# Patient Record
Sex: MALE | Race: WHITE | NOT HISPANIC OR LATINO | Employment: UNEMPLOYED | URBAN - METROPOLITAN AREA
[De-identification: names, ages, dates, MRNs, and addresses within clinical notes are randomized per-mention and may not be internally consistent; named-entity substitution may affect disease eponyms.]

---

## 2017-04-20 DIAGNOSIS — J44.9 CHRONIC OBSTRUCTIVE PULMONARY DISEASE, UNSPECIFIED COPD TYPE (H): ICD-10-CM

## 2017-04-20 NOTE — TELEPHONE ENCOUNTER
albuterol (PROAIR HFA, PROVENTIL HFA, VENTOLIN HFA) 108 (90 BASE) MCG/ACT inhaler       Last Written Prescription Date: 3/25/16  Last Fill Quantity: 1, # refills: 6    Last Office Visit with G, P or [facility] prescribing provider:  3/25/16   Future Office Visit:       Date of Last Asthma Action Plan Letter:   There are no preventive care reminders to display for this patient.   Asthma Control Test: No flowsheet data found.    Date of Last Spirometry Test:   No results found for this or any previous visit.        \

## 2017-04-24 NOTE — TELEPHONE ENCOUNTER
Routing refill request to provider for review/approval because:  Patient needs to be seen because it has been more than 1 year since last office visit.    Thank you  Shalini THEODORE RN

## 2017-04-25 RX ORDER — ALBUTEROL SULFATE 90 UG/1
AEROSOL, METERED RESPIRATORY (INHALATION)
Qty: 8.5 G | Refills: 0 | Status: SHIPPED | OUTPATIENT
Start: 2017-04-25 | End: 2017-11-13

## 2017-04-25 NOTE — TELEPHONE ENCOUNTER
Pt notified and is waiting for physical to be seen   But knows he needs to be seen for refills     Rebekah Galvez  [facility] [place]

## 2017-05-12 ENCOUNTER — OFFICE VISIT (OUTPATIENT)
Dept: FAMILY MEDICINE | Facility: CLINIC | Age: 52
End: 2017-05-12
Payer: COMMERCIAL

## 2017-05-12 VITALS
BODY MASS INDEX: 19.97 KG/M2 | HEART RATE: 76 BPM | SYSTOLIC BLOOD PRESSURE: 124 MMHG | DIASTOLIC BLOOD PRESSURE: 70 MMHG | OXYGEN SATURATION: 94 % | TEMPERATURE: 98.4 F | WEIGHT: 117 LBS | HEIGHT: 64 IN

## 2017-05-12 DIAGNOSIS — I10 ESSENTIAL HYPERTENSION, BENIGN: ICD-10-CM

## 2017-05-12 DIAGNOSIS — J44.9 CHRONIC OBSTRUCTIVE PULMONARY DISEASE, UNSPECIFIED COPD TYPE (H): Primary | ICD-10-CM

## 2017-05-12 DIAGNOSIS — Z12.11 SPECIAL SCREENING FOR MALIGNANT NEOPLASMS, COLON: ICD-10-CM

## 2017-05-12 DIAGNOSIS — J43.2 CENTRILOBULAR EMPHYSEMA (H): ICD-10-CM

## 2017-05-12 LAB
ANION GAP SERPL CALCULATED.3IONS-SCNC: 9 MMOL/L (ref 3–14)
BUN SERPL-MCNC: 17 MG/DL (ref 7–30)
CALCIUM SERPL-MCNC: 9.5 MG/DL (ref 8.5–10.1)
CHLORIDE SERPL-SCNC: 100 MMOL/L (ref 94–109)
CO2 SERPL-SCNC: 28 MMOL/L (ref 20–32)
CREAT SERPL-MCNC: 0.76 MG/DL (ref 0.66–1.25)
GFR SERPL CREATININE-BSD FRML MDRD: NORMAL ML/MIN/1.7M2
GLUCOSE SERPL-MCNC: 92 MG/DL (ref 70–99)
POTASSIUM SERPL-SCNC: 4.3 MMOL/L (ref 3.4–5.3)
SODIUM SERPL-SCNC: 137 MMOL/L (ref 133–144)

## 2017-05-12 PROCEDURE — 36415 COLL VENOUS BLD VENIPUNCTURE: CPT | Performed by: FAMILY MEDICINE

## 2017-05-12 PROCEDURE — 99214 OFFICE O/P EST MOD 30 MIN: CPT | Performed by: FAMILY MEDICINE

## 2017-05-12 PROCEDURE — 80048 BASIC METABOLIC PNL TOTAL CA: CPT | Performed by: FAMILY MEDICINE

## 2017-05-12 RX ORDER — ATENOLOL 25 MG/1
25 TABLET ORAL DAILY
Qty: 90 TABLET | Refills: 3 | Status: SHIPPED | OUTPATIENT
Start: 2017-05-12 | End: 2018-09-04

## 2017-05-12 RX ORDER — AMLODIPINE BESYLATE 10 MG/1
10 TABLET ORAL DAILY
Qty: 90 TABLET | Refills: 3 | Status: SHIPPED | OUTPATIENT
Start: 2017-05-12 | End: 2018-09-04

## 2017-05-12 RX ORDER — LISINOPRIL 40 MG/1
TABLET ORAL
Qty: 135 TABLET | Refills: 3 | Status: SHIPPED | OUTPATIENT
Start: 2017-05-12 | End: 2018-09-04

## 2017-05-12 RX ORDER — ALBUTEROL SULFATE 90 UG/1
2 AEROSOL, METERED RESPIRATORY (INHALATION) EVERY 6 HOURS PRN
Qty: 1 INHALER | Refills: 10 | Status: SHIPPED | OUTPATIENT
Start: 2017-05-12 | End: 2017-06-27

## 2017-05-12 NOTE — MR AVS SNAPSHOT
After Visit Summary   5/12/2017    Luis Hernandez    MRN: [id number]           Patient Information     Date Of Birth          1965        Visit Information        Provider Department      5/12/2017 10:20 AM Dean Mariee MD [facility]        Today's Diagnoses     Chronic obstructive pulmonary disease, unspecified COPD type (H)    -  1    Centrilobular emphysema (H)        BENIGN HYPERTENSION        Special screening for malignant neoplasms, colon          Care Instructions    Please go to lab.    If you are finding out that you are having more shortness of breath, try using the albuterol prior to activity or let me know and I can increase the dose of the advair to 500/50.    Please get your colonoscopy done this summer.          Thank you for choosing [facility].  You may be receiving a survey in the mail from [employer] regarding your visit today.  Please take a few minutes to complete and return the survey to let us know how we are doing.      If you have questions or concerns, please contact us via [employer] or you can contact your care team at [phone].    Our Clinic hours are:  Monday 6:40 am  to 7:00 pm  Tuesday -Friday 6:40 am to 5:00 pm    The [place] outpatient lab hours are:  Monday - Friday 6:10 am to 4:45 pm  Saturdays 7:00 am to 11:00 am  Appointments are required, call [phone]    If you have clinical questions after hours or would like to schedule an appointment,  call the clinic at [phone].          Follow-ups after your visit        Additional Services     GASTROENTEROLOGY ADULT REF PROCEDURE ONLY       Last Lab Result: Creatinine (mg/dL)       Date                     Value                 03/25/2016               0.72             ----------  Body mass index is 20.24 kg/(m^2).     Needed:  No  Language:  English    Patient will be contacted to schedule procedure.     Please be aware that coverage of these services is subject to the  "terms and limitations of your health insurance plan.  Call member services at your health plan with any benefit or coverage questions.  Any procedures must be performed at a [facility] facility OR coordinated by your clinic's referral office.    Please bring the following with you to your appointment:    (1) Any X-Rays, CTs or MRIs which have been performed.  Contact the facility where they were done to arrange for  prior to your scheduled appointment.    (2) List of current medications   (3) This referral request   (4) Any documents/labs given to you for this referral                  Who to contact     If you have questions or need follow up information about today's clinic visit or your schedule please contact [facility] directly at [phone].  Normal or non-critical lab and imaging results will be communicated to you by MyChart, letter or phone within 4 business days after the clinic has received the results. If you do not hear from us within 7 days, please contact the clinic through "[employer]hart or phone. If you have a critical or abnormal lab result, we will notify you by phone as soon as possible.  Submit refill requests through [employer] or call your pharmacy and they will forward the refill request to us. Please allow 3 business days for your refill to be completed.          Additional Information About Your Visit        "[email or web address] Information     [employer] lets you send messages to your doctor, view your test results, renew your prescriptions, schedule appointments and more. To sign up, go to [email or web address] . Click on \"Log in\" on the left side of the screen, which will take you to the Welcome page. Then click on \"Sign up Now\" on the right side of the page.     You will be asked to enter the access code listed below, as well as some personal information. Please follow the directions to create your username and password.     Your access code is: [email or web address]  Expires: 8/10/2017 10:52 AM   " "  Your access code will  in 90 days. If you need help or a new code, please call your [facility] clinic or [phone].        Care EveryWhere ID     This is your Care EveryWhere ID. This could be used by other organizations to access your [facility] medical records  [id number]        Your Vitals Were     Pulse Temperature Height Pulse Oximetry BMI (Body Mass Index)       76 98.4  F (36.9  C) (Tympanic) 5' 3.75\" (1.619 m) 94% 20.24 kg/m2        Blood Pressure from Last 3 Encounters:   17 124/70   16 132/84   16 139/85    Weight from Last 3 Encounters:   17 117 lb (53.1 kg)   16 126 lb 12.8 oz (57.5 kg)   16 126 lb 8 oz (57.4 kg)              We Performed the Following     Basic metabolic panel     GASTROENTEROLOGY ADULT REF PROCEDURE ONLY          Today's Medication Changes          These changes are accurate as of: 17 10:52 AM.  If you have any questions, ask your nurse or doctor.               These medicines have changed or have updated prescriptions.        Dose/Directions    * albuterol 108 (90 BASE) MCG/ACT Inhaler   This may have changed:  Another medication with the same name was added. Make sure you understand how and when to take each.   Used for:  Chronic obstructive pulmonary disease, unspecified COPD type (H)   Generic drug:  albuterol   Changed by:  Dean Mariee MD        INHALE 2 PUFFS INTO THE LUNGS EVERY 4 HOURS AS NNEDED FOR SHORTNESS OF BREATH / DYSPNEA   Quantity:  8.5 g   Refills:  0       * albuterol 108 (90 BASE) MCG/ACT Inhaler   Commonly known as:  PROAIR HFA/PROVENTIL HFA/VENTOLIN HFA   This may have changed:  You were already taking a medication with the same name, and this prescription was added. Make sure you understand how and when to take each.   Used for:  Chronic obstructive pulmonary disease, unspecified COPD type (H), Centrilobular emphysema (H)   Changed by:  Dean Mariee MD        Dose:  2 puff   Inhale 2 puffs into the " lungs every 6 hours as needed for shortness of breath / dyspnea or wheezing   Quantity:  1 Inhaler   Refills:  10       * Notice:  This list has 2 medication(s) that are the same as other medications prescribed for you. Read the directions carefully, and ask your doctor or other care provider to review them with you.         Where to get your medicines      These medications were sent to [id number] - [place], [place] - [address]  [address] [address]     Phone:  [phone]     albuterol 108 (90 BASE) MCG/ACT Inhaler    amLODIPine 10 MG tablet    atenolol 25 MG tablet    fluticasone-salmeterol 250-50 MCG/DOSE diskus inhaler    lisinopril 40 MG tablet                Primary Care Provider Office Phone # Fax #    Dean Mariee [phone]       [facility] [address] [address]        Thank you!     Thank you for choosing [facility]  for your care. Our goal is always to provide you with excellent care. Hearing back from our patients is one way we can continue to improve our services. Please take a few minutes to complete the written survey that you may receive in the mail after your visit with us. Thank you!             Your Updated Medication List - Protect others around you: Learn how to safely use, store and throw away your medicines at www.disposemymeds.org.          This list is accurate as of: 5/12/17 10:52 AM.  Always use your most recent med list.                   Brand Name Dispense Instructions for use    * albuterol 108 (90 BASE) MCG/ACT Inhaler   Generic drug:  albuterol     8.5 g    INHALE 2 PUFFS INTO THE LUNGS EVERY 4 HOURS AS NNEDED FOR SHORTNESS OF BREATH / DYSPNEA       * albuterol 108 (90 BASE) MCG/ACT Inhaler    PROAIR HFA/PROVENTIL HFA/VENTOLIN HFA    1 Inhaler    Inhale 2 puffs into the lungs every 6 hours as needed for shortness of breath / dyspnea or wheezing       amLODIPine 10 MG  tablet    NORVASC    90 tablet    Take 1 tablet (10 mg) by mouth daily       atenolol 25 MG tablet    TENORMIN    90 tablet    Take 1 tablet (25 mg) by mouth daily       fluticasone-salmeterol 250-50 MCG/DOSE diskus inhaler    ADVAIR    3 Inhaler    Inhale 1 puff into the lungs every 12 hours       lisinopril 40 MG tablet    PRINIVIL/ZESTRIL    135 tablet    Take 1.5 pills, or 60 mg daily       * Notice:  This list has 2 medication(s) that are the same as other medications prescribed for you. Read the directions carefully, and ask your doctor or other care provider to review them with you.

## 2017-05-12 NOTE — PROGRESS NOTES
SUBJECTIVE:                                                    Luis Hernandez is a 51 year old male who presents to clinic today for the following health issues:      Hypertension Follow-up      Outpatient blood pressures are not being checked.    Low Salt Diet: no added salt     COPD Follow-Up    Symptoms are currently: stable    Current fatigue or dyspnea with ambulation: stable     Shortness of breath: stable    Increased or change in Cough/Sputum: Yes-  Some phlegm lately    Fever(s): No    Baseline ambulation without stopping to rest 1-2 feet, blocks. Able to walk up 2-3 flights of stairs without stopping to rest.    Any ER/UC or hospital admissions since your last visit? No     History   Smoking Status     Former Smoker     Packs/day: 1.00     Years: 20.00     Types: Cigarettes   Smokeless Tobacco     Former User     Comment: quitting using the patch     No results found for: FEV1, QLY6QAZ       Amount of exercise or physical activity: walks daily with job    Problems taking medications regularly: No    Medication side effects: none    Diet: low salt          Problem list and histories reviewed & adjusted, as indicated.  Additional history: as documented        Reviewed and updated as needed this visit by clinical staff  Tobacco  Allergies  Med Hx  Surg Hx  Fam Hx  Soc Hx      Reviewed and updated as needed this visit by Provider         ROS:  CONSTITUTIONAL:NEGATIVE for fever, chills, change in weight  INTEGUMENTARY/SKIN: NEGATIVE for worrisome rashes, moles or lesions  RESP:sometimes has some increase work usually during the winter months.  CV: NEGATIVE for chest pain, palpitations or peripheral edema  MUSCULOSKELETAL: NEGATIVE for significant arthralgias or myalgia  NEURO: NEGATIVE for weakness, dizziness or paresthesias  PSYCHIATRIC: NEGATIVE for changes in mood or affect    OBJECTIVE:                                                    /70 (Cuff Size: Adult Regular)  Pulse 76  Temp 98.4  F  "(36.9  C) (Tympanic)  Ht 5' 3.75\" (1.619 m)  Wt 117 lb (53.1 kg)  SpO2 94%  BMI 20.24 kg/m2  Body mass index is 20.24 kg/(m^2).  GENERAL APPEARANCE: alert, no distress and cooperative  RESP: clear but some decrease in breath sounds  CV: regular rates and rhythm, normal S1 S2, no S3 or S4 and no murmur, click or rub  MS: extremities normal- no gross deformities noted  SKIN: no suspicious lesions or rashes  NEURO: Normal strength and tone, mentation intact and speech normal  PSYCH: mentation appears normal and affect normal/bright         ASSESSMENT/PLAN:                                                    1. Chronic obstructive pulmonary disease, unspecified COPD type (H)  Stable however discussed increasing strength of advair if worse  - albuterol (PROAIR HFA/PROVENTIL HFA/VENTOLIN HFA) 108 (90 BASE) MCG/ACT Inhaler; Inhale 2 puffs into the lungs every 6 hours as needed for shortness of breath / dyspnea or wheezing  Dispense: 1 Inhaler; Refill: 10    2. Centrilobular emphysema (H)  noted  - fluticasone-salmeterol (ADVAIR) 250-50 MCG/DOSE diskus inhaler; Inhale 1 puff into the lungs every 12 hours  Dispense: 3 Inhaler; Refill: 3  - albuterol (PROAIR HFA/PROVENTIL HFA/VENTOLIN HFA) 108 (90 BASE) MCG/ACT Inhaler; Inhale 2 puffs into the lungs every 6 hours as needed for shortness of breath / dyspnea or wheezing  Dispense: 1 Inhaler; Refill: 10    3. BENIGN HYPERTENSION  controlled  - lisinopril (PRINIVIL/ZESTRIL) 40 MG tablet; Take 1.5 pills, or 60 mg daily  Dispense: 135 tablet; Refill: 3  - atenolol (TENORMIN) 25 MG tablet; Take 1 tablet (25 mg) by mouth daily  Dispense: 90 tablet; Refill: 3  - amLODIPine (NORVASC) 10 MG tablet; Take 1 tablet (10 mg) by mouth daily  Dispense: 90 tablet; Refill: 3  - Basic metabolic panel    4. Special screening for malignant neoplasms, colon  Needs to have screening done  - GASTROENTEROLOGY ADULT REF PROCEDURE ONLY      See Patient Instructions    Dean Mariee MD  [place] [facility]

## 2017-05-12 NOTE — PATIENT INSTRUCTIONS
Please go to lab.    If you are finding out that you are having more shortness of breath, try using the albuterol prior to activity or let me know and I can increase the dose of the advair to 500/50.    Please get your colonoscopy done this summer.          Thank you for choosing [facility].  You may be receiving a survey in the mail from [employer] regarding your visit today.  Please take a few minutes to complete and return the survey to let us know how we are doing.      If you have questions or concerns, please contact us via [employer] or you can contact your care team at [phone].    Our Clinic hours are:  Monday 6:40 am  to 7:00 pm  Tuesday -Friday 6:40 am to 5:00 pm    The [place] outpatient lab hours are:  Monday - Friday 6:10 am to 4:45 pm  Saturdays 7:00 am to 11:00 am  Appointments are required, call [phone]    If you have clinical questions after hours or would like to schedule an appointment,  call the clinic at [phone].

## 2017-05-12 NOTE — LETTER
[facility]  [address] [address]  Phone: [phone]    May 15, 2017    Luis KATY Hernandez  [address] [address]          Dear Mr. Hernandez,    The results of your recent lab tests were:    He has normal kidney function.        Enclosed is a copy of these results.  If you have any further questions or problems, please contact our office.        Sincerely,      Dean Mariee MD / DARRYL

## 2017-06-27 ENCOUNTER — TELEPHONE (OUTPATIENT)
Dept: FAMILY MEDICINE | Facility: CLINIC | Age: 52
End: 2017-06-27

## 2017-06-27 DIAGNOSIS — J44.9 CHRONIC OBSTRUCTIVE PULMONARY DISEASE, UNSPECIFIED COPD TYPE (H): ICD-10-CM

## 2017-06-27 DIAGNOSIS — J43.2 CENTRILOBULAR EMPHYSEMA (H): ICD-10-CM

## 2017-06-27 RX ORDER — ALBUTEROL SULFATE 90 UG/1
2 AEROSOL, METERED RESPIRATORY (INHALATION) EVERY 6 HOURS PRN
Qty: 1 INHALER | Refills: 10 | Status: SHIPPED | OUTPATIENT
Start: 2017-06-27 | End: 2018-07-27

## 2017-06-27 NOTE — TELEPHONE ENCOUNTER
Patient returned our call, and I gave him the message.  [facility]  Clinic [facility]  [place]

## 2017-06-27 NOTE — TELEPHONE ENCOUNTER
S-(situation): Patient reports that he has used his ventolin inhaler x2 since it was prescribed and it has made his tongue swell up both times he has used it.      B-(background): Patient states that he picked up a ventolin inhaler from the pharmacy 2 weeks ago.  Both times he has used it his tongue has swelled up.  This happened most recently last night.  No other recent changes to medications or no new foods.  Patient did not seek emergency care either time or take benadryl.  Symptoms resolved on their own.  Patient would like to switch back to the albuterol inhaler he used previously.    A-(assessment): Patient having tongue swelling from ventolin inhaler.  Called pharmacy and patient was previously dispensed proair, but insurance no longer covers this.    R-(recommendations): Order pended for albuterol specifically asking for proair to be dispensed.    Judie Yoo RN

## 2017-06-27 NOTE — TELEPHONE ENCOUNTER
Reason for Call:  Other med request    Detailed comments: Patient states he is reacting to Ventolin and wants to go back to Albuterol please.  He states the Ventolin makes his tongue swell.    Phone Number Patient can be reached at: Home number on file [phone] (home)    Best Time: any    Can we leave a detailed message on this number? YES    Call taken on 6/27/2017 at 7:59 AM by Jocelyn Adams

## 2017-06-27 NOTE — TELEPHONE ENCOUNTER
Left message for patient to return call to clinic.  Prescription for proair was sent to the pharmacy this is the form of albuterol that he was on previously.    Judie Yoo RN

## 2017-11-08 ENCOUNTER — TELEPHONE (OUTPATIENT)
Dept: FAMILY MEDICINE | Facility: CLINIC | Age: 52
End: 2017-11-08

## 2017-11-08 DIAGNOSIS — J43.2 CENTRILOBULAR EMPHYSEMA (H): ICD-10-CM

## 2017-11-08 NOTE — TELEPHONE ENCOUNTER
Last OV 5/12/17 with Dr. Mariee.  Patient instructions from that OV are copied below    Instructions   Patient Instructions    Please go to lab.     If you are finding out that you are having more shortness of breath, try using the albuterol prior to activity or let me know and I can increase the dose of the advair to 500/50.     Please get your colonoscopy done this summer.        Order pended.    Judie Yoo RN

## 2017-11-08 NOTE — TELEPHONE ENCOUNTER
Patient reports Dr. Mariee told patient at last appointment that if patient would like a stronger dose to let us know. Patient is requesting a stronger dose with this refill.    fluticasone-salmeterol (ADVAIR) 250-50 MCG/DOSE diskus inhaler       Last Written Prescription Date: 5/12/17  Last Fill Quantity: 3, # refills: 3    Last Office Visit with G, P or [facility] prescribing provider:  5/12/17   Future Office Visit:

## 2017-11-09 NOTE — TELEPHONE ENCOUNTER
Left him a message to check with the pharmacy for something that he requested and plan to see primary before a refill will be needed.     Left our number to call if he has questions.     Nisha Miller RNC

## 2017-11-13 ENCOUNTER — OFFICE VISIT (OUTPATIENT)
Dept: FAMILY MEDICINE | Facility: CLINIC | Age: 52
End: 2017-11-13
Payer: COMMERCIAL

## 2017-11-13 ENCOUNTER — RADIANT APPOINTMENT (OUTPATIENT)
Dept: GENERAL RADIOLOGY | Facility: CLINIC | Age: 52
End: 2017-11-13
Attending: FAMILY MEDICINE
Payer: COMMERCIAL

## 2017-11-13 VITALS
OXYGEN SATURATION: 98 % | BODY MASS INDEX: 19.5 KG/M2 | TEMPERATURE: 98.1 F | HEART RATE: 76 BPM | WEIGHT: 114.2 LBS | HEIGHT: 64 IN | DIASTOLIC BLOOD PRESSURE: 76 MMHG | SYSTOLIC BLOOD PRESSURE: 120 MMHG

## 2017-11-13 DIAGNOSIS — J44.1 COPD EXACERBATION (H): ICD-10-CM

## 2017-11-13 DIAGNOSIS — Z23 NEED FOR PROPHYLACTIC VACCINATION AND INOCULATION AGAINST INFLUENZA: ICD-10-CM

## 2017-11-13 DIAGNOSIS — J43.2 CENTRILOBULAR EMPHYSEMA (H): ICD-10-CM

## 2017-11-13 DIAGNOSIS — R06.2 WHEEZING: ICD-10-CM

## 2017-11-13 DIAGNOSIS — J43.2 CENTRILOBULAR EMPHYSEMA (H): Primary | ICD-10-CM

## 2017-11-13 PROCEDURE — 71020 XR CHEST 2 VW: CPT

## 2017-11-13 PROCEDURE — 90471 IMMUNIZATION ADMIN: CPT | Performed by: FAMILY MEDICINE

## 2017-11-13 PROCEDURE — 90686 IIV4 VACC NO PRSV 0.5 ML IM: CPT | Performed by: FAMILY MEDICINE

## 2017-11-13 PROCEDURE — 99214 OFFICE O/P EST MOD 30 MIN: CPT | Mod: 25 | Performed by: FAMILY MEDICINE

## 2017-11-13 RX ORDER — DOXYCYCLINE 100 MG/1
100 CAPSULE ORAL 2 TIMES DAILY
Qty: 20 CAPSULE | Refills: 0 | Status: SHIPPED | OUTPATIENT
Start: 2017-11-13 | End: 2017-12-05

## 2017-11-13 RX ORDER — PREDNISONE 10 MG/1
40 TABLET ORAL DAILY
Qty: 20 TABLET | Refills: 0 | Status: SHIPPED | OUTPATIENT
Start: 2017-11-13 | End: 2017-12-05

## 2017-11-13 NOTE — LETTER
[facility]  [address] [address]  Phone: [phone]      November 13, 2017      RE: Luis Hernandez  [address] [address]        To whom it may concern:    Luis Hernandez is under my professional care he was seen in clinic today 11/13/2017. The employee is UNABLE to return to work until 11/14/2017, may return to his regular shift.      Sincerely,    Dean Mariee MD

## 2017-11-13 NOTE — NURSING NOTE
"Chief Complaint   Patient presents with     Chest Pain     COPD     follow up      Flu Shot     Health Maintenance     Patient reminded due for Colonoscopy, he has info at home.        Initial BP (!) 122/92 (BP Location: Right arm, Patient Position: Chair, Cuff Size: Adult Regular)  Pulse 76  Temp 98.1  F (36.7  C) (Tympanic)  Ht 5' 3.75\" (1.619 m)  Wt 114 lb 3.2 oz (51.8 kg)  SpO2 98%  BMI 19.76 kg/m2 Estimated body mass index is 19.76 kg/(m^2) as calculated from the following:    Height as of this encounter: 5' 3.75\" (1.619 m).    Weight as of this encounter: 114 lb 3.2 oz (51.8 kg).  Medication Reconciliation: complete    "

## 2017-11-13 NOTE — PROGRESS NOTES
"  SUBJECTIVE:   Luis Hernandez is a 52 year old male who presents to clinic today for the following health issues:    Chief Complaint   Patient presents with     Chest Pain     COPD     follow up      Flu Shot     Health Maintenance     Patient reminded due for Colonoscopy, he has info at home.        COPD Follow-Up    Symptoms are currently: slightly worsened    Current fatigue or dyspnea with ambulation: worsened from baseline    Shortness of breath: slightly worsened    Increased or change in Cough/Sputum: No    Fever(s): No    Baseline ambulation without stopping to rest:  2 blocks. Able to walk up 2 flights of stairs without stopping to rest.    Any ER/UC or hospital admissions since your last visit? No     History   Smoking Status     Former Smoker     Packs/day: 1.00     Years: 20.00     Types: Cigarettes   Smokeless Tobacco     Former User     Comment: quitting using the patch     No results found for: FEV1, [id number]      Amount of exercise or physical activity: \" just his job\"    Problems taking medications regularly: No    Medication side effects: none    Diet: regular (no restrictions)    CHEST PAIN     Onset: 1 week ago     Description:   Location:  right side- Upper chest   Character: sharp  Radiation: none   Duration:Constant at work- worse when at work up moving around . Fine if he is at home not up and moving .      Intensity: moderate    Progression of Symptoms:  same    Accompanying Signs & Symptoms:  Shortness of breath: no  Sweating: no  Nausea/vomiting: no  Lightheadedness: no  Palpitations: not applicable  Fever/Chills: no  Cough: no  Heartburn: no    History:   Family history of heart disease YES- Paternal Grandmother     Tobacco use: no    Precipitating factors:   Worse with exertion: YES  Worse with deep breaths :  YES, and worse with heavy breathing   Also worse with coughing   Related to food: no    Alleviating factors:  Rest       Therapies Tried and outcome: Rest- helps     patient has " "right sided chest pain with activity for the past week.  He also has copd, just had advair increased started the higher dose last night.  He is active at work.          Problem list and histories reviewed & adjusted, as indicated.  Additional history: as documented        Reviewed and updated as needed this visit by clinical staff  Tobacco  Allergies  Meds  Med Hx  Surg Hx  Fam Hx  Soc Hx      Reviewed and updated as needed this visit by Provider         ROS:  CONSTITUTIONAL:NEGATIVE for fever, chills, change in weight  INTEGUMENTARY/SKIN: NEGATIVE for worrisome rashes, moles or lesions  ENT/MOUTH: NEGATIVE for ear, mouth and throat problems  RESP:has wheeze, worse, known copd  CV:right sided pain  MUSCULOSKELETAL: NEGATIVE for significant arthralgias or myalgia  NEURO: NEGATIVE for weakness, dizziness or paresthesias  PSYCHIATRIC: NEGATIVE for changes in mood or affect    OBJECTIVE:                                                    /76 (BP Location: Right arm, Patient Position: Chair, Cuff Size: Adult Regular)  Pulse 76  Temp 98.1  F (36.7  C) (Tympanic)  Ht 5' 3.75\" (1.619 m)  Wt 114 lb 3.2 oz (51.8 kg)  SpO2 98%  BMI 19.76 kg/m2  Body mass index is 19.76 kg/(m^2).  GENERAL APPEARANCE: alert, no distress, cooperative and Nourishment slim   HENT: ear canals and TM's normal and nose and mouth without ulcers or lesions  NECK: no adenopathy, no asymmetry, masses, or scars and thyroid normal to palpation  RESP: diffuse wheeze, decrease breath sounds  CV: regular rates and rhythm, normal S1 S2, no S3 or S4 and no murmur, click or rub  MS: extremities normal- no gross deformities noted  SKIN: no suspicious lesions or rashes  NEURO: Normal strength and tone, mentation intact and speech normal  PSYCH: mentation appears normal and affect normal/bright    I have personally reviewed the xray and my interpretation is the following:  CXR is clear       ASSESSMENT/PLAN:                                          "           1. Centrilobular emphysema (H)  Likely having a copd exacerbation, will treat with steroid, cover with abx, also follow up in 2 weeks.  Concerned to make sure nothing else is going on. Just increased advair to a higher dose too. If not better may need to have chest ct with contrast.  - XR Chest 2 Views; Future    2. Need for prophylactic vaccination and inoculation against influenza    - Vaccine Administration, Initial [23970]  - FLU VAC, SPLIT VIRUS IM > 3 YO (QUADRIVALENT) [05982]    3. Wheezing    - XR Chest 2 Views; Future  COPD exacerbation plan as above.  He is at risk for decline due to underlying disease.    See Patient Instructions    Dean Mariee MD  [facility]  Injectable Influenza Immunization Documentation    1.  Is the person to be vaccinated sick today?   No    2. Does the person to be vaccinated have an allergy to a component   of the vaccine?   No  Egg Allergy Algorithm Link    3. Has the person to be vaccinated ever had a serious reaction   to influenza vaccine in the past?   No    4. Has the person to be vaccinated ever had Guillain-Barré syndrome?   No    Form completed by Ghanshyam CARR CMA

## 2017-11-13 NOTE — MR AVS SNAPSHOT
After Visit Summary   11/13/2017    Luis Hernandez    MRN: [id number]           Patient Information     Date Of Birth          1965        Visit Information        Provider Department      11/13/2017 2:40 PM Dean Mariee MD [facility]        Today's Diagnoses     Centrilobular emphysema (H)    -  1    Need for prophylactic vaccination and inoculation against influenza        Wheezing          Care Instructions    Follow up in 2 weeks.    I am putting you on an antibiotic and also oral prednisone.    I am expecting you should be feeling better over the next week.      Thank you for choosing [facility].  You may be receiving a survey in the mail from [employer] regarding your visit today.  Please take a few minutes to complete and return the survey to let us know how we are doing.      If you have questions or concerns, please contact us via Park[employer] Enterprise or you can contact your care team at [phone].    Our Clinic hours are:  Monday 6:40 am  to 7:00 pm  Tuesday -Friday 6:40 am to 5:00 pm    The [place] outpatient lab hours are:  Monday - Friday 6:10 am to 4:45 pm  Saturdays 7:00 am to 11:00 am  Appointments are required, call [phone]    If you have clinical questions after hours or would like to schedule an appointment,  call the clinic at [phone].            Follow-ups after your visit        Who to contact     If you have questions or need follow up information about today's clinic visit or your schedule please contact [facility] directly at [phone].  Normal or non-critical lab and imaging results will be communicated to you by Novasentishart, letter or phone within 4 business days after the clinic has received the results. If you do not hear from us within 7 days, please contact the clinic through [employer] or phone. If you have a critical or abnormal lab result, we will notify you by phone as soon as possible.  Submit refill requests through [employer]  "or call your pharmacy and they will forward the refill request to us. Please allow 3 business days for your refill to be completed.          Additional Information About Your Visit        MyChart Information     TelePharmhart lets you send messages to your doctor, view your test results, renew your prescriptions, schedule appointments and more. To sign up, go to [email or web address] . Click on \"Log in\" on the left side of the screen, which will take you to the Welcome page. Then click on \"Sign up Now\" on the right side of the page.     You will be asked to enter the access code listed below, as well as some personal information. Please follow the directions to create your username and password.     Your access code is: [email or web address]  Expires: 2018  3:34 PM     Your access code will  in 90 days. If you need help or a new code, please call your [place] clinic or [phone].        Care EveryWhere ID     This is your Care EveryWhere ID. This could be used by other organizations to access your [place] medical records  [id number]        Your Vitals Were     Pulse Temperature Height Pulse Oximetry BMI (Body Mass Index)       76 98.1  F (36.7  C) (Tympanic) 5' 3.75\" (1.619 m) 98% 19.76 kg/m2        Blood Pressure from Last 3 Encounters:   17 120/76   17 124/70   16 132/84    Weight from Last 3 Encounters:   17 114 lb 3.2 oz (51.8 kg)   17 117 lb (53.1 kg)   16 126 lb 12.8 oz (57.5 kg)              We Performed the Following     FLU VAC, SPLIT VIRUS IM > 3 YO (QUADRIVALENT) [95917]     Vaccine Administration, Initial [62960]          Today's Medication Changes          These changes are accurate as of: 17  3:34 PM.  If you have any questions, ask your nurse or doctor.               Start taking these medicines.        Dose/Directions    doxycycline 100 MG capsule   Commonly known as:  VIBRAMYCIN   Used for:  Centrilobular emphysema (H), Wheezing   Started by:  Kodi " Dean DOTSON MD        Dose:  100 mg   Take 1 capsule (100 mg) by mouth 2 times daily   Quantity:  20 capsule   Refills:  0       predniSONE 10 MG tablet   Commonly known as:  DELTASONE   Used for:  Centrilobular emphysema (H), Wheezing   Started by:  Dean Mariee MD        Dose:  40 mg   Take 4 tablets (40 mg) by mouth daily for 5 days   Quantity:  20 tablet   Refills:  0            Where to get your medicines      These medications were sent to [id number] - [place], [place] - [address]  [address] [address]     Phone:  [phone]     doxycycline 100 MG capsule    predniSONE 10 MG tablet                Primary Care Provider Office Phone # Fax #    Dean Mariee [phone] [address] [address]        Equal Access to Services     [facility]: Hadii anthony lara hadasho Soomaali, waaxda luqadaha, qaybta kaalmada adeegyada, waxjamarcus zarate . So [facility] [phone].    ATENCIÓN: Si habla español, tiene a zuleta disposición servicios gratuitos de asistencia lingüística. Llame al [phone].    We comply with applicable federal civil rights laws and [place] laws. We do not discriminate on the basis of race, color, national origin, age, disability, sex, sexual orientation, or gender identity.            Thank you!     Thank you for choosing [facility]  for your care. Our goal is always to provide you with excellent care. Hearing back from our patients is one way we can continue to improve our services. Please take a few minutes to complete the written survey that you may receive in the mail after your visit with us. Thank you!             Your Updated Medication List - Protect others around you: Learn how to safely use, store and throw away your medicines at www.disposemymeds.org.          This list is accurate as of: 11/13/17  3:34 PM.  Always use your most recent med list.                    Brand Name Dispense Instructions for use Diagnosis    albuterol 108 (90 BASE) MCG/ACT Inhaler    PROAIR HFA/PROVENTIL HFA/VENTOLIN HFA    1 Inhaler    Inhale 2 puffs into the lungs every 6 hours as needed for shortness of breath / dyspnea PROAIR only, had tongue swelling with Ventolin.    Chronic obstructive pulmonary disease, unspecified COPD type (H), Centrilobular emphysema (H)       amLODIPine 10 MG tablet    NORVASC    90 tablet    Take 1 tablet (10 mg) by mouth daily    Essential hypertension, benign       atenolol 25 MG tablet    TENORMIN    90 tablet    Take 1 tablet (25 mg) by mouth daily    Essential hypertension, benign       doxycycline 100 MG capsule    VIBRAMYCIN    20 capsule    Take 1 capsule (100 mg) by mouth 2 times daily    Centrilobular emphysema (H), Wheezing       fluticasone-salmeterol 500-50 MCG/DOSE diskus inhaler    ADVAIR    1 Inhaler    Inhale 1 puff into the lungs 2 times daily    Centrilobular emphysema (H)       lisinopril 40 MG tablet    PRINIVIL/ZESTRIL    135 tablet    Take 1.5 pills, or 60 mg daily    Essential hypertension, benign       predniSONE 10 MG tablet    DELTASONE    20 tablet    Take 4 tablets (40 mg) by mouth daily for 5 days    Centrilobular emphysema (H), Wheezing

## 2017-11-13 NOTE — PATIENT INSTRUCTIONS
Follow up in 2 weeks.    I am putting you on an antibiotic and also oral prednisone.    I am expecting you should be feeling better over the next week.      Thank you for choosing [facility].  You may be receiving a survey in the mail from Jose [employer]ast[employer] regarding your visit today.  Please take a few minutes to complete and return the survey to let us know how we are doing.      If you have questions or concerns, please contact us via Online [employer] Platform or you can contact your care team at [phone].    Our Clinic hours are:  Monday 6:40 am  to 7:00 pm  Tuesday -Friday 6:40 am to 5:00 pm    The [place] outpatient lab hours are:  Monday - Friday 6:10 am to 4:45 pm  Saturdays 7:00 am to 11:00 am  Appointments are required, call [phone]    If you have clinical questions after hours or would like to schedule an appointment,  call the clinic at [phone].

## 2017-12-05 ENCOUNTER — OFFICE VISIT (OUTPATIENT)
Dept: FAMILY MEDICINE | Facility: CLINIC | Age: 52
End: 2017-12-05
Payer: COMMERCIAL

## 2017-12-05 VITALS
HEART RATE: 83 BPM | OXYGEN SATURATION: 97 % | HEIGHT: 64 IN | RESPIRATION RATE: 16 BRPM | DIASTOLIC BLOOD PRESSURE: 84 MMHG | TEMPERATURE: 97.2 F | WEIGHT: 116.2 LBS | SYSTOLIC BLOOD PRESSURE: 138 MMHG | BODY MASS INDEX: 19.84 KG/M2

## 2017-12-05 DIAGNOSIS — R06.2 WHEEZING: ICD-10-CM

## 2017-12-05 DIAGNOSIS — J43.2 CENTRILOBULAR EMPHYSEMA (H): Primary | ICD-10-CM

## 2017-12-05 DIAGNOSIS — F17.201 TOBACCO ABUSE, IN REMISSION: ICD-10-CM

## 2017-12-05 DIAGNOSIS — I10 ESSENTIAL HYPERTENSION, BENIGN: ICD-10-CM

## 2017-12-05 PROCEDURE — 99214 OFFICE O/P EST MOD 30 MIN: CPT | Performed by: FAMILY MEDICINE

## 2017-12-05 RX ORDER — PREDNISONE 10 MG/1
40 TABLET ORAL DAILY
Qty: 20 TABLET | Refills: 0 | Status: SHIPPED | OUTPATIENT
Start: 2017-12-05 | End: 2018-09-04

## 2017-12-05 RX ORDER — DOXYCYCLINE 100 MG/1
100 CAPSULE ORAL 2 TIMES DAILY
Qty: 20 CAPSULE | Refills: 0 | Status: SHIPPED | OUTPATIENT
Start: 2017-12-05 | End: 2018-09-04

## 2017-12-05 NOTE — PATIENT INSTRUCTIONS
For your COPD,  You are doing better.    Due to the severity of your COPD, I do want you to have on hand a course of antibiotic called doxycycline and oral prednisone.    If your breathing gets worse, increase cough, shortness of breath which is going on for days, please start the antibiotic and the oral steroid medication.  You will then need to let us know how the treatment works.    I refilled your Advair medication at the higher dose for the next year.      Thank you for choosing [facility].  You may be receiving a survey in the mail from Jose Gastelum regarding your visit today.  Please take a few minutes to complete and return the survey to let us know how we are doing.      If you have questions or concerns, please contact us via anywayanyday or you can contact your care team at [phone].    Our Clinic hours are:  Monday 6:40 am  to 7:00 pm  Tuesday -Friday 6:40 am to 5:00 pm    The [place] outpatient lab hours are:  Monday - Friday 6:10 am to 4:45 pm  Saturdays 7:00 am to 11:00 am  Appointments are required, call [phone]    If you have clinical questions after hours or would like to schedule an appointment,  call the clinic at [phone].

## 2017-12-05 NOTE — MR AVS SNAPSHOT
After Visit Summary   12/5/2017    Luis Hernandez    MRN: [id number]           Patient Information     Date Of Birth          1965        Visit Information        Provider Department      12/5/2017 8:20 AM Dean Mariee MD [facility]        Today's Diagnoses     Centrilobular emphysema (H)    -  1    Essential hypertension, benign        Tobacco abuse, in remission        Wheezing          Care Instructions    For your COPD,  You are doing better.    Due to the severity of your COPD, I do want you to have on hand a course of antibiotic called doxycycline and oral prednisone.    If your breathing gets worse, increase cough, shortness of breath which is going on for days, please start the antibiotic and the oral steroid medication.  You will then need to let us know how the treatment works.    I refilled your Advair medication at the higher dose for the next year.      Thank you for choosing [facility].  You may be receiving a survey in the mail from [facility] regarding your visit today.  Please take a few minutes to complete and return the survey to let us know how we are doing.      If you have questions or concerns, please contact us via [email or web address] or you can contact your care team at [phone].    Our Clinic hours are:  Monday 6:40 am  to 7:00 pm  Tuesday -Friday 6:40 am to 5:00 pm    The [place] outpatient lab hours are:  Monday - Friday 6:10 am to 4:45 pm  Saturdays 7:00 am to 11:00 am  Appointments are required, call [phone]    If you have clinical questions after hours or would like to schedule an appointment,  call the clinic at [phone].            Follow-ups after your visit        Who to contact     If you have questions or need follow up information about today's clinic visit or your schedule please contact [facility] directly at [phone].  Normal or non-critical lab and imaging results will be communicated to you by Sandra, letter  "or phone within 4 business days after the clinic has received the results. If you do not hear from us within 7 days, please contact the clinic through Slide or phone. If you have a critical or abnormal lab result, we will notify you by phone as soon as possible.  Submit refill requests through Slide or call your pharmacy and they will forward the refill request to us. Please allow 3 business days for your refill to be completed.          Additional Information About Your Visit        Slide Information     Slide lets you send messages to your doctor, view your test results, renew your prescriptions, schedule appointments and more. To sign up, go to [email or web address] . Click on \"Log in\" on the left side of the screen, which will take you to the Welcome page. Then click on \"Sign up Now\" on the right side of the page.     You will be asked to enter the access code listed below, as well as some personal information. Please follow the directions to create your username and password.     Your access code is: [email or web address]  Expires: 2018  3:34 PM     Your access code will  in 90 days. If you need help or a new code, please call your [place] clinic or [phone].        Care EveryWhere ID     This is your Care EveryWhere ID. This could be used by other organizations to access your [place] medical records  [id number]        Your Vitals Were     Pulse Temperature Respirations Height Pulse Oximetry BMI (Body Mass Index)    83 97.2  F (36.2  C) (Tympanic) 16 5' 3.75\" (1.619 m) 97% 20.1 kg/m2       Blood Pressure from Last 3 Encounters:   17 138/84   17 120/76   17 124/70    Weight from Last 3 Encounters:   17 116 lb 3.2 oz (52.7 kg)   17 114 lb 3.2 oz (51.8 kg)   17 117 lb (53.1 kg)              Today, you had the following     No orders found for display         Today's Medication Changes          These changes are accurate as of: 17  8:55 AM.  If you have " any questions, ask your nurse or doctor.               Start taking these medicines.        Dose/Directions    predniSONE 10 MG tablet   Commonly known as:  DELTASONE   Used for:  Centrilobular emphysema (H), Wheezing   Started by:  Dean Mariee MD        Dose:  40 mg   Take 4 tablets (40 mg) by mouth daily Keep on hand for COPD exacerbation.   Quantity:  20 tablet   Refills:  0         These medicines have changed or have updated prescriptions.        Dose/Directions    doxycycline 100 MG capsule   Commonly known as:  VIBRAMYCIN   This may have changed:  additional instructions   Used for:  Centrilobular emphysema (H), Wheezing   Changed by:  Dean Mariee MD        Dose:  100 mg   Take 1 capsule (100 mg) by mouth 2 times daily Keep on hand for COPD exacerbation.   Quantity:  20 capsule   Refills:  0       fluticasone-salmeterol 500-50 MCG/DOSE diskus inhaler   Commonly known as:  ADVAIR   This may have changed:  additional instructions   Used for:  Centrilobular emphysema (H)   Changed by:  Dean Mariee MD        Dose:  1 puff   Inhale 1 puff into the lungs 2 times daily Hold on file until needed   Quantity:  1 Inhaler   Refills:  11            Where to get your medicines      These medications were sent to [id number] - [place], [place] - [address]  [address] [address]     Phone:  [phone]     doxycycline 100 MG capsule    fluticasone-salmeterol 500-50 MCG/DOSE diskus inhaler    predniSONE 10 MG tablet                Primary Care Provider Office Phone # Fax #    Dean Mariee [phone] [address] [address]        Equal Access to Services     [facility]: Hadii anthony dee Soange, waaxda luqadaha, qaybta kaalmada adealfredo, miguel angel castorena. [facility] [phone].    ATENCIÓN: Si habla español, tiene a zuleta disposición servicios gratuitos de asistencia lingüística.  Andrew burk [phone].    We comply with applicable federal civil rights laws and [place] laws. We do not discriminate on the basis of race, color, national origin, age, disability, sex, sexual orientation, or gender identity.            Thank you!     Thank you for choosing [facility]  for your care. Our goal is always to provide you with excellent care. Hearing back from our patients is one way we can continue to improve our services. Please take a few minutes to complete the written survey that you may receive in the mail after your visit with us. Thank you!             Your Updated Medication List - Protect others around you: Learn how to safely use, store and throw away your medicines at www.disposemymeds.org.          This list is accurate as of: 12/5/17  8:55 AM.  Always use your most recent med list.                   Brand Name Dispense Instructions for use Diagnosis    albuterol 108 (90 BASE) MCG/ACT Inhaler    PROAIR HFA/PROVENTIL HFA/VENTOLIN HFA    1 Inhaler    Inhale 2 puffs into the lungs every 6 hours as needed for shortness of breath / dyspnea PROAIR only, had tongue swelling with Ventolin.    Chronic obstructive pulmonary disease, unspecified COPD type (H), Centrilobular emphysema (H)       amLODIPine 10 MG tablet    NORVASC    90 tablet    Take 1 tablet (10 mg) by mouth daily    Essential hypertension, benign       atenolol 25 MG tablet    TENORMIN    90 tablet    Take 1 tablet (25 mg) by mouth daily    Essential hypertension, benign       doxycycline 100 MG capsule    VIBRAMYCIN    20 capsule    Take 1 capsule (100 mg) by mouth 2 times daily Keep on hand for COPD exacerbation.    Centrilobular emphysema (H), Wheezing       fluticasone-salmeterol 500-50 MCG/DOSE diskus inhaler    ADVAIR    1 Inhaler    Inhale 1 puff into the lungs 2 times daily Hold on file until needed    Centrilobular emphysema (H)       lisinopril 40 MG tablet    PRINIVIL/ZESTRIL    135 tablet    Take 1.5 pills,  or 60 mg daily    Essential hypertension, benign       predniSONE 10 MG tablet    DELTASONE    20 tablet    Take 4 tablets (40 mg) by mouth daily Keep on hand for COPD exacerbation.    Centrilobular emphysema (H), Wheezing

## 2017-12-05 NOTE — PROGRESS NOTES
SUBJECTIVE:   Luis Hernandez is a 52 year old male who presents to clinic today for the following health issues:    Chief Complaint   Patient presents with     COPD     Recheck, exacerbation 11/13, given antibiotics and prednisone.           COPD Follow-Up    Symptoms are currently: improved    Current fatigue or dyspnea with ambulation: stable     Shortness of breath: improved with new inhaler    Increased or change in Cough/Sputum: No    Fever(s): No    Baseline ambulation without stopping to rest:  2 blocks. Able to walk up 2 flights of stairs without stopping to rest.    Any ER/UC or hospital admissions since your last visit? No     History   Smoking Status     Former Smoker     Packs/day: 1.00     Years: 20.00     Types: Cigarettes   Smokeless Tobacco     Former User     Comment: quitting using the patch     No results found for: FEV1, KPP9TVR      Amount of exercise or physical activity: active at work    Problems taking medications regularly: No    Medication side effects: none    Diet: regular (no restrictions)            Problem list and histories reviewed & adjusted, as indicated.  Additional history: as documented        Reviewed and updated as needed this visit by clinical staffTobacco  Allergies  Meds  Med Hx  Surg Hx  Fam Hx  Soc Hx      Reviewed and updated as needed this visit by Provider         ROS:  CONSTITUTIONAL:NEGATIVE for fever, chills, change in weight  INTEGUMENTARY/SKIN: NEGATIVE for worrisome rashes, moles or lesions  ENT/MOUTH: NEGATIVE for ear, mouth and throat problems  RESP:improved breathing  CV: NEGATIVE for chest pain, palpitations or peripheral edema  MUSCULOSKELETAL: NEGATIVE for significant arthralgias or myalgia  NEURO: NEGATIVE for weakness, dizziness or paresthesias  PSYCHIATRIC: NEGATIVE for changes in mood or affect    OBJECTIVE:                                                    /84 (BP Location: Right arm, Patient Position: Chair, Cuff Size: Adult Regular)   "Pulse 83  Temp 97.2  F (36.2  C) (Tympanic)  Resp 16  Ht 5' 3.75\" (1.619 m)  Wt 116 lb 3.2 oz (52.7 kg)  SpO2 97%  BMI 20.1 kg/m2  Body mass index is 20.1 kg/(m^2).  GENERAL APPEARANCE: alert, no distress and cooperative  RESP: clear, however noted decrease in breath sounds.  CV: regular rates and rhythm, normal S1 S2, no S3 or S4 and no murmur, click or rub  ABDOMEN: soft, nontender, without hepatosplenomegaly or masses and bowel sounds normal  MS: extremities normal- no gross deformities noted  SKIN: no suspicious lesions or rashes  NEURO: Normal strength and tone, mentation intact and speech normal  PSYCH: mentation appears normal and affect normal/bright         ASSESSMENT/PLAN:                                                    1. Centrilobular emphysema (H)  Improved, discussed prevention of exacerbation with a course of abx and steroids to have on hand, instructed when to start, may help him so that he does not have to come into clinic, if he uses it he is to let us know and about his status.  - fluticasone-salmeterol (ADVAIR) 500-50 MCG/DOSE diskus inhaler; Inhale 1 puff into the lungs 2 times daily Hold on file until needed  Dispense: 1 Inhaler; Refill: 11  - doxycycline (VIBRAMYCIN) 100 MG capsule; Take 1 capsule (100 mg) by mouth 2 times daily Keep on hand for COPD exacerbation.  Dispense: 20 capsule; Refill: 0  - predniSONE (DELTASONE) 10 MG tablet; Take 4 tablets (40 mg) by mouth daily Keep on hand for COPD exacerbation.  Dispense: 20 tablet; Refill: 0    2. Essential hypertension, benign  controlled    3. Tobacco abuse, in remission  noted    4. Wheezing  stable  - doxycycline (VIBRAMYCIN) 100 MG capsule; Take 1 capsule (100 mg) by mouth 2 times daily Keep on hand for COPD exacerbation.  Dispense: 20 capsule; Refill: 0  - predniSONE (DELTASONE) 10 MG tablet; Take 4 tablets (40 mg) by mouth daily Keep on hand for COPD exacerbation.  Dispense: 20 tablet; Refill: 0      See Patient " Instructions    Dean Mariee MD  [facility]

## 2017-12-05 NOTE — NURSING NOTE
"Chief Complaint   Patient presents with     COPD     Recheck, exacerbation 11/13, given antibiotics and prednisone.       Initial BP (!) 154/104 (BP Location: Right arm, Patient Position: Chair, Cuff Size: Adult Regular)  Pulse 83  Temp 97.2  F (36.2  C) (Tympanic)  Resp 16  Ht 5' 3.75\" (1.619 m)  Wt 116 lb 3.2 oz (52.7 kg)  SpO2 97%  BMI 20.1 kg/m2 Estimated body mass index is 20.1 kg/(m^2) as calculated from the following:    Height as of this encounter: 5' 3.75\" (1.619 m).    Weight as of this encounter: 116 lb 3.2 oz (52.7 kg).  Medication Reconciliation: complete  "

## 2018-07-27 DIAGNOSIS — J44.9 CHRONIC OBSTRUCTIVE PULMONARY DISEASE, UNSPECIFIED COPD TYPE (H): ICD-10-CM

## 2018-07-27 DIAGNOSIS — J43.2 CENTRILOBULAR EMPHYSEMA (H): ICD-10-CM

## 2018-07-27 RX ORDER — ALBUTEROL SULFATE 90 UG/1
AEROSOL, METERED RESPIRATORY (INHALATION)
Qty: 8.5 G | Refills: 4 | Status: SHIPPED | OUTPATIENT
Start: 2018-07-27 | End: 2018-10-23

## 2018-07-27 NOTE — TELEPHONE ENCOUNTER
"Requested Prescriptions   Pending Prescriptions Disp Refills     PROAIR  (90 Base) MCG/ACT inhaler [Pharmacy Med Name: ProAir HFA Inhalation Aerosol Solution 108 (90 Base) MCG/ACT]  Last Written Prescription Date:  6/27/2017  Last Fill Quantity: 1 inhaler,  # refills: 10   Last office visit: 12/5/2017 with prescribing provider:  Margot   Future Office Visit:     8.5  9     Sig: inhale 2 puffs into the lungs every 6 hours as needed for shortness of breath / dyspnea.    Asthma Maintenance Inhalers - Anticholinergics Passed    7/27/2018  6:57 AM       Passed - Patient is age 12 years or older       Passed - Recent (12 mo) or future (30 days) visit within the authorizing provider's specialty    Patient had office visit in the last 12 months or has a visit in the next 30 days with authorizing provider or within the authorizing provider's specialty.  See \"Patient Info\" tab in inbasket, or \"Choose Columns\" in Meds & Orders section of the refill encounter.              "

## 2018-07-27 NOTE — TELEPHONE ENCOUNTER
Prescription approved per [facility] Refill Protocol.  Sondra CARR RN

## 2018-09-04 ENCOUNTER — OFFICE VISIT (OUTPATIENT)
Dept: FAMILY MEDICINE | Facility: CLINIC | Age: 53
End: 2018-09-04
Payer: COMMERCIAL

## 2018-09-04 VITALS
SYSTOLIC BLOOD PRESSURE: 136 MMHG | OXYGEN SATURATION: 94 % | WEIGHT: 112.2 LBS | DIASTOLIC BLOOD PRESSURE: 86 MMHG | HEIGHT: 64 IN | BODY MASS INDEX: 19.15 KG/M2 | HEART RATE: 92 BPM | TEMPERATURE: 98.4 F

## 2018-09-04 DIAGNOSIS — J43.2 CENTRILOBULAR EMPHYSEMA (H): ICD-10-CM

## 2018-09-04 DIAGNOSIS — Z23 NEED FOR PROPHYLACTIC VACCINATION AND INOCULATION AGAINST INFLUENZA: ICD-10-CM

## 2018-09-04 DIAGNOSIS — I10 ESSENTIAL HYPERTENSION, BENIGN: ICD-10-CM

## 2018-09-04 DIAGNOSIS — Z12.5 SCREENING FOR PROSTATE CANCER: ICD-10-CM

## 2018-09-04 DIAGNOSIS — J44.1 COPD EXACERBATION (H): ICD-10-CM

## 2018-09-04 DIAGNOSIS — R06.2 WHEEZING: ICD-10-CM

## 2018-09-04 DIAGNOSIS — Z13.6 CARDIOVASCULAR SCREENING; LDL GOAL LESS THAN 130: ICD-10-CM

## 2018-09-04 DIAGNOSIS — I10 ESSENTIAL HYPERTENSION, BENIGN: Primary | ICD-10-CM

## 2018-09-04 LAB
ANION GAP SERPL CALCULATED.3IONS-SCNC: 3 MMOL/L (ref 3–14)
BUN SERPL-MCNC: 16 MG/DL (ref 7–30)
CALCIUM SERPL-MCNC: 9.5 MG/DL (ref 8.5–10.1)
CHLORIDE SERPL-SCNC: 101 MMOL/L (ref 94–109)
CO2 SERPL-SCNC: 34 MMOL/L (ref 20–32)
CREAT SERPL-MCNC: 0.67 MG/DL (ref 0.66–1.25)
GFR SERPL CREATININE-BSD FRML MDRD: >90 ML/MIN/1.7M2
GLUCOSE SERPL-MCNC: 86 MG/DL (ref 70–99)
POTASSIUM SERPL-SCNC: 4.8 MMOL/L (ref 3.4–5.3)
PSA SERPL-ACNC: 1 UG/L (ref 0–4)
SODIUM SERPL-SCNC: 138 MMOL/L (ref 133–144)

## 2018-09-04 PROCEDURE — 99214 OFFICE O/P EST MOD 30 MIN: CPT | Mod: 25 | Performed by: FAMILY MEDICINE

## 2018-09-04 PROCEDURE — 90471 IMMUNIZATION ADMIN: CPT | Performed by: FAMILY MEDICINE

## 2018-09-04 PROCEDURE — 80048 BASIC METABOLIC PNL TOTAL CA: CPT | Performed by: FAMILY MEDICINE

## 2018-09-04 PROCEDURE — 90682 RIV4 VACC RECOMBINANT DNA IM: CPT | Performed by: FAMILY MEDICINE

## 2018-09-04 PROCEDURE — G0103 PSA SCREENING: HCPCS | Performed by: FAMILY MEDICINE

## 2018-09-04 PROCEDURE — 36415 COLL VENOUS BLD VENIPUNCTURE: CPT | Performed by: FAMILY MEDICINE

## 2018-09-04 RX ORDER — ATENOLOL 25 MG/1
25 TABLET ORAL DAILY
Qty: 90 TABLET | Refills: 3 | Status: SHIPPED | OUTPATIENT
Start: 2018-09-04 | End: 2019-06-12

## 2018-09-04 RX ORDER — ALBUTEROL SULFATE 90 UG/1
2 AEROSOL, METERED RESPIRATORY (INHALATION) EVERY 4 HOURS PRN
Qty: 1 INHALER | Refills: 3 | Status: SHIPPED | OUTPATIENT
Start: 2018-09-04 | End: 2019-06-12

## 2018-09-04 RX ORDER — AMLODIPINE BESYLATE 10 MG/1
10 TABLET ORAL DAILY
Qty: 90 TABLET | Refills: 3 | Status: ON HOLD | OUTPATIENT
Start: 2018-09-04 | End: 2019-04-05

## 2018-09-04 RX ORDER — DOXYCYCLINE 100 MG/1
100 CAPSULE ORAL 2 TIMES DAILY
Qty: 20 CAPSULE | Refills: 1 | Status: SHIPPED | OUTPATIENT
Start: 2018-09-04 | End: 2018-10-23

## 2018-09-04 RX ORDER — PREDNISONE 10 MG/1
40 TABLET ORAL DAILY
Qty: 20 TABLET | Refills: 1 | Status: SHIPPED | OUTPATIENT
Start: 2018-09-04 | End: 2018-10-23 | Stop reason: DRUGHIGH

## 2018-09-04 RX ORDER — TIOTROPIUM BROMIDE 18 UG/1
CAPSULE ORAL; RESPIRATORY (INHALATION)
Qty: 30 CAPSULE | Refills: 11 | Status: SHIPPED | OUTPATIENT
Start: 2018-09-04 | End: 2019-09-18

## 2018-09-04 RX ORDER — LISINOPRIL 40 MG/1
TABLET ORAL
Qty: 135 TABLET | Refills: 3 | Status: SHIPPED | OUTPATIENT
Start: 2018-09-04 | End: 2019-06-12

## 2018-09-04 NOTE — PROGRESS NOTES

## 2018-09-04 NOTE — PROGRESS NOTES
"  SUBJECTIVE:   Luis Hernandez is a 53 year old male who presents to clinic today for the following health issues:      Hypertension Follow-up      Outpatient blood pressures are not being checked.    Low Salt Diet: no added salt    COPD Follow-Up    Symptoms are currently: slightly worsened    Current fatigue or dyspnea with ambulation: worsened from baseline    Shortness of breath: much worse    Increased or change in Cough/Sputum: Yes-  More productive phlegm    Fever(s): No    Baseline ambulation without stopping to rest:  50  yards. Able to walk up 1 flight of stairs without stopping to rest.    Any ER/UC or hospital admissions since your last visit? No     History   Smoking Status     Former Smoker     Packs/day: 1.00     Years: 20.00     Types: Cigarettes   Smokeless Tobacco     Former User     Comment: quitting using the patch     No results found for: FEV1, [id number]    Amount of exercise or physical activity: None    Problems taking medications regularly: No    Medication side effects: none    Diet: low salt                  Problem list and histories reviewed & adjusted, as indicated.  Additional history: as documented        Reviewed and updated as needed this visit by clinical staff  Allergies  Meds       Reviewed and updated as needed this visit by Provider         ROS:  CONSTITUTIONAL:NEGATIVE for fever, chills, change in weight  INTEGUMENTARY/SKIN: NEGATIVE for worrisome rashes, moles or lesions  RESP:as above  CV: NEGATIVE for chest pain, palpitations or peripheral edema  MUSCULOSKELETAL: NEGATIVE for significant arthralgias or myalgia  NEURO: NEGATIVE for weakness, dizziness or paresthesias  PSYCHIATRIC: NEGATIVE for changes in mood or affect    OBJECTIVE:                                                    /86  Pulse 92  Temp 98.4  F (36.9  C)  Ht 5' 3.5\" (1.613 m)  Wt 112 lb 3.2 oz (50.9 kg)  SpO2 94%  BMI 19.56 kg/m2  Body mass index is 19.56 kg/(m^2).  GENERAL APPEARANCE: alert, no " distress, cooperative and Nourishment slim   RESP: decrease breath sounds and end expiratory wheezes  CV: regular rates and rhythm, normal S1 S2, no S3 or S4 and no murmur, click or rub  ABDOMEN: soft, nontender, without hepatosplenomegaly or masses and bowel sounds normal  MS: extremities normal- no gross deformities noted  SKIN: no suspicious lesions or rashes  NEURO: Normal strength and tone, mentation intact and speech normal  PSYCH: mentation appears normal and affect normal/bright         ASSESSMENT/PLAN:                                                    1. Essential hypertension, benign  Borderline control, recheck one month, refilled med, check labs  - Basic metabolic panel  - amLODIPine (NORVASC) 10 MG tablet; Take 1 tablet (10 mg) by mouth daily  Dispense: 90 tablet; Refill: 3  - atenolol (TENORMIN) 25 MG tablet; Take 1 tablet (25 mg) by mouth daily  Dispense: 90 tablet; Refill: 3  - lisinopril (PRINIVIL/ZESTRIL) 40 MG tablet; Take 1.5 pills, or 60 mg daily  Dispense: 135 tablet; Refill: 3    2. COPD exacerbation (H)  Use abx, steroid and also start spiriva  - doxycycline (VIBRAMYCIN) 100 MG capsule; Take 1 capsule (100 mg) by mouth 2 times daily Keep on hand for COPD exacerbation.  Dispense: 20 capsule; Refill: 1  - predniSONE (DELTASONE) 10 MG tablet; Take 4 tablets (40 mg) by mouth daily Keep on hand for COPD exacerbation.  Dispense: 20 tablet; Refill: 1    3. Centrilobular emphysema (H)    - fluticasone-salmeterol (ADVAIR) 500-50 MCG/DOSE diskus inhaler; Inhale 1 puff into the lungs 2 times daily Hold on file until needed  Dispense: 1 Inhaler; Refill: 11  - albuterol (PROAIR HFA/PROVENTIL HFA/VENTOLIN HFA) 108 (90 Base) MCG/ACT inhaler; Inhale 2 puffs into the lungs every 4 hours as needed for shortness of breath / dyspnea Hold on file until needed  Dispense: 1 Inhaler; Refill: 3  - tiotropium (SPIRIVA HANDIHALER) 18 MCG capsule; Inhale contents of one capsule daily.  Dispense: 30 capsule; Refill:  11  - doxycycline (VIBRAMYCIN) 100 MG capsule; Take 1 capsule (100 mg) by mouth 2 times daily Keep on hand for COPD exacerbation.  Dispense: 20 capsule; Refill: 1  - predniSONE (DELTASONE) 10 MG tablet; Take 4 tablets (40 mg) by mouth daily Keep on hand for COPD exacerbation.  Dispense: 20 tablet; Refill: 1    4. CARDIOVASCULAR SCREENING; LDL GOAL LESS THAN 130    - Lipid panel reflex to direct LDL Fasting; Future    5. Need for prophylactic vaccination and inoculation against influenza    - FLU VACCINE, (RIV4) RECOMBINANT HA  , IM (FluBlok, egg free) [59293]- >18 YRS (FMG recommended  50-64 YRS)  - Vaccine Administration, Initial [70159]    6. Screening for prostate cancer    - Prostate spec antigen screen    7. BENIGN HYPERTENSION    - Basic metabolic panel  - amLODIPine (NORVASC) 10 MG tablet; Take 1 tablet (10 mg) by mouth daily  Dispense: 90 tablet; Refill: 3  - atenolol (TENORMIN) 25 MG tablet; Take 1 tablet (25 mg) by mouth daily  Dispense: 90 tablet; Refill: 3  - lisinopril (PRINIVIL/ZESTRIL) 40 MG tablet; Take 1.5 pills, or 60 mg daily  Dispense: 135 tablet; Refill: 3    8. Wheezing    - doxycycline (VIBRAMYCIN) 100 MG capsule; Take 1 capsule (100 mg) by mouth 2 times daily Keep on hand for COPD exacerbation.  Dispense: 20 capsule; Refill: 1  - predniSONE (DELTASONE) 10 MG tablet; Take 4 tablets (40 mg) by mouth daily Keep on hand for COPD exacerbation.  Dispense: 20 tablet; Refill: 1      See Patient Instructions    Dean Mariee MD  [facility]

## 2018-09-04 NOTE — MR AVS SNAPSHOT
After Visit Summary   9/4/2018    Luis Hernandez    MRN: [id number]           Patient Information     Date Of Birth          1965        Visit Information        Provider Department      9/4/2018 8:20 AM Dean Mariee MD [facility]        Today's Diagnoses     Essential hypertension, benign    -  1    COPD exacerbation (H)        Centrilobular emphysema (H)        CARDIOVASCULAR SCREENING; LDL GOAL LESS THAN 130        Need for prophylactic vaccination and inoculation against influenza        Screening for prostate cancer        BENIGN HYPERTENSION        Wheezing          Care Instructions     Refilled your medications for the year.    You do have some wheezing going on which is consistent with a COPD exacerbation.  Please take the doxycycline and the course of prednisone you have at home.    I am also adding spiriva which is one capsule a day that you inhale.  If this is not covered by your formulary please let us know.    Follow up in 1 month or so to see how you are doing      Thank you for choosing [facility].  You may be receiving a survey in the mail from [facility] regarding your visit today.  Please take a few minutes to complete and return the survey to let us know how we are doing.      If you have questions or concerns, please contact us via [email or web address] or you can contact your care team at [phone].    Our Clinic hours are:  Monday 6:40 am  to 7:00 pm  Tuesday -Friday 6:40 am to 5:00 pm    The [place] outpatient lab hours are:  Monday - Friday 6:10 am to 4:45 pm  Saturdays 7:00 am to 11:00 am  Appointments are required, call [phone]    If you have clinical questions after hours or would like to schedule an appointment,  call the clinic at [phone].            Follow-ups after your visit        Follow-up notes from your care team     Return in about 4 weeks (around 10/2/2018) for follow up in the office.      Future tests that were ordered for you  "today     Open Future Orders        Priority Expected Expires Ordered    Lipid panel reflex to direct LDL Fasting Routine  9/4/2019 9/4/2018            Who to contact     If you have questions or need follow up information about today's clinic visit or your schedule please contact [facility] directly at [phone].  Normal or non-critical lab and imaging results will be communicated to you by MyChart, letter or phone within 4 business days after the clinic has received the results. If you do not hear from us within 7 days, please contact the clinic through MyChart or phone. If you have a critical or abnormal lab result, we will notify you by phone as soon as possible.  Submit refill requests through [employer] or call your pharmacy and they will forward the refill request to us. Please allow 3 business days for your refill to be completed.          Additional Information About Your Visit        Care EveryWhere ID     This is your Care EveryWhere ID. This could be used by other organizations to access your [facility] medical records  [id number]        Your Vitals Were     Pulse Temperature Height Pulse Oximetry BMI (Body Mass Index)       92 98.4  F (36.9  C) 5' 3.5\" (1.613 m) 94% 19.56 kg/m2        Blood Pressure from Last 3 Encounters:   09/04/18 136/86   12/05/17 138/84   11/13/17 120/76    Weight from Last 3 Encounters:   09/04/18 112 lb 3.2 oz (50.9 kg)   12/05/17 116 lb 3.2 oz (52.7 kg)   11/13/17 114 lb 3.2 oz (51.8 kg)              We Performed the Following     Basic metabolic panel     FLU VACCINE, (RIV4) RECOMBINANT HA  , IM (FluBlok, egg free) [89754]- >18 YRS (FMG recommended  50-64 YRS)     Prostate spec antigen screen     Vaccine Administration, Initial [34602]          Today's Medication Changes          These changes are accurate as of 9/4/18  9:00 AM.  If you have any questions, ask your nurse or doctor.               Start taking these medicines.        Dose/Directions    tiotropium 18 " MCG capsule   Commonly known as:  SPIRIVA HANDIHALER   Used for:  Centrilobular emphysema (H)        Inhale contents of one capsule daily.   Quantity:  30 capsule   Refills:  11         These medicines have changed or have updated prescriptions.        Dose/Directions    * PROAIR  (90 Base) MCG/ACT inhaler   This may have changed:  Another medication with the same name was added. Make sure you understand how and when to take each.   Used for:  Chronic obstructive pulmonary disease, unspecified COPD type (H), Centrilobular emphysema (H)   Generic drug:  albuterol        inhale 2 puffs into the lungs every 6 hours as needed for shortness of breath / dyspnea.   Quantity:  8.5 g   Refills:  4       * albuterol 108 (90 Base) MCG/ACT inhaler   Commonly known as:  PROAIR HFA/PROVENTIL HFA/VENTOLIN HFA   This may have changed:  You were already taking a medication with the same name, and this prescription was added. Make sure you understand how and when to take each.   Used for:  Centrilobular emphysema (H)        Dose:  2 puff   Inhale 2 puffs into the lungs every 4 hours as needed for shortness of breath / dyspnea Hold on file until needed   Quantity:  1 Inhaler   Refills:  3       * Notice:  This list has 2 medication(s) that are the same as other medications prescribed for you. Read the directions carefully, and ask your doctor or other care provider to review them with you.         Where to get your medicines      These medications were sent to [id number] - [place], [place] - [address]  [address] [address]     Phone:  [phone]     albuterol 108 (90 Base) MCG/ACT inhaler    amLODIPine 10 MG tablet    atenolol 25 MG tablet    doxycycline 100 MG capsule    fluticasone-salmeterol 500-50 MCG/DOSE diskus inhaler    lisinopril 40 MG tablet    predniSONE 10 MG tablet    tiotropium 18 MCG capsule                Primary Care Provider Office Phone # Fax #    Dean DOTSON  MD [place] [phone]       [address] [address]        Equal Access to Services     GRISELDA HOLLAND : Hadii aad ku hadanarenee Roche, waboda luqadaha, qaaminata kaalmada jeaninealfredo, miguel angel aurorain hayaan jeanineeulogio mcgrath laMelissakhalif castorena. So [facility] [phone].    ATENCIÓN: Si habla español, tiene a zuleta disposición servicios gratuitos de asistencia lingüística. Llame al [phone].    We comply with applicable federal civil rights laws and [place] laws. We do not discriminate on the basis of race, color, national origin, age, disability, sex, sexual orientation, or gender identity.            Thank you!     Thank you for choosing [facility]  for your care. Our goal is always to provide you with excellent care. Hearing back from our patients is one way we can continue to improve our services. Please take a few minutes to complete the written survey that you may receive in the mail after your visit with us. Thank you!             Your Updated Medication List - Protect others around you: Learn how to safely use, store and throw away your medicines at www.disposemymeds.org.          This list is accurate as of 9/4/18  9:00 AM.  Always use your most recent med list.                   Brand Name Dispense Instructions for use Diagnosis    amLODIPine 10 MG tablet    NORVASC    90 tablet    Take 1 tablet (10 mg) by mouth daily    Essential hypertension, benign       atenolol 25 MG tablet    TENORMIN    90 tablet    Take 1 tablet (25 mg) by mouth daily    Essential hypertension, benign       doxycycline 100 MG capsule    VIBRAMYCIN    20 capsule    Take 1 capsule (100 mg) by mouth 2 times daily Keep on hand for COPD exacerbation.    Centrilobular emphysema (H), Wheezing, COPD exacerbation (H)       fluticasone-salmeterol 500-50 MCG/DOSE diskus inhaler    ADVAIR    1 Inhaler    Inhale 1 puff into the lungs 2 times daily Hold on file until needed    Centrilobular emphysema (H)       lisinopril 40 MG  tablet    PRINIVIL/ZESTRIL    135 tablet    Take 1.5 pills, or 60 mg daily    Essential hypertension, benign       predniSONE 10 MG tablet    DELTASONE    20 tablet    Take 4 tablets (40 mg) by mouth daily Keep on hand for COPD exacerbation.    Centrilobular emphysema (H), Wheezing, COPD exacerbation (H)       * PROAIR  (90 Base) MCG/ACT inhaler   Generic drug:  albuterol     8.5 g    inhale 2 puffs into the lungs every 6 hours as needed for shortness of breath / dyspnea.    Chronic obstructive pulmonary disease, unspecified COPD type (H), Centrilobular emphysema (H)       * albuterol 108 (90 Base) MCG/ACT inhaler    PROAIR HFA/PROVENTIL HFA/VENTOLIN HFA    1 Inhaler    Inhale 2 puffs into the lungs every 4 hours as needed for shortness of breath / dyspnea Hold on file until needed    Centrilobular emphysema (H)       tiotropium 18 MCG capsule    SPIRIVA HANDIHALER    30 capsule    Inhale contents of one capsule daily.    Centrilobular emphysema (H)       * Notice:  This list has 2 medication(s) that are the same as other medications prescribed for you. Read the directions carefully, and ask your doctor or other care provider to review them with you.

## 2018-09-04 NOTE — LETTER
September 4, 2018      Luis BURNS [place]  [address] [address]        Dear ,    We are writing to inform you of your test results.    Normal kidney function.     Resulted Orders   Basic metabolic panel   Result Value Ref Range    Sodium 138 133 - 144 mmol/L    Potassium 4.8 3.4 - 5.3 mmol/L    Chloride 101 94 - 109 mmol/L    Carbon Dioxide 34 (H) 20 - 32 mmol/L    Anion Gap 3 3 - 14 mmol/L    Glucose 86 70 - 99 mg/dL    Urea Nitrogen 16 7 - 30 mg/dL    Creatinine 0.67 0.66 - 1.25 mg/dL    GFR Estimate >90 >60 mL/min/1.7m2      Comment:      Non  GFR Calc    GFR Estimate If Black >90 >60 mL/min/1.7m2      Comment:       GFR Calc    Calcium 9.5 8.5 - 10.1 mg/dL       If you have any questions or concerns, please call the clinic at the number listed above.       Sincerely,        Dean Mariee MD

## 2018-09-04 NOTE — PATIENT INSTRUCTIONS
Refilled your medications for the year.    You do have some wheezing going on which is consistent with a COPD exacerbation.  Please take the doxycycline and the course of prednisone you have at home.    I am also adding spiriva which is one capsule a day that you inhale.  If this is not covered by your formulary please let us know.    Follow up in 1 month or so to see how you are doing      Thank you for choosing [facility].  You may be receiving a survey in the mail [facility] your [facility] today.  Please take a few minutes to complete and return the survey to let us know how we are doing.      If you have questions or concerns, please contact us via [employer] or you can contact your care team at [phone].    Our Clinic hours are:  Monday 6:40 am  to 7:00 pm  Tuesday -Friday 6:40 am to 5:00 pm    The [place] outpatient lab hours are:  Monday - Friday 6:10 am to 4:45 pm  Saturdays 7:00 am to 11:00 am  Appointments are required, call [phone]    If you have clinical questions after hours or would like to schedule an appointment,  call the clinic at [phone].

## 2018-10-23 ENCOUNTER — OFFICE VISIT (OUTPATIENT)
Dept: FAMILY MEDICINE | Facility: CLINIC | Age: 53
End: 2018-10-23
Payer: COMMERCIAL

## 2018-10-23 VITALS
DIASTOLIC BLOOD PRESSURE: 70 MMHG | RESPIRATION RATE: 16 BRPM | TEMPERATURE: 98.1 F | SYSTOLIC BLOOD PRESSURE: 144 MMHG | HEART RATE: 98 BPM | OXYGEN SATURATION: 90 % | BODY MASS INDEX: 19.81 KG/M2 | WEIGHT: 116 LBS | HEIGHT: 64 IN

## 2018-10-23 DIAGNOSIS — J43.2 CENTRILOBULAR EMPHYSEMA (H): ICD-10-CM

## 2018-10-23 DIAGNOSIS — J44.1 COPD EXACERBATION (H): ICD-10-CM

## 2018-10-23 DIAGNOSIS — R06.2 WHEEZING: ICD-10-CM

## 2018-10-23 PROCEDURE — 99213 OFFICE O/P EST LOW 20 MIN: CPT | Performed by: FAMILY MEDICINE

## 2018-10-23 RX ORDER — PREDNISONE 10 MG/1
TABLET ORAL
Qty: 31 TABLET | Refills: 3 | Status: ON HOLD | OUTPATIENT
Start: 2018-10-23 | End: 2019-04-05

## 2018-10-23 RX ORDER — DOXYCYCLINE 100 MG/1
100 CAPSULE ORAL 2 TIMES DAILY
Qty: 20 CAPSULE | Refills: 3 | Status: ON HOLD | OUTPATIENT
Start: 2018-10-23 | End: 2019-04-05

## 2018-10-23 NOTE — PATIENT INSTRUCTIONS
You are doing better.    I did refill your antibiotic of doxycycline and prednisone. I did change your prednisone to a burst and taper over 2 weeks due to your copd that you have. We will see if this works better.    Please get the doxycycline and the prednisone filled and keep on hand when you do have a COPD exacerbation meaning you have difficulty breathing, cough with sputum etc or using your albuterol inhaler frequently.          Thank you for choosing [facility].  You may be receiving a survey in the mail from [facility] regarding your visit today.  Please take a few minutes to complete and return the survey to let us know how we are doing.      If you have questions or concerns, please contact us via [employer] or you can contact your care team at [phone].    Our Clinic hours are:  Monday 6:40 am  to 7:00 pm  Tuesday -Friday 6:40 am to 5:00 pm    The [place] outpatient lab hours are:  Monday - Friday 6:10 am to 4:45 pm  Saturdays 7:00 am to 11:00 am  Appointments are required, call [phone]    If you have clinical questions after hours or would like to schedule an appointment,  call the clinic at [phone].

## 2018-10-23 NOTE — PROGRESS NOTES
SUBJECTIVE:   Luis Hernandez is a 53 year old male who presents to clinic today for the following health issues:  Chief Complaint   Patient presents with     Follow Up For     COPD. Was seen 9/4/2018 and given Abx, prednisone and Spiriva. He felt great until he caught a cold. Then he breathing got worse. Started prednisone again on 10/21/18.         COPD Follow-Up    Symptoms are currently: slightly improved    Current fatigue or dyspnea with ambulation: better than baseline    Shortness of breath: improved    Increased or change in Cough/Sputum: Yes-  Since the cold symptoms    Fever(s): No    Baseline ambulation without stopping to rest:  50 yards . Able to walk up 1 flights of stairs without stopping to rest.    Any ER/UC or hospital admissions since your last visit? No     History   Smoking Status     Former Smoker     Packs/day: 1.00     Years: 20.00     Types: Cigarettes   Smokeless Tobacco     Former User     Comment: quitting using the patch     No results found for: FEV1, [id number]      Amount of exercise or physical activity: None    Problems taking medications regularly: No    Medication side effects: dry mouth    Diet: regular (no restrictions)        Problem list and histories reviewed & adjusted, as indicated.  Additional history: as documented        Reviewed and updated as needed this visit by clinical staff  Allergies  Meds       Reviewed and updated as needed this visit by Provider         ROS:  CONSTITUTIONAL:NEGATIVE for fever, chills, change in weight  INTEGUMENTARY/SKIN: NEGATIVE for worrisome rashes, moles or lesions  RESP:improved breathing with spiriva  CV: NEGATIVE for chest pain, palpitations or peripheral edema  GI: NEGATIVE for nausea, abdominal pain, heartburn, or change in bowel habits  MUSCULOSKELETAL: NEGATIVE for significant arthralgias or myalgia  NEURO: NEGATIVE for weakness, dizziness or paresthesias  PSYCHIATRIC: NEGATIVE for changes in mood or affect    OBJECTIVE:              "                                       /70  Pulse 98  Temp 98.1  F (36.7  C) (Tympanic)  Resp 16  Ht 5' 3.5\" (1.613 m)  Wt 116 lb (52.6 kg)  SpO2 90%  BMI 20.23 kg/m2  Body mass index is 20.23 kg/(m^2).  GENERAL APPEARANCE: alert, no distress, cooperative and Nourishment slim   RESP: lungs clear to auscultation - no rales, rhonchi or wheezes but does have a prolonged expiratory phase  CV: regular rates and rhythm, normal S1 S2, no S3 or S4 and no murmur, click or rub  MS: extremities normal- no gross deformities noted  SKIN: no suspicious lesions or rashes  NEURO: Normal strength and tone, mentation intact and speech normal  PSYCH: mentation appears normal and affect normal/bright         ASSESSMENT/PLAN:                                                    1. Centrilobular emphysema (H)  Will change the steroid course to a longer 2 weeks burst and taper, reports the steroid helped but a longer course may be needed.  Taking his two inhalers  - predniSONE (DELTASONE) 10 MG tablet; Take 4 tabs PO Q daily X 3 days, 3 tabs X 3 days, 2 tabs X 3 days, 1 tab X 3 days, 1/2 tab X 2 days. Use for COPD exacerbations  Dispense: 31 tablet; Refill: 3  - doxycycline (VIBRAMYCIN) 100 MG capsule; Take 1 capsule (100 mg) by mouth 2 times daily Keep on hand for COPD exacerbation.  Dispense: 20 capsule; Refill: 3    2. Wheezing  Use for copd exacerbation and explained.  - doxycycline (VIBRAMYCIN) 100 MG capsule; Take 1 capsule (100 mg) by mouth 2 times daily Keep on hand for COPD exacerbation.  Dispense: 20 capsule; Refill: 3    3. COPD exacerbation (H)    - predniSONE (DELTASONE) 10 MG tablet; Take 4 tabs PO Q daily X 3 days, 3 tabs X 3 days, 2 tabs X 3 days, 1 tab X 3 days, 1/2 tab X 2 days. Use for COPD exacerbations  Dispense: 31 tablet; Refill: 3  - doxycycline (VIBRAMYCIN) 100 MG capsule; Take 1 capsule (100 mg) by mouth 2 times daily Keep on hand for COPD exacerbation.  Dispense: 20 capsule; Refill: 3      See Patient " Instructions    Dean Mariee MD  [facility]

## 2018-10-23 NOTE — MR AVS SNAPSHOT
After Visit Summary   10/23/2018    Luis Hernandez    MRN: [id number]           Patient Information     Date Of Birth          1965        Visit Information        Provider Department      10/23/2018 9:20 AM Dean Mariee MD [facility]        Today's Diagnoses     Centrilobular emphysema (H)        Wheezing        COPD exacerbation (H)          Care Instructions    You are doing better.    I did refill your antibiotic of doxycycline and prednisone. I did change your prednisone to a burst and taper over 2 weeks due to your copd that you have. We will see if this works better.    Please get the doxycycline and the prednisone filled and keep on hand when you do have a COPD exacerbation meaning you have difficulty breathing, cough with sputum etc or using your albuterol inhaler frequently.          Thank you for choosing [facility].  You may be receiving a survey in the mail from [facility] regarding your visit today.  Please take a few minutes to complete and return the survey to let us know how we are doing.      If you have questions or concerns, please contact us via [employer] or you can contact your care team at [phone].    Our Clinic hours are:  Monday 6:40 am  to 7:00 pm  Tuesday -Friday 6:40 am to 5:00 pm    The [place] outpatient lab hours are:  Monday - Friday 6:10 am to 4:45 pm  Saturdays 7:00 am to 11:00 am  Appointments are required, call [phone]    If you have clinical questions after hours or would like to schedule an appointment,  call the clinic at [phone].            Follow-ups after your visit        Follow-up notes from your care team     Return in about 6 months (around 4/23/2019).      Who to contact     If you have questions or need follow up information about today's clinic visit or your schedule please contact [facility] directly at [phone].  Normal or non-critical lab and imaging results will be communicated to you by  "MyChart, letter or phone within 4 business days after the clinic has received the results. If you do not hear from us within 7 days, please contact the clinic through MyChart or phone. If you have a critical or abnormal lab result, we will notify you by phone as soon as possible.  Submit refill requests through [employer] or call your pharmacy and they will forward the refill request to us. Please allow 3 business days for your refill to be completed.          Additional Information About Your Visit        Care EveryWhere ID     This is your Care EveryWhere ID. This could be used by other organizations to access your [facility] medical records  [id number]        Your Vitals Were     Pulse Temperature Respirations Height Pulse Oximetry BMI (Body Mass Index)    98 98.1  F (36.7  C) (Tympanic) 16 5' 3.5\" (1.613 m) 90% 20.23 kg/m2       Blood Pressure from Last 3 Encounters:   10/23/18 144/70   09/04/18 136/86   12/05/17 138/84    Weight from Last 3 Encounters:   10/23/18 116 lb (52.6 kg)   09/04/18 112 lb 3.2 oz (50.9 kg)   12/05/17 116 lb 3.2 oz (52.7 kg)              Today, you had the following     No orders found for display         Today's Medication Changes          These changes are accurate as of 10/23/18  9:59 AM.  If you have any questions, ask your nurse or doctor.               These medicines have changed or have updated prescriptions.        Dose/Directions    predniSONE 10 MG tablet   Commonly known as:  DELTASONE   This may have changed:    - how much to take  - how to take this  - when to take this  - additional instructions   Used for:  Centrilobular emphysema (H), COPD exacerbation (H)        Take 4 tabs PO Q daily X 3 days, 3 tabs X 3 days, 2 tabs X 3 days, 1 tab X 3 days, 1/2 tab X 2 days. Use for COPD exacerbations   Quantity:  31 tablet   Refills:  3            Where to get your medicines      These medications were sent to [id number] - [place], [place] - [address]  [address] " [facility] [address]     Phone:  [phone]     doxycycline 100 MG capsule    predniSONE 10 MG tablet                Primary Care Provider Office Phone # Fax #    Dean Mariee [phone] [address] [address]        Equal Access to Services     GRISELDA HOLLAND : Hadii aad ku hadasho Soomaali, waaxda luqadaha, qaybta kaalmada adeegyada, waxay aurorain hayaan ena adairgideonstacy castorena. So [facility] [phone].    ATENCIÓN: Si habla español, tiene a zuleta disposición servicios gratuitos de asistencia lingüística. Llame al [phone].    We comply with applicable federal civil rights laws and [place] laws. We do not discriminate on the basis of race, color, national origin, age, disability, sex, sexual orientation, or gender identity.            Thank you!     Thank you for choosing [facility]  for your care. Our goal is always to provide you with excellent care. Hearing back from our patients is one way we can continue to improve our services. Please take a few minutes to complete the written survey that you may receive in the mail after your visit with us. Thank you!             Your Updated Medication List - Protect others around you: Learn how to safely use, store and throw away your medicines at www.disposemymeds.org.          This list is accurate as of 10/23/18  9:59 AM.  Always use your most recent med list.                   Brand Name Dispense Instructions for use Diagnosis    albuterol 108 (90 Base) MCG/ACT inhaler    PROAIR HFA/PROVENTIL HFA/VENTOLIN HFA    1 Inhaler    Inhale 2 puffs into the lungs every 4 hours as needed for shortness of breath / dyspnea Hold on file until needed    Centrilobular emphysema (H)       amLODIPine 10 MG tablet    NORVASC    90 tablet    Take 1 tablet (10 mg) by mouth daily    Essential hypertension, benign       atenolol 25 MG tablet    TENORMIN    90 tablet    Take 1 tablet (25 mg) by mouth daily     Essential hypertension, benign       doxycycline 100 MG capsule    VIBRAMYCIN    20 capsule    Take 1 capsule (100 mg) by mouth 2 times daily Keep on hand for COPD exacerbation.    Centrilobular emphysema (H), Wheezing, COPD exacerbation (H)       fluticasone-salmeterol 500-50 MCG/DOSE diskus inhaler    ADVAIR    1 Inhaler    Inhale 1 puff into the lungs 2 times daily Hold on file until needed    Centrilobular emphysema (H)       lisinopril 40 MG tablet    PRINIVIL/ZESTRIL    135 tablet    Take 1.5 pills, or 60 mg daily    Essential hypertension, benign       predniSONE 10 MG tablet    DELTASONE    31 tablet    Take 4 tabs PO Q daily X 3 days, 3 tabs X 3 days, 2 tabs X 3 days, 1 tab X 3 days, 1/2 tab X 2 days. Use for COPD exacerbations    Centrilobular emphysema (H), COPD exacerbation (H)       tiotropium 18 MCG capsule    SPIRIVA HANDIHALER    30 capsule    Inhale contents of one capsule daily.    Centrilobular emphysema (H)

## 2019-02-19 ENCOUNTER — OFFICE VISIT (OUTPATIENT)
Dept: FAMILY MEDICINE | Facility: CLINIC | Age: 54
End: 2019-02-19
Payer: COMMERCIAL

## 2019-02-19 VITALS
HEIGHT: 64 IN | BODY MASS INDEX: 19.63 KG/M2 | WEIGHT: 115 LBS | HEART RATE: 84 BPM | SYSTOLIC BLOOD PRESSURE: 136 MMHG | TEMPERATURE: 97.6 F | RESPIRATION RATE: 20 BRPM | DIASTOLIC BLOOD PRESSURE: 80 MMHG | OXYGEN SATURATION: 91 %

## 2019-02-19 DIAGNOSIS — J44.1 COPD EXACERBATION (H): ICD-10-CM

## 2019-02-19 DIAGNOSIS — J44.9 COPD, VERY SEVERE (H): Primary | ICD-10-CM

## 2019-02-19 PROCEDURE — 99214 OFFICE O/P EST MOD 30 MIN: CPT | Performed by: FAMILY MEDICINE

## 2019-02-19 ASSESSMENT — MIFFLIN-ST. JEOR: SCORE: 1283.67

## 2019-02-19 NOTE — PROGRESS NOTES
SUBJECTIVE:   Luis Hernandez is a 53 year old male who presents to clinic today for the following health issues:    COPD Follow-Up    Symptoms are currently: stable, had some difficulty with extreme cold weather a couple weeks ago    Current fatigue or dyspnea with ambulation: stable     Shortness of breath: stable    Increased or change in Cough/Sputum: No    Fever(s): No    Baseline ambulation without stopping to rest: many variables- weather, prior activity, etc. Able to walk up 1 flights of stairs without stopping to rest.    Any ER/UC or hospital admissions since your last visit? No     History   Smoking Status     Former Smoker     Packs/day: 1.00     Years: 20.00     Types: Cigarettes   Smokeless Tobacco     Former User     Comment: quitting using the patch     No results found for: FEV1, [id number]    Amount of exercise or physical activity: active with work    Problems taking medications regularly: No    Medication side effects: none    Diet: regular (no restrictions)    *Patient is requesting a handicap sticker application due to extreme fatigue and shortness of breath when walking.    Patient has known severe copd.  He is feeling he is getting worse.  He has not been to pulmonary rehab.  Has abx and oral steroids at home.  No fever or chills.  On max advair and spiriva.  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  He takes his time when moving around.  He had a recent exacerbation and self treated.  He is feeling at baseline right now.        Problem list and histories reviewed & adjusted, as indicated.  Additional history: as documented        Reviewed and updated as needed this visit by clinical staff       Reviewed and updated as needed this visit by Provider         ROS:  CONSTITUTIONAL:NEGATIVE for fever, chills, change in weight  INTEGUMENTARY/SKIN: NEGATIVE for worrisome rashes, moles or lesions  ENT/MOUTH: NEGATIVE for ear, mouth and throat problems  RESP:recent exacerbation, resolved  CV: NEGATIVE  "for chest pain, palpitations or peripheral edema  MUSCULOSKELETAL: NEGATIVE for significant arthralgias or myalgia  HEME/ALLERGY/IMMUNE: NEGATIVE for bleeding problems    OBJECTIVE:                                                    /80   Pulse 84   Temp 97.6  F (36.4  C) (Tympanic)   Resp 20   Ht 1.619 m (5' 3.75\")   Wt 52.2 kg (115 lb)   SpO2 91%   BMI 19.89 kg/m     Body mass index is 19.89 kg/m .  GENERAL APPEARANCE: alert, no distress, cooperative and Nourishment slim   HENT: ear canals and TM's normal and nose and mouth without ulcers or lesions  NECK: no adenopathy, no asymmetry, masses, or scars and thyroid normal to palpation  RESP: good air movement for Luis, very slight end wheeze  CV: regular rates and rhythm, normal S1 S2, no S3 or S4 and no murmur, click or rub  MS: negative  SKIN: no suspicious lesions or rashes  NEURO: Normal strength and tone, mentation intact and speech normal  PSYCH: mentation appears normal and affect normal/bright         ASSESSMENT/PLAN:                                                    1. COPD exacerbation (H)  Recent exacerbation self treated, made sure he has refills of his meds at home    2. COPD, very severe (H)  Recommend pulmonary rehab, completed parking sticker. At sometime he may need supplement O2.  We will see how he does in rehab.  - PULMONARY REHAB REFERRAL; Future    Counseling/Coordination of care is over 15 min in 25 min appt.  Discussed copd, progression, use of O2, need to check about O2 sats with activity, etc.  See Patient Instructions    Dean Mariee MD  [facility]  "

## 2019-02-19 NOTE — PATIENT INSTRUCTIONS
I have complete the form for your parking.    For your breathing I would recommend pulmonary rehab to see if they can help with your breathing since you are working and have ESPARZA.    Keep a steroid and antibiotic prescription on hand at home.          Thank you for choosing [facility].  You may be receiving a survey in the mail from [employer] regarding your visit today.  Please take a few minutes to complete and return the survey to let us know how we are doing.      If you have questions or concerns, please contact us via [employer] or you can contact your care team at [phone].    Our Clinic hours are:  Monday 6:40 am  to 7:00 pm  Tuesday -Friday 6:40 am to 5:00 pm    The [place] outpatient lab hours are:  Monday - Friday 6:10 am to 4:45 pm  Saturdays 7:00 am to 11:00 am  Appointments are required, call [phone]    If you have clinical questions after hours or would like to schedule an appointment,  call the clinic at [phone].

## 2019-02-26 ENCOUNTER — HOSPITAL ENCOUNTER (OUTPATIENT)
Dept: CARDIAC REHAB | Facility: CLINIC | Age: 54
End: 2019-02-26
Attending: FAMILY MEDICINE
Payer: COMMERCIAL

## 2019-02-26 DIAGNOSIS — J44.9 COPD, VERY SEVERE (H): ICD-10-CM

## 2019-02-26 NOTE — PROGRESS NOTES
Patient arrived for his Pulmonary Rehab initial evaluation. 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 He rated his fatigue at a 7/10. As we only had a shortened time, he was fatigued, and was already visibly shoulder shrugging RT decided to postpone the 6MWT. Vitals were obtained, patient health history was discussed, and details specific to the program were introduced. Patient will return on Thursday 2/28 at 8am to finish his intake. He will also do orientation to the equipment and discuss goals. Plan is to join group after this session and continue until completion of the program.     No billing charge.

## 2019-02-28 ENCOUNTER — HOSPITAL ENCOUNTER (OUTPATIENT)
Dept: CARDIAC REHAB | Facility: CLINIC | Age: 54
End: 2019-02-28
Attending: FAMILY MEDICINE
Payer: COMMERCIAL

## 2019-02-28 PROCEDURE — 40000244 ZZH STATISTIC VISIT PULM REHAB

## 2019-02-28 PROCEDURE — G0424 PULMONARY REHAB W EXER: HCPCS

## 2019-03-05 ENCOUNTER — HOSPITAL ENCOUNTER (OUTPATIENT)
Dept: CARDIAC REHAB | Facility: CLINIC | Age: 54
End: 2019-03-05
Attending: FAMILY MEDICINE
Payer: COMMERCIAL

## 2019-03-05 PROCEDURE — 40000244 ZZH STATISTIC VISIT PULM REHAB

## 2019-03-05 PROCEDURE — G0424 PULMONARY REHAB W EXER: HCPCS

## 2019-03-12 ENCOUNTER — HOSPITAL ENCOUNTER (OUTPATIENT)
Dept: CARDIAC REHAB | Facility: CLINIC | Age: 54
End: 2019-03-12
Attending: FAMILY MEDICINE
Payer: COMMERCIAL

## 2019-03-12 PROCEDURE — 40000244 ZZH STATISTIC VISIT PULM REHAB

## 2019-03-12 PROCEDURE — G0424 PULMONARY REHAB W EXER: HCPCS

## 2019-03-14 ENCOUNTER — HOSPITAL ENCOUNTER (OUTPATIENT)
Dept: CARDIAC REHAB | Facility: CLINIC | Age: 54
End: 2019-03-14
Attending: FAMILY MEDICINE
Payer: COMMERCIAL

## 2019-03-14 PROCEDURE — 40000244 ZZH STATISTIC VISIT PULM REHAB

## 2019-03-14 PROCEDURE — G0424 PULMONARY REHAB W EXER: HCPCS

## 2019-03-19 ENCOUNTER — HOSPITAL ENCOUNTER (OUTPATIENT)
Dept: CARDIAC REHAB | Facility: CLINIC | Age: 54
End: 2019-03-19
Attending: FAMILY MEDICINE
Payer: COMMERCIAL

## 2019-03-19 PROCEDURE — 40000244 ZZH STATISTIC VISIT PULM REHAB

## 2019-03-19 PROCEDURE — G0424 PULMONARY REHAB W EXER: HCPCS

## 2019-03-21 ENCOUNTER — HOSPITAL ENCOUNTER (OUTPATIENT)
Dept: CARDIAC REHAB | Facility: CLINIC | Age: 54
End: 2019-03-21
Attending: FAMILY MEDICINE
Payer: COMMERCIAL

## 2019-03-21 PROCEDURE — 40000244 ZZH STATISTIC VISIT PULM REHAB

## 2019-03-21 PROCEDURE — G0424 PULMONARY REHAB W EXER: HCPCS

## 2019-03-26 ENCOUNTER — HOSPITAL ENCOUNTER (OUTPATIENT)
Dept: CARDIAC REHAB | Facility: CLINIC | Age: 54
End: 2019-03-26
Attending: FAMILY MEDICINE
Payer: COMMERCIAL

## 2019-04-01 ENCOUNTER — APPOINTMENT (OUTPATIENT)
Dept: GENERAL RADIOLOGY | Facility: CLINIC | Age: 54
End: 2019-04-01
Attending: FAMILY MEDICINE
Payer: COMMERCIAL

## 2019-04-01 ENCOUNTER — APPOINTMENT (OUTPATIENT)
Dept: CARDIOLOGY | Facility: CLINIC | Age: 54
End: 2019-04-01
Attending: PHYSICIAN ASSISTANT
Payer: COMMERCIAL

## 2019-04-01 ENCOUNTER — HOSPITAL ENCOUNTER (INPATIENT)
Facility: CLINIC | Age: 54
LOS: 4 days | Discharge: HOME | End: 2019-04-05
Attending: FAMILY MEDICINE | Admitting: FAMILY MEDICINE
Payer: COMMERCIAL

## 2019-04-01 DIAGNOSIS — I27.20 PULMONARY HYPERTENSION (H): Primary | ICD-10-CM

## 2019-04-01 DIAGNOSIS — J41.0 SIMPLE CHRONIC BRONCHITIS (H): ICD-10-CM

## 2019-04-01 DIAGNOSIS — J44.1 COPD EXACERBATION (H): ICD-10-CM

## 2019-04-01 DIAGNOSIS — J96.01 ACUTE RESPIRATORY FAILURE WITH HYPOXIA (H): ICD-10-CM

## 2019-04-01 LAB
ALBUMIN UR-MCNC: NEGATIVE MG/DL
ANION GAP SERPL CALCULATED.3IONS-SCNC: 5 MMOL/L (ref 3–14)
APPEARANCE UR: CLEAR
BASE EXCESS BLDV CALC-SCNC: 8 MMOL/L
BASOPHILS # BLD AUTO: 0 10E9/L (ref 0–0.2)
BASOPHILS NFR BLD AUTO: 0.2 %
BILIRUB UR QL STRIP: NEGATIVE
BUN SERPL-MCNC: 11 MG/DL (ref 7–30)
CALCIUM SERPL-MCNC: 8.7 MG/DL (ref 8.5–10.1)
CHLORIDE SERPL-SCNC: 87 MMOL/L (ref 94–109)
CO2 SERPL-SCNC: 33 MMOL/L (ref 20–32)
COLOR UR AUTO: ABNORMAL
CREAT SERPL-MCNC: 0.46 MG/DL (ref 0.66–1.25)
DIFFERENTIAL METHOD BLD: ABNORMAL
EOSINOPHIL # BLD AUTO: 0.1 10E9/L (ref 0–0.7)
EOSINOPHIL NFR BLD AUTO: 0.4 %
ERYTHROCYTE [DISTWIDTH] IN BLOOD BY AUTOMATED COUNT: 13.2 % (ref 10–15)
GFR SERPL CREATININE-BSD FRML MDRD: >90 ML/MIN/{1.73_M2}
GLUCOSE SERPL-MCNC: 103 MG/DL (ref 70–99)
GLUCOSE UR STRIP-MCNC: NEGATIVE MG/DL
HCO3 BLDV-SCNC: 37 MMOL/L (ref 21–28)
HCT VFR BLD AUTO: 47.9 % (ref 40–53)
HGB BLD-MCNC: 15.6 G/DL (ref 13.3–17.7)
HGB UR QL STRIP: NEGATIVE
IMM GRANULOCYTES # BLD: 0.1 10E9/L (ref 0–0.4)
IMM GRANULOCYTES NFR BLD: 0.5 %
KETONES UR STRIP-MCNC: NEGATIVE MG/DL
LEUKOCYTE ESTERASE UR QL STRIP: NEGATIVE
LYMPHOCYTES # BLD AUTO: 1.1 10E9/L (ref 0.8–5.3)
LYMPHOCYTES NFR BLD AUTO: 8.9 %
MCH RBC QN AUTO: 31.4 PG (ref 26.5–33)
MCHC RBC AUTO-ENTMCNC: 32.6 G/DL (ref 31.5–36.5)
MCV RBC AUTO: 96 FL (ref 78–100)
MONOCYTES # BLD AUTO: 1.2 10E9/L (ref 0–1.3)
MONOCYTES NFR BLD AUTO: 10 %
NEUTROPHILS # BLD AUTO: 9.7 10E9/L (ref 1.6–8.3)
NEUTROPHILS NFR BLD AUTO: 80 %
NITRATE UR QL: NEGATIVE
NRBC # BLD AUTO: 0 10*3/UL
NRBC BLD AUTO-RTO: 0 /100
NT-PROBNP SERPL-MCNC: 331 PG/ML (ref 0–900)
O2/TOTAL GAS SETTING VFR VENT: ABNORMAL %
OSMOLALITY UR: 210 MMOL/KG (ref 100–1200)
PCO2 BLDV: 68 MM HG (ref 40–50)
PH BLDV: 7.34 PH (ref 7.32–7.43)
PH UR STRIP: 8 PH (ref 5–7)
PLATELET # BLD AUTO: 339 10E9/L (ref 150–450)
PO2 BLDV: 45 MM HG (ref 25–47)
POTASSIUM SERPL-SCNC: 4.2 MMOL/L (ref 3.4–5.3)
RBC # BLD AUTO: 4.97 10E12/L (ref 4.4–5.9)
RBC #/AREA URNS AUTO: <1 /HPF (ref 0–2)
SODIUM SERPL-SCNC: 125 MMOL/L (ref 133–144)
SODIUM UR-SCNC: 13 MMOL/L
SOURCE: ABNORMAL
SP GR UR STRIP: 1.01 (ref 1–1.03)
SQUAMOUS #/AREA URNS AUTO: <1 /HPF (ref 0–1)
TROPONIN I SERPL-MCNC: <0.015 UG/L (ref 0–0.04)
UROBILINOGEN UR STRIP-MCNC: 0 MG/DL (ref 0–2)
WBC # BLD AUTO: 12.1 10E9/L (ref 4–11)
WBC #/AREA URNS AUTO: <1 /HPF (ref 0–5)

## 2019-04-01 PROCEDURE — 85025 COMPLETE CBC W/AUTO DIFF WBC: CPT | Performed by: FAMILY MEDICINE

## 2019-04-01 PROCEDURE — 84300 ASSAY OF URINE SODIUM: CPT | Performed by: PHYSICIAN ASSISTANT

## 2019-04-01 PROCEDURE — 99285 EMERGENCY DEPT VISIT HI MDM: CPT | Mod: 25

## 2019-04-01 PROCEDURE — 25000125 ZZHC RX 250: Performed by: PHYSICIAN ASSISTANT

## 2019-04-01 PROCEDURE — 12000000 ZZH R&B MED SURG/OB

## 2019-04-01 PROCEDURE — 94640 AIRWAY INHALATION TREATMENT: CPT

## 2019-04-01 PROCEDURE — 40000275 ZZH STATISTIC RCP TIME EA 10 MIN

## 2019-04-01 PROCEDURE — 25000128 H RX IP 250 OP 636: Performed by: FAMILY MEDICINE

## 2019-04-01 PROCEDURE — 25000125 ZZHC RX 250: Performed by: FAMILY MEDICINE

## 2019-04-01 PROCEDURE — 94640 AIRWAY INHALATION TREATMENT: CPT | Mod: 76

## 2019-04-01 PROCEDURE — 25000132 ZZH RX MED GY IP 250 OP 250 PS 637: Performed by: PHYSICIAN ASSISTANT

## 2019-04-01 PROCEDURE — 99223 1ST HOSP IP/OBS HIGH 75: CPT | Mod: AI | Performed by: FAMILY MEDICINE

## 2019-04-01 PROCEDURE — 93306 TTE W/DOPPLER COMPLETE: CPT | Mod: 26 | Performed by: INTERNAL MEDICINE

## 2019-04-01 PROCEDURE — 93010 ELECTROCARDIOGRAM REPORT: CPT | Mod: Z6 | Performed by: FAMILY MEDICINE

## 2019-04-01 PROCEDURE — 84484 ASSAY OF TROPONIN QUANT: CPT | Performed by: FAMILY MEDICINE

## 2019-04-01 PROCEDURE — 93005 ELECTROCARDIOGRAM TRACING: CPT

## 2019-04-01 PROCEDURE — 96374 THER/PROPH/DIAG INJ IV PUSH: CPT

## 2019-04-01 PROCEDURE — 82803 BLOOD GASES ANY COMBINATION: CPT | Performed by: FAMILY MEDICINE

## 2019-04-01 PROCEDURE — 99285 EMERGENCY DEPT VISIT HI MDM: CPT | Mod: 25 | Performed by: FAMILY MEDICINE

## 2019-04-01 PROCEDURE — 81001 URINALYSIS AUTO W/SCOPE: CPT | Performed by: FAMILY MEDICINE

## 2019-04-01 PROCEDURE — 83935 ASSAY OF URINE OSMOLALITY: CPT | Performed by: PHYSICIAN ASSISTANT

## 2019-04-01 PROCEDURE — 76604 US EXAM CHEST: CPT

## 2019-04-01 PROCEDURE — 71046 X-RAY EXAM CHEST 2 VIEWS: CPT

## 2019-04-01 PROCEDURE — 93306 TTE W/DOPPLER COMPLETE: CPT

## 2019-04-01 PROCEDURE — 80048 BASIC METABOLIC PNL TOTAL CA: CPT | Performed by: FAMILY MEDICINE

## 2019-04-01 PROCEDURE — 25800030 ZZH RX IP 258 OP 636: Performed by: PHYSICIAN ASSISTANT

## 2019-04-01 PROCEDURE — 40000809 ZZH STATISTIC NO DOCUMENTATION TO SUPPORT CHARGE

## 2019-04-01 PROCEDURE — 76604 US EXAM CHEST: CPT | Mod: 26 | Performed by: FAMILY MEDICINE

## 2019-04-01 PROCEDURE — 83880 ASSAY OF NATRIURETIC PEPTIDE: CPT | Performed by: FAMILY MEDICINE

## 2019-04-01 RX ORDER — AMOXICILLIN 250 MG
2 CAPSULE ORAL 2 TIMES DAILY PRN
Status: DISCONTINUED | OUTPATIENT
Start: 2019-04-01 | End: 2019-04-05 | Stop reason: HOSPADM

## 2019-04-01 RX ORDER — AMLODIPINE BESYLATE 10 MG/1
10 TABLET ORAL EVERY EVENING
Status: DISCONTINUED | OUTPATIENT
Start: 2019-04-01 | End: 2019-04-01

## 2019-04-01 RX ORDER — AMOXICILLIN 250 MG
1 CAPSULE ORAL 2 TIMES DAILY PRN
Status: DISCONTINUED | OUTPATIENT
Start: 2019-04-01 | End: 2019-04-05 | Stop reason: HOSPADM

## 2019-04-01 RX ORDER — ATENOLOL 25 MG/1
25 TABLET ORAL EVERY EVENING
Status: DISCONTINUED | OUTPATIENT
Start: 2019-04-01 | End: 2019-04-01

## 2019-04-01 RX ORDER — AZITHROMYCIN 250 MG/1
250 TABLET, FILM COATED ORAL DAILY
Status: COMPLETED | OUTPATIENT
Start: 2019-04-02 | End: 2019-04-05

## 2019-04-01 RX ORDER — ACETAMINOPHEN 650 MG/1
650 SUPPOSITORY RECTAL EVERY 4 HOURS PRN
Status: DISCONTINUED | OUTPATIENT
Start: 2019-04-01 | End: 2019-04-05 | Stop reason: HOSPADM

## 2019-04-01 RX ORDER — AMLODIPINE BESYLATE 10 MG/1
10 TABLET ORAL DAILY
Status: DISCONTINUED | OUTPATIENT
Start: 2019-04-02 | End: 2019-04-04

## 2019-04-01 RX ORDER — IPRATROPIUM BROMIDE AND ALBUTEROL SULFATE 2.5; .5 MG/3ML; MG/3ML
3 SOLUTION RESPIRATORY (INHALATION) ONCE
Status: COMPLETED | OUTPATIENT
Start: 2019-04-01 | End: 2019-04-01

## 2019-04-01 RX ORDER — POLYETHYLENE GLYCOL 3350 17 G/17G
17 POWDER, FOR SOLUTION ORAL DAILY PRN
Status: DISCONTINUED | OUTPATIENT
Start: 2019-04-01 | End: 2019-04-05 | Stop reason: HOSPADM

## 2019-04-01 RX ORDER — IBUPROFEN 600 MG/1
600 TABLET, FILM COATED ORAL EVERY 6 HOURS PRN
Status: DISCONTINUED | OUTPATIENT
Start: 2019-04-01 | End: 2019-04-05 | Stop reason: HOSPADM

## 2019-04-01 RX ORDER — ONDANSETRON 4 MG/1
4 TABLET, ORALLY DISINTEGRATING ORAL EVERY 6 HOURS PRN
Status: DISCONTINUED | OUTPATIENT
Start: 2019-04-01 | End: 2019-04-05 | Stop reason: HOSPADM

## 2019-04-01 RX ORDER — PROCHLORPERAZINE MALEATE 10 MG
10 TABLET ORAL EVERY 6 HOURS PRN
Status: DISCONTINUED | OUTPATIENT
Start: 2019-04-01 | End: 2019-04-05 | Stop reason: HOSPADM

## 2019-04-01 RX ORDER — BISACODYL 10 MG
10 SUPPOSITORY, RECTAL RECTAL DAILY PRN
Status: DISCONTINUED | OUTPATIENT
Start: 2019-04-01 | End: 2019-04-05 | Stop reason: HOSPADM

## 2019-04-01 RX ORDER — METHYLPREDNISOLONE SODIUM SUCCINATE 125 MG/2ML
125 INJECTION, POWDER, LYOPHILIZED, FOR SOLUTION INTRAMUSCULAR; INTRAVENOUS ONCE
Status: COMPLETED | OUTPATIENT
Start: 2019-04-01 | End: 2019-04-01

## 2019-04-01 RX ORDER — ONDANSETRON 2 MG/ML
4 INJECTION INTRAMUSCULAR; INTRAVENOUS EVERY 6 HOURS PRN
Status: DISCONTINUED | OUTPATIENT
Start: 2019-04-01 | End: 2019-04-05 | Stop reason: HOSPADM

## 2019-04-01 RX ORDER — IPRATROPIUM BROMIDE AND ALBUTEROL SULFATE 2.5; .5 MG/3ML; MG/3ML
3 SOLUTION RESPIRATORY (INHALATION)
Status: DISCONTINUED | OUTPATIENT
Start: 2019-04-01 | End: 2019-04-05 | Stop reason: HOSPADM

## 2019-04-01 RX ORDER — METOCLOPRAMIDE HYDROCHLORIDE 5 MG/ML
10 INJECTION INTRAMUSCULAR; INTRAVENOUS EVERY 6 HOURS PRN
Status: DISCONTINUED | OUTPATIENT
Start: 2019-04-01 | End: 2019-04-05 | Stop reason: HOSPADM

## 2019-04-01 RX ORDER — PROCHLORPERAZINE 25 MG
25 SUPPOSITORY, RECTAL RECTAL EVERY 12 HOURS PRN
Status: DISCONTINUED | OUTPATIENT
Start: 2019-04-01 | End: 2019-04-05 | Stop reason: HOSPADM

## 2019-04-01 RX ORDER — PREDNISONE 20 MG/1
40 TABLET ORAL DAILY
Status: DISCONTINUED | OUTPATIENT
Start: 2019-04-01 | End: 2019-04-02

## 2019-04-01 RX ORDER — NALOXONE HYDROCHLORIDE 0.4 MG/ML
.1-.4 INJECTION, SOLUTION INTRAMUSCULAR; INTRAVENOUS; SUBCUTANEOUS
Status: DISCONTINUED | OUTPATIENT
Start: 2019-04-01 | End: 2019-04-05 | Stop reason: HOSPADM

## 2019-04-01 RX ORDER — AZITHROMYCIN 250 MG/1
500 TABLET, FILM COATED ORAL ONCE
Status: COMPLETED | OUTPATIENT
Start: 2019-04-01 | End: 2019-04-01

## 2019-04-01 RX ORDER — ALBUTEROL SULFATE 0.83 MG/ML
2.5 SOLUTION RESPIRATORY (INHALATION)
Status: DISCONTINUED | OUTPATIENT
Start: 2019-04-01 | End: 2019-04-05 | Stop reason: HOSPADM

## 2019-04-01 RX ORDER — SODIUM CHLORIDE 9 MG/ML
INJECTION, SOLUTION INTRAVENOUS CONTINUOUS
Status: DISCONTINUED | OUTPATIENT
Start: 2019-04-01 | End: 2019-04-02

## 2019-04-01 RX ORDER — ATENOLOL 25 MG/1
25 TABLET ORAL DAILY
Status: DISCONTINUED | OUTPATIENT
Start: 2019-04-02 | End: 2019-04-05 | Stop reason: HOSPADM

## 2019-04-01 RX ORDER — ACETAMINOPHEN 325 MG/1
650 TABLET ORAL EVERY 4 HOURS PRN
Status: DISCONTINUED | OUTPATIENT
Start: 2019-04-01 | End: 2019-04-05 | Stop reason: HOSPADM

## 2019-04-01 RX ORDER — METOCLOPRAMIDE 10 MG/1
10 TABLET ORAL EVERY 6 HOURS PRN
Status: DISCONTINUED | OUTPATIENT
Start: 2019-04-01 | End: 2019-04-05 | Stop reason: HOSPADM

## 2019-04-01 RX ADMIN — IBUPROFEN 600 MG: 600 TABLET ORAL at 14:57

## 2019-04-01 RX ADMIN — IPRATROPIUM BROMIDE AND ALBUTEROL SULFATE 3 ML: .5; 3 SOLUTION RESPIRATORY (INHALATION) at 15:35

## 2019-04-01 RX ADMIN — AZITHROMYCIN 500 MG: 250 TABLET, FILM COATED ORAL at 14:27

## 2019-04-01 RX ADMIN — IPRATROPIUM BROMIDE AND ALBUTEROL SULFATE 3 ML: .5; 3 SOLUTION RESPIRATORY (INHALATION) at 09:31

## 2019-04-01 RX ADMIN — METHYLPREDNISOLONE SODIUM SUCCINATE 125 MG: 125 INJECTION, POWDER, FOR SOLUTION INTRAMUSCULAR; INTRAVENOUS at 11:08

## 2019-04-01 RX ADMIN — LISINOPRIL 60 MG: 40 TABLET ORAL at 21:51

## 2019-04-01 RX ADMIN — SODIUM CHLORIDE: 9 INJECTION, SOLUTION INTRAVENOUS at 14:11

## 2019-04-01 RX ADMIN — PREDNISONE 40 MG: 20 TABLET ORAL at 14:27

## 2019-04-01 RX ADMIN — IPRATROPIUM BROMIDE AND ALBUTEROL SULFATE 3 ML: .5; 3 SOLUTION RESPIRATORY (INHALATION) at 20:14

## 2019-04-01 ASSESSMENT — ACTIVITIES OF DAILY LIVING (ADL)
ADLS_ACUITY_SCORE: 11
ADLS_ACUITY_SCORE: 11

## 2019-04-01 ASSESSMENT — MIFFLIN-ST. JEOR
SCORE: 1289
SCORE: 1287.64

## 2019-04-01 NOTE — PROGRESS NOTES
1:32 PM   Pt seen and examined.  history of copd with one previous hospitalization for this  Had recent out pt prednisone and doxy rx for same  Now with increasing difficulty breathing for two days.  No fever or prodromal uri.  Just mild cough.  Has had leg swelling for two weeks.  No chf history.  Eating well last two days.  Drinks 3-4 per day weekends-not so much during week    Has prolonged exp phase and wheeze on exam.  Sat was 89 % -now in 90's on oxygen    bnp is normal  Na was 125    Will hydrate at 50 ml per hour of saline.  Nebs, steroids, zithromax.  Check echo--suspect that he might have some right heart issues from copd or pul htn.  No diuretics now.  Checking urine sodium and urine and serum osmo's

## 2019-04-01 NOTE — ED AVS SNAPSHOT
[facility]  [address] [address]  Phone:  [phone]  Fax:  [phone]                                    Luis Hernandez   MRN: [id number]    Department:  [facility]   Date of Visit:  4/1/2019           After Visit Summary Signature Page    I have received my discharge instructions, and my questions have been answered. I have discussed any challenges I see with this plan with the nurse or doctor.    ..........................................................................................................................................  Patient/Patient Representative Signature      ..........................................................................................................................................  Patient Representative Print Name and Relationship to Patient    ..................................................               ................................................  Date                                   Time    ..........................................................................................................................................  Reviewed by Signature/Title    ...................................................              ..............................................  Date                                               Time          22EPIC Rev 08/18

## 2019-04-01 NOTE — ED PROVIDER NOTES
"HPI  Current medications, past medical history, and social history are reviewed.    The patient is a 53-year-old male presenting with shortness of breath.  He has a known history of COPD.  His last exacerbation appears to be on 2/19/19.  He reports worsened breathing starting 2 days ago.  At the same time, he recognized new bilateral edema in his lower extremities.  This does not extend above the calves.  He denies chest pain.  He does report a severe cough with congestion.  There has been minimal to no production.  He denies having a fever.  No myalgia.  No headache or sore throat.    ROS: All other review of systems are negative other than that noted above.     No past medical history on file.  Past Surgical History:   Procedure Laterality Date     APPENDECTOMY      childhood     Medicines    albuterol (PROAIR HFA/PROVENTIL HFA/VENTOLIN HFA) 108 (90 Base) MCG/ACT inhaler   amLODIPine (NORVASC) 10 MG tablet   atenolol (TENORMIN) 25 MG tablet   fluticasone-salmeterol (ADVAIR) 500-50 MCG/DOSE diskus inhaler   guaiFENesin (MUCINEX PO)   lisinopril (PRINIVIL/ZESTRIL) 40 MG tablet   predniSONE (DELTASONE) 10 MG tablet   tiotropium (SPIRIVA HANDIHALER) 18 MCG capsule   doxycycline (VIBRAMYCIN) 100 MG capsule     Family History   Problem Relation Age of Onset     Hypertension Father      Lipids Father      C.A.D. Father      Heart Disease Father      Diabetes Paternal Grandmother      Hypertension Mother      Ovarian Cancer Mother      Social History     Tobacco Use     Smoking status: Former Smoker     Packs/day: 1.00     Years: 20.00     Pack years: 20.00     Types: Cigarettes     Smokeless tobacco: Former User     Tobacco comment: quitting using the patch   Substance Use Topics     Alcohol use: Yes     Comment: rare     Drug use: No         PHYSICAL  BP (!) 159/105   Pulse 97   Temp 98  F (36.7  C) (Oral)   Resp 22   Ht 1.626 m (5' 4\")   Wt 52.2 kg (115 lb)   SpO2 95%   BMI 19.74 kg/m    General: Patient is " alert and in severe distress.  Taking deep breaths with accessory muscle use.  Talking in broken sentences.  Neurological: Alert.  Moving upper and lower extremities equally, bilaterally.  Head / Neck: Atraumatic.  Ears: Not done.  Eyes: Pupils are equal, round, and reactive.  Normal conjunctiva.  Nose: Midline.  No epistaxis.  Mouth / Throat: No ulcerations or lesions.  Upper pharynx is not erythematous.  Moist.  Respiratory: As above.  Decreased breath sounds bilaterally.  End expiratory wheezing.  Rhonchi.  Coughing occasionally.  Cardiovascular: Regular rhythm.  Peripheral extremities are warm.  Both feet and ankles are swollen.  No calf tenderness.  Abdomen / Pelvis: Not tender.  No distention.  Soft throughout.  Genitalia: Not done.  Musculoskeletal: No tenderness over major muscles and joints.  Skin: No evidence of rash or trauma.        ED COURSE  0927.  The patient has no shortness of breath and bilateral lower extremity edema.  Lab values pending.  Chest x-ray.  EKG.  The patient is receiving an albuterol/Atrovent neb on arrival.  O2 saturations are found to be 86% on room air.  He does not use oxygen at home.    Labs Ordered and Resulted from Time of ED Arrival Up to the Time of Departure from the ED   CBC WITH PLATELETS DIFFERENTIAL - Abnormal; Notable for the following components:       Result Value    WBC 12.1 (*)     Absolute Neutrophil 9.7 (*)     All other components within normal limits   BASIC METABOLIC PANEL - Abnormal; Notable for the following components:    Sodium 125 (*)     Chloride 87 (*)     Carbon Dioxide 33 (*)     Glucose 103 (*)     Creatinine 0.46 (*)     All other components within normal limits   BLOOD GAS VENOUS - Abnormal; Notable for the following components:    PCO2 Venous 68 (*)     Bicarbonate Venous 37 (*)     All other components within normal limits   TROPONIN I   NT PROBNP INPATIENT   VITAL SIGNS   PULSE OXIMETRY NURSING   CARDIAC CONTINUOUS MONITORING     IMAGING  Images  reviewed by me.  Radiology report also reviewed.  POC US ECHO LIMITED   Final Result   [facility] Procedure Note        Limited Bedside ED Cardiac Ultrasound:      PROCEDURE: PERFORMED BY: Dr. Seth Barahona   INDICATIONS/SYMPTOM:  Shortness of Breath   PROBE: Cardiac phased array probe   BODY LOCATION: Chest   FINDINGS:    The ultrasound was performed utilizing the subcostal, parasternal long axis and parasternal short axis views.   Cardiac contractility:  Present   Gross estimation of cardiac kinesis: normal   Pericardial Effusion:  None   RV:LV ratio: Equal   INTERPRETATION:    Chamber size and motion were grossly normal with LV > RV, normal cardiac kinesis.  No pericardial effusion was found.    IMAGE DOCUMENTATION: Images were archived to hard drive.              XR Chest 2 Views   Preliminary Result   IMPRESSION: Very prominent emphysematous changes most prominent in the   upper lobes. Hyperinflation. Cardiomediastinal silhouette is normal.   No focal infiltrates or evidence of pleural fluid. No significant   change.        EKG  (1025)   Interpretation performed by me.  Rate: 95     Rhythm: sinus     Axis: nl  Intervals: ME (12-2) 122, QRS (<12) 104, QTc (>5) 397  P wave: down in V1 and V2     QRS complex: nl  ST segment / T-wave: ST/J-point elevation in V2-V6, similar to previous  Conclusion: The patient has a sinus rhythm with P wave inversion, suspicious for left atrial enlargement.  ST/J-point elevation diffusely in V2-V6, similar to previous.    1044.  The patient has a negative chest x-ray.  A bedside ultrasound is obtained but not documented.  I see good heart function without pericardial effusion.  I see no obvious valve problems.  I see symmetric squeeze and chamber size.  I am assuming his fluid retention is progressively worsened and subacute and not specifically related to his shortness of breath.  He denies orthopnea that is new or different.  No chest pain.  His troponin is negative  though his EKG is abnormal (but similar to previous).  COPD exacerbation will be diagnosed.  Solu-Medrol will be started.  He does not need another neb currently.    Medications   methylPREDNISolone sodium succinate (solu-MEDROL) injection 125 mg (not administered)   ipratropium - albuterol 0.5 mg/2.5 mg/3 mL (DUONEB) neb solution 3 mL (3 mLs Nebulization Given 4/1/19 0998)       IMPRESSION    ICD-10-CM    1. COPD exacerbation (H) J44.1    2. Acute respiratory failure with hypoxia (H) J96.01          Critical Care time:  none                    Seth Barahona MD  04/01/19 2591

## 2019-04-01 NOTE — H&P
[facility]    History and Physical - Hospitalist Service       Date of Admission:  4/1/2019    Assessment & Plan   Luis Hernandez is a 53 year old male admitted on 4/1/2019. He presents with increasing dyspnea.    Acute on chronic respiratory failure with hypoxia and hypercapnia   Suspect COPD exacerbation, possible right HF  Increasing dyspnea over the last 48 hours, following a couple weeks of URI symptoms. Working very hard on arrival at the ED, 86% SpO2, 2-3 words/breath. Markedly improved with Duoneb x 1 and solu-medrol 125 mg. With BNP of 331 and new lower extremity edema, concerned for possible right heart failure due to acute COPD exacerbation.    At his baseline becomes dyspneic after about 50 feet, today only able to walk 5-10 feet before becoming dyspneic.  Acknowledges some orthopnea but this is unchanged from his baseline.  New lower extremity edema present for about 2 weeks, markedly worse over the last 48 hours. No PND. No recent travel, prolonged immobility, calf pain/swelling. Normal echo 8/26/15 with LVEF 60-65%, no mention of pulmonary HTN. SpO2 now 95% with O2. WBC 12.1. NT proBNP 331. CXR hyperinflated without infitrate. VBG 7.34/68/45.  - Duoneb q4h while awake  - Albuterol q2h prn  - Prednisone 40 mg x 5 days  - Azithromycin oral 500 mg, then 250 mg x 4 days  - Echo  - O2 as needed to maintain > 90%    COPD (centrilobular emphysema)  PFTs 7/15/2013 showed severe obstructive lung disease with reversibility and moderate reduction in diffusion capacity. Managed with Albuterol MDI, Advair, and Spiriva.  - Albuterol neb prn  - Hold Advair and Spiriva while on duonebs and prednisone    Hyponatremia  Na 125 on admission. Denies decreased PO intake of food or drink. Denies frequent alcohol use, ~ 3-6 beers / weekend.  - Urine sodium pending  - Urine & serum osmolality pending  - NS @ 50 mL/hr    Essential hypertension  Reviewed outpatient BPs, appears well controlled on  amlodipine 10 mg and lisinopril 60 mg.  - Continue amlodipine and lisinopril.    Tobacco abuse, in remission  60 pack/year smoking history. Currently down to 1-2 cigarettes per week. Declined nicotine patch.    Lung nodule  Known calcified granuloma RUL, stable on repeat CTs, pulmonology states no further follow-up needed.       Diet: 2 Gram Sodium Diet    DVT Prophylaxis: Pneumatic Compression Devices   Miller Catheter: not present  Code Status: Full    Disposition Plan   Expected discharge: 2 - 3 days, recommended to prior living arrangement once antibiotic plan established and dyspnea resolved/improving.  Entered: Tim Eubanks PA-C 04/01/2019, 1:03 PM     The patient's care was discussed with the Attending Physician, Dr. Holt and Patient.    Tim Eubanks PA-C  [facility]    ______________________________________________________________________    Chief Complaint   Increasing dyspnea    History is obtained from the patient    History of Present Illness   Luis Hernandez is a 53 year old male with a history of severe emphysema, hypertension, and 60 pack/year smoking history who presents with increasing dyspnea.  He describes approximately 2 weeks of URI symptoms including rhinorrhea nasal congestion but no cough.  Dramatic increase in his work of breathing and shortness of breath over the last 24-48 hours.  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  As of today he felt he could only walk 5-10 feet.  Additionally notes some mild swelling in his feet over the last 2 weeks that became much more prominent and visible over the last 48 hours.  Mild orthopnea at his baseline, unchanged.  No PND.  No cardiac history.  Denies fevers, chills, cough, mucus production, recent travel, or prolonged immobility.    60-pack-year smoking history, down to 1-2 cigarettes/week.  Trying to quit completely.    Denies  history of DVT, PE, stroke, MI.    Review of Systems    Denies headaches, dizziness, chest pain, palpitations, nausea, vomiting, diarrhea, constipation, weight loss.  The 10 point Review of Systems is negative other than noted in the HPI or here.     Past Medical History    I have reviewed this patient's medical history and updated it with pertinent information if needed.   Past Medical History:   Diagnosis Date     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)      Erectile dysfunction      Hypertension         Tobacco abuse, in remission 12/05/2017     Priority: Medium     Incidental pulmonary nodule, > 3mm and < 8mm, new from 2010 01/07/2014     Priority: Medium     By chest x-ray and chest CT new from CT chest from Oct 2010.   Stable 01/2014 to 07/2014, 6 month follow scan recommended.   Chest CT of 1/15/2015= stable nodule, f/u one year.       Eczema 10/04/2010     Priority: Medium        Past Surgical History   I have reviewed this patient's surgical history and updated it with pertinent information if needed.  Past Surgical History:   Procedure Laterality Date     APPENDECTOMY      childhood       Social History   I have reviewed this patient's social history and updated it with pertinent information if needed.  Social History     Tobacco Use     Smoking status: Former Smoker     Packs/day: 1.00     Years: 20.00     Pack years: 20.00     Types: Cigarettes     Smokeless tobacco: Former User     Tobacco comment: quitting using the patch   Substance Use Topics     Alcohol use: Yes     Comment: rare     Drug use: No       Family History   I have reviewed this patient's family history and updated it with pertinent information if needed.   Family History   Problem Relation Age of Onset     Hypertension Father      Lipids Father      C.A.D. Father      Heart Disease Father      Diabetes Paternal Grandmother      Hypertension Mother      Ovarian Cancer Mother        Prior to Admission Medications    Prior to Admission Medications   Prescriptions Last Dose Informant Patient Reported? Taking?   albuterol (PROAIR HFA/PROVENTIL HFA/VENTOLIN HFA) 108 (90 Base) MCG/ACT inhaler 4/1/2019 at 0200 Self No Yes   Sig: Inhale 2 puffs into the lungs every 4 hours as needed for shortness of breath / dyspnea Hold on file until needed   amLODIPine (NORVASC) 10 MG tablet 3/31/2019 at 1700 Self No Yes   Sig: Take 1 tablet (10 mg) by mouth daily   atenolol (TENORMIN) 25 MG tablet 3/31/2019 at 1700 Self No Yes   Sig: Take 1 tablet (25 mg) by mouth daily   doxycycline (VIBRAMYCIN) 100 MG capsule More than a month at Unknown time Self No No   Sig: Take 1 capsule (100 mg) by mouth 2 times daily Keep on hand for COPD exacerbation.   fluticasone-salmeterol (ADVAIR) 500-50 MCG/DOSE diskus inhaler 3/31/2019 at 2200 Self No Yes   Sig: Inhale 1 puff into the lungs 2 times daily Hold on file until needed   guaiFENesin (MUCINEX PO) 3/31/2019 at pm Self Yes Yes   Sig: Take 2 tablets by mouth once as needed   lisinopril (PRINIVIL/ZESTRIL) 40 MG tablet 3/31/2019 at 1700 Self No Yes   Sig: Take 1.5 pills, or 60 mg daily   Patient taking differently: Take 60 mg by mouth every evening Take 1.5 pills, or 60 mg daily   predniSONE (DELTASONE) 10 MG tablet 3/31/2019 at 1300 20 mg Self No Yes   Sig: Take 4 tabs PO Q daily X 3 days, 3 tabs X 3 days, 2 tabs X 3 days, 1 tab X 3 days, 1/2 tab X 2 days. Use for COPD exacerbations   tiotropium (SPIRIVA HANDIHALER) 18 MCG capsule 3/31/2019 at 2200 Self No Yes   Sig: Inhale contents of one capsule daily.   Patient taking differently: Inhale 18 mcg into the lungs every evening Inhale contents of one capsule daily.      Facility-Administered Medications: None     Allergies   Allergies   Allergen Reactions     Hctz Other (See Comments)     He has hyponatremia     Penicillins Other (See Comments)     Reaction unknown       Physical Exam   Vital Signs: Temp: 98  F (36.7  C) Temp src: Oral BP: (!) 148/109 Pulse: 96  Heart Rate: 98 Resp: 22 SpO2: 95 % O2 Device: None (Room air)    Weight: 115 lbs 0 oz    General: Appears calm, comfortable, nontoxic. Answers questions quickly and appropriately with clear speech.   Skin: Pink, warm, dry.  Eyes: PERRL. Sclera are white.  HENT:  Normocephalic, atraumatic. Oropharynx is moist, without lesions or exudate.  Lymph/Hematologic: No occipital, anterior/posterior cervical, supraclavicular, or axillary lymphadenopathy.  CV: RRR, clear S1/S2 without murmur, rub, or gallop. Radial pulses equal bilaterally.  Lower extremity edema present to mid calf.    Respiratory: Lung sounds are distant throughout with prolonged expiratory phase and scattered mild wheezes. Equal bilaterally.  Mild conversational dyspnea.  GI: Soft, nontender. Normal bowel sounds.  Musculoskeletal: Moving all extremities well. Normal muscle bulk and tone.  Nail clubbing present.  Neuro: Memory and cognition appear normal. CN II - XII grossly intact. Symmetrical extremity strength.  Psych: Normal mood and affect.    Data   Data reviewed today: I reviewed all medications, new labs and imaging results over the last 24 hours. I personally reviewed the EKG tracing showing SR without ischemic changes and the chest x-ray image(s) showing hyperinflation without effusions or infiltrates.    Recent Labs   Lab 04/01/19  0932   WBC 12.1*   HGB 15.6   MCV 96      *   POTASSIUM 4.2   CHLORIDE 87*   CO2 33*   BUN 11   CR 0.46*   ANIONGAP 5   MIKE 8.7   *   TROPI <0.015     Recent Results (from the past 24 hour(s))   XR Chest 2 Views    Narrative    CHEST TWO VIEWS April 1, 2019 10:04 AM     HISTORY: Shortness of breath.    COMPARISON: 11/13/2017.      Impression    IMPRESSION: Very prominent emphysematous changes most prominent in the  upper lobes. Hyperinflation. Cardiomediastinal silhouette is normal.  No focal infiltrates or evidence of pleural fluid. No significant  change.   POC US ECHO LIMITED    Impression     [facility] [place] Procedure Note      Limited Bedside ED Cardiac Ultrasound:    PROCEDURE: PERFORMED BY: Dr. Seth Barahona  INDICATIONS/SYMPTOM:  Shortness of Breath  PROBE: Cardiac phased array probe  BODY LOCATION: Chest  FINDINGS:   The ultrasound was performed utilizing the subcostal, parasternal long axis and parasternal short axis views.  Cardiac contractility:  Present  Gross estimation of cardiac kinesis: normal  Pericardial Effusion:  None  RV:LV ratio: Equal  INTERPRETATION:    Chamber size and motion were grossly normal with LV > RV, normal cardiac kinesis.  No pericardial effusion was found.   IMAGE DOCUMENTATION: Images were archived to hard drive.

## 2019-04-01 NOTE — LETTER
"Transition Communication Hand-off for Care Transitions to Next Level of Care Provider    Name: Luis BURNS Vanedarien  : 1965  MRN #: [id number]  Primary Care Provider: Dean Mariee  Primary Care MD Name: Dr. Mariee  Primary Clinic: [address] [facility] [address]  Primary Care Clinic Name: [facility]  Reason for Hospitalization:  COPD exacerbation (H) [J44.1]  Acute respiratory failure with hypoxia (H) [J96.01]  Admit Date/Time: 2019  9:15 AM  Discharge Date: 2019  Payor Source: Payor: [employer] / Plan: [employer] COMPREHENSIVE CARE SERV/BLUE LINK / Product Type: PPO /     Readmission Assessment Measure (OLLIE) Risk Score/category: Low  Reason for Communication Hand-off Referral: Admission diagnoses: COPD    Discharge Plan: Home       Concern for non-adherence with plan of care:   Y/N no  Follow-up plan:    Future Appointments   Date Time Provider Department Center   2019  9:00 AM 1, [facility]   2019  9:00 AM 1, [facility]   2019  9:00 AM 1, [facility]   2019  9:00 AM 1, [facility]   2019  9:00 AM 1, [facility]   2019  9:00 AM 1, [facility]   2019  9:00 AM 1, [facility]   2019  9:00 AM 1[facility]       Johnson Recommendations:  Per MD note 4/3/2019 \"Acute on chronic respiratory failure with hypoxia and hypercapnia   Suspect COPD exacerbation  Severe pulmonary hypertension  Increasing dyspnea over t 48 hours, following a couple weeks of URI symptoms. Working very hard on arrival at the ED, 86% SpO2, 2-3 words/breath. Markedly improved with Duoneb x 1 and solu-medrol 125 mg. With BNP of 331 and new lower extremity edema, concerned for possible right heart failure due to acute COPD exacerbation. Acknowledges some orthopnea but this is " "unchanged from his baseline.  New lower extremity edema present for about 2 weeks, markedly worse over the last 48 hours. No PND. No recent travel, prolonged immobility, calf pain/swelling. Normal echo 8/26/15 with LVEF 60-65%, no mention of pulmonary HTN. On admit-- WBC 12.1. NT proBNP 331. CXR hyperinflated without infitrate. VBG 7.34/68/45.     On nebs, pred, oxygen.Feel a lot better-but still on oxygen  Echo shows severe pul htn-received one dose lasix 4/2/19 .  Holding Advair and Spiriva while on duonebs and prednisone  On four liters nc.\"      Karen Barajas    AVS/Discharge Summary is the source of truth; this is a helpful guide for improved communication of patient story          "

## 2019-04-01 NOTE — PROGRESS NOTES
Pt is familiar with RT as he attend Pulmonary Rehab. Pt was instructed that he would need a doctor's note upon/after discharge stating that he is healthy/safe to continue rehab once he gets out of the hospital. Pt does not have home oxygen, but will probably need to be set up with it (based on being 86% RA at last clinic sesion). RT to continue to follow.         04/01/19 1535   Nebulizer Pre Assessment   %RT Use ONLY Delivery Method Nebulizer - Initial   Nebulizer Device Mouthpiece   Pretreatment Heart Rate (beats/min) 92   Pretreatment Resp Rate (breaths/min) 18   Pretreatment O2 sats - (TCU only) 91  (2.5L)   Pretreat Breath Sounds - Bilat - All Lobes diminished   Breath Sounds Post-Respiratory Treatment   Posttreatment Heart Rate (beats/min) 94   Posttreatment Resp Rate (breaths/min) 20   Throughout All Fields Post-Treatment aeration increased;wheezes, expiratory          04/01/19 1504   RCAT Assessment   Reason for Assessment COPD   Pulmonary Status 4   Surgical Status 0   Chest X-ray 1   Respiratory Pattern 0   Mental Status 0   Breath Sounds 4   Cough Effectiveness 0   Level of Activity 1   O2 Required for SpO2>=92% 1   Acuity Level (points) 11   Acuity Level  3   Re-eval Interval Guideline Every 3 days   Re-evaluation Date 04/04/19   Breath Sounds   Breath Sounds All Fields   All Lung Fields Breath Sounds crackles, coarse

## 2019-04-01 NOTE — PLAN OF CARE
"[place] [facility]    Patient admitted to room 2312 at approximately 1330 via cart from emergency room. Patient was accompanied by transport tech.     Verbal SBAR report received from ed prior to patient arrival.     Patient ambulated to bed independently. Patient alert and oriented X 3. The patient is not having any pain. 0-10 Pain Scale: 0. Admission vital signs: Blood pressure (!) 147/97, pulse 98, temperature 98.4  F (36.9  C), temperature source Oral, resp. rate 20, height 1.626 m (5' 4\"), weight 52.3 kg (115 lb 4.8 oz), SpO2 93 %. Patient was oriented to plan of care, call light, bed controls, tv, telephone, bathroom and visiting hours.     Risk Assessment    The following safety risks were identified during admission: none. Yellow risk band applied: NO.     Skin Initial Assessment    This writer admitted this patient and completed a full skin assessment and Troy score in the Adult PCS flowsheet. Appropriate interventions initiated as needed.     Secondary skin check completed by nan.    Troy Risk Assessment  Sensory Perception: 4-->no impairment  Moisture: 4-->rarely moist  Activity: 4-->walks frequently  Mobility: 4-->no limitation  Nutrition: 4-->excellent  Friction and Shear: 3-->no apparent problem  Troy Score: 23  Bed Support Surface: Atmos Air mattress  Reassessed using Bed Algorithm: Trudi Cisneros    "

## 2019-04-02 LAB
ANION GAP SERPL CALCULATED.3IONS-SCNC: 4 MMOL/L (ref 3–14)
BUN SERPL-MCNC: 16 MG/DL (ref 7–30)
CALCIUM SERPL-MCNC: 8.5 MG/DL (ref 8.5–10.1)
CHLORIDE SERPL-SCNC: 96 MMOL/L (ref 94–109)
CO2 SERPL-SCNC: 35 MMOL/L (ref 20–32)
CREAT SERPL-MCNC: 0.62 MG/DL (ref 0.66–1.25)
D DIMER PPP FEU-MCNC: 0.4 UG/ML FEU (ref 0–0.5)
ERYTHROCYTE [DISTWIDTH] IN BLOOD BY AUTOMATED COUNT: 13.3 % (ref 10–15)
GFR SERPL CREATININE-BSD FRML MDRD: >90 ML/MIN/{1.73_M2}
GLUCOSE SERPL-MCNC: 166 MG/DL (ref 70–99)
HCT VFR BLD AUTO: 42.9 % (ref 40–53)
HGB BLD-MCNC: 14.1 G/DL (ref 13.3–17.7)
LACTATE BLD-SCNC: 1.4 MMOL/L (ref 0.7–2)
MCH RBC QN AUTO: 32.3 PG (ref 26.5–33)
MCHC RBC AUTO-ENTMCNC: 32.9 G/DL (ref 31.5–36.5)
MCV RBC AUTO: 98 FL (ref 78–100)
OSMOLALITY SERPL: 275 MMOL/KG (ref 275–295)
PLATELET # BLD AUTO: 285 10E9/L (ref 150–450)
POTASSIUM SERPL-SCNC: 4.5 MMOL/L (ref 3.4–5.3)
RBC # BLD AUTO: 4.37 10E12/L (ref 4.4–5.9)
SODIUM SERPL-SCNC: 135 MMOL/L (ref 133–144)
WBC # BLD AUTO: 9.7 10E9/L (ref 4–11)

## 2019-04-02 PROCEDURE — 36415 COLL VENOUS BLD VENIPUNCTURE: CPT | Performed by: FAMILY MEDICINE

## 2019-04-02 PROCEDURE — 94640 AIRWAY INHALATION TREATMENT: CPT | Mod: 76

## 2019-04-02 PROCEDURE — 85027 COMPLETE CBC AUTOMATED: CPT | Performed by: PHYSICIAN ASSISTANT

## 2019-04-02 PROCEDURE — 25000132 ZZH RX MED GY IP 250 OP 250 PS 637: Performed by: PHYSICIAN ASSISTANT

## 2019-04-02 PROCEDURE — 25000132 ZZH RX MED GY IP 250 OP 250 PS 637: Performed by: FAMILY MEDICINE

## 2019-04-02 PROCEDURE — 12000000 ZZH R&B MED SURG/OB

## 2019-04-02 PROCEDURE — 85379 FIBRIN DEGRADATION QUANT: CPT | Performed by: FAMILY MEDICINE

## 2019-04-02 PROCEDURE — 80048 BASIC METABOLIC PNL TOTAL CA: CPT | Performed by: PHYSICIAN ASSISTANT

## 2019-04-02 PROCEDURE — 40000275 ZZH STATISTIC RCP TIME EA 10 MIN

## 2019-04-02 PROCEDURE — 99233 SBSQ HOSP IP/OBS HIGH 50: CPT | Performed by: FAMILY MEDICINE

## 2019-04-02 PROCEDURE — 83605 ASSAY OF LACTIC ACID: CPT | Performed by: FAMILY MEDICINE

## 2019-04-02 PROCEDURE — 25000128 H RX IP 250 OP 636: Performed by: FAMILY MEDICINE

## 2019-04-02 PROCEDURE — 25800030 ZZH RX IP 258 OP 636: Performed by: PHYSICIAN ASSISTANT

## 2019-04-02 PROCEDURE — 36415 COLL VENOUS BLD VENIPUNCTURE: CPT | Performed by: PHYSICIAN ASSISTANT

## 2019-04-02 PROCEDURE — 40000270 ZZH STATISTIC OXYGEN  O2DAILY TECH TIME

## 2019-04-02 PROCEDURE — 83930 ASSAY OF BLOOD OSMOLALITY: CPT | Performed by: PHYSICIAN ASSISTANT

## 2019-04-02 PROCEDURE — 25000125 ZZHC RX 250: Performed by: PHYSICIAN ASSISTANT

## 2019-04-02 PROCEDURE — 94640 AIRWAY INHALATION TREATMENT: CPT

## 2019-04-02 RX ORDER — DIPHENHYDRAMINE HCL 25 MG
25 CAPSULE ORAL
Status: DISCONTINUED | OUTPATIENT
Start: 2019-04-02 | End: 2019-04-05 | Stop reason: HOSPADM

## 2019-04-02 RX ORDER — FUROSEMIDE 10 MG/ML
20 INJECTION INTRAMUSCULAR; INTRAVENOUS ONCE
Status: COMPLETED | OUTPATIENT
Start: 2019-04-02 | End: 2019-04-02

## 2019-04-02 RX ADMIN — AZITHROMYCIN 250 MG: 250 TABLET, FILM COATED ORAL at 09:11

## 2019-04-02 RX ADMIN — PREDNISONE 40 MG: 20 TABLET ORAL at 09:11

## 2019-04-02 RX ADMIN — AMLODIPINE BESYLATE 10 MG: 10 TABLET ORAL at 09:11

## 2019-04-02 RX ADMIN — IPRATROPIUM BROMIDE AND ALBUTEROL SULFATE 3 ML: .5; 3 SOLUTION RESPIRATORY (INHALATION) at 07:49

## 2019-04-02 RX ADMIN — LISINOPRIL 60 MG: 40 TABLET ORAL at 21:05

## 2019-04-02 RX ADMIN — FUROSEMIDE 20 MG: 10 INJECTION, SOLUTION INTRAVENOUS at 10:56

## 2019-04-02 RX ADMIN — DIPHENHYDRAMINE HYDROCHLORIDE 25 MG: 25 CAPSULE ORAL at 23:37

## 2019-04-02 RX ADMIN — IPRATROPIUM BROMIDE AND ALBUTEROL SULFATE 3 ML: .5; 3 SOLUTION RESPIRATORY (INHALATION) at 20:22

## 2019-04-02 RX ADMIN — SODIUM CHLORIDE: 9 INJECTION, SOLUTION INTRAVENOUS at 09:15

## 2019-04-02 RX ADMIN — ALBUTEROL SULFATE 2.5 MG: 2.5 SOLUTION RESPIRATORY (INHALATION) at 01:51

## 2019-04-02 RX ADMIN — ATENOLOL 25 MG: 25 TABLET ORAL at 09:11

## 2019-04-02 RX ADMIN — IPRATROPIUM BROMIDE AND ALBUTEROL SULFATE 3 ML: .5; 3 SOLUTION RESPIRATORY (INHALATION) at 15:56

## 2019-04-02 RX ADMIN — IPRATROPIUM BROMIDE AND ALBUTEROL SULFATE 3 ML: .5; 3 SOLUTION RESPIRATORY (INHALATION) at 11:46

## 2019-04-02 ASSESSMENT — ACTIVITIES OF DAILY LIVING (ADL)
ADLS_ACUITY_SCORE: 11

## 2019-04-02 NOTE — PLAN OF CARE
AOx4, up w/SBA to BRV. O2 increased from 2.5LPM to 4LPM to keep sats >92%. Harsh, nonproductive cough. Lactic BPA fired, resulted 1.4. NS @ 50ml/hr, denies pain. Rested comfortably throughout shift.

## 2019-04-02 NOTE — PLAN OF CARE
Patient continues to require 3-4 liters of oxygen to keep saturation above 92%.  Dyspneic with any activity, received Lasix IV x 1 dose, Prednisone as ordered.

## 2019-04-02 NOTE — PLAN OF CARE
2.5L O2 via NC, sats >90%. SOB at rest and with exertion. Denies pain, UA sent for sodium osmolality. Currently comfortable.

## 2019-04-02 NOTE — PROGRESS NOTES
Discharge Planner   Discharge Plans in progress: Home  Barriers to discharge plan: Clinical improvement  Follow up plan: Referral for Clinic Care Coordination. Follow up with PCP.       Entered by: Tati Aleman 04/02/2019 1:16 PM

## 2019-04-02 NOTE — PROGRESS NOTES
[facility]      Subjective:  Thinks his breathing is better.  But not active and on oxygen.  No fever  Reports chronic hand tremor  Leg edema is better  Chronic tremor- no history of etoh withdrawal or problems    Review of Systems:  CONSTITUTIONAL: NEGATIVE for fever, chills, change in weight  INTEGUMENTARY/SKIN: NEGATIVE for worrisome rashes, moles or lesions  EYES: NEGATIVE for vision changes or irritation  ENT/MOUTH: NEGATIVE for ear, mouth and throat problems  RESP:cough, sob  BREAST: NEGATIVE for masses, tenderness or discharge  CV: leg edema is better  GI: NEGATIVE for nausea, abdominal pain, heartburn, or change in bowel habits  : NEGATIVE for frequency, dysuria, or hematuria  MUSCULOSKELETAL: NEGATIVE for significant arthralgias or myalgia  NEURO: chronic tremor  ENDOCRINE: NEGATIVE for temperature intolerance, skin/hair changes  HEME: NEGATIVE for bleeding problems  PSYCHIATRIC: NEGATIVE for changes in mood or affect    Physical Exam:  Vitals Were Reviewed    Patient Vitals for the past 16 hrs:   BP Temp Temp src Pulse Resp SpO2   04/02/19 0759 125/86 98.5  F (36.9  C) Oral 105 18 91 %   04/02/19 0425 -- -- -- -- -- 93 %   04/02/19 0413 118/83 98.6  F (37  C) Oral 104 16 (!) 88 %   04/01/19 2336 124/84 98.2  F (36.8  C) Oral 93 18 92 %   04/01/19 2024 137/82 98.1  F (36.7  C) Oral 95 20 91 %   04/01/19 2014 -- -- -- -- -- 94 %         Intake/Output Summary (Last 24 hours) at 4/2/2019 1003  Last data filed at 4/1/2019 1700  Gross per 24 hour   Intake 360 ml   Output --   Net 360 ml       GENERAL APPEARANCE: mildy tachypneic  EYES: conjunctiva clear, eyes grossly normal  RESP: exp wheeze, prolonged exp phase, not distressed  CV: regular rate and rhythm, normal S1 S2, no S3 or S4 and no murmur, click or rub   ABDOMEN: soft, nontender, no HSM or masses and bowel sounds normal  MS: less edema  SKIN: clear without significant rashes or lesions    Lab:  Recent Labs   Lab Test  04/02/19  0446 04/01/19  0932    125*   POTASSIUM 4.5 4.2   CHLORIDE 96 87*   CO2 35* 33*   ANIONGAP 4 5   * 103*   BUN 16 11   CR 0.62* 0.46*   MIKE 8.5 8.7     CBC RESULTS:   Recent Labs   Lab Test 04/02/19  0446 04/01/19  0932   WBC 9.7 12.1*   RBC 4.37* 4.97   HGB 14.1 15.6   HCT 42.9 47.9    339       Results for orders placed or performed during the hospital encounter of 04/01/19 (from the past 24 hour(s))   XR Chest 2 Views    Narrative    CHEST TWO VIEWS April 1, 2019 10:04 AM     HISTORY: Shortness of breath.    COMPARISON: 11/13/2017.      Impression    IMPRESSION: Very prominent emphysematous changes most prominent in the  upper lobes. Hyperinflation. Cardiomediastinal silhouette is normal.  No focal infiltrates or evidence of pleural fluid. No significant  change.    ALFREDO HINOJOSA MD   POC US ECHO LIMITED    Impression    [facility] Procedure Note      Limited Bedside ED Cardiac Ultrasound:    PROCEDURE: PERFORMED BY: Dr. Seth Barahona  INDICATIONS/SYMPTOM:  Shortness of Breath  PROBE: Cardiac phased array probe  BODY LOCATION: Chest  FINDINGS:   The ultrasound was performed utilizing the subcostal, parasternal long axis and parasternal short axis views.  Cardiac contractility:  Present  Gross estimation of cardiac kinesis: normal  Pericardial Effusion:  None  RV:LV ratio: Equal  INTERPRETATION:    Chamber size and motion were grossly normal with LV > RV, normal cardiac kinesis.  No pericardial effusion was found.   IMAGE DOCUMENTATION: Images were archived to hard drive.         UA with Microscopic   Result Value Ref Range    Color Urine Straw     Appearance Urine Clear     Glucose Urine Negative NEG^Negative mg/dL    Bilirubin Urine Negative NEG^Negative    Ketones Urine Negative NEG^Negative mg/dL    Specific Gravity Urine 1.008 1.003 - 1.035    Blood Urine Negative NEG^Negative    pH Urine 8.0 (H) 5.0 - 7.0 pH    Protein Albumin Urine Negative NEG^Negative mg/dL     Urobilinogen mg/dL 0.0 0.0 - 2.0 mg/dL    Nitrite Urine Negative NEG^Negative    Leukocyte Esterase Urine Negative NEG^Negative    Source Midstream Urine     WBC Urine <1 0 - 5 /HPF    RBC Urine <1 0 - 2 /HPF    Squamous Epithelial /HPF Urine <1 0 - 1 /HPF   Echocardiogram Complete    Narrative    [id number]  [id number]  [id number]  [id number]^KAYLA^DAVID           [facility]  Echocardiography Laboratory  [address] [place] [address]        Name: MARVA CASTELLANOS  MRN: [id number]  : 1965  Study Date: 2019 02:43 PM  Age: 53 yrs  Gender: Male  Patient Location: [facility]  Reason For Study: Dyspnea, Edema  Ordering Physician: DAVID GARZA  Referring Physician: Dean Mariee  Performed By: Tiara Sweeney RDCS     BSA: 1.5 m2  Height: 64 in  Weight: 115 lb  HR: 58  BP: 147/97 mmHg  _____________________________________________________________________________  __        Procedure  Complete Portable Echo Adult.  _____________________________________________________________________________  __        Interpretation Summary     1. The left ventricle is normal in structure, function and size. The visual  ejection fraction is estimated at 60%.  2. The right ventricle is mildly dilated. The right ventricular systolic  function is normal.  3. There is mild (1+) tricuspid regurgitation.  4. Severe (>55mmHg) pulmonary hypertension is present. The right ventricular  systolic pressure is approximated at 55mmHg plus the right atrial pressure.     Echo from 2015 showed normal RV, trace TR, could not estimated RVSP.  _____________________________________________________________________________  __        Left Ventricle  The left ventricle is normal in structure, function and size. There is normal  left ventricular wall thickness. The visual ejection fraction is estimated at  60%. Diastolic function not assessed due to tachycardia. Normal left  ventricular wall motion.     Right Ventricle  The  right ventricle is mildly dilated. The right ventricular systolic function  is normal.     Atria  Normal left atrial size. Right atrial size is normal. There is no atrial shunt  seen.     Mitral Valve  There is no mitral regurgitation noted.        Tricuspid Valve  There is mild (1+) tricuspid regurgitation. Severe (>55mmHg) pulmonary  hypertension is present. The right ventricular systolic pressure is  approximated at 55mmHg plus the right atrial pressure.     Aortic Valve  The aortic valve is normal in structure and function.     Pulmonic Valve  The pulmonic valve is normal in structure and function.     Vessels  Normal ascending, transverse (arch), and descending aorta.     Pericardium  There is no pericardial effusion.        Rhythm  Sinus rhythm was noted.  _____________________________________________________________________________  __  MMode/2D Measurements & Calculations  IVSd: 0.95 cm     LVIDd: 4.1 cm  LVIDs: 2.8 cm  LVPWd: 1.1 cm  FS: 30.9 %  LV mass(C)d: 133.8 grams  LV mass(C)dI: 86.5 grams/m2  Ao root diam: 3.4 cm  LA dimension: 3.0 cm  LA/Ao: 0.87  LA Volume (BP): 23.0 ml  LA Volume Index (BP): 14.8 ml/m2  RWT: 0.53  TAPSE: 2.0 cm           Doppler Measurements & Calculations  MV E max danielle: 62.9 cm/sec  MV A max danielle: 62.5 cm/sec  MV E/A: 1.0  MV dec time: 0.29 sec  TR max danielle: 372.6 cm/sec  TR max P.5 mmHg  E/E' av.0  Lateral E/e': 5.1  Medial E/e': 8.8           _____________________________________________________________________________  __           Report approved by: Inocencio Robins 2019 03:57 PM      Sodium random urine   Result Value Ref Range    Sodium Urine mmol/L 13 mmol/L   Osmolality urine   Result Value Ref Range    Urine Osmolality 210 100 - 1,200 mmol/kg   CBC with platelets   Result Value Ref Range    WBC 9.7 4.0 - 11.0 10e9/L    RBC Count 4.37 (L) 4.4 - 5.9 10e12/L    Hemoglobin 14.1 13.3 - 17.7 g/dL    Hematocrit 42.9 40.0 - 53.0 %    MCV 98 78 - 100 fl     MCH 32.3 26.5 - 33.0 pg    MCHC 32.9 31.5 - 36.5 g/dL    RDW 13.3 10.0 - 15.0 %    Platelet Count 285 150 - 450 10e9/L   Basic metabolic panel   Result Value Ref Range    Sodium 135 133 - 144 mmol/L    Potassium 4.5 3.4 - 5.3 mmol/L    Chloride 96 94 - 109 mmol/L    Carbon Dioxide 35 (H) 20 - 32 mmol/L    Anion Gap 4 3 - 14 mmol/L    Glucose 166 (H) 70 - 99 mg/dL    Urea Nitrogen 16 7 - 30 mg/dL    Creatinine 0.62 (L) 0.66 - 1.25 mg/dL    GFR Estimate >90 >60 mL/min/[1.73_m2]    GFR Estimate If Black >90 >60 mL/min/[1.73_m2]    Calcium 8.5 8.5 - 10.1 mg/dL   Lactic acid level STAT for sepsis protocol   Result Value Ref Range    Lactate for Sepsis Protocol 1.4 0.7 - 2.0 mmol/L       Assessment and Plan:    Luis Hernandez is a 53 year old male admitted on 4/1/2019. He presents with increasing dyspnea.     Acute on chronic respiratory failure with hypoxia and hypercapnia   Suspect COPD exacerbation  Severe pulmonary hypertension  Increasing dyspnea over t 48 hours, following a couple weeks of URI symptoms. Working very hard on arrival at the ED, 86% SpO2, 2-3 words/breath. Markedly improved with Duoneb x 1 and solu-medrol 125 mg. With BNP of 331 and new lower extremity edema, concerned for possible right heart failure due to acute COPD exacerbation. Acknowledges some orthopnea but this is unchanged from his baseline.  New lower extremity edema present for about 2 weeks, markedly worse over the last 48 hours. No PND. No recent travel, prolonged immobility, calf pain/swelling. Normal echo 8/26/15 with LVEF 60-65%, no mention of pulmonary HTN. On admit-- WBC 12.1. NT proBNP 331. CXR hyperinflated without infitrate. VBG 7.34/68/45.  On nebs, pred, oxygen.  Echo shows severe pul htn.  Holding Advair and Spiriva while on duonebs and prednisone  On four liters nc.     Hyponatremia  Na 125 on admission. Denies decreased PO intake of food or drink. Denies frequent alcohol use, ~ 3-6 beers / weekend.    Sodium now normalized.  Urine sodium is 13. Urine osmolality 210. Etiology is still unclear. Suggest monitoring in future. Could be from pul htn and some fluid overload.    Essential hypertension  Reviewed outpatient BPs, appears well controlled on amlodipine 10 mg and lisinopril 60 mg.  - Continue amlodipine and lisinopril.     Tobacco abuse, in remission  60 pack/year smoking history. Currently down to 1-2 cigarettes per week. Declined nicotine patch.    Diet: 2 Gram Sodium Diet    DVT Prophylaxis: Pneumatic Compression Devices   Miller Catheter: not present  Code Status: Full     Plan - pt with known copd and recent exacerbation with pred and doxy use presents with worse difficulty breathing and edema. Found to have pul htn.   Given saline initially due to low na (125). Sodium has normalized. Will give one dose lasix. D dimer ordered--but wheezing and prolonged exp phase suggest copd not PE.  Currently needing oxygen. Continue therapies. May need home oxygen.

## 2019-04-02 NOTE — CONSULTS
Care Transition Initial Assessment - RN      Met with: Patient.    DATA   Principal Problem:    Acute on chronic respiratory failure with hypoxia and hypercapnia (H)  Active Problems:    Essential hypertension, benign    COPD (chronic obstructive pulmonary disease) (H)    Tobacco abuse, in remission    Hyponatremia    COPD exacerbation (H)       Primary Care Clinic [facility]: [facility]  Primary Care MD Name: Dr. Mariee  Contact information and PCP information verified: Yes    ASSESSMENT  Cognitive Status: awake, alert and oriented.        Description of Support System: Supportive, Involved   Who is your support system?: Parent(s)   Support Assessment: Adequate family and caregiver support   Insurance Concerns: No Insurance issues identified      This writer met with pt, introduced self and role.  Care Transitions is consulted for a diagnosis of COPD. The patient lives independently in the community with his father. He does not use home oxygen. Provided COPD education resources. Patient agreed to Clinic Care Coordination. There are no discharge needs identified.    PLAN    Home      Tati Aleman RN, Care Coordinator [phone]

## 2019-04-03 LAB
ANION GAP SERPL CALCULATED.3IONS-SCNC: 3 MMOL/L (ref 3–14)
BUN SERPL-MCNC: 17 MG/DL (ref 7–30)
CALCIUM SERPL-MCNC: 8.2 MG/DL (ref 8.5–10.1)
CHLORIDE SERPL-SCNC: 98 MMOL/L (ref 94–109)
CO2 SERPL-SCNC: 36 MMOL/L (ref 20–32)
CREAT SERPL-MCNC: 0.56 MG/DL (ref 0.66–1.25)
ERYTHROCYTE [DISTWIDTH] IN BLOOD BY AUTOMATED COUNT: 14 % (ref 10–15)
GFR SERPL CREATININE-BSD FRML MDRD: >90 ML/MIN/{1.73_M2}
GLUCOSE SERPL-MCNC: 88 MG/DL (ref 70–99)
HCT VFR BLD AUTO: 42.2 % (ref 40–53)
HGB BLD-MCNC: 13.4 G/DL (ref 13.3–17.7)
MCH RBC QN AUTO: 31.6 PG (ref 26.5–33)
MCHC RBC AUTO-ENTMCNC: 31.8 G/DL (ref 31.5–36.5)
MCV RBC AUTO: 100 FL (ref 78–100)
PLATELET # BLD AUTO: 270 10E9/L (ref 150–450)
POTASSIUM SERPL-SCNC: 4.2 MMOL/L (ref 3.4–5.3)
RBC # BLD AUTO: 4.24 10E12/L (ref 4.4–5.9)
SODIUM SERPL-SCNC: 137 MMOL/L (ref 133–144)
WBC # BLD AUTO: 10.7 10E9/L (ref 4–11)

## 2019-04-03 PROCEDURE — 80048 BASIC METABOLIC PNL TOTAL CA: CPT | Performed by: PHYSICIAN ASSISTANT

## 2019-04-03 PROCEDURE — 25000125 ZZHC RX 250: Performed by: PHYSICIAN ASSISTANT

## 2019-04-03 PROCEDURE — 25000125 ZZHC RX 250: Performed by: FAMILY MEDICINE

## 2019-04-03 PROCEDURE — 25000131 ZZH RX MED GY IP 250 OP 636 PS 637: Performed by: FAMILY MEDICINE

## 2019-04-03 PROCEDURE — 25000132 ZZH RX MED GY IP 250 OP 250 PS 637: Performed by: FAMILY MEDICINE

## 2019-04-03 PROCEDURE — 94640 AIRWAY INHALATION TREATMENT: CPT | Mod: 76

## 2019-04-03 PROCEDURE — 85027 COMPLETE CBC AUTOMATED: CPT | Performed by: PHYSICIAN ASSISTANT

## 2019-04-03 PROCEDURE — 40000270 ZZH STATISTIC OXYGEN  O2DAILY TECH TIME

## 2019-04-03 PROCEDURE — 99233 SBSQ HOSP IP/OBS HIGH 50: CPT | Performed by: FAMILY MEDICINE

## 2019-04-03 PROCEDURE — 94640 AIRWAY INHALATION TREATMENT: CPT

## 2019-04-03 PROCEDURE — 25000132 ZZH RX MED GY IP 250 OP 250 PS 637: Performed by: PHYSICIAN ASSISTANT

## 2019-04-03 PROCEDURE — 36415 COLL VENOUS BLD VENIPUNCTURE: CPT | Performed by: PHYSICIAN ASSISTANT

## 2019-04-03 PROCEDURE — 40000275 ZZH STATISTIC RCP TIME EA 10 MIN

## 2019-04-03 PROCEDURE — 12000000 ZZH R&B MED SURG/OB

## 2019-04-03 RX ORDER — NICOTINE 21 MG/24HR
1 PATCH, TRANSDERMAL 24 HOURS TRANSDERMAL DAILY
Status: DISCONTINUED | OUTPATIENT
Start: 2019-04-03 | End: 2019-04-05 | Stop reason: HOSPADM

## 2019-04-03 RX ADMIN — IPRATROPIUM BROMIDE AND ALBUTEROL SULFATE 3 ML: .5; 3 SOLUTION RESPIRATORY (INHALATION) at 07:50

## 2019-04-03 RX ADMIN — IPRATROPIUM BROMIDE AND ALBUTEROL SULFATE 3 ML: .5; 3 SOLUTION RESPIRATORY (INHALATION) at 20:19

## 2019-04-03 RX ADMIN — ATENOLOL 25 MG: 25 TABLET ORAL at 07:36

## 2019-04-03 RX ADMIN — AZITHROMYCIN 250 MG: 250 TABLET, FILM COATED ORAL at 07:36

## 2019-04-03 RX ADMIN — IPRATROPIUM BROMIDE AND ALBUTEROL SULFATE 3 ML: .5; 3 SOLUTION RESPIRATORY (INHALATION) at 15:24

## 2019-04-03 RX ADMIN — AMLODIPINE BESYLATE 10 MG: 10 TABLET ORAL at 07:36

## 2019-04-03 RX ADMIN — IPRATROPIUM BROMIDE AND ALBUTEROL SULFATE 3 ML: .5; 3 SOLUTION RESPIRATORY (INHALATION) at 00:08

## 2019-04-03 RX ADMIN — DIPHENHYDRAMINE HYDROCHLORIDE 25 MG: 25 CAPSULE ORAL at 21:33

## 2019-04-03 RX ADMIN — IPRATROPIUM BROMIDE AND ALBUTEROL SULFATE 3 ML: .5; 3 SOLUTION RESPIRATORY (INHALATION) at 11:21

## 2019-04-03 RX ADMIN — NICOTINE 1 PATCH: 14 PATCH, EXTENDED RELEASE TRANSDERMAL at 11:55

## 2019-04-03 RX ADMIN — LISINOPRIL 60 MG: 40 TABLET ORAL at 17:02

## 2019-04-03 RX ADMIN — PREDNISONE 60 MG: 10 TABLET ORAL at 07:36

## 2019-04-03 ASSESSMENT — ACTIVITIES OF DAILY LIVING (ADL)
ADLS_ACUITY_SCORE: 11

## 2019-04-03 NOTE — PLAN OF CARE
Remains on 3-4L via NC to keep sats >92%. SOB w/activity, patient states breathing feels significantly improved from previous. Denies pain.

## 2019-04-03 NOTE — PLAN OF CARE
Patient states that breathing is better today, saturation on 2 liters 95%, will continue to try and wean off oxygen.  Requesting Nicotine patch, started 14 mg this afternoon.  Up ambulating with oxygen at this time.

## 2019-04-03 NOTE — PROGRESS NOTES
[facility]      Subjective:  He feels much better  Legs less swollen  Had a lot of urine out yesterday   Wants smoking patch    Review of Systems:  CONSTITUTIONAL: NEGATIVE for fever, chills, change in weight  INTEGUMENTARY/SKIN: NEGATIVE for worrisome rashes, moles or lesions  EYES: NEGATIVE for vision changes or irritation  ENT/MOUTH: NEGATIVE for ear, mouth and throat problems  RESP:better   BREAST: NEGATIVE for masses, tenderness or discharge  CV: less edema, no cp  GI: NEGATIVE for nausea, abdominal pain, heartburn, or change in bowel habits  : NEGATIVE for frequency, dysuria, or hematuria  MUSCULOSKELETAL: NEGATIVE for significant arthralgias or myalgia  NEURO: NEGATIVE for weakness, dizziness or paresthesias  ENDOCRINE: NEGATIVE for temperature intolerance, skin/hair changes  HEME: NEGATIVE for bleeding problems  PSYCHIATRIC: NEGATIVE for changes in mood or affect    Physical Exam:  Vitals Were Reviewed    Patient Vitals for the past 16 hrs:   BP Temp Temp src Pulse Heart Rate Resp SpO2   04/03/19 1125 118/71 98.3  F (36.8  C) Oral 89 -- 20 97 %   04/03/19 0734 139/89 98.3  F (36.8  C) Oral -- 93 18 92 %   04/03/19 0420 125/84 98.4  F (36.9  C) Oral 90 -- 18 98 %   04/02/19 2250 136/90 98.2  F (36.8  C) Oral 98 -- 18 94 %   04/02/19 2022 -- -- -- -- -- -- 96 %   04/02/19 1956 116/81 98.5  F (36.9  C) Oral 92 -- 18 92 %   04/02/19 1950 116/81 -- -- -- -- -- --       No intake or output data in the 24 hours ending 04/03/19 1145    GENERAL APPEARANCE: healthy, alert and no distress  RESP: better airmovement, less wheeze  CV: regular rate and rhythm, normal S1 S2, no S3 or S4 and no murmur, click or rub   ABDOMEN: soft, nontender, no HSM or masses and bowel sounds normal  MS: much less pitting  SKIN: clear without significant rashes or lesions    Lab:  Recent Labs   Lab Test 04/03/19  0632 04/02/19  0446    135   POTASSIUM 4.2 4.5   CHLORIDE 98 96   CO2 36* 35*   ANIONGAP 3 4   GLC  88 166*   BUN 17 16   CR 0.56* 0.62*   MIKE 8.2* 8.5     CBC RESULTS:   Recent Labs   Lab Test 04/03/19  0632 04/02/19  0446   WBC 10.7 9.7   RBC 4.24* 4.37*   HGB 13.4 14.1   HCT 42.2 42.9    285       Results for orders placed or performed during the hospital encounter of 04/01/19 (from the past 24 hour(s))   Care Transition RN/SW IP Consult    Narrative    Tati Aleman RN     4/2/2019  1:12 PM  Care Transition Initial Assessment - RN      Met with: Patient.    DATA   Principal Problem:    Acute on chronic respiratory failure with hypoxia and   hypercapnia (H)  Active Problems:    Essential hypertension, benign    COPD (chronic obstructive pulmonary disease) (H)    Tobacco abuse, in remission    Hyponatremia    COPD exacerbation (H)       Primary Care Clinic Name: [facility]  Primary Care MD Name: Dr. Mariee  Contact information and PCP information verified: Yes    ASSESSMENTCognitive Status: awake, alert and oriented.        Description of Support System: Supportive, Involved   Who is your support system?: Parent(s)   Support Assessment: Adequate family and caregiver support   Insurance Concerns: No Insurance issues identified      This writer met with pt, introduced self and role.  Care   Transitions is consulted for a diagnosis of COPD. The patient   lives independently in the community with his father. He does not   use home oxygen. Provided COPD education resources. Patient   agreed to Clinic Care Coordination. There are no discharge needs   identified.    PLAN    Home      Tati Aleman RN, Care Coordinator [phone]       CBC with platelets   Result Value Ref Range    WBC 10.7 4.0 - 11.0 10e9/L    RBC Count 4.24 (L) 4.4 - 5.9 10e12/L    Hemoglobin 13.4 13.3 - 17.7 g/dL    Hematocrit 42.2 40.0 - 53.0 %     78 - 100 fl    MCH 31.6 26.5 - 33.0 pg    MCHC 31.8 31.5 - 36.5 g/dL    RDW 14.0 10.0 - 15.0 %    Platelet Count 270 150 - 450 10e9/L   Basic metabolic panel   Result Value  Ref Range    Sodium 137 133 - 144 mmol/L    Potassium 4.2 3.4 - 5.3 mmol/L    Chloride 98 94 - 109 mmol/L    Carbon Dioxide 36 (H) 20 - 32 mmol/L    Anion Gap 3 3 - 14 mmol/L    Glucose 88 70 - 99 mg/dL    Urea Nitrogen 17 7 - 30 mg/dL    Creatinine 0.56 (L) 0.66 - 1.25 mg/dL    GFR Estimate >90 >60 mL/min/[1.73_m2]    GFR Estimate If Black >90 >60 mL/min/[1.73_m2]    Calcium 8.2 (L) 8.5 - 10.1 mg/dL       Assessment and Plan:    Luis Hernandez is a 53 year old male admitted on 4/1/2019. He presents with increasing dyspnea.     Acute on chronic respiratory failure with hypoxia and hypercapnia   Suspect COPD exacerbation  Severe pulmonary hypertension  Increasing dyspnea over t 48 hours, following a couple weeks of URI symptoms. Working very hard on arrival at the ED, 86% SpO2, 2-3 words/breath. Markedly improved with Duoneb x 1 and solu-medrol 125 mg. With BNP of 331 and new lower extremity edema, concerned for possible right heart failure due to acute COPD exacerbation. Acknowledges some orthopnea but this is unchanged from his baseline.  New lower extremity edema present for about 2 weeks, markedly worse over the last 48 hours. No PND. No recent travel, prolonged immobility, calf pain/swelling. Normal echo 8/26/15 with LVEF 60-65%, no mention of pulmonary HTN. On admit-- WBC 12.1. NT proBNP 331. CXR hyperinflated without infitrate. VBG 7.34/68/45.    On nebs, pred, oxygen.Feel a lot better-but still on oxygen  Echo shows severe pul htn-received one dose lasix 4/2/19 .  Holding Advair and Spiriva while on duonebs and prednisone  On four liters nc.     Hyponatremia  Na 125 on admission. Denies decreased PO intake of food or drink. Denies frequent alcohol use, ~ 3-6 beers / weekend.     Sodium now normalized. Urine sodium is 13. Urine osmolality 210. Etiology is still unclear. Suggest monitoring in future. Could be from pul htn and some fluid overload.     Essential hypertension  Reviewed outpatient BPs, appears well  controlled on amlodipine 10 mg and lisinopril 60 mg.  - Continue amlodipine and lisinopril.     Tobacco abuse, in remission  60 pack/year smoking history. Currently down to 1-2 cigarettes per week. Declined nicotine patch.     Diet: 2 Gram Sodium Diet    DVT Prophylaxis: Pneumatic Compression Devices   Miller Catheter: not present  Code Status: Full     Plan - pt with known copd and recent exacerbation with pred and doxy use presents with worse difficulty breathing and edema. Found to have pul htn.   Given saline initially due to low na (125). Sodium has normalized. . D dimer was normal. Given one dose of lasix yesterday due to edema-edema better. Feels beter. Trying weaning oxygen and possibly home tomorrow. May need a prn lasix pill for home.

## 2019-04-04 LAB
ANION GAP SERPL CALCULATED.3IONS-SCNC: 1 MMOL/L (ref 3–14)
BASE EXCESS BLDV CALC-SCNC: 8.1 MMOL/L
BUN SERPL-MCNC: 16 MG/DL (ref 7–30)
CALCIUM SERPL-MCNC: 8.6 MG/DL (ref 8.5–10.1)
CHLORIDE SERPL-SCNC: 95 MMOL/L (ref 94–109)
CO2 SERPL-SCNC: 37 MMOL/L (ref 20–32)
CREAT SERPL-MCNC: 0.57 MG/DL (ref 0.66–1.25)
ERYTHROCYTE [DISTWIDTH] IN BLOOD BY AUTOMATED COUNT: 14 % (ref 10–15)
GFR SERPL CREATININE-BSD FRML MDRD: >90 ML/MIN/{1.73_M2}
GLUCOSE SERPL-MCNC: 86 MG/DL (ref 70–99)
HCO3 BLDV-SCNC: 37 MMOL/L (ref 21–28)
HCT VFR BLD AUTO: 45.7 % (ref 40–53)
HGB BLD-MCNC: 14.5 G/DL (ref 13.3–17.7)
MCH RBC QN AUTO: 31.9 PG (ref 26.5–33)
MCHC RBC AUTO-ENTMCNC: 31.7 G/DL (ref 31.5–36.5)
MCV RBC AUTO: 101 FL (ref 78–100)
O2/TOTAL GAS SETTING VFR VENT: 26 %
PCO2 BLDV: 68 MM HG (ref 40–50)
PH BLDV: 7.34 PH (ref 7.32–7.43)
PLATELET # BLD AUTO: 301 10E9/L (ref 150–450)
PO2 BLDV: 39 MM HG (ref 25–47)
POTASSIUM SERPL-SCNC: 4.2 MMOL/L (ref 3.4–5.3)
RBC # BLD AUTO: 4.54 10E12/L (ref 4.4–5.9)
SODIUM SERPL-SCNC: 133 MMOL/L (ref 133–144)
WBC # BLD AUTO: 11.2 10E9/L (ref 4–11)

## 2019-04-04 PROCEDURE — 25000132 ZZH RX MED GY IP 250 OP 250 PS 637: Performed by: PHYSICIAN ASSISTANT

## 2019-04-04 PROCEDURE — 85027 COMPLETE CBC AUTOMATED: CPT | Performed by: PHYSICIAN ASSISTANT

## 2019-04-04 PROCEDURE — 12000000 ZZH R&B MED SURG/OB

## 2019-04-04 PROCEDURE — 82803 BLOOD GASES ANY COMBINATION: CPT | Performed by: FAMILY MEDICINE

## 2019-04-04 PROCEDURE — 94640 AIRWAY INHALATION TREATMENT: CPT

## 2019-04-04 PROCEDURE — 25000131 ZZH RX MED GY IP 250 OP 636 PS 637: Performed by: FAMILY MEDICINE

## 2019-04-04 PROCEDURE — 25000128 H RX IP 250 OP 636: Performed by: FAMILY MEDICINE

## 2019-04-04 PROCEDURE — 94640 AIRWAY INHALATION TREATMENT: CPT | Mod: 76

## 2019-04-04 PROCEDURE — 25000132 ZZH RX MED GY IP 250 OP 250 PS 637: Performed by: FAMILY MEDICINE

## 2019-04-04 PROCEDURE — 25000125 ZZHC RX 250: Performed by: PHYSICIAN ASSISTANT

## 2019-04-04 PROCEDURE — 36415 COLL VENOUS BLD VENIPUNCTURE: CPT | Performed by: PHYSICIAN ASSISTANT

## 2019-04-04 PROCEDURE — 80048 BASIC METABOLIC PNL TOTAL CA: CPT | Performed by: PHYSICIAN ASSISTANT

## 2019-04-04 PROCEDURE — 99233 SBSQ HOSP IP/OBS HIGH 50: CPT | Performed by: FAMILY MEDICINE

## 2019-04-04 RX ORDER — FUROSEMIDE 10 MG/ML
20 INJECTION INTRAMUSCULAR; INTRAVENOUS ONCE
Status: COMPLETED | OUTPATIENT
Start: 2019-04-04 | End: 2019-04-04

## 2019-04-04 RX ORDER — FUROSEMIDE 20 MG
20 TABLET ORAL DAILY
Status: DISCONTINUED | OUTPATIENT
Start: 2019-04-05 | End: 2019-04-05 | Stop reason: HOSPADM

## 2019-04-04 RX ADMIN — IPRATROPIUM BROMIDE AND ALBUTEROL SULFATE 3 ML: .5; 3 SOLUTION RESPIRATORY (INHALATION) at 07:43

## 2019-04-04 RX ADMIN — NICOTINE 1 PATCH: 14 PATCH, EXTENDED RELEASE TRANSDERMAL at 08:03

## 2019-04-04 RX ADMIN — ALBUTEROL SULFATE 2.5 MG: 2.5 SOLUTION RESPIRATORY (INHALATION) at 04:16

## 2019-04-04 RX ADMIN — ATENOLOL 25 MG: 25 TABLET ORAL at 08:03

## 2019-04-04 RX ADMIN — IPRATROPIUM BROMIDE AND ALBUTEROL SULFATE 3 ML: .5; 3 SOLUTION RESPIRATORY (INHALATION) at 16:13

## 2019-04-04 RX ADMIN — IPRATROPIUM BROMIDE AND ALBUTEROL SULFATE 3 ML: .5; 3 SOLUTION RESPIRATORY (INHALATION) at 11:46

## 2019-04-04 RX ADMIN — LISINOPRIL 60 MG: 40 TABLET ORAL at 17:02

## 2019-04-04 RX ADMIN — FUROSEMIDE 20 MG: 10 INJECTION, SOLUTION INTRAVENOUS at 13:02

## 2019-04-04 RX ADMIN — PREDNISONE 60 MG: 10 TABLET ORAL at 08:03

## 2019-04-04 RX ADMIN — AMLODIPINE BESYLATE 10 MG: 10 TABLET ORAL at 08:03

## 2019-04-04 RX ADMIN — AZITHROMYCIN 250 MG: 250 TABLET, FILM COATED ORAL at 08:03

## 2019-04-04 RX ADMIN — IPRATROPIUM BROMIDE AND ALBUTEROL SULFATE 3 ML: .5; 3 SOLUTION RESPIRATORY (INHALATION) at 19:24

## 2019-04-04 RX ADMIN — IPRATROPIUM BROMIDE AND ALBUTEROL SULFATE 3 ML: .5; 3 SOLUTION RESPIRATORY (INHALATION) at 23:28

## 2019-04-04 ASSESSMENT — ACTIVITIES OF DAILY LIVING (ADL)
ADLS_ACUITY_SCORE: 11

## 2019-04-04 ASSESSMENT — MIFFLIN-ST. JEOR: SCORE: 1310

## 2019-04-04 NOTE — PLAN OF CARE
"A&O. Up independently. Pt states overall feeling a lot better and that his breathing is getting much better too and he is happy with how far he can walk without being so SOB. Pt showered this evening independently. IV saline locked. Nicotine patch in place on right shoulder. Pt receiving nebs. Pt requests Benadryl at bedtime for sleep. Pt currently on 2L O2 via NC. /79 (BP Location: Left arm)   Pulse 97   Temp 98  F (36.7  C) (Oral)   Resp 20   Ht 1.626 m (5' 4\")   Wt 52.3 kg (115 lb 4.8 oz)   SpO2 95%   BMI 19.79 kg/m  . Will continue to monitor.   "

## 2019-04-04 NOTE — PLAN OF CARE
Pt up independently. Denies any SOB or ESPARZA. Sats 92% on RA. SL posterior fine crackles. Denies pain, A & O.  Kimberly Omer RN

## 2019-04-04 NOTE — PROGRESS NOTES
[facility] - [facility] Service      Subjective:  Much better  Some leg edema  Needed oxygen overnight-desatted to 87 off      Review of Systems:  CONSTITUTIONAL: NEGATIVE for fever, chills, change in weight  INTEGUMENTARY/SKIN: NEGATIVE for worrisome rashes, moles or lesions  EYES: NEGATIVE for vision changes or irritation  ENT/MOUTH: NEGATIVE for ear, mouth and throat problems  RESP:breathing is better  BREAST: NEGATIVE for masses, tenderness or discharge  CV: some edema  GI: NEGATIVE for nausea, abdominal pain, heartburn, or change in bowel habits  : NEGATIVE for frequency, dysuria, or hematuria  MUSCULOSKELETAL: NEGATIVE for significant arthralgias or myalgia  NEURO: NEGATIVE for weakness, dizziness or paresthesias  ENDOCRINE: NEGATIVE for temperature intolerance, skin/hair changes  HEME: NEGATIVE for bleeding problems  PSYCHIATRIC: NEGATIVE for changes in mood or affect    Physical Exam:  Vitals Were Reviewed    Patient Vitals for the past 16 hrs:   BP Temp Temp src Pulse Resp SpO2 Weight   04/04/19 0805 -- -- -- -- -- 94 % --   04/04/19 0750 135/89 97.7  F (36.5  C) Oral 92 20 97 % --   04/04/19 0743 -- -- -- -- -- 95 % --   04/04/19 0500 -- -- -- -- -- -- 54.4 kg (119 lb 14.9 oz)   04/04/19 0431 125/84 98.4  F (36.9  C) Oral 105 20 92 % --   04/04/19 0001 132/88 97.8  F (36.6  C) Oral 87 18 95 % --   04/03/19 2040 127/85 98  F (36.7  C) Oral 94 20 93 % --       No intake or output data in the 24 hours ending 04/04/19 1238    GENERAL APPEARANCE: healthy, alert and no distress  EYES: conjunctiva clear, eyes grossly normal  RESP: some wheeze, decreased, better air movement, not distressed.  CV: regular rate and rhythm, normal S1 S2, no S3 or S4 and no murmur, click or rub   ABDOMEN: soft, nontender, no HSM or masses and bowel sounds normal  MS: no clubbing, cyanosis; some  edema  SKIN: clear without significant rashes or lesions    Lab:  Recent Labs   Lab Test 04/04/19  0602 04/03/19  0632    133 680    POTASSIUM 4.2 4.2   CHLORIDE 95 98   CO2 37* 36*   ANIONGAP 1* 3   GLC 86 88   BUN 16 17   CR 0.57* 0.56*   MIKE 8.6 8.2*     CBC RESULTS:   Recent Labs   Lab Test 04/04/19  0602 04/03/19  0632   WBC 11.2* 10.7   RBC 4.54 4.24*   HGB 14.5 13.4   HCT 45.7 42.2    270       Results for orders placed or performed during the hospital encounter of 04/01/19 (from the past 24 hour(s))   CBC with platelets   Result Value Ref Range    WBC 11.2 (H) 4.0 - 11.0 10e9/L    RBC Count 4.54 4.4 - 5.9 10e12/L    Hemoglobin 14.5 13.3 - 17.7 g/dL    Hematocrit 45.7 40.0 - 53.0 %     (H) 78 - 100 fl    MCH 31.9 26.5 - 33.0 pg    MCHC 31.7 31.5 - 36.5 g/dL    RDW 14.0 10.0 - 15.0 %    Platelet Count 301 150 - 450 10e9/L   Basic metabolic panel   Result Value Ref Range    Sodium 133 133 - 144 mmol/L    Potassium 4.2 3.4 - 5.3 mmol/L    Chloride 95 94 - 109 mmol/L    Carbon Dioxide 37 (H) 20 - 32 mmol/L    Anion Gap 1 (L) 3 - 14 mmol/L    Glucose 86 70 - 99 mg/dL    Urea Nitrogen 16 7 - 30 mg/dL    Creatinine 0.57 (L) 0.66 - 1.25 mg/dL    GFR Estimate >90 >60 mL/min/[1.73_m2]    GFR Estimate If Black >90 >60 mL/min/[1.73_m2]    Calcium 8.6 8.5 - 10.1 mg/dL   Blood gas venous   Result Value Ref Range    Ph Venous 7.34 7.32 - 7.43 pH    PCO2 Venous 68 (H) 40 - 50 mm Hg    PO2 Venous 39 25 - 47 mm Hg    Bicarbonate Venous 37 (H) 21 - 28 mmol/L    Base Excess Venous 8.1 mmol/L    FIO2 26        Assessment and Plan:      Luis Hernandez is a 53 year old male admitted on 4/1/2019. He presents with increasing dyspnea.     Acute on chronic respiratory failure with hypoxia and hypercapnia   Suspect COPD exacerbation  Severe pulmonary hypertension  Increasing dyspnea over t 48 hours, following a couple weeks of URI symptoms. Working very hard on arrival at the ED, 86% SpO2, 2-3 words/breath. Markedly improved with Duoneb x 1 and solu-medrol 125 mg. With BNP of 331 and new lower extremity edema, concerned for possible right heart  failure due to acute COPD exacerbation. Acknowledges some orthopnea but this is unchanged from his baseline.  New lower extremity edema present for about 2 weeks, markedly worse over the last 48 hours. No PND. No recent travel, prolonged immobility, calf pain/swelling. Normal echo 8/26/15 with LVEF 60-65%, no mention of pulmonary HTN. On admit-- WBC 12.1. NT proBNP 331. CXR hyperinflated without infitrate. VBG 7.34/68/45.     On nebs, pred, just off oxygen--but needed it overnight.Feel a lot better-but still on oxygen  Echo shows severe pul htn-received one dose lasix 4/2/19 .  Holding Advair and Spiriva while on duonebs and prednisone     Hyponatremia  Na 125 on admission. Denies decreased PO intake of food or drink. Denies frequent alcohol use, ~ 3-6 beers / weekend.     Sodium now normalized. Urine sodium is 13. Urine osmolality 210. Etiology is still unclear. Suggest monitoring in future. Could be from pul htn and some fluid overload. Doubt from etoh.     Essential hypertension  Reviewed outpatient BPs, appears well controlled on amlodipine 10 mg and lisinopril 60 mg.    Discharge amlodopine due to swelling , probably discharge on lasix.     Tobacco abuse, in remission  60 pack/year smoking history. Currently down to 1-2 cigarettes per week. Declined nicotine patch.     Diet: 2 Gram Sodium Diet    DVT Prophylaxis: Pneumatic Compression Devices   Miller Catheter: not present  Code Status: Full     Plan - pt with known copd and recent exacerbation with pred and doxy use presents with worse difficulty breathing and edema. Found to have pul htn.   Given saline initially due to low na (125). Sodium has normalized. . D dimer was normal. Given one dose of lasix two days ago due to edema-edema better. Feels better. Trying weaning oxygen. Hopefully off oxygen tomorrow. One additional lasix dose today. Stop amlodopine due to swelling and discharge on oral lasix.

## 2019-04-04 NOTE — PLAN OF CARE
Pt states he slept well during the night. Woke this am @ 0400, requesting to see what his sat is on RA. Oxygen removed & sats dropped from 95% to 87%. Oxygen reapplied @ 1.5L, sat 92%. Pt reports he's feeling better today, ambulating independently in halls w/ portable O2 tank.

## 2019-04-05 VITALS
OXYGEN SATURATION: 94 % | WEIGHT: 119.27 LBS | SYSTOLIC BLOOD PRESSURE: 123 MMHG | RESPIRATION RATE: 18 BRPM | HEIGHT: 64 IN | TEMPERATURE: 98.1 F | BODY MASS INDEX: 20.36 KG/M2 | DIASTOLIC BLOOD PRESSURE: 78 MMHG | HEART RATE: 74 BPM

## 2019-04-05 LAB
ANION GAP SERPL CALCULATED.3IONS-SCNC: 2 MMOL/L (ref 3–14)
BUN SERPL-MCNC: 20 MG/DL (ref 7–30)
CALCIUM SERPL-MCNC: 8.6 MG/DL (ref 8.5–10.1)
CHLORIDE SERPL-SCNC: 98 MMOL/L (ref 94–109)
CO2 SERPL-SCNC: 37 MMOL/L (ref 20–32)
CREAT SERPL-MCNC: 0.69 MG/DL (ref 0.66–1.25)
ERYTHROCYTE [DISTWIDTH] IN BLOOD BY AUTOMATED COUNT: 14.1 % (ref 10–15)
GFR SERPL CREATININE-BSD FRML MDRD: >90 ML/MIN/{1.73_M2}
GLUCOSE SERPL-MCNC: 83 MG/DL (ref 70–99)
HCT VFR BLD AUTO: 42.3 % (ref 40–53)
HGB BLD-MCNC: 13.4 G/DL (ref 13.3–17.7)
MCH RBC QN AUTO: 31.6 PG (ref 26.5–33)
MCHC RBC AUTO-ENTMCNC: 31.7 G/DL (ref 31.5–36.5)
MCV RBC AUTO: 100 FL (ref 78–100)
PLATELET # BLD AUTO: 282 10E9/L (ref 150–450)
POTASSIUM SERPL-SCNC: 4.6 MMOL/L (ref 3.4–5.3)
RBC # BLD AUTO: 4.24 10E12/L (ref 4.4–5.9)
SODIUM SERPL-SCNC: 137 MMOL/L (ref 133–144)
WBC # BLD AUTO: 10.5 10E9/L (ref 4–11)

## 2019-04-05 PROCEDURE — 94640 AIRWAY INHALATION TREATMENT: CPT

## 2019-04-05 PROCEDURE — 99239 HOSP IP/OBS DSCHRG MGMT >30: CPT | Performed by: FAMILY MEDICINE

## 2019-04-05 PROCEDURE — 80048 BASIC METABOLIC PNL TOTAL CA: CPT | Performed by: PHYSICIAN ASSISTANT

## 2019-04-05 PROCEDURE — 25000132 ZZH RX MED GY IP 250 OP 250 PS 637: Performed by: FAMILY MEDICINE

## 2019-04-05 PROCEDURE — 36415 COLL VENOUS BLD VENIPUNCTURE: CPT | Performed by: PHYSICIAN ASSISTANT

## 2019-04-05 PROCEDURE — 94640 AIRWAY INHALATION TREATMENT: CPT | Mod: 76

## 2019-04-05 PROCEDURE — 25000132 ZZH RX MED GY IP 250 OP 250 PS 637: Performed by: PHYSICIAN ASSISTANT

## 2019-04-05 PROCEDURE — 25000131 ZZH RX MED GY IP 250 OP 636 PS 637: Performed by: FAMILY MEDICINE

## 2019-04-05 PROCEDURE — 25000125 ZZHC RX 250: Performed by: PHYSICIAN ASSISTANT

## 2019-04-05 PROCEDURE — 85027 COMPLETE CBC AUTOMATED: CPT | Performed by: PHYSICIAN ASSISTANT

## 2019-04-05 RX ORDER — NICOTINE 21 MG/24HR
1 PATCH, TRANSDERMAL 24 HOURS TRANSDERMAL EVERY 24 HOURS
Qty: 28 PATCH | Refills: 0 | Status: SHIPPED | OUTPATIENT
Start: 2019-04-05 | End: 2019-06-12

## 2019-04-05 RX ORDER — FUROSEMIDE 20 MG
20 TABLET ORAL DAILY
Qty: 30 TABLET | Refills: 0 | Status: SHIPPED | OUTPATIENT
Start: 2019-04-06 | End: 2019-04-26

## 2019-04-05 RX ORDER — PREDNISONE 10 MG/1
TABLET ORAL
Qty: 60 TABLET | Refills: 0 | Status: SHIPPED | OUTPATIENT
Start: 2019-04-05 | End: 2019-04-12

## 2019-04-05 RX ORDER — IPRATROPIUM BROMIDE AND ALBUTEROL SULFATE 2.5; .5 MG/3ML; MG/3ML
1 SOLUTION RESPIRATORY (INHALATION) 4 TIMES DAILY
Qty: 1 BOX | Refills: 1 | Status: SHIPPED | OUTPATIENT
Start: 2019-04-05 | End: 2019-06-12

## 2019-04-05 RX ADMIN — PREDNISONE 60 MG: 10 TABLET ORAL at 08:56

## 2019-04-05 RX ADMIN — FUROSEMIDE 20 MG: 20 TABLET ORAL at 06:27

## 2019-04-05 RX ADMIN — ATENOLOL 25 MG: 25 TABLET ORAL at 08:57

## 2019-04-05 RX ADMIN — NICOTINE 1 PATCH: 14 PATCH, EXTENDED RELEASE TRANSDERMAL at 08:59

## 2019-04-05 RX ADMIN — IPRATROPIUM BROMIDE AND ALBUTEROL SULFATE 3 ML: .5; 3 SOLUTION RESPIRATORY (INHALATION) at 12:06

## 2019-04-05 RX ADMIN — AZITHROMYCIN 250 MG: 250 TABLET, FILM COATED ORAL at 08:56

## 2019-04-05 RX ADMIN — IPRATROPIUM BROMIDE AND ALBUTEROL SULFATE 3 ML: .5; 3 SOLUTION RESPIRATORY (INHALATION) at 09:00

## 2019-04-05 ASSESSMENT — ACTIVITIES OF DAILY LIVING (ADL)
ADLS_ACUITY_SCORE: 11

## 2019-04-05 ASSESSMENT — MIFFLIN-ST. JEOR: SCORE: 1307

## 2019-04-05 NOTE — DISCHARGE INSTRUCTIONS
Stop amlodopine--this makes your legs swell.  Start lasix 20 mg daily.  Lasix can deplete potassium--you will need to have your potassium level checked next week.  Use duo neb four times a day.  While on duoneb --you can hold spiriva--It is not necessary.  Take 40 mg of prednisone a day until rechecked, then your primary doctor can advise you on how to taper.  No work through April 12, 2019.  I have prescribed a nebulizer.  I have ordered pulmonary rehab.              As a system [facility] wants to ensure that across the care continuum that you have support through care coordination services that include nurses and social workers in the outpatient setting.  Due to your COPD exacerbation I feel it is important you have this support when you discharge.  They will be calling you within 24-48 hours of your discharge.   A brochure describing the services was provided.  If you have questions you can reach out to your clinic directly and ask for the Care Coordinator assigned to your care.  Tati Aleman RN, Care Coordinator [phone]

## 2019-04-05 NOTE — PLAN OF CARE
Patient continues to be up independently.  Saturation > 92% ROOM AIR  Denies pain.  Alert & oriented X4

## 2019-04-05 NOTE — PROGRESS NOTES
[facility]      Subjective:  Much better    Review of Systems:  CONSTITUTIONAL: NEGATIVE for fever, chills, change in weight  ENT/MOUTH: NEGATIVE for ear, mouth and throat problems  RESP:much less sob  CV: NEGATIVE for chest pain, palpitations or peripheral edema    Physical Exam:  Vitals Were Reviewed    Patient Vitals for the past 16 hrs:   BP Temp Temp src Pulse Resp SpO2 Weight   04/05/19 1119 123/78 98.1  F (36.7  C) Oral 74 18 94 % --   04/05/19 0650 (!) 137/93 98.3  F (36.8  C) Oral 103 20 92 % --   04/05/19 0627 -- -- -- -- -- -- 54.1 kg (119 lb 4.3 oz)   04/05/19 0526 -- -- -- -- -- 94 % --   04/05/19 0500 (!) 142/99 98.1  F (36.7  C) Oral 104 20 93 % --   04/05/19 0100 122/82 99  F (37.2  C) Oral 109 18 95 % --   04/05/19 0059 -- -- -- -- -- 95 % --   04/05/19 0054 -- -- -- 105 18 90 % --   04/04/19 1944 121/79 98.4  F (36.9  C) Oral 107 20 93 % --         Intake/Output Summary (Last 24 hours) at 4/5/2019 1138  Last data filed at 4/5/2019 0959  Gross per 24 hour   Intake 1080 ml   Output 2125 ml   Net -1045 ml       GENERAL APPEARANCE: walking about floor  EYES: conjunctiva clear, eyes grossly normal  RESP: decreased, sl wheeze  CV: regular rate and rhythm, normal S1 S2, no S3 or S4 and no murmur, click or rub   ABDOMEN: soft, nontender, no HSM or masses and bowel sounds normal  MS: no clubbing, cyanosis; one plus but improved edema  SKIN: clear without significant rashes or lesions    Lab:  Recent Labs   Lab Test 04/05/19  0457 04/04/19  0602    133   POTASSIUM 4.6 4.2   CHLORIDE 98 95   CO2 37* 37*   ANIONGAP 2* 1*   GLC 83 86   BUN 20 16   CR 0.69 0.57*   MIKE 8.6 8.6     CBC RESULTS:   Recent Labs   Lab Test 04/05/19  0457 04/04/19  0602   WBC 10.5 11.2*   RBC 4.24* 4.54   HGB 13.4 14.5   HCT 42.3 45.7    301       Results for orders placed or performed during the hospital encounter of 04/01/19 (from the past 24 hour(s))   CBC with platelets   Result Value Ref Range     WBC 10.5 4.0 - 11.0 10e9/L    RBC Count 4.24 (L) 4.4 - 5.9 10e12/L    Hemoglobin 13.4 13.3 - 17.7 g/dL    Hematocrit 42.3 40.0 - 53.0 %     78 - 100 fl    MCH 31.6 26.5 - 33.0 pg    MCHC 31.7 31.5 - 36.5 g/dL    RDW 14.1 10.0 - 15.0 %    Platelet Count 282 150 - 450 10e9/L   Basic metabolic panel   Result Value Ref Range    Sodium 137 133 - 144 mmol/L    Potassium 4.6 3.4 - 5.3 mmol/L    Chloride 98 94 - 109 mmol/L    Carbon Dioxide 37 (H) 20 - 32 mmol/L    Anion Gap 2 (L) 3 - 14 mmol/L    Glucose 83 70 - 99 mg/dL    Urea Nitrogen 20 7 - 30 mg/dL    Creatinine 0.69 0.66 - 1.25 mg/dL    GFR Estimate >90 >60 mL/min/[1.73_m2]    GFR Estimate If Black >90 >60 mL/min/[1.73_m2]    Calcium 8.6 8.5 - 10.1 mg/dL       Assessment and Plan:    Luis Hernandez is a 53 year old male admitted on 4/1/2019. He presents with increasing dyspnea.     Acute on chronic respiratory failure with hypoxia and hypercapnia   Suspect COPD exacerbation  Severe pulmonary hypertension  Increasing dyspnea over t 48 hours, following a couple weeks of URI symptoms. Working very hard on arrival at the ED, 86% SpO2, 2-3 words/breath. Markedly improved with Duoneb x 1 and solu-medrol 125 mg. With BNP of 331 and new lower extremity edema, concerned for possible right heart failure due to acute COPD exacerbation. Acknowledges some orthopnea but this is unchanged from his baseline.  New lower extremity edema present for about 2 weeks, markedly worse over the last 48 hours. No PND. No recent travel, prolonged immobility, calf pain/swelling. Normal echo 8/26/15 with LVEF 60-65%, no mention of pulmonary HTN. On admit-- WBC 12.1. NT proBNP 331. CXR hyperinflated without infitrate. VBG 7.34/68/45.     On nebs, pred,off oxygen over night  Echo shows severe pul htn-received two doses of lasix, will discharge on lasix 20 mg  Holding Advair and Spiriva while on duonebs and prednisone     Hyponatremia  Na 125 on admission. Denies decreased PO intake of  food or drink. Denies frequent alcohol use, ~ 3-6 beers / weekend.     Sodium now normalized. Urine sodium is 13. Urine osmolality 210. Etiology is still unclear. Suggest monitoring in future. Could be from pul htn and some fluid overload. Doubt from etoh.     Essential hypertension  Reviewed outpatient BPs, appears well controlled on amlodipine 10 mg and lisinopril 60 mg.     Discharge amlodopine due to swelling , probably discharge on lasix.     Tobacco abuse, in remission  60 pack/year smoking history. Currently down to 1-2 cigarettes per week. Declined nicotine patch.     Diet: 2 Gram Sodium Diet    DVT Prophylaxis: Pneumatic Compression Devices   Miller Catheter: not present  Code Status: Full     Plan - pt with known copd and recent exacerbation with pred and doxy use presents with worse difficulty breathing and edema. Found to have pul htn.   Given saline initially due to low na (125). Sodium  normalized. . D dimer was normal. Given 2 doses of lasix  due to edema-edema better.          dc

## 2019-04-05 NOTE — DISCHARGE SUMMARY
Admit Date:     04/01/2019   Discharge Date:           HISTORY OF PRESENT ILLNESS:  Luis Hernandez is a 53-year-old male with a history of severe emphysema, hypertension and continued smoking.  He had had 2 weeks of URI symptoms including rhinorrhea and congestion.  He had dramatic increase in work of breathing over the 24-48 hours prior to admission.  He noted that he could only walk 5-10 feet.  He also developed leg swelling in the 2 weeks prior to admission.  He has a 60-pack-year history of smoking.  He has been trying to quit smoking.      He had recently been on prednisone and doxycycline for a COPD exacerbation.      Upon admission, he was diagnosed with a COPD exacerbation and acute on chronic hypoxic respiratory failure and hypercapnia.  He required oxygen.  He was given IV steroids and ultimately oral prednisone.  He was treated with nebulizations and oral Zithromax.  His chest x-ray was clear.      He went on to have an echocardiogram which showed severe pulmonary hypertension.  He had normal LV function.  He was diuresed on 2 occasions with IV Lasix and his edema reduced.      His breathing improved and ultimately he transitioned off oxygen.  He felt dramatically better.  He was up and ambulatory, off oxygen at the time of discharge, his swelling was better.      He did have a sodium of 125 upon admission.  It was unclear what caused this.  It may have been due to hypervolemic state related to his pulmonary hypertension.  He does have some history of alcohol use, so I cannot totally rule out alcohol is a factor in this, but it resolved and his sodium stayed normal in the hospital.      He has been on amlodipine and I think this is making leg swelling worse so I am going to stop that and send him out on some oral Lasix.  He is going to need to have his kidney function and potassium monitored.      ASSESSMENT:   1.  Acute hypoxic and hypercapnic respiratory failure related to chronic obstructive pulmonary  disease exacerbation.   2.  Severe pulmonary hypertension related to COPD.   3.  Hyponatremia -- unclear etiology, possibly due to hypervolemic state from pulmonary hypertension.   4.  Essential hypertension.   5.  Tobacco use.      PLAN:  He is going to discharge on Lasix 20 mg a day.  I did not put him on potassium.  He needs to have his potassium checked next week with his primary physician, who put him on 40 mg of prednisone a day until he sees primary care.  I think he is going to need a slow taper due to the severity of his illness.  I did not discharge him on oxygen.  I did give him a prescription for nebulizer and we are going to use DuoNeb 4 times a day.  While he is on the DuoNeb he needs to hold the Spiriva.  He can restart Spiriva when he is not taking DuoNeb 4 times a day.  He is referred to pulmonary rehabilitation.  I have taken him off work through 04/12/2019.  He needs to see primary care next week.  He is to return to the emergency room in the interim, if he has problems.     Current Discharge Medication List      START taking these medications    Details   furosemide (LASIX) 20 MG tablet Take 1 tablet (20 mg) by mouth daily  Qty: 30 tablet, Refills: 0    Associated Diagnoses: Pulmonary hypertension (H)      ipratropium - albuterol 0.5 mg/2.5 mg/3 mL (DUONEB) 0.5-2.5 (3) MG/3ML neb solution Take 1 vial (3 mLs) by nebulization 4 times daily  Qty: 1 Box, Refills: 1    Associated Diagnoses: Simple chronic bronchitis (H)      nicotine (NICODERM CQ) 14 MG/24HR 24 hr patch Place 1 patch onto the skin every 24 hours  Qty: 28 patch, Refills: 0    Associated Diagnoses: Simple chronic bronchitis (H)      order for DME Equipment being ordered: Nebulizer  Qty: 1 Device, Refills: 0    Associated Diagnoses: Simple chronic bronchitis (H)         CONTINUE these medications which have CHANGED    Details   predniSONE (DELTASONE) 10 MG tablet Four daily until you see your primary MD.  Qty: 60 tablet, Refills: 0     Comments: Please include the quantity of tablets and (strength) per dose in sig.  Associated Diagnoses: Simple chronic bronchitis (H)         CONTINUE these medications which have NOT CHANGED    Details   albuterol (PROAIR HFA/PROVENTIL HFA/VENTOLIN HFA) 108 (90 Base) MCG/ACT inhaler Inhale 2 puffs into the lungs every 4 hours as needed for shortness of breath / dyspnea Hold on file until needed  Qty: 1 Inhaler, Refills: 3    Associated Diagnoses: Centrilobular emphysema (H)      atenolol (TENORMIN) 25 MG tablet Take 1 tablet (25 mg) by mouth daily  Qty: 90 tablet, Refills: 3    Associated Diagnoses: Essential hypertension, benign      fluticasone-salmeterol (ADVAIR) 500-50 MCG/DOSE diskus inhaler Inhale 1 puff into the lungs 2 times daily Hold on file until needed  Qty: 1 Inhaler, Refills: 11    Associated Diagnoses: Centrilobular emphysema (H)      lisinopril (PRINIVIL/ZESTRIL) 40 MG tablet Take 1.5 pills, or 60 mg daily  Qty: 135 tablet, Refills: 3    Associated Diagnoses: Essential hypertension, benign      tiotropium (SPIRIVA HANDIHALER) 18 MCG capsule Inhale contents of one capsule daily.  Qty: 30 capsule, Refills: 11    Associated Diagnoses: Centrilobular emphysema (H)         STOP taking these medications       amLODIPine (NORVASC) 10 MG tablet Comments:   Reason for Stopping:         doxycycline (VIBRAMYCIN) 100 MG capsule Comments:   Reason for Stopping:         guaiFENesin (MUCINEX PO) Comments:   Reason for Stopping:             Unresulted Labs Ordered in the Past 30 Days of this Admission     No orders found from 2019 to 2019.              Greater than 30 minutes spent on this.         ECTOR ROSALES MD             D: 2019   T: 2019   MT: [place]      Name:     MARVA CASTELLANOS   MRN:      [id number]        Account:        [id number]   :      1965           Admit Date:     2019                                  Discharge Date:       Document: [id number]

## 2019-04-05 NOTE — PLAN OF CARE
[facility] DISCHARGE NOTE    Patient discharged to home at 1:50 PM via ambulation. Accompanied by staff. Discharge instructions reviewed with patient, opportunity offered to ask questions. Prescriptions sent to patients preferred pharmacy. All belongings sent with patient.    Nai Flores

## 2019-04-05 NOTE — PLAN OF CARE
Pt's sats on RA 90-91% at midnight. Placed on 1 liter oxygen & sats 95%. Pt does get SOA with activity. Lungs diminished with exp wheezes. States has occasional productive cough with small amt clear/yellow sputum. Has 1+ BLE edema. Up indep in room.

## 2019-04-08 ENCOUNTER — PATIENT OUTREACH (OUTPATIENT)
Dept: CARE COORDINATION | Facility: CLINIC | Age: 54
End: 2019-04-08

## 2019-04-08 ASSESSMENT — ACTIVITIES OF DAILY LIVING (ADL): DEPENDENT_IADLS:: INDEPENDENT

## 2019-04-08 NOTE — LETTER
[facility]  [address]  [place] [address]      April 8, 2019      Luis BURNS [facility]  [address] [address]      Dear Luis,    I am a clinic care coordinator who works with Dean Mariee MD at [facility]. I wanted to introduce myself and provide you with my contact information so that you can call me with questions or concerns about your health care. Below is a description of clinic care coordination and how I can further assist you.     The clinic care coordinator is a registered nurse and/or  who understand the health care system. The goal of clinic care coordination is to help you manage your health and improve access to the [facility] in the most efficient manner. The registered nurse can assist you in meeting your health care goals by providing education, coordinating services, and strengthening the communication among your providers. The  can assist you with financial, behavioral, psychosocial, chemical dependency, counseling, and/or psychiatric resources.    Please feel free to contact me at [phone], with any questions or concerns. We at [facility] are focused on providing you with the highest-quality healthcare experience possible and that all starts with you.     Sincerely,     ANASTASIA Linda, RN   [facility] - RN Care Coordinator  Phone: [phone]   Enclosed: I have enclosed a copy of a 24 Hour Access Plan. This has helpful phone numbers for you to call when needed. Please keep this in an easy to access place to use as needed.

## 2019-04-08 NOTE — PROGRESS NOTES
Clinic Care Coordination Contact  [facility]/Voicemail    Referral Source: IP Handoff    [place] [facility] [place] 4[place] with diagnosis of COPD exacerbation.     Clinical Data: Care Coordinator Outreach    Outreach attempted x 1.  Left message on voicemail with call back information and requested return call.    Plan: Care Coordinator will mail out care coordination introduction letter with care coordinator contact information and explanation of care coordination services. Care Coordinator will try to reach patient again in 1-2 business days.    ANASTASIA Linda, RN   [facility] - RN Care Coordinator  Phone: [phone]

## 2019-04-08 NOTE — LETTER
Health Care Home - Access Care Plan    About Me  Patient Name:  Luis Castellanos    YOB: 1965  Age:                             53 year old   Dalia MRN:            [id number] Telephone Information:   Home Phone [phone]   Mobile [phone]       Address:    [address] [place] [address] Email address:  No e-mail address on record      Emergency Contact(s)  Name Relationship Lgl Grd Work Phone Home Phone Mobile Phone   1. ELICEO CASTELLANOS Mother  [phone]   2. LUIS CASTELLANOS Father   [phone]             Health Maintenance: Routine Health maintenance Reviewed: Due/Overdue  Health Maintenance Due   Topic Date Due     HIV SCREEN (SYSTEM ASSIGNED)  06/02/1985     PREVENTIVE CARE VISIT  10/04/2011     COPD ACTION PLAN Q1 YR  11/25/2014     COLON CANCER SCREEN (SYSTEM ASSIGNED)  06/02/2017     ZOSTER IMMUNIZATION (1 of 2) 06/02/2017     ADVANCE DIRECTIVE PLANNING Q5 YRS  11/25/2018       My Access Plan  Medical Emergency 911   Questions or concerns during clinic hours Primary Clinic Line, I will call the clinic directly: [facility] - [phone]   24 Hour Appointment Line [phone] or  [phone]) (toll free)   24 Hour Nurse Line [phone] (toll free)   Questions or concerns outside clinic hours 24 Hour Appointment Line, I will call the after-hours on-call line:   [facility] [phone] or [phone]) (toll-free)   Preferred Urgent Care [facility], [phone]   Preferred [facility] [place], [place]  [phone]   Preferred Pharmacy [id number] - [place], [place] - [address] [facility]     Behavioral Health Crisis Line The National Suicide Prevention Lifeline at 1-958.848.8824 or 911     My Care Team Members  Patient Care Team       Relationship Specialty Notifications Start End    Dean Mariee MD PCP - General    11/21/08     Phone: [phone] Fax: [phone] [address] [facility] [address]    Frank Wagner MD MD Internal Medicine All results, Admissions 2/17/14     Phone: [phone] Fax: [phone]         [address] [facility] [address] [facility] [address]    Dean Mariee MD Assigned PCP   12/13/15     Phone: [phone] Fax: [phone] [address] [facility] [address]    Mindy Diane, RN Clinic Care Coordinator Primary Care - CC Admissions 4/5/19     Phone: [phone] Fax: [phone]               My Medical and Care Information  Problem List   Patient Active Problem List   Diagnosis     Essential hypertension, benign     ED (erectile dysfunction)     Eczema     COPD (chronic obstructive pulmonary disease) (H)     CARDIOVASCULAR SCREENING; LDL GOAL LESS THAN 130     Advanced directives, counseling/discussion     Incidental pulmonary nodule, > 3mm and < 8mm, new from 2010     Tobacco abuse, in remission     Acute on chronic respiratory failure with hypoxia and hypercapnia (H)     Hyponatremia     COPD exacerbation (H)      Current Medications and Allergies:  See printed Medication Report

## 2019-04-09 ENCOUNTER — HOSPITAL ENCOUNTER (OUTPATIENT)
Dept: CARDIAC REHAB | Facility: CLINIC | Age: 54
End: 2019-04-09
Attending: FAMILY MEDICINE
Payer: COMMERCIAL

## 2019-04-09 PROCEDURE — G0424 PULMONARY REHAB W EXER: HCPCS

## 2019-04-09 PROCEDURE — 40000244 ZZH STATISTIC VISIT PULM REHAB

## 2019-04-09 NOTE — PROGRESS NOTES
Clinic Care Coordination Contact  [facility]/Voicemail    Referral Source: IP Handoff  See initial note below    Clinical Data: Care Coordinator Outreach    Outreach attempted x 2.  Left message on voicemail with call back information and requested return call.    Plan: Care Coordinator mailed out care coordination introduction letter on 4/8/19. Care Coordinator will perform chart review after PCP appt scheduled on 4/12, and attempt patient outreach if appropriate.    ANASTASIA Linda, RN   [facility] - RN Care Coordinator  Phone: [phone]

## 2019-04-11 ENCOUNTER — HOSPITAL ENCOUNTER (OUTPATIENT)
Dept: CARDIAC REHAB | Facility: CLINIC | Age: 54
End: 2019-04-11
Attending: FAMILY MEDICINE
Payer: COMMERCIAL

## 2019-04-11 PROCEDURE — G0424 PULMONARY REHAB W EXER: HCPCS

## 2019-04-11 PROCEDURE — 40000244 ZZH STATISTIC VISIT PULM REHAB

## 2019-04-12 ENCOUNTER — TELEPHONE (OUTPATIENT)
Dept: FAMILY MEDICINE | Facility: CLINIC | Age: 54
End: 2019-04-12

## 2019-04-12 ENCOUNTER — OFFICE VISIT (OUTPATIENT)
Dept: FAMILY MEDICINE | Facility: CLINIC | Age: 54
End: 2019-04-12
Payer: COMMERCIAL

## 2019-04-12 VITALS
BODY MASS INDEX: 20.12 KG/M2 | RESPIRATION RATE: 24 BRPM | TEMPERATURE: 98.9 F | SYSTOLIC BLOOD PRESSURE: 152 MMHG | HEART RATE: 115 BPM | DIASTOLIC BLOOD PRESSURE: 86 MMHG | WEIGHT: 117.2 LBS | OXYGEN SATURATION: 93 %

## 2019-04-12 DIAGNOSIS — J41.8 MIXED SIMPLE AND MUCOPURULENT CHRONIC BRONCHITIS (H): Primary | ICD-10-CM

## 2019-04-12 DIAGNOSIS — J41.0 SIMPLE CHRONIC BRONCHITIS (H): ICD-10-CM

## 2019-04-12 DIAGNOSIS — I10 ESSENTIAL HYPERTENSION, BENIGN: ICD-10-CM

## 2019-04-12 DIAGNOSIS — R60.0 PERIPHERAL EDEMA: ICD-10-CM

## 2019-04-12 LAB
ANION GAP SERPL CALCULATED.3IONS-SCNC: 5 MMOL/L (ref 3–14)
BUN SERPL-MCNC: 21 MG/DL (ref 7–30)
CALCIUM SERPL-MCNC: 8.8 MG/DL (ref 8.5–10.1)
CHLORIDE SERPL-SCNC: 98 MMOL/L (ref 94–109)
CO2 SERPL-SCNC: 30 MMOL/L (ref 20–32)
CREAT SERPL-MCNC: 0.66 MG/DL (ref 0.66–1.25)
GFR SERPL CREATININE-BSD FRML MDRD: >90 ML/MIN/{1.73_M2}
GLUCOSE SERPL-MCNC: 145 MG/DL (ref 70–99)
POTASSIUM SERPL-SCNC: 4.3 MMOL/L (ref 3.4–5.3)
SODIUM SERPL-SCNC: 133 MMOL/L (ref 133–144)

## 2019-04-12 PROCEDURE — 99495 TRANSJ CARE MGMT MOD F2F 14D: CPT | Performed by: INTERNAL MEDICINE

## 2019-04-12 PROCEDURE — 36415 COLL VENOUS BLD VENIPUNCTURE: CPT | Performed by: INTERNAL MEDICINE

## 2019-04-12 PROCEDURE — 80048 BASIC METABOLIC PNL TOTAL CA: CPT | Performed by: INTERNAL MEDICINE

## 2019-04-12 RX ORDER — PREDNISONE 10 MG/1
10 TABLET ORAL DAILY
Status: CANCELLED | OUTPATIENT
Start: 2019-04-12

## 2019-04-12 RX ORDER — PREDNISONE 10 MG/1
TABLET ORAL
Qty: 30 TABLET | Refills: 0
Start: 2019-04-12 | End: 2019-04-26

## 2019-04-12 RX ORDER — AMLODIPINE BESYLATE 10 MG/1
10 TABLET ORAL DAILY
Qty: 90 TABLET | Refills: 3 | Status: SHIPPED | OUTPATIENT
Start: 2019-04-12 | End: 2019-04-26

## 2019-04-12 NOTE — PATIENT INSTRUCTIONS
Try weaning the Duonebs- decrease to three per day for a couple days and if that goes well, try two per day then 1 per day.  Resuming using the Spiriva when down to once per day.  You can use the Duonebs as needed if symptoms flare back up.      Continue the Lasix for about a week longer and then try stopping.  If edema returns, you can resume.      We'll start tapering the prednisone- go down to 30mg for 5 days, then 20mg for 5 days, then 10mg for 5 days, then stop.

## 2019-04-12 NOTE — TELEPHONE ENCOUNTER
Called and spoke to patient, and informed him he needs a current walk test to qualify for O2.  Patient does not want to come in on Monday and he agreed to have that completed in Cardiac and Pulmonary Rehab on Tues.  He already has appointment w/ Pulmonary Rehab on Tuesday 4/16/19. Yamilet BIGGS CMA ([facility])

## 2019-04-12 NOTE — PROGRESS NOTES
SUBJECTIVE:   Luis Hernandez is a 53 year old male who presents to clinic today for the following   health issues:      Chief Complaint   Patient presents with     Hospital F/U     [facility] 4/1/19 - 4/5/19, [facility] Follow-up Visit:    Hospital/Nursing Home/IP Rehab Facility: [facility]  Date of Admission: 4/1/19  Date of Discharge: 4/5/19  Reason(s) for Admission: respiratory failure             Problems taking medications regularly:  None       Medication changes since discharge: None       Problems adhering to non-medication therapy:  Respiratory therapy       Summary of hospitalization:  [facility] discharge summary reviewed    Prasad was admitted with SOB attributed to COPD exacerbation and was treated first with IV steroids then transitioned to PO.  He received nebs and azithromycin. TTE showed pulmonary HTN.  He was diuresed with IV Lasix.  Amlodipine was stopped in case this was contributing to swelling and he was started on PO Lasix.  Prednisone was continued at 40mg daily until follow-up so a taper could be prescribed.  Resume Spiriva when no longer using Duonebs as much.     Diagnostic Tests/Treatments reviewed.  Follow up needed: BMP  Other Healthcare Providers Involved in Patient s Care:         pulm rehab  Update since discharge: improved.     He reports edema has mostly resolved.  Feeling a bit more SOB than yesterday, but it tends to fluctuate.  No much cough or wheezing.  No fevers or chills.      Post Discharge Medication Reconciliation: discharge medications reconciled and changed, per note/orders (see AVS).  Plan of care communicated with patient     Coding guidelines for this visit:  Type of Medical   Decision Making Face-to-Face Visit       within 7 Days of discharge Face-to-Face Visit        within 14 days of discharge   Moderate Complexity 50628 [id number]   High Complexity 24246 90551              Additional history: as documented    Reviewed  and  updated as needed this visit by clinical staff  Tobacco  Allergies  Meds  Med Hx  Surg Hx  Fam Hx  Soc Hx        Reviewed and updated as needed this visit by Provider         Patient Active Problem List   Diagnosis     Essential hypertension, benign     ED (erectile dysfunction)     Eczema     COPD (chronic obstructive pulmonary disease) (H)     CARDIOVASCULAR SCREENING; LDL GOAL LESS THAN 130     Advanced directives, counseling/discussion     Incidental pulmonary nodule, > 3mm and < 8mm, new from 2010     Tobacco abuse, in remission     Acute on chronic respiratory failure with hypoxia and hypercapnia (H)     Hyponatremia     COPD exacerbation (H)     Past Surgical History:   Procedure Laterality Date     APPENDECTOMY      childhood       Social History     Tobacco Use     Smoking status: Former Smoker     Packs/day: 2.00     Years: 30.00     Pack years: 60.00     Types: Cigarettes     Smokeless tobacco: Former User     Tobacco comment: quitting using the patch   Substance Use Topics     Alcohol use: Yes     Comment: rare     Family History   Problem Relation Age of Onset     Hypertension Father      Lipids Father      C.A.D. Father      Heart Disease Father      Diabetes Paternal Grandmother      Hypertension Mother      Ovarian Cancer Mother          Current Outpatient Medications   Medication Sig Dispense Refill     amLODIPine (NORVASC) 10 MG tablet Take 1 tablet (10 mg) by mouth daily 90 tablet 3     atenolol (TENORMIN) 25 MG tablet Take 1 tablet (25 mg) by mouth daily 90 tablet 3     fluticasone-salmeterol (ADVAIR) 500-50 MCG/DOSE diskus inhaler Inhale 1 puff into the lungs 2 times daily Hold on file until needed 1 Inhaler 11     furosemide (LASIX) 20 MG tablet Take 1 tablet (20 mg) by mouth daily 30 tablet 0     ipratropium - albuterol 0.5 mg/2.5 mg/3 mL (DUONEB) 0.5-2.5 (3) MG/3ML neb solution Take 1 vial (3 mLs) by nebulization 4 times daily 1 Box 1     lisinopril (PRINIVIL/ZESTRIL) 40 MG tablet Take  1.5 pills, or 60 mg daily (Patient taking differently: Take 60 mg by mouth every evening Take 1.5 pills, or 60 mg daily) 135 tablet 3     nicotine (NICODERM CQ) 14 MG/24HR 24 hr patch Place 1 patch onto the skin every 24 hours 28 patch 0     order for DME Equipment being ordered: Oxygen 3L via NC during exercise as needed to maintain saturation at or above 90%.  Sats dropped to 78% during 6 minute walk test 1 Units 0     predniSONE (DELTASONE) 10 MG tablet Take 30 mg by mouth daily for 5 days, THEN 20 mg daily for 5 days, THEN 10 mg daily for 5 days. 30 tablet 0     albuterol (PROAIR HFA/PROVENTIL HFA/VENTOLIN HFA) 108 (90 Base) MCG/ACT inhaler Inhale 2 puffs into the lungs every 4 hours as needed for shortness of breath / dyspnea Hold on file until needed 1 Inhaler 3     order for DME Equipment being ordered: Nebulizer 1 Device 0     tiotropium (SPIRIVA HANDIHALER) 18 MCG capsule Inhale contents of one capsule daily. (Patient taking differently: Inhale 18 mcg into the lungs every evening Inhale contents of one capsule daily.) 30 capsule 11     Allergies   Allergen Reactions     Hctz Other (See Comments)     He has hyponatremia     Penicillins Other (See Comments)     Reaction unknown       ROS:  Constitutional, HEENT, pulmonary systems are negative, except as otherwise noted.    OBJECTIVE:     /86 (BP Location: Right arm, Patient Position: Sitting, Cuff Size: Adult Regular)   Pulse 115   Temp 98.9  F (37.2  C) (Tympanic)   Resp 24   Wt 53.2 kg (117 lb 3.2 oz)   SpO2 93%   BMI 20.12 kg/m    Body mass index is 20.12 kg/m .  GENERAL: healthy, alert and no distress  RESP: Very slight expiratory wheeze in the right lower lobe  CV: regular rate and rhythm, normal S1 S2, no S3 or S4, no murmur, click or rub  MS: Moderate pedal edema noted and trace ankle edema bilaterally    Diagnostic Test Results:  Results for orders placed or performed in visit on 04/12/19 (from the past 24 hour(s))   Basic metabolic panel    Result Value Ref Range    Sodium 133 133 - 144 mmol/L    Potassium 4.3 3.4 - 5.3 mmol/L    Chloride 98 94 - 109 mmol/L    Carbon Dioxide 30 20 - 32 mmol/L    Anion Gap 5 3 - 14 mmol/L    Glucose 145 (H) 70 - 99 mg/dL    Urea Nitrogen 21 7 - 30 mg/dL    Creatinine 0.66 0.66 - 1.25 mg/dL    GFR Estimate >90 >60 mL/min/[1.73_m2]    GFR Estimate If Black >90 >60 mL/min/[1.73_m2]    Calcium 8.8 8.5 - 10.1 mg/dL       ASSESSMENT/PLAN:         1. Mixed simple and mucopurulent chronic bronchitis (H)    Prasad was noted to desaturate to 78% on a 6-minute walk test upon admission to pulmonary rehab program a couple months ago.  They have been using 3 L of oxygen via nasal cannula with him during rehab, which is helping to maintain his saturations with exercise.  He would like to pursue getting prescription for oxygen for home use.  We contacted the oxygen company and unfortunately since his 6-minute walk test was greater than 3 days ago he will need a new one.  He is going to see if they would be able to do this at his pulmonary rehab session next week.  I also sent pulmonary rehab an email to inquire about this.  He has tried other treatments for his COPD including Advair, Spiriva, nebulizers, and oxygen saturation remains low, so oxygen therapy is indicated.  He is mobile in the home and able to use oxygen at home.  His pulmonary status is currently fairly stable, so we will try tapering him off of the prednisone as noted below.  He would also like to try tapering down on his DuoNeb use, so we will gradually decrease this as tolerated and he will resume his Spiriva.  Follow-up in 2 weeks for reassessment.      ADDENDUM 4/16:  6MWT needed to be repeated per O2 company since it was more than 30 days since the last one.  This was done in pulmonary rehab today.  They noted sats of 86% on RA at rest, so he qualifies for O2 at rest as well.  Sats recovered to 93% on 3L.  New prescription signed.      - order for DME; Equipment  being ordered: Oxygen 3L via NC continuously.  Dispense: 1 Units; Refill: 0  - predniSONE (DELTASONE) 10 MG tablet; Take 30 mg by mouth daily for 5 days, THEN 20 mg daily for 5 days, THEN 10 mg daily for 5 days.  Dispense: 30 tablet; Refill: 0    2. Essential hypertension, benign    Amlodipine was stopped during hospitalization with concerned that this was contributing to his edema, however he states that he has been on this for many years without any issues.  His blood pressure is not controlled off of this medication today, he would like to resume it.  Recheck blood pressure at follow-up in 2 weeks.    - amLODIPine (NORVASC) 10 MG tablet; Take 1 tablet (10 mg) by mouth daily  Dispense: 90 tablet; Refill: 3    3. Peripheral edema    Much improved since hospitalization.  He would like to continue the Lasix for a little while longer and then try to get off of it.  We will have him do this for another week to see if this can reduce his pedal edema further, and then try stopping.  BMP is stable today.    - Basic metabolic panel    Chart documentation was done using Dragon dictation software. Although reviewed after completion, some errors may remain.     Eric Patel MD  [facility] -

## 2019-04-15 NOTE — PROGRESS NOTES
Clinic Care Coordination Contact  [facility]/Voicemail    Referral Source: IP Handoff    Clinical Data: Care Coordinator Outreach    Outreach attempted x 3.  Left message on voicemail with call back information and requested return call.    Plan: Care Coordinator mailed out care coordination introduction letter on 4/8/19. Care Coordinator will do no further outreaches at this time.    ANASTASIA Linda, RN   [facility] - RN Care Coordinator  Phone: [phone]

## 2019-04-16 ENCOUNTER — HOSPITAL ENCOUNTER (OUTPATIENT)
Dept: CARDIAC REHAB | Facility: CLINIC | Age: 54
End: 2019-04-16
Attending: FAMILY MEDICINE
Payer: COMMERCIAL

## 2019-04-16 ENCOUNTER — DOCUMENTATION ONLY (OUTPATIENT)
Dept: FAMILY MEDICINE | Facility: CLINIC | Age: 54
End: 2019-04-16

## 2019-04-16 ENCOUNTER — TELEPHONE (OUTPATIENT)
Dept: CARDIAC REHAB | Facility: CLINIC | Age: 54
End: 2019-04-16

## 2019-04-16 PROCEDURE — 40000244 ZZH STATISTIC VISIT PULM REHAB

## 2019-04-16 PROCEDURE — G0424 PULMONARY REHAB W EXER: HCPCS

## 2019-04-16 NOTE — PROGRESS NOTES
Home Oxygen Assessment Tools  Patient's 02 sat on RA at rest 86% for 10 minutes.     Patient recovered to 93% at rest with 3L of oxygen applied.    As the patient was less than 88% on room air at rest, it was inappropriate/deemed unsafe to test him on room air with activity    Patient is on 3 LPM  (02 device: continuous nasal cannula) Sp02 91 %, after 2.5 minutes of walking in the hallway (approximately 180 feet).       Patient's Sp02 88% on 3 LPM/02 after 3.5 minutes of (hallway walking). Pt able to recover within 2 minutes with pursed lip breathing, stopping, and supplemental oxygen.

## 2019-04-16 NOTE — PROGRESS NOTES
[facility] received oxygen referral for patient 4/16/19. We have all documentation needed to bill patients insurance, called patients insurance verified no Prior Auth needed. Called patient and offered choice, patient is ok with using [facility] for oxygen setup. Scheduled to go to patients 4/17/19 to deliver oxygen.

## 2019-04-17 ENCOUNTER — TELEPHONE (OUTPATIENT)
Dept: FAMILY MEDICINE | Facility: CLINIC | Age: 54
End: 2019-04-17

## 2019-04-17 NOTE — TELEPHONE ENCOUNTER
Forms received from Dr. Mariee and filled out and faxed to [phone]    Britt Campbell on 4/17/2019 at 7:56 AM

## 2019-04-23 ENCOUNTER — HOSPITAL ENCOUNTER (OUTPATIENT)
Dept: CARDIAC REHAB | Facility: CLINIC | Age: 54
End: 2019-04-23
Attending: FAMILY MEDICINE
Payer: COMMERCIAL

## 2019-04-23 PROCEDURE — 40000244 ZZH STATISTIC VISIT PULM REHAB

## 2019-04-23 PROCEDURE — G0424 PULMONARY REHAB W EXER: HCPCS

## 2019-04-25 ENCOUNTER — HOSPITAL ENCOUNTER (OUTPATIENT)
Dept: CARDIAC REHAB | Facility: CLINIC | Age: 54
End: 2019-04-25
Attending: FAMILY MEDICINE
Payer: COMMERCIAL

## 2019-04-25 PROCEDURE — 40000244 ZZH STATISTIC VISIT PULM REHAB

## 2019-04-25 PROCEDURE — G0424 PULMONARY REHAB W EXER: HCPCS

## 2019-04-25 NOTE — PROGRESS NOTES
SUBJECTIVE:   Luis Hernandez is a 53 year old male who presents to clinic today for the following health issues:  Chief Complaint   Patient presents with     Hypertension     COPD         HPI     I saw Prasad on 4/12 for hospital follow-up.  We ordered home O2 for chronic bronchitis and were going to work on tapering Duonebs and prednisone and resuming Spiriva.  BP was high so we resumed amlodipine (previously held due to edema).  Edema was improved so we planned to continue Lasix for one more week and then try to stop.      Today, he reports he was not able to resume the amlodipine because he was not able to fill it at the pharmacy since insurance said it was too soon and he had thrown out his previous prescription when he was hospitalized per their instruction.      Hypertension Follow-up      Outpatient blood pressures are being checked at pulmonary rehab.  Results are 130s/80s.    Low Salt Diet: does not salt foods, but does pay attention to labels     Edema is resolved    COPD Follow-Up    Symptoms are currently: slightly worsened today, doing okay yesterday.  Tolerated coming off prednisone okay.  He is using Duonebs less as well, only on occasion now, and he started the Spiriva back up    No coughing or wheezing    He just got the oxygen for home and that is working well so far, didn't bring it here today and sat is 90.  He has a pulse ox and home and with the oxygen on, his sats stay in the 90s with activity (80s without the oxygen)    Current fatigue or dyspnea with ambulation: worsened from baseline    Shortness of breath: stable    Increased or change in Cough/Sputum: No    Fever(s): No    Baseline ambulation without stopping to rest:  100 feet. Able to walk up 1 flights of stairs without stopping to rest.    Any ER/UC or hospital admissions since your last visit? No     History   Smoking Status     Former Smoker     Packs/day: 2.00     Years: 30.00     Types: Cigarettes   Smokeless Tobacco     Former User      Comment: quitting using the patch     No results found for: FEV1, ROF6LXD  Additional history: as documented    Reviewed and updated as needed this visit by clinical staff  Tobacco  Allergies  Meds  Med Hx  Surg Hx  Fam Hx  Soc Hx        Reviewed and updated as needed this visit by Provider           Patient Active Problem List   Diagnosis     Essential hypertension, benign     ED (erectile dysfunction)     Eczema     COPD (chronic obstructive pulmonary disease) (H)     CARDIOVASCULAR SCREENING; LDL GOAL LESS THAN 130     Advanced directives, counseling/discussion     Incidental pulmonary nodule, > 3mm and < 8mm, new from 2010     Tobacco abuse, in remission     Acute on chronic respiratory failure with hypoxia and hypercapnia (H)     Hyponatremia     COPD exacerbation (H)     Past Surgical History:   Procedure Laterality Date     APPENDECTOMY      childhood       Social History     Tobacco Use     Smoking status: Former Smoker     Packs/day: 2.00     Years: 30.00     Pack years: 60.00     Types: Cigarettes     Smokeless tobacco: Former User     Tobacco comment: quitting using the patch   Substance Use Topics     Alcohol use: Yes     Comment: rare     Family History   Problem Relation Age of Onset     Hypertension Father      Lipids Father      C.A.D. Father      Heart Disease Father      Diabetes Paternal Grandmother      Hypertension Mother      Ovarian Cancer Mother          Current Outpatient Medications   Medication Sig Dispense Refill     amLODIPine (NORVASC) 10 MG tablet Take 1 tablet (10 mg) by mouth daily 90 tablet 3     atenolol (TENORMIN) 25 MG tablet Take 1 tablet (25 mg) by mouth daily 90 tablet 3     fluticasone-salmeterol (ADVAIR) 500-50 MCG/DOSE diskus inhaler Inhale 1 puff into the lungs 2 times daily Hold on file until needed 1 Inhaler 11     furosemide (LASIX) 20 MG tablet Take 1 tablet (20 mg) by mouth daily 90 tablet 3     lisinopril (PRINIVIL/ZESTRIL) 40 MG tablet Take 1.5 pills, or  60 mg daily (Patient taking differently: Take 60 mg by mouth every evening Take 1.5 pills, or 60 mg daily) 135 tablet 3     nicotine (NICODERM CQ) 14 MG/24HR 24 hr patch Place 1 patch onto the skin every 24 hours 28 patch 0     tiotropium (SPIRIVA HANDIHALER) 18 MCG capsule Inhale contents of one capsule daily. (Patient taking differently: Inhale 18 mcg into the lungs every evening Inhale contents of one capsule daily.) 30 capsule 11     albuterol (PROAIR HFA/PROVENTIL HFA/VENTOLIN HFA) 108 (90 Base) MCG/ACT inhaler Inhale 2 puffs into the lungs every 4 hours as needed for shortness of breath / dyspnea Hold on file until needed 1 Inhaler 3     ipratropium - albuterol 0.5 mg/2.5 mg/3 mL (DUONEB) 0.5-2.5 (3) MG/3ML neb solution Take 1 vial (3 mLs) by nebulization 4 times daily 1 Box 1     order for DME Equipment being ordered: Oxygen 3L via NC continuous.  O2 saturated noted to be 86% on room air at rest. 1 Units 0     order for DME Equipment being ordered: Nebulizer 1 Device 0     Allergies   Allergen Reactions     Hctz Other (See Comments)     He has hyponatremia     Penicillins Other (See Comments)     Reaction unknown       ROS:  Constitutional, cardiovascular, pulmonary systems are negative, except as otherwise noted.    OBJECTIVE:     BP (!) 176/104   Pulse 85   Temp 96.9  F (36.1  C) (Tympanic)   Resp 18   Wt 54.4 kg (120 lb)   SpO2 90%   BMI 20.60 kg/m    Body mass index is 20.6 kg/m .  GENERAL: healthy, alert and no distress  RESP: lungs clear to auscultation - no rales, rhonchi or wheezes  CV: regular rate and rhythm, normal S1 S2, no S3 or S4, no murmur, click or rub, mild bilateral ankle edema    Diagnostic Test Results:  none     ASSESSMENT/PLAN:         1. Simple chronic bronchitis (H)    Bit worse today compared to yesterday, which he attributes to the weather, but he states he is overall improved and doing well after tapering off the prednisone.  Was able to obtain home oxygen.  Continues with  pulmonary rehab.       2. Peripheral edema    Much improved- discussed we could discontinue Lasix, but risk would be edema returning, and if he developed pulmonary edema, this could affect his breathing.  He prefers to stay on the Lasix for now.  If things continue to remain stable, we may consider stopping it in the future.    - furosemide (LASIX) 20 MG tablet; Take 1 tablet (20 mg) by mouth daily  Dispense: 90 tablet; Refill: 3    3. Essential hypertension, benign    Unfortunately he was not able to  the amlodipine for insurance reasons and BP remains quite high today.  He is going to try to obtain it again, he says he may just end up paying cash.  RN BP check 2 weeks after restarting.     - amLODIPine (NORVASC) 10 MG tablet; Take 1 tablet (10 mg) by mouth daily  Dispense: 90 tablet; Refill: 3        Eric Patel MD  [facility] - IM

## 2019-04-26 ENCOUNTER — OFFICE VISIT (OUTPATIENT)
Dept: FAMILY MEDICINE | Facility: CLINIC | Age: 54
End: 2019-04-26
Payer: COMMERCIAL

## 2019-04-26 VITALS
BODY MASS INDEX: 20.6 KG/M2 | WEIGHT: 120 LBS | RESPIRATION RATE: 18 BRPM | HEART RATE: 85 BPM | OXYGEN SATURATION: 90 % | TEMPERATURE: 96.9 F | DIASTOLIC BLOOD PRESSURE: 104 MMHG | SYSTOLIC BLOOD PRESSURE: 176 MMHG

## 2019-04-26 DIAGNOSIS — J41.0 SIMPLE CHRONIC BRONCHITIS (H): Primary | ICD-10-CM

## 2019-04-26 DIAGNOSIS — I10 ESSENTIAL HYPERTENSION, BENIGN: ICD-10-CM

## 2019-04-26 DIAGNOSIS — R60.0 PERIPHERAL EDEMA: ICD-10-CM

## 2019-04-26 PROCEDURE — 99214 OFFICE O/P EST MOD 30 MIN: CPT | Performed by: INTERNAL MEDICINE

## 2019-04-26 RX ORDER — AMLODIPINE BESYLATE 10 MG/1
10 TABLET ORAL DAILY
Qty: 90 TABLET | Refills: 3 | Status: SHIPPED | OUTPATIENT
Start: 2019-04-26 | End: 2020-04-21

## 2019-04-26 RX ORDER — FUROSEMIDE 20 MG
20 TABLET ORAL DAILY
Qty: 90 TABLET | Refills: 3 | Status: SHIPPED | OUTPATIENT
Start: 2019-04-26 | End: 2020-04-21

## 2019-04-30 ENCOUNTER — HOSPITAL ENCOUNTER (OUTPATIENT)
Dept: CARDIAC REHAB | Facility: CLINIC | Age: 54
End: 2019-04-30
Attending: FAMILY MEDICINE
Payer: COMMERCIAL

## 2019-04-30 PROCEDURE — 40000244 ZZH STATISTIC VISIT PULM REHAB

## 2019-04-30 PROCEDURE — G0424 PULMONARY REHAB W EXER: HCPCS

## 2019-05-02 ENCOUNTER — HOSPITAL ENCOUNTER (OUTPATIENT)
Dept: CARDIAC REHAB | Facility: CLINIC | Age: 54
End: 2019-05-02
Attending: FAMILY MEDICINE
Payer: COMMERCIAL

## 2019-05-02 PROCEDURE — G0424 PULMONARY REHAB W EXER: HCPCS

## 2019-05-02 PROCEDURE — 40000244 ZZH STATISTIC VISIT PULM REHAB

## 2019-05-07 ENCOUNTER — HOSPITAL ENCOUNTER (OUTPATIENT)
Dept: CARDIAC REHAB | Facility: CLINIC | Age: 54
End: 2019-05-07
Attending: FAMILY MEDICINE
Payer: COMMERCIAL

## 2019-05-07 PROCEDURE — 40000244 ZZH STATISTIC VISIT PULM REHAB

## 2019-05-07 PROCEDURE — G0424 PULMONARY REHAB W EXER: HCPCS

## 2019-05-09 ENCOUNTER — HOSPITAL ENCOUNTER (OUTPATIENT)
Dept: CARDIAC REHAB | Facility: CLINIC | Age: 54
End: 2019-05-09
Attending: FAMILY MEDICINE
Payer: COMMERCIAL

## 2019-05-09 PROCEDURE — G0424 PULMONARY REHAB W EXER: HCPCS

## 2019-05-09 PROCEDURE — 40000244 ZZH STATISTIC VISIT PULM REHAB

## 2019-05-14 ENCOUNTER — HOSPITAL ENCOUNTER (OUTPATIENT)
Dept: CARDIAC REHAB | Facility: CLINIC | Age: 54
End: 2019-05-14
Attending: FAMILY MEDICINE
Payer: COMMERCIAL

## 2019-05-14 PROCEDURE — 40000244 ZZH STATISTIC VISIT PULM REHAB

## 2019-05-14 PROCEDURE — G0424 PULMONARY REHAB W EXER: HCPCS

## 2019-05-16 ENCOUNTER — HOSPITAL ENCOUNTER (OUTPATIENT)
Dept: CARDIAC REHAB | Facility: CLINIC | Age: 54
End: 2019-05-16
Attending: FAMILY MEDICINE
Payer: COMMERCIAL

## 2019-05-16 PROCEDURE — G0424 PULMONARY REHAB W EXER: HCPCS

## 2019-05-16 PROCEDURE — 40000244 ZZH STATISTIC VISIT PULM REHAB

## 2019-05-23 ENCOUNTER — TELEPHONE (OUTPATIENT)
Dept: FAMILY MEDICINE | Facility: CLINIC | Age: 54
End: 2019-05-23

## 2019-05-23 DIAGNOSIS — J43.2 CENTRILOBULAR EMPHYSEMA (H): ICD-10-CM

## 2019-05-23 DIAGNOSIS — R06.2 WHEEZING: ICD-10-CM

## 2019-05-23 DIAGNOSIS — J44.1 COPD EXACERBATION (H): ICD-10-CM

## 2019-05-23 NOTE — TELEPHONE ENCOUNTER
Requested Prescriptions   Pending Prescriptions Disp Refills     doxycycline hyclate (VIBRAMYCIN) 100 MG capsule [Pharmacy Med Name: Doxycycline Hyclate Oral Capsule 100 MG] 20 capsule 2     Sig: Take 1 capsule (100 mg) by mouth 2 times daily.( Keep on hand for COPD exacerbation.)       There is no refill protocol information for this order   Last Written Prescription Date:  Unknown  Last Fill Quantity: 20,  # refills: 2   Last office visit: 4/26/2019 with prescribing provider:  Mariee   Future Office Visit:         predniSONE (DELTASONE) 10 MG tablet [Pharmacy Med Name: predniSONE Oral Tablet 10 MG] 31 tablet 2     Sig: Take 4 tablets by mouth daily for 3 days, 3 tablets daily for 3 days, 2 tablets daily for 3 days, 1 tablet daily for 3 days, 1/2 tablet arvind       There is no refill protocol information for this order        Last Written Prescription Date:  Unknown  Last Fill Quantity: 31,  # refills: 2   Last office visit: 4/26/2019 with prescribing provider:  Mariee   Future Office Visit:

## 2019-05-23 NOTE — TELEPHONE ENCOUNTER
Left message for patient to return call to clinic.  Prescribed for bronchitis in April.  If he is still having symptoms, needs appt.    Alethea CARR RN

## 2019-05-24 RX ORDER — PREDNISONE 10 MG/1
TABLET ORAL
Qty: 31 TABLET | Refills: 2 | OUTPATIENT
Start: 2019-05-24

## 2019-05-24 RX ORDER — DOXYCYCLINE 100 MG/1
100 CAPSULE ORAL 2 TIMES DAILY
Qty: 20 CAPSULE | Refills: 2 | OUTPATIENT
Start: 2019-05-24

## 2019-06-07 ENCOUNTER — HOSPITAL ENCOUNTER (EMERGENCY)
Facility: CLINIC | Age: 54
Discharge: HOME | End: 2019-06-07
Attending: EMERGENCY MEDICINE | Admitting: EMERGENCY MEDICINE
Payer: COMMERCIAL

## 2019-06-07 VITALS
RESPIRATION RATE: 20 BRPM | OXYGEN SATURATION: 92 % | WEIGHT: 119 LBS | BODY MASS INDEX: 20.32 KG/M2 | TEMPERATURE: 98.2 F | SYSTOLIC BLOOD PRESSURE: 129 MMHG | HEIGHT: 64 IN | DIASTOLIC BLOOD PRESSURE: 92 MMHG

## 2019-06-07 DIAGNOSIS — K42.9 UMBILICAL HERNIA WITHOUT OBSTRUCTION AND WITHOUT GANGRENE: ICD-10-CM

## 2019-06-07 PROCEDURE — 99283 EMERGENCY DEPT VISIT LOW MDM: CPT

## 2019-06-07 PROCEDURE — 99283 EMERGENCY DEPT VISIT LOW MDM: CPT | Mod: Z6 | Performed by: EMERGENCY MEDICINE

## 2019-06-07 ASSESSMENT — ENCOUNTER SYMPTOMS
BACK PAIN: 0
SHORTNESS OF BREATH: 0
DIARRHEA: 0
CONSTIPATION: 0
VOMITING: 0
APPETITE CHANGE: 0
CHEST TIGHTNESS: 0
LIGHT-HEADEDNESS: 0
ABDOMINAL DISTENTION: 0
COUGH: 0
ABDOMINAL PAIN: 1
NAUSEA: 0
FATIGUE: 0
HEADACHES: 0
FEVER: 0

## 2019-06-07 ASSESSMENT — MIFFLIN-ST. JEOR: SCORE: 1300.78

## 2019-06-07 NOTE — ED PROVIDER NOTES
History     Chief Complaint   Patient presents with     Hernia     increased pain around umbilcal hernia since this evening.      HPI  Luis Hernandez is a 54 year old male with a history of an umbilical hernia for many years of severe umbilical pain after eating [employer] tonight.  Patient reports sharp pain in the periumbilical area which is been persistent causing him to come in for evaluation.  No associated nausea or diarrhea.  Symptoms have been present only for a few hours.  No previous similar symptoms in the past.    Allergies:  Allergies   Allergen Reactions     Hctz Other (See Comments)     He has hyponatremia     Penicillins Other (See Comments)     Reaction unknown       Problem List:    Patient Active Problem List    Diagnosis Date Noted     Acute on chronic respiratory failure with hypoxia and hypercapnia (H) 04/01/2019     Priority: Medium     Hyponatremia 04/01/2019     Priority: Medium     COPD exacerbation (H) 04/01/2019     Priority: Medium     Tobacco abuse, in remission 12/05/2017     Priority: Medium     Incidental pulmonary nodule, > 3mm and < 8mm, new from 2010 01/07/2014     Priority: Medium     By chest x-ray and chest CT new from CT chest from Oct 2010.   Stable 01/2014 to 07/2014, 6 month follow scan recommended.   Chest CT of 1/15/2015= stable nodule, f/u one year.       Advanced directives, counseling/discussion 11/25/2013     Priority: Medium     11/25/2013 Gave patient honoring choices forms to take home and review.  DAVEY Dial ([facility])        CARDIOVASCULAR SCREENING; LDL GOAL LESS THAN 130 10/31/2010     Priority: Medium     COPD (chronic obstructive pulmonary disease) (H) 10/25/2010     Priority: Medium     Severe to very severe       Eczema 10/04/2010     Priority: Medium     ED (erectile dysfunction) 04/20/2009     Priority: Medium     Essential hypertension, benign 10/15/2007     Priority: Medium        Past Medical History:    Past Medical History:   Diagnosis Date      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)      Erectile dysfunction      Hypertension        Past Surgical History:    Past Surgical History:   Procedure Laterality Date     APPENDECTOMY      childhood       Family History:    Family History   Problem Relation Age of Onset     Hypertension Father      Lipids Father      C.A.D. Father      Heart Disease Father      Diabetes Paternal Grandmother      Hypertension Mother      Ovarian Cancer Mother        Social History:  Marital Status:  Single [1]  Social History     Tobacco Use     Smoking status: Former Smoker     Packs/day: 2.00     Years: 30.00     Pack years: 60.00     Types: Cigarettes     Smokeless tobacco: Former User     Tobacco comment: quitting using the patch   Substance Use Topics     Alcohol use: Yes     Comment: rare     Drug use: No        Medications:      albuterol (PROAIR HFA/PROVENTIL HFA/VENTOLIN HFA) 108 (90 Base) MCG/ACT inhaler   amLODIPine (NORVASC) 10 MG tablet   atenolol (TENORMIN) 25 MG tablet   fluticasone-salmeterol (ADVAIR) 500-50 MCG/DOSE diskus inhaler   furosemide (LASIX) 20 MG tablet   ipratropium - albuterol 0.5 mg/2.5 mg/3 mL (DUONEB) 0.5-2.5 (3) MG/3ML neb solution   lisinopril (PRINIVIL/ZESTRIL) 40 MG tablet   nicotine (NICODERM CQ) 14 MG/24HR 24 hr patch   order for DME   order for DME   tiotropium (SPIRIVA HANDIHALER) 18 MCG capsule         Review of Systems   Constitutional: Negative for appetite change, fatigue and fever.   HENT: Negative for congestion.    Respiratory: Negative for cough, chest tightness and shortness of breath.    Cardiovascular: Negative for chest pain.   Gastrointestinal: Positive for abdominal pain. Negative for abdominal distention, constipation, diarrhea, nausea and vomiting.   Musculoskeletal: Negative for back pain.   Skin: Negative for rash.   Neurological: Negative for light-headedness and headaches.   All other systems reviewed and are negative.      Physical Exam   BP: (!)  "129/92  Heart Rate: 81  Temp: 98.2  F (36.8  C)  Resp: 20  Height: 162.6 cm (5' 4\")  Weight: 54 kg (119 lb)  SpO2: 92 %      Physical Exam   Constitutional: He is oriented to person, place, and time. He appears well-developed and well-nourished. No distress.   Appears mildly uncomfortable   HENT:   Head: Normocephalic and atraumatic.   Cardiovascular: Normal rate.   Pulmonary/Chest: Effort normal.   Abdominal: There is tenderness.       Neurological: He is alert and oriented to person, place, and time.   Skin: Skin is warm and dry. Capillary refill takes less than 2 seconds.   Psychiatric: He has a normal mood and affect.   Nursing note and vitals reviewed.      ED Course        Procedures    2:03 AM; patient placed supine with his knees bent to 90 degrees.  I manually reduced the umbilical hernia with slow, circumferential pressure.  After about 2 minutes, the incarcerated hernia loop reduced with instant resolution of patient's pain.                 No results found for this or any previous visit (from the past 24 hour(s)).    Medications - No data to display     2:37 AM Patient re-assessed: No return of pain.  Her abdomen remains soft and nontender.        Assessments & Plan (with Medical Decision Making)  54-year-old male presented for evaluation of periumbilical abdominal pain and tenderness around his umbilical hernia.  On exam he, firm, tender umbilical hernia.  Steady direct circumferential pressure applied over the course of 1 to 2 minutes with subsequent reduction of the hernia and resolution of his pain.  Patient left in the supine position and monitored in the ED with no recurrence.     I have reviewed the nursing notes.    I have reviewed the findings, diagnosis, plan and need for follow up with the patient.          Medication List      There are no discharge medications for this visit.         Final diagnoses:   Umbilical hernia without obstruction and without gangrene - incarcerated, reduced in the " ED       6/7/2019   [facility] EMERGENCY DEPARTMENT     Patel, Saúl Donald MD  06/07/19 0354

## 2019-06-07 NOTE — ED NOTES
Pt presents to ED for complaint of increased pain around his umbilical hernia starting tonight. Pain got worse this evening. Reports the hernia does not appear larger. Hernia has been present for years and he has not had any problems with it in the past.

## 2019-06-07 NOTE — ED AVS SNAPSHOT
[facility] Emergency Department  [address] [address]  Phone:  [phone]  Fax:  [phone]                                    Luis Hernandez   MRN: [id number]    Department:  [facility] Emergency Department   Date of Visit:  6/7/2019           After Visit Summary Signature Page    I have received my discharge instructions, and my questions have been answered. I have discussed any challenges I see with this plan with the nurse or doctor.    ..........................................................................................................................................  Patient/Patient Representative Signature      ..........................................................................................................................................  Patient Representative Print Name and Relationship to Patient    ..................................................               ................................................  Date                                   Time    ..........................................................................................................................................  Reviewed by Signature/Title    ...................................................              ..............................................  Date                                               Time          22EPIC Rev 08/18

## 2019-06-10 ENCOUNTER — PATIENT OUTREACH (OUTPATIENT)
Dept: FAMILY MEDICINE | Facility: CLINIC | Age: 54
End: 2019-06-10

## 2019-06-10 DIAGNOSIS — J44.1 COPD EXACERBATION (H): Primary | ICD-10-CM

## 2019-06-10 NOTE — PROGRESS NOTES
Clinic Care Coordination Contact  [facility]/Voicemail    Referral Source: ED Follow-Up  Clinical Data: Care Coordinator Outreach  Outreach attempted x 1.  Left message on voicemail with call back information and requested return call.  Plan: Care Coordinator will mail out care coordination introduction letter with care coordinator contact information and explanation of care coordination services. Care Coordinator will try to reach patient again in 1-2 business days.    Elizabeth Awan RN  [facility]-Care Coordination  [facility]  [phone]

## 2019-06-10 NOTE — LETTER
[facility] CARE COORDINATION  [address] [address].    Yanci 10, 2019    Luis BURNS [facility]  [address] [address]      Dear Luis,    I am a clinic care coordinator who works with Dean Mariee MD at [facility]. I wanted to introduce myself and provide you with my contact information so that you can call me with questions or concerns about your health care. Below is a description of clinic care coordination and how I can further assist you.     The clinic care coordinator is a registered nurse and/or  who understand the health care system. The goal of clinic care coordination is to help you manage your health and improve access to the [facility] in the most efficient manner. The registered nurse can assist you in meeting your health care goals by providing education, coordinating services, and strengthening the communication among your providers. The  can assist you with financial, behavioral, psychosocial, chemical dependency, counseling, and/or psychiatric resources.    Please feel free to contact me at [phone], with any questions or concerns. We at [facility] are focused on providing you with the highest-quality healthcare experience possible and that all starts with you.     Sincerely,     Elizabeth Awan RN  [facility]-Care Coordination  [facility]  [phone]      Enclosed: I have enclosed a copy of a 24 Hour Access Plan. This has helpful phone numbers for you to call when needed. Please keep this in an easy to access place to use as needed.

## 2019-06-10 NOTE — LETTER
Health Care Home - Access Care Plan    About Me:    Patient Name:  Luis Castellanos    YOB: 1965  Age:                      54 year old   [facility] MRN:     [id number] Telephone Information:   Home Phone [phone]   Mobile [phone]       Address:  [address] [address] Email address:  No e-mail address on record      Emergency Contact(s)   Name Relationship Lgl Grd Work Phone Home Phone Mobile Phone   1. ELICEO CASTELLANOS Mother  [phone]   2. LUIS CASTELLANOS Father   [phone]             Health Maintenance: Routine Health maintenance Reviewed: Due/Overdue   Health Maintenance Due   Topic Date Due     COLONOSCOPY  06/02/1977     HIV SCREENING  06/02/1982     PREVENTIVE CARE VISIT  10/04/2011     ZOSTER IMMUNIZATION (1 of 2) 06/02/2017     PHQ-2  01/01/2019         My Access Plan  Medical Emergency 911   Questions or concerns during clinic hours Primary Clinic Line, I will call the clinic directly: [facility] - [phone]   24 Hour Appointment Line [phone] or  [phone]) (toll free)   24 Hour Nurse Line [phone] (toll free)   Questions or concerns outside clinic hours 24 Hour Appointment Line, I will call the after-hours on-call line:   [facility] [phone] or [phone]) (toll-free)   Preferred Urgent Care     Preferred Hospital     Preferred Pharmacy [id number] - [place], [place] - [address] [place] [address]     Behavioral Health Crisis Line The National Suicide Prevention Lifeline at 1-429.218.1181 or 911                     My Care Team Members  Patient Care Team       Relationship Specialty Notifications Start End    Dean Mariee MD PCP - General   11/21/08     Phone: [phone] Fax: [phone] [address] [address]    Frank Wagner MD MD Internal Medicine All results, Admissions 2/17/14     Dean Mariee MD  Assigned PCP   4/26/19     Phone: [phone] Fax: [phone] [address] [address]    Rosa Maria Awan, RN Clinic Care Coordinator Primary Care - [facility] Admissions 6/10/19     Phone: [phone] Fax: [phone]               My Medical and Care Information  Problem List   Patient Active Problem List   Diagnosis     Essential hypertension, benign     ED (erectile dysfunction)     Eczema     COPD (chronic obstructive pulmonary disease) (H)     CARDIOVASCULAR SCREENING; LDL GOAL LESS THAN 130     Advanced directives, counseling/discussion     Incidental pulmonary nodule, > 3mm and < 8mm, new from 2010     Tobacco abuse, in remission     Acute on chronic respiratory failure with hypoxia and hypercapnia (H)     Hyponatremia     COPD exacerbation (H)      Current Medications and Allergies:  See printed Medication Report

## 2019-06-12 ENCOUNTER — OFFICE VISIT (OUTPATIENT)
Dept: FAMILY MEDICINE | Facility: CLINIC | Age: 54
End: 2019-06-12
Payer: COMMERCIAL

## 2019-06-12 VITALS
TEMPERATURE: 97.8 F | BODY MASS INDEX: 20.49 KG/M2 | WEIGHT: 120 LBS | DIASTOLIC BLOOD PRESSURE: 80 MMHG | OXYGEN SATURATION: 94 % | HEART RATE: 89 BPM | SYSTOLIC BLOOD PRESSURE: 138 MMHG | HEIGHT: 64 IN

## 2019-06-12 DIAGNOSIS — J44.1 COPD EXACERBATION (H): ICD-10-CM

## 2019-06-12 DIAGNOSIS — R06.2 WHEEZING: ICD-10-CM

## 2019-06-12 DIAGNOSIS — J41.0 SIMPLE CHRONIC BRONCHITIS (H): ICD-10-CM

## 2019-06-12 DIAGNOSIS — J43.2 CENTRILOBULAR EMPHYSEMA (H): ICD-10-CM

## 2019-06-12 DIAGNOSIS — I10 ESSENTIAL HYPERTENSION, BENIGN: ICD-10-CM

## 2019-06-12 PROCEDURE — 99214 OFFICE O/P EST MOD 30 MIN: CPT | Performed by: FAMILY MEDICINE

## 2019-06-12 RX ORDER — LISINOPRIL 40 MG/1
TABLET ORAL
Qty: 135 TABLET | Refills: 3 | Status: SHIPPED | OUTPATIENT
Start: 2019-06-12 | End: 2020-06-08

## 2019-06-12 RX ORDER — ALBUTEROL SULFATE 90 UG/1
2 AEROSOL, METERED RESPIRATORY (INHALATION) EVERY 4 HOURS PRN
Qty: 1 INHALER | Refills: 4 | Status: SHIPPED | OUTPATIENT
Start: 2019-06-12 | End: 2019-12-23

## 2019-06-12 RX ORDER — PREDNISONE 10 MG/1
40 TABLET ORAL DAILY
Qty: 20 TABLET | Refills: 3 | Status: SHIPPED | OUTPATIENT
Start: 2019-06-12 | End: 2019-12-23

## 2019-06-12 RX ORDER — ATENOLOL 25 MG/1
25 TABLET ORAL DAILY
Qty: 90 TABLET | Refills: 3 | Status: SHIPPED | OUTPATIENT
Start: 2019-06-12 | End: 2020-06-08

## 2019-06-12 RX ORDER — DOXYCYCLINE 100 MG/1
100 CAPSULE ORAL 2 TIMES DAILY
Qty: 20 CAPSULE | Refills: 3 | Status: SHIPPED | OUTPATIENT
Start: 2019-06-12 | End: 2019-12-23

## 2019-06-12 RX ORDER — IPRATROPIUM BROMIDE AND ALBUTEROL SULFATE 2.5; .5 MG/3ML; MG/3ML
1 SOLUTION RESPIRATORY (INHALATION) 4 TIMES DAILY
Qty: 1 BOX | Refills: 11 | Status: ON HOLD | OUTPATIENT
Start: 2019-06-12 | End: 2020-04-14

## 2019-06-12 ASSESSMENT — MIFFLIN-ST. JEOR: SCORE: 1305.32

## 2019-06-12 NOTE — PATIENT INSTRUCTIONS
I refilled your medications.    Continue with 3L per minute if your are using the oxygen.    Follow up in the fall in 2019 about 5 months.    You are doing great!  Keep up the great work!      Thank you for choosing [facility].  You may be receiving an email and/or telephone survey request from [facility] Customer Experience regarding your visit today.  Please take a few minutes to respond to the survey to let us know how we are doing.      If you have questions or concerns, please contact us via [employer] or you can contact your care team at [phone].    Our Clinic hours are:  Monday 6:40 am  to 7:00 pm  Tuesday -Friday 6:40 am to 5:00 pm    The [place] outpatient lab hours are:  Monday - Friday 6:10 am to 4:45 pm  Saturdays 7:00 am to 11:00 am  Appointments are required, call [phone]    If you have clinical questions after hours or would like to schedule an appointment,  call the clinic at [phone].

## 2019-06-12 NOTE — PROGRESS NOTES
"Subjective     Luis Hernandez is a 54 year old male who presents to clinic today for the following health issues:    HPI   Refill on medications: Prednisone and Doxycycline that he keeps on had for COPD Exacerbation  COPD Follow-Up    Overall, how are your COPD symptoms since your last clinic visit?  Better    How much fatigue or shortness of breath do you have when you are walking?  Same as usual    How much shortness of breath do you have when you are resting?  None    How often do you cough? Sometimes    Have you noticed any change in your sputum/phlegm?  No    Have you experienced a recent fever? No    Please describe how far you can walk without stopping to rest:  2-5 blocks    How many flights of stairs are you able to walk up without stopping?  1    Have you had any Emergency Room Visits, Urgent Care Visits, or Hospital Admissions because of your COPD since your last office visit?  No    History   Smoking Status     Former Smoker     Packs/day: 2.00     Years: 30.00     Types: Cigarettes   Smokeless Tobacco     Former User     Comment: quitting using the patch     No results found for: FEV1, [id number]    Pulmonary rehab went well, using O2 if he is really active.  Not needing abx or steroids at this time, just needs refill.    No side effect of his medication.                  Reviewed and updated as needed this visit by Provider         Review of Systems   Review Of Systems  Skin: negative  Eyes:   Ears/Nose/Throat: negative  Respiratory: stable  Cardiovascular: negative  Gastrointestinal:   Genitourinary: negative  Musculoskeletal: negative  Neurologic:   Psychiatric:   Hematologic/Lymphatic/Immunologic:   Endocrine:         Objective    /80   Pulse 89   Temp 97.8  F (36.6  C) (Tympanic)   Ht 1.626 m (5' 4\")   Wt 54.4 kg (120 lb)   SpO2 94%   BMI 20.60 kg/m    Body mass index is 20.6 kg/m .  Physical Exam   GENERAL APPEARANCE: alert, no distress, cooperative and Nourishment slim   RESP: lungs " clear to auscultation - no rales, rhonchi or wheezes, slight decrease in breath sound but no wheeze or rhonchi  CV: regular rates and rhythm, normal S1 S2, no S3 or S4 and no murmur, click or rub  ABDOMEN: soft, nontender, without hepatosplenomegaly or masses and bowel sounds normal  MS: extremities normal- no gross deformities noted  SKIN: no suspicious lesions or rashes  NEURO: Normal strength and tone, mentation intact and speech normal  PSYCH: mentation appears normal and affect normal/bright            Assessment & Plan     (J43.2) Centrilobular emphysema (H)  Comment: stable and refilled medications  Plan: doxycycline hyclate (VIBRAMYCIN) 100 MG         capsule, predniSONE (DELTASONE) 10 MG tablet,         albuterol (PROAIR HFA/PROVENTIL HFA/VENTOLIN         HFA) 108 (90 Base) MCG/ACT inhaler,         fluticasone-salmeterol (ADVAIR) 500-50 MCG/DOSE        inhaler            (R06.2) Wheezing  Comment: refilled med incase he needs it   Plan: doxycycline hyclate (VIBRAMYCIN) 100 MG         capsule, predniSONE (DELTASONE) 10 MG tablet            (J44.1) COPD exacerbation (H)  Comment:   Plan: doxycycline hyclate (VIBRAMYCIN) 100 MG         capsule, predniSONE (DELTASONE) 10 MG tablet            (J41.0) Simple chronic bronchitis (H)  Comment:   Plan: ipratropium - albuterol 0.5 mg/2.5 mg/3 mL         (DUONEB) 0.5-2.5 (3) MG/3ML neb solution            (I10) BENIGN HYPERTENSION  Comment: controlled and doing well  Plan: atenolol (TENORMIN) 25 MG tablet, lisinopril         (PRINIVIL/ZESTRIL) 40 MG tablet                 See Patient Instructions    Return in about 5 months (around 11/12/2019).    Dean Mariee MD  [facility]

## 2019-06-12 NOTE — PROGRESS NOTES
Clinic Care Coordination Contact  [facility]/Voicemail    Referral Source: ED Follow-Up  Clinical Data: Care Coordinator Outreach  Outreach attempted x 2.  Left message on voicemail with call back information and requested return call.  Plan: Care Coordinator mailed out care coordination introduction letter on 6-10-19. Care Coordinator will do no further outreaches at this time.    Elizabeth Awan RN  [facility]-Care Coordination  [facility]  [phone]

## 2019-07-03 ENCOUNTER — TELEPHONE (OUTPATIENT)
Dept: FAMILY MEDICINE | Facility: CLINIC | Age: 54
End: 2019-07-03

## 2019-07-03 NOTE — TELEPHONE ENCOUNTER
[facility] intermittent COPD form completed and routed to DR. Mariee for review and signature.

## 2019-07-08 NOTE — TELEPHONE ENCOUNTER
"Form completed and signed.  Will notify patient on Monday that form is ready for .     Form is in \"Pending Phone Calls\" tray.  "

## 2019-08-14 ENCOUNTER — TELEPHONE (OUTPATIENT)
Dept: FAMILY MEDICINE | Facility: CLINIC | Age: 54
End: 2019-08-14

## 2019-09-18 DIAGNOSIS — J43.2 CENTRILOBULAR EMPHYSEMA (H): ICD-10-CM

## 2019-09-18 NOTE — TELEPHONE ENCOUNTER
"Requested Prescriptions   Pending Prescriptions Disp Refills     tiotropium (SPIRIVA HANDIHALER) 18 MCG inhaled capsule [Pharmacy Med Name: Spiriva HandiHaler Inhalation Capsule 18 MCG] 30 capsule 10     Sig: Inhale contents of one capsule daily.       Asthma Maintenance Inhalers - Anticholinergics Passed - 9/18/2019  3:11 PM        Passed - Patient is age 12 years or older        Passed - Recent (12 mo) or future (30 days) visit within the authorizing provider's specialty     Patient had office visit in the last 12 months or has a visit in the next 30 days with authorizing provider or within the authorizing provider's specialty.  See \"Patient Info\" tab in inbasket, or \"Choose Columns\" in Meds & Orders section of the refill encounter.              Passed - Medication is active on med list        Last Written Prescription Date:  9/4/18  Last Fill Quantity: 30,  # refills: 11   Last office visit: 6/12/2019 with prescribing provider:  Kodi   Future Office Visit:      "

## 2019-09-19 RX ORDER — TIOTROPIUM BROMIDE 18 UG/1
CAPSULE ORAL; RESPIRATORY (INHALATION)
Qty: 30 CAPSULE | Refills: 3 | Status: SHIPPED | OUTPATIENT
Start: 2019-09-19 | End: 2019-12-23

## 2019-09-19 NOTE — TELEPHONE ENCOUNTER
Prescription approved per [facility] Refill Protocol.    Shalini THEODORE RN

## 2019-11-02 ENCOUNTER — HEALTH MAINTENANCE LETTER (OUTPATIENT)
Age: 54
End: 2019-11-02

## 2019-12-23 ENCOUNTER — OFFICE VISIT (OUTPATIENT)
Dept: FAMILY MEDICINE | Facility: CLINIC | Age: 54
End: 2019-12-23
Payer: COMMERCIAL

## 2019-12-23 VITALS
BODY MASS INDEX: 20.43 KG/M2 | SYSTOLIC BLOOD PRESSURE: 128 MMHG | DIASTOLIC BLOOD PRESSURE: 84 MMHG | TEMPERATURE: 97.3 F | OXYGEN SATURATION: 95 % | RESPIRATION RATE: 14 BRPM | HEART RATE: 92 BPM | WEIGHT: 119 LBS

## 2019-12-23 DIAGNOSIS — J44.1 COPD EXACERBATION (H): Primary | ICD-10-CM

## 2019-12-23 DIAGNOSIS — J43.2 CENTRILOBULAR EMPHYSEMA (H): ICD-10-CM

## 2019-12-23 DIAGNOSIS — Z12.11 SPECIAL SCREENING FOR MALIGNANT NEOPLASMS, COLON: ICD-10-CM

## 2019-12-23 DIAGNOSIS — R06.2 WHEEZING: ICD-10-CM

## 2019-12-23 PROCEDURE — 99214 OFFICE O/P EST MOD 30 MIN: CPT | Performed by: FAMILY MEDICINE

## 2019-12-23 RX ORDER — ALBUTEROL SULFATE 90 UG/1
2 AEROSOL, METERED RESPIRATORY (INHALATION) EVERY 4 HOURS PRN
Qty: 1 INHALER | Refills: 11 | Status: SHIPPED | OUTPATIENT
Start: 2019-12-23 | End: 2020-06-08

## 2019-12-23 RX ORDER — DOXYCYCLINE 100 MG/1
100 CAPSULE ORAL 2 TIMES DAILY
Qty: 20 CAPSULE | Refills: 4 | Status: ON HOLD | OUTPATIENT
Start: 2019-12-23 | End: 2020-02-19

## 2019-12-23 RX ORDER — TIOTROPIUM BROMIDE 18 UG/1
CAPSULE ORAL; RESPIRATORY (INHALATION)
Qty: 30 CAPSULE | Refills: 11 | Status: SHIPPED | OUTPATIENT
Start: 2019-12-23 | End: 2020-06-08

## 2019-12-23 RX ORDER — PREDNISONE 10 MG/1
40 TABLET ORAL DAILY
Qty: 20 TABLET | Refills: 4 | Status: ON HOLD | OUTPATIENT
Start: 2019-12-23 | End: 2020-04-14

## 2019-12-23 NOTE — PATIENT INSTRUCTIONS
You are having a COPD exacerbation.     Please start you doxycycline and your prednisone.    I sent refills of these medications to your pharmacy.    I refilled your albuterol and also your Spiriva to the pharmacy too.    Please get your colonoscopy done in the near future        Thank you for choosing [facility].  You may be receiving an email and/or telephone survey request from [facility] Customer Experience regarding your visit today.  Please take a few minutes to respond to the survey to let us know how we are doing.      If you have questions or concerns, please contact us via [employer] or you can contact your care team at [phone].    Our Clinic hours are:  Monday 6:40 am  to 7:00 pm  Tuesday -Friday 6:40 am to 5:00 pm    The [place] outpatient lab hours are:  Monday - Friday 6:10 am to 4:45 pm  Saturdays 7:00 am to 11:00 am  Appointments are required, call [phone]    If you have clinical questions after hours or would like to schedule an appointment,  call the clinic at [phone].

## 2019-12-23 NOTE — PROGRESS NOTES
"Subjective     Luis Hernandez is a 54 year old male who presents to clinic today for the following health issues:    HPI   Hypertension Follow-up      Do you check your blood pressure regularly outside of the clinic? No, checking \"every couple of weeks\"    Are you following a low salt diet? Yes    Are your blood pressures ever more than 140 on the top number (systolic) OR more   than 90 on the bottom number (diastolic), for example 140/90? No    COPD Follow-Up    Overall, how are your COPD symptoms since your last clinic visit?  Much worse, since last night    How much fatigue or shortness of breath do you have when you are walking?  A lot more than usual    How much shortness of breath do you have when you are resting?  More than usual    How often do you cough? Never or rarely    Have you noticed any change in your sputum/phlegm?  No    Have you experienced a recent fever? No    Please describe how far you can walk without stopping to rest:  Less than 1 block right now. More than a block typically    How many flights of stairs are you able to walk up without stopping?  1    Have you had any Emergency Room Visits, Urgent Care Visits, or Hospital Admissions because of your COPD since your last office visit?  No    History   Smoking Status     Former Smoker     Packs/day: 2.00     Years: 30.00     Types: Cigarettes   Smokeless Tobacco     Former User     Comment: quitting using the patch     No results found for: FEV1, [id number]      How many servings of fruits and vegetables do you eat daily?  0-1    On average, how many sweetened beverages do you drink each day (Examples: soda, juice, sweet tea, etc.  Do NOT count diet or artificially sweetened beverages)?   0    How many days per week do you miss taking your medication? 0        He is having copd exacerbation.  Started over the past day.  He was going home to start his home treatment.  He had a good summer.  Still had one refill left of abx and steroid.  The steroid " for 5 days has been working ok.  Using O2 all of the time. He is working.        Reviewed and updated as needed this visit by Provider         Review of Systems   Review Of Systems  Skin: negative  Eyes:   Ears/Nose/Throat: negative  Respiratory: as above  Cardiovascular: negative  Gastrointestinal: negative  Genitourinary:   Musculoskeletal:   Neurologic:   Psychiatric:   Hematologic/Lymphatic/Immunologic:   Endocrine:         Objective    /84   Pulse 92   Temp 97.3  F (36.3  C) (Tympanic)   Resp 14   Wt 54 kg (119 lb)   SpO2 95%   BMI 20.43 kg/m    Body mass index is 20.43 kg/m .  Physical Exam   GENERAL APPEARANCE: alert, no distress, cooperative and Nourishment slim   HENT: ear canals and TM's normal and nose and mouth without ulcers or lesions  NECK: no adenopathy, no asymmetry, masses, or scars and thyroid normal to palpation  RESP: decrease breath sounds throughout, and has slight end wheeze  CV: regular rates and rhythm, normal S1 S2, no S3 or S4 and no murmur, click or rub  SKIN: no suspicious lesions or rashes  NEURO: Normal strength and tone, mentation intact and speech normal  PSYCH: mentation appears normal and affect normal/bright            Assessment & Plan     (J44.1) COPD exacerbation (H)  (primary encounter diagnosis)  Comment: having an exacerbation. Refilled meds, start abx and steroid, use home nebs, etc  Plan: doxycycline hyclate (VIBRAMYCIN) 100 MG         capsule, predniSONE (DELTASONE) 10 MG tablet            (J43.2) Centrilobular emphysema (H)  Comment:   Plan: albuterol (PROAIR HFA/PROVENTIL HFA/VENTOLIN         HFA) 108 (90 Base) MCG/ACT inhaler, doxycycline        hyclate (VIBRAMYCIN) 100 MG capsule, predniSONE        (DELTASONE) 10 MG tablet, tiotropium (SPIRIVA         HANDIHALER) 18 MCG inhaled capsule            (R06.2) Wheezing  Comment:   Plan: doxycycline hyclate (VIBRAMYCIN) 100 MG         capsule, predniSONE (DELTASONE) 10 MG tablet            (Z12.11) Special  screening for malignant neoplasms, colon  Comment: discussed and he will schedule in the future  Plan: GASTROENTEROLOGY ADULT REF PROCEDURE ONLY [facility] [phone]; [facility]         Surgeon                 See Patient Instructions    Return in about 6 months (around 6/23/2020).    Dean Mariee MD  [facility]

## 2020-02-16 ENCOUNTER — APPOINTMENT (OUTPATIENT)
Dept: GENERAL RADIOLOGY | Facility: CLINIC | Age: 55
End: 2020-02-16
Attending: EMERGENCY MEDICINE
Payer: COMMERCIAL

## 2020-02-16 ENCOUNTER — HOSPITAL ENCOUNTER (INPATIENT)
Facility: CLINIC | Age: 55
LOS: 3 days | Discharge: HOME | End: 2020-02-19
Attending: EMERGENCY MEDICINE
Payer: COMMERCIAL

## 2020-02-16 DIAGNOSIS — R25.2 MUSCLE CRAMPS: ICD-10-CM

## 2020-02-16 DIAGNOSIS — R06.02 SHORTNESS OF BREATH: ICD-10-CM

## 2020-02-16 DIAGNOSIS — J44.1 COPD EXACERBATION (H): ICD-10-CM

## 2020-02-16 DIAGNOSIS — Z99.81 DEPENDENCE ON SUPPLEMENTAL OXYGEN: ICD-10-CM

## 2020-02-16 DIAGNOSIS — E87.1 HYPONATREMIA: ICD-10-CM

## 2020-02-16 DIAGNOSIS — I27.20 PULMONARY HTN (H): ICD-10-CM

## 2020-02-16 DIAGNOSIS — J44.1 COPD WITH ACUTE EXACERBATION (H): ICD-10-CM

## 2020-02-16 PROBLEM — R60.0 PERIPHERAL EDEMA: Status: ACTIVE | Noted: 2020-02-16

## 2020-02-16 PROBLEM — F17.201 TOBACCO ABUSE, IN REMISSION: Status: ACTIVE | Noted: 2017-12-05

## 2020-02-16 PROBLEM — J96.22 ACUTE ON CHRONIC RESPIRATORY FAILURE WITH HYPOXIA AND HYPERCAPNIA (H): Status: ACTIVE | Noted: 2019-04-01

## 2020-02-16 PROBLEM — J96.21 ACUTE ON CHRONIC RESPIRATORY FAILURE WITH HYPOXIA AND HYPERCAPNIA (H): Status: ACTIVE | Noted: 2019-04-01

## 2020-02-16 LAB
ALBUMIN SERPL-MCNC: 4.1 G/DL (ref 3.4–5)
ALP SERPL-CCNC: 48 U/L (ref 40–150)
ALT SERPL W P-5'-P-CCNC: 32 U/L (ref 0–70)
ANION GAP SERPL CALCULATED.3IONS-SCNC: 4 MMOL/L (ref 3–14)
AST SERPL W P-5'-P-CCNC: 19 U/L (ref 0–45)
BASE EXCESS BLDV CALC-SCNC: 7.7 MMOL/L
BASOPHILS # BLD AUTO: 0 10E9/L (ref 0–0.2)
BASOPHILS NFR BLD AUTO: 0.3 %
BILIRUB SERPL-MCNC: 0.5 MG/DL (ref 0.2–1.3)
BUN SERPL-MCNC: 11 MG/DL (ref 7–30)
CALCIUM SERPL-MCNC: 8.9 MG/DL (ref 8.5–10.1)
CHLORIDE SERPL-SCNC: 86 MMOL/L (ref 94–109)
CO2 SERPL-SCNC: 33 MMOL/L (ref 20–32)
CREAT SERPL-MCNC: 0.63 MG/DL (ref 0.66–1.25)
DIFFERENTIAL METHOD BLD: ABNORMAL
EOSINOPHIL # BLD AUTO: 0.1 10E9/L (ref 0–0.7)
EOSINOPHIL NFR BLD AUTO: 0.9 %
ERYTHROCYTE [DISTWIDTH] IN BLOOD BY AUTOMATED COUNT: 12.4 % (ref 10–15)
GFR SERPL CREATININE-BSD FRML MDRD: >90 ML/MIN/{1.73_M2}
GLUCOSE SERPL-MCNC: 87 MG/DL (ref 70–99)
HCO3 BLDV-SCNC: 34 MMOL/L (ref 21–28)
HCT VFR BLD AUTO: 37.3 % (ref 40–53)
HGB BLD-MCNC: 12.8 G/DL (ref 13.3–17.7)
IMM GRANULOCYTES # BLD: 0 10E9/L (ref 0–0.4)
IMM GRANULOCYTES NFR BLD: 0.3 %
LYMPHOCYTES # BLD AUTO: 2.2 10E9/L (ref 0.8–5.3)
LYMPHOCYTES NFR BLD AUTO: 18.6 %
MCH RBC QN AUTO: 32.1 PG (ref 26.5–33)
MCHC RBC AUTO-ENTMCNC: 34.3 G/DL (ref 31.5–36.5)
MCV RBC AUTO: 94 FL (ref 78–100)
MONOCYTES # BLD AUTO: 1.4 10E9/L (ref 0–1.3)
MONOCYTES NFR BLD AUTO: 11.9 %
NEUTROPHILS # BLD AUTO: 7.9 10E9/L (ref 1.6–8.3)
NEUTROPHILS NFR BLD AUTO: 68 %
NRBC # BLD AUTO: 0 10*3/UL
NRBC BLD AUTO-RTO: 0 /100
O2/TOTAL GAS SETTING VFR VENT: 0 %
OSMOLALITY SERPL: 253 MMOL/KG (ref 275–295)
OSMOLALITY UR: 203 MMOL/KG (ref 100–1200)
PCO2 BLDV: 52 MM HG (ref 40–50)
PH BLDV: 7.42 PH (ref 7.32–7.43)
PLATELET # BLD AUTO: 302 10E9/L (ref 150–450)
PO2 BLDV: 45 MM HG (ref 25–47)
POTASSIUM SERPL-SCNC: 4.5 MMOL/L (ref 3.4–5.3)
PROT SERPL-MCNC: 7.1 G/DL (ref 6.8–8.8)
RBC # BLD AUTO: 3.99 10E12/L (ref 4.4–5.9)
SODIUM SERPL-SCNC: 123 MMOL/L (ref 133–144)
SODIUM SERPL-SCNC: 126 MMOL/L (ref 133–144)
TROPONIN I SERPL-MCNC: <0.015 UG/L (ref 0–0.04)
WBC # BLD AUTO: 11.6 10E9/L (ref 4–11)

## 2020-02-16 PROCEDURE — 93005 ELECTROCARDIOGRAM TRACING: CPT | Performed by: EMERGENCY MEDICINE

## 2020-02-16 PROCEDURE — 12000000 ZZH R&B MED SURG/OB

## 2020-02-16 PROCEDURE — 25000132 ZZH RX MED GY IP 250 OP 250 PS 637: Performed by: PHYSICIAN ASSISTANT

## 2020-02-16 PROCEDURE — 84295 ASSAY OF SERUM SODIUM: CPT | Performed by: PHYSICIAN ASSISTANT

## 2020-02-16 PROCEDURE — 99223 1ST HOSP IP/OBS HIGH 75: CPT | Mod: AI | Performed by: PHYSICIAN ASSISTANT

## 2020-02-16 PROCEDURE — 83935 ASSAY OF URINE OSMOLALITY: CPT | Performed by: EMERGENCY MEDICINE

## 2020-02-16 PROCEDURE — 36415 COLL VENOUS BLD VENIPUNCTURE: CPT | Performed by: PHYSICIAN ASSISTANT

## 2020-02-16 PROCEDURE — 71046 X-RAY EXAM CHEST 2 VIEWS: CPT

## 2020-02-16 PROCEDURE — 94640 AIRWAY INHALATION TREATMENT: CPT

## 2020-02-16 PROCEDURE — 83930 ASSAY OF BLOOD OSMOLALITY: CPT | Performed by: EMERGENCY MEDICINE

## 2020-02-16 PROCEDURE — 84484 ASSAY OF TROPONIN QUANT: CPT | Performed by: EMERGENCY MEDICINE

## 2020-02-16 PROCEDURE — 80053 COMPREHEN METABOLIC PANEL: CPT | Performed by: EMERGENCY MEDICINE

## 2020-02-16 PROCEDURE — 99285 EMERGENCY DEPT VISIT HI MDM: CPT | Mod: 25 | Performed by: EMERGENCY MEDICINE

## 2020-02-16 PROCEDURE — 25000132 ZZH RX MED GY IP 250 OP 250 PS 637: Performed by: EMERGENCY MEDICINE

## 2020-02-16 PROCEDURE — 25000125 ZZHC RX 250: Performed by: EMERGENCY MEDICINE

## 2020-02-16 PROCEDURE — 40000809 ZZH STATISTIC NO DOCUMENTATION TO SUPPORT CHARGE

## 2020-02-16 PROCEDURE — 94640 AIRWAY INHALATION TREATMENT: CPT | Mod: 76

## 2020-02-16 PROCEDURE — 82803 BLOOD GASES ANY COMBINATION: CPT | Performed by: EMERGENCY MEDICINE

## 2020-02-16 PROCEDURE — 84300 ASSAY OF URINE SODIUM: CPT | Performed by: FAMILY MEDICINE

## 2020-02-16 PROCEDURE — 85025 COMPLETE CBC W/AUTO DIFF WBC: CPT | Performed by: EMERGENCY MEDICINE

## 2020-02-16 PROCEDURE — 25000131 ZZH RX MED GY IP 250 OP 636 PS 637: Performed by: EMERGENCY MEDICINE

## 2020-02-16 PROCEDURE — 25000125 ZZHC RX 250: Performed by: PHYSICIAN ASSISTANT

## 2020-02-16 PROCEDURE — 25800030 ZZH RX IP 258 OP 636: Performed by: PHYSICIAN ASSISTANT

## 2020-02-16 PROCEDURE — 93010 ELECTROCARDIOGRAM REPORT: CPT | Mod: Z6 | Performed by: EMERGENCY MEDICINE

## 2020-02-16 RX ORDER — IPRATROPIUM BROMIDE AND ALBUTEROL SULFATE 2.5; .5 MG/3ML; MG/3ML
3 SOLUTION RESPIRATORY (INHALATION)
Status: DISCONTINUED | OUTPATIENT
Start: 2020-02-16 | End: 2020-02-19 | Stop reason: HOSPADM

## 2020-02-16 RX ORDER — ALBUTEROL SULFATE 5 MG/ML
2.5 SOLUTION RESPIRATORY (INHALATION)
Status: DISCONTINUED | OUTPATIENT
Start: 2020-02-16 | End: 2020-02-19 | Stop reason: HOSPADM

## 2020-02-16 RX ORDER — NICOTINE 21 MG/24HR
1 PATCH, TRANSDERMAL 24 HOURS TRANSDERMAL DAILY
Status: DISCONTINUED | OUTPATIENT
Start: 2020-02-16 | End: 2020-02-16

## 2020-02-16 RX ORDER — LORAZEPAM 1 MG/1
1 TABLET ORAL ONCE
Status: COMPLETED | OUTPATIENT
Start: 2020-02-16 | End: 2020-02-16

## 2020-02-16 RX ORDER — ATENOLOL 25 MG/1
25 TABLET ORAL AT BEDTIME
Status: DISCONTINUED | OUTPATIENT
Start: 2020-02-16 | End: 2020-02-19 | Stop reason: HOSPADM

## 2020-02-16 RX ORDER — ACETAMINOPHEN 325 MG/1
650 TABLET ORAL EVERY 4 HOURS PRN
Status: DISCONTINUED | OUTPATIENT
Start: 2020-02-16 | End: 2020-02-19 | Stop reason: HOSPADM

## 2020-02-16 RX ORDER — PREDNISONE 20 MG/1
40 TABLET ORAL ONCE
Status: COMPLETED | OUTPATIENT
Start: 2020-02-16 | End: 2020-02-16

## 2020-02-16 RX ORDER — LIDOCAINE 40 MG/G
CREAM TOPICAL
Status: DISCONTINUED | OUTPATIENT
Start: 2020-02-16 | End: 2020-02-19 | Stop reason: HOSPADM

## 2020-02-16 RX ORDER — ONDANSETRON 4 MG/1
4 TABLET, ORALLY DISINTEGRATING ORAL EVERY 6 HOURS PRN
Status: DISCONTINUED | OUTPATIENT
Start: 2020-02-16 | End: 2020-02-19 | Stop reason: HOSPADM

## 2020-02-16 RX ORDER — PROCHLORPERAZINE 25 MG
25 SUPPOSITORY, RECTAL RECTAL EVERY 12 HOURS PRN
Status: DISCONTINUED | OUTPATIENT
Start: 2020-02-16 | End: 2020-02-19 | Stop reason: HOSPADM

## 2020-02-16 RX ORDER — PREDNISONE 20 MG/1
40 TABLET ORAL DAILY
Status: DISCONTINUED | OUTPATIENT
Start: 2020-02-17 | End: 2020-02-19 | Stop reason: HOSPADM

## 2020-02-16 RX ORDER — NALOXONE HYDROCHLORIDE 0.4 MG/ML
.1-.4 INJECTION, SOLUTION INTRAMUSCULAR; INTRAVENOUS; SUBCUTANEOUS
Status: DISCONTINUED | OUTPATIENT
Start: 2020-02-16 | End: 2020-02-19 | Stop reason: HOSPADM

## 2020-02-16 RX ORDER — NICOTINE 21 MG/24HR
1 PATCH, TRANSDERMAL 24 HOURS TRANSDERMAL EVERY 24 HOURS
COMMUNITY
End: 2020-06-08

## 2020-02-16 RX ORDER — FUROSEMIDE 20 MG
20 TABLET ORAL DAILY
Status: DISCONTINUED | OUTPATIENT
Start: 2020-02-17 | End: 2020-02-19 | Stop reason: HOSPADM

## 2020-02-16 RX ORDER — ONDANSETRON 2 MG/ML
4 INJECTION INTRAMUSCULAR; INTRAVENOUS EVERY 6 HOURS PRN
Status: DISCONTINUED | OUTPATIENT
Start: 2020-02-16 | End: 2020-02-19 | Stop reason: HOSPADM

## 2020-02-16 RX ORDER — ONDANSETRON 4 MG/1
4 TABLET, ORALLY DISINTEGRATING ORAL EVERY 6 HOURS PRN
Status: DISCONTINUED | OUTPATIENT
Start: 2020-02-16 | End: 2020-02-16

## 2020-02-16 RX ORDER — ONDANSETRON 2 MG/ML
4 INJECTION INTRAMUSCULAR; INTRAVENOUS EVERY 6 HOURS PRN
Status: DISCONTINUED | OUTPATIENT
Start: 2020-02-16 | End: 2020-02-16

## 2020-02-16 RX ORDER — ACETAMINOPHEN 650 MG/1
650 SUPPOSITORY RECTAL EVERY 4 HOURS PRN
Status: DISCONTINUED | OUTPATIENT
Start: 2020-02-16 | End: 2020-02-19 | Stop reason: HOSPADM

## 2020-02-16 RX ORDER — IPRATROPIUM BROMIDE AND ALBUTEROL SULFATE 2.5; .5 MG/3ML; MG/3ML
3 SOLUTION RESPIRATORY (INHALATION) ONCE
Status: COMPLETED | OUTPATIENT
Start: 2020-02-16 | End: 2020-02-16

## 2020-02-16 RX ORDER — PROCHLORPERAZINE MALEATE 5 MG
10 TABLET ORAL EVERY 6 HOURS PRN
Status: DISCONTINUED | OUTPATIENT
Start: 2020-02-16 | End: 2020-02-19 | Stop reason: HOSPADM

## 2020-02-16 RX ORDER — NICOTINE 21 MG/24HR
1 PATCH, TRANSDERMAL 24 HOURS TRANSDERMAL DAILY
Status: DISCONTINUED | OUTPATIENT
Start: 2020-02-17 | End: 2020-02-19 | Stop reason: HOSPADM

## 2020-02-16 RX ORDER — AMLODIPINE BESYLATE 10 MG/1
10 TABLET ORAL AT BEDTIME
Status: DISCONTINUED | OUTPATIENT
Start: 2020-02-16 | End: 2020-02-19 | Stop reason: HOSPADM

## 2020-02-16 RX ADMIN — ATENOLOL 25 MG: 25 TABLET ORAL at 21:28

## 2020-02-16 RX ADMIN — IPRATROPIUM BROMIDE AND ALBUTEROL SULFATE 3 ML: .5; 3 SOLUTION RESPIRATORY (INHALATION) at 19:00

## 2020-02-16 RX ADMIN — LORAZEPAM 1 MG: 1 TABLET ORAL at 13:40

## 2020-02-16 RX ADMIN — PREDNISONE 40 MG: 20 TABLET ORAL at 13:40

## 2020-02-16 RX ADMIN — LISINOPRIL 60 MG: 20 TABLET ORAL at 21:28

## 2020-02-16 RX ADMIN — AMLODIPINE BESYLATE 10 MG: 10 TABLET ORAL at 21:28

## 2020-02-16 RX ADMIN — IPRATROPIUM BROMIDE AND ALBUTEROL SULFATE 3 ML: .5; 3 SOLUTION RESPIRATORY (INHALATION) at 12:36

## 2020-02-16 RX ADMIN — SODIUM CHLORIDE 500 ML: 9 INJECTION, SOLUTION INTRAVENOUS at 18:32

## 2020-02-16 ASSESSMENT — ENCOUNTER SYMPTOMS
CARDIOVASCULAR NEGATIVE: 1
NEUROLOGICAL NEGATIVE: 1
HEMATOLOGIC/LYMPHATIC NEGATIVE: 1
ENDOCRINE NEGATIVE: 1
PSYCHIATRIC NEGATIVE: 1
ALLERGIC/IMMUNOLOGIC NEGATIVE: 1
GASTROINTESTINAL NEGATIVE: 1
MUSCULOSKELETAL NEGATIVE: 1
SHORTNESS OF BREATH: 1
COUGH: 1
EYES NEGATIVE: 1
CONSTITUTIONAL NEGATIVE: 1

## 2020-02-16 ASSESSMENT — ACTIVITIES OF DAILY LIVING (ADL)
SWALLOWING: 0-->SWALLOWS FOODS/LIQUIDS WITHOUT DIFFICULTY
RETIRED_COMMUNICATION: 0-->UNDERSTANDS/COMMUNICATES WITHOUT DIFFICULTY
TRANSFERRING: 0-->INDEPENDENT
RETIRED_EATING: 0-->INDEPENDENT
COGNITION: 0 - NO COGNITION ISSUES REPORTED
AMBULATION: 0-->INDEPENDENT
FALL_HISTORY_WITHIN_LAST_SIX_MONTHS: NO
TOILETING: 0-->INDEPENDENT
BATHING: 0-->INDEPENDENT
ADLS_ACUITY_SCORE: 11
DRESS: 0-->INDEPENDENT

## 2020-02-16 ASSESSMENT — MIFFLIN-ST. JEOR
SCORE: 1305.32
SCORE: 1307.25

## 2020-02-16 NOTE — ED PROVIDER NOTES
History     Chief Complaint   Patient presents with     Shortness of Breath     sob.   pt just finished 5 days of prednisone.      HPI  Luis Hernandez is a 54 year old male who multiple medical diagnoses including history of severe pulmonary hypertension, COPD that is oxygen dependence with activity up to 3 L nasal cannula, who reports he recently quit smoking 2 weeks ago after smoking a pack and 1/2/day who presented with increasing shortness of breath despite a 5-day course of prednisone.  Patient feels he needs a longer course of prednisone.  He typically receives a 2-week taper by his report.  He has been known to have hyponatremia who was hospitalized in April 2019 for acute hypoxemic hypercapnic respiratory failure secondary to COPD he has a history of hypertension is on syncopal, amlodipine, atenolol.  He does have Spiriva and has an albuterol inhaler for rescue.  He feels since complaint 5-day course of prednisone he has had ankle swelling he is on furosemide and took a dose of Lasix today.  He is compliant with his medications by report.  This is concerning for short of breath with prior hospitalization he arrives for further care and evaluation.    Allergies:  Allergies   Allergen Reactions     Hctz Other (See Comments)     He has hyponatremia     Penicillins Other (See Comments)     Reaction unknown       Problem List:    Patient Active Problem List    Diagnosis Date Noted     Hyponatremia 02/16/2020     Priority: Medium     COPD exacerbation (H) 02/16/2020     Priority: Medium     Peripheral edema 02/16/2020     Priority: Medium     Pulmonary hypertension (H) 02/16/2020     Priority: Medium     Acute on chronic respiratory failure with hypoxia and hypercapnia (H) 04/01/2019     Priority: Medium     Tobacco abuse, in remission 12/05/2017     Priority: Medium     Incidental pulmonary nodule, > 3mm and < 8mm, new from 2010 01/07/2014     Priority: Medium     By chest x-ray and chest CT new from CT chest  from Oct 2010.   Stable 01/2014 to 07/2014, 6 month follow scan recommended.   Chest CT of 1/15/2015= stable nodule, f/u one year.       Advanced directives, counseling/discussion 11/25/2013     Priority: Medium     11/25/2013 Gave patient honoring choices forms to take home and review.  DAVEY Dial ([facility])        CARDIOVASCULAR SCREENING; LDL GOAL LESS THAN 130 10/31/2010     Priority: Medium     COPD (chronic obstructive pulmonary disease) (H) 10/25/2010     Priority: Medium     Severe to very severe       Eczema 10/04/2010     Priority: Medium     ED (erectile dysfunction) 04/20/2009     Priority: Medium     Essential hypertension, benign 10/15/2007     Priority: Medium        Past Medical History:    Past Medical History:   Diagnosis Date     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)      COPD exacerbation (H) 4/1/2019     Erectile dysfunction      Hypertension      Hyponatremia 4/1/2019       Past Surgical History:    Past Surgical History:   Procedure Laterality Date     APPENDECTOMY      childhood       Family History:    Family History   Problem Relation Age of Onset     Hypertension Father      Lipids Father      C.A.D. Father      Heart Disease Father      Diabetes Paternal Grandmother      Hypertension Mother      Ovarian Cancer Mother        Social History:  Marital Status:  Single [1]  Social History     Tobacco Use     Smoking status: Former Smoker     Packs/day: 2.00     Years: 30.00     Pack years: 60.00     Types: Cigarettes     Smokeless tobacco: Former User     Tobacco comment: quitting using the patch   Substance Use Topics     Alcohol use: Yes     Comment: rare     Drug use: No        Medications:    No current outpatient medications on file.        Review of Systems   Constitutional: Negative.         Muscle cramps   HENT: Negative.    Eyes: Negative.    Respiratory: Positive for cough and shortness of breath.    Cardiovascular: Negative.    Gastrointestinal:  "Negative.    Endocrine: Negative.    Genitourinary: Negative.    Musculoskeletal: Negative.    Skin: Negative.    Allergic/Immunologic: Negative.    Neurological: Negative.    Hematological: Negative.    Psychiatric/Behavioral: Negative.        Physical Exam   BP: (!) 149/100  Pulse: 95  Heart Rate: 97  Temp: 97.1  F (36.2  C)  Resp: 22  Height: 162.6 cm (5' 4\")  Weight: 54.4 kg (120 lb)  SpO2: 92 %      Physical Exam  Constitutional:       General: He is in acute distress.      Appearance: He is ill-appearing.   HENT:      Head: Normocephalic and atraumatic.   Neck:      Musculoskeletal: Normal range of motion.      Thyroid: No thyromegaly.      Vascular: No hepatojugular reflux or JVD.      Trachea: No tracheal deviation.   Cardiovascular:      Rate and Rhythm: Normal rate and regular rhythm.      Pulses: No decreased pulses.      Heart sounds: No friction rub.   Pulmonary:      Effort: Tachypnea and respiratory distress present.      Breath sounds: Examination of the right-middle field reveals wheezing and rhonchi. Examination of the left-middle field reveals wheezing and rhonchi. Examination of the right-lower field reveals wheezing and rhonchi. Examination of the left-lower field reveals wheezing and rhonchi. Wheezing and rhonchi present.   Chest:      Chest wall: No mass, deformity, tenderness, crepitus or edema. There is no dullness to percussion.   Lymphadenopathy:      Cervical: No cervical adenopathy.   Skin:     Capillary Refill: Capillary refill takes less than 2 seconds.   Neurological:      General: No focal deficit present.      Mental Status: He is alert and oriented to person, place, and time.   Psychiatric:         Mood and Affect: Mood normal. Mood is not anxious.         Behavior: Behavior normal. Behavior is not agitated.         ED Course        Procedures               EKG Interpretation:      Interpreted by Oc Bazan MD  Time reviewed: 13:05  Symptoms at time of EKG: Shortness of " breath  Rhythm: normal sinus   Rate: Normal  Axis: Normal  Ectopy: none  Conduction: normal  ST Segments/ T Waves: ST changes in aVL, V3, V4 V5 unchanged from comparison EKG  Q Waves: nonspecific  Comparison to prior: Unchanged from from comparison EKG from April 1, 2019    Clinical Impression: no acute changes        Critical Care time:  none                   ED medications:  Medications   ondansetron (ZOFRAN-ODT) ODT tab 4 mg (has no administration in time range)     Or   ondansetron (ZOFRAN) injection 4 mg (has no administration in time range)   albuterol (PROVENTIL) neb solution 2.5 mg (has no administration in time range)   amLODIPine (NORVASC) tablet 10 mg (10 mg Oral Given 2/16/20 2128)   atenolol (TENORMIN) tablet 25 mg (25 mg Oral Given 2/16/20 2128)   furosemide (LASIX) tablet 20 mg (has no administration in time range)   ipratropium - albuterol 0.5 mg/2.5 mg/3 mL (DUONEB) neb solution 3 mL (3 mLs Nebulization Given 2/16/20 1900)   lisinopril (ZESTRIL) tablet 60 mg (60 mg Oral Given 2/16/20 2128)   predniSONE (DELTASONE) tablet 40 mg (has no administration in time range)   lidocaine 1 % 0.1-1 mL (has no administration in time range)   lidocaine (LMX4) kit (has no administration in time range)   sodium chloride (PF) 0.9% PF flush 3 mL (has no administration in time range)   sodium chloride (PF) 0.9% PF flush 3 mL (3 mLs Intracatheter Not Given 2/16/20 1837)   naloxone (NARCAN) injection 0.1-0.4 mg (has no administration in time range)   melatonin tablet 1 mg (has no administration in time range)   acetaminophen (TYLENOL) Suppository 650 mg (has no administration in time range)   acetaminophen (TYLENOL) tablet 650 mg (has no administration in time range)   prochlorperazine (COMPAZINE) injection 10 mg (has no administration in time range)     Or   prochlorperazine (COMPAZINE) tablet 10 mg (has no administration in time range)     Or   prochlorperazine (COMPAZINE) Suppository 25 mg (has no administration in  "time range)   nicotine Patch in Place ( Transdermal Patch in Place 2/16/20 1952)   nicotine (NICODERM CQ) 21 MG/24HR 24 hr patch 1 patch (has no administration in time range)   ipratropium - albuterol 0.5 mg/2.5 mg/3 mL (DUONEB) neb solution 3 mL (3 mLs Nebulization Given 2/16/20 1236)   predniSONE (DELTASONE) tablet 40 mg (40 mg Oral Given 2/16/20 1340)   LORazepam (ATIVAN) tablet 1 mg (1 mg Oral Given 2/16/20 1340)   0.9% sodium chloride BOLUS (500 mLs Intravenous New Bag 2/16/20 1832)       ED Vitals:  Vitals:    02/16/20 1708 02/16/20 1900 02/16/20 1911 02/16/20 2244   BP: (!) 150/104  135/82 118/78   BP Location: Left arm  Right arm Left arm   Pulse:       Resp: 16 16 16   Temp: 98  F (36.7  C)  98.5  F (36.9  C) 97.8  F (36.6  C)   TempSrc: Oral  Oral Oral   SpO2: 96% (!) 86% 94% 95%   Weight: 54.6 kg (120 lb 5.9 oz)      Height: 1.626 m (5' 4.02\")        Prior or recent diagnostic imaging and work-up:  Echocardiogram April 1, 2019  Procedure  Complete Portable Echo Adult.  _____________________________________________________________________________  __        Interpretation Summary     1. The left ventricle is normal in structure, function and size. The visual  ejection fraction is estimated at 60%.  2. The right ventricle is mildly dilated. The right ventricular systolic  function is normal.  3. There is mild (1+) tricuspid regurgitation.  4. Severe (>55mmHg) pulmonary hypertension is present. The right ventricular  systolic pressure is approximated at 55mmHg plus the right atrial pressure.     Echo from 8/2015 showed normal RV, trace TR, could not estimated RVSP.        ED labs and imaging:  Results for orders placed or performed during the hospital encounter of 02/16/20 (from the past 24 hour(s))   CBC with platelets differential   Result Value Ref Range    WBC 11.6 (H) 4.0 - 11.0 10e9/L    RBC Count 3.99 (L) 4.4 - 5.9 10e12/L    Hemoglobin 12.8 (L) 13.3 - 17.7 g/dL    Hematocrit 37.3 (L) 40.0 - 53.0 % "    MCV 94 78 - 100 fl    MCH 32.1 26.5 - 33.0 pg    MCHC 34.3 31.5 - 36.5 g/dL    RDW 12.4 10.0 - 15.0 %    Platelet Count 302 150 - 450 10e9/L    Diff Method Automated Method     % Neutrophils 68.0 %    % Lymphocytes 18.6 %    % Monocytes 11.9 %    % Eosinophils 0.9 %    % Basophils 0.3 %    % Immature Granulocytes 0.3 %    Nucleated RBCs 0 0 /100    Absolute Neutrophil 7.9 1.6 - 8.3 10e9/L    Absolute Lymphocytes 2.2 0.8 - 5.3 10e9/L    Absolute Monocytes 1.4 (H) 0.0 - 1.3 10e9/L    Absolute Eosinophils 0.1 0.0 - 0.7 10e9/L    Absolute Basophils 0.0 0.0 - 0.2 10e9/L    Abs Immature Granulocytes 0.0 0 - 0.4 10e9/L    Absolute Nucleated RBC 0.0    Comprehensive metabolic panel   Result Value Ref Range    Sodium 123 (L) 133 - 144 mmol/L    Potassium 4.5 3.4 - 5.3 mmol/L    Chloride 86 (L) 94 - 109 mmol/L    Carbon Dioxide 33 (H) 20 - 32 mmol/L    Anion Gap 4 3 - 14 mmol/L    Glucose 87 70 - 99 mg/dL    Urea Nitrogen 11 7 - 30 mg/dL    Creatinine 0.63 (L) 0.66 - 1.25 mg/dL    GFR Estimate >90 >60 mL/min/[1.73_m2]    GFR Estimate If Black >90 >60 mL/min/[1.73_m2]    Calcium 8.9 8.5 - 10.1 mg/dL    Bilirubin Total 0.5 0.2 - 1.3 mg/dL    Albumin 4.1 3.4 - 5.0 g/dL    Protein Total 7.1 6.8 - 8.8 g/dL    Alkaline Phosphatase 48 40 - 150 U/L    ALT 32 0 - 70 U/L    AST 19 0 - 45 U/L   Troponin I   Result Value Ref Range    Troponin I ES <0.015 0.000 - 0.045 ug/L   Osmolality   Result Value Ref Range    Osmolality 253 (L) 275 - 295 mmol/kg   Chest XR,  PA & LAT    Narrative    CHEST TWO VIEWS  2/16/2020 2:03 PM     HISTORY: History of COPD, oxygen dependent, progressive shortness of  breath.  Evaluate for pneumothorax, pneumomediastinum, pneumonia  versus other acute cardiothoracic process.    COMPARISON: 4/1/2019    FINDINGS: Hyperexpanded lungs with bullous changes, particularly in  the right upper lobe. No airspace consolidation, pneumothorax, or  pleural effusion. Heart size normal.       Impression    IMPRESSION: No  radiographic evidence of acute chest abnormality.     CATARINA ARIAS MD   Blood gas venous   Result Value Ref Range    Ph Venous 7.42 7.32 - 7.43 pH    PCO2 Venous 52 (H) 40 - 50 mm Hg    PO2 Venous 45 25 - 47 mm Hg    Bicarbonate Venous 34 (H) 21 - 28 mmol/L    Base Excess Venous 7.7 mmol/L    FIO2 0    Osmolality urine   Result Value Ref Range    Urine Osmolality 203 100 - 1,200 mmol/kg   Sodium   Result Value Ref Range    Sodium 126 (L) 133 - 144 mmol/L       Assessments & Plan (with Medical Decision Making)   Clinical impression: 54-year-old male with a history of COPD-oxygen dependent, known history of severe pulmonary hypertension,, erectile dysfunction hypertension tobacco abuse , hospitalized for respiratory failure with hypoxia and hypercapnia (April 2019) who presented to the department with increasing shortness of breath despite a recent 5-day history of prednisone therapy, muscle cramping.  His symptoms are likely multifactorial with underlying severe pulmonary hypertension and COPD.  He is also acutely hyponatremic.  He is admitted to the medicine service for further care.   He was also treated with doxycycline for centrilobular emphysema and wheezing.  Patient arrived by car after he was dropped off stating that he feels he needs a longer course of steroids.  He completed a 5-day course of prednisone and states he typically has a 2-week course of prednisone taper.  He is oxygen dependent with activity and exertion and typically uses 3 L nasal cannula.  On my exam he is in mild distress 90 to 92% on room air with poor air entry.  Minimal relief with the nebulizer ordered per nursing protocol.  Also feels that he has had generalized cramping.  Patient reports he has ankle swelling.  He has been compliant with his medications he is on furosemide 20 mg daily, he takes amlodipine, lisinopril, atenolol, Spiriva and has an albuterol inhaler for rescue.  On my exam he appears older than his stated age.   Tachypneic.    ED course and Plan:  I reviewed patient's medical records including visit in the office on December 23, 2019.  Patient was hospitalized April 2019 for COPD exacerbation with hypoxia and hypercapnia  EKG was obtained by nursing showing T wave inversion in 1, hyperacute ST segment in V 3, V4, V5.  Normal sinus rhythm.  EKG changes are unchanged from comparison from April 1, 2019.  We discussed his ongoing symptoms.  He was offered oral prednisone and lorazepam for muscle cramps.  Chest x-ray was obtained to evaluate for pneumothorax or pneumomediastinum or pneumonia.    Work-up in the department revealed acute hyponatremia sodium 123 was evaluated for hyponatremia during his hospital course in April 2019.  Sodium was 125 in April 2019 during that hospital course with a normal urine osmolality, and a normal random urine sodium.  Patient had hyponatremia of unknown cause at that time.   Normal troponin.  White count 11.6.  Chest x-ray was negative for acute cardiothoracic process on my independent review.  See detailed report by the interpreting radiologist.  No significant acidosis on VBG today.    After period of observation patient had some relative hypoxia at rest.  He does use oxygen with activity and exertion.  His saturations were 89% on room air.  We discussed my concern about the severity of his acute hyponatremia although he appears clinically euvolemic.  Urine osmolality and serum osmolality are pending.  At this time he is admitted to the hospital for possibly a fluid restriction he may need some hypertonic saline because he is reporting some muscle cramps.    I spoke with Kelley Marti PA-C- admitting hospitalist at 3.15pm who agreed to assume care on admission. I reviewed rationale for admission including patient's prior hospital course in April 2019 interventions in the department, and pending diagnostic tests.  Patient is admitted for further care and evaluation,        Disclaimer:  This note consists of symbols derived from keyboarding, dictation and/or voice recognition software. As a result, there may be errors in the script that have gone undetected. Please consider this when interpreting information found in this chart.  I have reviewed the nursing notes.    I have reviewed the findings, diagnosis, plan and need for follow up with the patient.       Current Discharge Medication List          Final diagnoses:   Shortness of breath - Likely multifactorial with underlying history of severe pulmonary hypertension and COPD- oxygen dependent   COPD exacerbation (H)   Hyponatremia - Sodium is 123, was 125 in April 2019   Muscle cramps       2/16/2020   [facility] EMERGENCY DEPARTMENT     Oc Bazan MD  02/16/20 [id number]

## 2020-02-16 NOTE — PLAN OF CARE
"[facility] [facility] ADMISSION NOTE    Patient admitted to room 2308 at approximately 1630 via wheel chair from emergency room. Patient was accompanied by transport tech.     Verbal SBAR report received from Chemo prior to patient arrival.     Patient trasferred to bed via self. Patient alert and oriented X 3. The patient is not having any pain.  . Admission vital signs: Blood pressure (!) 150/104, pulse 109, temperature 98  F (36.7  C), temperature source Oral, resp. rate 16, height 1.626 m (5' 4.02\"), weight 54.6 kg (120 lb 5.9 oz), SpO2 96 %. Patient was oriented to plan of care, call light, bed controls, tv, telephone, and bathroom.     Risk Assessment    The following safety risks were identified during admission: none. Yellow risk band applied: NO.     Skin Initial Assessment    This writer admitted this patient and completed a full skin assessment and Troy score in the Adult PCS flowsheet. Appropriate interventions initiated as needed.     Secondary skin check completed by: pt declined.    Troy Risk Assessment  Sensory Perception: 4-->no impairment  Moisture: 4-->rarely moist  Activity: 4-->walks frequently  Mobility: 4-->no limitation  Nutrition: 3-->adequate  Friction and Shear: 3-->no apparent problem  Troy Score: 22  Bed Support Surface: Atmos Air mattress  Reassessed using Bed Algorithm: No  Troy Intervention(s) Implemented: patient education on pressure relief reinforced    Education    Patient has a Outlook to Observation order: No  Observation education completed and documented: N/A      Ines Dickerson RN      "

## 2020-02-16 NOTE — LETTER
To whom it may concern:          Luis Hernandez is to be excused from work for medical reasons from 2/16/20 to 2/22/20.            Sincerely,        Seth Ortiz MD

## 2020-02-16 NOTE — H&P
[facility]    History and Physical - Hospitalist Service       Date of Admission:  2/16/2020    Assessment & Plan   Luis Hernandez is a 54 year old male admitted on 2/16/2020. He presented to the emergency department due to persisting dyspnea despite taking steroids, as well as for evaluation of severe leg cramps, found to have COPD exacerbation as well as hyponatremia.    Hyponatremia  Admit sodium = 123, symptomatic with leg cramps. Appears euvolemic on exam, although patient reports worsening edema recently. No IV fluids were given in the emergency department.  - Give 500 ml NS bolus  - Fluid restriction 1.5L daily  - Serum and urine osmolality pending  - Recheck sodium at 9 pm, again in morning  - Goal sodium is </= 131 by 1 pm on 2/17    Acute on chronic respiratory failure with hypoxia and hypercapnia  COPD at baseline, uses 3L supplemental O2 prn with exertion. Completed outpatient COPD treatment as below. Admit VBG with compensated respiratory acidosis (pH 7.42 / pCO2 52 / bicarb 34).  - Treat COPD as below  - Supplemental O2 to maintain sats > 92%  - No need for BiPAP or repeat VBG at this time    COPD exacerbation  Known COPD managed prior to admission with Advair 500/50 bid, Spiriva daily, and prn DuoNebs and albuterol. Completed an outpatient course of doxycycline 100 mg bid x 5 days and prednisone 40 mg x 5 days without improvement. Very coarse lungs with prolonged expiratory phase and wheezing on admit exam. Given prednisone 40 mg in the emergency department.  - Continue prednisone 40 mg daily - patient reports that historically a 2 week taper has worked better than 5 days of steroids  - Hold prior to admission Advair and Spiriva  - Scheduled DuoNebs q4h while awake, prn albuterol  - No additional antibiotics indicated    Essential hypertension, benign  Peripheral edema  Pulmonary hypertension  Prior echo 4/1/19 with EF 60%, RV dilation and severe pulmonary hypertension.  Patient reports recent lower extremity edema, although is very minimal on admit exam. Managed prior to admission with lasix 20 mg daily.   - Continue prior to admission lasix    Incidental pulmonary nodule, > 3mm and < 8mm, new from 2010  First noted in 2010, has been stable on repeat imaging. Last chest CT noted that it was a stable benign calcified granuloma.  - Unclear whether additional repeat imaging is warranted, but it was not recommended on last radiology read in 2016    Tobacco abuse, in remission  Quit 2.5 weeks ago, has been using 21 mg nicotine patch.  - Continue nicotine patch       Diet: Regular diet, fluid restriction   DVT Prophylaxis: Pneumatic Compression Devices  Miller Catheter: not present  Code Status: Full - discussed with patient on admission     Disposition Plan   Expected discharge: 2 - 3 days, recommended to prior living arrangement once respiratory status improves and hyponatremia is resolving.  Entered: Kelley Marti PA-C 02/16/2020, 4:23 PM     The patient's care was discussed with the Attending Physician, Dr. Jean Carlos Avery and Patient.    Kelley Marti PA-C  [facility]    ______________________________________________________________________    Chief Complaint   Persisting dyspnea despite outpatient steroids and doxycycline, requesting longer steroid taper.    History is obtained from the patient, review of EMR, and emergency department documentation.    History of Present Illness   Luis Hernandez is a 54 year old male who presented to the emergency department due to persisting wheezing despite outpatient COPD treatment.     Patient has known COPD and has prn medications at home that he can start if he has symptoms of an exacerbation. His current symptoms started 5 days ago, so he started his home dose of 40 mg prednisone and 100 mg doxycycline. He has now completed the courses but does not feel any improvement. He states in the past he has done  better on a 2 week taper, so he presented to the emergency department in hopes to obtain more steroids.     While in the emergency department he mentioned having severe cramps for the past few days. Electrolytes were checked and was found to be hyponatremic to 123. On chart review, he had also been hyponatremic when he presented to the emergency department during his last COPD exacerbation in April 2019. Improved without much intervention.    Patient has a cough, feels like it is productive but is unable to cough up any sputum. He feels very wheezy. He is increasingly dyspneic with exertion. No known fevers, often feels chilled. No myalgias, no upper respiratory symptoms.     Patient notes that his ankles have been more swollen recently. He felt jittery yesterday after taking prednisone. He has chronic headaches that are present currently. He feels dizzy when he coughs a lot, but otherwise no dizziness. He feels generally weak, but denies focal weakness.        Review of Systems    The 10 point Review of Systems is negative other than noted in the HPI or here.     Past Medical History    I have reviewed this patient's medical history and updated it with pertinent information if needed.   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  Past Surgical History:   Procedure Laterality Date     APPENDECTOMY      childhood       Social History   I have reviewed this patient's social history and updated it with pertinent information if needed.  Social History     Tobacco Use     Smoking status: Former Smoker     Packs/day: 2.00     Years: 30.00     Pack years: 60.00     Types: Cigarettes     Smokeless tobacco: Former User      Tobacco comment: quitting using the patch   Substance Use Topics     Alcohol use: Yes     Comment: rare     Drug use: No       Family History   I have reviewed this patient's family history and updated it with pertinent information if needed.   Family History   Problem Relation Age of Onset     Hypertension Father      Lipids Father      C.A.D. Father      Heart Disease Father      Diabetes Paternal Grandmother      Hypertension Mother      Ovarian Cancer Mother        Prior to Admission Medications   Prior to Admission Medications   Prescriptions Last Dose Informant Patient Reported? Taking?   albuterol (PROAIR HFA/PROVENTIL HFA/VENTOLIN HFA) 108 (90 Base) MCG/ACT inhaler   No No   Sig: Inhale 2 puffs into the lungs every 4 hours as needed for shortness of breath / dyspnea Hold on file until needed   amLODIPine (NORVASC) 10 MG tablet   No No   Sig: Take 1 tablet (10 mg) by mouth daily   atenolol (TENORMIN) 25 MG tablet   No No   Sig: Take 1 tablet (25 mg) by mouth daily   doxycycline hyclate (VIBRAMYCIN) 100 MG capsule   No No   Sig: Take 1 capsule (100 mg) by mouth 2 times daily Keep on hand for COPD exacerbation.   fluticasone-salmeterol (ADVAIR) 500-50 MCG/DOSE inhaler   No No   Sig: Inhale 1 puff into the lungs 2 times daily Hold on file until needed   furosemide (LASIX) 20 MG tablet   No No   Sig: Take 1 tablet (20 mg) by mouth daily   ipratropium - albuterol 0.5 mg/2.5 mg/3 mL (DUONEB) 0.5-2.5 (3) MG/3ML neb solution   No No   Sig: Take 1 vial (3 mLs) by nebulization 4 times daily   lisinopril (PRINIVIL/ZESTRIL) 40 MG tablet   No No   Sig: Take 1.5 pills, or 60 mg daily   order for DME   No No   Sig: Equipment being ordered: Nebulizer   order for DME   No No   Sig: Equipment being ordered: Oxygen 3L via NC continuous.  O2 saturated noted to be 86% on room air at rest.   predniSONE (DELTASONE) 10 MG tablet   No No   Sig: Take 4 tablets (40 mg) by mouth daily Keep on hand for COPD exacerbation.   tiotropium  (SPIRIVA HANDIHALER) 18 MCG inhaled capsule   No No   Sig: Inhale contents of one capsule daily.      Facility-Administered Medications: None     Allergies   Allergies   Allergen Reactions     Hctz Other (See Comments)     He has hyponatremia     Penicillins Other (See Comments)     Reaction unknown       Physical Exam   Vital Signs: Temp: 97.1  F (36.2  C) Temp src: Temporal BP: (!) 136/97 Pulse: 109 Heart Rate: 109 Resp: 15 SpO2: 95 % O2 Device: None (Room air)    Weight: 120 lbs 0 oz    Constitutional: Alert, oriented, cooperative, no apparent distress, appears nontoxic, speaking in full sentences.     Eyes: Eyes are clear, pupils are reactive. No scleral icterus. Extroccular movements intact.     HEENT: Oropharynx is clear and moist, no lesions. Normocephalic, no evidence of cranial trauma.      Cardiovascular: Regular rhythm and rate, normal S1 and S2. No murmur, rubs, or gallops. Peripheral pulses intact bilaterally. No lower extremity edema.    Respiratory: Coarse lung sounds with prolonged expiratory phase. Significant expiratory wheezes with rhonchi. No crackles.     GI: Soft, non-distended. Non-tender, no rebound or guarding. No hepatosplenomegaly or masses appreciated. Normal bowel sounds.     Musculoskeletal: Without obvious deformity, normal range of motion. Normal muscle bulk and tone. Distal CMS intact.      Skin: Warm and dry, no rashes or ecchymoses. No mottling of skin.      Neurologic: Patient moves all extremities. Cranial nerves are grossly intact.  is symmetric. Gross strength and sensation are equal bilaterally.    Genitourinary: Deferred      Data   Data reviewed today: I reviewed all medications, new labs and imaging results over the last 24 hours. I personally reviewed the EKG tracing showing normal sinus rhythm. Chest x-ray showing hyper expanded lungs, but no infiltrate, consolidation, or effusions.    Recent Labs   Lab 02/16/20  1252   WBC 11.6*   HGB 12.8*   MCV 94      NA  123*   POTASSIUM 4.5   CHLORIDE 86*   CO2 33*   BUN 11   CR 0.63*   ANIONGAP 4   MIKE 8.9   GLC 87   ALBUMIN 4.1   PROTTOTAL 7.1   BILITOTAL 0.5   ALKPHOS 48   ALT 32   AST 19   TROPI <0.015     Recent Results (from the past 24 hour(s))   Chest XR,  PA & LAT    Narrative    CHEST TWO VIEWS  2/16/2020 2:03 PM     HISTORY: History of COPD, oxygen dependent, progressive shortness of  breath.  Evaluate for pneumothorax, pneumomediastinum, pneumonia  versus other acute cardiothoracic process.    COMPARISON: 4/1/2019    FINDINGS: Hyperexpanded lungs with bullous changes, particularly in  the right upper lobe. No airspace consolidation, pneumothorax, or  pleural effusion. Heart size normal.       Impression    IMPRESSION: No radiographic evidence of acute chest abnormality.     CATARINA ARIAS MD

## 2020-02-17 LAB
ANION GAP SERPL CALCULATED.3IONS-SCNC: 3 MMOL/L (ref 3–14)
BUN SERPL-MCNC: 10 MG/DL (ref 7–30)
CALCIUM SERPL-MCNC: 9.2 MG/DL (ref 8.5–10.1)
CHLORIDE SERPL-SCNC: 93 MMOL/L (ref 94–109)
CO2 SERPL-SCNC: 34 MMOL/L (ref 20–32)
CREAT SERPL-MCNC: 0.63 MG/DL (ref 0.66–1.25)
ERYTHROCYTE [DISTWIDTH] IN BLOOD BY AUTOMATED COUNT: 12.8 % (ref 10–15)
GFR SERPL CREATININE-BSD FRML MDRD: >90 ML/MIN/{1.73_M2}
GLUCOSE SERPL-MCNC: 91 MG/DL (ref 70–99)
HCT VFR BLD AUTO: 40.1 % (ref 40–53)
HGB BLD-MCNC: 13.4 G/DL (ref 13.3–17.7)
MCH RBC QN AUTO: 32.2 PG (ref 26.5–33)
MCHC RBC AUTO-ENTMCNC: 33.4 G/DL (ref 31.5–36.5)
MCV RBC AUTO: 96 FL (ref 78–100)
PLATELET # BLD AUTO: 316 10E9/L (ref 150–450)
POTASSIUM SERPL-SCNC: 4.6 MMOL/L (ref 3.4–5.3)
RBC # BLD AUTO: 4.16 10E12/L (ref 4.4–5.9)
SODIUM SERPL-SCNC: 127 MMOL/L (ref 133–144)
SODIUM SERPL-SCNC: 130 MMOL/L (ref 133–144)
SODIUM SERPL-SCNC: 131 MMOL/L (ref 133–144)
SODIUM UR-SCNC: 72 MMOL/L
WBC # BLD AUTO: 9 10E9/L (ref 4–11)

## 2020-02-17 PROCEDURE — 25000131 ZZH RX MED GY IP 250 OP 636 PS 637: Performed by: PHYSICIAN ASSISTANT

## 2020-02-17 PROCEDURE — 36415 COLL VENOUS BLD VENIPUNCTURE: CPT | Performed by: FAMILY MEDICINE

## 2020-02-17 PROCEDURE — 94640 AIRWAY INHALATION TREATMENT: CPT | Mod: 76

## 2020-02-17 PROCEDURE — 36415 COLL VENOUS BLD VENIPUNCTURE: CPT | Performed by: PHYSICIAN ASSISTANT

## 2020-02-17 PROCEDURE — 80048 BASIC METABOLIC PNL TOTAL CA: CPT | Performed by: PHYSICIAN ASSISTANT

## 2020-02-17 PROCEDURE — 40000270 ZZH STATISTIC OXYGEN  O2DAILY TECH TIME

## 2020-02-17 PROCEDURE — 40000275 ZZH STATISTIC RCP TIME EA 10 MIN

## 2020-02-17 PROCEDURE — 99232 SBSQ HOSP IP/OBS MODERATE 35: CPT | Performed by: FAMILY MEDICINE

## 2020-02-17 PROCEDURE — 84295 ASSAY OF SERUM SODIUM: CPT | Performed by: INTERNAL MEDICINE

## 2020-02-17 PROCEDURE — 94640 AIRWAY INHALATION TREATMENT: CPT

## 2020-02-17 PROCEDURE — 84295 ASSAY OF SERUM SODIUM: CPT | Performed by: FAMILY MEDICINE

## 2020-02-17 PROCEDURE — 25000125 ZZHC RX 250: Performed by: PHYSICIAN ASSISTANT

## 2020-02-17 PROCEDURE — 36415 COLL VENOUS BLD VENIPUNCTURE: CPT | Performed by: INTERNAL MEDICINE

## 2020-02-17 PROCEDURE — 25000132 ZZH RX MED GY IP 250 OP 250 PS 637: Performed by: PHYSICIAN ASSISTANT

## 2020-02-17 PROCEDURE — 25000128 H RX IP 250 OP 636: Performed by: FAMILY MEDICINE

## 2020-02-17 PROCEDURE — 12000000 ZZH R&B MED SURG/OB

## 2020-02-17 PROCEDURE — 25000132 ZZH RX MED GY IP 250 OP 250 PS 637

## 2020-02-17 PROCEDURE — 85027 COMPLETE CBC AUTOMATED: CPT | Performed by: PHYSICIAN ASSISTANT

## 2020-02-17 RX ORDER — MAGNESIUM OXIDE 400 MG/1
400 TABLET ORAL EVERY EVENING
Status: ON HOLD | COMMUNITY
End: 2020-04-14

## 2020-02-17 RX ADMIN — PREDNISONE 40 MG: 20 TABLET ORAL at 07:46

## 2020-02-17 RX ADMIN — ATENOLOL 25 MG: 25 TABLET ORAL at 21:29

## 2020-02-17 RX ADMIN — LISINOPRIL 60 MG: 20 TABLET ORAL at 21:29

## 2020-02-17 RX ADMIN — IPRATROPIUM BROMIDE AND ALBUTEROL SULFATE 3 ML: .5; 3 SOLUTION RESPIRATORY (INHALATION) at 15:39

## 2020-02-17 RX ADMIN — DEXTROSE MONOHYDRATE 200 ML: 50 INJECTION, SOLUTION INTRAVENOUS at 10:30

## 2020-02-17 RX ADMIN — IPRATROPIUM BROMIDE AND ALBUTEROL SULFATE 3 ML: .5; 3 SOLUTION RESPIRATORY (INHALATION) at 03:48

## 2020-02-17 RX ADMIN — AMLODIPINE BESYLATE 10 MG: 10 TABLET ORAL at 21:29

## 2020-02-17 RX ADMIN — NICOTINE 1 PATCH: 21 PATCH, EXTENDED RELEASE TRANSDERMAL at 07:46

## 2020-02-17 RX ADMIN — FUROSEMIDE 20 MG: 20 TABLET ORAL at 07:46

## 2020-02-17 RX ADMIN — IPRATROPIUM BROMIDE AND ALBUTEROL SULFATE 3 ML: .5; 3 SOLUTION RESPIRATORY (INHALATION) at 07:37

## 2020-02-17 RX ADMIN — IPRATROPIUM BROMIDE AND ALBUTEROL SULFATE 3 ML: .5; 3 SOLUTION RESPIRATORY (INHALATION) at 11:33

## 2020-02-17 RX ADMIN — IPRATROPIUM BROMIDE AND ALBUTEROL SULFATE 3 ML: .5; 3 SOLUTION RESPIRATORY (INHALATION) at 19:26

## 2020-02-17 ASSESSMENT — ACTIVITIES OF DAILY LIVING (ADL)
ADLS_ACUITY_SCORE: 11

## 2020-02-17 ASSESSMENT — MIFFLIN-ST. JEOR: SCORE: 1301.25

## 2020-02-17 NOTE — PLAN OF CARE
Pt states no pain, gave IV fluids per MD order. 2000cc fluid restriction. Questions answered. Will continue to assess and monitor

## 2020-02-17 NOTE — PLAN OF CARE
"2 L O2 on for the night to keep sats in mid-90's. Dyspnea on exertion. Lungs are clear on inspiratory phase but ronchi on expiratory phase. Using urinal to void. Patient is aware of 1500 mL fluid restriction and currently has full one cup of coffee and one full cup of water at his bedside. IV saline locked. Nicotine patch on left shoulder. Neb given at 0345. Independent in room. Sodium was 126 at 2100 last night, up from 123. /78 (BP Location: Left arm)   Pulse 109   Temp 97.8  F (36.6  C) (Oral)   Resp 16   Ht 1.626 m (5' 4.02\")   Wt 54.6 kg (120 lb 5.9 oz)   SpO2 95%   BMI 20.65 kg/m      "

## 2020-02-17 NOTE — PROGRESS NOTES
[facility] Hospitalist Progress Note           Assessment & Plan        Luis Hernandez is a 54 year old male admitted on 2/16/2020. He presented to the emergency department due to persisting dyspnea despite taking steroids, as well as for evaluation of severe leg cramps, found to have COPD exacerbation as well as hyponatremia.       Acute on chronic respiratory failure with hypoxia and hypercapnia  2/16/20 -- COPD at baseline, uses 3L supplemental O2 prn with exertion. Completed outpatient COPD treatment as below. Admit VBG with compensated respiratory acidosis (pH 7.42 / pCO2 52 / bicarb 34).  - Treat COPD as below  - Supplemental O2 to maintain sats > 92%  - No need for BiPAP or repeat VBG at this time  2/17/2020 -- improving, but still on 2LNC at rest which is up from baseline - normally only uses oxygen with activity.       COPD exacerbation  2/16/20 -- Known COPD managed prior to admission with Advair 500/50 bid, Spiriva daily, and prn DuoNebs and albuterol. Completed an outpatient course of doxycycline 100 mg bid x 5 days and prednisone 40 mg x 5 days without improvement. Very coarse lungs with prolonged expiratory phase and wheezing on admit exam. Given prednisone 40 mg in the emergency department.  - Continue prednisone 40 mg daily - patient reports that historically a 2 week taper has worked better than 5 days of steroids  - Hold prior to admission Advair and Spiriva  - Scheduled DuoNebs q4h while awake, prn albuterol  - No additional antibiotics indicated  2/17/2020 -- improving . Continue prednisone at 40 along with nebs, plan for taper on discharge.      Hyponatremia  2/16/20 -- Admit sodium = 123, symptomatic with leg cramps. Appears euvolemic on exam, although patient reports worsening edema recently. No IV fluids were given in the emergency department.  - Give 500 ml NS bolus  - Fluid restriction 1.5L daily  - Serum and urine osmolality pending  - Recheck sodium at 9 pm, again in morning  - Goal  sodium is </= 131 by 1 pm on 2/17 2/17/2020 -- Na up to 130 this AM - slightly fast, but patient is pretty low risk overall - we'll give just 200 ml D5W x 1 now, continue every 6 hours checks for now, but goal is 131 or less by noon, then  135 or less by 6pm and 138 or less by tomorrow AM.  Urine sodium added and pending, but will relax to 2L restriction for now.       Essential hypertension, benign  Peripheral edema  Pulmonary hypertension  2/16/20 -- Prior echo 4/1/19 with EF 60%, RV dilation and severe pulmonary hypertension. Patient reports recent lower extremity edema, although is very minimal on admit exam. Managed prior to admission with lasix 20 mg daily.   - Continue prior to admission lasix  2/17/2020 -- stable.     Incidental pulmonary nodule  First noted in 2010, has been stable on repeat imaging. Last chest CT noted that it was a stable benign calcified granuloma. Nothing noted on chest x-ray on admission.  No further work-up needed.       Tobacco abuse, in remission  Quit 2.5 weeks ago, has been using 21 mg nicotine patch.  - Continue nicotine patch      Diet: Regular diet, fluid restriction as above      DVT Prophylaxis: Pneumatic Compression Devices    Miller Catheter: not present    Code Status: Full - discussed with patient on admission              Disposition  Improving, anticipate 1-2 more days inpatient pending continued improvement and weaning of oxygen            Interval History:   Improving.  Still wheezy but breathing feeling better at least.  No fever or chills. Cramps in legs also improving.  No new concerns.  No new symptoms. Confirmed that he uses oxygen with activity only at baseline.    No other pain             Review of Systems:    ROS: 10 point ROS neg other than the symptoms noted above in the HPI.           Medications:   Current active medications and PTA medications reviewed, see medication list for details.            Physical Exam:   Vitals were reviewed  Patient Vitals for  "the past 24 hrs:   BP Temp Temp src Pulse Heart Rate Resp SpO2 Height Weight   20 0745 (!) 128/92 97.9  F (36.6  C) Oral 83 -- 18 91 % -- --   20 0456 -- -- -- -- -- -- -- -- 54 kg (119 lb 0.8 oz)   20 2244 118/78 97.8  F (36.6  C) Oral -- 76 16 95 % -- --   20 1911 135/82 98.5  F (36.9  C) Oral -- 92 16 94 % -- --   20 1900 -- -- -- -- -- -- (!) 86 % -- --   20 1708 (!) 150/104 98  F (36.7  C) Oral -- 92 16 96 % 1.626 m (5' 4.02\") 54.6 kg (120 lb 5.9 oz)   20 1600 (!) 136/97 -- -- 109 109 15 95 % -- --   20 1545 (!) 158/104 -- -- 95 92 17 95 % -- --   20 1530 (!) 142/102 -- -- 96 92 18 94 % -- --   20 1515 (!) 130/95 -- -- 91 89 10 94 % -- --   20 1500 (!) 133/103 -- -- 98 96 11 (!) 89 % -- --   20 1445 (!) 141/102 -- -- 95 93 10 (!) 89 % -- --   20 1430 (!) 148/98 -- -- 91 92 8 90 % -- --   20 1415 (!) 150/101 -- -- 108 106 20 (!) 88 % -- --   20 1345 -- -- -- 94 107 (!) 34 -- -- --   20 1330 (!) 153/100 -- -- 97 95 17 92 % -- --   20 1315 (!) 149/99 -- -- 95 98 9 93 % -- --   20 1300 (!) 150/98 -- -- 101 100 8 94 % -- --   20 1245 (!) 147/105 -- -- 95 92 13 96 % -- --   20 1223 (!) 149/100 97.1  F (36.2  C) Temporal -- 97 22 92 % 1.626 m (5' 4\") 54.4 kg (120 lb)       Temperatures:  Current - Temp: 97.9  F (36.6  C); Max - Temp  Av.9  F (36.6  C)  Min: 97.1  F (36.2  C)  Max: 98.5  F (36.9  C)  Respiration range: Resp  Avg: 15.4  Min: 8  Max: 34  Pulse range: Pulse  Av.3  Min: 83  Max: 109  Blood pressure range: Systolic (24hrs), Av , Min:118 , Max:158   ; Diastolic (24hrs), Av, Min:78, Max:105    Pulse oximetry range: SpO2  Av.4 %  Min: 86 %  Max: 96 %  I/O last 3 completed shifts:  In: 515 [P.O.:515]  Out: 2900 [Urine:2900]    Intake/Output Summary (Last 24 hours) at 2020 0917  Last data filed at 2020 0641  Gross per 24 hour   Intake 515 ml   Output 2900 ml "   Net -2385 ml     EXAM:  General: awake and alert, NAD, oriented x 3  Head: normocephalic  Neck: unremarkable, no lymphadenopathy   HEENT: oropharynx pink and moist    Heart: Regular rate and rhythm, no murmurs, rubs, or gallops  Lungs: still pretty pronounced expiratory wheezing bilaterally but better air movement, no distress and no crackles.    Abdomen: soft, non-tender, no masses or organomegaly  Extremities: no edema in lower extremities   Skin unremarkable.               Data:     Results for orders placed or performed during the hospital encounter of 02/16/20 (from the past 24 hour(s))   CBC with platelets differential   Result Value Ref Range    WBC 11.6 (H) 4.0 - 11.0 10e9/L    RBC Count 3.99 (L) 4.4 - 5.9 10e12/L    Hemoglobin 12.8 (L) 13.3 - 17.7 g/dL    Hematocrit 37.3 (L) 40.0 - 53.0 %    MCV 94 78 - 100 fl    MCH 32.1 26.5 - 33.0 pg    MCHC 34.3 31.5 - 36.5 g/dL    RDW 12.4 10.0 - 15.0 %    Platelet Count 302 150 - 450 10e9/L    Diff Method Automated Method     % Neutrophils 68.0 %    % Lymphocytes 18.6 %    % Monocytes 11.9 %    % Eosinophils 0.9 %    % Basophils 0.3 %    % Immature Granulocytes 0.3 %    Nucleated RBCs 0 0 /100    Absolute Neutrophil 7.9 1.6 - 8.3 10e9/L    Absolute Lymphocytes 2.2 0.8 - 5.3 10e9/L    Absolute Monocytes 1.4 (H) 0.0 - 1.3 10e9/L    Absolute Eosinophils 0.1 0.0 - 0.7 10e9/L    Absolute Basophils 0.0 0.0 - 0.2 10e9/L    Abs Immature Granulocytes 0.0 0 - 0.4 10e9/L    Absolute Nucleated RBC 0.0    Comprehensive metabolic panel   Result Value Ref Range    Sodium 123 (L) 133 - 144 mmol/L    Potassium 4.5 3.4 - 5.3 mmol/L    Chloride 86 (L) 94 - 109 mmol/L    Carbon Dioxide 33 (H) 20 - 32 mmol/L    Anion Gap 4 3 - 14 mmol/L    Glucose 87 70 - 99 mg/dL    Urea Nitrogen 11 7 - 30 mg/dL    Creatinine 0.63 (L) 0.66 - 1.25 mg/dL    GFR Estimate >90 >60 mL/min/[1.73_m2]    GFR Estimate If Black >90 >60 mL/min/[1.73_m2]    Calcium 8.9 8.5 - 10.1 mg/dL    Bilirubin Total 0.5 0.2 -  1.3 mg/dL    Albumin 4.1 3.4 - 5.0 g/dL    Protein Total 7.1 6.8 - 8.8 g/dL    Alkaline Phosphatase 48 40 - 150 U/L    ALT 32 0 - 70 U/L    AST 19 0 - 45 U/L   Troponin I   Result Value Ref Range    Troponin I ES <0.015 0.000 - 0.045 ug/L   Osmolality   Result Value Ref Range    Osmolality 253 (L) 275 - 295 mmol/kg   Chest XR,  PA & LAT    Narrative    CHEST TWO VIEWS  2/16/2020 2:03 PM     HISTORY: History of COPD, oxygen dependent, progressive shortness of  breath.  Evaluate for pneumothorax, pneumomediastinum, pneumonia  versus other acute cardiothoracic process.    COMPARISON: 4/1/2019    FINDINGS: Hyperexpanded lungs with bullous changes, particularly in  the right upper lobe. No airspace consolidation, pneumothorax, or  pleural effusion. Heart size normal.       Impression    IMPRESSION: No radiographic evidence of acute chest abnormality.     CATARINA ARIAS MD   Blood gas venous   Result Value Ref Range    Ph Venous 7.42 7.32 - 7.43 pH    PCO2 Venous 52 (H) 40 - 50 mm Hg    PO2 Venous 45 25 - 47 mm Hg    Bicarbonate Venous 34 (H) 21 - 28 mmol/L    Base Excess Venous 7.7 mmol/L    FIO2 0    Osmolality urine   Result Value Ref Range    Urine Osmolality 203 100 - 1,200 mmol/kg   Sodium   Result Value Ref Range    Sodium 126 (L) 133 - 144 mmol/L   Basic metabolic panel   Result Value Ref Range    Sodium 130 (L) 133 - 144 mmol/L    Potassium 4.6 3.4 - 5.3 mmol/L    Chloride 93 (L) 94 - 109 mmol/L    Carbon Dioxide 34 (H) 20 - 32 mmol/L    Anion Gap 3 3 - 14 mmol/L    Glucose 91 70 - 99 mg/dL    Urea Nitrogen 10 7 - 30 mg/dL    Creatinine 0.63 (L) 0.66 - 1.25 mg/dL    GFR Estimate >90 >60 mL/min/[1.73_m2]    GFR Estimate If Black >90 >60 mL/min/[1.73_m2]    Calcium 9.2 8.5 - 10.1 mg/dL   CBC with platelets   Result Value Ref Range    WBC 9.0 4.0 - 11.0 10e9/L    RBC Count 4.16 (L) 4.4 - 5.9 10e12/L    Hemoglobin 13.4 13.3 - 17.7 g/dL    Hematocrit 40.1 40.0 - 53.0 %    MCV 96 78 - 100 fl    MCH 32.2 26.5 - 33.0  pg    MCHC 33.4 31.5 - 36.5 g/dL    RDW 12.8 10.0 - 15.0 %    Platelet Count 316 150 - 450 10e9/L           Attestation:  I have reviewed today's vital signs, notes, medications, labs and imaging.  Amount of time spent in direct patient care: 30 minutes.     Seth Ortiz MD, MD

## 2020-02-18 ENCOUNTER — APPOINTMENT (OUTPATIENT)
Dept: CT IMAGING | Facility: CLINIC | Age: 55
End: 2020-02-18
Attending: FAMILY MEDICINE
Payer: COMMERCIAL

## 2020-02-18 LAB
ANION GAP SERPL CALCULATED.3IONS-SCNC: 2 MMOL/L (ref 3–14)
BUN SERPL-MCNC: 18 MG/DL (ref 7–30)
CALCIUM SERPL-MCNC: 8.7 MG/DL (ref 8.5–10.1)
CHLORIDE SERPL-SCNC: 93 MMOL/L (ref 94–109)
CO2 SERPL-SCNC: 33 MMOL/L (ref 20–32)
CREAT SERPL-MCNC: 0.72 MG/DL (ref 0.66–1.25)
ERYTHROCYTE [DISTWIDTH] IN BLOOD BY AUTOMATED COUNT: 13.2 % (ref 10–15)
GFR SERPL CREATININE-BSD FRML MDRD: >90 ML/MIN/{1.73_M2}
GLUCOSE SERPL-MCNC: 89 MG/DL (ref 70–99)
HCT VFR BLD AUTO: 40.7 % (ref 40–53)
HGB BLD-MCNC: 13.4 G/DL (ref 13.3–17.7)
LACTATE BLD-SCNC: 1.4 MMOL/L (ref 0.7–2)
MCH RBC QN AUTO: 32 PG (ref 26.5–33)
MCHC RBC AUTO-ENTMCNC: 32.9 G/DL (ref 31.5–36.5)
MCV RBC AUTO: 97 FL (ref 78–100)
PLATELET # BLD AUTO: 324 10E9/L (ref 150–450)
POTASSIUM SERPL-SCNC: 4.4 MMOL/L (ref 3.4–5.3)
RBC # BLD AUTO: 4.19 10E12/L (ref 4.4–5.9)
SODIUM SERPL-SCNC: 128 MMOL/L (ref 133–144)
WBC # BLD AUTO: 12.2 10E9/L (ref 4–11)

## 2020-02-18 PROCEDURE — 83605 ASSAY OF LACTIC ACID: CPT | Performed by: INTERNAL MEDICINE

## 2020-02-18 PROCEDURE — 25000131 ZZH RX MED GY IP 250 OP 636 PS 637: Performed by: PHYSICIAN ASSISTANT

## 2020-02-18 PROCEDURE — 25000132 ZZH RX MED GY IP 250 OP 250 PS 637: Performed by: PHYSICIAN ASSISTANT

## 2020-02-18 PROCEDURE — 94640 AIRWAY INHALATION TREATMENT: CPT | Mod: 76

## 2020-02-18 PROCEDURE — 25000132 ZZH RX MED GY IP 250 OP 250 PS 637

## 2020-02-18 PROCEDURE — 85027 COMPLETE CBC AUTOMATED: CPT | Performed by: FAMILY MEDICINE

## 2020-02-18 PROCEDURE — 99232 SBSQ HOSP IP/OBS MODERATE 35: CPT | Performed by: FAMILY MEDICINE

## 2020-02-18 PROCEDURE — 25000125 ZZHC RX 250: Performed by: RADIOLOGY

## 2020-02-18 PROCEDURE — 25000128 H RX IP 250 OP 636: Performed by: RADIOLOGY

## 2020-02-18 PROCEDURE — 80048 BASIC METABOLIC PNL TOTAL CA: CPT | Performed by: FAMILY MEDICINE

## 2020-02-18 PROCEDURE — 74177 CT ABD & PELVIS W/CONTRAST: CPT

## 2020-02-18 PROCEDURE — 36415 COLL VENOUS BLD VENIPUNCTURE: CPT | Performed by: INTERNAL MEDICINE

## 2020-02-18 PROCEDURE — 36415 COLL VENOUS BLD VENIPUNCTURE: CPT | Performed by: FAMILY MEDICINE

## 2020-02-18 PROCEDURE — 25000132 ZZH RX MED GY IP 250 OP 250 PS 637: Performed by: FAMILY MEDICINE

## 2020-02-18 PROCEDURE — 12000000 ZZH R&B MED SURG/OB

## 2020-02-18 PROCEDURE — 25000125 ZZHC RX 250: Performed by: PHYSICIAN ASSISTANT

## 2020-02-18 PROCEDURE — 94640 AIRWAY INHALATION TREATMENT: CPT

## 2020-02-18 RX ORDER — IOPAMIDOL 755 MG/ML
58 INJECTION, SOLUTION INTRAVASCULAR ONCE
Status: COMPLETED | OUTPATIENT
Start: 2020-02-18 | End: 2020-02-18

## 2020-02-18 RX ORDER — MAGNESIUM OXIDE 400 MG/1
400 TABLET ORAL EVERY EVENING
Status: DISCONTINUED | OUTPATIENT
Start: 2020-02-18 | End: 2020-02-19 | Stop reason: HOSPADM

## 2020-02-18 RX ADMIN — ATENOLOL 25 MG: 25 TABLET ORAL at 21:38

## 2020-02-18 RX ADMIN — LISINOPRIL 60 MG: 20 TABLET ORAL at 21:37

## 2020-02-18 RX ADMIN — NICOTINE 1 PATCH: 21 PATCH, EXTENDED RELEASE TRANSDERMAL at 08:41

## 2020-02-18 RX ADMIN — AMLODIPINE BESYLATE 10 MG: 10 TABLET ORAL at 21:38

## 2020-02-18 RX ADMIN — Medication 400 MG: at 17:22

## 2020-02-18 RX ADMIN — SODIUM CHLORIDE 55 ML: 9 INJECTION, SOLUTION INTRAVENOUS at 11:46

## 2020-02-18 RX ADMIN — PREDNISONE 40 MG: 20 TABLET ORAL at 08:41

## 2020-02-18 RX ADMIN — IPRATROPIUM BROMIDE AND ALBUTEROL SULFATE 3 ML: .5; 3 SOLUTION RESPIRATORY (INHALATION) at 15:28

## 2020-02-18 RX ADMIN — IPRATROPIUM BROMIDE AND ALBUTEROL SULFATE 3 ML: .5; 3 SOLUTION RESPIRATORY (INHALATION) at 12:42

## 2020-02-18 RX ADMIN — IPRATROPIUM BROMIDE AND ALBUTEROL SULFATE 3 ML: .5; 3 SOLUTION RESPIRATORY (INHALATION) at 06:23

## 2020-02-18 RX ADMIN — IOPAMIDOL 58 ML: 755 INJECTION, SOLUTION INTRAVENOUS at 11:45

## 2020-02-18 RX ADMIN — FUROSEMIDE 20 MG: 20 TABLET ORAL at 08:41

## 2020-02-18 RX ADMIN — IPRATROPIUM BROMIDE AND ALBUTEROL SULFATE 3 ML: .5; 3 SOLUTION RESPIRATORY (INHALATION) at 19:24

## 2020-02-18 ASSESSMENT — ACTIVITIES OF DAILY LIVING (ADL)
ADLS_ACUITY_SCORE: 11

## 2020-02-18 NOTE — PLAN OF CARE
Patient alert and orientated. Vital signs stable. Patient on 2L of oxygen via nasal cannula, but patient does take his oxygen off here and there. Afebrile. Up independently. Denies any pain.       Patient has reached his fluid restriction of 1500 mL for the day. MD hunter with patient having ice chips occassionally to wet mouth.     CT scan completed.      Plan to discharge home tomorrow.

## 2020-02-18 NOTE — PROGRESS NOTES
[facility] Hospitalist Progress Note           Assessment & Plan      Luis Hernandez is a 54 year old male admitted on 2/16/2020. He presented to the emergency department due to persisting dyspnea despite taking steroids, as well as for evaluation of severe leg cramps, found to have COPD exacerbation as well as hyponatremia.        Acute on chronic respiratory failure with hypoxia and hypercapnia  2/16/20 -- COPD at baseline, uses 3L supplemental O2 prn with exertion. Completed outpatient COPD treatment as below. Admit VBG with compensated respiratory acidosis (pH 7.42 / pCO2 52 / bicarb 34).  - Treat COPD as below  - Supplemental O2 to maintain sats > 92%  - No need for BiPAP or repeat VBG at this time  2/17/2020 -- improving, but still on 2LNC at rest which is up from baseline - normally only uses oxygen with activity.    2/18/2020 -- improving, will try to wean oxygen at rest as able.         COPD exacerbation  2/16/20 -- Known COPD managed prior to admission with Advair 500/50 bid, Spiriva daily, and prn DuoNebs and albuterol. Completed an outpatient course of doxycycline 100 mg bid x 5 days and prednisone 40 mg x 5 days without improvement. Very coarse lungs with prolonged expiratory phase and wheezing on admit exam. Given prednisone 40 mg in the emergency department.  - Continue prednisone 40 mg daily - patient reports that historically a 2 week taper has worked better than 5 days of steroids  - Hold prior to admission Advair and Spiriva  - Scheduled DuoNebs q4h while awake, prn albuterol  - No additional antibiotics indicated  2/18/2020 -- slowly improving . Continue prednisone at 40 along with nebs, plan for taper on discharge.         Hyponatremia - suspect SIADH  2/16/20 -- Admit sodium = 123, symptomatic with leg cramps. Appears euvolemic on exam, although patient reports worsening edema recently. No IV fluids were given in the emergency department.  - Give 500 ml NS bolus  - Fluid restriction 1.5L  daily  - Serum and urine osmolality pending  - Recheck sodium at 9 pm, again in morning  - Goal sodium is </= 131 by 1 pm on 2/17 2/17/2020 -- Na up to 130 this AM - slightly fast, but patient is pretty low risk overall - we'll give just 200 ml D5W x 1 now, continue every 6 hours checks for now, but goal is 131 or less by noon, then  135 or less by 6pm and 138 or less by tomorrow AM.  Urine sodium added and pending, but will relax to 2L restriction for now.    2/18/2020 -- sodium up from admission but remains low- labs consistent with SIADH - resuming 1500 ml fluid restriction.   recheck in AM.  Out of the window of risk for overcorrection.  Requested that pharmacy review prior to admission medications, but no clear trigger for SIADH.  No overt signs of malignancy, but if no other cause found could consider CT chest/abd/pelvis to rule out malignancy.       Essential hypertension, benign  Peripheral edema  Pulmonary hypertension  2/16/20 -- Prior echo 4/1/19 with EF 60%, RV dilation and severe pulmonary hypertension. Patient reports recent lower extremity edema, although is very minimal on admit exam. Managed prior to admission with lasix 20 mg daily.   - Continue prior to admission lasix  2/18/2020 -- stable.      Incidental pulmonary nodule  First noted in 2010, has been stable on repeat imaging. Last chest CT noted that it was a stable benign calcified granuloma. Nothing noted on chest x-ray on admission.  No further work-up needed.       Tobacco abuse, in remission  Quit 2.5 weeks ago, has been using 21 mg nicotine patch.  - Continue nicotine patch      Diet: Regular diet, fluid restriction as above       DVT Prophylaxis: Pneumatic Compression Devices     Miller Catheter: not present     Code Status: Full - discussed with patient on admission               Disposition  Slowly improving, hope for discharge tomorrow if able to wean down oxygen at rest and sodium improving.              Interval History:   Feels  "like he's improving - hasn't been up and about much but breathing now feels \"good\" at rest on 2L - normally only uses 2L with activity however . No fever or chills.  No pain.  No new concerns.                Review of Systems:    ROS: 10 point ROS neg other than the symptoms noted above in the HPI.           Medications:   Current active medications and PTA medications reviewed, see medication list for details.            Physical Exam:   Vitals were reviewed  Patient Vitals for the past 24 hrs:   BP Temp Temp src Pulse Heart Rate Resp SpO2   20 0735 123/84 98.5  F (36.9  C) Oral 88 88 20 97 %   20 2300 103/75 98.7  F (37.1  C) Oral -- -- 18 94 %   20 1958 110/69 98.6  F (37  C) Oral -- 97 18 95 %   20 1647 102/69 97.4  F (36.3  C) Oral -- 95 18 98 %   20 1133 -- -- -- -- -- -- 96 %   20 1117 135/75 98.2  F (36.8  C) Oral 84 -- 16 93 %       Temperatures:  Current - Temp: 98.5  F (36.9  C); Max - Temp  Av.3  F (36.8  C)  Min: 97.4  F (36.3  C)  Max: 98.7  F (37.1  C)  Respiration range: Resp  Av  Min: 16  Max: 20  Pulse range: Pulse  Av  Min: 84  Max: 88  Blood pressure range: Systolic (24hrs), Av , Min:102 , Max:135   ; Diastolic (24hrs), Av, Min:69, Max:84    Pulse oximetry range: SpO2  Av.5 %  Min: 93 %  Max: 98 %  I/O last 3 completed shifts:  In: 1560 [P.O.:1560]  Out: 1200 [Urine:1200]    Intake/Output Summary (Last 24 hours) at 2020 1011  Last data filed at 2020 0846  Gross per 24 hour   Intake 1782 ml   Output 1200 ml   Net 582 ml     EXAM:  General: awake and alert, NAD, oriented x 3  Head: normocephalic  Neck: unremarkable, no lymphadenopathy   HEENT: oropharynx pink and moist    Heart: Regular rate and rhythm, no murmurs, rubs, or gallops  Lungs: improved air movement, still some wheezing with exhalation, no new concerns.    Abdomen: soft, non-tender, no masses or organomegaly  Extremities: no edema in lower extremities   Skin " unremarkable.               Data:     Results for orders placed or performed during the hospital encounter of 02/16/20 (from the past 24 hour(s))   Sodium   Result Value Ref Range    Sodium 127 (L) 133 - 144 mmol/L   Sodium   Result Value Ref Range    Sodium 131 (L) 133 - 144 mmol/L   CBC with platelets   Result Value Ref Range    WBC 12.2 (H) 4.0 - 11.0 10e9/L    RBC Count 4.19 (L) 4.4 - 5.9 10e12/L    Hemoglobin 13.4 13.3 - 17.7 g/dL    Hematocrit 40.7 40.0 - 53.0 %    MCV 97 78 - 100 fl    MCH 32.0 26.5 - 33.0 pg    MCHC 32.9 31.5 - 36.5 g/dL    RDW 13.2 10.0 - 15.0 %    Platelet Count 324 150 - 450 10e9/L   Basic metabolic panel   Result Value Ref Range    Sodium 128 (L) 133 - 144 mmol/L    Potassium 4.4 3.4 - 5.3 mmol/L    Chloride 93 (L) 94 - 109 mmol/L    Carbon Dioxide 33 (H) 20 - 32 mmol/L    Anion Gap 2 (L) 3 - 14 mmol/L    Glucose 89 70 - 99 mg/dL    Urea Nitrogen 18 7 - 30 mg/dL    Creatinine 0.72 0.66 - 1.25 mg/dL    GFR Estimate >90 >60 mL/min/[1.73_m2]    GFR Estimate If Black >90 >60 mL/min/[1.73_m2]    Calcium 8.7 8.5 - 10.1 mg/dL           Attestation:  I have reviewed today's vital signs, notes, medications, labs and imaging.  Amount of time spent in direct patient care: 25 minutes.     Seth Ortiz MD, MD

## 2020-02-18 NOTE — PROGRESS NOTES
Patient is alert and oriented. Up independent in room. Voiding good amounts. On strict I&0 2000 ml fluid daily, had 210 ml black coffee today 1790, S/L. On 2 LPM O2 NC 94%, uses O2 at home baseline. LS clear/diminished, infrequent good productive cough. Regular diet, had pudding, Rhea Doone cookies and coffee for a midnight snack. Denies any pain. Sodium 131 at 2319. Dr. Gregorio informed not drawn for 1800, was not ordered, off going RN noticed before end of shift. 0600 sodium 128. 420 ml out of 2000 ml this shift. Dr. Reyna notified of Na+.

## 2020-02-18 NOTE — PROGRESS NOTES
Patient is up indep in his room patient is doing well with his restrictions and has taken in up until now 1140. Day nurse did not write in patient's intake so this amount includes what day nurse reported was patient day intake. Patient is voiding good amounts and denies pain. Patient has call light and can let his needs be known.

## 2020-02-19 VITALS
HEART RATE: 95 BPM | BODY MASS INDEX: 20.74 KG/M2 | HEIGHT: 64 IN | OXYGEN SATURATION: 91 % | DIASTOLIC BLOOD PRESSURE: 78 MMHG | TEMPERATURE: 98.1 F | SYSTOLIC BLOOD PRESSURE: 129 MMHG | RESPIRATION RATE: 16 BRPM | WEIGHT: 121.47 LBS

## 2020-02-19 LAB
ANION GAP SERPL CALCULATED.3IONS-SCNC: 3 MMOL/L (ref 3–14)
BUN SERPL-MCNC: 31 MG/DL (ref 7–30)
CALCIUM SERPL-MCNC: 9 MG/DL (ref 8.5–10.1)
CHLORIDE SERPL-SCNC: 99 MMOL/L (ref 94–109)
CO2 SERPL-SCNC: 33 MMOL/L (ref 20–32)
CREAT SERPL-MCNC: 0.78 MG/DL (ref 0.66–1.25)
ERYTHROCYTE [DISTWIDTH] IN BLOOD BY AUTOMATED COUNT: 13.4 % (ref 10–15)
GFR SERPL CREATININE-BSD FRML MDRD: >90 ML/MIN/{1.73_M2}
GLUCOSE SERPL-MCNC: 98 MG/DL (ref 70–99)
HCT VFR BLD AUTO: 40 % (ref 40–53)
HGB BLD-MCNC: 13.2 G/DL (ref 13.3–17.7)
MCH RBC QN AUTO: 32.2 PG (ref 26.5–33)
MCHC RBC AUTO-ENTMCNC: 33 G/DL (ref 31.5–36.5)
MCV RBC AUTO: 98 FL (ref 78–100)
PLATELET # BLD AUTO: 348 10E9/L (ref 150–450)
POTASSIUM SERPL-SCNC: 4.3 MMOL/L (ref 3.4–5.3)
RBC # BLD AUTO: 4.1 10E12/L (ref 4.4–5.9)
SODIUM SERPL-SCNC: 135 MMOL/L (ref 133–144)
WBC # BLD AUTO: 13.1 10E9/L (ref 4–11)

## 2020-02-19 PROCEDURE — 25000132 ZZH RX MED GY IP 250 OP 250 PS 637

## 2020-02-19 PROCEDURE — 25000132 ZZH RX MED GY IP 250 OP 250 PS 637: Performed by: PHYSICIAN ASSISTANT

## 2020-02-19 PROCEDURE — 80048 BASIC METABOLIC PNL TOTAL CA: CPT | Performed by: FAMILY MEDICINE

## 2020-02-19 PROCEDURE — 36415 COLL VENOUS BLD VENIPUNCTURE: CPT | Performed by: FAMILY MEDICINE

## 2020-02-19 PROCEDURE — 94640 AIRWAY INHALATION TREATMENT: CPT

## 2020-02-19 PROCEDURE — 25000131 ZZH RX MED GY IP 250 OP 636 PS 637: Performed by: PHYSICIAN ASSISTANT

## 2020-02-19 PROCEDURE — 99239 HOSP IP/OBS DSCHRG MGMT >30: CPT | Performed by: FAMILY MEDICINE

## 2020-02-19 PROCEDURE — 25000125 ZZHC RX 250: Performed by: PHYSICIAN ASSISTANT

## 2020-02-19 PROCEDURE — 85027 COMPLETE CBC AUTOMATED: CPT | Performed by: FAMILY MEDICINE

## 2020-02-19 RX ORDER — PREDNISONE 10 MG/1
TABLET ORAL
Qty: 30 TABLET | Refills: 0 | Status: SHIPPED | OUTPATIENT
Start: 2020-02-19 | End: 2020-03-17

## 2020-02-19 RX ADMIN — FUROSEMIDE 20 MG: 20 TABLET ORAL at 08:02

## 2020-02-19 RX ADMIN — NICOTINE 1 PATCH: 21 PATCH, EXTENDED RELEASE TRANSDERMAL at 08:03

## 2020-02-19 RX ADMIN — IPRATROPIUM BROMIDE AND ALBUTEROL SULFATE 3 ML: .5; 3 SOLUTION RESPIRATORY (INHALATION) at 07:40

## 2020-02-19 RX ADMIN — PREDNISONE 40 MG: 20 TABLET ORAL at 08:02

## 2020-02-19 ASSESSMENT — ACTIVITIES OF DAILY LIVING (ADL)
ADLS_ACUITY_SCORE: 11

## 2020-02-19 ASSESSMENT — MIFFLIN-ST. JEOR: SCORE: 1312.25

## 2020-02-19 NOTE — DISCHARGE SUMMARY
[facility] Discharge Summary    Luis Hernandez MRN# [id number]   Age: 54 year old YOB: 1965     Date of Admission:  2/16/2020  Date of Discharge::  2/19/2020  Admitting Physician:  Jean Carlos Avery MD  Discharge Physician:  Seth Ortiz MD, MD             Admission Diagnoses:   Shortness of breath [R06.02]  Hyponatremia [E87.1]  Muscle cramps [R25.2]  COPD exacerbation (H) [J44.1]          Principle Discharge Diagnosis:       Acute on chronic respiratory failure with hypoxia and hypercapnia    See hospital course for further active diagnoses addressed during this admission.              Medications Prior to Admission:     Medications Prior to Admission   Medication Sig Dispense Refill Last Dose     albuterol (PROAIR HFA/PROVENTIL HFA/VENTOLIN HFA) 108 (90 Base) MCG/ACT inhaler Inhale 2 puffs into the lungs every 4 hours as needed for shortness of breath / dyspnea Hold on file until needed 1 Inhaler 11 2/16/2020 at Unknown time     amLODIPine (NORVASC) 10 MG tablet Take 1 tablet (10 mg) by mouth daily (Patient taking differently: Take 10 mg by mouth every evening ) 90 tablet 3 2/15/2020 at 1700     atenolol (TENORMIN) 25 MG tablet Take 1 tablet (25 mg) by mouth daily (Patient taking differently: Take 25 mg by mouth every evening ) 90 tablet 3 2/15/2020 at 1700     doxycycline hyclate (VIBRAMYCIN) 100 MG capsule Take 1 capsule (100 mg) by mouth 2 times daily Keep on hand for COPD exacerbation. 20 capsule 4 2/15/2020 at Unknown time     fluticasone-salmeterol (ADVAIR) 500-50 MCG/DOSE inhaler Inhale 1 puff into the lungs 2 times daily Hold on file until needed 1 Inhaler 11 2/16/2020 at am     furosemide (LASIX) 20 MG tablet Take 1 tablet (20 mg) by mouth daily 90 tablet 3 2/16/2020 at am     ipratropium - albuterol 0.5 mg/2.5 mg/3 mL (DUONEB) 0.5-2.5 (3) MG/3ML neb solution Take 1 vial (3 mLs) by nebulization 4 times daily 1 Box 11 2/16/2020 at Unknown time     lisinopril (PRINIVIL/ZESTRIL) 40  MG tablet Take 1.5 pills, or 60 mg daily (Patient taking differently: Take 60 mg by mouth every evening Take 1.5 pills, or 60 mg daily) 135 tablet 3 2/15/2020 at 1700     magnesium oxide (MAG-OX) 400 MG tablet Take 400 mg by mouth every evening   2/15/2020 at after supper     nicotine (NICODERM CQ) 21 MG/24HR 24 hr patch Place 1 patch onto the skin every 24 hours   2/16/2020 at 0800     order for DME Equipment being ordered: Oxygen 3L via NC continuous.  O2 saturated noted to be 86% on room air at rest. 1 Units 0 2/16/2020 at Unknown time     potassium 99 MG TABS Take 1 tablet by mouth every evening   2/15/2020 at pm     predniSONE (DELTASONE) 10 MG tablet Take 4 tablets (40 mg) by mouth daily Keep on hand for COPD exacerbation. 20 tablet 4 2/16/2020 at am     tiotropium (SPIRIVA HANDIHALER) 18 MCG inhaled capsule Inhale contents of one capsule daily. (Patient taking differently: Inhale 18 mcg into the lungs every evening Inhale contents of one capsule daily.) 30 capsule 11 Past Week at Unknown time     order for DME Equipment being ordered: Nebulizer 1 Device 0 Taking             Discharge Medications:     Current Discharge Medication List      START taking these medications    Details   !! predniSONE 10 MG PO tablet 4 tablets by mouth daily x 3 days, then 3 tablets daily x 3 days, then 2 tablets x 3 days then 1 tablet x 3 days then stop.  Qty: 30 tablet, Refills: 0    Associated Diagnoses: COPD with acute exacerbation (H)       !! - Potential duplicate medications found. Please discuss with provider.      CONTINUE these medications which have NOT CHANGED    Details   albuterol (PROAIR HFA/PROVENTIL HFA/VENTOLIN HFA) 108 (90 Base) MCG/ACT inhaler Inhale 2 puffs into the lungs every 4 hours as needed for shortness of breath / dyspnea Hold on file until needed  Qty: 1 Inhaler, Refills: 11    Comments: Pharmacy may dispense brand covered by insurance (Proair, or proventil or ventolin or generic albuterol  inhaler)  Associated Diagnoses: Centrilobular emphysema (H)      amLODIPine (NORVASC) 10 MG tablet Take 1 tablet (10 mg) by mouth daily  Qty: 90 tablet, Refills: 3    Comments: Patient disposed of last fill per instructions from hospital, but we are resuming so he needs this refilled early.  Associated Diagnoses: Essential hypertension, benign      atenolol (TENORMIN) 25 MG tablet Take 1 tablet (25 mg) by mouth daily  Qty: 90 tablet, Refills: 3    Associated Diagnoses: Essential hypertension, benign      fluticasone-salmeterol (ADVAIR) 500-50 MCG/DOSE inhaler Inhale 1 puff into the lungs 2 times daily Hold on file until needed  Qty: 1 Inhaler, Refills: 11    Associated Diagnoses: Centrilobular emphysema (H)      furosemide (LASIX) 20 MG tablet Take 1 tablet (20 mg) by mouth daily  Qty: 90 tablet, Refills: 3    Comments: The patient requests that this prescription be held on file for filling in the near future.  Associated Diagnoses: Peripheral edema      ipratropium - albuterol 0.5 mg/2.5 mg/3 mL (DUONEB) 0.5-2.5 (3) MG/3ML neb solution Take 1 vial (3 mLs) by nebulization 4 times daily  Qty: 1 Box, Refills: 11    Associated Diagnoses: Simple chronic bronchitis (H)      lisinopril (PRINIVIL/ZESTRIL) 40 MG tablet Take 1.5 pills, or 60 mg daily  Qty: 135 tablet, Refills: 3    Associated Diagnoses: Essential hypertension, benign      magnesium oxide (MAG-OX) 400 MG tablet Take 400 mg by mouth every evening      nicotine (NICODERM CQ) 21 MG/24HR 24 hr patch Place 1 patch onto the skin every 24 hours      !! order for DME Equipment being ordered: Oxygen 3L via NC continuous.  O2 saturated noted to be 86% on room air at rest.  Qty: 1 Units, Refills: 0    Associated Diagnoses: Mixed simple and mucopurulent chronic bronchitis (H)      potassium 99 MG TABS Take 1 tablet by mouth every evening      !! predniSONE (DELTASONE) 10 MG tablet Take 4 tablets (40 mg) by mouth daily Keep on hand for COPD exacerbation.  Qty: 20 tablet,  Refills: 4    Associated Diagnoses: Centrilobular emphysema (H); Wheezing; COPD exacerbation (H)      tiotropium (SPIRIVA HANDIHALER) 18 MCG inhaled capsule Inhale contents of one capsule daily.  Qty: 30 capsule, Refills: 11    Associated Diagnoses: Centrilobular emphysema (H)      !! order for DME Equipment being ordered: Nebulizer  Qty: 1 Device, Refills: 0    Associated Diagnoses: Simple chronic bronchitis (H)       !! - Potential duplicate medications found. Please discuss with provider.      STOP taking these medications       doxycycline hyclate (VIBRAMYCIN) 100 MG capsule Comments:   Reason for Stopping:                          Brief History of Illness:     From Admission H+P:   Luis Hernandez is a 54 year old male who presented to the emergency department due to persisting wheezing despite outpatient COPD treatment.      Patient has known COPD and has prn medications at home that he can start if he has symptoms of an exacerbation. His current symptoms started 5 days ago, so he started his home dose of 40 mg prednisone and 100 mg doxycycline. He has now completed the courses but does not feel any improvement. He states in the past he has done better on a 2 week taper, so he presented to the emergency department in hopes to obtain more steroids.      While in the emergency department he mentioned having severe cramps for the past few days. Electrolytes were checked and was found to be hyponatremic to 123. On chart review, he had also been hyponatremic when he presented to the emergency department during his last COPD exacerbation in April 2019. Improved without much intervention.     Patient has a cough, feels like it is productive but is unable to cough up any sputum. He feels very wheezy. He is increasingly dyspneic with exertion. No known fevers, often feels chilled. No myalgias, no upper respiratory symptoms.      Patient notes that his ankles have been more swollen recently. He felt jittery yesterday  "after taking prednisone. He has chronic headaches that are present currently. He feels dizzy when he coughs a lot, but otherwise no dizziness. He feels generally weak, but denies focal weakness.                  TODAY:     Subjective:  Doing well.  No dyspnea at rest, on room air at rest and using oxygen with being up and about in the room - this is baseline for him and he feels like his breathing overall is at or near baseline.  No fever or chills.  No new concerns.  Feels ready for discharge home today . Thinks he will do fine with transport without oxygen - will wheelchair down to his car (mom is picking him up) and then can have her grab his portable oxygen when he gets to his house.         ROS:   ROS: 10 point ROS neg other than the symptoms noted above in the HPI.   /78 (BP Location: Left arm)   Pulse 95   Temp 98.1  F (36.7  C) (Oral)   Resp 16   Ht 1.626 m (5' 4.02\")   Wt 55.1 kg (121 lb 7.6 oz)   SpO2 91%   BMI 20.84 kg/m     EXAM:  General: awake and alert, NAD, oriented x 3  Head: normocephalic  Neck: unremarkable, no lymphadenopathy   HEENT: oropharynx pink and moist    Heart: Regular rate and rhythm, no murmurs, rubs, or gallops  Lungs: still some expiratory wheezes but good air movement, no focal changes, no distress - overall clearly improved and suspect this is close to baseline.    Abdomen: soft, non-tender, no masses or organomegaly  Extremities: no edema in lower extremities   Skin unremarkable.            Hospital Course:     Luis Hernandez is a 54 year old male admitted on 2/16/2020. He presented to the emergency department due to persisting dyspnea despite taking steroids, as well as for evaluation of severe leg cramps, found to have COPD exacerbation as well as hyponatremia.        Acute on chronic respiratory failure with hypoxia and hypercapnia  2/16/20 -- COPD at baseline, uses 3L supplemental O2 prn with exertion. Completed outpatient COPD treatment as below. Admit VBG with " compensated respiratory acidosis (pH 7.42 / pCO2 52 / bicarb 34).  - Treat COPD as below  - Supplemental O2 to maintain sats > 92%  - No need for BiPAP or repeat VBG at this time  2/17/2020 -- improving, but still on 2LNC at rest which is up from baseline - normally only uses oxygen with activity.    2/19/2020 -- appears resolved and now at or near baseline.          COPD exacerbation  2/16/20 -- Known COPD managed prior to admission with Advair 500/50 bid, Spiriva daily, and prn DuoNebs and albuterol. Completed an outpatient course of doxycycline 100 mg bid x 5 days and prednisone 40 mg x 5 days without improvement. Very coarse lungs with prolonged expiratory phase and wheezing on admit exam. Given prednisone 40 mg in the emergency department.  - Continue prednisone 40 mg daily - patient reports that historically a 2 week taper has worked better than 5 days of steroids  - Hold prior to admission Advair and Spiriva  - Scheduled DuoNebs q4h while awake, prn albuterol  - No additional antibiotics indicated  2/18/2020 -- slowly improving . Continue prednisone at 40 along with nebs, plan for taper on discharge.    2/19/2020 --  Doing well as above, ok for discharge on prednisone taper along with home medications.           Hyponatremia - suspect SIADH  2/16/20 -- Admit sodium = 123, symptomatic with leg cramps. Appears euvolemic on exam, although patient reports worsening edema recently. No IV fluids were given in the emergency department.  - Give 500 ml NS bolus  - Fluid restriction 1.5L daily  - Serum and urine osmolality pending  - Recheck sodium at 9 pm, again in morning  - Goal sodium is </= 131 by 1 pm on 2/17 2/17/2020 -- Na up to 130 this AM - slightly fast, but patient is pretty low risk overall - we'll give just 200 ml D5W x 1 now, continue every 6 hours checks for now, but goal is 131 or less by noon, then  135 or less by 6pm and 138 or less by tomorrow AM.  Urine sodium added and pending, but will relax to  2L restriction for now.    2/18/2020 -- sodium up from admission but remains low- labs consistent with SIADH - resuming 1500 ml fluid restriction.   recheck in AM.  Out of the window of risk for overcorrection.  Requested that pharmacy review prior to admission medications, but no clear trigger for SIADH.    2/19/2020 -- per pharmacy, no apparent medication causes.  Discussed with patient yesterday and decided to proceed with CT chest/abd/pelvis - no clear malignancy found but did find a new pulmonary nodule as discussed below.  Sodium normalized with restriction - for now plan to try to continue a 2L fluid restriction, recheck sodium at follow-up with primary care provider and can discuss if he needs a tighter restriction, if 2L is good, or if he can try eliminating the restriction and following his sodium.       New pulmonary nodule  New pulmonary nodule measuring 8x5 mm seen on chest CT - per radiology recommend repeat CT chest for follow-up in 3-6 months - discussed with radiologist myself - nodule is too small to biopsy at present even if desired and characteristics are not immediately concerning, but did agree with recommendations for follow-up CT in 3-6 months.  Primary care provider to order this at follow-up appointment in 1 week, inbasket message sent.    Has an old known nodule as well - first noted in 2010, has been stable on repeat imaging. Last chest CT noted that it was a stable benign calcified granuloma. Nothing noted on chest x-ray on admission.      Essential hypertension, benign  Peripheral edema  Pulmonary hypertension  2/16/20 -- Prior echo 4/1/19 with EF 60%, RV dilation and severe pulmonary hypertension. Patient reports recent lower extremity edema, although is very minimal on admit exam. Managed prior to admission with lasix 20 mg daily.   - Continue prior to admission lasix  2/19/2020 -- these have remained stable, continue prior to admission lasix on discharge.      Tobacco abuse, in  remission  Quit 2.5 weeks ago, has been using 21 mg nicotine patch.  - Continue nicotine patch, encouraged to continue with cessation.       Diet: Regular diet, continue on fluid restriction as above      DVT Prophylaxis: Pneumatic Compression Devices     Code Status: Full - discussed with patient on admission                           Discharge Instructions and Follow-Up:     Discharge diet: Orders Placed This Encounter      Combination Diet Regular Diet Adult       Discharge activity: Activity as tolerated   Discharge follow-up: Follow up with primary care provider in 7 days, recheck BMP, readdress fluid restriction and order 3-6 month follow-up chest CT at that time as above.               Discharge Disposition:     Discharged to home      Attestation:  I have reviewed today's vital signs, notes, medications, labs and imaging.  Amount of time performed on this discharge summary: 65 minutes.    Seth Ortiz MD, MD

## 2020-02-19 NOTE — PLAN OF CARE
"Pt sleeping peacefully throughout the night. Up independent in room. Denies pain. /66 (BP Location: Left arm)   Pulse 89   Temp 98.1  F (36.7  C) (Oral)   Resp 16   Ht 1.626 m (5' 4.02\")   Wt 54 kg (119 lb 0.8 oz)   SpO2 95%   BMI 20.42 kg/m   LS bronchial and diminished in bases. Mild cough noted. Anticipate discharge today.  Sahil Prakash RN on 2/19/2020 at 5:33 AM    "

## 2020-02-19 NOTE — PLAN OF CARE
Patient alert and orientated. Vital signs stable. On room air, but uses oxygen as needed. Afebrile. Denied any pain. Up independently.      Fluid restriction of 1500 mL. Sodium level this morning was 135.     Patient denied any lightheadedness/dizziness, shortness of breath, difficulty breathing, nausea, or numbness/tingling.     Patient discharging home today.

## 2020-02-19 NOTE — PROGRESS NOTES
[facility] DISCHARGE NOTE    Patient discharged to home at 10:15 AM via wheel chair. Accompanied by other:self and staff. Discharge instructions reviewed with patient, opportunity offered to ask questions. Prescriptions sent to patients preferred pharmacy. All belongings sent with patient.    Viridiana Pacheco RN

## 2020-02-20 ENCOUNTER — TELEPHONE (OUTPATIENT)
Dept: FAMILY MEDICINE | Facility: CLINIC | Age: 55
End: 2020-02-20

## 2020-02-20 NOTE — TELEPHONE ENCOUNTER
ED/[facility]/IP follow up phone call:Shortness Of Breath    RN please call to follow up.  [facility] 2/19/2020    Number of ED visits in past 12 months = 0    Britt Campbell on 2/20/2020 at 9:51 AM

## 2020-02-26 ENCOUNTER — OFFICE VISIT (OUTPATIENT)
Dept: FAMILY MEDICINE | Facility: CLINIC | Age: 55
End: 2020-02-26
Payer: COMMERCIAL

## 2020-02-26 VITALS
BODY MASS INDEX: 21.24 KG/M2 | HEART RATE: 88 BPM | DIASTOLIC BLOOD PRESSURE: 82 MMHG | HEIGHT: 64 IN | OXYGEN SATURATION: 94 % | SYSTOLIC BLOOD PRESSURE: 138 MMHG | TEMPERATURE: 98.1 F | RESPIRATION RATE: 20 BRPM | WEIGHT: 124.4 LBS

## 2020-02-26 DIAGNOSIS — R91.1 INCIDENTAL PULMONARY NODULE, > 3MM AND < 8MM: ICD-10-CM

## 2020-02-26 DIAGNOSIS — I10 ESSENTIAL HYPERTENSION, BENIGN: Primary | ICD-10-CM

## 2020-02-26 DIAGNOSIS — E87.1 HYPONATREMIA: ICD-10-CM

## 2020-02-26 LAB
ANION GAP SERPL CALCULATED.3IONS-SCNC: 4 MMOL/L (ref 3–14)
BUN SERPL-MCNC: 20 MG/DL (ref 7–30)
CALCIUM SERPL-MCNC: 9 MG/DL (ref 8.5–10.1)
CHLORIDE SERPL-SCNC: 101 MMOL/L (ref 94–109)
CO2 SERPL-SCNC: 31 MMOL/L (ref 20–32)
CREAT SERPL-MCNC: 0.78 MG/DL (ref 0.66–1.25)
GFR SERPL CREATININE-BSD FRML MDRD: >90 ML/MIN/{1.73_M2}
GLUCOSE SERPL-MCNC: 62 MG/DL (ref 70–99)
POTASSIUM SERPL-SCNC: 4.1 MMOL/L (ref 3.4–5.3)
SODIUM SERPL-SCNC: 136 MMOL/L (ref 133–144)

## 2020-02-26 PROCEDURE — 99495 TRANSJ CARE MGMT MOD F2F 14D: CPT | Performed by: FAMILY MEDICINE

## 2020-02-26 PROCEDURE — 36415 COLL VENOUS BLD VENIPUNCTURE: CPT | Performed by: FAMILY MEDICINE

## 2020-02-26 PROCEDURE — 80048 BASIC METABOLIC PNL TOTAL CA: CPT | Performed by: FAMILY MEDICINE

## 2020-02-26 ASSESSMENT — MIFFLIN-ST. JEOR: SCORE: 1325.27

## 2020-02-26 ASSESSMENT — PAIN SCALES - GENERAL: PAINLEVEL: NO PAIN (0)

## 2020-02-26 NOTE — PATIENT INSTRUCTIONS
Please go to lab.    Please continue with the 2 liter fluid restriction.    Your lungs are clear.    Please recheck your lung nodule in 3-6 months, I ordered a future CT scan.          Thank you for choosing [facility].  You may be receiving an email and/or telephone survey request from [facility] [employer] regarding your visit today.  Please take a few minutes to respond to the survey to let us know how we are doing.      If you have questions or concerns, please contact us via [employer] or you can contact your care team at [phone].    Our Clinic hours are:  Monday 6:40 am  to 7:00 pm  Tuesday -Friday 6:40 am to 5:00 pm    The [place] outpatient lab hours are:  Monday - Friday 6:10 am to 4:45 pm  Saturdays 7:00 am to 11:00 am  Appointments are required, call [phone]    If you have clinical questions after hours or would like to schedule an appointment,  call the clinic at [phone].

## 2020-02-26 NOTE — NURSING NOTE
"Chief Complaint   Patient presents with     Hospital F/U     2/16-2/19 COPD exacerbation. Nonfasting        Initial /82   Pulse 88   Temp 98.1  F (36.7  C) (Tympanic)   Resp 20   Ht 1.626 m (5' 4\")   Wt 56.4 kg (124 lb 6.4 oz)   SpO2 94%   BMI 21.35 kg/m   Estimated body mass index is 21.35 kg/m  as calculated from the following:    Height as of this encounter: 1.626 m (5' 4\").    Weight as of this encounter: 56.4 kg (124 lb 6.4 oz).    Medication Reconciliation: complete    Mary Grace Lombardo CMA      "

## 2020-02-26 NOTE — PROGRESS NOTES
Subjective     Luis Hernandez is a 54 year old male who presents to clinic today for the following health issues:    HPI     Chief Complaint   Patient presents with     Hospital F/U     2/16-2/19 COPD exacerbation. Nonfasting      Hospital Follow-up Visit:  Hospital/Nursing Home/IP Rehab Facility: [facility]  Date of Admission: 2/16/2020  Date of Discharge: 2/19/2020  Reason(s) for Admission: Hyponatremia, shortness of breath, muscle cramps, COPD exacerbation.  Current status: Per patient, feeling 100% improved. Denies SOB or coughing.   Medication changes: Started on prednisone taper             Problems taking medications regularly:  None       Medication changes since discharge: Started on prednisone taper       Problems adhering to non-medication therapy:  None    Summary of hospitalization:  [facility] discharge summary reviewed  Diagnostic Tests/Treatments reviewed.  Follow up needed: need to have further imaging  Other Healthcare Providers Involved in Patient s Care:         None  Update since discharge: improved.     Post Discharge Medication Reconciliation: discharge medications reconciled and changed, per note/orders (see AVS).  Plan of care communicated with patient     Coding guidelines for this visit:  Type of Medical   Decision Making Face-to-Face Visit       within 7 Days of discharge Face-to-Face Visit        within 14 days of discharge   Moderate Complexity [id number]   High Complexity 12341 [id number]                      Reviewed and updated as needed this visit by Provider         Review of Systems   Review Of Systems  He has limited his water intake. He was drinking a lot of water prior to hosp admission  Skin: negative  Eyes: negative  Ears/Nose/Throat: negative  Respiratory: No shortness of breath, dyspnea on exertion, cough, or hemoptysis  Cardiovascular: negative  Gastrointestinal: negative  Genitourinary: negative  Musculoskeletal:   Neurologic:   Psychiatric:  "  Hematologic/Lymphatic/Immunologic:   Endocrine:         Objective    /82   Pulse 88   Temp 98.1  F (36.7  C) (Tympanic)   Resp 20   Ht 1.626 m (5' 4\")   Wt 56.4 kg (124 lb 6.4 oz)   SpO2 94%   BMI 21.35 kg/m    Body mass index is 21.35 kg/m .  Physical Exam   GENERAL APPEARANCE: alert, no distress and cooperative  RESP: lungs clear to auscultation - no rales, rhonchi or wheezes  CV: regular rates and rhythm, normal S1 S2, no S3 or S4 and no murmur, click or rub  ABDOMEN: soft, nontender, without hepatosplenomegaly or masses and bowel sounds normal  MS: extremities normal- no gross deformities noted  SKIN: no suspicious lesions or rashes  NEURO: Normal strength and tone, mentation intact and speech normal  PSYCH: mentation appears normal and affect normal/bright            Assessment & Plan     (I10) BENIGN HYPERTENSION  (primary encounter diagnosis)  Comment: controlled, need to recheck for hyponatremia  Plan: Basic metabolic panel            (E87.1) Hyponatremia  Comment: recheck  Plan: Basic metabolic panel            (R91.1) Incidental pulmonary nodule, > 3mm and < 8mm  Comment: explained finding and the plan with future CT scan  Plan: CT Chest w Contrast                 See Patient Instructions    Return in about 3 months (around 5/26/2020) for CT scan for lung nodule.    Dean Mariee MD  [facility]        "

## 2020-03-17 ENCOUNTER — MYC MEDICAL ADVICE (OUTPATIENT)
Dept: FAMILY MEDICINE | Facility: CLINIC | Age: 55
End: 2020-03-17

## 2020-03-17 DIAGNOSIS — J44.1 COPD WITH ACUTE EXACERBATION (H): ICD-10-CM

## 2020-03-17 DIAGNOSIS — J44.1 COPD EXACERBATION (H): ICD-10-CM

## 2020-03-17 DIAGNOSIS — R06.2 WHEEZING: ICD-10-CM

## 2020-03-17 DIAGNOSIS — J43.2 CENTRILOBULAR EMPHYSEMA (H): ICD-10-CM

## 2020-03-17 RX ORDER — PREDNISONE 10 MG/1
40 TABLET ORAL DAILY
Qty: 20 TABLET | Refills: 4 | Status: CANCELLED | OUTPATIENT
Start: 2020-03-17

## 2020-03-17 RX ORDER — PREDNISONE 10 MG/1
TABLET ORAL
Qty: 30 TABLET | Refills: 3 | Status: ON HOLD | OUTPATIENT
Start: 2020-03-17 | End: 2020-04-10

## 2020-03-17 NOTE — TELEPHONE ENCOUNTER
Dr Mariee, please see his mychart note, he is asking for prednisone to have on hand.   Thanks,   Nisha Miller RNC

## 2020-03-17 NOTE — TELEPHONE ENCOUNTER
I sent the medication with refills.  I also sent him a note on MyChart.  Dean Mariee MD  Family Medicine

## 2020-04-10 ENCOUNTER — APPOINTMENT (OUTPATIENT)
Dept: GENERAL RADIOLOGY | Facility: CLINIC | Age: 55
End: 2020-04-10
Attending: STUDENT IN AN ORGANIZED HEALTH CARE EDUCATION/TRAINING PROGRAM
Payer: COMMERCIAL

## 2020-04-10 ENCOUNTER — HOSPITAL ENCOUNTER (INPATIENT)
Facility: CLINIC | Age: 55
LOS: 4 days | Discharge: HOME | End: 2020-04-14
Attending: STUDENT IN AN ORGANIZED HEALTH CARE EDUCATION/TRAINING PROGRAM | Admitting: FAMILY MEDICINE
Payer: COMMERCIAL

## 2020-04-10 ENCOUNTER — COMMUNICATION - HEALTHEAST (OUTPATIENT)
Dept: SCHEDULING | Facility: CLINIC | Age: 55
End: 2020-04-10

## 2020-04-10 ENCOUNTER — APPOINTMENT (OUTPATIENT)
Dept: CT IMAGING | Facility: CLINIC | Age: 55
End: 2020-04-10
Attending: STUDENT IN AN ORGANIZED HEALTH CARE EDUCATION/TRAINING PROGRAM
Payer: COMMERCIAL

## 2020-04-10 DIAGNOSIS — R06.2 WHEEZING: ICD-10-CM

## 2020-04-10 DIAGNOSIS — J44.1 COPD EXACERBATION (H): ICD-10-CM

## 2020-04-10 DIAGNOSIS — R06.02 SHORTNESS OF BREATH: ICD-10-CM

## 2020-04-10 DIAGNOSIS — R60.0 PERIPHERAL EDEMA: ICD-10-CM

## 2020-04-10 DIAGNOSIS — J43.2 CENTRILOBULAR EMPHYSEMA (H): ICD-10-CM

## 2020-04-10 DIAGNOSIS — J18.9 PNEUMONIA OF LEFT LUNG DUE TO INFECTIOUS ORGANISM, UNSPECIFIED PART OF LUNG: Primary | ICD-10-CM

## 2020-04-10 DIAGNOSIS — J18.9 PNEUMONIA OF LEFT UPPER LOBE DUE TO INFECTIOUS ORGANISM: ICD-10-CM

## 2020-04-10 PROBLEM — J96.21 ACUTE ON CHRONIC RESPIRATORY FAILURE WITH HYPOXIA (H): Status: ACTIVE | Noted: 2020-04-10

## 2020-04-10 LAB
ALBUMIN SERPL-MCNC: 3.2 G/DL (ref 3.4–5)
ALP SERPL-CCNC: 77 U/L (ref 40–150)
ALT SERPL W P-5'-P-CCNC: 73 U/L (ref 0–70)
ANION GAP SERPL CALCULATED.3IONS-SCNC: 5 MMOL/L (ref 3–14)
AST SERPL W P-5'-P-CCNC: 64 U/L (ref 0–45)
BASE EXCESS BLDV CALC-SCNC: 1 MMOL/L
BASOPHILS # BLD AUTO: 0 10E9/L (ref 0–0.2)
BASOPHILS NFR BLD AUTO: 0.1 %
BILIRUB SERPL-MCNC: 0.2 MG/DL (ref 0.2–1.3)
BUN SERPL-MCNC: 14 MG/DL (ref 7–30)
CALCIUM SERPL-MCNC: 9.2 MG/DL (ref 8.5–10.1)
CHLORIDE SERPL-SCNC: 103 MMOL/L (ref 94–109)
CO2 SERPL-SCNC: 25 MMOL/L (ref 20–32)
CREAT SERPL-MCNC: 0.71 MG/DL (ref 0.66–1.25)
CRP SERPL-MCNC: 165 MG/L (ref 0–8)
DIFFERENTIAL METHOD BLD: ABNORMAL
EOSINOPHIL # BLD AUTO: 0.1 10E9/L (ref 0–0.7)
EOSINOPHIL NFR BLD AUTO: 0.4 %
ERYTHROCYTE [DISTWIDTH] IN BLOOD BY AUTOMATED COUNT: 13.8 % (ref 10–15)
FERRITIN SERPL-MCNC: 229 NG/ML (ref 26–388)
GFR SERPL CREATININE-BSD FRML MDRD: >90 ML/MIN/{1.73_M2}
GLUCOSE SERPL-MCNC: 120 MG/DL (ref 70–99)
GRAM STN SPEC: NORMAL
HCO3 BLDV-SCNC: 27 MMOL/L (ref 21–28)
HCT VFR BLD AUTO: 37.6 % (ref 40–53)
HGB BLD-MCNC: 12.2 G/DL (ref 13.3–17.7)
IMM GRANULOCYTES # BLD: 0.4 10E9/L (ref 0–0.4)
IMM GRANULOCYTES NFR BLD: 1.7 %
LACTATE BLD-SCNC: 0.8 MMOL/L (ref 0.7–2)
LDH SERPL L TO P-CCNC: 151 U/L (ref 85–227)
LYMPHOCYTES # BLD AUTO: 0.4 10E9/L (ref 0.8–5.3)
LYMPHOCYTES NFR BLD AUTO: 1.6 %
Lab: NORMAL
MCH RBC QN AUTO: 32.3 PG (ref 26.5–33)
MCHC RBC AUTO-ENTMCNC: 32.4 G/DL (ref 31.5–36.5)
MCV RBC AUTO: 100 FL (ref 78–100)
MONOCYTES # BLD AUTO: 1.5 10E9/L (ref 0–1.3)
MONOCYTES NFR BLD AUTO: 6.2 %
MRSA DNA SPEC QL NAA+PROBE: NEGATIVE
NEUTROPHILS # BLD AUTO: 21.4 10E9/L (ref 1.6–8.3)
NEUTROPHILS NFR BLD AUTO: 90 %
NRBC # BLD AUTO: 0 10*3/UL
NRBC BLD AUTO-RTO: 0 /100
NT-PROBNP SERPL-MCNC: 286 PG/ML (ref 0–900)
O2/TOTAL GAS SETTING VFR VENT: ABNORMAL %
PCO2 BLDV: 48 MM HG (ref 40–50)
PH BLDV: 7.36 PH (ref 7.32–7.43)
PLATELET # BLD AUTO: 302 10E9/L (ref 150–450)
PO2 BLDV: 63 MM HG (ref 25–47)
POTASSIUM SERPL-SCNC: 4.3 MMOL/L (ref 3.4–5.3)
PROCALCITONIN SERPL-MCNC: 0.07 NG/ML
PROT SERPL-MCNC: 7.1 G/DL (ref 6.8–8.8)
RBC # BLD AUTO: 3.78 10E12/L (ref 4.4–5.9)
SODIUM SERPL-SCNC: 133 MMOL/L (ref 133–144)
SPECIMEN SOURCE: NORMAL
SPECIMEN SOURCE: NORMAL
TROPONIN I SERPL-MCNC: <0.015 UG/L (ref 0–0.04)
WBC # BLD AUTO: 23.8 10E9/L (ref 4–11)

## 2020-04-10 PROCEDURE — 87205 SMEAR GRAM STAIN: CPT | Performed by: STUDENT IN AN ORGANIZED HEALTH CARE EDUCATION/TRAINING PROGRAM

## 2020-04-10 PROCEDURE — 85025 COMPLETE CBC W/AUTO DIFF WBC: CPT | Performed by: STUDENT IN AN ORGANIZED HEALTH CARE EDUCATION/TRAINING PROGRAM

## 2020-04-10 PROCEDURE — 83615 LACTATE (LD) (LDH) ENZYME: CPT | Performed by: STUDENT IN AN ORGANIZED HEALTH CARE EDUCATION/TRAINING PROGRAM

## 2020-04-10 PROCEDURE — 87040 BLOOD CULTURE FOR BACTERIA: CPT | Performed by: STUDENT IN AN ORGANIZED HEALTH CARE EDUCATION/TRAINING PROGRAM

## 2020-04-10 PROCEDURE — 87070 CULTURE OTHR SPECIMN AEROBIC: CPT | Performed by: STUDENT IN AN ORGANIZED HEALTH CARE EDUCATION/TRAINING PROGRAM

## 2020-04-10 PROCEDURE — 84484 ASSAY OF TROPONIN QUANT: CPT | Performed by: STUDENT IN AN ORGANIZED HEALTH CARE EDUCATION/TRAINING PROGRAM

## 2020-04-10 PROCEDURE — 96365 THER/PROPH/DIAG IV INF INIT: CPT | Performed by: STUDENT IN AN ORGANIZED HEALTH CARE EDUCATION/TRAINING PROGRAM

## 2020-04-10 PROCEDURE — 82803 BLOOD GASES ANY COMBINATION: CPT | Performed by: STUDENT IN AN ORGANIZED HEALTH CARE EDUCATION/TRAINING PROGRAM

## 2020-04-10 PROCEDURE — 87640 STAPH A DNA AMP PROBE: CPT | Performed by: PHYSICIAN ASSISTANT

## 2020-04-10 PROCEDURE — 99285 EMERGENCY DEPT VISIT HI MDM: CPT | Mod: 25 | Performed by: STUDENT IN AN ORGANIZED HEALTH CARE EDUCATION/TRAINING PROGRAM

## 2020-04-10 PROCEDURE — 87635 SARS-COV-2 COVID-19 AMP PRB: CPT | Performed by: STUDENT IN AN ORGANIZED HEALTH CARE EDUCATION/TRAINING PROGRAM

## 2020-04-10 PROCEDURE — 82728 ASSAY OF FERRITIN: CPT | Performed by: STUDENT IN AN ORGANIZED HEALTH CARE EDUCATION/TRAINING PROGRAM

## 2020-04-10 PROCEDURE — 12000000 ZZH R&B MED SURG/OB

## 2020-04-10 PROCEDURE — 96367 TX/PROPH/DG ADDL SEQ IV INF: CPT | Performed by: STUDENT IN AN ORGANIZED HEALTH CARE EDUCATION/TRAINING PROGRAM

## 2020-04-10 PROCEDURE — 87077 CULTURE AEROBIC IDENTIFY: CPT | Performed by: STUDENT IN AN ORGANIZED HEALTH CARE EDUCATION/TRAINING PROGRAM

## 2020-04-10 PROCEDURE — 86140 C-REACTIVE PROTEIN: CPT | Performed by: STUDENT IN AN ORGANIZED HEALTH CARE EDUCATION/TRAINING PROGRAM

## 2020-04-10 PROCEDURE — 99223 1ST HOSP IP/OBS HIGH 75: CPT | Mod: AI | Performed by: PHYSICIAN ASSISTANT

## 2020-04-10 PROCEDURE — 71045 X-RAY EXAM CHEST 1 VIEW: CPT

## 2020-04-10 PROCEDURE — 25800030 ZZH RX IP 258 OP 636: Performed by: STUDENT IN AN ORGANIZED HEALTH CARE EDUCATION/TRAINING PROGRAM

## 2020-04-10 PROCEDURE — 25000128 H RX IP 250 OP 636: Performed by: PHYSICIAN ASSISTANT

## 2020-04-10 PROCEDURE — 25000128 H RX IP 250 OP 636: Performed by: STUDENT IN AN ORGANIZED HEALTH CARE EDUCATION/TRAINING PROGRAM

## 2020-04-10 PROCEDURE — 87641 MR-STAPH DNA AMP PROBE: CPT | Performed by: PHYSICIAN ASSISTANT

## 2020-04-10 PROCEDURE — 25000125 ZZHC RX 250: Performed by: PHYSICIAN ASSISTANT

## 2020-04-10 PROCEDURE — 80053 COMPREHEN METABOLIC PANEL: CPT | Performed by: STUDENT IN AN ORGANIZED HEALTH CARE EDUCATION/TRAINING PROGRAM

## 2020-04-10 PROCEDURE — 83605 ASSAY OF LACTIC ACID: CPT | Performed by: PHYSICIAN ASSISTANT

## 2020-04-10 PROCEDURE — 87186 SC STD MICRODIL/AGAR DIL: CPT | Performed by: STUDENT IN AN ORGANIZED HEALTH CARE EDUCATION/TRAINING PROGRAM

## 2020-04-10 PROCEDURE — 83880 ASSAY OF NATRIURETIC PEPTIDE: CPT | Performed by: STUDENT IN AN ORGANIZED HEALTH CARE EDUCATION/TRAINING PROGRAM

## 2020-04-10 PROCEDURE — 25000132 ZZH RX MED GY IP 250 OP 250 PS 637: Performed by: PHYSICIAN ASSISTANT

## 2020-04-10 PROCEDURE — 36415 COLL VENOUS BLD VENIPUNCTURE: CPT | Performed by: PHYSICIAN ASSISTANT

## 2020-04-10 PROCEDURE — 71250 CT THORAX DX C-: CPT

## 2020-04-10 PROCEDURE — 84145 PROCALCITONIN (PCT): CPT | Performed by: STUDENT IN AN ORGANIZED HEALTH CARE EDUCATION/TRAINING PROGRAM

## 2020-04-10 RX ORDER — MAGNESIUM OXIDE 400 MG/1
400 TABLET ORAL EVERY EVENING
Status: DISCONTINUED | OUTPATIENT
Start: 2020-04-10 | End: 2020-04-14 | Stop reason: HOSPADM

## 2020-04-10 RX ORDER — GUAIFENESIN AND DEXTROMETHORPHAN HYDROBROMIDE 600; 30 MG/1; MG/1
1 TABLET, EXTENDED RELEASE ORAL 2 TIMES DAILY PRN
Status: DISCONTINUED | OUTPATIENT
Start: 2020-04-10 | End: 2020-04-14 | Stop reason: HOSPADM

## 2020-04-10 RX ORDER — ACETAMINOPHEN 325 MG/1
650 TABLET ORAL EVERY 4 HOURS PRN
Status: DISCONTINUED | OUTPATIENT
Start: 2020-04-10 | End: 2020-04-14 | Stop reason: HOSPADM

## 2020-04-10 RX ORDER — ALBUTEROL SULFATE 90 UG/1
2 AEROSOL, METERED RESPIRATORY (INHALATION)
Status: DISCONTINUED | OUTPATIENT
Start: 2020-04-10 | End: 2020-04-14 | Stop reason: HOSPADM

## 2020-04-10 RX ORDER — NALOXONE HYDROCHLORIDE 0.4 MG/ML
.1-.4 INJECTION, SOLUTION INTRAMUSCULAR; INTRAVENOUS; SUBCUTANEOUS
Status: DISCONTINUED | OUTPATIENT
Start: 2020-04-10 | End: 2020-04-14 | Stop reason: HOSPADM

## 2020-04-10 RX ORDER — CEFTRIAXONE SODIUM 2 G/50ML
2 INJECTION, SOLUTION INTRAVENOUS EVERY 24 HOURS
Status: DISCONTINUED | OUTPATIENT
Start: 2020-04-10 | End: 2020-04-14 | Stop reason: HOSPADM

## 2020-04-10 RX ORDER — AMLODIPINE BESYLATE 10 MG/1
10 TABLET ORAL DAILY
Status: DISCONTINUED | OUTPATIENT
Start: 2020-04-10 | End: 2020-04-14 | Stop reason: HOSPADM

## 2020-04-10 RX ORDER — FUROSEMIDE 20 MG
20 TABLET ORAL DAILY
Status: DISCONTINUED | OUTPATIENT
Start: 2020-04-10 | End: 2020-04-14 | Stop reason: HOSPADM

## 2020-04-10 RX ORDER — ONDANSETRON 4 MG/1
4 TABLET, ORALLY DISINTEGRATING ORAL EVERY 6 HOURS PRN
Status: DISCONTINUED | OUTPATIENT
Start: 2020-04-10 | End: 2020-04-14 | Stop reason: HOSPADM

## 2020-04-10 RX ORDER — ACETAMINOPHEN 650 MG/1
650 SUPPOSITORY RECTAL EVERY 4 HOURS PRN
Status: DISCONTINUED | OUTPATIENT
Start: 2020-04-10 | End: 2020-04-14 | Stop reason: HOSPADM

## 2020-04-10 RX ORDER — NICOTINE 21 MG/24HR
1 PATCH, TRANSDERMAL 24 HOURS TRANSDERMAL EVERY 24 HOURS
Status: DISCONTINUED | OUTPATIENT
Start: 2020-04-10 | End: 2020-04-14 | Stop reason: HOSPADM

## 2020-04-10 RX ORDER — PROCHLORPERAZINE 25 MG
25 SUPPOSITORY, RECTAL RECTAL EVERY 12 HOURS PRN
Status: DISCONTINUED | OUTPATIENT
Start: 2020-04-10 | End: 2020-04-14 | Stop reason: HOSPADM

## 2020-04-10 RX ORDER — PROCHLORPERAZINE MALEATE 5 MG
10 TABLET ORAL EVERY 6 HOURS PRN
Status: DISCONTINUED | OUTPATIENT
Start: 2020-04-10 | End: 2020-04-14 | Stop reason: HOSPADM

## 2020-04-10 RX ORDER — LIDOCAINE 40 MG/G
CREAM TOPICAL
Status: DISCONTINUED | OUTPATIENT
Start: 2020-04-10 | End: 2020-04-14 | Stop reason: HOSPADM

## 2020-04-10 RX ORDER — ONDANSETRON 2 MG/ML
4 INJECTION INTRAMUSCULAR; INTRAVENOUS EVERY 6 HOURS PRN
Status: DISCONTINUED | OUTPATIENT
Start: 2020-04-10 | End: 2020-04-14 | Stop reason: HOSPADM

## 2020-04-10 RX ORDER — ATENOLOL 25 MG/1
25 TABLET ORAL DAILY
Status: DISCONTINUED | OUTPATIENT
Start: 2020-04-10 | End: 2020-04-14 | Stop reason: HOSPADM

## 2020-04-10 RX ORDER — BENZONATATE 100 MG/1
100 CAPSULE ORAL 3 TIMES DAILY PRN
Status: DISCONTINUED | OUTPATIENT
Start: 2020-04-10 | End: 2020-04-14 | Stop reason: HOSPADM

## 2020-04-10 RX ADMIN — IPRATROPIUM BROMIDE AND ALBUTEROL 1 PUFF: 20; 100 SPRAY, METERED RESPIRATORY (INHALATION) at 21:47

## 2020-04-10 RX ADMIN — CEFTRIAXONE SODIUM 2 G: 2 INJECTION, SOLUTION INTRAVENOUS at 17:56

## 2020-04-10 RX ADMIN — ATENOLOL 25 MG: 25 TABLET ORAL at 21:45

## 2020-04-10 RX ADMIN — AZITHROMYCIN MONOHYDRATE 500 MG: 500 INJECTION, POWDER, LYOPHILIZED, FOR SOLUTION INTRAVENOUS at 19:05

## 2020-04-10 RX ADMIN — NICOTINE 1 PATCH: 21 PATCH, EXTENDED RELEASE TRANSDERMAL at 21:44

## 2020-04-10 RX ADMIN — LISINOPRIL 60 MG: 20 TABLET ORAL at 21:45

## 2020-04-10 RX ADMIN — FUROSEMIDE 20 MG: 20 TABLET ORAL at 21:45

## 2020-04-10 RX ADMIN — AMLODIPINE BESYLATE 10 MG: 10 TABLET ORAL at 21:45

## 2020-04-10 ASSESSMENT — MIFFLIN-ST. JEOR: SCORE: 1321.88

## 2020-04-10 NOTE — LETTER
Date: 4/14/2020      Name: Luis Hernandez                       YOB: 1965    Medical Record Number: [id number]    The patient was  Admitted to [facility] from 4/10-4/14/2020.  He should not return to work until he sees his primary care physician in follow up.        Lisseth Ortega MD  [facility]

## 2020-04-10 NOTE — ED PROVIDER NOTES
This exam was conducted using two-way, real-time interactive telecommunication technology between the patient and physician.  The patient was offered telemedicine as an option for care delivery and verbally consented to this option.  This evaluation was conducted while onsite in the emergency department during SARS-Cov-2 pandemic.        History     Chief Complaint   Patient presents with     Shortness of Breath     SOA with cough.  No pain.  Has history of COPD  States feels different     Cough     HPI  Luis Hernandez is a 54 year old male with past medical history which includes tobacco abuse, pulmonary hypertension, essential hypertension, and oxygen dependent COPD who presents for evaluation of increasing shortness of breath and cough.  Patient explains that yesterday he began suffering from increasing coughing and shortness of breath similar to history of COPD exacerbations.  However he has also felt chilled at times, home thermometer readings as high as 99  F.  Associated symptoms include mild headache.  He began taking his prescription prednisone at 40 mg yesterday in addition to doxycycline twice daily.  He denies sore throat, chest pain, or gastrointestinal symptoms.  No known sick/ill contacts.  Of note, patient only uses home oxygen with exertional activities but yesterday began using his oxygen at rest, 1 L via nasal cannula.    Allergies:  Allergies   Allergen Reactions     Hctz Other (See Comments)     He has hyponatremia     Penicillins Other (See Comments)     Reaction unknown       Problem List:    Patient Active Problem List    Diagnosis Date Noted     Hyponatremia 02/16/2020     Priority: Medium     COPD exacerbation (H) 02/16/2020     Priority: Medium     Peripheral edema 02/16/2020     Priority: Medium     Pulmonary hypertension (H) 02/16/2020     Priority: Medium     Acute on chronic respiratory failure with hypoxia and hypercapnia (H) 04/01/2019     Priority: Medium     Tobacco abuse, in  remission 12/05/2017     Priority: Medium     Incidental pulmonary nodule, > 3mm and < 8mm, new from 2010 01/07/2014     Priority: Medium     By chest x-ray and chest CT new from CT chest from Oct 2010.   Stable 01/2014 to 07/2014, 6 month follow scan recommended.   Chest CT of 1/15/2015= stable nodule, f/u one year.       Advanced directives, counseling/discussion 11/25/2013     Priority: Medium     11/25/2013 Gave patient honoring choices forms to take home and review.  DAVEY Dial ([facility])        CARDIOVASCULAR SCREENING; LDL GOAL LESS THAN 130 10/31/2010     Priority: Medium     COPD (chronic obstructive pulmonary disease) (H) 10/25/2010     Priority: Medium     Severe to very severe       Eczema 10/04/2010     Priority: Medium     ED (erectile dysfunction) 04/20/2009     Priority: Medium     Essential hypertension, benign 10/15/2007     Priority: Medium        Past Medical History:    Past Medical History:   Diagnosis Date     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)      COPD exacerbation (H) 4/1/2019     Erectile dysfunction      Hypertension      Hyponatremia 4/1/2019       Past Surgical History:    Past Surgical History:   Procedure Laterality Date     APPENDECTOMY      childhood       Family History:    Family History   Problem Relation Age of Onset     Hypertension Father      Lipids Father      C.A.D. Father      Heart Disease Father      Diabetes Paternal Grandmother      Hypertension Mother      Ovarian Cancer Mother        Social History:  Marital Status:  Single [1]  Social History     Tobacco Use     Smoking status: Former Smoker     Packs/day: 2.00     Years: 30.00     Pack years: 60.00     Types: Cigarettes     Smokeless tobacco: Former User     Tobacco comment: quitting using the patch   Substance Use Topics     Alcohol use: Yes     Comment: rare     Drug use: No        Medications:    albuterol (PROAIR HFA/PROVENTIL HFA/VENTOLIN HFA) 108 (90 Base) MCG/ACT  inhaler  amLODIPine (NORVASC) 10 MG tablet  atenolol (TENORMIN) 25 MG tablet  fluticasone-salmeterol (ADVAIR) 500-50 MCG/DOSE inhaler  furosemide (LASIX) 20 MG tablet  ipratropium - albuterol 0.5 mg/2.5 mg/3 mL (DUONEB) 0.5-2.5 (3) MG/3ML neb solution  lisinopril (PRINIVIL/ZESTRIL) 40 MG tablet  magnesium oxide (MAG-OX) 400 MG tablet  nicotine (NICODERM CQ) 21 MG/24HR 24 hr patch  order for DME  order for DME  potassium 99 MG TABS  predniSONE (DELTASONE) 10 MG tablet  predniSONE (DELTASONE) 10 MG tablet  tiotropium (SPIRIVA HANDIHALER) 18 MCG inhaled capsule          Review of Systems  Constitutional: Positive for chills.  Negative for fever.  Cardiovascular:  Negative for chest discomfort.  Respiratory: Positive for productive cough with shortness of breath.  Gastrointestinal:  Negative for abdominal pain, nausea or vomiting.   Neurological: Positive for generalized headache.  Negative for dizziness.    All others reviewed and are negative.      Physical Exam   BP: (!) 156/96  Pulse: 123  Heart Rate: 113  Temp: 98.6  F (37  C)  Resp: 22  Weight: 54.4 kg (120 lb)  SpO2: 94 %      Physical Exam  The physical exam was conducted using two-way, real-time interactive telecommunication technology with video between the patient and physician.    \  Constitutional:  Well developed, well nourished.  Appears nontoxic and in no acute distress.   HENT:  Normocephalic.  Eyes:  Conjunctivae are normal.   Cardiovascular:  No  cyanosis.   Respiratory:  Effort normal, no respiratory distress.  Talking in full sentences.  No audible wheezing or stridor.  Neurological:  Patient is alert and conversing appropriately.  Skin:  Does not appear erythematous or diaphoretic via video monitor.  Psychiatric:  Normal mood and affect.      ED Course        Procedures              Critical Care time:  none               Results for orders placed or performed during the hospital encounter of 04/10/20 (from the past 24 hour(s))   CBC with platelets  differential   Result Value Ref Range    WBC 23.8 (H) 4.0 - 11.0 10e9/L    RBC Count 3.78 (L) 4.4 - 5.9 10e12/L    Hemoglobin 12.2 (L) 13.3 - 17.7 g/dL    Hematocrit 37.6 (L) 40.0 - 53.0 %     78 - 100 fl    MCH 32.3 26.5 - 33.0 pg    MCHC 32.4 31.5 - 36.5 g/dL    RDW 13.8 10.0 - 15.0 %    Platelet Count 302 150 - 450 10e9/L    Diff Method Automated Method     % Neutrophils 90.0 %    % Lymphocytes 1.6 %    % Monocytes 6.2 %    % Eosinophils 0.4 %    % Basophils 0.1 %    % Immature Granulocytes 1.7 %    Nucleated RBCs 0 0 /100    Absolute Neutrophil 21.4 (H) 1.6 - 8.3 10e9/L    Absolute Lymphocytes 0.4 (L) 0.8 - 5.3 10e9/L    Absolute Monocytes 1.5 (H) 0.0 - 1.3 10e9/L    Absolute Eosinophils 0.1 0.0 - 0.7 10e9/L    Absolute Basophils 0.0 0.0 - 0.2 10e9/L    Abs Immature Granulocytes 0.4 0 - 0.4 10e9/L    Absolute Nucleated RBC 0.0    Comprehensive metabolic panel   Result Value Ref Range    Sodium 133 133 - 144 mmol/L    Potassium 4.3 3.4 - 5.3 mmol/L    Chloride 103 94 - 109 mmol/L    Carbon Dioxide 25 20 - 32 mmol/L    Anion Gap 5 3 - 14 mmol/L    Glucose 120 (H) 70 - 99 mg/dL    Urea Nitrogen 14 7 - 30 mg/dL    Creatinine 0.71 0.66 - 1.25 mg/dL    GFR Estimate >90 >60 mL/min/[1.73_m2]    GFR Estimate If Black >90 >60 mL/min/[1.73_m2]    Calcium 9.2 8.5 - 10.1 mg/dL    Bilirubin Total 0.2 0.2 - 1.3 mg/dL    Albumin 3.2 (L) 3.4 - 5.0 g/dL    Protein Total 7.1 6.8 - 8.8 g/dL    Alkaline Phosphatase 77 40 - 150 U/L    ALT 73 (H) 0 - 70 U/L    AST 64 (H) 0 - 45 U/L   Troponin I   Result Value Ref Range    Troponin I ES <0.015 0.000 - 0.045 ug/L   Nt probnp inpatient (BNP)   Result Value Ref Range    N-Terminal Pro BNP Inpatient 286 0 - 900 pg/mL   Blood gas venous   Result Value Ref Range    Ph Venous 7.36 7.32 - 7.43 pH    PCO2 Venous 48 40 - 50 mm Hg    PO2 Venous 63 (H) 25 - 47 mm Hg    Bicarbonate Venous 27 21 - 28 mmol/L    Base Excess Venous 1.0 mmol/L    FIO2 1 L    XR Chest Port 1 View    Narrative     CHEST ONE VIEW  4/10/2020 4:17 PM     HISTORY: Cough with shortness of breath.     COMPARISON: None.      Impression    IMPRESSION: Left lower lobe infiltrate. Right lung clear.    ECTOR POLLARD MD   Chest CT w/o contrast    Narrative    CT CHEST WITHOUT CONTRAST 4/10/2020 4:50 PM     HISTORY: Cough and shortness of breath, past medical history of oxygen  dependent COPD.    COMPARISON: None.    TECHNIQUE: Volumetric helical acquisition of CT images of the chest  from the clavicles to the kidneys were acquired without IV contrast.  Radiation dose for this scan was reduced using automated exposure  control, adjustment of the mA and/or kV according to patient size, or  iterative reconstruction technique.    FINDINGS: Extensive left upper lobe consolidation. Left lower lobe  clear. Moderate to severe emphysema. No pleural or pericardial  effusion. There are mild atherosclerotic changes of the visualized  aorta and its branches. There is no evidence of aortic aneurysm. There  are mild coronary vascular calcifications consistent with coronary  artery disease. Normal heart size. No acute findings in the visualized  upper abdomen.      Impression    IMPRESSION: Extensive left upper lobe infiltrate. Chest x-ray  follow-up in 6-8 weeks recommended to confirm resolution.    ECTOR POLLARD MD       Medications   cefTRIAXone IN D5W (ROCEPHIN) intermittent infusion 2 g (2 g Intravenous New Bag 4/10/20 1617)   azithromycin (ZITHROMAX) 500 mg in sodium chloride 0.9 % 250 mL intermittent infusion (has no administration in time range)   azithromycin (ZITHROMAX) 250 mg in sodium chloride 0.9 % 250 mL intermittent infusion (has no administration in time range)       Assessments & Plan (with Medical Decision Making)   Luis Hernandez is a 54 year old male who presented to the department complaining of 24 hours of symptoms including cough, shortness of breath, and tactile fevers at home.  He denies known sick or ill contacts, no known  novel coronavirus exposures.  Despite appearing fairly comfortable he describes subjective shortness of breath and need to increase home oxygen.  CBC significant for leukocytosis, personally reviewed the initial chest radiograph and there is concern for left-sided opacification.  Subsequent CT imaging read by radiologist as having an extensive left upper lobe infiltrate.  Given the patient's symptoms, objective findings, and extensive medical history, he will require hospital admission for monitoring and management.  Consulted hospitalist TANGELA Marti who has accepted ongoing care for the patient at this time.  She agrees with antibiotic therapy plan of ceftriaxone and azithromycin.  Temporary transition orders were placed per hospital protocol to prevent any potential delay in patient care.    The patient has been informed of results and the recommendation for admission.  They have verbalized an understanding, all questions answered, and in agreement with the plan.      Disclaimer:  This note consists of symbols derived from keyboarding, dictation, and/or voice recognition software.  As a result, there may be errors in the script that have gone undetected.  Please consider this when interpreting information found in the chart.        I have reviewed the nursing notes.    I have reviewed the findings, diagnosis, plan and need for follow up with the patient.       New Prescriptions    No medications on file       Final diagnoses:   Pneumonia of left upper lobe due to infectious organism (H)   Shortness of breath       4/10/2020   [facility] EMERGENCY DEPARTMENT     Luis Otero DO  04/10/20 1758

## 2020-04-10 NOTE — LETTER
Date: 4/14/2020      Name: Luis Hernandez                       YOB: 1965    Medical Record Number: [id number]    The patient was seen admitted to [facility] from 4/10/2020 to 4/14/2020.    He should not return to work until seen and released by his primary physician.            _________________________    Lisseth Ortega MD  [facility] Medicine [facility]

## 2020-04-11 PROBLEM — J44.1 COPD EXACERBATION (H): Status: RESOLVED | Noted: 2020-02-16 | Resolved: 2020-04-11

## 2020-04-11 PROBLEM — J96.21 ACUTE ON CHRONIC RESPIRATORY FAILURE WITH HYPOXIA AND HYPERCAPNIA (H): Status: RESOLVED | Noted: 2019-04-01 | Resolved: 2020-04-11

## 2020-04-11 PROBLEM — E87.1 HYPONATREMIA: Status: RESOLVED | Noted: 2020-02-16 | Resolved: 2020-04-11

## 2020-04-11 PROBLEM — J96.22 ACUTE ON CHRONIC RESPIRATORY FAILURE WITH HYPOXIA AND HYPERCAPNIA (H): Status: RESOLVED | Noted: 2019-04-01 | Resolved: 2020-04-11

## 2020-04-11 LAB
ALBUMIN SERPL-MCNC: 2.8 G/DL (ref 3.4–5)
ALP SERPL-CCNC: 75 U/L (ref 40–150)
ALT SERPL W P-5'-P-CCNC: 60 U/L (ref 0–70)
ANION GAP SERPL CALCULATED.3IONS-SCNC: 2 MMOL/L (ref 3–14)
AST SERPL W P-5'-P-CCNC: 32 U/L (ref 0–45)
BASOPHILS # BLD AUTO: 0 10E9/L (ref 0–0.2)
BASOPHILS NFR BLD AUTO: 0 %
BILIRUB SERPL-MCNC: 0.3 MG/DL (ref 0.2–1.3)
BUN SERPL-MCNC: 12 MG/DL (ref 7–30)
CALCIUM SERPL-MCNC: 9 MG/DL (ref 8.5–10.1)
CHLORIDE SERPL-SCNC: 98 MMOL/L (ref 94–109)
CO2 SERPL-SCNC: 32 MMOL/L (ref 20–32)
CREAT SERPL-MCNC: 0.59 MG/DL (ref 0.66–1.25)
DIFFERENTIAL METHOD BLD: ABNORMAL
EOSINOPHIL # BLD AUTO: 0 10E9/L (ref 0–0.7)
EOSINOPHIL NFR BLD AUTO: 0 %
ERYTHROCYTE [DISTWIDTH] IN BLOOD BY AUTOMATED COUNT: 14 % (ref 10–15)
GFR SERPL CREATININE-BSD FRML MDRD: >90 ML/MIN/{1.73_M2}
GLUCOSE SERPL-MCNC: 138 MG/DL (ref 70–99)
HCT VFR BLD AUTO: 35.5 % (ref 40–53)
HGB BLD-MCNC: 11.5 G/DL (ref 13.3–17.7)
LYMPHOCYTES # BLD AUTO: 0.7 10E9/L (ref 0.8–5.3)
LYMPHOCYTES NFR BLD AUTO: 3 %
MCH RBC QN AUTO: 31.9 PG (ref 26.5–33)
MCHC RBC AUTO-ENTMCNC: 32.4 G/DL (ref 31.5–36.5)
MCV RBC AUTO: 98 FL (ref 78–100)
MONOCYTES # BLD AUTO: 2.1 10E9/L (ref 0–1.3)
MONOCYTES NFR BLD AUTO: 9 %
MYELOCYTES # BLD: 0.2 10E9/L
MYELOCYTES NFR BLD MANUAL: 1 %
NEUTROPHILS # BLD AUTO: 19.8 10E9/L (ref 1.6–8.3)
NEUTROPHILS NFR BLD AUTO: 87 %
PLATELET # BLD AUTO: 296 10E9/L (ref 150–450)
PLATELET # BLD EST: ABNORMAL 10*3/UL
POTASSIUM SERPL-SCNC: 3.6 MMOL/L (ref 3.4–5.3)
PROT SERPL-MCNC: 6.7 G/DL (ref 6.8–8.8)
RBC # BLD AUTO: 3.61 10E12/L (ref 4.4–5.9)
RBC MORPH BLD: NORMAL
SARS-COV-2 PCR COMMENT: NORMAL
SARS-COV-2 RNA SPEC QL NAA+PROBE: NEGATIVE
SARS-COV-2 RNA SPEC QL NAA+PROBE: NORMAL
SODIUM SERPL-SCNC: 132 MMOL/L (ref 133–144)
SPECIMEN SOURCE: NORMAL
SPECIMEN SOURCE: NORMAL
WBC # BLD AUTO: 22.8 10E9/L (ref 4–11)

## 2020-04-11 PROCEDURE — 12000000 ZZH R&B MED SURG/OB

## 2020-04-11 PROCEDURE — 40000274 ZZH STATISTIC RCP CONSULT EA 30 MIN

## 2020-04-11 PROCEDURE — 25000128 H RX IP 250 OP 636: Performed by: PHYSICIAN ASSISTANT

## 2020-04-11 PROCEDURE — 25000128 H RX IP 250 OP 636: Performed by: FAMILY MEDICINE

## 2020-04-11 PROCEDURE — 99233 SBSQ HOSP IP/OBS HIGH 50: CPT | Performed by: FAMILY MEDICINE

## 2020-04-11 PROCEDURE — 94640 AIRWAY INHALATION TREATMENT: CPT

## 2020-04-11 PROCEDURE — 25000125 ZZHC RX 250: Performed by: FAMILY MEDICINE

## 2020-04-11 PROCEDURE — 85025 COMPLETE CBC W/AUTO DIFF WBC: CPT | Performed by: PHYSICIAN ASSISTANT

## 2020-04-11 PROCEDURE — 25000132 ZZH RX MED GY IP 250 OP 250 PS 637: Performed by: PHYSICIAN ASSISTANT

## 2020-04-11 PROCEDURE — 80053 COMPREHEN METABOLIC PANEL: CPT | Performed by: PHYSICIAN ASSISTANT

## 2020-04-11 PROCEDURE — 36415 COLL VENOUS BLD VENIPUNCTURE: CPT | Performed by: PHYSICIAN ASSISTANT

## 2020-04-11 RX ORDER — LEVOFLOXACIN 5 MG/ML
500 INJECTION, SOLUTION INTRAVENOUS EVERY 24 HOURS
Status: DISCONTINUED | OUTPATIENT
Start: 2020-04-11 | End: 2020-04-14 | Stop reason: HOSPADM

## 2020-04-11 RX ORDER — IPRATROPIUM BROMIDE AND ALBUTEROL SULFATE 2.5; .5 MG/3ML; MG/3ML
3 SOLUTION RESPIRATORY (INHALATION)
Status: DISCONTINUED | OUTPATIENT
Start: 2020-04-11 | End: 2020-04-13

## 2020-04-11 RX ADMIN — ACETAMINOPHEN 650 MG: 325 TABLET, FILM COATED ORAL at 13:44

## 2020-04-11 RX ADMIN — CEFTRIAXONE SODIUM 2 G: 2 INJECTION, SOLUTION INTRAVENOUS at 17:20

## 2020-04-11 RX ADMIN — ENOXAPARIN SODIUM 40 MG: 40 INJECTION SUBCUTANEOUS at 03:26

## 2020-04-11 RX ADMIN — BENZONATATE 100 MG: 100 CAPSULE ORAL at 03:26

## 2020-04-11 RX ADMIN — ALBUTEROL SULFATE 2 PUFF: 90 AEROSOL, METERED RESPIRATORY (INHALATION) at 20:19

## 2020-04-11 RX ADMIN — IPRATROPIUM BROMIDE AND ALBUTEROL 1 PUFF: 20; 100 SPRAY, METERED RESPIRATORY (INHALATION) at 11:48

## 2020-04-11 RX ADMIN — ACETAMINOPHEN 650 MG: 325 TABLET, FILM COATED ORAL at 03:26

## 2020-04-11 RX ADMIN — ALBUTEROL SULFATE 2 PUFF: 90 AEROSOL, METERED RESPIRATORY (INHALATION) at 06:49

## 2020-04-11 RX ADMIN — ALBUTEROL SULFATE 2 PUFF: 90 AEROSOL, METERED RESPIRATORY (INHALATION) at 03:20

## 2020-04-11 RX ADMIN — FUROSEMIDE 20 MG: 20 TABLET ORAL at 16:08

## 2020-04-11 RX ADMIN — FLUTICASONE FUROATE AND VILANTEROL TRIFENATATE 1 PUFF: 200; 25 POWDER RESPIRATORY (INHALATION) at 08:17

## 2020-04-11 RX ADMIN — NICOTINE 1 PATCH: 21 PATCH, EXTENDED RELEASE TRANSDERMAL at 20:34

## 2020-04-11 RX ADMIN — LEVOFLOXACIN 500 MG: 5 INJECTION, SOLUTION INTRAVENOUS at 13:41

## 2020-04-11 RX ADMIN — AMLODIPINE BESYLATE 10 MG: 10 TABLET ORAL at 16:08

## 2020-04-11 RX ADMIN — LISINOPRIL 60 MG: 20 TABLET ORAL at 16:15

## 2020-04-11 RX ADMIN — ATENOLOL 25 MG: 25 TABLET ORAL at 16:08

## 2020-04-11 RX ADMIN — IPRATROPIUM BROMIDE AND ALBUTEROL 1 PUFF: 20; 100 SPRAY, METERED RESPIRATORY (INHALATION) at 08:18

## 2020-04-11 RX ADMIN — IPRATROPIUM BROMIDE AND ALBUTEROL SULFATE 3 ML: .5; 3 SOLUTION RESPIRATORY (INHALATION) at 15:27

## 2020-04-11 RX ADMIN — Medication 400 MG: at 16:15

## 2020-04-11 ASSESSMENT — ACTIVITIES OF DAILY LIVING (ADL)
ADLS_ACUITY_SCORE: 13

## 2020-04-11 ASSESSMENT — MIFFLIN-ST. JEOR: SCORE: 1324.88

## 2020-04-11 NOTE — H&P
[facility]    History and Physical - Hospitalist Service       Date of Admission:  4/10/2020    Assessment & Plan   Luis Hernandez is a 54 year old male admitted on 4/10/2020. He presented to the emergency department for evaluation of cough and increasing O2 needs at home, was found to have pneumonia on imaging; he is being admitted for treatment of pneumonia, likely bacterial, but cannot rule out COVID.    Acute on chronic respiratory failure with hypoxia  Uses 1-2L supplemental O2 intermittently, but does not require it 24/7. In the past 24 hours prior to admission he was requiring 1-2L continuously to maintain sats. Chest x-ray with left infiltrate, chest CT with left upper lobe anterior infiltrate. VBG normal (pH 7.36 / pCO2 48 / bicarb 27). BNP normal. Presentation is most consistent with bacterial pneumonia, although cannot rule out COVID-19.  - Prn supplemental O2 to maintain sats > 92%  - Treat pneumonia and possible COVID below  - Radiology recommending follow-up chest x-ray in 6-8 weeks for resolution    Pneumonia - bacterial vs viral  Afebrile with leukocytosis (WBC 23.8) on admission. Chest x-ray and CT suggestive of pneumonia as above. Patient was given azithromycin and ceftriaxone in the emergency department. Was hospitalized within the past 2 months. Procalcitonin 0.07 - low risk.   - Ceftriaxone and azithromycin continued  - If COVID is negative, consider changing to Levaquin (to preserve azithromycin for COVID if needed)  - MRSA swab pending  - Procalcitonin pending  - Blood cultures pending x 2  - Will need follow-up chest x-ray as above    PUI COVID-19  Chest CT with left upper / anterior infiltrate as above, but not groundglass opacities. CRP is elevated (165.0), but normal ferritin (229) and LDH (151). LFTs also slightly elevated (ALT 73, AST 64). Patient works overnights at a grocery store, although no customers are present during his shifts and he stays away from  others.  - COVID pending  - Supplemental O2 as needed  - Discussed the benefit of prone position  - Continuous pulse ox  - No nebs or CPAP  - Contact and droplet precautions   - Prn tessalon and Mucinex D.M. available    Leukocytosis   Admit WBC 23.8. Afebrile on admission. Patient did take 40 mg prednisone x 2 days prior to admission, which may be contributing.   - CBC with diff in am     LFT elevation  Prior LFTs normal. Admit ALT 73, AST 64. May be a marker for COVID infection.  - CMP in am    COPD (chronic obstructive pulmonary disease)  Pulmonary hypertension  History of severe COPD, last hospitalized at [facility] Feb 16-19, 2020 for COPD exacerbation. Managed prior to admission with Advair 500/50 daily, DuoNebs qid, Spiriva 18 mcg daily, and prn albuterol. He has prn prednisone and doxycycline at home to use if he feels he is flaring - used prednisone 40 mg x 2 days prior to admission. Patient does not sound significantly wheezy on exam.  - No additional steroids for now, at least until COVID ruled out  - Continue prior to admission Advair and albuterol MDI  - Combivent MDI qid    Essential hypertension, benign  Peripheral edema  Managed prior to admission with amlodipine 10 mg daily, atenolol 25 mg daily, lisinopril 60 mg daily, and lasix 20 mg daily.   - Continue prior to admission meds     Incidental pulmonary nodule, > 3mm and < 8mm, new from 2010  First noted in 2010, has been stable on repeat imaging. Last chest CT noted that it was a stable benign calcified granuloma. New pulmonary nodule measuring 8x5 mm seen on chest CT 2/16/20.  Per notes from discharge summary 2/19/2020:  - Per radiology recommend repeat CT chest for follow-up in 3-6 months   - Primary care provider to order this at follow-up appointment in 1 week, inbasket message sent.      Tobacco abuse, in remission  Had quit a few months ago. Nicotine patch available if he would like one.       Diet: Regular Diet Adult    DVT Prophylaxis:  Enoxaparin (Lovenox) SQ  Miller Catheter: not present  Code Status: Full Code  - discussed with patient on admission    Disposition Plan   Expected discharge: 2+ days, recommended to prior living arrangement once work-up completed, respiratory status improves, antibiotic plan determined (if needed), stable vitals.  Entered: Kelley Marti PA-C 04/11/2020, 1:13 AM     The patient's care was discussed with the Attending Physician, Dr. Seth Ortiz, Bedside Nurse and Patient.    Kelley Marti PA-C  [facility]    ______________________________________________________________________    Chief Complaint   Cough, shortness of breath, increased O2 needs    History is obtained from the patient, review of EMR, and emergency department sign out from Dr. Luis Otero.    History of Present Illness   Luis Hernandez is a 54 year old male who presented to the emergency department for evaluation of worsening respiratory status.    Patient has COPD and has home O2. He does not need O2 continuously, but uses 1-2L if he is exerting himself.    Three days ago he developed some shortness of breath and a cough. He thought he might be having a COPD exacerbation. For the past 2 days he has taken his home supply of prednisone 40 mg. He has been using his rescue inhaler more frequently.    Over the past 24 hours he was needing to use 1-2L O2 around the clock to maintain his O2 sats at home. He has a productive cough with yellow sputum. His chest hurts from his coughing.     He denies fevers or chills, no myalgias or loss of taste/smell. 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 He works overnights only, and no customers are in the store during his shifts. He keeps his distance from others while at work.     On review of systems he maintains his appetite. Denies chest pain, palpitations, abdominal pain, nausea, vomiting, diarrhea, constipation. Urinating  normally. No pain. Has some headache. Feels fatigued and generally weak. The remainder review of systems is negative.    Review of Systems    The 10 point Review of Systems is negative other than noted in the HPI or here.     Past Medical History    I have reviewed this patient's medical history and updated it with pertinent information if needed.   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  Past Surgical History:   Procedure Laterality Date     APPENDECTOMY      childhood       Social History   I have reviewed this patient's social history and updated it with pertinent information if needed.  Social History     Tobacco Use     Smoking status: Former Smoker     Packs/day: 2.00     Years: 30.00     Pack years: 60.00     Types: Cigarettes     Smokeless tobacco: Former User     Tobacco comment: quitting using the patch   Substance Use Topics     Alcohol use: Yes     Comment: rare     Drug use: No       Family History   I have reviewed this patient's family history and updated it with pertinent information if needed.   Family History   Problem Relation Age of Onset     Hypertension Father      Lipids Father      C.A.D. Father      Heart Disease Father      Diabetes Paternal Grandmother      Hypertension Mother      Ovarian Cancer Mother        Prior to Admission Medications   Prior to Admission Medications   Prescriptions Last Dose Informant Patient Reported? Taking?   albuterol (PROAIR HFA/PROVENTIL HFA/VENTOLIN HFA) 108 (90 Base) MCG/ACT inhaler 4/10/2020 at 0800 Self No Yes   Sig: Inhale 2 puffs into the lungs every 4 hours as needed for shortness of breath / dyspnea Hold on file until needed   amLODIPine (NORVASC) 10 MG  tablet 4/9/2020 at 1600 Self No Yes   Sig: Take 1 tablet (10 mg) by mouth daily   atenolol (TENORMIN) 25 MG tablet 4/9/2020 at 1600 Self No Yes   Sig: Take 1 tablet (25 mg) by mouth daily   fluticasone-salmeterol (ADVAIR) 500-50 MCG/DOSE inhaler 4/10/2020 at 1000 Self No Yes   Sig: Inhale 1 puff into the lungs 2 times daily Hold on file until needed   furosemide (LASIX) 20 MG tablet 4/9/2020 at 1600 Self No Yes   Sig: Take 1 tablet (20 mg) by mouth daily   ipratropium - albuterol 0.5 mg/2.5 mg/3 mL (DUONEB) 0.5-2.5 (3) MG/3ML neb solution 4/10/2020 at 1130 Self No Yes   Sig: Take 1 vial (3 mLs) by nebulization 4 times daily   lisinopril (PRINIVIL/ZESTRIL) 40 MG tablet 4/9/2020 at 1600 Self No Yes   Sig: Take 1.5 pills, or 60 mg daily   Patient taking differently: Take 60 mg by mouth every evening Take 1.5 pills, or 60 mg daily   magnesium oxide (MAG-OX) 400 MG tablet 4/9/2020 at 1600 Self Yes Yes   Sig: Take 400 mg by mouth every evening   nicotine (NICODERM CQ) 21 MG/24HR 24 hr patch 4/9/2020 at Unknown time Self Yes Yes   Sig: Place 1 patch onto the skin every 24 hours   order for DME  Self No No   Sig: Equipment being ordered: Nebulizer   order for DME  Self No No   Sig: Equipment being ordered: Oxygen 3L via NC continuous.  O2 saturated noted to be 86% on room air at rest.   potassium 99 MG TABS 4/9/2020 at 1600 Self Yes Yes   Sig: Take 1 tablet by mouth every evening   predniSONE (DELTASONE) 10 MG tablet 4/10/2020 at 1200 Self No Yes   Sig: Take 4 tablets (40 mg) by mouth daily Keep on hand for COPD exacerbation.   tiotropium (SPIRIVA HANDIHALER) 18 MCG inhaled capsule 4/9/2020 at 0800 Self No Yes   Sig: Inhale contents of one capsule daily.   Patient taking differently: Inhale 18 mcg into the lungs every evening Inhale contents of one capsule daily.      Facility-Administered Medications: None     Allergies   Allergies   Allergen Reactions     Hctz Other (See Comments)     He has hyponatremia     Penicillins  Other (See Comments)     Reaction unknown       Physical Exam   Vital Signs: Temp: 100.9  F (38.3  C) Temp src: Oral BP: 135/86 Pulse: 101 Heart Rate: 102 Resp: 18 SpO2: 95 % O2 Device: Nasal cannula Oxygen Delivery: 3 LPM  Weight: 120 lbs 2.41 oz    Constitutional: Alert, oriented, cooperative, no apparent distress. Ill appearing but nontoxic, speaking in full sentences.     Eyes: Eyes are clear, pupils are reactive. No scleral icterus.    HEENT: Normocephalic, no evidence of cranial trauma.      Cardiovascular: Regular rhythm and rate, normal S1 and S2. No murmur, rubs, or gallops. Peripheral pulses intact bilaterally. No lower extremity edema.    Respiratory: Productive frequent cough. No wheezes, rhonchi, or crackles noted.     GI: Soft, non-distended. Non-tender, no rebound or guarding. No hepatosplenomegaly or masses appreciated. Normal bowel sounds.     Musculoskeletal: Without obvious deformity, normal range of motion. Normal muscle bulk and tone. Distal CMS intact.      Skin: Warm and dry, no rashes or ecchymoses. No mottling of skin.      Neurologic: Patient moves all extremities.  is symmetric. Gross strength and sensation are equal bilaterally.    Genitourinary: Deferred      Data   Data reviewed today: I reviewed all medications, new labs and imaging results over the last 24 hours. I personally reviewed the chest CT image(s) showing infiltrate in anterior upper lobe on the left. chest x-ray with left lower infiltrate.    Recent Labs   Lab 04/10/20  1437   WBC 23.8*   HGB 12.2*            POTASSIUM 4.3   CHLORIDE 103   CO2 25   BUN 14   CR 0.71   ANIONGAP 5   MIKE 9.2   *   ALBUMIN 3.2*   PROTTOTAL 7.1   BILITOTAL 0.2   ALKPHOS 77   ALT 73*   AST 64*   TROPI <0.015     Recent Results (from the past 24 hour(s))   XR Chest Port 1 View    Narrative    CHEST ONE VIEW  4/10/2020 4:17 PM     HISTORY: Cough with shortness of breath.     COMPARISON: None.      Impression     IMPRESSION: Left lower lobe infiltrate. Right lung clear.    ECTOR POLLARD MD   Chest CT w/o contrast    Narrative    CT CHEST WITHOUT CONTRAST 4/10/2020 4:50 PM     HISTORY: Cough and shortness of breath, past medical history of oxygen  dependent COPD.    COMPARISON: None.    TECHNIQUE: Volumetric helical acquisition of CT images of the chest  from the clavicles to the kidneys were acquired without IV contrast.  Radiation dose for this scan was reduced using automated exposure  control, adjustment of the mA and/or kV according to patient size, or  iterative reconstruction technique.    FINDINGS: Extensive left upper lobe consolidation. Left lower lobe  clear. Moderate to severe emphysema. No pleural or pericardial  effusion. There are mild atherosclerotic changes of the visualized  aorta and its branches. There is no evidence of aortic aneurysm. There  are mild coronary vascular calcifications consistent with coronary  artery disease. Normal heart size. No acute findings in the visualized  upper abdomen.      Impression    IMPRESSION: Extensive left upper lobe infiltrate. Chest x-ray  follow-up in 6-8 weeks recommended to confirm resolution.    ECTOR POLLARD MD

## 2020-04-11 NOTE — PROVIDER NOTIFICATION
DATE:  4/11/2020   TIME OF RECEIPT FROM LAB:  0219  LAB TEST:  COVID 19  LAB VALUE:  NEGATIVE  RESULTS GIVEN WITH READ-BACK TO (PROVIDER):  TANGELA REED  TIME LAB VALUE REPORTED TO PROVIDER:   0221    Per TANGELA Reed will remove contact isolation but leave droplet for now.

## 2020-04-11 NOTE — PLAN OF CARE
LS exp wheezes throughout. Oxygen sats >90% on 1.5L. He has frequent cough and is coughing up tan thick sputum. He denies need for cough med. Nicotine patch in place. Patient given tylenol for c/o headache this afternoon.

## 2020-04-11 NOTE — PROGRESS NOTES
"[facility]      Subjective:  He feels quite a bit better.  He is less short of breath.  He had low-grade fever overnight.  He is wheezing some but not too much    Review of Systems:  CONSTITUTIONAL: Stronger  INTEGUMENTARY/SKIN: NEGATIVE for worrisome rashes, moles or lesions  EYES: NEGATIVE for vision changes or irritation  ENT/MOUTH: NEGATIVE for ear, mouth and throat problems  RESP: Above  BREAST: NEGATIVE for masses, tenderness or discharge  CV: NEGATIVE for chest pain, palpitations or peripheral edema  GI: NEGATIVE for nausea, abdominal pain, heartburn, or change in bowel habits  : NEGATIVE for frequency, dysuria, or hematuria  MUSCULOSKELETAL: NEGATIVE for significant arthralgias or myalgia  NEURO: NEGATIVE for weakness, dizziness or paresthesias  ENDOCRINE: NEGATIVE for temperature intolerance, skin/hair changes  HEME: NEGATIVE for bleeding problems  PSYCHIATRIC: NEGATIVE for changes in mood or affect    Physical Exam:  Vitals Were Reviewed    Patient Vitals for the past 16 hrs:   BP Temp Temp src Pulse Resp SpO2 Height Weight   04/11/20 0832 118/68 98.1  F (36.7  C) Oral 88 18 92 % -- --   04/11/20 0326 -- -- -- -- -- -- -- 54.8 kg (120 lb 13 oz)   04/10/20 2340 135/86 100.9  F (38.3  C) Oral 101 18 95 % -- --   04/10/20 2132 -- -- -- -- 16 -- -- --   04/10/20 2131 -- 98.8  F (37.1  C) Oral -- -- -- -- --   04/10/20 2109 -- -- -- -- -- -- 1.651 m (5' 5\") 54.5 kg (120 lb 2.4 oz)   04/10/20 2100 -- 98.3  F (36.8  C) Oral -- 16 -- -- --   04/10/20 2056 -- 98.3  F (36.8  C) -- -- 14 -- -- --         Intake/Output Summary (Last 24 hours) at 4/11/2020 1213  Last data filed at 4/10/2020 2246  Gross per 24 hour   Intake --   Output 550 ml   Net -550 ml       GENERAL APPEARANCE: healthy, alert and no distress  RESP: Decreased  CV: regular rate and rhythm, normal S1 S2, no S3 or S4 and no murmur, click or rub   ABDOMEN: soft, nontender, no HSM or masses and bowel sounds normal  MS: no " clubbing, cyanosis; no edema  SKIN: clear without significant rashes or lesions    Lab:  Recent Labs   Lab Test 04/11/20  0616 04/10/20  1437   * 133   POTASSIUM 3.6 4.3   CHLORIDE 98 103   CO2 32 25   ANIONGAP 2* 5   * 120*   BUN 12 14   CR 0.59* 0.71   MIKE 9.0 9.2     CBC RESULTS:   Recent Labs   Lab Test 04/11/20  0616 04/10/20  1437   WBC 22.8* 23.8*   RBC 3.61* 3.78*   HGB 11.5* 12.2*   HCT 35.5* 37.6*    302       Results for orders placed or performed during the hospital encounter of 04/10/20 (from the past 24 hour(s))   CBC with platelets differential   Result Value Ref Range    WBC 23.8 (H) 4.0 - 11.0 10e9/L    RBC Count 3.78 (L) 4.4 - 5.9 10e12/L    Hemoglobin 12.2 (L) 13.3 - 17.7 g/dL    Hematocrit 37.6 (L) 40.0 - 53.0 %     78 - 100 fl    MCH 32.3 26.5 - 33.0 pg    MCHC 32.4 31.5 - 36.5 g/dL    RDW 13.8 10.0 - 15.0 %    Platelet Count 302 150 - 450 10e9/L    Diff Method Automated Method     % Neutrophils 90.0 %    % Lymphocytes 1.6 %    % Monocytes 6.2 %    % Eosinophils 0.4 %    % Basophils 0.1 %    % Immature Granulocytes 1.7 %    Nucleated RBCs 0 0 /100    Absolute Neutrophil 21.4 (H) 1.6 - 8.3 10e9/L    Absolute Lymphocytes 0.4 (L) 0.8 - 5.3 10e9/L    Absolute Monocytes 1.5 (H) 0.0 - 1.3 10e9/L    Absolute Eosinophils 0.1 0.0 - 0.7 10e9/L    Absolute Basophils 0.0 0.0 - 0.2 10e9/L    Abs Immature Granulocytes 0.4 0 - 0.4 10e9/L    Absolute Nucleated RBC 0.0    Comprehensive metabolic panel   Result Value Ref Range    Sodium 133 133 - 144 mmol/L    Potassium 4.3 3.4 - 5.3 mmol/L    Chloride 103 94 - 109 mmol/L    Carbon Dioxide 25 20 - 32 mmol/L    Anion Gap 5 3 - 14 mmol/L    Glucose 120 (H) 70 - 99 mg/dL    Urea Nitrogen 14 7 - 30 mg/dL    Creatinine 0.71 0.66 - 1.25 mg/dL    GFR Estimate >90 >60 mL/min/[1.73_m2]    GFR Estimate If Black >90 >60 mL/min/[1.73_m2]    Calcium 9.2 8.5 - 10.1 mg/dL    Bilirubin Total 0.2 0.2 - 1.3 mg/dL    Albumin 3.2 (L) 3.4 - 5.0 g/dL     Protein Total 7.1 6.8 - 8.8 g/dL    Alkaline Phosphatase 77 40 - 150 U/L    ALT 73 (H) 0 - 70 U/L    AST 64 (H) 0 - 45 U/L   Troponin I   Result Value Ref Range    Troponin I ES <0.015 0.000 - 0.045 ug/L   Nt probnp inpatient (BNP)   Result Value Ref Range    N-Terminal Pro BNP Inpatient 286 0 - 900 pg/mL   Blood gas venous   Result Value Ref Range    Ph Venous 7.36 7.32 - 7.43 pH    PCO2 Venous 48 40 - 50 mm Hg    PO2 Venous 63 (H) 25 - 47 mm Hg    Bicarbonate Venous 27 21 - 28 mmol/L    Base Excess Venous 1.0 mmol/L    FIO2 1 L    XR Chest Port 1 View    Narrative    CHEST ONE VIEW  4/10/2020 4:17 PM     HISTORY: Cough with shortness of breath.     COMPARISON: None.      Impression    IMPRESSION: Left lower lobe infiltrate. Right lung clear.    ECTOR POLLARD MD   Chest CT w/o contrast    Narrative    CT CHEST WITHOUT CONTRAST 4/10/2020 4:50 PM     HISTORY: Cough and shortness of breath, past medical history of oxygen  dependent COPD.    COMPARISON: None.    TECHNIQUE: Volumetric helical acquisition of CT images of the chest  from the clavicles to the kidneys were acquired without IV contrast.  Radiation dose for this scan was reduced using automated exposure  control, adjustment of the mA and/or kV according to patient size, or  iterative reconstruction technique.    FINDINGS: Extensive left upper lobe consolidation. Left lower lobe  clear. Moderate to severe emphysema. No pleural or pericardial  effusion. There are mild atherosclerotic changes of the visualized  aorta and its branches. There is no evidence of aortic aneurysm. There  are mild coronary vascular calcifications consistent with coronary  artery disease. Normal heart size. No acute findings in the visualized  upper abdomen.      Impression    IMPRESSION: Extensive left upper lobe infiltrate. Chest x-ray  follow-up in 6-8 weeks recommended to confirm resolution.    ECTOR POLLARD MD   Blood culture    Specimen: Blood   Result Value Ref Range    Specimen  Description Blood     Special Requests Right Arm     Culture Micro No growth after 9 hours    Blood culture    Specimen: Blood   Result Value Ref Range    Specimen Description Blood     Special Requests Left Arm     Culture Micro No growth after 9 hours    COVID-19 Virus (Coronavirus) by PCR Nasopharyngeal    Specimen: Nasopharyngeal   Result Value Ref Range    COVID-19 Virus PCR to [facility] - Source Nasopharyngeal     COVID-19 Virus PCR to [facility] - Result       Test received-See reflex to IDDL test SARS CoV2 (COVID-19) Virus RT-PCR   SARS-CoV-2 COVID-19 Virus (Coronavirus) RT-PCR Nasopharyngeal    Specimen: Nasopharyngeal   Result Value Ref Range    SARS-CoV-2 Virus Specimen Source Nasopharyngeal     SARS-CoV-2 PCR Result NEGATIVE     SARS-CoV-2 PCR Comment       This automated, real-time RT-PCR assay by [employer] on the [employer] has   been given Emergency Use Authorization (EUA) for the in vitro qualitative detection of RNA   from the SARS-CoV-2 virus in nasopharyngeal swabs in viral transport medium from patients   with signs and symptoms of infection who are suspected of COVID-19.     Methicillin Resistant Staph Aureus PCR    Specimen: Nares   Result Value Ref Range    Specimen Description Nares     Methicillin Resist/Sens S. aureus PCR Negative NEG^Negative   Procalcitonin   Result Value Ref Range    Procalcitonin 0.07 ng/ml   CRP inflammation   Result Value Ref Range    CRP Inflammation 165.0 (H) 0.0 - 8.0 mg/L   Ferritin   Result Value Ref Range    Ferritin 229 26 - 388 ng/mL   Lactate Dehydrogenase   Result Value Ref Range    Lactate Dehydrogenase 151 85 - 227 U/L   Sputum Culture Aerobic Bacterial    Specimen: Sputum   Result Value Ref Range    Specimen Description Sputum     Culture Micro Moderate growth  Normal latrice       Culture Micro (A)      Moderate growth  Lactose fermenting gram negative rods      Culture Micro (A)      Moderate growth  Strain 2  Lactose fermenting gram negative  rods      Culture Micro Culture in progress    Gram stain    Specimen: Sputum   Result Value Ref Range    Specimen Description Sputum     Special Requests Screen     Gram Stain <10 Squamous epithelial cells/low power field     Gram Stain >25 PMNs/low power field     Gram Stain Moderate  Mixed gram negative and positive latrice      Lactic acid level STAT   Result Value Ref Range    Lactate for Sepsis Protocol 0.8 0.7 - 2.0 mmol/L   CBC with platelets differential   Result Value Ref Range    WBC 22.8 (H) 4.0 - 11.0 10e9/L    RBC Count 3.61 (L) 4.4 - 5.9 10e12/L    Hemoglobin 11.5 (L) 13.3 - 17.7 g/dL    Hematocrit 35.5 (L) 40.0 - 53.0 %    MCV 98 78 - 100 fl    MCH 31.9 26.5 - 33.0 pg    MCHC 32.4 31.5 - 36.5 g/dL    RDW 14.0 10.0 - 15.0 %    Platelet Count 296 150 - 450 10e9/L    Diff Method Manual Differential     % Neutrophils 87.0 %    % Lymphocytes 3.0 %    % Monocytes 9.0 %    % Eosinophils 0.0 %    % Basophils 0.0 %    % Myelocytes 1.0 %    Absolute Neutrophil 19.8 (H) 1.6 - 8.3 10e9/L    Absolute Lymphocytes 0.7 (L) 0.8 - 5.3 10e9/L    Absolute Monocytes 2.1 (H) 0.0 - 1.3 10e9/L    Absolute Eosinophils 0.0 0.0 - 0.7 10e9/L    Absolute Basophils 0.0 0.0 - 0.2 10e9/L    Absolute Myelocytes 0.2 (H) 0 10e9/L    RBC Morphology Normal     Platelet Estimate       Automated count confirmed.  Platelet morphology is normal.   Comprehensive metabolic panel   Result Value Ref Range    Sodium 132 (L) 133 - 144 mmol/L    Potassium 3.6 3.4 - 5.3 mmol/L    Chloride 98 94 - 109 mmol/L    Carbon Dioxide 32 20 - 32 mmol/L    Anion Gap 2 (L) 3 - 14 mmol/L    Glucose 138 (H) 70 - 99 mg/dL    Urea Nitrogen 12 7 - 30 mg/dL    Creatinine 0.59 (L) 0.66 - 1.25 mg/dL    GFR Estimate >90 >60 mL/min/[1.73_m2]    GFR Estimate If Black >90 >60 mL/min/[1.73_m2]    Calcium 9.0 8.5 - 10.1 mg/dL    Bilirubin Total 0.3 0.2 - 1.3 mg/dL    Albumin 2.8 (L) 3.4 - 5.0 g/dL    Protein Total 6.7 (L) 6.8 - 8.8 g/dL    Alkaline Phosphatase 75 40 - 150 U/L     ALT 60 0 - 70 U/L    AST 32 0 - 45 U/L       Assessment and Plan:    Luis Hernandez is a 54 year old male admitted on 4/10/2020. He presented to the emergency department for evaluation of cough and increasing O2 needs at home, was found to have pneumonia on imaging; he is being admitted for treatment of pneumonia, likely bacterial, but cannot rule out COVID.    Acute on chronic respiratory failure with hypoxia   Pneumonia - bacterial vs viral  Uses 1-2L supplemental O2 intermittently, but does not require it 24/7. In the 24 hours prior to admission he was requiring 1-2L continuously to maintain sats. Chest x-ray with left infiltrate, chest CT with left upper lobe anterior infiltrate. VBG normal (pH 7.36 / pCO2 48 / bicarb 27). BNP normal. Presentation is most consistent with bacterial pneumonia.  Afebrile with leukocytosis  On admit(WBC 23.8) on admission. Chest x-ray and CT suggestive of pneumonia as above. Patient was given azithromycin and ceftriaxone in the emergency department. Was hospitalized within the past 2 months. Procalcitonin 0.07 - low risk.     Currently on two liters, wbc 22k, sputum with gram neg rods, changing atb to levaquin/ rocephin to get better gram neg coverage.  .     PUI COVID-19-ruled out    LFT elevation  Prior LFTs normal. Admit ALT 73, AST 64.   Resolved 04/11/20      COPD (chronic obstructive pulmonary disease)  Pulmonary hypertension  History of severe COPD, last hospitalized at [facility] Feb 16-19, 2020 for COPD exacerbation. Managed prior to admission with Advair 500/50 daily, DuoNebs qid, Spiriva 18 mcg daily, and prn albuterol. He has prn prednisone and doxycycline at home to use if he feels he is flaring - used prednisone 40 mg x 2 days prior to admission. Patient does not sound significantly wheezy on exam.  No additional steroids for now  Continue prior to admission Advair and albuterol MDI  Change Combivent MDI qid to duoneb q 4      Essential hypertension,  benign  Peripheral edema  Managed prior to admission with amlodipine 10 mg daily, atenolol 25 mg daily, lisinopril 60 mg daily, and lasix 20 mg daily.   - Continue prior to admission meds      Incidental pulmonary nodule, > 3mm and < 8mm, new from 2010  First noted in 2010, has been stable on repeat imaging. Last chest CT noted that it was a stable benign calcified granuloma. New pulmonary nodule measuring 8x5 mm seen on chest CT 2/16/20.  Per notes from discharge summary 2/19/2020:  - Per radiology recommend repeat CT chest for follow-up in 3-6 months   - Primary care provider to order this at follow-up appointment in 1 week, inbasket message sent.       Tobacco abuse, in remission  Had quit a few months ago. Nicotine patch available if he would like one.        Diet: Regular Diet Adult    DVT Prophylaxis: Enoxaparin (Lovenox) SQ  Miller Catheter: not present  Code Status: Full Code  - discussed with patient on admission     Plan -follow up urine culture, change antibiotics to Levaquin and Rocephin, start DuoNeb scheduled.

## 2020-04-11 NOTE — PLAN OF CARE
"Pt alert and oriented x 4. VSS on 3L O2; mildly tachy. Up with SBA. SOB on exertion, lung sounds diminished. Frequent productive cough noted with moderate amount of yellow-green phlegm; PRN tessalon given and pt stated that it helped. Pt stated that \"he feels a little bit better from yesterday\". COVID-19 result came back negative, MD updated. Pt has been resting in between cares. Discharge pending at this time. Will continue to monitor and reassess.   "

## 2020-04-11 NOTE — PROGRESS NOTES
Patient triggered Sepsis BPA. Results of lactic 0.8. VSS. Afebrile.  K Figueroa HONEYCUTT informed via web page.

## 2020-04-11 NOTE — PROGRESS NOTES
"[facility] [facility] [place]MISSION NOTE    Patient admitted to room 2405 at approximately 2045 via cart from emergency room. Patient was accompanied by transport tech.     Verbal SBAR report received from WILMAN RN prior to patient arrival.     Patient ambulated to bed with stand-by assist. Patient alert and oriented X 3. The patient is not having any pain. 0-10 Pain Scale: 0. Admission vital signs: Blood pressure 131/88, pulse 101, temperature 98.3  F (36.8  C), temperature source Oral, resp. rate 16, height 1.651 m (5' 5\"), weight 54.5 kg (120 lb 2.4 oz), SpO2 94 %. Patient was oriented to plan of care, call light, bed controls, tv, telephone, bathroom and visiting hours.     Risk Assessment    The following safety risks were identified during admission: fall. Yellow risk band applied: YES.     Skin Initial Assessment    This writer admitted this patient and completed a full skin assessment and Troy score in the Adult PCS flowsheet. Appropriate interventions initiated as needed.   Patient has bruise left hand from blood draw, bandaid over site. Discolored feet. Unable to assess groin or upper thigh, patient too short of breath.   Uses home 0xygen 1.5 - 2 lpm when he needs it.   Web page to ANNABELLE HONEYCUTT for MDI.       Troy Risk Assessment  Sensory Perception: 4-->no impairment  Moisture: 4-->rarely moist  Activity: 3-->walks occasionally  Mobility: 4-->no limitation  Nutrition: 2-->probably inadequate  Friction and Shear: 3-->no apparent problem  Troy Score: 20  Bed Support Surface: Mab[employer]teos Air mattress    Education      Roselyn Maier RN    "

## 2020-04-11 NOTE — PLAN OF CARE
"Writer assigned to patient from [id number]. A&O. Up independently. Regular diet tolerated well with no reports of N&V. IV saline locked. Up at edge of bed most of the evening because the Pt states, \"he feels like he can breathe better.\" Pt on scheduled Rocephin, Levaquin, Lisinopril, Mag-ox, Atenolol, Lasix, Norvasc, and duonebs. Pt remains on 1.5L O2 via NC. LS-diminished with expiratory wheezes. /65   Pulse 104   Temp 98.6  F (37  C) (Oral)   Resp 18   Ht 1.651 m (5' 5\")   Wt 54.8 kg (120 lb 13 oz)   SpO2 93%   BMI 20.10 kg/m  .       Charis Soriano RN on 4/11/2020 at 6:50 PM    "

## 2020-04-12 LAB
ANION GAP SERPL CALCULATED.3IONS-SCNC: 7 MMOL/L (ref 3–14)
BUN SERPL-MCNC: 13 MG/DL (ref 7–30)
CALCIUM SERPL-MCNC: 8.8 MG/DL (ref 8.5–10.1)
CHLORIDE SERPL-SCNC: 96 MMOL/L (ref 94–109)
CO2 SERPL-SCNC: 28 MMOL/L (ref 20–32)
CREAT SERPL-MCNC: 0.61 MG/DL (ref 0.66–1.25)
ERYTHROCYTE [DISTWIDTH] IN BLOOD BY AUTOMATED COUNT: 13.9 % (ref 10–15)
GFR SERPL CREATININE-BSD FRML MDRD: >90 ML/MIN/{1.73_M2}
GLUCOSE SERPL-MCNC: 94 MG/DL (ref 70–99)
HCT VFR BLD AUTO: 36.8 % (ref 40–53)
HGB BLD-MCNC: 11.9 G/DL (ref 13.3–17.7)
LACTATE BLD-SCNC: 0.7 MMOL/L (ref 0.7–2)
MCH RBC QN AUTO: 31.6 PG (ref 26.5–33)
MCHC RBC AUTO-ENTMCNC: 32.3 G/DL (ref 31.5–36.5)
MCV RBC AUTO: 98 FL (ref 78–100)
PLATELET # BLD AUTO: 311 10E9/L (ref 150–450)
POTASSIUM SERPL-SCNC: 3.9 MMOL/L (ref 3.4–5.3)
RBC # BLD AUTO: 3.76 10E12/L (ref 4.4–5.9)
SODIUM SERPL-SCNC: 131 MMOL/L (ref 133–144)
WBC # BLD AUTO: 20.7 10E9/L (ref 4–11)

## 2020-04-12 PROCEDURE — 36415 COLL VENOUS BLD VENIPUNCTURE: CPT | Performed by: FAMILY MEDICINE

## 2020-04-12 PROCEDURE — 25000128 H RX IP 250 OP 636: Performed by: PHYSICIAN ASSISTANT

## 2020-04-12 PROCEDURE — 83605 ASSAY OF LACTIC ACID: CPT | Performed by: FAMILY MEDICINE

## 2020-04-12 PROCEDURE — 94640 AIRWAY INHALATION TREATMENT: CPT

## 2020-04-12 PROCEDURE — 99233 SBSQ HOSP IP/OBS HIGH 50: CPT | Performed by: FAMILY MEDICINE

## 2020-04-12 PROCEDURE — 25000128 H RX IP 250 OP 636: Performed by: FAMILY MEDICINE

## 2020-04-12 PROCEDURE — 85027 COMPLETE CBC AUTOMATED: CPT | Performed by: FAMILY MEDICINE

## 2020-04-12 PROCEDURE — 12000000 ZZH R&B MED SURG/OB

## 2020-04-12 PROCEDURE — 80048 BASIC METABOLIC PNL TOTAL CA: CPT | Performed by: FAMILY MEDICINE

## 2020-04-12 PROCEDURE — 94640 AIRWAY INHALATION TREATMENT: CPT | Mod: 76

## 2020-04-12 PROCEDURE — 25000132 ZZH RX MED GY IP 250 OP 250 PS 637: Performed by: PHYSICIAN ASSISTANT

## 2020-04-12 PROCEDURE — 25000125 ZZHC RX 250: Performed by: FAMILY MEDICINE

## 2020-04-12 RX ADMIN — Medication 1 MG: at 01:35

## 2020-04-12 RX ADMIN — ACETAMINOPHEN 650 MG: 325 TABLET, FILM COATED ORAL at 20:02

## 2020-04-12 RX ADMIN — ENOXAPARIN SODIUM 40 MG: 40 INJECTION SUBCUTANEOUS at 23:32

## 2020-04-12 RX ADMIN — GUAIFENESIN AND DEXTROMETHORPHAN HYDROBROMIDE 1 TABLET: 600; 30 TABLET, EXTENDED RELEASE ORAL at 23:32

## 2020-04-12 RX ADMIN — IPRATROPIUM BROMIDE AND ALBUTEROL SULFATE 3 ML: .5; 3 SOLUTION RESPIRATORY (INHALATION) at 07:59

## 2020-04-12 RX ADMIN — IPRATROPIUM BROMIDE AND ALBUTEROL SULFATE 3 ML: .5; 3 SOLUTION RESPIRATORY (INHALATION) at 19:51

## 2020-04-12 RX ADMIN — GUAIFENESIN AND DEXTROMETHORPHAN HYDROBROMIDE 1 TABLET: 600; 30 TABLET, EXTENDED RELEASE ORAL at 06:10

## 2020-04-12 RX ADMIN — ACETAMINOPHEN 650 MG: 325 TABLET, FILM COATED ORAL at 06:10

## 2020-04-12 RX ADMIN — AMLODIPINE BESYLATE 10 MG: 10 TABLET ORAL at 16:27

## 2020-04-12 RX ADMIN — LISINOPRIL 60 MG: 20 TABLET ORAL at 16:27

## 2020-04-12 RX ADMIN — IPRATROPIUM BROMIDE AND ALBUTEROL SULFATE 3 ML: .5; 3 SOLUTION RESPIRATORY (INHALATION) at 16:42

## 2020-04-12 RX ADMIN — FUROSEMIDE 20 MG: 20 TABLET ORAL at 16:28

## 2020-04-12 RX ADMIN — BENZONATATE 100 MG: 100 CAPSULE ORAL at 20:02

## 2020-04-12 RX ADMIN — CEFTRIAXONE SODIUM 2 G: 2 INJECTION, SOLUTION INTRAVENOUS at 16:30

## 2020-04-12 RX ADMIN — Medication 400 MG: at 16:28

## 2020-04-12 RX ADMIN — ALBUTEROL SULFATE 2 PUFF: 90 AEROSOL, METERED RESPIRATORY (INHALATION) at 00:09

## 2020-04-12 RX ADMIN — LEVOFLOXACIN 500 MG: 5 INJECTION, SOLUTION INTRAVENOUS at 12:16

## 2020-04-12 RX ADMIN — ENOXAPARIN SODIUM 40 MG: 40 INJECTION SUBCUTANEOUS at 00:06

## 2020-04-12 RX ADMIN — IPRATROPIUM BROMIDE AND ALBUTEROL SULFATE 3 ML: .5; 3 SOLUTION RESPIRATORY (INHALATION) at 11:46

## 2020-04-12 RX ADMIN — FLUTICASONE FUROATE AND VILANTEROL TRIFENATATE 1 PUFF: 200; 25 POWDER RESPIRATORY (INHALATION) at 08:27

## 2020-04-12 RX ADMIN — ATENOLOL 25 MG: 25 TABLET ORAL at 16:28

## 2020-04-12 ASSESSMENT — ACTIVITIES OF DAILY LIVING (ADL)
ADLS_ACUITY_SCORE: 13

## 2020-04-12 ASSESSMENT — MIFFLIN-ST. JEOR: SCORE: 1347.88

## 2020-04-12 NOTE — PLAN OF CARE
Pt says he feels a little better today, less SOA.  He has occasional cough, mucinex was helpful this AM. Using O2 at 1-2 L prn per his home dose.  On RA at rest. Up independently in room, ambulating in halls.

## 2020-04-12 NOTE — PLAN OF CARE
Patient is independent in room. Some dyspnea on exertion. Remains on 1.5 L O2 to keep sats in mid 90's. Patient has used albuterol inhaler two times PRN instead of nebulizer. Droplet isolation maintained. Sepsis protocol triggered, lactic came back 0.7. Temp max 99.2 this shift.  Melatonin given to aid sleep.

## 2020-04-12 NOTE — PROGRESS NOTES
[facility]      Subjective:  He feels overall better.  He is less short of breath.  He has no fever.  He is eating relatively well.    Review of Systems:  CONSTITUTIONAL: Some malaise but less than previous  INTEGUMENTARY/SKIN: NEGATIVE for worrisome rashes, moles or lesions  EYES: NEGATIVE for vision changes or irritation  ENT/MOUTH: NEGATIVE for ear, mouth and throat problems  RESP: Less short of breath, cough  BREAST: NEGATIVE for masses, tenderness or discharge  CV: NEGATIVE for chest pain, palpitations or peripheral edema  GI: NEGATIVE for nausea, abdominal pain, heartburn, or change in bowel habits  : NEGATIVE for frequency, dysuria, or hematuria  MUSCULOSKELETAL: NEGATIVE for significant arthralgias or myalgia  NEURO: NEGATIVE for weakness, dizziness or paresthesias  ENDOCRINE: NEGATIVE for temperature intolerance, skin/hair changes  HEME: NEGATIVE for bleeding problems  PSYCHIATRIC: NEGATIVE for changes in mood or affect    Physical Exam:  Vitals Were Reviewed    Patient Vitals for the past 16 hrs:   BP Temp Temp src Pulse Resp SpO2 Weight   04/12/20 0756 120/76 99.1  F (37.3  C) Oral 101 18 93 % --   04/12/20 0604 -- -- -- -- -- -- 57.1 kg (125 lb 14.1 oz)   04/12/20 0100 115/70 99.1  F (37.3  C) Oral 106 18 95 % --   04/12/20 0040 103/71 99.2  F (37.3  C) Oral 106 18 95 % --   04/12/20 0010 111/71 98.5  F (36.9  C) Oral 107 20 93 % --       No intake or output data in the 24 hours ending 04/12/20 1256    GENERAL APPEARANCE: healthy, alert and no distress  EYES: conjunctiva clear, eyes grossly normal  RESP: Breath sounds are decreased, there was a very slight wheeze  CV: regular rate and rhythm, normal S1 S2, no S3 or S4 and no murmur, click or rub   ABDOMEN: soft, nontender, no HSM or masses and bowel sounds normal  MS: no clubbing, cyanosis; tr edema  SKIN: clear without significant rashes or lesions    Lab:  Recent Labs   Lab Test 04/12/20  0513 04/11/20  0616   * 132*    POTASSIUM 3.9 3.6   CHLORIDE 96 98   CO2 28 32   ANIONGAP 7 2*   GLC 94 138*   BUN 13 12   CR 0.61* 0.59*   MIKE 8.8 9.0     CBC RESULTS:   Recent Labs   Lab Test 04/12/20  0513 04/11/20  0616   WBC 20.7* 22.8*   RBC 3.76* 3.61*   HGB 11.9* 11.5*   HCT 36.8* 35.5*    296       Results for orders placed or performed during the hospital encounter of 04/10/20 (from the past 24 hour(s))   Lactic acid level STAT   Result Value Ref Range    Lactate for Sepsis Protocol 0.7 0.7 - 2.0 mmol/L   CBC with platelets   Result Value Ref Range    WBC 20.7 (H) 4.0 - 11.0 10e9/L    RBC Count 3.76 (L) 4.4 - 5.9 10e12/L    Hemoglobin 11.9 (L) 13.3 - 17.7 g/dL    Hematocrit 36.8 (L) 40.0 - 53.0 %    MCV 98 78 - 100 fl    MCH 31.6 26.5 - 33.0 pg    MCHC 32.3 31.5 - 36.5 g/dL    RDW 13.9 10.0 - 15.0 %    Platelet Count 311 150 - 450 10e9/L   Basic metabolic panel   Result Value Ref Range    Sodium 131 (L) 133 - 144 mmol/L    Potassium 3.9 3.4 - 5.3 mmol/L    Chloride 96 94 - 109 mmol/L    Carbon Dioxide 28 20 - 32 mmol/L    Anion Gap 7 3 - 14 mmol/L    Glucose 94 70 - 99 mg/dL    Urea Nitrogen 13 7 - 30 mg/dL    Creatinine 0.61 (L) 0.66 - 1.25 mg/dL    GFR Estimate >90 >60 mL/min/[1.73_m2]    GFR Estimate If Black >90 >60 mL/min/[1.73_m2]    Calcium 8.8 8.5 - 10.1 mg/dL       Assessment and Plan:    Luis Hernandez is a 54 year old male admitted on 4/10/2020. He presented to the emergency department for evaluation of cough and increasing O2 needs at home, was found to have pneumonia on imaging; he is being admitted for treatment of pneumonia, likely bacterial, but cannot rule out COVID.     Acute on chronic respiratory failure with hypoxia   Community acquired pneumonia-sputum with e coli and Klebsiella (sensitivities pending)  Uses 1-2L supplemental O2 intermittently at home, but does not require it 24/7. In the 24 hours prior to admission he was requiring 1-2L continuously to maintain sats. Chest x-ray with left infiltrate, chest  CT with left upper lobe anterior infiltrate. VBG normal (pH 7.36 / pCO2 48 / bicarb 27). BNP normal. Presentation is most consistent with bacterial pneumonia.  Afebrile with leukocytosis  On admit(WBC 23.8) on admission. Chest x-ray and CT suggestive of pneumonia as above. Patient was given azithromycin and ceftriaxone in the emergency department. Was hospitalized within the past 2 months. Procalcitonin 0.07 upon admit.    Currently on 1.5 liters nc, wbc 22k-20.7k, sputum with E. coli and Klebsiella (sensitivities pending), changed atb to levaquin/ rocephin 4/11/20  to get better gram neg coverage.   upon admit. Will repeat CRP 4/13/20 to ascertain improvement.  Note that in February he had a nodule seen on CT scan in the area of this pneumonia. No further fever.     PUI COVID-19-ruled out     LFT elevation  Prior LFTs normal. Admit ALT 73, AST 64.   Resolved 04/11/20      COPD (chronic obstructive pulmonary disease)  Pulmonary hypertension  History of severe COPD, last hospitalized at [facility] Feb 16-19, 2020 for COPD exacerbation. Managed prior to admission with Advair 500/50 daily, DuoNebs qid, Spiriva 18 mcg daily, and prn albuterol. He has prn prednisone and doxycycline at home to use if he feels he is flaring - used prednisone 40 mg x 2 days prior to admission.    The patient has had minimal to no wheezing so steroids have not been given systemically.  Continue prior to admission Advair and albuterol MDI  On duoneb q 4      Essential hypertension, benign  Peripheral edema  Managed prior to admission with amlodipine 10 mg daily, atenolol 25 mg daily, lisinopril 60 mg daily, and lasix 20 mg daily.   - Continue prior to admission meds      History of  pulmonary nodule-- new 8 by 5 mm posterior lateral nodule left upper lobe noted 2/18/20  New pulmonary nodule measuring 8x5 mm seen on chest CT 2/16/20.  Per notes from discharge summary 2/19/2020:  - Per radiology recommend repeat CT chest for  follow-up in 3-6 months   - Primary care provider to order this at follow-up appointment in 1 week, [employer] message sent.      This is in the area of current infiltrate--so CT follow up will be crucial.     Tobacco abuse, in remission  Had quit a few months ago. Nicotine patch available if he would like one.        Diet: Regular Diet Adult    DVT Prophylaxis: Enoxaparin (Lovenox) SQ  Miller Catheter: not present  Code Status: Full Code  - discussed with patient on admission     Plan -continue Levaquin and Rocephin, await sensitivities on sputum culture.  His white blood cell count is down a bit and he feels better-but I will order CRP tomorrow to see if this shows improvement from admission.     He will need CT follow-up of this pneumonia.  He had a new nodule noted in the area of his current infiltrate on a 2/18/2020 CT.

## 2020-04-13 ENCOUNTER — APPOINTMENT (OUTPATIENT)
Dept: GENERAL RADIOLOGY | Facility: CLINIC | Age: 55
End: 2020-04-13
Attending: INTERNAL MEDICINE
Payer: COMMERCIAL

## 2020-04-13 LAB
ANION GAP SERPL CALCULATED.3IONS-SCNC: 6 MMOL/L (ref 3–14)
BUN SERPL-MCNC: 15 MG/DL (ref 7–30)
CALCIUM SERPL-MCNC: 8.8 MG/DL (ref 8.5–10.1)
CHLORIDE SERPL-SCNC: 96 MMOL/L (ref 94–109)
CO2 SERPL-SCNC: 28 MMOL/L (ref 20–32)
CREAT SERPL-MCNC: 0.61 MG/DL (ref 0.66–1.25)
CRP SERPL-MCNC: 271 MG/L (ref 0–8)
ERYTHROCYTE [DISTWIDTH] IN BLOOD BY AUTOMATED COUNT: 13.7 % (ref 10–15)
GFR SERPL CREATININE-BSD FRML MDRD: >90 ML/MIN/{1.73_M2}
GLUCOSE SERPL-MCNC: 99 MG/DL (ref 70–99)
HCT VFR BLD AUTO: 35.6 % (ref 40–53)
HGB BLD-MCNC: 11.8 G/DL (ref 13.3–17.7)
L PNEUMO1 AG UR QL IA: NORMAL
LACTATE BLD-SCNC: 0.8 MMOL/L (ref 0.7–2)
MCH RBC QN AUTO: 31.7 PG (ref 26.5–33)
MCHC RBC AUTO-ENTMCNC: 33.1 G/DL (ref 31.5–36.5)
MCV RBC AUTO: 96 FL (ref 78–100)
PLATELET # BLD AUTO: 337 10E9/L (ref 150–450)
POTASSIUM SERPL-SCNC: 3.8 MMOL/L (ref 3.4–5.3)
RBC # BLD AUTO: 3.72 10E12/L (ref 4.4–5.9)
SARS-COV-2 PCR COMMENT: NORMAL
SARS-COV-2 RNA SPEC QL NAA+PROBE: NEGATIVE
SARS-COV-2 RNA SPEC QL NAA+PROBE: NORMAL
SODIUM SERPL-SCNC: 130 MMOL/L (ref 133–144)
SPECIMEN SOURCE: NORMAL
WBC # BLD AUTO: 22.5 10E9/L (ref 4–11)

## 2020-04-13 PROCEDURE — 87899 AGENT NOS ASSAY W/OPTIC: CPT | Performed by: INTERNAL MEDICINE

## 2020-04-13 PROCEDURE — 83605 ASSAY OF LACTIC ACID: CPT | Performed by: FAMILY MEDICINE

## 2020-04-13 PROCEDURE — 87581 M.PNEUMON DNA AMP PROBE: CPT | Performed by: INTERNAL MEDICINE

## 2020-04-13 PROCEDURE — 93005 ELECTROCARDIOGRAM TRACING: CPT

## 2020-04-13 PROCEDURE — 87633 RESP VIRUS 12-25 TARGETS: CPT | Performed by: INTERNAL MEDICINE

## 2020-04-13 PROCEDURE — 36415 COLL VENOUS BLD VENIPUNCTURE: CPT | Performed by: INTERNAL MEDICINE

## 2020-04-13 PROCEDURE — 40000275 ZZH STATISTIC RCP TIME EA 10 MIN

## 2020-04-13 PROCEDURE — 86140 C-REACTIVE PROTEIN: CPT | Performed by: FAMILY MEDICINE

## 2020-04-13 PROCEDURE — 25000128 H RX IP 250 OP 636: Performed by: PHYSICIAN ASSISTANT

## 2020-04-13 PROCEDURE — 25000125 ZZHC RX 250: Performed by: INTERNAL MEDICINE

## 2020-04-13 PROCEDURE — 84145 PROCALCITONIN (PCT): CPT | Performed by: INTERNAL MEDICINE

## 2020-04-13 PROCEDURE — 80048 BASIC METABOLIC PNL TOTAL CA: CPT | Performed by: FAMILY MEDICINE

## 2020-04-13 PROCEDURE — 99233 SBSQ HOSP IP/OBS HIGH 50: CPT | Performed by: INTERNAL MEDICINE

## 2020-04-13 PROCEDURE — 94640 AIRWAY INHALATION TREATMENT: CPT

## 2020-04-13 PROCEDURE — 25000128 H RX IP 250 OP 636: Performed by: FAMILY MEDICINE

## 2020-04-13 PROCEDURE — 25000125 ZZHC RX 250: Performed by: FAMILY MEDICINE

## 2020-04-13 PROCEDURE — 87635 SARS-COV-2 COVID-19 AMP PRB: CPT | Performed by: INTERNAL MEDICINE

## 2020-04-13 PROCEDURE — 12000000 ZZH R&B MED SURG/OB

## 2020-04-13 PROCEDURE — 25000132 ZZH RX MED GY IP 250 OP 250 PS 637: Performed by: PHYSICIAN ASSISTANT

## 2020-04-13 PROCEDURE — 36415 COLL VENOUS BLD VENIPUNCTURE: CPT | Performed by: FAMILY MEDICINE

## 2020-04-13 PROCEDURE — 71046 X-RAY EXAM CHEST 2 VIEWS: CPT

## 2020-04-13 PROCEDURE — 87486 CHLMYD PNEUM DNA AMP PROBE: CPT | Performed by: INTERNAL MEDICINE

## 2020-04-13 PROCEDURE — 25000131 ZZH RX MED GY IP 250 OP 636 PS 637: Performed by: INTERNAL MEDICINE

## 2020-04-13 PROCEDURE — 85027 COMPLETE CBC AUTOMATED: CPT | Performed by: FAMILY MEDICINE

## 2020-04-13 PROCEDURE — 94640 AIRWAY INHALATION TREATMENT: CPT | Mod: 76

## 2020-04-13 RX ORDER — PREDNISONE 20 MG/1
40 TABLET ORAL DAILY
Status: DISCONTINUED | OUTPATIENT
Start: 2020-04-13 | End: 2020-04-14 | Stop reason: HOSPADM

## 2020-04-13 RX ADMIN — GUAIFENESIN AND DEXTROMETHORPHAN HYDROBROMIDE 1 TABLET: 600; 30 TABLET, EXTENDED RELEASE ORAL at 12:06

## 2020-04-13 RX ADMIN — Medication 400 MG: at 17:27

## 2020-04-13 RX ADMIN — BENZONATATE 100 MG: 100 CAPSULE ORAL at 23:24

## 2020-04-13 RX ADMIN — IPRATROPIUM BROMIDE AND ALBUTEROL 1 PUFF: 20; 100 SPRAY, METERED RESPIRATORY (INHALATION) at 20:34

## 2020-04-13 RX ADMIN — IPRATROPIUM BROMIDE AND ALBUTEROL SULFATE 3 ML: .5; 3 SOLUTION RESPIRATORY (INHALATION) at 11:40

## 2020-04-13 RX ADMIN — BENZONATATE 100 MG: 100 CAPSULE ORAL at 04:45

## 2020-04-13 RX ADMIN — LEVOFLOXACIN 500 MG: 5 INJECTION, SOLUTION INTRAVENOUS at 12:23

## 2020-04-13 RX ADMIN — AMLODIPINE BESYLATE 10 MG: 10 TABLET ORAL at 15:34

## 2020-04-13 RX ADMIN — PREDNISONE 40 MG: 20 TABLET ORAL at 15:34

## 2020-04-13 RX ADMIN — BENZONATATE 100 MG: 100 CAPSULE ORAL at 13:56

## 2020-04-13 RX ADMIN — IPRATROPIUM BROMIDE AND ALBUTEROL 1 PUFF: 20; 100 SPRAY, METERED RESPIRATORY (INHALATION) at 17:25

## 2020-04-13 RX ADMIN — CEFTRIAXONE SODIUM 2 G: 2 INJECTION, SOLUTION INTRAVENOUS at 17:28

## 2020-04-13 RX ADMIN — FUROSEMIDE 20 MG: 20 TABLET ORAL at 15:34

## 2020-04-13 RX ADMIN — ACETAMINOPHEN 650 MG: 325 TABLET, FILM COATED ORAL at 04:45

## 2020-04-13 RX ADMIN — FLUTICASONE FUROATE AND VILANTEROL TRIFENATATE 1 PUFF: 200; 25 POWDER RESPIRATORY (INHALATION) at 09:25

## 2020-04-13 RX ADMIN — ATENOLOL 25 MG: 25 TABLET ORAL at 15:34

## 2020-04-13 RX ADMIN — LISINOPRIL 60 MG: 20 TABLET ORAL at 17:27

## 2020-04-13 RX ADMIN — ENOXAPARIN SODIUM 40 MG: 40 INJECTION SUBCUTANEOUS at 23:25

## 2020-04-13 RX ADMIN — IPRATROPIUM BROMIDE AND ALBUTEROL SULFATE 3 ML: .5; 3 SOLUTION RESPIRATORY (INHALATION) at 07:48

## 2020-04-13 ASSESSMENT — ACTIVITIES OF DAILY LIVING (ADL)
ADLS_ACUITY_SCORE: 13

## 2020-04-13 ASSESSMENT — MIFFLIN-ST. JEOR: SCORE: 1345.88

## 2020-04-13 NOTE — PLAN OF CARE
Patient HR was 120 to 130. Chest xray results back and appears worse per report. Dr. Ortega notified. Started on Tele. EKG done. NP swabs sent for Covid and respiratory panel. Patient placed in isolation, contact and droplet, r/o Covid. See note from Dr. Ortega.

## 2020-04-13 NOTE — PLAN OF CARE
Went to radiology for x ray by wheel chair.  On room air most of shift. Patient will put self on 1 L via n/c when ambulating in room. Lung sounds diminished with expiratory wheezing. Course lung sound hear  In left upper lobe. Coughing up less sputum. States sputum is now clear. Requested Tessalon and Mucinex. Received IV antibiotic.

## 2020-04-13 NOTE — PROGRESS NOTES
Addendum    cxr 2 views read as increased left side infiltrates. This is compared to a single view and not sure how reliable the comparison is. CT scan done on admit did comment on fairly extensive left side infiltrate. With previous procalcitonin low and crp high unclear if patient has an atypical pneumonia ( viral or other) along with bacterial ( grew out e coli and klebsiella). Will recheck covid 19, check viral resp panel and legionella urine.  ekg shows sinus tach that could just be a function of pulm infection. Was just given evening atenolol so will see if hr improves. Of note, no pe on previous chest ct.    Lisseth Ortega MD

## 2020-04-13 NOTE — PROGRESS NOTES
[facility]        Assessment and Plan:    Luis Hernandez is a 54 year old male admitted on 4/10/2020. He presented to the emergency department for evaluation of cough and increasing O2 needs at home, was found to have pneumonia on imaging; he is being admitted for treatment of pneumonia, likely bacterial, but cannot rule out COVID.     Acute on chronic respiratory failure with hypoxia   Community acquired pneumonia-sputum with e coli and Klebsiella (sensitivities pending)  Uses 1-2L supplemental O2 intermittently at home, but does not require it 24/7. In the 24 hours prior to admission he was requiring 1-2L continuously to maintain sats. Chest x-ray with left infiltrate, chest CT with left upper lobe anterior infiltrate. VBG normal (pH 7.36 / pCO2 48 / bicarb 27). BNP normal. Presentation is most consistent with bacterial pneumonia.  Afebrile with leukocytosis  On admit(WBC 23.8) on admission. Chest x-ray and CT suggestive of pneumonia as above. Patient was given azithromycin and ceftriaxone in the emergency department. Was hospitalized within the past 2 months. Procalcitonin 0.07 upon admit.    Currently on 1.5 liters nc, wbc 22k-20.7k, sputum with E. coli and Klebsiella (sensitivities pending), changed atb to levaquin/ rocephin 4/11/20  to get better gram neg coverage.   upon admit. Will repeat CRP 4/13/20 to ascertain improvement.  Note that in February he had a nodule seen on CT scan in the area of this pneumonia. No further fever.    4/13- as below, pt feels better except for cough secondary to nebs, wbc and crp are up however, . Current antibx should cover the klebsiella and e coli in sputum. I will not change antibx today given incongruence between labs and clinical improvement but may stop tomorrow.check procalcitonin. Recheck cxr     PUI COVID-19-ruled out     LFT elevation  Prior LFTs normal. Admit ALT 73, AST 64.   Resolved 04/11/20      COPD (chronic obstructive pulmonary  disease)  Pulmonary hypertension  History of severe COPD, last hospitalized at [facility] Feb 16-19, 2020 for COPD exacerbation. Managed prior to admission with Advair 500/50 daily, DuoNebs qid, Spiriva 18 mcg daily, and prn albuterol. He has prn prednisone and doxycycline at home to use if he feels he is flaring - used prednisone 40 mg x 2 days prior to admission.    The patient has had minimal to no wheezing so steroids have not been given systemically.  Continue prior to admission Advair and albuterol MDI  On duoneb q 4     4/13- per pt request will stop duonebs and restart combivent, cont breo ellipta. He is wheezing so will restart prednisone. He generally feels better but wbc up and crp up which does not correlate. Sputum cx pos for klebsiella and e coli both sens to the cipro and ceftriaxone he is on. I will cont these antibx for now and stop one tomorrow if improves, check procalcitonin today, cbc in am     Essential hypertension, benign  Peripheral edema  Managed prior to admission with amlodipine 10 mg daily, atenolol 25 mg daily, lisinopril 60 mg daily, and lasix 20 mg daily.   - Continue prior to admission meds      History of  pulmonary nodule-- new 8 by 5 mm posterior lateral nodule left upper lobe noted 2/18/20  New pulmonary nodule measuring 8x5 mm seen on chest CT 2/16/20.  Per notes from discharge summary 2/19/2020:  - Per radiology recommend repeat CT chest for follow-up in 3-6 months   - Primary care provider to order this at follow-up appointment in 1 week, inbasket message sent.      This is in the area of current infiltrate--so CT follow up will be crucial.     Tobacco abuse, in remission  Had quit a few months ago. Nicotine patch available if he would like one.        Diet: Regular Diet Adult    DVT Prophylaxis: Enoxaparin (Lovenox) SQ  Miller Catheter: not present  Code Status: Full Code  - discussed with patient on admission         Lisseth Ortega MD       Interval History    generally  continues to feel better than when first admitted. However, he says that when he uses the nebs, it makes him cough so much he gets more sob.  He would liket o stop duonebs and go back to combivent inhaler. iohterwise he feels good. Eating well. Has been walking in [place]    Physical Exam   Temp:  [98.2  F (36.8  C)-99.6  F (37.6  C)] 98.3  F (36.8  C)  Pulse:  [104-110] 104  Resp:  [18] 18  BP: (113-146)/(74-94) 113/74  SpO2:  [90 %-95 %] 90 %    Weights:   Vitals:    04/11/20 0326 04/12/20 0604 04/13/20 0450   Weight: 54.8 kg (120 lb 13 oz) 57.1 kg (125 lb 14.1 oz) 56.9 kg (125 lb 7.1 oz)    Body mass index is 20.87 kg/m .    Constitutional:  Looks like he is having difficulty breathing but he says that is normal for him  CV: Regular  Respiratory:  Wheeze left, decr bs right  GI: Soft, nontender  Skin: Warm and dry  Musculoskeletal:no edema    Data   Recent Labs   Lab 04/13/20  0435 04/12/20  0513 04/11/20  0616 04/10/20  1437   WBC 22.5* 20.7* 22.8* 23.8*   HGB 11.8* 11.9* 11.5* 12.2*   MCV 96 98 98 100    311 296 302   * 131* 132* 133   POTASSIUM 3.8 3.9 3.6 4.3   CHLORIDE 96 96 98 103   CO2 28 28 32 25   BUN 15 13 12 14   CR 0.61* 0.61* 0.59* 0.71   ANIONGAP 6 7 2* 5   MIKE 8.8 8.8 9.0 9.2   GLC 99 94 138* 120*   ALBUMIN  --   --  2.8* 3.2*   PROTTOTAL  --   --  6.7* 7.1   BILITOTAL  --   --  0.3 0.2   ALKPHOS  --   --  75 77   ALT  --   --  60 73*   AST  --   --  32 64*   TROPI  --   --   --  <0.015       Recent Labs   Lab 04/13/20  0435 04/12/20  0513 04/11/20  0616 04/10/20  1437   GLC 99 94 138* 120*        Unresulted Labs Ordered in the Past 30 Days of this Admission     Date and Time Order Name Status Description    4/10/2020 1735 Sputum Culture Aerobic Bacterial Preliminary     4/10/2020 1735 Blood culture Preliminary     4/10/2020 1735 Blood culture Preliminary            I    Medications     - MEDICATION INSTRUCTIONS -         amLODIPine  10 mg Oral Daily     atenolol  25 mg Oral Daily      cefTRIAXone  2 g Intravenous Q24H     enoxaparin ANTICOAGULANT  40 mg Subcutaneous Q24H     fluticasone-vilanterol  1 puff Inhalation Daily     furosemide  20 mg Oral Daily     Ipratropium-Albuterol  1 puff Inhalation 4x daily     levofloxacin  500 mg Intravenous Q24H     lisinopril  60 mg Oral QPM     magnesium oxide  400 mg Oral QPM     nicotine  1 patch Transdermal Q24H     nicotine   Transdermal Q8H     predniSONE  40 mg Oral Daily     sodium chloride (PF)  3 mL Intracatheter Q8H       Lisseth Ortega MD

## 2020-04-13 NOTE — PROGRESS NOTES
Patient is alert and oriented. C/o headache from coughing, given Tylenol, Mucinex and Tessalon. Sepsis protocol populated for tachycardia, pt states this is normal for them, lactic acid 0.7. Independent in room. Regular diet. Uses home O2 1-2 lpm. 1.5 lpm nc here, has not wanted to use this morning. Nicotine patch on right deltoid.

## 2020-04-14 VITALS
SYSTOLIC BLOOD PRESSURE: 120 MMHG | BODY MASS INDEX: 21.19 KG/M2 | DIASTOLIC BLOOD PRESSURE: 79 MMHG | HEIGHT: 65 IN | WEIGHT: 127.21 LBS | OXYGEN SATURATION: 90 % | HEART RATE: 102 BPM | TEMPERATURE: 98.9 F | RESPIRATION RATE: 18 BRPM

## 2020-04-14 PROBLEM — J18.9 CAP (COMMUNITY ACQUIRED PNEUMONIA): Status: ACTIVE | Noted: 2020-04-14

## 2020-04-14 LAB
ANION GAP SERPL CALCULATED.3IONS-SCNC: 5 MMOL/L (ref 3–14)
BACTERIA SPEC CULT: ABNORMAL
BUN SERPL-MCNC: 16 MG/DL (ref 7–30)
C PNEUM DNA SPEC QL NAA+PROBE: NOT DETECTED
CALCIUM SERPL-MCNC: 9 MG/DL (ref 8.5–10.1)
CHLORIDE SERPL-SCNC: 95 MMOL/L (ref 94–109)
CO2 SERPL-SCNC: 30 MMOL/L (ref 20–32)
CREAT SERPL-MCNC: 0.65 MG/DL (ref 0.66–1.25)
ERYTHROCYTE [DISTWIDTH] IN BLOOD BY AUTOMATED COUNT: 13.9 % (ref 10–15)
FLUAV H1 2009 PAND RNA SPEC QL NAA+PROBE: NOT DETECTED
FLUAV H1 RNA SPEC QL NAA+PROBE: NOT DETECTED
FLUAV H3 RNA SPEC QL NAA+PROBE: NOT DETECTED
FLUAV RNA SPEC QL NAA+PROBE: NOT DETECTED
FLUBV RNA SPEC QL NAA+PROBE: NOT DETECTED
GFR SERPL CREATININE-BSD FRML MDRD: >90 ML/MIN/{1.73_M2}
GLUCOSE SERPL-MCNC: 138 MG/DL (ref 70–99)
HADV DNA SPEC QL NAA+PROBE: NOT DETECTED
HCOV PNL SPEC NAA+PROBE: NOT DETECTED
HCT VFR BLD AUTO: 36.3 % (ref 40–53)
HGB BLD-MCNC: 12.1 G/DL (ref 13.3–17.7)
HMPV RNA SPEC QL NAA+PROBE: NOT DETECTED
HPIV1 RNA SPEC QL NAA+PROBE: NOT DETECTED
HPIV2 RNA SPEC QL NAA+PROBE: NOT DETECTED
HPIV3 RNA SPEC QL NAA+PROBE: NOT DETECTED
HPIV4 RNA SPEC QL NAA+PROBE: NOT DETECTED
M PNEUMO DNA SPEC QL NAA+PROBE: NOT DETECTED
MCH RBC QN AUTO: 32.1 PG (ref 26.5–33)
MCHC RBC AUTO-ENTMCNC: 33.3 G/DL (ref 31.5–36.5)
MCV RBC AUTO: 96 FL (ref 78–100)
MICROBIOLOGIST REVIEW: NORMAL
PLATELET # BLD AUTO: 384 10E9/L (ref 150–450)
POTASSIUM SERPL-SCNC: 4.1 MMOL/L (ref 3.4–5.3)
PROCALCITONIN SERPL-MCNC: 0.73 NG/ML
RBC # BLD AUTO: 3.77 10E12/L (ref 4.4–5.9)
RSV RNA SPEC QL NAA+PROBE: NOT DETECTED
RSV RNA SPEC QL NAA+PROBE: NOT DETECTED
RV+EV RNA SPEC QL NAA+PROBE: NOT DETECTED
SODIUM SERPL-SCNC: 130 MMOL/L (ref 133–144)
SPECIMEN SOURCE: ABNORMAL
WBC # BLD AUTO: 19 10E9/L (ref 4–11)

## 2020-04-14 PROCEDURE — 80048 BASIC METABOLIC PNL TOTAL CA: CPT | Performed by: FAMILY MEDICINE

## 2020-04-14 PROCEDURE — 99207 ZZC CDG-CODE INCORRECT PER BILLING BASED ON TIME: CPT | Performed by: INTERNAL MEDICINE

## 2020-04-14 PROCEDURE — 25000132 ZZH RX MED GY IP 250 OP 250 PS 637: Performed by: PHYSICIAN ASSISTANT

## 2020-04-14 PROCEDURE — 25000131 ZZH RX MED GY IP 250 OP 636 PS 637: Performed by: INTERNAL MEDICINE

## 2020-04-14 PROCEDURE — 36415 COLL VENOUS BLD VENIPUNCTURE: CPT | Performed by: FAMILY MEDICINE

## 2020-04-14 PROCEDURE — 85027 COMPLETE CBC AUTOMATED: CPT | Performed by: FAMILY MEDICINE

## 2020-04-14 PROCEDURE — 99238 HOSP IP/OBS DSCHRG MGMT 30/<: CPT | Performed by: INTERNAL MEDICINE

## 2020-04-14 RX ORDER — LEVOFLOXACIN 750 MG/1
750 TABLET, FILM COATED ORAL DAILY
Qty: 4 TABLET | Refills: 0 | Status: SHIPPED | OUTPATIENT
Start: 2020-04-14 | End: 2020-04-21

## 2020-04-14 RX ORDER — MAGNESIUM OXIDE 400 MG/1
400 TABLET ORAL EVERY EVENING
Start: 2020-04-14 | End: 2021-09-07

## 2020-04-14 RX ORDER — PREDNISONE 10 MG/1
40 TABLET ORAL DAILY
Qty: 20 TABLET | Refills: 4 | Status: SHIPPED | OUTPATIENT
Start: 2020-04-14 | End: 2020-04-21

## 2020-04-14 RX ADMIN — NICOTINE 1 PATCH: 21 PATCH, EXTENDED RELEASE TRANSDERMAL at 04:56

## 2020-04-14 RX ADMIN — IPRATROPIUM BROMIDE AND ALBUTEROL 1 PUFF: 20; 100 SPRAY, METERED RESPIRATORY (INHALATION) at 04:58

## 2020-04-14 RX ADMIN — FLUTICASONE FUROATE AND VILANTEROL TRIFENATATE 1 PUFF: 200; 25 POWDER RESPIRATORY (INHALATION) at 08:56

## 2020-04-14 RX ADMIN — PREDNISONE 40 MG: 20 TABLET ORAL at 08:54

## 2020-04-14 ASSESSMENT — ACTIVITIES OF DAILY LIVING (ADL)
ADLS_ACUITY_SCORE: 13

## 2020-04-14 ASSESSMENT — MIFFLIN-ST. JEOR: SCORE: 1353.88

## 2020-04-14 NOTE — PROGRESS NOTES
[facility]    Hospital Medicine   Cross Cover Note  Date of Service: 4/14/2020     I was called due to negative COVID test.    Patient has pneumonia which is a reasonable alternative etiology rather than COVID. May remove contact precaution, but maintain droplet precautions due to pneumonia.      Kelley Marti PA-C  [facility]  Pager: [phone]

## 2020-04-14 NOTE — PLAN OF CARE
[place] [facility] DISCHARGE NOTE    Patient discharged to home at 12:47 PM via wheel chair. Accompanied by father and staff. Discharge instructions reviewed with patient, opportunity offered to ask questions. Prescriptions filled and sent with patient upon discharge. All belongings sent with patient.    Lisseth Reddy RN

## 2020-04-14 NOTE — DISCHARGE SUMMARY
[facility] Discharge Summary        Assessment and Plan:    Luis Hernandez is a 54 year old male admitted on 4/10/2020. He presented to the emergency department for evaluation of cough and increasing O2 needs at home, was found to have pneumonia on imaging; he is being admitted for treatment of pneumonia, likely bacterial, but cannot rule out COVID.     Acute on chronic respiratory failure with hypoxia   Community acquired pneumonia-sputum  Secondary to e coli and ampicillin resistant Klebsiella . covid neg,resp viral panel neg, legionella neg  Uses 1-2L supplemental O2 intermittently at home, but does not require it 24/7. In the 24 hours prior to admission he was requiring 1-2L continuously to maintain sats. Chest x-ray with left infiltrate, chest CT with left upper lobe anterior infiltrate. VBG normal (pH 7.36 / pCO2 48 / bicarb 27). BNP normal. Presentation is most consistent with bacterial pneumonia.  Afebrile with leukocytosis  On admit(WBC 23.8) on admission. Chest x-ray and CT suggestive of pneumonia as above. Patient was given azithromycin and ceftriaxone in the emergency department. Was hospitalized within the past 2 months. Procalcitonin 0.07 upon admit.    Currently on 1.5 liters nc, wbc 22k-20.7k, sputum with E. coli and Klebsiella (sensitivities pending), changed atb to levaquin/ rocephin 4/11/20  to get better gram neg coverage.   upon admit. Will repeat CRP 4/13/20 to ascertain improvement.  Note that in February he had a nodule seen on CT scan in the area of this pneumonia. No further fever.    4/13- as below, pt feels better except for cough secondary to nebs, wbc and crp are up however, . Current antibx should cover the klebsiella and e coli in sputum. I will not change antibx today given incongruence between labs and clinical improvement but may stop tomorrow.check procalcitonin. Recheck cxr    4/14-Pt feels great. He is off oxygen, not wheezing and walking in halls.  He feels he go home. CXR suggest possible worsening infiltrate yesterday but hard to know if it was just technique. WBC down, procalcitonin .073 yesterday. I will discharge pt to complete 7 day course of levofloxacin which both e coli and klebsiella are sensitive to       PUI COVID-19-ruled out     LFT elevation  Prior LFTs normal. Admit ALT 73, AST 64.   Resolved 04/11/20      COPD (chronic obstructive pulmonary disease)  Pulmonary hypertension  History of severe COPD, last hospitalized at [facility] Feb 16-19, 2020 for COPD exacerbation. Managed prior to admission with Advair 500/50 daily, DuoNebs qid, Spiriva 18 mcg daily, and prn albuterol. He has prn prednisone and doxycycline at home to use if he feels he is flaring - used prednisone 40 mg x 2 days prior to admission.    The patient has had minimal to no wheezing so steroids have not been given systemically.  Continue prior to admission Advair and albuterol MDI  On duoneb q 4     4/13- per pt request will stop duonebs and restart combivent, cont breo ellipta. He is wheezing so will restart prednisone. He generally feels better but wbc up and crp up which does not correlate. Sputum cx pos for klebsiella and e coli both sens to the cipro and ceftriaxone he is on. I will cont these antibx for now and stop one tomorrow if improves, check procalcitonin today, cbc in am    4/14- markedly improved today. Wbc down. Will discharge home on antibx and 5 days prednisone     Essential hypertension, benign  Peripheral edema  Managed prior to admission with amlodipine 10 mg daily, atenolol 25 mg daily, lisinopril 60 mg daily, and lasix 20 mg daily.   - Continue prior to admission meds      History of  pulmonary nodule-- new 8 by 5 mm posterior lateral nodule left upper lobe noted 2/18/20. Needs follow up CT in 3-6 months. Will defer to primary to order  New pulmonary nodule measuring 8x5 mm seen on chest CT 2/16/20.  Per notes from discharge summary 2/19/2020:  - Per  radiology recommend repeat CT chest for follow-up in 3-6 months   - Primary care provider to order this at follow-up appointment in 1 week, [employer]et message sent.           Tobacco abuse, in remission  Had quit a few months ago.               Lisseth Ortega MD       Interval History     Feels great today. Off oxygen, walking in [place]    Physical Exam   Temp:  [98.1  F (36.7  C)-98.9  F (37.2  C)] 98.9  F (37.2  C)  Pulse:  [] 102  Heart Rate:  [] 89  Resp:  [18-20] 18  BP: (118-132)/(76-83) 120/79  SpO2:  [87 %-96 %] 90 %    Weights:   Vitals:    04/12/20 0604 04/13/20 0450 04/14/20 0529   Weight: 57.1 kg (125 lb 14.1 oz) 56.9 kg (125 lb 7.1 oz) 57.7 kg (127 lb 3.3 oz)    Body mass index is 21.17 kg/m .    Constitutional:   nad  CV: Regular  Respiratory:   clear  GI: Soft, nontender  Skin: Warm and dry  Musculoskeletal:no edema    Data   Recent Labs   Lab 04/14/20  0452 04/13/20  0435 04/12/20  0513 04/11/20  0616 04/10/20  1437   WBC 19.0* 22.5* 20.7* 22.8* 23.8*   HGB 12.1* 11.8* 11.9* 11.5* 12.2*   MCV 96 96 98 98 100    337 311 296 302   * 130* 131* 132* 133   POTASSIUM 4.1 3.8 3.9 3.6 4.3   CHLORIDE 95 96 96 98 103   CO2 30 28 28 32 25   BUN 16 15 13 12 14   CR 0.65* 0.61* 0.61* 0.59* 0.71   ANIONGAP 5 6 7 2* 5   MIKE 9.0 8.8 8.8 9.0 9.2   * 99 94 138* 120*   ALBUMIN  --   --   --  2.8* 3.2*   PROTTOTAL  --   --   --  6.7* 7.1   BILITOTAL  --   --   --  0.3 0.2   ALKPHOS  --   --   --  75 77   ALT  --   --   --  60 73*   AST  --   --   --  32 64*   TROPI  --   --   --   --  <0.015       Recent Labs   Lab 04/14/20  0452 04/13/20  0435 04/12/20  0513 04/11/20  0616 04/10/20  1437   * 99 94 138* 120*        Unresulted Labs Ordered in the Past 30 Days of this Admission     Date and Time Order Name Status Description    4/10/2020 1735 Blood culture Preliminary     4/10/2020 1735 Blood culture Preliminary            I    Medications     - MEDICATION INSTRUCTIONS -          amLODIPine  10 mg Oral Daily     atenolol  25 mg Oral Daily     cefTRIAXone  2 g Intravenous Q24H     enoxaparin ANTICOAGULANT  40 mg Subcutaneous Q24H     fluticasone-vilanterol  1 puff Inhalation Daily     furosemide  20 mg Oral Daily     Ipratropium-Albuterol  1 puff Inhalation 4x daily     levofloxacin  500 mg Intravenous Q24H     lisinopril  60 mg Oral QPM     magnesium oxide  400 mg Oral QPM     nicotine  1 patch Transdermal Q24H     nicotine   Transdermal Q8H     predniSONE  40 mg Oral Daily     sodium chloride (PF)  3 mL Intracatheter Q8H       Lisseth Ortega MD

## 2020-04-14 NOTE — PROGRESS NOTES
DATE:  4/13/2020   TIME OF RECEIPT FROM LAB:  2030  LAB TEST:  COVID 19  LAB VALUE:  Negative  RESULTS GIVEN WITH READ-BACK TO (PROVIDER): Kelley DÍAZ  TIME LAB VALUE REPORTED TO PROVIDER:   2035

## 2020-04-14 NOTE — PLAN OF CARE
Patient triggered sepsis alert.  Dr. Lisseth Ortega notified. Will not need to draw Lactic. WBC improved and patient discharging.

## 2020-04-15 ENCOUNTER — PATIENT OUTREACH (OUTPATIENT)
Dept: CARE COORDINATION | Facility: CLINIC | Age: 55
End: 2020-04-15

## 2020-04-15 NOTE — LETTER
[facility]  [address] [address]    April 16, 2020    Luis BURNS [facility]  [address] [address]      Dear Luis,    I am a clinic community health worker who works with Dean Mariee MD at [facility]. I wanted to introduce myself and provide you with my contact information for you to be able to call me with any questions or concerns. Below is a description of clinic care coordination and how we can further assist you.      The clinic care coordination team is made up of a registered nurse,  and community health worker who understand the health care system. The goal of clinic care coordination is to help you manage your health and improve access to the health care system in the most efficient manner. The team can assist you in meeting your health care goals by providing education, coordinating services, strengthening the communication among your providers and supporting you with any resource needs.    Please feel free to contact me with any questions or concerns. We are focused on providing you with the highest-quality healthcare experience possible and that all starts with you.     Sincerely,     Jo LONG Community Health Worker  Clinic Care Coordination  [facility] : [place]  Phone: [phone]

## 2020-04-15 NOTE — PROGRESS NOTES
Clinic Care Coordination Contact  [facility]/Voicemail    Clinical Data: Care Coordinator Outreach  Outreach attempted x 1.  Left message on patient's voicemail with call back information and requested return call.  Plan: CHW will try to reach patient again in 1-2 business days.    Referral reason: hosp d/c, hx of COPD. has not answered calls from CCC in past but warrants additional attempt due to elevated risk of readmission    Jo LONG Community Health Worker  Clinic Care Coordination  [facility]  Phone: [phone]

## 2020-04-16 LAB
BACTERIA SPEC CULT: NO GROWTH
BACTERIA SPEC CULT: NO GROWTH
Lab: NORMAL
Lab: NORMAL
SPECIMEN SOURCE: NORMAL
SPECIMEN SOURCE: NORMAL

## 2020-04-16 NOTE — PROGRESS NOTES
Clinic Care Coordination Contact  [facility]/Voicemail     CHW Outreach attempted x 2.  Left message on patient's voicemail with call back information and requested return call.  Plan: CHW will send care coordination introduction letter via [employer] CHW will do no further outreaches at this time.    Jo LONG Community Health Worker  Clinic Care Coordination  [facility]  Phone: [phone]

## 2020-04-21 ENCOUNTER — VIRTUAL VISIT (OUTPATIENT)
Dept: FAMILY MEDICINE | Facility: CLINIC | Age: 55
End: 2020-04-21
Payer: COMMERCIAL

## 2020-04-21 DIAGNOSIS — I10 ESSENTIAL HYPERTENSION, BENIGN: ICD-10-CM

## 2020-04-21 DIAGNOSIS — J41.0 SIMPLE CHRONIC BRONCHITIS (H): ICD-10-CM

## 2020-04-21 DIAGNOSIS — J18.9 COMMUNITY ACQUIRED PNEUMONIA, UNSPECIFIED LATERALITY: Primary | ICD-10-CM

## 2020-04-21 DIAGNOSIS — R60.0 PERIPHERAL EDEMA: ICD-10-CM

## 2020-04-21 PROCEDURE — 99214 OFFICE O/P EST MOD 30 MIN: CPT | Mod: 95 | Performed by: FAMILY MEDICINE

## 2020-04-21 RX ORDER — AMLODIPINE BESYLATE 10 MG/1
10 TABLET ORAL DAILY
Qty: 90 TABLET | Refills: 3 | Status: SHIPPED | OUTPATIENT
Start: 2020-04-21 | End: 2021-05-07

## 2020-04-21 RX ORDER — FUROSEMIDE 20 MG
20 TABLET ORAL DAILY
Qty: 90 TABLET | Refills: 3 | Status: SHIPPED | OUTPATIENT
Start: 2020-04-21 | End: 2021-05-07

## 2020-04-21 NOTE — PATIENT INSTRUCTIONS
Please call the clinic for an office visit for chest xray and some medication refills.    Phone number is below.          Thank you for choosing [facility] ([facility]  You may be receiving an email and/or telephone survey request from [facility] regarding your visit today.  Please take a few minutes to respond to the survey to let us know how we are doing.      If you have questions or concerns, please contact us via [employer] or you can contact your care team at [phone].    Our Clinic hours are:  Monday 6:40 am  to 7:00 pm  Tuesday -Friday 6:40 am to 5:00 pm    The [place] outpatient lab hours are:  Monday - Friday 6:10 am to 4:45 pm  Saturdays 7:00 am to 11:00 am  Appointments are required, call [phone]    If you have clinical questions after hours or would like to schedule an appointment,  call the clinic at [phone].

## 2020-04-21 NOTE — PROGRESS NOTES
"Luis Hernandez is a 54 year old male who is being evaluated via a billable telephone visit.      The patient has been notified of following:     \"This telephone visit will be conducted via a call between you and your physician/provider. We have found that certain health care needs can be provided without the need for a physical exam.  This service lets us provide the care you need with a short phone conversation.  If a prescription is necessary we can send it directly to your pharmacy.  If lab work is needed we can place an order for that and you can then stop by our lab to have the test done at a later time.    Telephone visits are billed at different rates depending on your insurance coverage. During this emergency period, for some insurers they may be billed the same as an in-person visit.  Please reach out to your insurance provider with any questions.    If during the course of the call the physician/provider feels a telephone visit is not appropriate, you will not be charged for this service.\"    Patient has given verbal consent for Telephone visit?  Yes    How would you like to obtain your AVS? Mail a copy    Subjective     Luis Hernandez is a 54 year old male who presents to clinic today for the following health issues:    [facility]    Hospital Follow-up Visit:    Hospital/Nursing Home/IP Rehab Facility: [facility]  Date of Admission: 4/10/20  Date of Discharge: 4/14/20  Reason(s) for Admission: Pneumonia of Left Lung, SOB, Emphysema, COPD Exacerbation      Was your hospitalization related to COVID-19? No   Problems taking medications regularly:  None  Medication changes since discharge: None  Problems adhering to non-medication therapy:  None    Summary of hospitalization:  [facility] discharge summary reviewed  Diagnostic Tests/Treatments reviewed.  Follow up needed: will need an office visit for a chest xray and medication refills.  Other Healthcare Providers Involved in Patient s Care:      "    None  Update since discharge: improved. Post Discharge Medication Reconciliation: discharge medications reconciled and changed, per note/orders (see AVS).  Plan of care communicated with patient                       Reviewed and updated as needed this visit by Provider  Tobacco  Allergies  Meds  Problems  Med Hx  Surg Hx  Fam Hx         Review of Systems   Review Of Systems  Skin: negative  Eyes: negative  Ears/Nose/Throat: negative  Respiratory: still using O2 but only at 2L per min  Cardiovascular: negative  Gastrointestinal: negative  Genitourinary:   Musculoskeletal: negative  Neurologic: negative  Psychiatric: negative  Hematologic/Lymphatic/Immunologic:   Endocrine:          Objective   Reported vitals:  There were no vitals taken for this visit.   healthy, alert and no distress  PSYCH: Alert and oriented times 3; coherent speech, normal   rate and volume, able to articulate logical thoughts, able   to abstract reason, no tangential thoughts, no hallucinations   or delusions  His affect is normal  RESP: No cough, no audible wheezing, able to talk in full sentences  Remainder of exam unable to be completed due to telephone visits            Assessment/Plan:  1. Community acquired pneumonia, unspecified laterality  He is doing better, completed abx, needs to have CXR in future and office visit    2. Simple chronic bronchitis (H)  Copd is stable and taking all of his medications    3. Essential hypertension, benign  He has had good blood pressure control  - amLODIPine (NORVASC) 10 MG tablet; Take 1 tablet (10 mg) by mouth daily  Dispense: 90 tablet; Refill: 3    4. Peripheral edema  Stable and on his medications.  - furosemide (LASIX) 20 MG tablet; Take 1 tablet (20 mg) by mouth daily  Dispense: 90 tablet; Refill: 3    Return in about 6 weeks (around 6/2/2020) for follow up for office visit..      Phone call duration:  15 minutes    Dean Mariee MD

## 2020-04-26 ENCOUNTER — TELEPHONE (OUTPATIENT)
Dept: FAMILY MEDICINE | Facility: CLINIC | Age: 55
End: 2020-04-26

## 2020-04-27 NOTE — TELEPHONE ENCOUNTER
[employer] - report of claim form completed and routed to DR. Mariee for review and signature.

## 2020-04-29 ENCOUNTER — TELEPHONE (OUTPATIENT)
Dept: FAMILY MEDICINE | Facility: CLINIC | Age: 55
End: 2020-04-29

## 2020-04-29 NOTE — TELEPHONE ENCOUNTER
Gill from [facility] CT scheduling dept is calling and states that Dr. Mariee got a message in his in box a while back to see if this patients CT is needing to be done now, or can wait, until all the Covid -19 scare is over. He can either answer his in box or call Gill at [phone].    [facility]

## 2020-04-29 NOTE — TELEPHONE ENCOUNTER
ARTHUR Beltran is asking if the patient's chest CT needs to be done now or later after COVID.  Recommended for pulmonary nodule. Please advise. Negra ZHU RN

## 2020-05-01 NOTE — TELEPHONE ENCOUNTER
"Per Dr Edwards, \"I cannot determine patient's fitness to return to work.  Per last virtual visit, he needs a follow up visit.\"    Can we try to schedule patient for a virtual visit for follow up and completion of his [employer] report of claim form?  Thank you.    Form is in \"Awaiting Response\" tray on forms desk.   "

## 2020-05-15 NOTE — TELEPHONE ENCOUNTER
Patient cancelled appointment.    [place] unemployment form also received.  Both forms routed to Dr. Mariee for review and signature.

## 2020-05-17 NOTE — TELEPHONE ENCOUNTER
Patient cancelled appointment for [employer] initial report of claim.    [place] unemployment form completed, signed, and faxed to [facility] at [phone]

## 2020-05-19 ENCOUNTER — TELEPHONE (OUTPATIENT)
Dept: FAMILY MEDICINE | Facility: CLINIC | Age: 55
End: 2020-05-19

## 2020-06-02 ENCOUNTER — HOSPITAL ENCOUNTER (OUTPATIENT)
Dept: CT IMAGING | Facility: CLINIC | Age: 55
Discharge: HOME | End: 2020-06-02
Attending: FAMILY MEDICINE | Admitting: FAMILY MEDICINE
Payer: COMMERCIAL

## 2020-06-02 DIAGNOSIS — R91.1 INCIDENTAL PULMONARY NODULE, > 3MM AND < 8MM: ICD-10-CM

## 2020-06-02 DIAGNOSIS — R91.1 INCIDENTAL LUNG NODULE, > 3MM AND < 8MM: Primary | ICD-10-CM

## 2020-06-02 PROCEDURE — 71250 CT THORAX DX C-: CPT

## 2020-06-08 ENCOUNTER — OFFICE VISIT (OUTPATIENT)
Dept: FAMILY MEDICINE | Facility: CLINIC | Age: 55
End: 2020-06-08
Payer: COMMERCIAL

## 2020-06-08 VITALS
BODY MASS INDEX: 20.83 KG/M2 | WEIGHT: 125 LBS | HEART RATE: 77 BPM | RESPIRATION RATE: 16 BRPM | OXYGEN SATURATION: 96 % | HEIGHT: 65 IN | DIASTOLIC BLOOD PRESSURE: 84 MMHG | TEMPERATURE: 98.5 F | SYSTOLIC BLOOD PRESSURE: 122 MMHG

## 2020-06-08 DIAGNOSIS — I10 ESSENTIAL HYPERTENSION, BENIGN: ICD-10-CM

## 2020-06-08 DIAGNOSIS — J43.2 CENTRILOBULAR EMPHYSEMA (H): Primary | ICD-10-CM

## 2020-06-08 DIAGNOSIS — Z12.5 SCREENING FOR PROSTATE CANCER: ICD-10-CM

## 2020-06-08 DIAGNOSIS — Z23 NEED FOR VACCINATION: ICD-10-CM

## 2020-06-08 DIAGNOSIS — D64.9 ANEMIA, UNSPECIFIED TYPE: ICD-10-CM

## 2020-06-08 DIAGNOSIS — E87.1 HYPONATREMIA: ICD-10-CM

## 2020-06-08 LAB
ANION GAP SERPL CALCULATED.3IONS-SCNC: 4 MMOL/L (ref 3–14)
BASOPHILS # BLD AUTO: 0.2 10E9/L (ref 0–0.2)
BASOPHILS NFR BLD AUTO: 1.9 %
BUN SERPL-MCNC: 9 MG/DL (ref 7–30)
CALCIUM SERPL-MCNC: 9.2 MG/DL (ref 8.5–10.1)
CHLORIDE SERPL-SCNC: 98 MMOL/L (ref 94–109)
CO2 SERPL-SCNC: 28 MMOL/L (ref 20–32)
CREAT SERPL-MCNC: 0.69 MG/DL (ref 0.66–1.25)
DIFFERENTIAL METHOD BLD: ABNORMAL
EOSINOPHIL # BLD AUTO: 0.8 10E9/L (ref 0–0.7)
EOSINOPHIL NFR BLD AUTO: 8.3 %
ERYTHROCYTE [DISTWIDTH] IN BLOOD BY AUTOMATED COUNT: 13.3 % (ref 10–15)
GFR SERPL CREATININE-BSD FRML MDRD: >90 ML/MIN/{1.73_M2}
GLUCOSE SERPL-MCNC: 94 MG/DL (ref 70–99)
HCT VFR BLD AUTO: 41.1 % (ref 40–53)
HGB BLD-MCNC: 13.9 G/DL (ref 13.3–17.7)
LYMPHOCYTES # BLD AUTO: 1.5 10E9/L (ref 0.8–5.3)
LYMPHOCYTES NFR BLD AUTO: 15.5 %
MCH RBC QN AUTO: 32.1 PG (ref 26.5–33)
MCHC RBC AUTO-ENTMCNC: 33.8 G/DL (ref 31.5–36.5)
MCV RBC AUTO: 95 FL (ref 78–100)
MONOCYTES # BLD AUTO: 1 10E9/L (ref 0–1.3)
MONOCYTES NFR BLD AUTO: 10.3 %
NEUTROPHILS # BLD AUTO: 6.3 10E9/L (ref 1.6–8.3)
NEUTROPHILS NFR BLD AUTO: 64 %
PLATELET # BLD AUTO: 381 10E9/L (ref 150–450)
POTASSIUM SERPL-SCNC: 4.1 MMOL/L (ref 3.4–5.3)
PSA SERPL-ACNC: 1.41 UG/L (ref 0–4)
RBC # BLD AUTO: 4.33 10E12/L (ref 4.4–5.9)
SODIUM SERPL-SCNC: 130 MMOL/L (ref 133–144)
WBC # BLD AUTO: 9.9 10E9/L (ref 4–11)

## 2020-06-08 PROCEDURE — 90715 TDAP VACCINE 7 YRS/> IM: CPT | Performed by: FAMILY MEDICINE

## 2020-06-08 PROCEDURE — 85025 COMPLETE CBC W/AUTO DIFF WBC: CPT | Performed by: FAMILY MEDICINE

## 2020-06-08 PROCEDURE — 80048 BASIC METABOLIC PNL TOTAL CA: CPT | Performed by: FAMILY MEDICINE

## 2020-06-08 PROCEDURE — 90471 IMMUNIZATION ADMIN: CPT | Performed by: FAMILY MEDICINE

## 2020-06-08 PROCEDURE — 99214 OFFICE O/P EST MOD 30 MIN: CPT | Performed by: FAMILY MEDICINE

## 2020-06-08 PROCEDURE — G0103 PSA SCREENING: HCPCS | Performed by: FAMILY MEDICINE

## 2020-06-08 PROCEDURE — 36415 COLL VENOUS BLD VENIPUNCTURE: CPT | Performed by: FAMILY MEDICINE

## 2020-06-08 RX ORDER — TIOTROPIUM BROMIDE 18 UG/1
18 CAPSULE ORAL; RESPIRATORY (INHALATION) EVERY EVENING
Qty: 30 CAPSULE | Refills: 11 | Status: SHIPPED | OUTPATIENT
Start: 2020-06-08 | End: 2021-05-07

## 2020-06-08 RX ORDER — ALBUTEROL SULFATE 90 UG/1
2 AEROSOL, METERED RESPIRATORY (INHALATION) EVERY 4 HOURS PRN
Qty: 1 INHALER | Refills: 11 | Status: SHIPPED | OUTPATIENT
Start: 2020-06-08 | End: 2021-05-07

## 2020-06-08 RX ORDER — LISINOPRIL 40 MG/1
TABLET ORAL
Qty: 135 TABLET | Refills: 3 | Status: SHIPPED | OUTPATIENT
Start: 2020-06-08 | End: 2021-05-07

## 2020-06-08 RX ORDER — ATENOLOL 25 MG/1
25 TABLET ORAL DAILY
Qty: 90 TABLET | Refills: 3 | Status: SHIPPED | OUTPATIENT
Start: 2020-06-08 | End: 2021-05-07

## 2020-06-08 ASSESSMENT — MIFFLIN-ST. JEOR: SCORE: 1338.88

## 2020-06-08 NOTE — PATIENT INSTRUCTIONS
I did get your medications refilled for the year.    I am checking your mild anemia that you had and your kidney function.          Thank you for choosing [facility].  You may be receiving an email and/or telephone survey request from [facility] Customer Experience regarding your visit today.  Please take a few minutes to respond to the survey to let us know how we are doing.      If you have questions or concerns, please contact us via [employer] or you can contact your care team at [phone].    Our Clinic hours are:  Monday 6:40 am  to 7:00 pm  Tuesday -Friday 6:40 am to 5:00 pm    The [place] outpatient lab hours are:  Monday - Friday 6:10 am to 4:45 pm  Saturdays 7:00 am to 11:00 am  Appointments are required, call [phone]    If you have clinical questions after hours or would like to schedule an appointment,  call the clinic at [phone].

## 2020-06-08 NOTE — PROGRESS NOTES
Subjective     Luis Hernandez is a 55 year old male who presents to clinic today for the following health issues:    HPI   Chief Complaint   Patient presents with     Hospital F/U     Hospital follow up     Health Maintenance     patient due for TD, Reminded due for colonoscopy, has info at home       Hospital Follow-up Visit:    Hospital/Nursing Home/IP Rehab Facility: [facility]  Date of Admission: 4/10/2020  Date of Discharge: 4/14/2020  Reason(s) for Admission: Acute on chronic respiratory failure with hypoxia   Community acquired pneumonia-sputum  Secondary to e coli and ampicillin resistant Klebsiella . covid neg,resp viral panel neg, legionella neg      Was your hospitalization related to COVID-19? No   Problems taking medications regularly:  None  Medication changes since discharge: None  Problems adhering to non-medication therapy:  None   - patient states he is feeling well.     Summary of hospitalization:  [facility] discharge summary reviewed  Diagnostic Tests/Treatments reviewed.  Follow up needed: none  Other Healthcare Providers Involved in Patient s Care:         None  Update since discharge: improved.   Post Discharge Medication Reconciliation: discharge medications reconciled and changed, per note/orders (see AVS).  Plan of care communicated with patient          Patient has been doing much better regarding his copd.  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  He seems to maintain his O2 in the 90s without O2, it does drop when he has activity.            Reviewed and updated as needed this visit by Provider  Tobacco  Allergies  Meds  Problems  Med Hx  Surg Hx  Fam Hx         Review of Systems   Review Of Systems  Skin: negative  Eyes:   Ears/Nose/Throat: negative  Respiratory: as above  Cardiovascular: negative  Gastrointestinal:   Genitourinary:   Musculoskeletal: negative  Neurologic: negative  Psychiatric: negative  Hematologic/Lymphatic/Immunologic: no  "bleeding   Endocrine:         Objective    /84   Pulse 77   Temp 98.5  F (36.9  C) (Tympanic)   Resp 16   Ht 1.651 m (5' 5\")   Wt 56.7 kg (125 lb)   SpO2 96%   BMI 20.80 kg/m    Body mass index is 20.8 kg/m .  Physical Exam   GENERAL APPEARANCE: alert, no distress and cooperative  RESP: decrease breath sounds throughout but clear  CV: regular rates and rhythm, normal S1 S2, no S3 or S4 and no murmur, click or rub  ABDOMEN: soft, nontender, without hepatosplenomegaly or masses and bowel sounds normal  MS: extremities normal- no gross deformities noted  SKIN: no suspicious lesions or rashes  NEURO: Normal strength and tone, mentation intact and speech normal  PSYCH: mentation appears normal and affect normal/bright            Assessment & Plan     (J43.2) Centrilobular emphysema (H)  (primary encounter diagnosis)  Comment: refilled all of his inhalers. Stable at this time  Plan: fluticasone-salmeterol (ADVAIR) 500-50 MCG/DOSE        inhaler, tiotropium (SPIRIVA HANDIHALER) 18 MCG        inhaled capsule, albuterol (PROAIR         HFA/PROVENTIL HFA/VENTOLIN HFA) 108 (90 Base)         MCG/ACT inhaler            (D64.9) Anemia, unspecified type  Comment: need to follow up with lab  Plan: CBC with platelets differential            (I10) Essential hypertension, benign  Comment: controlled  Plan: Basic metabolic panel        Refilled med    (I10) BENIGN HYPERTENSION  Comment:   Plan: atenolol (TENORMIN) 25 MG tablet, lisinopril         (ZESTRIL) 40 MG tablet            (Z12.5) Screening for prostate cancer  Comment:   Plan: PSA, screen                 See Patient Instructions    Return in about 1 year (around 6/8/2021).    Dean Mariee MD  [facility]      "

## 2020-06-08 NOTE — NURSING NOTE
Prior to immunization administration, verified patients identity using patient s name and date of birth. Please see Immunization Activity for additional information.     Screening Questionnaire for Adult Immunization    Are you sick today?   No   Do you have allergies to medications, food, a vaccine component or latex?   Yes   Have you ever had a serious reaction after receiving a vaccination?   No   Do you have a long-term health problem with heart, lung, kidney, or metabolic disease (e.g., diabetes), asthma, a blood disorder, no spleen, complement component deficiency, a cochlear implant, or a spinal fluid leak?  Are you on long-term aspirin therapy?   Yes   Do you have cancer, leukemia, HIV/AIDS, or any other immune system problem?   No   Do you have a parent, brother, or sister with an immune system problem?   No   In the past 3 months, have you taken medications that affect  your immune system, such as prednisone, other steroids, or anticancer drugs; drugs for the treatment of rheumatoid arthritis, Crohn s disease, or psoriasis; or have you had radiation treatments?   YES   Have you had a seizure, or a brain or other nervous system problem?   No   During the past year, have you received a transfusion of blood or blood    products, or been given immune (gamma) globulin or antiviral drug?   No   For women: Are you pregnant or is there a chance you could become       pregnant during the next month?   No   Have you received any vaccinations in the past 4 weeks?   No     Immunization questionnaire was positive for at least one answer.  Ok per guidelines for Tdap .        Per orders of Dr. Mariee , injection of Tdap given by Ghanshyam Modi CMA. Patient instructed to remain in clinic for 15 minutes afterwards, and to report any adverse reaction to me immediately.       Screening performed by Ghanshyam Modi CMA on 6/8/2020 at 11:17 AM.

## 2020-08-05 ENCOUNTER — TELEPHONE (OUTPATIENT)
Dept: FAMILY MEDICINE | Facility: CLINIC | Age: 55
End: 2020-08-05

## 2020-08-06 NOTE — TELEPHONE ENCOUNTER
Central Prior Authorization Team   Phone: [phone]    PA NOT NEEDED    Medication: fluticasone-salmeterol -PA NOT NEEDED  Insurance Company:    Pharmacy Filling the Rx:    Filling Pharmacy Phone:    Filling Pharmacy Fax:    Start Date: 8/6/2020    Insurance prefers [facility], called pharmacy and they state they already had changed the medication and the patient has picked up.

## 2020-08-06 NOTE — TELEPHONE ENCOUNTER
Prior Authorization Retail Medication Request    Medication/Dose: fluticasone-salmeterol  ICD code (if different than what is on RX):    Previously Tried and Failed:  Proair; proventil; ventolin; albuterol neb; advair; breo ellipta; ipratopium-albuterol; atrovent; combivent; spiriva   Rationale:  Patient has used since 2010    Insurance Name:  ERICK BENITO  Insurance ID:  Not provided  [facility] Key: [id number]      Pharmacy Information (if different than what is on RX)  Name:    Phone:      I wonder if this is an instance when [employer] would be covered? Lisseth LONG

## 2020-10-31 ENCOUNTER — VIRTUAL VISIT (OUTPATIENT)
Dept: FAMILY MEDICINE | Facility: OTHER | Age: 55
End: 2020-10-31

## 2020-10-31 NOTE — PROGRESS NOTES
"Date: 10/31/2020 12:27:55  Clinician: Flores Vizcaino  Clinician NPI: [id number]  Patient: Luis Hernandez  Patient : 1965  Patient Address[place] [address] [place], [place] [address] [place] [address]  Patient Phone: [phone]  Visit Protocol: URI  Patient Summary:  Luis is a 55 year old ( : 1965 ) male who initiated a [facility] Visit for COVID-19 (Coronavirus) evaluation and screening. When asked the question \"Please sign me up to receive news, health information and promotions. \", Luis responded \"No\".    When asked when his symptoms started, Luis reported that he does not have any symptoms.   He denies having recent facial or sinus surgery in the past 60 days and taking antibiotic medication in the past month.    Pertinent COVID-19 (Coronavirus) information  Luis does not work or volunteer as healthcare worker or a . In the past 14 days, Luis has not worked or volunteered at a healthcare facility or group living setting.   In the past 14 days, he also has not lived in a congregate living setting.   Luis has had a close contact with a laboratory-confirmed COVID-19 patient in the last 14 days. He was not exposed at his work. Date Luis was exposed to the laboratory-confirmed COVID-19 patient: 10/29/2020   Additional information about contact with COVID-19 (Coronavirus) patient as reported by the patient (free text): I currently have COPD and Emphzema. I took my mother to a doctor apppointment on thursday and she has tested positive for COVID today and has been put on a ventilator. Was also out to dinner with her and my brother and family on the  and my brother and his wife got a positive result test on the . Due to close contact exposure and my current health conditions I would like to be tested for COVID.   Luis is not living in the same household with the COVID-19 positive patient. He was in an enclosed space for greater than 15 minutes with the COVID-19 patient.   During the " encounter, both of them were wearing masks.   Since December 2019, Luis has not been diagnosed with lab-confirmed COVID-19 test and has had upper respiratory infection (URI) or influenza-like illness.      Date(s) of previous URI or influenza-like illness (free-text): april 2020     Symptoms Luis experienced during previous URI or influenza-like illness as reported by the patient (free-text): Pneumonia        Pertinent medical history  Luis does not need a return to work/school note.   Weight: 125 lbs   Luis smokes or uses smokeless tobacco.   Additional information as reported by the patient (free text): currently also on oxygen for COPD/Emphazema   Weight: 125 lbs    MEDICATIONS: atenolol oral, Lasix oral, lisinopril oral, Spiriva with HandiHaler inhalation, albuterol sulfate inhalation, Advair Diskus inhalation, ALLERGIES: Penicillins  Clinician Response:  Dear Luis,   Based on your exposure to COVID-19 (coronavirus), we would like to test you for this virus.  1. Please call [phone] to schedule your visit. Explain that you were referred by [facility] to have a COVID-19 test. Be ready to [facility].   The following will serve as your written order for this COVID Test, ordered by me, for the indication of suspected COVID [Z20.828]: The test will be ordered in [employer], our electronic health record, after you are scheduled. It will show as ordered and authorized by Jan Reyes MD.  Order: COVID-19 (coronavirus) PCR for ASYMPTOMATIC EXPOSURE testing from [facility].   If you know you have had close contact with someone who tested positive, you should be quarantined for 14 days after this exposure. You should stay in quarantine for the14 days even if the covid test is negative, the optimal time to test after exposure is 5-7 days from the exposure  Quarantine means   What should I do?  For safety, it's very important to follow these rules. Do this for 14 days after the date you were last  exposed to the virus..  Stay home and away from others. Don't go to school or anywhere else. Generally quarantine means staying home from work but there are some exceptions to this. Please contact your workplace.   No hugging, kissing or shaking hands.  Don't let anyone visit.  Cover your mouth and nose with a mask, tissue or washcloth to avoid spreading germs.  Wash your hands and face often. Use soap and water.  What are the symptoms of COVID-19?  The most common symptoms are cough, fever and trouble breathing. Less common symptoms include headache, body aches, fatigue (feeling very tired), chills, sore throat, stuffy or runny nose, diarrhea (loose poop), loss of taste or smell, belly pain, and nausea or vomiting (feeling sick to your stomach or throwing up).  After 14 days, if you have still don't have symptoms, you likely don't have this virus.  If you develop symptoms, follow these guidelines.  If you're normally healthy: Please start another [facility] visit to report your symptoms. Go to [email or web address].  If you have a serious health problem (like cancer, heart failure, an organ transplant or kidney disease): Call your specialty clinic. Let them know that you might have COVID-19.  2. When it's time for your COVID test:  Stay at least 6 feet away from others. (If someone will drive you to your test, stay in the backseat, as far away from the  as you can.)  Cover your mouth and nose with a mask, tissue or washcloth.  Go straight to the testing site. Don't make any stops on the way there or back.  Please note  Caregivers in these groups are at risk for severe illness due to COVID-19:  o People 65 years and older  o People who live in a nursing home or long-term care facility  o People with chronic disease (lung, heart, cancer, diabetes, kidney, liver, immunologic)  o People who have a weakened immune system, including those who:  Are in cancer treatment  Take medicine that weakens the immune system, such as  corticosteroids  Had a bone marrow or organ transplant  Have an immune deficiency  Have poorly controlled HIV or AIDS  Are obese (body mass index of 40 or higher)  Smoke regularly  Where can I get more information?  [facility] -- About COVID-19: [email or web address]  CDC -- What to Do If You're Sick: www.cdc.gov/coronavirus/2019-ncov/about/steps-when-sick.html  CDC -- Ending Home Isolation: www.cdc.gov/coronavirus/2019-ncov/hcp/disposition-in-home-patients.html  [facility] -- Caring for Someone: www.cdc.gov/coronavirus/2019-ncov/if-you-are-sick/care-for-someone.html  [facility] -- Interim Guidance for Hospital Discharge to Home: www.[facility].[place]./diseases/coronavirus/hcp/hospdischarge.pdf  [facility] clinical trials (COVID-19 research studies): clinicalaffairs.[facility]-clinical-trials  Below are the COVID-19 hotlines at the [facility] ([facility]). Interpreters are available.  For health questions: Call 700-305-3108 or 1-828.804.9239 (7 a.m. to 7 p.m.)  For questions about schools and childcare: Call 793-824-7235 or 1-312.167.1691 (7 a.m. to 7 p.m.)    Diagnosis: Contact with and (suspected) exposure to other viral communicable diseases  Diagnosis ICD: Z20.828

## 2020-11-03 ENCOUNTER — AMBULATORY - HEALTHEAST (OUTPATIENT)
Dept: FAMILY MEDICINE | Facility: CLINIC | Age: 55
End: 2020-11-03

## 2020-11-03 DIAGNOSIS — Z20.822 SUSPECTED 2019 NOVEL CORONAVIRUS INFECTION: ICD-10-CM

## 2020-11-05 ENCOUNTER — COMMUNICATION - HEALTHEAST (OUTPATIENT)
Dept: SCHEDULING | Facility: CLINIC | Age: 55
End: 2020-11-05

## 2020-11-16 ENCOUNTER — HEALTH MAINTENANCE LETTER (OUTPATIENT)
Age: 55
End: 2020-11-16

## 2021-03-12 ENCOUNTER — IMMUNIZATION (OUTPATIENT)
Dept: FAMILY MEDICINE | Facility: CLINIC | Age: 56
End: 2021-03-12
Payer: COMMERCIAL

## 2021-03-12 PROCEDURE — 0011A PR COVID VAC MODERNA 100 MCG/0.5 ML IM: CPT

## 2021-03-12 PROCEDURE — 91301 PR COVID VAC MODERNA 100 MCG/0.5 ML IM: CPT

## 2021-04-06 ENCOUNTER — NURSE TRIAGE (OUTPATIENT)
Dept: FAMILY MEDICINE | Facility: CLINIC | Age: 56
End: 2021-04-06

## 2021-04-06 ENCOUNTER — VIRTUAL VISIT (OUTPATIENT)
Dept: URGENT CARE | Facility: CLINIC | Age: 56
End: 2021-04-06
Payer: COMMERCIAL

## 2021-04-06 DIAGNOSIS — J44.1 COPD EXACERBATION (H): Primary | ICD-10-CM

## 2021-04-06 PROCEDURE — 99214 OFFICE O/P EST MOD 30 MIN: CPT | Performed by: EMERGENCY MEDICINE

## 2021-04-06 RX ORDER — PREDNISONE 10 MG/1
TABLET ORAL
Qty: 30 TABLET | Refills: 0 | Status: SHIPPED | OUTPATIENT
Start: 2021-04-06 | End: 2021-05-07

## 2021-04-06 RX ORDER — DOXYCYCLINE HYCLATE 100 MG
100 TABLET ORAL 2 TIMES DAILY
Qty: 20 TABLET | Refills: 0 | Status: SHIPPED | OUTPATIENT
Start: 2021-04-06 | End: 2021-04-16

## 2021-04-06 NOTE — TELEPHONE ENCOUNTER
"    Additional Information    Negative: Breathing stopped and hasn't returned    Negative: Choking on something    Negative: SEVERE difficulty breathing (e.g., struggling for each breath, speaks in single words, pulse > 120)    Negative: Bluish (or gray) lips or face    Negative: Difficult to awaken or acting confused (e.g., disoriented, slurred speech)    Negative: Passed out (i.e., fainted, collapsed and was not responding)    Negative: Wheezing started suddenly after medicine, an allergic food, or bee sting    Negative: Stridor    Negative: Slow, shallow and weak breathing    Negative: Sounds like a life-threatening emergency to the triager    Negative: Chest pain    Negative: Wheezing (high pitched whistling sound) and previous asthma attacks or use of asthma medicines    Negative: Difficulty breathing and only present when coughing    Negative: Difficulty breathing and only from stuffy or runny nose    Negative: MODERATE difficulty breathing (e.g., speaks in phrases, SOB even at rest, pulse 100-120) of new onset or worse than normal    Negative: Wheezing can be heard across the room    Negative: Drooling or spitting out saliva (because can't swallow)    Negative: Any history of prior \"blood clot\" in leg or lungs    Negative: Recent illness requiring prolonged bedrest (i.e., immobilization)    Negative: Hip or leg fracture in past 2 months (e.g., or had cast on leg or ankle)    Negative: Major surgery in the past month    Negative: Recent long-distance travel with prolonged time in car, bus, plane, or train (i.e., within past 2 weeks; 6 or  more hours duration)    Negative: Extra heart beats OR irregular heart beating   (i.e., \"palpitations\")    Negative: Fever > 103 F (39.4 C)    Negative: Fever > 101 F (38.3 C) and over 60 years of age    Negative: Fever > 100.0 F (37.8 C) and bedridden (e.g., nursing home patient, stroke, chronic illness, recovering from surgery)    Negative: Fever > 100.0 F (37.8 C) and " "diabetes mellitus or weak immune system (e.g., HIV positive, cancer chemo, splenectomy, organ transplant, chronic steroids)    Negative: Periods where breathing stops and then resumes normally and bedridden (e.g., nursing home patient, CVA)    Negative: Pregnant or postpartum (from 0 to 6 weeks after delivery)    Negative: Patient sounds very sick or weak to the triager    MILD difficulty breathing (e.g., minimal/no SOB at rest, SOB with walking, pulse < 100) of new onset or worse than normal    Answer Assessment - Initial Assessment Questions  1. RESPIRATORY STATUS: \"Describe your breathing?\" (e.g., wheezing, shortness of breath, unable to speak, severe coughing)       Wheezing, intermittent cough, with activity, better with rest  2. ONSET: \"When did this breathing problem begin?\"       Few days, has had COPD x 10 years  3. PATTERN \"Does the difficult breathing come and go, or has it been constant since it started?\"       Worse with activity  4. SEVERITY: \"How bad is your breathing?\" (e.g., mild, moderate, severe)     - MILD: No SOB at rest, mild SOB with walking, speaks normally in sentences, can lay down, no retractions, pulse < 100.     - MODERATE: SOB at rest, SOB with minimal exertion and prefers to sit, cannot lie down flat, speaks in phrases, mild retractions, audible wheezing, pulse 100-120.     - SEVERE: Very SOB at rest, speaks in single words, struggling to breathe, sitting hunched forward, retractions, pulse > 120       Mild  5. RECURRENT SYMPTOM: \"Have you had difficulty breathing before?\" If so, ask: \"When was the last time?\" and \"What happened that time?\"       Yes, as above  6. CARDIAC HISTORY: \"Do you have any history of heart disease?\" (e.g., heart attack, angina, bypass surgery, angioplasty)       Denies  7. LUNG HISTORY: \"Do you have any history of lung disease?\"  (e.g., pulmonary embolus, asthma, emphysema)      Denies  8. CAUSE: \"What do you think is causing the breathing problem?\"       " "COPD  9. OTHER SYMPTOMS: \"Do you have any other symptoms? (e.g., dizziness, runny nose, cough, chest pain, fever)      Intermittent cough.  Denies chest pain, fever.   10. PREGNANCY: \"Is there any chance you are pregnant?\" \"When was your last menstrual period?\"        N/A  11. TRAVEL: \"Have you traveled out of the country in the last month?\" (e.g., travel history, exposures)    Protocols used: BREATHING DIFFICULTY-A-[place]      "

## 2021-04-06 NOTE — PROGRESS NOTES
Phone appointment:    Assessment: Chronic history of COPD.  Patient is having exacerbation, recurrent in nature.  Typically knows the course of doxycycline and prednisone improves his symptoms.  He is in no acute distress at this time.    Plan: Tapering dose of prednisone ordered.  Doxycycline as instructed.  Recheck if worsening symptoms or no improvement in 5 to 7 days.    HPI: Patient is a 55-year-old male with a history of oxygen dependent COPD.  For about 2 days he has had worsening symptoms of dyspnea on exertion, increasing wheezing and coughing.  No fever.  Patient has recurrent exacerbations and notes that a protracted course of prednisone and doxycycline improves his symptoms.  He is tolerating his symptoms with quiet activity.      ROS: See HPI otherwise normal.    Allergies   Allergen Reactions     Hctz Other (See Comments)     He has hyponatremia     Penicillins Other (See Comments)     Reaction unknown      Current Outpatient Medications   Medication Sig Dispense Refill     doxycycline hyclate (VIBRA-TABS) 100 MG tablet Take 1 tablet (100 mg) by mouth 2 times daily for 10 days 20 tablet 0     predniSONE (DELTASONE) 10 MG tablet 4 tablets daily, then 3 tablets daily for 3 days, then 2 tablets daily for 3 days, then 1 tablet daily for 3 days. 30 tablet 0     albuterol (PROAIR HFA/PROVENTIL HFA/VENTOLIN HFA) 108 (90 Base) MCG/ACT inhaler Inhale 2 puffs into the lungs every 4 hours as needed for shortness of breath / dyspnea Hold on file until needed 1 Inhaler 11     amLODIPine (NORVASC) 10 MG tablet Take 1 tablet (10 mg) by mouth daily 90 tablet 3     atenolol (TENORMIN) 25 MG tablet Take 1 tablet (25 mg) by mouth daily 90 tablet 3     doxycycline hyclate (VIBRAMYCIN) 100 MG capsule Take 1 capsule (100 mg) by mouth 2 times daily Keep on hand for COPD exacerbation. 20 capsule 0     fluticasone-salmeterol (ADVAIR) 500-50 MCG/DOSE inhaler Inhale 1 puff into the lungs 2 times daily Hold on file until needed 1  Inhaler 11     furosemide (LASIX) 20 MG tablet Take 1 tablet (20 mg) by mouth daily 90 tablet 3     lisinopril (ZESTRIL) 40 MG tablet Take 1.5 pills, or 60 mg daily 135 tablet 3     magnesium oxide (MAG-OX) 400 MG tablet Take 1 tablet (400 mg) by mouth every evening       order for DME Equipment being ordered: Oxygen 3L via NC continuous.  O2 saturated noted to be 86% on room air at rest. 1 Units 0     order for DME Equipment being ordered: Nebulizer (Patient not taking: Reported on 6/8/2020) 1 Device 0     potassium 99 MG TABS Take 1 tablet by mouth every evening       tiotropium (SPIRIVA HANDIHALER) 18 MCG inhaled capsule Inhale 1 capsule (18 mcg) into the lungs every evening Inhale contents of one capsule daily. Hold on file until needed. 30 capsule 11         PE: No acute distress.  Patient is able to talk in full sentences.  He does have some notable wheezing.        TREATMENT: See HPI otherwise normal.      ASSESSMENT: Symptoms are typical of an exacerbation of COPD per patient's report.  Outpatient management seems to be reasonable at this time with careful follow-up instructions.      DIAGNOSIS: Exacerbation of COPD.      PLAN: Prednisone and doxycycline as instructed.  Continue daily inhalers.  Recheck of worsening symptoms.  Follow-up with Dr. Mariee in 4 to 5 days if no improvement.    Time: 10 minutes

## 2021-04-09 ENCOUNTER — IMMUNIZATION (OUTPATIENT)
Dept: FAMILY MEDICINE | Facility: CLINIC | Age: 56
End: 2021-04-09
Attending: FAMILY MEDICINE
Payer: COMMERCIAL

## 2021-04-09 PROCEDURE — 91301 PR COVID VAC MODERNA 100 MCG/0.5 ML IM: CPT

## 2021-04-09 PROCEDURE — 0012A PR COVID VAC MODERNA 100 MCG/0.5 ML IM: CPT

## 2021-05-07 ENCOUNTER — OFFICE VISIT (OUTPATIENT)
Dept: FAMILY MEDICINE | Facility: CLINIC | Age: 56
End: 2021-05-07
Payer: COMMERCIAL

## 2021-05-07 VITALS
WEIGHT: 132 LBS | SYSTOLIC BLOOD PRESSURE: 138 MMHG | TEMPERATURE: 97.2 F | HEIGHT: 64 IN | DIASTOLIC BLOOD PRESSURE: 84 MMHG | HEART RATE: 82 BPM | BODY MASS INDEX: 22.53 KG/M2 | OXYGEN SATURATION: 98 %

## 2021-05-07 DIAGNOSIS — D64.9 ANEMIA, UNSPECIFIED TYPE: ICD-10-CM

## 2021-05-07 DIAGNOSIS — R60.0 PERIPHERAL EDEMA: ICD-10-CM

## 2021-05-07 DIAGNOSIS — Z12.5 SCREENING FOR PROSTATE CANCER: ICD-10-CM

## 2021-05-07 DIAGNOSIS — J44.1 COPD EXACERBATION (H): ICD-10-CM

## 2021-05-07 DIAGNOSIS — I10 ESSENTIAL HYPERTENSION, BENIGN: ICD-10-CM

## 2021-05-07 DIAGNOSIS — R91.1 INCIDENTAL PULMONARY NODULE, > 3MM AND < 8MM: ICD-10-CM

## 2021-05-07 DIAGNOSIS — R06.2 WHEEZING: ICD-10-CM

## 2021-05-07 DIAGNOSIS — J43.2 CENTRILOBULAR EMPHYSEMA (H): Primary | ICD-10-CM

## 2021-05-07 DIAGNOSIS — Z13.6 CARDIOVASCULAR SCREENING; LDL GOAL LESS THAN 100: ICD-10-CM

## 2021-05-07 DIAGNOSIS — I27.20 PULMONARY HYPERTENSION (H): ICD-10-CM

## 2021-05-07 LAB
ANION GAP SERPL CALCULATED.3IONS-SCNC: 5 MMOL/L (ref 3–14)
BASOPHILS # BLD AUTO: 0.1 10E9/L (ref 0–0.2)
BASOPHILS NFR BLD AUTO: 0.9 %
BUN SERPL-MCNC: 13 MG/DL (ref 7–30)
CALCIUM SERPL-MCNC: 9.1 MG/DL (ref 8.5–10.1)
CHLORIDE SERPL-SCNC: 99 MMOL/L (ref 94–109)
CO2 SERPL-SCNC: 30 MMOL/L (ref 20–32)
CREAT SERPL-MCNC: 0.91 MG/DL (ref 0.66–1.25)
DIFFERENTIAL METHOD BLD: ABNORMAL
EOSINOPHIL # BLD AUTO: 0.5 10E9/L (ref 0–0.7)
EOSINOPHIL NFR BLD AUTO: 6 %
ERYTHROCYTE [DISTWIDTH] IN BLOOD BY AUTOMATED COUNT: 13.4 % (ref 10–15)
GFR SERPL CREATININE-BSD FRML MDRD: >90 ML/MIN/{1.73_M2}
GLUCOSE SERPL-MCNC: 69 MG/DL (ref 70–99)
HCT VFR BLD AUTO: 42.5 % (ref 40–53)
HGB BLD-MCNC: 13.5 G/DL (ref 13.3–17.7)
LYMPHOCYTES # BLD AUTO: 1.6 10E9/L (ref 0.8–5.3)
LYMPHOCYTES NFR BLD AUTO: 18.9 %
MCH RBC QN AUTO: 31.1 PG (ref 26.5–33)
MCHC RBC AUTO-ENTMCNC: 31.8 G/DL (ref 31.5–36.5)
MCV RBC AUTO: 98 FL (ref 78–100)
MONOCYTES # BLD AUTO: 0.8 10E9/L (ref 0–1.3)
MONOCYTES NFR BLD AUTO: 9.7 %
NEUTROPHILS # BLD AUTO: 5.6 10E9/L (ref 1.6–8.3)
NEUTROPHILS NFR BLD AUTO: 64.5 %
PLATELET # BLD AUTO: 385 10E9/L (ref 150–450)
POTASSIUM SERPL-SCNC: 4.2 MMOL/L (ref 3.4–5.3)
RBC # BLD AUTO: 4.34 10E12/L (ref 4.4–5.9)
SODIUM SERPL-SCNC: 134 MMOL/L (ref 133–144)
WBC # BLD AUTO: 8.7 10E9/L (ref 4–11)

## 2021-05-07 PROCEDURE — 80048 BASIC METABOLIC PNL TOTAL CA: CPT | Performed by: FAMILY MEDICINE

## 2021-05-07 PROCEDURE — 36415 COLL VENOUS BLD VENIPUNCTURE: CPT | Performed by: FAMILY MEDICINE

## 2021-05-07 PROCEDURE — 85025 COMPLETE CBC W/AUTO DIFF WBC: CPT | Performed by: FAMILY MEDICINE

## 2021-05-07 PROCEDURE — 99214 OFFICE O/P EST MOD 30 MIN: CPT | Performed by: FAMILY MEDICINE

## 2021-05-07 RX ORDER — AMLODIPINE BESYLATE 10 MG/1
10 TABLET ORAL DAILY
Qty: 90 TABLET | Refills: 3 | Status: SHIPPED | OUTPATIENT
Start: 2021-05-07 | End: 2022-05-24

## 2021-05-07 RX ORDER — LISINOPRIL 40 MG/1
TABLET ORAL
Qty: 1 TABLET | Refills: 0 | Status: SHIPPED | OUTPATIENT
Start: 2021-05-07 | End: 2021-05-07

## 2021-05-07 RX ORDER — IPRATROPIUM BROMIDE AND ALBUTEROL SULFATE 2.5; .5 MG/3ML; MG/3ML
1 SOLUTION RESPIRATORY (INHALATION) EVERY 6 HOURS PRN
Qty: 90 ML | Refills: 5 | Status: ON HOLD | OUTPATIENT
Start: 2021-05-07 | End: 2021-07-03

## 2021-05-07 RX ORDER — TIOTROPIUM BROMIDE 18 UG/1
18 CAPSULE ORAL; RESPIRATORY (INHALATION) EVERY EVENING
Qty: 30 CAPSULE | Refills: 11 | Status: SHIPPED | OUTPATIENT
Start: 2021-05-07 | End: 2021-09-16

## 2021-05-07 RX ORDER — LISINOPRIL 40 MG/1
TABLET ORAL
Qty: 135 TABLET | Refills: 3 | Status: SHIPPED | OUTPATIENT
Start: 2021-05-07 | End: 2021-05-07

## 2021-05-07 RX ORDER — ALBUTEROL SULFATE 90 UG/1
2 AEROSOL, METERED RESPIRATORY (INHALATION) EVERY 4 HOURS PRN
Qty: 18 G | Refills: 11 | Status: SHIPPED | OUTPATIENT
Start: 2021-05-07 | End: 2022-05-19

## 2021-05-07 RX ORDER — ATENOLOL 25 MG/1
25 TABLET ORAL DAILY
Qty: 90 TABLET | Refills: 3 | Status: SHIPPED | OUTPATIENT
Start: 2021-05-07 | End: 2022-05-24

## 2021-05-07 RX ORDER — DOXYCYCLINE 100 MG/1
100 CAPSULE ORAL 2 TIMES DAILY
Qty: 20 CAPSULE | Refills: 4 | Status: ON HOLD | OUTPATIENT
Start: 2021-05-07 | End: 2021-08-26

## 2021-05-07 RX ORDER — FUROSEMIDE 20 MG
20 TABLET ORAL DAILY
Qty: 90 TABLET | Refills: 3 | Status: ON HOLD | OUTPATIENT
Start: 2021-05-07 | End: 2021-08-26

## 2021-05-07 RX ORDER — PREDNISONE 10 MG/1
TABLET ORAL
Qty: 30 TABLET | Refills: 4 | Status: ON HOLD | OUTPATIENT
Start: 2021-05-07 | End: 2021-08-26

## 2021-05-07 RX ORDER — LOSARTAN POTASSIUM 50 MG/1
75 TABLET ORAL DAILY
Qty: 135 TABLET | Refills: 3 | Status: SHIPPED | OUTPATIENT
Start: 2021-05-07 | End: 2021-09-17

## 2021-05-07 ASSESSMENT — MIFFLIN-ST. JEOR: SCORE: 1354.75

## 2021-05-07 NOTE — PROGRESS NOTES
Assessment & Plan     Centrilobular emphysema (H)  Is on O2 with activity at 2 LPM, on inhalers, using them, has rescue prednisone and doxycycline for exacerbations, used them only 3 times last year, needs refill of meds, no side effects  stable  - doxycycline hyclate (VIBRAMYCIN) 100 MG capsule; Take 1 capsule (100 mg) by mouth 2 times daily Keep on hand for COPD exacerbation.  - fluticasone-salmeterol (ADVAIR) 500-50 MCG/DOSE inhaler; Inhale 1 puff into the lungs 2 times daily Hold on file until needed  - albuterol (PROAIR HFA/PROVENTIL HFA/VENTOLIN HFA) 108 (90 Base) MCG/ACT inhaler; Inhale 2 puffs into the lungs every 4 hours as needed for shortness of breath / dyspnea Hold on file until needed  - tiotropium (SPIRIVA HANDIHALER) 18 MCG inhaled capsule; Inhale 1 capsule (18 mcg) into the lungs every evening Inhale contents of one capsule daily. Hold on file until needed.  - ipratropium - albuterol 0.5 mg/2.5 mg/3 mL (DUONEB) 0.5-2.5 (3) MG/3ML neb solution; Take 1 vial (3 mLs) by nebulization every 6 hours as needed for shortness of breath / dyspnea or wheezing    COPD exacerbation (H)  Has meds for flare up and not abusing.  - doxycycline hyclate (VIBRAMYCIN) 100 MG capsule; Take 1 capsule (100 mg) by mouth 2 times daily Keep on hand for COPD exacerbation.  - predniSONE (DELTASONE) 10 MG tablet; 4 tablets daily, then 3 tablets daily for 3 days, then 2 tablets daily for 3 days, then 1 tablet daily for 3 days. Use for COPD flares  - ipratropium - albuterol 0.5 mg/2.5 mg/3 mL (DUONEB) 0.5-2.5 (3) MG/3ML neb solution; Take 1 vial (3 mLs) by nebulization every 6 hours as needed for shortness of breath / dyspnea or wheezing    Pulmonary hypertension (H)  On meds to help, stable    Anemia, unspecified type  Need to check to assure stability  - CBC with platelets differential    Essential hypertension, benign  Controlled, however having side effect of lisinopril likely due to chronic dry cough, will change to ARB  -  amLODIPine (NORVASC) 10 MG tablet; Take 1 tablet (10 mg) by mouth daily  - atenolol (TENORMIN) 25 MG tablet; Take 1 tablet (25 mg) by mouth daily  - losartan (COZAAR) 50 MG tablet; Take 1.5 tablets (75 mg) by mouth daily  - Basic metabolic panel    Wheezing  Has meds for flare ups  - doxycycline hyclate (VIBRAMYCIN) 100 MG capsule; Take 1 capsule (100 mg) by mouth 2 times daily Keep on hand for COPD exacerbation.    Incidental pulmonary nodule, > 3mm and < 8mm, new from 2010  Needs recheck of lung nodules  - CT Chest w Contrast; Future    Peripheral edema  Stable on med  - furosemide (LASIX) 20 MG tablet; Take 1 tablet (20 mg) by mouth daily    Screening for prostate cancer    - Prostate spec antigen screen; Future    CARDIOVASCULAR SCREENING; LDL GOAL LESS THAN 100    - Lipid panel reflex to direct LDL Fasting; Future                 No follow-ups on file.    Dean Mariee MD  [facility] is a 55 year old who presents for the following health issues     HPI     Hypertension Follow-up      Do you check your blood pressure regularly outside of the clinic? No     Are you following a low salt diet? Yes    Are your blood pressures ever more than 140 on the top number (systolic) OR more   than 90 on the bottom number (diastolic), for example 140/90? No    COPD Follow-Up    Overall, how are your COPD symptoms since your last clinic visit?  Slightly worse    How much fatigue or shortness of breath do you have when you are walking?  More than usual    How much shortness of breath do you have when you are resting?  None    How often do you cough? All the time    Have you noticed any change in your sputum/phlegm?  Yes- Thicker and more yellow    Have you experienced a recent fever? No    Please describe how far you can walk without stopping to rest:  The length of 3-5 rooms    How many flights of stairs are you able to walk up without stopping?  1    Have you had any Emergency  "Room Visits, Urgent Care Visits, or Hospital Admissions because of your COPD since your last office visit?  No    History   Smoking Status     Former Smoker     Packs/day: 2.00     Years: 30.00     Types: Cigarettes   Smokeless Tobacco     Former User     No results found for: FEV1, JTO2GSZ      How many servings of fruits and vegetables do you eat daily?  0-1    On average, how many sweetened beverages do you drink each day (Examples: soda, juice, sweet tea, etc.  Do NOT count diet or artificially sweetened beverages)?   1    How many days per week do you exercise enough to make your heart beat faster? 7    How many minutes a day do you exercise enough to make your heart beat faster? 20 - 29    How many days per week do you miss taking your medication? 0        Review of Systems   Review Of Systems  Skin: negative  Eyes:   Ears/Nose/Throat: negative  Respiratory: ESPARZA, stable otherwise  Cardiovascular: negative  Gastrointestinal: negative  Genitourinary:   Musculoskeletal:   Neurologic: negative  Psychiatric: negative  Hematologic/Lymphatic/Immunologic:   Endocrine:         Objective    /84   Pulse 82   Temp 97.2  F (36.2  C) (Tympanic)   Ht 1.626 m (5' 4\")   Wt 59.9 kg (132 lb)   SpO2 98%   BMI 22.66 kg/m    Body mass index is 22.66 kg/m .  Physical Exam   GENERAL APPEARANCE: alert, no distress and cooperative  RESP: clear but decrease breath sounds throughout  CV: regular rates and rhythm, normal S1 S2, no S3 or S4 and no murmur, click or rub  ABDOMEN: soft, nontender, without hepatosplenomegaly or masses and bowel sounds normal  MS: extremities normal- no gross deformities noted  SKIN: no suspicious lesions or rashes  NEURO: Normal strength and tone, mentation intact and speech normal                "

## 2021-05-07 NOTE — PATIENT INSTRUCTIONS
Please go to lab.    Please stop the lisinopril medication due to your cough.    Please start the losartan medication 75 mg a day.    I refilled your medication.    Please get a fasting lab done in about 1-2 months.          Thank you for choosing [facility].  You may be receiving an email and/or telephone survey request from [facility] Customer Experience regarding your visit today.  Please take a few minutes to respond to the survey to let us know how we are doing.      If you have questions or concerns, please contact us via [employer] or you can contact your care team at [phone].    Our Clinic hours are:  Monday 6:40 am  to 7:00 pm  Tuesday -Friday 6:40 am to 5:00 pm    The [place] outpatient lab hours are:  Monday - Friday 6:10 am to 4:45 pm  Saturdays 7:00 am to 11:00 am  Appointments are required, call [phone]    If you have clinical questions after hours or would like to schedule an appointment,  call the clinic at [phone].     Pain Refusal Text: I offered to prescribe pain medication but the patient refused to take this medication.

## 2021-05-10 ENCOUNTER — TELEPHONE (OUTPATIENT)
Dept: FAMILY MEDICINE | Facility: CLINIC | Age: 56
End: 2021-05-10

## 2021-05-10 NOTE — TELEPHONE ENCOUNTER
Forwarding to PCP for review please.     [facility] sends fax requesting clarification on recent prednisone Rx.  Would you like 4 tablets daily for 3 days?  We can either resend a new order or call them with a verbal.     Thanks,  Alecia Briscoe RN  [facility]

## 2021-05-10 NOTE — TELEPHONE ENCOUNTER
Please see the prescription, there is a burst and taper.  I do not understand why they are not seeing the complete instructions.  Dean Mariee MD  Family Medicine

## 2021-05-14 ENCOUNTER — HOSPITAL ENCOUNTER (OUTPATIENT)
Dept: CT IMAGING | Facility: CLINIC | Age: 56
Discharge: HOME | End: 2021-05-14
Attending: FAMILY MEDICINE | Admitting: FAMILY MEDICINE
Payer: COMMERCIAL

## 2021-05-14 ENCOUNTER — TELEPHONE (OUTPATIENT)
Dept: FAMILY MEDICINE | Facility: CLINIC | Age: 56
End: 2021-05-14

## 2021-05-14 DIAGNOSIS — R91.1 INCIDENTAL PULMONARY NODULE, > 3MM AND < 8MM: ICD-10-CM

## 2021-05-14 DIAGNOSIS — R91.1 INCIDENTAL PULMONARY NODULE, > 3MM AND < 8MM: Primary | ICD-10-CM

## 2021-05-14 PROCEDURE — 71260 CT THORAX DX C+: CPT

## 2021-05-14 PROCEDURE — 250N000009 HC RX 250: Performed by: FAMILY MEDICINE

## 2021-05-14 PROCEDURE — 250N000011 HC RX IP 250 OP 636: Performed by: FAMILY MEDICINE

## 2021-05-14 RX ORDER — IOPAMIDOL 755 MG/ML
80 INJECTION, SOLUTION INTRAVASCULAR ONCE
Status: DISCONTINUED | OUTPATIENT
Start: 2021-05-14 | End: 2021-05-14

## 2021-05-14 RX ORDER — IOPAMIDOL 755 MG/ML
80 INJECTION, SOLUTION INTRAVASCULAR ONCE
Status: COMPLETED | OUTPATIENT
Start: 2021-05-14 | End: 2021-05-14

## 2021-05-14 RX ADMIN — IOPAMIDOL 80 ML: 755 INJECTION, SOLUTION INTRAVENOUS at 09:37

## 2021-05-14 RX ADMIN — SODIUM CHLORIDE 80 ML: 9 INJECTION, SOLUTION INTRAVENOUS at 09:37

## 2021-05-14 NOTE — TELEPHONE ENCOUNTER
[facility]Home Oxygen Prescription    Signed, faxed to [phone] placed in basket. Elizabeth Hernandez on 5/14/2021 at 1:21 PM

## 2021-06-07 NOTE — TELEPHONE ENCOUNTER
Sats 92 on 1 liter O2  States it is diffcult to breath like he can't get enough air.  Has had chills since Monday...  COVID 19 Nurse Triage Plan/Patient Instructions    Please be aware that novel coronavirus (COVID-19) may be circulating in the community. If you develop symptoms such as fever, cough, or SOB or if you have concerns about the presence of another infection including coronavirus (COVID-19), please contact your health care provider or visit [email or web address].     Disposition/Instructions    Patient to go to ED and follow protocol based instructions. Follow System Ambulatory Workflow for COVID 19.     Bring Your Own Device:  Please also bring your smart device(s) (smart phones, tablets, laptops) and their charging cables for your personal use and to communicate with your care team during your visit.      Thank you for limiting contact with others, wearing a simple mask to cover your cough, practice good hand hygiene habits and accessing our virtual services where possible to limit the spread of this virus.    For more information about COVID19 and options for caring for yourself at home, please visit the CDC website at https://www.cdc.gov/coronavirus/2019-ncov/about/steps-when-sick.html  For more options for care at [facility], please visit our website at [email or web address]    For more information, please use the [facility] ([facility]) COVID-19 Hotlines (Interpreters available):     Health questions: Phone Number: [phone] or [phone] and Hours: 7 a.m. to 7 p.m.    Schools and  questions: Phone Number: [phone] or [phone] and Hours 7 a.m. to 7 p.m.      Aliza Kaur RN

## 2021-06-30 ENCOUNTER — APPOINTMENT (OUTPATIENT)
Dept: GENERAL RADIOLOGY | Facility: CLINIC | Age: 56
End: 2021-06-30
Attending: EMERGENCY MEDICINE
Payer: COMMERCIAL

## 2021-06-30 ENCOUNTER — HOSPITAL ENCOUNTER (INPATIENT)
Facility: CLINIC | Age: 56
LOS: 3 days | Discharge: HOME | End: 2021-07-03
Attending: EMERGENCY MEDICINE | Admitting: FAMILY MEDICINE
Payer: COMMERCIAL

## 2021-06-30 DIAGNOSIS — J96.21 ACUTE ON CHRONIC RESPIRATORY FAILURE WITH HYPOXIA AND HYPERCAPNIA (H): Primary | ICD-10-CM

## 2021-06-30 DIAGNOSIS — R06.03 ACUTE RESPIRATORY DISTRESS: ICD-10-CM

## 2021-06-30 DIAGNOSIS — Z11.52 ENCOUNTER FOR SCREENING LABORATORY TESTING FOR SEVERE ACUTE RESPIRATORY SYNDROME CORONAVIRUS 2 (SARS-COV-2): ICD-10-CM

## 2021-06-30 DIAGNOSIS — J96.22 ACUTE ON CHRONIC RESPIRATORY FAILURE WITH HYPOXIA AND HYPERCAPNIA (H): Primary | ICD-10-CM

## 2021-06-30 DIAGNOSIS — J41.0 SIMPLE CHRONIC BRONCHITIS (H): ICD-10-CM

## 2021-06-30 PROBLEM — J96.02 ACUTE RESPIRATORY ACIDOSIS (H): Status: ACTIVE | Noted: 2021-06-30

## 2021-06-30 PROBLEM — D64.9 ANEMIA, UNSPECIFIED TYPE: Status: RESOLVED | Noted: 2021-05-07 | Resolved: 2021-06-30

## 2021-06-30 PROBLEM — J18.9 PNEUMONIA: Status: RESOLVED | Noted: 2020-04-10 | Resolved: 2021-06-30

## 2021-06-30 PROBLEM — J18.9 CAP (COMMUNITY ACQUIRED PNEUMONIA): Status: RESOLVED | Noted: 2020-04-14 | Resolved: 2021-06-30

## 2021-06-30 LAB
ANION GAP SERPL CALCULATED.3IONS-SCNC: 4 MMOL/L (ref 3–14)
BASE EXCESS BLDV CALC-SCNC: 4.9 MMOL/L
BASOPHILS # BLD AUTO: 0 10E9/L (ref 0–0.2)
BASOPHILS NFR BLD AUTO: 0.2 %
BUN SERPL-MCNC: 13 MG/DL (ref 7–30)
CALCIUM SERPL-MCNC: 9.6 MG/DL (ref 8.5–10.1)
CHLORIDE SERPL-SCNC: 94 MMOL/L (ref 94–109)
CO2 SERPL-SCNC: 34 MMOL/L (ref 20–32)
CREAT SERPL-MCNC: 0.68 MG/DL (ref 0.66–1.25)
D DIMER PPP FEU-MCNC: 0.3 UG/ML FEU (ref 0–0.5)
DIFFERENTIAL METHOD BLD: ABNORMAL
EOSINOPHIL # BLD AUTO: 0 10E9/L (ref 0–0.7)
EOSINOPHIL NFR BLD AUTO: 0.3 %
ERYTHROCYTE [DISTWIDTH] IN BLOOD BY AUTOMATED COUNT: 12.7 % (ref 10–15)
GFR SERPL CREATININE-BSD FRML MDRD: >90 ML/MIN/{1.73_M2}
GLUCOSE BLDC GLUCOMTR-MCNC: 136 MG/DL (ref 70–99)
GLUCOSE SERPL-MCNC: 124 MG/DL (ref 70–99)
HCO3 BLDV-SCNC: 33 MMOL/L (ref 21–28)
HCT VFR BLD AUTO: 43.9 % (ref 40–53)
HGB BLD-MCNC: 14.1 G/DL (ref 13.3–17.7)
IMM GRANULOCYTES # BLD: 0 10E9/L (ref 0–0.4)
IMM GRANULOCYTES NFR BLD: 0.2 %
LABORATORY COMMENT REPORT: NORMAL
LACTATE BLD-SCNC: 0.7 MMOL/L (ref 0.7–2)
LYMPHOCYTES # BLD AUTO: 0.8 10E9/L (ref 0.8–5.3)
LYMPHOCYTES NFR BLD AUTO: 7.6 %
MCH RBC QN AUTO: 31.1 PG (ref 26.5–33)
MCHC RBC AUTO-ENTMCNC: 32.1 G/DL (ref 31.5–36.5)
MCV RBC AUTO: 97 FL (ref 78–100)
MONOCYTES # BLD AUTO: 0.3 10E9/L (ref 0–1.3)
MONOCYTES NFR BLD AUTO: 2.8 %
NEUTROPHILS # BLD AUTO: 9.3 10E9/L (ref 1.6–8.3)
NEUTROPHILS NFR BLD AUTO: 88.9 %
NRBC # BLD AUTO: 0 10*3/UL
NRBC BLD AUTO-RTO: 0 /100
PCO2 BLDV: 67 MM HG (ref 40–50)
PH BLDV: 7.31 PH (ref 7.32–7.43)
PLATELET # BLD AUTO: 418 10E9/L (ref 150–450)
PO2 BLDV: 49 MM HG (ref 25–47)
POTASSIUM SERPL-SCNC: 4.1 MMOL/L (ref 3.4–5.3)
RBC # BLD AUTO: 4.53 10E12/L (ref 4.4–5.9)
SARS-COV-2 RNA RESP QL NAA+PROBE: NEGATIVE
SODIUM SERPL-SCNC: 132 MMOL/L (ref 133–144)
SPECIMEN SOURCE: NORMAL
WBC # BLD AUTO: 10.5 10E9/L (ref 4–11)

## 2021-06-30 PROCEDURE — 96365 THER/PROPH/DIAG IV INF INIT: CPT | Performed by: EMERGENCY MEDICINE

## 2021-06-30 PROCEDURE — 82803 BLOOD GASES ANY COMBINATION: CPT | Performed by: EMERGENCY MEDICINE

## 2021-06-30 PROCEDURE — 93010 ELECTROCARDIOGRAM REPORT: CPT | Performed by: EMERGENCY MEDICINE

## 2021-06-30 PROCEDURE — 93005 ELECTROCARDIOGRAM TRACING: CPT | Performed by: EMERGENCY MEDICINE

## 2021-06-30 PROCEDURE — 83605 ASSAY OF LACTIC ACID: CPT | Performed by: FAMILY MEDICINE

## 2021-06-30 PROCEDURE — 200N000001 HC R&B ICU

## 2021-06-30 PROCEDURE — 85025 COMPLETE CBC W/AUTO DIFF WBC: CPT | Performed by: EMERGENCY MEDICINE

## 2021-06-30 PROCEDURE — 71045 X-RAY EXAM CHEST 1 VIEW: CPT

## 2021-06-30 PROCEDURE — C9803 HOPD COVID-19 SPEC COLLECT: HCPCS | Performed by: EMERGENCY MEDICINE

## 2021-06-30 PROCEDURE — 36415 COLL VENOUS BLD VENIPUNCTURE: CPT | Performed by: FAMILY MEDICINE

## 2021-06-30 PROCEDURE — 96375 TX/PRO/DX INJ NEW DRUG ADDON: CPT | Performed by: EMERGENCY MEDICINE

## 2021-06-30 PROCEDURE — 99223 1ST HOSP IP/OBS HIGH 75: CPT | Mod: AI | Performed by: FAMILY MEDICINE

## 2021-06-30 PROCEDURE — 87635 SARS-COV-2 COVID-19 AMP PRB: CPT | Performed by: EMERGENCY MEDICINE

## 2021-06-30 PROCEDURE — 5A09457 ASSISTANCE WITH RESPIRATORY VENTILATION, 24-96 CONSECUTIVE HOURS, CONTINUOUS POSITIVE AIRWAY PRESSURE: ICD-10-PCS | Performed by: INTERNAL MEDICINE

## 2021-06-30 PROCEDURE — 99285 EMERGENCY DEPT VISIT HI MDM: CPT | Mod: 25 | Performed by: EMERGENCY MEDICINE

## 2021-06-30 PROCEDURE — 250N000011 HC RX IP 250 OP 636: Performed by: EMERGENCY MEDICINE

## 2021-06-30 PROCEDURE — 999N000105 HC STATISTIC NO DOCUMENTATION TO SUPPORT CHARGE

## 2021-06-30 PROCEDURE — 94640 AIRWAY INHALATION TREATMENT: CPT | Performed by: EMERGENCY MEDICINE

## 2021-06-30 PROCEDURE — 250N000009 HC RX 250: Performed by: EMERGENCY MEDICINE

## 2021-06-30 PROCEDURE — 96361 HYDRATE IV INFUSION ADD-ON: CPT | Performed by: EMERGENCY MEDICINE

## 2021-06-30 PROCEDURE — 999N001017 HC STATISTIC GLUCOSE BY METER IP

## 2021-06-30 PROCEDURE — 94660 CPAP INITIATION&MGMT: CPT

## 2021-06-30 PROCEDURE — 999N000157 HC STATISTIC RCP TIME EA 10 MIN

## 2021-06-30 PROCEDURE — 250N000009 HC RX 250: Performed by: PHYSICIAN ASSISTANT

## 2021-06-30 PROCEDURE — 258N000003 HC RX IP 258 OP 636: Performed by: EMERGENCY MEDICINE

## 2021-06-30 PROCEDURE — 85379 FIBRIN DEGRADATION QUANT: CPT | Performed by: EMERGENCY MEDICINE

## 2021-06-30 PROCEDURE — 99291 CRITICAL CARE FIRST HOUR: CPT | Mod: 25 | Performed by: EMERGENCY MEDICINE

## 2021-06-30 PROCEDURE — 80048 BASIC METABOLIC PNL TOTAL CA: CPT | Performed by: EMERGENCY MEDICINE

## 2021-06-30 RX ORDER — AMLODIPINE BESYLATE 10 MG/1
10 TABLET ORAL DAILY
Status: DISCONTINUED | OUTPATIENT
Start: 2021-07-01 | End: 2021-07-03 | Stop reason: HOSPADM

## 2021-06-30 RX ORDER — AZITHROMYCIN 250 MG/1
250 TABLET, FILM COATED ORAL DAILY
Status: DISCONTINUED | OUTPATIENT
Start: 2021-07-01 | End: 2021-07-03 | Stop reason: HOSPADM

## 2021-06-30 RX ORDER — NICOTINE POLACRILEX 4 MG
15-30 LOZENGE BUCCAL
Status: DISCONTINUED | OUTPATIENT
Start: 2021-06-30 | End: 2021-07-01

## 2021-06-30 RX ORDER — DEXTROSE MONOHYDRATE 25 G/50ML
25-50 INJECTION, SOLUTION INTRAVENOUS
Status: DISCONTINUED | OUTPATIENT
Start: 2021-06-30 | End: 2021-07-01

## 2021-06-30 RX ORDER — PREDNISONE 20 MG/1
40 TABLET ORAL DAILY
Status: DISCONTINUED | OUTPATIENT
Start: 2021-07-01 | End: 2021-07-03 | Stop reason: HOSPADM

## 2021-06-30 RX ORDER — METHYLPREDNISOLONE SODIUM SUCCINATE 125 MG/2ML
125 INJECTION, POWDER, LYOPHILIZED, FOR SOLUTION INTRAMUSCULAR; INTRAVENOUS ONCE
Status: COMPLETED | OUTPATIENT
Start: 2021-06-30 | End: 2021-06-30

## 2021-06-30 RX ORDER — AMOXICILLIN 250 MG
2 CAPSULE ORAL 2 TIMES DAILY PRN
Status: DISCONTINUED | OUTPATIENT
Start: 2021-06-30 | End: 2021-07-03 | Stop reason: HOSPADM

## 2021-06-30 RX ORDER — AMOXICILLIN 250 MG
1 CAPSULE ORAL 2 TIMES DAILY PRN
Status: DISCONTINUED | OUTPATIENT
Start: 2021-06-30 | End: 2021-07-03 | Stop reason: HOSPADM

## 2021-06-30 RX ORDER — PROCHLORPERAZINE MALEATE 5 MG
10 TABLET ORAL EVERY 6 HOURS PRN
Status: DISCONTINUED | OUTPATIENT
Start: 2021-06-30 | End: 2021-07-03 | Stop reason: HOSPADM

## 2021-06-30 RX ORDER — ONDANSETRON 2 MG/ML
4 INJECTION INTRAMUSCULAR; INTRAVENOUS EVERY 6 HOURS PRN
Status: DISCONTINUED | OUTPATIENT
Start: 2021-06-30 | End: 2021-07-03 | Stop reason: HOSPADM

## 2021-06-30 RX ORDER — ATENOLOL 25 MG/1
25 TABLET ORAL DAILY
Status: DISCONTINUED | OUTPATIENT
Start: 2021-07-01 | End: 2021-07-03 | Stop reason: HOSPADM

## 2021-06-30 RX ORDER — FUROSEMIDE 20 MG
20 TABLET ORAL DAILY
Status: DISCONTINUED | OUTPATIENT
Start: 2021-07-01 | End: 2021-07-03 | Stop reason: HOSPADM

## 2021-06-30 RX ORDER — ALBUTEROL SULFATE 90 UG/1
2 AEROSOL, METERED RESPIRATORY (INHALATION) EVERY 4 HOURS PRN
Status: DISCONTINUED | OUTPATIENT
Start: 2021-06-30 | End: 2021-07-03 | Stop reason: HOSPADM

## 2021-06-30 RX ORDER — POLYETHYLENE GLYCOL 3350 17 G/17G
17 POWDER, FOR SOLUTION ORAL DAILY PRN
Status: DISCONTINUED | OUTPATIENT
Start: 2021-06-30 | End: 2021-07-03 | Stop reason: HOSPADM

## 2021-06-30 RX ORDER — PROCHLORPERAZINE 25 MG
25 SUPPOSITORY, RECTAL RECTAL EVERY 12 HOURS PRN
Status: DISCONTINUED | OUTPATIENT
Start: 2021-06-30 | End: 2021-07-03 | Stop reason: HOSPADM

## 2021-06-30 RX ORDER — ONDANSETRON 4 MG/1
4 TABLET, ORALLY DISINTEGRATING ORAL EVERY 6 HOURS PRN
Status: DISCONTINUED | OUTPATIENT
Start: 2021-06-30 | End: 2021-07-03 | Stop reason: HOSPADM

## 2021-06-30 RX ORDER — SODIUM CHLORIDE 9 MG/ML
INJECTION, SOLUTION INTRAVENOUS CONTINUOUS
Status: DISCONTINUED | OUTPATIENT
Start: 2021-06-30 | End: 2021-06-30

## 2021-06-30 RX ADMIN — SODIUM CHLORIDE 1000 ML: 9 INJECTION, SOLUTION INTRAVENOUS at 11:36

## 2021-06-30 RX ADMIN — METHYLPREDNISOLONE SODIUM SUCCINATE 125 MG: 125 INJECTION, POWDER, FOR SOLUTION INTRAMUSCULAR; INTRAVENOUS at 09:27

## 2021-06-30 RX ADMIN — IPRATROPIUM BROMIDE AND ALBUTEROL 1 PUFF: 20; 100 SPRAY, METERED RESPIRATORY (INHALATION) at 17:59

## 2021-06-30 RX ADMIN — SODIUM CHLORIDE 1000 ML: 9 INJECTION, SOLUTION INTRAVENOUS at 09:28

## 2021-06-30 RX ADMIN — AZITHROMYCIN 500 MG: 500 INJECTION, POWDER, LYOPHILIZED, FOR SOLUTION INTRAVENOUS at 09:44

## 2021-06-30 RX ADMIN — IPRATROPIUM BROMIDE AND ALBUTEROL 2 PUFF: 20; 100 SPRAY, METERED RESPIRATORY (INHALATION) at 09:29

## 2021-06-30 RX ADMIN — IPRATROPIUM BROMIDE AND ALBUTEROL 1 PUFF: 20; 100 SPRAY, METERED RESPIRATORY (INHALATION) at 22:10

## 2021-06-30 ASSESSMENT — ACTIVITIES OF DAILY LIVING (ADL)
ADLS_ACUITY_SCORE: 15
FALL_HISTORY_WITHIN_LAST_SIX_MONTHS: NO
WALKING_OR_CLIMBING_STAIRS_DIFFICULTY: NO
DOING_ERRANDS_INDEPENDENTLY_DIFFICULTY: NO
WHICH_OF_THE_ABOVE_FUNCTIONAL_RISKS_HAD_A_RECENT_ONSET_OR_CHANGE?: AMBULATION
ADLS_ACUITY_SCORE: 15
CONCENTRATING,_REMEMBERING_OR_MAKING_DECISIONS_DIFFICULTY: NO
DIFFICULTY_EATING/SWALLOWING: NO
TOILETING_ISSUES: NO
DIFFICULTY_COMMUNICATING: NO
DRESSING/BATHING_DIFFICULTY: NO

## 2021-06-30 ASSESSMENT — ENCOUNTER SYMPTOMS
CHEST TIGHTNESS: 1
SHORTNESS OF BREATH: 1
FATIGUE: 1
FEVER: 0

## 2021-06-30 ASSESSMENT — MIFFLIN-ST. JEOR
SCORE: 1321
SCORE: 1340.68

## 2021-06-30 NOTE — PROGRESS NOTES
Pt able to eat clear liquid tray slowly (requested regular for breakfast) on 3 l NC, up at bedside and used urinal, became very dyspneic, bipap needed to be applied, Recovered within minutes, comfortable with bipap on at rest.

## 2021-06-30 NOTE — ED PROVIDER NOTES
History     Chief Complaint   Patient presents with     Shortness of Breath     HPI  Luis Hernandez is a 56 year old male with history of COPD who presents with severe shortness of breath. Hypoxic with SaO2 in the 70s on arrival. Unable to speak in complete sentences, splinting, and accessory muscle use.    Patient states he was started on prednisone this past Monday and has been taking this as prescribed. He denies smoking.     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   Stable 01/2014 to 07/2014, 6 month follow scan recommended.   Chest CT of 1/15/2015= stable nodule, f/u one year.       Advanced directives, counseling/discussion 11/25/2013     Priority: Medium     11/25/2013 Gave patient honoring choices forms to take home and review.  DAVEY Dial ([facility])        CARDIOVASCULAR SCREENING; LDL GOAL LESS THAN 130 10/31/2010     Priority: Medium     COPD (chronic obstructive pulmonary disease) (H) 10/25/2010     Priority: Medium     Severe to very severe       Eczema 10/04/2010     Priority: Medium     ED (erectile dysfunction) 04/20/2009     Priority: Medium     Essential hypertension, benign 10/15/2007     Priority: Medium        Past Medical  "History:    Past Medical History:   Diagnosis Date     Acute on chronic respiratory failure with hypoxia (H) 4/10/2020     Acute on chronic respiratory failure with hypoxia and hypercapnia (H) 4/1/2019     CAP (community acquired pneumonia) 4/14/2020     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)      COPD exacerbation (H) 4/1/2019     COPD exacerbation (H) 2/16/2020     Erectile dysfunction      Hypertension      Hyponatremia 4/1/2019     Pneumonia 4/10/2020       Past Surgical History:    Past Surgical History:   Procedure Laterality Date     APPENDECTOMY      childhood       Family History:    Family History   Problem Relation Age of Onset     Hypertension Father      Lipids Father      C.A.D. Father      Heart Disease Father      Diabetes Paternal Grandmother      Hypertension Mother      Ovarian Cancer Mother      Breast Cancer Mother        Social History:  Marital Status:  Single [1]  Social History     Tobacco Use     Smoking status: Former Smoker     Packs/day: 2.00     Years: 30.00     Pack years: 60.00     Types: Cigarettes     Smokeless tobacco: Former User   Substance Use Topics     Alcohol use: Yes     Comment: rare     Drug use: No        Medications:    No current outpatient medications on file.        Review of Systems   Constitutional: Positive for fatigue. Negative for fever.   Respiratory: Positive for chest tightness and shortness of breath.    All other systems reviewed and are negative.      Physical Exam   BP: (!) 134/112  Pulse: 165  Temp: 97.8  F (36.6  C)  Resp: 30  Height: 162.6 cm (5' 4\")  Weight: 59 kg (130 lb)  SpO2: (!) 75 %      Physical Exam  Constitutional:       General: He is in acute distress.      Comments: splinting   HENT:      Head: Normocephalic and atraumatic.      Right Ear: External ear normal.      Left Ear: External ear normal.      Nose: Nose normal.      Mouth/Throat:      Mouth: Mucous membranes are dry.      Comments: Dry mucus membranes, lips " cracked  Eyes:      General:         Right eye: No discharge.         Left eye: No discharge.   Neck:      Musculoskeletal: No neck rigidity.   Cardiovascular:      Rate and Rhythm: Tachycardia present.      Pulses: Normal pulses.      Heart sounds: Normal heart sounds.   Pulmonary:      Effort: Respiratory distress present.      Comments: Bilateral exp wheezes, diminished LS, no rales  Accessory muscle use  Abdominal:      General: Abdomen is flat. Bowel sounds are normal.   Musculoskeletal: Normal range of motion.   Skin:     General: Skin is dry.      Capillary Refill: Capillary refill takes less than 2 seconds.   Neurological:      General: No focal deficit present.      Mental Status: He is alert.   Psychiatric:         Mood and Affect: Mood normal.         ED Course     0908- pt arrived in acute respiratory distress with SaO2 in the 70s on RA. Placed on oximask, ordered solumedrol, and inhaler.    0920- pt appears more comfortable, no longer splinting, but still struggling to get a deep breath with accessory muscle use.   Inhaler started.    0930- Solumedrol given. Pt able to speak in complete sentences.    0940- Azithromycin ordered once. ECG with sinus tachycardia, no CANDELARIA/STD.    1105- pt more SOB, VBG ordered, will speak to Hospitalist re: admission    1128- Spoke with Hospitalist- will come assess patient. Awaiting VBG results, will order D-dimer.     Procedures               EKG Interpretation:      Interpreted by Thiago Christensen MD  Time reviewed:   Symptoms at time of EKG: sob   Rhythm: sinus tachycardia  Rate: Tachycardia  Axis: Right Axis Deviation  Ectopy: none  Conduction: normal  ST Segments/ T Waves: No acute ischemic changes  Q Waves: nonspecific  Comparison to prior: grossly unchaged from 4/2020    Clinical Impression: sinus tachycardia                Critical Care time:  was 30 minutes for this patient excluding procedures.           Results for orders placed or performed during the hospital  encounter of 06/30/21 (from the past 24 hour(s))   CBC with platelets, differential   Result Value Ref Range    WBC 10.5 4.0 - 11.0 10e9/L    RBC Count 4.53 4.4 - 5.9 10e12/L    Hemoglobin 14.1 13.3 - 17.7 g/dL    Hematocrit 43.9 40.0 - 53.0 %    MCV 97 78 - 100 fl    MCH 31.1 26.5 - 33.0 pg    MCHC 32.1 31.5 - 36.5 g/dL    RDW 12.7 10.0 - 15.0 %    Platelet Count 418 150 - 450 10e9/L    Diff Method Automated Method     % Neutrophils 88.9 %    % Lymphocytes 7.6 %    % Monocytes 2.8 %    % Eosinophils 0.3 %    % Basophils 0.2 %    % Immature Granulocytes 0.2 %    Nucleated RBCs 0 0 /100    Absolute Neutrophil 9.3 (H) 1.6 - 8.3 10e9/L    Absolute Lymphocytes 0.8 0.8 - 5.3 10e9/L    Absolute Monocytes 0.3 0.0 - 1.3 10e9/L    Absolute Eosinophils 0.0 0.0 - 0.7 10e9/L    Absolute Basophils 0.0 0.0 - 0.2 10e9/L    Abs Immature Granulocytes 0.0 0 - 0.4 10e9/L    Absolute Nucleated RBC 0.0    Basic metabolic panel   Result Value Ref Range    Sodium 132 (L) 133 - 144 mmol/L    Potassium 4.1 3.4 - 5.3 mmol/L    Chloride 94 94 - 109 mmol/L    Carbon Dioxide 34 (H) 20 - 32 mmol/L    Anion Gap 4 3 - 14 mmol/L    Glucose 124 (H) 70 - 99 mg/dL    Urea Nitrogen 13 7 - 30 mg/dL    Creatinine 0.68 0.66 - 1.25 mg/dL    GFR Estimate >90 >60 mL/min/[1.73_m2]    GFR Estimate If Black >90 >60 mL/min/[1.73_m2]    Calcium 9.6 8.5 - 10.1 mg/dL   D dimer quantitative   Result Value Ref Range    D Dimer 0.3 0.0 - 0.50 ug/ml FEU   XR Chest Port 1 View    Narrative    CHEST PORTABLE ONE VIEW  6/30/2021 10:01 AM     HISTORY: COPD, desatting.    COMPARISON: None      Impression    IMPRESSION: Cardiac silhouette is within normal limits. No evidence  for infiltrate or pneumothorax. Blunting of the left costophrenic  angle may be due to tiny effusion or atelectasis. Degenerative changes  are noted through the spine.    ULISES CORONA MD          SYSTEM ID:  [id number]   Asymptomatic SARS-CoV-2 COVID-19 Virus (Coronavirus) by PCR    Specimen:  Nasopharyngeal   Result Value Ref Range    SARS-CoV-2 Virus Specimen Source Nasopharyngeal     SARS-CoV-2 PCR Result NEGATIVE     SARS-CoV-2 PCR Comment (Note)    Blood gas venous   Result Value Ref Range    Ph Venous 7.31 (L) 7.32 - 7.43 pH    PCO2 Venous 67 (H) 40 - 50 mm Hg    PO2 Venous 49 (H) 25 - 47 mm Hg    Bicarbonate Venous 33 (H) 21 - 28 mmol/L    Base Excess Venous 4.9 mmol/L   Lactic acid level STAT   Result Value Ref Range    Lactate for Sepsis Protocol 0.7 0.7 - 2.0 mmol/L   Glucose by meter   Result Value Ref Range    Glucose 136 (H) 70 - 99 mg/dL       Medications   glucose gel 15-30 g (has no administration in time range)     Or   dextrose 50 % injection 25-50 mL (has no administration in time range)     Or   glucagon injection 1 mg (has no administration in time range)   prochlorperazine (COMPAZINE) injection 10 mg (has no administration in time range)     Or   prochlorperazine (COMPAZINE) tablet 10 mg (has no administration in time range)     Or   prochlorperazine (COMPAZINE) suppository 25 mg (has no administration in time range)   ondansetron (ZOFRAN-ODT) ODT tab 4 mg (has no administration in time range)     Or   ondansetron (ZOFRAN) injection 4 mg (has no administration in time range)   polyethylene glycol (MIRALAX) Packet 17 g (has no administration in time range)   senna-docusate (SENOKOT-S/PERICOLACE) 8.6-50 MG per tablet 1 tablet (has no administration in time range)     Or   senna-docusate (SENOKOT-S/PERICOLACE) 8.6-50 MG per tablet 2 tablet (has no administration in time range)   predniSONE (DELTASONE) tablet 40 mg (has no administration in time range)   ipratropium-albuterol (COMBIVENT RESPIMAT) inhaler 1 puff (1 puff Inhalation Given 6/30/21 1759)   azithromycin (ZITHROMAX) tablet 250 mg (has no administration in time range)   albuterol (PROAIR HFA/PROVENTIL HFA/VENTOLIN HFA) 108 (90 Base) MCG/ACT inhaler 2 puff (has no administration in time range)   amLODIPine (NORVASC) tablet 10 mg  (has no administration in time range)   atenolol (TENORMIN) tablet 25 mg (has no administration in time range)   furosemide (LASIX) tablet 20 mg (has no administration in time range)   losartan (COZAAR) tablet 75 mg (has no administration in time range)   0.9% sodium chloride BOLUS (0 mLs Intravenous Stopped 6/30/21 1100)   methylPREDNISolone sodium succinate (solu-MEDROL) injection 125 mg (125 mg Intravenous Given 6/30/21 0927)   ipratropium-albuterol (COMBIVENT RESPIMAT) inhaler 2 puff (2 puffs Inhalation Given 6/30/21 0929)   azithromycin (ZITHROMAX) 500 mg in sodium chloride 0.9 % 250 mL intermittent infusion (0 mg Intravenous Stopped 6/30/21 1034)   0.9% sodium chloride BOLUS (0 mLs Intravenous Stopped 6/30/21 1400)       Assessments & Plan (with Medical Decision Making)     Likely COPD, patient desatting on arrival, in distress, will empirically treat with solumedrol, inhalers (convinced patient to try). Will get cxr, labs. Will monitor closely, titrate O2. Patient is at high risk for needing intubation.     I have reviewed the nursing notes.    I have reviewed the findings, diagnosis, plan and need for follow up with the patient.     Critical Care Addendum    My initial assessment, based on my review of nursing observations, review of vital signs, focused history and physical exam, established that Luis Hernandez has respiratory insufficiency, which requires immediate intervention, and therefore he is critically ill.     After the initial assessment, the care team initiated multiple lab tests, initiated IV fluid administration, initiated medication therapy with steroids and bronchodilators and initiated intensive non-invasive respiratory support to provide stabilization care. Due to the critical nature of this patient, I reassessed nursing observations, vital signs, physical exam and interpretation of imaging and labs multiple times prior to his disposition.     Time also spent performing documentation,  discussion with consultants and coordination of care.     Critical care time (excluding teaching time and procedures): 30 minutes.     Current Discharge Medication List          Final diagnoses:   None       6/30/2021   [facility] EMERGENCY DEPT     Thiago Christensen MD  06/30/21 0912

## 2021-06-30 NOTE — PROGRESS NOTES
Skin affirmation note    Admitting nurse completed full skin assessment, Troy score and Troy interventions. This writer agrees with the initial skin assessment findings.  Kimberly Omer RN BSN

## 2021-06-30 NOTE — PHARMACY
Hold potassium 99mg (2.5mEq) tablets while in hospital per non-essential medication holiday policy

## 2021-06-30 NOTE — PROGRESS NOTES
"[facility] [facility] [place]    Patient admitted to room 1004 at approximately 1410 via cart from emergency room. Patient was accompanied by nurse.     Verbal SBAR report received from Krupa FOSTER prior to patient arrival.     Patient ambulated to bed with stand-by assist. Patient alert and oriented X 3. The patient is not having any pain.  . Admission vital signs: Blood pressure (!) 144/56, pulse 114, temperature 97.7  F (36.5  C), temperature source Axillary, resp. rate (!) 39, height 1.626 m (5' 4\"), weight 57 kg (125 lb 10.6 oz), SpO2 91 %. Patient was oriented to plan of care, call light, bed controls, tv, telephone, bathroom and visiting hours.     Risk Assessment    The following safety risks were identified during admission: fall. Yellow risk band applied: YES.     Skin Initial Assessment    This writer admitted this patient and completed a full skin assessment and Troy score in the Adult PCS flowsheet. Appropriate interventions initiated as needed.     Secondary skin check completed by Maddie FOSTER.    Troy Risk Assessment  Sensory Perception: 4-->no impairment  Moisture: 4-->rarely moist  Activity: 3-->walks occasionally  Mobility: 3-->slightly limited  Nutrition: 3-->adequate  Friction and Shear: 3-->no apparent problem  Troy Score: 20  Mattress: Standard Hospital Mattress (Foam)  Bed Frame: Standard width and length    Education    Patient has a [place] to Observation order: No  Observation education completed and documented: N/A      Frannie Davidson RN    "

## 2021-06-30 NOTE — PROGRESS NOTES
11:38 AM  Patient presents with dyspnea 3 to 4 days.  He started prednisone 40 mg a day on Monday.  He continues to be dyspneic.  He has nonproductive cough.  No fever or chills.  He uses 2 L of oxygen at home with activity but does not use it with rest.    Currently the patient is sitting upright.  He has increased work of breathing heart rate is 120.  He is on 3 L of oxygen respiratory rate is around 20.  He has some wheeze and has prolonged expiratory phase.    I have asked for a D-dimer and a VBG.  If he is not acutely retaining I think he can likely go to the floor.    H&P will be done by Jessica HONEYCUTT.    11:52 AM    Ph 7.31/67  Admit to icu-trial bipap.

## 2021-06-30 NOTE — H&P
[facility]    History and Physical - Hospitalist Service       Date of Admission:  6/30/2021    Assessment & Plan      Luis Hernandez is a 56 year old male admitted on 6/30/2021. He presented to the emergency department for evaluation of cough, wheeze, shortness of breath and was found to have a COPD exacerbation with respiratory acidosis for which he is being admitted for further evaluation and treatment.    COPD exacerbation  Acute respiratory acidosis  Acute on chronic respiratory failure with hypoxia and hypercapnia  Known COPD, at baseline uses prn 3L supplemental O2 when exerting himself. Managed prior to admission with Advair 500/50 bid, Spiriva 18 mcg daily, prn albuterol, and prn DuoNebs (which he does not use).   Presented with O2 sats in the 70's, had been needing 24/7 supplemental O2 at home. Started a home supply of prednisone and doxycycline 2 days prior to admission without improvement.   Admit VBG with acute respiratory acidosis (pH 7.31 / pCO2 67 / bicarb 33). Chest x-ray unremarkable other than hyper expanded lung volumes. Is having some respiratory distress, was placed on BiPAP in the emergency department. Given 125 mg SoluMedrol, azithromycin, and Combivent in the emergency department.  - Admit to ICU on BiPAP  - Prednisone 40 mg daily   - Continue azithromycin 250 mg daily   - Scheduled Combivent q 4 hours while awake (does not tolerate nebs well)  - Prn albuterol  - Repeat VBG in am     Sinus tachycardia  Initial pulse in the 120's. EKG shows sinus tachycardia. Likely due to impaired respiratory status, expect pulse to normalize with improvement in respiratory status.   - Monitor on telemetry     Hyponatremia   Admit Na = 132, mild. Possibly nutritional due to poor recent appetite.  - BMP in am    Essential hypertension, benign  Peripheral edema  Blood pressure stable on admission. Managed prior to admission with amlodipine 10 mg daily, atenolol 25 mg daily, losartan  "75 mg daily, and lasix 20 mg daily.   - Continue home meds with holding parameters    Pulmonary hypertension  Noted on echo in 2019, severe >55 mmHg pulmonary hypertension present. Presumably due to pulmonary disease.     COVID status  COVID PCR negative 6/30/2021   Completed Moderna vaccine series on 4/9/21       Diet:   Advance diet as tolerated  DVT Prophylaxis: Pneumatic Compression Devices  Miller Catheter: Not present  Central Lines: None  Code Status:   Full - discussed with patient on admission      Disposition Plan   Expected discharge: 2+ days, recommended to prior living arrangement once respiratory status improves, respiratory acidosis resolves, clinical improvement.     The patient's care was discussed with the Attending Physician, Dr. Jean Carlos Holt and Patient.    Kelley Marti PA-C  [facility]  Securely message with the Vocera Web Console (learn more here)  Text page via [employer] Paging/Directory      ______________________________________________________________________    Chief Complaint   \"My COPD is flaring, and not getting better with my prednisone and inhalers at home.\"    History is obtained from the patient, review of EMR, and emergency department sign out from Dr. Thiago Christensen.     History of Present Illness   Luis Hernandez is a 56 year old male who presented to the emergency department for evaluation of COPD flare.    Patient has known COPD, uses 3L supplemental O2 when exerting himself, but otherwise usually on room air.   About three days ago he started having increased wheezing, shortness of breath, and a dry cough.   He has a home supply of prednisone and doxycycline to use if he has a flare; he started these 2 days ago (6/28/21) without improvement.   Recently he has been needing his supplemental O2 continuously.     He has been using his albuterol but says \"I can't take a deep breath in for it to work.\" He does not think his nebs are effective, so he has " not been using DuoNebs.     He denies any associated fever or chills, no recent known illness.  He has a poor appetite and has not been eating recently.  He is not sleeping well due to inability to breathe.    Denies any pain.  No nausea, vomiting, diarrhea, constipation.  Urinating well.   The remainder review of systems is negative.     Review of Systems    The 10 point Review of Systems is negative other than noted in the HPI or here.     Past Medical History    I have reviewed this patient's medical history and updated it with pertinent information if needed.   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  Past Surgical History:   Procedure Laterality Date     APPENDECTOMY      childhood       Social History   I have reviewed this patient's social history and updated it with pertinent information if needed.  Social History     Tobacco Use     Smoking status: Former Smoker     Packs/day: 2.00     Years: 30.00     Pack years: 60.00     Types: Cigarettes     Smokeless tobacco: Former User   Substance Use Topics     Alcohol use: Yes     Comment: rare     Drug use: No       Family History   I have reviewed this patient's family history and updated it with pertinent information if needed.  Family History   Problem Relation Age of Onset     Hypertension Father      Lipids Father      C.A.D. Father      Heart Disease Father      Diabetes Paternal Grandmother       Hypertension Mother      Ovarian Cancer Mother      Breast Cancer Mother        Prior to Admission Medications   Prior to Admission Medications   Prescriptions Last Dose Informant Patient Reported? Taking?   albuterol (PROAIR HFA/PROVENTIL HFA/VENTOLIN HFA) 108 (90 Base) MCG/ACT inhaler 6/30/2021 at am Self No Yes   Sig: Inhale 2 puffs into the lungs every 4 hours as needed for shortness of breath / dyspnea Hold on file until needed   amLODIPine (NORVASC) 10 MG tablet 6/30/2021 at 0700 Self No Yes   Sig: Take 1 tablet (10 mg) by mouth daily   atenolol (TENORMIN) 25 MG tablet 6/30/2021 at 0700 Self No Yes   Sig: Take 1 tablet (25 mg) by mouth daily   doxycycline hyclate (VIBRAMYCIN) 100 MG capsule 6/30/2021 at 0500 Self No Yes   Sig: Take 1 capsule (100 mg) by mouth 2 times daily Keep on hand for COPD exacerbation.   fluticasone-salmeterol (ADVAIR) 500-50 MCG/DOSE inhaler 6/29/2021 at Unknown time Self No Yes   Sig: Inhale 1 puff into the lungs 2 times daily Hold on file until needed   furosemide (LASIX) 20 MG tablet 6/30/2021 at 0700 Self No Yes   Sig: Take 1 tablet (20 mg) by mouth daily   ipratropium - albuterol 0.5 mg/2.5 mg/3 mL (DUONEB) 0.5-2.5 (3) MG/3ML neb solution Past Month at Unknown time Self No Yes   Sig: Take 1 vial (3 mLs) by nebulization every 6 hours as needed for shortness of breath / dyspnea or wheezing   losartan (COZAAR) 50 MG tablet 6/30/2021 at 0700 Self No Yes   Sig: Take 1.5 tablets (75 mg) by mouth daily   magnesium oxide (MAG-OX) 400 MG tablet Past Week at Unknown time Self No Yes   Sig: Take 1 tablet (400 mg) by mouth every evening   order for DME  Self No No   Sig: Equipment being ordered: Nebulizer   Patient not taking: Reported on 5/7/2021   order for DME  Self No No   Sig: Equipment being ordered: Oxygen 3L via NC continuous.  O2 saturated noted to be 86% on room air at rest.   potassium 99 MG TABS Past Week at Unknown time Self Yes Yes   Sig: Take 1 tablet by mouth every evening    predniSONE (DELTASONE) 10 MG tablet 2021 at 0500 40mg Self No Yes   Si tablets daily, then 3 tablets daily for 3 days, then 2 tablets daily for 3 days, then 1 tablet daily for 3 days. Use for COPD flares   tiotropium (SPIRIVA HANDIHALER) 18 MCG inhaled capsule 2021 at Unknown time Self No Yes   Sig: Inhale 1 capsule (18 mcg) into the lungs every evening Inhale contents of one capsule daily. Hold on file until needed.      Facility-Administered Medications: None     Allergies   Allergies   Allergen Reactions     Hctz Other (See Comments)     He has hyponatremia     Lisinopril Cough     Penicillins Other (See Comments)     Reaction unknown       Physical Exam   Vital Signs: Temp: 97.8  F (36.6  C) Temp src: Temporal BP: 123/89 Pulse: 112   Resp: 20 SpO2: 97 % O2 Device: BiPAP/CPAP Oxygen Delivery: 3 LPM  Weight: 130 lbs 0 oz    Constitutional: Alert, oriented, cooperative. Has some respiratory distress, initially tripoding but improved after BiPAP initiated. Appears nontoxic. Speaking in full coherent sentences.     Eyes: Eyes are clear, pupils are reactive. No scleral icterus.    HEENT: Oropharynx is clear and moist, no lesions. Normocephalic, no evidence of cranial trauma.      Cardiovascular: Regular rhythm rapid rate, normal S1 and S2. No murmur, rubs, or gallops. Peripheral pulses intact bilaterally. Trace non-pitting lower extremity edema.    Respiratory: Coarse lung sounds with prolonged expiratory phase. Coarse expiratory wheezes and rhonchi. No crackles.     GI: Soft, non-distended. Non-tender, no rebound or guarding. No hepatosplenomegaly or masses appreciated. Hypoactive bowel sounds.     Musculoskeletal: Without obvious deformity, normal range of motion. Diminished muscle bulk and tone. Distal CMS intact.      Skin: Warm and dry, no rashes or ecchymoses. No mottling of skin. Peeling skin on bilateral palms.     Neurologic: Patient moves all extremities. Gross strength and sensation are  equal bilaterally.    Genitourinary: Deferred      Data   Data reviewed today: I reviewed all medications, new labs and imaging results over the last 24 hours. I personally reviewed the EKG tracing showing sinus tachycardia and the chest x-ray image(s) showing hyperexpanded lung volumes, no infiltrate.    Recent Labs   Lab 06/30/21  0919   WBC 10.5   HGB 14.1   MCV 97      *   POTASSIUM 4.1   CHLORIDE 94   CO2 34*   BUN 13   CR 0.68   ANIONGAP 4   MIKE 9.6   *     Most Recent 3 CBC's:  Recent Labs   Lab Test 06/30/21  0919 05/07/21  0939 06/08/20  1025   WBC 10.5 8.7 9.9   HGB 14.1 13.5 13.9   MCV 97 98 95    385 381     Most Recent 3 BMP's:  Recent Labs   Lab Test 06/30/21  0919 05/07/21  0939 06/08/20  1025   * 134 130*   POTASSIUM 4.1 4.2 4.1   CHLORIDE 94 99 98   CO2 34* 30 28   BUN 13 13 9   CR 0.68 0.91 0.69   ANIONGAP 4 5 4   MIKE 9.6 9.1 9.2   * 69* 94     Most Recent 2 LFT's:  Recent Labs   Lab Test 04/11/20  0616 04/10/20  1437   AST 32 64*   ALT 60 73*   ALKPHOS 75 77   BILITOTAL 0.3 0.2     Recent Results (from the past 24 hour(s))   XR Chest Port 1 View    Narrative    CHEST PORTABLE ONE VIEW  6/30/2021 10:01 AM     HISTORY: COPD, desatting.    COMPARISON: None      Impression    IMPRESSION: Cardiac silhouette is within normal limits. No evidence  for infiltrate or pneumothorax. Blunting of the left costophrenic  angle may be due to tiny effusion or atelectasis. Degenerative changes  are noted through the spine.    ULISES CORONA MD          SYSTEM ID:  [id number]

## 2021-06-30 NOTE — ED NOTES
Pt states breathing has been getting worse since Sunday. Has prednisone at home which he started. No improvement with the prednisone just been getting worse. Recovered with 4L O2 via NC to 96%. Currently 93% on 3L RR still in the 30s.

## 2021-07-01 LAB
ALBUMIN SERPL-MCNC: 3.7 G/DL (ref 3.4–5)
ALP SERPL-CCNC: 37 U/L (ref 40–150)
ALT SERPL W P-5'-P-CCNC: 25 U/L (ref 0–70)
ANION GAP SERPL CALCULATED.3IONS-SCNC: 1 MMOL/L (ref 3–14)
AST SERPL W P-5'-P-CCNC: 14 U/L (ref 0–45)
BASE EXCESS BLDV CALC-SCNC: 8.3 MMOL/L
BASOPHILS # BLD AUTO: 0 10E9/L (ref 0–0.2)
BASOPHILS NFR BLD AUTO: 0.1 %
BILIRUB SERPL-MCNC: 0.3 MG/DL (ref 0.2–1.3)
BUN SERPL-MCNC: 14 MG/DL (ref 7–30)
CALCIUM SERPL-MCNC: 9.2 MG/DL (ref 8.5–10.1)
CHLORIDE SERPL-SCNC: 100 MMOL/L (ref 94–109)
CO2 SERPL-SCNC: 36 MMOL/L (ref 20–32)
CREAT SERPL-MCNC: 0.49 MG/DL (ref 0.66–1.25)
DIFFERENTIAL METHOD BLD: ABNORMAL
EOSINOPHIL # BLD AUTO: 0 10E9/L (ref 0–0.7)
EOSINOPHIL NFR BLD AUTO: 0.1 %
ERYTHROCYTE [DISTWIDTH] IN BLOOD BY AUTOMATED COUNT: 12.8 % (ref 10–15)
GFR SERPL CREATININE-BSD FRML MDRD: >90 ML/MIN/{1.73_M2}
GLUCOSE SERPL-MCNC: 94 MG/DL (ref 70–99)
HCO3 BLDV-SCNC: 37 MMOL/L (ref 21–28)
HCT VFR BLD AUTO: 39.1 % (ref 40–53)
HGB BLD-MCNC: 12.4 G/DL (ref 13.3–17.7)
IMM GRANULOCYTES # BLD: 0 10E9/L (ref 0–0.4)
IMM GRANULOCYTES NFR BLD: 0.2 %
LYMPHOCYTES # BLD AUTO: 0.9 10E9/L (ref 0.8–5.3)
LYMPHOCYTES NFR BLD AUTO: 10 %
MCH RBC QN AUTO: 31.2 PG (ref 26.5–33)
MCHC RBC AUTO-ENTMCNC: 31.7 G/DL (ref 31.5–36.5)
MCV RBC AUTO: 99 FL (ref 78–100)
MONOCYTES # BLD AUTO: 1.2 10E9/L (ref 0–1.3)
MONOCYTES NFR BLD AUTO: 12.7 %
NEUTROPHILS # BLD AUTO: 7 10E9/L (ref 1.6–8.3)
NEUTROPHILS NFR BLD AUTO: 76.9 %
NRBC # BLD AUTO: 0 10*3/UL
NRBC BLD AUTO-RTO: 0 /100
O2/TOTAL GAS SETTING VFR VENT: 30 %
PCO2 BLDV: 71 MM HG (ref 40–50)
PH BLDV: 7.33 PH (ref 7.32–7.43)
PLATELET # BLD AUTO: 344 10E9/L (ref 150–450)
PO2 BLDV: 64 MM HG (ref 25–47)
POTASSIUM SERPL-SCNC: 4 MMOL/L (ref 3.4–5.3)
PROT SERPL-MCNC: 6.8 G/DL (ref 6.8–8.8)
RBC # BLD AUTO: 3.97 10E12/L (ref 4.4–5.9)
SODIUM SERPL-SCNC: 137 MMOL/L (ref 133–144)
WBC # BLD AUTO: 9.1 10E9/L (ref 4–11)

## 2021-07-01 PROCEDURE — 94660 CPAP INITIATION&MGMT: CPT

## 2021-07-01 PROCEDURE — 99232 SBSQ HOSP IP/OBS MODERATE 35: CPT | Performed by: INTERNAL MEDICINE

## 2021-07-01 PROCEDURE — 85025 COMPLETE CBC W/AUTO DIFF WBC: CPT | Performed by: PHYSICIAN ASSISTANT

## 2021-07-01 PROCEDURE — 999N000123 HC STATISTIC OXYGEN O2DAILY TECH TIME

## 2021-07-01 PROCEDURE — 250N000013 HC RX MED GY IP 250 OP 250 PS 637: Performed by: INTERNAL MEDICINE

## 2021-07-01 PROCEDURE — 36415 COLL VENOUS BLD VENIPUNCTURE: CPT | Performed by: PHYSICIAN ASSISTANT

## 2021-07-01 PROCEDURE — 80053 COMPREHEN METABOLIC PANEL: CPT | Performed by: PHYSICIAN ASSISTANT

## 2021-07-01 PROCEDURE — 120N000001 HC R&B MED SURG/OB

## 2021-07-01 PROCEDURE — 82803 BLOOD GASES ANY COMBINATION: CPT | Performed by: PHYSICIAN ASSISTANT

## 2021-07-01 PROCEDURE — 999N000157 HC STATISTIC RCP TIME EA 10 MIN

## 2021-07-01 PROCEDURE — 250N000013 HC RX MED GY IP 250 OP 250 PS 637: Performed by: PHYSICIAN ASSISTANT

## 2021-07-01 PROCEDURE — 999N000215 HC STATISTIC HFNC ADULT NON-CPAP

## 2021-07-01 PROCEDURE — 250N000012 HC RX MED GY IP 250 OP 636 PS 637: Performed by: PHYSICIAN ASSISTANT

## 2021-07-01 RX ORDER — ACETAMINOPHEN 500 MG
500-1000 TABLET ORAL EVERY 6 HOURS PRN
Status: DISCONTINUED | OUTPATIENT
Start: 2021-07-01 | End: 2021-07-03 | Stop reason: HOSPADM

## 2021-07-01 RX ADMIN — IPRATROPIUM BROMIDE AND ALBUTEROL 1 PUFF: 20; 100 SPRAY, METERED RESPIRATORY (INHALATION) at 10:30

## 2021-07-01 RX ADMIN — IPRATROPIUM BROMIDE AND ALBUTEROL 1 PUFF: 20; 100 SPRAY, METERED RESPIRATORY (INHALATION) at 15:41

## 2021-07-01 RX ADMIN — LOSARTAN POTASSIUM 75 MG: 50 TABLET, FILM COATED ORAL at 07:53

## 2021-07-01 RX ADMIN — AMLODIPINE BESYLATE 10 MG: 10 TABLET ORAL at 07:53

## 2021-07-01 RX ADMIN — PREDNISONE 40 MG: 20 TABLET ORAL at 07:53

## 2021-07-01 RX ADMIN — IPRATROPIUM BROMIDE AND ALBUTEROL 1 PUFF: 20; 100 SPRAY, METERED RESPIRATORY (INHALATION) at 06:28

## 2021-07-01 RX ADMIN — AZITHROMYCIN MONOHYDRATE 250 MG: 250 TABLET ORAL at 07:54

## 2021-07-01 RX ADMIN — ACETAMINOPHEN 1000 MG: 500 TABLET, FILM COATED ORAL at 08:22

## 2021-07-01 RX ADMIN — FUROSEMIDE 20 MG: 20 TABLET ORAL at 07:53

## 2021-07-01 RX ADMIN — IPRATROPIUM BROMIDE AND ALBUTEROL 1 PUFF: 20; 100 SPRAY, METERED RESPIRATORY (INHALATION) at 19:48

## 2021-07-01 RX ADMIN — ATENOLOL 25 MG: 25 TABLET ORAL at 07:54

## 2021-07-01 ASSESSMENT — ACTIVITIES OF DAILY LIVING (ADL)
ADLS_ACUITY_SCORE: 13
ADLS_ACUITY_SCORE: 13
ADLS_ACUITY_SCORE: 15
ADLS_ACUITY_SCORE: 13

## 2021-07-01 ASSESSMENT — MIFFLIN-ST. JEOR: SCORE: 1317

## 2021-07-01 NOTE — PROGRESS NOTES
"Pt sitting up at edge of bed. Ate 100% breakfast, drinking fluids.  Took off Bipap prior to breakfast, applied O2 via NC at 4 lpm, sats 92-94% RR 20-24. Pt able to speak in full sentences without difficulty, no increased work of breathing today. Pt states \"I feel much better today\". Pt reports to this RN normally on 2lpm O2 with sats around 94% at home. Pt's daughter & father visited today. Pt has a large O2 tank at home that family will bring in tomorrow in anticipation of discharging. Kimberly Omer, RN BSN    "

## 2021-07-01 NOTE — PROGRESS NOTES
[facility] Medicine Progress Note  Date of Service: 07/01/2021     Assessment & Plan      Luis Hernandez is a 56 year old male admitted on 6/30/2021. He presented to the emergency department for evaluation of cough, wheeze, shortness of breath and was found to have a COPD exacerbation with respiratory acidosis for which he is being admitted for further evaluation and treatment.    COPD exacerbation  Acute respiratory acidosis  Acute on chronic respiratory failure with hypoxia and hypercapnia    Known COPD, at baseline uses prn 2-3L supplemental O2 with activity but not at rest. Managed prior to admission with Advair 500/50 bid, Spiriva 18 mcg daily, prn albuterol, and prn DuoNebs (which he does not use).     Presented with O2 sats in the 70's, had been needing 24/7 supplemental O2 at home. Started a home supply of prednisone and doxycycline 2 days prior to admission without improvement. In respiratory distress requiring BiPAP. VBG with acute respiratory acidosis (pH 7.31 / pCO2 67 / bicarb 33). Chest x-ray unremarkable.     Improving after one night and now off BiPAP.   - continue prednisone 40 mg daily   - Continue azithromycin 250 mg daily   - Scheduled Combivent q 4 hours while awake (does not tolerate nebs well)  - Prn albuterol    Sinus tachycardia    Initial pulse in the 120's. EKG shows sinus tachycardia. Likely due to impaired respiratory status, expect pulse to normalize with improvement in respiratory status. Resolved.     Hyponatremia     Admit Na = 132, mild. Possibly nutritional due to poor recent appetite.    Resolved 137.     Essential hypertension, benign  Peripheral edema  Blood pressure stable on admission. Managed prior to admission with amlodipine 10 mg daily, atenolol 25 mg daily, losartan 75 mg daily, and lasix 20 mg daily.   - Continue home meds with holding parameters    Pulmonary hypertension    Noted on echo in 2019, severe >55 mmHg pulmonary  hypertension present. Presumably due to pulmonary disease.     COVID status  COVID PCR negative 6/30/2021   Completed Moderna vaccine series on 4/9/21       Diet: Regular Diet Adult Advance diet as tolerated  DVT Prophylaxis: Pneumatic Compression Devices  Miller Catheter: Not present  Central Lines: None  Code Status: Full Code     Discussion: Improving. O2 needs above baseline and needs improved exercise tolerance before discharge.     Disposition: Anticipate discharge 1-3 more days. Stable for med/surg status.      Attestation:  Total time: 30 minutes    Aubrey Gomes MD  Hospital Medicine        Interval History   Feels much better than admission.  Has not been able to be very active yet.  Still very wheezy.  No chest pain or abdominal pain.    Physical Exam   Temp:  [97.7  F (36.5  C)-98.1  F (36.7  C)] 98.1  F (36.7  C)  Pulse:  [] 83  Resp:  [8-49] 17  BP: (106-151)/() 120/84  FiO2 (%):  [30 %-40 %] 30 %  SpO2:  [91 %-98 %] 92 % 4 lpm NC      Intake/Output Summary (Last 24 hours) at 7/1/2021 0932  Last data filed at 7/1/2021 0900  Gross per 24 hour   Intake 2960 ml   Output 700 ml   Net 2260 ml        Weights:   Vitals:    06/30/21 0910 06/30/21 1415 07/01/21 0600   Weight: 59 kg (130 lb) 57 kg (125 lb 10.6 oz) 56.6 kg (124 lb 12.5 oz)    Body mass index is 21.42 kg/m .    Constitutional: alert and oriented, NAD at rest, nontoxic  CV: Regular, no edema  Respiratory: Very tight exp wheezes with markedly prolonged exp phase, mild-moderate scattered rhonchi, rare crackles  GI: Soft, non-tender   Skin: Warm and dry  Data   Recent Labs   Lab 07/01/21  0440 06/30/21  0919   WBC 9.1 10.5   HGB 12.4* 14.1   MCV 99 97    418    132*   POTASSIUM 4.0 4.1   CHLORIDE 100 94   CO2 36* 34*   BUN 14 13   CR 0.49* 0.68   ANIONGAP 1* 4   MIKE 9.2 9.6   GLC 94 124*   ALBUMIN 3.7  --    PROTTOTAL 6.8  --    BILITOTAL 0.3  --    ALKPHOS 37*  --    ALT 25  --    AST 14  --        ABG   Recent Labs   Lab  07/01/21  0440   O2PER 30       VBG   Recent Labs   Lab 07/01/21  0440 06/30/21  1135   PHV 7.33 7.31*   PCO2V 71* 67*   PO2V 64* 49*   HCO3V 37* 33*   SHRUTHI 8.3 4.9   O2PER 30  --        Recent Labs   Lab 07/01/21  0440 06/30/21  1611 06/30/21  0919   GLC 94  --  124*   BGM  --  136*  --         Unresulted Labs Ordered in the Past 30 Days of this Admission     No orders found for last 31 day(s).           Imaging:   Recent Results (from the past 24 hour(s))   XR Chest Port 1 View    Narrative    CHEST PORTABLE ONE VIEW  6/30/2021 10:01 AM     HISTORY: COPD, desatting.    COMPARISON: None      Impression    IMPRESSION: Cardiac silhouette is within normal limits. No evidence  for infiltrate or pneumothorax. Blunting of the left costophrenic  angle may be due to tiny effusion or atelectasis. Degenerative changes  are noted through the spine.    ULISES CORONA MD          SYSTEM ID:  [id number]        I reviewed all new labs and imaging results over the last 24 hours. I personally reviewed no images or EKG's today.    Medications       amLODIPine  10 mg Oral Daily     atenolol  25 mg Oral Daily     azithromycin  250 mg Oral Daily     furosemide  20 mg Oral Daily     ipratropium-albuterol  1 puff Inhalation Q4H While awake     losartan  75 mg Oral Daily     predniSONE  40 mg Oral Daily   Reviewed meds    Aubrey Gomes MD  [facility]

## 2021-07-01 NOTE — UTILIZATION REVIEW
Admission Status; Secondary Review Determination     Under the authority of the Utilization Management Commitee, the utilization review process indicated a secondary review on the above patient. The review outcome is based on review of the medical records, discussions with staff, and applying clinical experience noted on the date of the review.     (x) Inpatient Status Appropriate - This patient's medical care is consistent with medical management for inpatient care and reasonable inpatient medical practice.     RATIONALE FOR DETERMINATION:  56 year old male h/o COPD on home oxygen with activity only who was admitted on 6/30/2021. He presented to the emergency department for evaluation of cough, wheeze, shortness of breath and was found to have a COPD exacerbation with respiratory acidosis for which he was admitted for further evaluation and treatment.    On presentation oxygen saturations were in the 70's and VBG consistent with acute respiratory acidosis (pH 7.31 / pCO2 67 / bicarb 33).  The patient required BIPAP on admission to the ICU.  Overnight his symptoms have improved.  He was titrated off BIPAP this morning and is currently on 4 liters via NC.  Plan is for possible discharge tomorrow.    Given the severity of his respiratory failure on presentation and the need for ICU care with BIPAP inpatient status appears appropriate.     At the time of admission with the information available to the attending physician more than 2 nights Hospital complex care was anticipated, based on patient risk of adverse outcome if treated as outpatient and complex care required. Inpatient admission is appropriate based on the Medicare guidelines.    The information on this document is developed by the utilization review team in order for the business office to ensure compliance. This only denotes the appropriateness of proper admission status and does not reflect the quality of care rendered.   The definitions of Inpatient  Status and Observation Status used in making the determination above are those provided in the CMS Coverage Manual, Chapter 1 and Chapter 6, section 70.4.     Sincerely,     Sylvester Grullon MD  Utilization Review   Physician Advisor   [facility]

## 2021-07-01 NOTE — PLAN OF CARE
Has been on BIPAP most of the night just coming off to take a few drinks overnight.  Voiding in a urinal. He gets quite dyspneic with any exertion.  Reports sleeping overnight and expresses no additional needs from staff at this time.

## 2021-07-01 NOTE — PROGRESS NOTES
Pt transferred to MS status. Remains stable on 4 LPM via nasal cannula. Pt encouraged to ambulate and sit up in the chair. Given a recliner in his room and pt sat up this afternoon. Tolerates activity well. Declines any questions or concerns at this time. Call light within reach.

## 2021-07-02 PROCEDURE — 250N000013 HC RX MED GY IP 250 OP 250 PS 637: Performed by: INTERNAL MEDICINE

## 2021-07-02 PROCEDURE — 250N000011 HC RX IP 250 OP 636: Performed by: HOSPITALIST

## 2021-07-02 PROCEDURE — 250N000012 HC RX MED GY IP 250 OP 636 PS 637: Performed by: INTERNAL MEDICINE

## 2021-07-02 PROCEDURE — 250N000011 HC RX IP 250 OP 636: Performed by: INTERNAL MEDICINE

## 2021-07-02 PROCEDURE — 120N000001 HC R&B MED SURG/OB

## 2021-07-02 PROCEDURE — 99232 SBSQ HOSP IP/OBS MODERATE 35: CPT | Performed by: INTERNAL MEDICINE

## 2021-07-02 RX ORDER — NALOXONE HYDROCHLORIDE 0.4 MG/ML
0.2 INJECTION, SOLUTION INTRAMUSCULAR; INTRAVENOUS; SUBCUTANEOUS
Status: DISCONTINUED | OUTPATIENT
Start: 2021-07-02 | End: 2021-07-03 | Stop reason: HOSPADM

## 2021-07-02 RX ORDER — NALOXONE HYDROCHLORIDE 0.4 MG/ML
0.4 INJECTION, SOLUTION INTRAMUSCULAR; INTRAVENOUS; SUBCUTANEOUS
Status: DISCONTINUED | OUTPATIENT
Start: 2021-07-02 | End: 2021-07-03 | Stop reason: HOSPADM

## 2021-07-02 RX ORDER — LORAZEPAM 2 MG/ML
0.5 INJECTION INTRAMUSCULAR ONCE
Status: COMPLETED | OUTPATIENT
Start: 2021-07-02 | End: 2021-07-02

## 2021-07-02 RX ADMIN — IPRATROPIUM BROMIDE AND ALBUTEROL 1 PUFF: 20; 100 SPRAY, METERED RESPIRATORY (INHALATION) at 21:09

## 2021-07-02 RX ADMIN — AZITHROMYCIN MONOHYDRATE 250 MG: 250 TABLET ORAL at 09:29

## 2021-07-02 RX ADMIN — IPRATROPIUM BROMIDE AND ALBUTEROL 1 PUFF: 20; 100 SPRAY, METERED RESPIRATORY (INHALATION) at 05:11

## 2021-07-02 RX ADMIN — FUROSEMIDE 20 MG: 20 TABLET ORAL at 09:29

## 2021-07-02 RX ADMIN — LORAZEPAM 0.5 MG: 2 INJECTION INTRAMUSCULAR; INTRAVENOUS at 05:10

## 2021-07-02 RX ADMIN — PREDNISONE 40 MG: 20 TABLET ORAL at 09:28

## 2021-07-02 RX ADMIN — ONDANSETRON 4 MG: 4 TABLET, ORALLY DISINTEGRATING ORAL at 13:29

## 2021-07-02 RX ADMIN — LOSARTAN POTASSIUM 75 MG: 50 TABLET, FILM COATED ORAL at 09:29

## 2021-07-02 RX ADMIN — IPRATROPIUM BROMIDE AND ALBUTEROL 1 PUFF: 20; 100 SPRAY, METERED RESPIRATORY (INHALATION) at 09:30

## 2021-07-02 RX ADMIN — IPRATROPIUM BROMIDE AND ALBUTEROL 1 PUFF: 20; 100 SPRAY, METERED RESPIRATORY (INHALATION) at 03:29

## 2021-07-02 RX ADMIN — ALBUTEROL SULFATE 2 PUFF: 90 AEROSOL, METERED RESPIRATORY (INHALATION) at 03:59

## 2021-07-02 RX ADMIN — ACETAMINOPHEN 1000 MG: 500 TABLET, FILM COATED ORAL at 16:09

## 2021-07-02 RX ADMIN — ALBUTEROL SULFATE 2 PUFF: 90 AEROSOL, METERED RESPIRATORY (INHALATION) at 21:08

## 2021-07-02 RX ADMIN — IPRATROPIUM BROMIDE AND ALBUTEROL 1 PUFF: 20; 100 SPRAY, METERED RESPIRATORY (INHALATION) at 16:01

## 2021-07-02 RX ADMIN — AMLODIPINE BESYLATE 10 MG: 10 TABLET ORAL at 09:29

## 2021-07-02 RX ADMIN — ACETAMINOPHEN 1000 MG: 500 TABLET, FILM COATED ORAL at 06:48

## 2021-07-02 RX ADMIN — ATENOLOL 25 MG: 25 TABLET ORAL at 09:29

## 2021-07-02 ASSESSMENT — ACTIVITIES OF DAILY LIVING (ADL)
ADLS_ACUITY_SCORE: 14
ADLS_ACUITY_SCORE: 14
ADLS_ACUITY_SCORE: 13

## 2021-07-02 NOTE — PROGRESS NOTES
[facility] Medicine Progress Note  Date of Service: 07/02/2021     Assessment & Plan      Luis Hernandez is a 56 year old male admitted on 6/30/2021. He presented to the emergency department for evaluation of cough, wheeze, shortness of breath and was found to have a COPD exacerbation with respiratory acidosis for which he is being admitted for further evaluation and treatment.    COPD exacerbation  Acute respiratory acidosis  Acute on chronic respiratory failure with hypoxia and hypercapnia    Known COPD, at baseline uses prn 2-3L supplemental O2 with activity but not at rest. Managed prior to admission with Advair 500/50 bid, Spiriva 18 mcg daily, prn albuterol, and prn DuoNebs (which he does not use).     Presented with O2 sats in the 70's, had been needing 24/7 supplemental O2 at home. Started a home supply of prednisone and doxycycline 2 days prior to admission without improvement. In respiratory distress requiring BiPAP. VBG with acute respiratory acidosis (pH 7.31 / pCO2 67 / bicarb 33). Chest x-ray unremarkable.     Improving after one night and off BiPAP.     Poor exercise tolerance still. Requiring O2 4 lpm (above baseline)  - continue prednisone 40 mg daily   - Continue azithromycin 250 mg daily   - Scheduled Combivent q 4 hours while awake (does not tolerate nebs well)  - Prn albuterol    Evening dyspnea    Episodes of dyspnea and anxiety most evenings. Suspect anxiety due to air hunger.    - low dose oxycodone prn dyspnea, discussed with patient    - would avoid lorazepam for these episodes    Sinus tachycardia    Initial pulse in the 120's. EKG shows sinus tachycardia. Likely due to impaired respiratory status, expect pulse to normalize with improvement in respiratory status. Resolved.     Hyponatremia     Admit Na = 132, mild. Possibly nutritional due to poor recent appetite.    Resolved 137.     Essential hypertension, benign  Peripheral edema    Blood pressure  stable on admission. Managed prior to admission with amlodipine 10 mg daily, atenolol 25 mg daily, losartan 75 mg daily, and lasix 20 mg daily.    - Continue home meds with holding parameters    Pulmonary hypertension    Noted on echo in 2019, severe >55 mmHg pulmonary hypertension present. Presumably due to pulmonary disease.     COVID status  COVID PCR negative 6/30/2021   Completed Moderna vaccine series on 4/9/21       Diet: Regular Diet Adult Advance diet as tolerated  DVT Prophylaxis: Pneumatic Compression Devices  Miller Catheter: Not present  Central Lines: None  Code Status: Full Code         Discussion: not much improvement over the last day.     Disposition Plan   Expected discharge: 1-3 days, recommended to prior living arrangement once O2 use less than 3 liters/minute.    Attestation:  Total time: 30 minutes    Aubrey Gomes MD  Hospital Medicine        Interval History   Had a difficult night with respiratory distress.  Patient notes that this often happens at night in the evening prior to going to bed or laying down.  Usually able to calm himself down after a couple hours.  Did get a lorazepam last night.  Has some chest tightness when this happens but no significant pain.  Gets very anxious and panicked at times.  Poor exercise tolerance today.  Does not feel he is improved much from yesterday.    Physical Exam   Temp:  [96.9  F (36.1  C)-98.3  F (36.8  C)] 98  F (36.7  C)  Pulse:  [] 78  Resp:  [18-22] 18  BP: (104-155)/(60-95) 104/60  SpO2:  [92 %-99 %] 99 %    Weights:   Vitals:    06/30/21 0910 06/30/21 1415 07/01/21 0600   Weight: 59 kg (130 lb) 57 kg (125 lb 10.6 oz) 56.6 kg (124 lb 12.5 oz)    Body mass index is 21.42 kg/m .    Constitutional: alert and oriented, tired appearing and mild creased work of breathing at rest and with talking but otherwise nontoxic  CV: Regular, no JVD or edema  Respiratory: Markedly prolonged expiratory phase with faint expiratory wheeze bilaterally,  diminished breath sounds throughout, poor air exchange  GI: Soft, non-tender, bowel sounds present  Skin: Warm and dry    Data   Recent Labs   Lab 07/01/21  0440 06/30/21  0919   WBC 9.1 10.5   HGB 12.4* 14.1   MCV 99 97    418    132*   POTASSIUM 4.0 4.1   CHLORIDE 100 94   CO2 36* 34*   BUN 14 13   CR 0.49* 0.68   ANIONGAP 1* 4   MIKE 9.2 9.6   GLC 94 124*   ALBUMIN 3.7  --    PROTTOTAL 6.8  --    BILITOTAL 0.3  --    ALKPHOS 37*  --    ALT 25  --    AST 14  --        Recent Labs   Lab 07/01/21  0440 06/30/21  1611 06/30/21  0919   GLC 94  --  124*   BGM  --  136*  --         Unresulted Labs Ordered in the Past 30 Days of this Admission     No orders found from 5/31/2021 to 7/1/2021.           Imaging: No results found for this or any previous visit (from the past 24 hour(s)).     I reviewed all new labs and imaging results over the last 24 hours. I personally reviewed no images or EKG's today.    Medications       amLODIPine  10 mg Oral Daily     atenolol  25 mg Oral Daily     azithromycin  250 mg Oral Daily     furosemide  20 mg Oral Daily     ipratropium-albuterol  1 puff Inhalation Q4H While awake     losartan  75 mg Oral Daily     predniSONE  40 mg Oral Daily   Reviewed meds    Aubrey Gomes MD  [facility] Medicine

## 2021-07-02 NOTE — PROGRESS NOTES
Patient is A&Ox4, is independent in his room and has a steady gait. The patient reported that he feels sob, his O2 sats have remained >91% on 4 Lpm NC, writer gave the patient his combivent inhaler and his albuterol inhaler. Writer paged telemed Dr. Ngo because the inhalers did not seem to decrease the pt's sob and the pt stated that nebs make it worse, writer suggest ativan to ease his breathing. Dr. Ngo ordered a one time dose of ativan, pt reported that it just made him feel weird. Pt's sats are 94% on 4 Lpm NC, now reporting a headache, prn tylenol given and writer encouraged pt to rest upright in bed instead of standing up so frequently and walking around his room, he stated that he would try this. Approx 5 mins after pt went to lay down in his bed, he was sleeping well. The patient has not slept hardly this whole shift. Pt drank at least 2 large cups of water this shift at approx 450 mls each.

## 2021-07-02 NOTE — PROGRESS NOTES
Patient in good spirits this morning. Did not eat lunch. Complains of headache and nausea. Oral Zofran given per patient request. Checked on after to see if nausea was better and wanted the Tylenol we discussed, but patient sleeping. Did not wake appeared comfortable. Oxygen decreased to 3 L via nasal cannula. When on 4 L oxygen levels 94%.

## 2021-07-03 VITALS
OXYGEN SATURATION: 93 % | DIASTOLIC BLOOD PRESSURE: 77 MMHG | WEIGHT: 124.56 LBS | SYSTOLIC BLOOD PRESSURE: 121 MMHG | BODY MASS INDEX: 21.27 KG/M2 | HEART RATE: 81 BPM | RESPIRATION RATE: 18 BRPM | TEMPERATURE: 98.2 F | HEIGHT: 64 IN

## 2021-07-03 PROCEDURE — 250N000013 HC RX MED GY IP 250 OP 250 PS 637: Performed by: INTERNAL MEDICINE

## 2021-07-03 PROCEDURE — 99238 HOSP IP/OBS DSCHRG MGMT 30/<: CPT | Performed by: INTERNAL MEDICINE

## 2021-07-03 PROCEDURE — 250N000012 HC RX MED GY IP 250 OP 636 PS 637: Performed by: INTERNAL MEDICINE

## 2021-07-03 RX ADMIN — LOSARTAN POTASSIUM 75 MG: 50 TABLET, FILM COATED ORAL at 08:57

## 2021-07-03 RX ADMIN — ACETAMINOPHEN 1000 MG: 500 TABLET, FILM COATED ORAL at 06:42

## 2021-07-03 RX ADMIN — IPRATROPIUM BROMIDE AND ALBUTEROL 1 PUFF: 20; 100 SPRAY, METERED RESPIRATORY (INHALATION) at 06:42

## 2021-07-03 RX ADMIN — AZITHROMYCIN MONOHYDRATE 250 MG: 250 TABLET ORAL at 08:57

## 2021-07-03 RX ADMIN — FUROSEMIDE 20 MG: 20 TABLET ORAL at 08:57

## 2021-07-03 RX ADMIN — PREDNISONE 40 MG: 20 TABLET ORAL at 08:56

## 2021-07-03 RX ADMIN — AMLODIPINE BESYLATE 10 MG: 10 TABLET ORAL at 08:57

## 2021-07-03 RX ADMIN — ATENOLOL 25 MG: 25 TABLET ORAL at 08:57

## 2021-07-03 RX ADMIN — ALBUTEROL SULFATE 2 PUFF: 90 AEROSOL, METERED RESPIRATORY (INHALATION) at 09:40

## 2021-07-03 RX ADMIN — IPRATROPIUM BROMIDE AND ALBUTEROL 1 PUFF: 20; 100 SPRAY, METERED RESPIRATORY (INHALATION) at 09:40

## 2021-07-03 ASSESSMENT — ACTIVITIES OF DAILY LIVING (ADL)
ADLS_ACUITY_SCORE: 13

## 2021-07-03 ASSESSMENT — MIFFLIN-ST. JEOR
SCORE: 1316
SCORE: 1321

## 2021-07-03 NOTE — PROGRESS NOTES
"Writer spoke with patient regarding his oxygen use at home as he reportedly had \"run out\" of oxygen at home. Patient stated that he ran out of oxygen in his portable oxygen tank and was unable to fill it off his concentrator soon enough. He will make sure his portable oxygen tank does not run out of oxygen.   "

## 2021-07-03 NOTE — DISCHARGE SUMMARY
[facility]  Discharge Summary  Hospital Medicine       Date of Admission:  6/30/2021  Date of Discharge:  7/3/2021  2:48 PM  Discharging Provider: Aubrey Gomes MD      Identification and Chief Compaint: Luis Hernandez is a 56 year old male who presented on 6/30/2021 with complaint of cough, wheezes and shortness of breath.    Discharge Diagnoses   COPD exacerbation  Acute respiratory acidosis  Acute on chronic respiratory failure with hypoxia and hypercapnia  Dyspnea, episodic  Essential hypertension, benign  Peripheral edema  Pulmonary hypertension      Follow-ups Needed After Discharge   Follow-up Appointments     Follow-up and recommended labs and tests       Follow up with primary care provider, Dean Mariee, within 7 days   for hospital follow- up.  No follow up labs or test are needed.           Hospital Course      COPD exacerbation  Acute respiratory acidosis  Acute on chronic respiratory failure with hypoxia and hypercapnia    Known COPD, at baseline uses prn 2-3L supplemental O2 with activity but not at rest. Managed prior to admission with Advair 500/50 bid, Spiriva 18 mcg daily, prn albuterol, and prn DuoNebs (which he does not use).     Presented with O2 sats in the 70's, had been needing 24/7 supplemental O2 at home. Started a home supply of prednisone and doxycycline 2 days prior to admission without improvement. In respiratory distress requiring BiPAP. VBG with acute respiratory acidosis (pH 7.31 / pCO2 67 / bicarb 33). Chest x-ray unremarkable.     Improving after one night and off BiPAP.     Poor exercise tolerance still. Requiring O2 4 lpm (above baseline)    Patient to resume his home prednisone taper at 30 mg daily. He will also resume his home doxycycline course. He will resume home bronchodilator therapy.     Dyspnea, episodic    Episodes of dyspnea and anxiety most evenings. Suspect anxiety due to air hunger.     Low dose oxycodone was discussed with  patient and offered for dyspnea, but he had no dyspnea last night and did not start the oxycodone.      Sinus tachycardia    Initial pulse in the 120's. EKG shows sinus tachycardia. Likely due to impaired respiratory status, expect pulse to normalize with improvement in respiratory status. Resolved.      Hyponatremia     Admit Na = 132, mild. Possibly nutritional due to poor recent appetite.    Resolved 137.      Essential hypertension, benign  Peripheral edema    Blood pressure stable on admission. Managed prior to admission with amlodipine 10 mg daily, atenolol 25 mg daily, losartan 75 mg daily, and lasix 20 mg daily.    - Continue home meds with holding parameters     Pulmonary hypertension    Noted on echo in 2019, severe >55 mmHg pulmonary hypertension present. Presumably due to pulmonary disease.      COVID status  COVID PCR negative 6/30/2021   Completed Moderna vaccine series on 4/9/21    Consultations This Hospital Stay   None    Code Status   Full Code    The discharge plan was discussed with the patient, and he expressed understanding.     Time Spent on this Encounter   Total time on this discharge was 25 minutes.       Aubrey Gomes MD  [facility]  ______________________________________________________________________    Physical Exam   Vital Signs: Temp: 98.2  F (36.8  C) Temp src: Oral BP: 121/77 Pulse: 81   Resp: 18 SpO2: 93 % O2 Device: Nasal cannula Oxygen Delivery: 2.5 LPM  Weight: 124 lbs 8.96 oz  Constitutional: alert and oriented, NAD, nontoxic  CV: Regular  Respiratory: diminished breath sounds throughout, no wheezes, prolonged exp phase but improved from yesterday, no rhonchi  GI: Soft, nontender  Skin: Warm and dry       Primary Care Physician   Dean Mariee  [id number] [facility] [id number]     Discharge Disposition   Discharged to home  Condition at discharge: Stable    Significant Results and Procedures   Results for orders placed or performed  during the hospital encounter of 06/30/21   XR Chest Port 1 View    Narrative    CHEST PORTABLE ONE VIEW  6/30/2021 10:01 AM     HISTORY: COPD, desatting.    COMPARISON: None      Impression    IMPRESSION: Cardiac silhouette is within normal limits. No evidence  for infiltrate or pneumothorax. Blunting of the left costophrenic  angle may be due to tiny effusion or atelectasis. Degenerative changes  are noted through the spine.    ULISES CORONA MD          SYSTEM ID:  [id number]     Procedures    None    Discharge Orders      Reason for your hospital stay    COPD exacerbation     Follow-up and recommended labs and tests     Follow up with primary care provider, Dean Mariee, within 7 days for hospital follow- up.  No follow up labs or test are needed.     Activity    Your activity upon discharge: activity as tolerated     Oxygen Adult/Peds    Oxygen Documentation:   I certify that this patient, Luis Hernandez has been under my care (or a nurse practitioner or physican's assistant working with me). This is the face-to-face encounter for oxygen medical necessity.      Luis Hernandez is now in a chronic stable state and continues to require supplemental oxygen. Patient has continued oxygen desaturation due to Chronic Respiratory Failure with Hypoxia J93.11  COPD J44.9.    Alternative treatment(s) tried or considered and deemed clinically infective for treatment of Chronic Respiratory Failure with Hypoxia J93.11  COPD J44.9 include inhalers and steroids.  If portability is ordered, is the patient mobile within the home? yes    **Patients who qualify for home O2 coverage under the CMS guidelines require ABG tests or O2 sat readings obtained closest to, but no earlier than 2 days prior to the discharge, as evidence of the need for home oxygen therapy. Testing must be performed while patient is in the chronic stable state. See notes for O2 sats.**     Diet    Follow this diet upon discharge:       Regular Diet  Adult     Discharge Medications   Current Discharge Medication List      CONTINUE these medications which have NOT CHANGED    Details   albuterol (PROAIR HFA/PROVENTIL HFA/VENTOLIN HFA) 108 (90 Base) MCG/ACT inhaler Inhale 2 puffs into the lungs every 4 hours as needed for shortness of breath / dyspnea Hold on file until needed  Qty: 18 g, Refills: 11    Comments: Pharmacy may dispense brand covered by insurance (Proair, or proventil or ventolin or generic albuterol inhaler)  Associated Diagnoses: Centrilobular emphysema (H)      amLODIPine (NORVASC) 10 MG tablet Take 1 tablet (10 mg) by mouth daily  Qty: 90 tablet, Refills: 3    Associated Diagnoses: Essential hypertension, benign      atenolol (TENORMIN) 25 MG tablet Take 1 tablet (25 mg) by mouth daily  Qty: 90 tablet, Refills: 3    Associated Diagnoses: Essential hypertension, benign      doxycycline hyclate (VIBRAMYCIN) 100 MG capsule Take 1 capsule (100 mg) by mouth 2 times daily Keep on hand for COPD exacerbation.  Qty: 20 capsule, Refills: 4    Associated Diagnoses: Centrilobular emphysema (H); Wheezing; COPD exacerbation (H)      fluticasone-salmeterol (ADVAIR) 500-50 MCG/DOSE inhaler Inhale 1 puff into the lungs 2 times daily Hold on file until needed  Qty: 60 each, Refills: 11    Associated Diagnoses: Centrilobular emphysema (H)      furosemide (LASIX) 20 MG tablet Take 1 tablet (20 mg) by mouth daily  Qty: 90 tablet, Refills: 3    Associated Diagnoses: Peripheral edema      losartan (COZAAR) 50 MG tablet Take 1.5 tablets (75 mg) by mouth daily  Qty: 135 tablet, Refills: 3    Associated Diagnoses: Essential hypertension, benign      magnesium oxide (MAG-OX) 400 MG tablet Take 1 tablet (400 mg) by mouth every evening  Qty:      Comments: Do not take magnesium until finish course of antibiotic  Associated Diagnoses: Peripheral edema      potassium 99 MG TABS Take 1 tablet by mouth every evening      predniSONE (DELTASONE) 10 MG tablet 4 tablets daily, then  3 tablets daily for 3 days, then 2 tablets daily for 3 days, then 1 tablet daily for 3 days. Use for COPD flares  Qty: 30 tablet, Refills: 4    Associated Diagnoses: COPD exacerbation (H)      tiotropium (SPIRIVA HANDIHALER) 18 MCG inhaled capsule Inhale 1 capsule (18 mcg) into the lungs every evening Inhale contents of one capsule daily. Hold on file until needed.  Qty: 30 capsule, Refills: 11    Associated Diagnoses: Centrilobular emphysema (H)      !! order for DME Equipment being ordered: Oxygen 3L via NC continuous.  O2 saturated noted to be 86% on room air at rest.  Qty: 1 Units, Refills: 0    Associated Diagnoses: Mixed simple and mucopurulent chronic bronchitis (H)      !! order for DME Equipment being ordered: Nebulizer  Qty: 1 Device, Refills: 0    Associated Diagnoses: Simple chronic bronchitis (H)       !! - Potential duplicate medications found. Please discuss with provider.        Allergies   Allergies   Allergen Reactions     Hctz Other (See Comments)     He has hyponatremia     Lisinopril Cough     Penicillins Other (See Comments)     Reaction unknown

## 2021-07-03 NOTE — PROGRESS NOTES
[facility] DISCHARGE NOTE    Patient discharged to home at 2:58 PM via wheel chair. Accompanied by daughter and staff. Discharge instructions reviewed with patient and daughter, opportunity offered to ask questions. Prescriptions - None ordered for discharge. All belongings sent with patient.    Roselyn Maier RN

## 2021-07-03 NOTE — PLAN OF CARE
"Patient alert and oriented, pleasant and cooperative with staff. Denies pain during NOC. Able to sleep majority of NOC waking at 0400 stating he, \"slept great,\" and feels much better. IV removed by patient while sleeping. Up ind in room.   Temp: 96.1  F (35.6  C) Temp src: Oral BP: 110/68 Pulse: 83   Resp: 18 SpO2: 96 % O2 Device: Nasal cannula Oxygen Delivery: 4 LPM   "

## 2021-07-05 ENCOUNTER — TELEPHONE (OUTPATIENT)
Dept: FAMILY MEDICINE | Facility: CLINIC | Age: 56
End: 2021-07-05

## 2021-07-05 ENCOUNTER — OFFICE VISIT (OUTPATIENT)
Dept: FAMILY MEDICINE | Facility: CLINIC | Age: 56
End: 2021-07-05
Payer: COMMERCIAL

## 2021-07-05 VITALS
WEIGHT: 122.8 LBS | SYSTOLIC BLOOD PRESSURE: 122 MMHG | RESPIRATION RATE: 20 BRPM | DIASTOLIC BLOOD PRESSURE: 82 MMHG | HEART RATE: 85 BPM | HEIGHT: 64 IN | OXYGEN SATURATION: 95 % | BODY MASS INDEX: 20.96 KG/M2 | TEMPERATURE: 98.2 F

## 2021-07-05 DIAGNOSIS — J44.1 COPD EXACERBATION (H): Primary | ICD-10-CM

## 2021-07-05 PROCEDURE — 99214 OFFICE O/P EST MOD 30 MIN: CPT | Performed by: NURSE PRACTITIONER

## 2021-07-05 ASSESSMENT — MIFFLIN-ST. JEOR: SCORE: 1308.02

## 2021-07-05 NOTE — PROGRESS NOTES
Assessment & Plan     COPD exacerbation (H)  Patient has improved and is back to 95% on 1.5 L of O2.  BP is normal and HR is normal.  Patient has no edema in lower extremities and discussed discontinuing lasix and restarting only if this returns.  Lungs still have some mild inspiratory and expiratory wheezing but breathing is improving.  He can continue prednisone and doxycycline until completed.  Patient just started to take Advair today where he could get a good puff and feels that this also has been helpful.  Recommended use of albuterol as well for wheezing as needed.  I do not feel that labs are needed so close to discharge but recommend follow-up in 1-2 weeks if symptoms have not completely resolved for re-evaluation.    See Patient Instructions    Return in about 2 weeks (around 7/19/2021), or if symptoms worsen or fail to improve, for Follow up.    Shalini Washington NP  [facility] CLEMENT Smith is a 56 year old who presents for the following health issues;     [facility]       Hospital Follow-up Visit:    Hospital/Nursing Home/ Rehab Facility: [facility]  Date of Admission: 6/30/2021  Date of Discharge: 7/3/2021   Reason(s) for Admission:  Essential hypertension, benign     COPD exacerbation (H)    Acute on chronic respiratory failure with hypoxia and hypercapnia (H)     Peripheral edema     Pulmonary hypertension (H)     Acute respiratory acidosis        Was your hospitalization related to COVID-19? No   Problems taking medications regularly:  None  Medication changes since discharge: NO. Pulse rate was around 120 this Am   Problems adhering to non-medication therapy:  None    Summary of hospitalization:  Discharge summary unavailable  Diagnostic Tests/Treatments reviewed.  Follow up needed: none  Other Healthcare Providers Involved in Patient s Care:         None  Update since discharge: improved.  Post Discharge Medication Reconciliation:  "discharge medications reconciled, continue medications without change.  Plan of care communicated with patient     Started to use the Advair again today since he is able to get air in today to get this accomplished.  Patient comes today with 1.5 L on when vitals were taken.  Feels that he has improvement.  Taking 1/2 tab of lasix but wants to stop since his swelling is gone in the lower extremities.    Review of Systems   CONSTITUTIONAL: NEGATIVE for fever, chills, change in weight  RESP:POSITIVE for Hx COPD with acute exacerbation and here for follow-up with improvement  CV: NEGATIVE for chest pain, palpitations or peripheral edema  PSYCHIATRIC: NEGATIVE for changes in mood or affect  ROS otherwise negative      Objective    /82   Pulse 85   Temp 98.2  F (36.8  C) (Tympanic)   Resp 20   Ht 1.626 m (5' 4\")   Wt 55.7 kg (122 lb 12.8 oz)   SpO2 95%   BMI 21.08 kg/m    Body mass index is 21.08 kg/m .  Physical Exam   GENERAL: healthy, alert and no distress  RESP: expiratory wheezes that are mild and occasional throughout, inspiratory wheezes that are mild and occasional throughout and decreased breath sounds R lower posterior and L lower posterior  CV: regular rate and rhythm, normal S1 S2, no S3 or S4, no murmur, click or rub, no peripheral edema and peripheral pulses strong  MS: no gross musculoskeletal defects noted, no edema  PSYCH: mentation appears normal, affect normal/bright      "

## 2021-07-05 NOTE — TELEPHONE ENCOUNTER
ED/UC/IP follow up phone call: Acute Respiratory Distress, Acute On Chronic Respiratory Failure With Hypoxia And Hypercapnia    RN please call to follow up.  [facility]    Number of ED visits in past 6 months = 0      Number of IP visits in past 6 months = 1

## 2021-07-05 NOTE — PATIENT INSTRUCTIONS
1.  Blood pressure and oxygen sats look good today.  2.  No labs needed today.  3.  You do still have some mild wheezing and I recommend use of albuterol as needed to assist with symptoms.  4.  Follow-up in 1-2 weeks with your primary care provider if symptoms are not completely resolved.

## 2021-07-07 NOTE — TELEPHONE ENCOUNTER
"  ED for acute condition Discharge Protocol    \"Hi, my name is Fartun Brasher RN, a registered nurse, and I am calling from [facility].  I am calling to follow up and see how things are going for you after your recent emergency visit.\"    Tell me how you are doing now that you are home?\" \"Pretty good\".      Discharge Instructions    \"Let's review your discharge instructions.  What is/are the follow-up recommendations?  Pt. Response: Patient was seen for follow up on 7/5.    \"Has an appointment with your primary care provider been scheduled?\"  Yes. (confirm and remind to bring meds)    Medications    \"Tell me what changed about your medicines when you discharged?\"    No changes    \"What questions do you have about your medications?\"   None        Call Summary    \"What questions or concerns do you have about your recent visit and your follow-up care?\"     none    \"If you have questions or things don't continue to improve, we encourage you contact us through the main clinic number (give number).  Even if the clinic is not open, triage nurses are available 24/7 to help you.     We would like you to know that our clinic has extended hours (provide information).  We also have urgent care (provide details on closest location and hours/contact info)\"    \"Thank you for your time and take care!\"                "

## 2021-08-21 ENCOUNTER — APPOINTMENT (OUTPATIENT)
Dept: GENERAL RADIOLOGY | Facility: CLINIC | Age: 56
End: 2021-08-21
Attending: FAMILY MEDICINE
Payer: COMMERCIAL

## 2021-08-21 ENCOUNTER — HOSPITAL ENCOUNTER (EMERGENCY)
Facility: CLINIC | Age: 56
Discharge: ANOTHER HOSPITAL | End: 2021-08-22
Attending: FAMILY MEDICINE | Admitting: FAMILY MEDICINE
Payer: COMMERCIAL

## 2021-08-21 ENCOUNTER — MYC MEDICAL ADVICE (OUTPATIENT)
Dept: FAMILY MEDICINE | Facility: CLINIC | Age: 56
End: 2021-08-21

## 2021-08-21 DIAGNOSIS — J41.0 SIMPLE CHRONIC BRONCHITIS (H): ICD-10-CM

## 2021-08-21 DIAGNOSIS — J96.21 ACUTE ON CHRONIC RESPIRATORY FAILURE WITH HYPOXIA AND HYPERCAPNIA (H): Primary | ICD-10-CM

## 2021-08-21 DIAGNOSIS — J44.1 CHRONIC OBSTRUCTIVE PULMONARY DISEASE WITH ACUTE EXACERBATION (H): ICD-10-CM

## 2021-08-21 DIAGNOSIS — J96.22 ACUTE AND CHRONIC RESPIRATORY FAILURE WITH HYPERCAPNIA (H): ICD-10-CM

## 2021-08-21 DIAGNOSIS — J96.22 ACUTE ON CHRONIC RESPIRATORY FAILURE WITH HYPOXIA AND HYPERCAPNIA (H): Primary | ICD-10-CM

## 2021-08-21 DIAGNOSIS — J43.9 PULMONARY EMPHYSEMA, UNSPECIFIED EMPHYSEMA TYPE (H): ICD-10-CM

## 2021-08-21 LAB
ALBUMIN SERPL-MCNC: 4.1 G/DL (ref 3.4–5)
ALP SERPL-CCNC: 42 U/L (ref 40–150)
ALT SERPL W P-5'-P-CCNC: 28 U/L (ref 0–70)
ANION GAP SERPL CALCULATED.3IONS-SCNC: 5 MMOL/L (ref 3–14)
AST SERPL W P-5'-P-CCNC: 18 U/L (ref 0–45)
BASE EXCESS BLDV CALC-SCNC: -0.8 MMOL/L (ref -7.7–1.9)
BASE EXCESS BLDV CALC-SCNC: 3.9 MMOL/L (ref -7.7–1.9)
BASOPHILS # BLD AUTO: 0.1 10E3/UL (ref 0–0.2)
BASOPHILS NFR BLD AUTO: 1 %
BILIRUB SERPL-MCNC: 0.3 MG/DL (ref 0.2–1.3)
BUN SERPL-MCNC: 17 MG/DL (ref 7–30)
CALCIUM SERPL-MCNC: 9.1 MG/DL (ref 8.5–10.1)
CHLORIDE BLD-SCNC: 103 MMOL/L (ref 94–109)
CO2 SERPL-SCNC: 32 MMOL/L (ref 20–32)
CREAT SERPL-MCNC: 0.72 MG/DL (ref 0.66–1.25)
EOSINOPHIL # BLD AUTO: 0.7 10E3/UL (ref 0–0.7)
EOSINOPHIL NFR BLD AUTO: 6 %
ERYTHROCYTE [DISTWIDTH] IN BLOOD BY AUTOMATED COUNT: 13.2 % (ref 10–15)
GFR SERPL CREATININE-BSD FRML MDRD: >90 ML/MIN/1.73M2
GLUCOSE BLD-MCNC: 139 MG/DL (ref 70–99)
HCO3 BLDV-SCNC: 30 MMOL/L (ref 21–28)
HCO3 BLDV-SCNC: 32 MMOL/L (ref 21–28)
HCT VFR BLD AUTO: 41.3 % (ref 40–53)
HGB BLD-MCNC: 13.1 G/DL (ref 13.3–17.7)
IMM GRANULOCYTES # BLD: 0 10E3/UL
IMM GRANULOCYTES NFR BLD: 0 %
LYMPHOCYTES # BLD AUTO: 3.1 10E3/UL (ref 0.8–5.3)
LYMPHOCYTES NFR BLD AUTO: 25 %
MCH RBC QN AUTO: 30.7 PG (ref 26.5–33)
MCHC RBC AUTO-ENTMCNC: 31.7 G/DL (ref 31.5–36.5)
MCV RBC AUTO: 97 FL (ref 78–100)
MONOCYTES # BLD AUTO: 1.4 10E3/UL (ref 0–1.3)
MONOCYTES NFR BLD AUTO: 11 %
NEUTROPHILS # BLD AUTO: 7.2 10E3/UL (ref 1.6–8.3)
NEUTROPHILS NFR BLD AUTO: 57 %
NRBC # BLD AUTO: 0 10E3/UL
NRBC BLD AUTO-RTO: 0 /100
NT-PROBNP SERPL-MCNC: 46 PG/ML (ref 0–900)
O2/TOTAL GAS SETTING VFR VENT: 30 %
O2/TOTAL GAS SETTING VFR VENT: 30 %
PCO2 BLDV: 68 MM HG (ref 40–50)
PCO2 BLDV: 83 MM HG (ref 40–50)
PH BLDV: 7.17 [PH] (ref 7.32–7.43)
PH BLDV: 7.29 [PH] (ref 7.32–7.43)
PLATELET # BLD AUTO: 323 10E3/UL (ref 150–450)
PO2 BLDV: 42 MM HG (ref 25–47)
PO2 BLDV: 53 MM HG (ref 25–47)
POTASSIUM BLD-SCNC: 4.7 MMOL/L (ref 3.4–5.3)
PROT SERPL-MCNC: 7.9 G/DL (ref 6.8–8.8)
RBC # BLD AUTO: 4.27 10E6/UL (ref 4.4–5.9)
SARS-COV-2 RNA RESP QL NAA+PROBE: NEGATIVE
SODIUM SERPL-SCNC: 140 MMOL/L (ref 133–144)
WBC # BLD AUTO: 12.5 10E3/UL (ref 4–11)

## 2021-08-21 PROCEDURE — C9803 HOPD COVID-19 SPEC COLLECT: HCPCS | Performed by: FAMILY MEDICINE

## 2021-08-21 PROCEDURE — 93010 ELECTROCARDIOGRAM REPORT: CPT | Performed by: FAMILY MEDICINE

## 2021-08-21 PROCEDURE — 87635 SARS-COV-2 COVID-19 AMP PRB: CPT | Performed by: FAMILY MEDICINE

## 2021-08-21 PROCEDURE — 93005 ELECTROCARDIOGRAM TRACING: CPT | Performed by: FAMILY MEDICINE

## 2021-08-21 PROCEDURE — 99291 CRITICAL CARE FIRST HOUR: CPT | Mod: 25 | Performed by: FAMILY MEDICINE

## 2021-08-21 PROCEDURE — 999N000123 HC STATISTIC OXYGEN O2DAILY TECH TIME

## 2021-08-21 PROCEDURE — 999N000157 HC STATISTIC RCP TIME EA 10 MIN

## 2021-08-21 PROCEDURE — 84460 ALANINE AMINO (ALT) (SGPT): CPT | Performed by: FAMILY MEDICINE

## 2021-08-21 PROCEDURE — 999N000215 HC STATISTIC HFNC ADULT NON-CPAP

## 2021-08-21 PROCEDURE — 250N000011 HC RX IP 250 OP 636: Performed by: FAMILY MEDICINE

## 2021-08-21 PROCEDURE — 83880 ASSAY OF NATRIURETIC PEPTIDE: CPT | Performed by: FAMILY MEDICINE

## 2021-08-21 PROCEDURE — 85025 COMPLETE CBC W/AUTO DIFF WBC: CPT | Performed by: FAMILY MEDICINE

## 2021-08-21 PROCEDURE — 36415 COLL VENOUS BLD VENIPUNCTURE: CPT | Performed by: FAMILY MEDICINE

## 2021-08-21 PROCEDURE — 71045 X-RAY EXAM CHEST 1 VIEW: CPT

## 2021-08-21 PROCEDURE — 94660 CPAP INITIATION&MGMT: CPT

## 2021-08-21 PROCEDURE — 82803 BLOOD GASES ANY COMBINATION: CPT | Performed by: FAMILY MEDICINE

## 2021-08-21 PROCEDURE — 999N000105 HC STATISTIC NO DOCUMENTATION TO SUPPORT CHARGE: Performed by: FAMILY MEDICINE

## 2021-08-21 PROCEDURE — 99285 EMERGENCY DEPT VISIT HI MDM: CPT | Mod: 25 | Performed by: FAMILY MEDICINE

## 2021-08-21 PROCEDURE — 96374 THER/PROPH/DIAG INJ IV PUSH: CPT | Performed by: FAMILY MEDICINE

## 2021-08-21 RX ORDER — ALBUTEROL SULFATE 5 MG/ML
2.5 SOLUTION RESPIRATORY (INHALATION) ONCE
Status: DISCONTINUED | OUTPATIENT
Start: 2021-08-21 | End: 2021-08-22 | Stop reason: HOSPADM

## 2021-08-21 RX ORDER — METHYLPREDNISOLONE SODIUM SUCCINATE 125 MG/2ML
125 INJECTION, POWDER, LYOPHILIZED, FOR SOLUTION INTRAMUSCULAR; INTRAVENOUS ONCE
Status: COMPLETED | OUTPATIENT
Start: 2021-08-21 | End: 2021-08-21

## 2021-08-21 RX ADMIN — METHYLPREDNISOLONE SODIUM SUCCINATE 125 MG: 125 INJECTION, POWDER, FOR SOLUTION INTRAMUSCULAR; INTRAVENOUS at 20:23

## 2021-08-22 ENCOUNTER — HOSPITAL ENCOUNTER (INPATIENT)
Facility: CLINIC | Age: 56
LOS: 4 days | Discharge: HOME | End: 2021-08-26
Attending: INTERNAL MEDICINE | Admitting: INTERNAL MEDICINE
Payer: COMMERCIAL

## 2021-08-22 VITALS
HEART RATE: 98 BPM | TEMPERATURE: 96.9 F | RESPIRATION RATE: 24 BRPM | SYSTOLIC BLOOD PRESSURE: 135 MMHG | WEIGHT: 122 LBS | BODY MASS INDEX: 20.94 KG/M2 | DIASTOLIC BLOOD PRESSURE: 96 MMHG | OXYGEN SATURATION: 95 %

## 2021-08-22 DIAGNOSIS — J96.22 ACUTE ON CHRONIC RESPIRATORY FAILURE WITH HYPOXIA AND HYPERCAPNIA (H): ICD-10-CM

## 2021-08-22 DIAGNOSIS — R06.2 WHEEZING: ICD-10-CM

## 2021-08-22 DIAGNOSIS — J43.2 CENTRILOBULAR EMPHYSEMA (H): ICD-10-CM

## 2021-08-22 DIAGNOSIS — J96.21 ACUTE ON CHRONIC RESPIRATORY FAILURE WITH HYPOXIA AND HYPERCAPNIA (H): ICD-10-CM

## 2021-08-22 DIAGNOSIS — J44.1 COPD EXACERBATION (H): Primary | ICD-10-CM

## 2021-08-22 LAB
ANION GAP SERPL CALCULATED.3IONS-SCNC: <1 MMOL/L (ref 3–14)
BASE EXCESS BLDV CALC-SCNC: 3.7 MMOL/L (ref -7.7–1.9)
BUN SERPL-MCNC: 16 MG/DL (ref 7–30)
CALCIUM SERPL-MCNC: 9.3 MG/DL (ref 8.5–10.1)
CHLORIDE BLD-SCNC: 103 MMOL/L (ref 94–109)
CO2 SERPL-SCNC: 33 MMOL/L (ref 20–32)
CREAT SERPL-MCNC: 0.6 MG/DL (ref 0.66–1.25)
CREAT SERPL-MCNC: 0.62 MG/DL (ref 0.66–1.25)
ERYTHROCYTE [DISTWIDTH] IN BLOOD BY AUTOMATED COUNT: 13.2 % (ref 10–15)
GFR SERPL CREATININE-BSD FRML MDRD: >90 ML/MIN/1.73M2
GFR SERPL CREATININE-BSD FRML MDRD: >90 ML/MIN/1.73M2
GLUCOSE BLD-MCNC: 132 MG/DL (ref 70–99)
GLUCOSE BLDC GLUCOMTR-MCNC: 115 MG/DL (ref 70–99)
GLUCOSE BLDC GLUCOMTR-MCNC: 116 MG/DL (ref 70–99)
GLUCOSE BLDC GLUCOMTR-MCNC: 120 MG/DL (ref 70–99)
GLUCOSE BLDC GLUCOMTR-MCNC: 120 MG/DL (ref 70–99)
GLUCOSE BLDC GLUCOMTR-MCNC: 124 MG/DL (ref 70–99)
HCO3 BLDV-SCNC: 31 MMOL/L (ref 21–28)
HCT VFR BLD AUTO: 38.3 % (ref 40–53)
HGB BLD-MCNC: 12.1 G/DL (ref 13.3–17.7)
MAGNESIUM SERPL-MCNC: 2.1 MG/DL (ref 1.6–2.3)
MCH RBC QN AUTO: 30.3 PG (ref 26.5–33)
MCHC RBC AUTO-ENTMCNC: 31.6 G/DL (ref 31.5–36.5)
MCV RBC AUTO: 96 FL (ref 78–100)
O2/TOTAL GAS SETTING VFR VENT: 30 %
PCO2 BLDV: 58 MM HG (ref 40–50)
PH BLDV: 7.33 [PH] (ref 7.32–7.43)
PLATELET # BLD AUTO: 299 10E3/UL (ref 150–450)
PO2 BLDV: 47 MM HG (ref 25–47)
POTASSIUM BLD-SCNC: 4.3 MMOL/L (ref 3.4–5.3)
RBC # BLD AUTO: 3.99 10E6/UL (ref 4.4–5.9)
SODIUM SERPL-SCNC: 136 MMOL/L (ref 133–144)
WBC # BLD AUTO: 9.6 10E3/UL (ref 4–11)

## 2021-08-22 PROCEDURE — 82565 ASSAY OF CREATININE: CPT | Performed by: INTERNAL MEDICINE

## 2021-08-22 PROCEDURE — 99223 1ST HOSP IP/OBS HIGH 75: CPT | Mod: AI | Performed by: INTERNAL MEDICINE

## 2021-08-22 PROCEDURE — 83735 ASSAY OF MAGNESIUM: CPT | Performed by: INTERNAL MEDICINE

## 2021-08-22 PROCEDURE — 250N000009 HC RX 250: Performed by: INTERNAL MEDICINE

## 2021-08-22 PROCEDURE — 5A09357 ASSISTANCE WITH RESPIRATORY VENTILATION, LESS THAN 24 CONSECUTIVE HOURS, CONTINUOUS POSITIVE AIRWAY PRESSURE: ICD-10-PCS | Performed by: INTERNAL MEDICINE

## 2021-08-22 PROCEDURE — 36415 COLL VENOUS BLD VENIPUNCTURE: CPT | Performed by: INTERNAL MEDICINE

## 2021-08-22 PROCEDURE — 82803 BLOOD GASES ANY COMBINATION: CPT | Performed by: INTERNAL MEDICINE

## 2021-08-22 PROCEDURE — 250N000011 HC RX IP 250 OP 636: Performed by: INTERNAL MEDICINE

## 2021-08-22 PROCEDURE — 250N000013 HC RX MED GY IP 250 OP 250 PS 637: Performed by: INTERNAL MEDICINE

## 2021-08-22 PROCEDURE — 999N000105 HC STATISTIC NO DOCUMENTATION TO SUPPORT CHARGE

## 2021-08-22 PROCEDURE — 258N000003 HC RX IP 258 OP 636: Performed by: INTERNAL MEDICINE

## 2021-08-22 PROCEDURE — 94660 CPAP INITIATION&MGMT: CPT

## 2021-08-22 PROCEDURE — 85027 COMPLETE CBC AUTOMATED: CPT | Performed by: INTERNAL MEDICINE

## 2021-08-22 PROCEDURE — 94640 AIRWAY INHALATION TREATMENT: CPT | Mod: 76

## 2021-08-22 PROCEDURE — 94640 AIRWAY INHALATION TREATMENT: CPT

## 2021-08-22 PROCEDURE — 200N000001 HC R&B ICU

## 2021-08-22 PROCEDURE — 999N000157 HC STATISTIC RCP TIME EA 10 MIN

## 2021-08-22 RX ORDER — ALBUTEROL SULFATE 90 UG/1
2 AEROSOL, METERED RESPIRATORY (INHALATION) EVERY 4 HOURS PRN
Status: DISCONTINUED | OUTPATIENT
Start: 2021-08-22 | End: 2021-08-26 | Stop reason: HOSPADM

## 2021-08-22 RX ORDER — ONDANSETRON 2 MG/ML
4 INJECTION INTRAMUSCULAR; INTRAVENOUS EVERY 6 HOURS PRN
Status: DISCONTINUED | OUTPATIENT
Start: 2021-08-22 | End: 2021-08-26 | Stop reason: HOSPADM

## 2021-08-22 RX ORDER — AMLODIPINE BESYLATE 10 MG/1
10 TABLET ORAL DAILY
Status: DISCONTINUED | OUTPATIENT
Start: 2021-08-22 | End: 2021-08-26 | Stop reason: HOSPADM

## 2021-08-22 RX ORDER — FUROSEMIDE 20 MG
20 TABLET ORAL DAILY
Status: DISCONTINUED | OUTPATIENT
Start: 2021-08-22 | End: 2021-08-22

## 2021-08-22 RX ORDER — AZITHROMYCIN 250 MG/1
250 TABLET, FILM COATED ORAL DAILY
Status: COMPLETED | OUTPATIENT
Start: 2021-08-23 | End: 2021-08-26

## 2021-08-22 RX ORDER — LEVALBUTEROL INHALATION SOLUTION 0.63 MG/3ML
0.63 SOLUTION RESPIRATORY (INHALATION) 4 TIMES DAILY
Status: DISCONTINUED | OUTPATIENT
Start: 2021-08-22 | End: 2021-08-25

## 2021-08-22 RX ORDER — NICOTINE POLACRILEX 4 MG
15-30 LOZENGE BUCCAL
Status: DISCONTINUED | OUTPATIENT
Start: 2021-08-22 | End: 2021-08-26 | Stop reason: HOSPADM

## 2021-08-22 RX ORDER — AMOXICILLIN 250 MG
1 CAPSULE ORAL 2 TIMES DAILY PRN
Status: DISCONTINUED | OUTPATIENT
Start: 2021-08-22 | End: 2021-08-26 | Stop reason: HOSPADM

## 2021-08-22 RX ORDER — AZITHROMYCIN 500 MG/5ML
500 INJECTION, POWDER, LYOPHILIZED, FOR SOLUTION INTRAVENOUS ONCE
Status: COMPLETED | OUTPATIENT
Start: 2021-08-22 | End: 2021-08-22

## 2021-08-22 RX ORDER — DEXTROSE MONOHYDRATE 25 G/50ML
25-50 INJECTION, SOLUTION INTRAVENOUS
Status: DISCONTINUED | OUTPATIENT
Start: 2021-08-22 | End: 2021-08-22

## 2021-08-22 RX ORDER — PROCHLORPERAZINE MALEATE 5 MG
10 TABLET ORAL EVERY 6 HOURS PRN
Status: DISCONTINUED | OUTPATIENT
Start: 2021-08-22 | End: 2021-08-26 | Stop reason: HOSPADM

## 2021-08-22 RX ORDER — AMOXICILLIN 250 MG
2 CAPSULE ORAL 2 TIMES DAILY PRN
Status: DISCONTINUED | OUTPATIENT
Start: 2021-08-22 | End: 2021-08-26 | Stop reason: HOSPADM

## 2021-08-22 RX ORDER — MAGNESIUM OXIDE 400 MG/1
400 TABLET ORAL EVERY EVENING
Status: DISCONTINUED | OUTPATIENT
Start: 2021-08-22 | End: 2021-08-26 | Stop reason: HOSPADM

## 2021-08-22 RX ORDER — METHYLPREDNISOLONE SODIUM SUCCINATE 125 MG/2ML
60 INJECTION, POWDER, LYOPHILIZED, FOR SOLUTION INTRAMUSCULAR; INTRAVENOUS EVERY 8 HOURS
Status: DISCONTINUED | OUTPATIENT
Start: 2021-08-22 | End: 2021-08-24

## 2021-08-22 RX ORDER — BISACODYL 10 MG
10 SUPPOSITORY, RECTAL RECTAL DAILY PRN
Status: DISCONTINUED | OUTPATIENT
Start: 2021-08-22 | End: 2021-08-26 | Stop reason: HOSPADM

## 2021-08-22 RX ORDER — LEVALBUTEROL INHALATION SOLUTION 0.63 MG/3ML
0.63 SOLUTION RESPIRATORY (INHALATION)
Status: DISCONTINUED | OUTPATIENT
Start: 2021-08-22 | End: 2021-08-26 | Stop reason: HOSPADM

## 2021-08-22 RX ORDER — PROCHLORPERAZINE 25 MG
25 SUPPOSITORY, RECTAL RECTAL EVERY 12 HOURS PRN
Status: DISCONTINUED | OUTPATIENT
Start: 2021-08-22 | End: 2021-08-26 | Stop reason: HOSPADM

## 2021-08-22 RX ORDER — NICOTINE POLACRILEX 4 MG
15-30 LOZENGE BUCCAL
Status: DISCONTINUED | OUTPATIENT
Start: 2021-08-22 | End: 2021-08-22

## 2021-08-22 RX ORDER — ONDANSETRON 4 MG/1
4 TABLET, ORALLY DISINTEGRATING ORAL EVERY 6 HOURS PRN
Status: DISCONTINUED | OUTPATIENT
Start: 2021-08-22 | End: 2021-08-26 | Stop reason: HOSPADM

## 2021-08-22 RX ORDER — SODIUM CHLORIDE, SODIUM LACTATE, POTASSIUM CHLORIDE, CALCIUM CHLORIDE 600; 310; 30; 20 MG/100ML; MG/100ML; MG/100ML; MG/100ML
INJECTION, SOLUTION INTRAVENOUS CONTINUOUS
Status: DISCONTINUED | OUTPATIENT
Start: 2021-08-22 | End: 2021-08-26 | Stop reason: HOSPADM

## 2021-08-22 RX ORDER — ACETAMINOPHEN 500 MG
1000 TABLET ORAL EVERY 6 HOURS PRN
Status: DISCONTINUED | OUTPATIENT
Start: 2021-08-22 | End: 2021-08-26 | Stop reason: HOSPADM

## 2021-08-22 RX ORDER — DEXTROSE MONOHYDRATE 25 G/50ML
25-50 INJECTION, SOLUTION INTRAVENOUS
Status: DISCONTINUED | OUTPATIENT
Start: 2021-08-22 | End: 2021-08-26 | Stop reason: HOSPADM

## 2021-08-22 RX ADMIN — METHYLPREDNISOLONE 62.5 MG: 125 INJECTION, POWDER, LYOPHILIZED, FOR SOLUTION INTRAMUSCULAR; INTRAVENOUS at 19:58

## 2021-08-22 RX ADMIN — LEVALBUTEROL HYDROCHLORIDE 0.63 MG: 0.63 SOLUTION RESPIRATORY (INHALATION) at 08:42

## 2021-08-22 RX ADMIN — SODIUM CHLORIDE, POTASSIUM CHLORIDE, SODIUM LACTATE AND CALCIUM CHLORIDE: 600; 310; 30; 20 INJECTION, SOLUTION INTRAVENOUS at 02:52

## 2021-08-22 RX ADMIN — AMLODIPINE BESYLATE 10 MG: 10 TABLET ORAL at 09:45

## 2021-08-22 RX ADMIN — AZITHROMYCIN MONOHYDRATE 500 MG: 500 INJECTION, POWDER, LYOPHILIZED, FOR SOLUTION INTRAVENOUS at 02:52

## 2021-08-22 RX ADMIN — ACETAMINOPHEN 1000 MG: 500 TABLET, FILM COATED ORAL at 19:58

## 2021-08-22 RX ADMIN — LEVALBUTEROL HYDROCHLORIDE 0.63 MG: 0.63 SOLUTION RESPIRATORY (INHALATION) at 16:09

## 2021-08-22 RX ADMIN — LEVALBUTEROL HYDROCHLORIDE 0.63 MG: 0.63 SOLUTION RESPIRATORY (INHALATION) at 19:30

## 2021-08-22 RX ADMIN — ENOXAPARIN SODIUM 40 MG: 40 INJECTION SUBCUTANEOUS at 09:13

## 2021-08-22 RX ADMIN — LEVALBUTEROL HYDROCHLORIDE 0.63 MG: 0.63 SOLUTION RESPIRATORY (INHALATION) at 11:32

## 2021-08-22 RX ADMIN — UMECLIDINIUM 1 PUFF: 62.5 AEROSOL, POWDER ORAL at 09:51

## 2021-08-22 RX ADMIN — LOSARTAN POTASSIUM 75 MG: 50 TABLET, FILM COATED ORAL at 09:45

## 2021-08-22 RX ADMIN — METHYLPREDNISOLONE 62.5 MG: 125 INJECTION, POWDER, LYOPHILIZED, FOR SOLUTION INTRAMUSCULAR; INTRAVENOUS at 11:46

## 2021-08-22 RX ADMIN — SODIUM CHLORIDE, POTASSIUM CHLORIDE, SODIUM LACTATE AND CALCIUM CHLORIDE: 600; 310; 30; 20 INJECTION, SOLUTION INTRAVENOUS at 16:38

## 2021-08-22 RX ADMIN — METHYLPREDNISOLONE 62.5 MG: 125 INJECTION, POWDER, LYOPHILIZED, FOR SOLUTION INTRAMUSCULAR; INTRAVENOUS at 03:58

## 2021-08-22 RX ADMIN — ACETAMINOPHEN 1000 MG: 500 TABLET, FILM COATED ORAL at 09:45

## 2021-08-22 RX ADMIN — MAGNESIUM OXIDE TAB 400 MG (241.3 MG ELEMENTAL MG) 400 MG: 400 (241.3 MG) TAB at 19:58

## 2021-08-22 ASSESSMENT — MIFFLIN-ST. JEOR: SCORE: 1303

## 2021-08-22 ASSESSMENT — ACTIVITIES OF DAILY LIVING (ADL)
ADLS_ACUITY_SCORE: 15
ADLS_ACUITY_SCORE: 13
ADLS_ACUITY_SCORE: 15
ADLS_ACUITY_SCORE: 15
ADLS_ACUITY_SCORE: 13

## 2021-08-22 NOTE — H&P
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  He had COVID-19 vaccination in 4/21.  He presented to [facility] last night for evaluation of increasing shortness of breath.  He was hospitalized at [facility] from 6/30/2021 through 7/3/2021 with COPD exacerbation.  He stopped smoking at the time of that admission and has not restarted. He discharged home on a prolonged steroid taper that he completed about 2 weeks ago.  The last several days his breathing seemed to get worse.  He called paramedics because of worsened dyspnea last evening.  When they arrived his oxygen saturations were in the 80s percent.  He was tachycardic.  They placed him on BiPAP.  Upon arrival to [facility] emergency department he was tachycardic, tachypneic, and hypertensive.  He was requiring BiPAP support.  On exam he was tight, wheezy, and and with labored breathing.  Initial venous blood gas showed pH of 7.17 with PCO2 of 83.  This improved to 7.29 with PCO2 of 68 after some time on BiPAP.  White blood cell count was 12.5 and the remainder of his CBC and also his comprehensive metabolic panel were unremarkable.  EKG showed no ischemic changes.  Chest x-ray showed stable left lateral midlung nodule as well as hyperinflation but no clear infiltrate.  Luis refused duo nebs at [facility] because these make him cough.  He was given Solu-Medrol.  BiPAP was titrated.  Admission was wanted with the review beds there.  Arrangements were made to transfer Luis to [facility] intensive care unit for ongoing care of acute on  chronic hypoxic respiratory failure due to COPD exacerbation.  When I saw Luis he was still uncomfortable due to dyspnea.  He is having some chest tightness that is pleuritic in nature.  He denies abdominal pain, nausea, vomiting, diarrhea, and dysuria.    Problem list:    Acute hypoxic and hypercapnic respiratory failure  Chronic respiratory failure on supplemental oxygen (2 to 3 L/min)  Pulmonary hypertension  -There is no clear trigger for exacerbation  -Luis was hospitalized at [facility] for COPD exacerbation from 6/30/2021 through 7/3/2021.  He just finished his prednisone taper 2 weeks ago.  He probably needs a prolonged taper on discharge of my actually do better on a small dose of prednisone chronically.  -Admit as inpatient to the intensive care unit  -Continue rescue BiPAP with settings of 18 and 6  -Continue Solu-Medrol 60 mg IV every 8 hours  -Luis refuses duo nebs.  We agreed to try Xopenex nebs  -Continue prior to admission Spiriva  -AM basic metabolic panel and CBC  -Empiric Zithromax for COPD exacerbation  -Considered congestive heart failure but BNP is normal    Hypertension  -Resume prior to admission amlodipine and Cozaar with hold parameters  -Hold prior to admission Lasix while n.p.o.  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  He had COVID-19 vaccination in 4/21.  He presented to [facility] last  night for evaluation of increasing shortness of breath.  He was hospitalized at [facility] from 6/30/2021 through 7/3/2021 with COPD exacerbation.  He stopped smoking at the time of that admission and has not restarted. He discharged home on a prolonged steroid taper that he completed about 2 weeks ago.  The last several days his breathing seemed to get worse.  He called paramedics because of worsened dyspnea last evening.  When they arrived his oxygen saturations were in the 80s percent.  He was tachycardic.  They placed him on BiPAP.  Upon arrival to [facility] emergency department he was tachycardic, tachypneic, and hypertensive.  He was requiring BiPAP support.  On exam he was tight, wheezy, and and with labored breathing.  Initial venous blood gas showed pH of 7.17 with PCO2 of 83.  This improved to 7.29 with PCO2 of 68 after some time on BiPAP.  White blood cell count was 12.5 and the remainder of his CBC and also his comprehensive metabolic panel were unremarkable.  EKG showed no ischemic changes.  Chest x-ray showed stable left lateral midlung nodule as well as hyperinflation but no clear infiltrate.  Luis refused duo nebs at [facility] because these make him cough.  He was given Solu-Medrol.  BiPAP was titrated.  Admission was wanted with the review beds there.  Arrangements were made to transfer Luis to [facility] intensive care unit for ongoing care of acute on chronic hypoxic respiratory failure due to COPD exacerbation.  When I saw Luis he was still uncomfortable due to dyspnea.  He is having some chest tightness that is pleuritic in nature.  He denies abdominal pain, nausea, vomiting, diarrhea, and dysuria.           Past Medical History:     Patient Active Problem List   Diagnosis     Essential hypertension, benign     ED (erectile dysfunction)     Eczema     COPD (chronic obstructive pulmonary disease) (H)     CARDIOVASCULAR SCREENING; LDL GOAL LESS THAN 130      Advanced directives, counseling/discussion     COPD exacerbation (H)     Incidental pulmonary nodule, > 3mm and < 8mm, new from 2010     Tobacco abuse, in remission     Acute on chronic respiratory failure with hypoxia and hypercapnia (H)     Peripheral edema     Pulmonary hypertension (H)     Acute respiratory acidosis     Acute on chronic respiratory failure (H)      Past Medical History:   Diagnosis Date     Acute on chronic respiratory failure with hypoxia (H) 4/10/2020     Acute on chronic respiratory failure with hypoxia and hypercapnia (H) 4/1/2019     CAP (community acquired pneumonia) 4/14/2020     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)      COPD exacerbation (H) 4/1/2019     COPD exacerbation (H) 2/16/2020     Erectile dysfunction      Hypertension      Hyponatremia 4/1/2019     Pneumonia 4/10/2020             Past Surgical History:     Past Surgical History:   Procedure Laterality Date     APPENDECTOMY      childhood            Home Medications:     Prior to Admission medications    Medication Sig Last Dose Taking? Auth Provider   albuterol (PROAIR HFA/PROVENTIL HFA/VENTOLIN HFA) 108 (90 Base) MCG/ACT inhaler Inhale 2 puffs into the lungs every 4 hours as needed for shortness of breath / dyspnea Hold on file until needed   Dean Mariee MD   amLODIPine (NORVASC) 10 MG tablet Take 1 tablet (10 mg) by mouth daily   Dean Mariee MD   atenolol (TENORMIN) 25 MG tablet Take 1 tablet (25 mg) by mouth daily   Dean Mariee MD   doxycycline hyclate (VIBRAMYCIN) 100 MG capsule Take 1 capsule (100 mg) by mouth 2 times daily Keep on hand for COPD exacerbation.   Dean Mariee MD   fluticasone-salmeterol (ADVAIR) 500-50 MCG/DOSE inhaler Inhale 1 puff into the lungs 2 times daily Hold on file until needed   Dean Mariee MD   furosemide (LASIX) 20 MG tablet Take 1 tablet (20 mg) by mouth daily   Dean Mariee MD   losartan (COZAAR) 50 MG tablet Take 1.5  "tablets (75 mg) by mouth daily   Dean Mariee MD   magnesium oxide (MAG-OX) 400 MG tablet Take 1 tablet (400 mg) by mouth every evening   Lisseth Ortega MD   order for DME Equipment being ordered: Oxygen 3L via NC continuous.  O2 saturated noted to be 86% on room air at rest.   Eric Patel MD   order for DME Equipment being ordered: Nebulizer   Jean Carlos Holt MD   potassium 99 MG TABS Take 1 tablet by mouth every evening   Abstract, Provider   predniSONE (DELTASONE) 10 MG tablet 4 tablets daily, then 3 tablets daily for 3 days, then 2 tablets daily for 3 days, then 1 tablet daily for 3 days. Use for COPD flares   Dean Mariee MD   tiotropium (SPIRIVA HANDIHALER) 18 MCG inhaled capsule Inhale 1 capsule (18 mcg) into the lungs every evening Inhale contents of one capsule daily. Hold on file until needed.   Dean Mariee MD            Allergies:     Allergies   Allergen Reactions     Hctz Other (See Comments)     He has hyponatremia     Lisinopril Cough     Penicillins Other (See Comments)     Reaction unknown            Social History:     Social History     Tobacco Use     Smoking status: Former Smoker     Packs/day: 2.00     Years: 30.00     Pack years: 60.00     Types: Cigarettes     Smokeless tobacco: Former User   Substance Use Topics     Alcohol use: Yes     Comment: rare             Family History:     Family History   Problem Relation Age of Onset     Hypertension Father      Lipids Father      C.A.D. Father      Heart Disease Father      Diabetes Paternal Grandmother      Hypertension Mother      Ovarian Cancer Mother      Breast Cancer Mother               Review of Systems:   The 10 point Review of Systems is negative other than as noted in the HPI.           Physical Exam:   Blood pressure (!) 123/95, pulse 98, temperature 97.8  F (36.6  C), temperature source Axillary, resp. rate 20, height 1.626 m (5' 4\"), weight 55.2 kg (121 lb 11.1 oz), SpO2 95 %.  121 lbs 11.1 oz    "   GENERAL: Pleasant and cooperative. Moderate acute distress due to dyspnea  EYES: Pupils equal and round. No scleral erythema or icterus.  ENT: External ears are normal without deformity. Posterior oropharynx is without erythem, swelling, or exudate.  NECK: Supple. No masses or swelling. No tenderness. Thyroid is normal without mass or tenderness.  CHEST: bilateral wheezes with tight breath sounds to auscultation. Normal breath sounds. No retractions.   CV: Regular rate and rhythm. No JVD. Pulses normal.  ABDOMEN: Bowel sounds present. No tenderness. No masses or hernia.  EXTREMETIES: No clubbing, cyanosis, or ischemia.  SKIN: Warm and dry to touch. No wounds or rashes.  NEUROLOGIC: Strength and sensation are normal. Deep tendon reflexes are normal. Cranial nerves are normal.             Data:   All new lab and imaging data was reviewed.     Results for orders placed or performed during the hospital encounter of 08/21/21 (from the past 24 hour(s))   CBC with platelets differential    Narrative    The following orders were created for panel order CBC with platelets differential.  Procedure                               Abnormality         Status                     ---------                               -----------         ------                     CBC with platelets and d...[[id number]]  Abnormal            Final result                 Please view results for these tests on the individual orders.   Comprehensive metabolic panel   Result Value Ref Range    Sodium 140 133 - 144 mmol/L    Potassium 4.7 3.4 - 5.3 mmol/L    Chloride 103 94 - 109 mmol/L    Carbon Dioxide (CO2) 32 20 - 32 mmol/L    Anion Gap 5 3 - 14 mmol/L    Urea Nitrogen 17 7 - 30 mg/dL    Creatinine 0.72 0.66 - 1.25 mg/dL    Calcium 9.1 8.5 - 10.1 mg/dL    Glucose 139 (H) 70 - 99 mg/dL    Alkaline Phosphatase 42 40 - 150 U/L    AST 18 0 - 45 U/L    ALT 28 0 - 70 U/L    Protein Total 7.9 6.8 - 8.8 g/dL    Albumin 4.1 3.4 - 5.0 g/dL    Bilirubin Total  0.3 0.2 - 1.3 mg/dL    GFR Estimate >90 >60 mL/min/1.73m2   Blood gas venous   Result Value Ref Range    pH Venous 7.17 (LL) 7.32 - 7.43    pCO2 Venous 83 (HH) 40 - 50 mm Hg    pO2 Venous 42 25 - 47 mm Hg    Bicarbonate Venous 30 (H) 21 - 28 mmol/L    Base Excess/Deficit (+/-) -0.8 -7.7 - 1.9 mmol/L    FIO2 30    Nt probnp inpatient (BNP)   Result Value Ref Range    N terminal Pro BNP Inpatient 46 0 - 900 pg/mL   CBC with platelets and differential   Result Value Ref Range    WBC Count 12.5 (H) 4.0 - 11.0 10e3/uL    RBC Count 4.27 (L) 4.40 - 5.90 10e6/uL    Hemoglobin 13.1 (L) 13.3 - 17.7 g/dL    Hematocrit 41.3 40.0 - 53.0 %    MCV 97 78 - 100 fL    MCH 30.7 26.5 - 33.0 pg    MCHC 31.7 31.5 - 36.5 g/dL    RDW 13.2 10.0 - 15.0 %    Platelet Count 323 150 - 450 10e3/uL    % Neutrophils 57 %    % Lymphocytes 25 %    % Monocytes 11 %    % Eosinophils 6 %    % Basophils 1 %    % Immature Granulocytes 0 %    NRBCs per 100 WBC 0 <1 /100    Absolute Neutrophils 7.2 1.6 - 8.3 10e3/uL    Absolute Lymphocytes 3.1 0.8 - 5.3 10e3/uL    Absolute Monocytes 1.4 (H) 0.0 - 1.3 10e3/uL    Absolute Eosinophils 0.7 0.0 - 0.7 10e3/uL    Absolute Basophils 0.1 0.0 - 0.2 10e3/uL    Absolute Immature Granulocytes 0.0 <=0.0 10e3/uL    Absolute NRBCs 0.0 10e3/uL   XR Chest Port 1 View    Narrative    CHEST ONE VIEW  8/21/2021 9:01 PM     HISTORY: Dyspnea.    COMPARISON: June 30, 2021      Impression    IMPRESSION: Stable lateral midlung nodule on the left. Similar  hyperexpansion and probable emphysema. No definite new infiltrates.    ECTOR POLLARD MD         SYSTEM ID:  [id number]   Asymptomatic COVID-19 Virus (Coronavirus) by PCR Nasopharyngeal    Specimen: Nasopharyngeal; Swab   Result Value Ref Range    SARS CoV2 PCR Negative Negative    Narrative    Testing was performed using the kita  SARS-CoV-2 & Influenza A/B Assay on the kita  [employer]  System.  This test should be ordered for the detection of SARS-COV-2 in individuals who meet  SARS-CoV-2 clinical and/or epidemiological criteria. Test performance is unknown in asymptomatic patients.  This test is for in vitro diagnostic use under the FDA EUA for laboratories certified under CLIA to perform moderate and/or high complexity testing. This test has not been FDA cleared or approved.  A negative test does not rule out the presence of PCR inhibitors in the specimen or target RNA in concentration below the limit of detection for the assay. The possibility of a false negative should be considered if the patient's recent exposure or clinical presentation suggests COVID-19.  [facility] are certified under the Clinical Laboratory Improvement Amendments of 1988 (CLIA-88) as qualified to perform moderate and/or high complexity laboratory testing.   Blood gas venous   Result Value Ref Range    pH Venous 7.29 (L) 7.32 - 7.43    pCO2 Venous 68 (H) 40 - 50 mm Hg    pO2 Venous 53 (H) 25 - 47 mm Hg    Bicarbonate Venous 32 (H) 21 - 28 mmol/L    Base Excess/Deficit (+/-) 3.9 (H) -7.7 - 1.9 mmol/L    FIO2 30

## 2021-08-22 NOTE — PROGRESS NOTES
"         [facility]  Respiratory Care Note      A BiPAP of  18/6 @ 30% continues to be applied to the pt via the mask for an increase in WOB and/or SOB.  The bridge of the nose look good and remains intact .Pt is tolerating it well. Will continue to monitor and assess the pt's current respiratory status and needs.    Vital signs:  Temp: 97.3  F (36.3  C) Temp src: Axillary BP: (!) 164/28 (pt was moving arm, will recheck) Pulse: 92   Resp: 18 SpO2: 98 % O2 Device: BiPAP/CPAP (18/6)   Height: 162.6 cm (5' 4\") Weight: 55.2 kg (121 lb 11.1 oz)  Estimated body mass index is 20.89 kg/m  as calculated from the following:    Height as of this encounter: 1.626 m (5' 4\").    Weight as of this encounter: 55.2 kg (121 lb 11.1 oz).      Past Medical History:   Diagnosis Date     Acute on chronic respiratory failure with hypoxia (H) 4/10/2020     Acute on chronic respiratory failure with hypoxia and hypercapnia (H) 4/1/2019     CAP (community acquired pneumonia) 4/14/2020     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)      COPD exacerbation (H) 4/1/2019     COPD exacerbation (H) 2/16/2020     Erectile dysfunction      Hypertension      Hyponatremia 4/1/2019     Pneumonia 4/10/2020       Past Surgical History:   Procedure Laterality Date     APPENDECTOMY      childhood       Family History   Problem Relation Age of Onset     Hypertension Father      Lipids Father      C.A.D. Father      Heart Disease Father      Diabetes Paternal Grandmother      Hypertension Mother      Ovarian Cancer Mother      Breast Cancer Mother        Social History     Tobacco Use     Smoking status: Former Smoker     Packs/day: 2.00     Years: 30.00     Pack years: 60.00     Types: Cigarettes     Smokeless tobacco: Former User   Substance Use Topics     Alcohol use: Yes     Comment: anil Hall RT  [facility]  8/22/2021    "

## 2021-08-22 NOTE — ED NOTES
Pt corrected nurse and states that he was in the hospital approx 3 weeks ago.  Had been feeling ok at home until today.  Pt states sudden onset of SOA.  Pt does not like duonebs.  States it makes his symptoms worse. Pt tachycardic upon arrival.  Has difficulty breathing at NOC.  Tonight worse.

## 2021-08-22 NOTE — ED TRIAGE NOTES
Pt discharge from hospital x1 week ago. Pt 96% on 30% FiO2, arrives on bipap. MD at bedside.  RT at bedside.  Pt has COPD hx.

## 2021-08-22 NOTE — PLAN OF CARE
ICU End of Shift Summary.  For vital signs and complete assessments, please see documentation flowsheets.     Pertinent assessments: Pt is alert and oriented x4, pleasant. Hemodynamically stable with SR.  Lungs are diminished throughout with scattered inspiratory wheeze heard faintly at times. Continues on Bipap 30%Fi02.  Voiding in adequate amounts.  Major Shift Events: Pt up to chair with SBA.  Pt exhibits increase in RR when Bipap offf briefly, but has maintained 02 sats.  Plan (Upcoming Events): Continue steroids q 8, zithromax daily, albuterl inhaler, xopenex nebs. Attempted to wean off bipap once medications have helped to decrease inflammation.  Discharge/Transfer Needs: TBD    Bedside Shift Report Completed : Yes  Bedside Safety Check Completed: Yes

## 2021-08-22 NOTE — PROGRESS NOTES
[facility]    Hospitalist Progress Note  Name: Luis Hernandez    MRN: [id number]  Provider: Kamila Snowden MD  Date of Service: 08/22/2021    Assessment & Plan   Summary of Stay: Luis Hernandez is a 56 year old male who was admitted on 8/22/2021 acute hypoxic and hypercapnic respiratory failure.  Patient's past medical history significant for oxygen dependent COPD, pneumonia, pulmonary hypertension, hypertension, hyponatremia and COVID-19 infection in April 2021.    Acute hypoxic and hypercapnic respiratory failure  Chronic respiratory failure on supplemental oxygen (2 to 3 L/min)  Pulmonary hypertension  -There is no clear trigger for exacerbation  -Luis was hospitalized at [facility] for COPD exacerbation from 6/30/2021 through 7/3/2021.  He just finished his prednisone taper 2 weeks ago.  He probably needs a prolonged taper on discharge of my actually do better on a small dose of prednisone chronically.  -Admit as inpatient to the intensive care unit  -Continue rescue BiPAP with settings of 18 and 6  -Continue Solu-Medrol 60 mg IV every 8 hours  -Luis refuses duo nebs.  We agreed to try Xopenex nebs  -Continue prior to admission Spiriva  -AM basic metabolic panel and CBC  -Empiric Zithromax for COPD exacerbation  -Considered congestive heart failure but BNP is normal     Hypertension  -Resume prior to admission amlodipine and Cozaar with hold parameters  -Hold prior to admission Lasix while n.p.o.  -Modest IV fluids with lactated Ringer's at 75 mL/h     COVID-19 PCR testing was negative      DVT Prophylaxis: Enoxaparin (Lovenox) SQ  Code Status: Full Code    Disposition: Expected discharge to be decided      Interval History   Assumed care reviewed chart.  She feels breathing is better on BiPAP.  Able to talk in full sentences.  More than 10 point review of systems was carried out was otherwise negative.    -Data reviewed today: I reviewed all new labs and imaging reports over the  last 24 hours. I personally reviewed the EKG tracing showing Sinus and the chest x-ray image(s) showing No acute infiltrates.    Physical Exam   Temp: 97.8  F (36.6  C) Temp src: Axillary BP: (!) 123/92 Pulse: 94   Resp: 9 SpO2: 94 % O2 Device: BiPAP/CPAP    Vitals:    08/22/21 0130   Weight: 55.2 kg (121 lb 11.1 oz)     Vital Signs with Ranges  Temp:  [96.9  F (36.1  C)-97.8  F (36.6  C)] 97.8  F (36.6  C)  Pulse:  [] 94  Resp:  [9-29] 9  BP: (105-171)/() 123/92  FiO2 (%):  [30 %] 30 %  SpO2:  [94 %-99 %] 94 %  I/O last 3 completed shifts:  In: -   Out: 450 [Urine:450]      GENERAL: Pleasant and cooperative. Moderate acute distress due to dyspnea using BiPAP. able to talk in full sentences while on BiPAP  EYES: Pupils equal and round. No scleral erythema or icterus.  ENT: External ears are normal without deformity. Posterior oropharynx is without erythem, swelling, or exudate.  NECK: Supple. No masses or swelling. No tenderness. Thyroid is normal without mass or tenderness.  CHEST: bilateral wheezes with tight breath sounds to auscultation. Normal breath sounds. No retractions.   CV: Regular rate and rhythm. No JVD. Pulses normal.  ABDOMEN: Bowel sounds present. No tenderness. No masses or hernia.  EXTREMETIES: No clubbing, cyanosis, or ischemia.  SKIN: Warm and dry to touch. No wounds or rashes.  NEUROLOGIC: Strength and sensation are normal. Deep tendon reflexes are normal. Cranial nerves are normal.    Medications     lactated ringers 75 mL/hr at 08/22/21 0252       amLODIPine  10 mg Oral Daily     [START ON 8/23/2021] azithromycin  250 mg Oral Daily     enoxaparin ANTICOAGULANT  40 mg Subcutaneous Q24H     insulin aspart  1-4 Units Subcutaneous Q4H     levalbuterol  0.63 mg Nebulization 4x Daily     losartan  75 mg Oral Daily     magnesium oxide  400 mg Oral QPM     methylPREDNISolone  62.5 mg Intravenous Q8H     tiotropium  18 mcg Inhalation QPM     Data     Recent Labs   Lab 08/22/21  6723  08/21/21 2156 08/21/21 2019   PHV 7.33 7.29* 7.17*   PO2V 47 53* 42   PCO2V 58* 68* 83*   HCO3V 31* 32* 30*     Recent Labs   Lab 08/22/21 0559 08/21/21 2019   WBC 9.6 12.5*   HGB 12.1* 13.1*   HCT 38.3* 41.3   MCV 96 97    323     Recent Labs   Lab 08/22/21 0559 08/22/21 0253 08/22/21 0221 08/21/21 2019     --   --  140   POTASSIUM 4.3  --   --  4.7   CHLORIDE 103  --   --  103   CO2 33*  --   --  32   ANIONGAP <1*  --   --  5   * 124*  --  139*   BUN 16  --   --  17   CR 0.62*  --  0.60* 0.72   GFRESTIMATED >90  --  >90 >90   MIKE 9.3  --   --  9.1     No results for input(s): CULT in the last 168 hours.  Recent Labs   Lab 08/21/21 2019   NTBNPI 46     Recent Labs   Lab 08/22/21 0559 08/22/21 0253 08/21/21 2019   * 124* 139*     Recent Labs   Lab 08/22/21 0559 08/21/21 2019   HGB 12.1* 13.1*     Recent Labs   Lab 08/21/21 2019   AST 18   ALT 28   ALKPHOS 42   BILITOTAL 0.3     No results for input(s): INR in the last 168 hours.  No results for input(s): LACT in the last 168 hours.  No results for input(s): LIPASE in the last 168 hours.  Recent Labs   Lab 08/22/21 0559 08/22/21 0221 08/21/21 2019   BUN 16  --  17   CR 0.62* 0.60* 0.72     No results for input(s): TSH in the last 168 hours.  No results for input(s): TROPONIN, TROPI, TROPR in the last 168 hours.    Invalid input(s): TROP, TROPONINIES  No results for input(s): COLOR, APPEARANCE, URINEGLC, URINEBILI, URINEKETONE, SG, UBLD, URINEPH, PROTEIN, UROBILINOGEN, NITRITE, LEUKEST, RBCU, WBCU in the last 168 hours.    Recent Results (from the past 24 hour(s))   XR Chest Port 1 View    Narrative    CHEST ONE VIEW  8/21/2021 9:01 PM     HISTORY: Dyspnea.    COMPARISON: June 30, 2021      Impression    IMPRESSION: Stable lateral midlung nodule on the left. Similar  hyperexpansion and probable emphysema. No definite new infiltrates.    ECTOR POLLARD MD         SYSTEM ID:  [id number]

## 2021-08-22 NOTE — PHARMACY-ADMISSION MEDICATION HISTORY
Admission medication history interview status for this patient is complete. See [facility] admission navigator for allergy information, prior to admission medications and immunization status.     Medication history interview done, indicate source(s): Patient  Medication history resources (including written lists, pill bottles, clinic record): [employer] dispense records  Pharmacy: [id number] [place], [place] - [address] [facility] [phone]    Changes made to PTA medication list:  Added: None  Changed: None  Reported as Not Taking: Furosemide  Removed: None     Actions taken by pharmacist (provider contacted, etc):None     Additional medication history information:None    Medication reconciliation/reorder completed by provider prior to medication history?  Yes       Prior to Admission medications    Medication Sig Last Dose Taking? Auth Provider   albuterol (PROAIR HFA/PROVENTIL HFA/VENTOLIN HFA) 108 (90 Base) MCG/ACT inhaler Inhale 2 puffs into the lungs every 4 hours as needed for shortness of breath / dyspnea Hold on file until needed 8/21/2021 at Unknown time Yes Dean Mariee MD   amLODIPine (NORVASC) 10 MG tablet Take 1 tablet (10 mg) by mouth daily 8/21/2021 at Unknown time Yes Dean Mariee MD   atenolol (TENORMIN) 25 MG tablet Take 1 tablet (25 mg) by mouth daily 8/21/2021 at Unknown time Yes Dean Mariee MD   doxycycline hyclate (VIBRAMYCIN) 100 MG capsule Take 1 capsule (100 mg) by mouth 2 times daily Keep on hand for COPD exacerbation.  Yes Dean Mariee MD   fluticasone-salmeterol (ADVAIR) 500-50 MCG/DOSE inhaler Inhale 1 puff into the lungs 2 times daily Hold on file until needed 8/21/2021 at Unknown time Yes Dean Mariee MD   losartan (COZAAR) 50 MG tablet Take 1.5 tablets (75 mg) by mouth daily 8/21/2021 at Unknown time Yes Dean Mariee MD   magnesium oxide (MAG-OX) 400 MG tablet Take 1 tablet (400 mg) by mouth every evening 8/21/2021 at Unknown time  Yes Lisseth Ortega MD   potassium 99 MG TABS Take 1 tablet by mouth every evening Past Week at Unknown time Yes Abstract, Provider   predniSONE (DELTASONE) 10 MG tablet 4 tablets daily, then 3 tablets daily for 3 days, then 2 tablets daily for 3 days, then 1 tablet daily for 3 days. Use for COPD flares Past Week at Unknown time Yes Dean Mariee MD   tiotropium (SPIRIVA HANDIHALER) 18 MCG inhaled capsule Inhale 1 capsule (18 mcg) into the lungs every evening Inhale contents of one capsule daily. Hold on file until needed. 8/21/2021 at Unknown time Yes Dean Mariee MD   furosemide (LASIX) 20 MG tablet Take 1 tablet (20 mg) by mouth daily  Patient not taking: Reported on 8/22/2021 Not Taking at Unknown time  Dean Mariee MD   order for DME Equipment being ordered: Oxygen 3L via NC continuous.  O2 saturated noted to be 86% on room air at rest.   Eric Patel MD   order for DME Equipment being ordered: Nebulizer   Jean Carlos Holt MD

## 2021-08-22 NOTE — ED PROVIDER NOTES
"  History     Chief Complaint   Patient presents with     Shortness of Breath     HPI  Luis Hernandez is a 56 year old male with oxygen dependent COPD and chronic respiratory failure on home O2 who presents with worsening dyspnea.  He called EMS because he could not breathe.  They found him with sats in the 80s.  He was tachycardic.  They placed him on BiPAP at 10/5.  He is improved since then.  He was hospitalized in June with a COPD exacerbation and required BiPAP at that time.  He is chronically on 2 to 3 L of O2 by nasal cannula.  He was seen in the clinic for follow-up July 5 and was doing better at that time.  I reviewed the records of his discharge summary from June 30.  See that for details.  He is not certain what the trigger for his current decompensation is.  He has not smoked since his last hospitalization.  He was Covid vaccinated in April and Covid negative on his June hospitalization.  He has not been having fevers.  He does not have a new cough or sputum change.  He noticed a little bit of leg edema a couple of days ago but that has not worsened.  He has not had any other changes in his meds other than being on chronic home O2.  He has been off of his prednisone for 2 weeks after a long taper.  He denies any abdominal pain.  Appetites been diminished but no vomiting.  He does not have nausea.  He has not been having trouble with bowels or bladder.    Echocardiogram April 2019 showed severe pulmonary hypertension:  \"Interpretation Summary     1. The left ventricle is normal in structure, function and size. The visual  ejection fraction is estimated at 60%.  2. The right ventricle is mildly dilated. The right ventricular systolic  function is normal.  3. There is mild (1+) tricuspid regurgitation.  4. Severe (>55mmHg) pulmonary hypertension is present. The right ventricular  systolic pressure is approximated at 55mmHg plus the right atrial pressure.     Echo from 8/2015 showed normal RV, trace TR, " "could not estimated RVSP.\"    Allergies:  Allergies   Allergen Reactions     Hctz Other (See Comments)     He has hyponatremia     Lisinopril Cough     Penicillins Other (See Comments)     Reaction unknown       Problem List:    Patient Active Problem List    Diagnosis Date Noted     Acute respiratory acidosis 06/30/2021     Priority: Medium     Peripheral edema 02/16/2020     Priority: Medium     Pulmonary hypertension (H) 02/16/2020     Priority: Medium     Acute on chronic respiratory failure with hypoxia and hypercapnia (H) 04/01/2019     Priority: Medium     Tobacco abuse, in remission 12/05/2017     Priority: Medium     COPD exacerbation (H) 01/07/2014     Priority: Medium     Incidental pulmonary nodule, > 3mm and < 8mm, new from 2010 01/07/2014     Priority: Medium     By chest x-ray and chest CT new from CT chest from Oct 2010.   Stable 01/2014 to 07/2014, 6 month follow scan recommended.   Chest CT of 1/15/2015= stable nodule, f/u one year.       Advanced directives, counseling/discussion 11/25/2013     Priority: Medium     11/25/2013 Gave patient honoring choices forms to take home and review.  HERMELINDA Dial-C ([facility])        CARDIOVASCULAR SCREENING; LDL GOAL LESS THAN 130 10/31/2010     Priority: Medium     COPD (chronic obstructive pulmonary disease) (H) 10/25/2010     Priority: Medium     Severe to very severe       Eczema 10/04/2010     Priority: Medium     ED (erectile dysfunction) 04/20/2009     Priority: Medium     Essential hypertension, benign 10/15/2007     Priority: Medium        Past Medical History:    Past Medical History:   Diagnosis Date     Acute on chronic respiratory failure with hypoxia (H) 4/10/2020     Acute on chronic respiratory failure with hypoxia and hypercapnia (H) 4/1/2019     CAP (community acquired pneumonia) 4/14/2020     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)      COPD exacerbation (H) 4/1/2019     COPD exacerbation (H) 2/16/2020     Erectile " dysfunction      Hypertension      Hyponatremia 4/1/2019     Pneumonia 4/10/2020       Past Surgical History:    Past Surgical History:   Procedure Laterality Date     APPENDECTOMY      childhood       Family History:    Family History   Problem Relation Age of Onset     Hypertension Father      Lipids Father      C.A.D. Father      Heart Disease Father      Diabetes Paternal Grandmother      Hypertension Mother      Ovarian Cancer Mother      Breast Cancer Mother        Social History:  Marital Status:  Single [1]  Social History     Tobacco Use     Smoking status: Former Smoker     Packs/day: 2.00     Years: 30.00     Pack years: 60.00     Types: Cigarettes     Smokeless tobacco: Former User   Substance Use Topics     Alcohol use: Yes     Comment: rare     Drug use: No        Medications:    albuterol (PROAIR HFA/PROVENTIL HFA/VENTOLIN HFA) 108 (90 Base) MCG/ACT inhaler  amLODIPine (NORVASC) 10 MG tablet  atenolol (TENORMIN) 25 MG tablet  doxycycline hyclate (VIBRAMYCIN) 100 MG capsule  fluticasone-salmeterol (ADVAIR) 500-50 MCG/DOSE inhaler  furosemide (LASIX) 20 MG tablet  losartan (COZAAR) 50 MG tablet  magnesium oxide (MAG-OX) 400 MG tablet  order for DME  order for DME  potassium 99 MG TABS  predniSONE (DELTASONE) 10 MG tablet  tiotropium (SPIRIVA HANDIHALER) 18 MCG inhaled capsule          Review of Systems  All other systems are reviewed and are negative    Physical Exam   BP: (!) 171/121  Pulse: (!) 122  Temp: 96.9  F (36.1  C)  Resp: 13  Weight: 55.3 kg (122 lb)  SpO2: 96 %      Physical Exam  Nursing note and vitals were reviewed.  Constitutional: Awake and alert, [place] ill and poorly nourished appearing 56-year-old on BiPAP but able to answer questions and cooperate with examination  HEENT: BiPAP device is in place with minimal leak EOMI.   Neck: Freely mobile.  Cardiovascular: Cardiac examination reveals normal heart rate and regular rhythm without murmur.  Pulmonary/Chest: Breathing is moderate to  severely labored.  There wheezes throughout the lung fields with marked prolongation of the expiratory phase and retractions and tachypnea.  No rales are present.  Breath sounds are symmetric.  He is able to speak in short sentences..  Abdomen: Soft, nontender, no HSM or masses rebound or guarding.  Musculoskeletal: Extremities are warm and well-perfused and without edema  Neurological: Alert, oriented, thought content logical, coherent   Skin: Warm, dry, no rashes.  Excoriations are present on the shins bilaterally without evidence of secondary infection  Psychiatric: Affect congruent.      ED Course        Procedures              EKG Interpretation:      Interpreted by Scout Miller MD  Time reviewed: 8:30PM  Symptoms at time of EKG: dyspnea   Rhythm: sinus   Rate: 121  Axis: normal  Ectopy: none  Conduction: normal  ST Segments/ T Waves: No ST-T wave changes  Q Waves: none  Comparison to prior: Unchanged from 6/30/2021    Clinical Impression: normal EKG          Critical Care time:  was 45 minutes for this patient excluding procedures.                    Results for orders placed or performed during the hospital encounter of 08/21/21 (from the past 24 hour(s))   CBC with platelets differential    Narrative    The following orders were created for panel order CBC with platelets differential.  Procedure                               Abnormality         Status                     ---------                               -----------         ------                     CBC with platelets and d...[[id number]]  Abnormal            Final result                 Please view results for these tests on the individual orders.   Comprehensive metabolic panel   Result Value Ref Range    Sodium 140 133 - 144 mmol/L    Potassium 4.7 3.4 - 5.3 mmol/L    Chloride 103 94 - 109 mmol/L    Carbon Dioxide (CO2) 32 20 - 32 mmol/L    Anion Gap 5 3 - 14 mmol/L    Urea Nitrogen 17 7 - 30 mg/dL    Creatinine 0.72 0.66 - 1.25 mg/dL    Calcium  9.1 8.5 - 10.1 mg/dL    Glucose 139 (H) 70 - 99 mg/dL    Alkaline Phosphatase 42 40 - 150 U/L    AST 18 0 - 45 U/L    ALT 28 0 - 70 U/L    Protein Total 7.9 6.8 - 8.8 g/dL    Albumin 4.1 3.4 - 5.0 g/dL    Bilirubin Total 0.3 0.2 - 1.3 mg/dL    GFR Estimate >90 >60 mL/min/1.73m2   Blood gas venous   Result Value Ref Range    pH Venous 7.17 (LL) 7.32 - 7.43    pCO2 Venous 83 (HH) 40 - 50 mm Hg    pO2 Venous 42 25 - 47 mm Hg    Bicarbonate Venous 30 (H) 21 - 28 mmol/L    Base Excess/Deficit (+/-) -0.8 -7.7 - 1.9 mmol/L    FIO2 30    Nt probnp inpatient (BNP)   Result Value Ref Range    N terminal Pro BNP Inpatient 46 0 - 900 pg/mL   CBC with platelets and differential   Result Value Ref Range    WBC Count 12.5 (H) 4.0 - 11.0 10e3/uL    RBC Count 4.27 (L) 4.40 - 5.90 10e6/uL    Hemoglobin 13.1 (L) 13.3 - 17.7 g/dL    Hematocrit 41.3 40.0 - 53.0 %    MCV 97 78 - 100 fL    MCH 30.7 26.5 - 33.0 pg    MCHC 31.7 31.5 - 36.5 g/dL    RDW 13.2 10.0 - 15.0 %    Platelet Count 323 150 - 450 10e3/uL    % Neutrophils 57 %    % Lymphocytes 25 %    % Monocytes 11 %    % Eosinophils 6 %    % Basophils 1 %    % Immature Granulocytes 0 %    NRBCs per 100 WBC 0 <1 /100    Absolute Neutrophils 7.2 1.6 - 8.3 10e3/uL    Absolute Lymphocytes 3.1 0.8 - 5.3 10e3/uL    Absolute Monocytes 1.4 (H) 0.0 - 1.3 10e3/uL    Absolute Eosinophils 0.7 0.0 - 0.7 10e3/uL    Absolute Basophils 0.1 0.0 - 0.2 10e3/uL    Absolute Immature Granulocytes 0.0 <=0.0 10e3/uL    Absolute NRBCs 0.0 10e3/uL   XR Chest Port 1 View    Narrative    CHEST ONE VIEW  8/21/2021 9:01 PM     HISTORY: Dyspnea.    COMPARISON: June 30, 2021      Impression    IMPRESSION: Stable lateral midlung nodule on the left. Similar  hyperexpansion and probable emphysema. No definite new infiltrates.    ECTOR POLLARD MD         SYSTEM ID:  [id number]   Asymptomatic COVID-19 Virus (Coronavirus) by PCR Nasopharyngeal    Specimen: Nasopharyngeal; Swab   Result Value Ref Range    SARS CoV2 PCR  Negative Negative    Narrative    Testing was performed using the [employer]  SARS-CoV-2 & Influenza A/B Assay on the [employer]  [employer]  System.  This test should be ordered for the detection of SARS-COV-2 in individuals who meet SARS-CoV-2 clinical and/or epidemiological criteria. Test performance is unknown in asymptomatic patients.  This test is for in vitro diagnostic use under the FDA EUA for laboratories certified under CLIA to perform moderate and/or high complexity testing. This test has not been FDA cleared or approved.  A negative test does not rule out the presence of PCR inhibitors in the specimen or target RNA in concentration below the limit of detection for the assay. The possibility of a false negative should be considered if the patient's recent exposure or clinical presentation suggests COVID-19.  [facility] are certified under the Clinical Laboratory Improvement Amendments of 1988 (CLIA-88) as qualified to perform moderate and/or high complexity laboratory testing.   Blood gas venous   Result Value Ref Range    pH Venous 7.29 (L) 7.32 - 7.43    pCO2 Venous 68 (H) 40 - 50 mm Hg    pO2 Venous 53 (H) 25 - 47 mm Hg    Bicarbonate Venous 32 (H) 21 - 28 mmol/L    Base Excess/Deficit (+/-) 3.9 (H) -7.7 - 1.9 mmol/L    FIO2 30        Medications   albuterol (PROVENTIL) neb solution 2.5 mg (2.5 mg Nebulization Not Given 8/21/21 2204)   methylPREDNISolone sodium succinate (solu-MEDROL) injection 125 mg (125 mg Intravenous Given 8/21/21 2023)       Assessments & Plan (with Medical Decision Making)     56-year-old male presents in hypercapnic respiratory failure in the setting of severe COPD oxygen dependent with chronic respiratory failure.  The trigger for this is uncertain.  I do not find evidence of infectious process by x-ray or laboratory or symptom criteria although some viral trigger is possible.  I have not initiated antibiotic therapy for this reason.  There is no evidence of coexisting CHF  although he does have severe pulmonary hypertension and this may be contributing.  He has no significant pain.  Initial blood gases are as noted above.  We adjusted his BiPAP settings to achieve tidal volumes of about 6 cc/kg.  This resulted in tidal volumes of 380 to 400 cc.  This improved his respiratory acidosis on repeat and it was noted to be breathing more comfortably.  He received a dose of Solu-Medrol.  He received an albuterol neb.  He has been Covid immunized.  Covid testing today is negative.  Remainder of his labs are reassuring.  He will require hospitalization and further work-up and management until he can come off of BiPAP and stabilized.  He is agreeable to this plan.  No beds are available here.  We found a bed available at [facility] in the ICU and Alexei Huang MD agreed to accept him for transfer.    I have reviewed the nursing notes.    I have reviewed the findings, diagnosis, plan and need for follow up with the patient.       New Prescriptions    No medications on file       Final diagnoses:   Acute and chronic respiratory failure with hypercapnia (H)   Chronic obstructive pulmonary disease with acute exacerbation (H)       8/21/2021   [facility] EMERGENCY DEPT     Scout Miller MD  08/21/21 [id number]

## 2021-08-22 NOTE — PROGRESS NOTES
I was called by the systems operation center regarding a transfer from [facility].  I spoke with Dr. Miller who would like to transfer [facility] intensive care unit.  Luis is a 56-year-old male with history of COPD, chronic respiratory failure on 2 to 3 L/min of supplemental oxygen continuously, hypertension, hyponatremia, pneumonia, pulmonary hypertension, and appendectomy.  He was vaccinated for COVID-19 in April of this year.  He was hospitalized at [facility] from 6/30/2021 through 7/3/2021 with COPD exacerbation. He stopped smoking at the time of that admission and has not restarted smoking.  He discharged home on a prednisone taper which he completed 2 weeks ago.  He presented to the emergency department for evaluation of dyspnea.  He called paramedics because he could not breathe.  Paramedics arrived and found him to have oxygen saturations in the 80s% range.  He was tachycardic.  He was placed on BiPAP 10/5 and taken to the emergency department for evaluation.  Emergency department evaluation showed tachycardia, hypertension, and tachypnea.  He remained on BiPAP.  Laboratory evaluation showed acidosis on initial venous blood gas with pH of 7.17, PCO2 83, PO2 42, and bicarb of 30.  White blood cell count was 12.5.  Remainder of CBC was unremarkable.  Comprehensive metabolic panel was unremarkable.  BNP was normal at 46.  He was given Solu-Medrol.  He refused nebs because they make him cough.  Repeat venous blood gas was improved (pH 7.29, PCO2 68, PO2 53, and bicarb 32).  Chest x-ray showed stable left lateral mid lung nodule.  There was hyperexpansion.  No definite infiltrate.  Luis remained on BiPAP.  ICU admission was wanted.  There were no beds at [facility] so arrangements have been made to transfer Luis here.  I accepted Luis in transfer.  He will not leave leave [facility] until after 11 PM, I am told, due to staffing here at [facility].

## 2021-08-22 NOTE — PLAN OF CARE
ICU End of Shift Summary.  For vital signs and complete assessments, please see documentation flowsheets.     Pertinent assessments: pt admitted from [facility] for COPD exacerbation. Pt on bipap, tolerating well. Pt denies pain. Lungs clear but diminished, no cough. Pt RECEIVED 1 DOSE OF iv Zithromax, will transition to oral. Pt on IV solumedrol every 8 hours. Pt does not tolerate duonebs, prn albuterol and xopenex ordered.   Major Shift Events: admit from [facility]  Plan (Upcoming Events): continue current cares, wean off bipap to home oxygen of 2-3 liters.   Discharge/Transfer Needs: none    Bedside Shift Report Completed : y  Bedside Safety Check Completed:y

## 2021-08-22 NOTE — PROGRESS NOTES
A BiPAP of  20/8 @ 30% was applied to the pt via the mask for an increase in WOB and/or SOB.  The bridge of the nose is intact with gel pad in place.Pt is tolerating it well. Will continue to monitor and assess the pt's current respiratory status and needs.    Viridiana Reilly, RT

## 2021-08-22 NOTE — PROGRESS NOTES
Pt remains on BIPAP. Current settings: 18/6, 14 and 30%. Sats mid to high 90's BS diminished. RT will continue to monitor.    Viridiana Reilly, RT

## 2021-08-23 LAB
GLUCOSE BLDC GLUCOMTR-MCNC: 102 MG/DL (ref 70–99)
GLUCOSE BLDC GLUCOMTR-MCNC: 112 MG/DL (ref 70–99)
GLUCOSE BLDC GLUCOMTR-MCNC: 114 MG/DL (ref 70–99)
GLUCOSE BLDC GLUCOMTR-MCNC: 115 MG/DL (ref 70–99)
GLUCOSE BLDC GLUCOMTR-MCNC: 119 MG/DL (ref 70–99)
GLUCOSE BLDC GLUCOMTR-MCNC: 134 MG/DL (ref 70–99)

## 2021-08-23 PROCEDURE — 250N000009 HC RX 250: Performed by: INTERNAL MEDICINE

## 2021-08-23 PROCEDURE — 94660 CPAP INITIATION&MGMT: CPT

## 2021-08-23 PROCEDURE — 94640 AIRWAY INHALATION TREATMENT: CPT

## 2021-08-23 PROCEDURE — 999N000157 HC STATISTIC RCP TIME EA 10 MIN

## 2021-08-23 PROCEDURE — 250N000012 HC RX MED GY IP 250 OP 636 PS 637: Performed by: INTERNAL MEDICINE

## 2021-08-23 PROCEDURE — 258N000003 HC RX IP 258 OP 636: Performed by: INTERNAL MEDICINE

## 2021-08-23 PROCEDURE — 250N000011 HC RX IP 250 OP 636: Performed by: INTERNAL MEDICINE

## 2021-08-23 PROCEDURE — 94640 AIRWAY INHALATION TREATMENT: CPT | Mod: 76

## 2021-08-23 PROCEDURE — 120N000001 HC R&B MED SURG/OB

## 2021-08-23 PROCEDURE — 250N000013 HC RX MED GY IP 250 OP 250 PS 637: Performed by: INTERNAL MEDICINE

## 2021-08-23 PROCEDURE — 99233 SBSQ HOSP IP/OBS HIGH 50: CPT | Performed by: INTERNAL MEDICINE

## 2021-08-23 RX ADMIN — AZITHROMYCIN MONOHYDRATE 250 MG: 250 TABLET ORAL at 09:25

## 2021-08-23 RX ADMIN — ENOXAPARIN SODIUM 40 MG: 40 INJECTION SUBCUTANEOUS at 09:21

## 2021-08-23 RX ADMIN — UMECLIDINIUM 1 PUFF: 62.5 AEROSOL, POWDER ORAL at 10:00

## 2021-08-23 RX ADMIN — SODIUM CHLORIDE, POTASSIUM CHLORIDE, SODIUM LACTATE AND CALCIUM CHLORIDE: 600; 310; 30; 20 INJECTION, SOLUTION INTRAVENOUS at 03:22

## 2021-08-23 RX ADMIN — METHYLPREDNISOLONE 62.5 MG: 125 INJECTION, POWDER, LYOPHILIZED, FOR SOLUTION INTRAMUSCULAR; INTRAVENOUS at 11:48

## 2021-08-23 RX ADMIN — METHYLPREDNISOLONE 62.5 MG: 125 INJECTION, POWDER, LYOPHILIZED, FOR SOLUTION INTRAMUSCULAR; INTRAVENOUS at 22:34

## 2021-08-23 RX ADMIN — METHYLPREDNISOLONE 62.5 MG: 125 INJECTION, POWDER, LYOPHILIZED, FOR SOLUTION INTRAMUSCULAR; INTRAVENOUS at 03:23

## 2021-08-23 RX ADMIN — LEVALBUTEROL HYDROCHLORIDE 0.63 MG: 0.63 SOLUTION RESPIRATORY (INHALATION) at 11:42

## 2021-08-23 RX ADMIN — AMLODIPINE BESYLATE 10 MG: 10 TABLET ORAL at 09:19

## 2021-08-23 RX ADMIN — LEVALBUTEROL HYDROCHLORIDE 0.63 MG: 0.63 SOLUTION RESPIRATORY (INHALATION) at 19:21

## 2021-08-23 RX ADMIN — LEVALBUTEROL HYDROCHLORIDE 0.63 MG: 0.63 SOLUTION RESPIRATORY (INHALATION) at 08:30

## 2021-08-23 RX ADMIN — LOSARTAN POTASSIUM 75 MG: 50 TABLET, FILM COATED ORAL at 09:20

## 2021-08-23 ASSESSMENT — ACTIVITIES OF DAILY LIVING (ADL)
ADLS_ACUITY_SCORE: 15

## 2021-08-23 ASSESSMENT — MIFFLIN-ST. JEOR: SCORE: 1301

## 2021-08-23 NOTE — PLAN OF CARE
ICU End of Shift Summary.  For vital signs and complete assessments, please see documentation flowsheets.     Pertinent assessments: AO. Reports headache controlled with tylenol. Afebrile. Tele SR. Continues on BIPAP. Reports SOB at rest. Occ using accessory muscles. Lungs diminished with occ inspiratory wheeze. Voiding in urinal. SBA. Steady on feet.  Major Shift Events: None  Plan (Upcoming Events): Wean from BIPAP as able  Discharge/Transfer Needs: TBD    Bedside Shift Report Completed : Y  Bedside Safety Check Completed: Y

## 2021-08-23 NOTE — PROGRESS NOTES
[facility]    Hospitalist Progress Note  Name: Luis Hernandez    MRN: [id number]  Provider: Kamila Snowden MD  Date of Service: 08/23/2021    Assessment & Plan   Summary of Stay: Luis Hernandez is a 56 year old male who was admitted on 8/22/2021 acute hypoxic and hypercapnic respiratory failure.  Patient's past medical history significant for oxygen dependent COPD, pneumonia, pulmonary hypertension, hypertension, hyponatremia and COVID-19 infection in April 2021.    Continues to require BiPAP with increased work of breathing. We will try to wean from BiPAP    Acute hypoxic and hypercapnic respiratory failure  Chronic respiratory failure on supplemental oxygen (2 to 3 L/min)  Pulmonary hypertension  -There is no clear trigger for exacerbation  -Luis was hospitalized at [facility] for COPD exacerbation from 6/30/2021 through 7/3/2021.  He just finished his prednisone taper 2 weeks ago.  He probably needs a prolonged taper on discharge of my actually do better on a small dose of prednisone chronically.  -Admit as inpatient to the intensive care unit  -Continue rescue BiPAP with settings of 18 and 6: Wean as able  -Continue Solu-Medrol 60 mg IV every 8 hours  -Luis refuses duo nebs.  We agreed to try Xopenex nebs  -Continue prior to admission Spiriva  -AM basic metabolic panel and CBC  -Empiric Zithromax for COPD exacerbation  -Considered congestive heart failure but BNP is normal     Hypertension  -Resume prior to admission amlodipine and Cozaar with hold parameters  -Hold prior to admission Lasix while n.p.o.  -Modest IV fluids with lactated Ringer's at 75 mL/h     COVID-19 PCR testing was negative      DVT Prophylaxis: Enoxaparin (Lovenox) SQ  Code Status: Full Code    Disposition: Expected discharge to be decided      Interval History   Reviewed chart. Continues to have shortness of breath has trouble talking in full sentences. Some underlying cough. No fever or chills. Is willing to  try nasal cannula. We will try to wean BiPAP more than 10 point review of systems was carried out was otherwise negative.    -Data reviewed today: I reviewed all new labs and imaging reports over the last 24 hours. I personally reviewed the EKG tracing showing Sinus and the chest x-ray image(s) showing No acute infiltrates.    Physical Exam   Temp: 97  F (36.1  C) Temp src: Axillary BP: 112/76 Pulse: 63   Resp: 13 SpO2: 97 % O2 Device: BiPAP/CPAP    Vitals:    08/22/21 0130 08/23/21 0315   Weight: 55.2 kg (121 lb 11.1 oz) 55 kg (121 lb 4.1 oz)     Vital Signs with Ranges  Temp:  [97  F (36.1  C)-97.7  F (36.5  C)] 97  F (36.1  C)  Pulse:  [63-96] 63  Resp:  [11-27] 13  BP: ()/() 112/76  FiO2 (%):  [30 %] 30 %  SpO2:  [71 %-100 %] 97 %  I/O last 3 completed shifts:  In: 2085 [P.O.:50; I.V.:2035]  Out: 925 [Urine:925]      GENERAL: Pleasant and cooperative. Moderate acute distress due to dyspnea using BiPAP. able to talk in full sentences while on BiPAP  EYES: Pupils equal and round. No scleral erythema or icterus.  ENT: External ears are normal without deformity. Posterior oropharynx is without erythem, swelling, or exudate.  NECK: Supple. No masses or swelling. No tenderness. Thyroid is normal without mass or tenderness.  CHEST: bilateral wheezes with tight breath sounds to auscultation. Normal breath sounds. No retractions.   CV: Regular rate and rhythm. No JVD. Pulses normal.  ABDOMEN: Bowel sounds present. No tenderness. No masses or hernia.  EXTREMETIES: No clubbing, cyanosis, or ischemia.  SKIN: Warm and dry to touch. No wounds or rashes.  NEUROLOGIC: Strength and sensation are normal. Deep tendon reflexes are normal. Cranial nerves are normal.    Medications     lactated ringers 75 mL/hr at 08/23/21 0600       amLODIPine  10 mg Oral Daily     azithromycin  250 mg Oral Daily     enoxaparin ANTICOAGULANT  40 mg Subcutaneous Q24H     insulin aspart  1-4 Units Subcutaneous Q4H     levalbuterol  0.63 mg  Nebulization 4x Daily     losartan  75 mg Oral Daily     magnesium oxide  400 mg Oral QPM     methylPREDNISolone  62.5 mg Intravenous Q8H     umeclidinium  1 puff Inhalation Daily     Data     Recent Labs   Lab 08/22/21 0221 08/21/21 2156 08/21/21 2019   PHV 7.33 7.29* 7.17*   PO2V 47 53* 42   PCO2V 58* 68* 83*   HCO3V 31* 32* 30*     Recent Labs   Lab 08/22/21 0559 08/21/21 2019   WBC 9.6 12.5*   HGB 12.1* 13.1*   HCT 38.3* 41.3   MCV 96 97    323     Recent Labs   Lab 08/22/21 1955 08/22/21 1628 08/22/21 1234 08/22/21 0559 08/22/21 0221 08/21/21 2019   NA  --   --   --  136  --  140   POTASSIUM  --   --   --  4.3  --  4.7   CHLORIDE  --   --   --  103  --  103   CO2  --   --   --  33*  --  32   ANIONGAP  --   --   --  <1*  --  5   * 115* 120* 132*  --  139*   BUN  --   --   --  16  --  17   CR  --   --   --  0.62* 0.60* 0.72   GFRESTIMATED  --   --   --  >90 >90 >90   MIKE  --   --   --  9.3  --  9.1     No results for input(s): CULT in the last 168 hours.  Recent Labs   Lab 08/21/21  2019   NTBNPI 46     Recent Labs   Lab 08/22/21 1955 08/22/21 1628 08/22/21 1234 08/22/21 0755 08/22/21  0559   * 115* 120* 120* 132*     Recent Labs   Lab 08/22/21 0559 08/21/21 2019   HGB 12.1* 13.1*     Recent Labs   Lab 08/21/21  2019   AST 18   ALT 28   ALKPHOS 42   BILITOTAL 0.3     No results for input(s): INR in the last 168 hours.  No results for input(s): LACT in the last 168 hours.  No results for input(s): LIPASE in the last 168 hours.  Recent Labs   Lab 08/22/21 0559 08/22/21 0221 08/21/21  2019   BUN 16  --  17   CR 0.62* 0.60* 0.72     No results for input(s): TSH in the last 168 hours.  No results for input(s): TROPONIN, TROPI, TROPR in the last 168 hours.    Invalid input(s): TROP, TROPONINIES  No results for input(s): COLOR, APPEARANCE, URINEGLC, URINEBILI, URINEKETONE, SG, UBLD, URINEPH, PROTEIN, UROBILINOGEN, NITRITE, LEUKEST, RBCU, WBCU in the last 168 hours.    No results  found for this or any previous visit (from the past 24 hour(s)).

## 2021-08-23 NOTE — PROGRESS NOTES
"         [facility]  Respiratory Care Note      Pt started out on the BiPAP 16/6 @ 30% and has been transitioned to a 3 LPM NC and is tolerating it well. Will continue to monitor and assess the pt's current respiratory status and needs.     Vital signs:  Temp: 98.2  F (36.8  C) Temp src: Oral BP: (!) 142/97 Pulse: 92   Resp: 11 SpO2: 95 % O2 Device: Nasal cannula Oxygen Delivery: 3 LPM Height: 162.6 cm (5' 4\") Weight: 55 kg (121 lb 4.1 oz)  Estimated body mass index is 20.81 kg/m  as calculated from the following:    Height as of this encounter: 1.626 m (5' 4\").    Weight as of this encounter: 55 kg (121 lb 4.1 oz).    Past Medical History:   Diagnosis Date     Acute on chronic respiratory failure with hypoxia (H) 4/10/2020     Acute on chronic respiratory failure with hypoxia and hypercapnia (H) 4/1/2019     CAP (community acquired pneumonia) 4/14/2020     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)      COPD exacerbation (H) 4/1/2019     COPD exacerbation (H) 2/16/2020     Erectile dysfunction      Hypertension      Hyponatremia 4/1/2019     Pneumonia 4/10/2020       Past Surgical History:   Procedure Laterality Date     APPENDECTOMY      childhood       Family History   Problem Relation Age of Onset     Hypertension Father      Lipids Father      C.A.D. Father      Heart Disease Father      Diabetes Paternal Grandmother      Hypertension Mother      Ovarian Cancer Mother      Breast Cancer Mother        Social History     Tobacco Use     Smoking status: Former Smoker     Packs/day: 2.00     Years: 30.00     Pack years: 60.00     Types: Cigarettes     Smokeless tobacco: Former User   Substance Use Topics     Alcohol use: Yes     Comment: anil Hall RT  [facility]  8/23/2021    "

## 2021-08-23 NOTE — CONSULTS
Pt admitted with COPD exacerbation, noted to have unplanned readmission risk of 19%.  Pt was hospitalized at [facility] for COPD exacerbation from 6/30/2021 through 7/3/2021.  He just finished his prednisone taper 2 weeks ago.  Currently on BiPAP.Care Coordination team is following and will assess for discharge needs when medically appropriate.      Sintia Blackwell RN, BSN, PHN, CTS  Care Coordinator  [facility]  [phone]

## 2021-08-23 NOTE — PROGRESS NOTES
Cross cover:  Notified by nursing regarding concerns for tachycardia  Is very out of the ICU earlier and was tolerating nasal cannula at 3 L  Here for COPD, respiratory failure  On breathing treatments, corticosteroids  Earlier had decreasing O2 sats and now being transition back to BiPAP  Continue to monitor, on cardiac telemetry, check EKG  Stable blood pressure levels, afebrile no nausea, no vomiting no reported chest pain or abdominal pain

## 2021-08-23 NOTE — PROGRESS NOTES
-Pt ha been alert and oriented. Initially he was using accessory muscles with breathing and BIPAP but his breathing is getting more unlabored. He has been switching between NC and BIPAP.  He is down to 3L NC, tolerating well when sitting up  -Continues to have intermittent coughs  - lungs with expiratory wheezes  - prefers inline nebs through the BIPAP  -Given 240 ml apple juice and tolerated well. Swallows without difficulty or spontaneous coughing.   - he seems to be a good candidate to advance diet for dinner  - blood sugars have been stable  -voiding adequately  - normal sinus rhythm/ sinus tachy at times  -skin ok  - no peripheral edema noted

## 2021-08-24 ENCOUNTER — APPOINTMENT (OUTPATIENT)
Dept: CT IMAGING | Facility: CLINIC | Age: 56
End: 2021-08-24
Attending: INTERNAL MEDICINE
Payer: COMMERCIAL

## 2021-08-24 LAB
ANION GAP SERPL CALCULATED.3IONS-SCNC: <1 MMOL/L (ref 3–14)
ATRIAL RATE - MUSE: 108 BPM
BASE EXCESS BLDV CALC-SCNC: 2.5 MMOL/L (ref -7.7–1.9)
BASOPHILS # BLD AUTO: 0 10E3/UL (ref 0–0.2)
BASOPHILS NFR BLD AUTO: 0 %
BUN SERPL-MCNC: 28 MG/DL (ref 7–30)
CALCIUM SERPL-MCNC: 8.7 MG/DL (ref 8.5–10.1)
CHLORIDE BLD-SCNC: 108 MMOL/L (ref 94–109)
CO2 SERPL-SCNC: 32 MMOL/L (ref 20–32)
CREAT SERPL-MCNC: 0.58 MG/DL (ref 0.66–1.25)
DIASTOLIC BLOOD PRESSURE - MUSE: NORMAL MMHG
EOSINOPHIL # BLD AUTO: 0 10E3/UL (ref 0–0.7)
EOSINOPHIL NFR BLD AUTO: 0 %
ERYTHROCYTE [DISTWIDTH] IN BLOOD BY AUTOMATED COUNT: 13.9 % (ref 10–15)
GFR SERPL CREATININE-BSD FRML MDRD: >90 ML/MIN/1.73M2
GLUCOSE BLD-MCNC: 115 MG/DL (ref 70–99)
GLUCOSE BLDC GLUCOMTR-MCNC: 102 MG/DL (ref 70–99)
GLUCOSE BLDC GLUCOMTR-MCNC: 107 MG/DL (ref 70–99)
GLUCOSE BLDC GLUCOMTR-MCNC: 108 MG/DL (ref 70–99)
GLUCOSE BLDC GLUCOMTR-MCNC: 121 MG/DL (ref 70–99)
GLUCOSE BLDC GLUCOMTR-MCNC: 123 MG/DL (ref 70–99)
GLUCOSE BLDC GLUCOMTR-MCNC: 83 MG/DL (ref 70–99)
HCO3 BLDV-SCNC: 29 MMOL/L (ref 21–28)
HCT VFR BLD AUTO: 35.2 % (ref 40–53)
HGB BLD-MCNC: 11.2 G/DL (ref 13.3–17.7)
IMM GRANULOCYTES # BLD: 0.1 10E3/UL
IMM GRANULOCYTES NFR BLD: 1 %
INTERPRETATION ECG - MUSE: NORMAL
LYMPHOCYTES # BLD AUTO: 0.4 10E3/UL (ref 0.8–5.3)
LYMPHOCYTES NFR BLD AUTO: 3 %
MCH RBC QN AUTO: 30.4 PG (ref 26.5–33)
MCHC RBC AUTO-ENTMCNC: 31.8 G/DL (ref 31.5–36.5)
MCV RBC AUTO: 95 FL (ref 78–100)
MONOCYTES # BLD AUTO: 0.3 10E3/UL (ref 0–1.3)
MONOCYTES NFR BLD AUTO: 2 %
NEUTROPHILS # BLD AUTO: 11.9 10E3/UL (ref 1.6–8.3)
NEUTROPHILS NFR BLD AUTO: 94 %
NRBC # BLD AUTO: 0 10E3/UL
NRBC BLD AUTO-RTO: 0 /100
O2/TOTAL GAS SETTING VFR VENT: 3 %
P AXIS - MUSE: 85 DEGREES
PCO2 BLDV: 50 MM HG (ref 40–50)
PH BLDV: 7.37 [PH] (ref 7.32–7.43)
PLATELET # BLD AUTO: 306 10E3/UL (ref 150–450)
PO2 BLDV: 54 MM HG (ref 25–47)
POTASSIUM BLD-SCNC: 4.1 MMOL/L (ref 3.4–5.3)
PR INTERVAL - MUSE: 138 MS
PROCALCITONIN SERPL-MCNC: <0.05 NG/ML
QRS DURATION - MUSE: 88 MS
QT - MUSE: 328 MS
QTC - MUSE: 439 MS
R AXIS - MUSE: 94 DEGREES
RBC # BLD AUTO: 3.69 10E6/UL (ref 4.4–5.9)
SODIUM SERPL-SCNC: 140 MMOL/L (ref 133–144)
SYSTOLIC BLOOD PRESSURE - MUSE: NORMAL MMHG
T AXIS - MUSE: 61 DEGREES
VENTRICULAR RATE- MUSE: 108 BPM
WBC # BLD AUTO: 12.7 10E3/UL (ref 4–11)

## 2021-08-24 PROCEDURE — 94640 AIRWAY INHALATION TREATMENT: CPT

## 2021-08-24 PROCEDURE — 250N000009 HC RX 250: Performed by: INTERNAL MEDICINE

## 2021-08-24 PROCEDURE — 120N000001 HC R&B MED SURG/OB

## 2021-08-24 PROCEDURE — 999N000157 HC STATISTIC RCP TIME EA 10 MIN

## 2021-08-24 PROCEDURE — 250N000013 HC RX MED GY IP 250 OP 250 PS 637: Performed by: INTERNAL MEDICINE

## 2021-08-24 PROCEDURE — 36415 COLL VENOUS BLD VENIPUNCTURE: CPT | Performed by: INTERNAL MEDICINE

## 2021-08-24 PROCEDURE — 71275 CT ANGIOGRAPHY CHEST: CPT

## 2021-08-24 PROCEDURE — 84145 PROCALCITONIN (PCT): CPT | Performed by: INTERNAL MEDICINE

## 2021-08-24 PROCEDURE — 250N000011 HC RX IP 250 OP 636: Performed by: INTERNAL MEDICINE

## 2021-08-24 PROCEDURE — 94660 CPAP INITIATION&MGMT: CPT

## 2021-08-24 PROCEDURE — 99233 SBSQ HOSP IP/OBS HIGH 50: CPT | Performed by: INTERNAL MEDICINE

## 2021-08-24 PROCEDURE — 82803 BLOOD GASES ANY COMBINATION: CPT | Performed by: INTERNAL MEDICINE

## 2021-08-24 PROCEDURE — 999N000105 HC STATISTIC NO DOCUMENTATION TO SUPPORT CHARGE

## 2021-08-24 PROCEDURE — 80048 BASIC METABOLIC PNL TOTAL CA: CPT | Performed by: INTERNAL MEDICINE

## 2021-08-24 PROCEDURE — 85025 COMPLETE CBC W/AUTO DIFF WBC: CPT | Performed by: INTERNAL MEDICINE

## 2021-08-24 PROCEDURE — 94640 AIRWAY INHALATION TREATMENT: CPT | Mod: 76

## 2021-08-24 PROCEDURE — 87077 CULTURE AEROBIC IDENTIFY: CPT | Performed by: INTERNAL MEDICINE

## 2021-08-24 RX ORDER — METHYLPREDNISOLONE SODIUM SUCCINATE 125 MG/2ML
60 INJECTION, POWDER, LYOPHILIZED, FOR SOLUTION INTRAMUSCULAR; INTRAVENOUS EVERY 12 HOURS
Status: DISCONTINUED | OUTPATIENT
Start: 2021-08-25 | End: 2021-08-26 | Stop reason: HOSPADM

## 2021-08-24 RX ORDER — IOPAMIDOL 755 MG/ML
500 INJECTION, SOLUTION INTRAVASCULAR ONCE
Status: COMPLETED | OUTPATIENT
Start: 2021-08-24 | End: 2021-08-24

## 2021-08-24 RX ADMIN — IOPAMIDOL 51 ML: 755 INJECTION, SOLUTION INTRAVENOUS at 14:06

## 2021-08-24 RX ADMIN — MAGNESIUM OXIDE TAB 400 MG (241.3 MG ELEMENTAL MG) 400 MG: 400 (241.3 MG) TAB at 21:53

## 2021-08-24 RX ADMIN — AZITHROMYCIN MONOHYDRATE 250 MG: 250 TABLET ORAL at 09:11

## 2021-08-24 RX ADMIN — UMECLIDINIUM 1 PUFF: 62.5 AEROSOL, POWDER ORAL at 08:47

## 2021-08-24 RX ADMIN — ALBUTEROL SULFATE 2 PUFF: 90 AEROSOL, METERED RESPIRATORY (INHALATION) at 11:52

## 2021-08-24 RX ADMIN — LOSARTAN POTASSIUM 75 MG: 50 TABLET, FILM COATED ORAL at 09:11

## 2021-08-24 RX ADMIN — AMLODIPINE BESYLATE 10 MG: 10 TABLET ORAL at 09:11

## 2021-08-24 RX ADMIN — ALBUTEROL SULFATE 2 PUFF: 90 AEROSOL, METERED RESPIRATORY (INHALATION) at 16:20

## 2021-08-24 RX ADMIN — ACETAMINOPHEN 1000 MG: 500 TABLET, FILM COATED ORAL at 09:10

## 2021-08-24 RX ADMIN — CEFEPIME HYDROCHLORIDE 2 G: 2 INJECTION, POWDER, FOR SOLUTION INTRAVENOUS at 18:26

## 2021-08-24 RX ADMIN — SODIUM CHLORIDE 70 ML: 9 INJECTION, SOLUTION INTRAVENOUS at 14:06

## 2021-08-24 RX ADMIN — ALBUTEROL SULFATE 2 PUFF: 90 AEROSOL, METERED RESPIRATORY (INHALATION) at 19:21

## 2021-08-24 RX ADMIN — ENOXAPARIN SODIUM 40 MG: 40 INJECTION SUBCUTANEOUS at 09:10

## 2021-08-24 RX ADMIN — METHYLPREDNISOLONE 62.5 MG: 125 INJECTION, POWDER, LYOPHILIZED, FOR SOLUTION INTRAMUSCULAR; INTRAVENOUS at 04:18

## 2021-08-24 RX ADMIN — METHYLPREDNISOLONE 62.5 MG: 125 INJECTION, POWDER, LYOPHILIZED, FOR SOLUTION INTRAMUSCULAR; INTRAVENOUS at 12:22

## 2021-08-24 ASSESSMENT — MIFFLIN-ST. JEOR: SCORE: 1313.01

## 2021-08-24 ASSESSMENT — ACTIVITIES OF DAILY LIVING (ADL)
ADLS_ACUITY_SCORE: 15
ADLS_ACUITY_SCORE: 13
DEPENDENT_IADLS:: CLEANING;COOKING;LAUNDRY;SHOPPING;MEAL PREPARATION;MEDICATION MANAGEMENT;TRANSPORTATION

## 2021-08-24 NOTE — PLAN OF CARE
Pertinent assessments: VSS on bipap at 35% FiO2. Afebrile. Tele-ST. Tolerating a 4 gm Sodium diet. Up with SBA. B, 102. A&Ox4.     Major Shift Events: ICU transfer    Treatment Plan: Wean off of bipap, encourage ambulation, tele, BG q4h, Zithromax, Lovenox, nebs, and Solumedrol.

## 2021-08-24 NOTE — PLAN OF CARE
Pertinent assessments: VSS on bipap at 35% FiO2. Afebrile. Tele-ST. Tolerating a 4 gm Sodium diet. Up with SBA. BG/Q4: 102, 108. A&Ox4. Patient slept overnight, denies shortness of breath, pain,nausea,numb/ting.Pt requesting to be taken off of Bipap today. Will continue to monitor.    Major Shift Events: ICU transfer 8/23    Treatment Plan: Wean off of bipap, encourage ambulation, tele, BG q4h, Zithromax, Lovenox, nebs, and Solumedrol.

## 2021-08-24 NOTE — CONSULTS
Care Management Initial Consult    General Information  Assessment completed with: Luis Ceballos  Type of CM/SW Visit: Initial Assessment    Primary Care Provider verified and updated as needed: Yes   Readmission within the last 30 days: no previous admission in last 30 days   Return Category: Exacerbation of disease  Reason for Consult: care coordination/care conference, discharge planning  Advance Care Planning: Advance Care Planning Reviewed: no concerns identified        Communication Assessment  Patient's communication style: spoken language (English or Bilingual)    Hearing Difficulty or Deaf: no   Wear Glasses or Blind: yes    Cognitive  Cognitive/Neuro/Behavioral: WDL                      Living Environment:   People in home: parent(s), other (see comments) (Dad's girlfriend)  Luis Smith Sr  Current living Arrangements: house      Able to return to prior arrangements: yes     Family/Social Support:  Care provided by: parent(s)  Provides care for: no one, unable/limited ability to care for self  Marital Status: Single  Children, Parent(s), Sibling(s)          Description of Support System: Supportive, Involved       Current Resources:   Patient receiving home care services: No     Community Resources: None  Equipment currently used at home: other (see comments) (Inhalers)  Supplies currently used at home: Oxygen Tubing/Supplies    Employment/Financial:  Employment Status: disabled        Financial Concerns: No concerns identified     Lifestyle & Psychosocial Needs:  Social Determinants of Health     Tobacco Use: Medium Risk     Smoking Tobacco Use: Former Smoker     Smokeless Tobacco Use: Former User   Alcohol Use:      Frequency of Alcohol Consumption:      Average Number of Drinks:      Frequency of Binge Drinking:    Financial Resource Strain:      Difficulty of Paying Living Expenses:    Food Insecurity:      Worried About Running Out of Food in the Last Year:      Ran Out of Food in the Last Year:     Transportation Needs:      Lack of Transportation (Medical):      Lack of Transportation (Non-Medical):    Physical Activity:      Days of Exercise per Week:      Minutes of Exercise per Session:    Stress:      Feeling of Stress :    Social Connections:      Frequency of Communication with Friends and Family:      Frequency of Social Gatherings with Friends and Family:      Attends Oriental orthodox Services:      Active Member of Clubs or Organizations:      Attends Club or Organization Meetings:      Marital Status:    Intimate Partner Violence:      Fear of Current or Ex-Partner:      Emotionally Abused:      Physically Abused:      Sexually Abused:    Depression: Not at risk     PHQ-2 Score: 0   Housing Stability:      Unable to Pay for Housing in the Last Year:      Number of Places Lived in the Last Year:      Unstable Housing in the Last Year:      Functional Status:  Prior to admission patient needed assistance:   Dependent ADLs:: Ambulation-no assistive device, Eating  Dependent IADLs:: Cleaning, Cooking, Laundry, Shopping, Meal Preparation, Medication Management, Transportation     Mental Health Status:  Mental Health Status: No Current Concerns       Chemical Dependency Status:  Chemical Dependency Status: No Current Concerns           Values/Beliefs:  Spiritual, Cultural Beliefs, Oriental orthodox Practices, Values that affect care: no      Additional Information:  Patient admitted for COPD exacerbation. He has an unplanned readmission risk of 20%. CM met with patient at bedside. Introduced self & role. Patient lives in a house with his father & his father's girlfriend. His bedroom is upstairs and he has difficulty going up 1 flight of stairs at night to bed. He is disabled and no longer works. Patient stated he quit smoking on 7/1/21. He uses home oxygen through [facility] at 2.5 to 3 LPM. He has inhalers but nebs make it difficult for him to breathe. No issues getting his prescriptions filled at his pharmacy. His  father & his father's girlfriend are very supportive and helpful. He also has a daughter who is looking into living situation where he can live with her and get her assistance with his needs. His father will be transporting him home at discharge.   CM sent referral to [facility] to schedule a follow up appointment at his [facility] clinic in the morning.     CM will continue to follow patient until discharge for any additional needs.     Britt Reilly RN, BSN, CPHN, CM  Inpatient Care Coordination - [facility]  [phone]

## 2021-08-24 NOTE — PROGRESS NOTES
[facility]  Hospitalist Progress Note  Bull Horan MD 08/24/2021    Reason for Stay/active problem list      Acute on chronic hypoxic and hypercapnic respiratory failure    Pulmonary hypertension         Assessment and Plan:        Summary of Stay: Luis Colbert is a 56-year-old male with history of oxygen dependent COPD (2 to 3 L/min continuously), pneumonia, hypertension, hyponatremia, pulmonary hypertension, and appendectomy.  He had COVID-19 vaccination in 4/21.  He presented to [facility] for evaluation of increasing shortness of breath.  He was hospitalized at [facility] from 6/30/2021 through 7/3/2021 with COPD exacerbation.  He stopped smoking at the time of that admission and has not restarted.   He discharged home on a prolonged steroid taper that he completed about 2 weeks ago.  The last several days PTA his breathing seemed to get worse.  He called paramedics because of worsened dyspnea last evening.  When they arrived his oxygen saturations were in the 80s percent.  He was tachycardic.  They placed him on BiPAP.    Upon arrival to [facility] emergency department he was tachycardic, tachypneic, and hypertensive.  He was requiring BiPAP support.    On exam he was tight, wheezy, and and with labored breathing.  Initial venous blood gas showed pH of 7.17 with PCO2 of 83.  This improved to 7.29 with PCO2 of 68 after some time on BiPAP.  White blood cell count was 12.5 and the remainder of his CBC and also his comprehensive metabolic panel were unremarkable.  EKG showed no ischemic changes.  Chest x-ray showed stable left lateral midlung nodule as well as hyperinflation but no clear infiltrate.    Luis refused duo nebs at [facility] because these make him cough.  He was given Solu-Medrol.  BiPAP was titrated.  Admission was wanted with the review beds there.  Arrangements were made to transfer Luis to [facility] intensive care unit for  ongoing care of acute on chronic hypoxic respiratory failure due to COPD exacerbation.      Patient progress during stay: Patient was admitted to intensive care unit at [facility] and was continued on BiPAP, IV steroids, nebulizer treatments as well as empiric azithromycin.  His condition improved and he was transferred to the floor on August 24.          Problem List with Assessment and Plan      1. Acute on chronic hypoxic and hypercapnic respiratory failure: Appears to be multifactorial including COPD exacerbation, underlying severe pulmonary hypertension    Appears to be in respiratory distress at rest, denies chest pain.  Currently on 2 L of oxygen at rest.    Continue steroid with methylprednisolone twice a day, continue Xopenex nebs, empiric azithromycin, consult pulmonology service per patient request.    BiPAP as needed    2. Hypertension on amlodipine and Cozaar as well as Lasix      Continue Cozaar and amlodipine with hold parameter, resume his Lasix    Discontinue IV fluid        Plan for today:    Consult pulmonary service to assist with further recommendations    Get a CT of the chest for further evaluation    Addendum  CT of the chest resulted and this showed no evidence of pulmonary embolism but he has new finding of prominent mucus impaction leading to the right lower lobe bronchus and small airways of the right lower lobe consistent with aspiration.  New curvilinear opacity along the anterior left upper lobe mid chest that could be focal atelectasis versus acute airspace disease.  New indeterminate pulmonary nodule at the left lower lobe also reported.  Patient has coronary artery calcification and emphysema.    Plan    Add IV cefepime for broad coverage of possible aspiration pneumonia        LDA     Access : Peripheral     Miller Catheter: Not present        FEN :    Orders Placed This Encounter      No Added Salt 4 Gram Sodium           Intake/Output Summary (Last 24 hours) at  "8/24/2021 1655  Last data filed at 8/24/2021 1139  Gross per 24 hour   Intake 240 ml   Output 700 ml   Net -460 ml          DVT Prophylaxis:     Enoxaparin (Lovenox) SQ    Code Status:      Full Code    Estimated discharge  disposition and plan: Likely home in the next 2 days if okay with pulmonary service            Interval History (Subjective):        Patient is seen and examined by me today and medical record reviewed.Overnight events noted and care discussed with nursing staff.  Patient was transferred out of ICU.  Is feeling short of breath especially with exertion with significant dyspnea on exertion.  Denies any chest pain.  He is not current smoker.  He does not have pulmonologist and would like to see one here.  He is from [facility].         Physical Exam:        Last Vital Signs:  BP (!) 151/97 (BP Location: Right arm)   Pulse 106   Temp 97.9  F (36.6  C) (Oral)   Resp 18   Ht 1.626 m (5' 4\")   Wt 56.2 kg (123 lb 14.4 oz)   SpO2 95%   BMI 21.27 kg/m      I/O last 3 completed shifts:  In: 330 [P.O.:330]  Out: 700 [Urine:700]  Vitals:    08/22/21 0130 08/23/21 0315 08/24/21 0400   Weight: 55.2 kg (121 lb 11.1 oz) 55 kg (121 lb 4.1 oz) 56.2 kg (123 lb 14.4 oz)       Wt Readings from Last 5 Encounters:   08/24/21 56.2 kg (123 lb 14.4 oz)   08/21/21 55.3 kg (122 lb)   07/05/21 55.7 kg (122 lb 12.8 oz)   07/03/21 56.5 kg (124 lb 9 oz)   05/07/21 59.9 kg (132 lb)        Constitutional: Awake, alert, with mild to moderate respiratory distress at rest.  On 3 L of oxygen     Respiratory:  Decreased breath sounds, no crackles or wheezing.   Cardiovascular: Regular rate and rhythm, normal S1 and S2, and no murmur noted   Abdomen: Normal bowel sounds, soft, non-distended, non-tender   Skin: No new rashes, no cyanosis, dry to touch   Neuro: Alert with  no new focal weakness   Extremities: No edema, normal range of motion   Other(s):        All other systems: Negative          Medications:        All current " medications were reviewed with changes reflected in problem list.         Data:      All new lab and imaging data was reviewed.      Data reviewed today: I reviewed all new labs and imaging results over the last 24 hours. I personally reviewed no images or EKG's today      Recent Labs   Lab 08/24/21  0609 08/22/21  0559 08/21/21 2019   WBC 12.7* 9.6 12.5*   HGB 11.2* 12.1* 13.1*   HCT 35.2* 38.3* 41.3   MCV 95 96 97    299 323     No results for input(s): CULT in the last 168 hours.  Recent Labs   Lab 08/24/21  1623 08/24/21  1213 08/24/21  0803 08/24/21  0609 08/22/21  0559 08/22/21 0221 08/21/21 2019 08/21/21 2019   NA  --   --   --  140 136  --   --  140   POTASSIUM  --   --   --  4.1 4.3  --   --  4.7   CHLORIDE  --   --   --  108 103  --   --  103   CO2  --   --   --  32 33*  --   --  32   ANIONGAP  --   --   --  <1* <1*  --   --  5   * 121* 123* 115* 132*  --    < > 139*   BUN  --   --   --  28 16  --   --  17   CR  --   --   --  0.58* 0.62* 0.60*  --  0.72   GFRESTIMATED  --   --   --  >90 >90 >90  --  >90   MIKE  --   --   --  8.7 9.3  --   --  9.1   MAG  --   --   --   --  2.1  --   --   --    PROTTOTAL  --   --   --   --   --   --   --  7.9   ALBUMIN  --   --   --   --   --   --   --  4.1   BILITOTAL  --   --   --   --   --   --   --  0.3   ALKPHOS  --   --   --   --   --   --   --  42   AST  --   --   --   --   --   --   --  18   ALT  --   --   --   --   --   --   --  28    < > = values in this interval not displayed.       Recent Labs   Lab 08/24/21  1623 08/24/21  1213 08/24/21  0803 08/24/21  0609 08/24/21  0359   * 121* 123* 115* 108*       No results for input(s): INR in the last 168 hours.      No results for input(s): TROPONIN, TROPI, TROPR in the last 168 hours.    Invalid input(s): TROP, TROPONINIES    Recent Results (from the past 48 hour(s))   CT Chest Pulmonary Embolism w Contrast    Narrative    CT CHEST PULMONARY EMBOLISM WITH CONTRAST 8/24/2021 2:12 PM    CLINICAL  HISTORY: PE suspected, high prob, shortness of breath.    TECHNIQUE: CT angiogram chest during arterial phase injection IV  contrast. 2D and 3D MIP reconstructions were performed by the CT  technologist. Dose reduction techniques were used.     CONTRAST: 51mL Isovue-370    COMPARISON: Chest x-ray 8/21/2021, CT chest 5/14/2021.    FINDINGS:  ANGIOGRAM CHEST: Pulmonary arteries are normal caliber and negative  for pulmonary emboli. Thoracic aorta is negative for dissection.    LUNGS AND PLEURA: No effusions. Diffuse emphysema. No acute airspace  disease. There is a new irregular 0.4 cm nodule posterior left lower  lobe series 7 image 202. New curvilinear flat opacity along the  anterior left upper lobe midchest is approximately 3.1 x 0.9 cm series  7 image 114. Stable 0.5 cm right midlung nodule image 153. Stable  linear opacities with calcification at the posterior right upper lobe  image 98. Increasing mucus impaction localizing to the right lower  lobe base and there is prominent mucus impaction at the right lower  lobe bronchus, series 7 image 153.    MEDIASTINUM/AXILLAE: No new adenopathy. No acute mediastinal  abnormality.    CORONARY ARTERY CALCIFICATION: Moderate.    UPPER ABDOMEN: No acute abnormality.    MUSCULOSKELETAL: Unremarkable.      Impression    IMPRESSION:  1.  No evidence for pulmonary embolism.  2.  New finding of prominent mucus impaction leading to the right  lower lobe bronchus and small airways of the right lower lobe. This is  consistent with aspiration.  3.  New curvilinear opacity along the anterior left upper lobe  midchest could just be focal atelectasis but acute airspace disease  including pneumonia not excluded. New indeterminant pulmonary nodule  at the left lower lobe. Recommend follow-up CT chest in 6 months.  There are other stable nodules.  4.  Coronary artery calcifications.  5.  Emphysema.    NBA GARCIA MD         SYSTEM ID:  [id number]       COVID Status:  COVID-19 PCR Results     COVID-19 PCR Results 4/10/20 4/10/20 4/13/20 4/13/20 6/30/21 8/21/21    1748 1748 1720 1720     COVID-19 Virus PCR to [facility] - Result Test received-See reflex to IDDL test SARS CoV2 (COVID-19) Virus RT-PCR  Test received-See reflex to IDDL test SARS CoV2 (COVID-19) Virus RT-PCR      COVID-19 Virus PCR to [facility] - Source Nasopharyngeal  Nasopharyngeal      SARS-CoV-2 Virus Specimen Source  Nasopharyngeal  Nasopharyngeal Nasopharyngeal    SARS-CoV-2 PCR Result  NEGATIVE  NEGATIVE NEGATIVE    SARS CoV2 PCR      Negative      Comments are available for some flowsheets but are not being displayed.         COVID-19 Antibody Results, Testing for Immunity    COVID-19 Antibody Results, Testing for Immunity   No data to display.              Disclaimer: This note consists of symbols derived from keyboarding, dictation and/or voice recognition software. As a result, there may be errors in the script that have gone undetected. Please consider this when interpreting information found in this chart.

## 2021-08-24 NOTE — PLAN OF CARE
To Do:  End of Shift Summary  For vital signs and complete assessments, please see documentation flowsheets.     Pertinent assessments: VSS on NC at 3 L. Tele-ST. Tolerating a 4 gm Sodium diet. Up with SBA. BG/Q4: last .  A&Ox4. Patient denies pain,nausea,numb/ting. Will continue to monitor.    Major Shift Events: CT at 1400 to r/o pulm embolism    Treatment Plan: Encourage ambulation, tele, BG q4h, Zithromax, Lovenox, openex nebs, and Solumedrol.

## 2021-08-24 NOTE — PROGRESS NOTES
"Patient remains on a BiPAP of  18/6 @ 35% via the mask for an increase in WOB and/or SOB. The bridge of the nose skin integrity is intact with gel pad in place. Pt is tolerating it well. Will continue to monitor and assess the pt's current respiratory status and needs.    BP (!) 137/94 (BP Location: Right arm)   Pulse 100   Temp 98.6  F (37  C) (Axillary)   Resp 18   Ht 1.626 m (5' 4\")   Wt 56.2 kg (123 lb 14.4 oz)   SpO2 95%   BMI 21.27 kg/m          Stephen Ballesteros, RT    "

## 2021-08-25 ENCOUNTER — PATIENT OUTREACH (OUTPATIENT)
Dept: CARE COORDINATION | Facility: CLINIC | Age: 56
End: 2021-08-25

## 2021-08-25 DIAGNOSIS — J44.1 COPD EXACERBATION (H): Primary | ICD-10-CM

## 2021-08-25 LAB
ANION GAP SERPL CALCULATED.3IONS-SCNC: <1 MMOL/L (ref 3–14)
BUN SERPL-MCNC: 25 MG/DL (ref 7–30)
CALCIUM SERPL-MCNC: 8.9 MG/DL (ref 8.5–10.1)
CHLORIDE BLD-SCNC: 107 MMOL/L (ref 94–109)
CO2 SERPL-SCNC: 32 MMOL/L (ref 20–32)
CREAT SERPL-MCNC: 0.6 MG/DL (ref 0.66–1.25)
ERYTHROCYTE [DISTWIDTH] IN BLOOD BY AUTOMATED COUNT: 13.9 % (ref 10–15)
GFR SERPL CREATININE-BSD FRML MDRD: >90 ML/MIN/1.73M2
GLUCOSE BLD-MCNC: 117 MG/DL (ref 70–99)
GLUCOSE BLDC GLUCOMTR-MCNC: 111 MG/DL (ref 70–99)
GLUCOSE BLDC GLUCOMTR-MCNC: 114 MG/DL (ref 70–99)
GLUCOSE BLDC GLUCOMTR-MCNC: 116 MG/DL (ref 70–99)
GLUCOSE BLDC GLUCOMTR-MCNC: 125 MG/DL (ref 70–99)
GLUCOSE BLDC GLUCOMTR-MCNC: 149 MG/DL (ref 70–99)
GLUCOSE BLDC GLUCOMTR-MCNC: 84 MG/DL (ref 70–99)
GLUCOSE BLDC GLUCOMTR-MCNC: 96 MG/DL (ref 70–99)
HCT VFR BLD AUTO: 40 % (ref 40–53)
HGB BLD-MCNC: 12.5 G/DL (ref 13.3–17.7)
MCH RBC QN AUTO: 30.1 PG (ref 26.5–33)
MCHC RBC AUTO-ENTMCNC: 31.3 G/DL (ref 31.5–36.5)
MCV RBC AUTO: 96 FL (ref 78–100)
PLATELET # BLD AUTO: 325 10E3/UL (ref 150–450)
POTASSIUM BLD-SCNC: 4.1 MMOL/L (ref 3.4–5.3)
RBC # BLD AUTO: 4.15 10E6/UL (ref 4.4–5.9)
SODIUM SERPL-SCNC: 139 MMOL/L (ref 133–144)
WBC # BLD AUTO: 13.8 10E3/UL (ref 4–11)

## 2021-08-25 PROCEDURE — 80048 BASIC METABOLIC PNL TOTAL CA: CPT | Performed by: INTERNAL MEDICINE

## 2021-08-25 PROCEDURE — 250N000013 HC RX MED GY IP 250 OP 250 PS 637: Performed by: INTERNAL MEDICINE

## 2021-08-25 PROCEDURE — 94640 AIRWAY INHALATION TREATMENT: CPT | Mod: 76

## 2021-08-25 PROCEDURE — 999N000157 HC STATISTIC RCP TIME EA 10 MIN

## 2021-08-25 PROCEDURE — 99222 1ST HOSP IP/OBS MODERATE 55: CPT | Performed by: STUDENT IN AN ORGANIZED HEALTH CARE EDUCATION/TRAINING PROGRAM

## 2021-08-25 PROCEDURE — 94640 AIRWAY INHALATION TREATMENT: CPT

## 2021-08-25 PROCEDURE — 250N000011 HC RX IP 250 OP 636: Performed by: INTERNAL MEDICINE

## 2021-08-25 PROCEDURE — 85027 COMPLETE CBC AUTOMATED: CPT | Performed by: INTERNAL MEDICINE

## 2021-08-25 PROCEDURE — 120N000001 HC R&B MED SURG/OB

## 2021-08-25 PROCEDURE — 36415 COLL VENOUS BLD VENIPUNCTURE: CPT | Performed by: INTERNAL MEDICINE

## 2021-08-25 PROCEDURE — 250N000009 HC RX 250: Performed by: INTERNAL MEDICINE

## 2021-08-25 PROCEDURE — 94660 CPAP INITIATION&MGMT: CPT

## 2021-08-25 PROCEDURE — 99233 SBSQ HOSP IP/OBS HIGH 50: CPT | Performed by: INTERNAL MEDICINE

## 2021-08-25 PROCEDURE — 250N000013 HC RX MED GY IP 250 OP 250 PS 637: Performed by: STUDENT IN AN ORGANIZED HEALTH CARE EDUCATION/TRAINING PROGRAM

## 2021-08-25 RX ORDER — HYDRALAZINE HYDROCHLORIDE 20 MG/ML
10 INJECTION INTRAMUSCULAR; INTRAVENOUS EVERY 4 HOURS PRN
Status: DISCONTINUED | OUTPATIENT
Start: 2021-08-25 | End: 2021-08-26 | Stop reason: HOSPADM

## 2021-08-25 RX ORDER — LEVALBUTEROL INHALATION SOLUTION 0.63 MG/3ML
0.63 SOLUTION RESPIRATORY (INHALATION) EVERY 4 HOURS PRN
Status: DISCONTINUED | OUTPATIENT
Start: 2021-08-25 | End: 2021-08-26 | Stop reason: HOSPADM

## 2021-08-25 RX ORDER — LORAZEPAM 0.5 MG/1
0.5 TABLET ORAL 2 TIMES DAILY PRN
Status: DISCONTINUED | OUTPATIENT
Start: 2021-08-25 | End: 2021-08-26 | Stop reason: HOSPADM

## 2021-08-25 RX ADMIN — CEFEPIME HYDROCHLORIDE 2 G: 2 INJECTION, POWDER, FOR SOLUTION INTRAVENOUS at 17:33

## 2021-08-25 RX ADMIN — ALBUTEROL SULFATE 2 PUFF: 90 AEROSOL, METERED RESPIRATORY (INHALATION) at 20:41

## 2021-08-25 RX ADMIN — LORAZEPAM 0.5 MG: 0.5 TABLET ORAL at 21:21

## 2021-08-25 RX ADMIN — AZITHROMYCIN MONOHYDRATE 250 MG: 250 TABLET ORAL at 08:43

## 2021-08-25 RX ADMIN — CEFEPIME HYDROCHLORIDE 2 G: 2 INJECTION, POWDER, FOR SOLUTION INTRAVENOUS at 08:36

## 2021-08-25 RX ADMIN — LORAZEPAM 0.5 MG: 0.5 TABLET ORAL at 02:46

## 2021-08-25 RX ADMIN — ENOXAPARIN SODIUM 40 MG: 40 INJECTION SUBCUTANEOUS at 08:42

## 2021-08-25 RX ADMIN — LEVALBUTEROL HYDROCHLORIDE 0.63 MG: 0.63 SOLUTION RESPIRATORY (INHALATION) at 02:00

## 2021-08-25 RX ADMIN — METHYLPREDNISOLONE 62.5 MG: 125 INJECTION, POWDER, LYOPHILIZED, FOR SOLUTION INTRAMUSCULAR; INTRAVENOUS at 00:35

## 2021-08-25 RX ADMIN — FLUTICASONE FUROATE AND VILANTEROL TRIFENATATE 1 PUFF: 200; 25 POWDER RESPIRATORY (INHALATION) at 13:44

## 2021-08-25 RX ADMIN — ALBUTEROL SULFATE 2 PUFF: 90 AEROSOL, METERED RESPIRATORY (INHALATION) at 07:36

## 2021-08-25 RX ADMIN — UMECLIDINIUM 1 PUFF: 62.5 AEROSOL, POWDER ORAL at 07:36

## 2021-08-25 RX ADMIN — AMLODIPINE BESYLATE 10 MG: 10 TABLET ORAL at 08:42

## 2021-08-25 RX ADMIN — LOSARTAN POTASSIUM 75 MG: 50 TABLET, FILM COATED ORAL at 08:43

## 2021-08-25 RX ADMIN — METHYLPREDNISOLONE 62.5 MG: 125 INJECTION, POWDER, LYOPHILIZED, FOR SOLUTION INTRAMUSCULAR; INTRAVENOUS at 12:12

## 2021-08-25 RX ADMIN — MAGNESIUM OXIDE TAB 400 MG (241.3 MG ELEMENTAL MG) 400 MG: 400 (241.3 MG) TAB at 21:21

## 2021-08-25 RX ADMIN — CEFEPIME HYDROCHLORIDE 2 G: 2 INJECTION, POWDER, FOR SOLUTION INTRAVENOUS at 01:29

## 2021-08-25 ASSESSMENT — ACTIVITIES OF DAILY LIVING (ADL)
ADLS_ACUITY_SCORE: 13
DEPENDENT_IADLS:: CLEANING;COOKING;LAUNDRY;SHOPPING;MEAL PREPARATION;MEDICATION MANAGEMENT;TRANSPORTATION
ADLS_ACUITY_SCORE: 13

## 2021-08-25 ASSESSMENT — MIFFLIN-ST. JEOR: SCORE: 1309.83

## 2021-08-25 NOTE — PLAN OF CARE
Pertinent assessments: A&O. 2.5 L O2, BL for pt. Dyspnea on exertion, LS diminished. Denies pain. Abrasion noted on nose, open to air.     Major Shift Events: uneventful    Treatment Plan: Encourage ambulation, BG q4h, Zithromax, Cefepime, Lovenox, Xopenex PRN, Breo ellipta and Solumedrol.

## 2021-08-25 NOTE — PLAN OF CARE
Pertinent assessments: VSS on NC at 3 L. Tele-ST. Tolerating a 4 gm Sodium diet. Up with SBA. Dyspnea on exertion. LS diminished. A&Ox4. Denies pain.    Major Shift Events: IV Cefepime added.    Treatment Plan: Encourage ambulation, tele, BG q4h, Zithromax, Cefepime, Lovenox, xopenex nebs, and Solumedrol.

## 2021-08-25 NOTE — TELEPHONE ENCOUNTER
I have done a referral to pulmonary.  We will have one starting at [place] so I think he should be able to get an appt.  Dean Mariee MD  Family Medicine

## 2021-08-25 NOTE — PROGRESS NOTES
Clinic Care Coordination Contact  Ambulatory Care Coordination to Inpatient Care Management   Hand-In Communication    Date:  August 25, 2021  Name: Luis Hernandez is enrolled in Ambulatory Care Coordination program and I am the Lead Care Coordinator.  CC Contact Information: Epic InVitalMedixsket + phone  Payor Source: Payor: BLUE PLUS / Plan: BLUE PLUS [employer] / Product Type: HMO /   Current services in place:     Please see the CC Snaphot and Care Management Flowsheets for specific  details of this Luis Hernandez care plan.   Additional details/specific concerns r/t this admission:    No additional information to share.  Patient new to clinic care coordination     I will follow this admission in Epic. Please feel free to contact me with questions or for further collaboration in discharge planning.  [facility]   India Andrade RN, Care Coordinator   [facility]'s   E-mail [email or web address]   [phone]

## 2021-08-25 NOTE — PROGRESS NOTES
"A BiPAP of  18/6 @ 35% was applied to the pt via the mask for an increase in WOB and/or SOB. Sore noted at nose bridge and RN is informed. A gel pad is placed on nose-bridge for the mask. Pt given xopenex neb inline. Pt feels very anxious at moment. Will continue to monitor and assess the pt's current respiratory status and needs.    BP (!) 156/100 (BP Location: Right arm)   Pulse 100   Temp 97.9  F (36.6  C) (Oral)   Resp 16   Ht 1.626 m (5' 4\")   Wt 56.2 kg (123 lb 14.4 oz)   SpO2 98%   BMI 21.27 kg/m            Stephen Ballesteros, RT    "

## 2021-08-25 NOTE — PLAN OF CARE
Pertinent assessments: A&Ox4, VSS. NC on 2.5 L O2, dyspnea on exertion. LS diminished. Up Ind. Denies pain. Abrasion noted on nose, open to air.     Major Shift Events: uneventful    Treatment Plan: Encourage ambulation, BG q4h, Zithromax, Cefepime, Lovenox, Xopenex PRN, Breo ellipta and Solumedrol.

## 2021-08-25 NOTE — PROGRESS NOTES
[facility]  Hospitalist Progress Note  Bull Horan MD 08/25/2021    Reason for Stay/active problem list      Acute on chronic hypoxic and hypercapnic respiratory failure    Pulmonary hypertension         Assessment and Plan:        Summary of Stay: Luis Colbert is a 56-year-old male with history of oxygen dependent COPD (2 to 3 L/min continuously), pneumonia, hypertension, hyponatremia, pulmonary hypertension, and appendectomy.  He had COVID-19 vaccination in 4/21.  He presented to [facility] for evaluation of increasing shortness of breath.  He was hospitalized at [facility] from 6/30/2021 through 7/3/2021 with COPD exacerbation.  He stopped smoking at the time of that admission and has not restarted.   He discharged home on a prolonged steroid taper that he completed about 2 weeks ago.  The last several days PTA his breathing seemed to get worse.  He called paramedics because of worsened dyspnea last evening.  When they arrived his oxygen saturations were in the 80s percent.  He was tachycardic.  They placed him on BiPAP.    Upon arrival to [facility] emergency department he was tachycardic, tachypneic, and hypertensive.  He was requiring BiPAP support.    On exam he was tight, wheezy, and and with labored breathing.  Initial venous blood gas showed pH of 7.17 with PCO2 of 83.  This improved to 7.29 with PCO2 of 68 after some time on BiPAP.  White blood cell count was 12.5 and the remainder of his CBC and also his comprehensive metabolic panel were unremarkable.  EKG showed no ischemic changes.  Chest x-ray showed stable left lateral midlung nodule as well as hyperinflation but no clear infiltrate.    Luis refused duo nebs at [facility] because these make him cough.  He was given Solu-Medrol.  BiPAP was titrated.  Admission was wanted with the review beds there.  Arrangements were made to transfer Luis to [facility] intensive care unit for  ongoing care of acute on chronic hypoxic respiratory failure due to COPD exacerbation.      Patient progress during stay: Patient was admitted to intensive care unit at [facility] and was continued on BiPAP, IV steroids, nebulizer treatments as well as empiric azithromycin.  His condition improved and he was transferred to the floor on August 24.          Problem List with Assessment and Plan      1. Acute on chronic hypoxic and hypercapnic respiratory failure: Appears to be multifactorial including COPD exacerbation, underlying severe pulmonary hypertension    Patient is better today on his baseline 3 L of oxygen.  Looks much better.  He was seen by pulmonary service recommendation obtained.  Dr. Rodriguez's recommendation appreciated    Continue antibiotic, monitor cultures, change Xopenex to as needed.    BiPAP as needed    2. Hypertension on amlodipine and Cozaar as well as Lasix      Continue Cozaar and amlodipine with hold parameter, resume his Lasix    3.  Concern for aspiration pneumonia: Continue cefepime and azithromycin      Plan for today:    Consult pulmonary service to assist with further recommendations    Get a CT of the chest for further evaluation    Plan    Continue current antibiotic, steroid and monitor closely.          LDA     Access : Peripheral     Miller Catheter: Not present        FEN :    Orders Placed This Encounter      No Added Salt 4 Gram Sodium           Intake/Output Summary (Last 24 hours) at 8/24/2021 1655  Last data filed at 8/24/2021 1139  Gross per 24 hour   Intake 240 ml   Output 700 ml   Net -460 ml          DVT Prophylaxis:     Enoxaparin (Lovenox) SQ    Code Status:      Full Code    Estimated discharge  disposition and plan: Likely home tomorrow with steroid taper          Interval History (Subjective):        Patient is seen and examined by me today and medical record reviewed.Overnight events noted and care discussed with nursing staff.  Patient feels better  "today still short of breath especially with movement with heart rate also fast.  He was seen by pulmonary service and input is appreciated       Physical Exam:        Last Vital Signs:  BP (!) 142/90 (BP Location: Right arm)   Pulse 100   Temp 97.9  F (36.6  C) (Oral)   Resp 16   Ht 1.626 m (5' 4\")   Wt 55.9 kg (123 lb 3.2 oz)   SpO2 98%   BMI 21.15 kg/m      I/O last 3 completed shifts:  In: 240 [P.O.:240]  Out: 1315 [Urine:1315]  Vitals:    08/22/21 0130 08/23/21 0315 08/24/21 0400 08/25/21 0604   Weight: 55.2 kg (121 lb 11.1 oz) 55 kg (121 lb 4.1 oz) 56.2 kg (123 lb 14.4 oz) 55.9 kg (123 lb 3.2 oz)       Wt Readings from Last 5 Encounters:   08/25/21 55.9 kg (123 lb 3.2 oz)   08/21/21 55.3 kg (122 lb)   07/05/21 55.7 kg (122 lb 12.8 oz)   07/03/21 56.5 kg (124 lb 9 oz)   05/07/21 59.9 kg (132 lb)        Constitutional: Awake, alert, with mild to moderate respiratory distress at rest.  On 3 L of oxygen     Respiratory:  Decreased breath sounds, no crackles or wheezing.   Cardiovascular: Regular rate and rhythm, normal S1 and S2, and no murmur noted   Abdomen: Normal bowel sounds, soft, non-distended, non-tender   Skin: No new rashes, no cyanosis, dry to touch   Neuro: Alert with  no new focal weakness   Extremities: No edema, normal range of motion   Other(s):        All other systems: Negative          Medications:        All current medications were reviewed with changes reflected in problem list.         Data:      All new lab and imaging data was reviewed.      Data reviewed today: I reviewed all new labs and imaging results over the last 24 hours. I personally reviewed no images or EKG's today      Recent Labs   Lab 08/25/21  0618 08/24/21  0609 08/22/21  0559   WBC 13.8* 12.7* 9.6   HGB 12.5* 11.2* 12.1*   HCT 40.0 35.2* 38.3*   MCV 96 95 96    306 299     No results for input(s): CULT in the last 168 hours.  Recent Labs   Lab 08/25/21  0857 08/25/21  0618 08/25/21  0503 08/24/21  0609 " 08/22/21  0559 08/22/21  0221 08/21/21 2019   NA  --  139  --  140 136  --  140   POTASSIUM  --  4.1  --  4.1 4.3  --  4.7   CHLORIDE  --  107  --  108 103  --  103   CO2  --  32  --  32 33*  --  32   ANIONGAP  --  <1*  --  <1* <1*  --  5   * 117* 116* 115* 132*   < > 139*   BUN  --  25  --  28 16  --  17   CR  --  0.60*  --  0.58* 0.62*  --  0.72   GFRESTIMATED  --  >90  --  >90 >90  --  >90   MIKE  --  8.9  --  8.7 9.3  --  9.1   MAG  --   --   --   --  2.1  --   --    PROTTOTAL  --   --   --   --   --   --  7.9   ALBUMIN  --   --   --   --   --   --  4.1   BILITOTAL  --   --   --   --   --   --  0.3   ALKPHOS  --   --   --   --   --   --  42   AST  --   --   --   --   --   --  18   ALT  --   --   --   --   --   --  28    < > = values in this interval not displayed.       Recent Labs   Lab 08/25/21  0857 08/25/21  0618 08/25/21  0503 08/25/21  0034 08/24/21 2059   * 117* 116* 96 83       No results for input(s): INR in the last 168 hours.      No results for input(s): TROPONIN, TROPI, TROPR in the last 168 hours.    Invalid input(s): TROP, TROPONINIES    Recent Results (from the past 48 hour(s))   CT Chest Pulmonary Embolism w Contrast    Narrative    CT CHEST PULMONARY EMBOLISM WITH CONTRAST 8/24/2021 2:12 PM    CLINICAL HISTORY: PE suspected, high prob, shortness of breath.    TECHNIQUE: CT angiogram chest during arterial phase injection IV  contrast. 2D and 3D MIP reconstructions were performed by the CT  technologist. Dose reduction techniques were used.     CONTRAST: 51mL Isovue-370    COMPARISON: Chest x-ray 8/21/2021, CT chest 5/14/2021.    FINDINGS:  ANGIOGRAM CHEST: Pulmonary arteries are normal caliber and negative  for pulmonary emboli. Thoracic aorta is negative for dissection.    LUNGS AND PLEURA: No effusions. Diffuse emphysema. No acute airspace  disease. There is a new irregular 0.4 cm nodule posterior left lower  lobe series 7 image 202. New curvilinear flat opacity along  the  anterior left upper lobe midchest is approximately 3.1 x 0.9 cm series  7 image 114. Stable 0.5 cm right midlung nodule image 153. Stable  linear opacities with calcification at the posterior right upper lobe  image 98. Increasing mucus impaction localizing to the right lower  lobe base and there is prominent mucus impaction at the right lower  lobe bronchus, series 7 image 153.    MEDIASTINUM/AXILLAE: No new adenopathy. No acute mediastinal  abnormality.    CORONARY ARTERY CALCIFICATION: Moderate.    UPPER ABDOMEN: No acute abnormality.    MUSCULOSKELETAL: Unremarkable.      Impression    IMPRESSION:  1.  No evidence for pulmonary embolism.  2.  New finding of prominent mucus impaction leading to the right  lower lobe bronchus and small airways of the right lower lobe. This is  consistent with aspiration.  3.  New curvilinear opacity along the anterior left upper lobe  midchest could just be focal atelectasis but acute airspace disease  including pneumonia not excluded. New indeterminant pulmonary nodule  at the left lower lobe. Recommend follow-up CT chest in 6 months.  There are other stable nodules.  4.  Coronary artery calcifications.  5.  Emphysema.    NBA GARCIA MD         SYSTEM ID:  [id number]       COVID Status:  COVID-19 PCR Results    COVID-19 PCR Results 4/10/20 4/10/20 4/13/20 4/13/20 6/30/21 8/21/21    1748 1748 1720 1720     COVID-19 Virus PCR to [facility] - Result Test received-See reflex to IDDL test SARS CoV2 (COVID-19) Virus RT-PCR  Test received-See reflex to IDDL test SARS CoV2 (COVID-19) Virus RT-PCR      COVID-19 Virus PCR to [facility] - Source Nasopharyngeal  Nasopharyngeal      SARS-CoV-2 Virus Specimen Source  Nasopharyngeal  Nasopharyngeal Nasopharyngeal    SARS-CoV-2 PCR Result  NEGATIVE  NEGATIVE NEGATIVE    SARS CoV2 PCR      Negative      Comments are available for some flowsheets but are not being displayed.         COVID-19 Antibody Results, Testing for Immunity    COVID-19 Antibody  Results, Testing for Immunity   No data to display.              Disclaimer: This note consists of symbols derived from keyboarding, dictation and/or voice recognition software. As a result, there may be errors in the script that have gone undetected. Please consider this when interpreting information found in this chart.

## 2021-08-25 NOTE — PLAN OF CARE
To Do:  End of Shift Summary  For vital signs and complete assessments, please see documentation flowsheets.     Pertinent assessments: A&Ox4, DBP in the 100's, PRN Hydralazine ordered for SBP above 180. Pt had an anxious episode, pt on 4L of O2 via nsal cannula requested BiPAP, orders received for Ativan for anxiety. Sore noted at the bridge of nose. BP rechecked 143/97.    Major Shift Events: Hydralazine and Ativan added.     Treatment Plan: Encourage ambulation, BG q4h, Zithromax, Cefepime, Lovenox, xopenex nebs, and Solumedrol.

## 2021-08-25 NOTE — PROGRESS NOTES
Care Management Follow Up    Length of Stay (days): 3    Expected Discharge Date: 08/26/2021     Concerns to be Addressed: all concerns addressed in this encounter, care coordination/care conferences, discharge planning     Patient plan of care discussed at interdisciplinary rounds: Yes    Anticipated Discharge Disposition: Home     Anticipated Discharge Services: None  Anticipated Discharge DME: Oxygen    Patient/family educated on Medicare website which has current facility and service quality ratings: no  Education Provided on the Discharge Plan:  Yes  Patient/Family in Agreement with the Plan: yes    Referrals Placed by CM/SW: External Care Coordination  Private pay costs discussed: Not applicable    Additional Information:  Per Pulmonology progress note, patient needs to follow up with Dr. Yamilet Cuellar in clinic on Thursdays in [place]. Contacted the [facility] ([phone]) to schedule f/u appointment. Was transferred to the Pulmonology Clinic. The Pulmonology clinic is only open on Monday, Wed, Thur & Friday. LM with patient's information and CM's contact information to schedule appointment. Await return call.     CM will continue to follow patient until discharge for any additional needs.     Britt Reilly, RN, BSN, CPHN, CM  Inpatient Care Coordination - [facility]  [phone]

## 2021-08-25 NOTE — CONSULTS
Pulmonary Consult Note    Assessment and Recommendations:  56M w/ GOLD 4D COPD on home O2 admitted w/ COPD exacerbation. Patient has been having recurrent exacerbations the past several months which appear related to overall very severe disease and perhaps transient allergic exposures. He is currently on maximal inhaler therapy, at home, with LABA/ICS/LAMA and prn XIOMARA. At this time, would continue maximal inhaler therapy, antibiotic course, and steroids. Patient will need a pulmonary appointment on discharge, he is a good candidate for alternative therapies, such as roflumilast.     - continue empiric course of ABx   - f/u SCx results for narrowing ABx  - continue umeclidinium (substitute for home tiotropium)  - added home fluticasone-salmeterol (ordered)  - continue methylpred, will taper if continues to improve  - on discharge recommend prolonged steroid taper: 40mg x 5d, 30mg x 5d, 20mg x 5d, 10mg x 5d   - alpha-1-antitrypsin testing (ordered)  - recommend outpatient sleep study w/ nocturnal oximetry  - patient should follow-up w/ pulmonary on discharge w/ repeat PFTs, my colleague Dr. Yamilet Cuellar has clinic on Thursdays in [place], where patient would like to be seen    Pulmonary will continue to follow.     Summary:  56M COPD on home O2, HTN, hyponatremia, pHTN admitted 8/22 w/ SOB and acute on chronic hypoxemic respiratory failure 2/2 COPD exacerbation. Pt w/ recent admission at [facility] 6/30-7/3 for same. Had stopped smoking at that time and has not restarted. Discharged on prolonged steroid taper. Several days PTA had worsening dyspnea. BIBEMS to [facility]. Hypoxemic to 80s. Initial VBG 7.17/83. Started on BiPAP and steroids. Transferred to [facility] ICU. Started on empiric azithromycin. Transferred out of ICU 8/24.     FEV1 23% in 2013. Compliant w/ home Advair and Spiriva. Typically uses albuterol 3x/daily. Has home action plan of prednisone and doxy which he has had to use twice. Has been hospitalized three  "times w/ exacerbation. Using 2.5-3L home O2, mostly w/ exertion, does not wear at night. Prior grocery store . Has a 70+ pack year history. No other obvious exposures. Has one dog. Has seasonal allergies. Has COVID and flu vaccine.     10 point review of systems negative, aside from that mentioned above    BP (!) 140/93 (BP Location: Right arm)   Pulse 97   Temp 98  F (36.7  C) (Oral)   Resp 16   Ht 1.626 m (5' 4\")   Wt 55.9 kg (123 lb 3.2 oz)   SpO2 96%   BMI 21.15 kg/m    Gen: sitting in bed, NAD  HEENT: anicteric  Card: RRR  Pulm: diffusely diminished b/l  Abd: soft, NTND  MSK: no edema  Skin: no obvious rash  Psych: normal affect  Neuro: alert and oriented, answering questions appropriately     Labs: personally reviewed  Abs eos 700 8/21/21    Imaging: personally reviewed    Past Medical History:   Diagnosis Date     Acute on chronic respiratory failure with hypoxia (H) 4/10/2020     Acute on chronic respiratory failure with hypoxia and hypercapnia (H) 4/1/2019     CAP (community acquired pneumonia) 4/14/2020     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)      COPD exacerbation (H) 4/1/2019     COPD exacerbation (H) 2/16/2020     Erectile dysfunction      Hypertension      Hyponatremia 4/1/2019     Pneumonia 4/10/2020       Past Surgical History:   Procedure Laterality Date     APPENDECTOMY      childhood       Family History   Problem Relation Age of Onset     Hypertension Father      Lipids Father      C.A.D. Father      Heart Disease Father      Diabetes Paternal Grandmother      Hypertension Mother      Ovarian Cancer Mother      Breast Cancer Mother        Social History     Socioeconomic History     Marital status: Single     Spouse name: Not on file     Number of children: Not on file     Years of education: Not on file     Highest education level: Not on file   Occupational History     Not on file   Tobacco Use     Smoking status: Former Smoker     Packs/day: 2.00 "     Years: 30.00     Pack years: 60.00     Types: Cigarettes     Smokeless tobacco: Former User   Substance and Sexual Activity     Alcohol use: Yes     Comment: rare     Drug use: No     Sexual activity: Yes     Partners: Female   Other Topics Concern     Parent/sibling w/ CABG, MI or angioplasty before 65F 55M? No   Social History Narrative    The patient has a 60+ pk yr tobacco hx.  He has has no active use, quit Jan 2014.  Alcohol use is <1 alcoholic drinks per week.  He denies use of recreational drugs.  He is employed. [employer].  Works nights.   The patient is .  Has 1 child.Hot Tub Exposure: NORecent Travel: NO Hx of incarceration:  NOBird Exposure:   NOAnimal Exposure:  NOInhalation Exposure:  NO     Social Determinants of Health     Financial Resource Strain:      Difficulty of Paying Living Expenses:    Food Insecurity:      Worried About Running Out of Food in the Last Year:      Ran Out of Food in the Last Year:    Transportation Needs:      Lack of Transportation (Medical):      Lack of Transportation (Non-Medical):    Physical Activity:      Days of Exercise per Week:      Minutes of Exercise per Session:    Stress:      Feeling of Stress :    Social Connections:      Frequency of Communication with Friends and Family:      Frequency of Social Gatherings with Friends and Family:      Attends Rastafari Services:      Active Member of Clubs or Organizations:      Attends Club or Organization Meetings:      Marital Status:    Intimate Partner Violence:      Fear of Current or Ex-Partner:      Emotionally Abused:      Physically Abused:      Sexually Abused:        Wild Brewer MD  Pulmonary and Critical Care Medicine  [facility]

## 2021-08-26 VITALS
RESPIRATION RATE: 16 BRPM | HEIGHT: 64 IN | WEIGHT: 123.2 LBS | OXYGEN SATURATION: 95 % | HEART RATE: 96 BPM | TEMPERATURE: 98 F | BODY MASS INDEX: 21.03 KG/M2 | DIASTOLIC BLOOD PRESSURE: 90 MMHG | SYSTOLIC BLOOD PRESSURE: 144 MMHG

## 2021-08-26 LAB
GLUCOSE BLDC GLUCOMTR-MCNC: 114 MG/DL (ref 70–99)
GLUCOSE BLDC GLUCOMTR-MCNC: 120 MG/DL (ref 70–99)
GLUCOSE BLDC GLUCOMTR-MCNC: 282 MG/DL (ref 70–99)
GLUCOSE BLDC GLUCOMTR-MCNC: 95 MG/DL (ref 70–99)
HOLD SPECIMEN: NORMAL
HOLD SPECIMEN: NORMAL

## 2021-08-26 PROCEDURE — 250N000011 HC RX IP 250 OP 636: Performed by: INTERNAL MEDICINE

## 2021-08-26 PROCEDURE — 82103 ALPHA-1-ANTITRYPSIN TOTAL: CPT | Performed by: STUDENT IN AN ORGANIZED HEALTH CARE EDUCATION/TRAINING PROGRAM

## 2021-08-26 PROCEDURE — 36415 COLL VENOUS BLD VENIPUNCTURE: CPT | Performed by: STUDENT IN AN ORGANIZED HEALTH CARE EDUCATION/TRAINING PROGRAM

## 2021-08-26 PROCEDURE — 99239 HOSP IP/OBS DSCHRG MGMT >30: CPT | Performed by: INTERNAL MEDICINE

## 2021-08-26 PROCEDURE — 250N000013 HC RX MED GY IP 250 OP 250 PS 637: Performed by: INTERNAL MEDICINE

## 2021-08-26 RX ORDER — DOXYCYCLINE 100 MG/1
100 CAPSULE ORAL 2 TIMES DAILY
Qty: 20 CAPSULE | Refills: 0 | Status: SHIPPED | OUTPATIENT
Start: 2021-08-26 | End: 2021-08-26

## 2021-08-26 RX ORDER — PREDNISONE 10 MG/1
TABLET ORAL
Qty: 30 TABLET | Refills: 4 | Status: SHIPPED | OUTPATIENT
Start: 2021-08-26 | End: 2022-01-31

## 2021-08-26 RX ORDER — DOXYCYCLINE 100 MG/1
100 CAPSULE ORAL 2 TIMES DAILY
Qty: 14 CAPSULE | Refills: 0 | Status: SHIPPED | OUTPATIENT
Start: 2021-08-26 | End: 2021-09-01 | Stop reason: DRUGHIGH

## 2021-08-26 RX ORDER — LORAZEPAM 0.5 MG/1
0.5 TABLET ORAL 2 TIMES DAILY PRN
Qty: 20 TABLET | Refills: 0 | Status: SHIPPED | OUTPATIENT
Start: 2021-08-26 | End: 2021-09-07

## 2021-08-26 RX ADMIN — FLUTICASONE FUROATE AND VILANTEROL TRIFENATATE 1 PUFF: 200; 25 POWDER RESPIRATORY (INHALATION) at 08:30

## 2021-08-26 RX ADMIN — UMECLIDINIUM 1 PUFF: 62.5 AEROSOL, POWDER ORAL at 08:30

## 2021-08-26 RX ADMIN — CEFEPIME HYDROCHLORIDE 2 G: 2 INJECTION, POWDER, FOR SOLUTION INTRAVENOUS at 09:40

## 2021-08-26 RX ADMIN — CEFEPIME HYDROCHLORIDE 2 G: 2 INJECTION, POWDER, FOR SOLUTION INTRAVENOUS at 01:48

## 2021-08-26 RX ADMIN — METHYLPREDNISOLONE 62.5 MG: 125 INJECTION, POWDER, LYOPHILIZED, FOR SOLUTION INTRAMUSCULAR; INTRAVENOUS at 11:37

## 2021-08-26 RX ADMIN — AZITHROMYCIN MONOHYDRATE 250 MG: 250 TABLET ORAL at 08:28

## 2021-08-26 RX ADMIN — ENOXAPARIN SODIUM 40 MG: 40 INJECTION SUBCUTANEOUS at 08:31

## 2021-08-26 RX ADMIN — LOSARTAN POTASSIUM 75 MG: 50 TABLET, FILM COATED ORAL at 08:27

## 2021-08-26 RX ADMIN — METHYLPREDNISOLONE 62.5 MG: 125 INJECTION, POWDER, LYOPHILIZED, FOR SOLUTION INTRAMUSCULAR; INTRAVENOUS at 00:30

## 2021-08-26 RX ADMIN — AMLODIPINE BESYLATE 10 MG: 10 TABLET ORAL at 08:27

## 2021-08-26 ASSESSMENT — ACTIVITIES OF DAILY LIVING (ADL)
ADLS_ACUITY_SCORE: 13

## 2021-08-26 NOTE — DISCHARGE INSTRUCTIONS
Your Pulmonology follow up appointment has been scheduled for you with Dr. Yamilet Cuellar at [facility] for Thursday, 9/16/21 at 9:45am. Please bring your hospital discharge instructions, a photo ID, insurance information and any new medications with you to your appointment. Please call central scheduling at [phone] if you need to reschedule. Plan to discuss an outpatient sleep study with overnight oximetry.     Pulmonary Function Test are scheduled for Friday, 9/10/21 at 8am. Please don't use your inhalers prior to this appointment.

## 2021-08-26 NOTE — PLAN OF CARE
To Do:  End of Shift Summary  For vital signs and complete assessments, please see documentation flowsheets.     Pertinent assessments: Pt. A&Ox4, VSS on 2.5L O2 via nasal cannula. Denies pain. LS diminished throughout fields. PO Ativan given at HS. BS WNL. Up amb. Ind.     Major Shift Events: Uneventful    Treatment Plan: Encourage ambulation, BG q4h, PO Zithromax, IV Cefepime, PO Ativan, PRN Nebs.     Bedside RN: Dede Swan

## 2021-08-26 NOTE — PLAN OF CARE
Discharged to home via father's transportation. AVS reviewed, no further questions at this time. IV removed CDI. All belongings sent home with pt. Follow up appointments and discharge instructions understood. Prescriptions filled and sent home with pt including 20 tabs of ativan.

## 2021-08-26 NOTE — PROGRESS NOTES
Care Management Discharge Note    Discharge Date: 08/26/2021     Discharge Disposition: Home    Discharge Services: None    Discharge DME: Oxygen    Discharge Transportation: family or friend will provide    Private pay costs discussed: Not applicable    PAS Confirmation Code:  N/A  Patient/family educated on Medicare website which has current facility and service quality ratings: no    Education Provided on the Discharge Plan:  Yes  Persons Notified of Discharge Plans: RALEIGH DACOSTA  Patient/Family in Agreement with the Plan: yes    Handoff Referral Completed: No    Additional Information:  CM attempted again to schedule Pulmonology appointment with Dr. Yamilet Cuellar - Pulmonology at [facility] (Thursdays only). Contacted Pul Clinic ([phone]). Had to LM on voicemail again requesting return call to schedule appt.     CM will continue to follow patient until discharge for any additional needs.     Britt Reilly RN, BSN, CPHN, CM  Inpatient Care Coordination - [facility]  [phone]    Addendum 10:15am - Attempted again to schedule Pulmonology appt. Reached  again. Did not leave message.    Ds     Addendum 10:45am - Attempted again to schedule Pulmonology appt. Reached  again. Did not leave message.    Ds    Addendum 11:05am - CM received return call from RT at the [facility]. Phone number provided by clinic staff was incorrect for scheduling Pulm appointments. RT provided correct number. Contacted [facility] central scheduling at [phone]. Scheduled initial appt with Dr. Yamilet Cuellar for Thursday 9/16/21 at 9:45am. Updated AVS.   [email or web address]

## 2021-08-27 ENCOUNTER — PATIENT OUTREACH (OUTPATIENT)
Dept: CARE COORDINATION | Facility: CLINIC | Age: 56
End: 2021-08-27

## 2021-08-27 DIAGNOSIS — J44.1 COPD EXACERBATION (H): Primary | ICD-10-CM

## 2021-08-27 ASSESSMENT — ACTIVITIES OF DAILY LIVING (ADL): DEPENDENT_IADLS:: CLEANING;COOKING;LAUNDRY;SHOPPING;MEAL PREPARATION;MEDICATION MANAGEMENT;TRANSPORTATION

## 2021-08-27 NOTE — DISCHARGE SUMMARY
Physician Discharge Summary     Name: Luis Hernandez    MRN: [id number]     YOB: 1965    Age: 56 year old                                             [facility]  Hospitalist Discharge Summary-[facility]      Primary care provider: Dean Mariee      Admit date:  8/22/2021      Discharge date and time: 8/26/2021 11:58 AM       Discharge Physician:  Bull Horan MD      Primary Discharge Diagnosis          Acute on chronic hypoxic and hypercapnic respiratory failure    Pulmonary hypertension    Acute COPD exacerbation       Secondary Diagnosis /chronic medical conditions         Past Medical History:   Diagnosis Date     Acute on chronic respiratory failure with hypoxia (H) 4/10/2020     Acute on chronic respiratory failure with hypoxia and hypercapnia (H) 4/1/2019     CAP (community acquired pneumonia) 4/14/2020     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    Reason for Hospitalization(C/C,HPI and brief patient summary):sob       Significant findings(Primary diagnosis )Procedures and treatments provided(Hospital course ,consults, procedures):Please see bellow for details    Luis Colbert is a 56-year-old male with history of oxygen dependent COPD (2 to 3 L/min continuously), pneumonia, hypertension, hyponatremia, pulmonary hypertension, and appendectomy.  He had COVID-19 vaccination in 4/21.  He presented to [facility] for evaluation of increasing shortness of breath.  He was hospitalized at [facility] from 6/30/2021  through 7/3/2021 with COPD exacerbation.  He stopped smoking at the time of that admission and has not restarted.   He discharged home on a prolonged steroid taper that he completed about 2 weeks ago.  The last several days PTA his breathing seemed to get worse.  He called paramedics because of worsened dyspnea last evening.  When they arrived his oxygen saturations were in the 80s percent.  He was tachycardic.  They placed him on BiPAP.    Upon arrival to [facility] emergency department he was tachycardic, tachypneic, and hypertensive.  He was requiring BiPAP support.    On exam he was tight, wheezy, and and with labored breathing.  Initial venous blood gas showed pH of 7.17 with PCO2 of 83.  This improved to 7.29 with PCO2 of 68 after some time on BiPAP.  White blood cell count was 12.5 and the remainder of his CBC and also his comprehensive metabolic panel were unremarkable.  EKG showed no ischemic changes.  Chest x-ray showed stable left lateral midlung nodule as well as hyperinflation but no clear infiltrate.    Luis refused duo nebs at [facility] because these make him cough.  He was given Solu-Medrol.  BiPAP was titrated.  Admission was wanted with the review beds there.  Arrangements were made to transfer Luis to [facility] intensive care unit for ongoing care of acute on chronic hypoxic respiratory failure due to COPD exacerbation.        Patient was admitted to intensive care unit at [facility] and was continued on BiPAP, IV steroids, nebulizer treatments as well as empiric azithromycin.  His condition improved and he was transferred to the floor on August 24.  On the floor , he continued to have respiratory distress and pulmonology service was consulted and patient was seen by Dr Rodriguez who recommended empiric antibiotics , inhalers , steroid and outpatient sleep study as well as pulmonary follow up in [place] .alpha-1-antitrypsin testing  was ordered and result pending at  "the time of discharge.        Consultations during hospital stay:       RESPIRATORY CARE IP CONSULT  CARE MANAGEMENT / SOCIAL WORK IP CONSULT  PULMONARY IP CONSULT  CARE MANAGEMENT / SOCIAL WORK IP CONSULT      Patient discharge Condition:     stable    BP (!) 144/90 (BP Location: Right arm)   Pulse 96   Temp 98  F (36.7  C) (Oral)   Resp 16   Ht 1.626 m (5' 4\")   Wt 55.9 kg (123 lb 3.2 oz)   SpO2 95%   BMI 21.15 kg/m         Discharge Instructions:       Patient/family instructions: Written discharge instruction given to patient/family    Discharge Medications:       Review of your medicines      START taking      Dose / Directions   LORazepam 0.5 MG tablet  Commonly known as: ATIVAN  Used for: Acute on chronic respiratory failure with hypoxia and hypercapnia (H)      Dose: 0.5 mg  Take 1 tablet (0.5 mg) by mouth 2 times daily as needed for anxiety  Quantity: 20 tablet  Refills: 0        CONTINUE these medicines which may have CHANGED, or have new prescriptions. If we are uncertain of the size of tablets/capsules you have at home, strength may be listed as something that might have changed.      Dose / Directions   doxycycline hyclate 100 MG capsule  Commonly known as: VIBRAMYCIN  This may have changed: additional instructions  Used for: Centrilobular emphysema (H), Wheezing, COPD exacerbation (H)      Dose: 100 mg  Take 1 capsule (100 mg) by mouth 2 times daily for 7 days  Quantity: 14 capsule  Refills: 0     predniSONE 10 MG tablet  Commonly known as: DELTASONE  This may have changed: additional instructions  Used for: COPD exacerbation (H)      40mg x 5d, 30mg x 5d, 20mg x 5d, 10mg x 5d  Quantity: 30 tablet  Refills: 4        CONTINUE these medicines which have NOT CHANGED      Dose / Directions   albuterol 108 (90 Base) MCG/ACT inhaler  Commonly known as: PROAIR HFA/PROVENTIL HFA/VENTOLIN HFA  Used for: Centrilobular emphysema (H)      Dose: 2 puff  Inhale 2 puffs into the lungs every 4 hours as needed " for shortness of breath / dyspnea Hold on file until needed  Quantity: 18 g  Refills: 11     amLODIPine 10 MG tablet  Commonly known as: NORVASC  Used for: Essential hypertension, benign      Dose: 10 mg  Take 1 tablet (10 mg) by mouth daily  Quantity: 90 tablet  Refills: 3     atenolol 25 MG tablet  Commonly known as: TENORMIN  Used for: Essential hypertension, benign      Dose: 25 mg  Take 1 tablet (25 mg) by mouth daily  Quantity: 90 tablet  Refills: 3     fluticasone-salmeterol 500-50 MCG/DOSE inhaler  Commonly known as: ADVAIR  Used for: Centrilobular emphysema (H)      Dose: 1 puff  Inhale 1 puff into the lungs 2 times daily Hold on file until needed  Quantity: 60 each  Refills: 11     losartan 50 MG tablet  Commonly known as: COZAAR  Used for: Essential hypertension, benign      Dose: 75 mg  Take 1.5 tablets (75 mg) by mouth daily  Quantity: 135 tablet  Refills: 3     magnesium oxide 400 MG tablet  Commonly known as: MAG-OX  Used for: Peripheral edema  Notes to patient: Start in 7 days after antibiotic course is complete.      Dose: 400 mg  Take 1 tablet (400 mg) by mouth every evening  Quantity:    Refills: 0     order for DME  Used for: Simple chronic bronchitis (H)      Equipment being ordered: Nebulizer  Quantity: 1 Device  Refills: 0     order for DME  Used for: Mixed simple and mucopurulent chronic bronchitis (H)      Equipment being ordered: Oxygen 3L via NC continuous.  O2 saturated noted to be 86% on room air at rest.  Quantity: 1 Units  Refills: 0     potassium 99 MG Tabs      Dose: 1 tablet  Take 1 tablet by mouth every evening  Refills: 0     Spiriva HandiHaler 18 MCG inhaled capsule  Used for: Centrilobular emphysema (H)  Generic drug: tiotropium      Dose: 18 mcg  Inhale 1 capsule (18 mcg) into the lungs every evening Inhale contents of one capsule daily. Hold on file until needed.  Quantity: 30 capsule  Refills: 11        STOP taking    furosemide 20 MG tablet  Commonly known as: LASIX               Where to get your medicines      These medications were sent to [place], [place] - [address] [facility]  [address] [facility] [address]    Phone: [phone]     doxycycline hyclate 100 MG capsule    predniSONE 10 MG tablet     Some of these will need a paper prescription and others can be bought over the counter. Ask your nurse if you have questions.    Bring a paper prescription for each of these medications    LORazepam 0.5 MG tablet          Discharge diet:Orders Placed This Encounter      Diet    regular diet      Discharge activity:Activity as tolerated      Discharge follow-up:    Follow up with primary care provider in 7 days or earlier if symptoms return or gets worse.    Follow up with consultant as instructed  with pulmonary service      Other instructions:    We discussed with patient/family about detail discharge instructions as well as discharge medications above including potential risks,side effects and benefits.Patient/family understood benefits and potential serious side effects of taking these medications and need to follow up with PCP if the patient develops complications.  Patient is also advised to see a doctor immediately for severe symptoms.        Major procedure performed/  Significant Diagnostic Studies:           Results for orders placed or performed during the hospital encounter of 08/22/21   CT Chest Pulmonary Embolism w Contrast    Narrative    CT CHEST PULMONARY EMBOLISM WITH CONTRAST 8/24/2021 2:12 PM    CLINICAL HISTORY: PE suspected, high prob, shortness of breath.    TECHNIQUE: CT angiogram chest during arterial phase injection IV  contrast. 2D and 3D MIP reconstructions were performed by the CT  technologist. Dose reduction techniques were used.     CONTRAST: 51mL Isovue-370    COMPARISON: Chest x-ray 8/21/2021, CT chest 5/14/2021.    FINDINGS:  ANGIOGRAM CHEST: Pulmonary arteries are normal caliber and negative  for pulmonary  emboli. Thoracic aorta is negative for dissection.    LUNGS AND PLEURA: No effusions. Diffuse emphysema. No acute airspace  disease. There is a new irregular 0.4 cm nodule posterior left lower  lobe series 7 image 202. New curvilinear flat opacity along the  anterior left upper lobe midchest is approximately 3.1 x 0.9 cm series  7 image 114. Stable 0.5 cm right midlung nodule image 153. Stable  linear opacities with calcification at the posterior right upper lobe  image 98. Increasing mucus impaction localizing to the right lower  lobe base and there is prominent mucus impaction at the right lower  lobe bronchus, series 7 image 153.    MEDIASTINUM/AXILLAE: No new adenopathy. No acute mediastinal  abnormality.    CORONARY ARTERY CALCIFICATION: Moderate.    UPPER ABDOMEN: No acute abnormality.    MUSCULOSKELETAL: Unremarkable.      Impression    IMPRESSION:  1.  No evidence for pulmonary embolism.  2.  New finding of prominent mucus impaction leading to the right  lower lobe bronchus and small airways of the right lower lobe. This is  consistent with aspiration.  3.  New curvilinear opacity along the anterior left upper lobe  midchest could just be focal atelectasis but acute airspace disease  including pneumonia not excluded. New indeterminant pulmonary nodule  at the left lower lobe. Recommend follow-up CT chest in 6 months.  There are other stable nodules.  4.  Coronary artery calcifications.  5.  Emphysema.    NBA GARCIA MD         SYSTEM ID:  [id number]       Recent Labs   Lab 08/25/21  0618 08/24/21  0609 08/22/21  0559   WBC 13.8* 12.7* 9.6   HGB 12.5* 11.2* 12.1*   HCT 40.0 35.2* 38.3*   MCV 96 95 96    306 299     No results for input(s): CULT in the last 168 hours.  Recent Labs   Lab 08/26/21  0823 08/26/21  0728 08/26/21  0427 08/25/21  0618 08/24/21  0609 08/22/21  0559 08/22/21  0221 08/21/21 2019   NA  --   --   --  139 140 136  --  140   POTASSIUM  --   --   --  4.1 4.1 4.3  --  4.7   CHLORIDE   --   --   --  107 108 103  --  103   CO2  --   --   --  32 32 33*  --  32   ANIONGAP  --   --   --  <1* <1* <1*  --  5   * 120* 114* 117* 115* 132*   < > 139*   BUN  --   --   --  25 28 16  --  17   CR  --   --   --  0.60* 0.58* 0.62*  --  0.72   GFRESTIMATED  --   --   --  >90 >90 >90  --  >90   MIKE  --   --   --  8.9 8.7 9.3  --  9.1   MAG  --   --   --   --   --  2.1  --   --    PROTTOTAL  --   --   --   --   --   --   --  7.9   ALBUMIN  --   --   --   --   --   --   --  4.1   BILITOTAL  --   --   --   --   --   --   --  0.3   ALKPHOS  --   --   --   --   --   --   --  42   AST  --   --   --   --   --   --   --  18   ALT  --   --   --   --   --   --   --  28    < > = values in this interval not displayed.       Recent Labs   Lab 08/26/21  0823 08/26/21  0728 08/26/21  0427 08/26/21  0027 08/25/21 2026   * 120* 114* 95 111*       No results for input(s): INR in the last 168 hours.      Incidental findings that was discussed with patient/family and need follow up with PCP: see CT     Pending Results:       Unresulted Labs Ordered in the Past 30 Days of this Admission     Date and Time Order Name Status Description    8/26/2021 12:02 AM Alpha 1 antitryp tiffany reflex to pheno In process     8/24/2021  5:09 PM Respiratory Aerobic Bacterial Culture Preliminary              Patient Allergies:       Allergies   Allergen Reactions     Hctz Other (See Comments)     He has hyponatremia     Lisinopril Cough     Penicillins Other (See Comments)     Reaction unknown         Disposition:     Disposition: home           I saw and evaluated the patient on day of discharge and  discharge instructions reviewed  and  all the patient's questions and concerns addressed. Over 30 minutes spent on discharge and coordination of discharge process for this patient.      Disclaimer: This note consists of symbols derived from keyboarding, dictation and/or voice recognition software. As a result, there may be errors in the script  that have gone undetected. Please consider this when interpreting information found in this chart

## 2021-08-27 NOTE — PROGRESS NOTES
Clinic Care Coordination Contact  [facility]/Voicemail    Referral Source: IP Report  Hospital admission 8/22-8/26/2021 COPD Exacerbation   Clinical Data: Care Coordinator Outreach  Outreach attempted x 1.  Left message on patient's voicemail with call back information and requested return call.  Plan: . Care Coordinator will try to reach patient again in 1-2 business days.  [facility]   India Andrade RN, Care Coordinator   [facility]'s   E-mail [email or web address]   [phone]

## 2021-08-28 LAB
BACTERIA SPT CULT: ABNORMAL
GRAM STAIN RESULT: ABNORMAL

## 2021-08-30 ENCOUNTER — PATIENT OUTREACH (OUTPATIENT)
Dept: NURSING | Facility: CLINIC | Age: 56
End: 2021-08-30
Payer: COMMERCIAL

## 2021-08-30 DIAGNOSIS — J44.1 COPD EXACERBATION (H): Primary | ICD-10-CM

## 2021-08-30 LAB
A1AT PHENOTYP SERPL-IMP: NORMAL
A1AT SERPL-MCNC: NORMAL MG/DL
A1AT SS SERPL-MCNC: NORMAL G/L
A1AT SZ SERPL-MCNC: NORMAL G/L
A1AT ZZ SERPL-MCNC: NORMAL G/L
SPECIMEN SOURCE: NORMAL

## 2021-08-30 SDOH — ECONOMIC STABILITY: FOOD INSECURITY: WITHIN THE PAST 12 MONTHS, YOU WORRIED THAT YOUR FOOD WOULD RUN OUT BEFORE YOU GOT MONEY TO BUY MORE.: NEVER TRUE

## 2021-08-30 SDOH — HEALTH STABILITY: PHYSICAL HEALTH: ON AVERAGE, HOW MANY MINUTES DO YOU ENGAGE IN EXERCISE AT THIS LEVEL?: 0 MIN

## 2021-08-30 SDOH — ECONOMIC STABILITY: TRANSPORTATION INSECURITY
IN THE PAST 12 MONTHS, HAS THE LACK OF TRANSPORTATION KEPT YOU FROM MEDICAL APPOINTMENTS OR FROM GETTING MEDICATIONS?: NO

## 2021-08-30 SDOH — ECONOMIC STABILITY: TRANSPORTATION INSECURITY
IN THE PAST 12 MONTHS, HAS LACK OF TRANSPORTATION KEPT YOU FROM MEETINGS, WORK, OR FROM GETTING THINGS NEEDED FOR DAILY LIVING?: NO

## 2021-08-30 SDOH — ECONOMIC STABILITY: FOOD INSECURITY: WITHIN THE PAST 12 MONTHS, THE FOOD YOU BOUGHT JUST DIDN'T LAST AND YOU DIDN'T HAVE MONEY TO GET MORE.: NEVER TRUE

## 2021-08-30 SDOH — HEALTH STABILITY: PHYSICAL HEALTH: ON AVERAGE, HOW MANY DAYS PER WEEK DO YOU ENGAGE IN MODERATE TO STRENUOUS EXERCISE (LIKE A BRISK WALK)?: 0 DAYS

## 2021-08-30 ASSESSMENT — SOCIAL DETERMINANTS OF HEALTH (SDOH)
HOW OFTEN DO YOU ATTEND CHURCH OR RELIGIOUS SERVICES?: NEVER
HOW OFTEN DO YOU GET TOGETHER WITH FRIENDS OR RELATIVES?: TWICE A WEEK
HOW OFTEN DO YOU ATTENT MEETINGS OF THE CLUB OR ORGANIZATION YOU BELONG TO?: NEVER
DO YOU BELONG TO ANY CLUBS OR ORGANIZATIONS SUCH AS CHURCH GROUPS UNIONS, FRATERNAL OR ATHLETIC GROUPS, OR SCHOOL GROUPS?: NO
HOW HARD IS IT FOR YOU TO PAY FOR THE VERY BASICS LIKE FOOD, HOUSING, MEDICAL CARE, AND HEATING?: NOT HARD AT ALL
IN A TYPICAL WEEK, HOW MANY TIMES DO YOU TALK ON THE PHONE WITH FAMILY, FRIENDS, OR NEIGHBORS?: TWICE A WEEK

## 2021-08-30 ASSESSMENT — ACTIVITIES OF DAILY LIVING (ADL): DEPENDENT_IADLS:: CLEANING;COOKING;LAUNDRY;SHOPPING;MEAL PREPARATION;MEDICATION MANAGEMENT;TRANSPORTATION

## 2021-08-30 NOTE — PROGRESS NOTES
Clinic Care Coordination Contact  [facility]: Post-Discharge Note  SITUATION                                                      Admission:    Admission Date: 08/21/21   Reason for Admission: Increased SOB  Discharge:   Discharge Date: 08/26/21  Discharge Diagnosis: Acute on chronic hypoxic and hypercapnic respiratory failure     BACKGROUND                                                      COPD diagnosis for 10 years     ASSESSMENT      Enrollment  Primary Care Care Coordination Status: Enrolled  Clinical Pathway Name: COPD  Outreach Frequency: weekly    Discharge Assessment  How are you doing now that you are home?: Better.  On continuous oxygen at 2.5 liters  Denies any further episodes of SOB.  Dry cough.  Will finish antibiotic and Prednisone course Patient has a PCP hospital follow up 9/7/and Pulmonologist 9/28/2021  How are your symptoms? (Red Flag symptoms escalate to triage hotline per guidelines): Improved  Do you feel your condition is stable enough to be safe at home until your provider visit?: Yes  Does the patient have their discharge instructions? : Yes  Does the patient have questions regarding their discharge instructions? : No  Were you started on any new medications or were there changes to any of your previous medications? : Yes  Does the patient have all of their medications?: Yes  Do you have questions regarding any of your medications? : No  Do you have all of your needed medical supplies or equipment (DME)?  (i.e. oxygen tank, CPAP, cane, etc.): Yes  Discharge follow-up appointment scheduled within 14 calendar days? : Yes  Discharge Follow Up Appointment Date: 09/07/21  Discharge Follow Up Appointment Scheduled with?: Primary Care Provider         Post-op (Clinicians Only)  Did the patient have surgery or a procedure: No  Fever: No  Chills: No  Eating & Drinking: eating and drinking without complaints/concerns  PO Intake: regular diet  Bowel Function: normal  Date of last BM:  08/30/21  Urinary Status: voiding without complaint/concerns    Care Management       Care Mgmt General Assessment  Referral  Referral Source: IP Report  Health Care Home/Utilization  Preferred Hospital: [facility]  [phone]  Preferred Urgent Care: [facility], [phone]  Living Situation  Current living arrangement:: I live in a private home  Resources  Patient receiving home care services:: No  Community Resources: None  Supplies used at home:: Oxygen Tubing/Supplies  Equipment Currently Used at Home: other (see comments) (Inhalers)  Referrals Placed: None  Employment Status: disabled  Psychosocial  Druze or spiritual beliefs that impact treatment:: No  Mental health DX:: No  Mental health management concern (GOAL):: No  Chemical Dependency Status: No Current Concerns  Informal Support system:: Family  Functional Status  Dependent ADLs:: Ambulation-no assistive device;Eating  Dependent IADLs:: Cleaning;Cooking;Laundry;Shopping;Meal Preparation;Medication Management;Transportation  Mobility Status: Independent  Advance Care Plan/Directive  Advanced Care Plans/Directives on file:: No and     Care Mgmt Encounter Assessment    Clinic Utilization  Difficulty keeping appointments:: Yes  Compliance Concerns: No  No-Show Concerns: Yes  No PCP office visit in Past Year: No        Primary Diagnosis  Primary Diagnosis: COPD  Barriers in Communication  Other concerns:: Glasses  Pain  Pain (GOAL):: No  Medication Review  Knowledgeable about how to use meds:: Yes  Medication side effects suspected:: No  Diet/Exercise/Sleep  Diet:: No added salt  Inadequate nutrition (GOAL):: No  Tube Feeding: No  Inadequate activity/exercise (GOAL):: No  Significant changes in sleep pattern (GOAL): No    PLAN                                                        Future Appointments   Date Time Provider Department Center   9/1/2021  9:00 AM Kendy Edward, [facility]  [facility]   9/7/2021 12:40 PM Dean Mariee MD [facility]   9/10/2021  8:00 AM [facility]   9/16/2021  9:45 AM Bianca Cuellar MD [facility]         For any urgent concerns, please contact our 24 hour nurse triage line: [phone] ([phone])         Clinic Care Coordination Contact    Clinic Care Coordination Contact  OUTREACH    Referral Information:  Referral Source: IP Report    Primary Diagnosis: COPD    Chief Complaint   Patient presents with     Clinic Care Coordination - Post Hospital     Clinic Care Coordination RN      Clinic Utilization  Difficulty keeping appointments:: Yes  Compliance Concerns: No  No-Show Concerns: Yes  No PCP office visit in Past Year: No  Utilization    Hospital Admissions  2             ED Visits  2             No Show Count (past year)  0                Current as of: 8/30/2021  1:27 PM            Clinical Concerns:  Current Behavioral Concerns: some anxiety   Taking Ativan as needed     Education Provided to patient: CC introductory latter and care plan sent via My Chart   Pain  Pain (GOAL):: No  Health Maintenance Reviewed: Due/Overdue   Health Maintenance Due   Topic Date Due     COLORECTAL CANCER SCREENING  Never done     HIV SCREENING  Never done     HEPATITIS C SCREENING  Never done     PREVENTIVE CARE VISIT  10/04/2011     ZOSTER IMMUNIZATION (1 of 2) Never done     ADVANCE CARE PLANNING  11/25/2018     PHQ-2  01/01/2021     INFLUENZA VACCINE (1) 09/01/2021   Will discuss at next PCP visit   Clinical Pathway: Clinic Care Coordination COPD Assessment    Discharge:    Day of hospital discharge: 8/62/21  What recommendations were made for follow up after your recent hospitalization? Antibiotic and Prednisone taper   Have the follow up appointments been scheduled? Yes  If not, can I help you set up these appointments? No     Is there a referral for Pulmonary Rehab? No    Symptom Review:    How have you been feeling now that you are  home? Better  Are you having any increased shortness of breath? No  Symptoms  Chills: No  Fever: No    Do you have a COPD Action Plan? Yes   Is the COPD Action Plan on refrigerator or available: No    What number would you call if you were in the YELLOW zones:  [phone]    Medications:    Were you started on any new medications?  Yes,   Were any of your previous medications changed? No  Do you have all of your medications? Yes,   Have you had trouble filling your prescriptions? No  Are you medications effective in controlling your symptoms? Yes,   Are you currently on Prednisone (* does pt understand the tapering instructions)?  Yes    Was MTM or Diabetic Education referral placed (*Make sure to put transitions as reason for referral)? No    Oxygen/DME:    Are you currently on oxygen?  Yes   Is this new for you? No   Is it set up at home? Yes   What liter flow were you instructed to use and how often do you use it? 2.5 liters continuously   Review with patient how to use/maintain nebulizers and inhalers: Yes    Activity:    How much activity can you do before you are SOB? Moderate       Diet:    Do you weigh yourself daily? No    Are there any current diet restrictions or changes per discharge instructions? No added salt     Emotions/Lifestyle:    Do you smoke?  reports that he has quit smoking. His smoking use included cigarettes. He has a 60.00 pack-year smoking history. He has quit using smokeless tobacco. quit 2 months ago     Medication Management:  Medication review status: Medications reviewed.  Changes noted per patient report.       Functional Status:  Dependent ADLs:: Ambulation-no assistive device, Eating  Dependent IADLs:: Cleaning, Cooking, Laundry, Shopping, Meal Preparation, Medication Management, Transportation  Mobility Status: Independent    Living Situation:  Current living arrangement:: I live in a private home    Lifestyle & Psychosocial Needs:    Social Determinants of Health     Tobacco  Use: Medium Risk     Smoking Tobacco Use: Former Smoker     Smokeless Tobacco Use: Former User   Alcohol Use:      Frequency of Alcohol Consumption:      Average Number of Drinks:      Frequency of Binge Drinking:    Financial Resource Strain: Low Risk      Difficulty of Paying Living Expenses: Not hard at all   Food Insecurity: No Food Insecurity     Worried About Running Out of Food in the Last Year: Never true     Ran Out of Food in the Last Year: Never true   Transportation Needs: No Transportation Needs     Lack of Transportation (Medical): No     Lack of Transportation (Non-Medical): No   Physical Activity: Inactive     Days of Exercise per Week: 0 days     Minutes of Exercise per Session: 0 min   Stress:      Feeling of Stress :    Social Connections: Unknown     Frequency of Communication with Friends and Family: Twice a week     Frequency of Social Gatherings with Friends and Family: Twice a week     Attends Jehovah's witness Services: Never     Active Member of Clubs or Organizations: No     Attends Club or Organization Meetings: Never     Marital Status: Not on file   Intimate Partner Violence:      Fear of Current or Ex-Partner:      Emotionally Abused:      Physically Abused:      Sexually Abused:    Depression: Not at risk     PHQ-2 Score: 0   Housing Stability:      Unable to Pay for Housing in the Last Year:      Number of Places Lived in the Last Year:      Unstable Housing in the Last Year:      Diet:: No added salt  Inadequate nutrition (GOAL):: No  Tube Feeding: No  Inadequate activity/exercise (GOAL):: No  Significant changes in sleep pattern (GOAL): No        Jehovah's witness or spiritual beliefs that impact treatment:: No  Mental health DX:: No  Mental health management concern (GOAL):: No  Chemical Dependency Status: No Current Concerns  Informal Support system:: Family         Resources and Interventions:  Current Resources:      Community Resources: None  Supplies used at home:: Oxygen  Tubing/Supplies  Equipment Currently Used at Home: other (see comments) (Inhalers)  Employment Status: disabled      Advance Care Plan/Directive  Advanced Care Plans/Directives on file:: No    Referrals Placed: None     Goals:   Goals        General     Improve chronic symptoms (pt-stated)      Notes - Note created  8/30/2021  1:59 PM by India Andrade RN     Goal Statement: I will keep my COPD stable   Date Goal set: 8/30/2021  Barriers: None identified   Strengths: Agrees with the plan discussed   Date to Achieve By: 11/30/2021  Patient expressed understanding of goal: Yes  Action steps to achieve this goal:  1. I will finish course of antibiotic and Prednisone   2. I will take deep breaths .cough and drink plenty of water   3. I will My inhaler as needed   4. I will use my Ativan as needed for anxiety  5. I will use my oxygen at 2.5 as directed   6. I will keep future appointments with my primary provider and pulmonologist          Medical (pt-stated)      Notes - Note created  8/30/2021  2:01 PM by India Andrade RN     Goal Statement: I will complete Health Maintenance due in the next 1-3 months   Date Goal set: 8/30/2021  Barriers: None identified   Strengths:Motivated   Date to Achieve By: 11/30/2021  Patient expressed understanding of goal: Yes  Action steps to achieve this goal:  1. I will discuss at my future primary provider appointments             Patient/Caregiver understanding:Expresses good understanding of discharge instructions     Outreach Frequency: weekly  Future Appointments              In 2 days Kendy Edward RPH [facility]    In 1 week Dean Mariee MD [facility]  Arrive at: Clinic A    In 1 [facility]    In 2 weeks Bianca Cuellar MD [facility]          Plan:   Patient will keep future PCP and  Pulmonary appointments   Patient will finish course of antibiotic and Prednisone taper   Patient will use his oxygen continuous at 2.5 liters   CC RN will follow up in 3-5 business days   [facility]   India Andrade RN, Care Coordinator   [facility]'s   E-mail [email or web address]   [phone]

## 2021-09-01 ENCOUNTER — VIRTUAL VISIT (OUTPATIENT)
Dept: PHARMACY | Facility: CLINIC | Age: 56
End: 2021-09-01
Payer: COMMERCIAL

## 2021-09-01 DIAGNOSIS — I10 ESSENTIAL HYPERTENSION, BENIGN: ICD-10-CM

## 2021-09-01 DIAGNOSIS — J18.9 COMMUNITY ACQUIRED PNEUMONIA, UNSPECIFIED LATERALITY: ICD-10-CM

## 2021-09-01 DIAGNOSIS — J96.21 ACUTE ON CHRONIC RESPIRATORY FAILURE WITH HYPOXIA AND HYPERCAPNIA (H): Primary | ICD-10-CM

## 2021-09-01 DIAGNOSIS — J43.9 PULMONARY EMPHYSEMA, UNSPECIFIED EMPHYSEMA TYPE (H): ICD-10-CM

## 2021-09-01 DIAGNOSIS — J96.22 ACUTE ON CHRONIC RESPIRATORY FAILURE WITH HYPOXIA AND HYPERCAPNIA (H): Primary | ICD-10-CM

## 2021-09-01 DIAGNOSIS — J44.1 COPD EXACERBATION (H): Primary | ICD-10-CM

## 2021-09-01 PROCEDURE — 99607 MTMS BY PHARM ADDL 15 MIN: CPT | Performed by: PHARMACIST

## 2021-09-01 PROCEDURE — 99605 MTMS BY PHARM NP 15 MIN: CPT | Performed by: PHARMACIST

## 2021-09-01 RX ORDER — SULFAMETHOXAZOLE/TRIMETHOPRIM 800-160 MG
1 TABLET ORAL 2 TIMES DAILY
Qty: 14 TABLET | Refills: 0 | Status: SHIPPED | OUTPATIENT
Start: 2021-09-01 | End: 2021-09-08

## 2021-09-01 NOTE — PATIENT INSTRUCTIONS
Recommendations from today's MTM visit:                                                    MTM (medication therapy management) is a service provided by a clinical pharmacist designed to help you get the most of out of your medicines.   Today we reviewed what your medicines are for, how to know if they are working, that your medicines are safe and how to make your medicine regimen as easy as possible.      Agustin Smith,    It was a pleasure talking with you today! We discussed the followin. Could consider trying Mucinex (over-the-counter), this may help to loosen your phlegm.     2. Review with pulmonology - could consider switching from Spiriva HandiHaler to Spiriva Respimat and consider starting Daliresp.      Follow-up: as needed    It was great to speak with you today.  I value your experience and would be very thankful for your time with providing feedback on our clinic survey. You may receive a survey via email or text message in the next few days.     To schedule another MTM appointment, please call the clinic directly or you may call the MTM scheduling line at [phone] or toll-free at [phone].     My Clinical Pharmacist's contact information:                                                      Please feel free to contact me with any questions or concerns you have.      Keep up the good work!!    Kendy Edward, PharmD  [phone] in clinic on Tuesday and Wednesday

## 2021-09-01 NOTE — PROGRESS NOTES
Medication Therapy Management (MTM) Encounter    ASSESSMENT:                            Medication Adherence/Access: No issues identified    COPD: Follow up with pulmonology as scheduled. Possible considerations: patient could try Mucinex, he has a lot of phlegm that he is having trouble coughing up. Patient to review with pulmonology - consider switching from Spiriva HandiHaler to Spiriva Respimat and consider starting Daliresp.     Hypertension: stable - at home blood pressure at goal. Slightly elevated in hospital - continue to monitor.     PLAN:                            1. Could consider trying Mucinex, this may help to loosen patient's phlegm.     2. Patient to review with pulmonology - could consider switching from Spiriva HandiHaler to Spiriva Respimat and consider starting Daliresp.      Follow-up: as needed    SUBJECTIVE/OBJECTIVE:                          Luis Hernandez is a 56 year old male called for a transitions of care visit. He was discharged from [facility] on 8/26/2021 for COPD exacerbation.      Reason for visit: transition of care.    Allergies/ADRs: Reviewed in chart  Past Medical History: Reviewed in chart  Tobacco: He reports that he has quit smoking. His smoking use included cigarettes. He has a 60.00 pack-year smoking history. He has quit using smokeless tobacco.  Alcohol: none    Medication Adherence/Access: no issues reported    COPD: Current medications: Advair 500-50 one puff twice daily, Spiriva one capsule once daily and albuterol as needed - using 2-3 times daily. Also taking doxycycline 100 mg twice daily - has 2 days left and prednisone starting 30 mg dose taper today. Patient states has been having problems with breathing the past 6 months. Does well on prednisone, once prednisone taper is finished, has trouble breathing again in 2-3 weeks. Patient states he does not have allergies. Has upcoming appointment with pulmonology.  Patient rinses their mouth after  using steroid inhaler.    Patient is not experiencing side effects.   Patient reports the following symptoms: recent hospitalization.  Patient does have an COPD Action Plan on file.   Has spirometry been completed: yes    Patient gets yearly flu vaccine, has had the pneumonia vaccine and COVID vaccine.    Hypertension: Current medications include amlodipine 10 mg daily, atenolol 25 mg daily and losartan 50 mg daily. Patient does self-monitor blood pressure. Blood pressure this mornin/75, pulse 82. Patient reports no current medication side effects.   BP Readings from Last 3 Encounters:   21 (!) 144/90   21 (!) 135/96   21 122/82     ----------------  Post Discharge Medication Reconciliation Status: discharge medications reconciled, continue medications without change.    I spent 21 minutes with this patient today. All changes were made via collaborative practice agreement with Dean Mariee MD. A copy of the visit note was provided to the patient's primary care provider.    The patient was sent via [employer] a summary of these recommendations.     Kendy Edward, PharmD  Medication Therapy Management     Telemedicine Visit Details  Type of service:  Telephone visit  Start Time: 9:00 AM  End Time: 9:21 AM  Originating Location (patient location): [place]  Distant Location (provider location):  [facility]     Medication Therapy Recommendations  COPD (chronic obstructive pulmonary disease) (H)    Rationale: Synergistic therapy - Needs additional medication therapy - Indication   Recommendation: Start Medication - Mucinex 600 MG Tb12 - Patient to discuss switching to Spiriva Respimat and consider starting Daliresp with pulmonology   Status: Accepted - no CPA Needed

## 2021-09-02 NOTE — PROGRESS NOTES
"Luis is a 56 year old who is being evaluated via a billable video visit.      How would you like to obtain your AVS? {AVS Preference:748829}  If the video visit is dropped, the invitation should be resent by: {video visit invitation:092469}  Will anyone else be joining your video visit? {:098405}  {If patient encounters technical issues they should call [phone] :494071}    Video Start Time: {video visit start/end time for provider to select:927806}    {PROVIDER CHARTING PREFERENCE:992081}    Subjective   Luis is a 56 year old who presents for the following health issues {ACCOMPANIED BY STATEMENT (Optional):113860}    HPI       Hospital Follow-up Visit:    Hospital/Nursing Home/ Rehab Facility: [facility]  Date of Admission: 8/22/21  Date of Discharge: 8/26/21  Reason(s) for Admission:    Acute on chronic respiratory failure with hypoxia and hypercapnia (H)     Acute respiratory acidosis     Acute on chronic respiratory failure         Was your hospitalization related to COVID-19? No   Problems taking medications regularly:  {NONE DEFAULTED:811757::\"None\"}  Medication changes since discharge: {NONE DEFAULTED:474787::\"None\"}  Problems adhering to non-medication therapy:  {NONE DEFAULTED:483839::\"None\"}    Summary of hospitalization:  {HOSPITAL DISCHARGE SUMMARY INFO:836715::\"[facility] discharge summary reviewed\"}  Diagnostic Tests/Treatments reviewed.  Follow up needed: {NONE DEFAULTED:085004::\"none\"}  Other Healthcare Providers Involved in Patient s Care:         {those currently involved after discharge:409144::\"None\"}  Update since discharge: {IMPROVED DEFAULT:607571::\"improved.\"} {TIP  Include information from family/caregivers, SNF, Care Coordination :198177}      Post Discharge Medication Reconciliation: {ACO Med Rec (Provider):889237}.  Plan of care communicated with {Communicate Plan to:679844::\"patient\"}     {Reference  Coding guidelines- Moderate " "Complexity F2F/Video within 7 - 14 days of discharge 8088740, High Complexity F2F/Video within 7 days 1545597 or [id number] days 3033384 :565028}           Review of Systems   {ROS COMP (Optional):885240}      Objective           Vitals:  No vitals were obtained today due to virtual visit.    Physical Exam   {video visit exam brief selected:631458::\"GENERAL: Healthy, alert and no distress\",\"EYES: Eyes grossly normal to inspection.  No discharge or erythema, or obvious scleral/conjunctival abnormalities.\",\"RESP: No audible wheeze, cough, or visible cyanosis.  No visible retractions or increased work of breathing.  \",\"SKIN: Visible skin clear. No significant rash, abnormal pigmentation or lesions.\",\"NEURO: Cranial nerves grossly intact.  Mentation and speech appropriate for age.\",\"PSYCH: Mentation appears normal, affect normal/bright, judgement and insight intact, normal speech and appearance well-groomed.\"}    {Diagnostic Test Results (Optional):674633}    {AMBULATORY ATTESTATION (Optional):144266}        Video-Visit Details    Type of service:  Video Visit    Video End Time:{video visit start/end time for provider to select:515338}    Originating Location (pt. Location): {video visit patient location:072182::\"Home\"}    Distant Location (provider location):  [facility]     Platform used for Video Visit: {Virtual Visit Platforms:596779::\"[employer]\"}  "

## 2021-09-07 ENCOUNTER — VIRTUAL VISIT (OUTPATIENT)
Dept: FAMILY MEDICINE | Facility: CLINIC | Age: 56
End: 2021-09-07
Payer: COMMERCIAL

## 2021-09-07 DIAGNOSIS — J44.1 CHRONIC OBSTRUCTIVE PULMONARY DISEASE WITH ACUTE EXACERBATION (H): Primary | ICD-10-CM

## 2021-09-07 DIAGNOSIS — J44.1 COPD EXACERBATION (H): ICD-10-CM

## 2021-09-07 DIAGNOSIS — I10 ESSENTIAL HYPERTENSION, BENIGN: ICD-10-CM

## 2021-09-07 PROCEDURE — 99495 TRANSJ CARE MGMT MOD F2F 14D: CPT | Mod: 95 | Performed by: FAMILY MEDICINE

## 2021-09-07 NOTE — PATIENT INSTRUCTIONS
You are doing well.    Please see pulmonary next week as planned.          Thank you for choosing [facility].  You may be receiving an email and/or telephone survey request from [facility] Customer Experience regarding your visit today.  Please take a few minutes to respond to the survey to let us know how we are doing.      If you have questions or concerns, please contact us via [employer] or you can contact your care team at [phone] option [phone].    Our Clinic hours are:  Monday - Thursday 7am-6pm  Friday 7am-5pm    The [place] outpatient lab hours are:  Monday - Friday 7am-4:30pm    Appointments are required, call [phone]    If you have clinical questions after hours or would like to schedule an appointment,  call the clinic at [phone].

## 2021-09-10 ENCOUNTER — HOSPITAL ENCOUNTER (OUTPATIENT)
Dept: RESPIRATORY THERAPY | Facility: CLINIC | Age: 56
Discharge: HOME | End: 2021-09-10
Attending: INTERNAL MEDICINE | Admitting: INTERNAL MEDICINE
Payer: COMMERCIAL

## 2021-09-10 PROCEDURE — 999N000105 HC STATISTIC NO DOCUMENTATION TO SUPPORT CHARGE

## 2021-09-10 PROCEDURE — 94375 RESPIRATORY FLOW VOLUME LOOP: CPT

## 2021-09-10 PROCEDURE — 94726 PLETHYSMOGRAPHY LUNG VOLUMES: CPT

## 2021-09-10 PROCEDURE — 94729 DIFFUSING CAPACITY: CPT | Mod: 26 | Performed by: INTERNAL MEDICINE

## 2021-09-10 PROCEDURE — 94726 PLETHYSMOGRAPHY LUNG VOLUMES: CPT | Mod: 26 | Performed by: INTERNAL MEDICINE

## 2021-09-10 PROCEDURE — 94010 BREATHING CAPACITY TEST: CPT | Mod: 26 | Performed by: INTERNAL MEDICINE

## 2021-09-10 PROCEDURE — 94729 DIFFUSING CAPACITY: CPT

## 2021-09-12 ENCOUNTER — HEALTH MAINTENANCE LETTER (OUTPATIENT)
Age: 56
End: 2021-09-12

## 2021-09-13 ENCOUNTER — TELEPHONE (OUTPATIENT)
Dept: FAMILY MEDICINE | Facility: CLINIC | Age: 56
End: 2021-09-13

## 2021-09-13 LAB
DLCOCOR-%PRED-PRE: 34 %
DLCOCOR-PRE: 8.14 ML/MIN/MMHG
DLCOUNC-%PRED-PRE: 32 %
DLCOUNC-PRE: 7.62 ML/MIN/MMHG
DLCOUNC-PRED: 23.65 ML/MIN/MMHG
ERV-%PRED-PRE: 40 %
ERV-PRE: 0.53 L
ERV-PRED: 1.31 L
EXPTIME-PRE: 12.15 SEC
FEF2575-%PRED-POST: 6 %
FEF2575-%PRED-PRE: 6 %
FEF2575-POST: 0.16 L/SEC
FEF2575-PRE: 0.19 L/SEC
FEF2575-PRED: 2.73 L/SEC
FEFMAX-%PRED-PRE: 31 %
FEFMAX-PRE: 2.6 L/SEC
FEFMAX-PRED: 8.32 L/SEC
FEV1-%PRED-PRE: 18 %
FEV1-PRE: 0.53 L
FEV1FEV6-PRE: 40 %
FEV1FEV6-PRED: 80 %
FEV1FVC-PRE: 33 %
FEV1FVC-PRED: 80 %
FEV1SVC-PRE: 28 %
FEV1SVC-PRED: 71 %
FIFMAX-PRE: 1.79 L/SEC
FRCPLETH-%PRED-PRE: 204 %
FRCPLETH-PRE: 6.56 L
FRCPLETH-PRED: 3.2 L
FVC-%PRED-PRE: 44 %
FVC-PRE: 1.6 L
FVC-PRED: 3.61 L
IC-%PRED-PRE: 45 %
IC-PRE: 1.25 L
IC-PRED: 2.76 L
RVPLETH-%PRED-PRE: 282 %
RVPLETH-PRE: 5.9 L
RVPLETH-PRED: 2.09 L
TLCPLETH-%PRED-PRE: 132 %
TLCPLETH-PRE: 7.81 L
TLCPLETH-PRED: 5.91 L
VA-%PRED-PRE: 66 %
VA-PRE: 3.54 L
VC-%PRED-PRE: 46 %
VC-PRE: 1.9 L
VC-PRED: 4.08 L

## 2021-09-13 NOTE — TELEPHONE ENCOUNTER
Patient states he has COPD so he is short of air all the time to begin with.  Is able to complete full sentences. And walk up a flight of stairs. Denies chest pain, fever, loss of taste or smell.  appt made. Negra ZHU RN

## 2021-09-13 NOTE — TELEPHONE ENCOUNTER
Reason for call:  Patient reporting a symptom    Symptom or request: Patient is calling stating that since May the Losartan has been having high BP and he feels like its hard to breath with the medication, SOB. He said he can hear his heart racing.   6am 141/102 HR 91  9am  140/92 HR 82    Duration (how long have symptoms been present): since may    Have you been treated for this before? Yes    Phone Number patient can be reached at:  Home number on file [phone] (home)    Best Time:  any    Can we leave a detailed message on this number:  YES    Call taken on 9/13/2021 at 10:45 AM by Britt Campbell

## 2021-09-14 ENCOUNTER — PATIENT OUTREACH (OUTPATIENT)
Dept: CARE COORDINATION | Facility: CLINIC | Age: 56
End: 2021-09-14

## 2021-09-14 DIAGNOSIS — J44.1 COPD EXACERBATION (H): Primary | ICD-10-CM

## 2021-09-14 ASSESSMENT — ACTIVITIES OF DAILY LIVING (ADL): DEPENDENT_IADLS:: CLEANING;COOKING;LAUNDRY;SHOPPING;MEAL PREPARATION;MEDICATION MANAGEMENT;TRANSPORTATION

## 2021-09-14 NOTE — PROGRESS NOTES
Clinic Care Coordination Contact  [facility]/Voicemail    Referral Source: IP Report  Admission:               Admission Date: 08/21/21              Reason for Admission: Increased SOB  Discharge:   Discharge Date: 08/26/21  Discharge Diagnosis: Acute on chronic hypoxic and hypercapnic respiratory failure   Clinical Data: Care Coordinator Outreach  Outreach attempted x 1.  Left message on patient's voicemail with call back information and requested return call.  Plan: . Care Coordinator will try to reach patient again in 3-5 business days.  [facility]   India Andrade RN, Care Coordinator   [facility]'s   E-mail [email or web address]   [phone]

## 2021-09-16 ENCOUNTER — OFFICE VISIT (OUTPATIENT)
Dept: PULMONOLOGY | Facility: CLINIC | Age: 56
End: 2021-09-16
Attending: FAMILY MEDICINE
Payer: COMMERCIAL

## 2021-09-16 VITALS
WEIGHT: 123.24 LBS | HEART RATE: 76 BPM | OXYGEN SATURATION: 98 % | TEMPERATURE: 97.9 F | BODY MASS INDEX: 21.04 KG/M2 | HEIGHT: 64 IN | SYSTOLIC BLOOD PRESSURE: 135 MMHG | DIASTOLIC BLOOD PRESSURE: 99 MMHG

## 2021-09-16 DIAGNOSIS — J96.21 ACUTE ON CHRONIC RESPIRATORY FAILURE WITH HYPOXIA AND HYPERCAPNIA (H): ICD-10-CM

## 2021-09-16 DIAGNOSIS — J43.9 PULMONARY EMPHYSEMA, UNSPECIFIED EMPHYSEMA TYPE (H): ICD-10-CM

## 2021-09-16 DIAGNOSIS — J96.22 ACUTE ON CHRONIC RESPIRATORY FAILURE WITH HYPOXIA AND HYPERCAPNIA (H): ICD-10-CM

## 2021-09-16 PROCEDURE — 99205 OFFICE O/P NEW HI 60 MIN: CPT | Performed by: INTERNAL MEDICINE

## 2021-09-16 RX ORDER — FLUTICASONE PROPIONATE AND SALMETEROL XINAFOATE 230; 21 UG/1; UG/1
2 AEROSOL, METERED RESPIRATORY (INHALATION) 2 TIMES DAILY
Qty: 12 G | Refills: 3 | Status: SHIPPED | OUTPATIENT
Start: 2021-09-16 | End: 2021-12-16

## 2021-09-16 RX ORDER — SULFAMETHOXAZOLE/TRIMETHOPRIM 800-160 MG
1 TABLET ORAL 2 TIMES DAILY
Qty: 28 TABLET | Refills: 0 | Status: ON HOLD | OUTPATIENT
Start: 2021-09-16 | End: 2022-04-24

## 2021-09-16 RX ORDER — IPRATROPIUM BROMIDE AND ALBUTEROL SULFATE 2.5; .5 MG/3ML; MG/3ML
1 SOLUTION RESPIRATORY (INHALATION) EVERY 6 HOURS PRN
Qty: 300 ML | Refills: 6 | Status: SHIPPED | OUTPATIENT
Start: 2021-09-16 | End: 2022-05-19

## 2021-09-16 RX ORDER — PREDNISONE 5 MG/1
5 TABLET ORAL DAILY
Qty: 5 TABLET | Refills: 0 | Status: SHIPPED | OUTPATIENT
Start: 2021-09-16 | End: 2022-01-31

## 2021-09-16 ASSESSMENT — MIFFLIN-ST. JEOR: SCORE: 1310

## 2021-09-16 NOTE — NURSING NOTE
"Initial BP (!) 135/99 (BP Location: Left arm, Patient Position: Sitting, Cuff Size: Adult Large)   Pulse 76   Temp 97.9  F (36.6  C) (Tympanic)   Ht 1.626 m (5' 4\")   Wt 55.9 kg (123 lb 3.8 oz)   SpO2 98%   BMI 21.15 kg/m   Estimated body mass index is 21.15 kg/m  as calculated from the following:    Height as of this encounter: 1.626 m (5' 4\").    Weight as of this encounter: 55.9 kg (123 lb 3.8 oz). .    Flores Haji MA    "

## 2021-09-16 NOTE — PROGRESS NOTES
[facility]  [facility]  September 16, 2021         Assessment and Plan:   Mr. Luis Hernandez is a 56 year old male with medical history significant for very severe COPD, hypertension who was referred after recent hospitalization for COPD exacerbation.  He has had 3x hospitalizations since April for COPD exacerbations during which time he did grow E. coli, Klebsiella, stenotrophomonas. He was most recently discharged on 8/26 and treated with a tapered steroid course as well as doxycycline which was changed to Bactrim. He continues on triple therapy with Advair and Spiriva but is not using his nebulizer; we will make some adjustments to his medication technique and administration today and consider chronic azithromycin versus Roflumilast in the future should he continue to have recurrent exacerbations.    1. Very severe COPD (FEV1 18%), panlobular emphysema, minimal cough but mucus production  2. Prior tobacco dependence, greater than 50 years, quit in August 2021  3. Likely pulmonary hypertension with RVP of 55 mmHg plus RAP (2019 ECHO), dilated RV but normal RV EF  4. New indeterminate left lower lobe nodule, indeterminate left upper lobe opacity on 8/26/2021 CT    Recommendations as follows:   -Change current Advair dry powder inhaler to metered-dose inhaler  -change Spiriva capsule to Respimat metered-dose inhaler  -encourage nebulizer use of DuoNeb's as needed  -start aerobica use at least 2-3 times per day for 10-minute sessions after albuterol inhaler or nebulizer therapy  -prolonged prednisone taper course with 5 mg daily for 5 additional days  -continue treatment of stenotrophomonas with Bactrim double strength 1 tab twice a day for 3 more weeks  -repeat chest CT in 6 months approximately February 2022 to follow new left lower lobe nodule  -consider initiation of Roflumilast to prevent recurrent exacerbations versus daily azithromycin  -start pulmonary rehab  -he  has received his Covid vaccine but we discussed staying up-to-date with yearly flu vaccine and pneumonia shots. He is due for Prevnar 13.     Return to clinic in 3 months.     Bianca Cuellar MD  Pulmonary, Allergy, Critical Care, and Sleep Medicine   Pager: [phone]        HPI:    Mr. Luis Hernandez is a 56 year old male with medical history significant for very severe COPD, hypertension who was referred after recent hospitalization for COPD exacerbation.    He had previously been followed by Dr. Wagner for a right upper lobe pulmonary nodule in 2014 to 2016.  His COPD has been  managed by primary care since then.  He reports having been hospitalized 2 times with at least 3 flareups over the last 6 months.  In 2020 he had no COPD exacerbations.  He is chronically on 2-1/2 L at rest and with exertion since 2019.  He is not using oxygen at night as he says that previously he did this and he would wake up with headaches.    He denies persistent cough but does feel like he has mucus in his chest in the morning that he cannot clear.  He does cough it up it is thick and yellow.  He was most recently hospitalized at [facility] from 8/22/2021 through 8/26/2021.  He was initially treated with doxycycline but a sputum culture grew E. coli and stenotrophomonas so he was switched to Bactrim which he received for approximately 1 week.  He just completed a prednisone taper over the last 3 weeks; last dose yesterday of 10 mg a day.  Today he says his breathing is at his baseline and comfortable.    His poor control began in April during which time he typically does have worsened allergy symptoms which include runny nose.  He denies postnasal drainage or seasonal allergies during other times a year.  He also does note that during the summer when the air was smoky from the [place] wildfires that he had more trouble breathing.  He also says that in May his blood pressure medication was switched to losartan which has caused him to  feel short of breath within 2 hours after taking this.  He stopped taking that this morning and will see his PCP tomorrow to discuss further.    He reports that he has no lower extremity edema no orthopnea, and his weight has been stable.  He is taking Advair 1 puff twice a day through a dry powder inhaler formulation as well as Spiriva capsules daily.  He typically does use his albuterol inhaler about 3 times a day with some improvement.  He has a nebulizer machine at home but is not using this as he was told not to use DuoNebs while taking Spiriva and also because he has had worsened breathing after taking nebulizer treatment.  He says that his breathing is typically good in the morning but around 2 PM he notices that it worsens.  His symptoms have generally been worse at night.  He denies GERD.    He is a former smoker and quit about 3 months ago cold turkey.  He smoked for 35 years, approximately 1-1/2 packs/day.  He previously worked at [employer] but had to stop working in 2020 due to worsening breathing.    He lives in a home with his father and 2 dogs.  He denies allergies to these dogs.  There is no obvious mold or placed one in the home that make more difficult for him to breathe. No overt aspiration but he does wear dentures.            Review of Systems:     A 13 point ROS was performed and was negative except as listed above in the HPI.  Weight is stable.  No orthopnea/PND.           Past Medical and Surgical History:     Past Medical History:   Diagnosis Date     Acute on chronic respiratory failure with hypoxia (H) 4/10/2020     Acute on chronic respiratory failure with hypoxia and hypercapnia (H) 4/1/2019     CAP (community acquired pneumonia) 4/14/2020     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)      COPD exacerbation (H) 4/1/2019     COPD exacerbation (H) 2/16/2020     Erectile dysfunction      Hypertension      Hyponatremia 4/1/2019     Pneumonia 4/10/2020     Past Surgical  History:   Procedure Laterality Date     APPENDECTOMY      childhood           Family History:     Family History   Problem Relation Age of Onset     Hypertension Father      Lipids Father      C.A.D. Father      Heart Disease Father      Diabetes Paternal Grandmother      Hypertension Mother      Ovarian Cancer Mother      Breast Cancer Mother             Social History:     Social History     Socioeconomic History     Marital status: Single     Spouse name: Not on file     Number of children: Not on file     Years of education: Not on file     Highest education level: Not on file   Occupational History     Not on file   Tobacco Use     Smoking status: Former Smoker     Packs/day: 2.00     Years: 30.00     Pack years: 60.00     Types: Cigarettes     Smokeless tobacco: Former User   Vaping Use     Vaping Use: Never used   Substance and Sexual Activity     Alcohol use: Yes     Comment: rare     Drug use: No     Sexual activity: Yes     Partners: Female   Other Topics Concern     Parent/sibling w/ CABG, MI or angioplasty before 65F 55M? No   Social History Narrative    The patient has a 60+ pk yr tobacco hx.  He has has no active use, quit Jan 2014.  Alcohol use is <1 alcoholic drinks per week.  He denies use of recreational drugs.  He is employed. [employer].  Works nights.   The patient is .  Has 1 child.Hot Tub Exposure: NORecent Travel: NO Hx of incarceration:  NOBird Exposure:   NOAnimal Exposure:  NOInhalation Exposure:  NO     Social Determinants of Health     Financial Resource Strain: Low Risk      Difficulty of Paying Living Expenses: Not hard at all   Food Insecurity: No Food Insecurity     Worried About Running Out of Food in the Last Year: Never true     Ran Out of Food in the Last Year: Never true   Transportation Needs: No Transportation Needs     Lack of Transportation (Medical): No     Lack of Transportation (Non-Medical): No   Physical Activity: Inactive     Days of Exercise per Week: 0  "days     Minutes of Exercise per Session: 0 min   Stress:      Feeling of Stress :    Social Connections: Unknown     Frequency of Communication with Friends and Family: Twice a week     Frequency of Social Gatherings with Friends and Family: Twice a week     Attends [employer] Services: Never     Active Member of Clubs or Organizations: No     Attends Club or Organization Meetings: Never     Marital Status: Not on file   Intimate Partner Violence:      Fear of Current or Ex-Partner:      Emotionally Abused:      Physically Abused:      Sexually Abused:             Medications:     Current Outpatient Medications   Medication     albuterol (PROAIR HFA/PROVENTIL HFA/VENTOLIN HFA) 108 (90 Base) MCG/ACT inhaler     amLODIPine (NORVASC) 10 MG tablet     atenolol (TENORMIN) 25 MG tablet     fluticasone-salmeterol (ADVAIR) 500-50 MCG/DOSE inhaler     losartan (COZAAR) 50 MG tablet     order for DME     potassium 99 MG TABS     tiotropium (SPIRIVA HANDIHALER) 18 MCG inhaled capsule     order for DME     predniSONE (DELTASONE) 10 MG tablet     No current facility-administered medications for this visit.            Physical Exam:   BP (!) 135/99 (BP Location: Left arm, Patient Position: Sitting, Cuff Size: Adult Large)   Pulse 76   Temp 97.9  F (36.6  C) (Tympanic)   Ht 1.626 m (5' 4\")   Wt 55.9 kg (123 lb 3.8 oz)   SpO2 98%   BMI 21.15 kg/m      Constitutional:   Awake, alert and in no apparent distress     Eyes:   nonicteric     HEENT:   Supple without supraclavicular or cervical lymphadenopathy     Lungs:   Reduced air flow.  No crackles. No rhonchi.  No wheezes. Speaking in full sentences.      Cardiovascular:   Normal S1 and S2.  RRR.  No murmur, gallop or rub.     Musculoskeletal:   No edema, digital clubbing present     Neurologic:   Alert and conversant.     Skin:   Warm, dry.  No rash on limited exam.  No lower extremity edema.             Data:   All laboratory and imaging data reviewed personally.  "     Specimen Description   Date Value Ref Range Status   04/13/2020 Urine  Final   04/10/2020 Sputum  Final   04/10/2020 Sputum  Final    Culture Micro   Date Value Ref Range Status   04/10/2020 Moderate growth  Normal latrice    Final   04/10/2020 Moderate growth  Escherichia coli   (A)  Final   04/10/2020 Moderate growth  Klebsiella pneumoniae   (A)  Final        Recent Results (from the past 168 hour(s))   Pulmonary Function Test    Collection Time: 09/10/21  7:41 AM   Result Value Ref Range    FVC-Pred 3.61 L    FVC-Pre 1.60 L    FVC-%Pred-Pre 44 %    FEV1-Pre 0.53 L    FEV1-%Pred-Pre 18 %    FEV1FVC-Pred 80 %    FEV1FVC-Pre 33 %    FEFMax-Pred 8.32 L/sec    FEFMax-Pre 2.60 L/sec    FEFMax-%Pred-Pre 31 %    FEF2575-Pred 2.73 L/sec    FEF2575-Pre 0.19 L/sec    GQN8137-%Pred-Pre 6 %    FEF2575-Post 0.16 L/sec    ZRQ7217-%Pred-Post 6 %    ExpTime-Pre 12.15 sec    FIFMax-Pre 1.79 L/sec    VC-Pred 4.08 L    VC-Pre 1.90 L    VC-%Pred-Pre 46 %    IC-Pred 2.76 L    IC-Pre 1.25 L    IC-%Pred-Pre 45 %    ERV-Pred 1.31 L    ERV-Pre 0.53 L    ERV-%Pred-Pre 40 %    FEV1FEV6-Pred 80 %    FEV1FEV6-Pre 40 %    FRCPleth-Pred 3.20 L    FRCPleth-Pre 6.56 L    FRCPleth-%Pred-Pre 204 %    RVPleth-Pred 2.09 L    RVPleth-Pre 5.90 L    RVPleth-%Pred-Pre 282 %    TLCPleth-Pred 5.91 L    TLCPleth-Pre 7.81 L    TLCPleth-%Pred-Pre 132 %    DLCOunc-Pred 23.65 ml/min/mmHg    DLCOunc-Pre 7.62 ml/min/mmHg    DLCOunc-%Pred-Pre 32 %    DLCOcor-Pre 8.14 ml/min/mmHg    DLCOcor-%Pred-Pre 34 %    VA-Pre 3.54 L    VA-%Pred-Pre 66 %    FEV1SVC-Pred 71 %    FEV1SVC-Pre 28 %       PFT: Very severe obstructive lung disease. Air trapping and hyperinflation are noted. No significant bronchodilator response is noted. Diffusion capacity is severely reduced. Very reduced compared to prior studies noted in Dr. Wagner's note in 2016.     CT chest - 8/24/21 - personally reviewed: panlobular emphysema predominantly in the upper lobes bilaterally. No infiltrates.  Some evidence of mucous impaction on the RLL. Small nodules as detailed below.     IMPRESSION:  1.  No evidence for pulmonary embolism.  2.  New finding of prominent mucus impaction leading to the right  lower lobe bronchus and small airways of the right lower lobe. This is  consistent with aspiration.  3.  New curvilinear opacity along the anterior left upper lobe  midchest could just be focal atelectasis but acute airspace disease  including pneumonia not excluded. New indeterminant pulmonary nodule  at the left lower lobe. Recommend follow-up CT chest in 6 months.  There are other stable nodules.  4.  Coronary artery calcifications.  5.  Emphysema.

## 2021-09-17 ENCOUNTER — OFFICE VISIT (OUTPATIENT)
Dept: FAMILY MEDICINE | Facility: CLINIC | Age: 56
End: 2021-09-17
Payer: COMMERCIAL

## 2021-09-17 VITALS
DIASTOLIC BLOOD PRESSURE: 82 MMHG | BODY MASS INDEX: 21 KG/M2 | OXYGEN SATURATION: 96 % | HEART RATE: 76 BPM | TEMPERATURE: 96.6 F | HEIGHT: 64 IN | SYSTOLIC BLOOD PRESSURE: 130 MMHG | WEIGHT: 123 LBS

## 2021-09-17 DIAGNOSIS — I10 ESSENTIAL HYPERTENSION, BENIGN: Primary | ICD-10-CM

## 2021-09-17 PROCEDURE — 99213 OFFICE O/P EST LOW 20 MIN: CPT | Performed by: NURSE PRACTITIONER

## 2021-09-17 RX ORDER — LISINOPRIL 40 MG/1
40 TABLET ORAL DAILY
Qty: 90 TABLET | Refills: 3 | Status: SHIPPED | OUTPATIENT
Start: 2021-09-17 | End: 2022-07-15

## 2021-09-17 ASSESSMENT — MIFFLIN-ST. JEOR: SCORE: 1308.92

## 2021-09-17 NOTE — PROGRESS NOTES
"    Assessment & Plan     Essential hypertension, benign  -patient restarted Lisinopril on his own, no side effects, he stopped Cozaar  - recommended to continue lisinopril (ZESTRIL) 40 MG tablet; Take 1 tablet (40 mg) by mouth daily  -continue Atenolol 25 mg daily  -Norvasc 10 mg daily evening  -monitor BP at home       See Patient Instructions    Return in about 6 months (around 3/17/2022) for Routine Visit.    CHITO Schreiber CNP  [facility]    Darell Smith is a 56 year old who presents for the following health issues     Chief Complaint   Patient presents with     Hypertension     He has not taken his losartan for the past two days due to increased SOB. Took his lisionpril yesterday and b/p was 121/87, 125/88, 110/78.          HPI     Hypertension Follow-up      Do you check your blood pressure regularly outside of the clinic? Yes at home. Results have been 141/102, 121/80, 145/105, 119/76, 145/97, 135/90, 121/87.    Are you following a low salt diet? Yes    Are your blood pressures ever more than 140 on the top number (systolic) OR more   than 90 on the bottom number (diastolic), for example 140/90? Yes      Review of Systems   Constitutional, HEENT, cardiovascular, pulmonary, gi and gu systems are negative, except as otherwise noted.      Objective    /82 (BP Location: Right arm, Patient Position: Sitting, Cuff Size: Adult Large)   Pulse 76   Temp (!) 96.6  F (35.9  C) (Tympanic)   Ht 1.626 m (5' 4\")   Wt 55.8 kg (123 lb)   SpO2 96%   BMI 21.11 kg/m    Body mass index is 21.11 kg/m .  Physical Exam   GENERAL: healthy, alert and no distress  EYES: Eyes grossly normal to inspection, PERRL and conjunctivae and sclerae normal  RESP: lungs clear to auscultation - no rales, rhonchi or wheezes  CV: regular rate and rhythm, normal S1 S2, no S3 or S4, no murmur, click or rub, no peripheral edema and peripheral pulses strong  NEURO: Normal strength and tone, mentation " intact and speech normal  PSYCH: mentation appears normal, affect normal/bright

## 2021-09-24 ENCOUNTER — PATIENT OUTREACH (OUTPATIENT)
Dept: CARE COORDINATION | Facility: CLINIC | Age: 56
End: 2021-09-24

## 2021-09-24 DIAGNOSIS — J44.1 COPD EXACERBATION (H): Primary | ICD-10-CM

## 2021-09-24 ASSESSMENT — ACTIVITIES OF DAILY LIVING (ADL): DEPENDENT_IADLS:: CLEANING;COOKING;LAUNDRY;SHOPPING;MEAL PREPARATION;MEDICATION MANAGEMENT;TRANSPORTATION

## 2021-09-24 NOTE — LETTER
[facility]ORD[facility]  [address] [address]    September 29, 2021    Luis BURNS [facility]  [address] [address]      Dear Luis,    I have been unsuccessful in reaching you since our last contact. At this time the Care Coordination team will make no further attempts to reach you, however this does not change your ability to continue receiving care from your providers at your primary care clinic. If you need additional support from a care coordinator in the future please contact me at [phone].    All of us at [facility]  are invested in your health and are here to assist you in meeting your goals.     Sincerely,    [facility]   India Andrade RN, Care Coordinator   [facility]   E-mail [email or web address]   [phone]

## 2021-09-24 NOTE — PROGRESS NOTES
Clinic Care Coordination Contact  RUST/Voicemail       Clinical Data: Care Coordinator Outreach  Admission:               Admission Date: 08/21/21              Reason for Admission: Increased SOB  Discharge:   Discharge Date: 08/26/21  Discharge Diagnosis: Acute on chronic hypoxic and hypercapnic respiratory failure   Outreach attempted x 2.  Left message on patient's voicemail with call back information and requested return call.  Plan:  Care Coordinator will do no further outreaches at this time.  [facility]   India Andrade RN, Care Coordinator   [facility]   E-mail [email or web address]   [phone]

## 2021-12-16 ENCOUNTER — OFFICE VISIT (OUTPATIENT)
Dept: PULMONOLOGY | Facility: CLINIC | Age: 56
End: 2021-12-16
Payer: COMMERCIAL

## 2021-12-16 VITALS
DIASTOLIC BLOOD PRESSURE: 91 MMHG | TEMPERATURE: 98.2 F | HEIGHT: 64 IN | OXYGEN SATURATION: 92 % | WEIGHT: 130 LBS | SYSTOLIC BLOOD PRESSURE: 135 MMHG | BODY MASS INDEX: 22.2 KG/M2 | HEART RATE: 95 BPM

## 2021-12-16 DIAGNOSIS — J96.21 ACUTE ON CHRONIC RESPIRATORY FAILURE WITH HYPOXIA AND HYPERCAPNIA (H): ICD-10-CM

## 2021-12-16 DIAGNOSIS — J43.9 PULMONARY EMPHYSEMA, UNSPECIFIED EMPHYSEMA TYPE (H): ICD-10-CM

## 2021-12-16 DIAGNOSIS — J96.22 ACUTE ON CHRONIC RESPIRATORY FAILURE WITH HYPOXIA AND HYPERCAPNIA (H): ICD-10-CM

## 2021-12-16 PROCEDURE — 90670 PCV13 VACCINE IM: CPT | Performed by: INTERNAL MEDICINE

## 2021-12-16 PROCEDURE — 99214 OFFICE O/P EST MOD 30 MIN: CPT | Mod: 25 | Performed by: INTERNAL MEDICINE

## 2021-12-16 PROCEDURE — 90471 IMMUNIZATION ADMIN: CPT | Performed by: INTERNAL MEDICINE

## 2021-12-16 RX ORDER — ALBUTEROL SULFATE 0.83 MG/ML
2.5 SOLUTION RESPIRATORY (INHALATION) EVERY 6 HOURS PRN
Qty: 90 ML | Refills: 5 | Status: SHIPPED | OUTPATIENT
Start: 2021-12-16 | End: 2024-01-07

## 2021-12-16 RX ORDER — FLUTICASONE PROPIONATE AND SALMETEROL XINAFOATE 230; 21 UG/1; UG/1
2 AEROSOL, METERED RESPIRATORY (INHALATION) 2 TIMES DAILY
Qty: 12 G | Refills: 5 | Status: SHIPPED | OUTPATIENT
Start: 2021-12-16 | End: 2022-05-19

## 2021-12-16 ASSESSMENT — MIFFLIN-ST. JEOR: SCORE: 1340.68

## 2021-12-16 NOTE — PROGRESS NOTES
[facility]  [facility]  December 16, 2021         Assessment and Plan:   Mr. Luis Hernandez is a 56 year old male with medical history significant for very severe COPD, hypertension who was referred after recent hospitalization for COPD exacerbation.  He has had 3x hospitalizations since April for COPD exacerbations during which time he did grow E. coli, Klebsiella, stenotrophomonas. At his last visit I extended his Bactrim course for full coverage of stenotrophomonas.   He continues on triple therapy with Advair, albuterol and Spiriva; now doing better on MDI inhaler therapy versus dry powder.  I encouraged him to start pulmonary rehab.  Additionally, could consider chronic azithromycin versus Roflumilast in the future should he continue to have recurrent exacerbations.    1. Very severe COPD (FEV1 18%), panlobular emphysema, minimal cough but mucus production and chronic hypoxic respiratory failure, on 2-1/2 to 4 L with exertion (A1ATD neg)  2. Prior tobacco dependence, greater than 50 years, quit in August 2021  3. Likely pulmonary hypertension with RVP of 55 mmHg plus RAP (2019 ECHO), dilated RV but normal RV EF  4. New indeterminate left lower lobe nodule, indeterminate left upper lobe opacity on 8/26/2021 CT    Recommendations as follows:   -Continue Advair MDI  -Continue Respimat  -Discontinue DuoNeb and placed an order for albuterol nebs  -Continue aerobika use at least 2-3 times per day for 10-minute sessions after albuterol inhaler or nebulizer therapy   -repeat chest CT in 6 months approximately February 2022 to follow new left lower lobe nodule  -consider initiation of Roflumilast to prevent recurrent exacerbations versus daily azithromycin  -start pulmonary rehab (last 2 years ago) -he will call to schedule this  -Prevnar 13 to be given today  -he has received his Covid vaccine but we discussed staying up-to-date with yearly flu vaccine and pneumonia  shots    Return to clinic in 6 months.    Bianca Cuellar MD  Pulmonary, Allergy, Critical Care, and Sleep Medicine   Pager: [phone]        HPI:    Mr. Luis Hernandez is a 56 year old male with medical history significant for very severe COPD, hypertension who was referred after recent hospitalization for COPD exacerbation.  He was last seen in the pulmonary clinic in September 2021.      He reports that he is currently doing well.  He has not had any illnesses since his last visit.  He continues to have a minimal cough with clear mucus production predominantly in the morning.  He is able to clear this well and is using the aerobic advice 3-5 times per day.  He did complete his course of Bactrim and noted that when he finished this he felt much better.  He now can sleep laying down flat which she was unable to do prior to his last visit.    He does do pursed lip breathing and he feels that this helps.  He denies significant wheezing.  He has noted that weather changes affect his breathing. He is very limited with his ability to do activities at home and notes that he gets winded just making a sandwich.  He is using Advair, Spiriva, albuterol about 3 times per day.  He has tried using the DuoNebs but feels that this does not help.    He denies GERD or allergies.  He continues to live with his father and he is not smoking.           Review of Systems:     A 13 point ROS was performed and was negative except as listed above in the HPI.  Weight is stable.  No orthopnea/PND.           Past Medical and Surgical History:     Past Medical History:   Diagnosis Date     Acute on chronic respiratory failure with hypoxia (H) 4/10/2020     Acute on chronic respiratory failure with hypoxia and hypercapnia (H) 4/1/2019     CAP (community acquired pneumonia) 4/14/2020     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)      COPD exacerbation (H) 4/1/2019     COPD exacerbation (H) 2/16/2020     Erectile dysfunction       Hypertension      Hyponatremia 4/1/2019     Pneumonia 4/10/2020     Past Surgical History:   Procedure Laterality Date     APPENDECTOMY      childhood           Family History:     Family History   Problem Relation Age of Onset     Hypertension Mother      Ovarian Cancer Mother      Breast Cancer Mother      Hypertension Father      Lipids Father      C.A.D. Father      Heart Disease Father      Chronic Obstructive Pulmonary Disease Father      Diabetes Paternal Grandmother      Chronic Obstructive Pulmonary Disease Paternal Grandfather             Social History:     Social History     Socioeconomic History     Marital status: Single     Spouse name: Not on file     Number of children: Not on file     Years of education: Not on file     Highest education level: Not on file   Occupational History     Not on file   Tobacco Use     Smoking status: Former Smoker     Packs/day: 2.00     Years: 30.00     Pack years: 60.00     Types: Cigarettes     Smokeless tobacco: Former User   Vaping Use     Vaping Use: Never used   Substance and Sexual Activity     Alcohol use: Yes     Comment: rare     Drug use: No     Sexual activity: Yes     Partners: Female   Other Topics Concern     Parent/sibling w/ CABG, MI or angioplasty before 65F 55M? No   Social History Narrative    The patient has a 60+ pk yr tobacco hx.  He has has no active use, quit Jan 2014.  Alcohol use is <1 alcoholic drinks per week.  He denies use of recreational drugs.  He is employed. [employer].  Works nights.   The patient is .  Has 1 child.Hot Tub Exposure: NORecent Travel: NO Hx of incarceration:  NOBird Exposure:   NOAnimal Exposure:  NOInhalation Exposure:  NO        Lives in [place], [place] with father. 2 dogs.          Social Determinants of Health     Financial Resource Strain: Low Risk      Difficulty of Paying Living Expenses: Not hard at all   Food Insecurity: No Food Insecurity     Worried About Running Out of Food in the Last Year: Never  "true     Ran Out of Food in the Last Year: Never true   Transportation Needs: No Transportation Needs     Lack of Transportation (Medical): No     Lack of Transportation (Non-Medical): No   Physical Activity: Inactive     Days of Exercise per Week: 0 days     Minutes of Exercise per Session: 0 min   Stress: Not on file   Social Connections: Unknown     Frequency of Communication with Friends and Family: Twice a week     Frequency of Social Gatherings with Friends and Family: Twice a week     Attends Evangelical Services: Never     Active Member of Clubs or Organizations: No     Attends Club or Organization Meetings: Never     Marital Status: Not on file   Intimate Partner Violence: Not on file   Housing Stability: Not on file            Medications:     Current Outpatient Medications   Medication     albuterol (PROAIR HFA/PROVENTIL HFA/VENTOLIN HFA) 108 (90 Base) MCG/ACT inhaler     amLODIPine (NORVASC) 10 MG tablet     atenolol (TENORMIN) 25 MG tablet     fluticasone-salmeterol (ADVAIR-HFA) 230-21 MCG/ACT inhaler     lisinopril (ZESTRIL) 40 MG tablet     potassium 99 MG TABS     tiotropium (SPIRIVA RESPIMAT) 2.5 MCG/ACT inhaler     ipratropium - albuterol 0.5 mg/2.5 mg/3 mL (DUONEB) 0.5-2.5 (3) MG/3ML neb solution     order for DME     order for DME     predniSONE (DELTASONE) 10 MG tablet     predniSONE (DELTASONE) 5 MG tablet     sulfamethoxazole-trimethoprim (BACTRIM DS) 800-160 MG tablet     No current facility-administered medications for this visit.            Physical Exam:   BP (!) 135/91 (BP Location: Right arm, Patient Position: Sitting, Cuff Size: Adult Regular)   Pulse 95   Temp 98.2  F (36.8  C) (Tympanic)   Ht 1.626 m (5' 4\")   Wt 59 kg (130 lb)   SpO2 92%   BMI 22.31 kg/m      Constitutional:   Awake, alert and in no apparent distress, pleasant male, on supplemental oxygen     Eyes:   nonicteric     HEENT:   Supple without supraclavicular or cervical lymphadenopathy     Lungs:   Reduced air flow " b/l.  No crackles. No rhonchi.  No wheezes. Speaking in full sentences.      Cardiovascular:   Normal S1 and S2.  RRR.  No murmur, gallop or rub.     Musculoskeletal:   No edema, digital clubbing present     Neurologic:   Alert and conversant.     Skin:   Warm, dry.  No rash on limited exam.  No lower extremity edema.             Data:   All laboratory and imaging data reviewed personally.      Specimen Description   Date Value Ref Range Status   04/13/2020 Urine  Final   04/10/2020 Sputum  Final   04/10/2020 Sputum  Final    Culture Micro   Date Value Ref Range Status   04/10/2020 Moderate growth  Normal latrice    Final   04/10/2020 Moderate growth  Escherichia coli   (A)  Final   04/10/2020 Moderate growth  Klebsiella pneumoniae   (A)  Final        No results found for this or any previous visit (from the past 168 hour(s)).    PFT: Very severe obstructive lung disease. Air trapping and hyperinflation are noted. No significant bronchodilator response is noted. Diffusion capacity is severely reduced. Very reduced compared to prior studies noted in Dr. Wagner's note in 2016.     CT chest - 8/24/21 - personally reviewed: panlobular emphysema predominantly in the upper lobes bilaterally. No infiltrates. Some evidence of mucous impaction on the RLL. Small nodules as detailed below.     IMPRESSION:  1.  No evidence for pulmonary embolism.  2.  New finding of prominent mucus impaction leading to the right  lower lobe bronchus and small airways of the right lower lobe. This is  consistent with aspiration.  3.  New curvilinear opacity along the anterior left upper lobe  midchest could just be focal atelectasis but acute airspace disease  including pneumonia not excluded. New indeterminant pulmonary nodule  at the left lower lobe. Recommend follow-up CT chest in 6 months.  There are other stable nodules.  4.  Coronary artery calcifications.  5.  Emphysema.

## 2021-12-16 NOTE — PATIENT INSTRUCTIONS
Luis,    It was nice seeing you!  I am glad to hear you are doing well.     Recommendations as follows:   - continue current therapies including advair, spiriva, and albuterol  - I have ordered albuterol nebs for you as the duonebs were not working well (keep these however)  - prevnar 13 vaccine today   - schedule pulmonary rehab   - Please call the pulmonary clinic with any questions. The pulmonary clinic number is: [phone].    Stay up to date with vaccinations including your yearly flu vaccine. Please continue to social distance which does not exclude going outside and enjoying our wonderful weather! Wash your hands regularly. Wear a mask when unable to social distance (such as in the grocery store).  I recommend that you receive the COVID-19 vaccine.     Have a nice fall and stay well! Please let me know if you have questions or concerns.     Yamilet Cuellar MD  Pulmonary, Allergy, Critical Care, and Sleep Medicine   [facility]

## 2021-12-16 NOTE — NURSING NOTE
"Initial BP (!) 135/91 (BP Location: Right arm, Patient Position: Sitting, Cuff Size: Adult Regular)   Pulse 95   Temp 98.2  F (36.8  C) (Tympanic)   Ht 1.626 m (5' 4\")   Wt 59 kg (130 lb)   SpO2 92%   BMI 22.31 kg/m   Estimated body mass index is 22.31 kg/m  as calculated from the following:    Height as of this encounter: 1.626 m (5' 4\").    Weight as of this encounter: 59 kg (130 lb). .    Flores Haji MA    "

## 2022-01-02 ENCOUNTER — HEALTH MAINTENANCE LETTER (OUTPATIENT)
Age: 57
End: 2022-01-02

## 2022-01-10 DIAGNOSIS — J44.9 CHRONIC OBSTRUCTIVE PULMONARY DISEASE, UNSPECIFIED COPD TYPE (H): Primary | ICD-10-CM

## 2022-01-10 RX ORDER — PREDNISONE 20 MG/1
40 TABLET ORAL DAILY
Qty: 10 TABLET | Refills: 0 | Status: SHIPPED | OUTPATIENT
Start: 2022-01-10 | End: 2022-01-31

## 2022-01-10 RX ORDER — DOXYCYCLINE HYCLATE 100 MG
100 TABLET ORAL 2 TIMES DAILY
Qty: 20 TABLET | Refills: 0 | Status: ON HOLD | OUTPATIENT
Start: 2022-01-10 | End: 2022-04-27

## 2022-01-31 ENCOUNTER — VIRTUAL VISIT (OUTPATIENT)
Dept: FAMILY MEDICINE | Facility: CLINIC | Age: 57
End: 2022-01-31
Payer: COMMERCIAL

## 2022-01-31 DIAGNOSIS — I27.20 PULMONARY HYPERTENSION (H): ICD-10-CM

## 2022-01-31 DIAGNOSIS — J96.21 ACUTE ON CHRONIC RESPIRATORY FAILURE WITH HYPOXIA AND HYPERCAPNIA (H): ICD-10-CM

## 2022-01-31 DIAGNOSIS — J44.1 COPD EXACERBATION (H): ICD-10-CM

## 2022-01-31 DIAGNOSIS — J96.22 ACUTE ON CHRONIC RESPIRATORY FAILURE WITH HYPOXIA AND HYPERCAPNIA (H): ICD-10-CM

## 2022-01-31 DIAGNOSIS — J44.9 CHRONIC OBSTRUCTIVE PULMONARY DISEASE, UNSPECIFIED COPD TYPE (H): ICD-10-CM

## 2022-01-31 PROCEDURE — 99214 OFFICE O/P EST MOD 30 MIN: CPT | Mod: 95 | Performed by: FAMILY MEDICINE

## 2022-01-31 RX ORDER — AZITHROMYCIN 250 MG/1
TABLET, FILM COATED ORAL
Qty: 6 TABLET | Refills: 0 | Status: SHIPPED | OUTPATIENT
Start: 2022-01-31 | End: 2022-02-05

## 2022-01-31 RX ORDER — PREDNISONE 10 MG/1
TABLET ORAL
Qty: 30 TABLET | Refills: 0 | Status: ON HOLD | OUTPATIENT
Start: 2022-01-31 | End: 2022-04-27

## 2022-01-31 RX ORDER — DOXYCYCLINE HYCLATE 100 MG
100 TABLET ORAL 2 TIMES DAILY
Qty: 20 TABLET | Refills: 0 | Status: CANCELLED | OUTPATIENT
Start: 2022-01-31

## 2022-01-31 RX ORDER — PREDNISONE 10 MG/1
TABLET ORAL
Qty: 30 TABLET | Refills: 4 | Status: CANCELLED | OUTPATIENT
Start: 2022-01-31

## 2022-01-31 NOTE — PROGRESS NOTES
Luis is a 56 year old who is being evaluated via a billable telephone visit.      What phone number would you like to be contacted at? [phone]  How would you like to obtain your AVS? MyChart    Assessment & Plan     COPD exacerbation (H)  You seem to have a COPD exacerbation going on.  The prednisone was only for 5 days, so I extended it with the usual taper.  I change your antibiotic from doxycycline to azithromycin.  Follow up in one week  - azithromycin (ZITHROMAX) 250 MG tablet; Take 2 tablets (500 mg) by mouth daily for 1 day, THEN 1 tablet (250 mg) daily for 4 days.  - predniSONE (DELTASONE) 10 MG tablet; 30mg x 5d, 20mg x 5d, 10mg x 5d, take by mouth    Chronic obstructive pulmonary disease, unspecified COPD type (H)      Pulmonary hypertension (H)  noted    Acute on chronic respiratory failure with hypoxia and hypercapnia (H)  Has acute on chronic.  Using 3 LMP while feeling more ESPARZA.               See Patient Instructions    Return in about 1 week (around 2/7/2022) for Office Visit.    Dean Mariee MD  [facility]   Luis is a 56 year old who presents for the following health issues     HPI     COPD Follow-Up  - Patient on his last dose of Doxycyline and Prednisone today. Patient states things were going good for the first 2-3 days but not the last two days have been getting worse.    Overall, how are your COPD symptoms since your last clinic visit?  Little worse each day. For the last 2 days.    How much fatigue or shortness of breath do you have when you are walking?  More than usual    How much shortness of breath do you have when you are resting?  More than usual- just a little.    How often do you cough? Rarely    Have you noticed any change in your sputum/phlegm?  No    Have you experienced a recent fever? No    Please describe how far you can walk without stopping to rest:  Less than 1 block    How many flights of stairs are you able to walk up  without stopping?  1    Have you had any Emergency Room Visits, Urgent Care Visits, or Hospital Admissions because of your COPD since your last office visit?  No    History   Smoking Status     Former Smoker     Packs/day: 2.00     Years: 30.00     Types: Cigarettes   Smokeless Tobacco     Former User     No results found for: FEV1, [id number]      How many servings of fruits and vegetables do you eat daily?  0-1    On average, how many sweetened beverages do you drink each day (Examples: soda, juice, sweet tea, etc.  Do NOT count diet or artificially sweetened beverages)?   1    How many days per week do you exercise enough to make your heart beat faster? 0    How many minutes a day do you exercise enough to make your heart beat faster? 0    How many days per week do you miss taking your medication? 0        Review of Systems   Dry cough.  No fever or chills.  Feels similar to last fall.  Using doxy and prednisone burst only.  (no taper was prescribed)  Tried getting a hold of his pulmonary specialist and soonest he could talk to her was weeks away.      Objective           Vitals:  No vitals were obtained today due to virtual visit.    Physical Exam   alert, no distress, cooperative and able to talk in sentences.  He is on 3 LMP and O2 sat 96%.  PSYCH: Alert and oriented times 3; coherent speech, normal   rate and volume, able to articulate logical thoughts, able   to abstract reason, no tangential thoughts, no hallucinations   or delusions  His affect is normal  RESP: No cough, no audible wheezing, able to talk in full sentences  Remainder of exam unable to be completed due to telephone visits                Phone call duration: 11 minutes

## 2022-01-31 NOTE — PATIENT INSTRUCTIONS
Please stop your doxycycline.    Please start azithromycin.    Please continue with the prednisone taper since today you took 40 mg of prednisone and no taper was prescribed.          Thank you for choosing [facility].  You may be receiving an email and/or telephone survey request from [facility] Customer Experience regarding your visit today.  Please take a few minutes to respond to the survey to let us know how we are doing.      If you have questions or concerns, please contact us via [place] or you can contact your care team at [phone] option [phone].    Our Clinic hours are:  Monday - Thursday 7am-6pm  Friday 7am-5pm    The [place] outpatient lab hours are:  Monday - Friday 7am-4:30pm    Appointments are required, call [phone]    If you have clinical questions after hours or would like to schedule an appointment,  call the clinic at [phone].

## 2022-02-07 ENCOUNTER — APPOINTMENT (OUTPATIENT)
Dept: GENERAL RADIOLOGY | Facility: CLINIC | Age: 57
End: 2022-02-07
Attending: FAMILY MEDICINE
Payer: COMMERCIAL

## 2022-02-07 ENCOUNTER — HOSPITAL ENCOUNTER (EMERGENCY)
Facility: CLINIC | Age: 57
Discharge: ANOTHER HOSPITAL | End: 2022-02-08
Attending: FAMILY MEDICINE | Admitting: FAMILY MEDICINE
Payer: COMMERCIAL

## 2022-02-07 ENCOUNTER — OFFICE VISIT (OUTPATIENT)
Dept: FAMILY MEDICINE | Facility: CLINIC | Age: 57
End: 2022-02-07
Payer: COMMERCIAL

## 2022-02-07 DIAGNOSIS — Z87.891 PERSONAL HISTORY OF TOBACCO USE, PRESENTING HAZARDS TO HEALTH: ICD-10-CM

## 2022-02-07 DIAGNOSIS — Z11.52 ENCOUNTER FOR SCREENING LABORATORY TESTING FOR SEVERE ACUTE RESPIRATORY SYNDROME CORONAVIRUS 2 (SARS-COV-2): ICD-10-CM

## 2022-02-07 DIAGNOSIS — J96.22 ACUTE ON CHRONIC RESPIRATORY FAILURE WITH HYPERCAPNIA (H): ICD-10-CM

## 2022-02-07 DIAGNOSIS — J44.1 COPD EXACERBATION (H): ICD-10-CM

## 2022-02-07 DIAGNOSIS — J44.1 COPD EXACERBATION (H): Primary | ICD-10-CM

## 2022-02-07 PROBLEM — J96.20 ACUTE ON CHRONIC RESPIRATORY FAILURE (H): Status: ACTIVE | Noted: 2021-08-22

## 2022-02-07 LAB
ALBUMIN SERPL-MCNC: 3.8 G/DL (ref 3.4–5)
ALP SERPL-CCNC: 33 U/L (ref 40–150)
ALT SERPL W P-5'-P-CCNC: 27 U/L (ref 0–70)
ANION GAP SERPL CALCULATED.3IONS-SCNC: 1 MMOL/L (ref 3–14)
AST SERPL W P-5'-P-CCNC: 15 U/L (ref 0–45)
BASE EXCESS BLDV CALC-SCNC: 8 MMOL/L (ref -7.7–1.9)
BASOPHILS # BLD AUTO: 0.1 10E3/UL (ref 0–0.2)
BASOPHILS NFR BLD AUTO: 0 %
BILIRUB SERPL-MCNC: 0.2 MG/DL (ref 0.2–1.3)
BUN SERPL-MCNC: 14 MG/DL (ref 7–30)
CALCIUM SERPL-MCNC: 9.2 MG/DL (ref 8.5–10.1)
CHLORIDE BLD-SCNC: 103 MMOL/L (ref 94–109)
CO2 SERPL-SCNC: 35 MMOL/L (ref 20–32)
CREAT SERPL-MCNC: 0.76 MG/DL (ref 0.66–1.25)
D DIMER PPP FEU-MCNC: 0.28 UG/ML FEU (ref 0–0.5)
EOSINOPHIL # BLD AUTO: 0.2 10E3/UL (ref 0–0.7)
EOSINOPHIL NFR BLD AUTO: 1 %
ERYTHROCYTE [DISTWIDTH] IN BLOOD BY AUTOMATED COUNT: 13.3 % (ref 10–15)
FLUAV RNA SPEC QL NAA+PROBE: NEGATIVE
FLUBV RNA RESP QL NAA+PROBE: NEGATIVE
GFR SERPL CREATININE-BSD FRML MDRD: >90 ML/MIN/1.73M2
GLUCOSE BLD-MCNC: 132 MG/DL (ref 70–99)
HCO3 BLDV-SCNC: 37 MMOL/L (ref 21–28)
HCT VFR BLD AUTO: 40.3 % (ref 40–53)
HGB BLD-MCNC: 12.6 G/DL (ref 13.3–17.7)
HOLD SPECIMEN: NORMAL
IMM GRANULOCYTES # BLD: 0.2 10E3/UL
IMM GRANULOCYTES NFR BLD: 2 %
LYMPHOCYTES # BLD AUTO: 2.9 10E3/UL (ref 0.8–5.3)
LYMPHOCYTES NFR BLD AUTO: 19 %
MCH RBC QN AUTO: 30.7 PG (ref 26.5–33)
MCHC RBC AUTO-ENTMCNC: 31.3 G/DL (ref 31.5–36.5)
MCV RBC AUTO: 98 FL (ref 78–100)
MONOCYTES # BLD AUTO: 1.8 10E3/UL (ref 0–1.3)
MONOCYTES NFR BLD AUTO: 12 %
NEUTROPHILS # BLD AUTO: 10 10E3/UL (ref 1.6–8.3)
NEUTROPHILS NFR BLD AUTO: 66 %
NRBC # BLD AUTO: 0 10E3/UL
NRBC BLD AUTO-RTO: 0 /100
NT-PROBNP SERPL-MCNC: 67 PG/ML (ref 0–900)
O2/TOTAL GAS SETTING VFR VENT: 40 %
PCO2 BLDV: 76 MM HG (ref 40–50)
PH BLDV: 7.3 [PH] (ref 7.32–7.43)
PLATELET # BLD AUTO: 456 10E3/UL (ref 150–450)
PO2 BLDV: 49 MM HG (ref 25–47)
POTASSIUM BLD-SCNC: 3.7 MMOL/L (ref 3.4–5.3)
PROT SERPL-MCNC: 7.2 G/DL (ref 6.8–8.8)
RBC # BLD AUTO: 4.1 10E6/UL (ref 4.4–5.9)
SARS-COV-2 RNA RESP QL NAA+PROBE: NEGATIVE
SODIUM SERPL-SCNC: 139 MMOL/L (ref 133–144)
TROPONIN I SERPL HS-MCNC: 3 NG/L
WBC # BLD AUTO: 15.1 10E3/UL (ref 4–11)

## 2022-02-07 PROCEDURE — 87636 SARSCOV2 & INF A&B AMP PRB: CPT | Performed by: FAMILY MEDICINE

## 2022-02-07 PROCEDURE — 80053 COMPREHEN METABOLIC PANEL: CPT | Performed by: FAMILY MEDICINE

## 2022-02-07 PROCEDURE — 84484 ASSAY OF TROPONIN QUANT: CPT | Performed by: FAMILY MEDICINE

## 2022-02-07 PROCEDURE — 85025 COMPLETE CBC W/AUTO DIFF WBC: CPT | Performed by: FAMILY MEDICINE

## 2022-02-07 PROCEDURE — 94640 AIRWAY INHALATION TREATMENT: CPT | Performed by: FAMILY MEDICINE

## 2022-02-07 PROCEDURE — 96375 TX/PRO/DX INJ NEW DRUG ADDON: CPT | Performed by: FAMILY MEDICINE

## 2022-02-07 PROCEDURE — 36415 COLL VENOUS BLD VENIPUNCTURE: CPT | Performed by: FAMILY MEDICINE

## 2022-02-07 PROCEDURE — 83880 ASSAY OF NATRIURETIC PEPTIDE: CPT | Performed by: FAMILY MEDICINE

## 2022-02-07 PROCEDURE — 93005 ELECTROCARDIOGRAM TRACING: CPT | Performed by: FAMILY MEDICINE

## 2022-02-07 PROCEDURE — 99215 OFFICE O/P EST HI 40 MIN: CPT | Performed by: FAMILY MEDICINE

## 2022-02-07 PROCEDURE — 93308 TTE F-UP OR LMTD: CPT | Mod: 26 | Performed by: FAMILY MEDICINE

## 2022-02-07 PROCEDURE — 250N000013 HC RX MED GY IP 250 OP 250 PS 637: Performed by: FAMILY MEDICINE

## 2022-02-07 PROCEDURE — 96374 THER/PROPH/DIAG INJ IV PUSH: CPT | Mod: 59 | Performed by: FAMILY MEDICINE

## 2022-02-07 PROCEDURE — 99285 EMERGENCY DEPT VISIT HI MDM: CPT | Mod: 25 | Performed by: FAMILY MEDICINE

## 2022-02-07 PROCEDURE — 96376 TX/PRO/DX INJ SAME DRUG ADON: CPT | Performed by: FAMILY MEDICINE

## 2022-02-07 PROCEDURE — 93010 ELECTROCARDIOGRAM REPORT: CPT | Performed by: FAMILY MEDICINE

## 2022-02-07 PROCEDURE — 999N000105 HC STATISTIC NO DOCUMENTATION TO SUPPORT CHARGE

## 2022-02-07 PROCEDURE — 82803 BLOOD GASES ANY COMBINATION: CPT | Performed by: FAMILY MEDICINE

## 2022-02-07 PROCEDURE — 93308 TTE F-UP OR LMTD: CPT | Performed by: FAMILY MEDICINE

## 2022-02-07 PROCEDURE — 71045 X-RAY EXAM CHEST 1 VIEW: CPT

## 2022-02-07 PROCEDURE — 76604 US EXAM CHEST: CPT | Mod: 26 | Performed by: FAMILY MEDICINE

## 2022-02-07 PROCEDURE — 999N000157 HC STATISTIC RCP TIME EA 10 MIN

## 2022-02-07 PROCEDURE — 85379 FIBRIN DEGRADATION QUANT: CPT | Performed by: FAMILY MEDICINE

## 2022-02-07 PROCEDURE — 94660 CPAP INITIATION&MGMT: CPT

## 2022-02-07 PROCEDURE — C9803 HOPD COVID-19 SPEC COLLECT: HCPCS | Performed by: FAMILY MEDICINE

## 2022-02-07 PROCEDURE — 250N000011 HC RX IP 250 OP 636: Performed by: FAMILY MEDICINE

## 2022-02-07 PROCEDURE — 76604 US EXAM CHEST: CPT | Performed by: FAMILY MEDICINE

## 2022-02-07 PROCEDURE — 82040 ASSAY OF SERUM ALBUMIN: CPT | Performed by: FAMILY MEDICINE

## 2022-02-07 RX ORDER — FLUTICASONE PROPIONATE AND SALMETEROL XINAFOATE 230; 21 UG/1; UG/1
2 AEROSOL, METERED RESPIRATORY (INHALATION) 2 TIMES DAILY
Status: DISCONTINUED | OUTPATIENT
Start: 2022-02-07 | End: 2022-02-07 | Stop reason: CLARIF

## 2022-02-07 RX ORDER — LORAZEPAM 2 MG/ML
0.5 INJECTION INTRAMUSCULAR EVERY 6 HOURS
Status: DISCONTINUED | OUTPATIENT
Start: 2022-02-07 | End: 2022-02-08 | Stop reason: HOSPADM

## 2022-02-07 RX ORDER — ALBUTEROL SULFATE 90 UG/1
6 AEROSOL, METERED RESPIRATORY (INHALATION) EVERY 6 HOURS PRN
Status: DISCONTINUED | OUTPATIENT
Start: 2022-02-07 | End: 2022-02-08 | Stop reason: HOSPADM

## 2022-02-07 RX ORDER — PREDNISONE 20 MG/1
60 TABLET ORAL ONCE
Status: DISCONTINUED | OUTPATIENT
Start: 2022-02-08 | End: 2022-02-08 | Stop reason: HOSPADM

## 2022-02-07 RX ORDER — METHYLPREDNISOLONE SODIUM SUCCINATE 40 MG/ML
60 INJECTION, POWDER, LYOPHILIZED, FOR SOLUTION INTRAMUSCULAR; INTRAVENOUS ONCE
Status: COMPLETED | OUTPATIENT
Start: 2022-02-07 | End: 2022-02-07

## 2022-02-07 RX ORDER — ATENOLOL 25 MG/1
25 TABLET ORAL DAILY
Status: DISCONTINUED | OUTPATIENT
Start: 2022-02-08 | End: 2022-02-08 | Stop reason: HOSPADM

## 2022-02-07 RX ORDER — ALBUTEROL SULFATE 90 UG/1
2 AEROSOL, METERED RESPIRATORY (INHALATION) EVERY 4 HOURS PRN
Status: DISCONTINUED | OUTPATIENT
Start: 2022-02-07 | End: 2022-02-08 | Stop reason: HOSPADM

## 2022-02-07 RX ORDER — LISINOPRIL 10 MG/1
40 TABLET ORAL DAILY
Status: DISCONTINUED | OUTPATIENT
Start: 2022-02-08 | End: 2022-02-08 | Stop reason: HOSPADM

## 2022-02-07 RX ORDER — IPRATROPIUM BROMIDE AND ALBUTEROL SULFATE 2.5; .5 MG/3ML; MG/3ML
1 SOLUTION RESPIRATORY (INHALATION) EVERY 6 HOURS PRN
Status: DISCONTINUED | OUTPATIENT
Start: 2022-02-07 | End: 2022-02-08 | Stop reason: HOSPADM

## 2022-02-07 RX ORDER — PREDNISONE 20 MG/1
60 TABLET ORAL ONCE
Status: DISCONTINUED | OUTPATIENT
Start: 2022-02-08 | End: 2022-02-07

## 2022-02-07 RX ORDER — AMLODIPINE BESYLATE 10 MG/1
10 TABLET ORAL DAILY
Status: DISCONTINUED | OUTPATIENT
Start: 2022-02-08 | End: 2022-02-08 | Stop reason: HOSPADM

## 2022-02-07 RX ADMIN — FLUTICASONE FUROATE AND VILANTEROL TRIFENATATE 1 PUFF: 200; 25 POWDER RESPIRATORY (INHALATION) at 21:57

## 2022-02-07 RX ADMIN — METHYLPREDNISOLONE SODIUM SUCCINATE 60 MG: 40 INJECTION, POWDER, FOR SOLUTION INTRAMUSCULAR; INTRAVENOUS at 16:55

## 2022-02-07 RX ADMIN — LORAZEPAM 0.5 MG: 2 INJECTION INTRAMUSCULAR; INTRAVENOUS at 21:59

## 2022-02-07 RX ADMIN — LORAZEPAM 0.5 MG: 2 INJECTION INTRAMUSCULAR; INTRAVENOUS at 16:54

## 2022-02-07 ASSESSMENT — ACTIVITIES OF DAILY LIVING (ADL)
ADLS_ACUITY_SCORE: 9

## 2022-02-07 ASSESSMENT — ENCOUNTER SYMPTOMS
BLOOD IN STOOL: 0
SORE THROAT: 0
CHILLS: 0
PALPITATIONS: 0
SINUS PRESSURE: 0
DYSURIA: 0
VOMITING: 0
DIARRHEA: 0
HEADACHES: 0
ABDOMINAL PAIN: 0
FREQUENCY: 0
COUGH: 1
FEVER: 0
CONSTIPATION: 0
WHEEZING: 0
NAUSEA: 0
SHORTNESS OF BREATH: 1
DIAPHORESIS: 0

## 2022-02-07 NOTE — ED NOTES
"Pt arrived via triage, in WC.   Pt with increasing SOB and O2 needs.  Pt has COPD and has been seen and treated for these symptoms for the past 2 weeks.  Steroids started 2 weeks ago, and antibiotics a week ago.  Approx. 3 days ago pt increased home o2 to 3 lts with any activity and talking. Pt stays pretty sedentary at home, but had MD apt today, and was sent to ED.      ECG completed in Triage, IV in and labs have been sent.    Lungs:   Extremely quiet, not moving much air at all.  SOB with getting out of WC.   Wishes to stand at bedside \"to breath\" for a minute.   02 at 3 lts.     Pt doesn't do Nebs at home \"it makes me worse, I can't get the air out\".  Pt took 4 puffs at home at 1pm.  Pt has  albuterol inhaler with him, and encouraged to take another 4 puffs at this time.  PLAN:  Discuss plan of care, and will await MD assessment and orders.        "

## 2022-02-07 NOTE — ED PROVIDER NOTES
History     Chief Complaint   Patient presents with     Shortness of Breath     hx COPD on home O2(baseline 2.5 L using 3 L NC) on prednisione and just finished antibiotics.SOB for wks but today it is worse.      HPI  Luis Hernandez is a 56 year old male who presents with history of hypertension, COPD, tobacco abuse, pulmonary hypertension.  He presents here for COPD on home O2 using baseline 2.5 L now on 3 L nasal cannula on prednisone just completed antibiotics but has been worsening.  EKG done today demonstrated no acute significant changes other than a sinus tach 110-120.      He arrived today in clinic O2 dependent and followed by pulmonology.  In rooming in clinic the patient could not speak in full sentences oxygen saturations 92% but accessory muscles and was transferred to the emergency department for further evaluation.    He was last seen for recurrent COPD exacerbation January 31.  He was placed on azithromycin as well as prednisone which have been on a slow taper over 15 days from 30 mg down to 20 then 10.  He was continuing his Spiriva and albuterol inhalers.  He continues to sleep more upright but this has been more chronic.  No leg edema.  No VTE risks or history.            Allergies:  Allergies   Allergen Reactions     Hctz Other (See Comments)     He has hyponatremia     Penicillins Other (See Comments)     Reaction unknown       Problem List:    Patient Active Problem List    Diagnosis Date Noted     Acute on chronic respiratory failure (H) 08/22/2021     Priority: Medium     Acute respiratory acidosis 06/30/2021     Priority: Medium     Peripheral edema 02/16/2020     Priority: Medium     Pulmonary hypertension (H) 02/16/2020     Priority: Medium     Acute on chronic respiratory failure with hypoxia and hypercapnia (H) 04/01/2019     Priority: Medium     Tobacco abuse, in remission 12/05/2017     Priority: Medium     COPD exacerbation (H) 01/07/2014     Priority: Medium     Incidental  pulmonary nodule, > 3mm and < 8mm, new from 2010 01/07/2014     Priority: Medium     By chest x-ray and chest CT new from CT chest from Oct 2010.   Stable 01/2014 to 07/2014, 6 month follow scan recommended.   Chest CT of 1/15/2015= stable nodule, f/u one year.       Advanced directives, counseling/discussion 11/25/2013     Priority: Medium     11/25/2013 Gave patient honoring choices forms to take home and review.  DAVEY Dial ([facility])        CARDIOVASCULAR SCREENING; LDL GOAL LESS THAN 130 10/31/2010     Priority: Medium     COPD (chronic obstructive pulmonary disease) (H) 10/25/2010     Priority: Medium     Severe to very severe       Eczema 10/04/2010     Priority: Medium     ED (erectile dysfunction) 04/20/2009     Priority: Medium     Essential hypertension, benign 10/15/2007     Priority: Medium        Past Medical History:    Past Medical History:   Diagnosis Date     Acute on chronic respiratory failure with hypoxia (H) 4/10/2020     Acute on chronic respiratory failure with hypoxia and hypercapnia (H) 4/1/2019     CAP (community acquired pneumonia) 4/14/2020     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 Father      Heart Disease Father      Chronic Obstructive Pulmonary Disease Father      Diabetes Paternal Grandmother      Chronic Obstructive Pulmonary Disease Paternal Grandfather        Social History:  Marital Status:  Single [1]  Social History     Tobacco Use     Smoking status: Former Smoker      Packs/day: 2.00     Years: 30.00     Pack years: 60.00     Types: Cigarettes     Smokeless tobacco: Former User   Vaping Use     Vaping Use: Never used   Substance Use Topics     Alcohol use: Yes     Comment: rare     Drug use: No        Medications:    albuterol (PROAIR HFA/PROVENTIL HFA/VENTOLIN HFA) 108 (90 Base) MCG/ACT inhaler  albuterol (PROVENTIL) (2.5 MG/3ML) 0.083% neb solution  amLODIPine (NORVASC) 10 MG tablet  atenolol (TENORMIN) 25 MG tablet  doxycycline hyclate (VIBRA-TABS) 100 MG tablet  fluticasone-salmeterol (ADVAIR-HFA) 230-21 MCG/ACT inhaler  ipratropium - albuterol 0.5 mg/2.5 mg/3 mL (DUONEB) 0.5-2.5 (3) MG/3ML neb solution  lisinopril (ZESTRIL) 40 MG tablet  order for DME  order for DME  potassium 99 MG TABS  predniSONE (DELTASONE) 10 MG tablet  sulfamethoxazole-trimethoprim (BACTRIM DS) 800-160 MG tablet  tiotropium (SPIRIVA RESPIMAT) 2.5 MCG/ACT inhaler          Review of Systems   Constitutional: Negative for chills, diaphoresis and fever.   HENT: Negative for ear pain, sinus pressure and sore throat.    Eyes: Negative for visual disturbance.   Respiratory: Positive for cough and shortness of breath. Negative for wheezing.    Cardiovascular: Negative for chest pain and palpitations.   Gastrointestinal: Negative for abdominal pain, blood in stool, constipation, diarrhea, nausea and vomiting.   Genitourinary: Negative for dysuria, frequency and urgency.   Skin: Negative for rash.   Neurological: Negative for headaches.   All other systems reviewed and are negative.      Physical Exam   BP: (!) 147/86  Pulse: 118  Temp: 98  F (36.7  C)  Resp: 22  Weight: 59 kg (130 lb)  SpO2: 96 %      Physical Exam  Constitutional:       General: He is in acute distress.      Appearance: He is not diaphoretic.   Eyes:      Conjunctiva/sclera: Conjunctivae normal.   Cardiovascular:      Rate and Rhythm: Normal rate and regular rhythm.      Pulses: Normal pulses.   Pulmonary:      Effort: Respiratory distress  present.      Breath sounds: Stridor present. Wheezing present. No rhonchi.   Abdominal:      General: Abdomen is flat. There is no distension.      Palpations: There is no mass.      Tenderness: There is no abdominal tenderness.   Musculoskeletal:      Cervical back: Neck supple.      Right lower leg: No edema.      Left lower leg: No edema.   Skin:     Coloration: Skin is not pale.      Findings: No rash.   Neurological:      General: No focal deficit present.      Mental Status: He is alert.      Motor: No weakness.         ED Course                 Procedures                 EKG Interpretation:      Interpreted by Sylvester Blanton MD  EKG done at 2028 hrs. demonstrates a sinus tachycardia 121 bpm with a normal axis.  No ST change.  No T wave changes.  Normal R progression and no Q waves.  Normal intervals.  Normal conduction.  No ectopy.  Impression sinus tachycardia 121 bpm without acute ST changes.    Critical Care time:  none               Results for orders placed or performed during the hospital encounter of 02/07/22 (from the past 24 hour(s))   [place] Draw    Narrative    The following orders were created for panel order [place] Draw.  Procedure                               Abnormality         Status                     ---------                               -----------         ------                     Extra Blue Top Tube[[id number]]                              Final result               Extra Red Top Tube[[id number]]                               Final result               Extra Green Top (Lithium...[[id number]]                      Final result               Extra Purple Top Tube[[id number]]                            Final result                 Please view results for these tests on the individual orders.   CBC with platelets, differential    Narrative    The following orders were created for panel order CBC with platelets, differential.  Procedure                               Abnormality          Status                     ---------                               -----------         ------                     CBC with platelets and d...[[id number]]  Abnormal            Final result                 Please view results for these tests on the individual orders.   Comprehensive metabolic panel   Result Value Ref Range    Sodium 139 133 - 144 mmol/L    Potassium 3.7 3.4 - 5.3 mmol/L    Chloride 103 94 - 109 mmol/L    Carbon Dioxide (CO2) 35 (H) 20 - 32 mmol/L    Anion Gap 1 (L) 3 - 14 mmol/L    Urea Nitrogen 14 7 - 30 mg/dL    Creatinine 0.76 0.66 - 1.25 mg/dL    Calcium 9.2 8.5 - 10.1 mg/dL    Glucose 132 (H) 70 - 99 mg/dL    Alkaline Phosphatase 33 (L) 40 - 150 U/L    AST 15 0 - 45 U/L    ALT 27 0 - 70 U/L    Protein Total 7.2 6.8 - 8.8 g/dL    Albumin 3.8 3.4 - 5.0 g/dL    Bilirubin Total 0.2 0.2 - 1.3 mg/dL    GFR Estimate >90 >60 mL/min/1.73m2   Extra Blue Top Tube   Result Value Ref Range    Hold Specimen JIC    Extra Red Top Tube   Result Value Ref Range    Hold Specimen JIC    Extra Green Top (Lithium Heparin) Tube   Result Value Ref Range    Hold Specimen JIC    Extra Purple Top Tube   Result Value Ref Range    Hold Specimen JIC    CBC with platelets and differential   Result Value Ref Range    WBC Count 15.1 (H) 4.0 - 11.0 10e3/uL    RBC Count 4.10 (L) 4.40 - 5.90 10e6/uL    Hemoglobin 12.6 (L) 13.3 - 17.7 g/dL    Hematocrit 40.3 40.0 - 53.0 %    MCV 98 78 - 100 fL    MCH 30.7 26.5 - 33.0 pg    MCHC 31.3 (L) 31.5 - 36.5 g/dL    RDW 13.3 10.0 - 15.0 %    Platelet Count 456 (H) 150 - 450 10e3/uL    % Neutrophils 66 %    % Lymphocytes 19 %    % Monocytes 12 %    % Eosinophils 1 %    % Basophils 0 %    % Immature Granulocytes 2 %    NRBCs per 100 WBC 0 <1 /100    Absolute Neutrophils 10.0 (H) 1.6 - 8.3 10e3/uL    Absolute Lymphocytes 2.9 0.8 - 5.3 10e3/uL    Absolute Monocytes 1.8 (H) 0.0 - 1.3 10e3/uL    Absolute Eosinophils 0.2 0.0 - 0.7 10e3/uL    Absolute Basophils 0.1 0.0 - 0.2 10e3/uL    Absolute  Immature Granulocytes 0.2 <=0.4 10e3/uL    Absolute NRBCs 0.0 10e3/uL   Troponin I   Result Value Ref Range    Troponin I High Sensitivity 3 <79 ng/L   Nt probnp inpatient (BNP)   Result Value Ref Range    N terminal Pro BNP Inpatient 67 0 - 900 pg/mL   D dimer quantitative   Result Value Ref Range    D-Dimer Quantitative 0.28 0.00 - 0.50 ug/mL FEU    Narrative    This D-dimer assay is intended for use in conjunction with a clinical pretest probability assessment model to exclude pulmonary embolism (PE) and deep venous thrombosis (DVT) in outpatients suspected of PE or DVT. The cut-off value is 0.50 ug/mL FEU.   POC US CHEST B-SCAN    Impression    [facility] Procedure Note      Limited Bedside ED Cardiac Ultrasound:    PROCEDURE: PERFORMED BY: Dr. Sylvester Blanton MD  INDICATIONS/SYMPTOM:  Shortness of Breath  PROBE: Cardiac phased array probe and High frequency linear probe  BODY LOCATION: Chest  FINDINGS:   The ultrasound was performed utilizing the subcostal, parasternal long axis, parasternal short axis and apical 4 chamber views.  Cardiac contractility:  Present  Gross estimation of cardiac kinesis: normal  Pericardial Effusion:  None  RV:LV ratio: LV > RV  IVC:    Diameter:  IVC diameter expiration (IVCe) 1-2 cm                                                   IVC diameter inspiration (IVCi) 1 cm                                                       Collapsibility:  IVC collapses < 50% with inspiration  INTERPRETATION:    Chamber size and motion were grossly normal with LV > RV, normal cardiac kinesis.  No pericardial effusion was found.  IVC visualized and findings indicate normovolemia.  IMAGE DOCUMENTATION: Images were archived to PACs system.        [facility] Procedure Note      Limited Bedside ED Ultrasound of Thorax:    PROCEDURE: PERFORMED BY: Dr. Sylvester Blanton MD  INDICATIONS/SYMPTOM:  shortness of breath  PROBE: High frequency linear probe  BODY LOCATION: Chest  FINDINGS:  Images of  both lung hemithoracies taken in 2D in multiple rib spaces        Right side:  Lung sliding artifact  Present     Comet tail artifacts  Absent   Left side:  Lung sliding artifact  Present     Comet tail artifacts  Absent   Hemothorax: Right side Absent     Left side Absent   Pleural effusion: Right side Absent      Left side Absent    INTERPRETATION: The exam was normal. There was no free fluid identified in the chest cavity. No evidence of pneumothorax, hemothorax or pleural effusion.  IMAGE DOCUMENTATION: Images were archived to PACs system.       Blood gas venous   Result Value Ref Range    pH Venous 7.30 (L) 7.32 - 7.43    pCO2 Venous 76 (HH) 40 - 50 mm Hg    pO2 Venous 49 (H) 25 - 47 mm Hg    Bicarbonate Venous 37 (H) 21 - 28 mmol/L    Base Excess/Deficit (+/-) 8.0 (H) -7.7 - 1.9 mmol/L    FIO2 40    Symptomatic; Unknown Influenza A/B & SARS-CoV2 (COVID-19) Virus PCR Multiplex Nasopharyngeal    Specimen: Nasopharyngeal; Swab   Result Value Ref Range    Influenza A PCR Negative Negative    Influenza B PCR Negative Negative    SARS CoV2 PCR Negative Negative    Narrative    Testing was performed using the kita SARS-CoV-2 & Influenza A/B Assay on the kita Amalia System. This test should be ordered for the detection of SARS-CoV-2 and influenza viruses in individuals who meet clinical and/or epidemiological criteria. Test performance is unknown in asymptomatic patients. This test is for in vitro diagnostic use under the FDA EUA for laboratories certified under CLIA to perform moderate and/or high complexity testing. This test has not been FDA cleared or approved. A negative result does not rule out the presence of PCR inhibitors in the specimen or target RNA in concentration below the limit of detection for the assay. If only one viral target is positive but coinfection with multiple targets is suspected, the sample should be re-tested with another FDA cleared, approved or authorized test, if coinfection would  change clinical management. [facility] are certified under the Clinical Laboratory Improvement Amendments of 1988 (CLIA-88) as  qualified to perform moderate and/or high complexity laboratory testing.   XR Chest Port 1 View    Narrative    EXAM: XR CHEST PORT 1 VIEW  LOCATION: [facility]  DATE/TIME: 2/7/2022 5:47 PM    INDICATION: copd, worsening  COMPARISON: None.      Impression    IMPRESSION: The lungs are hyperinflated compatible with COPD. Prominent bulla in the right upper lobe laterally. Normal heart size. Atelectatic changes right lung base.       Medications   LORazepam (ATIVAN) injection 0.5 mg (0.5 mg Intravenous Given 2/7/22 1654)   albuterol (PROVENTIL HFA/VENTOLIN HFA) inhaler (has no administration in time range)   albuterol (PROVENTIL HFA/VENTOLIN HFA) inhaler (has no administration in time range)   amLODIPine (NORVASC) tablet 10 mg (has no administration in time range)   atenolol (TENORMIN) tablet 25 mg (has no administration in time range)   ipratropium - albuterol 0.5 mg/2.5 mg/3 mL (DUONEB) neb solution 3 mL (has no administration in time range)   lisinopril (ZESTRIL) tablet 40 mg (has no administration in time range)   fluticasone-vilanterol (BREO ELLIPTA) 200-25 MCG/INH inhaler 1 puff (has no administration in time range)   umeclidinium (INCRUSE ELLIPTA) 62.5 MCG/INH inhaler 1 puff (has no administration in time range)   predniSONE (DELTASONE) tablet 60 mg (has no administration in time range)   methylPREDNISolone sodium succinate (solu-MEDROL) injection 60 mg (60 mg Intravenous Given 2/7/22 1655)       Assessments & Plan (with Medical Decision Making)     SAURABH; Luis Hernandez is a 56 year old male who presents with an acute on chronic exacerbation of COPD home oxygen use increased difficulty performing any activity at home and here was distressed in the clinic when he had follow-up today scheduled sent from the clinic to the emergency department.   Despite using frequent inhalers at home including his albuterol chronic Spiriva.  He had been on prednisone taper as well as antibiotics which she completed.  He now has an acute worsening again.  No obvious trigger.  Will check Covid testing.  Check chest x-ray but at this point would not recommend initiating additional antibiotics although could consider this.  He is markedly improved on BiPAP.  Initiated Solu-Medrol IV.  Plan for inpatient stay and stabilization.  Consider antibiotics for acute exacerbation of chronic bronchitis but this episode falls on top of another 1 in which antibiotics have been used.    I have ordered his home medications.  Discussed with Dr. Miller who is aware of the patient.  His bedside ultrasound is reassuring without signs of CHF.      I have reviewed the nursing notes.    I have reviewed the findings, diagnosis, plan and need for follow up with the patient.      New Prescriptions    No medications on file       Final diagnoses:   COPD exacerbation (H)   Acute on chronic respiratory failure with hypercapnia (H)       2/7/2022   [facility] EMERGENCY DEPT     Sylvester Blanton MD  02/07/22 [id number]

## 2022-02-07 NOTE — ED NOTES
EKG Interpretation:      Interpreted by Oc Bazan MD  Time reviewed: 1405  Symptoms at time of EKG: Triage chief complaint- hx COPD on home O2(baseline 2.5 L using 3 L NC) on prednisione and just finished antibiotics.SOB for wks but today it is worse  Rhythm: Normal sinus  and Sinus tachycardia  Rate: 110-120  Axis: Normal  Ectopy: None  Conduction: Normal  ST Segments/ T Waves: Non-specific ST-T wave changes  Q Waves: None and Nonspecific  Comparison to prior: When compared with EKG dated August 21, 2021 sinus tachycardia is unchanged, related changes appreciated    Clinical Impression: Sinus tachycardia with rate related changes.      Patient was not seen or examined.       Oc Bazan MD  02/07/22 [id number]

## 2022-02-07 NOTE — ED NOTES
Pt doing well on Bipap, looks much more comfortable.  Medicated per orders.  Comfort measures offered.  PLAN:  Will continue to monitor..

## 2022-02-07 NOTE — ED PROVIDER NOTES
Oxygen dependent COPD 56-year-old male presented with increased oxygen requirement and increased dyspnea.  Recently seen for an exacerbation of COPD on January 31.  He at that time he was treated with azithromycin and prednisone.  Currently on BiPAP therapy.  Awaiting placement in a bed with BiPAP availability.  He has a history of pulmonary hypertension.  He received IV Solu-Medrol today.  He has improved significantly on BiPAP therapy.    5:55 PM: Chest x-ray reviewed.  Lung fields are clear.  No focal infiltrates.  No signs of increased interstitial fluid.  No pleural effusions and normal heart size.  Radiology interpretation pending.    6:30 PM: Bed available at [facility].  Case reviewed with , who accepts him for transfer to admission to their ICU.    Final diagnoses:   COPD exacerbation (H)   Acute on chronic respiratory failure with hypercapnia (H)     He remained stable during the remainder of his time in the emergency department on BiPAP therapy with improved oxygenation and decreased work of breathing.  Covid testing was negative.     Scout Miller MD  02/07/22 [id number]

## 2022-02-07 NOTE — ED TRIAGE NOTES
hx COPD on home O2(baseline 2.5 L using 3 L NC) on prednisione and just finished antibiotics.SOB for wks but today it is worse.

## 2022-02-07 NOTE — ED NOTES
Pt seen and examined by MD.  BiPAP ordered, RT called and here.  VBG drawn with Bipap just placed.   Pt is tolerating well, but wishes something for anxiety.  Will address with MD.  PLAN:  RT in room still adjusting BIPAP, will monitor closely, address meds with MD.

## 2022-02-07 NOTE — PROGRESS NOTES
Patient arrived for follow up visit due to COPD exacerbation.  He is O2 dependent and sees pulmonary.      Arrived and was roomed asking for a doctor.  He sat in room using accessory muscles with O2 on him, cannot speak in sentences and asking for a doctor.    O2 sats were 92 % but using accessory muscles and cannot speak.    Called ED and transferred him for further care.    Dean Mariee MD  Family Medicine

## 2022-02-08 VITALS
HEART RATE: 90 BPM | TEMPERATURE: 98 F | WEIGHT: 130 LBS | SYSTOLIC BLOOD PRESSURE: 137 MMHG | BODY MASS INDEX: 22.31 KG/M2 | RESPIRATION RATE: 22 BRPM | OXYGEN SATURATION: 95 % | DIASTOLIC BLOOD PRESSURE: 105 MMHG

## 2022-02-08 ASSESSMENT — ACTIVITIES OF DAILY LIVING (ADL): ADLS_ACUITY_SCORE: 9

## 2022-02-08 NOTE — ED NOTES
Pt given break on bipap, sips of water and puffs per orders.  Pt medicated for comfort, EMS will be here soon.  PLAN:   Monitor and await EMS.

## 2022-02-08 NOTE — ED NOTES
Continuing to wait for bed placement at [place].   CRISTIAN Garland has given update to pts daughter.

## 2022-02-08 NOTE — ED NOTES
Spoke with Lizabeth,  ICU Charge nurse, they will speak with their house supervisor.  They will need to follow their Policy on BIPAP needs, but will delay transfer until we hear back from them.

## 2022-02-08 NOTE — ED NOTES
Pt informed there is a bed at [facility] and he will be transferring there.  Will keep pt updated as I here more about time frame.  Pt watching TV and is comfortable.

## 2022-02-08 NOTE — ED NOTES
Connor has called back and pt is OK to transfer.  EMS is approx 1 hr out.  Pt updated.   Pt medications still have not arrived from Pharmacy.  Will called pharmacy on home medication and will administer when it arrives.

## 2022-04-12 ENCOUNTER — TELEPHONE (OUTPATIENT)
Dept: FAMILY MEDICINE | Facility: CLINIC | Age: 57
End: 2022-04-12
Payer: COMMERCIAL

## 2022-04-12 NOTE — TELEPHONE ENCOUNTER
Reason for Call:  Home Health Care    Ashli with [facility] called regarding (reason for call):     Ashli the RN wants to talk to the PCP about possibly discharging patient from Hospice.     Phone Number Homecare Nurse can be reached at: [phone]    Can we leave a detailed message on this number? YES    Phone number patient can be reached at: Home number on file [phone] (home)    Best Time: any    Call taken on 4/12/2022 at 5:45 PM by Britt Campbell

## 2022-04-13 NOTE — TELEPHONE ENCOUNTER
TATIANA Mariee      Pt is discharging from [facility] on 4/22 for life expectancy of greater than 6 months.(per Medicare guideline)  [facility], Deloris FOSTER says pt is stable on the medication and O2 regimen he is on.   They are faxing us his current med list. It looks like these meds are different than what is on our medication list.    Deloris FOSTER says feel free to call her if questions. [phone]

## 2022-04-22 ENCOUNTER — MEDICAL CORRESPONDENCE (OUTPATIENT)
Dept: HEALTH INFORMATION MANAGEMENT | Facility: CLINIC | Age: 57
End: 2022-04-22

## 2022-04-22 ENCOUNTER — NURSE TRIAGE (OUTPATIENT)
Dept: NURSING | Facility: CLINIC | Age: 57
End: 2022-04-22
Payer: COMMERCIAL

## 2022-04-22 ENCOUNTER — APPOINTMENT (OUTPATIENT)
Dept: ULTRASOUND IMAGING | Facility: CLINIC | Age: 57
End: 2022-04-22
Attending: EMERGENCY MEDICINE
Payer: COMMERCIAL

## 2022-04-22 ENCOUNTER — HOSPITAL ENCOUNTER (EMERGENCY)
Facility: CLINIC | Age: 57
Discharge: HOME | End: 2022-04-22
Attending: EMERGENCY MEDICINE | Admitting: EMERGENCY MEDICINE
Payer: COMMERCIAL

## 2022-04-22 VITALS
HEART RATE: 96 BPM | RESPIRATION RATE: 18 BRPM | DIASTOLIC BLOOD PRESSURE: 94 MMHG | WEIGHT: 126 LBS | SYSTOLIC BLOOD PRESSURE: 136 MMHG | OXYGEN SATURATION: 96 % | BODY MASS INDEX: 21.63 KG/M2

## 2022-04-22 DIAGNOSIS — R10.13 EPIGASTRIC PAIN: ICD-10-CM

## 2022-04-22 LAB
ALBUMIN SERPL-MCNC: 3.8 G/DL (ref 3.4–5)
ALP SERPL-CCNC: 36 U/L (ref 40–150)
ALT SERPL W P-5'-P-CCNC: 22 U/L (ref 0–70)
ANION GAP SERPL CALCULATED.3IONS-SCNC: 3 MMOL/L (ref 3–14)
AST SERPL W P-5'-P-CCNC: 16 U/L (ref 0–45)
BASE EXCESS BLDV CALC-SCNC: 3.9 MMOL/L (ref -7.7–1.9)
BASOPHILS # BLD AUTO: 0.1 10E3/UL (ref 0–0.2)
BASOPHILS NFR BLD AUTO: 1 %
BILIRUB SERPL-MCNC: 0.3 MG/DL (ref 0.2–1.3)
BUN SERPL-MCNC: 5 MG/DL (ref 7–30)
CALCIUM SERPL-MCNC: 9.2 MG/DL (ref 8.5–10.1)
CHLORIDE BLD-SCNC: 102 MMOL/L (ref 94–109)
CO2 SERPL-SCNC: 32 MMOL/L (ref 20–32)
CREAT SERPL-MCNC: 0.64 MG/DL (ref 0.66–1.25)
EOSINOPHIL # BLD AUTO: 0.2 10E3/UL (ref 0–0.7)
EOSINOPHIL NFR BLD AUTO: 2 %
ERYTHROCYTE [DISTWIDTH] IN BLOOD BY AUTOMATED COUNT: 13.3 % (ref 10–15)
GFR SERPL CREATININE-BSD FRML MDRD: >90 ML/MIN/1.73M2
GLUCOSE BLD-MCNC: 95 MG/DL (ref 70–99)
HCO3 BLDV-SCNC: 31 MMOL/L (ref 21–28)
HCT VFR BLD AUTO: 37.1 % (ref 40–53)
HGB BLD-MCNC: 12 G/DL (ref 13.3–17.7)
HOLD SPECIMEN: NORMAL
IMM GRANULOCYTES # BLD: 0 10E3/UL
IMM GRANULOCYTES NFR BLD: 0 %
LACTATE SERPL-SCNC: 0.4 MMOL/L (ref 0.7–2)
LIPASE SERPL-CCNC: 210 U/L (ref 73–393)
LYMPHOCYTES # BLD AUTO: 1.8 10E3/UL (ref 0.8–5.3)
LYMPHOCYTES NFR BLD AUTO: 18 %
MCH RBC QN AUTO: 30.2 PG (ref 26.5–33)
MCHC RBC AUTO-ENTMCNC: 32.3 G/DL (ref 31.5–36.5)
MCV RBC AUTO: 94 FL (ref 78–100)
MONOCYTES # BLD AUTO: 1.1 10E3/UL (ref 0–1.3)
MONOCYTES NFR BLD AUTO: 11 %
NEUTROPHILS # BLD AUTO: 7.2 10E3/UL (ref 1.6–8.3)
NEUTROPHILS NFR BLD AUTO: 68 %
NRBC # BLD AUTO: 0 10E3/UL
NRBC BLD AUTO-RTO: 0 /100
O2/TOTAL GAS SETTING VFR VENT: 3 %
PCO2 BLDV: 58 MM HG (ref 40–50)
PH BLDV: 7.34 [PH] (ref 7.32–7.43)
PLATELET # BLD AUTO: 328 10E3/UL (ref 150–450)
PO2 BLDV: 66 MM HG (ref 25–47)
POTASSIUM BLD-SCNC: 3.7 MMOL/L (ref 3.4–5.3)
PROT SERPL-MCNC: 7.2 G/DL (ref 6.8–8.8)
RBC # BLD AUTO: 3.97 10E6/UL (ref 4.4–5.9)
SODIUM SERPL-SCNC: 137 MMOL/L (ref 133–144)
WBC # BLD AUTO: 10.5 10E3/UL (ref 4–11)

## 2022-04-22 PROCEDURE — 99285 EMERGENCY DEPT VISIT HI MDM: CPT | Mod: 25 | Performed by: EMERGENCY MEDICINE

## 2022-04-22 PROCEDURE — 96374 THER/PROPH/DIAG INJ IV PUSH: CPT | Performed by: EMERGENCY MEDICINE

## 2022-04-22 PROCEDURE — 36415 COLL VENOUS BLD VENIPUNCTURE: CPT | Performed by: EMERGENCY MEDICINE

## 2022-04-22 PROCEDURE — 83690 ASSAY OF LIPASE: CPT | Performed by: EMERGENCY MEDICINE

## 2022-04-22 PROCEDURE — 80053 COMPREHEN METABOLIC PANEL: CPT | Performed by: EMERGENCY MEDICINE

## 2022-04-22 PROCEDURE — 83605 ASSAY OF LACTIC ACID: CPT | Performed by: EMERGENCY MEDICINE

## 2022-04-22 PROCEDURE — 82803 BLOOD GASES ANY COMBINATION: CPT | Performed by: EMERGENCY MEDICINE

## 2022-04-22 PROCEDURE — 93010 ELECTROCARDIOGRAM REPORT: CPT | Performed by: EMERGENCY MEDICINE

## 2022-04-22 PROCEDURE — 76705 ECHO EXAM OF ABDOMEN: CPT

## 2022-04-22 PROCEDURE — 250N000011 HC RX IP 250 OP 636: Performed by: EMERGENCY MEDICINE

## 2022-04-22 PROCEDURE — 85025 COMPLETE CBC W/AUTO DIFF WBC: CPT | Performed by: EMERGENCY MEDICINE

## 2022-04-22 PROCEDURE — 250N000013 HC RX MED GY IP 250 OP 250 PS 637: Performed by: EMERGENCY MEDICINE

## 2022-04-22 PROCEDURE — 93005 ELECTROCARDIOGRAM TRACING: CPT | Performed by: EMERGENCY MEDICINE

## 2022-04-22 RX ORDER — PANTOPRAZOLE SODIUM 40 MG/1
40 TABLET, DELAYED RELEASE ORAL ONCE
Status: COMPLETED | OUTPATIENT
Start: 2022-04-22 | End: 2022-04-22

## 2022-04-22 RX ORDER — PANTOPRAZOLE SODIUM 40 MG/1
40 TABLET, DELAYED RELEASE ORAL DAILY
Qty: 30 TABLET | Refills: 0 | Status: SHIPPED | OUTPATIENT
Start: 2022-04-22 | End: 2022-05-22

## 2022-04-22 RX ADMIN — HYDROCORTISONE SODIUM SUCCINATE 100 MG: 100 INJECTION, POWDER, FOR SOLUTION INTRAMUSCULAR; INTRAVENOUS at 19:03

## 2022-04-22 RX ADMIN — PANTOPRAZOLE SODIUM 40 MG: 40 TABLET, DELAYED RELEASE ORAL at 21:46

## 2022-04-22 NOTE — ED PROVIDER NOTES
History     Chief Complaint   Patient presents with     COPD     HPI  Luis Hernandez is a 56 year old male who presents from home with daughter secondary to feeling of overall jitteriness, difficulty sleeping, anorectic.  He is lost 4 pounds in the last week.  He describes symptoms ongoing for last 10 days.  He was admitted to [facility] and January, he has end-stage COPD oxygen dependent, at that time he was discharged on hospice, he has been taking prednisone 10 mg daily up until 2 days ago when he discontinued same thinking that maybe his symptoms were secondary to chronic prednisone use.  He denies headache, fever chills or sweats, vomiting, chest pain.  He typically maintains his oxygen at 2 and half liters, has been able to turn it down to 1-2 L over the past week and maintain sats reportedly in the upper 90s.  He denies palpitations.  No near syncope no recent fall.  No abdominal pain complaint, bowel movements baseline brown and formed without change in habit, denies urinary symptoms.  Denies rash.  Denies leg pain or swelling.  Does not smoke or use alcohol.  Reports compliance with remainder of medication regimen.    Allergies:  Allergies   Allergen Reactions     Hctz Other (See Comments)     He has hyponatremia     Penicillins Other (See Comments)     Reaction unknown       Problem List:    Patient Active Problem List    Diagnosis Date Noted     Acute on chronic respiratory failure (H) 08/22/2021     Priority: Medium     Acute respiratory acidosis 06/30/2021     Priority: Medium     Peripheral edema 02/16/2020     Priority: Medium     Pulmonary hypertension (H) 02/16/2020     Priority: Medium     Acute on chronic respiratory failure with hypoxia and hypercapnia (H) 04/01/2019     Priority: Medium     Tobacco abuse, in remission 12/05/2017     Priority: Medium     COPD exacerbation (H) 01/07/2014     Priority: Medium     Incidental pulmonary nodule, > 3mm and < 8mm, new from 2010 01/07/2014      Priority: Medium     By chest x-ray and chest CT new from CT chest from Oct 2010.   Stable 01/2014 to 07/2014, 6 month follow scan recommended.   Chest CT of 1/15/2015= stable nodule, f/u one year.       Advanced directives, counseling/discussion 11/25/2013     Priority: Medium     11/25/2013 Gave patient honoring choices forms to take home and review.  DAVEY Dial ([facility])        CARDIOVASCULAR SCREENING; LDL GOAL LESS THAN 130 10/31/2010     Priority: Medium     COPD (chronic obstructive pulmonary disease) (H) 10/25/2010     Priority: Medium     Severe to very severe       Eczema 10/04/2010     Priority: Medium     ED (erectile dysfunction) 04/20/2009     Priority: Medium     Essential hypertension, benign 10/15/2007     Priority: Medium        Past Medical History:    Past Medical History:   Diagnosis Date     Acute on chronic respiratory failure with hypoxia (H) 4/10/2020     Acute on chronic respiratory failure with hypoxia and hypercapnia (H) 4/1/2019     CAP (community acquired pneumonia) 4/14/2020     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 Father      Heart Disease Father      Chronic Obstructive Pulmonary Disease Father      Diabetes Paternal Grandmother      Chronic Obstructive Pulmonary Disease Paternal Grandfather        Social History:  Marital Status:  Single [1]  Social History     Tobacco Use     Smoking status: Former Smoker     Packs/day: 2.00     Years: 30.00     Pack years: 60.00     Types:  Cigarettes     Smokeless tobacco: Former User   Vaping Use     Vaping Use: Never used   Substance Use Topics     Alcohol use: Yes     Comment: rare     Drug use: No        Medications:    pantoprazole (PROTONIX) 40 MG EC tablet  albuterol (PROAIR HFA/PROVENTIL HFA/VENTOLIN HFA) 108 (90 Base) MCG/ACT inhaler  albuterol (PROVENTIL) (2.5 MG/3ML) 0.083% neb solution  amLODIPine (NORVASC) 10 MG tablet  atenolol (TENORMIN) 25 MG tablet  doxycycline hyclate (VIBRA-TABS) 100 MG tablet  fluticasone-salmeterol (ADVAIR-HFA) 230-21 MCG/ACT inhaler  ipratropium - albuterol 0.5 mg/2.5 mg/3 mL (DUONEB) 0.5-2.5 (3) MG/3ML neb solution  lisinopril (ZESTRIL) 40 MG tablet  order for DME  order for DME  potassium 99 MG TABS  predniSONE (DELTASONE) 10 MG tablet  sulfamethoxazole-trimethoprim (BACTRIM DS) 800-160 MG tablet  tiotropium (SPIRIVA RESPIMAT) 2.5 MCG/ACT inhaler          Review of Systems  All other systems reviewed and are negative.  Numbness over ulnar nerve distribution bilateral hands chronic  Weeping left eye without eye pain visual change redness or purulent discharge  Insomnia complaint is chronic although worse over the last week and a half.  Physical Exam   BP: (!) 120/90  Pulse: 110  Resp: 16  Weight: 57.2 kg (126 lb)  SpO2: 98 %      Physical Exam  Nontoxic appearing no respiratory distress alert and oriented ×3, appears chronically ill  Head atraumatic normocephalic  Conjunctiva noninjected scant clear drainage left, no erythema periorbital no swelling   Neck supple full active painless range of motion  Lungs fairly clear with reasonable symmetrical air movement bilaterally  Heart regular no murmur  Abdomen soft mild/moderate epigastric right upper quadrant tenderness without guarding or rebound, bowel sounds are present,  Strength and sensation grossly intact throughout the extremities, gait and station normal  Speech is fluent, good eye contact, thought processes are rational  Lower extremities without swelling,  redness or tenderness  Pedal pulses symmetrical and strong    ED Course          EKG normal sinus rhythm rate 97, inferior Q waves, no acute ST or T wave changes, unchanged from previous, read by Dr. Sylvester Emery       Procedures              Critical Care time:  none                  Results for orders placed or performed during the hospital encounter of 04/22/22 (from the past 24 hour(s))   [place] Draw    Narrative    The following orders were created for panel order [place] Draw.  Procedure                               Abnormality         Status                     ---------                               -----------         ------                     Extra Blue Top Tube[[id number]]                              Final result               Extra Red Top Tube[[id number]]                               Final result               Extra Green Top (Lithium...[[id number]]                      Final result               Extra Purple Top Tube[[id number]]                            Final result                 Please view results for these tests on the individual orders.   CBC with platelets, differential    Narrative    The following orders were created for panel order CBC with platelets, differential.  Procedure                               Abnormality         Status                     ---------                               -----------         ------                     CBC with platelets and d...[[id number]]  Abnormal            Final result                 Please view results for these tests on the individual orders.   Comprehensive metabolic panel   Result Value Ref Range    Sodium 137 133 - 144 mmol/L    Potassium 3.7 3.4 - 5.3 mmol/L    Chloride 102 94 - 109 mmol/L    Carbon Dioxide (CO2) 32 20 - 32 mmol/L    Anion Gap 3 3 - 14 mmol/L    Urea Nitrogen 5 (L) 7 - 30 mg/dL    Creatinine 0.64 (L) 0.66 - 1.25 mg/dL    Calcium 9.2 8.5 - 10.1 mg/dL    Glucose 95 70 - 99 mg/dL    Alkaline Phosphatase 36 (L) 40 - 150 U/L     AST 16 0 - 45 U/L    ALT 22 0 - 70 U/L    Protein Total 7.2 6.8 - 8.8 g/dL    Albumin 3.8 3.4 - 5.0 g/dL    Bilirubin Total 0.3 0.2 - 1.3 mg/dL    GFR Estimate >90 >60 mL/min/1.73m2   Extra Blue Top Tube   Result Value Ref Range    Hold Specimen JIC    Extra Red Top Tube   Result Value Ref Range    Hold Specimen JIC    Extra Green Top (Lithium Heparin) Tube   Result Value Ref Range    Hold Specimen JIC    Extra Purple Top Tube   Result Value Ref Range    Hold Specimen JIC    CBC with platelets and differential   Result Value Ref Range    WBC Count 10.5 4.0 - 11.0 10e3/uL    RBC Count 3.97 (L) 4.40 - 5.90 10e6/uL    Hemoglobin 12.0 (L) 13.3 - 17.7 g/dL    Hematocrit 37.1 (L) 40.0 - 53.0 %    MCV 94 78 - 100 fL    MCH 30.2 26.5 - 33.0 pg    MCHC 32.3 31.5 - 36.5 g/dL    RDW 13.3 10.0 - 15.0 %    Platelet Count 328 150 - 450 10e3/uL    % Neutrophils 68 %    % Lymphocytes 18 %    % Monocytes 11 %    % Eosinophils 2 %    % Basophils 1 %    % Immature Granulocytes 0 %    NRBCs per 100 WBC 0 <1 /100    Absolute Neutrophils 7.2 1.6 - 8.3 10e3/uL    Absolute Lymphocytes 1.8 0.8 - 5.3 10e3/uL    Absolute Monocytes 1.1 0.0 - 1.3 10e3/uL    Absolute Eosinophils 0.2 0.0 - 0.7 10e3/uL    Absolute Basophils 0.1 0.0 - 0.2 10e3/uL    Absolute Immature Granulocytes 0.0 <=0.4 10e3/uL    Absolute NRBCs 0.0 10e3/uL   Lipase   Result Value Ref Range    Lipase 210 73 - 393 U/L   Lactic acid whole blood   Result Value Ref Range    Lactic Acid 0.4 (L) 0.7 - 2.0 mmol/L   Blood gas venous   Result Value Ref Range    pH Venous 7.34 7.32 - 7.43    pCO2 Venous 58 (H) 40 - 50 mm Hg    pO2 Venous 66 (H) 25 - 47 mm Hg    Bicarbonate Venous 31 (H) 21 - 28 mmol/L    Base Excess/Deficit (+/-) 3.9 (H) -7.7 - 1.9 mmol/L    FIO2 3    Abdomen US, limited (RUQ only)    Narrative    EXAM: US ABDOMEN LIMITED  LOCATION: [facility]  DATE/TIME: 4/22/2022 7:18 PM    INDICATION: Right upper quadrant tenderness.  COMPARISON:  None.  TECHNIQUE: Limited abdominal ultrasound.    FINDINGS:    GALLBLADDER: Wall echo shadow sign of the gallbladder, compatible with numerous gallstones filling the gallbladder. No pericholecystic fluid. Limited evaluation for gallbladder wall thickening due to shadowing. Negative sonographic Doyle's sign.    BILE DUCTS: No biliary dilatation. The common duct measures 3 mm.    LIVER: Normal parenchyma with smooth contour. No focal mass.    RIGHT KIDNEY: There is a 1.5 x 1.3 x 1.2 cm simple cyst in the kidney; no follow-up required. No hydronephrosis.    PANCREAS: The visualized portions are normal.    No ascites.      Impression    IMPRESSION:  1.  Cholelithiasis. No pericholecystic fluid. Negative sonographic Doyle sign. If there is clinical concern for acute cholecystitis, consider further evaluation with HIDA.       Medications   hydrocortisone sodium succinate PF (solu-CORTEF) injection 100 mg (100 mg Intravenous Given 4/22/22 1903)   pantoprazole (PROTONIX) EC tablet 40 mg (40 mg Oral Given 4/22/22 2146)       Assessments & Plan (with Medical Decision Making)  Anorexia, weight loss over the past week and a half, epigastric right upper quadrant tenderness.  Chronic prednisone end-stage COPD.  No evidence for respiratory failure.  Right upper quadrant ultrasound significant for multiple gallstones, no evidence for cholecystitis either by exam or lab or ultrasound.  CBD normal.  Question early cholecystitis, could represent peptic ulcer disease given chronic prednisone.  Recommend continuing to hold prednisone at this time.  Empiric PPI.  HIDA scan outpatient.  Wrist diet as tolerated.  Follow-up primary care, return criteria reviewed.     I have reviewed the nursing notes.    I have reviewed the findings, diagnosis, plan and need for follow up with the patient.       Discharge Medication List as of 4/22/2022  9:37 PM      START taking these medications    Details   pantoprazole (PROTONIX) 40 MG EC tablet Take  1 tablet (40 mg) by mouth daily for 30 doses, Disp-30 tablet, R-0, E-Prescribe             Final diagnoses:   Epigastric pain       4/22/2022   [facility] EMERGENCY DEPT     Sylvester Emery MD  04/23/22 0102

## 2022-04-22 NOTE — ED TRIAGE NOTES
Pt here with multiple complaints has copd, has weeping eye, bilat hand numbness, feels jittery and loss of appetite. Concerned it was his prednisone. Pt stopped taking it on Wednesday due to symptoms.

## 2022-04-22 NOTE — TELEPHONE ENCOUNTER
"Daughter Analilia calling reporting patient has \"severe COPD.\"    Patient was taken off hospice 1 week ago since he was not declining.    Stating patient had been on Prednisone since 2/2022 which was stopped on Wednesday 4/20/22.    Patient reporting eye discharge \"stuck together\" in the morning alternating between both eyes.    Afebrile.     Reporting O2 sats and breathing have improved.    Decreased appetite. \"Just not hungry.\"     Drinking water and Gatorade.     O2 sats 97% on 1 litter.     Difficulty sleeping \"jittery.\"    Bilateral leg cramping.    Afebrile.    Disposition to see in office today.    Advised Urgent Care if no available clinic appointments.    Radha Rodriguez RN  [facility]      COVID 19 Nurse Triage Plan/Patient Instructions    Please be aware that novel coronavirus (COVID-19) may be circulating in the community. If you develop symptoms such as fever, cough, or SOB or if you have concerns about the presence of another infection including coronavirus (COVID-19), please contact your health care provider or visit [email or web address].     Disposition/Instructions    In-Person Visit with provider recommended. Reference Visit Selection Guide.    Thank you for taking steps to prevent the spread of this virus.  o Limit your contact with others.  o Wear a simple mask to cover your cough.  o Wash your hands well and often.    Resources    [facility]: About COVID-19: [email or web address]    CDC: What to Do If You're Sick: www.cdc.gov/coronavirus/2019-ncov/about/steps-when-sick.html    CDC: Ending Home Isolation: www.cdc.gov/coronavirus/2019-ncov/hcp/disposition-in-home-patients.html     CDC: Caring for Someone: www.cdc.gov/coronavirus/2019-ncov/if-you-are-sick/care-for-someone.html     [facility]: Interim Guidance for Hospital Discharge to Home: www.health.[email or web address]/diseases/coronavirus/hcp/hospdischarge.pdf    [facility] clinical trials (COVID-19 research " studies): [email or web address]-clinical-trials     Below are the [employer] hotlines at the [place] Department [facility] ([facility]). Interpreters are available.   o For health questions: Call 023-642-6164 or 1-383.947.2697 (7 a.m. to 7 p.m.)  o For questions about schools and childcare: Call 482-269-2941 or 1-939.184.2230 (7 a.m. to 7 p.m.)                     Reason for Disposition    Weak immune system (e.g., HIV positive, cancer chemo, splenectomy, organ transplant, chronic steroids)    Additional Information    Negative: Eye exposure to chemical or fumes    Negative: Redness of white of eye (sclera), but no pus or only a small amount of brief pus    Negative: SEVERE pain (e.g., excruciating)    Negative: Patient sounds very sick or weak to the triager    Negative: MODERATE eye pain (e.g., interferes with normal activities)    Negative: Blurred vision    Negative: Cloudy spot or sore seen on the cornea (clear part of the eye)    Negative: Eyelids are very swollen (shut or almost)    Negative: Eyelid (outer) is very red and painful (or tender to touch)    Negative: Fever > 103 F (39.4 C)    Negative: Discharge from penis    Negative: New or abnormal vaginal discharge    Negative: Using antibiotic eyedrops > 3 days but pus persists    Negative: Lots of yellow or green nasal discharge    Protocols used: EYE - PUS OR [id number]

## 2022-04-24 ENCOUNTER — APPOINTMENT (OUTPATIENT)
Dept: CT IMAGING | Facility: CLINIC | Age: 57
End: 2022-04-24
Attending: NURSE PRACTITIONER
Payer: COMMERCIAL

## 2022-04-24 ENCOUNTER — HOSPITAL ENCOUNTER (INPATIENT)
Facility: CLINIC | Age: 57
LOS: 3 days | Discharge: HOME | End: 2022-04-27
Attending: PEDIATRICS | Admitting: STUDENT IN AN ORGANIZED HEALTH CARE EDUCATION/TRAINING PROGRAM
Payer: COMMERCIAL

## 2022-04-24 ENCOUNTER — HOSPITAL ENCOUNTER (EMERGENCY)
Facility: CLINIC | Age: 57
Discharge: ANOTHER HOSPITAL | End: 2022-04-24
Attending: EMERGENCY MEDICINE | Admitting: EMERGENCY MEDICINE
Payer: COMMERCIAL

## 2022-04-24 ENCOUNTER — APPOINTMENT (OUTPATIENT)
Dept: GENERAL RADIOLOGY | Facility: CLINIC | Age: 57
End: 2022-04-24
Attending: EMERGENCY MEDICINE
Payer: COMMERCIAL

## 2022-04-24 VITALS
TEMPERATURE: 98.6 F | DIASTOLIC BLOOD PRESSURE: 98 MMHG | WEIGHT: 130 LBS | OXYGEN SATURATION: 97 % | RESPIRATION RATE: 24 BRPM | HEART RATE: 113 BPM | BODY MASS INDEX: 22.2 KG/M2 | SYSTOLIC BLOOD PRESSURE: 126 MMHG | HEIGHT: 64 IN

## 2022-04-24 DIAGNOSIS — J96.22 ACUTE ON CHRONIC RESPIRATORY FAILURE WITH HYPOXIA AND HYPERCAPNIA (H): ICD-10-CM

## 2022-04-24 DIAGNOSIS — G47.09 SLEEP INITIATION DISORDER: ICD-10-CM

## 2022-04-24 DIAGNOSIS — J44.1 COPD EXACERBATION (H): ICD-10-CM

## 2022-04-24 DIAGNOSIS — J96.21 ACUTE ON CHRONIC RESPIRATORY FAILURE WITH HYPOXIA AND HYPERCAPNIA (H): ICD-10-CM

## 2022-04-24 DIAGNOSIS — J44.1 COPD EXACERBATION (H): Primary | ICD-10-CM

## 2022-04-24 DIAGNOSIS — Z13.6 CARDIOVASCULAR SCREENING; LDL GOAL LESS THAN 130: ICD-10-CM

## 2022-04-24 DIAGNOSIS — F41.9 ANXIETY: ICD-10-CM

## 2022-04-24 LAB
ALBUMIN SERPL-MCNC: 3.8 G/DL (ref 3.4–5)
ALP SERPL-CCNC: 35 U/L (ref 40–150)
ALT SERPL W P-5'-P-CCNC: 24 U/L (ref 0–70)
ANION GAP SERPL CALCULATED.3IONS-SCNC: 6 MMOL/L (ref 3–14)
AST SERPL W P-5'-P-CCNC: 18 U/L (ref 0–45)
BASE EXCESS BLDV CALC-SCNC: 3.1 MMOL/L (ref -7.7–1.9)
BASE EXCESS BLDV CALC-SCNC: 3.4 MMOL/L (ref -7.7–1.9)
BASE EXCESS BLDV CALC-SCNC: 4 MMOL/L (ref -7.7–1.9)
BASOPHILS # BLD AUTO: 0.1 10E3/UL (ref 0–0.2)
BASOPHILS NFR BLD AUTO: 1 %
BILIRUB SERPL-MCNC: 0.5 MG/DL (ref 0.2–1.3)
BUN SERPL-MCNC: 4 MG/DL (ref 7–30)
CALCIUM SERPL-MCNC: 8.8 MG/DL (ref 8.5–10.1)
CHLORIDE BLD-SCNC: 96 MMOL/L (ref 94–109)
CO2 SERPL-SCNC: 29 MMOL/L (ref 20–32)
CREAT SERPL-MCNC: 0.67 MG/DL (ref 0.66–1.25)
CRP SERPL-MCNC: <2.9 MG/L (ref 0–8)
EOSINOPHIL # BLD AUTO: 0.2 10E3/UL (ref 0–0.7)
EOSINOPHIL NFR BLD AUTO: 2 %
ERYTHROCYTE [DISTWIDTH] IN BLOOD BY AUTOMATED COUNT: 13.2 % (ref 10–15)
FLUAV RNA SPEC QL NAA+PROBE: NEGATIVE
FLUBV RNA RESP QL NAA+PROBE: NEGATIVE
GFR SERPL CREATININE-BSD FRML MDRD: >90 ML/MIN/1.73M2
GLUCOSE BLD-MCNC: 88 MG/DL (ref 70–99)
HCO3 BLDV-SCNC: 31 MMOL/L (ref 21–28)
HCT VFR BLD AUTO: 35.4 % (ref 40–53)
HGB BLD-MCNC: 11.3 G/DL (ref 13.3–17.7)
IMM GRANULOCYTES # BLD: 0 10E3/UL
IMM GRANULOCYTES NFR BLD: 0 %
LACTATE SERPL-SCNC: 1.5 MMOL/L (ref 0.7–2)
LYMPHOCYTES # BLD AUTO: 1.7 10E3/UL (ref 0.8–5.3)
LYMPHOCYTES NFR BLD AUTO: 18 %
MAGNESIUM SERPL-MCNC: 2.2 MG/DL (ref 1.6–2.3)
MCH RBC QN AUTO: 29.5 PG (ref 26.5–33)
MCHC RBC AUTO-ENTMCNC: 31.9 G/DL (ref 31.5–36.5)
MCV RBC AUTO: 92 FL (ref 78–100)
MONOCYTES # BLD AUTO: 1.3 10E3/UL (ref 0–1.3)
MONOCYTES NFR BLD AUTO: 14 %
NEUTROPHILS # BLD AUTO: 5.9 10E3/UL (ref 1.6–8.3)
NEUTROPHILS NFR BLD AUTO: 65 %
NRBC # BLD AUTO: 0 10E3/UL
NRBC BLD AUTO-RTO: 0 /100
NT-PROBNP SERPL-MCNC: 87 PG/ML (ref 0–900)
O2/TOTAL GAS SETTING VFR VENT: 25 %
O2/TOTAL GAS SETTING VFR VENT: 25 %
O2/TOTAL GAS SETTING VFR VENT: 28 %
PCO2 BLDV: 55 MM HG (ref 40–50)
PCO2 BLDV: 59 MM HG (ref 40–50)
PCO2 BLDV: 64 MM HG (ref 40–50)
PH BLDV: 7.29 [PH] (ref 7.32–7.43)
PH BLDV: 7.33 [PH] (ref 7.32–7.43)
PH BLDV: 7.35 [PH] (ref 7.32–7.43)
PHOSPHATE SERPL-MCNC: 3.9 MG/DL (ref 2.5–4.5)
PLATELET # BLD AUTO: 379 10E3/UL (ref 150–450)
PO2 BLDV: 32 MM HG (ref 25–47)
PO2 BLDV: 42 MM HG (ref 25–47)
PO2 BLDV: 58 MM HG (ref 25–47)
POTASSIUM BLD-SCNC: 3.7 MMOL/L (ref 3.4–5.3)
PROCALCITONIN SERPL-MCNC: <0.05 NG/ML
PROT SERPL-MCNC: 6.8 G/DL (ref 6.8–8.8)
RBC # BLD AUTO: 3.83 10E6/UL (ref 4.4–5.9)
SARS-COV-2 RNA RESP QL NAA+PROBE: NEGATIVE
SODIUM SERPL-SCNC: 131 MMOL/L (ref 133–144)
TROPONIN I SERPL HS-MCNC: 5 NG/L
WBC # BLD AUTO: 9.1 10E3/UL (ref 4–11)

## 2022-04-24 PROCEDURE — 94640 AIRWAY INHALATION TREATMENT: CPT | Mod: 76

## 2022-04-24 PROCEDURE — 93010 ELECTROCARDIOGRAM REPORT: CPT | Performed by: EMERGENCY MEDICINE

## 2022-04-24 PROCEDURE — 120N000003 HC R&B IMCU UMMC

## 2022-04-24 PROCEDURE — 999N000157 HC STATISTIC RCP TIME EA 10 MIN

## 2022-04-24 PROCEDURE — 84484 ASSAY OF TROPONIN QUANT: CPT | Performed by: EMERGENCY MEDICINE

## 2022-04-24 PROCEDURE — 82803 BLOOD GASES ANY COMBINATION: CPT | Performed by: PEDIATRICS

## 2022-04-24 PROCEDURE — 84145 PROCALCITONIN (PCT): CPT | Performed by: NURSE PRACTITIONER

## 2022-04-24 PROCEDURE — 250N000009 HC RX 250: Performed by: EMERGENCY MEDICINE

## 2022-04-24 PROCEDURE — 80053 COMPREHEN METABOLIC PANEL: CPT | Performed by: EMERGENCY MEDICINE

## 2022-04-24 PROCEDURE — 250N000013 HC RX MED GY IP 250 OP 250 PS 637: Performed by: PEDIATRICS

## 2022-04-24 PROCEDURE — 250N000012 HC RX MED GY IP 250 OP 636 PS 637: Performed by: EMERGENCY MEDICINE

## 2022-04-24 PROCEDURE — 94640 AIRWAY INHALATION TREATMENT: CPT

## 2022-04-24 PROCEDURE — 87636 SARSCOV2 & INF A&B AMP PRB: CPT | Performed by: EMERGENCY MEDICINE

## 2022-04-24 PROCEDURE — 71275 CT ANGIOGRAPHY CHEST: CPT | Mod: 26 | Performed by: RADIOLOGY

## 2022-04-24 PROCEDURE — 250N000011 HC RX IP 250 OP 636: Performed by: PEDIATRICS

## 2022-04-24 PROCEDURE — 85025 COMPLETE CBC W/AUTO DIFF WBC: CPT | Performed by: EMERGENCY MEDICINE

## 2022-04-24 PROCEDURE — 250N000011 HC RX IP 250 OP 636: Performed by: EMERGENCY MEDICINE

## 2022-04-24 PROCEDURE — 83880 ASSAY OF NATRIURETIC PEPTIDE: CPT | Performed by: EMERGENCY MEDICINE

## 2022-04-24 PROCEDURE — 36415 COLL VENOUS BLD VENIPUNCTURE: CPT | Performed by: PEDIATRICS

## 2022-04-24 PROCEDURE — 250N000009 HC RX 250: Performed by: NURSE PRACTITIONER

## 2022-04-24 PROCEDURE — 71275 CT ANGIOGRAPHY CHEST: CPT

## 2022-04-24 PROCEDURE — 258N000003 HC RX IP 258 OP 636: Performed by: NURSE PRACTITIONER

## 2022-04-24 PROCEDURE — 86140 C-REACTIVE PROTEIN: CPT | Performed by: NURSE PRACTITIONER

## 2022-04-24 PROCEDURE — 82803 BLOOD GASES ANY COMBINATION: CPT | Performed by: STUDENT IN AN ORGANIZED HEALTH CARE EDUCATION/TRAINING PROGRAM

## 2022-04-24 PROCEDURE — 99285 EMERGENCY DEPT VISIT HI MDM: CPT | Mod: CS | Performed by: EMERGENCY MEDICINE

## 2022-04-24 PROCEDURE — 36415 COLL VENOUS BLD VENIPUNCTURE: CPT | Performed by: STUDENT IN AN ORGANIZED HEALTH CARE EDUCATION/TRAINING PROGRAM

## 2022-04-24 PROCEDURE — 96374 THER/PROPH/DIAG INJ IV PUSH: CPT | Performed by: EMERGENCY MEDICINE

## 2022-04-24 PROCEDURE — 93005 ELECTROCARDIOGRAM TRACING: CPT | Performed by: EMERGENCY MEDICINE

## 2022-04-24 PROCEDURE — C9803 HOPD COVID-19 SPEC COLLECT: HCPCS | Performed by: EMERGENCY MEDICINE

## 2022-04-24 PROCEDURE — 84100 ASSAY OF PHOSPHORUS: CPT | Performed by: NURSE PRACTITIONER

## 2022-04-24 PROCEDURE — 83735 ASSAY OF MAGNESIUM: CPT | Performed by: NURSE PRACTITIONER

## 2022-04-24 PROCEDURE — 99223 1ST HOSP IP/OBS HIGH 75: CPT | Mod: AI | Performed by: STUDENT IN AN ORGANIZED HEALTH CARE EDUCATION/TRAINING PROGRAM

## 2022-04-24 PROCEDURE — 99207 PR APP CREDIT; MD BILLING SHARED VISIT: CPT | Performed by: NURSE PRACTITIONER

## 2022-04-24 PROCEDURE — 82803 BLOOD GASES ANY COMBINATION: CPT | Performed by: EMERGENCY MEDICINE

## 2022-04-24 PROCEDURE — 99285 EMERGENCY DEPT VISIT HI MDM: CPT | Mod: 25 | Performed by: EMERGENCY MEDICINE

## 2022-04-24 PROCEDURE — 36415 COLL VENOUS BLD VENIPUNCTURE: CPT | Performed by: EMERGENCY MEDICINE

## 2022-04-24 PROCEDURE — 250N000013 HC RX MED GY IP 250 OP 250 PS 637: Performed by: NURSE PRACTITIONER

## 2022-04-24 PROCEDURE — 71045 X-RAY EXAM CHEST 1 VIEW: CPT

## 2022-04-24 PROCEDURE — 250N000009 HC RX 250: Performed by: PEDIATRICS

## 2022-04-24 PROCEDURE — 83605 ASSAY OF LACTIC ACID: CPT | Performed by: PEDIATRICS

## 2022-04-24 PROCEDURE — 94660 CPAP INITIATION&MGMT: CPT

## 2022-04-24 RX ORDER — IPRATROPIUM BROMIDE AND ALBUTEROL SULFATE 2.5; .5 MG/3ML; MG/3ML
1 SOLUTION RESPIRATORY (INHALATION) EVERY 6 HOURS PRN
Status: DISCONTINUED | OUTPATIENT
Start: 2022-04-24 | End: 2022-04-25

## 2022-04-24 RX ORDER — LIDOCAINE 40 MG/G
CREAM TOPICAL
Status: DISCONTINUED | OUTPATIENT
Start: 2022-04-24 | End: 2022-04-27 | Stop reason: HOSPADM

## 2022-04-24 RX ORDER — PANTOPRAZOLE SODIUM 40 MG/1
40 TABLET, DELAYED RELEASE ORAL DAILY
Status: DISCONTINUED | OUTPATIENT
Start: 2022-04-24 | End: 2022-04-27 | Stop reason: HOSPADM

## 2022-04-24 RX ORDER — PREDNISONE 20 MG/1
60 TABLET ORAL ONCE
Status: COMPLETED | OUTPATIENT
Start: 2022-04-24 | End: 2022-04-24

## 2022-04-24 RX ORDER — LORAZEPAM 0.5 MG/1
0.5 TABLET ORAL EVERY 4 HOURS PRN
Status: DISCONTINUED | OUTPATIENT
Start: 2022-04-24 | End: 2022-04-26

## 2022-04-24 RX ORDER — HYDROXYZINE HYDROCHLORIDE 10 MG/1
5-10 TABLET, FILM COATED ORAL 3 TIMES DAILY PRN
Status: DISCONTINUED | OUTPATIENT
Start: 2022-04-24 | End: 2022-04-27 | Stop reason: HOSPADM

## 2022-04-24 RX ORDER — IPRATROPIUM BROMIDE AND ALBUTEROL SULFATE 2.5; .5 MG/3ML; MG/3ML
3 SOLUTION RESPIRATORY (INHALATION) ONCE
Status: COMPLETED | OUTPATIENT
Start: 2022-04-24 | End: 2022-04-24

## 2022-04-24 RX ORDER — LISINOPRIL 20 MG/1
40 TABLET ORAL DAILY
Status: DISCONTINUED | OUTPATIENT
Start: 2022-04-25 | End: 2022-04-27 | Stop reason: HOSPADM

## 2022-04-24 RX ORDER — LORAZEPAM 2 MG/ML
1 INJECTION INTRAMUSCULAR ONCE
Status: COMPLETED | OUTPATIENT
Start: 2022-04-24 | End: 2022-04-24

## 2022-04-24 RX ORDER — SODIUM CHLORIDE 9 MG/ML
INJECTION, SOLUTION INTRAVENOUS CONTINUOUS
Status: DISCONTINUED | OUTPATIENT
Start: 2022-04-24 | End: 2022-04-25

## 2022-04-24 RX ORDER — IPRATROPIUM BROMIDE AND ALBUTEROL SULFATE 2.5; .5 MG/3ML; MG/3ML
3 SOLUTION RESPIRATORY (INHALATION) EVERY 4 HOURS
Status: DISCONTINUED | OUTPATIENT
Start: 2022-04-24 | End: 2022-04-24

## 2022-04-24 RX ORDER — LEVOFLOXACIN 500 MG/1
500 TABLET, FILM COATED ORAL DAILY
Status: DISCONTINUED | OUTPATIENT
Start: 2022-04-24 | End: 2022-04-27 | Stop reason: HOSPADM

## 2022-04-24 RX ORDER — GUAIFENESIN 600 MG/1
1200 TABLET, EXTENDED RELEASE ORAL 2 TIMES DAILY
Status: DISCONTINUED | OUTPATIENT
Start: 2022-04-24 | End: 2022-04-27 | Stop reason: HOSPADM

## 2022-04-24 RX ORDER — CARBOXYMETHYLCELLULOSE SODIUM 5 MG/ML
1 SOLUTION/ DROPS OPHTHALMIC
Status: DISCONTINUED | OUTPATIENT
Start: 2022-04-24 | End: 2022-04-27 | Stop reason: HOSPADM

## 2022-04-24 RX ORDER — IOPAMIDOL 755 MG/ML
52 INJECTION, SOLUTION INTRAVASCULAR ONCE
Status: COMPLETED | OUTPATIENT
Start: 2022-04-24 | End: 2022-04-24

## 2022-04-24 RX ORDER — AMLODIPINE BESYLATE 10 MG/1
10 TABLET ORAL DAILY
Status: DISCONTINUED | OUTPATIENT
Start: 2022-04-25 | End: 2022-04-27 | Stop reason: HOSPADM

## 2022-04-24 RX ORDER — ALBUTEROL SULFATE 0.83 MG/ML
2.5 SOLUTION RESPIRATORY (INHALATION)
Status: DISCONTINUED | OUTPATIENT
Start: 2022-04-24 | End: 2022-04-26

## 2022-04-24 RX ORDER — IPRATROPIUM BROMIDE AND ALBUTEROL SULFATE 2.5; .5 MG/3ML; MG/3ML
3 SOLUTION RESPIRATORY (INHALATION)
Status: DISCONTINUED | OUTPATIENT
Start: 2022-04-24 | End: 2022-04-25

## 2022-04-24 RX ADMIN — LORAZEPAM 0.5 MG: 0.5 TABLET ORAL at 23:57

## 2022-04-24 RX ADMIN — SODIUM CHLORIDE: 9 INJECTION, SOLUTION INTRAVENOUS at 16:32

## 2022-04-24 RX ADMIN — FLUTICASONE FUROATE AND VILANTEROL TRIFENATATE 1 PUFF: 200; 25 POWDER RESPIRATORY (INHALATION) at 15:27

## 2022-04-24 RX ADMIN — LORAZEPAM 1 MG: 2 INJECTION INTRAMUSCULAR; INTRAVENOUS at 11:48

## 2022-04-24 RX ADMIN — IPRATROPIUM BROMIDE AND ALBUTEROL SULFATE 3 ML: 2.5; .5 SOLUTION RESPIRATORY (INHALATION) at 15:24

## 2022-04-24 RX ADMIN — IPRATROPIUM BROMIDE AND ALBUTEROL SULFATE 3 ML: .5; 3 SOLUTION RESPIRATORY (INHALATION) at 06:11

## 2022-04-24 RX ADMIN — IPRATROPIUM BROMIDE AND ALBUTEROL SULFATE 3 ML: 2.5; .5 SOLUTION RESPIRATORY (INHALATION) at 20:51

## 2022-04-24 RX ADMIN — PANTOPRAZOLE SODIUM 40 MG: 40 TABLET, DELAYED RELEASE ORAL at 15:27

## 2022-04-24 RX ADMIN — LORAZEPAM 0.5 MG: 0.5 TABLET ORAL at 19:56

## 2022-04-24 RX ADMIN — GUAIFENESIN 1200 MG: 600 TABLET ORAL at 19:54

## 2022-04-24 RX ADMIN — PREDNISONE 60 MG: 20 TABLET ORAL at 06:16

## 2022-04-24 RX ADMIN — IPRATROPIUM BROMIDE AND ALBUTEROL SULFATE 3 ML: .5; 3 SOLUTION RESPIRATORY (INHALATION) at 06:33

## 2022-04-24 RX ADMIN — IOPAMIDOL 61 ML: 755 INJECTION, SOLUTION INTRAVENOUS at 19:18

## 2022-04-24 RX ADMIN — LEVOFLOXACIN 500 MG: 500 TABLET, FILM COATED ORAL at 15:27

## 2022-04-24 RX ADMIN — UMECLIDINIUM 1 PUFF: 62.5 AEROSOL, POWDER ORAL at 15:26

## 2022-04-24 ASSESSMENT — ENCOUNTER SYMPTOMS
SPEECH DIFFICULTY: 0
HEADACHES: 0
HEMATURIA: 0
ABDOMINAL PAIN: 0
BACK PAIN: 0
MYALGIAS: 0
RHINORRHEA: 0
WHEEZING: 1
SHORTNESS OF BREATH: 1
JOINT SWELLING: 0
CONFUSION: 0
SORE THROAT: 0
CONSTIPATION: 0
FREQUENCY: 0
DIARRHEA: 0
LIGHT-HEADEDNESS: 1
FEVER: 0
CHEST TIGHTNESS: 1
DYSURIA: 0

## 2022-04-24 ASSESSMENT — ACTIVITIES OF DAILY LIVING (ADL)
ADLS_ACUITY_SCORE: 8

## 2022-04-24 NOTE — PROGRESS NOTES
Pt arrived via EMS on BIPAP. Pt transport uneventful, VSS. Denies pain. RT at bedside and placed pt on our BIPAP. MD notified of pt's arrival. Pt up with SBA. 2 RN skin check competed, no areas of concern noted.

## 2022-04-24 NOTE — PROGRESS NOTES
Patient remains on BiPAP  S/T mode, IPAP 16, EPAP 8, Rate 14, FiO2 25%  Breath sounds clear and diminished  [place] to wean patient this shift.  RT to monitor and assess as needed.    Lauren Thomas RRT

## 2022-04-24 NOTE — ED NOTES
Pt ate 75% of breakfast.  Oxgyen sats on 2.5L NC were 94-96%, but patient appears to be struggling to get air.  BIPAP applied.  FIO2 25%.  Pt states he feels better with the BIPAP on.

## 2022-04-24 NOTE — H&P
"[facility]    History and Physical - Hospitalist Service, GOLD TEAM 11       Date of Admission:  4/24/2022    Assessment & Plan   Luis Hernandez is a 56 year old male admitted on 4/24/2022. He has a past medical history for very severe, steroid-dependent COPD on home 02 (2.5L/nasal cannula), pulmonary HTN and HTN who presented to [facility] ED this morning with increased dyspnea, tachycardia concerning for COPD exacerbation.  Started on BiPAP with symptomatic improvement and transferred to [facility] for further care.      #Acute on chronic hypoxemic, hypercapnic respiratory failure   #Very severe COPD (FEV1 18%)  #Pulmonary Hypertension  Managed on chronic supplemental 02 (2.5L/nasal cannula).  Follows as OP with Pulmonologist Dr. Cuellar (last seen 12/16/2021) managed on triple therapy (Advair, albuterol and Spiriva).  PFTs (2019) w/ FEV 1 18%.  RVP 55 mmHg plus RAP (2019 TTE), dilated RV but normal RV EF.  Had previously been on Prednisone 10 mg daily but patient self-discontinued this on 4/20 as he felt it was worsening his anxiety and made him \"jittery\".  Received a one time dose IV Methylprednisolone in ED 4/22 but was instructed not to continue his daily Prednisone on discharge.  Now p/w dyspnea, productive cough + wheezing concerning for COPD exacerbation.  CXR without consolidation, afebrile, WBC 9.1 . Procal <0.05.  Hx of PNAs (E Coli, klebsiella, stentotrophomonas for which he had required prolonged courses of Bactrim).  Transferred on BIpap, improved symptoms.  C02 55 currently (59 earlier this AM).  Prednisone 60 mg given in ED this AM.   - Continue Prednisone 60 mg daily   - Levofloxacin 500 mg PO daily given productive cough and severe COPD   - BIpap treatment QID with naps and bedtim --> wean to NC if tolerates, titrate to sp02 >92%.  OK to be on NC to eat.   - AM CBC  - Pulmonology consult (no recent follow up as he was recently on hospice - see below)  - COPD " team consult  - Flutter valve   - Continue Breo-ellipta, Duonebs q 4 hours, Incruse ellipta, Albuterol PRN and Duoneb PRN  - Mucinex BID   - Telemetry   - Continuous pulse ox   - VS q 4 hours     #Hypertension  Managed on PTA Amlodipine 10 mg daily, Atenolol 25 mg daily and Lisinopril 40 mg daily. BPs somewhat elevated in setting of anxiety and missed meds this AM (140s/100s).   - Resume PTA Amlodipine and Lisinopril   - HOLD PTA beta blocker in setting of COPD exacerbation     #Sinus tachycardia  Likely due to COPD exacerbation and hypoxia with possible component of hypovolemia.  EKG without ischemic changes, no CP, troponin 5.   - CT PE protocol   - Monitor closely  - IVF at 75mL/hour     #Hyponatremia  Na 131.  Likely hypovolemic as reporting a few days of decreased PO intake in setting of abd pain   - NS 75mL/hour   - BMP in AM     #Intermittent abdominal pain  #Cholelithiasis   Recent ED visit 4/22 for anorexia, weight loss (4 lbs) and abdominal pain, MD felt possibly related to PUD due to chronic prednisone use.  RUQ US showed multiple gallstones, no e/o cholecystitis.  Started on PPI, recc to have OP HIDA.  No current abd pain.  LFTs WNL.  - If abd pain overniht, repeat RUQ US and consider HIDA this admission   - CMP in AM tomororw   - Regular diet ok for now     #Goals of care  Per chart review, patient had numerous admissions for COPD exacerbations and was admitted to [facility] 2/8-2/10/2022 for COPD exacerbation at which time he reported very poor quality of life.  He was therefore discharged on hospice with prednisone taper and prednisone 10 mg daily as well as PRN morphine and PRN Ativan for air hunger.  He was doing well at home and was ultimately discharged from [facility] on 4/13/22 for failure to decline and life expectancy of >6 months.  He had not yet had a follow up with his PCP or pulmonologist.  He shares that he is a DNR/DNI but does not currently want comfort care only.  He continues to  report poor quality of life but seemed somewhat unclear about the role of hospice.    - Palliative care consult for ongoing goals of care discussions   - Should likely have POLST on discharge   - DNR/DNI ordered       #Anxiety   Likely exacerbated by COPD, breathlessness and Prednisone.  Reports symptomatic improvement while he was on hospice (was getting Morphine and Ativan) but now that he is off those meds, symptoms are worse.   - PRN Ativan ordered        Diet: Regular Diet Adult    DVT Prophylaxis: Pneumatic Compression Devices  Miller Catheter: Not present  Central Lines: None  Cardiac Monitoring: ACTIVE order. Indication: Hypoxemia   Code Status: No CPR- Do NOT Intubate      Disposition Plan   Expected Discharge: 04/27/2022  Anticipated discharge location:  Awaiting care coordination huddle  Delays: Medical stability, consultations     The patient's care was discussed with the Attending Physician, Dr. Moran, Bedside Nurse and Patient.    Adamaris Ernst NP  Hospitalist Service, [address]  Securely message with the Vocera Web Console (learn more here)  Text page via [facility] Paging/Directory   Please see signed in provider for up to date coverage information  ______________________________________________________________________    Chief Complaint   Dyspnea and cough    History is obtained from the patient, electronic health record and emergency department physician.    History of Present Illness   Luis Hernandez is a 56 year old male who has a past medical history for severe COPD on home 02 (2.5L/nasal cannula), pulmonary HTN and HTN who presented to [facility] ED this morning with increased dyspnea.      Per patient, he started having worsening dyspnea, anorexia and abdominal this past Monday (4/18). He also had some anxiety, jitteriness and he felt this was due to his chronic prednisone use (10 mg daily) therefore he self discontinued this on  Wednesday 4/20.  He experienced worsening symptoms therefore he presented to the ED on Friday (4/22) where he was given a dose of 100 mg IV Methylprednisolone but instructed not to continue his PTA prednisone due to concern for PUD and abd pain in setting of chronic steroid use.  At that time he was found to have multiple gallstones on imaging with normal CBD with a question of early cholecystitis.  Started on empiric PPI with plan for OP HIDA.      This AM, around 1AM, started having worsening dyspnea that was not improved with home nebs and inhalers. In the ED he was tight, wheezy with labored breathing.  Also was tachycardic to 120s.  VBG was 7.33/59/58/31, started on Bipap, given prednisone 60 mg once and transferred to [facility].  Repeat VBG on a few hours of Bipap improved to 7.35/55/42/31.      Currently, breathing feels much better but ongoing dyspnea with any exertion.  He has a cough but unable to bring it up.  When he did cough something up at home it was yellow.  No fevers, chills, nausea, vomiting or diarrhea. He does report abdominal pain but mildly improved from last Friday.      Review of Systems    The 10 point Review of Systems is negative other than noted in the HPI or here.     Past Medical History    I have reviewed this patient's medical history and updated it with pertinent information if needed.   Past Medical History:   Diagnosis Date     Acute on chronic respiratory failure with hypoxia (H) 4/10/2020     Acute on chronic respiratory failure with hypoxia and hypercapnia (H) 4/1/2019     CAP (community acquired pneumonia) 4/14/2020     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)      COPD exacerbation (H) 4/1/2019     COPD exacerbation (H) 2/16/2020     Erectile dysfunction      Hypertension      Hyponatremia 4/1/2019     Pneumonia 4/10/2020       Past Surgical History   I have reviewed this patient's surgical history and updated it with pertinent information if  needed.  Past Surgical History:   Procedure Laterality Date     APPENDECTOMY      childhood       Social History   I have reviewed this patient's social history and updated it with pertinent information if needed.  Social History     Tobacco Use     Smoking status: Former Smoker     Packs/day: 2.00     Years: 30.00     Pack years: 60.00     Types: Cigarettes     Smokeless tobacco: Former User   Vaping Use     Vaping Use: Never used   Substance Use Topics     Alcohol use: Yes     Comment: rare     Drug use: No       Family History   I have reviewed this patient's family history and updated it with pertinent information if needed.  Family History   Problem Relation Age of Onset     Hypertension Mother      Ovarian Cancer Mother      Breast Cancer Mother      Hypertension Father      Lipids Father      C.A.D. Father      Heart Disease Father      Chronic Obstructive Pulmonary Disease Father      Diabetes Paternal Grandmother      Chronic Obstructive Pulmonary Disease Paternal Grandfather        Prior to Admission Medications   Prior to Admission Medications   Prescriptions Last Dose Informant Patient Reported? Taking?   albuterol (PROAIR HFA/PROVENTIL HFA/VENTOLIN HFA) 108 (90 Base) MCG/ACT inhaler  Self No No   Sig: Inhale 2 puffs into the lungs every 4 hours as needed for shortness of breath / dyspnea Hold on file until needed   albuterol (PROVENTIL) (2.5 MG/3ML) 0.083% neb solution  Self No No   Sig: Take 1 vial (2.5 mg) by nebulization every 6 hours as needed for shortness of breath / dyspnea or wheezing   amLODIPine (NORVASC) 10 MG tablet  Self No No   Sig: Take 1 tablet (10 mg) by mouth daily   atenolol (TENORMIN) 25 MG tablet  Self No No   Sig: Take 1 tablet (25 mg) by mouth daily   doxycycline hyclate (VIBRA-TABS) 100 MG tablet  Self No No   Sig: Take 1 tablet (100 mg) by mouth 2 times daily   fluticasone-salmeterol (ADVAIR-HFA) 230-21 MCG/ACT inhaler  Self No No   Sig: Inhale 2 puffs into the lungs 2 times  daily   ipratropium - albuterol 0.5 mg/2.5 mg/3 mL (DUONEB) 0.5-2.5 (3) MG/3ML neb solution  Self No No   Sig: Take 1 vial (3 mLs) by nebulization every 6 hours as needed for shortness of breath / dyspnea or wheezing   lisinopril (ZESTRIL) 40 MG tablet  Self No No   Sig: Take 1 tablet (40 mg) by mouth daily   order for DME  Self No No   Sig: Equipment being ordered: Nebulizer   order for DME  Self No No   Sig: Equipment being ordered: Oxygen 3L via NC continuous.  O2 saturated noted to be 86% on room air at rest.   pantoprazole (PROTONIX) 40 MG EC tablet   No No   Sig: Take 1 tablet (40 mg) by mouth daily for 30 doses   potassium 99 MG TABS  Self Yes No   Sig: Take 1 tablet by mouth every evening    predniSONE (DELTASONE) 10 MG tablet  Self No No   Simg x 5d, 20mg x 5d, 10mg x 5d, take by mouth   tiotropium (SPIRIVA RESPIMAT) 2.5 MCG/ACT inhaler  Self No No   Sig: Inhale 2 puffs into the lungs daily      Facility-Administered Medications: None     Allergies   Allergies   Allergen Reactions     Hctz Other (See Comments)     He has hyponatremia     Penicillins Other (See Comments)     Reaction unknown       Physical Exam   Vital Signs: Temp: 97.8  F (36.6  C) Temp src: Axillary BP: (!) 121/102 Pulse: 119   Resp: 22 SpO2: 99 % O2 Device: High Flow Nasal Cannula (HFNC) Oxygen Delivery: 35 LPM  Weight: 125 lbs 4.8 oz    General Appearance: NAD, sitting up on edge of bed.  Conversant.   Eyes: PERRLA  HEENT: No lymphadenopathy.  Dry MM.   Respiratory: Normal effort on HFNC.  Decreased air movement   Cardiovascular: Sinus tachycardia, no murmurs appreciated.   GI: Abd spft, NTND  Skin: No jaundice, no rashes.   Musculoskeletal: No joint swelling or tenderness  Neurologic: A+O x 3  Psychiatric: Normal mood     Data   Data reviewed today: I reviewed all medications, new labs and imaging results over the last 24 hours.     Recent Labs   Lab 22  0624 22  1645   WBC 9.1 10.5   HGB 11.3* 12.0*   MCV 92 94   PLT  379 328   * 137   POTASSIUM 3.7 3.7   CHLORIDE 96 102   CO2 29 32   BUN 4* 5*   CR 0.67 0.64*   ANIONGAP 6 3   MIKE 8.8 9.2   GLC 88 95   ALBUMIN 3.8 3.8   PROTTOTAL 6.8 7.2   BILITOTAL 0.5 0.3   ALKPHOS 35* 36*   ALT 24 22   AST 18 16   LIPASE  --  210     Recent Results (from the past 24 hour(s))   XR Chest Port 1 View    Narrative    EXAM: XR CHEST PORT 1 VIEW  LOCATION: [facility]  DATE/TIME: 4/24/2022 6:50 AM    INDICATION: copd, dyspnea and hypoxia  COMPARISON: None.      Impression    IMPRESSION: The lungs are hyperinflated but otherwise clear.

## 2022-04-24 NOTE — INTERIM SUMMARY
Brief pulmonary note:    Pulmonary consulted 4/24 for exacerbation of severe COPD.  In brief, Mr. Hernandez is a 56 year old with COPD (FEV1 18%) on home oxygen, pulmonary hypertension who was admitted for COPD exacerbation. He has a complicated last few months, well outlined in medicine admission H&P. His last COPD clinic follow up was 12/2021, and PFTs 9/20201 with FEV1 18*pred (0.53L), FVC 44% pred, DLCO 34% pred.   Recommendations:  -- Agree with primary team management including:   - pred 60mg daily   - levofloxacin    - PTA fluticasone-vilaterol, umeclidinium and scheduled duonebs   - good airway clearance mechanisms with aerobika   - agree with consult to chronic RT COPD team, palliative care  -- Encourage use of bipap overnight for COPD exacerbation  -- If he were to decompensate, consider CTPE study    Full consult and to be staffed 4/25/22, discussed with consulting provider.    Becki Stern, DO  Pulmonary fellow

## 2022-04-24 NOTE — ED TRIAGE NOTES
Patient has COPD, has been struggling with breathing since Monday. Patient is distressed in Triage. MD notified and RT called to place patient on Bi-Pap.

## 2022-04-24 NOTE — PROGRESS NOTES
"[facility]  Transfer Triage Note    Date of call: 04/24/22  Time of call: 8:57 AM    Current Patient Location: [facility]  Current Level of Care: ED   Dr. Springer    Vitals: Temp: 98.6  F (37  C) Temp src: Oral BP: (!) 133/102 Pulse: 100   Resp: 24 SpO2: 97 % Height: 162.6 cm (5' 4\") Weight: 59 kg (130 lb)  O2 Device: BiPAP/CPAP at Oxygen Delivery: 2.5 LPM  FiO2 (%): 25 %  Diagnosis: COPD Exacerbation  Is COVID-19 a concern? No  Reason for requested transfer: Level of care not available at same facility   Isolation Needs: None    Outside Records: Available  Additional records may be faxed to [phone].    Transfer accepted: Yes  Stability of Patient: Patient is at risk for instability, however patient requires urgent transfer and does not meet ICU criteria   Level of Care Needed: IMC  Telemetry Needed:  Med (Remote) Telemetry  Expected Time of Arrival for Transfer: 0-8 hours  Arrival Location:  [facility]    Recommendations for Management and Stabilization: Not needed    Additional Comments:   57 yo M with COPD (steroid and O2 dependent, 2.5 LPM at baseline) and pulmonary HTN who presented to the ED for worsening dyspnea over the prior 6 hours.  Luis notes that he stopped his steroids 4 days ago because of some GI upset he thought that they were causing.  He presented to the ED 2 days ago for these GI symptoms and was cleared.  Unfortunately in the interim he developed dyspnea and presented to the ED in respiratory distress (tripoding and using accesory muscles).  He was started on BIPAP immediately and substantially improved his work of breathing.  He was evaluated with labs, EKG, CXR which are available in chart and treated with 60 mg of oral prednisone and 2 nebulizers then recommended for admission.  The ED's primary facility had no bed availability for his required level of care and thus he was referred to [facility] for IMC.  Accepted to " Medicine for [facility] bed, no iso, tele.    Of note, Luis had an admission in January/February for which he was discharged on hospice but graduated from hospice in April as his life expectancy was deemed expected greater than 6 months per the ED physician.    Veronique De La Cruz MD

## 2022-04-24 NOTE — PLAN OF CARE
Goal Outcome Evaluation:    Plan of Care Reviewed With: patient     Overall Patient Progress: improving    Neuro: A&Ox4   Cardiac: Afebrile, Sinus Tach,             Respiratory: Weaned off BiPAP, on NC 2L (baseline). Needs to wear BiPAP during naps and overnight while sleeping.   GI/: Voiding spontaneously. No BM this shift.   Diet/appetite: Tolerating diet. Denies nausea   Activity: Up at bedside to use urinal, SBA with ambulation, Increased SOB w/activity   Pain: Denies   Skin: No issues  Lines:PIV x1     Plan: Continue to keep on NC while awake and BiPAP while sleeping. No new complaints.Will continue to monitor and follow plan of care.

## 2022-04-24 NOTE — PROVIDER NOTIFICATION
Pt placed on BiPAP in ED.  Settings titrated up to 16/8, 25% for pt comfort/support.     BiPAP settings/Parameters:        04/24/22 0611   Tech Time   $Tech Time (10 minute increments) 4   Mode: CPAP/ BiPAP/ AVAPS/ AVAPS AE   CPAP/BiPAP/ AVAPS/ AVAPS AE Mode BiPAP S/T   CPAP/BiPAP/Settings   $CPAP/BiPAP Initial completed   BIPAP/CPAP On Standby On   IPAP/EPAP (cmH2O) 16/8   Rate (breaths/min) 14   Oxygen (%) 25   Timed Inspiration (sec) 0.75   IPAP RISE  Settings (V60) 2   CPAP/BiPAP Patient Parameters   IPAP (cm H2O) 16 cmH2O   EPAP (cm H2O) 8 cmH2O   Pressure Support (cm H2O) 8 cmH2O   RR Total (breaths/min) 21 breaths/min   Vt (mL) 430 mL   Minute Ventilation (L/min) 9 L/min   Peak Inspiratory Pressure (cm H2O) 17 cmH2O   Pt.  Leak (L/min) 30 L/min   CPAP/BiPAP/AVAPS/AVAPS AE Alarms   High Pressure (cm H2O) 25 cmH2O   Low Pressure (cm H2O) 5   Low Pressure Delay (sec) 20 sec   Lo Min Vent 2   High Rate (breaths/min) 40 breaths/min   Low Rate (breaths/min) 12   CPAP/BiPAP/AVAPS/AVAPS AE Patient Assessment   Interface Face Mask - Small   Skin Integrity intact   RT Device Skin Assessment   Oxygen Delivery Device CPAP/BiPAP Mask   Site Appearance neck circumference Clean and dry   Site Appearance bridge of nose Clean and dry   Site Appearance occiput Clean and dry   Strap Tightness Finger Allowance between head and device strap   Device Skin Interventions Taken No adjustments needed   Nebulizer Assessment & Treatment   $RT Use ONLY Delivery Method Nebulizer - Initial   Nebulizer Device In line   Pretreatment Heart Rate (beats/min) 110   Pretreatment Resp Rate (breaths/min) 21   Pretreat Breath Sounds - Bilat - All Lobes wheezes, expiratory

## 2022-04-24 NOTE — ED PROVIDER NOTES
History     Chief Complaint   Patient presents with     Shortness of Breath     HPI  Luis Hernandez is a 56 year old male with oxygen dependent and steroid-dependent COPD, on 2.5 L/nasal cannula, pulmonary HTN, and pneumonia presenting to ER via private vehicle for increased dsypnea which worsened 6 hours ago (01:00 AM), but began insidiously 6 days ago.  Used inhaler and nebulizer treatments at home with no relief of symptoms.  Recent history is remarkable for discontinuation 4 days ago of Prednisone 10 mg daily because he thought it was causing GI upset and epigastric pain, for which he was seen in the ED 2 days ago with an unremarkable laboratory evaluation and right upper quadrant ultrasound study remarkable for only cholelithiasis.  During his recent ED evaluation 2 days he had no complaint of dyspnea or shortness of breath and reported normal O2 saturations. He has had no recent URI signs or symptoms, chest pain, cough, hemoptysis, leg pain or leg swelling, fever or chills. No palpitations. No abdominal pain now, no signs of GI bleeding.      Allergies:  Allergies   Allergen Reactions     Hctz Other (See Comments)     He has hyponatremia     Penicillins Other (See Comments)     Reaction unknown       Problem List:    Patient Active Problem List    Diagnosis Date Noted     Acute on chronic respiratory failure (H) 08/22/2021     Priority: Medium     Acute respiratory acidosis 06/30/2021     Priority: Medium     Peripheral edema 02/16/2020     Priority: Medium     Pulmonary hypertension (H) 02/16/2020     Priority: Medium     Acute on chronic respiratory failure with hypoxia and hypercapnia (H) 04/01/2019     Priority: Medium     Tobacco abuse, in remission 12/05/2017     Priority: Medium     COPD exacerbation (H) 01/07/2014     Priority: Medium     Incidental pulmonary nodule, > 3mm and < 8mm, new from 2010 01/07/2014     Priority: Medium     By chest x-ray and chest CT new from CT chest from Oct 2010.    Stable 01/2014 to 07/2014, 6 month follow scan recommended.   Chest CT of 1/15/2015= stable nodule, f/u one year.       Advanced directives, counseling/discussion 11/25/2013     Priority: Medium     11/25/2013 Gave patient honoring choices forms to take home and review.  DAVEY Dial ([facility])        CARDIOVASCULAR SCREENING; LDL GOAL LESS THAN 130 10/31/2010     Priority: Medium     COPD (chronic obstructive pulmonary disease) (H) 10/25/2010     Priority: Medium     Severe to very severe       Eczema 10/04/2010     Priority: Medium     ED (erectile dysfunction) 04/20/2009     Priority: Medium     Essential hypertension, benign 10/15/2007     Priority: Medium        Past Medical History:    Past Medical History:   Diagnosis Date     Acute on chronic respiratory failure with hypoxia (H) 4/10/2020     Acute on chronic respiratory failure with hypoxia and hypercapnia (H) 4/1/2019     CAP (community acquired pneumonia) 4/14/2020     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 Father      Heart Disease Father      Chronic Obstructive Pulmonary Disease Father      Diabetes Paternal Grandmother      Chronic Obstructive Pulmonary Disease Paternal Grandfather        Social History:  Marital Status:  Single [1]  Social History     Tobacco Use     Smoking status: Former Smoker     Packs/day: 2.00     Years: 30.00     Pack years: 60.00     Types: Cigarettes     Smokeless tobacco: Former User   Vaping Use     Vaping Use: Never  "used   Substance Use Topics     Alcohol use: Yes     Comment: rare     Drug use: No        Medications:    albuterol (PROAIR HFA/PROVENTIL HFA/VENTOLIN HFA) 108 (90 Base) MCG/ACT inhaler  albuterol (PROVENTIL) (2.5 MG/3ML) 0.083% neb solution  amLODIPine (NORVASC) 10 MG tablet  atenolol (TENORMIN) 25 MG tablet  doxycycline hyclate (VIBRA-TABS) 100 MG tablet  fluticasone-salmeterol (ADVAIR-HFA) 230-21 MCG/ACT inhaler  ipratropium - albuterol 0.5 mg/2.5 mg/3 mL (DUONEB) 0.5-2.5 (3) MG/3ML neb solution  lisinopril (ZESTRIL) 40 MG tablet  order for DME  order for DME  pantoprazole (PROTONIX) 40 MG EC tablet  potassium 99 MG TABS  predniSONE (DELTASONE) 10 MG tablet  sulfamethoxazole-trimethoprim (BACTRIM DS) 800-160 MG tablet  tiotropium (SPIRIVA RESPIMAT) 2.5 MCG/ACT inhaler          Review of Systems   Constitutional: Negative for chills and fever.   HENT: Negative.  Negative for congestion, rhinorrhea and sore throat.    Respiratory: Positive for chest tightness, shortness of breath and wheezing. Negative for cough.    Cardiovascular: Negative for chest pain, palpitations and leg swelling.   Gastrointestinal: Negative for abdominal pain, blood in stool, constipation and diarrhea.   Endocrine: Negative for polyuria.   Genitourinary: Negative for dysuria, frequency and hematuria.   Musculoskeletal: Negative for back pain, joint swelling and myalgias.   Skin: Negative.  Negative for color change, pallor and rash.   Neurological: Positive for light-headedness. Negative for speech difficulty and headaches.   Psychiatric/Behavioral: Negative for confusion.       Physical Exam   BP: (!) 158/102  Pulse: (!) 122  Temp: 98.6  F (37  C)  Resp: 24  Height: 162.6 cm (5' 4\")  Weight: 59 kg (130 lb)  SpO2: 99 %      Physical Exam  Vitals and nursing note reviewed.   Constitutional:       General: He is in acute distress.      Appearance: He is well-developed.   HENT:      Head: Normocephalic and atraumatic.      Mouth/Throat:      " Mouth: Mucous membranes are moist.   Eyes:      General: No scleral icterus.     Extraocular Movements: Extraocular movements intact.      Conjunctiva/sclera: Conjunctivae normal.   Cardiovascular:      Rate and Rhythm: Regular rhythm. Tachycardia present.      Pulses: Normal pulses.      Heart sounds: Normal heart sounds. No murmur heard.    No friction rub. No gallop.   Pulmonary:      Effort: Tachypnea, accessory muscle usage, prolonged expiration, respiratory distress and retractions present.      Breath sounds: Decreased air movement present. No stridor. Examination of the right-middle field reveals decreased breath sounds. Examination of the left-middle field reveals decreased breath sounds. Examination of the right-lower field reveals decreased breath sounds. Examination of the left-lower field reveals decreased breath sounds. Decreased breath sounds ( speaks in short sentences) present. No wheezing, rhonchi or rales.   Abdominal:      Palpations: Abdomen is soft.      Tenderness: There is no abdominal tenderness. There is no right CVA tenderness, left CVA tenderness, guarding or rebound. Negative signs include Doyle's sign.   Musculoskeletal:         General: Normal range of motion.      Cervical back: Normal range of motion and neck supple.      Right lower leg: No tenderness. No edema.      Left lower leg: No tenderness. No edema.   Skin:     General: Skin is warm and dry.      Capillary Refill: Capillary refill takes less than 2 seconds.      Coloration: Skin is not cyanotic or pale.      Findings: No rash.   Neurological:      General: No focal deficit present.      Mental Status: He is alert and oriented to person, place, and time.   Psychiatric:         Mood and Affect: Mood normal.         Behavior: Behavior normal.         Thought Content: Thought content normal.         ED Course             Procedures              EKG Interpretation:      Interpreted by Michael Springer MD  Time reviewed: Upon  completion  Symptoms at time of EKG: Shortness of breath  Rhythm: Sinus tach  Rate: 122  Axis: normal  Ectopy: none  Conduction: Probable right atrial enlargement, otherwise normal  ST Segments/ T Waves: No ST-T wave changes  Q Waves: none  Comparison to prior: Unchanged from 4/22/2022  Clinical Impression: sinus tach, o/w normal/unremarkable  EKG       Results for orders placed or performed during the hospital encounter of 04/24/22 (from the past 24 hour(s))   CBC with platelets differential    Narrative    The following orders were created for panel order CBC with platelets differential.  Procedure                               Abnormality         Status                     ---------                               -----------         ------                     CBC with platelets and d...[[id number]]  Abnormal            Final result                 Please view results for these tests on the individual orders.   Comprehensive metabolic panel   Result Value Ref Range    Sodium 131 (L) 133 - 144 mmol/L    Potassium 3.7 3.4 - 5.3 mmol/L    Chloride 96 94 - 109 mmol/L    Carbon Dioxide (CO2) 29 20 - 32 mmol/L    Anion Gap 6 3 - 14 mmol/L    Urea Nitrogen 4 (L) 7 - 30 mg/dL    Creatinine 0.67 0.66 - 1.25 mg/dL    Calcium 8.8 8.5 - 10.1 mg/dL    Glucose 88 70 - 99 mg/dL    Alkaline Phosphatase 35 (L) 40 - 150 U/L    AST 18 0 - 45 U/L    ALT 24 0 - 70 U/L    Protein Total 6.8 6.8 - 8.8 g/dL    Albumin 3.8 3.4 - 5.0 g/dL    Bilirubin Total 0.5 0.2 - 1.3 mg/dL    GFR Estimate >90 >60 mL/min/1.73m2   Nt probnp inpatient (BNP)   Result Value Ref Range    N terminal Pro BNP Inpatient 87 0 - 900 pg/mL   Troponin I   Result Value Ref Range    Troponin I High Sensitivity 5 <79 ng/L   Blood gas venous   Result Value Ref Range    pH Venous 7.33 7.32 - 7.43    pCO2 Venous 59 (H) 40 - 50 mm Hg    pO2 Venous 58 (H) 25 - 47 mm Hg    Bicarbonate Venous 31 (H) 21 - 28 mmol/L    Base Excess/Deficit (+/-) 3.4 (H) -7.7 - 1.9 mmol/L    FIO2 25     CBC with platelets and differential   Result Value Ref Range    WBC Count 9.1 4.0 - 11.0 10e3/uL    RBC Count 3.83 (L) 4.40 - 5.90 10e6/uL    Hemoglobin 11.3 (L) 13.3 - 17.7 g/dL    Hematocrit 35.4 (L) 40.0 - 53.0 %    MCV 92 78 - 100 fL    MCH 29.5 26.5 - 33.0 pg    MCHC 31.9 31.5 - 36.5 g/dL    RDW 13.2 10.0 - 15.0 %    Platelet Count 379 150 - 450 10e3/uL    % Neutrophils 65 %    % Lymphocytes 18 %    % Monocytes 14 %    % Eosinophils 2 %    % Basophils 1 %    % Immature Granulocytes 0 %    NRBCs per 100 WBC 0 <1 /100    Absolute Neutrophils 5.9 1.6 - 8.3 10e3/uL    Absolute Lymphocytes 1.7 0.8 - 5.3 10e3/uL    Absolute Monocytes 1.3 0.0 - 1.3 10e3/uL    Absolute Eosinophils 0.2 0.0 - 0.7 10e3/uL    Absolute Basophils 0.1 0.0 - 0.2 10e3/uL    Absolute Immature Granulocytes 0.0 <=0.4 10e3/uL    Absolute NRBCs 0.0 10e3/uL   Symptomatic; Yes; 4/18/2022 Influenza A/B & SARS-CoV2 (COVID-19) Virus PCR Multiplex Nasopharyngeal    Specimen: Nasopharyngeal; Swab   Result Value Ref Range    Influenza A PCR Negative Negative    Influenza B PCR Negative Negative    SARS CoV2 PCR Negative Negative    Narrative    Testing was performed using the kita SARS-CoV-2 & Influenza A/B Assay on the kita Amalia System. This test should be ordered for the detection of SARS-CoV-2 and influenza viruses in individuals who meet clinical and/or epidemiological criteria. Test performance is unknown in asymptomatic patients. This test is for in vitro diagnostic use under the FDA EUA for laboratories certified under CLIA to perform moderate and/or high complexity testing. This test has not been FDA cleared or approved. A negative result does not rule out the presence of PCR inhibitors in the specimen or target RNA in concentration below the limit of detection for the assay. If only one viral target is positive but coinfection with multiple targets is suspected, the sample should be re-tested with another FDA cleared, approved or authorized  test, if coinfection would change clinical management. [facility] are certified under the Clinical Laboratory Improvement Amendments of 1988 (CLIA-88) as  qualified to perform moderate and/or high complexity laboratory testing.   XR Chest Port 1 View    Narrative    EXAM: XR CHEST PORT 1 VIEW  LOCATION: [facility]  DATE/TIME: 4/24/2022 6:50 AM    INDICATION: copd, dyspnea and hypoxia  COMPARISON: None.      Impression    IMPRESSION: The lungs are hyperinflated but otherwise clear.     I independently reviewed the X-rays: Agree with the Radiologist's interpretation.      Medications   ipratropium - albuterol 0.5 mg/2.5 mg/3 mL (DUONEB) neb solution 3 mL (3 mLs Nebulization Given 4/24/22 0611)   ipratropium - albuterol 0.5 mg/2.5 mg/3 mL (DUONEB) neb solution 3 mL (3 mLs Nebulization Given 4/24/22 0633)   predniSONE (DELTASONE) tablet 60 mg (60 mg Oral Given 4/24/22 0616)       7:16 AM - He remains stable on BiPAP, no beds currently available here or within the [facility] system. Will continue bed search. On Bipap, tolerating well, SPO2: 98%, RR: 20, IPAP: 16, EPAP: 8.    8:59 AM - Reviewed the case with Dr. De La Cruz, triage Hospitalist at [facility]. He accepted care of the patient in transfer there. No beds available here.      Critical Care:    My initial assessment, based on my review of nursing observations, review of vital signs, focused history, physical exam, review of cardiac rhythm monitor and 12 lead ECG analysis, established that patient has respiratory insufficiency and respiratory failure, which requires immediate intervention, and therefore She is critically ill.     After the initial assessment, the care team initiated multiple lab tests, initiated medication therapy with DuoNebs and a prednisone burst, and initiated intensive non-invasive respiratory support to provide stabilization care. Due to the critical nature of this patient, I reassessed nursing  observations, vital signs, physical exam, review of cardiac rhythm monitor, 12 lead ECG analysis, mental status, neurologic status and respiratory status multiple times prior to She disposition.     Time also spent performing documentation, reviewing test results and coordination of care.     Critical care time (excluding teaching time and procedures): 45 minutes.       Assessments & Plan (with Medical Decision Making)   56 year old male with oxygen dependent and steroid-dependent COPD, 2.5 L/nasal cannula, pulmonary HTN, presenting to ER via private vehicle for increased dsypnea which began insidiously 6 days ago, and worsened this morning with inability to ambulate independently to the restroom in his home due to severe dyspnea refractory to inhaler and nebulizer treatments at home. Recent history is remarkable for discontinuation 4 days ago of Prednisone 10 mg daily because he thought it was causing GI upset and epigastric pain, for which he was seen in the ED 2 days ago with an unremarkable laboratory evaluation and right upper quadrant ultrasound study remarkable for only cholelithiasis.  He was previously in hospice after hospitalization for similar COPD exacerbation and February of this year, but this was continued earlier this month, 4/2/2022, because it was determined that his life expectancy was expected to be > 6 months.  He arrived in acute respiratory distress with tachypnea, tachycardia and minimal air movement, tripoding. He was stabilized on BiPAP, DuoNeb therapy was initiated a Prednisone burst initiated.  He appears to have an acute COPD exacerbation precipitated by recent discontinuation several days ago chronic steroid therapy. No evidence of pneumonia or acute infectious process.  No evidence of CHF.  COVID-19 and influenza studies negative. He was transferred to [facility]. No beds available here.    I have reviewed the nursing notes.    I have reviewed the findings, diagnosis, plan and  need for follow up with the patient.    New Prescriptions    No medications on file       Final diagnoses:   COPD exacerbation (H)   Acute on chronic respiratory failure with hypoxia and hypercapnia (H)       4/24/2022   [facility] EMERGENCY DEPT     Michael Springer MD  04/25/22 3894

## 2022-04-25 LAB
ANION GAP SERPL CALCULATED.3IONS-SCNC: 5 MMOL/L (ref 3–14)
BASE EXCESS BLDV CALC-SCNC: 2.8 MMOL/L (ref -7.7–1.9)
BUN SERPL-MCNC: 6 MG/DL (ref 7–30)
CALCIUM SERPL-MCNC: 8.8 MG/DL (ref 8.5–10.1)
CHLORIDE BLD-SCNC: 99 MMOL/L (ref 94–109)
CO2 SERPL-SCNC: 30 MMOL/L (ref 20–32)
CREAT SERPL-MCNC: 0.56 MG/DL (ref 0.66–1.25)
ERYTHROCYTE [DISTWIDTH] IN BLOOD BY AUTOMATED COUNT: 13.3 % (ref 10–15)
GFR SERPL CREATININE-BSD FRML MDRD: >90 ML/MIN/1.73M2
GLUCOSE BLD-MCNC: 86 MG/DL (ref 70–99)
HCO3 BLDV-SCNC: 30 MMOL/L (ref 21–28)
HCT VFR BLD AUTO: 34.6 % (ref 40–53)
HGB BLD-MCNC: 11 G/DL (ref 13.3–17.7)
LACTATE SERPL-SCNC: 1.3 MMOL/L (ref 0.7–2)
MCH RBC QN AUTO: 29.8 PG (ref 26.5–33)
MCHC RBC AUTO-ENTMCNC: 31.8 G/DL (ref 31.5–36.5)
MCV RBC AUTO: 94 FL (ref 78–100)
O2/TOTAL GAS SETTING VFR VENT: 1 %
PCO2 BLDV: 55 MM HG (ref 40–50)
PH BLDV: 7.34 [PH] (ref 7.32–7.43)
PLATELET # BLD AUTO: 350 10E3/UL (ref 150–450)
PO2 BLDV: 40 MM HG (ref 25–47)
POTASSIUM BLD-SCNC: 4 MMOL/L (ref 3.4–5.3)
PROCALCITONIN SERPL-MCNC: <0.05 NG/ML
RBC # BLD AUTO: 3.69 10E6/UL (ref 4.4–5.9)
SODIUM SERPL-SCNC: 134 MMOL/L (ref 133–144)
WBC # BLD AUTO: 12.8 10E3/UL (ref 4–11)

## 2022-04-25 PROCEDURE — 36415 COLL VENOUS BLD VENIPUNCTURE: CPT | Performed by: STUDENT IN AN ORGANIZED HEALTH CARE EDUCATION/TRAINING PROGRAM

## 2022-04-25 PROCEDURE — 82803 BLOOD GASES ANY COMBINATION: CPT | Performed by: STUDENT IN AN ORGANIZED HEALTH CARE EDUCATION/TRAINING PROGRAM

## 2022-04-25 PROCEDURE — 80048 BASIC METABOLIC PNL TOTAL CA: CPT | Performed by: NURSE PRACTITIONER

## 2022-04-25 PROCEDURE — 250N000012 HC RX MED GY IP 250 OP 636 PS 637: Performed by: NURSE PRACTITIONER

## 2022-04-25 PROCEDURE — 99233 SBSQ HOSP IP/OBS HIGH 50: CPT | Performed by: STUDENT IN AN ORGANIZED HEALTH CARE EDUCATION/TRAINING PROGRAM

## 2022-04-25 PROCEDURE — 83605 ASSAY OF LACTIC ACID: CPT | Performed by: STUDENT IN AN ORGANIZED HEALTH CARE EDUCATION/TRAINING PROGRAM

## 2022-04-25 PROCEDURE — 250N000013 HC RX MED GY IP 250 OP 250 PS 637: Performed by: NURSE PRACTITIONER

## 2022-04-25 PROCEDURE — 999N000033 HC STATISTIC CHRONIC PULMONARY DISEASE SPECIALIST

## 2022-04-25 PROCEDURE — 94799 UNLISTED PULMONARY SVC/PX: CPT

## 2022-04-25 PROCEDURE — 36415 COLL VENOUS BLD VENIPUNCTURE: CPT | Performed by: NURSE PRACTITIONER

## 2022-04-25 PROCEDURE — G0463 HOSPITAL OUTPT CLINIC VISIT: HCPCS

## 2022-04-25 PROCEDURE — 99222 1ST HOSP IP/OBS MODERATE 55: CPT | Mod: GC | Performed by: INTERNAL MEDICINE

## 2022-04-25 PROCEDURE — 250N000013 HC RX MED GY IP 250 OP 250 PS 637: Performed by: STUDENT IN AN ORGANIZED HEALTH CARE EDUCATION/TRAINING PROGRAM

## 2022-04-25 PROCEDURE — 120N000003 HC R&B IMCU UMMC

## 2022-04-25 PROCEDURE — 272N000202 HC AEROBIKA WITH MANOMETER

## 2022-04-25 PROCEDURE — 999N000032 HC STATISTIC CHRONIC DISEASE SPECIALIST RT CONSULT

## 2022-04-25 PROCEDURE — 84145 PROCALCITONIN (PCT): CPT | Performed by: NURSE PRACTITIONER

## 2022-04-25 PROCEDURE — 85027 COMPLETE CBC AUTOMATED: CPT | Performed by: NURSE PRACTITIONER

## 2022-04-25 PROCEDURE — 258N000003 HC RX IP 258 OP 636: Performed by: NURSE PRACTITIONER

## 2022-04-25 RX ORDER — ATENOLOL 25 MG/1
25 TABLET ORAL DAILY
Status: DISCONTINUED | OUTPATIENT
Start: 2022-04-25 | End: 2022-04-27 | Stop reason: HOSPADM

## 2022-04-25 RX ORDER — IPRATROPIUM BROMIDE AND ALBUTEROL SULFATE 2.5; .5 MG/3ML; MG/3ML
3 SOLUTION RESPIRATORY (INHALATION) EVERY 4 HOURS PRN
Status: DISCONTINUED | OUTPATIENT
Start: 2022-04-25 | End: 2022-04-27 | Stop reason: HOSPADM

## 2022-04-25 RX ADMIN — ATENOLOL 25 MG: 25 TABLET ORAL at 20:40

## 2022-04-25 RX ADMIN — UMECLIDINIUM 1 PUFF: 62.5 AEROSOL, POWDER ORAL at 07:49

## 2022-04-25 RX ADMIN — Medication 10 MG: at 13:11

## 2022-04-25 RX ADMIN — GUAIFENESIN 1200 MG: 600 TABLET ORAL at 07:50

## 2022-04-25 RX ADMIN — LEVOFLOXACIN 500 MG: 500 TABLET, FILM COATED ORAL at 07:50

## 2022-04-25 RX ADMIN — LISINOPRIL 40 MG: 20 TABLET ORAL at 07:50

## 2022-04-25 RX ADMIN — SODIUM CHLORIDE: 9 INJECTION, SOLUTION INTRAVENOUS at 04:41

## 2022-04-25 RX ADMIN — GUAIFENESIN 1200 MG: 600 TABLET ORAL at 20:39

## 2022-04-25 RX ADMIN — PREDNISONE 60 MG: 50 TABLET ORAL at 07:50

## 2022-04-25 RX ADMIN — PANTOPRAZOLE SODIUM 40 MG: 40 TABLET, DELAYED RELEASE ORAL at 07:50

## 2022-04-25 RX ADMIN — LORAZEPAM 0.5 MG: 0.5 TABLET ORAL at 20:40

## 2022-04-25 RX ADMIN — FLUTICASONE FUROATE AND VILANTEROL TRIFENATATE 1 PUFF: 200; 25 POWDER RESPIRATORY (INHALATION) at 07:49

## 2022-04-25 RX ADMIN — AMLODIPINE BESYLATE 10 MG: 10 TABLET ORAL at 07:50

## 2022-04-25 RX ADMIN — Medication 5 MG: at 04:08

## 2022-04-25 RX ADMIN — LORAZEPAM 0.5 MG: 0.5 TABLET ORAL at 10:08

## 2022-04-25 ASSESSMENT — ACTIVITIES OF DAILY LIVING (ADL)
ADLS_ACUITY_SCORE: 8
DEPENDENT_IADLS:: INDEPENDENT
ADLS_ACUITY_SCORE: 8

## 2022-04-25 ASSESSMENT — ENCOUNTER SYMPTOMS
CHILLS: 0
BLOOD IN STOOL: 0
PALPITATIONS: 0
COLOR CHANGE: 0
COUGH: 0

## 2022-04-25 NOTE — PROGRESS NOTES
Pt takes nebs infrequently at home, states he's not going to do them today    Will make PRN at this time

## 2022-04-25 NOTE — PROGRESS NOTES
Walking Oximetry    Pt walked with RN around the unit,  Pt had  3 LNC while walking, 02 sats were 96-98%.  When Pt got back to his room, he requested 1.5 LNC (baseline at home), sats 96% and above when resting.  Will continue to monitor closely.

## 2022-04-25 NOTE — CONSULTS
Chronic Pulmonary Disease Specialist Consult   COPD Initial Interview    2022    Patient: Luis Hernandez      :  1965                    MRN:[id number]      Reason for Consult:  Consulted to provide COPD education and optimize treatment regimen. Patient with very severe COPD being followed by COPD Readmission Reduction Program    History of Present Illness: Luis Hernandez is a 56 year old male admitted on 2022. He has a past medical history for very severe, steroid-dependent COPD on home 02 (2.5L/nasal cannula), pulmonary HTN and HTN who presented to [facility] ED this morning with increased dyspnea, tachycardia concerning for COPD exacerbation. Started on BiPAP with symptomatic improvement and transferred to [address] for further care.      Most recent hospitalization and reason:  2022  [facility] for COPD exacerbation         Smoking Hx:  Former 2 PPD smoker for 30 years, quit in 2021      Diagnosis of COPD:    Patient with very severe airway obstruction on September 10, 2021 PFT's and apical predominant centrilobular and paraseptal emphysematous changes on 2021 CT scan.    Pulmonologist/Last office visit   2021   Dr. Cuellar  [facility] (Of special interest-Patient has never been followed by a pulmonologist prior to seeing Dr. Cuellar. He has only been seen by Dr Cuellar twice; 9/10/2021 and 2021.    Most recent  PFT/interpretation on:   9/10/21 (Of special interest-Patient has no prior PFT's before 9/10/21)        FVC     1.60 L   44%   FEV1     0.53 L   18%   FEV1/FVC   (Ratio)                 33%   DLCO               32%   Interpretation      Very Severe airflow obstruction    Relevant Sleep Studies:  None    Home Oxygen Use:   2-2.5 L Continuous     Home DME company is [employer]    Most recent Chest X-ray:     2021   The lungs are hyperinflated but otherwise clear.      Most recent CT: 2021    Severe apical predominant  centrilobular and paraseptal exhibits changes. There are retained secretions/debris within the bilateral mainstem bronchi and extending into the segmental and subsegmental right lower lobe bronchi, possibly representing aspiration. Calcified granuloma of the right upper lobe posteriorly. New 9 mm spiculated solid nodule along the left majorfissure (series 7 image 209). Additional sub-6 mm solid pulmonary nodules are unchanged. No pneumothorax, pleural effusion, or pulmonary consolidation.       Patient up-to-date on Annual Influenza/ Pneumococcal Vaccination/COVID:  He has had his Influenza/COVID vaccine this year and is up to date with Pneumococcal vaccine.     Pulmonary Rehab History:    Previously attended 2 years ago.  Patient has referral in his chart from 9/16/2021    Home respiratory medications include:      Advair -21 mcg    2 puffs BID  Spiriva Respimat     2 puffs Daily  Albuterol MDI Q4 prn  Albuterol nebulizer Q6 prn      Assessment:  Luis was sitting up at the side of his bed upon writers entrance to his room. He was on 1 L O2 via NC with oxygen saturation of 95%. He does get very winded during conversation.        Action:         Evaluated patients inspiratory flow using In-Check device:     --Low resistance setting: Patient able to generate 40 LPM, which is sufficient inspiratory flow for both his Albuterol   rescue inhaler and his Advair HFA.  Albuterol and Advair inhalers require a slow inhalation of 20-60 LPM for optimal drug disposition with 5-10 second breath hold.      --Medium resistance setting: Patient able to generate 40 LPM, which is on very low end of inspiratory flow range for the use of any DPI product. Talked with patient about the possibility of using nebulizers instead of the Ellipta products he is using as an inpatient. Patient declines using only nebs because he feels they are not effective. Educated the patient regarding his low inspiratory flows that show these DPI may  not be effective for him. Patient again declines changing to all nebulizers as an inpatient. Patient has used DPI's at home previously and were not beneficial, per Dr. Cuellar's office visit note.        -Evaluated patients' coordination and technique with inhaler: Patient demonstrated good technique with all of his inhalers. Educated patient on proper inspiratory flows needed for all his inhalers. Instructed patient on proper use of Aerochamber spacer with inhaler; reiterated that spacer should always be used with his Albuterol rescue inhaler and his Advair HFA. Educated patient regarding the importance of rinsing mouth after the Advair. (Patient declined to take a Aerochamber spacer device as he stated that he already has four of them at home).     -Patient able to generate a pressure of 5-10 cm H2O on Aerobika OPEP device for 2-3 seconds that did not generate a cough. The goal for each breath, for a total of 3 sets of 10 breaths, is to exhale at a of pressure 10-20 for 3-4 seconds without fatigue. Educated patient to perform 2 to 3 'childress coughs'  to clear airway after each set. Shared that device can be used with or without nebs. Shared that consistent use of this device will help open smaller airways, improve mucus clearance, decrease cough frequency, and improve exercise tolerance.     Recommendations:  -Continue with current inpatient respiratory medication schedule.     -Inpatient pulmonary consult for further recommendations and coordination of care (DONE)    -Will need follow up appointment with Pulmonologist and complete PFT's.(PFT's just completed in September 2021)     -Use Aerobika Oscillating PEP Device for 3 sets of 10 breaths two times daily as directed above    -Palliative consult to discuss symptom management: degree of air hunger/pain/coping with illness/anxiety and depression/advanced care planning/ goals of care and comfort. Patient was discharged home on hospice after 2/8/2022 admission at [facility].  "Hospice discharged him on 4/13/2022 for failure to decline and life expectancy > 6 months.     -Recommend TCU to improve muscle strength, endurance and functional abilities    -PT/OT consult to assess safety with returning home, functional abilities and limitations, home care needs and cognitive evaluation    -Referral for outpatient pulmonary Rehab (Has referral in chart from 9/16/2021)    -Home Oxygen Assessment Testing 24-48 hours prior to discharge to determine if O2 needs have changed since previous order at rest and with exertion/activity. If the patient requires oxygen at rest, the walk test must be performed using the new baseline O2 to determine if patient needs more oxygen with activity.   -In the event patient qualifies for oxygen, at rest or with activity, please use the following verbiage in oxygen order: \"Please provide portable oxygen concentrator AND please test for oxygen conserving device using ____LPM to maintain sats between ____)    -Patient is a good candidate for COPD Action Plan due to high readmission risk  -At discharge continue with patient's home regimen of respiratory medications      Will continue to follow and support patient as needed. Will follow up with phone call 48 hours after discharge.     I spent 45 minutes with the patient.    Daysi Sainz, PRABHJOT, RRT, CTTS  Chronic Pulmonary Disease Specialist  Office: [phone]   Pager: [phone]             "

## 2022-04-25 NOTE — PROGRESS NOTES
"[facility]    Medicine Progress Note - Hospitalist Service, GOLD TEAM 11    Date of Admission:  4/24/2022    Assessment & Plan         Luis Hernandez is a 56 year old male admitted on 4/24/2022. He has a past medical history for very severe, steroid-dependent COPD on home 02 (2.5L/nasal cannula), pulmonary HTN and HTN who presented to [facility] ED this 4/24 with increased dyspnea, tachycardia concerning for COPD exacerbation, now improved to baseline with steroids, antibiotics, and nebs.     Acute on chronic hypoxemic, hypercapnic respiratory failure   Very severe COPD (FEV1 18%), acute exacerbation   Pulmonary Hypertension  Managed on chronic supplemental 02 (2.5L/nasal cannula).  Follows as OP with Pulmonologist Dr. Cuellar (last seen 12/16/2021) managed on triple therapy (Advair, albuterol and Spiriva).  PFTs (2019) w/ FEV 1 18%.  RVP 55 mmHg plus RAP (2019 TTE), dilated RV but normal RV EF.  Had previously been on Prednisone 10 mg daily but patient self-discontinued this on 4/20 as he felt it was worsening his anxiety and made him \"jittery\".  Received a one time dose IV Methylprednisolone in ED 4/22 but was instructed not to continue his daily Prednisone on discharge.  Now p/w dyspnea, productive cough + wheezing concerning for COPD exacerbation.  CXR without consolidation, afebrile, WBC 9.1 . Procal <0.05.  Hx of PNAs (E Coli, klebsiella, stentotrophomonas for which he had required prolonged courses of Bactrim).  Transferred on BIpap, improved symptoms.  C02 55 currently (59 earlier this AM).    Pulm consulted:   - Continue Prednisone 60 mg for 5 days (day 2), then taper ( 40 mg x 5 days, 30 mg x 5 days and 20 mg x 5 days and 10 mg x 5 days, 7.5 mg x 5 day and 5 mg for 5 days then stop)   - Levofloxacin 500 mg PO daily given productive cough and severe COPD, complete 5 day course EOT 4/28   - Recommend starting Roflumilast 250 mcg daily for 4 weeks then 500 mcg daily " (this can have psychiatric side effects therefore if patient unable to tolerate given an increase in his anxiety would stop and start azithromycin for prophylaxis; patient voiced understanding and was advised to call the clinic if he experiences psychiatric side effects)  - BIpap treatment QID with naps and bedtime- insurance will cover if documented hypercarbia. Oxygen tonight with VBG ordered 4/26 AM.   - COPD team consult  - Flutter valve   - Continue Breo-ellipta, Duonebs q 4 hours, Incruse ellipta, Albuterol PRN and Duoneb PRN  - Mucinex BID   - Discontinue telemetry   - Consider diuresis PRN due to pHTN, otherwise optimize pulmonary disease.   - Outpatient follow up: Dr. Cuellar with PFTs, home O2 eval within 24-48 hours (completed 4/25: 3L with walking, 1.5L at rest - baseline), patient declined pulmonary rehab, home COPD action plan     Hypertension  Managed on PTA Amlodipine 10 mg daily, Atenolol 25 mg daily and Lisinopril 40 mg daily. BPs somewhat elevated in setting of anxiety and missed meds this AM (140s/100s).   - Resume PTA Amlodipine, lisinopril, amlodipine    Sinus tachycardia  Likely due to COPD exacerbation and hypoxia with possible component of hypovolemia.  EKG without ischemic changes, no CP, troponin 5.   - CT PE protocol --negative  - Monitor closely    Hyponatremia, improved  Na 131 now 134 after IVF  - BMP tomorrow    Intermittent abdominal pain  Cholelithiasis   Recent ED visit 4/22 for anorexia, weight loss (4 lbs) and abdominal pain, MD felt possibly related to PUD due to chronic prednisone use.  RUQ US showed multiple gallstones, no e/o cholecystitis.  Started on PPI, recc to have OP HIDA.  No current abd pain.  LFTs WNL.  - If abd pain overniht, repeat RUQ US and consider HIDA this admission   - CMP in AM tomororw   - Regular diet ok for now     Goals of care  Per chart review, patient had numerous admissions for COPD exacerbations and was admitted to [facility] 2/8-2/10/2022 for COPD  exacerbation at which time he reported very poor quality of life.  He was therefore discharged on hospice with prednisone taper and prednisone 10 mg daily as well as PRN morphine and PRN Ativan for air hunger.  He was doing well at home and was ultimately discharged from [facility] on 4/13/22 for failure to decline and life expectancy of >6 months.  He had not yet had a follow up with his PCP or pulmonologist.  He shares that he is a DNR/DNI but does not currently want comfort care only.  He continues to report poor quality of life but seemed somewhat unclear about the role of hospice.    - Palliative care consult for management of dyspnea   - Should likely have POLST on discharge   - DNR/DNI ordered       Anxiety   Likely exacerbated by COPD, breathlessness and Prednisone.  Reports symptomatic improvement while he was on hospice (was getting Morphine and Ativan) but now that he is off those meds, symptoms are worse.   - PRN Ativan ordered          Diet: Regular Diet Adult    DVT Prophylaxis: Low Risk/Ambulatory with no VTE prophylaxis indicated  Miller Catheter: Not present  Central Lines: None  Cardiac Monitoring: None  Code Status: No CPR- Do NOT Intubate      Disposition Plan   Expected Discharge: 04/26/2022     Anticipated discharge location: home with family    Delays:         The patient's care was discussed with the Patient.    Mandi Moran MD  Hospitalist Service, [address]  Securely message with the Vocera Web Console (learn more here)  Text page via [facility] Paging/Directory   Please see signed in provider for up to date coverage information      Clinically Significant Risk Factors Present on Admission              ______________________________________________________________________    Interval History   - Feels well this morning, no complaints. 2L NC. Sputum decreased from prior. No shortness of breath or chest pain. No abdominal pain,  states that he only had it in the ED when the provider pushed hard on his abdomen     A 4 point review of systems evaluated including questioning patient about cardiovascular symptoms, gastrointestinal symptoms, respiratory symptoms, constitutional symptoms, and hematology / bleeding symptoms and all were negative except as noted in HPI above.      Data reviewed today: I reviewed all medications, new labs and imaging results over the last 24 hours. I personally reviewed no images or EKG's today.    Physical Exam   Vital Signs: Temp: 99  F (37.2  C) Temp src: Oral BP: 117/88 Pulse: 90   Resp: 18 SpO2: 97 % O2 Device: Nasal cannula Oxygen Delivery: 2 LPM  Weight: 125 lbs 7.07 oz  General: NAD   HEENT: Anicteric sclera  CV: RRR  Lungs: Labored effort on oxygen, diminished breath sounds throughout, some anterior wheezing, no crackles  Abd: Soft, ND  Ext: clubbing present, 1+ pitting LE edema in the lower shins and ankles  Skin: No jaundice  Neuro: AAOx3, no focal deficits  Data   Recent Labs   Lab 04/25/22  0509 04/24/22  0624 04/22/22  1645   WBC 12.8* 9.1 10.5   HGB 11.0* 11.3* 12.0*   MCV 94 92 94    379 328    131* 137   POTASSIUM 4.0 3.7 3.7   CHLORIDE 99 96 102   CO2 30 29 32   BUN 6* 4* 5*   CR 0.56* 0.67 0.64*   ANIONGAP 5 6 3   MIKE 8.8 8.8 9.2   GLC 86 88 95   ALBUMIN  --  3.8 3.8   PROTTOTAL  --  6.8 7.2   BILITOTAL  --  0.5 0.3   ALKPHOS  --  35* 36*   ALT  --  24 22   AST  --  18 16   LIPASE  --   --  210

## 2022-04-25 NOTE — CONSULTS
PULMONARY CONSULT  Date of service: 2022    Patient: Luis Hernandez      : 1965      MRN: [id number]    We were consulted by Dr. De La Cruz for evaluation of COPD exacerbation.        Impressions/Recommendations:   56 year old male with PMHx most significant for COPD (2.5L NC), pulmonary nodules, HTN, pulmonary HTN and tobacco use (80 pack year, quit 1 year ago) who was admitted 2022 after presenting with dyspnea and increased sputum production. CT with debris in the bilateral mainstem bronchus concerning for aspiration with a new spiculated 9 mm solid nodule in the left major fissure. Clinical picture seems most consistent with a recurrent COPD exacerbation with unclear percipient. Patient does note an aspiration event ~ 2 weeks ago which correlated with increase in sputum production, history not overly cornicing for pneumonia. Patient notes compliance with all of his inhalers, currently on appropriate treatment. Follows with Dr. Cuellar (last seen 2021 at which point Roflumilast vs azithromycin and pulmonary rehab were discussed).Patient clinically improving and is currently back to his baseline.     Severe COPD with acute exacerbation  Plan:   - Continue Advair (ICS indicated despite increase in risk for pneumonia given absolute eosinophilia > 300 previously documented in 2021)  - Continue Spiriva  - Continue Prednisone 60 mg for 5 days, then taper ( 40 mg x 5 days, 30 mg x 5 days and 20 mg x 5 days and 10 mg x 5 days, 7.5 mg x 5 day and 5 mg for 5 days then stop)  - Recommend 5 day course of Levaquin for COPDE  - Recommend starting Roflumilast 250 mcg daily for 4 weeks then 500 mcg daily (this can have psychiatric side effects therefore if patient unable to tolerate given an increase in his anxiety would stop and start azithromycin for prophylaxis; patient voiced understanding and was advised to call the clinic if he experiences psychiatric side effects)  - RCAT consult to review MDI technique  (done, patient using correctly)  - Recommend BiPAP at bedtime at home (insurance sometimes does not cover this if morning/daily hypercarbia is not documented therefore will have patient sleep with baseline oxygen tonight with will repeat VBG in the AM to see if daily hypercarbia can be documented but regardless patient will benefit from BiPAP at home QHS)  - Recommend pulmonary rehab (ASAP following discharge)  - Continued OP follow up (in 4-6 weeks) with Dr. Cuellar in Pulmonary Clinic    Pulmonary nodules/New Spiculated Lung Mass (left): CT Chest with stable pulmonary nodule but a NEW 9 mm spiculated lung nodule in left major fissure which is concerning for malignancy (80 pack year smoking history)  Plan:   - Will present patient at lung nodule conference  - Repeat chest CT in 3 months pending discussion at lung nodule conference    Likely pulmonary hypertension: with RVP of 55 mmHg plus RAP (2019 ECHO), dilated RV but normal RV EF. No clear signs of significant volume overload (some lower extremity edema likely 2/2 venous status in the setting of decreased mobility) and patient is back to his baseline from a respiratory stand point therefore do not feel diuresis is indicated at this time.  Plan:   - optimization of underlying pulmonary disease  - consider diuresis if he develops signs of volume overload.     Patient seen & discussed w/  Dr. Keila CANTOR, who is in agreement.     Lauren Montero DO  Resident Physician PGY-3  Pulmonary & Critical Care Service          History of Present Illness:   Luis Hernandez is a 56 year old male who presented to [facility] on 4/24/2022 with complaints of ESPARZA  On admission patient was diagnosed w/ COPD exacerbations.     Patient notes 1 weeks of progressively worsening shortness of breath. He notes 2 weeks of increase in his morning cough after chocking on a piece of chicken. She denies and fever chills and night sweats. He notes that his shortness of breath was significant worse the  day prior to admission and notes that his neighbor was burning leaves. He denies any other occupational and environmental exposures. He quit smoking one year ago today. He was recently admitted to [facility] 2/2022 for a COPD exacerbation which he felt was more of a panic attack, he notes poor quality of life during this admission as which point he was discharge on hospital with prednisone 10 mg daily and morphine for symptom management. He stopped the prednisone 4 days prior to admission because he felt this was making his anxiety worse. He was seen in the ED 2 days prior to admission and was given a dose of steroida and told not to continue his daily 10 mg given concern for PUD. He has been using his inhalers as prescribed. He went to pulmonary rehab ~ 2 year ago.    Patient is a former smoker w/ 80 pack year history. Patient reports no EtOH use. Patient reports no recreational drug use.           Review of Symptoms:   10-point ROS reviewed, & found negative w/ exceptions noted in the HPI.          Past Medical History:     Past Medical History:   Diagnosis Date     Acute on chronic respiratory failure with hypoxia (H) 4/10/2020     Acute on chronic respiratory failure with hypoxia and hypercapnia (H) 4/1/2019     CAP (community acquired pneumonia) 4/14/2020     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)      COPD exacerbation (H) 4/1/2019     COPD exacerbation (H) 2/16/2020     Erectile dysfunction      Hypertension      Hyponatremia 4/1/2019     Pneumonia 4/10/2020       Past Surgical History:   Procedure Laterality Date     APPENDECTOMY      childhood            Allergies:     Allergies   Allergen Reactions     Hctz Other (See Comments)     He has hyponatremia     Penicillins Other (See Comments)     Reaction unknown             Outpatient Medications:     [COMPLETED] LORazepam (ATIVAN) injection 1 mg    albuterol (PROAIR HFA/PROVENTIL HFA/VENTOLIN HFA) 108 (90 Base) MCG/ACT inhaler, Inhale 2  puffs into the lungs every 4 hours as needed for shortness of breath / dyspnea Hold on file until needed  albuterol (PROVENTIL) (2.5 MG/3ML) 0.083% neb solution, Take 1 vial (2.5 mg) by nebulization every 6 hours as needed for shortness of breath / dyspnea or wheezing  amLODIPine (NORVASC) 10 MG tablet, Take 1 tablet (10 mg) by mouth daily  atenolol (TENORMIN) 25 MG tablet, Take 1 tablet (25 mg) by mouth daily  doxycycline hyclate (VIBRA-TABS) 100 MG tablet, Take 1 tablet (100 mg) by mouth 2 times daily  fluticasone-salmeterol (ADVAIR-HFA) 230-21 MCG/ACT inhaler, Inhale 2 puffs into the lungs 2 times daily  ipratropium - albuterol 0.5 mg/2.5 mg/3 mL (DUONEB) 0.5-2.5 (3) MG/3ML neb solution, Take 1 vial (3 mLs) by nebulization every 6 hours as needed for shortness of breath / dyspnea or wheezing  lisinopril (ZESTRIL) 40 MG tablet, Take 1 tablet (40 mg) by mouth daily  order for DME, Equipment being ordered: Oxygen 3L via NC continuous.  O2 saturated noted to be 86% on room air at rest.  order for DME, Equipment being ordered: Nebulizer  pantoprazole (PROTONIX) 40 MG EC tablet, Take 1 tablet (40 mg) by mouth daily for 30 doses  potassium 99 MG TABS, Take 1 tablet by mouth every evening   predniSONE (DELTASONE) 10 MG tablet, 30mg x 5d, 20mg x 5d, 10mg x 5d, take by mouth  tiotropium (SPIRIVA RESPIMAT) 2.5 MCG/ACT inhaler, Inhale 2 puffs into the lungs daily              Family History:     Family History   Problem Relation Age of Onset     Hypertension Mother      Ovarian Cancer Mother      Breast Cancer Mother      Hypertension Father      Lipids Father      C.A.D. Father      Heart Disease Father      Chronic Obstructive Pulmonary Disease Father      Diabetes Paternal Grandmother      Chronic Obstructive Pulmonary Disease Paternal Grandfather                Social History:     Social History     Tobacco Use     Smoking status: Former Smoker     Packs/day: 2.00     Years: 30.00     Pack years: 60.00     Types:  Cigarettes     Smokeless tobacco: Former User   Vaping Use     Vaping Use: Never used   Substance Use Topics     Alcohol use: Yes     Comment: rare     Drug use: No             Physical Exam:   BP (!) 143/111 (BP Location: Left arm)   Pulse 101   Temp 98.8  F (37.1  C) (Oral)   Resp 20   Wt 56.9 kg (125 lb 7.1 oz)   SpO2 99%   BMI 21.53 kg/m      General: NAD   HEENT: Anicteric sclera  CV: RRR  Lungs: Labored effort on oxygen, diminished breath sounds throughout, some anterior wheezing, no crackles  Abd: Soft, ND  Ext: clubbing presenting, 1+ pitting LE edema in the lower shins and ankles  Skin: No jaundice  Neuro: AAOx3, no focal deficits          Data:   Labs (all laboratory studies reviewed by me)    Imaging (all imaging studies reviewed by me)

## 2022-04-25 NOTE — PROGRESS NOTES
Patient anxious, restless, and was not able to tolerate bipap for very long overnight. There was one instance of 1 hour and then the second time he placed it on he kept it on for about 2hours.  Patient insists that it wasn't working right. Respiratory at bedside several times to assess. Ativan administered x2 and atarax x1.  By end of this shift, patient looks and states that he feels better than the start of shift.  Urinal voiding.  Darlyn Durbin RN

## 2022-04-25 NOTE — CONSULTS
Care Management Initial Consult    General Information  Assessment completed with: Patient  Type of CM/SW Visit: Initial Assessment  Primary Care Provider verified and updated as needed: Yes   Readmission within the last 30 days: current reason for admission unrelated to previous admission   Reason for Consult: discharge planning  Advance Care Planning: Advance Care Planning Reviewed: no concerns identified        Communication Assessment  Patient's communication style: spoken language (English or Bilingual)    Hearing Difficulty or Deaf: no   Wear Glasses or Blind: yes    Cognitive  Cognitive/Neuro/Behavioral: WDL                      Living Environment:   People in home: parent(s)     Current living Arrangements: house      Able to return to prior arrangements: yes     Family/Social Support:  Care provided by: self  Provides care for: no one  Marital Status: Single  Support System: Father, daughter     Description of Support System: Supportive, Involved      Current Resources:   Patient receiving home care services: No  Community Resources: None  Equipment currently used at home: none  Supplies currently used at home: Oxygen Tubing/Supplies    Employment/Financial:  Employment Status: disabled     Financial Concerns: No concerns identified    Functional Status:  Prior to admission patient needed assistance: Independent with all ADLs.      Mental Health Status:  Mental Health Status: No Current Concerns       Chemical Dependency Status:  Chemical Dependency Status: No Current Concerns           Additional Information:  Care management assessment completed at the bedside w/patient. Patient lives locally w/his father, independently. Patient notes that he does not use any adaptive equipment. At baseline, patient is on 2.5L oxygen through [facility] has a portable tank at the bedside. Patient notes that he was on the bipap overnight and that a provider mentioned he may need on at discharge. No  additional discharge needs or concerns noted by patient. RNCC will continue to follow for discharge planning.     Shalini Nuñez, RNCC, BSN    [facility]    [address]  [address] [place] [id number]    [email or web address]    Office: [phone] Pager: [phone]    To contact the weekend RNCC  [place] (0800 - 1630) Saturday and Sunday    Units: 4A, 4C, 4E, 5A and 5B- Pager 1: [phone]    Units: 6A, 6B, 6C, 6D- Pager 2: [phone]    Units: 7A, 7B, 7C, 7D, and 5C-Pager 3: [phone]

## 2022-04-25 NOTE — PLAN OF CARE
/88 (BP Location: Left arm)   Pulse 90   Temp 99  F (37.2  C) (Oral)   Resp 18   Wt 56.9 kg (125 lb 7.1 oz)   SpO2 97%   BMI 21.53 kg/m      Hours of Care: 5143-2454.    Vitals:  stable   Activity:  Independent/SBA  Pain:  denies  Neuro:  A+Ox4, sensation intact, Anxious.  Cardiac:  SR/ST, no c/o chest pain.    Respiratory:  Chronic 02   2-3LNC use. Pt has SOB o/e, 02 sats above 95 even when mobilizing.  GI:  BS+, passing gas.  :  voiding well, clear/yellow urine.  Diet:  Regular diet, desent appetite.    Lines:  SLx1  Skin/Wounds:  Intact   Plan:  TBD, Palliative care consult.      Continue to monitor and follow POC    Aldair Mccullough RN on 4/25/2022 at 5:32 PM

## 2022-04-26 LAB
ANION GAP SERPL CALCULATED.3IONS-SCNC: 4 MMOL/L (ref 3–14)
BASE EXCESS BLDA CALC-SCNC: 1.4 MMOL/L (ref -9–1.8)
BASE EXCESS BLDV CALC-SCNC: 2.7 MMOL/L (ref -7.7–1.9)
BUN SERPL-MCNC: 12 MG/DL (ref 7–30)
CALCIUM SERPL-MCNC: 9.6 MG/DL (ref 8.5–10.1)
CHLORIDE BLD-SCNC: 96 MMOL/L (ref 94–109)
CO2 SERPL-SCNC: 30 MMOL/L (ref 20–32)
CREAT SERPL-MCNC: 0.72 MG/DL (ref 0.66–1.25)
ERYTHROCYTE [DISTWIDTH] IN BLOOD BY AUTOMATED COUNT: 13.3 % (ref 10–15)
GFR SERPL CREATININE-BSD FRML MDRD: >90 ML/MIN/1.73M2
GLUCOSE BLD-MCNC: 85 MG/DL (ref 70–99)
HCO3 BLD-SCNC: 29 MMOL/L (ref 21–28)
HCO3 BLDV-SCNC: 31 MMOL/L (ref 21–28)
HCT VFR BLD AUTO: 37.8 % (ref 40–53)
HGB BLD-MCNC: 11.7 G/DL (ref 13.3–17.7)
MCH RBC QN AUTO: 29 PG (ref 26.5–33)
MCHC RBC AUTO-ENTMCNC: 31 G/DL (ref 31.5–36.5)
MCV RBC AUTO: 94 FL (ref 78–100)
O2/TOTAL GAS SETTING VFR VENT: 2 %
O2/TOTAL GAS SETTING VFR VENT: 25 %
PCO2 BLD: 56 MM HG (ref 35–45)
PCO2 BLDV: 67 MM HG (ref 40–50)
PH BLD: 7.32 [PH] (ref 7.35–7.45)
PH BLDV: 7.28 [PH] (ref 7.32–7.43)
PLATELET # BLD AUTO: 431 10E3/UL (ref 150–450)
PO2 BLD: 51 MM HG (ref 80–105)
PO2 BLDV: 28 MM HG (ref 25–47)
POTASSIUM BLD-SCNC: 4.4 MMOL/L (ref 3.4–5.3)
RBC # BLD AUTO: 4.04 10E6/UL (ref 4.4–5.9)
SODIUM SERPL-SCNC: 130 MMOL/L (ref 133–144)
WBC # BLD AUTO: 12.6 10E3/UL (ref 4–11)

## 2022-04-26 PROCEDURE — 99222 1ST HOSP IP/OBS MODERATE 55: CPT | Performed by: NURSE PRACTITIONER

## 2022-04-26 PROCEDURE — 99232 SBSQ HOSP IP/OBS MODERATE 35: CPT | Mod: GC | Performed by: INTERNAL MEDICINE

## 2022-04-26 PROCEDURE — 80048 BASIC METABOLIC PNL TOTAL CA: CPT | Performed by: STUDENT IN AN ORGANIZED HEALTH CARE EDUCATION/TRAINING PROGRAM

## 2022-04-26 PROCEDURE — 999N000157 HC STATISTIC RCP TIME EA 10 MIN

## 2022-04-26 PROCEDURE — 250N000013 HC RX MED GY IP 250 OP 250 PS 637: Performed by: STUDENT IN AN ORGANIZED HEALTH CARE EDUCATION/TRAINING PROGRAM

## 2022-04-26 PROCEDURE — 120N000003 HC R&B IMCU UMMC

## 2022-04-26 PROCEDURE — 250N000013 HC RX MED GY IP 250 OP 250 PS 637: Performed by: NURSE PRACTITIONER

## 2022-04-26 PROCEDURE — 82803 BLOOD GASES ANY COMBINATION: CPT | Performed by: STUDENT IN AN ORGANIZED HEALTH CARE EDUCATION/TRAINING PROGRAM

## 2022-04-26 PROCEDURE — 99232 SBSQ HOSP IP/OBS MODERATE 35: CPT | Performed by: STUDENT IN AN ORGANIZED HEALTH CARE EDUCATION/TRAINING PROGRAM

## 2022-04-26 PROCEDURE — 250N000012 HC RX MED GY IP 250 OP 636 PS 637: Performed by: NURSE PRACTITIONER

## 2022-04-26 PROCEDURE — 85027 COMPLETE CBC AUTOMATED: CPT | Performed by: STUDENT IN AN ORGANIZED HEALTH CARE EDUCATION/TRAINING PROGRAM

## 2022-04-26 PROCEDURE — 36600 WITHDRAWAL OF ARTERIAL BLOOD: CPT

## 2022-04-26 PROCEDURE — 36415 COLL VENOUS BLD VENIPUNCTURE: CPT | Performed by: STUDENT IN AN ORGANIZED HEALTH CARE EDUCATION/TRAINING PROGRAM

## 2022-04-26 RX ORDER — LORAZEPAM 0.5 MG/1
0.5 TABLET ORAL EVERY 6 HOURS PRN
Status: DISCONTINUED | OUTPATIENT
Start: 2022-04-26 | End: 2022-04-27 | Stop reason: HOSPADM

## 2022-04-26 RX ORDER — ALBUTEROL SULFATE 90 UG/1
2 AEROSOL, METERED RESPIRATORY (INHALATION)
Status: DISCONTINUED | OUTPATIENT
Start: 2022-04-26 | End: 2022-04-27 | Stop reason: HOSPADM

## 2022-04-26 RX ADMIN — PREDNISONE 60 MG: 50 TABLET ORAL at 07:43

## 2022-04-26 RX ADMIN — PANTOPRAZOLE SODIUM 40 MG: 40 TABLET, DELAYED RELEASE ORAL at 07:44

## 2022-04-26 RX ADMIN — UMECLIDINIUM 1 PUFF: 62.5 AEROSOL, POWDER ORAL at 07:42

## 2022-04-26 RX ADMIN — LEVOFLOXACIN 500 MG: 500 TABLET, FILM COATED ORAL at 07:44

## 2022-04-26 RX ADMIN — GUAIFENESIN 1200 MG: 600 TABLET ORAL at 20:36

## 2022-04-26 RX ADMIN — ATENOLOL 25 MG: 25 TABLET ORAL at 07:43

## 2022-04-26 RX ADMIN — FLUTICASONE FUROATE AND VILANTEROL TRIFENATATE 1 PUFF: 200; 25 POWDER RESPIRATORY (INHALATION) at 07:42

## 2022-04-26 RX ADMIN — ROFLUMILAST 250 MCG: 500 TABLET ORAL at 07:42

## 2022-04-26 RX ADMIN — GUAIFENESIN 1200 MG: 600 TABLET ORAL at 07:44

## 2022-04-26 RX ADMIN — AMLODIPINE BESYLATE 10 MG: 10 TABLET ORAL at 07:43

## 2022-04-26 RX ADMIN — Medication 10 MG: at 22:27

## 2022-04-26 RX ADMIN — Medication 1 MG: at 22:27

## 2022-04-26 RX ADMIN — LISINOPRIL 40 MG: 20 TABLET ORAL at 07:43

## 2022-04-26 ASSESSMENT — ACTIVITIES OF DAILY LIVING (ADL)
ADLS_ACUITY_SCORE: 8

## 2022-04-26 NOTE — PHARMACY-ADMISSION MEDICATION HISTORY
Admission Medication History Completed by Pharmacy    See [facility] Admission Navigator for allergy information, preferred outpatient pharmacy, prior to admission medications and immunization status.     Medication History Sources:     Patient Interview    SureScripts    Changes made to PTA medication list (reason):    Added: None    Deleted: None    Changed: None    Additional Information:    Patient reports that he wasn't using doxycycline before admission but does have a supply at home.    He was on prednisone but stopped that due to some GI issues but does have a supply at home for COPD flares     Prior to Admission medications    Medication Sig Last Dose Taking? Auth Provider   albuterol (PROAIR HFA/PROVENTIL HFA/VENTOLIN HFA) 108 (90 Base) MCG/ACT inhaler Inhale 2 puffs into the lungs every 4 hours as needed for shortness of breath / dyspnea Hold on file until needed 4/23/2022 at Unknown time Yes Dean Mariee MD   albuterol (PROVENTIL) (2.5 MG/3ML) 0.083% neb solution Take 1 vial (2.5 mg) by nebulization every 6 hours as needed for shortness of breath / dyspnea or wheezing 4/24/2022 at Unknown time Yes Bianca Cuellar MD   amLODIPine (NORVASC) 10 MG tablet Take 1 tablet (10 mg) by mouth daily 4/23/2022 at Unknown time Yes Dean Mariee MD   atenolol (TENORMIN) 25 MG tablet Take 1 tablet (25 mg) by mouth daily 4/23/2022 at Unknown time Yes Dean Mariee MD   doxycycline hyclate (VIBRA-TABS) 100 MG tablet Take 1 tablet (100 mg) by mouth 2 times daily More than a month at Unknown time Yes Bianca Cuellar MD   fluticasone-salmeterol (ADVAIR-HFA) 230-21 MCG/ACT inhaler Inhale 2 puffs into the lungs 2 times daily 4/23/2022 at Unknown time Yes Bianca Cuellar MD   ipratropium - albuterol 0.5 mg/2.5 mg/3 mL (DUONEB) 0.5-2.5 (3) MG/3ML neb solution Take 1 vial (3 mLs) by nebulization every 6 hours as needed for shortness of breath / dyspnea or wheezing More than a month at  Unknown time Yes Bianca Cuellar MD   lisinopril (ZESTRIL) 40 MG tablet Take 1 tablet (40 mg) by mouth daily 4/23/2022 at Unknown time Yes Kaylene Puga APRN CNP   order for DME Equipment being ordered: Oxygen 3L via NC continuous.  O2 saturated noted to be 86% on room air at rest. Unknown at Unknown time Yes Eric Patel MD   order for DME Equipment being ordered: Nebulizer Unknown at Unknown time Yes Jean Carlos Holt MD   pantoprazole (PROTONIX) 40 MG EC tablet Take 1 tablet (40 mg) by mouth daily for 30 doses 4/23/2022 at Unknown time Yes Sylvester Emery MD   potassium 99 MG TABS Take 1 tablet by mouth daily as needed Past Month at Unknown time Yes Abstract, Provider   predniSONE (DELTASONE) 10 MG tablet 30mg x 5d, 20mg x 5d, 10mg x 5d, take by mouth Past Week at Unknown time Yes Dean Mariee MD   tiotropium (SPIRIVA RESPIMAT) 2.5 MCG/ACT inhaler Inhale 2 puffs into the lungs daily 4/23/2022 at Unknown time Yes Bianca Cuellar MD       Date completed: 04/26/22    Medication history completed by: Clive Mills [facility]

## 2022-04-26 NOTE — PROGRESS NOTES
"[facility]    Medicine Progress Note - Hospitalist Service, GOLD TEAM 11    Date of Admission:  4/24/2022    Assessment & Plan           Luis Hernandez is a 56 year old male admitted on 4/24/2022. He has a past medical history for very severe, steroid-dependent COPD on home 02 (2.5L/nasal cannula), pulmonary HTN and HTN who presented to [facility] ED this 4/24 with increased dyspnea, tachycardia concerning for COPD exacerbation, now improved to baseline with steroids, antibiotics, and nebs.     Acute on chronic hypoxemic, hypercapnic respiratory failure   Very severe COPD (FEV1 18%), acute exacerbation   Pulmonary Hypertension  Managed on chronic supplemental 02 (2.5L/nasal cannula).  Follows as OP with Pulmonologist Dr. Cuellar (last seen 12/16/2021) managed on triple therapy (Advair, albuterol and Spiriva).  PFTs (2019) w/ FEV 1 18%.  RVP 55 mmHg plus RAP (2019 TTE), dilated RV but normal RV EF.  Had previously been on Prednisone 10 mg daily but patient self-discontinued this on 4/20 as he felt it was worsening his anxiety and made him \"jittery\".  Received a one time dose IV Methylprednisolone in ED 4/22 but was instructed not to continue his daily Prednisone on discharge.  Now p/w dyspnea, productive cough + wheezing concerning for COPD exacerbation.  CXR without consolidation, afebrile, WBC 9.1 . Procal <0.05.  Hx of PNAs (E Coli, klebsiella, stentotrophomonas for which he had required prolonged courses of Bactrim).  Transferred on BIpap, improved symptoms.  C02 55 currently (59 earlier this AM).    Pulm consulted:   - Continue Prednisone 60 mg for 5 days (day 2), then taper ( 40 mg x 5 days, 30 mg x 5 days and 20 mg x 5 days and 10 mg x 5 days, 7.5 mg x 5 day and 5 mg for 5 days then stop)   - Levofloxacin 500 mg PO daily given productive cough and severe COPD, complete 5 day course EOT 4/28   - Recommend starting Roflumilast 250 mcg daily for 4 weeks then 500 mcg daily " (this can have psychiatric side effects therefore if patient unable to tolerate given an increase in his anxiety would stop and start azithromycin for prophylaxis; patient voiced understanding and was advised to call the clinic if he experiences psychiatric side effects)  - BIpap treatment QID with naps and bedtime- insurance will cover if documented hypercarbia. Oxygen tonight with VBG ordered 4/26 AM.   - COPD team consulted, appreciate recommendations  - Overnight oximetry study 4/26-4/27 to assess eligibility for home BiPAP  - Flutter valve   - Continue Breo-ellipta, Duonebs q 4 hours, Incruse ellipta, Albuterol PRN and Duoneb PRN  - Mucinex BID   - Consider diuresis PRN due to pHTN, otherwise optimize pulmonary disease.   - Outpatient follow up: Dr. Cuellar with PFTs, home O2 eval within 24-48 hours (completed 4/25: 3L with walking, 1.5L at rest - baseline), patient declined pulmonary rehab, home COPD action plan     Hypertension  Managed on PTA Amlodipine 10 mg daily, Atenolol 25 mg daily and Lisinopril 40 mg daily. BPs somewhat elevated in setting of anxiety and missed meds this AM (140s/100s).   - Continue PTA Amlodipine, lisinopril, amlodipine    Sinus tachycardia  Likely due to COPD exacerbation and hypoxia with possible component of hypovolemia.  EKG without ischemic changes, no CP, troponin 5.   - CT PE protocol --negative  - Monitor closely    Hyponatremia, improved  Na 131 now 134 after IVF  - BMP tomorrow    Intermittent abdominal pain  Cholelithiasis   Recent ED visit 4/22 for anorexia, weight loss (4 lbs) and abdominal pain, MD felt possibly related to PUD due to chronic prednisone use.  RUQ US showed multiple gallstones, no e/o cholecystitis.  Started on PPI, recc to have OP HIDA.  No current abd pain.  LFTs WNL.  - Regular diet ok for now     Goals of care  Per chart review, patient had numerous admissions for COPD exacerbations and was admitted to [facility] 2/8-2/10/2022 for COPD exacerbation at  which time he reported very poor quality of life.  He was therefore discharged on hospice with prednisone taper and prednisone 10 mg daily as well as PRN morphine and PRN Ativan for air hunger.  He was doing well at home and was ultimately discharged from [facility] on 4/13/22 for failure to decline and life expectancy of >6 months.  He had not yet had a follow up with his PCP or pulmonologist.  He shares that he is a DNR/DNI but does not currently want comfort care only.  He continues to report poor quality of life but seemed somewhat unclear about the role of hospice.    - Palliative care consult for management of dyspnea, appreciate recommendations  - Should likely have POLST on discharge, appreciate palliative assistance  - DNR/DNI ordered       Anxiety   Likely exacerbated by COPD, breathlessness and Prednisone.  Reports symptomatic improvement while he was on hospice (was getting Morphine and Ativan) but now that he is off those meds, symptoms are worse.   - PRN Ativan ordered          Diet: Regular Diet Adult    DVT Prophylaxis: Low Risk/Ambulatory with no VTE prophylaxis indicated  Miller Catheter: Not present  Central Lines: None  Cardiac Monitoring: None  Code Status: No CPR- Do NOT Intubate      Disposition Plan   Expected Discharge: 04/26/2022     Anticipated discharge location: home with family    Delays:         The patient's care was discussed with the Care Coordinator/, Patient and RT and palliaitve Consultant.    Mariajose Oconnor MD  Hospitalist Service, [address]  Securely message with the Vocera Web Console (learn more here)  Text page via [facility] Paging/Directory   Please see signed in provider for up to date coverage information      Clinically Significant Risk Factors Present on Admission              ______________________________________________________________________    Interval History   Continues to feel  well with improvements in shortness of breath relative to admission. No concerns. 2L NC. Sputum decreased from prior. No shortness of breath or chest pain. He is agreeable to completion of overnight oximetry.    A 4 point review of systems evaluated including questioning patient about cardiovascular symptoms, gastrointestinal symptoms, respiratory symptoms, constitutional symptoms, and hematology / bleeding symptoms and all were negative except as noted in HPI above.      Data reviewed today: I reviewed all medications, new labs and imaging results over the last 24 hours. I personally reviewed no images or EKG's today.    Physical Exam   Vital Signs: Temp: 98.6  F (37  C) Temp src: Oral BP: 107/81 Pulse: 89   Resp: 16 SpO2: 100 % O2 Device: Nasal cannula Oxygen Delivery: 1.5 LPM  Weight: 124 lbs 1.9 oz  General: NAD   HEENT: Anicteric sclera  CV: RRR  Lungs: Normal respiratory effort, diminished breath sounds throughout, no wheezing, no crackles  Abd: Soft, ND  Ext: clubbing present, 1+ pitting LE edema in the lower shins and ankles  Skin: No jaundice  Neuro: AAOx3, no focal deficits  Data   Recent Labs   Lab 04/26/22  0507 04/25/22  0509 04/24/22  0624 04/22/22  1645   WBC 12.6* 12.8* 9.1 10.5   HGB 11.7* 11.0* 11.3* 12.0*   MCV 94 94 92 94    350 379 328   * 134 131* 137   POTASSIUM 4.4 4.0 3.7 3.7   CHLORIDE 96 99 96 102   CO2 30 30 29 32   BUN 12 6* 4* 5*   CR 0.72 0.56* 0.67 0.64*   ANIONGAP 4 5 6 3   MIKE 9.6 8.8 8.8 9.2   GLC 85 86 88 95   ALBUMIN  --   --  3.8 3.8   PROTTOTAL  --   --  6.8 7.2   BILITOTAL  --   --  0.5 0.3   ALKPHOS  --   --  35* 36*   ALT  --   --  24 22   AST  --   --  18 16   LIPASE  --   --   --  210

## 2022-04-26 NOTE — PLAN OF CARE
Goal Outcome Evaluation:    Plan of Care Reviewed With: patient     Overall Patient Progress: improving    VSS on 1.5LPM overnight for baseline O2 trial- VBG recheck in AM, PRN ativan given for anxiety x1.     Plan: Continue plan of care, Palliative care consult

## 2022-04-26 NOTE — PROGRESS NOTES
Chronic Pulmonary Disease Specialist Progress note    Contacted by RNCC, Shalini Nuñez, to let writer know that medical team would like to discharge patient with BIPAP device for nighttime use. Writer confirmed with RNCC that patient will need an ABG drawn on his usual oxygen and an overnight oximetry study completed on his usual oxygen. Patient states that he turns his oxygen down to 1 L at times at home; however, the order for his home oxygen is for 2 to 2 1/2 L continuous. Medical team was contacted with this information by writer. Order was placed by medical team for both ABG and overnight oximetry. These will be completed prior to discharge. If patient has a PCO2 of > or = 52 mmHg on ABG and O2 saturation < or = 88% for > or = to 5 mins of NOC recording time (min 2 hrs) while on his usual FIO2 or 2 LPM (whichever is higher) he will qualify for a BIPAP without a rate. Patient would not qualify for a machine with a RR unless he has failed the use of previous device. If patient qualifies would send order for device with appropriate settings to [facility].    Prior to initiating therapy, ANDRÉS and treatment with CPAP has been considered and ruled out.     Bedside RT will be informed of the above information as well.    PRABHJOT Pennington, RRT, CTTS  Chronic Pulmonary Disease Specialist  Office: [phone]   Pager: [phone]

## 2022-04-26 NOTE — PROGRESS NOTES
[facility]   Pulmonary Progress Note  Luis Hernandez MRN: [id number]  1965  Date of Admission:4/24/2022  Date of Service: 04/26/2022  ___________________________________  Main Plans for Today     - Continue prednisone taper    - Continue Roflumilast   - Continue Antibiotics   - BiPAP at bedtime and with naps   - Continue home inhalers   - Pulmonary Rehab   - F/u with Dr. Cuellar in 4-6 weeks   - Pulmonary will sign off, call with questions     Assessment & Plan  56 year old male with PMHx most significant for COPD (2.5L NC), pulmonary nodules, HTN, pulmonary HTN and tobacco use (80 pack year, quit 1 year ago) who was admitted 4/24/2022 after presenting with dyspnea and increased sputum production. CT with debris in the bilateral mainstem bronchus concerning for aspiration with a new spiculated 9 mm solid nodule in the left major fissure. Clinical picture seems most consistent with a recurrent COPD exacerbation with unclear percipient. Patient does note an aspiration event ~ 2 weeks ago which correlated with increase in sputum production, history not overly cornicing for pneumonia. Patient notes compliance with all of his inhalers, currently on appropriate treatment. Follows with Dr. Cuellar (last seen 12/2021 at which point Roflumilast vs azithromycin and pulmonary rehab were discussed). Patient clinically improving and is currently back to his baseline.      Severe COPD with acute exacerbation  Plan:   - Continue Advair   - Continue Spiriva  - Continue Prednisone 60 mg for 5 days, then taper ( 40 mg x 5 days, 30 mg x 5 days and 20 mg x 5 days and 10 mg x 5 days, 7.5 mg x 5 day and 5 mg for 5 days then stop)  - Recommend 5 day course of Levaquin for COPDE  - Recommend starting Roflumilast 250 mcg daily for 4 weeks then 500 mcg daily   - Recommend BiPAP at bedtime at home   - Recommend pulmonary rehab (ASAP following discharge)  - Continued OP follow up (in 4-6 weeks) with Dr. Cuellar in Pulmonary  Clinic  - Pulmonary will sign off, call with questions     Pulmonary nodules/New Spiculated Lung Mass (left): CT Chest with stable pulmonary nodule but a NEW 9 mm spiculated lung nodule in left major fissure which is concerning for malignancy (80 pack year smoking history)  Plan:   - Will present patient at lung nodule conference  - Repeat chest CT in 3 months pending discussion at lung nodule conference     Likely pulmonary hypertension: with RVP of 55 mmHg plus RAP (2019 ECHO), dilated RV but normal RV EF. No clear signs of significant volume overload (some lower extremity edema likely 2/2 venous status in the setting of decreased mobility) and patient is back to his baseline from a respiratory stand point therefore do not feel diuresis is indicated at this time.  Plan:   - optimization of underlying pulmonary disease  - consider diuresis if he develops signs of volume overload.      Patient seen & discussed w/  Dr. Keila CANTOR, who is in agreement.      Lauren Montero DO  Resident Physician PGY-3  Pulmonary & Critical Care Service    Interval History    - Nursing notes reviewed   - patient feels that his breathing is at his baseline   - walking better with less SOB     Review of Systems   ROS:  6 point ROS including Respiratory, CV, GI and , other than that noted in the HPI, is negative   Physical Exam Temp:  [98.2  F (36.8  C)-98.6  F (37  C)] 98.6  F (37  C)  Pulse:  [] 89  Resp:  [16-21] 16  BP: (107-140)/(81-99) 107/81  SpO2:  [94 %-100 %] 100 %  I/O last 3 completed shifts:  In: 360 [P.O.:360]  Out: 1275 [Urine:1275]  Wt Readings from Last 1 Encounters:   04/26/22 56.3 kg (124 lb 1.9 oz)    Body mass index is 21.3 kg/m . Resp: 16      Exam:  General: NAD  HEENT: MMM  CV: RRR, no murmur, click, rub  Resp: diminished breath sounds, wheezing with expiration but better air movement, no cough  Abd: ND  Extremities: WWP  Neuro: AAOx3, no focal deficits    Data   Labs: reviewed in EMR     Imaging:  reviewed in EMR       Medications     amLODIPine  10 mg Oral Daily     atenolol  25 mg Oral Daily     fluticasone-vilanterol  1 puff Inhalation Daily     guaiFENesin  1,200 mg Oral BID     levofloxacin  500 mg Oral Daily     lisinopril  40 mg Oral Daily     pantoprazole  40 mg Oral Daily     predniSONE  60 mg Oral Daily     roflumilast  250 mcg Oral Daily     sodium chloride (PF)  3 mL Intracatheter Q8H     umeclidinium  1 puff Inhalation Daily

## 2022-04-26 NOTE — PLAN OF CARE
/81 (BP Location: Left arm)   Pulse 89   Temp 98.6  F (37  C) (Oral)   Resp 16   Wt 56.3 kg (124 lb 1.9 oz)   SpO2 100%   BMI 21.30 kg/m      Hours of Care: 4212-7192.    Vitals:  stable   Activity:  Independent  Pain:  denies  Neuro:  A+Ox4, sensation intact.  Cardiac:  No tele, denies chest pain.    Respiratory:  1.5 LNC, sats above 95%.  GI: BS+, passing gas, having BM's.  :  Voiding well, clear/yellow urine.  Diet:  Regular, good appetite    Lines:  SLx1  Skin/Wounds:  Fragile, no wounds/sores noted.   Plan:  Continues Oximetry overnight, then home tomorrow am 4/27.      Continue to monitor and follow POC    Aldair Mccullough RN on 4/26/2022 at 5:55 PM

## 2022-04-26 NOTE — CONSULTS
"[facility]  Palliative Care Consultation Note    Patient: Luis Hernandez  Date of Admission:  4/24/2022    Requesting Clinician / Team: [facility]  Reason for consult: Symptom management  Goals of care  Patient and family support    Recommendations:    Pt seen and examined. Basic tenets and concepts of palliative care consultation discussed. Reviewed with primary care team.  --Goals of care: He continues with restorative goals aware that his overall 02 need is increasing. He lives with his father who is able to provide physical assistance as needed. While he is typically 02 dependent 24/7 with 2.5 to 3 lpm via NC- a proposed addition of BiPap support at night would be new. He hopes to resume his independence (driving and going for short walks with his dog)  --CODE STATUS: DNR/DNI- was previously in hospice care but doesn't see need at this time. Tells me he \"graduated\" and attributes his earlier hospice referral to anxiety and some memory issues when he was initially enrolled.   --Symptoms management: Plans use of low dose lorazepam for anxiety- ordered 0.5 mg q 4 hours prn.  Consider changing to q 6 hours prn due to sedating nature of med and limited use in past. Consider use of low dose opiates (hydromorphone) for shortness of breath/air hunger instead if anxiolytic ineffective. .  -- OP palliative care clinic follow up encouraged. Could be seen virtually or in [facility] clinic.    These recommendations have been discussed with patient and primary team      Thank you for the opportunity to participate in the care of this patient and family. Our team: will continue to follow.     During regular M-F work hours -- if you are not sure who specifically to contact -- please contact us by sending a text page to our team consult pager at [phone].    After regular work hours and on weekends/holidays, you can call our answering service at [phone]. Also, who's on " "call for us is available in [employer] Smart Web.   Wild DALE NP ACHPN  Nurse Practitioner- Advanced Practice Provider  [facility] Palliative Medicine Consult Service   [phone]  .TT spent: 55 minutes of which 40 minutes were spent in direct face to face contact with patient/family.  Greater than 50% of time spent counseling and/or coordinating care.     Assessments:  Luis Hernandez is a 56 year old male with PMHx most significant for numerous exacerbations of severe COPD (2.5L NC continuously at home), pulmonary nodules, HTN, pulmonary HTN and tobacco use (80 pack year, quit 1 year ago) who was admitted [facility] ED 4/24/2022  presenting with dyspnea and increased sputum production. He was enrolled in hospice until mid April when he \" graduated\" due to stability and adequate sx control.  Evaluation in ED -->Chest CT with debris in mainstem bronchus concerning for aspiration with a new 9 mm solid nodule in the left major fissure. Clinical picture seems most consistent with a recurrent COPD exacerbation with pt reported aspiration event ~ 2 weeks ago which correlated with increase in sputum production, sx not convincing for pneumonia.  OP pulmonary medicine MD, Dr. Cuellar (last seen 12/2021 at which point Roflumilast vs azithromycin and pulmonary rehab were discussed) He plans close outpatient follow up.      Today, the patient was seen for PC consultation for goals of care and sx management related to above.    Prognosis, Goals, & Planning:      Functional Status just prior to hospitalization: 2 (Ambulatory and capable of all selfcare but unable to carry out any work activities; may need help with IADLs up and about > 50% of waking hours)      Prognosis, Goals, and/or Advance Care Planning were addressed today: Yes        Summary/Comments: see above.       Patient's decision making preferences: independently          Patient has decision-making capacity today for complex decisions: Yes            I have " "concerns about the patient/family's health literacy today: No           Patient has a completed Health Care Directive: No.       Code status: No CPR / No Intubation    Coping, Meaning, & Spirituality:   Mood, coping, and/or meaning in the context of serious illness were addressed today: Yes  Summary/Comments: family support helps him get thru.    Social:     Living situation: Lives with father, and 2 dogs in [place].  Has been able to be independent with driving and ambulates without assistive device.    Key family / caregivers: His father and daughter are supportive.  He also has a sister and brother in the area who are available as needed.    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  Enjoyed his work.    Substance use history: Previous smoker.    Current in-home services: Intermittent home care.  Recently disenrolled from hospice due to \"graduation\".    History of Present Illness:  History gathered today from: patient, medical chart, medical team members, unit team members  chart review indicates numerous recent admissions for COPD exacerbation, most recent have a [facility] in February.  At that time he endorsed decreasing quality of life.  He was enrolled in [facility] hospice at the time of discharge ultimately \"graduated\"  from hospice due to stability and increase life expectancy.  He does not feel he is needing hospice at this time.  Hopes to complete ongoing evaluation and support needs determination during this hospitalization return home.  Open to home care.  Plans outpatient follow-up with pulmonary medicine and primary care services.       Palliative Symptom Data:  # Dyspnea severity the last 12 hours: moderate  # Anxiety severity the last 12 hours: low  ROS:  Comprehensive ROS is reviewed and per HPI otherwise negative     Past Medical History:  Past Medical History:   Diagnosis Date     Acute on chronic respiratory failure with hypoxia (H) " 4/10/2020     Acute on chronic respiratory failure with hypoxia and hypercapnia (H) 4/1/2019     CAP (community acquired pneumonia) 4/14/2020     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)      COPD exacerbation (H) 4/1/2019     COPD exacerbation (H) 2/16/2020     Erectile dysfunction      Hypertension      Hyponatremia 4/1/2019     Pneumonia 4/10/2020        Past Surgical History:  Past Surgical History:   Procedure Laterality Date     APPENDECTOMY      childhood         Family History:  Family History   Problem Relation Age of Onset     Hypertension Mother      Ovarian Cancer Mother      Breast Cancer Mother      Hypertension Father      Lipids Father      C.A.D. Father      Heart Disease Father      Chronic Obstructive Pulmonary Disease Father      Diabetes Paternal Grandmother      Chronic Obstructive Pulmonary Disease Paternal Grandfather         Allergies:  Allergies   Allergen Reactions     Hctz Other (See Comments)     He has hyponatremia     Penicillins Other (See Comments)     Reaction unknown        Medications:  I have reviewed this patient's medication profile and medications from this hospitalization.     Physical Exam:  Vital Signs: Temp: 98.2  F (36.8  C) Temp src: Oral BP: (!) 140/99 Pulse: 96   Resp: 17 SpO2: 99 % O2 Device: Nasal cannula Oxygen Delivery: 2 LPM  Weight: 124 lbs 1.9 oz  General: NAD.  Sitting up in bed.  No acute distress.  Independent with bed mobility.  O2 via nasal cannula.  HEENT: NC/AT.  MMM.  Dentition appear to be in adequate repair.  Anicteric sclera  CV: S1-S2 without murmur.  RRR, distant tones rate in the 80s.  Lungs: Labored effort on oxygen, diminished quality breath sounds throughout, no wheezing, no crackles  Abd: Soft, ND.  Denies bowel or bladder concerns.  No evidence of incontinence.  Ext: clubbing presenting, 1+ pitting LE edema in the lower shins and ankles  Skin: No jaundice  Neuro: AAOx3, articulate and good historian.  Brief exam reveals  no focal deficits.  No tremor    Data reviewed:  ROUTINE LABS (Last four results)  BMPRecent Labs   Lab 04/26/22  0507 04/25/22  0509 04/24/22  0624 04/22/22  1645   * 134 131* 137   POTASSIUM 4.4 4.0 3.7 3.7   CHLORIDE 96 99 96 102   MIKE 9.6 8.8 8.8 9.2   CO2 30 30 29 32   BUN 12 6* 4* 5*   CR 0.72 0.56* 0.67 0.64*   GLC 85 86 88 95     CBC  Recent Labs   Lab 04/26/22  0507 04/25/22  0509 04/24/22  0624 04/22/22  1645   WBC 12.6* 12.8* 9.1 10.5   RBC 4.04* 3.69* 3.83* 3.97*   HGB 11.7* 11.0* 11.3* 12.0*   HCT 37.8* 34.6* 35.4* 37.1*   MCV 94 94 92 94   MCH 29.0 29.8 29.5 30.2   MCHC 31.0* 31.8 31.9 32.3   RDW 13.3 13.3 13.2 13.3    350 379 328     INRNo lab results found in last 7 days.

## 2022-04-26 NOTE — PROGRESS NOTES
Care Management Follow Up    Length of Stay (days): 2    Expected Discharge Date: 04/26/2022     Concerns to be Addressed: Discharge planning   Patient plan of care discussed at interdisciplinary rounds: Yes    Anticipated Discharge Disposition: Home  Anticipated Discharge Services: None  Anticipated Discharge DME:  Home O2, home bipap    Education Provided on the Discharge Plan: Yes  Patient/Family in Agreement with the Plan:  Yes    Referrals Placed by CM/SW:  DME  Private pay costs discussed: Not applicable    Additional Information:  Update received from the primary provider, patient will need bipap set up for discharge, anticipate that he is medically ready for discharge today. Writer contacted COPD RT team for assistance w/bipap set up. Per RT, patient will need ABG and overnight oximetry study, must have sats lower than 88% for more than 5 minutes in additional to clinical documentation by the provider for bipap set up, RT will speak directly with the attending physician. It is unclear if patient will qualify for bipap at this time, care management will continue to follow for discharge planning.    3898 Addendum:  Patient will remain hospitalized for overnight oximetry and possible home bipap set up. Care coordination will continue to follow for discharge planning.     Shalini Nuñez, RNCC, BSN    [facility]    [address]  [address] [place] [address]    [email or web address]    Office: [phone] Pager: [phone]    To contact the weekend RNCC  [place] (0800 - 1630) Saturday and Sunday    Units: 4A, 4C, 4E, 5A and 5B- Pager 1: [phone]    Units: 6A, 6B, 6C, 6D- Pager 2: [phone]    Units: 7A, 7B, 7C, 7D, and 5C-Pager 3: [phone]

## 2022-04-27 ENCOUNTER — DOCUMENTATION ONLY (OUTPATIENT)
Dept: PHARMACY | Facility: CLINIC | Age: 57
End: 2022-04-27
Payer: COMMERCIAL

## 2022-04-27 ENCOUNTER — TELEPHONE (OUTPATIENT)
Dept: PULMONOLOGY | Facility: CLINIC | Age: 57
End: 2022-04-27
Payer: COMMERCIAL

## 2022-04-27 VITALS
WEIGHT: 123.9 LBS | SYSTOLIC BLOOD PRESSURE: 126 MMHG | TEMPERATURE: 98.2 F | BODY MASS INDEX: 21.27 KG/M2 | DIASTOLIC BLOOD PRESSURE: 99 MMHG | OXYGEN SATURATION: 98 % | RESPIRATION RATE: 16 BRPM | HEART RATE: 88 BPM

## 2022-04-27 DIAGNOSIS — J44.1 CHRONIC OBSTRUCTIVE PULMONARY DISEASE WITH ACUTE EXACERBATION (H): Primary | ICD-10-CM

## 2022-04-27 LAB
ANION GAP SERPL CALCULATED.3IONS-SCNC: 6 MMOL/L (ref 3–14)
BUN SERPL-MCNC: 13 MG/DL (ref 7–30)
CALCIUM SERPL-MCNC: 9.3 MG/DL (ref 8.5–10.1)
CHLORIDE BLD-SCNC: 98 MMOL/L (ref 94–109)
CO2 SERPL-SCNC: 27 MMOL/L (ref 20–32)
CREAT SERPL-MCNC: 0.61 MG/DL (ref 0.66–1.25)
ERYTHROCYTE [DISTWIDTH] IN BLOOD BY AUTOMATED COUNT: 13.3 % (ref 10–15)
GFR SERPL CREATININE-BSD FRML MDRD: >90 ML/MIN/1.73M2
GLUCOSE BLD-MCNC: 78 MG/DL (ref 70–99)
HCT VFR BLD AUTO: 36.5 % (ref 40–53)
HGB BLD-MCNC: 11.7 G/DL (ref 13.3–17.7)
MCH RBC QN AUTO: 30 PG (ref 26.5–33)
MCHC RBC AUTO-ENTMCNC: 32.1 G/DL (ref 31.5–36.5)
MCV RBC AUTO: 94 FL (ref 78–100)
PLATELET # BLD AUTO: 422 10E3/UL (ref 150–450)
POTASSIUM BLD-SCNC: 3.8 MMOL/L (ref 3.4–5.3)
RBC # BLD AUTO: 3.9 10E6/UL (ref 4.4–5.9)
SODIUM SERPL-SCNC: 131 MMOL/L (ref 133–144)
WBC # BLD AUTO: 13 10E3/UL (ref 4–11)

## 2022-04-27 PROCEDURE — 250N000012 HC RX MED GY IP 250 OP 636 PS 637: Performed by: NURSE PRACTITIONER

## 2022-04-27 PROCEDURE — 250N000013 HC RX MED GY IP 250 OP 250 PS 637: Performed by: STUDENT IN AN ORGANIZED HEALTH CARE EDUCATION/TRAINING PROGRAM

## 2022-04-27 PROCEDURE — 36415 COLL VENOUS BLD VENIPUNCTURE: CPT | Performed by: STUDENT IN AN ORGANIZED HEALTH CARE EDUCATION/TRAINING PROGRAM

## 2022-04-27 PROCEDURE — 85027 COMPLETE CBC AUTOMATED: CPT | Performed by: STUDENT IN AN ORGANIZED HEALTH CARE EDUCATION/TRAINING PROGRAM

## 2022-04-27 PROCEDURE — 94762 N-INVAS EAR/PLS OXIMTRY CONT: CPT

## 2022-04-27 PROCEDURE — 82310 ASSAY OF CALCIUM: CPT | Performed by: STUDENT IN AN ORGANIZED HEALTH CARE EDUCATION/TRAINING PROGRAM

## 2022-04-27 PROCEDURE — 250N000013 HC RX MED GY IP 250 OP 250 PS 637: Performed by: NURSE PRACTITIONER

## 2022-04-27 PROCEDURE — 99239 HOSP IP/OBS DSCHRG MGMT >30: CPT | Performed by: STUDENT IN AN ORGANIZED HEALTH CARE EDUCATION/TRAINING PROGRAM

## 2022-04-27 RX ORDER — ROFLUMILAST 250 UG/1
TABLET ORAL
Qty: 90 TABLET | Refills: 0 | Status: SHIPPED | OUTPATIENT
Start: 2022-04-27 | End: 2022-06-26

## 2022-04-27 RX ORDER — HYDROXYZINE HYDROCHLORIDE 10 MG/1
10 TABLET, FILM COATED ORAL 3 TIMES DAILY PRN
Qty: 15 TABLET | Refills: 0 | Status: SHIPPED | OUTPATIENT
Start: 2022-04-27 | End: 2022-05-07

## 2022-04-27 RX ORDER — PREDNISONE 5 MG/1
TABLET ORAL
Qty: 13 TABLET | Refills: 0 | Status: SHIPPED | OUTPATIENT
Start: 2022-05-24 | End: 2022-06-03

## 2022-04-27 RX ORDER — LEVOFLOXACIN 500 MG/1
500 TABLET, FILM COATED ORAL DAILY
Qty: 1 TABLET | Refills: 0 | Status: SHIPPED | OUTPATIENT
Start: 2022-04-28 | End: 2022-04-29

## 2022-04-27 RX ORDER — GUAIFENESIN 600 MG/1
1200 TABLET, EXTENDED RELEASE ORAL 2 TIMES DAILY
Qty: 28 TABLET | Refills: 0 | Status: SHIPPED | OUTPATIENT
Start: 2022-04-27 | End: 2022-05-04

## 2022-04-27 RX ORDER — PREDNISONE 20 MG/1
TABLET ORAL
Qty: 43 TABLET | Refills: 0 | Status: SHIPPED | OUTPATIENT
Start: 2022-04-28 | End: 2022-07-15

## 2022-04-27 RX ADMIN — UMECLIDINIUM 1 PUFF: 62.5 AEROSOL, POWDER ORAL at 07:21

## 2022-04-27 RX ADMIN — PANTOPRAZOLE SODIUM 40 MG: 40 TABLET, DELAYED RELEASE ORAL at 07:22

## 2022-04-27 RX ADMIN — PREDNISONE 60 MG: 50 TABLET ORAL at 07:22

## 2022-04-27 RX ADMIN — ATENOLOL 25 MG: 25 TABLET ORAL at 07:22

## 2022-04-27 RX ADMIN — AMLODIPINE BESYLATE 10 MG: 10 TABLET ORAL at 07:22

## 2022-04-27 RX ADMIN — LISINOPRIL 40 MG: 20 TABLET ORAL at 07:21

## 2022-04-27 RX ADMIN — FLUTICASONE FUROATE AND VILANTEROL TRIFENATATE 1 PUFF: 200; 25 POWDER RESPIRATORY (INHALATION) at 07:21

## 2022-04-27 RX ADMIN — GUAIFENESIN 1200 MG: 600 TABLET ORAL at 07:22

## 2022-04-27 RX ADMIN — ROFLUMILAST 250 MCG: 500 TABLET ORAL at 07:21

## 2022-04-27 RX ADMIN — LEVOFLOXACIN 500 MG: 500 TABLET, FILM COATED ORAL at 07:22

## 2022-04-27 ASSESSMENT — ACTIVITIES OF DAILY LIVING (ADL)
ADLS_ACUITY_SCORE: 8

## 2022-04-27 NOTE — TELEPHONE ENCOUNTER
[facility] Call Center    Phone Message    May a detailed message be left on voicemail: yes     Reason for Call: Appointment Intake    Referring Provider Name: Mariajose Oconnor MD  Diagnosis and/or Symptoms: Follow-up with Dr. Cuellar within 4-6 weeks    -scheduled first avail on 7/21 - sending due to no availability within requested time frame. Thank you!     Action Taken: Message routed to:  Other: [place] [facility].     Travel Screening: Not Applicable

## 2022-04-27 NOTE — PROGRESS NOTES
DISCHARGE                         No discharge date for patient encounter.  ----------------------------------------------------------------------------  Discharged to: Home  Via: private transportation  Accompanied by: Family (dad)  Discharge Instructions: *diet, *activity, medications, follow up appointments, when to call the MD, aftercare instructions.  Prescriptions: To be filled by  Pt's  pharmacy; medication list reviewed & sent with pt  Follow Up Appointments: arranged; information given  Belongings: All sent with pt  IV: d/c'd  Telemetry: d/c'd  Pt exhibits understanding of above discharge instructions; all questions answered.    Discharge Paperwork: Signed, copied, and sent home with patient. Pt brought down to the main entrance at 14:20.

## 2022-04-27 NOTE — TELEPHONE ENCOUNTER
Called patient and notified of appt time and day. Pt confirmed.   Sania BIGGS RN BSN PHN  Specialty Clinics

## 2022-04-27 NOTE — PLAN OF CARE
Neuro: A&Ox4. Pleasant. Pt expressed some anxiety, PRN atarax given x1 with reported relief.   Cardiac: No tele. HR 70s-80s. VSS. Temp: 98.3  F (36.8  C) Temp src: Oral BP: 100/73 Pulse: 80   Resp: 18 SpO2: 100 % O2 Device: Nasal cannula at Oxygen Delivery: 1.5 LPM  Respiratory: Sating >95% on 1.5L NC. Denies SOB.   GI/: Adequate urine output. No BM overnight.   Diet/appetite: Tolerating regular diet. Eating well.  Activity:  Ambulated independently in room, steady gait.   Pain: At acceptable level on current regimen. Pt denied pain overnight.   Skin: No new deficits noted.   LDA's: R PIV SL    Sleep study performed overnight.    Plan: Continue with POC. Notify primary team with changes.

## 2022-04-27 NOTE — TELEPHONE ENCOUNTER
Bianca Cuellar MD  You 19 minutes ago (11:59 AM)     KM    Hi,     Let's add on for 5/19 at 4 pm. This will make my 3:30 appt a half hour slot.     Thanks,     Yamilet Cuellar MD   Pulmonary, Allergy, Critical Care, and Sleep Medicine   [facility]   Pager: [phone]

## 2022-04-27 NOTE — PROVIDER NOTIFICATION
Patient's outpatient pharmacy contacted this writer to notify that they are unable to fill the eprescribed roflumilast (DALIRESP) script.    Notified Mariajose Oconnor MD to make aware.  Pharmacy requesting prior auth or medication change.

## 2022-04-27 NOTE — TELEPHONE ENCOUNTER
Pt currently in hospital to be discharged to home. Diagnosis of acute respiratory failure and COPD exacerbation. Looking for follow up appt 4-6 weeks. Please advise on appt for patient.  Sania BIGGS RN BSN PHN  Specialty Clinics

## 2022-04-27 NOTE — PROGRESS NOTES
Care Management Discharge Note    Discharge Date: 04/27/2022    Discharge Disposition: Home    Discharge Services: None    Discharge DME: Home O2    Discharge Transportation: family or friend will provide    Private pay costs discussed: Not applicable     Education Provided on the Discharge Plan: Yes  Patient/Family in Agreement with the Plan:  Yes    Handoff Referral Completed: Yes    Additional Information:  Patient medically ready for discharge to home today. Patient did not qualify for bipap, will resume home O2, has a portable tank at the bedside, family/friend will transport. No additional discharge needs noted.     Shalini Nuñez, RNCC, BSN    [facility]    [address]  [address] [place] [address]    [email or web address]    Office: [phone] Pager: [phone]    To contact the weekend RNCC  [place] (0800 - 1630) Saturday and Sunday    Units: 4A, 4C, 4E, 5A and 5B- Pager 1: [phone]    Units: 6A, 6B, 6C, 6D- Pager 2: [phone]    Units: 7A, 7B, 7C, 7D, and 5C-Pager 3: [phone]

## 2022-04-28 ENCOUNTER — PATIENT OUTREACH (OUTPATIENT)
Dept: CARE COORDINATION | Facility: CLINIC | Age: 57
End: 2022-04-28
Payer: COMMERCIAL

## 2022-04-28 DIAGNOSIS — Z71.89 OTHER SPECIFIED COUNSELING: ICD-10-CM

## 2022-04-28 NOTE — PROGRESS NOTES
Prior Authorization Approval    Daliresp 500mcg tabs  Date Initiated: 4/27/2022  Date Completed: 4/27/2022  Prior Auth Type: Non-Formulary                Status: Approved    Effective Date: 01/27/2022 - 04/27/2023  Copay: 25.00     Filling Pharmacy: [id number] - [place], [place] - [address] [place] [address]    Insurance: [facility] - Phone [phone] Fax [phone]  ID: [id number]  Case Number: [id number] / [id number]   Submitted Via: Noah Davila  [facility] Liaison  Ph: [phone] Pager: [phone]

## 2022-04-28 NOTE — PROGRESS NOTES
Clinic Care Coordination Contact    Background: Care Coordination referral placed from [facility] discharge report for reason of patient meeting criteria for a TCM outreach call by [facility] [place] team.    Assessment: Upon chart review, CCRC Team member will cancel/close the referral for TCM outreach due to reason below:    Patient isn't interested in speaking with care team today and disconnected call    Plan: Care Coordination referral for TCM outreach canceled.    Alyson Martins MA  [facility] [place], [facility]

## 2022-05-06 NOTE — DISCHARGE SUMMARY
[facility]  Hospitalist Discharge Summary      Date of Admission:  4/24/2022  Date of Discharge:  4/27/2022  2:38 PM  Discharging Provider: Mariajose Oconnor MD  Discharge Service: Hospitalist Service, GOLD TEAM 11    Discharge Diagnoses   Acute on chronic hypoxemic, hypercapnic respiratory failure   Very severe COPD (FEV1 18%), acute exacerbation   Pulmonary Hypertension  Hypertension  Sinus tachycardia  Hyponatremia, improved  Intermittent abdominal pain  Cholelithiasis without cholecystitis  Anxiety      Follow-ups Needed After Discharge   Follow-up Appointments     Adult [facility] Follow-up and recommended labs and tests      Follow up with Dr. Cuellar, at [facility] Pulmonology clinic, within 4-6 weeks    for hospital follow- up. No follow up labs or test are needed.    Appointments on [place] and/or [facility] (with [facility] or [facility]   provider or service). Call [phone] if you haven't heard regarding   these appointments within 7 days of discharge.             Unresulted Labs Ordered in the Past 30 Days of this Admission     No orders found from 3/25/2022 to 4/25/2022.          Discharge Disposition   Discharged to home  Condition at discharge: Stable      Hospital Course   Luis Hernandez is a 56 year old male admitted on 4/24/2022. He has a past medical history for very severe, steroid-dependent COPD on home 02 (2.5L/nasal cannula), pulmonary HTN and HTN who presented to [facility] ED this 4/24 with increased dyspnea, tachycardia concerning for COPD exacerbation, now improved to baseline with steroids, antibiotics, and nebs.     Acute on chronic hypoxemic, hypercapnic respiratory failure   Very severe COPD (FEV1 18%), acute exacerbation   Pulmonary Hypertension  Managed on chronic supplemental 02 (2.5L/nasal cannula).  Follows as OP with Pulmonologist Dr. Cuellar (last seen 12/16/2021) managed on triple therapy (Advair, albuterol and Spiriva).  PFTs (2019) w/  "FEV 1 18%.  RVP 55 mmHg plus RAP (2019 TTE), dilated RV but normal RV EF.  Had previously been on Prednisone 10 mg daily but patient self-discontinued this on 4/20 as he felt it was worsening his anxiety and made him \"jittery\".  Received a one time dose IV Methylprednisolone in ED 4/22 but was instructed not to continue his daily Prednisone on discharge.  Now p/w dyspnea, productive cough + wheezing concerning for COPD exacerbation.  CXR without consolidation, afebrile, WBC 9.1 . Procal <0.05.  Hx of PNAs (E Coli, klebsiella, stentotrophomonas for which he had required prolonged courses of Bactrim).  Transferred on BIpap, improved symptoms.  C02 55 currently (59 earlier this AM).  Pulmonology, COPD team consulted with recommendations and treatments as below.  Pulm consulted:   - Continue Prednisone 60 mg for 5 days (day 2), then taper ( 40 mg x 5 days, 30 mg x 5 days and 20 mg x 5 days and 10 mg x 5 days, 7.5 mg x 5 day and 5 mg for 5 days then stop)   - Levofloxacin 500 mg PO daily given productive cough and severe COPD, complete 5 day course EOT 4/28   - Recommend starting Roflumilast 250 mcg daily for 4 weeks then 500 mcg daily (this can have psychiatric side effects therefore if patient unable to tolerate given an increase in his anxiety would stop and start azithromycin for prophylaxis; patient voiced understanding and was advised to call the clinic if he experiences psychiatric side effects)  - BiPAP treatment QID with naps and bedtime while inpatient, unfortunately, patient did not qualify for coverage of home BiPAP at time of discharge and will require completion of sleep study as outpatient given chronic hypoxia and hypercapnea.  - Continue PTA regimen including Breo-ellipta, Duonebs q 4 hours, Incruse ellipta, Albuterol PRN and Duoneb PRN  - Mucinex BID   - Outpatient follow up: Dr. Cuellar with PFTs, home O2 eval within 24-48 hours (completed 4/25: 3L with walking, 1.5L at rest - baseline), patient declined " pulmonary rehab, home COPD action plan     Hypertension  Managed on PTA Amlodipine 10 mg daily, Atenolol 25 mg daily and Lisinopril 40 mg daily. BPs somewhat elevated in setting of anxiety and missed meds this AM (140s/100s).   - Continue PTA Amlodipine, lisinopril, amlodipine    Sinus tachycardia  Likely due to COPD exacerbation and hypoxia with possible component of hypovolemia.  EKG without ischemic changes, no CP, troponin 5. CT PE completed and negative.    Hyponatremia, improved  Na 131 to 134 after IVF.    Intermittent abdominal pain  Cholelithiasis   Recent ED visit 4/22 for anorexia, weight loss (4 lbs) and abdominal pain, MD felt possibly related to PUD due to chronic prednisone use.  RUQ US showed multiple gallstones, no e/o cholecystitis.  Started on PPI, recc to have OP HIDA.  No current abd pain.  LFTs WNL.    Goals of care  Per chart review, patient had numerous admissions for COPD exacerbations and was admitted to [facility] 2/8-2/10/2022 for COPD exacerbation at which time he reported very poor quality of life.  He was therefore discharged on hospice with prednisone taper and prednisone 10 mg daily as well as PRN morphine and PRN Ativan for air hunger.  He was doing well at home and was ultimately discharged from [facility] on 4/13/22 for failure to decline and life expectancy of >6 months.  He had not yet had a follow up with his PCP or pulmonologist.  He shares that he is a DNR/DNI but does not currently want comfort care only.  He continues to report poor quality of life but seemed somewhat unclear about the role of hospice. Palliative care consult for management of dyspnea with recommendations against initiation of low dose opioids at this time given adequate symptoms management on regimen as above. Patient is DNR/DNI.    Anxiety   Likely exacerbated by COPD, breathlessness and Prednisone.  Reports symptomatic improvement while he was on hospice (was getting Morphine and Ativan) but now  that he is off those meds, symptoms are worse. Received PRN ativan        Consultations This Hospital Stay   IP RESPIRATORY CARE CHRONIC PULMONARY DISEASE SPECIALIST  PULMONARY GENERAL ADULT IP CONSULT  CARE MANAGEMENT / SOCIAL WORK IP CONSULT  PALLIATIVE CARE ADULT IP CONSULT    Code Status   Prior    Time Spent on this Encounter   I, Mariajose Oconnor MD, personally saw the patient today and spent greater than 30 minutes discharging this patient.       Mariajose Oconnor MD  [facility] [address] [address]  Phone: [phone]  ______________________________________________________________________    Physical Exam   Vital Signs:                   Weight: 123 lbs 14.38 oz  General: NAD   HEENT: Anicteric sclera  CV: RRR  Lungs: Normal respiratory effort, diminished breath sounds throughout, no wheezing, no crackles  Abd: Soft, ND  Ext: clubbing present, 1+ pitting LE edema in the lower shins and ankles  Skin: No jaundice  Neuro: AAOx3, no focal deficits       Primary Care Physician   Dean Mariee    Discharge Orders      Pulmonary Rehab Referral      Pulmonary Medicine Referral      Sleep Study Referral      Reason for your hospital stay    Dear Luis Hernandez    You were hospitalized at [facility] with COPD exacerbation and treated with oxygen, nebulizers, antibiotics, and steroids.  Over your hospitalization your breathing improved and today you are ready to be discharged.  If you continue your COPD therapy you should continue to improve but if you develop fever, shortness of breath, or worsening cough please seek medical attention.    We are suggesting the following medication changes:  Start roflumilast  Continue to steroid taper as prescribed  Continue antibiotics with levaquin for 1 additional day    Please get the following tests done:  None    Please set up an appointment to follow-up on the  following:  Pulmonology in 4-6 weeks  Pulmonary rehabilitation    Thank you for allowing me and my team the privilege of caring for you today. Please let us know if there is anything else we can do for you so that we can be sure you are leaving completely satisfied with your care experience.    Your hospital unit at the time of discharge is 6B so if you have any questions please call the hospital at [phone] and ask to talk to a nurse on 6B.    Take care!    Mariajose Oconnor MD  Hospitalist     Activity    Your activity upon discharge: activity as tolerated     Adult [facility] Follow-up and recommended labs and tests    Follow up with Dr. Cuellar, at [facility] Pulmonology clinic, within 4-6 weeks  for hospital follow- up. No follow up labs or test are needed.    Appointments on [place] and/or [facility] (with [facility] or [facility] provider or service). Call [phone] if you haven't heard regarding these appointments within 7 days of discharge.     Diet    Follow this diet upon discharge: Orders Placed This Encounter      Regular Diet Adult       Significant Results and Procedures   Most Recent 3 CBC's:Recent Labs   Lab Test 04/27/22  0544 04/26/22  0507 04/25/22  0509   WBC 13.0* 12.6* 12.8*   HGB 11.7* 11.7* 11.0*   MCV 94 94 94    431 350     Most Recent 3 BMP's:Recent Labs   Lab Test 04/27/22  0544 04/26/22  0507 04/25/22  0509   * 130* 134   POTASSIUM 3.8 4.4 4.0   CHLORIDE 98 96 99   CO2 27 30 30   BUN 13 12 6*   CR 0.61* 0.72 0.56*   ANIONGAP 6 4 5   MIKE 9.3 9.6 8.8   GLC 78 85 86     Most Recent ABG:Recent Labs   Lab Test 04/26/22  1309   PH 7.32*   PO2 51*   PCO2 56*   HCO3 29*   CAITIE 1.4   ,   Results for orders placed or performed during the hospital encounter of 04/24/22   CT Chest Pulmonary Embolism w Contrast    Narrative    EXAMINATION: CTA pulmonary angiogram, 4/24/2022 7:31 PM     COMPARISON: Radiograph earlier same day, 2/7/2022. CT 8/24/2021 no  acute abnormality of the upper  abdomen..    HISTORY: PE suspected, high prob    TECHNIQUE: Volumetric helical acquisition of CT images of the chest  from the lung apices to the kidneys were acquired after the  administration of 61 mL of Isovue-370 IV contrast. Non-Flash technique  with shallow inspiratory breath hold technique.  Post-processed  multiplanar and/or MIP reformations were obtained, archived to PACS  and used in interpretation of this study.     FINDINGS:  Contrast bolus is: excellent.  Exam is negative for acute  pulmonary embolism.       The largest right ventricle transaxial diameter is (measured from  endocardium to endocardium): 2.8, cm   The largest left ventricle transaxial diameter is (measured from  endocardium to endocardium): 3.1 cm  RV/LV ratio is: 0.9 (if ratio greater than 1.1 then sign is suspicious  for right heart strain)  Reflux of contrast into the IVC? Trace  Paradoxical bowing of the interventricular septum to the left? no  Pericardial effusion?:not present    Visualized are unremarkable. Heart size is normal. Stable intrahepatic  vessels. The main pulmonary artery and descending thoracic aorta are  within normal limits. Mild coronary artery calcifications. No abnormal  thoracic lymph nodes by size criteria. The esophagus is unremarkable.    Severe apical predominant centrilobular and paraseptal exhibits  changes. There are retained secretions/debris within the bilateral  mainstem bronchi and extending into the segmental and subsegmental  right lower lobe bronchi. Calcified granuloma of the right upper lobe  posteriorly. New 9 mm spiculated solid nodule along the left major  fissure (series 7 image 209). Additional sub-6 mm solid pulmonary  nodules are unchanged. No pneumothorax, pleural effusion, or pulmonary  consolidation.    Limited evaluation of the upper abdomen with 3 mm nonobstructing renal  calculus in the superior renal pole of the right kidney.    No acute or suspicious osseous lesion. The soft tissues  are within  normal limits.      Impression    IMPRESSION:   1. Exam is negative for acute pulmonary embolism.       Evidence for right heart strain or increased right heart pressures?   is not present.     2. New 9 mm spiculated solid nodule along the left major fissure,  consider follow-up low-dose chest CT in 3 months, PET/CT, or tissue  sampling per Fleischner's Society guidelines.    3. Debris/retained secretions within the bilateral mainstem bronchi,  and extending into the right lower lobe bronchi, concerning for  recurrent aspiration.    In the event of a positive result for acute pulmonary embolism  resulting in right heart strain, consider calling the   [facility] patient placement ([phone]) for PERT (Pulmonary Embolism  Response Team) Activation?    PERT -- Pulmonary Embolism Response Team (Multidisciplinary team  including cardiology, interventional radiology, critical care,  hematology)    I have personally reviewed the examination and initial interpretation  and I agree with the findings.    GERALD KING MD         SYSTEM ID:  [id number]       Discharge Medications   Discharge Medication List as of 4/27/2022  2:04 PM      START taking these medications    Details   guaiFENesin (MUCINEX) 600 MG 12 hr tablet Take 2 tablets (1,200 mg) by mouth 2 times daily for 7 days, Disp-28 tablet, R-0, E-Prescribe      hydrOXYzine (ATARAX) 10 MG tablet Take 1 tablet (10 mg) by mouth 3 times daily as needed for anxiety, Disp-15 tablet, R-0, E-Prescribe      levofloxacin (LEVAQUIN) 500 MG tablet Take 1 tablet (500 mg) by mouth daily for 1 day, Disp-1 tablet, R-0, E-Prescribe      roflumilast (DALIRESP) 250 MCG TABS tablet Take 1 tablet (250 mcg) by mouth daily for 30 days, THEN 2 tablets (500 mcg) daily., Disp-90 tablet, R-0, E-Prescribe         CONTINUE these medications which have CHANGED    Details   !! predniSONE (DELTASONE) 20 MG tablet Take 3 tablets (60 mg) by mouth daily for 1 day, THEN 2.5 tablets (50 mg)  daily for 5 days, THEN 2 tablets (40 mg) daily for 5 days, THEN 1.5 tablets (30 mg) daily for 5 days, THEN 1 tablet (20 mg) daily for 5 days, THEN 0.5 tablets (10 mg) daily for 5 d ays, THEN 0.5 tablets (10 mg) daily for 5 days. Then take 7.5 mg daily x 5 days THEN 5 mg daily x 5 days THEN STOP, Disp-43 tablet, R-0, E-Prescribe      !! predniSONE (DELTASONE) 5 MG tablet Take 1.5 tablets (7.5 mg) by mouth daily for 5 days, THEN 1 tablet (5 mg) daily for 5 days., Disp-13 tablet, R-0, E-Prescribe       !! - Potential duplicate medications found. Please discuss with provider.      CONTINUE these medications which have NOT CHANGED    Details   albuterol (PROAIR HFA/PROVENTIL HFA/VENTOLIN HFA) 108 (90 Base) MCG/ACT inhaler Inhale 2 puffs into the lungs every 4 hours as needed for shortness of breath / dyspnea Hold on file until needed, Disp-18 g, R-11, E-PrescribePharmacy may dispense brand covered by insurance (Proair, or proventil or ventolin or generic albuterol inhale r)      albuterol (PROVENTIL) (2.5 MG/3ML) 0.083% neb solution Take 1 vial (2.5 mg) by nebulization every 6 hours as needed for shortness of breath / dyspnea or wheezing, Disp-90 mL, R-5, E-Prescribe      amLODIPine (NORVASC) 10 MG tablet Take 1 tablet (10 mg) by mouth daily, Disp-90 tablet, R-3, E-Prescribe      atenolol (TENORMIN) 25 MG tablet Take 1 tablet (25 mg) by mouth daily, Disp-90 tablet, R-3, E-Prescribe      fluticasone-salmeterol (ADVAIR-HFA) 230-21 MCG/ACT inhaler Inhale 2 puffs into the lungs 2 times daily, Disp-12 g, R-5, E-Prescribe      ipratropium - albuterol 0.5 mg/2.5 mg/3 mL (DUONEB) 0.5-2.5 (3) MG/3ML neb solution Take 1 vial (3 mLs) by nebulization every 6 hours as needed for shortness of breath / dyspnea or wheezing, Disp-300 mL, R-6, E-Prescribe      lisinopril (ZESTRIL) 40 MG tablet Take 1 tablet (40 mg) by mouth daily, Disp-90 tablet, R-3, E-Prescribe      !! order for DME Equipment being ordered: Oxygen 3L via NC continuous.   O2 saturated noted to be 86% on room air at rest.Disp-1 Units, R-0, No Print Out      !! order for DME Equipment being ordered: NebulizerDisp-1 Device, R-0, Local Print      pantoprazole (PROTONIX) 40 MG EC tablet Take 1 tablet (40 mg) by mouth daily for 30 doses, Disp-30 tablet, R-0, E-Prescribe      tiotropium (SPIRIVA RESPIMAT) 2.5 MCG/ACT inhaler Inhale 2 puffs into the lungs daily, Disp-4 g, R-5, E-Prescribe       !! - Potential duplicate medications found. Please discuss with provider.      STOP taking these medications       doxycycline hyclate (VIBRA-TABS) 100 MG tablet Comments:   Reason for Stopping:         potassium 99 MG TABS Comments:   Reason for Stopping:             Allergies   Allergies   Allergen Reactions     Hctz Other (See Comments)     He has hyponatremia     Penicillins Other (See Comments)     Reaction unknown

## 2022-05-11 ENCOUNTER — HOSPITAL ENCOUNTER (OUTPATIENT)
Dept: CARDIAC REHAB | Facility: CLINIC | Age: 57
Discharge: HOME | End: 2022-05-11
Attending: STUDENT IN AN ORGANIZED HEALTH CARE EDUCATION/TRAINING PROGRAM
Payer: COMMERCIAL

## 2022-05-11 DIAGNOSIS — J44.1 COPD EXACERBATION (H): ICD-10-CM

## 2022-05-11 PROCEDURE — 94626 PHY/QHP OP PULM RHB W/MNTR: CPT | Performed by: REHABILITATION PRACTITIONER

## 2022-05-16 ENCOUNTER — TELEPHONE (OUTPATIENT)
Dept: FAMILY MEDICINE | Facility: CLINIC | Age: 57
End: 2022-05-16
Payer: COMMERCIAL

## 2022-05-16 NOTE — TELEPHONE ENCOUNTER
Patient Quality Outreach    Patient is due for the following:   Colon Cancer Screening -  Colonoscopy    NEXT STEPS:   patient to schedule    Type of outreach:    patient to schedule      Questions for provider review:    None     Osito Gusman

## 2022-05-16 NOTE — LETTER
[facility]  [address]  [facility] [place] [address]  Phone: [phone]        May 16, 2022      Luis BURNS [facility]  [address] [address] [facility] [id number]      Your healthcare team cares about your health. To provide you with the best care,   we have reviewed your chart and based on our findings, we see that you are due to:     - COLON CANCER SCREENING:  Call or mychart the clinic to schedule your colonoscopy or schedule/ your FIT Test, or Cologuard test    If you have already completed these items, please contact the clinic via phone or   Mychart so your care team can review and update your records. Thank you for   choosing [facility] for your healthcare needs. For any questions,   concerns, or to schedule an appointment please contact the clinic.       Healthy Regards,      Your [facility] Care Team

## 2022-05-17 ENCOUNTER — HOSPITAL ENCOUNTER (OUTPATIENT)
Dept: CARDIAC REHAB | Facility: CLINIC | Age: 57
Discharge: HOME | End: 2022-05-17
Attending: FAMILY MEDICINE
Payer: COMMERCIAL

## 2022-05-17 PROCEDURE — 94625 PHY/QHP OP PULM RHB W/O MNTR: CPT

## 2022-05-19 ENCOUNTER — OFFICE VISIT (OUTPATIENT)
Dept: PULMONOLOGY | Facility: CLINIC | Age: 57
End: 2022-05-19
Payer: COMMERCIAL

## 2022-05-19 ENCOUNTER — HOSPITAL ENCOUNTER (OUTPATIENT)
Dept: CARDIAC REHAB | Facility: CLINIC | Age: 57
Discharge: HOME | End: 2022-05-19
Attending: FAMILY MEDICINE
Payer: COMMERCIAL

## 2022-05-19 VITALS
HEART RATE: 91 BPM | HEIGHT: 64 IN | TEMPERATURE: 98.4 F | SYSTOLIC BLOOD PRESSURE: 149 MMHG | WEIGHT: 128 LBS | OXYGEN SATURATION: 98 % | DIASTOLIC BLOOD PRESSURE: 97 MMHG | BODY MASS INDEX: 21.85 KG/M2

## 2022-05-19 DIAGNOSIS — J44.1 COPD EXACERBATION (H): ICD-10-CM

## 2022-05-19 DIAGNOSIS — J96.22 ACUTE ON CHRONIC RESPIRATORY FAILURE WITH HYPOXIA AND HYPERCAPNIA (H): ICD-10-CM

## 2022-05-19 DIAGNOSIS — J43.9 PULMONARY EMPHYSEMA, UNSPECIFIED EMPHYSEMA TYPE (H): Primary | ICD-10-CM

## 2022-05-19 DIAGNOSIS — J30.2 SEASONAL ALLERGIES: ICD-10-CM

## 2022-05-19 DIAGNOSIS — J43.2 CENTRILOBULAR EMPHYSEMA (H): ICD-10-CM

## 2022-05-19 DIAGNOSIS — J96.21 ACUTE ON CHRONIC RESPIRATORY FAILURE WITH HYPOXIA AND HYPERCAPNIA (H): ICD-10-CM

## 2022-05-19 DIAGNOSIS — R91.1 LUNG NODULE: ICD-10-CM

## 2022-05-19 PROCEDURE — 94625 PHY/QHP OP PULM RHB W/O MNTR: CPT

## 2022-05-19 PROCEDURE — 99214 OFFICE O/P EST MOD 30 MIN: CPT | Performed by: INTERNAL MEDICINE

## 2022-05-19 RX ORDER — PREDNISONE 20 MG/1
40 TABLET ORAL DAILY
Qty: 10 TABLET | Refills: 0 | Status: SHIPPED | OUTPATIENT
Start: 2022-05-19 | End: 2022-05-24

## 2022-05-19 RX ORDER — FLUTICASONE PROPIONATE AND SALMETEROL XINAFOATE 230; 21 UG/1; UG/1
2 AEROSOL, METERED RESPIRATORY (INHALATION) 2 TIMES DAILY
Qty: 36 G | Refills: 3 | Status: SHIPPED | OUTPATIENT
Start: 2022-05-19 | End: 2023-03-30

## 2022-05-19 RX ORDER — ROFLUMILAST 500 UG/1
500 TABLET ORAL DAILY
Qty: 90 TABLET | Refills: 3 | Status: SHIPPED | OUTPATIENT
Start: 2022-05-26 | End: 2023-03-30

## 2022-05-19 RX ORDER — FLUTICASONE PROPIONATE 50 MCG
1-2 SPRAY, SUSPENSION (ML) NASAL DAILY
Qty: 16 G | Refills: 5 | Status: SHIPPED | OUTPATIENT
Start: 2022-05-19 | End: 2024-01-07

## 2022-05-19 RX ORDER — ALBUTEROL SULFATE 90 UG/1
2 AEROSOL, METERED RESPIRATORY (INHALATION) EVERY 4 HOURS PRN
Qty: 54 G | Refills: 3 | Status: SHIPPED | OUTPATIENT
Start: 2022-05-19 | End: 2023-03-30

## 2022-05-19 RX ORDER — AZITHROMYCIN 250 MG/1
TABLET, FILM COATED ORAL
Qty: 6 TABLET | Refills: 0 | Status: SHIPPED | OUTPATIENT
Start: 2022-05-19 | End: 2022-07-15

## 2022-05-19 NOTE — PATIENT INSTRUCTIONS
Luis,    It was nice seeing you!  I am glad to hear you are doing well.     Recommendations as follows:   - continue current inhalers including advair, spiriva, albuterol  - continue to use aerobika regularly to help with airway clearance  - continue prednisone taper course; monitor for changes in your breathing with changes in dosage  - continue roflumilast (250 mg daily for another week then increase to full dose, 500 mg daily)  - plan for lab work to monitor liver in about 2 weeks   - plan for chest CT in July (this is ordered)   - continue pulmonary rehab and stay active  - I have sent azithromycin/prednisone to your pharmacy to keep on hand for COPD exacerbation   - Please call the pulmonary clinic with any questions. The pulmonary clinic number is: [phone].    Stay up to date with vaccinations including your yearly flu vaccine. Please continue to social distance which does not exclude going outside and enjoying our wonderful weather! Wash your hands regularly. Wear a mask when unable to social distance (such as in the grocery store).  I recommend that you receive the COVID-19 vaccine.     Have a nice spring and stay well! Please let me know if you have questions or concerns.     Yamilet Cuellar MD  Pulmonary, Allergy, Critical Care, and Sleep Medicine   [facility]

## 2022-05-19 NOTE — NURSING NOTE
"Initial BP (!) 149/97 (BP Location: Right arm, Patient Position: Sitting, Cuff Size: Adult Regular)   Pulse 91   Temp 98.4  F (36.9  C) (Tympanic)   Ht 1.626 m (5' 4\")   Wt 58.1 kg (128 lb)   SpO2 98%   BMI 21.97 kg/m   Estimated body mass index is 21.97 kg/m  as calculated from the following:    Height as of this encounter: 1.626 m (5' 4\").    Weight as of this encounter: 58.1 kg (128 lb). .    Flores Haji MA    "

## 2022-05-19 NOTE — PROGRESS NOTES
[facility]  [facility]  May 19, 2022         Assessment and Plan:   Mr. Luis Hernandez is a 56 year old male with medical history significant for very severe COPD, hypertension who was referred after recent hospitalization for COPD exacerbation.  He had 3x hospitalizations in 2021 for COPD exacerbations during which time he did grow E. coli, Klebsiella, stenotrophomonas. Additionally, he has had an eventful 2022 with recurrent exacerbations and hospitalizations, most recently at [facility] from 4/24/22 through 4/27/22.  He has been started on roflumilast for frequent exacerbations and we will continue at this time along with triple therapy.     1. Very severe COPD (FEV1 18%), panlobular emphysema, minimal cough but mucus production and chronic hypoxic respiratory failure, on 2 to 3 L with exertion (A1ATD neg)  2. Prior tobacco dependence, greater than 50 years, quit in August 2021  3. Likely pulmonary hypertension with RVP of 55 mmHg plus RAP (2019 ECHO), dilated RV but normal RV EF  4. New spiculated 9 mm nodule in L major fissure (4/24/22)  5. indeterminate left lower lobe nodule, indeterminate left upper lobe opacity on 8/26/2021 CT    Recommendations as follows:   -Continue Advair, spiriva, albuterol  -Continue prednisone taper and monitor for any changes in symptoms  -Continue roflumilast 250 mg every day for another week then increase to 500 mg every day  -LFTs in 2 weeks  -Continue aerobika use at least 2-3 times per day for 10-minute sessions after albuterol inhaler or nebulizer therapy   -azithromycin/prednisone ordered for home use prn for COPD exacerbation   -repeat chest CT in July to monitor lung nodule  -continue pulmonary rehab   -he has received his Covid vaccine but we discussed staying up-to-date with yearly flu vaccine and pneumonia shots (he is up to date)    I certify that this patient, Luis Hernandez has been under my care (or a nurse practitioner or  galina's assistant working with me). This is the face-to-face encounter for oxygen medical necessity.      Luis Hernandez is now in a chronic stable state and continues to require supplemental oxygen. Patient has continued oxygen desaturation due to COPD J44.9.    Alternative treatment(s) tried or considered and deemed clinically infective for treatment of Chronic Respiratory Failure with Hypoxia J93.11 include nebulizers, inhalers and pulmonary toileting.  If portability is ordered, is the patient mobile within the home? yes    Return to clinic in 6 months.    Bianca Cuellar MD  Pulmonary, Allergy, Critical Care, and Sleep Medicine   Pager: [phone]    This note was created using dictation software and may contain errors.  Please contact the creator for any clarifications that are needed.    I have spent 35 minutes on the day of the visit to review the chart, interview and examine the patient, review labs and imaging, formulate a plan, document and submit orders. Time documented is excluding time spent for PFT interpretation          HPI:    Mr. Luis Hernandez is a 56 year old male with medical history significant for very severe COPD, hypertension who was initially referred after recent hospitalization for COPD exacerbation.  He was last seen in the pulmonary clinic in December 2021. He is present today with his daughter.     He has had 2 exacerbations in 2022; first in February when he was admitted fro 2/8 - 2/10 and discharged on hospice. Later admitted to [facility] with exacerbation from 4/24 - 4/27 and started on roflumilast. He was seen by the pulmonary consult team during his most recent hospitalization and started on a prolonged taper of prednisone.     He has been doing overall well since discharged. He has started pulmonary rehab and has had 2 sessions with plan for 18 sesssion. He does have an am cough but is able to clear things out with use of aerobika. Currently on prednisone 10 mg daily with ongoing taper  plan; no questions about this. Appetite is good and weight is increasing. He is walking more each day and has a personal goal to do 1 flight of stairs (16 steps) 10x per day. Currently using 2 LPM at rest and 3 LPM with movement. Tolerating roflumilast. Using spiriva, advair, and albuterol and he feels this helps; has felt albuterol nebs make his breathing more difficult. Currently taking low dose lasix to hep with swollen feet and ankles. + some low grade allergy symptoms but very minimal and not taking anything; will continue to monitor.     Prior history:   He reports that he is currently doing well.  He has not had any illnesses since his last visit.  He continues to have a minimal cough with clear mucus production predominantly in the morning.  He is able to clear this well and is using the aerobic advice 3-5 times per day.  He did complete his course of Bactrim and noted that when he finished this he felt much better.  He now can sleep laying down flat which she was unable to do prior to his last visit.    He does do pursed lip breathing and he feels that this helps.  He denies significant wheezing.  He has noted that weather changes affect his breathing. He is very limited with his ability to do activities at home and notes that he gets winded just making a sandwich.  He is using Advair, Spiriva, albuterol about 3 times per day.  He has tried using the DuoNebs but feels that this does not help.    He denies GERD or allergies.  He continues to live with his father and he is not smoking.           Review of Systems:     A 13 point ROS was performed and was negative except as listed above in the HPI.  Weight is increasing.  No orthopnea/PND.           Past Medical and Surgical History:     Past Medical History:   Diagnosis Date     Acute on chronic respiratory failure with hypoxia (H) 4/10/2020     Acute on chronic respiratory failure with hypoxia and hypercapnia (H) 4/1/2019     CAP (community acquired  pneumonia) 2020     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)      COPD exacerbation (H) 2019     COPD exacerbation (H) 2020     Erectile dysfunction      Hypertension      Hyponatremia 2019     Pneumonia 4/10/2020     Past Surgical History:   Procedure Laterality Date     APPENDECTOMY      childhood           Family History:     Family History   Problem Relation Age of Onset     Hypertension Mother      Ovarian Cancer Mother      Breast Cancer Mother      Hypertension Father      Lipids Father      C.A.D. Father      Heart Disease Father      Chronic Obstructive Pulmonary Disease Father      Diabetes Paternal Grandmother      Chronic Obstructive Pulmonary Disease Paternal Grandfather             Social History:     Social History     Socioeconomic History     Marital status: Single     Spouse name: Not on file     Number of children: Not on file     Years of education: Not on file     Highest education level: Not on file   Occupational History     Not on file   Tobacco Use     Smoking status: Former Smoker     Packs/day: 2.00     Years: 30.00     Pack years: 60.00     Types: Cigarettes     Quit date: 2021     Years since quittin.7     Smokeless tobacco: Former User   Vaping Use     Vaping Use: Never used   Substance and Sexual Activity     Alcohol use: Yes     Comment: rare     Drug use: No     Sexual activity: Yes     Partners: Female   Other Topics Concern     Parent/sibling w/ CABG, MI or angioplasty before 65F 55M? No   Social History Narrative    The patient has a 60+ pk yr tobacco hx.  He has has no active use, quit 2014.  Alcohol use is <1 alcoholic drinks per week.  He denies use of recreational drugs.  He is employed. [employer].  Works nights.   The patient is .  Has 1 child.Hot Tub Exposure: NORecent Travel: NO Hx of incarceration:  NOBird Exposure:   NOAnimal Exposure:  NOInhalation Exposure:  NO        Lives in [place], [place] with father. 2  dogs.          Social Determinants of Health     Financial Resource Strain: Low Risk      Difficulty of Paying Living Expenses: Not hard at all   Food Insecurity: No Food Insecurity     Worried About Running Out of Food in the Last Year: Never true     Ran Out of Food in the Last Year: Never true   Transportation Needs: No Transportation Needs     Lack of Transportation (Medical): No     Lack of Transportation (Non-Medical): No   Physical Activity: Inactive     Days of Exercise per Week: 0 days     Minutes of Exercise per Session: 0 min   Stress: Not on file   Social Connections: Unknown     Frequency of Communication with Friends and Family: Twice a week     Frequency of Social Gatherings with Friends and Family: Twice a week     Attends Mandaeism Services: Never     Active Member of Clubs or Organizations: No     Attends Club or Organization Meetings: Never     Marital Status: Not on file   Intimate Partner Violence: Not on file   Housing Stability: Not on file            Medications:     Current Outpatient Medications   Medication     albuterol (PROAIR HFA/PROVENTIL HFA/VENTOLIN HFA) 108 (90 Base) MCG/ACT inhaler     albuterol (PROVENTIL) (2.5 MG/3ML) 0.083% neb solution     amLODIPine (NORVASC) 10 MG tablet     atenolol (TENORMIN) 25 MG tablet     fluticasone-salmeterol (ADVAIR-HFA) 230-21 MCG/ACT inhaler     ipratropium - albuterol 0.5 mg/2.5 mg/3 mL (DUONEB) 0.5-2.5 (3) MG/3ML neb solution     lisinopril (ZESTRIL) 40 MG tablet     order for DME     order for DME     predniSONE (DELTASONE) 20 MG tablet     [START ON 5/24/2022] predniSONE (DELTASONE) 5 MG tablet     roflumilast (DALIRESP) 250 MCG TABS tablet     tiotropium (SPIRIVA RESPIMAT) 2.5 MCG/ACT inhaler     pantoprazole (PROTONIX) 40 MG EC tablet     No current facility-administered medications for this visit.            Physical Exam:   BP (!) 149/97 (BP Location: Right arm, Patient Position: Sitting, Cuff Size: Adult Regular)   Pulse 91   Temp 98.4  " F (36.9  C) (Tympanic)   Ht 1.626 m (5' 4\")   Wt 58.1 kg (128 lb)   SpO2 98%   BMI 21.97 kg/m      Constitutional:   Awake, alert and in no apparent distress, pleasant male, on supplemental oxygen     Eyes:   nonicteric     HEENT:   Supple without supraclavicular or cervical lymphadenopathy     Lungs:   Reduced air flow b/l and in bases.  No crackles. No rhonchi.  No wheezes. Speaking in full sentences.      Cardiovascular:   Normal S1 and S2.  RRR.  No murmur, gallop or rub.     Musculoskeletal:   No edema, digital clubbing present     Neurologic:   Alert and conversant.     Skin:   Warm, dry.  No rash on limited exam.  No lower extremity edema.             Data:   All laboratory and imaging data reviewed personally.      Specimen Description   Date Value Ref Range Status   04/13/2020 Urine  Final   04/10/2020 Sputum  Final   04/10/2020 Sputum  Final    Culture Micro   Date Value Ref Range Status   04/10/2020 Moderate growth  Normal latrice    Final   04/10/2020 Moderate growth  Escherichia coli   (A)  Final   04/10/2020 Moderate growth  Klebsiella pneumoniae   (A)  Final        No results found for this or any previous visit (from the past 168 hour(s)).    PFT: Very severe obstructive lung disease. Air trapping and hyperinflation are noted. No significant bronchodilator response is noted. Diffusion capacity is severely reduced. Very reduced compared to prior studies noted in Dr. Wagner's note in 2016.     CT chest - 8/24/21 - personally reviewed: panlobular emphysema predominantly in the upper lobes bilaterally. No infiltrates. Some evidence of mucous impaction on the RLL. Small nodules as detailed below.     IMPRESSION:  1.  No evidence for pulmonary embolism.  2.  New finding of prominent mucus impaction leading to the right  lower lobe bronchus and small airways of the right lower lobe. This is  consistent with aspiration.  3.  New curvilinear opacity along the anterior left upper lobe  midchest could just " be focal atelectasis but acute airspace disease  including pneumonia not excluded. New indeterminant pulmonary nodule  at the left lower lobe. Recommend follow-up CT chest in 6 months.  There are other stable nodules.  4.  Coronary artery calcifications.  5.  Emphysema.    CTPE - 4/24/22 -   Personally reviewed: apical predominant emphysema. Secretions/debris in the bilateral mainstem bronchi. New 9 mm spiculated nodule L major fissure.   IMPRESSION:   1. Exam is negative for acute pulmonary embolism.        Evidence for right heart strain or increased right heart pressures?   is not present.      2. New 9 mm spiculated solid nodule along the left major fissure,  consider follow-up low-dose chest CT in 3 months, PET/CT, or tissue  sampling per Fleischner's Society guidelines.     3. Debris/retained secretions within the bilateral mainstem bronchi,  and extending into the right lower lobe bronchi, concerning for  recurrent aspiration.

## 2022-05-24 ENCOUNTER — HOSPITAL ENCOUNTER (OUTPATIENT)
Dept: CARDIAC REHAB | Facility: CLINIC | Age: 57
Discharge: HOME | End: 2022-05-24
Attending: FAMILY MEDICINE
Payer: COMMERCIAL

## 2022-05-24 PROCEDURE — 94625 PHY/QHP OP PULM RHB W/O MNTR: CPT

## 2022-05-26 ENCOUNTER — HOSPITAL ENCOUNTER (OUTPATIENT)
Dept: CARDIAC REHAB | Facility: CLINIC | Age: 57
Discharge: HOME | End: 2022-05-26
Attending: FAMILY MEDICINE
Payer: COMMERCIAL

## 2022-05-26 PROCEDURE — 94625 PHY/QHP OP PULM RHB W/O MNTR: CPT

## 2022-06-02 ENCOUNTER — LAB (OUTPATIENT)
Dept: LAB | Facility: CLINIC | Age: 57
End: 2022-06-02
Payer: COMMERCIAL

## 2022-06-02 ENCOUNTER — HOSPITAL ENCOUNTER (OUTPATIENT)
Dept: CARDIAC REHAB | Facility: CLINIC | Age: 57
Discharge: HOME | End: 2022-06-02
Attending: FAMILY MEDICINE
Payer: COMMERCIAL

## 2022-06-02 DIAGNOSIS — J43.2 CENTRILOBULAR EMPHYSEMA (H): ICD-10-CM

## 2022-06-02 LAB
ALBUMIN SERPL-MCNC: 3.8 G/DL (ref 3.4–5)
ALP SERPL-CCNC: 41 U/L (ref 40–150)
ALT SERPL W P-5'-P-CCNC: 24 U/L (ref 0–70)
AST SERPL W P-5'-P-CCNC: 18 U/L (ref 0–45)
BILIRUB DIRECT SERPL-MCNC: <0.1 MG/DL (ref 0–0.2)
BILIRUB SERPL-MCNC: 0.3 MG/DL (ref 0.2–1.3)
PROT SERPL-MCNC: 7 G/DL (ref 6.8–8.8)

## 2022-06-02 PROCEDURE — 94625 PHY/QHP OP PULM RHB W/O MNTR: CPT

## 2022-06-02 PROCEDURE — 80076 HEPATIC FUNCTION PANEL: CPT

## 2022-06-02 PROCEDURE — 36415 COLL VENOUS BLD VENIPUNCTURE: CPT

## 2022-06-07 ENCOUNTER — HOSPITAL ENCOUNTER (OUTPATIENT)
Dept: CARDIAC REHAB | Facility: CLINIC | Age: 57
Discharge: HOME | End: 2022-06-07
Attending: FAMILY MEDICINE
Payer: COMMERCIAL

## 2022-06-07 PROCEDURE — 94625 PHY/QHP OP PULM RHB W/O MNTR: CPT

## 2022-06-09 ENCOUNTER — HOSPITAL ENCOUNTER (OUTPATIENT)
Dept: CARDIAC REHAB | Facility: CLINIC | Age: 57
Discharge: HOME | End: 2022-06-09
Attending: FAMILY MEDICINE
Payer: COMMERCIAL

## 2022-06-09 PROCEDURE — 94625 PHY/QHP OP PULM RHB W/O MNTR: CPT

## 2022-06-14 ENCOUNTER — HOSPITAL ENCOUNTER (OUTPATIENT)
Dept: CARDIAC REHAB | Facility: CLINIC | Age: 57
Discharge: HOME | End: 2022-06-14
Attending: FAMILY MEDICINE
Payer: COMMERCIAL

## 2022-06-14 PROCEDURE — 94625 PHY/QHP OP PULM RHB W/O MNTR: CPT

## 2022-06-16 ENCOUNTER — HOSPITAL ENCOUNTER (OUTPATIENT)
Dept: CARDIAC REHAB | Facility: CLINIC | Age: 57
Discharge: HOME | End: 2022-06-16
Attending: FAMILY MEDICINE
Payer: COMMERCIAL

## 2022-06-16 PROCEDURE — 94625 PHY/QHP OP PULM RHB W/O MNTR: CPT | Performed by: REHABILITATION PRACTITIONER

## 2022-06-21 ENCOUNTER — HOSPITAL ENCOUNTER (OUTPATIENT)
Dept: CARDIAC REHAB | Facility: CLINIC | Age: 57
Discharge: HOME | End: 2022-06-21
Attending: FAMILY MEDICINE
Payer: COMMERCIAL

## 2022-06-21 PROCEDURE — 94625 PHY/QHP OP PULM RHB W/O MNTR: CPT

## 2022-06-23 ENCOUNTER — HOSPITAL ENCOUNTER (OUTPATIENT)
Dept: CARDIAC REHAB | Facility: CLINIC | Age: 57
Discharge: HOME | End: 2022-06-23
Attending: FAMILY MEDICINE
Payer: COMMERCIAL

## 2022-06-23 PROCEDURE — 94625 PHY/QHP OP PULM RHB W/O MNTR: CPT

## 2022-06-28 ENCOUNTER — HOSPITAL ENCOUNTER (OUTPATIENT)
Dept: CARDIAC REHAB | Facility: CLINIC | Age: 57
Discharge: HOME | End: 2022-06-28
Attending: FAMILY MEDICINE
Payer: COMMERCIAL

## 2022-06-28 PROCEDURE — 94625 PHY/QHP OP PULM RHB W/O MNTR: CPT

## 2022-06-30 ENCOUNTER — HOSPITAL ENCOUNTER (OUTPATIENT)
Dept: CARDIAC REHAB | Facility: CLINIC | Age: 57
Discharge: HOME | End: 2022-06-30
Attending: FAMILY MEDICINE
Payer: COMMERCIAL

## 2022-06-30 PROCEDURE — 94625 PHY/QHP OP PULM RHB W/O MNTR: CPT

## 2022-07-05 ENCOUNTER — HOSPITAL ENCOUNTER (OUTPATIENT)
Dept: CARDIAC REHAB | Facility: CLINIC | Age: 57
Discharge: HOME | End: 2022-07-05
Attending: FAMILY MEDICINE
Payer: COMMERCIAL

## 2022-07-05 PROCEDURE — 94625 PHY/QHP OP PULM RHB W/O MNTR: CPT | Performed by: REHABILITATION PRACTITIONER

## 2022-07-06 ENCOUNTER — HOSPITAL ENCOUNTER (OUTPATIENT)
Dept: CT IMAGING | Facility: CLINIC | Age: 57
Discharge: HOME | End: 2022-07-06
Attending: INTERNAL MEDICINE | Admitting: INTERNAL MEDICINE
Payer: COMMERCIAL

## 2022-07-06 DIAGNOSIS — R91.1 LUNG NODULE: ICD-10-CM

## 2022-07-06 PROCEDURE — 250N000009 HC RX 250: Performed by: INTERNAL MEDICINE

## 2022-07-06 PROCEDURE — 250N000011 HC RX IP 250 OP 636: Performed by: INTERNAL MEDICINE

## 2022-07-06 PROCEDURE — 71260 CT THORAX DX C+: CPT

## 2022-07-06 RX ORDER — IOPAMIDOL 755 MG/ML
88 INJECTION, SOLUTION INTRAVASCULAR ONCE
Status: COMPLETED | OUTPATIENT
Start: 2022-07-06 | End: 2022-07-06

## 2022-07-06 RX ADMIN — SODIUM CHLORIDE 64 ML: 9 INJECTION, SOLUTION INTRAVENOUS at 08:12

## 2022-07-06 RX ADMIN — IOPAMIDOL 88 ML: 755 INJECTION, SOLUTION INTRAVENOUS at 08:12

## 2022-07-07 ENCOUNTER — HOSPITAL ENCOUNTER (OUTPATIENT)
Dept: CARDIAC REHAB | Facility: CLINIC | Age: 57
Discharge: HOME | End: 2022-07-07
Attending: FAMILY MEDICINE
Payer: COMMERCIAL

## 2022-07-07 DIAGNOSIS — R91.8 PULMONARY NODULES: Primary | ICD-10-CM

## 2022-07-07 DIAGNOSIS — J43.9 PULMONARY EMPHYSEMA, UNSPECIFIED EMPHYSEMA TYPE (H): ICD-10-CM

## 2022-07-07 PROCEDURE — 94625 PHY/QHP OP PULM RHB W/O MNTR: CPT

## 2022-07-12 ENCOUNTER — HOSPITAL ENCOUNTER (OUTPATIENT)
Dept: CARDIAC REHAB | Facility: CLINIC | Age: 57
Discharge: HOME | End: 2022-07-12
Attending: FAMILY MEDICINE
Payer: COMMERCIAL

## 2022-07-12 PROCEDURE — 94626 PHY/QHP OP PULM RHB W/MNTR: CPT

## 2022-07-15 ENCOUNTER — OFFICE VISIT (OUTPATIENT)
Dept: FAMILY MEDICINE | Facility: CLINIC | Age: 57
End: 2022-07-15
Payer: COMMERCIAL

## 2022-07-15 VITALS
RESPIRATION RATE: 18 BRPM | WEIGHT: 125.4 LBS | OXYGEN SATURATION: 95 % | BODY MASS INDEX: 21.41 KG/M2 | HEIGHT: 64 IN | HEART RATE: 91 BPM | SYSTOLIC BLOOD PRESSURE: 130 MMHG | TEMPERATURE: 98.4 F | DIASTOLIC BLOOD PRESSURE: 86 MMHG

## 2022-07-15 DIAGNOSIS — D64.9 ANEMIA, UNSPECIFIED TYPE: ICD-10-CM

## 2022-07-15 DIAGNOSIS — J44.1 COPD EXACERBATION (H): ICD-10-CM

## 2022-07-15 DIAGNOSIS — I10 ESSENTIAL HYPERTENSION, BENIGN: Primary | ICD-10-CM

## 2022-07-15 DIAGNOSIS — J44.9 COPD, VERY SEVERE (H): ICD-10-CM

## 2022-07-15 DIAGNOSIS — E87.1 HYPONATREMIA: ICD-10-CM

## 2022-07-15 DIAGNOSIS — J43.9 PULMONARY EMPHYSEMA, UNSPECIFIED EMPHYSEMA TYPE (H): ICD-10-CM

## 2022-07-15 DIAGNOSIS — H69.91 DYSFUNCTION OF RIGHT EUSTACHIAN TUBE: ICD-10-CM

## 2022-07-15 LAB
ANION GAP SERPL CALCULATED.3IONS-SCNC: 3 MMOL/L (ref 3–14)
BUN SERPL-MCNC: 18 MG/DL (ref 7–30)
CALCIUM SERPL-MCNC: 9 MG/DL (ref 8.5–10.1)
CHLORIDE BLD-SCNC: 103 MMOL/L (ref 94–109)
CO2 SERPL-SCNC: 32 MMOL/L (ref 20–32)
CREAT SERPL-MCNC: 0.86 MG/DL (ref 0.66–1.25)
GFR SERPL CREATININE-BSD FRML MDRD: >90 ML/MIN/1.73M2
GLUCOSE BLD-MCNC: 114 MG/DL (ref 70–99)
POTASSIUM BLD-SCNC: 3.6 MMOL/L (ref 3.4–5.3)
SODIUM SERPL-SCNC: 138 MMOL/L (ref 133–144)

## 2022-07-15 PROCEDURE — 80048 BASIC METABOLIC PNL TOTAL CA: CPT | Performed by: FAMILY MEDICINE

## 2022-07-15 PROCEDURE — 99214 OFFICE O/P EST MOD 30 MIN: CPT | Performed by: FAMILY MEDICINE

## 2022-07-15 PROCEDURE — 36415 COLL VENOUS BLD VENIPUNCTURE: CPT | Performed by: FAMILY MEDICINE

## 2022-07-15 RX ORDER — ATENOLOL 25 MG/1
25 TABLET ORAL DAILY
Qty: 90 TABLET | Refills: 3 | Status: SHIPPED | OUTPATIENT
Start: 2022-07-15 | End: 2023-08-11

## 2022-07-15 RX ORDER — PREDNISONE 20 MG/1
TABLET ORAL
Qty: 43 TABLET | Refills: 1 | Status: SHIPPED | OUTPATIENT
Start: 2022-07-15 | End: 2022-07-21

## 2022-07-15 RX ORDER — AZITHROMYCIN 250 MG/1
TABLET, FILM COATED ORAL
Qty: 6 TABLET | Refills: 1 | Status: SHIPPED | OUTPATIENT
Start: 2022-07-15 | End: 2022-11-14

## 2022-07-15 RX ORDER — DOXYCYCLINE 100 MG/1
CAPSULE ORAL
COMMUNITY
Start: 2021-11-21 | End: 2022-07-15

## 2022-07-15 RX ORDER — LISINOPRIL 40 MG/1
40 TABLET ORAL DAILY
Qty: 90 TABLET | Refills: 3 | Status: SHIPPED | OUTPATIENT
Start: 2022-07-15 | End: 2023-08-11

## 2022-07-15 RX ORDER — AMLODIPINE BESYLATE 10 MG/1
10 TABLET ORAL DAILY
Qty: 90 TABLET | Refills: 3 | Status: SHIPPED | OUTPATIENT
Start: 2022-07-15 | End: 2023-08-11

## 2022-07-15 ASSESSMENT — PAIN SCALES - GENERAL: PAINLEVEL: NO PAIN (0)

## 2022-07-15 NOTE — PATIENT INSTRUCTIONS
Your blood pressure is controlled.    I am rechecking your kidney function due to your slightly low sodium level.    I did refill your antibiotic and steroid burst and taper incase you get a COPD exacerbation.    Please add generic claritin 10 mg a day to see about helping your eustachian tube dysfunction in your right ear.          Thank you for choosing [facility].  You may be receiving an email and/or telephone survey request from [facility] Customer Experience regarding your visit today.  Please take a few minutes to respond to the survey to let us know how we are doing.      If you have questions or concerns, please contact us via [employer] or you can contact your care team at [phone] option [phone].    Our Clinic hours are:  Monday - Thursday 7am-6pm  Friday 7am-5pm    The [place] outpatient lab hours are:  Monday - Friday 7am-4:30pm    Appointments are required, call [phone]    If you have clinical questions after hours or would like to schedule an appointment,  call the clinic at [phone].

## 2022-07-15 NOTE — PROGRESS NOTES
Assessment & Plan     Essential hypertension, benign  Controlled and refilled med, checking his blood electrolytes due to hyponatremia  - lisinopril (ZESTRIL) 40 MG tablet; Take 1 tablet (40 mg) by mouth daily  - amLODIPine (NORVASC) 10 MG tablet; Take 1 tablet (10 mg) by mouth daily  - atenolol (TENORMIN) 25 MG tablet; Take 1 tablet (25 mg) by mouth daily  - Basic metabolic panel; Future  - Basic metabolic panel    Anemia, unspecified type  Noted from prior labs, mild    COPD, very severe (H)  Seeing pulmonologist, refilled meds incase he has exacerbation  - azithromycin (ZITHROMAX) 250 MG tablet; Take 2 tablets (500 mg) by mouth daily for 1 day, THEN 1 tablet (250 mg) daily for 4 days. Use for COPD exacerbation  - predniSONE (DELTASONE) 20 MG tablet; Take 3 tablets (60 mg) by mouth daily for 1 day, THEN 2.5 tablets (50 mg) daily for 5 days, THEN 2 tablets (40 mg) daily for 5 days, THEN 1.5 tablets (30 mg) daily for 5 days, THEN 1 tablet (20 mg) daily for 5 days, THEN 0.5 tablets (10 mg) daily for 5 days, THEN 0.5 tablets (10 mg) daily for 5 days. Then take 7.5 mg daily x 5 days THEN 5 mg daily x 5 days THEN STOP. Use for COPD exacerbation    Hyponatremia    - Basic metabolic panel; Future  - Basic metabolic panel    Pulmonary emphysema, unspecified emphysema type (H)    - azithromycin (ZITHROMAX) 250 MG tablet; Take 2 tablets (500 mg) by mouth daily for 1 day, THEN 1 tablet (250 mg) daily for 4 days. Use for COPD exacerbation    COPD exacerbation (H)    - predniSONE (DELTASONE) 20 MG tablet; Take 3 tablets (60 mg) by mouth daily for 1 day, THEN 2.5 tablets (50 mg) daily for 5 days, THEN 2 tablets (40 mg) daily for 5 days, THEN 1.5 tablets (30 mg) daily for 5 days, THEN 1 tablet (20 mg) daily for 5 days, THEN 0.5 tablets (10 mg) daily for 5 days, THEN 0.5 tablets (10 mg) daily for 5 days. Then take 7.5 mg daily x 5 days THEN 5 mg daily x 5 days THEN STOP. Use for COPD exacerbation    Dysfunction of right  eustachian tube  Add claritin to see if this would help               See Patient Instructions    Return in about 6 months (around 1/15/2023) for Office Visit.    Dean Mariee MD  [facility]    Darell Smith is a 57 year old, presenting for the following health issues:  Hypertension and Ear Problem (Right ear is plugged)      HPI     Hypertension Follow-up      Do you check your blood pressure regularly outside of the clinic? yes    Are you following a low salt diet? Yes    Are your blood pressures ever more than 140 on the top number (systolic) OR more   than 90 on the bottom number (diastolic), for example 140/90? No      How many servings of fruits and vegetables do you eat daily?  0-1    On average, how many sweetened beverages do you drink each day (Examples: soda, juice, sweet tea, etc.  Do NOT count diet or artificially sweetened beverages)?   0    How many days per week do you exercise enough to make your heart beat faster? 5    How many minutes a day do you exercise enough to make your heart beat faster? 30 - 60    How many days per week do you miss taking your medication? 0    Pt feels his right ear is plugged for 2 months.  No drainage. Uses flonase nasal spray.    Regarding copd it is stable.  2 LMP of oxygen at rest and 3 LMP with activity. Completed pulmonary rehab and feeling better.  He states that heat and humidity is hard on him.  Would like to have abx and steroid on hand incase he has an exacerbation.        Review of Systems   CONSTITUTIONAL:NEGATIVE for fever, chills, change in weight  INTEGUMENTARY/SKIN: NEGATIVE for worrisome rashes, moles or lesions  RESP:SOB and ESPARZA due to copd  CV: NEGATIVE for chest pain, palpitations or peripheral edema  GI: NEGATIVE for nausea, abdominal pain, heartburn, or change in bowel habits  MUSCULOSKELETAL: NEGATIVE for significant arthralgias or myalgia  NEURO: NEGATIVE for weakness, dizziness or paresthesias  PSYCHIATRIC:  "NEGATIVE for changes in mood or affect      Objective    /86 (BP Location: Left arm, Patient Position: Sitting, Cuff Size: Adult Regular)   Pulse 91   Temp 98.4  F (36.9  C) (Tympanic)   Resp 18   Ht 1.626 m (5' 4.02\")   Wt 56.9 kg (125 lb 6.4 oz)   SpO2 95%   BMI 21.51 kg/m    Body mass index is 21.51 kg/m .  Physical Exam   GENERAL APPEARANCE: alert, no distress, cooperative and Nourishment slim   HENT: ear canals and TM's normal and nose and mouth without ulcers or lesions  NECK: no adenopathy, no asymmetry, masses, or scars and thyroid normal to palpation  RESP: lungs clear but marked decrease in breath sounds  CV: regular rates and rhythm, normal S1 S2, no S3 or S4 and no murmur, click or rub  MS: extremities normal- no gross deformities noted  SKIN: no suspicious lesions or rashes  NEURO: Normal strength and tone, mentation intact and speech normal  PSYCH: mentation appears normal and affect normal/bright                    .  ..  "

## 2022-07-21 ENCOUNTER — OFFICE VISIT (OUTPATIENT)
Dept: PULMONOLOGY | Facility: CLINIC | Age: 57
End: 2022-07-21
Attending: STUDENT IN AN ORGANIZED HEALTH CARE EDUCATION/TRAINING PROGRAM
Payer: COMMERCIAL

## 2022-07-21 VITALS
WEIGHT: 125 LBS | SYSTOLIC BLOOD PRESSURE: 144 MMHG | HEART RATE: 101 BPM | DIASTOLIC BLOOD PRESSURE: 85 MMHG | HEIGHT: 64 IN | OXYGEN SATURATION: 97 % | BODY MASS INDEX: 21.34 KG/M2

## 2022-07-21 DIAGNOSIS — Z13.6 CARDIOVASCULAR SCREENING; LDL GOAL LESS THAN 130: ICD-10-CM

## 2022-07-21 DIAGNOSIS — R91.8 PULMONARY NODULES: Primary | ICD-10-CM

## 2022-07-21 DIAGNOSIS — J96.22 ACUTE ON CHRONIC RESPIRATORY FAILURE WITH HYPOXIA AND HYPERCAPNIA (H): ICD-10-CM

## 2022-07-21 DIAGNOSIS — J96.21 ACUTE ON CHRONIC RESPIRATORY FAILURE WITH HYPOXIA AND HYPERCAPNIA (H): ICD-10-CM

## 2022-07-21 DIAGNOSIS — J43.9 PULMONARY EMPHYSEMA, UNSPECIFIED EMPHYSEMA TYPE (H): ICD-10-CM

## 2022-07-21 PROCEDURE — 99214 OFFICE O/P EST MOD 30 MIN: CPT | Performed by: INTERNAL MEDICINE

## 2022-07-21 ASSESSMENT — PAIN SCALES - GENERAL: PAINLEVEL: NO PAIN (0)

## 2022-07-21 NOTE — PATIENT INSTRUCTIONS
Luis,    It was nice seeing you!  I am glad to hear you are doing well.     Recommendations as follows:   - continue advair, start using spiriva 1puff in the am and 1 puff in the pm; if you continue to have symptoms later in the day then start taking 2 puffs spiriva in the pm   - continue roflumilast  - continue to be active daily and practice pursed lip breathing   - continue to use aerobika daily   - chest CT in 6 months  - I will reach out to move your sleep evaluation forward and we'll reach out to you to set this up   - Please call the pulmonary clinic with any questions. The pulmonary clinic number is: [phone].    Stay up to date with vaccinations including your yearly flu vaccine. Please continue to social distance which does not exclude going outside and enjoying our wonderful weather! Wash your hands regularly. Wear a mask when unable to social distance (such as in the grocery store).  I recommend that you receive the COVID-19 vaccine.     Have a nice summer and stay well! Please let me know if you have questions or concerns.     Yamilet Cuellar MD  Pulmonary, Allergy, Critical Care, and Sleep Medicine   [facility]

## 2022-07-21 NOTE — PROGRESS NOTES
[facility]  [facility]  July 21 , 2022         Assessment and Plan:   Mr. Luis Hernandez is a 56 year old male with medical history significant for very severe COPD, hypertension who was referred after recent hospitalization for COPD exacerbation.  He had 3x hospitalizations in 2021 for COPD exacerbations during which time he did grow E. coli, Klebsiella, stenotrophomonas. Additionally, he has had an eventful 2022 with recurrent exacerbations and hospitalizations, most recently at [facility] from 4/24/22 through 4/27/22.  He has been started on roflumilast for frequent exacerbations and we will continue at this time along with triple therapy.     1. Very severe COPD (FEV1 18%), panlobular emphysema, minimal cough but mucus production and chronic hypoxic respiratory failure, on 2 to 3 L with exertion (A1ATD neg)  2. Prior tobacco dependence, greater than 50 years, quit in August 2021  3. Likely pulmonary hypertension with RVP of 55 mmHg plus RAP (2019 ECHO), dilated RV but normal RV EF  4. New spiculated 9 mm nodule in L major fissure (4/24/22)  5. indeterminate left lower lobe nodule, indeterminate left upper lobe opacity on 8/26/2021 CT    Recommendations as follows:   -Continue Advair, spiriva, albuterol; he will trial Spiriva 1 puff in the morning and 1 puff in the evening, alternatively 2 puffs in the evening rather than in the morning  -He remains off of prednisone at this time  -Continue roflumilast; LFTs were stable and will repeat in next visit  -Continue aerobika use at least 2-3 times per day for 10-minute sessions after albuterol inhaler or nebulizer therapy   -azithromycin/prednisone ordered for home use prn for COPD exacerbation   -repeat chest CT in 6 months (ordered) to monitor lung nodule  - chronic pulm team to help with qualification for PPV at nighttime; may need sleep study   -he has received his Covid vaccine but we discussed staying up-to-date with yearly  flu vaccine and pneumonia shots (he is up to date)    I certify that this patient, Luis Hernandez has been under my care (or a nurse practitioner or physican's assistant working with me). This is the face-to-face encounter for oxygen medical necessity.      Luis Hernandez is now in a chronic stable state and continues to require supplemental oxygen. Patient has continued oxygen desaturation due to COPD J44.9.    Alternative treatment(s) tried or considered and deemed clinically infective for treatment of Chronic Respiratory Failure with Hypoxia J93.11 include nebulizers, inhalers and pulmonary toileting.  If portability is ordered, is the patient mobile within the home? yes    Return to clinic in 4 months.    Bianca Cuellar MD  Pulmonary, Allergy, Critical Care, and Sleep Medicine   Pager: [phone]    This note was created using dictation software and may contain errors.  Please contact the creator for any clarifications that are needed.    I have spent 35 minutes on the day of the visit to review the chart, interview and examine the patient, review labs and imaging, formulate a plan, document and submit orders. Time documented is excluding time spent for PFT interpretation          HPI:    Mr. Luis Hernandez is a 56 year old male with medical history significant for very severe COPD, hypertension who was initially referred after recent hospitalization for COPD exacerbation.  He was last seen in the pulmonary clinic in May 2022.     He says his breathing has improved quite a bit.  He continues on 2 L O2. He did graduate from pulmonary rehab which was beneficial for him.  He continues to walk and use weights at home.  He is using Advair and Spiriva in the morning.  He does feel like nights are difficult for him due to panicking while sleeping but also due to shortness of breath.  Causes him to wake up.  He is using his albuterol nebulizer once to twice per day.  He does continue to use Roflumilast.  His last exacerbation  was in June.  He is using his aerobic 10 minutes 3 times a day and this is beneficial.  He does need to be evaluated for obstructive sleep apnea and usage of positive chronic pressure ventilation at night.    Prior history:  He has had 2 exacerbations in 2022; first in February when he was admitted fro 2/8 - 2/10 and discharged on hospice. Later admitted to [facility] with exacerbation from 4/24 - 4/27 and started on roflumilast. He was seen by the pulmonary consult team during his most recent hospitalization and started on a prolonged taper of prednisone.     He has been doing overall well since discharged. He has started pulmonary rehab and has had 2 sessions with plan for 18 sesssion. He does have an am cough but is able to clear things out with use of aerobika. Currently on prednisone 10 mg daily with ongoing taper plan; no questions about this. Appetite is good and weight is increasing. He is walking more each day and has a personal goal to do 1 flight of stairs (16 steps) 10x per day. Currently using 2 LPM at rest and 3 LPM with movement. Tolerating roflumilast. Using spiriva, advair, and albuterol and he feels this helps; has felt albuterol nebs make his breathing more difficult. Currently taking low dose lasix to hep with swollen feet and ankles. + some low grade allergy symptoms but very minimal and not taking anything; will continue to monitor.     Prior history:   He reports that he is currently doing well.  He has not had any illnesses since his last visit.  He continues to have a minimal cough with clear mucus production predominantly in the morning.  He is able to clear this well and is using the aerobic advice 3-5 times per day.  He did complete his course of Bactrim and noted that when he finished this he felt much better.  He now can sleep laying down flat which she was unable to do prior to his last visit.    He does do pursed lip breathing and he feels that this helps.  He denies significant wheezing.   He has noted that weather changes affect his breathing. He is very limited with his ability to do activities at home and notes that he gets winded just making a sandwich.  He is using Advair, Spiriva, albuterol about 3 times per day.  He has tried using the DuoNebs but feels that this does not help.    He denies GERD or allergies.  He continues to live with his father and he is not smoking.           Review of Systems:     A 13 point ROS was performed and was negative except as listed above in the HPI.  Weight is increasing.  No orthopnea/PND.           Past Medical and Surgical History:     Past Medical History:   Diagnosis Date     Acute on chronic respiratory failure with hypoxia (H) 4/10/2020     Acute on chronic respiratory failure with hypoxia and hypercapnia (H) 4/1/2019     CAP (community acquired pneumonia) 4/14/2020     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)      COPD exacerbation (H) 4/1/2019     COPD exacerbation (H) 2/16/2020     Erectile dysfunction      Hypertension      Hyponatremia 4/1/2019     Pneumonia 4/10/2020     Past Surgical History:   Procedure Laterality Date     APPENDECTOMY      childhood           Family History:     Family History   Problem Relation Age of Onset     Hypertension Mother      Ovarian Cancer Mother      Breast Cancer Mother      Hypertension Father      Lipids Father      C.A.D. Father      Heart Disease Father      Chronic Obstructive Pulmonary Disease Father      Diabetes Paternal Grandmother      Chronic Obstructive Pulmonary Disease Paternal Grandfather             Social History:     Social History     Socioeconomic History     Marital status: Single     Spouse name: Not on file     Number of children: Not on file     Years of education: Not on file     Highest education level: Not on file   Occupational History     Not on file   Tobacco Use     Smoking status: Former Smoker     Packs/day: 2.00     Years: 30.00     Pack years: 60.00     Types:  Cigarettes     Quit date: 2021     Years since quittin.9     Smokeless tobacco: Former User   Vaping Use     Vaping Use: Never used   Substance and Sexual Activity     Alcohol use: Yes     Comment: rare     Drug use: No     Sexual activity: Yes     Partners: Female   Other Topics Concern     Parent/sibling w/ CABG, MI or angioplasty before 65F 55M? No   Social History Narrative    The patient has a 60+ pk yr tobacco hx.  He has has no active use, quit 2014.  Alcohol use is <1 alcoholic drinks per week.  He denies use of recreational drugs.  He is employed. [employer].  Works nights.   The patient is .  Has 1 child.Hot Tub Exposure: NORecent Travel: NO Hx of incarceration:  NOBird Exposure:   NOAnimal Exposure:  NOInhalation Exposure:  NO        Lives in [place], [place] with father. 2 dogs.          Social Determinants of Health     Financial Resource Strain: Low Risk      Difficulty of Paying Living Expenses: Not hard at all   Food Insecurity: No Food Insecurity     Worried About Running Out of Food in the Last Year: Never true     Ran Out of Food in the Last Year: Never true   Transportation Needs: No Transportation Needs     Lack of Transportation (Medical): No     Lack of Transportation (Non-Medical): No   Physical Activity: Inactive     Days of Exercise per Week: 0 days     Minutes of Exercise per Session: 0 min   Stress: Not on file   Social Connections: Unknown     Frequency of Communication with Friends and Family: Twice a week     Frequency of Social Gatherings with Friends and Family: Twice a week     Attends [employer] Services: Never     Active Member of Clubs or Organizations: No     Attends Club or Organization Meetings: Never     Marital Status: Not on file   Intimate Partner Violence: Not on file   Housing Stability: Not on file            Medications:     Current Outpatient Medications   Medication     albuterol (PROAIR HFA/PROVENTIL HFA/VENTOLIN HFA) 108 (90 Base) MCG/ACT inhaler  "    albuterol (PROVENTIL) (2.5 MG/3ML) 0.083% neb solution     amLODIPine (NORVASC) 10 MG tablet     atenolol (TENORMIN) 25 MG tablet     fluticasone (FLONASE) 50 MCG/ACT nasal spray     fluticasone-salmeterol (ADVAIR-HFA) 230-21 MCG/ACT inhaler     lisinopril (ZESTRIL) 40 MG tablet     order for DME     order for DME     predniSONE (DELTASONE) 10 MG tablet     roflumilast (DALIRESP) 500 MCG TABS tablet     tiotropium (SPIRIVA RESPIMAT) 2.5 MCG/ACT inhaler     roflumilast (DALIRESP) 250 MCG TABS tablet     No current facility-administered medications for this visit.            Physical Exam:   BP (!) 144/85 (BP Location: Right arm, Patient Position: Sitting, Cuff Size: Adult Regular)   Pulse 101   Ht 1.626 m (5' 4\")   Wt 56.7 kg (125 lb)   SpO2 97%   BMI 21.46 kg/m      Constitutional:   Awake, alert and in no apparent distress, pleasant male, on supplemental oxygen, thin male     Eyes:   nonicteric     HEENT:   Supple without supraclavicular or cervical lymphadenopathy     Lungs:   Reduced air flow b/l and in bases.  Prolonged expiratory phase.  No crackles. No rhonchi.  No wheezes. Speaking in full sentences.      Cardiovascular:   Normal S1 and S2.  RRR.  No murmur, gallop or rub.     Musculoskeletal:   No edema, digital clubbing present     Neurologic:   Alert and conversant.     Skin:   Warm, dry.  No rash on limited exam.  No lower extremity edema.             Data:   All laboratory and imaging data reviewed personally.      Specimen Description   Date Value Ref Range Status   04/13/2020 Urine  Final   04/10/2020 Sputum  Final   04/10/2020 Sputum  Final    Culture Micro   Date Value Ref Range Status   04/10/2020 Moderate growth  Normal latrice    Final   04/10/2020 Moderate growth  Escherichia coli   (A)  Final   04/10/2020 Moderate growth  Klebsiella pneumoniae   (A)  Final        Recent Results (from the past 168 hour(s))   Basic metabolic panel    Collection Time: 07/15/22  9:43 AM   Result Value Ref " Range    Sodium 138 133 - 144 mmol/L    Potassium 3.6 3.4 - 5.3 mmol/L    Chloride 103 94 - 109 mmol/L    Carbon Dioxide (CO2) 32 20 - 32 mmol/L    Anion Gap 3 3 - 14 mmol/L    Urea Nitrogen 18 7 - 30 mg/dL    Creatinine 0.86 0.66 - 1.25 mg/dL    Calcium 9.0 8.5 - 10.1 mg/dL    Glucose 114 (H) 70 - 99 mg/dL    GFR Estimate >90 >60 mL/min/1.73m2       PFT: Very severe obstructive lung disease. Air trapping and hyperinflation are noted. No significant bronchodilator response is noted. Diffusion capacity is severely reduced. Very reduced compared to prior studies noted in Dr. Wagner's note in 2016.     CT chest - 8/24/21 - personally reviewed: panlobular emphysema predominantly in the upper lobes bilaterally. No infiltrates. Some evidence of mucous impaction on the RLL. Small nodules as detailed below.     IMPRESSION:  1.  No evidence for pulmonary embolism.  2.  New finding of prominent mucus impaction leading to the right  lower lobe bronchus and small airways of the right lower lobe. This is  consistent with aspiration.  3.  New curvilinear opacity along the anterior left upper lobe  midchest could just be focal atelectasis but acute airspace disease  including pneumonia not excluded. New indeterminant pulmonary nodule  at the left lower lobe. Recommend follow-up CT chest in 6 months.  There are other stable nodules.  4.  Coronary artery calcifications.  5.  Emphysema.    CTPE - 4/24/22 -   Personally reviewed: apical predominant emphysema. Secretions/debris in the bilateral mainstem bronchi. New 9 mm spiculated nodule L major fissure.   IMPRESSION:   1. Exam is negative for acute pulmonary embolism.        Evidence for right heart strain or increased right heart pressures?   is not present.      2. New 9 mm spiculated solid nodule along the left major fissure,  consider follow-up low-dose chest CT in 3 months, PET/CT, or tissue  sampling per Fleischner's Society guidelines.     3. Debris/retained secretions within  the bilateral mainstem bronchi,  and extending into the right lower lobe bronchi, concerning for  recurrent aspiration.     CT chest- 7/6/2022-personally reviewed and I agree with the radiologist read of:  IMPRESSION:   1.  Interval decrease in size of previously seen new 9 mm spiculated  pulmonary nodule along the left major fissure, now measuring 3 mm.  2.  New scarlike nodule with a mean diameter of 5 mm in the lateral  left upper lobe.  3.  Unchanged 4 mm right mid lung pulmonary nodule.  4.  Severe pulmonary emphysema and bilateral scarring and/or  atelectasis.  5.  No new or progressive airspace consolidation or pleural fluid.  6.  Indeterminate enlarged right hilar lymph node, new to comparison.

## 2022-07-21 NOTE — NURSING NOTE
"Initial BP (!) 144/85 (BP Location: Right arm, Patient Position: Sitting, Cuff Size: Adult Regular)   Pulse 101   Ht 1.626 m (5' 4\")   Wt 56.7 kg (125 lb)   SpO2 97%   BMI 21.46 kg/m   Estimated body mass index is 21.46 kg/m  as calculated from the following:    Height as of this encounter: 1.626 m (5' 4\").    Weight as of this encounter: 56.7 kg (125 lb). .    Ana Oliva MA    "

## 2022-07-26 ENCOUNTER — TELEPHONE (OUTPATIENT)
Dept: SLEEP MEDICINE | Facility: CLINIC | Age: 57
End: 2022-07-26

## 2022-09-07 ENCOUNTER — TELEPHONE (OUTPATIENT)
Dept: FAMILY MEDICINE | Facility: CLINIC | Age: 57
End: 2022-09-07

## 2022-10-18 NOTE — PROGRESS NOTES
Changed his antibiotic from doxycycline to bactrim DS based on the culture and sensitivity results.  Dean Mariee MD  Family Medicine     Glycopyrrolate Pregnancy And Lactation Text: This medication is Pregnancy Category B and is considered safe during pregnancy. It is unknown if it is excreted breast milk.

## 2022-10-31 ENCOUNTER — TELEPHONE (OUTPATIENT)
Dept: PULMONOLOGY | Facility: CLINIC | Age: 57
End: 2022-10-31

## 2022-11-02 NOTE — TELEPHONE ENCOUNTER
Central Prior Authorization Team   Phone: [phone]    PA Initiation    Medication: roflumilast (DALIRESP) 500 MCG TABS tablet  Insurance Company: [employer] ([facility]) - Phone [phone] Fax [phone]  Pharmacy Filling the Rx: [id number] - [place], [place] - [address]  Filling Pharmacy Phone: [phone]  Filling Pharmacy Fax: [phone]  Start Date: 11/2/2022

## 2022-11-07 NOTE — TELEPHONE ENCOUNTER
Prior Authorization Approval    Authorization Effective Date: 11/2/2022  Authorization Expiration Date: 12/31/2023  Medication: roflumilast (DALIRESP) 500 MCG TABS tablet-PA APPROVED   Approved Dose/Quantity:   Reference #:     Insurance Company: [employer] ([facility]) - Phone [phone] Fax [phone]  Expected CoPay:       CoPay Card Available:      Foundation Assistance Needed:    Which Pharmacy is filling the prescription (Not needed for infusion/clinic administered): [id number] - [place], [place] - [address] [place] [address]  Pharmacy Notified: Yes- **Instructed pharmacy to notify patient when script is ready to /ship.**- Pharmacy stated that they have a paid claim on 10/18/2022 quantity 30 for 30 day supply with a co-pay $100 and patient had picked it up. Next fill date is 11/10/2022.  Patient Notified: Yes

## 2022-11-19 ENCOUNTER — HEALTH MAINTENANCE LETTER (OUTPATIENT)
Age: 57
End: 2022-11-19

## 2022-12-08 ENCOUNTER — OFFICE VISIT (OUTPATIENT)
Dept: PULMONOLOGY | Facility: CLINIC | Age: 57
End: 2022-12-08
Payer: COMMERCIAL

## 2022-12-08 VITALS
BODY MASS INDEX: 22.2 KG/M2 | HEART RATE: 82 BPM | HEIGHT: 64 IN | WEIGHT: 130 LBS | SYSTOLIC BLOOD PRESSURE: 138 MMHG | TEMPERATURE: 97 F | DIASTOLIC BLOOD PRESSURE: 90 MMHG | OXYGEN SATURATION: 100 %

## 2022-12-08 DIAGNOSIS — J96.21 ACUTE ON CHRONIC RESPIRATORY FAILURE WITH HYPOXIA (H): ICD-10-CM

## 2022-12-08 DIAGNOSIS — J42 CHRONIC BRONCHITIS, UNSPECIFIED CHRONIC BRONCHITIS TYPE (H): Primary | ICD-10-CM

## 2022-12-08 DIAGNOSIS — R91.8 PULMONARY NODULES: ICD-10-CM

## 2022-12-08 PROCEDURE — 99214 OFFICE O/P EST MOD 30 MIN: CPT | Performed by: INTERNAL MEDICINE

## 2022-12-08 NOTE — PROGRESS NOTES
[facility]  [facility]  December 8 , 2022         Assessment and Plan:   Mr. Luis Hernandez is a 57 year old male with medical history significant for very severe COPD, hypertension who was referred after recent hospitalization for COPD exacerbation.  He had 3x hospitalizations in 2021 for COPD exacerbations during which time he did grow E. coli, Klebsiella, stenotrophomonas. Additionally, he has had an eventful 2022 with recurrent exacerbations and hospitalizations, most recently at [facility] from 4/24/22 through 4/27/22.  He has been started on roflumilast for frequent exacerbations and we will continue at this time along with triple therapy. Excellent response since starting roflumilast.     1. Very severe COPD (FEV1 18%), panlobular emphysema, minimal cough but mucus production and chronic hypoxic respiratory failure, on 2 to 3 L with exertion (A1ATD neg)  2. Prior tobacco dependence, greater than 50 years, quit in August 2021  3. Likely pulmonary hypertension with RVP of 55 mmHg plus RAP (2019 ECHO), dilated RV but normal RV EF  4. New spiculated 9 mm nodule in L major fissure (4/24/22) - decreased to 3 mm on 7/6/22 CT  5. indeterminate left lower lobe nodule, indeterminate left upper lobe opacity on 8/26/2021 CT  6. New 5 mm nodule on 7/6/22 chest CT   7. 4 mm R mid lung pulmonary nodule - unchanged on 7/6/22    Recommendations as follows:   -Continue Advair, spiriva, albuterol  -Continue roflumilast; LFTs have remained stable  -Continue aerobika use at least 2-3 times per day for 10-minute sessions after albuterol inhaler or nebulizer therapy   -azithromycin/prednisone ordered for home use prn for COPD exacerbation   -repeat chest CT in 6 months (ordered for January 2023) to monitor lung nodule  - sleep eval pending  -he is up to date with covid, influenza, pneumovax vaccines including the bivalent covid booster     I certify that this patient, Luis Hernandez has been  under my care (or a nurse practitioner or physican's assistant working with me). This is the face-to-face encounter for oxygen medical necessity.      Luis Hernandez is now in a chronic stable state and continues to require supplemental oxygen. Patient has continued oxygen desaturation due to COPD J44.9.    Alternative treatment(s) tried or considered and deemed clinically infective for treatment of Chronic Respiratory Failure with Hypoxia J93.11 include nebulizers, inhalers and pulmonary toileting.  If portability is ordered, is the patient mobile within the home? yes    Return to clinic in 6 months.    Bianca Cuellar MD  Pulmonary, Allergy, Critical Care, and Sleep Medicine   Pager: [phone]    This note was created using dictation software and may contain errors.  Please contact the creator for any clarifications that are needed.    I have spent 35 minutes on the day of the visit to review the chart, interview and examine the patient, review labs and imaging, formulate a plan, document and submit orders. Time documented is excluding time spent for PFT interpretation          HPI:    Mr. Luis Hernandez is a 57 year old male with medical history significant for very severe COPD, hypertension who was initially referred after recent hospitalization for COPD exacerbation.  He was last seen in the pulmonary clinic in June 2022.     He continues to do well and in fact feels better than he has in years. He is using advair, spiriva, albuterol, and roflumilast. No exacerbations since his last visit. He has established care with sleep medicine but is unclear when his evaluation will be - he asks that I reach out to them.     No significant coughing or wheeze. Still with ESPARZA. He remains active with regular exercise at least 3 times per week. He continues to use aerobika device twice daily.  Weight is stable and appetite is excellent.     His daughter had asked about lung transplant possibility at a prior visit and we discussed  some of the intricacies of this including the extensive evaluation and needing to make sure that previously seen nodule is not malignant before moving forward with evaluation. He says that he is not interested in transplant evaluation at this time but would continue to think about it and possibly discuss with family.     Prior history:   He says his breathing has improved quite a bit.  He continues on 2 L O2. He did graduate from pulmonary rehab which was beneficial for him.  He continues to walk and use weights at home.  He is using Advair and Spiriva in the morning.  He does feel like nights are difficult for him due to panicking while sleeping but also due to shortness of breath.  Causes him to wake up.  He is using his albuterol nebulizer once to twice per day.  He does continue to use Roflumilast.  His last exacerbation was in June.  He is using his aerobic 10 minutes 3 times a day and this is beneficial.  He does need to be evaluated for obstructive sleep apnea and usage of positive chronic pressure ventilation at night.    Prior history:  He has had 2 exacerbations in 2022; first in February when he was admitted fro 2/8 - 2/10 and discharged on hospice. Later admitted to [facility] with exacerbation from 4/24 - 4/27 and started on roflumilast. He was seen by the pulmonary consult team during his most recent hospitalization and started on a prolonged taper of prednisone.     He has been doing overall well since discharged. He has started pulmonary rehab and has had 2 sessions with plan for 18 sesssion. He does have an am cough but is able to clear things out with use of aerobika. Currently on prednisone 10 mg daily with ongoing taper plan; no questions about this. Appetite is good and weight is increasing. He is walking more each day and has a personal goal to do 1 flight of stairs (16 steps) 10x per day. Currently using 2 LPM at rest and 3 LPM with movement. Tolerating roflumilast. Using spiriva, advair, and  albuterol and he feels this helps; has felt albuterol nebs make his breathing more difficult. Currently taking low dose lasix to hep with swollen feet and ankles. + some low grade allergy symptoms but very minimal and not taking anything; will continue to monitor.     Prior history:   He reports that he is currently doing well.  He has not had any illnesses since his last visit.  He continues to have a minimal cough with clear mucus production predominantly in the morning.  He is able to clear this well and is using the aerobic advice 3-5 times per day.  He did complete his course of Bactrim and noted that when he finished this he felt much better.  He now can sleep laying down flat which she was unable to do prior to his last visit.    He does do pursed lip breathing and he feels that this helps.  He denies significant wheezing.  He has noted that weather changes affect his breathing. He is very limited with his ability to do activities at home and notes that he gets winded just making a sandwich.  He is using Advair, Spiriva, albuterol about 3 times per day.  He has tried using the DuoNebs but feels that this does not help.    He denies GERD or allergies.  He continues to live with his father and he is not smoking.           Review of Systems:     A 13 point ROS was performed and was negative except as listed above in the HPI.  Weight is increasing.  No orthopnea/PND.           Past Medical and Surgical History:     Past Medical History:   Diagnosis Date     Acute on chronic respiratory failure with hypoxia (H) 4/10/2020     Acute on chronic respiratory failure with hypoxia and hypercapnia (H) 4/1/2019     CAP (community acquired pneumonia) 4/14/2020     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)      COPD exacerbation (H) 4/1/2019     COPD exacerbation (H) 2/16/2020     Erectile dysfunction      Hypertension      Hyponatremia 4/1/2019     Pneumonia 4/10/2020     Past Surgical History:    Procedure Laterality Date     APPENDECTOMY      childhood           Family History:     Family History   Problem Relation Age of Onset     Hypertension Mother      Ovarian Cancer Mother      Breast Cancer Mother      Hypertension Father      Lipids Father      C.A.D. Father      Heart Disease Father      Chronic Obstructive Pulmonary Disease Father      Diabetes Paternal Grandmother      Chronic Obstructive Pulmonary Disease Paternal Grandfather             Social History:     Social History     Socioeconomic History     Marital status: Single     Spouse name: Not on file     Number of children: Not on file     Years of education: Not on file     Highest education level: Not on file   Occupational History     Not on file   Tobacco Use     Smoking status: Former     Packs/day: 2.00     Years: 30.00     Pack years: 60.00     Types: Cigarettes     Quit date: 2021     Years since quittin.3     Smokeless tobacco: Former   Vaping Use     Vaping Use: Never used   Substance and Sexual Activity     Alcohol use: Yes     Comment: rare     Drug use: No     Sexual activity: Yes     Partners: Female   Other Topics Concern     Parent/sibling w/ CABG, MI or angioplasty before 65F 55M? No   Social History Narrative    The patient has a 60+ pk yr tobacco hx.  He has has no active use, quit 2014.  Alcohol use is <1 alcoholic drinks per week.  He denies use of recreational drugs.  He is employed. [employer].  Works nights.   The patient is .  Has 1 child.Hot Tub Exposure: NORecent Travel: NO Hx of incarceration:  NOBird Exposure:   NOAnimal Exposure:  NOInhalation Exposure:  NO        Lives in [place], [place] with father. 2 dogs.          Social Determinants of Health     Financial Resource Strain: Low Risk      Difficulty of Paying Living Expenses: Not hard at all   Food Insecurity: No Food Insecurity     Worried About Running Out of Food in the Last Year: Never true     Ran Out of Food in the Last Year: Never  "true   Transportation Needs: No Transportation Needs     Lack of Transportation (Medical): No     Lack of Transportation (Non-Medical): No   Physical Activity: Not on file   Stress: Not on file   Social Connections: Not on file   Intimate Partner Violence: Not on file   Housing Stability: Not on file            Medications:     Current Outpatient Medications   Medication     albuterol (PROAIR HFA/PROVENTIL HFA/VENTOLIN HFA) 108 (90 Base) MCG/ACT inhaler     albuterol (PROVENTIL) (2.5 MG/3ML) 0.083% neb solution     amLODIPine (NORVASC) 10 MG tablet     atenolol (TENORMIN) 25 MG tablet     fluticasone (FLONASE) 50 MCG/ACT nasal spray     fluticasone-salmeterol (ADVAIR-HFA) 230-21 MCG/ACT inhaler     lisinopril (ZESTRIL) 40 MG tablet     order for DME     roflumilast (DALIRESP) 500 MCG TABS tablet     tiotropium (SPIRIVA RESPIMAT) 2.5 MCG/ACT inhaler     order for DME     predniSONE (DELTASONE) 10 MG tablet     roflumilast (DALIRESP) 250 MCG TABS tablet     No current facility-administered medications for this visit.            Physical Exam:   BP (!) 138/90 (BP Location: Right arm, Patient Position: Sitting, Cuff Size: Adult Regular)   Pulse 82   Temp 97  F (36.1  C) (Tympanic)   Ht 1.626 m (5' 4.02\")   Wt 59 kg (130 lb)   SpO2 100%   BMI 22.30 kg/m      Constitutional:   Awake, alert and in no apparent distress, thin, pleasant male, on supplemental oxygen, thin male     Eyes:   nonicteric     HEENT:   Supple without supraclavicular or cervical lymphadenopathy     Lungs:   Reduced air flow b/l and in bases.  Prolonged expiratory phase.  No crackles. No rhonchi.  No wheezes. Speaking in full sentences.      Cardiovascular:   Normal S1 and S2.  RRR.  No murmur, gallop or rub.     Musculoskeletal:   No edema, digital clubbing present     Neurologic:   Alert and conversant.     Skin:   Warm, dry.  No rash on limited exam.  No lower extremity edema.             Data:   All laboratory and imaging data reviewed " personally.      Specimen Description   Date Value Ref Range Status   04/13/2020 Urine  Final   04/10/2020 Sputum  Final   04/10/2020 Sputum  Final    Culture Micro   Date Value Ref Range Status   04/10/2020 Moderate growth  Normal latrice    Final   04/10/2020 Moderate growth  Escherichia coli   (A)  Final   04/10/2020 Moderate growth  Klebsiella pneumoniae   (A)  Final        No results found for this or any previous visit (from the past 168 hour(s)).    PFT: Very severe obstructive lung disease. Air trapping and hyperinflation are noted. No significant bronchodilator response is noted. Diffusion capacity is severely reduced. Very reduced compared to prior studies noted in Dr. Wagner's note in 2016.     CT chest - 8/24/21 - personally reviewed: panlobular emphysema predominantly in the upper lobes bilaterally. No infiltrates. Some evidence of mucous impaction on the RLL. Small nodules as detailed below.     IMPRESSION:  1.  No evidence for pulmonary embolism.  2.  New finding of prominent mucus impaction leading to the right  lower lobe bronchus and small airways of the right lower lobe. This is  consistent with aspiration.  3.  New curvilinear opacity along the anterior left upper lobe  midchest could just be focal atelectasis but acute airspace disease  including pneumonia not excluded. New indeterminant pulmonary nodule  at the left lower lobe. Recommend follow-up CT chest in 6 months.  There are other stable nodules.  4.  Coronary artery calcifications.  5.  Emphysema.    CTPE - 4/24/22 -   Personally reviewed: apical predominant emphysema. Secretions/debris in the bilateral mainstem bronchi. New 9 mm spiculated nodule L major fissure.   IMPRESSION:   1. Exam is negative for acute pulmonary embolism.        Evidence for right heart strain or increased right heart pressures?   is not present.      2. New 9 mm spiculated solid nodule along the left major fissure,  consider follow-up low-dose chest CT in 3 months,  PET/CT, or tissue  sampling per Fleischner's Society guidelines.     3. Debris/retained secretions within the bilateral mainstem bronchi,  and extending into the right lower lobe bronchi, concerning for  recurrent aspiration.     CT chest- 7/6/2022-personally reviewed and I agree with the radiologist read of:  IMPRESSION:   1.  Interval decrease in size of previously seen new 9 mm spiculated  pulmonary nodule along the left major fissure, now measuring 3 mm.  2.  New scarlike nodule with a mean diameter of 5 mm in the lateral  left upper lobe.  3.  Unchanged 4 mm right mid lung pulmonary nodule.  4.  Severe pulmonary emphysema and bilateral scarring and/or  atelectasis.  5.  No new or progressive airspace consolidation or pleural fluid.  6.  Indeterminate enlarged right hilar lymph node, new to comparison.

## 2022-12-08 NOTE — NURSING NOTE
"Initial BP (!) 138/90 (BP Location: Right arm, Patient Position: Sitting, Cuff Size: Adult Regular)   Pulse 82   Temp 97  F (36.1  C) (Tympanic)   Ht 1.626 m (5' 4.02\")   Wt 59 kg (130 lb)   SpO2 100%   BMI 22.30 kg/m   Estimated body mass index is 22.3 kg/m  as calculated from the following:    Height as of this encounter: 1.626 m (5' 4.02\").    Weight as of this encounter: 59 kg (130 lb). .    Flores Haji MA    "

## 2022-12-09 NOTE — PATIENT INSTRUCTIONS
Luis,    It was nice seeing you!  I am glad to hear you are doing well.  We reviewed your symptoms and management plan.     Recommendations as follows:   - 6 minute walk test today  - oxygen orders were renewed  - continue current therapies including advair, spiriva, albuterol, roflumilast  - continue aerobika device at least twice per day  - Please call the pulmonary clinic with any questions. The pulmonary clinic number is: [phone].    Stay up to date with vaccinations including your yearly flu vaccine. Please continue to social distance which does not exclude going outside and enjoying our wonderful weather! Wash your hands regularly. Wear a mask when unable to social distance (such as in the grocery store).  I recommend that you receive the COVID-19 vaccine.     Have a nice fall and stay well! Please let me know if you have questions or concerns.     Yamilet Cuellar MD  Pulmonary, Allergy, Critical Care, and Sleep Medicine   [facility]

## 2022-12-12 ENCOUNTER — HOSPITAL ENCOUNTER (OUTPATIENT)
Dept: RESPIRATORY THERAPY | Facility: CLINIC | Age: 57
Discharge: HOME | End: 2022-12-12
Attending: FAMILY MEDICINE | Admitting: FAMILY MEDICINE
Payer: COMMERCIAL

## 2022-12-12 DIAGNOSIS — J42 CHRONIC BRONCHITIS, UNSPECIFIED CHRONIC BRONCHITIS TYPE (H): ICD-10-CM

## 2022-12-12 PROCEDURE — 94618 PULMONARY STRESS TESTING: CPT

## 2022-12-12 NOTE — PROGRESS NOTES
6MWT/ O2 titration completed.    Room air SpO2 at rest = 95%, HR 88, /90. RPD 5  Room air SpO2 with walk = 84%, , /90. RPD 9    2L SpO2 at rest = 98%, HR 86  2L SpO2 with walk = 93%, , /90. RPD 5    Patient rested on RA, then walked 250 ft/76 m in 1.5 mins. O2 sats dropped to 84%. Walk stopped.  Patient rested on 2L, then walked 950 ft/ 289 m in 6 mins. One rest @ 4:30.  Patient reports soa.    Patient has own small home O2 tank through [facility] [employer]. Reports he is using 2-3 L depending on his activity.  MDI's taken this morning.

## 2022-12-18 ENCOUNTER — HEALTH MAINTENANCE LETTER (OUTPATIENT)
Age: 57
End: 2022-12-18

## 2023-01-20 ENCOUNTER — HOSPITAL ENCOUNTER (OUTPATIENT)
Dept: CT IMAGING | Facility: CLINIC | Age: 58
Discharge: HOME | End: 2023-01-20
Attending: INTERNAL MEDICINE | Admitting: INTERNAL MEDICINE
Payer: COMMERCIAL

## 2023-01-20 DIAGNOSIS — R91.8 PULMONARY NODULES: ICD-10-CM

## 2023-01-20 DIAGNOSIS — J96.21 ACUTE ON CHRONIC RESPIRATORY FAILURE WITH HYPOXIA AND HYPERCAPNIA (H): ICD-10-CM

## 2023-01-20 DIAGNOSIS — J96.22 ACUTE ON CHRONIC RESPIRATORY FAILURE WITH HYPOXIA AND HYPERCAPNIA (H): ICD-10-CM

## 2023-01-20 PROCEDURE — 71250 CT THORAX DX C-: CPT

## 2023-01-25 ENCOUNTER — MYC MEDICAL ADVICE (OUTPATIENT)
Dept: PULMONOLOGY | Facility: CLINIC | Age: 58
End: 2023-01-25
Payer: COMMERCIAL

## 2023-01-25 DIAGNOSIS — J44.9 CHRONIC OBSTRUCTIVE PULMONARY DISEASE, UNSPECIFIED COPD TYPE (H): Primary | ICD-10-CM

## 2023-01-25 DIAGNOSIS — J42 CHRONIC BRONCHITIS, UNSPECIFIED CHRONIC BRONCHITIS TYPE (H): ICD-10-CM

## 2023-01-25 RX ORDER — DOXYCYCLINE HYCLATE 100 MG
100 TABLET ORAL 2 TIMES DAILY
Qty: 20 TABLET | Refills: 0 | Status: SHIPPED | OUTPATIENT
Start: 2023-01-25 | End: 2023-02-04

## 2023-01-25 RX ORDER — PREDNISONE 20 MG/1
40 TABLET ORAL DAILY
Qty: 10 TABLET | Refills: 0 | Status: SHIPPED | OUTPATIENT
Start: 2023-01-25 | End: 2023-01-30

## 2023-01-25 NOTE — TELEPHONE ENCOUNTER
Hi, pls send prednisone 40 mg daily x 5 days and doxycycline 100 mg BID x 10 days to his pharmacy.     Thanks,     Bianca Cuellar MD   Pulmonary, Allergy, Critical Care, and Sleep Medicine   [facility]   Pager: [phone]

## 2023-01-26 ENCOUNTER — TELEPHONE (OUTPATIENT)
Dept: PULMONOLOGY | Facility: CLINIC | Age: 58
End: 2023-01-26
Payer: COMMERCIAL

## 2023-01-26 DIAGNOSIS — J43.1 PANLOBULAR EMPHYSEMA (H): ICD-10-CM

## 2023-01-26 DIAGNOSIS — R91.8 PULMONARY NODULES: Primary | ICD-10-CM

## 2023-01-26 NOTE — TELEPHONE ENCOUNTER
Called patient and discussed recent chest CT findings (see report).     RADHA nodule decreasing in size, several new, small (7 mm and smaller) nodules. Also, mass-like opacity in RLL bronchus concerning for mucous vs. Malignancy. Patient being treated for COPD flare at this time. Will plan to repeat CT chest in 6 weeks (ordered). He will follow-up with me in clinic and consider Nodify testing for further risk stratification.     He is high risk for bronchoscopy procedure given FEV1 is 18%.     Bianca Cuellar MD  Pulmonary, Allergy, Critical Care, and Sleep Medicine   [facility]   Pager: [phone]

## 2023-02-16 ENCOUNTER — MYC MEDICAL ADVICE (OUTPATIENT)
Dept: PULMONOLOGY | Facility: CLINIC | Age: 58
End: 2023-02-16
Payer: COMMERCIAL

## 2023-02-16 DIAGNOSIS — J44.1 COPD EXACERBATION (H): Primary | ICD-10-CM

## 2023-02-16 RX ORDER — PREDNISONE 10 MG/1
TABLET ORAL
Qty: 25 TABLET | Refills: 0 | Status: SHIPPED | OUTPATIENT
Start: 2023-02-16 | End: 2023-03-16

## 2023-02-17 ENCOUNTER — TELEPHONE (OUTPATIENT)
Dept: SLEEP MEDICINE | Facility: CLINIC | Age: 58
End: 2023-02-17
Payer: COMMERCIAL

## 2023-02-17 NOTE — TELEPHONE ENCOUNTER
----- Message from Jermaine Olguin MD sent at 2/15/2023  5:49 AM CST -----  Regarding: appointment  Pulmonary has requested a sleep consultation as patient which has not yet been scheduled.  Please schedule with one of the following: Ed Martin Iber.  He has mobility problems and virtual may be necessary.    Richard Olguin MD

## 2023-03-09 ENCOUNTER — HOSPITAL ENCOUNTER (OUTPATIENT)
Dept: CT IMAGING | Facility: CLINIC | Age: 58
Discharge: HOME | End: 2023-03-09
Attending: INTERNAL MEDICINE | Admitting: INTERNAL MEDICINE
Payer: COMMERCIAL

## 2023-03-09 DIAGNOSIS — J43.1 PANLOBULAR EMPHYSEMA (H): ICD-10-CM

## 2023-03-09 DIAGNOSIS — R91.8 PULMONARY NODULES: ICD-10-CM

## 2023-03-09 PROCEDURE — 250N000011 HC RX IP 250 OP 636: Performed by: RADIOLOGY

## 2023-03-09 PROCEDURE — 71260 CT THORAX DX C+: CPT

## 2023-03-09 PROCEDURE — 250N000009 HC RX 250: Performed by: RADIOLOGY

## 2023-03-09 RX ORDER — IOPAMIDOL 755 MG/ML
58 INJECTION, SOLUTION INTRAVASCULAR ONCE
Status: COMPLETED | OUTPATIENT
Start: 2023-03-09 | End: 2023-03-09

## 2023-03-09 RX ADMIN — SODIUM CHLORIDE 56 ML: 9 INJECTION, SOLUTION INTRAVENOUS at 09:25

## 2023-03-09 RX ADMIN — IOPAMIDOL 58 ML: 755 INJECTION, SOLUTION INTRAVENOUS at 09:25

## 2023-03-15 ENCOUNTER — TELEPHONE (OUTPATIENT)
Dept: PULMONOLOGY | Facility: CLINIC | Age: 58
End: 2023-03-15
Payer: COMMERCIAL

## 2023-03-15 DIAGNOSIS — J44.1 COPD EXACERBATION (H): Primary | ICD-10-CM

## 2023-03-16 RX ORDER — AZITHROMYCIN 250 MG/1
TABLET, FILM COATED ORAL
Qty: 6 TABLET | Refills: 0 | Status: SHIPPED | OUTPATIENT
Start: 2023-03-16 | End: 2023-03-21

## 2023-03-16 RX ORDER — PREDNISONE 10 MG/1
TABLET ORAL
Qty: 25 TABLET | Refills: 0 | Status: SHIPPED | OUTPATIENT
Start: 2023-03-16 | End: 2023-03-17

## 2023-03-16 NOTE — TELEPHONE ENCOUNTER
Spoke to patient and he said he did a course of Prednisone about 3 weeks ago.   Was doing well for about 1.5 weeks once he was done, but then his neighbor was burning wood outside and he thinks this set him off again.    Patient does ok at rest but with activity, his chest feels a little tight and is SOB. Denies wheezing and coughing.    States no changes in meds and has been taking them all as prescribed plus on home oxygen, 2L.    Per Dr. Cuellar's last OV note from 12/8/22:  Recommendations as follows:   -Continue Advair, spiriva, albuterol  -Continue roflumilast; LFTs have remained stable  -Continue aerobika use at least 2-3 times per day for 10-minute sessions after albuterol inhaler or nebulizer therapy   -azithromycin/prednisone ordered for home use prn for COPD exacerbation   -repeat chest CT in 6 months (ordered for January 2023) to monitor lung nodule  - sleep eval pending  -he is up to date with covid, influenza, pneumovax vaccines including the bivalent covid booster     Sent refills of Prednisone and azithromycin to pharmacy.  Patient knows to go to the ED if exacerbation worsens; reviewed criteria for ED visit.    Patient states understanding and agrees with the plan. Has a f/u visit with Dr. Cuellar on 3/30/23.    Viridiana Stark RN on 3/16/2023 at 11:06 AM

## 2023-03-17 ENCOUNTER — TELEPHONE (OUTPATIENT)
Dept: PULMONOLOGY | Facility: CLINIC | Age: 58
End: 2023-03-17
Payer: COMMERCIAL

## 2023-03-17 DIAGNOSIS — J44.1 COPD EXACERBATION (H): ICD-10-CM

## 2023-03-17 RX ORDER — PREDNISONE 10 MG/1
TABLET ORAL
Qty: 26 TABLET | Refills: 0 | Status: SHIPPED | OUTPATIENT
Start: 2023-03-17 | End: 2023-03-30

## 2023-03-17 NOTE — TELEPHONE ENCOUNTER
Sent new script with increase quantity of 26 for correct dosing of Prednisone.  Sania BIGGS RN BSN PHN  [facility]

## 2023-03-17 NOTE — TELEPHONE ENCOUNTER
Received 2 faxes from:  [facility] #[id number]    Message:  FYI This Rx requires 26 tabs (per directions 25.5 tabs)    Message:  Unable to send electronically     Huseyin Burgos  [facility]

## 2023-03-21 ENCOUNTER — MYC MEDICAL ADVICE (OUTPATIENT)
Dept: SURGERY | Facility: CLINIC | Age: 58
End: 2023-03-21
Payer: COMMERCIAL

## 2023-03-21 NOTE — TELEPHONE ENCOUNTER
"Sent patient a message via [employer] requesting an update on how he's doing and also informing him that he should use his nebulizer and \"as needed\" inhalers more often when experiencing a flare-up.    Waiting for response.  Viridiana Stark RN on 3/21/2023 at 8:31 AM    "

## 2023-03-21 NOTE — TELEPHONE ENCOUNTER
See patient's mychart response from 3/21/23.    Has f/u appointment with Dr. Cuellar on 3/30/23.    Viridiana Stark RN on 3/21/2023 at 2:13 PM

## 2023-03-30 ENCOUNTER — OFFICE VISIT (OUTPATIENT)
Dept: PULMONOLOGY | Facility: CLINIC | Age: 58
End: 2023-03-30
Payer: COMMERCIAL

## 2023-03-30 VITALS
HEIGHT: 64 IN | OXYGEN SATURATION: 98 % | DIASTOLIC BLOOD PRESSURE: 90 MMHG | SYSTOLIC BLOOD PRESSURE: 143 MMHG | BODY MASS INDEX: 20.83 KG/M2 | WEIGHT: 122 LBS | HEART RATE: 83 BPM | TEMPERATURE: 97.8 F

## 2023-03-30 DIAGNOSIS — J44.1 COPD EXACERBATION (H): ICD-10-CM

## 2023-03-30 DIAGNOSIS — J96.22 ACUTE ON CHRONIC RESPIRATORY FAILURE WITH HYPOXIA AND HYPERCAPNIA (H): ICD-10-CM

## 2023-03-30 DIAGNOSIS — J96.21 ACUTE ON CHRONIC RESPIRATORY FAILURE WITH HYPOXIA AND HYPERCAPNIA (H): ICD-10-CM

## 2023-03-30 DIAGNOSIS — J43.9 PULMONARY EMPHYSEMA, UNSPECIFIED EMPHYSEMA TYPE (H): ICD-10-CM

## 2023-03-30 DIAGNOSIS — J43.2 CENTRILOBULAR EMPHYSEMA (H): ICD-10-CM

## 2023-03-30 DIAGNOSIS — J41.0 SIMPLE CHRONIC BRONCHITIS (H): Primary | ICD-10-CM

## 2023-03-30 PROCEDURE — 99214 OFFICE O/P EST MOD 30 MIN: CPT | Performed by: INTERNAL MEDICINE

## 2023-03-30 RX ORDER — ALBUTEROL SULFATE 90 UG/1
2 AEROSOL, METERED RESPIRATORY (INHALATION) EVERY 4 HOURS PRN
Qty: 54 G | Refills: 3 | Status: SHIPPED | OUTPATIENT
Start: 2023-03-30 | End: 2024-04-10

## 2023-03-30 RX ORDER — AZITHROMYCIN 250 MG/1
TABLET, FILM COATED ORAL
Qty: 6 TABLET | Refills: 0 | Status: SHIPPED | OUTPATIENT
Start: 2023-03-30 | End: 2023-04-05

## 2023-03-30 RX ORDER — PREDNISONE 10 MG/1
TABLET ORAL
Qty: 26 TABLET | Refills: 0 | Status: SHIPPED | OUTPATIENT
Start: 2023-03-30 | End: 2023-04-05

## 2023-03-30 RX ORDER — FLUTICASONE PROPIONATE AND SALMETEROL XINAFOATE 230; 21 UG/1; UG/1
2 AEROSOL, METERED RESPIRATORY (INHALATION) 2 TIMES DAILY
Qty: 36 G | Refills: 3 | Status: SHIPPED | OUTPATIENT
Start: 2023-03-30 | End: 2023-06-05

## 2023-03-30 RX ORDER — ROFLUMILAST 500 UG/1
500 TABLET ORAL DAILY
Qty: 90 TABLET | Refills: 3 | Status: SHIPPED | OUTPATIENT
Start: 2023-03-30 | End: 2024-05-24

## 2023-03-30 NOTE — PATIENT INSTRUCTIONS
Luis,    It was nice seeing you!  I am glad to hear you are doing well.  We reviewed your symptoms, chest CT and management plan.     Recommendations as follows:   -Continue current therapies including Advair, Spiriva, albuterol  -Continue Roflumilast  -Continue using aerobika 2-3 times per day for airway clearance  -I will reach out to the pulmonary function lab to see if we can do a monitored neb treatment with respiratory therapy monitoring next week using a new mucolytic; my team will contact you once this is set up  -Repeat chest CT in 6 months to monitor enlarged lymph nodes  -Continue to remain active with regular exercise  - Please call the pulmonary clinic with any questions. The pulmonary clinic number is: [phone].    Stay up to date with vaccinations including your yearly flu vaccine. Wash your hands regularly. Consider wearing a mask in crowded places to reduce the risk of respiratory illnesses. I recommend that you receive the COVID-19 vaccine and stay up to date with boosters.     Have a nice spring and stay well! Please let me know if you have questions or concerns.     Yamilet Cuellar MD  Pulmonary, Allergy, Critical Care, and Sleep Medicine   [facility]
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
## 2023-03-30 NOTE — NURSING NOTE
"Initial BP (!) 143/90 (BP Location: Right arm, Patient Position: Sitting, Cuff Size: Adult Regular)   Pulse 83   Temp 97.8  F (36.6  C) (Tympanic)   Ht 1.626 m (5' 4.02\")   Wt 55.3 kg (122 lb)   SpO2 98%   BMI 20.93 kg/m   Estimated body mass index is 20.93 kg/m  as calculated from the following:    Height as of this encounter: 1.626 m (5' 4.02\").    Weight as of this encounter: 55.3 kg (122 lb). .    Flores Haji MA    "

## 2023-04-03 RX ORDER — SODIUM CHLORIDE FOR INHALATION 3 %
3 VIAL, NEBULIZER (ML) INHALATION
Qty: 150 ML | Refills: 0 | Status: SHIPPED | OUTPATIENT
Start: 2023-04-03 | End: 2023-11-06

## 2023-04-06 RX ORDER — GUAIFENESIN 600 MG/1
1200 TABLET, EXTENDED RELEASE ORAL 2 TIMES DAILY
Qty: 60 TABLET | Refills: 5 | Status: SHIPPED | OUTPATIENT
Start: 2023-04-06 | End: 2024-01-07

## 2023-05-10 ENCOUNTER — TELEPHONE (OUTPATIENT)
Dept: FAMILY MEDICINE | Facility: CLINIC | Age: 58
End: 2023-05-10
Payer: COMMERCIAL

## 2023-05-10 NOTE — TELEPHONE ENCOUNTER
Patient Quality Outreach    Patient is due for the following:   Colon Cancer Screening    Next Steps:   No follow up needed at this time.    Type of outreach:    Chart review performed, no outreach needed.   Discontinued by Dr. Mariee due to palliative care.       Questions for provider review:    None           Goldie High, CMA

## 2023-05-30 ENCOUNTER — MYC MEDICAL ADVICE (OUTPATIENT)
Dept: PULMONOLOGY | Facility: CLINIC | Age: 58
End: 2023-05-30
Payer: COMMERCIAL

## 2023-05-30 DIAGNOSIS — J43.9 PULMONARY EMPHYSEMA, UNSPECIFIED EMPHYSEMA TYPE (H): ICD-10-CM

## 2023-05-30 DIAGNOSIS — J96.21 ACUTE ON CHRONIC RESPIRATORY FAILURE WITH HYPOXIA AND HYPERCAPNIA (H): ICD-10-CM

## 2023-05-30 DIAGNOSIS — J96.22 ACUTE ON CHRONIC RESPIRATORY FAILURE WITH HYPOXIA AND HYPERCAPNIA (H): ICD-10-CM

## 2023-06-01 DIAGNOSIS — J43.9 PULMONARY EMPHYSEMA, UNSPECIFIED EMPHYSEMA TYPE (H): ICD-10-CM

## 2023-06-01 DIAGNOSIS — J96.22 ACUTE ON CHRONIC RESPIRATORY FAILURE WITH HYPOXIA AND HYPERCAPNIA (H): ICD-10-CM

## 2023-06-01 DIAGNOSIS — J44.1 COPD EXACERBATION (H): ICD-10-CM

## 2023-06-01 DIAGNOSIS — J96.21 ACUTE ON CHRONIC RESPIRATORY FAILURE WITH HYPOXIA AND HYPERCAPNIA (H): ICD-10-CM

## 2023-06-01 DIAGNOSIS — J43.2 CENTRILOBULAR EMPHYSEMA (H): ICD-10-CM

## 2023-06-01 RX ORDER — AZITHROMYCIN 250 MG/1
TABLET, FILM COATED ORAL
Qty: 6 TABLET | Refills: 0 | Status: SHIPPED | OUTPATIENT
Start: 2023-06-01 | End: 2023-06-06

## 2023-06-01 RX ORDER — PREDNISONE 10 MG/1
TABLET ORAL
Qty: 26 TABLET | Refills: 0 | Status: SHIPPED | OUTPATIENT
Start: 2023-06-01 | End: 2023-07-13

## 2023-06-01 NOTE — TELEPHONE ENCOUNTER
Reviewed last office visit note  Patient having acute exacerbation due to AC having mold and not working well.   Patient actively SOA, stated that he was to use abx and pred for exacerbation. Does not have refill left from previous order.     Ordered.   Jenna DUBON RN  [facility]

## 2023-06-01 NOTE — TELEPHONE ENCOUNTER
azithromycin (ZITHROMAX) 250 MG tablet       Requested Prescriptions   Pending Prescriptions Disp Refills     predniSONE (DELTASONE) 10 MG tablet 26 tablet 0     Sig: Start 30 mg (3 pills) daily for 5 days followed by 20 mg (2 pills) daily for 3 days followed by 10 mg (1 pill) daily for 3 days followed by 5 mg (1/2 pill) daily for 3 days.       There is no refill protocol information for this order            Last office visit: Visit date not found ; last virtual visit: Visit date not found with prescribing provider:  Bianca Cuellar  Future Office Visit:   Next 5 appointments (look out 90 days)    Aug 11, 2023  9:30 AM  (Arrive by 9:10 AM)  Pre-Operative Physical with Dean Mariee MD  [facility]  Arrive at: Clinic A [address] [place] [id number]  [phone]           Lisseth Price  Specialty Clinic -   [facility]

## 2023-06-05 RX ORDER — FLUTICASONE PROPIONATE AND SALMETEROL 500; 50 UG/1; UG/1
1 POWDER RESPIRATORY (INHALATION) EVERY 12 HOURS
Qty: 60 EACH | Refills: 0 | Status: SHIPPED | OUTPATIENT
Start: 2023-06-05 | End: 2023-07-20

## 2023-07-13 ENCOUNTER — TELEPHONE (OUTPATIENT)
Dept: FAMILY MEDICINE | Facility: CLINIC | Age: 58
End: 2023-07-13
Payer: COMMERCIAL

## 2023-07-13 DIAGNOSIS — J44.1 COPD EXACERBATION (H): ICD-10-CM

## 2023-07-13 RX ORDER — AZITHROMYCIN 250 MG/1
TABLET, FILM COATED ORAL
Qty: 6 TABLET | Refills: 0 | Status: SHIPPED | OUTPATIENT
Start: 2023-07-13 | End: 2023-07-18

## 2023-07-13 RX ORDER — PREDNISONE 10 MG/1
TABLET ORAL
Qty: 40 TABLET | Refills: 0 | Status: SHIPPED | OUTPATIENT
Start: 2023-07-13 | End: 2023-08-11

## 2023-07-13 NOTE — TELEPHONE ENCOUNTER
Symptoms    Describe your symptoms: patient reports have a copd flare, requesting prednisone and antibiotic.         How long have you been having symptoms: last night    Patient has refill rx with pulmonology but asking pcp for refill      Home remedies tried: used all inhaler, report mucus is clear.    Preferred Pharmacy:   [id number] - [place], [place] - [address]  [address] [address]  Phone: [phone] Fax: [phone]    Could we send this information to you in dotHIV or would you prefer to receive a phone call?:   Patient would prefer a phone call   Okay to leave a detailed message?: Yes at Home number on file [phone] (home)

## 2023-07-13 NOTE — TELEPHONE ENCOUNTER
Reason for Call:  Other prescription    Detailed comments: Patient called and is requesting a refill of   predniSONE (DELTASONE) 10 MG tablet  azithromycin (ZITHROMAX) 250 MG tablet    [facility] [place]  Phone Number Patient can be reached at: Home number on file [phone] (home)    Best Time: Anytime    Can we leave a detailed message on this number? YES    Thank you,  Lisseth Price  Specialty Clinic -   [facility]      Call taken on 7/13/2023 at 10:42 AM by Lisseth Price

## 2023-07-13 NOTE — TELEPHONE ENCOUNTER
"Last OV with Dr. Cuellar on 3/30/23. Has follow up appointment in Pulm on 10/5/23.    Patient last prescribed Pred and z-amira on 6/1/23 for COPD flare.    Spoke with patient to gather information about symptoms. Patient c/o productive cough with clear sputum, ESPARZA, wheezing, and muscle cramps; \"chest feels inflamed\" . Patient denies SOB at rest, fatigue, fever, chills, and night sweats. Patient is able to obtain home oxygen level, which is 2 L continuous. Patient is Compliant with prescribed respiratory medications and is taking OTC medications, which include: Mucinex. Mucinex not helpful.    Mold in air conditioner fixed. No recent illnesses.    Patient is asking for Prednisone and Zithromax. RN will forward message to MD to review and advise. Rx's pended to preferred pharmacy.    RN advised patient to seek care in the Emergency Department should the symptoms become emergent, to which patient agrees.    Please advise.  Viridiana Stark RN on 7/13/2023 at 11:25 AM      "

## 2023-07-13 NOTE — TELEPHONE ENCOUNTER
Per routing comment from Dr. Cuellar:      I altered the dosages a little (started with 40 mg daily x 5 days then the taper that you created (just 10 mg higher) and signed it.     Thank you!     Yamilet

## 2023-07-13 NOTE — TELEPHONE ENCOUNTER
Spoke with patient and informed that Pred and Zithromax sent to his pharmacy. Reviewed Prednisone taper instructions. Informed patient to let us know if sx's don't improve after this treatment. Patient states understanding and agrees with the plan.    Viridiana Stark RN on 7/13/2023 at 2:19 PM

## 2023-07-13 NOTE — TELEPHONE ENCOUNTER
"Pt was called, he has not been seen by Dr Mariee for a year. Pt was advised to call pulmonology as they have been managing his medications, most recent pulmonology visit was 3/30/2023. Pt stated, \"I usually call Dr Mariee's clinic as it is quicker.\"  Negra Elizondo RN     \"  "

## 2023-07-17 DIAGNOSIS — J43.9 PULMONARY EMPHYSEMA, UNSPECIFIED EMPHYSEMA TYPE (H): ICD-10-CM

## 2023-07-17 DIAGNOSIS — J96.21 ACUTE ON CHRONIC RESPIRATORY FAILURE WITH HYPOXIA AND HYPERCAPNIA (H): ICD-10-CM

## 2023-07-17 DIAGNOSIS — J96.22 ACUTE ON CHRONIC RESPIRATORY FAILURE WITH HYPOXIA AND HYPERCAPNIA (H): ICD-10-CM

## 2023-07-17 NOTE — TELEPHONE ENCOUNTER
Requested Prescriptions   Pending Prescriptions Disp Refills     fluticasone-salmeterol (ADVAIR) 500-50 MCG/ACT inhaler 60 each 0     Sig: Inhale 1 puff into the lungs every 12 hours       There is no refill protocol information for this order        Last office visit: Visit date not found ; last virtual visit: Visit date not found with prescribing provider:  Dr. Cuellar    Future Office Visit:   Next 5 appointments (look out 90 days)    Aug 11, 2023  9:30 AM  (Arrive by 9:10 AM)  Pre-Operative Physical with Dean Mariee MD  [facility] ([facility] )  Arrive at: Clinic [facility] [address] [address]  [phone]   Oct 05, 2023  1:00 PM  Return Visit with Bianca Cuellar MD  [facility] ([facility] ) [address] [address]  [phone]

## 2023-07-20 RX ORDER — FLUTICASONE PROPIONATE AND SALMETEROL 500; 50 UG/1; UG/1
1 POWDER RESPIRATORY (INHALATION) EVERY 12 HOURS
Qty: 60 EACH | Refills: 3 | Status: SHIPPED | OUTPATIENT
Start: 2023-07-20 | End: 2023-10-05

## 2023-07-20 NOTE — TELEPHONE ENCOUNTER
Last OV 3/30/23. Return appointment: 10/5/23.    Refilled Advair to last until return appointment.  Viridiana Stark RN on 7/20/2023 at 9:25 AM

## 2023-08-11 ENCOUNTER — OFFICE VISIT (OUTPATIENT)
Dept: FAMILY MEDICINE | Facility: CLINIC | Age: 58
End: 2023-08-11
Payer: COMMERCIAL

## 2023-08-11 VITALS
RESPIRATION RATE: 20 BRPM | DIASTOLIC BLOOD PRESSURE: 82 MMHG | HEIGHT: 64 IN | BODY MASS INDEX: 18.92 KG/M2 | HEART RATE: 68 BPM | OXYGEN SATURATION: 98 % | WEIGHT: 110.8 LBS | SYSTOLIC BLOOD PRESSURE: 129 MMHG

## 2023-08-11 DIAGNOSIS — H25.9 AGE-RELATED CATARACT OF BOTH EYES, UNSPECIFIED AGE-RELATED CATARACT TYPE: ICD-10-CM

## 2023-08-11 DIAGNOSIS — I27.20 PULMONARY HYPERTENSION (H): ICD-10-CM

## 2023-08-11 DIAGNOSIS — J44.9 COPD, VERY SEVERE (H): ICD-10-CM

## 2023-08-11 DIAGNOSIS — I10 ESSENTIAL HYPERTENSION, BENIGN: ICD-10-CM

## 2023-08-11 DIAGNOSIS — Z01.818 PREOP GENERAL PHYSICAL EXAM: Primary | ICD-10-CM

## 2023-08-11 PROBLEM — J96.20 ACUTE ON CHRONIC RESPIRATORY FAILURE (H): Status: RESOLVED | Noted: 2021-08-22 | Resolved: 2023-08-11

## 2023-08-11 PROBLEM — J96.22 ACUTE ON CHRONIC RESPIRATORY FAILURE WITH HYPOXIA AND HYPERCAPNIA (H): Status: RESOLVED | Noted: 2019-04-01 | Resolved: 2023-08-11

## 2023-08-11 PROBLEM — J96.02 ACUTE RESPIRATORY ACIDOSIS (H): Status: RESOLVED | Noted: 2021-06-30 | Resolved: 2023-08-11

## 2023-08-11 PROBLEM — J96.21 ACUTE ON CHRONIC RESPIRATORY FAILURE WITH HYPOXIA AND HYPERCAPNIA (H): Status: RESOLVED | Noted: 2019-04-01 | Resolved: 2023-08-11

## 2023-08-11 PROCEDURE — 99214 OFFICE O/P EST MOD 30 MIN: CPT | Performed by: FAMILY MEDICINE

## 2023-08-11 RX ORDER — ATENOLOL 25 MG/1
25 TABLET ORAL DAILY
Qty: 90 TABLET | Refills: 3 | Status: ON HOLD | OUTPATIENT
Start: 2023-08-11 | End: 2024-05-01

## 2023-08-11 RX ORDER — AMLODIPINE BESYLATE 10 MG/1
10 TABLET ORAL DAILY
Qty: 90 TABLET | Refills: 3 | Status: SHIPPED | OUTPATIENT
Start: 2023-08-11 | End: 2024-05-09

## 2023-08-11 RX ORDER — AZITHROMYCIN 250 MG/1
TABLET, FILM COATED ORAL
Qty: 6 TABLET | Refills: 3 | Status: SHIPPED | OUTPATIENT
Start: 2023-08-11 | End: 2023-08-16

## 2023-08-11 RX ORDER — LISINOPRIL 40 MG/1
40 TABLET ORAL DAILY
Qty: 90 TABLET | Refills: 3 | Status: SHIPPED | OUTPATIENT
Start: 2023-08-11 | End: 2024-05-09

## 2023-08-11 RX ORDER — PREDNISONE 10 MG/1
TABLET ORAL
Qty: 40 TABLET | Refills: 3 | Status: SHIPPED | OUTPATIENT
Start: 2023-08-11 | End: 2023-11-06

## 2023-08-11 ASSESSMENT — PAIN SCALES - GENERAL: PAINLEVEL: NO PAIN (0)

## 2023-08-11 NOTE — PROGRESS NOTES
[facility]  [address] [address]  Phone: [phone]  Primary Provider: Alfredo Mcgee  Pre-op Performing Provider: ALFREDO MCGEE      PREOPERATIVE EVALUATION:  Today's date: 8/11/2023    Luis Hernandez is a 58 year old male who presents for a preoperative evaluation.      8/11/2023     9:08 AM   Additional Questions   Roomed by eduardo rosenberg cma       Surgical Information:  Surgery/Procedure:cataract  Surgery Location: [facility]  Surgeon:   Surgery Date: right 08/30/2023, left 09/18/2023  Time of Surgery: TBD  Where patient plans to recover: At home with family  Fax number for surgical facility: [phone]    Assessment & Plan     The proposed surgical procedure is considered LOW risk.    Preop general physical exam      Age-related cataract of both eyes, unspecified age-related cataract type      Essential hypertension, benign  Controlled and refilled med  - amLODIPine (NORVASC) 10 MG tablet; Take 1 tablet (10 mg) by mouth daily  - atenolol (TENORMIN) 25 MG tablet; Take 1 tablet (25 mg) by mouth daily  - lisinopril (ZESTRIL) 40 MG tablet; Take 1 tablet (40 mg) by mouth daily      -Pulmonary hypertension (H)  Will get an echo in the future  - Echocardiogram Complete; Future    COPD, very severe (H)  Sent though meds incase he gets an exacerbation  - azithromycin (ZITHROMAX) 250 MG tablet; Take 2 tablets (500 mg) by mouth daily for 1 day, THEN 1 tablet (250 mg) daily for 4 days. Use for COPD exacerbation   predniSONE (DELTASONE) 10 MG tablet; Start 40 mg (4 pills) daily for 5 days followed by 30 mg (3 pills) daily for 3 days followed by 20 mg (2 pills) daily for 3 days followed by 10 mg (1 pill) daily for 3 days.    Pulmonary emphysema, unspecified emphysema type (H)              - No identified additional risk factors other than previously addressed    Antiplatelet or Anticoagulation Medication Instructions:      Additional  Medication Instructions:  Reviewed with patient on how to take meds    RECOMMENDATION:  APPROVAL GIVEN to proceed with proposed procedure, without further diagnostic evaluation.            Subjective       HPI related to upcoming procedure: cataracts bilaterally.        8/11/2023     9:10 AM   Preop Questions   1. Have you ever had a heart attack or stroke? No   2. Have you ever had surgery on your heart or blood vessels, such as a stent placement, a coronary artery bypass, or surgery on an artery in your head, neck, heart, or legs? No   3. Do you have chest pain with activity? No   4. Do you have a history of  heart failure? No   5. Do you currently have a cold, bronchitis or symptoms of other infection? No   6. Do you have a cough, shortness of breath, or wheezing? YES - has copd   7. Do you or anyone in your family have previous history of blood clots? No   8. Do you or does anyone in your family have a serious bleeding problem such as prolonged bleeding following surgeries or cuts? No   9. Have you ever had problems with anemia or been told to take iron pills? No   10. Have you had any abnormal blood loss such as black, tarry or bloody stools? No   11. Have you ever had a blood transfusion? No   12. Are you willing to have a blood transfusion if it is medically needed before, during, or after your surgery? Yes   13. Have you or any of your relatives ever had problems with anesthesia? No   14. Do you have sleep apnea, excessive snoring or daytime drowsiness? No   15. Do you have any artifical heart valves or other implanted medical devices like a pacemaker, defibrillator, or continuous glucose monitor? No   16. Do you have artificial joints? No   17. Are you allergic to latex? No       Health Care Directive:  Patient does not have a Health Care Directive or Living Will: Discussed advance care planning with patient; information given to patient to review.    Preoperative Review of :   reviewed - no record of  controlled substances prescribed.      Status of Chronic Conditions:  See problem list for active medical problems.  Problems all longstanding and stable, except as noted/documented.  See ROS for pertinent symptoms related to these conditions.    Review of Systems  Review Of Systems  Constitutional: negative  Skin: negative  Eyes: cataracts  Ears/Nose/Throat: negative  Respiratory: chronic SOB, has COPD on Oxygen  Cardiovascular: negative  Gastrointestinal: negative  Genitourinary: negative  Musculoskeletal: negative  Neurologic: negative  Psychiatric: negative  Hematologic/Lymphatic/Immunologic: negative  Endocrine: negative\    Patient Active Problem List    Diagnosis Date Noted    Anemia, unspecified type 05/07/2021     Priority: Medium    Peripheral edema 02/16/2020     Priority: Medium    Pulmonary hypertension (H) 02/16/2020     Priority: Medium    Tobacco abuse, 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   Stable 01/2014 to 07/2014, 6 month follow scan recommended.   Chest CT of 1/15/2015= stable nodule, f/u one year.      Advanced directives, counseling/discussion 11/25/2013     Priority: Medium     11/25/2013 Gave patient honoring choices forms to take home and review.  HERMELINDA D[facility] ([facility])       CARDIOVASCULAR SCREENING; LDL GOAL LESS THAN 130 10/31/2010     Priority: Medium    COPD, very severe (H) 10/25/2010     Priority: Medium     Severe to very severe      Eczema 10/04/2010     Priority: Medium    ED (erectile dysfunction) 04/20/2009     Priority: Medium    Essential hypertension, benign 10/15/2007     Priority: Medium      Past Medical History:   Diagnosis Date    Acute on chronic respiratory failure with hypoxia (H) 4/10/2020    Acute on chronic respiratory failure with hypoxia  and hypercapnia (H) 4/1/2019    CAP (community acquired pneumonia) 4/14/2020    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)     COPD exacerbation (H) 4/1/2019    COPD exacerbation (H) 2/16/2020    Erectile dysfunction     Hypertension     Hyponatremia 4/1/2019    Pneumonia 4/10/2020     Past Surgical History:   Procedure Laterality Date    APPENDECTOMY      childhood     Current Outpatient Medications   Medication Sig Dispense Refill    albuterol (PROAIR HFA/PROVENTIL HFA/VENTOLIN HFA) 108 (90 Base) MCG/ACT inhaler Inhale 2 puffs into the lungs every 4 hours as needed for shortness of breath Hold on file until needed 54 g 3    albuterol (PROVENTIL) (2.5 MG/3ML) 0.083% neb solution Take 1 vial (2.5 mg) by nebulization every 6 hours as needed for shortness of breath / dyspnea or wheezing 90 mL 5    amLODIPine (NORVASC) 10 MG tablet Take 1 tablet (10 mg) by mouth daily 90 tablet 3    atenolol (TENORMIN) 25 MG tablet Take 1 tablet (25 mg) by mouth daily 90 tablet 3    azithromycin (ZITHROMAX) 250 MG tablet Take 2 tablets (500 mg) by mouth daily for 1 day, THEN 1 tablet (250 mg) daily for 4 days. Use for COPD exacerbation 6 tablet 3    fluticasone (FLONASE) 50 MCG/ACT nasal spray Spray 1-2 sprays into both nostrils daily 16 g 5    fluticasone-salmeterol (ADVAIR) 500-50 MCG/ACT inhaler Inhale 1 puff into the lungs every 12 hours 60 each 3    guaiFENesin (MUCINEX) 600 MG 12 hr tablet Take 2 tablets (1,200 mg) by mouth 2 times daily 60 tablet 5    lisinopril (ZESTRIL) 40 MG tablet Take 1 tablet (40 mg) by mouth daily 90 tablet 3    order for DME Equipment being ordered: Oxygen 3L via NC continuous.  O2 saturated noted to be 86% on room air at rest. 1 Units 0    order for DME Equipment being ordered: Nebulizer 1 Device 0    predniSONE (DELTASONE) 10 MG tablet Start 40 mg (4 pills) daily for 5 days followed by 30 mg (3 pills) daily for 3 days followed by 20 mg (2 pills) daily for 3 days followed by 10 mg  "(1 pill) daily for 3 days. 40 tablet 3    roflumilast (DALIRESP) 500 MCG TABS tablet Take 1 tablet (500 mcg) by mouth daily 90 tablet 3    sodium chloride (NEBUSAL) 3 % neb solution Take 3 mLs by nebulization every 3 hours as needed for wheezing or other (thick mucous) 150 mL 0    tiotropium (SPIRIVA RESPIMAT) 2.5 MCG/ACT inhaler Inhale 2 puffs into the lungs daily 12 g 11    roflumilast (DALIRESP) 250 MCG TABS tablet Take 1 tablet (250 mcg) by mouth daily for 30 days, THEN 2 tablets (500 mcg) daily. 90 tablet 0       Allergies   Allergen Reactions    Hctz Other (See Comments)     He has hyponatremia    Penicillins Other (See Comments)     Reaction unknown        Social History     Tobacco Use    Smoking status: Former     Packs/day: 2.00     Years: 30.00     Pack years: 60.00     Types: Cigarettes     Quit date: 2021     Years since quittin.0    Smokeless tobacco: Former   Substance Use Topics    Alcohol use: Yes     Comment: rare     Family History   Problem Relation Age of Onset    Hypertension Mother     Ovarian Cancer Mother     Breast Cancer Mother     Hypertension Father     Lipids Father     C.A.D. Father     Heart Disease Father     Chronic Obstructive Pulmonary Disease Father     Diabetes Paternal Grandmother     Chronic Obstructive Pulmonary Disease Paternal Grandfather      History   Drug Use No         Objective     /82 (BP Location: Left arm, Patient Position: Sitting, Cuff Size: Adult Regular)   Pulse 68   Resp 20   Ht 1.626 m (5' 4.02\")   Wt 50.3 kg (110 lb 12.8 oz)   SpO2 98%   BMI 19.01 kg/m      Physical Exam    GENERAL APPEARANCE: healthy, alert and no distress, using O2     EYES: EOMI,  PERRL     HENT: ear canals and TM's normal and nose and mouth without ulcers or lesions     NECK: no adenopathy, no asymmetry, masses, or scars and thyroid normal to palpation     RESP: clear but decrease breath sounds     CV: regular rates and rhythm, normal S1 S2, no S3 or S4 and no murmur, " click or rub     ABDOMEN:  soft, nontender, no HSM or masses and bowel sounds normal     MS: extremities normal- no gross deformities noted, no evidence of inflammation in joints, FROM in all extremities.     SKIN: no suspicious lesions or rashes     NEURO: Normal strength and tone, sensory exam grossly normal, mentation intact and speech normal     PSYCH: mentation appears normal. and affect normal/bright     LYMPHATICS: No cervical adenopathy    Recent Labs   Lab Test 07/15/22  0943 04/27/22  0544 04/26/22  0507   HGB  --  11.7* 11.7*   PLT  --  422 [id number]    131* 130*   POTASSIUM 3.6 3.8 4.4   CR 0.86 0.61* 0.72        Diagnostics:  No labs were ordered during this visit.   No EKG required for low risk surgery (cataract, skin procedure, breast biopsy, etc).    Revised Cardiac Risk Index (RCRI):  The patient has the following serious cardiovascular risks for perioperative complications:   - No serious cardiac risks = 0 points     RCRI Interpretation: 0 points: Class I (very low risk - 0.4% complication rate)     Hypertension Follow-up    Do you check your blood pressure regularly outside of the clinic? No   Are you following a low salt diet? Yes  Are your blood pressures ever more than 140 on the top number (systolic) OR more   than 90 on the bottom number (diastolic), for example 140/90? No  How many servings of fruits and vegetables do you eat daily?  0-1  On average, how many sweetened beverages do you drink each day (Examples: soda, juice, sweet tea, etc.  Do NOT count diet or artificially sweetened beverages)?   0  How many days per week do you exercise enough to make your heart beat faster? 7  How many minutes a day do you exercise enough to make your heart beat faster? 60 or more  How many days per week do you miss taking your medication? 0       Signed Electronically by: Dean Mariee MD  Copy of this evaluation report is provided to requesting physician.

## 2023-08-11 NOTE — NURSING NOTE
"Chief Complaint   Patient presents with    Pre-Op Exam       Initial There were no vitals taken for this visit. Estimated body mass index is 20.93 kg/m  as calculated from the following:    Height as of 3/30/23: 1.626 m (5' 4.02\").    Weight as of 3/30/23: 55.3 kg (122 lb).    Patient presents to the clinic using Oxygen    Is there anyone who you would like to be able to receive your results? No  If yes have patient fill out LOUISE      "

## 2023-08-11 NOTE — PATIENT INSTRUCTIONS
For informational purposes only. Not to replace the advice of your health care provider. Copyright   2003,  [employer] [facility]. All rights reserved. Clinically reviewed by Ingrsi Izaguirre MD. [employer]Patient[employer] [id number] - REV .  Preparing for Your Surgery  Getting started  A nurse will call you to review your health history and instructions. They will give you an arrival time based on your scheduled surgery time. Please be ready to share:  Your doctor's clinic name and phone number  Your medical, surgical, and anesthesia history  A list of allergies and sensitivities  A list of medicines, including herbal treatments and over-the-counter drugs  Whether the patient has a legal guardian (ask how to send us the papers in advance)  Please tell us if you're pregnant--or if there's any chance you might be pregnant. Some surgeries may injure a fetus (unborn baby), so they require a pregnancy test. Surgeries that are safe for a fetus don't always need a test, and you can choose whether to have one.   If you have a child who's having surgery, please ask for a copy of Preparing for Your Child's Surgery.    Preparing for surgery  Within 10 to 30 days of surgery: Have a pre-op exam (sometimes called an H&P, or History and Physical). This can be done at a clinic or pre-operative center.  If you're having a , you may not need this exam. Talk to your care team.  At your pre-op exam, talk to your care team about all medicines you take. If you need to stop any medicines before surgery, ask when to start taking them again.  We do this for your safety. Many medicines can make you bleed too much during surgery. Some change how well surgery (anesthesia) drugs work.  Call your insurance company to let them know you're having surgery. (If you don't have insurance, call [phone].)  Call your clinic if there's any change in your health. This includes signs of a cold or flu (sore throat, runny nose, cough, rash, fever). It also  includes a scrape or scratch near the surgery site.  If you have questions on the day of surgery, call your hospital or surgery center.  Eating and drinking guidelines  For your safety: Unless your surgeon tells you otherwise, follow the guidelines below.  Eat and drink as usual until 8 hours before you arrive for surgery. After that, no food or milk.  Drink clear liquids until 2 hours before you arrive. These are liquids you can see through, like water, Gatorade, and Propel Water. They also include plain black coffee and tea (no cream or milk), candy, and breath mints. You can spit out gum when you arrive.  If you drink alcohol: Stop drinking it the night before surgery.  If your care team tells you to take medicine on the morning of surgery, it's okay to take it with a sip of water.  Preventing infection  Shower or bathe the night before and morning of your surgery. Follow the instructions your clinic gave you. (If no instructions, use regular soap.)  Don't shave or clip hair near your surgery site. We'll remove the hair if needed.  Don't smoke or vape the morning of surgery. You may chew nicotine gum up to 2 hours before surgery. A nicotine patch is okay.  Note: Some surgeries require you to completely quit smoking and nicotine. Check with your surgeon.  Your care team will make every effort to keep you safe from infection. We will:  Clean our hands often with soap and water (or an alcohol-based hand rub).  Clean the skin at your surgery site with a special soap that kills germs.  Give you a special gown to keep you warm. (Cold raises the risk of infection.)  Wear special hair covers, masks, gowns and gloves during surgery.  Give antibiotic medicine, if prescribed. Not all surgeries need antibiotics.  What to bring on the day of surgery  Photo ID and insurance card  Copy of your health care directive, if you have one  Glasses and hearing aids (bring cases)  You can't wear contacts during surgery  Inhaler and eye  drops, if you use them (tell us about these when you arrive)  CPAP machine or breathing device, if you use them  A few personal items, if spending the night  If you have . . .  A pacemaker, ICD (cardiac defibrillator) or other implant: Bring the ID card.  An implanted stimulator: Bring the remote control.  A legal guardian: Bring a copy of the certified (court-stamped) guardianship papers.  Please remove any jewelry, including body piercings. Leave jewelry and other valuables at home.  If you're going home the day of surgery  You must have a responsible adult drive you home. They should stay with you overnight as well.  If you don't have someone to stay with you, and you aren't safe to go home alone, we may keep you overnight. Insurance often won't pay for this.  After surgery  If it's hard to control your pain or you need more pain medicine, please call your surgeon's office.  Questions?   If you have any questions for your care team, list them here: _________________________________________________________________________________________________________________________________________________________________________ ____________________________________ ____________________________________ ____________________________________    How to Take Your Medication Before Surgery  - if you have a morning surgery please take your morning meds the night before otherwise you can take them in the morning

## 2023-08-31 DIAGNOSIS — J96.21 ACUTE ON CHRONIC RESPIRATORY FAILURE WITH HYPOXIA AND HYPERCAPNIA (H): ICD-10-CM

## 2023-08-31 DIAGNOSIS — J43.9 PULMONARY EMPHYSEMA, UNSPECIFIED EMPHYSEMA TYPE (H): ICD-10-CM

## 2023-08-31 DIAGNOSIS — J96.22 ACUTE ON CHRONIC RESPIRATORY FAILURE WITH HYPOXIA AND HYPERCAPNIA (H): ICD-10-CM

## 2023-08-31 NOTE — TELEPHONE ENCOUNTER
Requested Prescriptions   Pending Prescriptions Disp Refills    tiotropium (SPIRIVA RESPIMAT) 2.5 MCG/ACT inhaler 12 g 11     Sig: Inhale 2 puffs into the lungs daily       There is no refill protocol information for this order          Last office visit: 3/30/2023 ; last virtual visit: Visit date not found with prescribing provider:  Bianca Cuellar   Future Office Visit:   Next 5 appointments (look out 90 days)      Oct 05, 2023  1:00 PM  Return Visit with Bianca Cuellar MD  [facility] ([facility] ) [address] [address] [phone]           Thank you,  Lisseth Price

## 2023-09-07 ENCOUNTER — TELEPHONE (OUTPATIENT)
Dept: FAMILY MEDICINE | Facility: CLINIC | Age: 58
End: 2023-09-07
Payer: COMMERCIAL

## 2023-09-07 NOTE — TELEPHONE ENCOUNTER
Patient Quality Outreach    Patient is due for the following:   Colon Cancer Screening    Next Steps:   No follow up needed at this time.    Type of outreach:    Chart review performed, no outreach needed. and colon cancer screening was discontinued for this pt.       Questions for provider review:    None           Goldie High, CMA

## 2023-09-14 ENCOUNTER — HOSPITAL ENCOUNTER (OUTPATIENT)
Dept: CT IMAGING | Facility: CLINIC | Age: 58
Discharge: HOME | End: 2023-09-14
Attending: INTERNAL MEDICINE | Admitting: INTERNAL MEDICINE
Payer: COMMERCIAL

## 2023-09-14 DIAGNOSIS — J43.2 CENTRILOBULAR EMPHYSEMA (H): ICD-10-CM

## 2023-09-14 DIAGNOSIS — J44.1 COPD EXACERBATION (H): ICD-10-CM

## 2023-09-14 DIAGNOSIS — J96.22 ACUTE ON CHRONIC RESPIRATORY FAILURE WITH HYPOXIA AND HYPERCAPNIA (H): ICD-10-CM

## 2023-09-14 DIAGNOSIS — J43.9 PULMONARY EMPHYSEMA, UNSPECIFIED EMPHYSEMA TYPE (H): ICD-10-CM

## 2023-09-14 DIAGNOSIS — J96.21 ACUTE ON CHRONIC RESPIRATORY FAILURE WITH HYPOXIA AND HYPERCAPNIA (H): ICD-10-CM

## 2023-09-14 PROCEDURE — 250N000011 HC RX IP 250 OP 636: Performed by: INTERNAL MEDICINE

## 2023-09-14 PROCEDURE — 71260 CT THORAX DX C+: CPT

## 2023-09-14 PROCEDURE — 250N000009 HC RX 250: Performed by: INTERNAL MEDICINE

## 2023-09-14 RX ORDER — IOPAMIDOL 755 MG/ML
54 INJECTION, SOLUTION INTRAVASCULAR ONCE
Status: COMPLETED | OUTPATIENT
Start: 2023-09-14 | End: 2023-09-14

## 2023-09-14 RX ADMIN — SODIUM CHLORIDE 54 ML: 9 INJECTION, SOLUTION INTRAVENOUS at 08:26

## 2023-09-14 RX ADMIN — IOPAMIDOL 54 ML: 755 INJECTION, SOLUTION INTRAVENOUS at 08:26

## 2023-09-20 DIAGNOSIS — J41.1 MUCOPURULENT CHRONIC BRONCHITIS (H): ICD-10-CM

## 2023-09-20 DIAGNOSIS — R91.8 PULMONARY NODULES: Primary | ICD-10-CM

## 2023-09-20 DIAGNOSIS — Z87.891 HISTORY OF TOBACCO USE: ICD-10-CM

## 2023-10-05 ENCOUNTER — OFFICE VISIT (OUTPATIENT)
Dept: PULMONOLOGY | Facility: CLINIC | Age: 58
End: 2023-10-05
Payer: COMMERCIAL

## 2023-10-05 VITALS
SYSTOLIC BLOOD PRESSURE: 124 MMHG | BODY MASS INDEX: 19.28 KG/M2 | OXYGEN SATURATION: 96 % | TEMPERATURE: 97.1 F | WEIGHT: 112.4 LBS | DIASTOLIC BLOOD PRESSURE: 86 MMHG

## 2023-10-05 DIAGNOSIS — R91.8 PULMONARY NODULES: Primary | ICD-10-CM

## 2023-10-05 DIAGNOSIS — J96.22 ACUTE ON CHRONIC RESPIRATORY FAILURE WITH HYPOXIA AND HYPERCAPNIA (H): ICD-10-CM

## 2023-10-05 DIAGNOSIS — J96.21 ACUTE ON CHRONIC RESPIRATORY FAILURE WITH HYPOXIA AND HYPERCAPNIA (H): ICD-10-CM

## 2023-10-05 DIAGNOSIS — J43.9 PULMONARY EMPHYSEMA, UNSPECIFIED EMPHYSEMA TYPE (H): ICD-10-CM

## 2023-10-05 PROCEDURE — 99215 OFFICE O/P EST HI 40 MIN: CPT | Performed by: INTERNAL MEDICINE

## 2023-10-05 RX ORDER — FLUTICASONE PROPIONATE AND SALMETEROL 500; 50 UG/1; UG/1
1 POWDER RESPIRATORY (INHALATION) EVERY 12 HOURS
Qty: 60 EACH | Refills: 11 | Status: SHIPPED | OUTPATIENT
Start: 2023-10-05 | End: 2024-07-22

## 2023-10-05 NOTE — PATIENT INSTRUCTIONS
Luis,    It was nice seeing you!  I am glad to hear you are doing well.     Recommendations as follows:   - continue current therapies   - please schedule a PET scan  - somebody will contact you about the Nodify test to draw blood within your home  - overnight oximetry study was ordered  - continue aerobika  - continue supplemental O2   - Please call the pulmonary clinic with any questions. The pulmonary clinic number is: [phone].    Stay up to date with vaccinations including your yearly flu vaccine. Wash your hands regularly. Consider wearing a mask in crowded places to reduce the risk of respiratory illnesses. I recommend that you receive the COVID-19 vaccine and stay up to date with boosters.     Have a nice brian and stay well! Please let me know if you have questions or concerns.     Yamilet Cuellar MD  Pulmonary, Allergy, Critical Care, and Sleep Medicine   [facility]

## 2023-10-05 NOTE — PROGRESS NOTES
[facility] and [facility]   October 5, 2023         Assessment and Plan:   Mr. Luis Hernandez is a 58 year old male with medical history significant for very severe COPD, hypertension who was referred after recent hospitalization for COPD exacerbation.  He had 3x hospitalizations in 2021 for COPD exacerbations during which time he did grow E. coli, Klebsiella, stenotrophomonas. Additionally, he had an eventful 2022 with recurrent exacerbations and hospitalizations, most recently at [facility] from 4/24/22 through 4/27/22.  He has been started on roflumilast with an excellent response.     Now with waxing/waning nodules including a new, spiculated nodule at the base of the RUL along with a persistent and stable filling defect in the BI/RLL concerning for endobronchial tumor vs. Mucous plugging. He would not tolerate a bronchoscopy so we have elected to monitor this and will perform a PET along with Nodify testing.     1. Very severe COPD (FEV1 18%), panlobular emphysema, minimal cough but mucus production and chronic hypoxic respiratory failure, on 2 to 3 L with exertion (A1ATD neg)  2. Prior tobacco dependence, greater than 50 years, quit in August 2021  3. Likely pulmonary hypertension with RVP of 55 mmHg plus RAP (2019 ECHO), dilated RV but normal RV EF  4. New spiculated 9 mm nodule in L major fissure (4/24/22) - decreased to 3 mm on 7/6/22 CT  5. indeterminate left lower lobe nodule, indeterminate left upper lobe opacity on 8/26/2021 CT  6. New 5 mm nodule in RADHA on 7/6/22 chest CT   7. 4 mm R mid lung pulmonary nodule - unchanged on 7/6/22, 3/9/23  8. 7 mm nodule in posterolateral RUL now 6 mm (from 7 mm), resolved  9. Fissural nodule R mid lung stable, resolved  10.  Endobronchial debris soft tissue versus mucous plugging, mildly enlarged subcarinal and right hilar lymph nodes  11. New spiculated 1.6 cm nodule at base of the RUL on 9/14/23 CT     Recommendations as  follows:   -Continue Advair, spiriva, albuterol  -Continue roflumilast; LFTs have remained stable  -Continue oral mucolytic, Mucinex  -Overnight oximetry   -Continue aerobika use at least 2-3 times per day for 10-minute sessions after albuterol inhaler or nebulizer therapy   -azithromycin/prednisone ordered for home use prn for COPD exacerbation   -PET ordered   -Nodify blood test to help risk stratify new nodule   - sleep eval pending  -he is up to date with covid, influenza, pneumovax vaccines including the bivalent covid booster     I certify that this patient, Luis Hernandez has been under my care (or a nurse practitioner or physican's assistant working with me). This is the face-to-face encounter for oxygen medical necessity.      Luis Hernandez is now in a chronic stable state and continues to require supplemental oxygen. Patient has continued oxygen desaturation due to COPD J44.9.    Alternative treatment(s) tried or considered and deemed clinically infective for treatment of Chronic Respiratory Failure with Hypoxia J93.11 include nebulizers, inhalers and pulmonary toileting.  If portability is ordered, is the patient mobile within the home? yes    Return to clinic in 6 months.    Bianca Cuellar MD  Pulmonary, Allergy, Critical Care, and Sleep Medicine   Pager: [phone]    This note was created using dictation software and may contain errors.  Please contact the creator for any clarifications that are needed.    I have spent 45 minutes on the day of the visit to review the chart, interview and examine the patient, review labs and imaging, formulate a plan, document and submit orders. Time documented is excluding time spent for PFT interpretation          HPI:    Mr. Luis Hernandez is a 58 year old male with medical history significant for very severe COPD, hypertension who was initially referred after recent hospitalization for COPD exacerbation.  He was last seen in the pulmonary clinic in March 2023.  "    Currently feels at his baseline. About one month ago was unable to cough up typical morning mucous. His lungs felt \"dry.\" Breathing became worse so he started his home prednisone course. This helped his breathing. Started mucinex about a week ago and now lots of thin mucous is coming up regularly throughout the day. Also using Aerobika 3x/day. Also utilizing PSB. Prednisone taper ended Monday and no change since. Breathing at baseline.     He has lost about 20# / 6 months. Gained 5# back. Appetite is good and he is eating well. No screening colonoscopy but stools are normal. Has had chronic fatigue.     Exercising daily, primarily with walking up stairs, using pedaller. He continues on 2 L O2, 1.5 LPM at nighttime. Sleep eval is on hold with upcoming cataract surgeries and also planning to get TTE.     + PND but controlled on daily flonase. Very mild seasonal allergy symptoms including sneezing.     Prior:   Currently feeling okay. Recently had a flare up after exposure to smoke from a neighbor burning wood. Feels mucous is thick currently but he is able to cough this up. Discussed using aerobika more, consideration of neb.   He is using his oxygen 24/7.     Reports that prior neb therapy made breathing worse but he will try using albuterol inhaler first then a neb.     He is using roflumilast, respimat, advair, albuterol prn.       Prior history:   He continues to do well and in fact feels better than he has in years. He is using advair, spiriva, albuterol, and roflumilast. No exacerbations since his last visit. He has established care with sleep medicine but is unclear when his evaluation will be - he asks that I reach out to them.     No significant coughing or wheeze. Still with ESPARZA. He remains active with regular exercise at least 3 times per week. He continues to use aerobika device twice daily.  Weight is stable and appetite is excellent.     His daughter had asked about lung transplant possibility at a " prior visit and we discussed some of the intricacies of this including the extensive evaluation and needing to make sure that previously seen nodule is not malignant before moving forward with evaluation. He says that he is not interested in transplant evaluation at this time but would continue to think about it and possibly discuss with family.     Prior history:   He says his breathing has improved quite a bit.  He continues on 2 L O2. He did graduate from pulmonary rehab which was beneficial for him.  He continues to walk and use weights at home.  He is using Advair and Spiriva in the morning.  He does feel like nights are difficult for him due to panicking while sleeping but also due to shortness of breath.  Causes him to wake up.  He is using his albuterol nebulizer once to twice per day.  He does continue to use Roflumilast.  His last exacerbation was in June.  He is using his aerobic 10 minutes 3 times a day and this is beneficial.  He does need to be evaluated for obstructive sleep apnea and usage of positive chronic pressure ventilation at night.    Prior history:  He has had 2 exacerbations in 2022; first in February when he was admitted fro 2/8 - 2/10 and discharged on hospice. Later admitted to [facility] with exacerbation from 4/24 - 4/27 and started on roflumilast. He was seen by the pulmonary consult team during his most recent hospitalization and started on a prolonged taper of prednisone.     He has been doing overall well since discharged. He has started pulmonary rehab and has had 2 sessions with plan for 18 sesssion. He does have an am cough but is able to clear things out with use of aerobika. Currently on prednisone 10 mg daily with ongoing taper plan; no questions about this. Appetite is good and weight is increasing. He is walking more each day and has a personal goal to do 1 flight of stairs (16 steps) 10x per day. Currently using 2 LPM at rest and 3 LPM with movement. Tolerating roflumilast.  Using spiriva, advair, and albuterol and he feels this helps; has felt albuterol nebs make his breathing more difficult. Currently taking low dose lasix to hep with swollen feet and ankles. + some low grade allergy symptoms but very minimal and not taking anything; will continue to monitor.     Prior history:   He reports that he is currently doing well.  He has not had any illnesses since his last visit.  He continues to have a minimal cough with clear mucus production predominantly in the morning.  He is able to clear this well and is using the aerobic advice 3-5 times per day.  He did complete his course of Bactrim and noted that when he finished this he felt much better.  He now can sleep laying down flat which she was unable to do prior to his last visit.    He does do pursed lip breathing and he feels that this helps.  He denies significant wheezing.  He has noted that weather changes affect his breathing. He is very limited with his ability to do activities at home and notes that he gets winded just making a sandwich.  He is using Advair, Spiriva, albuterol about 3 times per day.  He has tried using the DuoNebs but feels that this does not help.    He denies GERD or allergies.  He continues to live with his father and he is not smoking.           Review of Systems:     A 13 point ROS was performed and was negative except as listed above in the HPI.  Weight is increasing.  No orthopnea/PND.           Past Medical and Surgical History:     Past Medical History:   Diagnosis Date    Acute on chronic respiratory failure with hypoxia (H) 4/10/2020    Acute on chronic respiratory failure with hypoxia and hypercapnia (H) 4/1/2019    CAP (community acquired pneumonia) 4/14/2020    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)     COPD exacerbation (H) 4/1/2019    COPD exacerbation (H) 2/16/2020    Erectile dysfunction     Hypertension     Hyponatremia 4/1/2019    Pneumonia 4/10/2020     Past Surgical  History:   Procedure Laterality Date    APPENDECTOMY      childhood           Family History:     Family History   Problem Relation Age of Onset    Hypertension Mother     Ovarian Cancer Mother     Breast Cancer Mother     Hypertension Father     Lipids Father     C.A.D. Father     Heart Disease Father     Chronic Obstructive Pulmonary Disease Father     Diabetes Paternal Grandmother     Chronic Obstructive Pulmonary Disease Paternal Grandfather             Social History:     Social History     Socioeconomic History    Marital status: Single     Spouse name: Not on file    Number of children: Not on file    Years of education: Not on file    Highest education level: Not on file   Occupational History    Not on file   Tobacco Use    Smoking status: Former     Packs/day: 2.00     Years: 30.00     Pack years: 60.00     Types: Cigarettes     Quit date: 2021     Years since quittin.1    Smokeless tobacco: Former   Vaping Use    Vaping Use: Never used   Substance and Sexual Activity    Alcohol use: Yes     Comment: rare    Drug use: No    Sexual activity: Yes     Partners: Female   Other Topics Concern    Parent/sibling w/ CABG, MI or angioplasty before 65F 55M? No   Social History Narrative    The patient has a 60+ pk yr tobacco hx.  He has has no active use, quit 2014.  Alcohol use is <1 alcoholic drinks per week.  He denies use of recreational drugs.  He is employed. [employer].  Works nights.   The patient is .  Has 1 child.Hot Tub Exposure: NORecent Travel: NO Hx of incarceration:  NOBird Exposure:   NOAnimal Exposure:  NOInhalation Exposure:  NO        Lives in [place], [place] with father. 2 dogs.          Social Determinants of Health     Financial Resource Strain: Low Risk  (2021)    Overall Financial Resource Strain (CARDIA)     Difficulty of Paying Living Expenses: Not hard at all   Food Insecurity: No Food Insecurity (2021)    Hunger Vital Sign     Worried About Running Out of Food  in the Last Year: Never true     Ran Out of Food in the Last Year: Never true   Transportation Needs: No Transportation Needs (8/30/2021)    PRAPARE - Transportation     Lack of Transportation (Medical): No     Lack of Transportation (Non-Medical): No   Physical Activity: Inactive (8/30/2021)    Exercise Vital Sign     Days of Exercise per Week: 0 days     Minutes of Exercise per Session: 0 min   Stress: Not on file   Social Connections: Unknown (8/30/2021)    Social Connection and Isolation Panel [NHANES]     Frequency of Communication with Friends and Family: Twice a week     Frequency of Social Gatherings with Friends and Family: Twice a week     Attends Mandaeism Services: Never     Active Member of Clubs or Organizations: No     Attends Club or Organization Meetings: Never     Marital Status: Not on file   Interpersonal Safety: Not on file   Housing Stability: Not on file            Medications:     Current Outpatient Medications   Medication    albuterol (PROAIR HFA/PROVENTIL HFA/VENTOLIN HFA) 108 (90 Base) MCG/ACT inhaler    albuterol (PROVENTIL) (2.5 MG/3ML) 0.083% neb solution    amLODIPine (NORVASC) 10 MG tablet    atenolol (TENORMIN) 25 MG tablet    fluticasone (FLONASE) 50 MCG/ACT nasal spray    fluticasone-salmeterol (ADVAIR) 500-50 MCG/ACT inhaler    guaiFENesin (MUCINEX) 600 MG 12 hr tablet    lisinopril (ZESTRIL) 40 MG tablet    order for DME    order for DME    sodium chloride (NEBUSAL) 3 % neb solution    tiotropium (SPIRIVA RESPIMAT) 2.5 MCG/ACT inhaler    predniSONE (DELTASONE) 10 MG tablet    roflumilast (DALIRESP) 500 MCG TABS tablet     No current facility-administered medications for this visit.            Physical Exam:   /86 (BP Location: Right arm, Patient Position: Sitting, Cuff Size: Adult Regular)   Temp 97.1  F (36.2  C) (Tympanic)   Wt 51 kg (112 lb 6.4 oz)   SpO2 96%   BMI 19.28 kg/m      Constitutional:   Awake, alert and in no apparent distress, pleasant male, on  supplemental oxygen, thin male     Eyes:   nonicteric     HEENT:   Supple without supraclavicular or cervical lymphadenopathy     Lungs:   Reduced air flow b/l and in bases.  Prolonged expiratory phase.  No crackles. No rhonchi.  No wheezes. Speaking in full sentences.      Cardiovascular:   Normal S1 and S2.  RRR.  No murmur, gallop or rub.     Musculoskeletal:   No edema, digital clubbing present     Neurologic:   Alert and conversant.     Skin:   Warm, dry.  No rash on limited exam.  No lower extremity edema.             Data:   All laboratory and imaging data reviewed personally.      Specimen Description   Date Value Ref Range Status   04/13/2020 Urine  Final   04/10/2020 Sputum  Final   04/10/2020 Sputum  Final    Culture Micro   Date Value Ref Range Status   04/10/2020 Moderate growth  Normal latrice    Final   04/10/2020 Moderate growth  Escherichia coli   (A)  Final   04/10/2020 Moderate growth  Klebsiella pneumoniae   (A)  Final        No results found for this or any previous visit (from the past 168 hour(s)).    PFT: Very severe obstructive lung disease. Air trapping and hyperinflation are noted. No significant bronchodilator response is noted. Diffusion capacity is severely reduced. Very reduced compared to prior studies noted in Dr. Wagner's note in 2016.     CT chest - 8/24/21 - personally reviewed: panlobular emphysema predominantly in the upper lobes bilaterally. No infiltrates. Some evidence of mucous impaction on the RLL. Small nodules as detailed below.     IMPRESSION:  1.  No evidence for pulmonary embolism.  2.  New finding of prominent mucus impaction leading to the right  lower lobe bronchus and small airways of the right lower lobe. This is  consistent with aspiration.  3.  New curvilinear opacity along the anterior left upper lobe  midchest could just be focal atelectasis but acute airspace disease  including pneumonia not excluded. New indeterminant pulmonary nodule  at the left lower lobe.  Recommend follow-up CT chest in 6 months.  There are other stable nodules.  4.  Coronary artery calcifications.  5.  Emphysema.    CTPE - 4/24/22 -   Personally reviewed: apical predominant emphysema. Secretions/debris in the bilateral mainstem bronchi. New 9 mm spiculated nodule L major fissure.   IMPRESSION:   1. Exam is negative for acute pulmonary embolism.        Evidence for right heart strain or increased right heart pressures?   is not present.      2. New 9 mm spiculated solid nodule along the left major fissure,  consider follow-up low-dose chest CT in 3 months, PET/CT, or tissue  sampling per Fleischner's Society guidelines.     3. Debris/retained secretions within the bilateral mainstem bronchi,  and extending into the right lower lobe bronchi, concerning for  recurrent aspiration.     CT chest- 7/6/2022-personally reviewed and I agree with the radiologist read of:  IMPRESSION:   1.  Interval decrease in size of previously seen new 9 mm spiculated  pulmonary nodule along the left major fissure, now measuring 3 mm.  2.  New scarlike nodule with a mean diameter of 5 mm in the lateral  left upper lobe.  3.  Unchanged 4 mm right mid lung pulmonary nodule.  4.  Severe pulmonary emphysema and bilateral scarring and/or  atelectasis.  5.  No new or progressive airspace consolidation or pleural fluid.  6.  Indeterminate enlarged right hilar lymph node, new to comparison.    CT chest w/ contrast - 3/9/23 - personally reviewed: CT CHEST WITH CONTRAST  3/9/2023 9:40 AM     CLINICAL HISTORY: Severe emphysema, evaluate nodules for change.  Evaluate right lower lobe bronchus filling defect. Pulmonary nodules.  Panlobular emphysema (H).     TECHNIQUE: CT chest with IV contrast. Multiplanar reformats were  obtained. Dose reduction techniques were used.     CONTRAST: 58 mL Isovue 370     COMPARISON: 1/20/2023, 7/6/2022, 4/24/2022     FINDINGS:   LUNGS AND PLEURA: Advanced emphysematous changes of the lungs are  present.  Thin bandlike pleuroparenchymal scarring in the anterior left  upper lobe is stable. Similar-appearing atelectasis and/or scarring in  the inferomedial right middle lobe is unchanged. No airspace  consolidation or pleural fluid. Bronchial wall thickening and  endobronchial debris involving the right mainstem bronchus and  extending to the bronchus intermedius as well as segmental and  subsegmental branches to the right lower lobe are noted.     Benign densely calcified clustered pulmonary granulomas are seen in  the superior segment of the right lower lobe. Previously described 6  mm scarlike nodule in the right upper lobe is similar to comparison  measuring 5 mm (4-79). Previously described linear 7 mm pulmonary  nodule in the posterolateral right upper lobe now measures 6 mm  (4-110). A previously seen 4 mm noncalcified pulmonary nodule in the  lateral right mid lung is unchanged measuring 4 mm (4-152). A  suspected fissural lymph node in the lateral right midlung is stable  as well (4-193). No new or enlarging pulmonary nodules.      MEDIASTINUM/AXILLAE: There is soft tissue density in the subcarinal  region and right hilum, which are thought to represent mildly enlarged  lymph nodes. Subcarinal lymph node measures 16 mm in short axis  (2-33). Right hilar lymph node measures 10 mm in short axis. Heart  size is normal. No pericardial effusion. Mild to moderate coronary  artery atherosclerosis is present. Thoracic aorta is normal in course  and caliber. Mild thoracic aortic atherosclerosis is present.     UPPER ABDOMEN: Bilateral renal cortical thinning and scarring.  Nonobstructing 2-3 mm left lower pole intrarenal calculi. Tiny  suspected medial left upper pole renal cortical cyst. No follow-up is  necessary. No hydronephrosis.     MUSCULOSKELETAL: Mild degenerative changes of the spine. No acute  osseous abnormality. Chronic-appearing rib fractures.                                                                       IMPRESSION:   1.  Bronchial wall thickening and endobronchial debris involving the  right mainstem bronchus, bronchus intermedius, and segmental and  subsegmental branches to the right lower lobe. Consider further  evaluation with bronchoscopy if clinically indicated.  2.  Nonspecific mildly enlarged subcarinal and right hilar lymph  nodes.  3.  Severe pulmonary emphysema.  4.  Unchanged subcentimeter pulmonary nodules. No new or enlarging  pulmonary nodules.  5.  Nonacute findings as above.    CT chest - 9/14 /23 - personally reviewed and I agree with the radiologist's read of: FINDINGS:   LUNGS AND PLEURA: Stable 4 mm nodule in the lateral aspect of the  right upper lobe on image 173 of series 5.  There is a new spiculated nodule in the base of the right upper lobe  along the right minor fissure measuring 16 x 13 mm on image 187 of  series 5. This is very flat along the minor fissure on the coronal  view, image 23 of series 7. This may represent atelectasis or  scarring. Short-term follow-up or PET scan is recommended.     Stable irregular bronchial wall thickening with some likely luminal  mucus plugging involving the bronchus intermedius and right lower lobe  bronchi. Stable moderate background emphysema. No infiltrate or  effusion.  Nodules in the right upper lobe have resolved.     MEDIASTINUM/AXILLAE: Abnormal soft tissue mass in the subcarinal  region has increased since the previous exam and likely represents  adenopathy. It now measures 18 mm in short axis on image 82 compared  to 15 mm on the previous exam. Borderline enlarged right hilar lymph  node is seen on image 85 measuring 10 mm. Trace pericardial effusion  is present. No axillary adenopathy. The aorta is normal in caliber.     CORONARY ARTERY CALCIFICATION: Severe.     UPPER ABDOMEN: No significant finding.     MUSCULOSKELETAL: Mild degenerative changes are noted throughout the  spine.                                                                       IMPRESSION:   1.  New spiculated appearing nodular opacity along the right minor  fissure is very thin on coronal views and likely represents some  atelectasis or an inflammatory process. Short-term follow-up in one to  two months or PET scan would be recommended.  2.  Stable irregular and lobulated bronchial wall thickening with  intraluminal likely mucous plugging involving the bronchus intermedius  and right lower lobe bronchi is unchanged from the previous exam.  Bronchoscopy should be considered if it has not been performed  already.  3.  Interval increase in size of likely adenopathy in the subcarinal  region. Stable borderline enlarged right hilar lymph node.  4.  Stable background emphysema.  5.  Stable 4 mm right lower lobe nodule. Other nodules in the right  upper lung have resolved. If the patient is high risk, follow-up would  be recommended in one year.

## 2023-10-05 NOTE — NURSING NOTE
"Initial /86 (BP Location: Right arm, Patient Position: Sitting, Cuff Size: Adult Regular)   Temp 97.1  F (36.2  C) (Tympanic)   Wt 51 kg (112 lb 6.4 oz)   SpO2 96%   BMI 19.28 kg/m   Estimated body mass index is 19.28 kg/m  as calculated from the following:    Height as of 8/11/23: 1.626 m (5' 4.02\").    Weight as of this encounter: 51 kg (112 lb 6.4 oz). .  Flores Haji MA    "

## 2023-10-06 ENCOUNTER — TELEPHONE (OUTPATIENT)
Dept: FAMILY MEDICINE | Facility: CLINIC | Age: 58
End: 2023-10-06
Payer: COMMERCIAL

## 2023-10-06 DIAGNOSIS — Z12.11 SCREEN FOR COLON CANCER: Primary | ICD-10-CM

## 2023-10-06 NOTE — TELEPHONE ENCOUNTER
Dr. Mariee received a staff message from patient's pulmonologist, Dr. Cuellar, with concerns regarding unintentional weight loss over past 6 months. Patient has never had screening colonoscopy, and should likely have this completed as part of a thorough workup. I ordered this. Can our staff please call him and advise this was ordered?  Thanks,  Bobbi Rehman, CNP

## 2023-10-09 ENCOUNTER — TRANSFERRED RECORDS (OUTPATIENT)
Dept: HEALTH INFORMATION MANAGEMENT | Facility: CLINIC | Age: 58
End: 2023-10-09
Payer: COMMERCIAL

## 2023-10-09 ENCOUNTER — MEDICAL CORRESPONDENCE (OUTPATIENT)
Dept: HEALTH INFORMATION MANAGEMENT | Facility: CLINIC | Age: 58
End: 2023-10-09
Payer: COMMERCIAL

## 2023-10-10 ENCOUNTER — TELEPHONE (OUTPATIENT)
Dept: PULMONOLOGY | Facility: CLINIC | Age: 58
End: 2023-10-10
Payer: COMMERCIAL

## 2023-10-10 NOTE — TELEPHONE ENCOUNTER
Initial message sent to staff on 10/6, please see below. I am out of office today, can someone please contact patient about this message?  Thanks,  Bobbi Rehman, CNP

## 2023-10-10 NOTE — TELEPHONE ENCOUNTER
Patient says his doctor, Dr. Restrepo, said patient cannot have colonoscopy because of his COPD. Elizabeth Hernandez on 10/10/2023 at 12:40 PM

## 2023-10-10 NOTE — TELEPHONE ENCOUNTER
[facility] Call [facility]    Phone Message    May a detailed message be left on voicemail: yes     Reason for Call: Other: [facility] called to inform Dr. Cuellar signed in the wrong place on the lab orders for this patient please resubmit and have her signed at the authorized physician spot thank you fax to [phone]        Action Taken: Other: Pulm    Travel Screening: Not Applicable

## 2023-10-11 ENCOUNTER — LAB (OUTPATIENT)
Dept: FAMILY MEDICINE | Facility: CLINIC | Age: 58
End: 2023-10-11
Payer: COMMERCIAL

## 2023-10-11 ENCOUNTER — MYC MEDICAL ADVICE (OUTPATIENT)
Dept: FAMILY MEDICINE | Facility: CLINIC | Age: 58
End: 2023-10-11
Payer: COMMERCIAL

## 2023-10-11 DIAGNOSIS — Z12.11 SCREEN FOR COLON CANCER: ICD-10-CM

## 2023-10-11 NOTE — TELEPHONE ENCOUNTER
[facility] Call [facility]    Phone Message    May a detailed message be left on voicemail: yes     Reason for Call: Other: Per [employer] is calling because the area that this needs to be sign has not been sign. Per [employer] will be highlighting the place that is needing the signature. Please out for a form fax from [employer]. Thank you!       Action Taken: Message routed to:  [facility]): PULM    Travel Screening: Not Applicable                                                                .

## 2023-10-11 NOTE — TELEPHONE ENCOUNTER
She reached out to recommend screening. We can start with a Cologuard if he prefers, however if this is positive, we will need to work with Dr. Cuellar and the endoscopy team to safely get him a colonoscopy.  Bobbi Rehman, CNP

## 2023-10-11 NOTE — TELEPHONE ENCOUNTER
Left non-detailed message for patient to return a call to the clinic RN or pt is active in [employer] account also.    Message left for pt to check [employer] for further instructions.    Elisa Matos RN

## 2023-10-12 ENCOUNTER — OFFICE VISIT (OUTPATIENT)
Dept: FAMILY MEDICINE | Facility: CLINIC | Age: 58
End: 2023-10-12
Payer: COMMERCIAL

## 2023-10-12 VITALS
RESPIRATION RATE: 20 BRPM | BODY MASS INDEX: 19.19 KG/M2 | HEIGHT: 64 IN | WEIGHT: 112.4 LBS | DIASTOLIC BLOOD PRESSURE: 82 MMHG | TEMPERATURE: 97.6 F | HEART RATE: 84 BPM | SYSTOLIC BLOOD PRESSURE: 134 MMHG | OXYGEN SATURATION: 98 %

## 2023-10-12 DIAGNOSIS — J44.9 COPD, VERY SEVERE (H): ICD-10-CM

## 2023-10-12 DIAGNOSIS — Z01.818 PREOP GENERAL PHYSICAL EXAM: Primary | ICD-10-CM

## 2023-10-12 DIAGNOSIS — I10 ESSENTIAL HYPERTENSION, BENIGN: ICD-10-CM

## 2023-10-12 DIAGNOSIS — H26.9 CATARACT OF RIGHT EYE, UNSPECIFIED CATARACT TYPE: ICD-10-CM

## 2023-10-12 PROCEDURE — 90480 ADMN SARSCOV2 VAC 1/ONLY CMP: CPT | Performed by: FAMILY MEDICINE

## 2023-10-12 PROCEDURE — 99214 OFFICE O/P EST MOD 30 MIN: CPT | Mod: 25 | Performed by: FAMILY MEDICINE

## 2023-10-12 PROCEDURE — 91320 SARSCV2 VAC 30MCG TRS-SUC IM: CPT | Performed by: FAMILY MEDICINE

## 2023-10-12 PROCEDURE — 90682 RIV4 VACC RECOMBINANT DNA IM: CPT | Performed by: FAMILY MEDICINE

## 2023-10-12 PROCEDURE — G0008 ADMIN INFLUENZA VIRUS VAC: HCPCS | Mod: 59 | Performed by: FAMILY MEDICINE

## 2023-10-12 ASSESSMENT — PAIN SCALES - GENERAL: PAINLEVEL: NO PAIN (0)

## 2023-10-12 NOTE — PROGRESS NOTES
[facility]  [address] [address]  Phone: [phone]  Primary Provider: Dean Mariee  Pre-op Performing Provider: DAVID HINTON      PREOPERATIVE EVALUATION:  Today's date: 10/12/2023    Luis is a 58 year old male who presents for a preoperative evaluation.      10/12/2023     9:12 AM   Additional Questions   Roomed by Anjelica Maher CMA       Surgical Information:  Surgery/Procedure: Cataract removal right eye.  Surgery Location: [facility], [facility]   Surgeon: Dr. Mock  Surgery Date: 11-1-2023  Time of Surgery: Will call as the date gets closer.   Where patient plans to recover: At home with family  Fax number for surgical facility: [phone].    Assessment & Plan     The proposed surgical procedure is considered LOW risk.    There are no diagnoses linked to this encounter.            - No identified additional risk factors other than previously addressed    Antiplatelet or Anticoagulation Medication Instructions:   - Patient is on no antiplatelet or anticoagulation medications.   - Bleeding risk is low for this procedure (e.g. dental, skin, cataract).    Additional Medication Instructions:  Patient is to take all scheduled medications on the day of surgery    RECOMMENDATION:  APPROVAL GIVEN to proceed with proposed procedure, without further diagnostic evaluation.      Subjective       HPI related to upcoming procedure: Patient has cataract right eye causing BOV. Hence, planned surgery. Reviewed above with patient.           10/12/2023     9:19 AM   Preop Questions   1. Have you ever had a heart attack or stroke? No   2. Have you ever had surgery on your heart or blood vessels, such as a stent placement, a coronary artery bypass, or surgery on an artery in your head, neck, heart, or legs? No   3. Do you have chest pain with activity? No   4. Do you have a history of  heart failure? No   5. Do you currently have  a cold, bronchitis or symptoms of other infection? YES - Has been having nasal congestion and a harder time breathing for the last 2 days.  States part of the breathing is his normal baseline for his COPD. Patient said he has standby abx and presdnisone he will be starting today.   6. Do you have a cough, shortness of breath, or wheezing? YES - Due to his COPD.   7. Do you or anyone in your family have previous history of blood clots? No   8. Do you or does anyone in your family have a serious bleeding problem such as prolonged bleeding following surgeries or cuts? No   9. Have you ever had problems with anemia or been told to take iron pills? No   10. Have you had any abnormal blood loss such as black, tarry or bloody stools? No   11. Have you ever had a blood transfusion? No   12. Are you willing to have a blood transfusion if it is medically needed before, during, or after your surgery? Yes   13. Have you or any of your relatives ever had problems with anesthesia? No   14. Do you have sleep apnea, excessive snoring or daytime drowsiness? No   15. Do you have any artifical heart valves or other implanted medical devices like a pacemaker, defibrillator, or continuous glucose monitor? No   16. Do you have artificial joints? No   17. Are you allergic to latex? No     Health Care Directive:  Patient does not have a Health Care Directive or Living Will: Discussed advance care planning with patient; however, patient declined at this time.    Preoperative Review of :   reviewed - no record of controlled substances prescribed.      Status of Chronic Conditions:  See problem list for active medical problems.  Problems all longstanding and stable, except as noted/documented.  See ROS for pertinent symptoms related to these conditions.    ANEMIA - Patient has a recent history of moderate-severe anemia, which has not been symptomatic. Work up to date has revealed results below. Treatment has beennone.     COPD - Patient  has a longstanding history of moderate-severe COPD . Patient has been doing well overall noting SOB and COUGH and continues on medication regimen consisting of Advair and spiriva as maintenance med, and albuterol as prn med without adverse reactions or side effects.    HYPERTENSION - Patient has longstanding history of HTN , currently denies any symptoms referable to elevated blood pressure. Specifically denies chest pain, palpitations, dyspnea, orthopnea, PND or peripheral edema. Blood pressure readings have been in normal range. Current medication regimen is as listed below. Patient denies any side effects of medication.     Review of Systems  CONSTITUTIONAL: NEGATIVE for fever, chills, change in weight  INTEGUMENTARY/SKIN: NEGATIVE for worrisome rashes, moles or lesions  EYES: NEGATIVE for vision changes or irritation  ENT/MOUTH: NEGATIVE for ear, mouth and throat problems  RESP:cough-productive and dyspnea on exertion  CV: NEGATIVE for chest pain, palpitations or peripheral edema  GI: NEGATIVE for nausea, abdominal pain, heartburn, or change in bowel habits  : NEGATIVE for frequency, dysuria, or hematuria  MUSCULOSKELETAL: NEGATIVE for significant arthralgias or myalgia  NEURO: NEGATIVE for weakness, dizziness or paresthesias  ENDOCRINE: NEGATIVE for temperature intolerance, skin/hair changes  HEME: NEGATIVE for bleeding problems  PSYCHIATRIC: NEGATIVE for changes in mood or affect    Patient Active Problem List    Diagnosis Date Noted    Anemia, unspecified type 05/07/2021     Priority: Medium    Peripheral edema 02/16/2020     Priority: Medium    Pulmonary hypertension (H) 02/16/2020     Priority: Medium    Tobacco abuse, 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   Stable 01/2014 to 07/2014,  6 month follow scan recommended.   Chest CT of 1/15/2015= stable nodule, f/u one year.      Advanced directives, counseling/discussion 11/25/2013     Priority: Medium     11/25/2013 Gave patient honoring choices forms to take home and review.  DAVEY Dial ([facility])       CARDIOVASCULAR SCREENING; LDL GOAL LESS THAN 130 10/31/2010     Priority: Medium    COPD, very severe (H) 10/25/2010     Priority: Medium     Severe to very severe      Eczema 10/04/2010     Priority: Medium    ED (erectile dysfunction) 04/20/2009     Priority: Medium    Essential hypertension, benign 10/15/2007     Priority: Medium      Past Medical History:   Diagnosis Date    Acute on chronic respiratory failure with hypoxia (H) 4/10/2020    Acute on chronic respiratory failure with hypoxia and hypercapnia (H) 4/1/2019    CAP (community acquired pneumonia) 4/14/2020    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)     COPD exacerbation (H) 4/1/2019    COPD exacerbation (H) 2/16/2020    Erectile dysfunction     Hypertension     Hyponatremia 4/1/2019    Pneumonia 4/10/2020     Past Surgical History:   Procedure Laterality Date    APPENDECTOMY      childhood     Current Outpatient Medications   Medication Sig Dispense Refill    albuterol (PROAIR HFA/PROVENTIL HFA/VENTOLIN HFA) 108 (90 Base) MCG/ACT inhaler Inhale 2 puffs into the lungs every 4 hours as needed for shortness of breath Hold on file until needed 54 g 3    albuterol (PROVENTIL) (2.5 MG/3ML) 0.083% neb solution Take 1 vial (2.5 mg) by nebulization every 6 hours as needed for shortness of breath / dyspnea or wheezing 90 mL 5    amLODIPine (NORVASC) 10 MG tablet Take 1 tablet (10 mg) by mouth daily 90 tablet 3    atenolol (TENORMIN) 25 MG tablet Take 1 tablet (25 mg) by mouth daily 90 tablet 3    fluticasone (FLONASE) 50 MCG/ACT nasal spray Spray 1-2 sprays into both nostrils daily 16 g 5    fluticasone-salmeterol (ADVAIR) 500-50 MCG/ACT inhaler Inhale 1 puff into the  lungs every 12 hours 60 each 11    guaiFENesin (MUCINEX) 600 MG 12 hr tablet Take 2 tablets (1,200 mg) by mouth 2 times daily 60 tablet 5    lisinopril (ZESTRIL) 40 MG tablet Take 1 tablet (40 mg) by mouth daily 90 tablet 3    order for DME Equipment being ordered: Oxygen 3L via NC continuous.  O2 saturated noted to be 86% on room air at rest. 1 Units 0    order for DME Equipment being ordered: Nebulizer 1 Device 0    roflumilast (DALIRESP) 500 MCG TABS tablet Take 1 tablet (500 mcg) by mouth daily 90 tablet 3    sodium chloride (NEBUSAL) 3 % neb solution Take 3 mLs by nebulization every 3 hours as needed for wheezing or other (thick mucous) 150 mL 0    tiotropium (SPIRIVA RESPIMAT) 2.5 MCG/ACT inhaler Inhale 2 puffs into the lungs daily 12 g 3    predniSONE (DELTASONE) 10 MG tablet Start 40 mg (4 pills) daily for 5 days followed by 30 mg (3 pills) daily for 3 days followed by 20 mg (2 pills) daily for 3 days followed by 10 mg (1 pill) daily for 3 days. (Patient not taking: Reported on 10/5/2023) 40 tablet 3       Allergies   Allergen Reactions    Hctz Other (See Comments)     He has hyponatremia    Penicillins Other (See Comments)     Reaction unknown        Social History     Tobacco Use    Smoking status: Former     Packs/day: 2.00     Years: 30.00     Additional pack years: 0.00     Total pack years: 60.00     Types: Cigarettes     Quit date: 2021     Years since quittin.1    Smokeless tobacco: Former   Substance Use Topics    Alcohol use: Yes     Comment: rare     Family History   Problem Relation Age of Onset    Hypertension Mother     Ovarian Cancer Mother     Breast Cancer Mother     Hypertension Father     Lipids Father     C.A.D. Father     Heart Disease Father     Chronic Obstructive Pulmonary Disease Father     Diabetes Paternal Grandmother     Chronic Obstructive Pulmonary Disease Paternal Grandfather      History   Drug Use No         Objective     /82 (BP Location: Right arm, Patient  "Position: Chair, Cuff Size: Adult Regular)   Pulse 84   Temp 97.6  F (36.4  C) (Tympanic)   Resp 20   Ht 1.626 m (5' 4.02\")   Wt 51 kg (112 lb 6.4 oz)   SpO2 98%   BMI 19.28 kg/m      Physical Exam  GENERAL APPEARANCE: alert and no distress; ambulatory w/o assist  EYES: pink conj, no icterus, PERRL, EOMI  HENT: ear canals and TM's normal, nose and mouth without ulcers or lesions, oropharynx clear and oral mucous membranes moist  NECK: no adenopathy, no asymmetry, masses, or scars and thyroid normal to palpation  RESP: lungs clear to auscultation - no rales, rhonchi or wheezes  CV: regular rates and rhythm, normal S1 S2, no S3 or S4, no murmur, click or rub, no peripheral edema and peripheral pulses strong  ABDOMEN: soft, nontender, no hepatosplenomegaly, no masses and bowel sounds normal  MS: no musculoskeletal defects are noted and gait is age appropriate without ataxia  SKIN: good turgor, no rash/jaundice/ecchymosis  NEURO: Normal strength and tone, sensory exam grossly normal, mentation intact and speech normal     Recent Labs   Lab Test 07/15/22  0943 04/27/22  0544 04/26/22  0507   HGB  --  11.7* 11.7*   PLT  --  422 431    131* 130*   POTASSIUM 3.6 3.8 4.4   CR 0.86 0.61* 0.72        Diagnostics:  No labs were ordered during this visit.   No EKG required for low risk surgery (cataract, skin procedure, breast biopsy, etc).    Revised Cardiac Risk Index (RCRI):  The patient has the following serious cardiovascular risks for perioperative complications:   - No serious cardiac risks = 0 points     RCRI Interpretation: 0 points: Class I (very low risk - 0.4% complication rate)         Signed Electronically by: Adria Edwrads MD  Copy of this evaluation report is provided to requesting physician.      "

## 2023-10-12 NOTE — TELEPHONE ENCOUNTER
Order signed and faxed to [employer] at [phone]    Thank you,  Lisseth ROLON [facility]

## 2023-10-12 NOTE — TELEPHONE ENCOUNTER
Form is located in Secretaries office.   Flores ARNOLD Has original    Signed and faxed. Again.   Jenna DUBON RN  [facility]

## 2023-10-13 NOTE — TELEPHONE ENCOUNTER
Received results from [facility] for pt. [facility] and also put a copy put on  desk.      Digna Atwood   [facility] [place]   [facility]  [phone]

## 2023-10-31 ENCOUNTER — DOCUMENTATION ONLY (OUTPATIENT)
Dept: PULMONOLOGY | Facility: CLINIC | Age: 58
End: 2023-10-31
Payer: COMMERCIAL

## 2023-10-31 DIAGNOSIS — J44.9 CHRONIC OBSTRUCTIVE PULMONARY DISEASE, UNSPECIFIED COPD TYPE (H): Primary | ICD-10-CM

## 2023-11-03 LAB — NONINV COLON CA DNA+OCC BLD SCRN STL QL: NEGATIVE

## 2023-11-06 ENCOUNTER — OFFICE VISIT (OUTPATIENT)
Dept: FAMILY MEDICINE | Facility: CLINIC | Age: 58
End: 2023-11-06
Payer: COMMERCIAL

## 2023-11-06 VITALS
RESPIRATION RATE: 16 BRPM | TEMPERATURE: 97.7 F | OXYGEN SATURATION: 96 % | WEIGHT: 112.1 LBS | BODY MASS INDEX: 19.14 KG/M2 | DIASTOLIC BLOOD PRESSURE: 80 MMHG | HEART RATE: 84 BPM | HEIGHT: 64 IN | SYSTOLIC BLOOD PRESSURE: 110 MMHG

## 2023-11-06 DIAGNOSIS — I10 ESSENTIAL HYPERTENSION, BENIGN: ICD-10-CM

## 2023-11-06 DIAGNOSIS — H26.9 CATARACT OF LEFT EYE, UNSPECIFIED CATARACT TYPE: ICD-10-CM

## 2023-11-06 DIAGNOSIS — Z01.818 PREOP GENERAL PHYSICAL EXAM: Primary | ICD-10-CM

## 2023-11-06 DIAGNOSIS — J96.22 ACUTE ON CHRONIC RESPIRATORY FAILURE WITH HYPOXIA AND HYPERCAPNIA (H): ICD-10-CM

## 2023-11-06 DIAGNOSIS — J96.21 ACUTE ON CHRONIC RESPIRATORY FAILURE WITH HYPOXIA AND HYPERCAPNIA (H): ICD-10-CM

## 2023-11-06 PROCEDURE — 99214 OFFICE O/P EST MOD 30 MIN: CPT | Performed by: FAMILY MEDICINE

## 2023-11-06 ASSESSMENT — PAIN SCALES - GENERAL: PAINLEVEL: NO PAIN (0)

## 2023-11-06 NOTE — PROGRESS NOTES
[facility]  [address] [address]  Phone: [phone]  Primary Provider: Dean Mariee  Pre-op Performing Provider: BEAR RICE    PREOPERATIVE EVALUATION:  Today's date: 11/6/2023    Luis is a 58 year old male who presents for a preoperative evaluation.          11/6/2023     8:56 AM   Additional Questions   Roomed by Debbie BURNS   Accompanied by self         11/6/2023     8:56 AM   Patient Reported Additional Medications   Patient reports taking the following new medications none       Surgical Information:  Surgery/Procedure: LEFT KELMAN PHACOEMULSIFICATION CLEAR CORNEAL INTRAOCULAR LENS IMPLANT   Surgery Location: [facility]  Surgeon: Dr. Mock  Surgery Date: 12/6/23  Time of Surgery: 7:15 am  Where patient plans to recover: At home with family  Fax number for surgical facility: [phone]    Assessment & Plan     The proposed surgical procedure is considered LOW risk.    (Z01.818) Preop general physical exam  (primary encounter diagnosis)  Comment: 58 yr old male here for a pre op evaluation for cataract surgery  Plan: Patient cleared for surgery    (J96.21,  J96.22) Acute on chronic respiratory failure with hypoxia and hypercapnia (H)  Comment: Patient dependent on oxygen.   Plan: No new events    (H26.9) Cataract of left eye, unspecified cataract type  Comment: Left cataract surgery planned  Plan: Patient cleared for surgery    (I10) Essential hypertension, benign  Comment: Asked to hold lisinopril on day of surgery  Plan: as above.            - No identified additional risk factors other than previously addressed    Antiplatelet or Anticoagulation Medication Instructions:   - Patient is on no antiplatelet or anticoagulation medications.    Additional Medication Instructions:   - ACE/ARB: HOLD on day of surgery (minimum 11 hours for general anesthesia).   - Beta Blockers: Continue taking on the day of surgery.   - Calcium  Channel Blockers: May be continued on the day of surgery.    RECOMMENDATION:  APPROVAL GIVEN to proceed with proposed procedure, without further diagnostic evaluation.            Subjective       HPI related to upcoming procedure:     Patient is a 58 yr old male here for a pre op evaluation. He is having a left cataract surgery. His past medical history is significant for hypertension, COPD dependent on oxygen. He denies any acute symptoms.    1. No - Have you ever had a heart attack or stroke?  2. No - Have you ever had surgery on your heart or blood vessels, such as a stent, coronary (heart) bypass, or surgery on an artery in the head, neck, heart, or legs?  3. No - Do you have chest pain when you are physically active?  4. No - Do you have a history of heart failure?  5. No - Do you currently have a cold, bronchitis, or symptoms of other respiratory (head and chest) infections?  6. YES- Do you have a cough, shortness of breath, or wheezing? COPD related  7. No - Do you or anyone in your family have a history of blood clots?  8. No - Do you or anyone in your family have a serious bleeding problem, such as long-lasting bleeding after surgeries or cuts?  9. No - Have you ever had anemia or been told to take iron pills?  10. No - Have you had any abnormal blood loss such as black, tarry or bloody stools, or abnormal vaginal bleeding?  11. No - Have you ever had a blood transfusion?  12. Yes - Are you willing to have a blood transfusion if it is medically needed before, during, or after your surgery?  13. No - Have you or anyone in your family ever had problems with anesthesia (sedation for surgery)?  14. No - Do you have sleep apnea, excessive snoring, or daytime drowsiness?   15. No - Do you have any artifical heart valves or other implanted medical devices, such as a pacemaker, defibrillator, or continuous glucose monitor?  16. No - Do you have any artifical joints?  17. No - Are you allergic to latex?  18. No - Is  there any chance that you may be pregnant?      Health Care Directive:  Patient does not have a Health Care Directive or Living Will: Discussed advance care planning with patient; however, patient declined at this time.    Preoperative Review of :   reviewed - no record of controlled substances prescribed.      Status of Chronic Conditions:  See problem list for active medical problems.  Problems all longstanding and stable, except as noted/documented.  See ROS for pertinent symptoms related to these conditions.    Review of Systems  CONSTITUTIONAL: NEGATIVE for fever, chills, change in weight  INTEGUMENTARY/SKIN: NEGATIVE for worrisome rashes, moles or lesions  EYES: POSITIVE for blurred vision left  ENT/MOUTH: NEGATIVE for ear, mouth and throat problems  RESP: NEGATIVE for significant cough or SOB  CV: NEGATIVE for chest pain, palpitations or peripheral edema  GI: NEGATIVE for nausea, abdominal pain, heartburn, or change in bowel habits  : NEGATIVE for frequency, dysuria, or hematuria  MUSCULOSKELETAL: NEGATIVE for significant arthralgias or myalgia  NEURO: NEGATIVE for weakness, dizziness or paresthesias  ENDOCRINE: NEGATIVE for temperature intolerance, skin/hair changes  HEME: NEGATIVE for bleeding problems  PSYCHIATRIC: NEGATIVE for changes in mood or affect    Patient Active Problem List    Diagnosis Date Noted    Anemia, unspecified type 05/07/2021     Priority: Medium    Peripheral edema 02/16/2020     Priority: Medium    Pulmonary hypertension (H) 02/16/2020     Priority: Medium    Tobacco abuse, 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   Stable 01/2014 to 07/2014, 6 month follow scan recommended.   Chest CT of 1/15/2015= stable nodule, f/u one year.      Advanced directives,  counseling/discussion 11/25/2013     Priority: Medium     11/25/2013 Gave patient honoring choices forms to take home and review.  [facility] ([facility]       CARDIOVASCULAR SCREENING; LDL GOAL LESS THAN 130 10/31/2010     Priority: Medium    COPD, very severe (H) 10/25/2010     Priority: Medium     Severe to very severe      Eczema 10/04/2010     Priority: Medium    ED (erectile dysfunction) 04/20/2009     Priority: Medium    Essential hypertension, benign 10/15/2007     Priority: Medium      Past Medical History:   Diagnosis Date    Acute on chronic respiratory failure with hypoxia (H) 4/10/2020    Acute on chronic respiratory failure with hypoxia and hypercapnia (H) 4/1/2019    CAP (community acquired pneumonia) 4/14/2020    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)     COPD exacerbation (H) 4/1/2019    COPD exacerbation (H) 2/16/2020    Erectile dysfunction     Hypertension     Hyponatremia 4/1/2019    Pneumonia 4/10/2020     Past Surgical History:   Procedure Laterality Date    APPENDECTOMY      childhood     Current Outpatient Medications   Medication Sig Dispense Refill    albuterol (PROAIR HFA/PROVENTIL HFA/VENTOLIN HFA) 108 (90 Base) MCG/ACT inhaler Inhale 2 puffs into the lungs every 4 hours as needed for shortness of breath Hold on file until needed 54 g 3    albuterol (PROVENTIL) (2.5 MG/3ML) 0.083% neb solution Take 1 vial (2.5 mg) by nebulization every 6 hours as needed for shortness of breath / dyspnea or wheezing 90 mL 5    amLODIPine (NORVASC) 10 MG tablet Take 1 tablet (10 mg) by mouth daily 90 tablet 3    atenolol (TENORMIN) 25 MG tablet Take 1 tablet (25 mg) by mouth daily 90 tablet 3    fluticasone (FLONASE) 50 MCG/ACT nasal spray Spray 1-2 sprays into both nostrils daily 16 g 5    fluticasone-salmeterol (ADVAIR) 500-50 MCG/ACT inhaler Inhale 1 puff into the lungs every 12 hours 60 each 11    guaiFENesin (MUCINEX) 600 MG 12 hr tablet Take 2 tablets (1,200 mg) by mouth 2  times daily 60 tablet 5    lisinopril (ZESTRIL) 40 MG tablet Take 1 tablet (40 mg) by mouth daily 90 tablet 3    order for DME Equipment being ordered: Oxygen 3L via NC continuous.  O2 saturated noted to be 86% on room air at rest. 1 Units 0    order for DME Equipment being ordered: Nebulizer 1 Device 0    roflumilast (DALIRESP) 500 MCG TABS tablet Take 1 tablet (500 mcg) by mouth daily 90 tablet 3    tiotropium (SPIRIVA RESPIMAT) 2.5 MCG/ACT inhaler Inhale 2 puffs into the lungs daily 12 g 3    predniSONE (DELTASONE) 10 MG tablet Start 40 mg (4 pills) daily for 5 days followed by 30 mg (3 pills) daily for 3 days followed by 20 mg (2 pills) daily for 3 days followed by 10 mg (1 pill) daily for 3 days. (Patient not taking: Reported on 10/5/2023) 40 tablet 3    sodium chloride (NEBUSAL) 3 % neb solution Take 3 mLs by nebulization every 3 hours as needed for wheezing or other (thick mucous) (Patient not taking: Reported on 2023) 150 mL 0       Allergies   Allergen Reactions    Hctz Other (See Comments)     He has hyponatremia    Penicillins Other (See Comments)     Reaction unknown        Social History     Tobacco Use    Smoking status: Former     Packs/day: 2.00     Years: 30.00     Additional pack years: 0.00     Total pack years: 60.00     Types: Cigarettes     Quit date: 2021     Years since quittin.2    Smokeless tobacco: Former   Substance Use Topics    Alcohol use: Yes     Comment: rare     Family History   Problem Relation Age of Onset    Hypertension Mother     Ovarian Cancer Mother     Breast Cancer Mother     Hypertension Father     Lipids Father     C.A.D. Father     Heart Disease Father     Chronic Obstructive Pulmonary Disease Father     Diabetes Paternal Grandmother     Chronic Obstructive Pulmonary Disease Paternal Grandfather      History   Drug Use No         Objective     /80 (BP Location: Right arm, Patient Position: Sitting, Cuff Size: Adult Regular)   Pulse 84   Temp 97.7  F  "(36.5  C) (Tympanic)   Resp 16   Ht 1.613 m (5' 3.5\")   Wt 50.8 kg (112 lb 1.6 oz)   SpO2 96%   BMI 19.55 kg/m      Physical Exam  GENERAL APPEARANCE: healthy, alert and no distress  HENT: ear canals and TM's normal and nose and mouth without ulcers or lesions  RESP: lungs clear to auscultation - no rales, rhonchi or wheezes  CV: regular rate and rhythm, normal S1 S2, no S3 or S4 and no murmur, click or rub   ABDOMEN: soft, nontender, no HSM or masses and bowel sounds normal  NEURO: Normal strength and tone, sensory exam grossly normal, mentation intact and speech normal    Recent Labs   Lab Test 07/15/22  0943 04/27/22  0544 04/26/22  0507   HGB  --  11.7* 11.7*   PLT  --  422 431    131* 130*   POTASSIUM 3.6 3.8 4.4   CR 0.86 0.61* 0.72        Diagnostics:  No labs were ordered during this visit.   No EKG required, no history of coronary heart disease, significant arrhythmia, peripheral arterial disease or other structural heart disease.    Revised Cardiac Risk Index (RCRI):  The patient has the following serious cardiovascular risks for perioperative complications:   - No serious cardiac risks = 0 points     RCRI Interpretation: 0 points: Class I (very low risk - 0.4% complication rate)         Signed Electronically by: Juan Manuel Almeida MD  Copy of this evaluation report is provided to requesting physician.      "

## 2023-11-09 ENCOUNTER — HOSPITAL ENCOUNTER (OUTPATIENT)
Dept: PET IMAGING | Facility: CLINIC | Age: 58
Discharge: HOME | End: 2023-11-09
Attending: INTERNAL MEDICINE | Admitting: INTERNAL MEDICINE
Payer: COMMERCIAL

## 2023-11-09 DIAGNOSIS — R91.8 PULMONARY NODULES: ICD-10-CM

## 2023-11-09 PROCEDURE — 78815 PET IMAGE W/CT SKULL-THIGH: CPT | Mod: PI

## 2023-11-09 PROCEDURE — A9552 F18 FDG: HCPCS | Performed by: INTERNAL MEDICINE

## 2023-11-09 PROCEDURE — 343N000001 HC RX 343: Performed by: INTERNAL MEDICINE

## 2023-11-09 RX ADMIN — FLUDEOXYGLUCOSE F-18 11.1 MILLICURIE: 500 INJECTION, SOLUTION INTRAVENOUS at 13:04

## 2023-11-14 ENCOUNTER — TELEPHONE (OUTPATIENT)
Dept: MULTI SPECIALTY CLINIC | Facility: CLINIC | Age: 58
End: 2023-11-14
Payer: COMMERCIAL

## 2023-11-14 DIAGNOSIS — R94.2 ABNORMAL PET SCAN OF LUNG: Primary | ICD-10-CM

## 2023-11-14 DIAGNOSIS — J16.8 PNEUMONIA DUE TO OTHER SPECIFIED INFECTIOUS ORGANISMS: ICD-10-CM

## 2023-11-14 DIAGNOSIS — R93.3 ABNORMAL CT SCAN, COLON: ICD-10-CM

## 2023-11-15 ENCOUNTER — PATIENT OUTREACH (OUTPATIENT)
Dept: ONCOLOGY | Facility: CLINIC | Age: 58
End: 2023-11-15
Payer: COMMERCIAL

## 2023-11-15 NOTE — PROGRESS NOTES
New Patient Oncology Nurse Navigator Note     Referring provider: Dr. Bianca Cuellar    Referring Clinic/Organization: [facility]  Referred to: Medical Oncology and Radiation Oncology  Requested provider (if applicable): First available - did not specify   Referral Received: 11/15/23       Evaluation for :   Diagnosis   R94.2 (ICD-10-CM) - Abnormal PET scan of lung   R93.3 (ICD-10-CM) - Abnormal CT scan, colon     Clinical History (per Nurse review of records provided):      09/14/2023 CT Chest w/ contrast (bookmarked) showed:   IMPRESSION:   1.  New spiculated appearing nodular opacity along the right minor  fissure is very thin on coronal views and likely represents some  atelectasis or an inflammatory process. Short-term follow-up in one to  two months or PET scan would be recommended.  2.  Stable irregular and lobulated bronchial wall thickening with  intraluminal likely mucous plugging involving the bronchus intermedius  and right lower lobe bronchi is unchanged from the previous exam.  Bronchoscopy should be considered if it has not been performed  already.  3.  Interval increase in size of likely adenopathy in the subcarinal  region. Stable borderline enlarged right hilar lymph node.  4.  Stable background emphysema.  5.  Stable 4 mm right lower lobe nodule. Other nodules in the right  upper lung have resolved. If the patient is high risk, follow-up would  be recommended in one year.     11/09/2023 PET (bookmarked) showed:   IMPRESSION:     1.  FDG avid right hilar soft tissue which encases the bronchus intermedius and extends along the right middle lobe bronchus and is contiguous with subcarinal candice tissue and endoluminal soft tissue of the right lower lobe bronchus. Findings are   concerning for a primary lung malignancy.  2.  There are additional FDG avid opacities/nodules bilaterally including in the right lower lobe, left suprahilar region, medial left lower lobe, and right upper lobe these may  "be infectious/inflammatory in etiology given that many are new compared to   prior. However, endobronchial spread of tumor is possible. Recommend short-term interval follow-up with chest CT.  3.  Hypermetabolic soft tissue thickening of the distal sigmoid/upper rectum, concerning for malignancy although an inflammatory etiology is possible given adjacent diverticular disease. Recommend further evaluation with colonoscopy.    11/14/2023 Telephone note from Dr. Cuellar bookmarked - discussed her concern with pt's ability to tolerate a bronchoscopy for biopsy and referring to Med Onc for considering alternative method of biopsy.     Clinical Assessment / Barriers to Care (Per Nurse):    Tobacco History    Smoking Status  Former Quit Date  08/2021 Smoking Frequency  2.00 packs/day for 30.00 years (60.00 ttl pk-yrs) Smoking Tobacco Type  Cigarettes quit in 08/2021   Smokeless Tobacco Use  Former     Records Location: Care Everywhere   Records Needed: None  Additional testing needed prior to consult: TBD  Referral updates and Plan:   Writer called Luis and discussed the referral. He reports [place] would be his preferred cancer center location however next available isn't until mid December. Luis agreed to be seen at another location but requested as a video visit because he is on oxygen and has a difficult time \"getting around.\" Writer explained that once the initial work up is done he can choose to transfer care at a later date to the [facility] once there are openings there. Luis verbalized understanding and is in agreement with the plan. Holding off on scheduling Rad Onc consult for now as there is no biopsy yet. Will defer for Med Onc to arrange if they wish to in the future.     PRABHJOT GilliamN, RN, OCN  [facility] Oncology Nurse Navigator  [phone] / [phone]    "

## 2023-11-15 NOTE — TELEPHONE ENCOUNTER
RECORDS STATUS - ALL OTHER DIAGNOSIS      RECORDS RECEIVED FROM: [facility] - Internal Records   DATE RECEIVED: 11/15

## 2023-11-15 NOTE — TELEPHONE ENCOUNTER
"Discussed PET findings with Luis which show 2 areas that are concerning for possible malignancy (in lung and also in colon). Discussed again my concern that he would not tolerate a bronchoscopy to obtain a biopsy of the lymph nodes given FEV1 18% and oxygen dependence. He has noted more difficulty breathing over the last several months less responsive to prednisone/azithromycin. We will trial a course of Augmentin to see if this helps his symptoms for possible post-obstructive pneumonia. Also will refer to oncology and consider alternative method of biopsy (?colon vs. EUS?).     Reviewed his listed \"penicillin allergy\" which he says was from when he was a baby and he cannot recall the symptoms. He has received both cefepime and cephalexin previously and tolerated this. PEN-FAST score of 0, <1% risk of allergy.     All of Mr. Griffiths's questions/concerns were addressed to the best of my ability. He will expect a call from the schedulers for the oncology team.     Bianca Cuellar MD  Pulmonary, Allergy, Critical Care, and Sleep Medicine   [facility]   Pager: [phone]    "

## 2023-11-16 NOTE — PROGRESS NOTES
"[facility]   New Patient Visit    Name: Luis Hernandez  MRN: [id number]  Date of visit: Nov 17, 2023    Diagnosis: Lung mass  Stage: TBD    Molecular: TBD  Performance status: ECOG 2    Oncology History:     9/14/23 CT chest:   \"There is a new spiculated nodule in the base of the right upper lobe along the right minor fissure measuring 16 x 13 mm on image 187 of series 5. This is very flat along the minor fissure on the coronal view, image 23 of series 7. This may represent atelectasis or scarring. Short-term follow-up or PET scan is recommended.\"    \"Abnormal soft tissue mass in the subcarinal region has increased since the previous exam and likely represents adenopathy.\"    11/9/23 PET/CT:   \"FDG avid right hilar soft tissue which encases the bronchus intermedius and extends along the right middle lobe bronchus and is contiguous with subcarinal candice tissue and endoluminal soft tissue of the right lower lobe bronchus. Findings are concerning for a primary lung malignancy.\"    \"There are additional FDG avid opacities/nodules bilaterally including in the right lower lobe, left suprahilar region, medial left lower lobe, and right upper lobe these may be infectious/inflammatory in etiology given that many are new compared to prior. However, endobronchial spread of tumor is possible.\"        HPI:   Luis Hernandez is a 58 year old male with a past medical history that includes HTN, severe COPD with several hospitalizations, noted to have a new pumonary nodules and hilar and mediastinal lymphadenopathy concerning for malignancy.     Most recent hospitalization for COPD exacerbation was at [facility] in April 2022, after which he was discharged on a short course of steroids and levofloxacin given concern for concomitant bacterial pneumonia. Given his frequent hospitalizations, CT imaging of the chest has been frequent. In September of this year a CT scan revealed a new RUL spiculated nodule " "measuring 1.6 cm in greatest dimension. Subsequent PET/CT in November showed this lesion has resolved, however, there are new bilateral FDG avid lesions wihtin the RLL, L suprahilar area, and left lower lobe, along with FDG avid R hilar and subcarinal lymphadenopathy.     Patient reports his breathing symptoms became more limiting about 2 years ago. Previously had been pretty active with outdoor and sporting activities. He reports the past two months his breathing has been noticeably worse. Using 2L O2 at rest and 3L with ambulation (this has been generally stable). Currently, he can walk about 20 ft before pausing to rest. Before, he had been walking several blocks, albeit at a slow pace, without stopping much. Now his chest \"tightens up\" with walking and he reports having more difficulty clearing thick secretions.     He's had fewer hospitalizations with adjustments of his inhaler regimen. Last admission was in April 2022. Was started on a course of augmentin yesterday (for 10 days) given his respiratory symptoms and some concern for an infectious etiology contributing. He does report some recent diminished appetite and mild nausea. Reports his weight has been stable.     His recent PET/CT also noted a soft tissue thickening of the distal sigmoid colon/upper rectum. He denies diarrhea or bloody stools and no change in stool caliber. He reports a cologuard test a month ago was negative.     Lives up in [facility], lived there his whole life  Grew up in [place], whole family up there  Daughter lives in [place], three grandchildren (13, 11, 10)  Used to bass fish a lot, bowling and softball in the past, now all of this is limited by COPD  No history of cancer  Mother had breast cancer in 40s  No current tobacco use (quit 2 years ago)    Exam:   No vitals taken this visit (e-visit)    Gen: Comfortable appearing, NAD  Resp: Using 2L by NC, speaking in full sentences      Labs:   No recent labs to review    Imaging: "   All pertinent imaging studies personally reviewed, steele findings included in oncology history above    9/14/23 CT chest:                                     IMPRESSION:   1.  New spiculated appearing nodular opacity along the right minor  fissure is very thin on coronal views and likely represents some  atelectasis or an inflammatory process. Short-term follow-up in one to  two months or PET scan would be recommended.  2.  Stable irregular and lobulated bronchial wall thickening with  intraluminal likely mucous plugging involving the bronchus intermedius  and right lower lobe bronchi is unchanged from the previous exam.  Bronchoscopy should be considered if it has not been performed  already.  3.  Interval increase in size of likely adenopathy in the subcarinal  region. Stable borderline enlarged right hilar lymph node.  4.  Stable background emphysema.  5.  Stable 4 mm right lower lobe nodule. Other nodules in the right  upper lung have resolved. If the patient is high risk, follow-up would  be recommended in one year.    11/9/23 PET/CT:  IMPRESSION:  1.  FDG avid right hilar soft tissue which encases the bronchus intermedius and extends along the right middle lobe bronchus and is contiguous with subcarinal candice tissue and endoluminal soft tissue of the right lower lobe bronchus. Findings are concerning for a primary lung malignancy.  2.  There are additional FDG avid opacities/nodules bilaterally including in the right lower lobe, left suprahilar region, medial left lower lobe, and right upper lobe these may be infectious/inflammatory in etiology given that many are new compared to prior. However, endobronchial spread of tumor is possible. Recommend short-term interval follow-up with chest CT.  3.  Hypermetabolic soft tissue thickening of the distal sigmoid/upper rectum, concerning for malignancy although an inflammatory etiology is possible given adjacent diverticular disease. Recommend further evaluation with  colonoscopy.    Pathology:   None to review    Assessment and Plan:   Luis Hernandez is a 58 year old male with severe COPD and new pulmonary nodule with associated FDG avid intrathoracic lymphadenopathy, presenting today for consideration of further workup and management.       # Pulmonary nodule with hilar and mediastinal LAD c/f malignancy  -no tissue diagnosis  -RUL lesion seen on 9/14/23 CT chest resolved on 11/9/23 PET/CT, while additional nodules are new in the interim  -inflammatory/infectious etiology remains possible    Plan:  --will discuss with Dr. Cuellar and/or interventional pulmonology re: bronchoscopy risk, on my review there the RUL lesion is now resolved and would therefore not be amenable to biopsy  --repeat PET/CT in 3 months after course of augmentin (started yesterday)  --if persistent FDG avid lymphadenopathy will consider sending ctDNA assay and re-evaluate risk of bronchoscopy for tissue diagnosis      # FDG avid sigmoid/rectal lesion  -PET/CT noted hypermetabolic thickening of the distale sigmoid vs proximal rectum indeterminate for malignancy vs inflammatory  -patient reports no change in stool character, no blood  -10/25/23 cologuard test negative    Plan:  --monitor for now while pulmonary workup ongoing  --will consider focused imaging (MR abdomen/pelvis) as may not tolerate colonoscopy      Tim Proctor MD PhD   of Medicine  Division of Hematology, Oncology and Transplantation    ---  60 minutes were spent on the date of the encounter performing chart review, history and exam, documentation, and further activities as noted above.

## 2023-11-17 ENCOUNTER — VIRTUAL VISIT (OUTPATIENT)
Dept: ONCOLOGY | Facility: CLINIC | Age: 58
End: 2023-11-17
Attending: INTERNAL MEDICINE
Payer: COMMERCIAL

## 2023-11-17 ENCOUNTER — PRE VISIT (OUTPATIENT)
Dept: ONCOLOGY | Facility: CLINIC | Age: 58
End: 2023-11-17
Payer: COMMERCIAL

## 2023-11-17 ENCOUNTER — PATIENT OUTREACH (OUTPATIENT)
Dept: ONCOLOGY | Facility: CLINIC | Age: 58
End: 2023-11-17
Payer: COMMERCIAL

## 2023-11-17 VITALS — BODY MASS INDEX: 18.78 KG/M2 | WEIGHT: 110 LBS | HEIGHT: 64 IN

## 2023-11-17 DIAGNOSIS — R93.3 ABNORMAL CT SCAN, COLON: ICD-10-CM

## 2023-11-17 DIAGNOSIS — R94.2 ABNORMAL PET SCAN OF LUNG: ICD-10-CM

## 2023-11-17 PROCEDURE — 99205 OFFICE O/P NEW HI 60 MIN: CPT | Mod: VID | Performed by: STUDENT IN AN ORGANIZED HEALTH CARE EDUCATION/TRAINING PROGRAM

## 2023-11-17 ASSESSMENT — PAIN SCALES - GENERAL: PAINLEVEL: NO PAIN (0)

## 2023-11-17 NOTE — PROGRESS NOTES
Virtual Visit Details    Type of service:  Video Visit   Video Start Time:  9:29  Video End Time: 9:47    Originating Location (pt. Location): Home    Distant Location (provider location):  On-site  Platform used for Video Visit: Shahriar

## 2023-11-17 NOTE — NURSING NOTE
Is the patient currently in the state of [place]? YES    Visit mode:VIDEO    If the visit is dropped, the patient can be reconnected by: VIDEO VISIT: Text to cell phone:   Telephone Information:   Mobile [phone]       Will anyone else be joining the visit? NO  (If patient encounters technical issues they should call [phone]    How would you like to obtain your AVS? MyChart    Are changes needed to the allergy or medication list? No    Reason for visit: Consult    Medications and allergies have been reviewed.     Pato MEEKS

## 2023-11-17 NOTE — PROGRESS NOTES
[facility]: Cancer Care Initial Note                                    Discussion with Patient:                                                      Reached out to Luis following his video visit with Dr. Proctor today. Introduced myself as Dr. Proctor's RN Care Coordinator at the [facility].    Provided Luis with my contact information and encouraged him to call or MyChart with any needs. Additionally reminded him to contact the triage team for any symptom/side effect concerns.    Treatment plan TBD - will provide formal chemotherapy education if Luis pursue chemo.    Luis voiced an understanding and denies any further questions; he understands that I will follow and provided care coordination as needed.    Rosa Monge, RN, BSN  RN Care Coordinator  [facility]       Assessment:                                                      Initial  Current living arrangement:: I live in a private home  Informal Support system:: Family  Equipment Currently Used at Home: none  Bed or wheelchair confined:: No  Mobility Status: Independent  Transportation means:: Regular car  Medication adherence problem (GOAL):: No  Knowledgeable about how to use meds:: Yes  Medication side effects suspected:: No  Referrals Placed: None  Advanced Care Plans/Directives on file:: No  Patient Reported Pain?: No  Pain Score: No Pain (0)    No assessment indicated    Intervention/Education provided during outreach:                                                       Patient to follow up as scheduled at next appt  Patient to call/MyChart message with updates  Confirmed patient has clinic and triage numbers    Rosa Monge, RN, BSN  RN Care Coordinator  [facility]

## 2023-11-17 NOTE — LETTER
"    11/17/2023         RE: Luis Hernandez  [address] [address] [facility] [address]        Dear Colleague,    Thank you for referring your patient, Luis Hernandez, to the [facility]. Please see a copy of my visit note below.    [facility]   New Patient Visit    Name: Luis Hernandez  MRN: [id number]  Date of visit: Nov 17, 2023    Diagnosis: Lung mass  Stage: TBD    Molecular: TBD  Performance status: ECOG 2    Oncology History:     9/14/23 CT chest:   \"There is a new spiculated nodule in the base of the right upper lobe along the right minor fissure measuring 16 x 13 mm on image 187 of series 5. This is very flat along the minor fissure on the coronal view, image 23 of series 7. This may represent atelectasis or scarring. Short-term follow-up or PET scan is recommended.\"    \"Abnormal soft tissue mass in the subcarinal region has increased since the previous exam and likely represents adenopathy.\"    11/9/23 PET/CT:   \"FDG avid right hilar soft tissue which encases the bronchus intermedius and extends along the right middle lobe bronchus and is contiguous with subcarinal candice tissue and endoluminal soft tissue of the right lower lobe bronchus. Findings are concerning for a primary lung malignancy.\"    \"There are additional FDG avid opacities/nodules bilaterally including in the right lower lobe, left suprahilar region, medial left lower lobe, and right upper lobe these may be infectious/inflammatory in etiology given that many are new compared to prior. However, endobronchial spread of tumor is possible.\"        HPI:   Luis Hernandez is a 58 year old male with a past medical history that includes HTN, severe COPD with several hospitalizations, noted to have a new pumonary nodules and hilar and mediastinal lymphadenopathy concerning for malignancy.     Most recent hospitalization for COPD exacerbation was at [facility] in April 2022, after which he was discharged on " "a short course of steroids and levofloxacin given concern for concomitant bacterial pneumonia. Given his frequent hospitalizations, CT imaging of the chest has been frequent. In September of this year a CT scan revealed a new RUL spiculated nodule measuring 1.6 cm in greatest dimension. Subsequent PET/CT in November showed this lesion has resolved, however, there are new bilateral FDG avid lesions wihtin the RLL, L suprahilar area, and left lower lobe, along with FDG avid R hilar and subcarinal lymphadenopathy.     Patient reports his breathing symptoms became more limiting about 2 years ago. Previously had been pretty active with outdoor and sporting activities. He reports the past two months his breathing has been noticeably worse. Using 2L O2 at rest and 3L with ambulation (this has been generally stable). Currently, he can walk about 20 ft before pausing to rest. Before, he had been walking several blocks, albeit at a slow pace, without stopping much. Now his chest \"tightens up\" with walking and he reports having more difficulty clearing thick secretions.     He's had fewer hospitalizations with adjustments of his inhaler regimen. Last admission was in April 2022. Was started on a course of augmentin yesterday (for 10 days) given his respiratory symptoms and some concern for an infectious etiology contributing. He does report some recent diminished appetite and mild nausea. Reports his weight has been stable.     His recent PET/CT also noted a soft tissue thickening of the distal sigmoid colon/upper rectum. He denies diarrhea or bloody stools and no change in stool caliber. He reports a cologuard test a month ago was negative.     Lives up in [place], lived there his whole life  Grew up in [place], whole family up there  Daughter lives in [place], three grandchildren (13, 11, 10)  Used to bass fish a lot, bowling and softball in the past, now all of this is limited by COPD  No history of cancer  Mother had " breast cancer in 40s  No current tobacco use (quit 2 years ago)    Exam:   No vitals taken this visit (e-visit)    Gen: Comfortable appearing, NAD  Resp: Using 2L by NC, speaking in full sentences      Labs:   No recent labs to review    Imaging:   All pertinent imaging studies personally reviewed, steele findings included in oncology history above    9/14/23 CT chest:                                     IMPRESSION:   1.  New spiculated appearing nodular opacity along the right minor  fissure is very thin on coronal views and likely represents some  atelectasis or an inflammatory process. Short-term follow-up in one to  two months or PET scan would be recommended.  2.  Stable irregular and lobulated bronchial wall thickening with  intraluminal likely mucous plugging involving the bronchus intermedius  and right lower lobe bronchi is unchanged from the previous exam.  Bronchoscopy should be considered if it has not been performed  already.  3.  Interval increase in size of likely adenopathy in the subcarinal  region. Stable borderline enlarged right hilar lymph node.  4.  Stable background emphysema.  5.  Stable 4 mm right lower lobe nodule. Other nodules in the right  upper lung have resolved. If the patient is high risk, follow-up would  be recommended in one year.    11/9/23 PET/CT:  IMPRESSION:  1.  FDG avid right hilar soft tissue which encases the bronchus intermedius and extends along the right middle lobe bronchus and is contiguous with subcarinal candice tissue and endoluminal soft tissue of the right lower lobe bronchus. Findings are concerning for a primary lung malignancy.  2.  There are additional FDG avid opacities/nodules bilaterally including in the right lower lobe, left suprahilar region, medial left lower lobe, and right upper lobe these may be infectious/inflammatory in etiology given that many are new compared to prior. However, endobronchial spread of tumor is possible. Recommend short-term interval  follow-up with chest CT.  3.  Hypermetabolic soft tissue thickening of the distal sigmoid/upper rectum, concerning for malignancy although an inflammatory etiology is possible given adjacent diverticular disease. Recommend further evaluation with colonoscopy.    Pathology:   None to review    Assessment and Plan:   Luis Hernandez is a 58 year old male with severe COPD and new pulmonary nodule with associated FDG avid intrathoracic lymphadenopathy, presenting today for consideration of further workup and management.       # Pulmonary nodule with hilar and mediastinal LAD c/f malignancy  -no tissue diagnosis  -RUL lesion seen on 9/14/23 CT chest resolved on 11/9/23 PET/CT, while additional nodules are new in the interim  -inflammatory/infectious etiology remains possible    Plan:  --will discuss with Dr. Cuellar and/or interventional pulmonology re: bronchoscopy risk, on my review there the RUL lesion is now resolved and would therefore not be amenable to biopsy  --repeat PET/CT in 3 months after course of augmentin (started yesterday)  --if persistent FDG avid lymphadenopathy will consider sending ctDNA assay and re-evaluate risk of bronchoscopy for tissue diagnosis      # FDG avid sigmoid/rectal lesion  -PET/CT noted hypermetabolic thickening of the distale sigmoid vs proximal rectum indeterminate for malignancy vs inflammatory  -patient reports no change in stool character, no blood  -10/25/23 cologuard test negative    Plan:  --monitor for now while pulmonary workup ongoing  --will consider focused imaging (MR abdomen/pelvis) as may not tolerate colonoscopy      Tim Proctor MD PhD   of Medicine  Division of Hematology, Oncology and Transplantation    ---  60 minutes were spent on the date of the encounter performing chart review, history and exam, documentation, and further activities as noted above.        Virtual Visit Details    Type of service:  Video Visit   Video Start Time:   9:29  Video End Time: 9:47    Originating Location (pt. Location): Home    Distant Location (provider location):  On-site  Platform used for Video Visit: Shahriar

## 2023-11-27 ENCOUNTER — PATIENT OUTREACH (OUTPATIENT)
Dept: ONCOLOGY | Facility: CLINIC | Age: 58
End: 2023-11-27
Payer: COMMERCIAL

## 2023-11-27 DIAGNOSIS — R93.3 ABNORMAL CT SCAN, COLON: ICD-10-CM

## 2023-11-27 DIAGNOSIS — R94.2 ABNORMAL PET SCAN OF LUNG: Primary | ICD-10-CM

## 2023-11-27 NOTE — PROGRESS NOTES
New Patient Oncology Nurse Navigator Note     Referring provider: Dr. Proctor and Dr. Cuellar    Referring Clinic/Organization: [facility]  Referred to: Interventional Pulmonology  Requested provider (if applicable): First available - did not specify   Referral Received: 11/27/23       Evaluation for :   Diagnosis   R94.2 (ICD-10-CM) - Abnormal PET scan of lung   R93.3 (ICD-10-CM) - Abnormal CT scan, colon      Per inbasket from Dr. Portillo - she has discussed this with Dr. Cuellar and agreed to have IP see pt soon.     Clinical History (per Nurse review of records provided):      11/09/2023 PET (bookmarked) showed:   IMPRESSION:     1.  FDG avid right hilar soft tissue which encases the bronchus intermedius and extends along the right middle lobe bronchus and is contiguous with subcarinal candice tissue and endoluminal soft tissue of the right lower lobe bronchus. Findings are   concerning for a primary lung malignancy.  2.  There are additional FDG avid opacities/nodules bilaterally including in the right lower lobe, left suprahilar region, medial left lower lobe, and right upper lobe these may be infectious/inflammatory in etiology given that many are new compared to   prior. However, endobronchial spread of tumor is possible. Recommend short-term interval follow-up with chest CT.  3.  Hypermetabolic soft tissue thickening of the distal sigmoid/upper rectum, concerning for malignancy although an inflammatory etiology is possible given adjacent diverticular disease. Recommend further evaluation with colonoscopy    Clinical Assessment / Barriers to Care (Per Nurse):      Records Location: [facility]   Records Needed: None  Additional testing needed prior to consult: PFT  Referral updates and Plan:   Writer called Ulis and discussed the referral with him. He agreed to see Dr. Kelvin miranda on Tuesday Nov 5th to discuss biopsy. He asked if the biopsy would be in [place] and writer advised that the majority of IP  procedures are done at the [facility] in [place]. Luis expressed frustration as he has transportation issues and his daughter will have to help drive him. Writer offered to reach out to Social Work to assist with transportation options but Luis declined at this time. All questions answered. Call was warm transferred to NPS to schedule.     PRABHJOT GilliamN, RN, OCN  [facility] Oncology Nurse Navigator  [phone] / [phone]

## 2023-11-27 NOTE — TELEPHONE ENCOUNTER
RECORDS STATUS - ALL OTHER DIAGNOSIS      RECORDS RECEIVED FROM: [facility]   DATE RECEIVED: 11/27

## 2023-11-29 RX ORDER — AZITHROMYCIN 250 MG/1
TABLET, FILM COATED ORAL
COMMUNITY
Start: 2023-10-11 | End: 2023-12-12

## 2023-12-05 ENCOUNTER — VIRTUAL VISIT (OUTPATIENT)
Dept: PULMONOLOGY | Facility: CLINIC | Age: 58
End: 2023-12-05
Attending: STUDENT IN AN ORGANIZED HEALTH CARE EDUCATION/TRAINING PROGRAM
Payer: COMMERCIAL

## 2023-12-05 ENCOUNTER — PRE VISIT (OUTPATIENT)
Dept: PULMONOLOGY | Facility: CLINIC | Age: 58
End: 2023-12-05
Payer: COMMERCIAL

## 2023-12-05 VITALS — BODY MASS INDEX: 19.12 KG/M2 | WEIGHT: 112 LBS | HEIGHT: 64 IN

## 2023-12-05 DIAGNOSIS — R93.3 ABNORMAL CT SCAN, COLON: ICD-10-CM

## 2023-12-05 DIAGNOSIS — R94.2 ABNORMAL PET SCAN OF LUNG: ICD-10-CM

## 2023-12-05 PROCEDURE — 99214 OFFICE O/P EST MOD 30 MIN: CPT | Mod: VID | Performed by: STUDENT IN AN ORGANIZED HEALTH CARE EDUCATION/TRAINING PROGRAM

## 2023-12-05 ASSESSMENT — PAIN SCALES - GENERAL: PAINLEVEL: NO PAIN (0)

## 2023-12-05 NOTE — NURSING NOTE
Is the patient currently in the state of [place]? YES    Visit mode:VIDEO    If the visit is dropped, the patient can be reconnected by: VIDEO VISIT: Text to cell phone:   Telephone Information:   Mobile [phone]       Will anyone else be joining the visit? NO  (If patient encounters technical issues they should call [phone]    How would you like to obtain your AVS? MyChart    Are changes needed to the allergy or medication list? No    Reason for visit: Consult    Rosio MEEKS

## 2023-12-05 NOTE — PROGRESS NOTES
Virtual Visit Details    Type of service:  Video Visit   Video Start Time: 3:17 PM  Video End Time: 3:32pm    Originating Location (pt. Location): Home    Distant Location (provider location):  On-site  Platform used for Video Visit: Shahriar    LUNG NODULE & INTERVENTIONAL PULMONARY CLINIC  [facility]    Luis Hernandez MRN# [id number]   Age: 58 year old YOB: 1965     Reason for Consultation: Lung nodule    Requesting Physician: Dean Mariee MD  [address] [place] [address]     Assessment and Plan:    Luis Hernandez is a 58 year old male who presents for evaluation of a RUL lung nodule and compression of his right main bronchus.    I reviewed their CT scan from 9/14/23 and PET from 11/9/23 which reveals hypermetabolic right hilar region as well as pet avid regions in the peripheral RLL.     I recommended biopsy with biopsy of right hilar region with inspection of his airways and possible stent/debulking.     Patient is unsure whether he wants to have this done. He needs to discuss this with her daughter. He will get back to me regarding this conversation.       Patient indicated understanding and agreed to the plan of care. All questions answered.     Shekhar Warren DO   of Medicine  Interventional Pulmonology  Department of Pulmonary, Allergy, Critical Care and Sleep Medicine   [facility]     History:    Luis Hernandez is a 58 year old male who presents for evaluation of a PET avid right hilar region.   Pmhx of COPD and HTN. On home oxygen 2L during the day and 1.5L at night.   Quit smoking cigarettes 2 years ago. The most he ever smoked was 2ppd. 25 year smoking history.   No personal hx of cancer.   Mother had breast cancer.   No exposrue to fungus, mold, TB, asbestos, radon, or uranium.   Not on any immune suppressant medications.   Weight loss, 10 lbs over 1 year. Past 6 months he has been stable.   No night sweats,  "fever, or chills.   No signs of aspiration.     Medications:  Current Outpatient Medications   Medication Sig    albuterol (PROAIR HFA/PROVENTIL HFA/VENTOLIN HFA) 108 (90 Base) MCG/ACT inhaler Inhale 2 puffs into the lungs every 4 hours as needed for shortness of breath Hold on file until needed    amLODIPine (NORVASC) 10 MG tablet Take 1 tablet (10 mg) by mouth daily    atenolol (TENORMIN) 25 MG tablet Take 1 tablet (25 mg) by mouth daily    fluticasone (FLONASE) 50 MCG/ACT nasal spray Spray 1-2 sprays into both nostrils daily    fluticasone-salmeterol (ADVAIR) 500-50 MCG/ACT inhaler Inhale 1 puff into the lungs every 12 hours    guaiFENesin (MUCINEX) 600 MG 12 hr tablet Take 2 tablets (1,200 mg) by mouth 2 times daily    lisinopril (ZESTRIL) 40 MG tablet Take 1 tablet (40 mg) by mouth daily    order for DME Equipment being ordered: Oxygen 3L via NC continuous.  O2 saturated noted to be 86% on room air at rest.    order for DME Equipment being ordered: Nebulizer    roflumilast (DALIRESP) 500 MCG TABS tablet Take 1 tablet (500 mcg) by mouth daily    tiotropium (SPIRIVA RESPIMAT) 2.5 MCG/ACT inhaler Inhale 2 puffs into the lungs daily    albuterol (PROVENTIL) (2.5 MG/3ML) 0.083% neb solution Take 1 vial (2.5 mg) by nebulization every 6 hours as needed for shortness of breath / dyspnea or wheezing (Patient not taking: Reported on 12/5/2023)    amoxicillin-clavulanate (AUGMENTIN) 875-125 MG tablet Take 1 tablet by mouth 2 times daily (Patient not taking: Reported on 12/5/2023)    azithromycin (ZITHROMAX) 250 MG tablet Take 2 tablets (500 mg) by mouth daily for 1 day, THEN 1 tablet (250 mg) daily for 4 days. Use for COPD exacerbation.* (Patient not taking: Reported on 12/5/2023)     No current facility-administered medications for this visit.         Physical exam:  Ht 1.613 m (5' 3.5\")   Wt 50.8 kg (112 lb)   BMI 19.53 kg/m    Wt Readings from Last 4 Encounters:   12/05/23 50.8 kg (112 lb)   11/17/23 49.9 kg (110 lb) "   11/06/23 50.8 kg (112 lb 1.6 oz)   10/12/23 51 kg (112 lb 6.4 oz)     General: Well appearing, nonlabored breathing  Neuro: Answering questions appropriately  Psych: Normal affect

## 2023-12-05 NOTE — LETTER
12/5/2023       RE: Luis Hernandez  [address] [facility] [address]     Dear Colleague,    Thank you for referring your patient, Luis Hernandez, to the [facility] at [facility]. Please see a copy of my visit note below.      LUNG NODULE & INTERVENTIONAL PULMONARY CLINIC  [facility]    Luis Hernandez MRN# [id number]   Age: 58 year old YOB: 1965     Reason for Consultation: Lung nodule    Requesting Physician: Dean Mariee MD  [address] [place] [address]     Assessment and Plan:    Luis Hernandez is a 58 year old male who presents for evaluation of a RUL lung nodule and compression of his right main bronchus.    I reviewed their CT scan from 9/14/23 and PET from 11/9/23 which reveals hypermetabolic right hilar region as well as pet avid regions in the peripheral RLL.     I recommended biopsy with biopsy of right hilar region with inspection of his airways and possible stent/debulking.     Patient is unsure whether he wants to have this done. He needs to discuss this with her daughter. He will get back to me regarding this conversation.       Patient indicated understanding and agreed to the plan of care. All questions answered.     Shekhar Warren DO   of Medicine  Interventional Pulmonology  Department of Pulmonary, Allergy, Critical Care and Sleep Medicine   [facility]     History:    Luis Hernandez is a 58 year old male who presents for evaluation of a PET avid right hilar region.   Pmhx of COPD and HTN. On home oxygen 2L during the day and 1.5L at night.   Quit smoking cigarettes 2 years ago. The most he ever smoked was 2ppd. 25 year smoking history.   No personal hx of cancer.   Mother had breast cancer.   No exposrue to fungus, mold, TB, asbestos, radon, or uranium.   Not on any immune suppressant medications.   Weight loss, 10 lbs over 1  "year. Past 6 months he has been stable.   No night sweats, fever, or chills.   No signs of aspiration.     Medications:  Current Outpatient Medications   Medication Sig    albuterol (PROAIR HFA/PROVENTIL HFA/VENTOLIN HFA) 108 (90 Base) MCG/ACT inhaler Inhale 2 puffs into the lungs every 4 hours as needed for shortness of breath Hold on file until needed    amLODIPine (NORVASC) 10 MG tablet Take 1 tablet (10 mg) by mouth daily    atenolol (TENORMIN) 25 MG tablet Take 1 tablet (25 mg) by mouth daily    fluticasone (FLONASE) 50 MCG/ACT nasal spray Spray 1-2 sprays into both nostrils daily    fluticasone-salmeterol (ADVAIR) 500-50 MCG/ACT inhaler Inhale 1 puff into the lungs every 12 hours    guaiFENesin (MUCINEX) 600 MG 12 hr tablet Take 2 tablets (1,200 mg) by mouth 2 times daily    lisinopril (ZESTRIL) 40 MG tablet Take 1 tablet (40 mg) by mouth daily    order for DME Equipment being ordered: Oxygen 3L via NC continuous.  O2 saturated noted to be 86% on room air at rest.    order for DME Equipment being ordered: Nebulizer    roflumilast (DALIRESP) 500 MCG TABS tablet Take 1 tablet (500 mcg) by mouth daily    tiotropium (SPIRIVA RESPIMAT) 2.5 MCG/ACT inhaler Inhale 2 puffs into the lungs daily    albuterol (PROVENTIL) (2.5 MG/3ML) 0.083% neb solution Take 1 vial (2.5 mg) by nebulization every 6 hours as needed for shortness of breath / dyspnea or wheezing (Patient not taking: Reported on 12/5/2023)    amoxicillin-clavulanate (AUGMENTIN) 875-125 MG tablet Take 1 tablet by mouth 2 times daily (Patient not taking: Reported on 12/5/2023)    azithromycin (ZITHROMAX) 250 MG tablet Take 2 tablets (500 mg) by mouth daily for 1 day, THEN 1 tablet (250 mg) daily for 4 days. Use for COPD exacerbation.* (Patient not taking: Reported on 12/5/2023)     No current facility-administered medications for this visit.         Physical exam:  Ht 1.613 m (5' 3.5\")   Wt 50.8 kg (112 lb)   BMI 19.53 kg/m    Wt Readings from Last 4 " Encounters:   12/05/23 50.8 kg (112 lb)   11/17/23 49.9 kg (110 lb)   11/06/23 50.8 kg (112 lb 1.6 oz)   10/12/23 51 kg (112 lb 6.4 oz)     General: Well appearing, nonlabored breathing  Neuro: Answering questions appropriately  Psych: Normal affect   Again, thank you for allowing me to participate in the care of your patient.      Sincerely,    Shekhar Warren DO

## 2023-12-06 DIAGNOSIS — J44.1 COPD EXACERBATION (H): Primary | ICD-10-CM

## 2023-12-06 RX ORDER — AZITHROMYCIN 250 MG/1
TABLET, FILM COATED ORAL
Qty: 6 TABLET | Refills: 0 | Status: SHIPPED | OUTPATIENT
Start: 2023-12-06 | End: 2023-12-11

## 2023-12-06 RX ORDER — PREDNISONE 10 MG/1
TABLET ORAL
Qty: 30 TABLET | Refills: 0 | Status: SHIPPED | OUTPATIENT
Start: 2023-12-06 | End: 2024-01-07

## 2023-12-06 NOTE — PROGRESS NOTES
Discussed potential upcoming bronchoscopy procedure with Prasad. He wants to move forward with the procedure. Planning for PFTs next week.     He has been feeling okay. Last prednisone/azithromycin in September. Chest tightness resolved with augmentin x 10 days. Discussed sending additional prednisone (tapered course) and azithromycin to keep at home.     Bianca Cuellar MD  Pulmonary, Allergy, Critical Care, and Sleep Medicine   [facility]   Pager: [phone]

## 2023-12-11 ENCOUNTER — PROCEDURE ONLY VISIT (OUTPATIENT)
Dept: PULMONOLOGY | Facility: CLINIC | Age: 58
End: 2023-12-11
Payer: COMMERCIAL

## 2023-12-11 DIAGNOSIS — R91.1 LUNG NODULE: Primary | ICD-10-CM

## 2023-12-11 RX ORDER — CEFAZOLIN SODIUM 2 G/50ML
2 SOLUTION INTRAVENOUS SEE ADMIN INSTRUCTIONS
Status: CANCELLED | OUTPATIENT
Start: 2023-12-11

## 2023-12-11 RX ORDER — METRONIDAZOLE 500 MG/100ML
500 INJECTION, SOLUTION INTRAVENOUS
Status: CANCELLED | OUTPATIENT
Start: 2023-12-11

## 2023-12-11 RX ORDER — CEFAZOLIN SODIUM 2 G/50ML
2 SOLUTION INTRAVENOUS
Status: CANCELLED | OUTPATIENT
Start: 2023-12-11

## 2023-12-12 ENCOUNTER — HOSPITAL ENCOUNTER (OUTPATIENT)
Dept: RESPIRATORY THERAPY | Facility: CLINIC | Age: 58
Discharge: HOME | End: 2023-12-12
Attending: INTERNAL MEDICINE | Admitting: INTERNAL MEDICINE
Payer: COMMERCIAL

## 2023-12-12 DIAGNOSIS — R93.3 ABNORMAL CT SCAN, COLON: ICD-10-CM

## 2023-12-12 DIAGNOSIS — J44.1 COPD EXACERBATION (H): Primary | ICD-10-CM

## 2023-12-12 DIAGNOSIS — R94.2 ABNORMAL PET SCAN OF LUNG: ICD-10-CM

## 2023-12-12 PROCEDURE — 94729 DIFFUSING CAPACITY: CPT

## 2023-12-12 PROCEDURE — 94726 PLETHYSMOGRAPHY LUNG VOLUMES: CPT

## 2023-12-12 PROCEDURE — 94060 EVALUATION OF WHEEZING: CPT

## 2023-12-12 RX ORDER — PREDNISOLONE ACETATE 10 MG/ML
1 SUSPENSION/ DROPS OPHTHALMIC
COMMUNITY
Start: 2023-11-10 | End: 2024-01-07

## 2023-12-12 RX ORDER — SODIUM CHLORIDE FOR INHALATION 3 %
VIAL, NEBULIZER (ML) INHALATION
COMMUNITY
End: 2024-01-07

## 2023-12-12 RX ORDER — INHALER, ASSIST DEVICES
1 SPACER (EA) MISCELLANEOUS ONCE
Status: DISCONTINUED | OUTPATIENT
Start: 2023-12-12 | End: 2023-12-13 | Stop reason: HOSPADM

## 2023-12-12 RX ORDER — OFLOXACIN 3 MG/ML
1 SOLUTION/ DROPS OPHTHALMIC
COMMUNITY
Start: 2023-11-10 | End: 2024-01-07

## 2023-12-12 RX ORDER — KETOROLAC TROMETHAMINE 4 MG/ML
1 SOLUTION/ DROPS OPHTHALMIC
COMMUNITY
Start: 2023-11-10 | End: 2024-01-07

## 2023-12-12 NOTE — TELEPHONE ENCOUNTER
[facility] Call Center    Phone Message    May a detailed message be left on voicemail: yes     Reason for Call: Medication Refill Request    Has the patient contacted the pharmacy for the refill? Yes   Name of medication being requested: predniSONE (DELTASONE) 10 MG tablet  Provider who prescribed the medication:   Pharmacy: [id number] [address]  Date medication is needed: ASAP         Action Taken: Message routed to:  [facility] PULM    Travel Screening: Not Applicable                                                                         Clothing

## 2023-12-12 NOTE — PROGRESS NOTES
Needs EBUS TBNA for diagnosis (see PET).   Needs rigid bronch with possible stent insertion in the RBI.

## 2023-12-19 LAB
DLCOUNC-%PRED-PRE: 35 %
DLCOUNC-PRE: 8.31 ML/MIN/MMHG
DLCOUNC-PRED: 23.54 ML/MIN/MMHG
ERV-%PRED-PRE: 49 %
ERV-PRE: 0.63 L
ERV-PRED: 1.29 L
EXPTIME-PRE: 13.03 SEC
FEF2575-%PRED-POST: 6 %
FEF2575-%PRED-PRE: 5 %
FEF2575-POST: 0.17 L/SEC
FEF2575-PRE: 0.15 L/SEC
FEF2575-PRED: 2.6 L/SEC
FEFMAX-%PRED-PRE: 29 %
FEFMAX-PRE: 2.43 L/SEC
FEFMAX-PRED: 8.17 L/SEC
FEV1-%PRED-PRE: 16 %
FEV1-PRE: 0.46 L
FEV1FEV6-PRE: 38 %
FEV1FEV6-PRED: 80 %
FEV1FVC-PRE: 27 %
FEV1FVC-PRED: 80 %
FEV1SVC-PRE: 25 %
FEV1SVC-PRED: 72 %
FIFMAX-PRE: 1.82 L/SEC
FRCPLETH-%PRED-PRE: 217 %
FRCPLETH-PRE: 7 L
FRCPLETH-PRED: 3.22 L
FVC-%PRED-PRE: 47 %
FVC-PRE: 1.68 L
FVC-PRED: 3.54 L
IC-%PRED-PRE: 47 %
IC-PRE: 1.21 L
IC-PRED: 2.58 L
RVPLETH-%PRED-PRE: 296 %
RVPLETH-PRE: 6.36 L
RVPLETH-PRED: 2.14 L
TLCPLETH-%PRED-PRE: 138 %
TLCPLETH-PRE: 8.21 L
TLCPLETH-PRED: 5.91 L
VA-%PRED-PRE: 58 %
VA-PRE: 3.08 L
VC-%PRED-PRE: 46 %
VC-PRE: 1.85 L
VC-PRED: 3.94 L

## 2023-12-20 ENCOUNTER — TELEPHONE (OUTPATIENT)
Dept: PULMONOLOGY | Facility: CLINIC | Age: 58
End: 2023-12-20
Payer: COMMERCIAL

## 2023-12-20 NOTE — TELEPHONE ENCOUNTER
Writer contacted patient to schedule IP procedure. Scheduled 01/18 with Dr. Prescott. Patient to complete H&P with PCP. Packet information sent via [employer] per patient request.    Shayne Chisholm on 12/20/2023 at 2:44 PM

## 2024-01-07 ENCOUNTER — NURSE TRIAGE (OUTPATIENT)
Dept: NURSING | Facility: CLINIC | Age: 59
End: 2024-01-07
Payer: COMMERCIAL

## 2024-01-07 ENCOUNTER — HOSPITAL ENCOUNTER (OUTPATIENT)
Facility: CLINIC | Age: 59
Setting detail: OBSERVATION
Discharge: HOME | End: 2024-01-08
Attending: STUDENT IN AN ORGANIZED HEALTH CARE EDUCATION/TRAINING PROGRAM | Admitting: INTERNAL MEDICINE
Payer: COMMERCIAL

## 2024-01-07 ENCOUNTER — APPOINTMENT (OUTPATIENT)
Dept: GENERAL RADIOLOGY | Facility: CLINIC | Age: 59
End: 2024-01-07
Attending: STUDENT IN AN ORGANIZED HEALTH CARE EDUCATION/TRAINING PROGRAM
Payer: COMMERCIAL

## 2024-01-07 DIAGNOSIS — Z20.822 LAB TEST NEGATIVE FOR COVID-19 VIRUS: Primary | ICD-10-CM

## 2024-01-07 DIAGNOSIS — J44.1 COPD WITH ACUTE EXACERBATION (H): ICD-10-CM

## 2024-01-07 LAB
ALBUMIN SERPL BCG-MCNC: 4.4 G/DL (ref 3.5–5.2)
ALP SERPL-CCNC: 60 U/L (ref 40–150)
ALT SERPL W P-5'-P-CCNC: 13 U/L (ref 0–70)
ANION GAP SERPL CALCULATED.3IONS-SCNC: 7 MMOL/L (ref 7–15)
AST SERPL W P-5'-P-CCNC: 16 U/L (ref 0–45)
BASE EXCESS BLDV CALC-SCNC: 1.3 MMOL/L (ref -7.7–1.9)
BASOPHILS # BLD AUTO: 0.1 10E3/UL (ref 0–0.2)
BASOPHILS NFR BLD AUTO: 1 %
BILIRUB SERPL-MCNC: 0.3 MG/DL
BUN SERPL-MCNC: 12.6 MG/DL (ref 6–20)
CALCIUM SERPL-MCNC: 9.5 MG/DL (ref 8.6–10)
CHLORIDE SERPL-SCNC: 92 MMOL/L (ref 98–107)
CREAT SERPL-MCNC: 0.7 MG/DL (ref 0.67–1.17)
DEPRECATED HCO3 PLAS-SCNC: 29 MMOL/L (ref 22–29)
EGFRCR SERPLBLD CKD-EPI 2021: >90 ML/MIN/1.73M2
EOSINOPHIL # BLD AUTO: 0.4 10E3/UL (ref 0–0.7)
EOSINOPHIL NFR BLD AUTO: 4 %
ERYTHROCYTE [DISTWIDTH] IN BLOOD BY AUTOMATED COUNT: 13 % (ref 10–15)
FLUAV RNA SPEC QL NAA+PROBE: NEGATIVE
FLUBV RNA RESP QL NAA+PROBE: NEGATIVE
GLUCOSE SERPL-MCNC: 121 MG/DL (ref 70–99)
HCO3 BLDV-SCNC: 28 MMOL/L (ref 21–28)
HCT VFR BLD AUTO: 36.8 % (ref 40–53)
HGB BLD-MCNC: 12.1 G/DL (ref 13.3–17.7)
HOLD SPECIMEN: NORMAL
IMM GRANULOCYTES # BLD: 0 10E3/UL
IMM GRANULOCYTES NFR BLD: 0 %
INR PPP: 0.94 (ref 0.85–1.15)
LYMPHOCYTES # BLD AUTO: 1.5 10E3/UL (ref 0.8–5.3)
LYMPHOCYTES NFR BLD AUTO: 15 %
MCH RBC QN AUTO: 29.8 PG (ref 26.5–33)
MCHC RBC AUTO-ENTMCNC: 32.9 G/DL (ref 31.5–36.5)
MCV RBC AUTO: 91 FL (ref 78–100)
MONOCYTES # BLD AUTO: 1.3 10E3/UL (ref 0–1.3)
MONOCYTES NFR BLD AUTO: 12 %
NEUTROPHILS # BLD AUTO: 7.2 10E3/UL (ref 1.6–8.3)
NEUTROPHILS NFR BLD AUTO: 68 %
NRBC # BLD AUTO: 0 10E3/UL
NRBC BLD AUTO-RTO: 0 /100
O2/TOTAL GAS SETTING VFR VENT: 4 %
OSMOLALITY UR: 230 MMOL/KG (ref 100–1200)
PCO2 BLDV: 53 MM HG (ref 40–50)
PH BLDV: 7.33 [PH] (ref 7.32–7.43)
PLATELET # BLD AUTO: 389 10E3/UL (ref 150–450)
PO2 BLDV: 50 MM HG (ref 25–47)
POTASSIUM SERPL-SCNC: 4.5 MMOL/L (ref 3.4–5.3)
PROCALCITONIN SERPL IA-MCNC: 0.06 NG/ML
PROT SERPL-MCNC: 7.4 G/DL (ref 6.4–8.3)
RBC # BLD AUTO: 4.06 10E6/UL (ref 4.4–5.9)
RSV RNA SPEC NAA+PROBE: NEGATIVE
SARS-COV-2 RNA RESP QL NAA+PROBE: NEGATIVE
SODIUM SERPL-SCNC: 128 MMOL/L (ref 135–145)
SODIUM UR-SCNC: 34 MMOL/L
WBC # BLD AUTO: 10.4 10E3/UL (ref 4–11)

## 2024-01-07 PROCEDURE — 93005 ELECTROCARDIOGRAM TRACING: CPT | Performed by: STUDENT IN AN ORGANIZED HEALTH CARE EDUCATION/TRAINING PROGRAM

## 2024-01-07 PROCEDURE — 93010 ELECTROCARDIOGRAM REPORT: CPT | Performed by: STUDENT IN AN ORGANIZED HEALTH CARE EDUCATION/TRAINING PROGRAM

## 2024-01-07 PROCEDURE — 83935 ASSAY OF URINE OSMOLALITY: CPT | Performed by: INTERNAL MEDICINE

## 2024-01-07 PROCEDURE — G0378 HOSPITAL OBSERVATION PER HR: HCPCS

## 2024-01-07 PROCEDURE — 94640 AIRWAY INHALATION TREATMENT: CPT

## 2024-01-07 PROCEDURE — 250N000013 HC RX MED GY IP 250 OP 250 PS 637: Performed by: INTERNAL MEDICINE

## 2024-01-07 PROCEDURE — 250N000009 HC RX 250: Performed by: STUDENT IN AN ORGANIZED HEALTH CARE EDUCATION/TRAINING PROGRAM

## 2024-01-07 PROCEDURE — 71046 X-RAY EXAM CHEST 2 VIEWS: CPT

## 2024-01-07 PROCEDURE — 99223 1ST HOSP IP/OBS HIGH 75: CPT | Performed by: INTERNAL MEDICINE

## 2024-01-07 PROCEDURE — 99291 CRITICAL CARE FIRST HOUR: CPT | Mod: 25 | Performed by: STUDENT IN AN ORGANIZED HEALTH CARE EDUCATION/TRAINING PROGRAM

## 2024-01-07 PROCEDURE — 87637 SARSCOV2&INF A&B&RSV AMP PRB: CPT | Performed by: STUDENT IN AN ORGANIZED HEALTH CARE EDUCATION/TRAINING PROGRAM

## 2024-01-07 PROCEDURE — 85025 COMPLETE CBC W/AUTO DIFF WBC: CPT | Performed by: STUDENT IN AN ORGANIZED HEALTH CARE EDUCATION/TRAINING PROGRAM

## 2024-01-07 PROCEDURE — 80053 COMPREHEN METABOLIC PANEL: CPT | Performed by: STUDENT IN AN ORGANIZED HEALTH CARE EDUCATION/TRAINING PROGRAM

## 2024-01-07 PROCEDURE — 36415 COLL VENOUS BLD VENIPUNCTURE: CPT | Performed by: STUDENT IN AN ORGANIZED HEALTH CARE EDUCATION/TRAINING PROGRAM

## 2024-01-07 PROCEDURE — 250N000011 HC RX IP 250 OP 636: Performed by: INTERNAL MEDICINE

## 2024-01-07 PROCEDURE — 84300 ASSAY OF URINE SODIUM: CPT | Performed by: INTERNAL MEDICINE

## 2024-01-07 PROCEDURE — 82803 BLOOD GASES ANY COMBINATION: CPT | Performed by: STUDENT IN AN ORGANIZED HEALTH CARE EDUCATION/TRAINING PROGRAM

## 2024-01-07 PROCEDURE — 84145 PROCALCITONIN (PCT): CPT | Performed by: STUDENT IN AN ORGANIZED HEALTH CARE EDUCATION/TRAINING PROGRAM

## 2024-01-07 PROCEDURE — 85610 PROTHROMBIN TIME: CPT | Performed by: STUDENT IN AN ORGANIZED HEALTH CARE EDUCATION/TRAINING PROGRAM

## 2024-01-07 PROCEDURE — 96374 THER/PROPH/DIAG INJ IV PUSH: CPT

## 2024-01-07 RX ORDER — PROCHLORPERAZINE 25 MG
25 SUPPOSITORY, RECTAL RECTAL EVERY 12 HOURS PRN
Status: DISCONTINUED | OUTPATIENT
Start: 2024-01-07 | End: 2024-01-08 | Stop reason: HOSPADM

## 2024-01-07 RX ORDER — ONDANSETRON 2 MG/ML
4 INJECTION INTRAMUSCULAR; INTRAVENOUS EVERY 6 HOURS PRN
Status: DISCONTINUED | OUTPATIENT
Start: 2024-01-07 | End: 2024-01-08 | Stop reason: HOSPADM

## 2024-01-07 RX ORDER — FLUTICASONE FUROATE AND VILANTEROL 200; 25 UG/1; UG/1
1 POWDER RESPIRATORY (INHALATION) DAILY
Status: DISCONTINUED | OUTPATIENT
Start: 2024-01-08 | End: 2024-01-07

## 2024-01-07 RX ORDER — IPRATROPIUM BROMIDE AND ALBUTEROL SULFATE 2.5; .5 MG/3ML; MG/3ML
3 SOLUTION RESPIRATORY (INHALATION) ONCE
Status: COMPLETED | OUTPATIENT
Start: 2024-01-07 | End: 2024-01-07

## 2024-01-07 RX ORDER — ALBUTEROL SULFATE 90 UG/1
2 AEROSOL, METERED RESPIRATORY (INHALATION)
Status: DISCONTINUED | OUTPATIENT
Start: 2024-01-07 | End: 2024-01-08 | Stop reason: HOSPADM

## 2024-01-07 RX ORDER — POLYETHYLENE GLYCOL 3350 17 G/17G
17 POWDER, FOR SOLUTION ORAL 2 TIMES DAILY PRN
Status: DISCONTINUED | OUTPATIENT
Start: 2024-01-07 | End: 2024-01-08 | Stop reason: HOSPADM

## 2024-01-07 RX ORDER — MAGNESIUM OXIDE 400 MG/1
400 TABLET ORAL
Status: DISCONTINUED | OUTPATIENT
Start: 2024-01-07 | End: 2024-01-08 | Stop reason: HOSPADM

## 2024-01-07 RX ORDER — PROCHLORPERAZINE MALEATE 10 MG
10 TABLET ORAL EVERY 6 HOURS PRN
Status: DISCONTINUED | OUTPATIENT
Start: 2024-01-07 | End: 2024-01-08 | Stop reason: HOSPADM

## 2024-01-07 RX ORDER — ATENOLOL 25 MG/1
25 TABLET ORAL DAILY
Status: DISCONTINUED | OUTPATIENT
Start: 2024-01-08 | End: 2024-01-08 | Stop reason: HOSPADM

## 2024-01-07 RX ORDER — PREDNISONE 20 MG/1
40 TABLET ORAL DAILY
Status: DISCONTINUED | OUTPATIENT
Start: 2024-01-08 | End: 2024-01-08 | Stop reason: HOSPADM

## 2024-01-07 RX ORDER — METHYLPREDNISOLONE SODIUM SUCCINATE 40 MG/ML
40 INJECTION, POWDER, LYOPHILIZED, FOR SOLUTION INTRAMUSCULAR; INTRAVENOUS ONCE
Status: COMPLETED | OUTPATIENT
Start: 2024-01-07 | End: 2024-01-07

## 2024-01-07 RX ORDER — HYDRALAZINE HYDROCHLORIDE 20 MG/ML
10 INJECTION INTRAMUSCULAR; INTRAVENOUS EVERY 4 HOURS PRN
Status: DISCONTINUED | OUTPATIENT
Start: 2024-01-07 | End: 2024-01-08 | Stop reason: HOSPADM

## 2024-01-07 RX ORDER — AMLODIPINE BESYLATE 10 MG/1
10 TABLET ORAL DAILY
Status: DISCONTINUED | OUTPATIENT
Start: 2024-01-08 | End: 2024-01-08 | Stop reason: HOSPADM

## 2024-01-07 RX ORDER — AMOXICILLIN 250 MG
1 CAPSULE ORAL 2 TIMES DAILY PRN
Status: DISCONTINUED | OUTPATIENT
Start: 2024-01-07 | End: 2024-01-08 | Stop reason: HOSPADM

## 2024-01-07 RX ORDER — GUAIFENESIN 1200 MG/1
1 TABLET, EXTENDED RELEASE ORAL DAILY
COMMUNITY

## 2024-01-07 RX ORDER — ACETAMINOPHEN 325 MG/1
650 TABLET ORAL EVERY 4 HOURS PRN
Status: DISCONTINUED | OUTPATIENT
Start: 2024-01-07 | End: 2024-01-08 | Stop reason: HOSPADM

## 2024-01-07 RX ORDER — GUAIFENESIN 600 MG/1
1200 TABLET, EXTENDED RELEASE ORAL DAILY
Status: DISCONTINUED | OUTPATIENT
Start: 2024-01-08 | End: 2024-01-08 | Stop reason: HOSPADM

## 2024-01-07 RX ORDER — ROFLUMILAST 500 UG/1
500 TABLET ORAL DAILY
Status: DISCONTINUED | OUTPATIENT
Start: 2024-01-08 | End: 2024-01-08 | Stop reason: HOSPADM

## 2024-01-07 RX ORDER — AMOXICILLIN 250 MG
2 CAPSULE ORAL 2 TIMES DAILY PRN
Status: DISCONTINUED | OUTPATIENT
Start: 2024-01-07 | End: 2024-01-08 | Stop reason: HOSPADM

## 2024-01-07 RX ORDER — ONDANSETRON 4 MG/1
4 TABLET, ORALLY DISINTEGRATING ORAL EVERY 6 HOURS PRN
Status: DISCONTINUED | OUTPATIENT
Start: 2024-01-07 | End: 2024-01-08 | Stop reason: HOSPADM

## 2024-01-07 RX ORDER — MAGNESIUM OXIDE 400 MG/1
400 TABLET ORAL
COMMUNITY

## 2024-01-07 RX ORDER — LISINOPRIL 40 MG/1
40 TABLET ORAL DAILY
Status: DISCONTINUED | OUTPATIENT
Start: 2024-01-08 | End: 2024-01-08 | Stop reason: HOSPADM

## 2024-01-07 RX ORDER — HYDRALAZINE HYDROCHLORIDE 10 MG/1
10 TABLET, FILM COATED ORAL EVERY 4 HOURS PRN
Status: DISCONTINUED | OUTPATIENT
Start: 2024-01-07 | End: 2024-01-08 | Stop reason: HOSPADM

## 2024-01-07 RX ADMIN — ALBUTEROL SULFATE 2 PUFF: 90 AEROSOL, METERED RESPIRATORY (INHALATION) at 18:44

## 2024-01-07 RX ADMIN — Medication 400 MG: at 14:43

## 2024-01-07 RX ADMIN — IPRATROPIUM BROMIDE AND ALBUTEROL SULFATE 3 ML: 2.5; .5 SOLUTION RESPIRATORY (INHALATION) at 11:23

## 2024-01-07 RX ADMIN — ACETAMINOPHEN 650 MG: 325 TABLET, FILM COATED ORAL at 15:27

## 2024-01-07 RX ADMIN — ALBUTEROL SULFATE 2 PUFF: 90 AEROSOL, METERED RESPIRATORY (INHALATION) at 15:23

## 2024-01-07 RX ADMIN — METHYLPREDNISOLONE SODIUM SUCCINATE 40 MG: 40 INJECTION INTRAMUSCULAR; INTRAVENOUS at 15:08

## 2024-01-07 ASSESSMENT — ACTIVITIES OF DAILY LIVING (ADL)
ADLS_ACUITY_SCORE: 22
ADLS_ACUITY_SCORE: 37
ADLS_ACUITY_SCORE: 22

## 2024-01-07 NOTE — TELEPHONE ENCOUNTER
Nurse Triage SBAR    Is this a 2nd Level Triage? NO    Situation: Patient having a flare of COPD - started last night  Consent: not needed    Background: Normally takes Prednisone and an antibiotic - doesn't have any at home- Last flare like this was about one month ago    Assessment: has COPD and having trouble breathing  Is on oxygen- 2 LPM NC continuous  Notes more issues with breathing when he is moving around- indicates this shortness of breath happens with any movement  Last night was when the flare started- hard to lay down  Denies additional symptoms  Pulse  (depending on movement) oxygen 95% on 2 L    Protocol Recommended Disposition:       Recommendation: Advised to be seen in the ER- will go to [place] today. Declines additional questions.       ER    Does the patient meet one of the following criteria for ADS visit consideration? 16+ years old, with an [facility] PCP       Harleen Smith RN 8:22 AM 1/7/2024  Reason for Disposition   [1] MODERATE difficulty breathing (e.g., speaks in phrases, SOB even at rest, pulse 100-120) AND [2] NEW-onset or WORSE than normal    Additional Information   Negative: SEVERE difficulty breathing (e.g., struggling for each breath, speaks in single words)   Negative: [1] Breathing stopped AND [2] hasn't returned   Negative: Choking on something   Negative: Bluish (or gray) lips or face now   Negative: Difficult to awaken or acting confused (e.g., disoriented, slurred speech)   Negative: Passed out (i.e., lost consciousness, collapsed and was not responding)   Negative: Wheezing started suddenly after medicine, an allergic food or bee sting   Negative: Stridor (harsh sound while breathing in)   Negative: Slow, shallow and weak breathing   Negative: Sounds like a life-threatening emergency to the triager   Negative: Chest pain   Negative: [1] Wheezing (high pitched whistling sound) AND [2] previous asthma attacks or use of asthma medicines   Negative: [1] Difficulty  breathing AND [2] only present when coughing   Negative: [1] Difficulty breathing AND [2] only from stuffy or runny nose   Negative: [1] Difficulty breathing AND [2] within 14 days of COVID-19 Exposure    Protocols used: Breathing Difficulty-A-AH

## 2024-01-07 NOTE — ED TRIAGE NOTES
Increased sob last night.  Pt on 2 liters of oxygen via nc at home.  Pt called EMS.  Pt was put on bipap and given 125mg of solumedrol with improvement.      Triage Assessment (Adult)       Row Name 01/07/24 0947          Triage Assessment    Airway WDL WDL        Respiratory WDL    Respiratory WDL X;cough

## 2024-01-07 NOTE — MEDICATION SCRIBE - ADMISSION MEDICATION HISTORY
Medication Scribe Admission Medication History    Admission medication history is complete. The information provided in this note is only as accurate as the sources available at the time of the update.    Information Source(s): Patient and CareEverywhere/SureScripts via  in room with patient and finished at desk.    Pertinent Information: Patient states that he doesn't like to use neb medicine as it always makes his breathing worse.  Takes Magnesium at lunch to prevent leg cramps.    Changes made to PTA medication list:  Added: OTC Mucinex 1200 mg, Magnesium Oxide 400 mg.  Deleted: Albuterol Neb from 2021, Flonase nasal spray, Mucinex 600 mg tab, Ofloxacin eye drop, Ketorolac eye drop, Prednisolone eye drop, Prednisone taper, Sodium Chloride neb.  Changed: None    Allergies reviewed with patient and updates made in EHR: yes, no change.    Medication History Completed By: Shahnaz Huff 1/7/2024 12:10 PM    PTA Med List   Medication Sig Last Dose    albuterol (PROAIR HFA/PROVENTIL HFA/VENTOLIN HFA) 108 (90 Base) MCG/ACT inhaler Inhale 2 puffs into the lungs every 4 hours as needed for shortness of breath Hold on file until needed 1/7/2024 at 0915    amLODIPine (NORVASC) 10 MG tablet Take 1 tablet (10 mg) by mouth daily 1/7/2024 at am    atenolol (TENORMIN) 25 MG tablet Take 1 tablet (25 mg) by mouth daily 1/7/2024 at am    fluticasone-salmeterol (ADVAIR) 500-50 MCG/ACT inhaler Inhale 1 puff into the lungs every 12 hours 1/7/2024 at am    guaiFENesin (MUCINEX MAXIMUM STRENGTH) 1200 MG TB12 Take 1 tablet by mouth daily 1/7/2024 at am    lisinopril (ZESTRIL) 40 MG tablet Take 1 tablet (40 mg) by mouth daily 1/7/2024 at am    magnesium oxide (MAG-OX) 400 MG tablet Take 400 mg by mouth daily (with lunch) 1/6/2024 at lunch    order for DME Equipment being ordered: Oxygen 3L via NC continuous.  O2 saturated noted to be 86% on room air at rest. 1/7/2024 at cont.    order for DME Equipment being ordered: Nebulizer More than a  month at on hand    roflumilast (DALIRESP) 500 MCG TABS tablet Take 1 tablet (500 mcg) by mouth daily 1/7/2024 at am    tiotropium (SPIRIVA RESPIMAT) 2.5 MCG/ACT inhaler Inhale 2 puffs into the lungs daily 1/7/2024 at am

## 2024-01-07 NOTE — ED NOTES
I attempted to ambulate Mr. Hernandez, but he did not make it past standing at the edge of his bed while voiding.

## 2024-01-07 NOTE — PROGRESS NOTES
A&Ox4  98% on 2Lpm NC  P  115  Independent in room  Pleasant elodia Ashby RN [facility]

## 2024-01-07 NOTE — H&P
[facility]    History and Physical - Hospitalist Service       Date of Admission:  1/7/2024    Assessment & Plan   Luis Hernandez is a 58 year old male admitted on 1/7/2024. He presented from home with sudden onset dyspnea.  He is admitted with a COPD exacerbation.    COPD exacerbation  Acute on chronic hypoxic respiratory failure  Chronic hypercapnic respiratory failure    Chest x-ray demonstrates normal heart size and pulmonary vascularity.  Hyperinflated lungs.  Right mid and lower lobe patchy and nodular opacities likely represent combination of suspected pulmonary malignancy or postobstructive pneumonia.  No pleural effusion or pneumothorax.  -No indication for antibiotics.  Patient afebrile, WBC 10.4, procalcitonin 0.06.  -Give methylprednisolone 40 mg IV once now, then start prednisone 40 mg daily tomorrow.  -Albuterol MDI every 4 hours while awake.  Patient states he is intolerant of nebulizers.  -Will not order home Spiriva or Advair to reduce out-of-pocket cost for patient.  -At baseline, patient is on 2 L nasal cannula continuously.  He was brought in by ambulance on BiPAP and then weaned to 4 L oxymask.  He is currently on 3 L nasal cannula.  Titrate oxygen to maintain saturations between 90 to 94%.  -Repeat VBG in a.m.    Right lung nodule, concerning for malignancy    Chest CT with contrast 9/14/2023 demonstrates a new spiculated appearing nodular opacity along the right minor fissure likely represents atelectasis or inflammatory process.  Stable irregular lobulated bronchial wall thickening with intraluminal likely mucous plugging involving the bronchus intermedius and right lower lobe bronchi unchanged from previous exam.  Interval increase in adenopathy in the subcarinal region.  Stable borderline enlarged right hilar lymph node.  Stable background emphysema.  Stable 4 mm right lower lobe nodule.    PET/CT 11/9/2023 demonstrates FDG avid right hilar nodule soft tissue  which encases the bronchus intermedius extends along the right middle lobe bronchus and is contiguous with subcarinal lymph node tissue and endoluminal soft tissue of the right lower lobe bronchus.  Concerning for primary malignancy.  FDG avid opacities bilaterally including the right lower lobe, left suprahilar region, medial left lobe, and right upper lobe, new from prior.  Hypermetabolic soft tissue thickening the distal sigmoid/upper rectum, concerning for malignancy.  -Patient to follow-up with pulmonology for endobronchial biopsy on 1/18/2024.    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    Clinically Significant Risk Factors Present on Admission         # Hyponatremia: Lowest Na = 128 mmol/L in last 2 days, will monitor as appropriate          # Hypertension: Noted on problem list          # COPD: noted on problem list         Diet: Regular Diet Adult    DVT Prophylaxis: Low Risk/Ambulatory with no VTE prophylaxis indicated  Miller Catheter: Not present  Code Status: No CPR- Do NOT Intubate      Disposition Plan   Expected discharge back to home in 1 to 2 days once dyspnea on exertion improved.    Entered: Tito Fraser MD 01/07/2024, 2:18 PM     45 MINUTES SPENT BY ME on the date of service doing chart review, history, exam, documentation & further activities per the note.    Tito Fraser MD  [facility]    ______________________________________________________________________    Chief Complaint   Chief Complaint   Patient presents with    Shortness of Breath        History is obtained from the patient    History of Present Illness   Luis Hrenandez is a 58 year old male who presented to the emergency room from home with sudden onset shortness of breath.  Patient denies any recent illness or sick contacts.  He has been taking his  medications as prescribed.  Yesterday evening he had a sudden onset of dyspnea.  He was able to treat this at home with albuterol, but was short of breath throughout the night.  This morning, his breathing appeared to have stabilized, but then while watching TV, he had the onset of more severe shortness of breath.  He initially attempted to drive to the emergency department, but called ambulance due to the severity of his breathing.    The patient denies fevers, chills, nasal congestion, runny nose, or sore throat.  Patient denies chest pain, chest pressure, palpitations, lightheadedness, or dizziness.  Patient denies syncope, falls, or trauma.  Patient denies nausea, vomiting, diarrhea, melena, hematochezia, constipation, or abdominal pain.  Patient denies dysuria or hematuria.  Patient denies rash, muscle aches, joint pain, or edema.  Patient denies headache, neck pain, or back pain.  Patient denies diplopia, dysarthria, dysphagia, incoordination, focal numbness, or unilateral weakness.    Review of Systems    The 10 point Review of Systems is negative other than noted in the HPI or here.     Past Medical History    I have reviewed this patient's medical history and updated it with pertinent information if needed.   Past Medical History:   Diagnosis Date    Acute on chronic respiratory failure with hypoxia (H) 4/10/2020    Acute on chronic respiratory failure with hypoxia and hypercapnia (H) 4/1/2019    CAP (community acquired pneumonia) 4/14/2020    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)     COPD exacerbation (H) 4/1/2019    COPD exacerbation (H) 2/16/2020    Erectile dysfunction     Hypertension     Hyponatremia 4/1/2019    Pneumonia 4/10/2020       Patient Active Problem List    Diagnosis Date Noted    COPD with acute exacerbation (H) 01/07/2024     Priority: Medium    Acute on chronic respiratory failure with hypoxia and hypercapnia (H) 11/06/2023     Priority: Medium    Anemia,  unspecified type 2021     Priority: Medium    Peripheral edema 2020     Priority: Medium    Pulmonary hypertension (H) 2020     Priority: Medium    Tobacco abuse, in remission 2017     Priority: Medium    COPD exacerbation (H) 2014     Priority: Medium    Incidental pulmonary nodule, > 3mm and < 8mm, new from 2014     Priority: Medium     By chest x-ray and chest CT new from CT chest from Oct 2010.   Stable 2014 to 2014, 6 month follow scan recommended.   Chest CT of 1/15/2015= stable nodule, f/u one year.      Advanced directives, counseling/discussion 2013     Priority: Medium     2013 Gave patient honoring choices forms to take home and review.  DAVEY Dial ([facility])       CARDIOVASCULAR SCREENING; LDL GOAL LESS THAN 130 10/31/2010     Priority: Medium    COPD, very severe (H) 10/25/2010     Priority: Medium     Severe to very severe      Eczema 10/04/2010     Priority: Medium    ED (erectile dysfunction) 2009     Priority: Medium    Essential hypertension, benign 10/15/2007     Priority: Medium        Past Surgical History   I have reviewed this patient's surgical history and updated it with pertinent information if needed.  Past Surgical History:   Procedure Laterality Date    APPENDECTOMY      childhood       Social History   I have reviewed this patient's social history and updated it with pertinent information if needed.  Social History     Tobacco Use    Smoking status: Former     Packs/day: 2.00     Years: 30.00     Additional pack years: 0.00     Total pack years: 60.00     Types: Cigarettes     Quit date: 2021     Years since quittin.4    Smokeless tobacco: Former   Vaping Use    Vaping Use: Never used   Substance Use Topics    Alcohol use: Yes     Comment: rare    Drug use: No       Family History   I have reviewed this patient's family history and updated it with pertinent information if needed.   Family History   Problem  Relation Age of Onset    Hypertension Mother     Ovarian Cancer Mother     Breast Cancer Mother     Hypertension Father     Lipids Father     C.A.D. Father     Heart Disease Father     Chronic Obstructive Pulmonary Disease Father     Diabetes Paternal Grandmother     Chronic Obstructive Pulmonary Disease Paternal Grandfather        Prior to Admission Medications   Prior to Admission Medications   Prescriptions Last Dose Informant Patient Reported? Taking?   albuterol (PROAIR HFA/PROVENTIL HFA/VENTOLIN HFA) 108 (90 Base) MCG/ACT inhaler 1/7/2024 at 0915 Self No Yes   Sig: Inhale 2 puffs into the lungs every 4 hours as needed for shortness of breath Hold on file until needed   amLODIPine (NORVASC) 10 MG tablet 1/7/2024 at am Self No Yes   Sig: Take 1 tablet (10 mg) by mouth daily   atenolol (TENORMIN) 25 MG tablet 1/7/2024 at am Self No Yes   Sig: Take 1 tablet (25 mg) by mouth daily   fluticasone-salmeterol (ADVAIR) 500-50 MCG/ACT inhaler 1/7/2024 at am Self No Yes   Sig: Inhale 1 puff into the lungs every 12 hours   guaiFENesin (MUCINEX MAXIMUM STRENGTH) 1200 MG TB12 1/7/2024 at am Self Yes Yes   Sig: Take 1 tablet by mouth daily   lisinopril (ZESTRIL) 40 MG tablet 1/7/2024 at am Self No Yes   Sig: Take 1 tablet (40 mg) by mouth daily   magnesium oxide (MAG-OX) 400 MG tablet 1/6/2024 at lunch Self Yes Yes   Sig: Take 400 mg by mouth daily (with lunch) For leg cramps   order for DME More than a month at on hand Self No Yes   Sig: Equipment being ordered: Nebulizer   order for DME 1/7/2024 at cont. Self No Yes   Sig: Equipment being ordered: Oxygen 3L via NC continuous.  O2 saturated noted to be 86% on room air at rest.   roflumilast (DALIRESP) 500 MCG TABS tablet 1/7/2024 at am Self No Yes   Sig: Take 1 tablet (500 mcg) by mouth daily   tiotropium (SPIRIVA RESPIMAT) 2.5 MCG/ACT inhaler 1/7/2024 at am Self No Yes   Sig: Inhale 2 puffs into the lungs daily      Facility-Administered Medications: None     Allergies    Allergies   Allergen Reactions    Hctz Other (See Comments)     He has hyponatremia - avoid use    Penicillins Unknown     Childhood reaction.       Physical Exam   Vital Signs: Temp: 97.8  F (36.6  C) Temp src: Oral BP: 128/85 Pulse: (!) 124   Resp: 16 SpO2: 96 % O2 Device: Oxymask Oxygen Delivery: 3 LPM  Weight: 106 lbs 7.71 oz    Gen: Thin, well developed, resting comfortably in bed, no acute distress  Head: atraumatic normocephalic; sclera non-injected, anicterric; oral mucosa moist, no lesions, no exudates  Neck: supple without spinal abnormality  Chest: Scattered wheezes bilaterally, no rhonchi, no rales.  Cardiovascular: Tachycardic with regular rhythm, no gallops or rubs, no murmurs, no edema  Abdomen: bowel sounds normal, no hepatosplenomegaly, no masses, non-tender, non-distended, no guarding, no rebound tenderness  Musculoskeletal: Decreased muscle mass, normal muscle tone  Skin: no rashes, no chronic venous stasis  Lymph: no lymphadenopathy.  Neuro: cranial nerves II-XII intact, strength in all four extremities normal, reflexes normal, coordination normal.    Data   Data reviewed today: I reviewed all medications, new labs and imaging results over the last 24 hours. I personally reviewed the EKG tracing showing sinus tachycardia with rightward axis deviation and the chest x-ray image(s) showing hyperinflation with right mid and basilar nodularity. .    Recent Labs   Lab 01/07/24  0949   WBC 10.4   HGB 12.1*   MCV 91      INR 0.94   *   POTASSIUM 4.5   CHLORIDE 92*   CO2 29   BUN 12.6   CR 0.70   ANIONGAP 7   MIKE 9.5   *   ALBUMIN 4.4   PROTTOTAL 7.4   BILITOTAL 0.3   ALKPHOS 60   ALT 13   AST 16         Recent Results (from the past 24 hour(s))   XR Chest 2 Views    Narrative    EXAM: XR CHEST 2 VIEWS  LOCATION: [facility]  DATE: 1/7/2024    INDICATION: sob, pmh copd  COMPARISON: PET/CT 11/09/2023 and CT chest 09/14/2023.      Impression    IMPRESSION:  Heart size and pulmonary vascularity are normal. Hyperinflated lungs. Right mid and lower lung patchy and nodular opacities likely represent a combination of suspected pulmonary malignancy and/or postobstructive pneumonia. No pleural effusion   or pneumothorax.

## 2024-01-07 NOTE — ED NOTES
"[facility]   Admission Handoff    The patient is Luis Hernandez, 58 year old who arrived in the ED by AMBULANCE from home with a complaint of Shortness of Breath  . The patient's current symptoms are new and during this time the symptoms have decreased. In the ED, patient was diagnosed with   Final diagnoses:   COPD with acute exacerbation (H)         Needed?: No    Allergies:    Allergies   Allergen Reactions    Hctz Other (See Comments)     He has hyponatremia - avoid use    Penicillins Unknown     Childhood reaction.       Past Medical Hx:   Past Medical History:   Diagnosis Date    Acute on chronic respiratory failure with hypoxia (H) 4/10/2020    Acute on chronic respiratory failure with hypoxia and hypercapnia (H) 4/1/2019    CAP (community acquired pneumonia) 4/14/2020    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)     COPD exacerbation (H) 4/1/2019    COPD exacerbation (H) 2/16/2020    Erectile dysfunction     Hypertension     Hyponatremia 4/1/2019    Pneumonia 4/10/2020       Initial vitals were: BP: (!) 137/109  Pulse: 117  Temp: 99  F (37.2  C)  Resp: 18  Height: 162.6 cm (5' 4\")  Weight: 50.8 kg (112 lb)  SpO2: 98 %   Recent vital Signs: BP (!) 141/98   Pulse 116   Temp 99  F (37.2  C) (Oral)   Resp 24   Ht 1.626 m (5' 4\")   Wt 50.8 kg (112 lb)   SpO2 98%   BMI 19.22 kg/m      Elimination Status: Continent: Yes     Activity Level: SBA    Fall Status: Reason for falls risk:  Mobility  arm band in place and activity supervised    Baseline Mental status: WDL  Current Mental Status changes: at basesline    Infection present or suspected this encounter: no  Sepsis suspected: No    Isolation type: no    Bariatric equipment needed?: No    In the ED these meds were given:   Medications   ipratropium - albuterol 0.5 mg/2.5 mg/3 mL (DUONEB) neb solution 3 mL (3 mLs Nebulization $Given 1/7/24 1129)       Drips running?  No    Home pump  No    Current " LDAs: Peripheral IV: Site left lower arm; Gauge 18  none     Results:   Labs/Imaging  Ordered and Resulted from Time of ED Arrival Up to the Time of Departure from the ED  Results for orders placed or performed during the hospital encounter of 01/07/24 (from the past 24 hour(s))   [place] Draw    Narrative    The following orders were created for panel order [place] Draw.  Procedure                               Abnormality         Status                     ---------                               -----------         ------                     Extra Blue Top Tube[[id number]]                              Final result               Extra Red Top Tube[[id number]]                               Final result               Extra Green Top (Lithium...[[id number]]                      Final result               Extra Purple Top Tube[[id number]]                            Final result                 Please view results for these tests on the individual orders.   Blood gas venous   Result Value Ref Range    pH Venous 7.33 7.32 - 7.43    pCO2 Venous 53 (H) 40 - 50 mm Hg    pO2 Venous 50 (H) 25 - 47 mm Hg    Bicarbonate Venous 28 21 - 28 mmol/L    Base Excess/Deficit 1.3 -7.7 - 1.9 mmol/L    FIO2 4    Extra Blue Top Tube   Result Value Ref Range    Hold Specimen JIC    Extra Red Top Tube   Result Value Ref Range    Hold Specimen JIC    Extra Green Top (Lithium Heparin) Tube   Result Value Ref Range    Hold Specimen JIC    Extra Purple Top Tube   Result Value Ref Range    Hold Specimen JIC    CBC with platelets differential    Narrative    The following orders were created for panel order CBC with platelets differential.  Procedure                               Abnormality         Status                     ---------                               -----------         ------                     CBC with platelets and d...[[id number]]  Abnormal            Final result                 Please view results for these tests on the individual  orders.   INR   Result Value Ref Range    INR 0.94 0.85 - 1.15   Comprehensive metabolic panel   Result Value Ref Range    Sodium 128 (L) 135 - 145 mmol/L    Potassium 4.5 3.4 - 5.3 mmol/L    Carbon Dioxide (CO2) 29 22 - 29 mmol/L    Anion Gap 7 7 - 15 mmol/L    Urea Nitrogen 12.6 6.0 - 20.0 mg/dL    Creatinine 0.70 0.67 - 1.17 mg/dL    GFR Estimate >90 >60 mL/min/1.73m2    Calcium 9.5 8.6 - 10.0 mg/dL    Chloride 92 (L) 98 - 107 mmol/L    Glucose 121 (H) 70 - 99 mg/dL    Alkaline Phosphatase 60 40 - 150 U/L    AST 16 0 - 45 U/L    ALT 13 0 - 70 U/L    Protein Total 7.4 6.4 - 8.3 g/dL    Albumin 4.4 3.5 - 5.2 g/dL    Bilirubin Total 0.3 <=1.2 mg/dL   Procalcitonin   Result Value Ref Range    Procalcitonin 0.06 <0.50 ng/mL   CBC with platelets and differential   Result Value Ref Range    WBC Count 10.4 4.0 - 11.0 10e3/uL    RBC Count 4.06 (L) 4.40 - 5.90 10e6/uL    Hemoglobin 12.1 (L) 13.3 - 17.7 g/dL    Hematocrit 36.8 (L) 40.0 - 53.0 %    MCV 91 78 - 100 fL    MCH 29.8 26.5 - 33.0 pg    MCHC 32.9 31.5 - 36.5 g/dL    RDW 13.0 10.0 - 15.0 %    Platelet Count 389 150 - 450 10e3/uL    % Neutrophils 68 %    % Lymphocytes 15 %    % Monocytes 12 %    % Eosinophils 4 %    % Basophils 1 %    % Immature Granulocytes 0 %    NRBCs per 100 WBC 0 <1 /100    Absolute Neutrophils 7.2 1.6 - 8.3 10e3/uL    Absolute Lymphocytes 1.5 0.8 - 5.3 10e3/uL    Absolute Monocytes 1.3 0.0 - 1.3 10e3/uL    Absolute Eosinophils 0.4 0.0 - 0.7 10e3/uL    Absolute Basophils 0.1 0.0 - 0.2 10e3/uL    Absolute Immature Granulocytes 0.0 <=0.4 10e3/uL    Absolute NRBCs 0.0 10e3/uL   Symptomatic Influenza A/B, RSV, & SARS-CoV2 PCR (COVID-19) Nose    Specimen: Nose; Swab   Result Value Ref Range    Influenza A PCR Negative Negative    Influenza B PCR Negative Negative    RSV PCR Negative Negative    SARS CoV2 PCR Negative Negative    Narrative    Testing was performed using the Xpert Xpress CoV2/Flu/RSV Assay on the [employer] GeneXpert Instrument. This test  should be ordered for the detection of SARS-CoV-2, influenza, and RSV viruses in individuals who meet clinical and/or epidemiological criteria. Test performance is unknown in asymptomatic patients. This test is for in vitro diagnostic use under the FDA EUA for laboratories certified under CLIA to perform high or moderate complexity testing. This test has not been FDA cleared or approved. A negative result does not rule out the presence of PCR inhibitors in the specimen or target RNA in concentration below the limit of detection for the assay. If only one viral target is positive but coinfection with multiple targets is suspected, the sample should be re-tested with another FDA cleared, approved, or authorized test, if coinfection would change clinical management. This test was validated by the [facility] [employer]. These laboratories are certified under the Clinical Laboratory Improvement Amendments of 1988 (CLIA-88) as qualified to perform high complexity laboratory testing.   XR Chest 2 Views    Narrative    EXAM: XR CHEST 2 VIEWS  LOCATION: [facility]  DATE: 1/7/2024    INDICATION: sob, pmh copd  COMPARISON: PET/CT 11/09/2023 and CT chest 09/14/2023.      Impression    IMPRESSION: Heart size and pulmonary vascularity are normal. Hyperinflated lungs. Right mid and lower lung patchy and nodular opacities likely represent a combination of suspected pulmonary malignancy and/or postobstructive pneumonia. No pleural effusion   or pneumothorax.       For the majority of the shift this patient's behavior was Green     Cardiac Rhythm: N/A  Pt needs tele? No  Skin/wound Issues: None    Code Status:     Pain control: pt had none    Nausea control: pt had none    Abnormal labs/tests/findings requiring intervention:     Patient tested for COVID 19 prior to admission: yes      OBS brochure/video discussed/provided to patient/family: Yes    Family present during ED course? No     Family  Comments/Social Situation comments:     Tasks needing completion: None    Mckay Lopez RN

## 2024-01-07 NOTE — ED PROVIDER NOTES
History     Chief Complaint   Patient presents with    Shortness of Breath     HPI  Luis Hernandez is a 58 year old male with documented PMH significant for oxygen dependent COPD (2L at baseline) and FDG PET identified avid right hilar soft tissue which encases the bronchus intermedius and extends along the right middle lobe bronchus is concerning for malignancy who is presenting to the department today via EMS for evaluation of shortness of breath with cough and wheezing.  Patient explains that last evening around 11 PM he developed increased shortness of breath associated with increased work of breathing which he believes is consistent with COPD exacerbations.  He maintains that he often has similar exacerbation every 1-2 months and initiate his outpatient prescribed medication for exacerbation per protocol which includes prednisone taper and unspecified antibiotic.  However at this time patient did not have any more of his emergency relief medications as prescribed by pulmonology so symptoms progressed overnight until he eventually decided to call for EMS.  Patient arrived to department via EMS on BiPAP, although he says he no longer requires BiPAP for support.  He also received DuoNeb therapy and Solu-Medrol en route to the department.  He denies recent fever, chills, headache, dizziness, chest pain, palpitations, abdominal pain or gastrointestinal symptoms.        Allergies:  Allergies   Allergen Reactions    Hctz Other (See Comments)     He has hyponatremia - avoid use    Penicillins Unknown     Childhood reaction.       Problem List:    Patient Active Problem List    Diagnosis Date Noted    COPD with acute exacerbation (H) 01/07/2024     Priority: Medium    Acute on chronic respiratory failure with hypoxia and hypercapnia (H) 11/06/2023     Priority: Medium    Anemia, unspecified type 05/07/2021     Priority: Medium    Peripheral edema 02/16/2020     Priority: Medium    Pulmonary hypertension (H) 02/16/2020      Priority: Medium    Tobacco abuse, 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   Stable 01/2014 to 07/2014, 6 month follow scan recommended.   Chest CT of 1/15/2015= stable nodule, f/u one year.      Advanced directives, counseling/discussion 11/25/2013     Priority: Medium     11/25/2013 Gave patient honoring choices forms to take home and review.  DAVEY Dial ([facility])       CARDIOVASCULAR SCREENING; LDL GOAL LESS THAN 130 10/31/2010     Priority: Medium    COPD, very severe (H) 10/25/2010     Priority: Medium     Severe to very severe      Eczema 10/04/2010     Priority: Medium    ED (erectile dysfunction) 04/20/2009     Priority: Medium    Essential hypertension, benign 10/15/2007     Priority: Medium        Past Medical History:    Past Medical History:   Diagnosis Date    Acute on chronic respiratory failure with hypoxia (H) 4/10/2020    Acute on chronic respiratory failure with hypoxia and hypercapnia (H) 4/1/2019    CAP (community acquired pneumonia) 4/14/2020    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)     COPD exacerbation (H) 4/1/2019    COPD exacerbation (H) 2/16/2020    Erectile dysfunction     Hypertension     Hyponatremia 4/1/2019    Pneumonia 4/10/2020       Past Surgical History:    Past Surgical History:   Procedure Laterality Date    APPENDECTOMY      childhood       Family History:    Family History   Problem Relation Age of Onset    Hypertension Mother     Ovarian Cancer Mother     Breast Cancer Mother     Hypertension Father     Lipids Father     C.A.D. Father     Heart Disease Father     Chronic Obstructive Pulmonary Disease Father     Diabetes Paternal Grandmother     Chronic Obstructive Pulmonary Disease Paternal Grandfather        Social  "History:  Marital Status:  Single [1]  Social History     Tobacco Use    Smoking status: Former     Packs/day: 2.00     Years: 30.00     Additional pack years: 0.00     Total pack years: 60.00     Types: Cigarettes     Quit date: 2021     Years since quittin.4    Smokeless tobacco: Former   Vaping Use    Vaping Use: Never used   Substance Use Topics    Alcohol use: Yes     Comment: rare    Drug use: No        Medications:    albuterol (PROAIR HFA/PROVENTIL HFA/VENTOLIN HFA) 108 (90 Base) MCG/ACT inhaler  amLODIPine (NORVASC) 10 MG tablet  atenolol (TENORMIN) 25 MG tablet  fluticasone (FLONASE) 50 MCG/ACT nasal spray  fluticasone-salmeterol (ADVAIR) 500-50 MCG/ACT inhaler  guaiFENesin (MUCINEX) 600 MG 12 hr tablet  ketorolac tromethamine (ACULAR-LS) 0.4 % SOLN ophthalmic solution  lisinopril (ZESTRIL) 40 MG tablet  ofloxacin (OCUFLOX) 0.3 % ophthalmic solution  order for DME  order for DME  prednisoLONE acetate (PRED FORTE) 1 % ophthalmic suspension  predniSONE (DELTASONE) 10 MG tablet  roflumilast (DALIRESP) 500 MCG TABS tablet  sodium chloride (NEBUSAL) 3 % neb solution  tiotropium (SPIRIVA RESPIMAT) 2.5 MCG/ACT inhaler          Review of Systems  Constitutional:  Negative for fever or chills.  Cardiovascular:  Negative for chest discomfort.  Respiratory: Positive for shortness of breath with wheezing.  Baseline cough remains unchanged.  Gastrointestinal:  Negative for abdominal pain, nausea or vomiting.   Neurological:  Negative for headache or dizziness.    All others reviewed and are negative.      Physical Exam   BP: (!) 137/109  Pulse: 117  Temp: 99  F (37.2  C)  Resp: 18  Height: 162.6 cm (5' 4\")  Weight: 50.8 kg (112 lb)  SpO2: 98 %      Physical Exam  Constitutional:  Well developed, well nourished.  Appears to be in mild respiratory distress but overall nontoxic in appearance.  HENT:  Normocephalic and atraumatic.  Symmetric in appearance.  Eyes:  Conjunctivae are normal.  Cardiovascular:  No " cyanosis.  Tachycardia with regular rhythm..  No audible murmurs noted.  No lower extremity edema or asymmetry.   Respiratory: Mild tachypnea without accessory muscle usage.  Diffuse wheezing, no rhonchi or crackles.  Gastrointestinal:  Soft nondistended abdomen.  Nontender and without guarding.  No rigidity or rebound tenderness.  Negative Doyle's sign.  Negative McBurney's point.    Musculoskeletal:  Moves extremities spontaneously.  Neurological:  Patient is alert.  Skin:  Skin is warm and dry.  Psychiatric:  Normal mood and affect.      ED Course                 Procedures                EKG Interpretation:      Interpreted by: Luis Otero  Time reviewed: Upon completion    Symptoms at time of EKG: Shortness of breath  Rhythm: Sinus  Rate: 125 bpm  Axis: Normal    Conduction: None atypical   ST Segments/ T Waves: No pathologic ST-elevations or T-wave abnormalities.  Q Waves: None  Comparison to prior: Similar morphology to previous     Clinical Impression: No sign of ischemia         Critical Care Addendum    My initial assessment, based on my review of prehospital provider report, review of vital signs, focused history, physical exam, and review of cardiac rhythm monitor, established that Luis Hernandez has respiratory distress, which requires immediate intervention, and therefore He is critically ill.     After the initial assessment, the care team initiated multiple lab tests, initiated IV fluid administration, initiated medication therapy with DuoNebs, and initiated intensive non-invasive respiratory support to provide stabilization care. Due to the critical nature of this patient, I reassessed vital signs, physical exam, review of cardiac rhythm monitor, mental status, and respiratory status multiple times prior to He disposition.     Time also spent performing documentation, discussion with family to obtain medical information for decision making, reviewing test results, discussion with consultants, and  coordination of care.     Critical care time (excluding teaching time and procedures): 40 minutes.               Results for orders placed or performed during the hospital encounter of 01/07/24 (from the past 24 hour(s))   [place] Draw    Narrative    The following orders were created for panel order [place] Draw.  Procedure                               Abnormality         Status                     ---------                               -----------         ------                     Extra Blue Top Tube[[id number]]                              Final result               Extra Red Top Tube[[id number]]                               Final result               Extra Green Top (Lithium...[[id number]]                      Final result               Extra Purple Top Tube[[id number]]                            Final result                 Please view results for these tests on the individual orders.   Blood gas venous   Result Value Ref Range    pH Venous 7.33 7.32 - 7.43    pCO2 Venous 53 (H) 40 - 50 mm Hg    pO2 Venous 50 (H) 25 - 47 mm Hg    Bicarbonate Venous 28 21 - 28 mmol/L    Base Excess/Deficit 1.3 -7.7 - 1.9 mmol/L    FIO2 4    Extra Blue Top Tube   Result Value Ref Range    Hold Specimen JIC    Extra Red Top Tube   Result Value Ref Range    Hold Specimen JIC    Extra Green Top (Lithium Heparin) Tube   Result Value Ref Range    Hold Specimen JIC    Extra Purple Top Tube   Result Value Ref Range    Hold Specimen JIC    CBC with platelets differential    Narrative    The following orders were created for panel order CBC with platelets differential.  Procedure                               Abnormality         Status                     ---------                               -----------         ------                     CBC with platelets and d...[[id number]]  Abnormal            Final result                 Please view results for these tests on the individual orders.   INR   Result Value Ref Range    INR 0.94 0.85 -  1.15   Comprehensive metabolic panel   Result Value Ref Range    Sodium 128 (L) 135 - 145 mmol/L    Potassium 4.5 3.4 - 5.3 mmol/L    Carbon Dioxide (CO2) 29 22 - 29 mmol/L    Anion Gap 7 7 - 15 mmol/L    Urea Nitrogen 12.6 6.0 - 20.0 mg/dL    Creatinine 0.70 0.67 - 1.17 mg/dL    GFR Estimate >90 >60 mL/min/1.73m2    Calcium 9.5 8.6 - 10.0 mg/dL    Chloride 92 (L) 98 - 107 mmol/L    Glucose 121 (H) 70 - 99 mg/dL    Alkaline Phosphatase 60 40 - 150 U/L    AST 16 0 - 45 U/L    ALT 13 0 - 70 U/L    Protein Total 7.4 6.4 - 8.3 g/dL    Albumin 4.4 3.5 - 5.2 g/dL    Bilirubin Total 0.3 <=1.2 mg/dL   Procalcitonin   Result Value Ref Range    Procalcitonin 0.06 <0.50 ng/mL   CBC with platelets and differential   Result Value Ref Range    WBC Count 10.4 4.0 - 11.0 10e3/uL    RBC Count 4.06 (L) 4.40 - 5.90 10e6/uL    Hemoglobin 12.1 (L) 13.3 - 17.7 g/dL    Hematocrit 36.8 (L) 40.0 - 53.0 %    MCV 91 78 - 100 fL    MCH 29.8 26.5 - 33.0 pg    MCHC 32.9 31.5 - 36.5 g/dL    RDW 13.0 10.0 - 15.0 %    Platelet Count 389 150 - 450 10e3/uL    % Neutrophils 68 %    % Lymphocytes 15 %    % Monocytes 12 %    % Eosinophils 4 %    % Basophils 1 %    % Immature Granulocytes 0 %    NRBCs per 100 WBC 0 <1 /100    Absolute Neutrophils 7.2 1.6 - 8.3 10e3/uL    Absolute Lymphocytes 1.5 0.8 - 5.3 10e3/uL    Absolute Monocytes 1.3 0.0 - 1.3 10e3/uL    Absolute Eosinophils 0.4 0.0 - 0.7 10e3/uL    Absolute Basophils 0.1 0.0 - 0.2 10e3/uL    Absolute Immature Granulocytes 0.0 <=0.4 10e3/uL    Absolute NRBCs 0.0 10e3/uL   Symptomatic Influenza A/B, RSV, & SARS-CoV2 PCR (COVID-19) Nose    Specimen: Nose; Swab   Result Value Ref Range    Influenza A PCR Negative Negative    Influenza B PCR Negative Negative    RSV PCR Negative Negative    SARS CoV2 PCR Negative Negative    Narrative    Testing was performed using the Xpert Xpress CoV2/Flu/RSV Assay on the [employer] Instrument. This test should be ordered for the detection of SARS-CoV-2,  influenza, and RSV viruses in individuals who meet clinical and/or epidemiological criteria. Test performance is unknown in asymptomatic patients. This test is for in vitro diagnostic use under the FDA EUA for laboratories certified under CLIA to perform high or moderate complexity testing. This test has not been FDA cleared or approved. A negative result does not rule out the presence of PCR inhibitors in the specimen or target RNA in concentration below the limit of detection for the assay. If only one viral target is positive but coinfection with multiple targets is suspected, the sample should be re-tested with another FDA cleared, approved, or authorized test, if coinfection would change clinical management. This test was validated by the [facility] [employer]. These laboratories are certified under the Clinical Laboratory Improvement Amendments of 1988 (CLIA-88) as qualified to perform high complexity laboratory testing.   XR Chest 2 Views    Narrative    EXAM: XR CHEST 2 VIEWS  LOCATION: [facility]  DATE: 1/7/2024    INDICATION: sob, pmh copd  COMPARISON: PET/CT 11/09/2023 and CT chest 09/14/2023.      Impression    IMPRESSION: Heart size and pulmonary vascularity are normal. Hyperinflated lungs. Right mid and lower lung patchy and nodular opacities likely represent a combination of suspected pulmonary malignancy and/or postobstructive pneumonia. No pleural effusion   or pneumothorax.       Medications   ipratropium - albuterol 0.5 mg/2.5 mg/3 mL (DUONEB) neb solution 3 mL (3 mLs Nebulization $Given 1/7/24 1127)       Assessments & Plan (with Medical Decision Making)   Luis Hernandez is a 58 year old male who presented to the department for evaluation of progressive shortness of breath with cough and wheezing, patient maintains that symptoms are reminiscent of frequent COPD exacerbations but unfortunately he did not have his rescue home medications of prednisone and  unspecified antibiotic.  EMS reports that when they arrived the patient was tachypneic and hypoxic so they transition from home nasal cannula to BiPAP and he also received Solu-Medrol and DuoNeb therapy en route to the department.  Patient's symptoms gradually improved in the department and he was again transition to oxymask at 5 L/min.    The patient's initial lab work including VBG and CMP are reassuring, CBC without leukocytosis, rapid COVID and influenza testing negative.  Chest radiograph independently reviewed and agree with radiologist read that there is right-sided patchy infiltrates but most likely representing previously identified mass concerning for malignancy, as opposed to postobstructive pneumonia which does not seem consistent with clinical examination.  Consulted hospitalist regarding observation admission, in agreement with hospitalization and management plan thus far, antibiotics deferred at this time.  Patient is also in agreement with plan as he cannot safely ambulate due to his persistent dyspnea.        Disclaimer:  This note consists of symbols derived from keyboarding, dictation, and/or voice recognition software.  As a result, there may be errors in the script that have gone undetected.  Please consider this when interpreting information found in the chart.      I have reviewed the nursing notes.    I have reviewed the findings, diagnosis, plan and need for follow up with the patient.             New Prescriptions    No medications on file       Final diagnoses:   COPD with acute exacerbation (H)       1/7/2024   [facility] EMERGENCY DEPT       Luis Otero DO  01/07/24 1177

## 2024-01-07 NOTE — PROGRESS NOTES
"[place] [facility] ADMISSION NOTE    Patient admitted to room 2209 at approximately 1240PM via wheel chair from emergency room. Patient was accompanied by transport tech.     Verbal SBAR report received from ER RN Josr's Hand Off Note  prior to patient arrival.     Patient ambulated to bed independently. Patient alert and oriented X 3. The patient is not having any pain.  . Admission vital signs: Blood pressure 128/85, pulse (!) 124, temperature 97.8  F (36.6  C), temperature source Oral, resp. rate 16, height 1.626 m (5' 4\"), weight 48.3 kg (106 lb 7.7 oz), SpO2 96%. Patient was oriented to plan of care, call light, bed controls, tv, telephone, bathroom, and visiting hours.     Risk Assessment    The following safety risks were identified during admission: none. Yellow risk band applied: NO.     Skin Initial Assessment    This writer admitted this patient and completed a full skin assessment and [place] score in the Adult PCS flowsheet. Appropriate interventions initiated as needed.     Secondary skin check completed by ERYN, patient 59 y/o and is independent with ambulation, refused skin check.         Education    Patient has a [place] to Observation order: Yes  Observation education completed and documented: Yes      Isma Briscoe RN      "

## 2024-01-08 VITALS
BODY MASS INDEX: 18.18 KG/M2 | HEART RATE: 96 BPM | HEIGHT: 64 IN | SYSTOLIC BLOOD PRESSURE: 135 MMHG | TEMPERATURE: 97.3 F | WEIGHT: 106.48 LBS | DIASTOLIC BLOOD PRESSURE: 90 MMHG | RESPIRATION RATE: 18 BRPM | OXYGEN SATURATION: 98 %

## 2024-01-08 LAB
ANION GAP SERPL CALCULATED.3IONS-SCNC: 7 MMOL/L (ref 7–15)
BASE EXCESS BLDV CALC-SCNC: 0.8 MMOL/L (ref -7.7–1.9)
BASOPHILS # BLD AUTO: 0 10E3/UL (ref 0–0.2)
BASOPHILS NFR BLD AUTO: 0 %
BUN SERPL-MCNC: 16.6 MG/DL (ref 6–20)
CALCIUM SERPL-MCNC: 9.4 MG/DL (ref 8.6–10)
CHLORIDE SERPL-SCNC: 96 MMOL/L (ref 98–107)
CREAT SERPL-MCNC: 0.56 MG/DL (ref 0.67–1.17)
DEPRECATED HCO3 PLAS-SCNC: 28 MMOL/L (ref 22–29)
EGFRCR SERPLBLD CKD-EPI 2021: >90 ML/MIN/1.73M2
EOSINOPHIL # BLD AUTO: 0 10E3/UL (ref 0–0.7)
EOSINOPHIL NFR BLD AUTO: 0 %
ERYTHROCYTE [DISTWIDTH] IN BLOOD BY AUTOMATED COUNT: 12.9 % (ref 10–15)
GLUCOSE SERPL-MCNC: 122 MG/DL (ref 70–99)
HCO3 BLDV-SCNC: 26 MMOL/L (ref 21–28)
HCT VFR BLD AUTO: 34.4 % (ref 40–53)
HGB BLD-MCNC: 11.2 G/DL (ref 13.3–17.7)
IMM GRANULOCYTES # BLD: 0.1 10E3/UL
IMM GRANULOCYTES NFR BLD: 1 %
LYMPHOCYTES # BLD AUTO: 0.4 10E3/UL (ref 0.8–5.3)
LYMPHOCYTES NFR BLD AUTO: 4 %
MCH RBC QN AUTO: 29.2 PG (ref 26.5–33)
MCHC RBC AUTO-ENTMCNC: 32.6 G/DL (ref 31.5–36.5)
MCV RBC AUTO: 90 FL (ref 78–100)
MONOCYTES # BLD AUTO: 0.6 10E3/UL (ref 0–1.3)
MONOCYTES NFR BLD AUTO: 6 %
NEUTROPHILS # BLD AUTO: 10 10E3/UL (ref 1.6–8.3)
NEUTROPHILS NFR BLD AUTO: 89 %
NRBC # BLD AUTO: 0 10E3/UL
NRBC BLD AUTO-RTO: 0 /100
O2/TOTAL GAS SETTING VFR VENT: 28 %
PCO2 BLDV: 44 MM HG (ref 40–50)
PH BLDV: 7.38 [PH] (ref 7.32–7.43)
PLATELET # BLD AUTO: 408 10E3/UL (ref 150–450)
PO2 BLDV: 71 MM HG (ref 25–47)
POTASSIUM SERPL-SCNC: 4.8 MMOL/L (ref 3.4–5.3)
RBC # BLD AUTO: 3.83 10E6/UL (ref 4.4–5.9)
SODIUM SERPL-SCNC: 131 MMOL/L (ref 135–145)
WBC # BLD AUTO: 11.1 10E3/UL (ref 4–11)

## 2024-01-08 PROCEDURE — 250N000012 HC RX MED GY IP 250 OP 636 PS 637: Performed by: INTERNAL MEDICINE

## 2024-01-08 PROCEDURE — 85025 COMPLETE CBC W/AUTO DIFF WBC: CPT | Performed by: INTERNAL MEDICINE

## 2024-01-08 PROCEDURE — 250N000013 HC RX MED GY IP 250 OP 250 PS 637: Performed by: INTERNAL MEDICINE

## 2024-01-08 PROCEDURE — G0378 HOSPITAL OBSERVATION PER HR: HCPCS

## 2024-01-08 PROCEDURE — 82803 BLOOD GASES ANY COMBINATION: CPT | Performed by: INTERNAL MEDICINE

## 2024-01-08 PROCEDURE — 80048 BASIC METABOLIC PNL TOTAL CA: CPT | Performed by: INTERNAL MEDICINE

## 2024-01-08 PROCEDURE — 36415 COLL VENOUS BLD VENIPUNCTURE: CPT | Performed by: INTERNAL MEDICINE

## 2024-01-08 RX ORDER — METHYLPREDNISOLONE 4 MG
TABLET, DOSE PACK ORAL
Qty: 21 TABLET | Refills: 0 | Status: SHIPPED | OUTPATIENT
Start: 2024-01-08 | End: 2024-01-08

## 2024-01-08 RX ORDER — METHYLPREDNISOLONE 4 MG
TABLET, DOSE PACK ORAL
Qty: 21 TABLET | Refills: 0 | Status: SHIPPED | OUTPATIENT
Start: 2024-01-08 | End: 2024-04-12

## 2024-01-08 RX ADMIN — LISINOPRIL 40 MG: 40 TABLET ORAL at 07:50

## 2024-01-08 RX ADMIN — ATENOLOL 25 MG: 25 TABLET ORAL at 07:50

## 2024-01-08 RX ADMIN — ROFLUMILAST 500 MCG: 500 TABLET ORAL at 07:50

## 2024-01-08 RX ADMIN — GUAIFENESIN 1200 MG: 600 TABLET ORAL at 07:50

## 2024-01-08 RX ADMIN — ACETAMINOPHEN 650 MG: 325 TABLET, FILM COATED ORAL at 05:01

## 2024-01-08 RX ADMIN — PREDNISONE 40 MG: 20 TABLET ORAL at 07:50

## 2024-01-08 RX ADMIN — ALBUTEROL SULFATE 2 PUFF: 90 AEROSOL, METERED RESPIRATORY (INHALATION) at 05:01

## 2024-01-08 RX ADMIN — AMLODIPINE BESYLATE 10 MG: 10 TABLET ORAL at 07:50

## 2024-01-08 ASSESSMENT — ACTIVITIES OF DAILY LIVING (ADL)
ADLS_ACUITY_SCORE: 22

## 2024-01-08 NOTE — PLAN OF CARE
Goal Outcome Evaluation:      Plan of Care Reviewed With: patient             [place] [facility] DISCHARGE NOTE    Patient discharged to home at 11:40 AM via wheel chair. Accompanied by daughter and staff. Discharge instructions reviewed with patient, opportunity offered to ask questions. Prescriptions sent to patients preferred pharmacy. All belongings sent with patient.    Shawnee Monroe RN    Will call patient if we hear back from Dr Prescott re: pre op

## 2024-01-08 NOTE — PLAN OF CARE
Goal Outcome Evaluation:       Patient is saturating at baseline 97% while on baseline O2 2LPM LUIS ENRIQUE Ashby RN [facility]  \

## 2024-01-08 NOTE — DISCHARGE SUMMARY
[facility]  Hospitalist Discharge Summary      Date of Admission:  1/7/2024  Date of Discharge:  1/8/2024  Discharging Provider: Sylvester Lo DO  Discharge Service: Hospitalist Service    Discharge Diagnoses   COPD exacerbation  Acute on chronic hypoxic respiratory failure  Chronic hypercapnic respiratory failure  Right lung nodule, concerning for malignancy  Hypertension  Hyponatremia    Clinically Significant Risk Factors          Follow-ups Needed After Discharge   Follow-up Appointments     Follow-up and recommended labs and tests       Please follow-up with primary care provider at patient's earliest   convenience.  Please follow-up with Dr. Prescott for endobronchial biopsy on January 18.            Unresulted Labs Ordered in the Past 30 Days of this Admission       No orders found for last 31 day(s).          Discharge Disposition   Discharged to home  Condition at discharge: Stable    Hospital Course   Luis Hernandez is a 58 year old male admitted on 1/7/2024. He presented from home with sudden onset dyspnea.  He is admitted with a COPD exacerbation.    COPD exacerbation  Acute on chronic hypoxic respiratory failure  Chronic hypercapnic respiratory failure    Chest x-ray demonstrates normal heart size and pulmonary vascularity.  Hyperinflated lungs.  Right mid and lower lobe patchy and nodular opacities likely represent combination of suspected pulmonary malignancy or postobstructive pneumonia.  No pleural effusion or pneumothorax.  -No indication for antibiotics.  Patient afebrile, WBC 10.4, procalcitonin 0.06.  -Give methylprednisolone 40 mg IV once now, then start prednisone 40 mg daily tomorrow.  -Albuterol MDI every 4 hours while awake.  Patient states he is intolerant of nebulizers.  -Will not order home Spiriva or Advair to reduce out-of-pocket cost for patient.  -At baseline, patient is on 2 L nasal cannula continuously.  He was brought in by ambulance on BiPAP and then weaned  to 4 L oxymask.  He is currently on 3 L nasal cannula.  Titrate oxygen to maintain saturations between 90 to 94%.  -Repeat VBG in a.m.    Right lung nodule, concerning for malignancy    Chest CT with contrast 9/14/2023 demonstrates a new spiculated appearing nodular opacity along the right minor fissure likely represents atelectasis or inflammatory process.  Stable irregular lobulated bronchial wall thickening with intraluminal likely mucous plugging involving the bronchus intermedius and right lower lobe bronchi unchanged from previous exam.  Interval increase in adenopathy in the subcarinal region.  Stable borderline enlarged right hilar lymph node.  Stable background emphysema.  Stable 4 mm right lower lobe nodule.    PET/CT 11/9/2023 demonstrates FDG avid right hilar nodule soft tissue which encases the bronchus intermedius extends along the right middle lobe bronchus and is contiguous with subcarinal lymph node tissue and endoluminal soft tissue of the right lower lobe bronchus.  Concerning for primary malignancy.  FDG avid opacities bilaterally including the right lower lobe, left suprahilar region, medial left lobe, and right upper lobe, new from prior.  Hypermetabolic soft tissue thickening the distal sigmoid/upper rectum, concerning for malignancy.  -Patient to follow-up with pulmonology for endobronchial biopsy on 1/18/2024.    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    Consultations This Hospital Stay   RESPIRATORY CARE IP CONSULT    Code Status   No CPR- Do NOT Intubate    Time Spent on this Encounter   I, Sylvester Lo DO, personally saw the patient today and spent greater than 30 minutes discharging this patient.       Sylvester Lo DO, [facility] SURGICAL  [address] [address]  Phone: [phone]  Fax:  [phone]  ______________________________________________________________________       Primary Care Physician   Dean Mariee    Discharge Orders      Reason for your hospital stay    Patient was admitted to the hospital for COPD exacerbation.     Follow-up and recommended labs and tests     Please follow-up with primary care provider at patient's earliest convenience.  Please follow-up with Dr. Prescott for endobronchial biopsy on January 18.     Activity    Your activity upon discharge: activity as tolerated     Diet    Follow this diet upon discharge: Orders Placed This Encounter      Regular Diet Adult       Significant Results and Procedures   Results for orders placed or performed during the hospital encounter of 01/07/24   XR Chest 2 Views    Narrative    EXAM: XR CHEST 2 VIEWS  LOCATION: [facility]  DATE: 1/7/2024    INDICATION: sob, pmh copd  COMPARISON: PET/CT 11/09/2023 and CT chest 09/14/2023.      Impression    IMPRESSION: Heart size and pulmonary vascularity are normal. Hyperinflated lungs. Right mid and lower lung patchy and nodular opacities likely represent a combination of suspected pulmonary malignancy and/or postobstructive pneumonia. No pleural effusion   or pneumothorax.       Discharge Medications   Current Discharge Medication List        START taking these medications    Details   methylPREDNISolone (MEDROL DOSEPAK) 4 MG tablet therapy pack Follow Package Directions  Qty: 21 tablet, Refills: 0    Associated Diagnoses: COPD with acute exacerbation (H)           CONTINUE these medications which have NOT CHANGED    Details   albuterol (PROAIR HFA/PROVENTIL HFA/VENTOLIN HFA) 108 (90 Base) MCG/ACT inhaler Inhale 2 puffs into the lungs every 4 hours as needed for shortness of breath Hold on file until needed  Qty: 54 g, Refills: 3    Comments: Pharmacy may dispense brand covered by insurance (Proair, or proventil or ventolin or generic albuterol inhaler)  Associated  Diagnoses: Centrilobular emphysema (H)      amLODIPine (NORVASC) 10 MG tablet Take 1 tablet (10 mg) by mouth daily  Qty: 90 tablet, Refills: 3    Associated Diagnoses: Essential hypertension, benign      atenolol (TENORMIN) 25 MG tablet Take 1 tablet (25 mg) by mouth daily  Qty: 90 tablet, Refills: 3    Associated Diagnoses: Essential hypertension, benign      fluticasone-salmeterol (ADVAIR) 500-50 MCG/ACT inhaler Inhale 1 puff into the lungs every 12 hours  Qty: 60 each, Refills: 11    Associated Diagnoses: Acute on chronic respiratory failure with hypoxia and hypercapnia (H); Pulmonary emphysema, unspecified emphysema type (H)      guaiFENesin (MUCINEX MAXIMUM STRENGTH) 1200 MG TB12 Take 1 tablet by mouth daily      lisinopril (ZESTRIL) 40 MG tablet Take 1 tablet (40 mg) by mouth daily  Qty: 90 tablet, Refills: 3    Associated Diagnoses: Essential hypertension, benign      magnesium oxide (MAG-OX) 400 MG tablet Take 400 mg by mouth daily (with lunch) For leg cramps      !! order for DME Equipment being ordered: Oxygen 3L via NC continuous.  O2 saturated noted to be 86% on room air at rest.  Qty: 1 Units, Refills: 0    Associated Diagnoses: Mixed simple and mucopurulent chronic bronchitis (H)      !! order for DME Equipment being ordered: Nebulizer  Qty: 1 Device, Refills: 0    Associated Diagnoses: Simple chronic bronchitis (H)      roflumilast (DALIRESP) 500 MCG TABS tablet Take 1 tablet (500 mcg) by mouth daily  Qty: 90 tablet, Refills: 3    Associated Diagnoses: Centrilobular emphysema (H); Pulmonary emphysema, unspecified emphysema type (H)      tiotropium (SPIRIVA RESPIMAT) 2.5 MCG/ACT inhaler Inhale 2 puffs into the lungs daily  Qty: 12 g, Refills: 3    Associated Diagnoses: Acute on chronic respiratory failure with hypoxia and hypercapnia (H); Pulmonary emphysema, unspecified emphysema type (H)       !! - Potential duplicate medications found. Please discuss with provider.        Allergies   Allergies    Allergen Reactions    Hctz Other (See Comments)     He has hyponatremia - avoid use    Penicillins Unknown     Childhood reaction.

## 2024-01-08 NOTE — CARE PLAN
Goal Outcome Evaluation:  Observation goals  PRIOR TO DISCHARGE        -diagnostic tests and consults completed and resulted- No  -vital signs at patient baseline-yes  -tolerating oral intake to maintain hydration-yes  -adequate pain control on oral analgesics-yes  -safe disposition plan has been identified-yes

## 2024-01-08 NOTE — PLAN OF CARE
Goal Outcome Evaluation:        Observation goals  PRIOR TO DISCHARGE        diagnostic tests and consults completed and resulted  -vital signs at patient baseline-yes  -tolerating oral intake to maintain hydration-yes  -adequate pain control on oral analgesics-denies pain at this time  -safe disposition plan has been identified-yes

## 2024-01-09 ENCOUNTER — OFFICE VISIT (OUTPATIENT)
Dept: FAMILY MEDICINE | Facility: CLINIC | Age: 59
End: 2024-01-09
Payer: COMMERCIAL

## 2024-01-09 ENCOUNTER — PATIENT OUTREACH (OUTPATIENT)
Dept: CARE COORDINATION | Facility: CLINIC | Age: 59
End: 2024-01-09

## 2024-01-09 VITALS
HEART RATE: 80 BPM | RESPIRATION RATE: 20 BRPM | TEMPERATURE: 97 F | OXYGEN SATURATION: 94 % | WEIGHT: 107 LBS | HEIGHT: 64 IN | SYSTOLIC BLOOD PRESSURE: 118 MMHG | BODY MASS INDEX: 18.27 KG/M2 | DIASTOLIC BLOOD PRESSURE: 72 MMHG

## 2024-01-09 DIAGNOSIS — J44.9 COPD, VERY SEVERE (H): ICD-10-CM

## 2024-01-09 DIAGNOSIS — R91.1 INCIDENTAL PULMONARY NODULE, > 3MM AND < 8MM: ICD-10-CM

## 2024-01-09 DIAGNOSIS — Z01.818 PREOP GENERAL PHYSICAL EXAM: Primary | ICD-10-CM

## 2024-01-09 DIAGNOSIS — I27.20 PULMONARY HYPERTENSION (H): ICD-10-CM

## 2024-01-09 PROCEDURE — 99214 OFFICE O/P EST MOD 30 MIN: CPT | Performed by: NURSE PRACTITIONER

## 2024-01-09 ASSESSMENT — PAIN SCALES - GENERAL: PAINLEVEL: NO PAIN (0)

## 2024-01-09 NOTE — PATIENT INSTRUCTIONS
Preparing for Your Surgery  Getting started  A nurse will call you to review your health history and instructions. They will give you an arrival time based on your scheduled surgery time. Please be ready to share:  Your doctor's clinic name and phone number  Your medical, surgical, and anesthesia history  A list of allergies and sensitivities  A list of medicines, including herbal treatments and over-the-counter drugs  Whether the patient has a legal guardian (ask how to send us the papers in advance)  Please tell us if you're pregnant--or if there's any chance you might be pregnant. Some surgeries may injure a fetus (unborn baby), so they require a pregnancy test. Surgeries that are safe for a fetus don't always need a test, and you can choose whether to have one.   If you have a child who's having surgery, please ask for a copy of Preparing for Your Child's Surgery.    Preparing for surgery  Within 10 to 30 days of surgery: Have a pre-op exam (sometimes called an H&P, or History and Physical). This can be done at a clinic or pre-operative center.  If you're having a , you may not need this exam. Talk to your care team.  At your pre-op exam, talk to your care team about all medicines you take. If you need to stop any medicines before surgery, ask when to start taking them again.  We do this for your safety. Many medicines can make you bleed too much during surgery. Some change how well surgery (anesthesia) drugs work.  Call your insurance company to let them know you're having surgery. (If you don't have insurance, call [phone].)  Call your clinic if there's any change in your health. This includes signs of a cold or flu (sore throat, runny nose, cough, rash, fever). It also includes a scrape or scratch near the surgery site.  If you have questions on the day of surgery, call your hospital or surgery center.  Eating and drinking guidelines  For your safety: Unless your surgeon tells you otherwise,  follow the guidelines below.  Eat and drink as usual until 8 hours before you arrive for surgery. After that, no food or milk.  Drink clear liquids until 2 hours before you arrive. These are liquids you can see through, like water, Gatorade, and Propel Water. They also include plain black coffee and tea (no cream or milk), candy, and breath mints. You can spit out gum when you arrive.  If you drink alcohol: Stop drinking it the night before surgery.  If your care team tells you to take medicine on the morning of surgery, it's okay to take it with a sip of water.  Preventing infection  Shower or bathe the night before and morning of your surgery. Follow the instructions your clinic gave you. (If no instructions, use regular soap.)  Don't shave or clip hair near your surgery site. We'll remove the hair if needed.  Don't smoke or vape the morning of surgery. You may chew nicotine gum up to 2 hours before surgery. A nicotine patch is okay.  Note: Some surgeries require you to completely quit smoking and nicotine. Check with your surgeon.  Your care team will make every effort to keep you safe from infection. We will:  Clean our hands often with soap and water (or an alcohol-based hand rub).  Clean the skin at your surgery site with a special soap that kills germs.  Give you a special gown to keep you warm. (Cold raises the risk of infection.)  Wear special hair covers, masks, gowns and gloves during surgery.  Give antibiotic medicine, if prescribed. Not all surgeries need antibiotics.  What to bring on the day of surgery  Photo ID and insurance card  Copy of your health care directive, if you have one  Glasses and hearing aids (bring cases)  You can't wear contacts during surgery  Inhaler and eye drops, if you use them (tell us about these when you arrive)  CPAP machine or breathing device, if you use them  A few personal items, if spending the night  If you have . . .  A pacemaker, ICD (cardiac defibrillator) or other  implant: Bring the ID card.  An implanted stimulator: Bring the remote control.  A legal guardian: Bring a copy of the certified (court-stamped) guardianship papers.  Please remove any jewelry, including body piercings. Leave jewelry and other valuables at home.  If you're going home the day of surgery  You must have a responsible adult drive you home. They should stay with you overnight as well.  If you don't have someone to stay with you, and you aren't safe to go home alone, we may keep you overnight. Insurance often won't pay for this.  After surgery  If it's hard to control your pain or you need more pain medicine, please call your surgeon's office.  Questions?   If you have any questions for your care team, list them here: _________________________________________________________________________________________________________________________________________________________________________ ____________________________________ ____________________________________ ____________________________________  For informational purposes only. Not to replace the advice of your health care provider. Copyright   2003, 2019 [facility]. All rights reserved. Clinically reviewed by Ingris Izaguirre MD. SMARTworks [id number] - REV 12/22.    How to Take Your Medication Before Surgery  - Take all of your medications before surgery as usual

## 2024-01-09 NOTE — PROGRESS NOTES
[facility]: [facility]    Background: Transitional Care Management program identified per system criteria and reviewed by [facility] Resource [place] team for possible outreach.    Assessment: Upon chart review, [facility] Team member will not proceed with patient outreach related to this episode of Transitional Care Management program due to reason below:    Patient has a follow up appointment with an appropriate provider today for hospital discharge    Plan: Transitional Care Management episode addressed appropriately per reason noted above.      Antionette Cutler  Community Health Worker  [facility]  [phone]      *Connected Care Resource Team does NOT follow patient ongoing. Referrals are identified based on internal discharge reports and the outreach is to ensure patient has an understanding of their discharge instructions.

## 2024-01-09 NOTE — PROGRESS NOTES
[facility]  [address] [facility] [address]  Phone: [phone]  Primary Provider: Dean Mariee  Pre-op Performing Provider: FRANK MAZARIEGOS    PREOPERATIVE EVALUATION:  Today's date: 1/9/2024    Luis is a 58 year old, presenting for the following:  Pre-Op Exam        1/9/2024     9:37 AM   Additional Questions   Roomed by Anahi CARBONE CMA     Surgical Information:  Surgery/Procedure: BRONCHOSCOPY possible tissue debulking, possible stent placement; Endobronchial ultrasound with transbronchial needle aspiration   Surgery Location: [facility]  Surgeon: ERVIN Prescott  Surgery Date: 1/18/2024  Time of Surgery: 9:10 am  Where patient plans to recover: At home with family  Fax number for surgical facility: Note does not need to be faxed, will be available electronically in Epic.    Assessment & Plan     The proposed surgical procedure is considered INTERMEDIATE risk.    Preop general physical exam  Patient is a 58-year-old with a history of COPD and incidental pulmonary nodule noted on PET scan.  Patient is currently on 2 L O2, Spiriva, Advair, albuterol as needed, and Daliresp with controlled symptoms of his COPD at this time.  Patient is undergoing bronchoscopy with needle aspiration and possible stent placement and tissue debulking.  Advised patient that he can take his typical medications the morning of surgery, and to bring his albuterol inhaler with him to his procedure.  Handout given on preparing for surgery and reviewed.  No labs or EKG indicated for this test at this time.    Incidental pulmonary nodule, > 3mm and < 8mm, new from 2010  Stable.    COPD, very severe (H)  Stable.    Pulmonary hypertension (H)  Stable.    Post Medication Reconciliation Status: discharge medications reconciled, continue medications without change        - No identified additional risk factors other than previously addressed    Antiplatelet or  Anticoagulation Medication Instructions:   - Patient is on no antiplatelet or anticoagulation medications.    Additional Medication Instructions:  Patient is to take all scheduled medications on the day of surgery    RECOMMENDATION:  APPROVAL GIVEN to proceed with proposed procedure, without further diagnostic evaluation.    Subjective       HPI related to upcoming procedure:         1/9/2024     9:35 AM   Preop Questions   1. Have you ever had a heart attack or stroke? No   2. Have you ever had surgery on your heart or blood vessels, such as a stent placement, a coronary artery bypass, or surgery on an artery in your head, neck, heart, or legs? No   3. Do you have chest pain with activity? No   4. Do you have a history of  heart failure? No   5. Do you currently have a cold, bronchitis or symptoms of other infection? No   6. Do you have a cough, shortness of breath, or wheezing? YES - COPD hx   7. Do you or anyone in your family have previous history of blood clots? YES - Father with recent CVA (carotid)   8. Do you or does anyone in your family have a serious bleeding problem such as prolonged bleeding following surgeries or cuts? No   9. Have you ever had problems with anemia or been told to take iron pills? No   10. Have you had any abnormal blood loss such as black, tarry or bloody stools? No   11. Have you ever had a blood transfusion? No   12. Are you willing to have a blood transfusion if it is medically needed before, during, or after your surgery? Yes   13. Have you or any of your relatives ever had problems with anesthesia? No   14. Do you have sleep apnea, excessive snoring or daytime drowsiness? No   15. Do you have any artifical heart valves or other implanted medical devices like a pacemaker, defibrillator, or continuous glucose monitor? No   16. Do you have artificial joints? No   17. Are you allergic to latex? No       Health Care Directive:  Patient does not have a Health Care Directive or Living  Will: Advance Directive received and scanned. Click on Code in the patient header to view.    Preoperative Review of :   reviewed - no record of controlled substances prescribed.    Status of Chronic Conditions:  COPD - Patient has a longstanding history of moderate-severe COPD . Patient has been doing well overall noting NO SYMPTOMS and continues on medication regimen consisting of Advair, Spiriva, Albuterol, Roflumilast, and oxygen 2 liters without adverse reactions or side effects.    HYPERTENSION - Patient has longstanding history of HTN , currently denies any symptoms referable to elevated blood pressure. Specifically denies chest pain, palpitations, dyspnea, orthopnea, PND or peripheral edema. Blood pressure readings have been in normal range. Current medication regimen is as listed below. Patient denies any side effects of medication.     Review of Systems  CONSTITUTIONAL:POSITIVE  for weight loss  INTEGUMENTARY/SKIN: NEGATIVE for worrisome rashes, moles or lesions  EYES: NEGATIVE for vision changes or irritation  ENT/MOUTH: NEGATIVE for ear, mouth and throat problems  RESP:POSITIVE for cough-non productive, cough-productive, and Hx COPD  CV: NEGATIVE for chest pain, palpitations or peripheral edema  GI: NEGATIVE for nausea, abdominal pain, heartburn, or change in bowel habits  : NEGATIVE for frequency, dysuria, or hematuria  MUSCULOSKELETAL: NEGATIVE for significant arthralgias or myalgia  NEURO: NEGATIVE for weakness, dizziness or paresthesias  ENDOCRINE: NEGATIVE for temperature intolerance, skin/hair changes  HEME: NEGATIVE for bleeding problems  PSYCHIATRIC: NEGATIVE for changes in mood or affect    Patient Active Problem List    Diagnosis Date Noted    COPD with acute exacerbation (H) 01/07/2024     Priority: Medium    Acute on chronic respiratory failure with hypoxia and hypercapnia (H) 11/06/2023     Priority: Medium    Anemia, unspecified type 05/07/2021     Priority: Medium    Peripheral  edema 02/16/2020     Priority: Medium    Pulmonary hypertension (H) 02/16/2020     Priority: Medium    Tobacco abuse, 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   Stable 01/2014 to 07/2014, 6 month follow scan recommended.   Chest CT of 1/15/2015= stable nodule, f/u one year.      Advanced directives, counseling/discussion 11/25/2013     Priority: Medium     11/25/2013 Gave patient honoring choices forms to take home and review.  DAVEY Dial ([facility])       CARDIOVASCULAR SCREENING; LDL GOAL LESS THAN 130 10/31/2010     Priority: Medium    COPD, very severe (H) 10/25/2010     Priority: Medium     Severe to very severe      Eczema 10/04/2010     Priority: Medium    ED (erectile dysfunction) 04/20/2009     Priority: Medium    Essential hypertension, benign 10/15/2007     Priority: Medium      Past Medical History:   Diagnosis Date    Acute on chronic respiratory failure with hypoxia (H) 4/10/2020    Acute on chronic respiratory failure with hypoxia and hypercapnia (H) 4/1/2019    CAP (community acquired pneumonia) 4/14/2020    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)     COPD exacerbation (H) 4/1/2019    COPD exacerbation (H) 2/16/2020    Erectile dysfunction     Hypertension     Hyponatremia 4/1/2019    Pneumonia 4/10/2020     Past Surgical History:   Procedure Laterality Date    APPENDECTOMY      childhood     Current Outpatient Medications   Medication Sig Dispense Refill    albuterol (PROAIR HFA/PROVENTIL HFA/VENTOLIN HFA) 108 (90 Base) MCG/ACT inhaler Inhale 2 puffs into the lungs every 4 hours as needed for shortness of breath Hold on file until needed 54 g 3    amLODIPine (NORVASC) 10 MG tablet Take 1 tablet (10 mg) by mouth daily 90 tablet 3    atenolol (TENORMIN) 25 MG  tablet Take 1 tablet (25 mg) by mouth daily 90 tablet 3    fluticasone-salmeterol (ADVAIR) 500-50 MCG/ACT inhaler Inhale 1 puff into the lungs every 12 hours 60 each 11    guaiFENesin (MUCINEX MAXIMUM STRENGTH) 1200 MG TB12 Take 1 tablet by mouth daily      lisinopril (ZESTRIL) 40 MG tablet Take 1 tablet (40 mg) by mouth daily 90 tablet 3    magnesium oxide (MAG-OX) 400 MG tablet Take 400 mg by mouth daily (with lunch) For leg cramps      methylPREDNISolone (MEDROL DOSEPAK) 4 MG tablet therapy pack Follow Package Directions starting 24 21 tablet 0    order for DME Equipment being ordered: Oxygen 3L via NC continuous.  O2 saturated noted to be 86% on room air at rest. 1 Units 0    order for DME Equipment being ordered: Nebulizer 1 Device 0    roflumilast (DALIRESP) 500 MCG TABS tablet Take 1 tablet (500 mcg) by mouth daily 90 tablet 3    tiotropium (SPIRIVA RESPIMAT) 2.5 MCG/ACT inhaler Inhale 2 puffs into the lungs daily 12 g 3       Allergies   Allergen Reactions    Hctz Other (See Comments)     He has hyponatremia - avoid use    Penicillins Unknown     Childhood reaction.        Social History     Tobacco Use    Smoking status: Former     Packs/day: 2.00     Years: 30.00     Additional pack years: 0.00     Total pack years: 60.00     Types: Cigarettes     Quit date: 2021     Years since quittin.4    Smokeless tobacco: Former   Substance Use Topics    Alcohol use: Yes     Comment: rare     Family History   Problem Relation Age of Onset    Hypertension Mother     Ovarian Cancer Mother     Breast Cancer Mother     Hypertension Father     Lipids Father     C.A.D. Father     Heart Disease Father     Chronic Obstructive Pulmonary Disease Father     Cerebrovascular Disease Father     Diabetes Paternal Grandmother     Chronic Obstructive Pulmonary Disease Paternal Grandfather      History   Drug Use No         Objective     /72 (BP Location: Left arm, Patient Position: Sitting, Cuff Size: Adult Regular)   " Pulse 80   Temp 97  F (36.1  C) (Tympanic)   Resp 20   Ht 1.626 m (5' 4\")   Wt 48.5 kg (107 lb)   SpO2 94%   BMI 18.37 kg/m      Physical Exam    GENERAL APPEARANCE: healthy, alert and no distress     EYES: EOMI,  PERRL     HENT: ear canals and TM's normal and nose and mouth without ulcers or lesions     NECK: no adenopathy, no asymmetry, masses, or scars and thyroid normal to palpation     RESP: lungs clear to auscultation - no rales, rhonchi or wheezes and decreased breath sounds throughout     CV: regular rates and rhythm, normal S1 S2, no S3 or S4 and no murmur, click or rub     ABDOMEN:  soft, nontender, no HSM or masses and bowel sounds normal     MS: extremities normal- no gross deformities noted, no evidence of inflammation in joints, FROM in all extremities.     SKIN: no suspicious lesions or rashes     NEURO: Normal strength and tone, sensory exam grossly normal, mentation intact and speech normal     PSYCH: mentation appears normal. and affect normal/bright     LYMPHATICS: No cervical adenopathy    Recent Labs   Lab Test 01/08/24  0522 01/07/24  0949   HGB 11.2* 12.1*    389   INR  --  0.94   * 128*   POTASSIUM 4.8 4.5   CR 0.56* 0.70      Diagnostics:  No labs were ordered during this visit.   No EKG required, no history of coronary heart disease, significant arrhythmia, peripheral arterial disease or other structural heart disease.    Revised Cardiac Risk Index (RCRI):  The patient has the following serious cardiovascular risks for perioperative complications:   - No serious cardiac risks = 0 points     RCRI Interpretation: 0 points: Class I (very low risk - 0.4% complication rate)    Signed Electronically by: Shalini Washington NP  Copy of this evaluation report is provided to requesting physician.    "

## 2024-01-18 ENCOUNTER — ANESTHESIA (OUTPATIENT)
Dept: SURGERY | Facility: CLINIC | Age: 59
End: 2024-01-18
Payer: COMMERCIAL

## 2024-01-18 ENCOUNTER — HOSPITAL ENCOUNTER (OUTPATIENT)
Facility: CLINIC | Age: 59
Discharge: HOME | End: 2024-01-18
Attending: INTERNAL MEDICINE | Admitting: INTERNAL MEDICINE
Payer: COMMERCIAL

## 2024-01-18 ENCOUNTER — APPOINTMENT (OUTPATIENT)
Dept: GENERAL RADIOLOGY | Facility: CLINIC | Age: 59
End: 2024-01-18
Attending: INTERNAL MEDICINE
Payer: COMMERCIAL

## 2024-01-18 ENCOUNTER — ANESTHESIA EVENT (OUTPATIENT)
Dept: SURGERY | Facility: CLINIC | Age: 59
End: 2024-01-18
Payer: COMMERCIAL

## 2024-01-18 VITALS
RESPIRATION RATE: 22 BRPM | OXYGEN SATURATION: 93 % | HEIGHT: 64 IN | HEART RATE: 90 BPM | WEIGHT: 110.89 LBS | SYSTOLIC BLOOD PRESSURE: 118 MMHG | BODY MASS INDEX: 18.93 KG/M2 | DIASTOLIC BLOOD PRESSURE: 81 MMHG | TEMPERATURE: 97.9 F

## 2024-01-18 DIAGNOSIS — C34.11 MALIGNANT NEOPLASM OF UPPER LOBE OF RIGHT LUNG (H): Primary | ICD-10-CM

## 2024-01-18 PROCEDURE — 88172 CYTP DX EVAL FNA 1ST EA SITE: CPT | Mod: 26 | Performed by: PATHOLOGY

## 2024-01-18 PROCEDURE — 88360 TUMOR IMMUNOHISTOCHEM/MANUAL: CPT | Mod: TC | Performed by: INTERNAL MEDICINE

## 2024-01-18 PROCEDURE — 272N000001 HC OR GENERAL SUPPLY STERILE: Performed by: INTERNAL MEDICINE

## 2024-01-18 PROCEDURE — 31652 BRONCH EBUS SAMPLNG 1/2 NODE: CPT | Mod: GC | Performed by: INTERNAL MEDICINE

## 2024-01-18 PROCEDURE — 999N000141 HC STATISTIC PRE-PROCEDURE NURSING ASSESSMENT: Performed by: INTERNAL MEDICINE

## 2024-01-18 PROCEDURE — 250N000011 HC RX IP 250 OP 636: Performed by: STUDENT IN AN ORGANIZED HEALTH CARE EDUCATION/TRAINING PROGRAM

## 2024-01-18 PROCEDURE — 31641 BRONCHOSCOPY TREAT BLOCKAGE: CPT | Mod: GC | Performed by: INTERNAL MEDICINE

## 2024-01-18 PROCEDURE — 88305 TISSUE EXAM BY PATHOLOGIST: CPT | Mod: 26 | Performed by: PATHOLOGY

## 2024-01-18 PROCEDURE — 31636 BRONCHOSCOPY BRONCH STENTS: CPT | Mod: GC | Performed by: INTERNAL MEDICINE

## 2024-01-18 PROCEDURE — 710N000010 HC RECOVERY PHASE 1, LEVEL 2, PER MIN: Performed by: INTERNAL MEDICINE

## 2024-01-18 PROCEDURE — 88342 IMHCHEM/IMCYTCHM 1ST ANTB: CPT | Mod: 26 | Performed by: PATHOLOGY

## 2024-01-18 PROCEDURE — C1726 CATH, BAL DIL, NON-VASCULAR: HCPCS | Performed by: INTERNAL MEDICINE

## 2024-01-18 PROCEDURE — 250N000011 HC RX IP 250 OP 636

## 2024-01-18 PROCEDURE — 88360 TUMOR IMMUNOHISTOCHEM/MANUAL: CPT | Mod: 26 | Performed by: PATHOLOGY

## 2024-01-18 PROCEDURE — 88341 IMHCHEM/IMCYTCHM EA ADD ANTB: CPT | Mod: 26 | Performed by: PATHOLOGY

## 2024-01-18 PROCEDURE — 258N000003 HC RX IP 258 OP 636: Performed by: STUDENT IN AN ORGANIZED HEALTH CARE EDUCATION/TRAINING PROGRAM

## 2024-01-18 PROCEDURE — 250N000009 HC RX 250: Performed by: STUDENT IN AN ORGANIZED HEALTH CARE EDUCATION/TRAINING PROGRAM

## 2024-01-18 PROCEDURE — 88173 CYTOPATH EVAL FNA REPORT: CPT | Mod: 26 | Performed by: PATHOLOGY

## 2024-01-18 PROCEDURE — 360N000076 HC SURGERY LEVEL 3, PER MIN: Performed by: INTERNAL MEDICINE

## 2024-01-18 PROCEDURE — C1874 STENT, COATED/COV W/DEL SYS: HCPCS | Performed by: INTERNAL MEDICINE

## 2024-01-18 PROCEDURE — 71045 X-RAY EXAM CHEST 1 VIEW: CPT | Mod: 26 | Performed by: RADIOLOGY

## 2024-01-18 PROCEDURE — 370N000017 HC ANESTHESIA TECHNICAL FEE, PER MIN: Performed by: INTERNAL MEDICINE

## 2024-01-18 PROCEDURE — 250N000011 HC RX IP 250 OP 636: Mod: JZ | Performed by: STUDENT IN AN ORGANIZED HEALTH CARE EDUCATION/TRAINING PROGRAM

## 2024-01-18 PROCEDURE — 88342 IMHCHEM/IMCYTCHM 1ST ANTB: CPT | Mod: TC | Performed by: INTERNAL MEDICINE

## 2024-01-18 PROCEDURE — 88305 TISSUE EXAM BY PATHOLOGIST: CPT | Mod: TC | Performed by: INTERNAL MEDICINE

## 2024-01-18 PROCEDURE — 999N000065 XR CHEST PORT 1 VIEW

## 2024-01-18 PROCEDURE — 710N000012 HC RECOVERY PHASE 2, PER MINUTE: Performed by: INTERNAL MEDICINE

## 2024-01-18 PROCEDURE — 250N000013 HC RX MED GY IP 250 OP 250 PS 637: Performed by: STUDENT IN AN ORGANIZED HEALTH CARE EDUCATION/TRAINING PROGRAM

## 2024-01-18 DEVICE — IMPLANTABLE DEVICE
Type: IMPLANTABLE DEVICE | Site: BRONCHUS | Status: NON-FUNCTIONAL
Removed: 2024-11-15

## 2024-01-18 RX ORDER — SODIUM CHLORIDE FOR INHALATION 0.9 %
3 VIAL, NEBULIZER (ML) INHALATION 2 TIMES DAILY
Qty: 180 ML | Refills: 3 | Status: ON HOLD | OUTPATIENT
Start: 2024-01-18 | End: 2024-05-01

## 2024-01-18 RX ORDER — DEXAMETHASONE SODIUM PHOSPHATE 4 MG/ML
INJECTION, SOLUTION INTRA-ARTICULAR; INTRALESIONAL; INTRAMUSCULAR; INTRAVENOUS; SOFT TISSUE PRN
Status: DISCONTINUED | OUTPATIENT
Start: 2024-01-18 | End: 2024-01-18

## 2024-01-18 RX ORDER — SODIUM CHLORIDE, SODIUM LACTATE, POTASSIUM CHLORIDE, CALCIUM CHLORIDE 600; 310; 30; 20 MG/100ML; MG/100ML; MG/100ML; MG/100ML
INJECTION, SOLUTION INTRAVENOUS CONTINUOUS PRN
Status: DISCONTINUED | OUTPATIENT
Start: 2024-01-18 | End: 2024-01-18

## 2024-01-18 RX ORDER — PROPOFOL 10 MG/ML
INJECTION, EMULSION INTRAVENOUS PRN
Status: DISCONTINUED | OUTPATIENT
Start: 2024-01-18 | End: 2024-01-18

## 2024-01-18 RX ORDER — PROPOFOL 10 MG/ML
INJECTION, EMULSION INTRAVENOUS CONTINUOUS PRN
Status: DISCONTINUED | OUTPATIENT
Start: 2024-01-18 | End: 2024-01-18

## 2024-01-18 RX ORDER — ALBUTEROL SULFATE 0.83 MG/ML
2.5 SOLUTION RESPIRATORY (INHALATION) EVERY 4 HOURS PRN
Status: DISCONTINUED | OUTPATIENT
Start: 2024-01-18 | End: 2024-01-18 | Stop reason: HOSPADM

## 2024-01-18 RX ORDER — HALOPERIDOL 5 MG/ML
1 INJECTION INTRAMUSCULAR
Status: DISCONTINUED | OUTPATIENT
Start: 2024-01-18 | End: 2024-01-18 | Stop reason: HOSPADM

## 2024-01-18 RX ORDER — ONDANSETRON 4 MG/1
4 TABLET, ORALLY DISINTEGRATING ORAL EVERY 30 MIN PRN
Status: DISCONTINUED | OUTPATIENT
Start: 2024-01-18 | End: 2024-01-18 | Stop reason: HOSPADM

## 2024-01-18 RX ORDER — METRONIDAZOLE 500 MG/100ML
500 INJECTION, SOLUTION INTRAVENOUS
Status: COMPLETED | OUTPATIENT
Start: 2024-01-18 | End: 2024-01-18

## 2024-01-18 RX ORDER — FENTANYL CITRATE 50 UG/ML
25 INJECTION, SOLUTION INTRAMUSCULAR; INTRAVENOUS EVERY 5 MIN PRN
Status: DISCONTINUED | OUTPATIENT
Start: 2024-01-18 | End: 2024-01-18 | Stop reason: HOSPADM

## 2024-01-18 RX ORDER — LABETALOL HYDROCHLORIDE 5 MG/ML
10 INJECTION, SOLUTION INTRAVENOUS
Status: DISCONTINUED | OUTPATIENT
Start: 2024-01-18 | End: 2024-01-18 | Stop reason: HOSPADM

## 2024-01-18 RX ORDER — ONDANSETRON 2 MG/ML
4 INJECTION INTRAMUSCULAR; INTRAVENOUS EVERY 30 MIN PRN
Status: DISCONTINUED | OUTPATIENT
Start: 2024-01-18 | End: 2024-01-18 | Stop reason: HOSPADM

## 2024-01-18 RX ORDER — CEFAZOLIN SODIUM/WATER 2 G/20 ML
2 SYRINGE (ML) INTRAVENOUS
Status: DISCONTINUED | OUTPATIENT
Start: 2024-01-18 | End: 2024-01-18 | Stop reason: HOSPADM

## 2024-01-18 RX ORDER — SODIUM CHLORIDE, SODIUM LACTATE, POTASSIUM CHLORIDE, CALCIUM CHLORIDE 600; 310; 30; 20 MG/100ML; MG/100ML; MG/100ML; MG/100ML
INJECTION, SOLUTION INTRAVENOUS CONTINUOUS
Status: DISCONTINUED | OUTPATIENT
Start: 2024-01-18 | End: 2024-01-18 | Stop reason: HOSPADM

## 2024-01-18 RX ORDER — FENTANYL CITRATE 50 UG/ML
50 INJECTION, SOLUTION INTRAMUSCULAR; INTRAVENOUS EVERY 5 MIN PRN
Status: DISCONTINUED | OUTPATIENT
Start: 2024-01-18 | End: 2024-01-18 | Stop reason: HOSPADM

## 2024-01-18 RX ORDER — HYDRALAZINE HYDROCHLORIDE 20 MG/ML
2.5-5 INJECTION INTRAMUSCULAR; INTRAVENOUS EVERY 10 MIN PRN
Status: DISCONTINUED | OUTPATIENT
Start: 2024-01-18 | End: 2024-01-18 | Stop reason: HOSPADM

## 2024-01-18 RX ORDER — ACETAMINOPHEN 325 MG/1
975 TABLET ORAL EVERY 6 HOURS PRN
Status: DISCONTINUED | OUTPATIENT
Start: 2024-01-18 | End: 2024-01-18 | Stop reason: HOSPADM

## 2024-01-18 RX ORDER — FENTANYL CITRATE 50 UG/ML
INJECTION, SOLUTION INTRAMUSCULAR; INTRAVENOUS PRN
Status: DISCONTINUED | OUTPATIENT
Start: 2024-01-18 | End: 2024-01-18

## 2024-01-18 RX ORDER — LIDOCAINE HYDROCHLORIDE 20 MG/ML
INJECTION, SOLUTION INFILTRATION; PERINEURAL PRN
Status: DISCONTINUED | OUTPATIENT
Start: 2024-01-18 | End: 2024-01-18

## 2024-01-18 RX ORDER — HYDROMORPHONE HCL IN WATER/PF 6 MG/30 ML
0.2 PATIENT CONTROLLED ANALGESIA SYRINGE INTRAVENOUS EVERY 5 MIN PRN
Status: DISCONTINUED | OUTPATIENT
Start: 2024-01-18 | End: 2024-01-18 | Stop reason: HOSPADM

## 2024-01-18 RX ORDER — OXYCODONE HYDROCHLORIDE 5 MG/1
5 TABLET ORAL EVERY 4 HOURS PRN
Status: DISCONTINUED | OUTPATIENT
Start: 2024-01-18 | End: 2024-01-18 | Stop reason: HOSPADM

## 2024-01-18 RX ORDER — CEFAZOLIN SODIUM/WATER 2 G/20 ML
2 SYRINGE (ML) INTRAVENOUS SEE ADMIN INSTRUCTIONS
Status: DISCONTINUED | OUTPATIENT
Start: 2024-01-18 | End: 2024-01-18 | Stop reason: HOSPADM

## 2024-01-18 RX ORDER — ONDANSETRON 2 MG/ML
INJECTION INTRAMUSCULAR; INTRAVENOUS PRN
Status: DISCONTINUED | OUTPATIENT
Start: 2024-01-18 | End: 2024-01-18

## 2024-01-18 RX ORDER — HYDROMORPHONE HCL IN WATER/PF 6 MG/30 ML
0.4 PATIENT CONTROLLED ANALGESIA SYRINGE INTRAVENOUS EVERY 5 MIN PRN
Status: DISCONTINUED | OUTPATIENT
Start: 2024-01-18 | End: 2024-01-18 | Stop reason: HOSPADM

## 2024-01-18 RX ORDER — IPRATROPIUM BROMIDE AND ALBUTEROL SULFATE 2.5; .5 MG/3ML; MG/3ML
3 SOLUTION RESPIRATORY (INHALATION) ONCE
Status: COMPLETED | OUTPATIENT
Start: 2024-01-18 | End: 2024-01-18

## 2024-01-18 RX ADMIN — LIDOCAINE HYDROCHLORIDE 80 MG: 20 INJECTION, SOLUTION INFILTRATION; PERINEURAL at 10:25

## 2024-01-18 RX ADMIN — SUGAMMADEX 200 MG: 100 INJECTION, SOLUTION INTRAVENOUS at 11:11

## 2024-01-18 RX ADMIN — Medication 1 LOZENGE: at 12:23

## 2024-01-18 RX ADMIN — METRONIDAZOLE 500 MG: 500 INJECTION, SOLUTION INTRAVENOUS at 08:05

## 2024-01-18 RX ADMIN — DEXAMETHASONE SODIUM PHOSPHATE 4 MG: 4 INJECTION, SOLUTION INTRA-ARTICULAR; INTRALESIONAL; INTRAMUSCULAR; INTRAVENOUS; SOFT TISSUE at 10:42

## 2024-01-18 RX ADMIN — MIDAZOLAM 0.5 MG: 1 INJECTION INTRAMUSCULAR; INTRAVENOUS at 10:12

## 2024-01-18 RX ADMIN — ONDANSETRON 4 MG: 2 INJECTION INTRAMUSCULAR; INTRAVENOUS at 10:55

## 2024-01-18 RX ADMIN — IPRATROPIUM BROMIDE AND ALBUTEROL SULFATE 3 ML: .5; 3 SOLUTION RESPIRATORY (INHALATION) at 11:43

## 2024-01-18 RX ADMIN — NOREPINEPHRINE BITARTRATE 12.8 MCG: 1 INJECTION, SOLUTION, CONCENTRATE INTRAVENOUS at 10:29

## 2024-01-18 RX ADMIN — FENTANYL CITRATE 50 MCG: 50 INJECTION INTRAMUSCULAR; INTRAVENOUS at 10:25

## 2024-01-18 RX ADMIN — PROPOFOL 100 MG: 10 INJECTION, EMULSION INTRAVENOUS at 10:25

## 2024-01-18 RX ADMIN — SODIUM CHLORIDE, POTASSIUM CHLORIDE, SODIUM LACTATE AND CALCIUM CHLORIDE: 600; 310; 30; 20 INJECTION, SOLUTION INTRAVENOUS at 10:15

## 2024-01-18 RX ADMIN — Medication 50 MG: at 10:26

## 2024-01-18 RX ADMIN — NOREPINEPHRINE BITARTRATE 6.4 MCG: 1 INJECTION, SOLUTION, CONCENTRATE INTRAVENOUS at 10:33

## 2024-01-18 RX ADMIN — NOREPINEPHRINE BITARTRATE 6.4 MCG: 1 INJECTION, SOLUTION, CONCENTRATE INTRAVENOUS at 10:31

## 2024-01-18 RX ADMIN — PROPOFOL 150 MCG/KG/MIN: 10 INJECTION, EMULSION INTRAVENOUS at 10:26

## 2024-01-18 RX ADMIN — NOREPINEPHRINE BITARTRATE 6.4 MCG: 1 INJECTION, SOLUTION, CONCENTRATE INTRAVENOUS at 10:37

## 2024-01-18 RX ADMIN — FENTANYL CITRATE 25 MCG: 50 INJECTION INTRAMUSCULAR; INTRAVENOUS at 11:05

## 2024-01-18 ASSESSMENT — COPD QUESTIONNAIRES: COPD: 1

## 2024-01-18 ASSESSMENT — ACTIVITIES OF DAILY LIVING (ADL)
ADLS_ACUITY_SCORE: 31
ADLS_ACUITY_SCORE: 31
ADLS_ACUITY_SCORE: 29
ADLS_ACUITY_SCORE: 31

## 2024-01-18 NOTE — ANESTHESIA PREPROCEDURE EVALUATION
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      Social History     Tobacco Use    Smoking status: Former     Packs/day: 2.00     Years: 30.00     Additional pack years: 0.00     Total pack years: 60.00     Types: Cigarettes     Quit date: 2021     Years since quittin.4    Smokeless tobacco: Former   Substance Use Topics    Alcohol use: Yes     Comment: rare      Wt Readings from Last 1 Encounters:   24 50.3 kg (110 lb 14.3 oz)        Anesthesia Evaluation   Pt has had prior anesthetic.         ROS/MED HX  ENT/Pulmonary: Comment: Admitted for COPD exacerbation 24-24    (+)                          COPD, O2 dependent,  2 liters/min, recent URI,          Neurologic:       Cardiovascular:     (+)  hypertension- -   -  - -                                pulmonary hypertension, Previous cardiac testing   Echo: Date: 2019 Results:  Interpretation Summary     1. The left ventricle is normal in structure, function and size. The visual  ejection fraction is estimated at 60%.  2. The right ventricle  is mildly dilated. The right ventricular systolic  function is normal.  3. There is mild (1+) tricuspid regurgitation.  4. Severe (>55mmHg) pulmonary hypertension is present. The right ventricular  systolic pressure is approximated at 55mmHg plus the right atrial pressure.     Echo from 8/2015 showed normal RV, trace TR, could not estimated RVSP.    Stress Test:  Date: Results:    ECG Reviewed:  Date: Results:    Cath:  Date: Results:      METS/Exercise Tolerance:     Hematologic:     (+)      anemia,          Musculoskeletal:  - neg musculoskeletal ROS     GI/Hepatic:  - neg GI/hepatic ROS     Renal/Genitourinary:  - neg Renal ROS     Endo: Comment: Hyponatremia thought 2/2 SIADH      Psychiatric/Substance Use:       Infectious Disease:  - neg infectious disease ROS     Malignancy: Comment: Lung nodule      Other:            Physical Exam    Airway        Mallampati: I       Respiratory Devices and Support         Dental       (+) Edentulous      Cardiovascular             Pulmonary                   OUTSIDE LABS:  CBC:   Lab Results   Component Value Date    WBC 11.1 (H) 01/08/2024    WBC 10.4 01/07/2024    HGB 11.2 (L) 01/08/2024    HGB 12.1 (L) 01/07/2024    HCT 34.4 (L) 01/08/2024    HCT 36.8 (L) 01/07/2024     01/08/2024     01/07/2024     BMP:   Lab Results   Component Value Date     (L) 01/08/2024     (L) 01/07/2024    POTASSIUM 4.8 01/08/2024    POTASSIUM 4.5 01/07/2024    CHLORIDE 96 (L) 01/08/2024    CHLORIDE 92 (L) 01/07/2024    CO2 28 01/08/2024    CO2 29 01/07/2024    BUN 16.6 01/08/2024    BUN 12.6 01/07/2024    CR 0.56 (L) 01/08/2024    CR 0.70 01/07/2024     (H) 01/08/2024     (H) 01/07/2024     COAGS:   Lab Results   Component Value Date    PTT 31 01/07/2014    INR 0.94 01/07/2024     POC:   Lab Results   Component Value Date     (H) 06/30/2021     HEPATIC:   Lab Results   Component Value Date    ALBUMIN 4.4 01/07/2024    PROTTOTAL 7.4 01/07/2024     ALT 13 01/07/2024    AST 16 01/07/2024    ALKPHOS 60 01/07/2024    BILITOTAL 0.3 01/07/2024     OTHER:   Lab Results   Component Value Date    PH 7.32 (L) 04/26/2022    LACT 1.3 04/25/2022    MIKE 9.4 01/08/2024    PHOS 3.9 04/24/2022    MAG 2.2 04/24/2022    LIPASE 210 04/22/2022    TSH 0.79 04/27/2009    CRP <2.9 04/24/2022       Anesthesia Plan    ASA Status:  4    NPO Status:  NPO Appropriate    Anesthesia Type: General.     - Airway: ETT   Induction: Intravenous.   Maintenance: TIVA.   Techniques and Equipment:     - Airway: Video-Laryngoscope     - Lines/Monitors: BIS     Consents    Anesthesia Plan(s) and associated risks, benefits, and realistic alternatives discussed. Questions answered and patient/representative(s) expressed understanding.     - Discussed:     - Discussed with:  Patient       - Patient is DNR/DNI Status: Yes             Suspend during perioperative period? Yes.             Agree to: chemical resuscitation, chest compression/defibrillation.        Postoperative Care    Pain management: IV analgesics, Oral pain medications.   PONV prophylaxis: Ondansetron (or other 5HT-3), Dexamethasone or Solumedrol, Background Propofol Infusion     Comments:               Candy Cunningham MD    I have reviewed the pertinent notes and labs in the chart from the past 30 days and (re)examined the patient.  Any updates or changes from those notes are reflected in this note.     # Hyponatremia: Lowest Na = 128 mmol/L in last 30 days, will monitor as appropriate

## 2024-01-18 NOTE — ANESTHESIA POSTPROCEDURE EVALUATION
Patient: Luis Hernandez    Procedure: Procedure(s):  Bronchoscopy with tissue debulking,  and stent placement.  Endobronchial ultrasound with Endobronchial and Cryo biopsy.       Anesthesia Type:  General    Note:  Disposition: Outpatient   Postop Pain Control: Uneventful            Sign Out: Well controlled pain   PONV: No   Neuro/Psych: Uneventful            Sign Out: Acceptable/Baseline neuro status   Airway/Respiratory: Uneventful            Sign Out: Acceptable/Baseline resp. status   CV/Hemodynamics: Uneventful            Sign Out: Acceptable CV status; No obvious hypovolemia; No obvious fluid overload   Other NRE: NONE   DID A NON-ROUTINE EVENT OCCUR? No           Last vitals:  Vitals Value Taken Time   /86 01/18/24 1215   Temp 36.4  C (97.5  F) 01/18/24 1215   Pulse 98 01/18/24 1219   Resp 20 01/18/24 1215   SpO2 91 % 01/18/24 1219   Vitals shown include unfiled device data.    Electronically Signed By: Amilcar Evans MD  January 18, 2024  12:20 PM

## 2024-01-18 NOTE — ANESTHESIA CARE TRANSFER NOTE
Patient: Luis Hernandez    Procedure: Procedure(s):  BRONCHOSCOPY tissue debulking, stent placement, EBUS, Endobronchial biopsy, cryo biopsy.  Endobronchial ultrasound with transbronchial needle aspiration       Diagnosis: Lung nodule [R91.1]  Diagnosis Additional Information: No value filed.    Anesthesia Type:   General     Note:    Oropharynx: oropharynx clear of all foreign objects and spontaneously breathing  Level of Consciousness: awake  Oxygen Supplementation: face mask  Level of Supplemental Oxygen (L/min / FiO2): 10  Independent Airway: airway patency satisfactory and stable  Dentition: dentition unchanged  Vital Signs Stable: post-procedure vital signs reviewed and stable  Report to RN Given: handoff report given  Patient transferred to: PACU    Handoff Report: Identifed the Patient, Identified the Reponsible Provider, Reviewed the pertinent medical history, Discussed the surgical course, Reviewed Intra-OP anesthesia mangement and issues during anesthesia, Set expectations for post-procedure period and Allowed opportunity for questions and acknowledgement of understanding      Vitals:  Vitals Value Taken Time   /65 01/18/24 1126   Temp     Pulse 102 01/18/24 1129   Resp     SpO2 96 % 01/18/24 1129   Vitals shown include unfiled device data.    Electronically Signed By: CHITO Rock CRNA  January 18, 2024  11:30 AM

## 2024-01-18 NOTE — PROCEDURES
INTERVENTIONAL PULMONOLOGY       Procedure(s):    A flexible and rigid bronchoscopy   Airway exam  Airway stent removal (1 stent)  EBUS-TBNA (1 site)  Therapeutic suctioning (2 sites)  Tissue/tumor debulking     Indication:  right lung mass    Attending of Record:  Isac Prescott MD     Interventional Pulmonary Fellow   Raysa Rivera    Trainees Present:   None     Medications:    General Anesthesia - See anesthesia flowsheet for details    Sedation Time:   Per Anesthesia Care Provider    Time Out:  Performed    The patient's medical record has been reviewed.  The indication for the procedure was reviewed.  The necessary history and physical examination was performed and reviewed.  The risks, benefits and alternatives of the procedure were discussed with the the patient in detail and he had the opportunity to ask questions.  I discussed in particular the potential complications including risks of minor or life-threatening bleeding and/or infection, respiratory failure, vocal cord trauma / paralysis, pneumothorax, and discomfort. Sedation risks were also discussed including abnormal heart rhythms, low blood pressure, and respiratory failure. All questions were answered to the best of my ability.  Verbal and written informed consent was obtained.  The proposed procedure and the patient's identification were verified prior to the procedure by the physician and the nurse.    The patient was assessed for the adequacy for the procedure and to receive medications.   Mental Status:  Alert and oriented x 3  Airway examination:  Class I (Complete visualization of soft palate)  Pulmonary:  Non labored respirations  CV:  Regular rate  ASA Grade:  (II)  Mild systemic disease    After clinical evaluation and reviewing the indication, risks, alternatives and benefits of the procedure the patient was deemed to be in satisfactory condition to undergo the procedure.           A Tuberculosis risk assessment was performed:  The  patient has no known RISK of Tuberculosis    The procedure was performed in a negative airflow room: The patient could not be moved to a negative airflow room because of needed OR for the procedure    Maneuvers / Procedure:      A Flexible and 12mm Rigid bronchoscope bronchoscope was used for the procedure. The rigid bronchoscope was inserted into the mouth. Uvula, epiglottis and vocal cords were seen. The scope was advanced turning the bevel to 90 degress while passing through the cords and into the trachea.     Airway Examination: A complete airway examination was performed from the distal trachea to the subsegmental level in each lobe of both lungs.  Pertinent findings include tumor involving RMB, RC1 and BI down to RLL bronchi. We were not able to visualize RML and superior segments.         EBUS-TBNA (Nodule/Mass): The EBUS scope was inserted and the RUL mass was visualized and biopsied. A total of 4 biopsies were performed using 22G FNA Needle. . Martha was Present    Tumor/Tissue Debulking: The Right mainstem, Bronchus intermedius, and RUL airway was accessed and tumor/tissue debulking was performed using the 2.4mm ERBE cryoprobe. Hemostasis and patency of the airway was acheived post-debulking.    Endobronchial biopsies: One Site - The bronchoscope was inserted.  Topical epinephrine was not administered.  The Cryoprobe were introduced and endobronchial biopsies were obtained from RC1.  A total of 4 biopsies were obtained.    Stent placement: Stent#1:Aero ([id number]) stent deployed in the BI bronchus using direct guidance. After deployment, the stent was in good position.     Therapeutic suctioning: 15-20min of operative time was spent clearing out the airway of debris, blood and mucous prior to the intervention.         Any disposable equipment was visually inspected and deemed to be intact immediately post procedure.      Relevant Pictures  Main elaina          RMB tumor      RUL        BI Aero  stent          RMB view of BI and RUL        Assessment and Recommendations:     Successful completion of Rigid and flexible bronchoscopy with Aero 10x15 mm stent in BI, tumor debulking RMB, BI and RUL, EBUS TBNA Right lung mass, endobronchial cryobiopsy  Ok to discharge once medically stable.   Follow up bronchoscopy in 6 weeks  Saline nebs BID (prescribed)          Raysa Rivera  Interventional pulmonary fellow  Pager: [phone]

## 2024-01-18 NOTE — DISCHARGE INSTRUCTIONS
Post Bronchoscopy Patient Instructions:    January 18, 2024  Luis Hernandez    Your procedure completed (bronchoscopy with tissue tumor debulking and airway stent placement in right side, lung biopsy) without any immediate complications.  You may cough up scant amount of blood for the next 12-24 hours. If you have excessive cough with blood, chest pain, shortness of breath, please report to the closest emergency room.    You may experience low grade (less than 100.5 F) fever next 24 hours, if so, you can take Tylenol. If the fever persists more than 24 hours, please contact to our office or your primary care provider.    Our office (Thoracic/Pulmonary--[phone]) will call you with the results of samples taken during the procedure. Please note that you may get a result notification through  My Chart  before us calling you, as the Laboratories are instructed to release the results as soon as they are available to the patients and providers at the same time. Please allow your us 24-48 hours call you to discuss the results.    You may resume your diet as it was prior to procedure.    You may resume your medications after the procedure unless you are instructed to do differently.     Please follow instructions from the nursing staff upon discharge in terms of activity. In general, you should avoid any attention or motor skill requiring activities (e.g., driving or operating any motorized vehicle) for 24 hours as you might be still under the effect of sedation medications. Please make sure an adult to accompany you next 24 hours.     Should you have any question, please do not hesitate to call our office.    Raysa Rivera MD    To contact a doctor, call Dr. Isac Prescott @ the clinic at [phone]   OR :  Call [phone] at the [facility], and ask for the resident on call for Pulmonary  OR:  Call the Emergency Room at [phone] ([place])

## 2024-01-22 ENCOUNTER — CARE COORDINATION (OUTPATIENT)
Dept: PULMONOLOGY | Facility: CLINIC | Age: 59
End: 2024-01-22
Payer: COMMERCIAL

## 2024-01-22 LAB
PATH REPORT.COMMENTS IMP SPEC: ABNORMAL
PATH REPORT.COMMENTS IMP SPEC: YES
PATH REPORT.FINAL DX SPEC: ABNORMAL
PATH REPORT.GROSS SPEC: ABNORMAL
PATH REPORT.MICROSCOPIC SPEC OTHER STN: ABNORMAL
PATH REPORT.RELEVANT HX SPEC: ABNORMAL

## 2024-01-22 NOTE — PROGRESS NOTES
FNA results from lung biopsy on 1/18 forwarded to Dr. Warren and Dr. Proctor. Requesting one of the providers update pt on results/next steps. Has appointment with Dr. Proctor already scheduled for 2/2. Planning for repeat bronch in 6 weeks as well - inquired w Dr. Warren if a follow-up clinic appointment is needed or not.     Final Diagnosis   Specimen A                 Interpretation:                  Positive for malignancy  Non-small cell carcinoma consistent with squamous cell carcinoma (see comment)                 Adequacy:                 Satisfactory for evaluation

## 2024-01-24 LAB
INTERPRETATION: NORMAL
LAB DIRECTOR COMMENTS: NORMAL
LAB DIRECTOR DISCLAIMER: NORMAL
LAB DIRECTOR INTERPRETATION: NORMAL
LAB DIRECTOR METHODOLOGY: NORMAL
LAB DIRECTOR RESULTS: NORMAL
SIGNIFICANT RESULTS: NORMAL
SPECIMEN DESCRIPTION: NORMAL
SPECIMEN DESCRIPTION: NORMAL
TEST DETAILS, MDL: NORMAL

## 2024-01-24 PROCEDURE — 81455 SO/HL 51/>GSAP DNA/DNA&RNA: CPT | Performed by: INTERNAL MEDICINE

## 2024-01-24 PROCEDURE — G0452 MOLECULAR PATHOLOGY INTERPR: HCPCS | Mod: 26 | Performed by: PATHOLOGY

## 2024-01-25 LAB
PATH REPORT.ADDENDUM SPEC: ABNORMAL
PATH REPORT.COMMENTS IMP SPEC: ABNORMAL
PATH REPORT.COMMENTS IMP SPEC: YES
PATH REPORT.FINAL DX SPEC: ABNORMAL
PATH REPORT.GROSS SPEC: ABNORMAL
PATH REPORT.MICROSCOPIC SPEC OTHER STN: ABNORMAL
PATH REPORT.RELEVANT HX SPEC: ABNORMAL
PHOTO IMAGE: ABNORMAL

## 2024-01-28 ENCOUNTER — HEALTH MAINTENANCE LETTER (OUTPATIENT)
Age: 59
End: 2024-01-28

## 2024-01-31 NOTE — PROGRESS NOTES
Hematology/Medical Oncology Follow-up Note    ADDENDUM:  Called patient regarding unremarkable MR brain scan and CBC/CMP. 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  Optimal treatment would consist of pembrolizumab, nab-paclitaxel and carboplatin.  I discussed the indications, benefits, risks, alternatives and side effects.  I indicated that likelihood of response is probably somewhat greater than 50%.  Since his cancer appears to be somewhat indolent being that the March, 2023 CT scan revealed subcarinal adenopathy that has progressed, I indicated I felt his medical prognosis without interventional treatment would be 6-12 months.  The patient has told his family that he would never want chemotherapy and is uncertain about whether to proceed with systemic immuno chemotherapy at this time.  Will get obtain MR scan of the brain for staging purposes as well as a CBC and CMP  I will see him back after the studies and particularly if his daughter Analilia Wray will be able to accompany him to listen in and help with asking her questions and clarify what his priorities and personal preferences are.  Patient wants to think things over.    Time with  patient including review, documentation, history, examination, coordination of care and counseling was 40 minutes.    History of present illness:  The patient has a longstanding history of severe COPD (FEV1=18%) with recurrent infection, prior tobacco use until August, 2021, pulmonary and essential hypertension.    On 10/5/2023, he was seen by his regular pulmonologist with a history of waxing/waning bilateral lung nodules with a follow-up 9/14/2023 CT with chest revealing a new spiculated nodule at the base of the right upper lobe with persistent and stable filling defect of the bronchus intermedius/right lower lobe bronchus concerning for possible endobronchial tumor versus mucous plugging, and progressive abnormal soft tissue mass in the subcarinal.      Because he was felt not to be able to tolerate bronchoscopy, a 11/9/2023 PET scan revealed glucose avid right hilar soft tissue mass encasing the bronchus intermedius extending along the right middle lobe bronchus contiguous with subcarinal lymphadenopathy and intraluminal soft tissue density of the right lower lobe bronchus concerning for primary lung malignancy.  In addition, there were glucose avid opacities seen bilaterally involve the right lower lobe, left suprahilar region, medial left lower lobe and right upper lobe which were felt to be possibly inflammatory.  However endobronchial tumor spread cannot be excluded.  A hypermetabolic soft tissue thickening was seen in the distal sigmoid/upper rectum concerning for malignancy.  However, a 10/25/2023 Cologuard study was negative.    On 11/17/2023, the patient was seen in hematology/medical oncology consultation by Dr. Tim Proctor.  Augmentin was empirically initiated by Dr. Cuellar.    On 1/18/2024, the patient underwent flexible and rigid bronchoscopy with a right upper lobe, right mainstem bronchus and bronchus intermedius identified revealing a moderatey differentiated squamous cell carcinoma with a  PD-L1 CPS 10-20% and TPS 5%.  Lung cancer NGS panel revealed CDKN2A and PIK3CA alterations.      The patient underwent tumor debulking with placement of a 10 x 15 mm bronchus intermedius stent and therapeutic suctioning of the airway.  The right middle lobe bronchus could not be identified.  In addition, right upper lobe mass needle aspirate confirmed cytologic evidence of a non-small cell carcinoma consistent with a squamous cell carcinoma which was p40 positive, TTF 1 and CK7 negative.  Following the bronchoscopic procedure, his breathing was better for perhaps a week but has now returned to baseline.    Follow-up 1/20/2023 CT chest without contrast revealed reduction in left upper lobe nodule, new nodules throughout both lungs up to 7 mm and continued nodular wall thickening of the bronchus intermedius, right lower lobe bronchus with masslike filling defect of the right lower lobe.    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  Family History   Problem Relation Age of Onset    Hypertension Mother     Ovarian Cancer Mother     Breast Cancer Mother     Hypertension Father     Lipids Father     C.A.D. Father     Heart Disease Father     Chronic Obstructive Pulmonary Disease Father     Cerebrovascular Disease Father     Diabetes Paternal Grandmother     Chronic Obstructive Pulmonary Disease Paternal Grandfather          Medications:  Current Outpatient  "Medications   Medication    albuterol (PROAIR HFA/PROVENTIL HFA/VENTOLIN HFA) 108 (90 Base) MCG/ACT inhaler    amLODIPine (NORVASC) 10 MG tablet    atenolol (TENORMIN) 25 MG tablet    fluticasone-salmeterol (ADVAIR) 500-50 MCG/ACT inhaler    guaiFENesin (MUCINEX MAXIMUM STRENGTH) 1200 MG TB12    lisinopril (ZESTRIL) 40 MG tablet    magnesium oxide (MAG-OX) 400 MG tablet    methylPREDNISolone (MEDROL DOSEPAK) 4 MG tablet therapy pack    order for DME    order for DME    roflumilast (DALIRESP) 500 MCG TABS tablet    sodium chloride 0.9 % neb solution    tiotropium (SPIRIVA RESPIMAT) 2.5 MCG/ACT inhaler     No current facility-administered medications for this visit.       Allergies:  Allergies   Allergen Reactions    Hctz Other (See Comments)     He has hyponatremia - avoid use    Penicillins Unknown     Childhood reaction.       Review of systems:  Except as noted in the note above, the patient denies headaches, diplopia, hearing loss or dizziness; dyspnea, cough, hemoptysis, pleurisy; chest pain/pressure, palpitations, lightheadedness; anorexia, nausea, vomiting, abdominal pain, diarrhea, constipation, melena or rectal bleeding; dysuria, frequency, nocturia, blood in the urine; fever, chills, sweats; tingling, numbness, loss of balance; insomnia, depression, anxiety.    Physical examination:  /88 (BP Location: Right arm, Patient Position: Sitting, Cuff Size: Adult Small)   Pulse 92   Temp 97.6  F (36.4  C) (Tympanic)   Resp 16   Ht 1.6 m (5' 3\")   Wt 52.2 kg (115 lb)   SpO2 93%   BMI 20.37 kg/m      The patient is alert and oriented. Throat and oral mucosa are normal-appearing. Thyroid gland is not enlarged. Adenopathy is absent in the neck, axilla, groin and supraclavicular fossa; Lungs are clear to auscultation and percussion without rales or rubs although there was wheezing and significant expiratory slowing; heart rhythm is regular, heart sounds are without murmurs or gallops; the abdomen is soft " and flat without hepatosplenomegaly, masses, ascites or tenderness.; extremities and skin reveal no unusual skin lesions, joint swelling or edema;    Irvin Lantigua MD

## 2024-02-01 ENCOUNTER — LAB (OUTPATIENT)
Dept: LAB | Facility: CLINIC | Age: 59
End: 2024-02-01
Payer: COMMERCIAL

## 2024-02-01 ENCOUNTER — ONCOLOGY VISIT (OUTPATIENT)
Dept: ONCOLOGY | Facility: CLINIC | Age: 59
End: 2024-02-01
Attending: INTERNAL MEDICINE
Payer: COMMERCIAL

## 2024-02-01 VITALS
DIASTOLIC BLOOD PRESSURE: 88 MMHG | HEIGHT: 63 IN | TEMPERATURE: 97.6 F | HEART RATE: 92 BPM | BODY MASS INDEX: 20.38 KG/M2 | RESPIRATION RATE: 16 BRPM | SYSTOLIC BLOOD PRESSURE: 127 MMHG | OXYGEN SATURATION: 93 % | WEIGHT: 115 LBS

## 2024-02-01 DIAGNOSIS — C34.91 SQUAMOUS CELL CARCINOMA OF RIGHT LUNG (H): ICD-10-CM

## 2024-02-01 DIAGNOSIS — C34.91 SQUAMOUS CELL CARCINOMA OF RIGHT LUNG (H): Primary | ICD-10-CM

## 2024-02-01 LAB
ALBUMIN SERPL BCG-MCNC: 4.3 G/DL (ref 3.5–5.2)
ALP SERPL-CCNC: 59 U/L (ref 40–150)
ALT SERPL W P-5'-P-CCNC: 12 U/L (ref 0–70)
ANION GAP SERPL CALCULATED.3IONS-SCNC: 11 MMOL/L (ref 7–15)
AST SERPL W P-5'-P-CCNC: 18 U/L (ref 0–45)
BASOPHILS # BLD AUTO: 0.1 10E3/UL (ref 0–0.2)
BASOPHILS NFR BLD AUTO: 2 %
BILIRUB SERPL-MCNC: 0.2 MG/DL
BUN SERPL-MCNC: 11.8 MG/DL (ref 6–20)
CALCIUM SERPL-MCNC: 9.8 MG/DL (ref 8.6–10)
CHLORIDE SERPL-SCNC: 97 MMOL/L (ref 98–107)
CREAT SERPL-MCNC: 0.67 MG/DL (ref 0.67–1.17)
DEPRECATED HCO3 PLAS-SCNC: 25 MMOL/L (ref 22–29)
EGFRCR SERPLBLD CKD-EPI 2021: >90 ML/MIN/1.73M2
EOSINOPHIL # BLD AUTO: 0.4 10E3/UL (ref 0–0.7)
EOSINOPHIL NFR BLD AUTO: 5 %
ERYTHROCYTE [DISTWIDTH] IN BLOOD BY AUTOMATED COUNT: 13.3 % (ref 10–15)
GLUCOSE SERPL-MCNC: 91 MG/DL (ref 70–99)
HCT VFR BLD AUTO: 35.4 % (ref 40–53)
HGB BLD-MCNC: 11.3 G/DL (ref 13.3–17.7)
IMM GRANULOCYTES # BLD: 0 10E3/UL
IMM GRANULOCYTES NFR BLD: 0 %
LYMPHOCYTES # BLD AUTO: 1.1 10E3/UL (ref 0.8–5.3)
LYMPHOCYTES NFR BLD AUTO: 14 %
MCH RBC QN AUTO: 29.2 PG (ref 26.5–33)
MCHC RBC AUTO-ENTMCNC: 31.9 G/DL (ref 31.5–36.5)
MCV RBC AUTO: 92 FL (ref 78–100)
MONOCYTES # BLD AUTO: 1.1 10E3/UL (ref 0–1.3)
MONOCYTES NFR BLD AUTO: 13 %
NEUTROPHILS # BLD AUTO: 5.6 10E3/UL (ref 1.6–8.3)
NEUTROPHILS NFR BLD AUTO: 66 %
NRBC # BLD AUTO: 0 10E3/UL
NRBC BLD AUTO-RTO: 0 /100
PLATELET # BLD AUTO: 396 10E3/UL (ref 150–450)
POTASSIUM SERPL-SCNC: 5.1 MMOL/L (ref 3.4–5.3)
PROT SERPL-MCNC: 7.4 G/DL (ref 6.4–8.3)
RBC # BLD AUTO: 3.87 10E6/UL (ref 4.4–5.9)
SODIUM SERPL-SCNC: 133 MMOL/L (ref 135–145)
WBC # BLD AUTO: 8.5 10E3/UL (ref 4–11)

## 2024-02-01 PROCEDURE — 99215 OFFICE O/P EST HI 40 MIN: CPT | Performed by: INTERNAL MEDICINE

## 2024-02-01 PROCEDURE — G0463 HOSPITAL OUTPT CLINIC VISIT: HCPCS | Performed by: INTERNAL MEDICINE

## 2024-02-01 PROCEDURE — 36415 COLL VENOUS BLD VENIPUNCTURE: CPT

## 2024-02-01 PROCEDURE — 82040 ASSAY OF SERUM ALBUMIN: CPT

## 2024-02-01 PROCEDURE — 85004 AUTOMATED DIFF WBC COUNT: CPT

## 2024-02-01 ASSESSMENT — PAIN SCALES - GENERAL: PAINLEVEL: NO PAIN (0)

## 2024-02-01 NOTE — PROGRESS NOTES
"Oncology Rooming Note    February 1, 2024 3:02 PM   Luis Hernandez is a 58 year old male who presents for:    Chief Complaint   Patient presents with    Oncology Clinic Visit     Lung cancer - provider visit only     Initial Vitals: /88 (BP Location: Right arm, Patient Position: Sitting, Cuff Size: Adult Small)   Pulse 92   Temp 97.6  F (36.4  C) (Tympanic)   Resp 16   Ht 1.6 m (5' 3\")   Wt 52.2 kg (115 lb)   SpO2 93%   BMI 20.37 kg/m   Estimated body mass index is 20.37 kg/m  as calculated from the following:    Height as of this encounter: 1.6 m (5' 3\").    Weight as of this encounter: 52.2 kg (115 lb). Body surface area is 1.52 meters squared.  No Pain (0) Comment: Data Unavailable   No LMP for male patient.  Allergies reviewed: Yes  Medications reviewed: Yes    Medications: Medication refills not needed today.  Pharmacy name entered into [employer]:    [id number] - [place], [place] - [address] [facility] - [place]UCED[facility] - [address] [place] [address] [facility] [place] - [address] [facility] AT [address]AL TO [facility] SITES  [id number] - [place], [place] - [address] [place] [address]    Frailty Screening:   Is the patient here for a new oncology consult visit in cancer care? 2. No      Clinical concerns:  None      Feli Mead CMA              "

## 2024-02-01 NOTE — LETTER
2/1/2024         RE: Luis Hernandez  [address] [place] [address]        Dear Colleague,    Thank you for referring your patient, Luis Hernandez, to the [facility]. Please see a copy of my visit note below.    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  Optimal treatment would consist of pembrolizumab, nab-paclitaxel and carboplatin.  I discussed the indications, benefits, risks, alternatives and side effects.  I indicated that likelihood of response is probably somewhat greater than 50%.  Since his cancer appears to be somewhat indolent being that the March, 2023 CT scan revealed subcarinal adenopathy that has progressed, I indicated I felt his medical prognosis without interventional treatment would be 6-12 months.  The patient has told his family that he would never want chemotherapy and is uncertain about whether to proceed with systemic immuno chemotherapy at this time.  Will get obtain MR scan of the brain for staging purposes as well as a CBC and CMP  I will see him back after the studies and particularly if his daughter Analilia peñaloza [place] will be able to accompany him to listen in  and help with asking her questions and clarify what his priorities and personal preferences are.  Patient wants to think things over.    Time with patient including review, documentation, history, examination, coordination of care and counseling was 40 minutes.    History of present illness:  The patient has a longstanding history of severe COPD (FEV1=18%) with recurrent infection, prior tobacco use until August, 2021, pulmonary and essential hypertension.    On 10/5/2023, he was seen by his regular pulmonologist with a history of waxing/waning bilateral lung nodules with a follow-up 9/14/2023 CT with chest revealing a new spiculated nodule at the base of the right upper lobe with persistent and stable filling defect of the bronchus intermedius/right lower lobe bronchus concerning for possible endobronchial tumor versus mucous plugging, and progressive abnormal soft tissue mass in the subcarinal.      Because he was felt not to be able to tolerate bronchoscopy, a 11/9/2023 PET scan revealed glucose avid right hilar soft tissue mass encasing the bronchus intermedius extending along the right middle lobe bronchus contiguous with subcarinal lymphadenopathy and intraluminal soft tissue density of the right lower lobe bronchus concerning for primary lung malignancy.  In addition, there were glucose avid opacities seen bilaterally involve the right lower lobe, left suprahilar region, medial left lower lobe and right upper lobe which were felt to be possibly inflammatory.  However endobronchial tumor spread cannot be excluded.  A hypermetabolic soft tissue thickening was seen in the distal sigmoid/upper rectum concerning for malignancy.  However, a 10/25/2023 Cologuard study was negative.    On 11/17/2023, the patient was seen in hematology/medical oncology consultation by Dr. Tim Proctor.  Augmentin was empirically initiated by Dr. Cuellar.    On 1/18/2024, the patient underwent flexible and rigid bronchoscopy with a  right upper lobe, right mainstem bronchus and bronchus intermedius identified revealing a moderatey differentiated squamous cell carcinoma with a PD-L1 CPS 10-20% and TPS 5%.  Lung cancer NGS panel revealed CDKN2A and PIK3CA alterations.      The patient underwent tumor debulking with placement of a 10 x 15 mm bronchus intermedius stent and therapeutic suctioning of the airway.  The right middle lobe bronchus could not be identified.  In addition, right upper lobe mass needle aspirate confirmed cytologic evidence of a non-small cell carcinoma consistent with a squamous cell carcinoma which was p40 positive, TTF 1 and CK7 negative.  Following the bronchoscopic procedure, his breathing was better for perhaps a week but has now returned to baseline.    Follow-up 1/20/2023 CT chest without contrast revealed reduction in left upper lobe nodule, new nodules throughout both lungs up to 7 mm and continued nodular wall thickening of the bronchus intermedius, right lower lobe bronchus with masslike filling defect of the right lower lobe.    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  Family History   Problem Relation Age of Onset     Hypertension Mother      Ovarian Cancer Mother      Breast Cancer Mother      Hypertension Father      Lipids Father      C.A.D. Father      Heart Disease Father      Chronic Obstructive Pulmonary Disease Father       "Cerebrovascular Disease Father      Diabetes Paternal Grandmother      Chronic Obstructive Pulmonary Disease Paternal Grandfather          Medications:  Current Outpatient Medications   Medication     albuterol (PROAIR HFA/PROVENTIL HFA/VENTOLIN HFA) 108 (90 Base) MCG/ACT inhaler     amLODIPine (NORVASC) 10 MG tablet     atenolol (TENORMIN) 25 MG tablet     fluticasone-salmeterol (ADVAIR) 500-50 MCG/ACT inhaler     guaiFENesin (MUCINEX MAXIMUM STRENGTH) 1200 MG TB12     lisinopril (ZESTRIL) 40 MG tablet     magnesium oxide (MAG-OX) 400 MG tablet     methylPREDNISolone (MEDROL DOSEPAK) 4 MG tablet therapy pack     order for DME     order for DME     roflumilast (DALIRESP) 500 MCG TABS tablet     sodium chloride 0.9 % neb solution     tiotropium (SPIRIVA RESPIMAT) 2.5 MCG/ACT inhaler     No current facility-administered medications for this visit.       Allergies:  Allergies   Allergen Reactions     Hctz Other (See Comments)     He has hyponatremia - avoid use     Penicillins Unknown     Childhood reaction.       Review of systems:  Except as noted in the note above, the patient denies headaches, diplopia, hearing loss or dizziness; dyspnea, cough, hemoptysis, pleurisy; chest pain/pressure, palpitations, lightheadedness; anorexia, nausea, vomiting, abdominal pain, diarrhea, constipation, melena or rectal bleeding; dysuria, frequency, nocturia, blood in the urine; fever, chills, sweats; tingling, numbness, loss of balance; insomnia, depression, anxiety.    Physical examination:  /88 (BP Location: Right arm, Patient Position: Sitting, Cuff Size: Adult Small)   Pulse 92   Temp 97.6  F (36.4  C) (Tympanic)   Resp 16   Ht 1.6 m (5' 3\")   Wt 52.2 kg (115 lb)   SpO2 93%   BMI 20.37 kg/m      The patient is alert and oriented. Throat and oral mucosa are normal-appearing. Thyroid gland is not enlarged. Adenopathy is absent in the neck, axilla, groin and supraclavicular fossa; Lungs are clear to auscultation and " "percussion without rales or rubs although there was wheezing and significant expiratory slowing; heart rhythm is regular, heart sounds are without murmurs or gallops; the abdomen is soft and flat without hepatosplenomegaly, masses, ascites or tenderness.; extremities and skin reveal no unusual skin lesions, joint swelling or edema;    Irvin Lantigua MD    Oncology Rooming Note    February 1, 2024 3:02 PM   Luis Hernandez is a 58 year old male who presents for:    Chief Complaint   Patient presents with     Oncology Clinic Visit     Lung cancer - provider visit only     Initial Vitals: /88 (BP Location: Right arm, Patient Position: Sitting, Cuff Size: Adult Small)   Pulse 92   Temp 97.6  F (36.4  C) (Tympanic)   Resp 16   Ht 1.6 m (5' 3\")   Wt 52.2 kg (115 lb)   SpO2 93%   BMI 20.37 kg/m   Estimated body mass index is 20.37 kg/m  as calculated from the following:    Height as of this encounter: 1.6 m (5' 3\").    Weight as of this encounter: 52.2 kg (115 lb). Body surface area is 1.52 meters squared.  No Pain (0) Comment: Data Unavailable   No LMP for male patient.  Allergies reviewed: Yes  Medications reviewed: Yes    Medications: Medication refills not needed today.  Pharmacy name entered into [employer]:    [id number] - [place], [place] - [address] [place] [place] [facility] [address] [facility] - [address] [facility] AT [address]AL TO [facility] SITES  [facility][id number] - [place], [place] - [address] [place] [address]    Frailty Screening:   Is the patient here for a new oncology consult visit in cancer care? 2. No      Clinical concerns:  None      Feli Mead CMA                Again, thank you for allowing me to participate in the care of your patient.        Sincerely,        Irvin Lantigua MD  "

## 2024-02-02 ENCOUNTER — MYC MEDICAL ADVICE (OUTPATIENT)
Dept: PULMONOLOGY | Facility: CLINIC | Age: 59
End: 2024-02-02

## 2024-02-02 ENCOUNTER — HOSPITAL ENCOUNTER (OUTPATIENT)
Dept: MRI IMAGING | Facility: CLINIC | Age: 59
Discharge: HOME | End: 2024-02-02
Attending: INTERNAL MEDICINE | Admitting: INTERNAL MEDICINE
Payer: COMMERCIAL

## 2024-02-02 DIAGNOSIS — C34.91 MALIGNANT NEOPLASM OF RIGHT LUNG, UNSPECIFIED PART OF LUNG (H): ICD-10-CM

## 2024-02-02 DIAGNOSIS — J43.8 OTHER EMPHYSEMA (H): Primary | ICD-10-CM

## 2024-02-02 DIAGNOSIS — C34.91 SQUAMOUS CELL CARCINOMA OF RIGHT LUNG (H): ICD-10-CM

## 2024-02-02 PROCEDURE — 70553 MRI BRAIN STEM W/O & W/DYE: CPT

## 2024-02-02 PROCEDURE — A9585 GADOBUTROL INJECTION: HCPCS | Performed by: INTERNAL MEDICINE

## 2024-02-02 PROCEDURE — 255N000002 HC RX 255 OP 636: Performed by: INTERNAL MEDICINE

## 2024-02-02 RX ORDER — GADOBUTROL 604.72 MG/ML
5 INJECTION INTRAVENOUS ONCE
Status: COMPLETED | OUTPATIENT
Start: 2024-02-02 | End: 2024-02-02

## 2024-02-02 RX ADMIN — GADOBUTROL 5 ML: 604.72 INJECTION INTRAVENOUS at 11:39

## 2024-02-03 RX ORDER — AZITHROMYCIN 250 MG/1
TABLET, FILM COATED ORAL
Qty: 6 TABLET | Refills: 0 | Status: SHIPPED | OUTPATIENT
Start: 2024-02-03 | End: 2024-02-08

## 2024-02-03 RX ORDER — PREDNISONE 20 MG/1
40 TABLET ORAL DAILY
Qty: 10 TABLET | Refills: 0 | Status: SHIPPED | OUTPATIENT
Start: 2024-02-03 | End: 2024-04-12

## 2024-02-03 NOTE — TELEPHONE ENCOUNTER
Luis is requesting chronic prednisone daily to help with symptom management given recurrent exacerbations, and recent diagnosis of R lung SCC.  He recently saw Dr. Lantigua in the [place] [facility] after recent bronchoscopy, debulking, and stent placement by interventional pulmonology with positive pathology.  Treatment options were discussed at that time but he is uncertain if he will move forward.  A brain MRI is planned for staging purposes.    After the bronchoscopy his breathing improved but shortly thereafter returned back to his baseline.  He is not describing symptoms consistent with a flare at this time.  He is using Advair, Spiriva, albuterol, Roflumilast.  He is also using saline nebs and tolerating these.      We discussed potential of starting daily prednisone for symptom management and also referral to palliative care given he is not certain that he wants to be aggressive with his cares and that his malignancy is unlikely to be cured.  I will reach out to Dr. Lantigua for his opinion on whether daily prednisone could potentially interact with immunotherapy.    Bianca Cuellar MD  Pulmonary, Allergy, Critical Care, and Sleep Medicine   [facility]   Pager: [phone]

## 2024-02-08 NOTE — PROGRESS NOTES
Hematology/Medical Oncology Follow-up Note    ADDENDUM: Called patient and he is now agreeable to trial of pembrolizumab, paclitaxel and carboplatin.  Will try to start as soon as possible. Will use carboplatin AUC 5.0. Patient consents. See below regarding my documentation of this exchange of information. Follow-up with me at time of cycle #2.    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  I discussed again indications, benefits, risks, alternatives and side effects of palliative pembrolizumab, paclitaxel and carboplatin immuno chemotherapy with the patient and his daughter.  He is undecided at this time whether he would elect interventional therapy versus palliative/hospice care.  We agreed that I will contact him again over this coming weekend or early next week regarding any additional questions and decision making.  They understand that treatment is likely palliative and not curative.  He does not have an operable tumor and radiation therapy at this time is not feasible or indicated.    Time with patient including review, documentation, history, examination, coordination of care and counseling was 20 minutes.    History of present  illness:  The patient has a longstanding history of severe COPD (FEV1=18%) with recurrent infection, prior tobacco use until August, 2021, pulmonary and essential hypertension.    On 10/5/2023, he was seen by his regular pulmonologist with a history of waxing/waning bilateral lung nodules with a follow-up 9/14/2023 CT with chest revealing a new spiculated nodule at the base of the right upper lobe with persistent and stable filling defect of the bronchus intermedius/right lower lobe bronchus concerning for possible endobronchial tumor versus mucous plugging, and progressive abnormal soft tissue mass in the subcarinal.      Because he was felt not to be able to tolerate bronchoscopy, a 11/9/2023 PET scan revealed glucose avid right hilar soft tissue mass encasing the bronchus intermedius extending along the right middle lobe bronchus contiguous with subcarinal lymphadenopathy and intraluminal soft tissue density of the right lower lobe bronchus concerning for primary lung malignancy.  In addition, there were glucose avid opacities seen bilaterally involve the right lower lobe, left suprahilar region, medial left lower lobe and right upper lobe which were felt to be possibly inflammatory.  However endobronchial tumor spread cannot be excluded.  A hypermetabolic soft tissue thickening was seen in the distal sigmoid/upper rectum concerning for malignancy.  However, a 10/25/2023 Cologuard study was negative.    On 11/17/2023, the patient was seen in hematology/medical oncology consultation by Dr. Tim Proctor.  Augmentin was empirically initiated by Dr. Cuellar.    On 1/18/2024, the patient underwent flexible and rigid bronchoscopy with a right upper lobe, right mainstem bronchus and bronchus intermedius identified revealing a moderatey differentiated squamous cell carcinoma with a PD-L1 CPS 10-20% and TPS 5%.  Lung cancer NGS panel revealed CDKN2A and PIK3CA alterations.      The patient underwent tumor debulking with  placement of a 10 x 15 mm bronchus intermedius stent and therapeutic suctioning of the airway.  The right middle lobe bronchus could not be identified.  In addition, right upper lobe mass needle aspirate confirmed cytologic evidence of a non-small cell carcinoma consistent with a squamous cell carcinoma which was p40 positive, TTF 1 and CK7 negative.  Following the bronchoscopic procedure, his breathing was better for perhaps a week but has now returned to baseline.    Follow-up 1/20/2023 CT chest without contrast revealed reduction in left upper lobe nodule, new nodules throughout both lungs up to 7 mm and continued nodular wall thickening of the bronchus intermedius, right lower lobe bronchus with masslike filling defect of the right lower lobe.    On 2/1/2024, I saw him in initial oncology consultation.  MR scan brain with contrast was unremarkable.  CBC and CMP were similarly negative.    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  Family History   Problem Relation Age of Onset    Hypertension Mother     Ovarian Cancer Mother     Breast Cancer Mother     Hypertension Father     Lipids Father     C.A.D. Father     Heart Disease Father     Chronic Obstructive Pulmonary Disease Father     Cerebrovascular Disease Father     Diabetes Paternal Grandmother     Chronic Obstructive Pulmonary Disease Paternal Grandfather          Medications:  Current Outpatient  Medications   Medication    albuterol (PROAIR HFA/PROVENTIL HFA/VENTOLIN HFA) 108 (90 Base) MCG/ACT inhaler    amLODIPine (NORVASC) 10 MG tablet    atenolol (TENORMIN) 25 MG tablet    azithromycin (ZITHROMAX) 250 MG tablet    fluticasone-salmeterol (ADVAIR) 500-50 MCG/ACT inhaler    guaiFENesin (MUCINEX MAXIMUM STRENGTH) 1200 MG TB12    lisinopril (ZESTRIL) 40 MG tablet    magnesium oxide (MAG-OX) 400 MG tablet    methylPREDNISolone (MEDROL DOSEPAK) 4 MG tablet therapy pack    order for DME    order for DME    predniSONE (DELTASONE) 20 MG tablet    roflumilast (DALIRESP) 500 MCG TABS tablet    sodium chloride 0.9 % neb solution    tiotropium (SPIRIVA RESPIMAT) 2.5 MCG/ACT inhaler     No current facility-administered medications for this visit.       Allergies:  Allergies   Allergen Reactions    Hctz Other (See Comments)     He has hyponatremia - avoid use    Penicillins Unknown     Childhood reaction.       Review of systems:  Except as noted in the note above, the patient denies headaches, diplopia, hearing loss or dizziness; dyspnea, cough, hemoptysis, pleurisy; chest pain/pressure, palpitations, lightheadedness; anorexia, nausea, vomiting, abdominal pain, diarrhea, constipation, melena or rectal bleeding; dysuria, frequency, nocturia, blood in the urine; fever, chills, sweats; tingling, numbness, loss of balance; insomnia, depression, anxiety.    Physical examination:  There were no vitals taken for this visit.    The patient is alert and oriented. Throat and oral mucosa are normal-appearing. Thyroid gland is not enlarged. Adenopathy is absent in the neck, axilla, groin and supraclavicular fossa; Lungs are clear to auscultation and percussion without rales or rubs although there was wheezing and significant expiratory slowing; heart rhythm is regular, heart sounds are without murmurs or gallops; the abdomen is soft and flat without hepatosplenomegaly, masses, ascites or tenderness.; extremities and skin reveal no  unusual skin lesions, joint swelling or edema;    Irvin Lantigua MD

## 2024-02-15 ENCOUNTER — ONCOLOGY VISIT (OUTPATIENT)
Dept: ONCOLOGY | Facility: CLINIC | Age: 59
End: 2024-02-15
Attending: INTERNAL MEDICINE
Payer: COMMERCIAL

## 2024-02-15 VITALS
WEIGHT: 114.2 LBS | TEMPERATURE: 98 F | SYSTOLIC BLOOD PRESSURE: 122 MMHG | HEART RATE: 80 BPM | DIASTOLIC BLOOD PRESSURE: 84 MMHG | OXYGEN SATURATION: 96 % | BODY MASS INDEX: 20.23 KG/M2

## 2024-02-15 DIAGNOSIS — C34.91 SQUAMOUS CELL LUNG CANCER, RIGHT (H): Primary | ICD-10-CM

## 2024-02-15 DIAGNOSIS — Z51.11 ENCOUNTER FOR ANTINEOPLASTIC CHEMOTHERAPY: ICD-10-CM

## 2024-02-15 PROCEDURE — 99214 OFFICE O/P EST MOD 30 MIN: CPT | Performed by: INTERNAL MEDICINE

## 2024-02-15 PROCEDURE — G0463 HOSPITAL OUTPT CLINIC VISIT: HCPCS | Performed by: INTERNAL MEDICINE

## 2024-02-15 ASSESSMENT — PAIN SCALES - GENERAL: PAINLEVEL: NO PAIN (0)

## 2024-02-15 NOTE — PROGRESS NOTES
"Oncology Rooming Note    February 15, 2024 9:51 AM   Luis Hernandez is a 58 year old male who presents for:    No chief complaint on file.    Initial Vitals: /84   Pulse 80   Temp 98  F (36.7  C)   Wt 51.8 kg (114 lb 3.2 oz)   SpO2 96%   BMI 20.23 kg/m   Estimated body mass index is 20.23 kg/m  as calculated from the following:    Height as of 2/1/24: 1.6 m (5' 3\").    Weight as of this encounter: 51.8 kg (114 lb 3.2 oz). Body surface area is 1.52 meters squared.  No Pain (0) Comment: Data Unavailable   No LMP for male patient.  Allergies reviewed: Yes  Medications reviewed: Yes    Medications: Medication refills not needed today.  Pharmacy name entered into Findery:    [id number] - [place], [place] - [address] [facility] [place] - [address] [facility] - [place] [facility] - [address] [facility] [address] [facility]  [id number] - [place], [place] - [address] [place] [address]    Frailty Screening:   Is the patient here for a new oncology consult visit in cancer care? 2. No      Clinical concerns: Patient here in follow up to review scans and labs  Dr. Lantigua  was notified.      Rosa Maria Quick RN              "

## 2024-02-15 NOTE — PATIENT INSTRUCTIONS
You will consider chemo-immunotherapy versus hospice care and Dr. Lantigua will contact you after this weekend.

## 2024-02-15 NOTE — LETTER
2/15/2024         RE: Luis Hernandez  [address] [facility] [address]        Dear Colleague,    Thank you for referring your patient, Luis Hernandez, to the [facility]. Please see a copy of my visit note below.     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  I discussed again indications, benefits, risks, alternatives and side effects of palliative pembrolizumab, paclitaxel and carboplatin immuno chemotherapy with the patient and his daughter.  He is undecided at this time whether he would elect interventional therapy versus palliative/hospice care.  We agreed that I will contact him again over this coming weekend or early next week regarding any additional questions and decision making.  They understand that treatment is likely palliative and not curative.  He does not have an operable tumor and radiation therapy at this time is not feasible or indicated.    Time with patient including review, documentation, history, examination, coordination of care and counseling was 20 minutes.    History of present illness:  The patient has a longstanding history of severe COPD (FEV1=18%) with  recurrent infection, prior tobacco use until August, 2021, pulmonary and essential hypertension.    On 10/5/2023, he was seen by his regular pulmonologist with a history of waxing/waning bilateral lung nodules with a follow-up 9/14/2023 CT with chest revealing a new spiculated nodule at the base of the right upper lobe with persistent and stable filling defect of the bronchus intermedius/right lower lobe bronchus concerning for possible endobronchial tumor versus mucous plugging, and progressive abnormal soft tissue mass in the subcarinal.      Because he was felt not to be able to tolerate bronchoscopy, a 11/9/2023 PET scan revealed glucose avid right hilar soft tissue mass encasing the bronchus intermedius extending along the right middle lobe bronchus contiguous with subcarinal lymphadenopathy and intraluminal soft tissue density of the right lower lobe bronchus concerning for primary lung malignancy.  In addition, there were glucose avid opacities seen bilaterally involve the right lower lobe, left suprahilar region, medial left lower lobe and right upper lobe which were felt to be possibly inflammatory.  However endobronchial tumor spread cannot be excluded.  A hypermetabolic soft tissue thickening was seen in the distal sigmoid/upper rectum concerning for malignancy.  However, a 10/25/2023 Cologuard study was negative.    On 11/17/2023, the patient was seen in hematology/medical oncology consultation by Dr. Tim Proctor.  Augmentin was empirically initiated by Dr. Cuellar.    On 1/18/2024, the patient underwent flexible and rigid bronchoscopy with a right upper lobe, right mainstem bronchus and bronchus intermedius identified revealing a moderatey differentiated squamous cell carcinoma with a PD-L1 CPS 10-20% and TPS 5%.  Lung cancer NGS panel revealed CDKN2A and PIK3CA alterations.      The patient underwent tumor debulking with placement of a 10 x 15 mm bronchus intermedius stent and therapeutic suctioning  of the airway.  The right middle lobe bronchus could not be identified.  In addition, right upper lobe mass needle aspirate confirmed cytologic evidence of a non-small cell carcinoma consistent with a squamous cell carcinoma which was p40 positive, TTF 1 and CK7 negative.  Following the bronchoscopic procedure, his breathing was better for perhaps a week but has now returned to baseline.    Follow-up 1/20/2023 CT chest without contrast revealed reduction in left upper lobe nodule, new nodules throughout both lungs up to 7 mm and continued nodular wall thickening of the bronchus intermedius, right lower lobe bronchus with masslike filling defect of the right lower lobe.    On 2/1/2024, I saw him in initial oncology consultation.  MR scan brain with contrast was unremarkable.  CBC and CMP were similarly negative.    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  Family History   Problem Relation Age of Onset     Hypertension Mother      Ovarian Cancer Mother      Breast Cancer Mother      Hypertension Father      Lipids Father      C.A.D. Father      Heart Disease Father      Chronic Obstructive Pulmonary Disease Father      Cerebrovascular Disease Father      Diabetes Paternal Grandmother      Chronic Obstructive Pulmonary Disease Paternal Grandfather          Medications:  Current Outpatient Medications   Medication     albuterol (PROAIR HFA/PROVENTIL  HFA/VENTOLIN HFA) 108 (90 Base) MCG/ACT inhaler     amLODIPine (NORVASC) 10 MG tablet     atenolol (TENORMIN) 25 MG tablet     azithromycin (ZITHROMAX) 250 MG tablet     fluticasone-salmeterol (ADVAIR) 500-50 MCG/ACT inhaler     guaiFENesin (MUCINEX MAXIMUM STRENGTH) 1200 MG TB12     lisinopril (ZESTRIL) 40 MG tablet     magnesium oxide (MAG-OX) 400 MG tablet     methylPREDNISolone (MEDROL DOSEPAK) 4 MG tablet therapy pack     order for DME     order for DME     predniSONE (DELTASONE) 20 MG tablet     roflumilast (DALIRESP) 500 MCG TABS tablet     sodium chloride 0.9 % neb solution     tiotropium (SPIRIVA RESPIMAT) 2.5 MCG/ACT inhaler     No current facility-administered medications for this visit.       Allergies:  Allergies   Allergen Reactions     Hctz Other (See Comments)     He has hyponatremia - avoid use     Penicillins Unknown     Childhood reaction.       Review of systems:  Except as noted in the note above, the patient denies headaches, diplopia, hearing loss or dizziness; dyspnea, cough, hemoptysis, pleurisy; chest pain/pressure, palpitations, lightheadedness; anorexia, nausea, vomiting, abdominal pain, diarrhea, constipation, melena or rectal bleeding; dysuria, frequency, nocturia, blood in the urine; fever, chills, sweats; tingling, numbness, loss of balance; insomnia, depression, anxiety.    Physical examination:  There were no vitals taken for this visit.    The patient is alert and oriented. Throat and oral mucosa are normal-appearing. Thyroid gland is not enlarged. Adenopathy is absent in the neck, axilla, groin and supraclavicular fossa; Lungs are clear to auscultation and percussion without rales or rubs although there was wheezing and significant expiratory slowing; heart rhythm is regular, heart sounds are without murmurs or gallops; the abdomen is soft and flat without hepatosplenomegaly, masses, ascites or tenderness.; extremities and skin reveal no unusual skin lesions, joint swelling or  "edema;    Irvin Lantigua MD    Oncology Rooming Note    February 15, 2024 9:51 AM   Luis Hernandez is a 58 year old male who presents for:    No chief complaint on file.    Initial Vitals: /84   Pulse 80   Temp 98  F (36.7  C)   Wt 51.8 kg (114 lb 3.2 oz)   SpO2 96%   BMI 20.23 kg/m   Estimated body mass index is 20.23 kg/m  as calculated from the following:    Height as of 2/1/24: 1.6 m (5' 3\").    Weight as of this encounter: 51.8 kg (114 lb 3.2 oz). Body surface area is 1.52 meters squared.  No Pain (0) Comment: Data Unavailable   No LMP for male patient.  Allergies reviewed: Yes  Medications reviewed: Yes    Medications: Medication refills not needed today.  Pharmacy name entered into Ritot:    [id number] - [place], [place] - [address] [facility] SPECIALTY [place] - [place] SAUC[address] - [address] [facility] [place] - [address] [facility] AT [address] TO [id number] - [place], [place] - [address] [place] [address]    Frailty Screening:   Is the patient here for a new oncology consult visit in cancer care? 2. No      Clinical concerns: Patient here in follow up to review scans and labs  Dr. Lantigua  was notified.      Rosa Maria Quick RN                Again, thank you for allowing me to participate in the care of your patient.        Sincerely,        Irvin Lantigua MD  "

## 2024-02-16 ENCOUNTER — HOSPITAL ENCOUNTER (OUTPATIENT)
Facility: CLINIC | Age: 59
End: 2024-02-16
Attending: INTERNAL MEDICINE | Admitting: INTERNAL MEDICINE
Payer: COMMERCIAL

## 2024-02-20 PROBLEM — C34.91 SQUAMOUS CELL LUNG CANCER, RIGHT (H): Status: ACTIVE | Noted: 2024-02-20

## 2024-02-20 RX ORDER — ALBUTEROL SULFATE 90 UG/1
1-2 AEROSOL, METERED RESPIRATORY (INHALATION)
Status: CANCELLED
Start: 2024-02-23

## 2024-02-20 RX ORDER — HEPARIN SODIUM,PORCINE 10 UNIT/ML
5-20 VIAL (ML) INTRAVENOUS DAILY PRN
Status: CANCELLED | OUTPATIENT
Start: 2024-02-23

## 2024-02-20 RX ORDER — PALONOSETRON 0.05 MG/ML
0.25 INJECTION, SOLUTION INTRAVENOUS ONCE
Status: CANCELLED | OUTPATIENT
Start: 2024-02-23

## 2024-02-20 RX ORDER — DIPHENHYDRAMINE HCL 25 MG
50 CAPSULE ORAL ONCE
Status: CANCELLED
Start: 2024-02-23

## 2024-02-20 RX ORDER — DIPHENHYDRAMINE HYDROCHLORIDE 50 MG/ML
50 INJECTION INTRAMUSCULAR; INTRAVENOUS
Status: CANCELLED
Start: 2024-02-23

## 2024-02-20 RX ORDER — METHYLPREDNISOLONE SODIUM SUCCINATE 125 MG/2ML
125 INJECTION, POWDER, LYOPHILIZED, FOR SOLUTION INTRAMUSCULAR; INTRAVENOUS
Status: CANCELLED
Start: 2024-02-23

## 2024-02-20 RX ORDER — MEPERIDINE HYDROCHLORIDE 25 MG/ML
25 INJECTION INTRAMUSCULAR; INTRAVENOUS; SUBCUTANEOUS EVERY 30 MIN PRN
Status: CANCELLED | OUTPATIENT
Start: 2024-02-23

## 2024-02-20 RX ORDER — LORAZEPAM 2 MG/ML
0.5 INJECTION INTRAMUSCULAR EVERY 4 HOURS PRN
Status: CANCELLED | OUTPATIENT
Start: 2024-02-23

## 2024-02-20 RX ORDER — HEPARIN SODIUM (PORCINE) LOCK FLUSH IV SOLN 100 UNIT/ML 100 UNIT/ML
5 SOLUTION INTRAVENOUS
Status: CANCELLED | OUTPATIENT
Start: 2024-02-23

## 2024-02-20 RX ORDER — EPINEPHRINE 1 MG/ML
0.3 INJECTION, SOLUTION, CONCENTRATE INTRAVENOUS EVERY 5 MIN PRN
Status: CANCELLED | OUTPATIENT
Start: 2024-02-23

## 2024-02-20 RX ORDER — ALBUTEROL SULFATE 0.83 MG/ML
2.5 SOLUTION RESPIRATORY (INHALATION)
Status: CANCELLED | OUTPATIENT
Start: 2024-02-23

## 2024-02-23 ENCOUNTER — TELEPHONE (OUTPATIENT)
Dept: PULMONOLOGY | Facility: CLINIC | Age: 59
End: 2024-02-23
Payer: COMMERCIAL

## 2024-02-23 NOTE — TELEPHONE ENCOUNTER
Writer spoke with patient regarding procedure on 03/04. Patient wishes to cancel the procedure because he doesn't see the point with his current prognosis.    Shayne Chisholm on 2/23/2024 at 3:41 PM

## 2024-02-26 DIAGNOSIS — Z51.11 ENCOUNTER FOR ANTINEOPLASTIC CHEMOTHERAPY: Primary | ICD-10-CM

## 2024-02-28 ENCOUNTER — PATIENT OUTREACH (OUTPATIENT)
Dept: ONCOLOGY | Facility: CLINIC | Age: 59
End: 2024-02-28
Payer: COMMERCIAL

## 2024-02-28 DIAGNOSIS — Z51.11 ENCOUNTER FOR ANTINEOPLASTIC CHEMOTHERAPY: ICD-10-CM

## 2024-02-28 DIAGNOSIS — C34.91 SQUAMOUS CELL LUNG CANCER, RIGHT (H): Primary | ICD-10-CM

## 2024-02-28 RX ORDER — PROCHLORPERAZINE MALEATE 10 MG
10 TABLET ORAL EVERY 6 HOURS PRN
Qty: 30 TABLET | Refills: 2 | Status: SHIPPED | OUTPATIENT
Start: 2024-02-28

## 2024-02-28 RX ORDER — ONDANSETRON 8 MG/1
8 TABLET, FILM COATED ORAL EVERY 8 HOURS PRN
Qty: 30 TABLET | Refills: 2 | Status: SHIPPED | OUTPATIENT
Start: 2024-02-28

## 2024-02-28 RX ORDER — DEXAMETHASONE 4 MG/1
8 TABLET ORAL DAILY
Qty: 6 TABLET | Refills: 3 | Status: SHIPPED | OUTPATIENT
Start: 2024-02-28 | End: 2024-04-12

## 2024-02-29 ENCOUNTER — APPOINTMENT (OUTPATIENT)
Dept: LAB | Facility: CLINIC | Age: 59
End: 2024-02-29
Payer: COMMERCIAL

## 2024-02-29 ENCOUNTER — PATIENT OUTREACH (OUTPATIENT)
Dept: ONCOLOGY | Facility: CLINIC | Age: 59
End: 2024-02-29

## 2024-02-29 ENCOUNTER — INFUSION THERAPY VISIT (OUTPATIENT)
Dept: INFUSION THERAPY | Facility: CLINIC | Age: 59
End: 2024-02-29
Attending: INTERNAL MEDICINE
Payer: COMMERCIAL

## 2024-02-29 VITALS
SYSTOLIC BLOOD PRESSURE: 137 MMHG | DIASTOLIC BLOOD PRESSURE: 87 MMHG | TEMPERATURE: 98.1 F | WEIGHT: 114.6 LBS | BODY MASS INDEX: 20.3 KG/M2 | HEART RATE: 88 BPM

## 2024-02-29 DIAGNOSIS — Z51.11 ENCOUNTER FOR ANTINEOPLASTIC CHEMOTHERAPY: ICD-10-CM

## 2024-02-29 DIAGNOSIS — C34.91 SQUAMOUS CELL LUNG CANCER, RIGHT (H): Primary | ICD-10-CM

## 2024-02-29 LAB
ALBUMIN SERPL BCG-MCNC: 3.7 G/DL (ref 3.5–5.2)
ALP SERPL-CCNC: 50 U/L (ref 40–150)
ALT SERPL W P-5'-P-CCNC: 14 U/L (ref 0–70)
ANION GAP SERPL CALCULATED.3IONS-SCNC: 11 MMOL/L (ref 7–15)
AST SERPL W P-5'-P-CCNC: 16 U/L (ref 0–45)
BASOPHILS # BLD AUTO: 0.1 10E3/UL (ref 0–0.2)
BASOPHILS NFR BLD AUTO: 1 %
BILIRUB SERPL-MCNC: <0.2 MG/DL
BUN SERPL-MCNC: 11.9 MG/DL (ref 6–20)
CALCIUM SERPL-MCNC: 9.3 MG/DL (ref 8.6–10)
CHLORIDE SERPL-SCNC: 97 MMOL/L (ref 98–107)
CREAT SERPL-MCNC: 0.75 MG/DL (ref 0.67–1.17)
DEPRECATED HCO3 PLAS-SCNC: 26 MMOL/L (ref 22–29)
EGFRCR SERPLBLD CKD-EPI 2021: >90 ML/MIN/1.73M2
EOSINOPHIL # BLD AUTO: 0.6 10E3/UL (ref 0–0.7)
EOSINOPHIL NFR BLD AUTO: 6 %
ERYTHROCYTE [DISTWIDTH] IN BLOOD BY AUTOMATED COUNT: 13.2 % (ref 10–15)
GLUCOSE SERPL-MCNC: 106 MG/DL (ref 70–99)
HCT VFR BLD AUTO: 33.1 % (ref 40–53)
HGB BLD-MCNC: 10.6 G/DL (ref 13.3–17.7)
IMM GRANULOCYTES # BLD: 0 10E3/UL
IMM GRANULOCYTES NFR BLD: 0 %
LYMPHOCYTES # BLD AUTO: 1 10E3/UL (ref 0–5.3)
LYMPHOCYTES NFR BLD AUTO: 9 %
MCH RBC QN AUTO: 28.9 PG (ref 26.5–33)
MCHC RBC AUTO-ENTMCNC: 32 G/DL (ref 31.5–36.5)
MCV RBC AUTO: 90 FL (ref 78–100)
MONOCYTES # BLD AUTO: 1.1 10E3/UL (ref 0–1.3)
MONOCYTES NFR BLD AUTO: 10 %
NEUTROPHILS # BLD AUTO: 8.2 10E3/UL (ref 1.6–8.3)
NEUTROPHILS NFR BLD AUTO: 74 %
NRBC # BLD AUTO: 0 10E3/UL
NRBC BLD AUTO-RTO: 0 /100
PLATELET # BLD AUTO: 478 10E3/UL (ref 150–450)
POTASSIUM SERPL-SCNC: 4.3 MMOL/L (ref 3.4–5.3)
PROT SERPL-MCNC: 6.8 G/DL (ref 6.4–8.3)
RBC # BLD AUTO: 3.67 10E6/UL (ref 4.4–5.9)
SODIUM SERPL-SCNC: 134 MMOL/L (ref 135–145)
T4 FREE SERPL-MCNC: 1.15 NG/DL (ref 0.9–1.7)
TSH SERPL DL<=0.005 MIU/L-ACNC: 0.2 UIU/ML (ref 0.3–4.2)
WBC # BLD AUTO: 11 10E3/UL (ref 4–11)

## 2024-02-29 PROCEDURE — 96417 CHEMO IV INFUS EACH ADDL SEQ: CPT

## 2024-02-29 PROCEDURE — 84443 ASSAY THYROID STIM HORMONE: CPT | Performed by: INTERNAL MEDICINE

## 2024-02-29 PROCEDURE — 96375 TX/PRO/DX INJ NEW DRUG ADDON: CPT

## 2024-02-29 PROCEDURE — 84439 ASSAY OF FREE THYROXINE: CPT | Performed by: INTERNAL MEDICINE

## 2024-02-29 PROCEDURE — 36415 COLL VENOUS BLD VENIPUNCTURE: CPT | Performed by: INTERNAL MEDICINE

## 2024-02-29 PROCEDURE — 96372 THER/PROPH/DIAG INJ SC/IM: CPT | Performed by: INTERNAL MEDICINE

## 2024-02-29 PROCEDURE — 96377 APPLICATON ON-BODY INJECTOR: CPT | Mod: XS

## 2024-02-29 PROCEDURE — 96413 CHEMO IV INFUSION 1 HR: CPT

## 2024-02-29 PROCEDURE — 96367 TX/PROPH/DG ADDL SEQ IV INF: CPT

## 2024-02-29 PROCEDURE — 258N000003 HC RX IP 258 OP 636: Performed by: INTERNAL MEDICINE

## 2024-02-29 PROCEDURE — 96415 CHEMO IV INFUSION ADDL HR: CPT

## 2024-02-29 PROCEDURE — 250N000011 HC RX IP 250 OP 636: Performed by: INTERNAL MEDICINE

## 2024-02-29 PROCEDURE — 80053 COMPREHEN METABOLIC PANEL: CPT | Performed by: INTERNAL MEDICINE

## 2024-02-29 PROCEDURE — 85025 COMPLETE CBC W/AUTO DIFF WBC: CPT | Performed by: INTERNAL MEDICINE

## 2024-02-29 RX ORDER — PALONOSETRON 0.05 MG/ML
0.25 INJECTION, SOLUTION INTRAVENOUS ONCE
Status: COMPLETED | OUTPATIENT
Start: 2024-02-29 | End: 2024-02-29

## 2024-02-29 RX ADMIN — PACLITAXEL 304 MG: 6 INJECTION, SOLUTION INTRAVENOUS at 10:57

## 2024-02-29 RX ADMIN — SODIUM CHLORIDE 200 MG: 9 INJECTION, SOLUTION INTRAVENOUS at 10:16

## 2024-02-29 RX ADMIN — DIPHENHYDRAMINE HYDROCHLORIDE 50 MG: 50 INJECTION, SOLUTION INTRAMUSCULAR; INTRAVENOUS at 09:30

## 2024-02-29 RX ADMIN — PALONOSETRON 0.25 MG: 0.05 INJECTION, SOLUTION INTRAVENOUS at 09:21

## 2024-02-29 RX ADMIN — CARBOPLATIN 550 MG: 10 INJECTION, SOLUTION INTRAVENOUS at 14:16

## 2024-02-29 RX ADMIN — FAMOTIDINE 20 MG: 10 INJECTION INTRAVENOUS at 09:21

## 2024-02-29 RX ADMIN — DEXAMETHASONE SODIUM PHOSPHATE: 10 INJECTION, SOLUTION INTRAMUSCULAR; INTRAVENOUS at 09:54

## 2024-02-29 RX ADMIN — SODIUM CHLORIDE 250 ML: 9 INJECTION, SOLUTION INTRAVENOUS at 09:32

## 2024-02-29 RX ADMIN — PEGFILGRASTIM 6 MG: KIT SUBCUTANEOUS at 14:50

## 2024-02-29 NOTE — CONFIDENTIAL NOTE
[facility]: Cancer Care Initial Note                                    Discussion with Patient:                                                      Chemo teach done today at chairside. Side effects reviewed with patient. All questions answered.     Antiemetics: zofran, compazine, Dexamethasone        Assessment:                                                      Initial  Mobility Status: Independent  Transportation means:: Accessible car    No assessment indicated    Intervention/Education provided during outreach:                                                       All questions and concerns answered today. Patient will call with any additional concerns    Patient to follow up as scheduled at next appt    Signature:  Rosa Maria Quick RN  [facility]: Cancer Care Plan of Care Education Note                                        Assessment:                                                      Assessment completed with:: Patient    Plan of Care Education   Yearly learning assessment completed?: Yes (see Education tab)  Does patient understand diagnosis?: Yes  Does patient understand treatment plan/regimen?: Yes  Preparing for treatment:: Reviewed treatment preparation information with patient (vascular access, day of chemo, visitor policy, what to bring, etc.)  Vascular access education provided for:: Peripheral IV  Side effect education:: Diarrhea/Constipation;Fatigue;Hair loss;Immune-mediated effects;Infection;Infertility;Neuropathy;Nausea/Vomiting;Mylosuppression;Mouth sores;Lab value monitoring (anemia, neutropenia, thrombocytopenia);Skin changes  Coping - concerns/fears reviewed with patient:: Yes  Plan of Care:: JESSICA follow-up appointment;Lab appointment;Imaging;MD follow-up appointment;Treatment schedule  When to call provider:: Bleeding;Increased shortness of breath;New/worsening pain;Shaking chills;Temperature >100.4F;Uncontrolled diarrhea/constipation;Uncontrolled  nausea/vomiting  Reasons for deferring treatment reviewed with patient:: Yes  Additional education provided for: : Steroid therapy;Bleeding precautions    Evaluation of Learning  Patient Education Provided: Yes  Readiness:: Acceptance  Method:: Booklet/Handout;Explanation  Response:: Verbalizes understanding    No assessment indicated

## 2024-02-29 NOTE — PROGRESS NOTES
Infusion Nursing Note:  Luis Hernandez presents today for C1D1 Keytruda, Taxol, Carboplatin.    Patient seen by provider today: No   present during visit today: Not Applicable.    Note: N/A.      Intravenous Access:  Peripheral IV placed.    Treatment Conditions:  Lab Results   Component Value Date    HGB 10.6 (L) 02/29/2024    WBC 11.0 02/29/2024    ANEU 7.0 07/01/2021    ANEUTAUTO 8.2 02/29/2024     (H) 02/29/2024        Lab Results   Component Value Date     (L) 02/29/2024    POTASSIUM 4.3 02/29/2024    MAG 2.2 04/24/2022    CR 0.75 02/29/2024    MIKE 9.3 02/29/2024    BILITOTAL <0.2 02/29/2024    ALBUMIN 3.7 02/29/2024    ALT 14 02/29/2024    AST 16 02/29/2024       Results reviewed, labs MET treatment parameters, ok to proceed with treatment.      Post Infusion Assessment:  Patient tolerated infusion without incident.  Blood return noted pre and post infusion.  Site patent and intact, free from redness, edema or discomfort.  No evidence of extravasations.  Access discontinued per protocol.     ONPRO  Was placed on patient's: back of left arm.    Was placed at 3:00 PM      Patient education included: what patient can expect after application, what colored lights mean on the device, when to remove device, when and where to call with questions or issues, all patients questions answered, and that Neulasta administration will occur at 6:00pm 3/1, remove around 7:00pm 3/1.    Patient tolerated administration well.      Discharge Plan:   Copy of AVS reviewed with patient and/or family.  Patient will return 3/21/24 for next appointment.  Patient discharged in stable condition accompanied by: self.  Departure Mode: Ambulatory.      MONI DIANA RN

## 2024-03-04 ENCOUNTER — PATIENT OUTREACH (OUTPATIENT)
Dept: ONCOLOGY | Facility: CLINIC | Age: 59
End: 2024-03-04
Payer: COMMERCIAL

## 2024-03-04 NOTE — PROGRESS NOTES
"[facility]: Cancer Care                                                                                          Called pt to see how he is feeling after C1D1 Carbo/Taxol/Keytruda on 02.29.     He denies any nausea and has been eating normally. Reported he was constipated but took \"some stuff\" for that and it is now normal.     Does report some joint pain - encouraged him to try claritin for the pain and he has some and will try it for a couple days. He is also taking tylenol as needed for he aches.     Pt will call if anything comes up.     Follow up is on the 21st with next cycle.     Signature:  Analilia Santo RN  "

## 2024-03-07 NOTE — PROGRESS NOTES
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    Time with patient including review, documentation, history, examination, coordination of care and counseling was 30 minutes.    Recent oncology review:  On 2/29/2024, cycle #1 upfront induction pembrolizumab, paclitaxel, carboplatin and pegfilgrastim were administered.  The patient tolerated well with bone aching for 6-7 days which resolved with breathlessness may be slightly better.  He denies any paresthesias.SH  A CBC revealed a white count 7700, hemoglobin 9.8, platelets 496,000, normal CMP and TSH.    History of present illness:  The patient has a longstanding history of severe COPD (FEV1=18%) with recurrent infection, prior tobacco use until August, 2021, pulmonary and essential hypertension.    On 10/5/2023, he was seen by his regular pulmonologist with a history  of waxing/waning bilateral lung nodules with a follow-up 9/14/2023 CT with chest revealing a new spiculated nodule at the base of the right upper lobe with persistent and stable filling defect of the bronchus intermedius/right lower lobe bronchus concerning for possible endobronchial tumor versus mucous plugging, and progressive abnormal soft tissue mass in the subcarinal.      Because he was felt not to be able to tolerate bronchoscopy, a 11/9/2023 PET scan revealed glucose avid right hilar soft tissue mass encasing the bronchus intermedius extending along the right middle lobe bronchus contiguous with subcarinal lymphadenopathy and intraluminal soft tissue density of the right lower lobe bronchus concerning for primary lung malignancy.  In addition, there were glucose avid opacities seen bilaterally involve the right lower lobe, left suprahilar region, medial left lower lobe and right upper lobe which were felt to be possibly inflammatory.  However endobronchial tumor spread cannot be excluded.  A hypermetabolic soft tissue thickening was seen in the distal sigmoid/upper rectum concerning for malignancy.  However, a 10/25/2023 Cologuard study was negative.    On 11/17/2023, the patient was seen in hematology/medical oncology consultation by Dr. Tim Proctor.  Augmentin was empirically initiated by Dr. Cuellar.    On 1/18/2024, the patient underwent flexible and rigid bronchoscopy with a right upper lobe, right mainstem bronchus and bronchus intermedius identified revealing a moderatey differentiated squamous cell carcinoma with a PD-L1 CPS 10-20% and TPS 5%.  Lung cancer NGS panel revealed CDKN2A and PIK3CA alterations.      The patient underwent tumor debulking with placement of a 10 x 15 mm bronchus intermedius stent and therapeutic suctioning of the airway.  The right middle lobe bronchus could not be identified.  In addition, right upper lobe mass needle aspirate confirmed cytologic evidence of a non-small  cell carcinoma consistent with a squamous cell carcinoma which was p40 positive, TTF 1 and CK7 negative.  Following the bronchoscopic procedure, his breathing was better for perhaps a week but has now returned to baseline.    Follow-up 1/20/2023 CT chest without contrast revealed reduction in left upper lobe nodule, new nodules throughout both lungs up to 7 mm and continued nodular wall thickening of the bronchus intermedius, right lower lobe bronchus with masslike filling defect of the right lower lobe.    On 2/1/2024, I saw him in initial oncology consultation.  MR scan brain with contrast was unremarkable.  He subsequently elected interventional therapy although he had been inclined toward palliative care only at the time of his original consultation.    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  Family History   Problem Relation Age of Onset    Hypertension Mother     Ovarian Cancer Mother     Breast Cancer Mother     Hypertension Father     Lipids Father     C.A.D. Father     Heart Disease Father     Chronic Obstructive Pulmonary Disease Father     Cerebrovascular Disease Father     Diabetes Paternal Grandmother     Chronic Obstructive Pulmonary Disease Paternal Grandfather          Medications:  Current Outpatient Medications   Medication    albuterol (PROAIR HFA/PROVENTIL HFA/VENTOLIN HFA) 108 (90 Base) MCG/ACT inhaler    amLODIPine (NORVASC) 10 MG  "tablet    atenolol (TENORMIN) 25 MG tablet    dexAMETHasone (DECADRON) 4 MG tablet    fluticasone-salmeterol (ADVAIR) 500-50 MCG/ACT inhaler    guaiFENesin (MUCINEX MAXIMUM STRENGTH) 1200 MG TB12    lisinopril (ZESTRIL) 40 MG tablet    magnesium oxide (MAG-OX) 400 MG tablet    ondansetron (ZOFRAN) 8 MG tablet    order for DME    order for DME    predniSONE (DELTASONE) 20 MG tablet    prochlorperazine (COMPAZINE) 10 MG tablet    roflumilast (DALIRESP) 500 MCG TABS tablet    sodium chloride 0.9 % neb solution    tiotropium (SPIRIVA RESPIMAT) 2.5 MCG/ACT inhaler    methylPREDNISolone (MEDROL DOSEPAK) 4 MG tablet therapy pack     No current facility-administered medications for this visit.       Allergies:  Allergies   Allergen Reactions    Hctz Other (See Comments)     He has hyponatremia - avoid use    Penicillins Unknown     Childhood reaction.       Review of systems:  Except as noted in the note above, the patient denies headaches, diplopia, hearing loss or dizziness; dyspnea, cough, hemoptysis, pleurisy; chest pain/pressure, palpitations, lightheadedness; anorexia, nausea, vomiting, abdominal pain, diarrhea, constipation, melena or rectal bleeding; dysuria, frequency, nocturia, blood in the urine; fever, chills, sweats; tingling, numbness, loss of balance; insomnia, depression, anxiety.    Physical examination:  /84 (BP Location: Right arm, Patient Position: Sitting, Cuff Size: Adult Regular)   Pulse 86   Temp 97.7  F (36.5  C) (Tympanic)   Resp 16   Ht 1.6 m (5' 3\")   Wt 52.2 kg (115 lb)   SpO2 97%   BMI 20.37 kg/m      The patient is alert and oriented.  Adenopathy is absent in the neck, axilla, groin and supraclavicular fossa; Lungs are clear to auscultation and percussion without rales or rubs although there was wheezing and significant expiratory slowing; heart rhythm is regular, heart sounds are without murmurs or gallops; the abdomen is soft and flat without hepatosplenomegaly, masses, ascites " or tenderness.; extremities and skin reveal no unusual skin lesions, joint swelling or edema;    Irvin Lantigua MD

## 2024-03-21 ENCOUNTER — ONCOLOGY VISIT (OUTPATIENT)
Dept: ONCOLOGY | Facility: CLINIC | Age: 59
End: 2024-03-21
Attending: INTERNAL MEDICINE
Payer: COMMERCIAL

## 2024-03-21 ENCOUNTER — LAB (OUTPATIENT)
Dept: LAB | Facility: CLINIC | Age: 59
End: 2024-03-21
Attending: INTERNAL MEDICINE
Payer: COMMERCIAL

## 2024-03-21 ENCOUNTER — INFUSION THERAPY VISIT (OUTPATIENT)
Dept: INFUSION THERAPY | Facility: CLINIC | Age: 59
End: 2024-03-21
Attending: INTERNAL MEDICINE
Payer: COMMERCIAL

## 2024-03-21 VITALS
RESPIRATION RATE: 16 BRPM | DIASTOLIC BLOOD PRESSURE: 84 MMHG | WEIGHT: 115 LBS | HEART RATE: 86 BPM | OXYGEN SATURATION: 97 % | BODY MASS INDEX: 20.38 KG/M2 | TEMPERATURE: 97.7 F | SYSTOLIC BLOOD PRESSURE: 130 MMHG | HEIGHT: 63 IN

## 2024-03-21 VITALS — DIASTOLIC BLOOD PRESSURE: 85 MMHG | SYSTOLIC BLOOD PRESSURE: 122 MMHG | HEART RATE: 87 BPM

## 2024-03-21 DIAGNOSIS — C34.11 MALIGNANT NEOPLASM OF UPPER LOBE, RIGHT BRONCHUS OR LUNG (H): ICD-10-CM

## 2024-03-21 DIAGNOSIS — C34.91 SQUAMOUS CELL LUNG CANCER, RIGHT (H): Primary | ICD-10-CM

## 2024-03-21 DIAGNOSIS — Z51.11 ENCOUNTER FOR ANTINEOPLASTIC CHEMOTHERAPY: ICD-10-CM

## 2024-03-21 LAB
ALBUMIN SERPL BCG-MCNC: 3.7 G/DL (ref 3.5–5.2)
ALP SERPL-CCNC: 74 U/L (ref 40–150)
ALT SERPL W P-5'-P-CCNC: 19 U/L (ref 0–70)
ANION GAP SERPL CALCULATED.3IONS-SCNC: 10 MMOL/L (ref 7–15)
AST SERPL W P-5'-P-CCNC: 15 U/L (ref 0–45)
BASOPHILS # BLD AUTO: 0.1 10E3/UL (ref 0–0.2)
BASOPHILS NFR BLD AUTO: 2 %
BILIRUB SERPL-MCNC: 0.2 MG/DL
BUN SERPL-MCNC: 14.2 MG/DL (ref 6–20)
CALCIUM SERPL-MCNC: 9.2 MG/DL (ref 8.6–10)
CHLORIDE SERPL-SCNC: 95 MMOL/L (ref 98–107)
CREAT SERPL-MCNC: 0.66 MG/DL (ref 0.67–1.17)
DEPRECATED HCO3 PLAS-SCNC: 26 MMOL/L (ref 22–29)
EGFRCR SERPLBLD CKD-EPI 2021: >90 ML/MIN/1.73M2
EOSINOPHIL # BLD AUTO: 0.3 10E3/UL (ref 0–0.7)
EOSINOPHIL NFR BLD AUTO: 3 %
ERYTHROCYTE [DISTWIDTH] IN BLOOD BY AUTOMATED COUNT: 14.6 % (ref 10–15)
GLUCOSE SERPL-MCNC: 89 MG/DL (ref 70–99)
HCT VFR BLD AUTO: 31.5 % (ref 40–53)
HGB BLD-MCNC: 9.8 G/DL (ref 13.3–17.7)
IMM GRANULOCYTES # BLD: 0 10E3/UL
IMM GRANULOCYTES NFR BLD: 0 %
LYMPHOCYTES # BLD AUTO: 0.9 10E3/UL (ref 0.8–5.3)
LYMPHOCYTES NFR BLD AUTO: 12 %
MCH RBC QN AUTO: 28.7 PG (ref 26.5–33)
MCHC RBC AUTO-ENTMCNC: 31.1 G/DL (ref 31.5–36.5)
MCV RBC AUTO: 92 FL (ref 78–100)
MONOCYTES # BLD AUTO: 1.2 10E3/UL (ref 0–1.3)
MONOCYTES NFR BLD AUTO: 15 %
NEUTROPHILS # BLD AUTO: 5.2 10E3/UL (ref 1.6–8.3)
NEUTROPHILS NFR BLD AUTO: 68 %
NRBC # BLD AUTO: 0 10E3/UL
NRBC BLD AUTO-RTO: 0 /100
PLATELET # BLD AUTO: 496 10E3/UL (ref 150–450)
POTASSIUM SERPL-SCNC: 4.7 MMOL/L (ref 3.4–5.3)
PROT SERPL-MCNC: 6.7 G/DL (ref 6.4–8.3)
RBC # BLD AUTO: 3.41 10E6/UL (ref 4.4–5.9)
SODIUM SERPL-SCNC: 131 MMOL/L (ref 135–145)
TSH SERPL DL<=0.005 MIU/L-ACNC: 0.32 UIU/ML (ref 0.3–4.2)
WBC # BLD AUTO: 7.7 10E3/UL (ref 4–11)

## 2024-03-21 PROCEDURE — 99214 OFFICE O/P EST MOD 30 MIN: CPT | Performed by: INTERNAL MEDICINE

## 2024-03-21 PROCEDURE — 258N000003 HC RX IP 258 OP 636: Performed by: INTERNAL MEDICINE

## 2024-03-21 PROCEDURE — 84443 ASSAY THYROID STIM HORMONE: CPT | Performed by: INTERNAL MEDICINE

## 2024-03-21 PROCEDURE — 96375 TX/PRO/DX INJ NEW DRUG ADDON: CPT

## 2024-03-21 PROCEDURE — 96415 CHEMO IV INFUSION ADDL HR: CPT

## 2024-03-21 PROCEDURE — 96413 CHEMO IV INFUSION 1 HR: CPT

## 2024-03-21 PROCEDURE — 82040 ASSAY OF SERUM ALBUMIN: CPT | Performed by: INTERNAL MEDICINE

## 2024-03-21 PROCEDURE — G0463 HOSPITAL OUTPT CLINIC VISIT: HCPCS | Performed by: INTERNAL MEDICINE

## 2024-03-21 PROCEDURE — 96367 TX/PROPH/DG ADDL SEQ IV INF: CPT

## 2024-03-21 PROCEDURE — 96417 CHEMO IV INFUS EACH ADDL SEQ: CPT

## 2024-03-21 PROCEDURE — G0463 HOSPITAL OUTPT CLINIC VISIT: HCPCS | Mod: 25 | Performed by: INTERNAL MEDICINE

## 2024-03-21 PROCEDURE — G2211 COMPLEX E/M VISIT ADD ON: HCPCS | Performed by: INTERNAL MEDICINE

## 2024-03-21 PROCEDURE — 85025 COMPLETE CBC W/AUTO DIFF WBC: CPT | Performed by: INTERNAL MEDICINE

## 2024-03-21 PROCEDURE — 36415 COLL VENOUS BLD VENIPUNCTURE: CPT | Performed by: INTERNAL MEDICINE

## 2024-03-21 PROCEDURE — 96372 THER/PROPH/DIAG INJ SC/IM: CPT | Performed by: INTERNAL MEDICINE

## 2024-03-21 PROCEDURE — 96377 APPLICATON ON-BODY INJECTOR: CPT

## 2024-03-21 PROCEDURE — 250N000011 HC RX IP 250 OP 636: Performed by: INTERNAL MEDICINE

## 2024-03-21 RX ORDER — PALONOSETRON 0.05 MG/ML
0.25 INJECTION, SOLUTION INTRAVENOUS ONCE
Status: CANCELLED | OUTPATIENT
Start: 2024-03-21

## 2024-03-21 RX ORDER — DIPHENHYDRAMINE HCL 25 MG
50 CAPSULE ORAL ONCE
Status: CANCELLED
Start: 2024-03-21

## 2024-03-21 RX ORDER — ALBUTEROL SULFATE 0.83 MG/ML
2.5 SOLUTION RESPIRATORY (INHALATION)
Status: CANCELLED | OUTPATIENT
Start: 2024-03-21

## 2024-03-21 RX ORDER — DIPHENHYDRAMINE HYDROCHLORIDE 50 MG/ML
50 INJECTION INTRAMUSCULAR; INTRAVENOUS
Status: CANCELLED
Start: 2024-03-21

## 2024-03-21 RX ORDER — PALONOSETRON 0.05 MG/ML
0.25 INJECTION, SOLUTION INTRAVENOUS ONCE
Status: COMPLETED | OUTPATIENT
Start: 2024-03-21 | End: 2024-03-21

## 2024-03-21 RX ORDER — METHYLPREDNISOLONE SODIUM SUCCINATE 125 MG/2ML
125 INJECTION, POWDER, LYOPHILIZED, FOR SOLUTION INTRAMUSCULAR; INTRAVENOUS
Status: CANCELLED
Start: 2024-03-21

## 2024-03-21 RX ORDER — HEPARIN SODIUM,PORCINE 10 UNIT/ML
5-20 VIAL (ML) INTRAVENOUS DAILY PRN
Status: CANCELLED | OUTPATIENT
Start: 2024-03-21

## 2024-03-21 RX ORDER — EPINEPHRINE 1 MG/ML
0.3 INJECTION, SOLUTION, CONCENTRATE INTRAVENOUS EVERY 5 MIN PRN
Status: CANCELLED | OUTPATIENT
Start: 2024-03-21

## 2024-03-21 RX ORDER — ALBUTEROL SULFATE 90 UG/1
1-2 AEROSOL, METERED RESPIRATORY (INHALATION)
Status: CANCELLED
Start: 2024-03-21

## 2024-03-21 RX ORDER — MEPERIDINE HYDROCHLORIDE 25 MG/ML
25 INJECTION INTRAMUSCULAR; INTRAVENOUS; SUBCUTANEOUS EVERY 30 MIN PRN
Status: CANCELLED | OUTPATIENT
Start: 2024-03-21

## 2024-03-21 RX ORDER — LORAZEPAM 2 MG/ML
0.5 INJECTION INTRAMUSCULAR EVERY 4 HOURS PRN
Status: CANCELLED | OUTPATIENT
Start: 2024-03-21

## 2024-03-21 RX ORDER — HEPARIN SODIUM (PORCINE) LOCK FLUSH IV SOLN 100 UNIT/ML 100 UNIT/ML
5 SOLUTION INTRAVENOUS
Status: CANCELLED | OUTPATIENT
Start: 2024-03-21

## 2024-03-21 RX ADMIN — SODIUM CHLORIDE 250 ML: 9 INJECTION, SOLUTION INTRAVENOUS at 09:30

## 2024-03-21 RX ADMIN — DIPHENHYDRAMINE HYDROCHLORIDE 50 MG: 50 INJECTION, SOLUTION INTRAMUSCULAR; INTRAVENOUS at 09:44

## 2024-03-21 RX ADMIN — PACLITAXEL 304 MG: 6 INJECTION, SOLUTION INTRAVENOUS at 11:03

## 2024-03-21 RX ADMIN — PEGFILGRASTIM 6 MG: KIT SUBCUTANEOUS at 14:23

## 2024-03-21 RX ADMIN — PALONOSETRON 0.25 MG: 0.05 INJECTION, SOLUTION INTRAVENOUS at 09:34

## 2024-03-21 RX ADMIN — SODIUM CHLORIDE 200 MG: 9 INJECTION, SOLUTION INTRAVENOUS at 10:20

## 2024-03-21 RX ADMIN — DEXAMETHASONE SODIUM PHOSPHATE: 10 INJECTION, SOLUTION INTRAMUSCULAR; INTRAVENOUS at 09:58

## 2024-03-21 RX ADMIN — CARBOPLATIN 600 MG: 10 INJECTION, SOLUTION INTRAVENOUS at 14:18

## 2024-03-21 RX ADMIN — FAMOTIDINE 20 MG: 10 INJECTION, SOLUTION INTRAVENOUS at 09:33

## 2024-03-21 ASSESSMENT — PAIN SCALES - GENERAL: PAINLEVEL: NO PAIN (0)

## 2024-03-21 NOTE — PATIENT INSTRUCTIONS
1. Proceed with cycle #2 paclitaxel/carboplatin today  2. Follow-up Jennifer Cabrera NP Wednesday, April 10, 2024 for anticipated cycle #3  3. PET scan for Monday, April 8, 2024            Order Questions

## 2024-03-21 NOTE — LETTER
3/21/2024         RE: Luis Hernandez  [address] [facility] [address]        Dear Colleague,    Thank you for referring your patient, Luis Hernandez, to the [facility]. Please see a copy of my visit note below.    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    Time with patient including review, documentation, history, examination, coordination of care and counseling was 30 minutes.    Recent oncology review:  On 2/29/2024, cycle #1 upfront induction pembrolizumab, paclitaxel, carboplatin and pegfilgrastim were administered.  The patient tolerated well with bone aching for 6-7 days which resolved with breathlessness may be slightly better.  He denies any paresthesias.SH  A CBC revealed a white count 7700, hemoglobin 9.8, platelets 496,000, normal CMP and TSH.    History of present  illness:  The patient has a longstanding history of severe COPD (FEV1=18%) with recurrent infection, prior tobacco use until August, 2021, pulmonary and essential hypertension.    On 10/5/2023, he was seen by his regular pulmonologist with a history of waxing/waning bilateral lung nodules with a follow-up 9/14/2023 CT with chest revealing a new spiculated nodule at the base of the right upper lobe with persistent and stable filling defect of the bronchus intermedius/right lower lobe bronchus concerning for possible endobronchial tumor versus mucous plugging, and progressive abnormal soft tissue mass in the subcarinal.      Because he was felt not to be able to tolerate bronchoscopy, a 11/9/2023 PET scan revealed glucose avid right hilar soft tissue mass encasing the bronchus intermedius extending along the right middle lobe bronchus contiguous with subcarinal lymphadenopathy and intraluminal soft tissue density of the right lower lobe bronchus concerning for primary lung malignancy.  In addition, there were glucose avid opacities seen bilaterally involve the right lower lobe, left suprahilar region, medial left lower lobe and right upper lobe which were felt to be possibly inflammatory.  However endobronchial tumor spread cannot be excluded.  A hypermetabolic soft tissue thickening was seen in the distal sigmoid/upper rectum concerning for malignancy.  However, a 10/25/2023 Cologuard study was negative.    On 11/17/2023, the patient was seen in hematology/medical oncology consultation by Dr. Tim Proctor.  Augmentin was empirically initiated by Dr. Cuellar.    On 1/18/2024, the patient underwent flexible and rigid bronchoscopy with a right upper lobe, right mainstem bronchus and bronchus intermedius identified revealing a moderatey differentiated squamous cell carcinoma with a PD-L1 CPS 10-20% and TPS 5%.  Lung cancer NGS panel revealed CDKN2A and PIK3CA alterations.      The patient underwent tumor debulking with  placement of a 10 x 15 mm bronchus intermedius stent and therapeutic suctioning of the airway.  The right middle lobe bronchus could not be identified.  In addition, right upper lobe mass needle aspirate confirmed cytologic evidence of a non-small cell carcinoma consistent with a squamous cell carcinoma which was p40 positive, TTF 1 and CK7 negative.  Following the bronchoscopic procedure, his breathing was better for perhaps a week but has now returned to baseline.    Follow-up 1/20/2023 CT chest without contrast revealed reduction in left upper lobe nodule, new nodules throughout both lungs up to 7 mm and continued nodular wall thickening of the bronchus intermedius, right lower lobe bronchus with masslike filling defect of the right lower lobe.    On 2/1/2024, I saw him in initial oncology consultation.  MR scan brain with contrast was unremarkable.  He subsequently elected interventional therapy although he had been inclined toward palliative care only at the time of his original consultation.    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  Family History   Problem Relation Age of Onset     Hypertension Mother      Ovarian Cancer Mother      Breast Cancer Mother      Hypertension Father      Lipids Father      C.A.D. Father      Heart Disease Father      Chronic Obstructive Pulmonary Disease Father      Cerebrovascular Disease Father      Diabetes  "Paternal Grandmother      Chronic Obstructive Pulmonary Disease Paternal Grandfather          Medications:  Current Outpatient Medications   Medication     albuterol (PROAIR HFA/PROVENTIL HFA/VENTOLIN HFA) 108 (90 Base) MCG/ACT inhaler     amLODIPine (NORVASC) 10 MG tablet     atenolol (TENORMIN) 25 MG tablet     dexAMETHasone (DECADRON) 4 MG tablet     fluticasone-salmeterol (ADVAIR) 500-50 MCG/ACT inhaler     guaiFENesin (MUCINEX MAXIMUM STRENGTH) 1200 MG TB12     lisinopril (ZESTRIL) 40 MG tablet     magnesium oxide (MAG-OX) 400 MG tablet     ondansetron (ZOFRAN) 8 MG tablet     order for DME     order for DME     predniSONE (DELTASONE) 20 MG tablet     prochlorperazine (COMPAZINE) 10 MG tablet     roflumilast (DALIRESP) 500 MCG TABS tablet     sodium chloride 0.9 % neb solution     tiotropium (SPIRIVA RESPIMAT) 2.5 MCG/ACT inhaler     methylPREDNISolone (MEDROL DOSEPAK) 4 MG tablet therapy pack     No current facility-administered medications for this visit.       Allergies:  Allergies   Allergen Reactions     Hctz Other (See Comments)     He has hyponatremia - avoid use     Penicillins Unknown     Childhood reaction.       Review of systems:  Except as noted in the note above, the patient denies headaches, diplopia, hearing loss or dizziness; dyspnea, cough, hemoptysis, pleurisy; chest pain/pressure, palpitations, lightheadedness; anorexia, nausea, vomiting, abdominal pain, diarrhea, constipation, melena or rectal bleeding; dysuria, frequency, nocturia, blood in the urine; fever, chills, sweats; tingling, numbness, loss of balance; insomnia, depression, anxiety.    Physical examination:  /84 (BP Location: Right arm, Patient Position: Sitting, Cuff Size: Adult Regular)   Pulse 86   Temp 97.7  F (36.5  C) (Tympanic)   Resp 16   Ht 1.6 m (5' 3\")   Wt 52.2 kg (115 lb)   SpO2 97%   BMI 20.37 kg/m      The patient is alert and oriented.  Adenopathy is absent in the neck, axilla, groin and supraclavicular " "fossa; Lungs are clear to auscultation and percussion without rales or rubs although there was wheezing and significant expiratory slowing; heart rhythm is regular, heart sounds are without murmurs or gallops; the abdomen is soft and flat without hepatosplenomegaly, masses, ascites or tenderness.; extremities and skin reveal no unusual skin lesions, joint swelling or edema;    Irvin Lantigua MD    Oncology Rooming Note    March 21, 2024 8:46 AM   Luis Hernandez is a 58 year old male who presents for:    Chief Complaint   Patient presents with     Oncology Clinic Visit     Squamous cell lung cancer, right - Labs provider and infusion     Initial Vitals: /84 (BP Location: Right arm, Patient Position: Sitting, Cuff Size: Adult Regular)   Pulse 86   Temp 97.7  F (36.5  C) (Tympanic)   Resp 16   Ht 1.6 m (5' 3\")   Wt 52.2 kg (115 lb)   SpO2 97%   BMI 20.37 kg/m   Estimated body mass index is 20.37 kg/m  as calculated from the following:    Height as of this encounter: 1.6 m (5' 3\").    Weight as of this encounter: 52.2 kg (115 lb). Body surface area is 1.52 meters squared.  No Pain (0) Comment: Data Unavailable   No LMP for male patient.  Allergies reviewed: Yes  Medications reviewed: Yes    Medications: Medication refills not needed today.  Pharmacy name entered into [employer]:    [facility][id number] - [place], [place] - [address] [facility] SPECIALTY [place] - [place], [place] - [address] [facility] [place] - ONE [facility] AT PORTAL TO [facility] SITES  [facility] #[id number] - [place], [place] - [address] [place] [address]    Frailty Screening:   Is the patient here for a new oncology consult visit in cancer care? 2. No      Clinical concerns:  None      Feli Mead CMA                Again, thank you for allowing me to participate in the care of your patient.        Sincerely,        Irvin Lantigua MD  "

## 2024-03-21 NOTE — PROGRESS NOTES
"Oncology Rooming Note    March 21, 2024 8:46 AM   Luis Hernandez is a 58 year old male who presents for:    Chief Complaint   Patient presents with    Oncology Clinic Visit     Squamous cell lung cancer, right - Labs provider and infusion     Initial Vitals: /84 (BP Location: Right arm, Patient Position: Sitting, Cuff Size: Adult Regular)   Pulse 86   Temp 97.7  F (36.5  C) (Tympanic)   Resp 16   Ht 1.6 m (5' 3\")   Wt 52.2 kg (115 lb)   SpO2 97%   BMI 20.37 kg/m   Estimated body mass index is 20.37 kg/m  as calculated from the following:    Height as of this encounter: 1.6 m (5' 3\").    Weight as of this encounter: 52.2 kg (115 lb). Body surface area is 1.52 meters squared.  No Pain (0) Comment: Data Unavailable   No LMP for male patient.  Allergies reviewed: Yes  Medications reviewed: Yes    Medications: Medication refills not needed today.  Pharmacy name entered into [employer]:    [facility][id number] - [place], [place] - [address] [facility] [place] [facility] [facility] [address] [place] [address] [place] [facility] [place] - ONE [facility] AT PORTAL TO [facility] SITES  [facility] #[id number] - [place], [place] - [address] [place] [address]    Frailty Screening:   Is the patient here for a new oncology consult visit in cancer care? 2. No      Clinical concerns:  None      Feli Mead CMA              "

## 2024-03-21 NOTE — PROGRESS NOTES
Infusion Nursing Note:  Luis Hernandez presents today for C2D1.    Patient seen by provider today: Yes   present during visit today: Not Applicable.    Note: Labs drawn peripherally.      Intravenous Access:  Peripheral IV placed.    Treatment Conditions:  Lab Results   Component Value Date    HGB 9.8 (L) 03/21/2024    WBC 7.7 03/21/2024    ANEU 7.0 07/01/2021    ANEUTAUTO 5.2 03/21/2024     (H) 03/21/2024        Lab Results   Component Value Date     (L) 03/21/2024    POTASSIUM 4.7 03/21/2024    MAG 2.2 04/24/2022    CR 0.66 (L) 03/21/2024    MIKE 9.2 03/21/2024    BILITOTAL 0.2 03/21/2024    ALBUMIN 3.7 03/21/2024    ALT 19 03/21/2024    AST 15 03/21/2024       Results reviewed, labs MET treatment parameters, ok to proceed with treatment.      Post Infusion Assessment:  Patient tolerated infusion without incident.  Patient tolerated injection without incident.  Blood return noted pre and post infusion.  Site patent and intact, free from redness, edema or discomfort.  No evidence of extravasations.  Access discontinued per protocol.       Discharge Plan:   Patient discharged in stable condition accompanied by: self.  Departure Mode: Ambulatory.      Margarita Petit RN

## 2024-04-04 ENCOUNTER — OFFICE VISIT (OUTPATIENT)
Dept: PULMONOLOGY | Facility: CLINIC | Age: 59
End: 2024-04-04
Attending: INTERNAL MEDICINE
Payer: COMMERCIAL

## 2024-04-04 ENCOUNTER — TELEPHONE (OUTPATIENT)
Dept: PULMONOLOGY | Facility: CLINIC | Age: 59
End: 2024-04-04

## 2024-04-04 VITALS
TEMPERATURE: 99.2 F | DIASTOLIC BLOOD PRESSURE: 69 MMHG | OXYGEN SATURATION: 97 % | BODY MASS INDEX: 19.17 KG/M2 | SYSTOLIC BLOOD PRESSURE: 105 MMHG | WEIGHT: 108.2 LBS | HEIGHT: 63 IN | HEART RATE: 101 BPM

## 2024-04-04 DIAGNOSIS — J44.1 COPD EXACERBATION (H): Primary | ICD-10-CM

## 2024-04-04 DIAGNOSIS — C34.91 SQUAMOUS CELL LUNG CANCER, RIGHT (H): ICD-10-CM

## 2024-04-04 PROCEDURE — 99215 OFFICE O/P EST HI 40 MIN: CPT | Mod: 25 | Performed by: INTERNAL MEDICINE

## 2024-04-04 RX ORDER — PREDNISONE 10 MG/1
TABLET ORAL
Qty: 33 TABLET | Refills: 5 | Status: ON HOLD | OUTPATIENT
Start: 2024-04-04 | End: 2024-05-01

## 2024-04-04 RX ORDER — PREDNISONE 20 MG/1
TABLET ORAL
Qty: 16 TABLET | Refills: 0 | Status: SHIPPED | OUTPATIENT
Start: 2024-04-04 | End: 2024-04-12

## 2024-04-04 NOTE — TELEPHONE ENCOUNTER
[facility] Call Center    Phone Message    May a detailed message be left on voicemail: yes     Reason for Call: Medication Question or concern regarding medication   Prescription Clarification  Name of Medication: prednisone   Prescribing Provider: Dr Cuellar    Pharmacy: [facility] #[id number] - [place], [place] - [address] [place] [address]    What on the order needs clarification? Clarify directions and quantity on orders.     Action Taken: Message routed to:  Other: [place] [facility]     Travel Screening: Not Applicable

## 2024-04-04 NOTE — PROGRESS NOTES
[facility]  [address] [address]  Phone: [phone]    Patient:  Luis Hernandez, Date of birth 1965  Date of Visit:  04/04/2024  Referring Provider Bianca Cuellar      Assessment & Plan             Assessment and Plan:   Mr. Luis Hernandez is a 58 year old male with medical history significant for very severe COPD, hypertension who was referred after recent hospitalization for COPD exacerbation.  He had 3x hospitalizations in 2021 for COPD exacerbations during which time he did grow E. coli, Klebsiella, stenotrophomonas. Additionally, he had an eventful 2022 with recurrent exacerbations and hospitalizations, most recently at [facility] from 4/24/22 through 4/27/22.  He has been started on roflumilast with an excellent response.     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 He is s/p 1 round of chemotherapy (pembrolizumab, paclitaxel and carboplatin) with plan for cycle 2 pending PET results.     1. Very severe COPD (FEV1 18%), panlobular emphysema, minimal cough but mucus production and chronic hypoxic respiratory failure, on 2 to 3 L with exertion (A1ATD neg)  2. Prior tobacco dependence, greater than 50 years, quit in August 2021  3. Likely pulmonary hypertension with RVP of 55 mmHg plus RAP (2019 ECHO), dilated RV but normal RV EF  4. New spiculated 9 mm nodule in L major fissure (4/24/22) - decreased to 3 mm on 7/6/22 CT  5. indeterminate left lower lobe nodule, indeterminate left upper lobe opacity on 8/26/2021 CT  6. New 5 mm nodule in RADHA on 7/6/22 chest CT   7. 4 mm R mid lung pulmonary nodule - unchanged on 7/6/22, 3/9/23  8. 7 mm nodule in posterolateral RUL now 6 mm (from 7 mm), resolved  9. Fissural nodule R mid lung stable, resolved  10.  Endobronchial debris soft tissue versus mucous plugging, mildly enlarged subcarinal and right hilar lymph nodes  11. New  spiculated 1.6 cm nodule at base of the RUL on 9/14/23 CT   12. Non-operable R lung SCC and is now s/p cycle 1 of chemotherapy (pembrolizumab, paclitaxel and carboplatin)    Recommendations as follows:   -Continue Advair, spiriva, albuterol  -Continue roflumilast; LFTs have remained stable  -Continue oral mucolytic, Mucinex  -Continue twice daily nebs for stent patency; discussed follow-up with IP but he is not interested at this time   -discussed role of palliative care to help provide support, support goal setting but he is not interested at this time  -SW referral to help evaluate resources for additional nutritional supplements in setting of weight loss   -Continue aerobika use at least 2-3 times per day for 10-minute sessions after albuterol inhaler or nebulizer therapy   -prednisone burst for recent/current exacerbation with taper to 10 mg prednisone daily   -he is up to date with covid, influenza, pneumovax vaccines including the bivalent covid booster; due for RSV vaccine but timing is dependent on chemotherapy     I certify that this patient, Luis Hernandez has been under my care (or a nurse practitioner or physican's assistant working with me). This is the face-to-face encounter for oxygen medical necessity.      Luis Hernandez is now in a chronic stable state and continues to require supplemental oxygen. Patient has continued oxygen desaturation due to COPD J44.9.    Alternative treatment(s) tried or considered and deemed clinically infective for treatment of Chronic Respiratory Failure with Hypoxia J93.11 include nebulizers, inhalers and pulmonary toileting.  If portability is ordered, is the patient mobile within the home? yes    Return to clinic in 6 months.    Bianca Cuellar MD  Pulmonary, Allergy, Critical Care, and Sleep Medicine   Pager: [phone]    This note was created using dictation software and may contain errors.  Please contact the creator for any clarifications that are needed.    I have spent  40 minutes on the day of the visit to review the chart, interview and examine the patient, review labs and imaging, formulate a plan, document and submit orders. Time documented is excluding time spent for PFT interpretation        Medical Decision Making           Bianca Cuellar MD             HPI:    Mr. Luis Hernandez is a 58 year old male with medical history significant for very severe COPD, hypertension. He has been diagnosed with non-operable R lung SCC and is now s/p cycle 1 of chemotherapy (pembrolizumab, paclitaxel and carboplatin).     He is overall doing okay.  He has had some bone pain after chemo as well as bloating.  The symptoms have improved however and he will undergo repeat PET on 4/8 with possible chemo (cycle 2) thereafter.  Appetite has been good but his weight is down about 7 to 8 pounds.  Eating has been limited by intermittent constipation as well as bloating.  With daily MiraLAX for 3 days in a row this has improved.  He is using Ensure or boost supplements but these are costly.    Breathing is actually been feeling better during chemotherapy and dyspnea that is typically worsened in the afternoon has not been present.  He does continue to get quite winded with walking into clinic.  For about 5 days he has had increased yellow mucus production.  He did take azithromycin but this has not changed his symptoms.  He is using his Spiriva, Advair, albuterol nebs along with hypertonic saline.  He continues to cough.  He has been using Aerobika 3 times daily and is also using Roflumilast.  He continues on 2 L of oxygen 24/7.  He denies fever but has had morning chills which was ongoing for him.    We discussed chronic use of low-dose prednisone to optimize his breathing, and energy level.  He says that his spirits are good and he is overall tolerating his chemotherapy to this point.  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  We did discuss returning to [place] for repeat bronchoscopy and reevaluation of stent/debulking.  He does not want to do this since he is not interested in the travel, repeat physician visits, and also feels that this might buy him some time but overall is not worth the burden of care.  His daughter continues to be the main individual of support for him.       Prior history:   Currently feels at his baseline. About one month ago was unable to cough up typical morning mucous. His lungs felt \"dry.\" Breathing became worse so he started his home prednisone course. This helped his breathing. Started mucinex about a week ago and now lots of thin mucous is coming up regularly throughout the day. Also using Aerobika 3x/day. Also utilizing PSB. Prednisone taper ended Monday and no change since. Breathing at baseline.     He has lost about 20# / 6 months. Gained 5# back. Appetite is good and he is eating well. No screening colonoscopy but stools are normal. Has had chronic fatigue.     Exercising daily, primarily with walking up stairs, using pedaller. He continues on 2 L O2, 1.5 LPM at nighttime. Sleep eval is on hold with upcoming cataract surgeries and also planning to get TTE.     + PND but controlled on daily flonase. Very mild seasonal allergy symptoms including sneezing.     Prior:   Currently feeling okay. Recently had a flare up after exposure to smoke from a neighbor burning wood. Feels mucous is thick currently but he is able to cough this up. Discussed using aerobika more, consideration of neb.   He is using his oxygen 24/7.     Reports that prior neb therapy made breathing worse but he will try using albuterol inhaler first then a neb.     He is using roflumilast, respimat, advair, albuterol prn.       Prior history:   He continues to do well and in fact feels better than he has in years. He is using advair, spiriva, albuterol, and roflumilast. No exacerbations since his last " visit. He has established care with sleep medicine but is unclear when his evaluation will be - he asks that I reach out to them.     No significant coughing or wheeze. Still with ESPARZA. He remains active with regular exercise at least 3 times per week. He continues to use aerobika device twice daily.  Weight is stable and appetite is excellent.     His daughter had asked about lung transplant possibility at a prior visit and we discussed some of the intricacies of this including the extensive evaluation and needing to make sure that previously seen nodule is not malignant before moving forward with evaluation. He says that he is not interested in transplant evaluation at this time but would continue to think about it and possibly discuss with family.     Prior history:   He says his breathing has improved quite a bit.  He continues on 2 L O2. He did graduate from pulmonary rehab which was beneficial for him.  He continues to walk and use weights at home.  He is using Advair and Spiriva in the morning.  He does feel like nights are difficult for him due to panicking while sleeping but also due to shortness of breath.  Causes him to wake up.  He is using his albuterol nebulizer once to twice per day.  He does continue to use Roflumilast.  His last exacerbation was in June.  He is using his aerobic 10 minutes 3 times a day and this is beneficial.  He does need to be evaluated for obstructive sleep apnea and usage of positive chronic pressure ventilation at night.    Prior history:  He has had 2 exacerbations in 2022; first in February when he was admitted fro 2/8 - 2/10 and discharged on hospice. Later admitted to [facility] with exacerbation from 4/24 - 4/27 and started on roflumilast. He was seen by the pulmonary consult team during his most recent hospitalization and started on a prolonged taper of prednisone.     He has been doing overall well since discharged. He has started pulmonary rehab and has had 2 sessions with  plan for 18 sesssion. He does have an am cough but is able to clear things out with use of aerobika. Currently on prednisone 10 mg daily with ongoing taper plan; no questions about this. Appetite is good and weight is increasing. He is walking more each day and has a personal goal to do 1 flight of stairs (16 steps) 10x per day. Currently using 2 LPM at rest and 3 LPM with movement. Tolerating roflumilast. Using spiriva, advair, and albuterol and he feels this helps; has felt albuterol nebs make his breathing more difficult. Currently taking low dose lasix to hep with swollen feet and ankles. + some low grade allergy symptoms but very minimal and not taking anything; will continue to monitor.     Prior history:   He reports that he is currently doing well.  He has not had any illnesses since his last visit.  He continues to have a minimal cough with clear mucus production predominantly in the morning.  He is able to clear this well and is using the aerobic advice 3-5 times per day.  He did complete his course of Bactrim and noted that when he finished this he felt much better.  He now can sleep laying down flat which she was unable to do prior to his last visit.    He does do pursed lip breathing and he feels that this helps.  He denies significant wheezing.  He has noted that weather changes affect his breathing. He is very limited with his ability to do activities at home and notes that he gets winded just making a sandwich.  He is using Advair, Spiriva, albuterol about 3 times per day.  He has tried using the DuoNebs but feels that this does not help.    He denies GERD or allergies.  He continues to live with his father and he is not smoking.           Review of Systems:     A 13 point ROS was performed and was negative except as listed above in the HPI.  Weight is increasing.  No orthopnea/PND.           Past Medical and Surgical History:     Past Medical History:   Diagnosis Date    Acute on chronic respiratory  failure with hypoxia (H) 4/10/2020    Acute on chronic respiratory failure with hypoxia and hypercapnia (H) 2019    CAP (community acquired pneumonia) 2020    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)     COPD exacerbation (H) 2019    COPD exacerbation (H) 2020    Erectile dysfunction     Hypertension     Hyponatremia 2019    Pneumonia 4/10/2020     Past Surgical History:   Procedure Laterality Date    APPENDECTOMY      childhood    BRONCHOSCOPY, DILATE BRONCHUS, STENT BRONCHUS, COMBINED N/A 2024    Procedure: Bronchoscopy with tissue debulking,  and stent placement.;  Surgeon: Isac Prescott MD;  Location: [facility] OR    ENDOBRONCHIAL ULTRASOUND FLEXIBLE N/A 2024    Procedure: Endobronchial ultrasound with Endobronchial and Cryo biopsy.;  Surgeon: Isac Prescott MD;  Location: [facility] OR           Family History:     Family History   Problem Relation Age of Onset    Hypertension Mother     Ovarian Cancer Mother     Breast Cancer Mother     Hypertension Father     Lipids Father     C.A.D. Father     Heart Disease Father     Chronic Obstructive Pulmonary Disease Father     Cerebrovascular Disease Father     Diabetes Paternal Grandmother     Chronic Obstructive Pulmonary Disease Paternal Grandfather             Social History:     Social History     Socioeconomic History    Marital status: Single     Spouse name: Not on file    Number of children: Not on file    Years of education: Not on file    Highest education level: Not on file   Occupational History    Not on file   Tobacco Use    Smoking status: Former     Packs/day: 2.00     Years: 30.00     Additional pack years: 0.00     Total pack years: 60.00     Types: Cigarettes     Quit date: 2021     Years since quittin.6    Smokeless tobacco: Former   Vaping Use    Vaping Use: Never used   Substance and Sexual Activity    Alcohol use: Yes     Comment: rare    Drug use: No    Sexual activity: Yes     Partners:  Female   Other Topics Concern    Parent/sibling w/ CABG, MI or angioplasty before 65F 55M? No   Social History Narrative    The patient has a 60+ pk yr tobacco hx.  He has has no active use, quit Jan 2014.  Alcohol use is <1 alcoholic drinks per week.  He denies use of recreational drugs.  He is employed. [employer].  Works nights.   The patient is .  Has 1 child.Hot Tub Exposure: NORecent Travel: NO Hx of incarceration:  NOBird Exposure:   NOAnimal Exposure:  NOInhalation Exposure:  NO        Lives in [place], [place] with father. 2 dogs.          Social Determinants of Health     Financial Resource Strain: Low Risk  (1/9/2024)    Financial Resource Strain     Within the past 12 months, have you or your family members you live with been unable to get utilities (heat, electricity) when it was really needed?: No   Food Insecurity: Low Risk  (1/9/2024)    Food Insecurity     Within the past 12 months, did you worry that your food would run out before you got money to buy more?: No     Within the past 12 months, did the food you bought just not last and you didn t have money to get more?: No   Transportation Needs: Low Risk  (1/9/2024)    Transportation Needs     Within the past 12 months, has lack of transportation kept you from medical appointments, getting your medicines, non-medical meetings or appointments, work, or from getting things that you need?: No   Physical Activity: Inactive (8/30/2021)    Exercise Vital Sign     Days of Exercise per Week: 0 days     Minutes of Exercise per Session: 0 min   Stress: Not on file   Social Connections: Unknown (8/30/2021)    Social Connection and Isolation Panel [NHANES]     Frequency of Communication with Friends and Family: Twice a week     Frequency of Social Gatherings with Friends and Family: Twice a week     Attends Christianity Services: Never     Active Member of Clubs or Organizations: No     Attends Club or Organization Meetings: Never     Marital Status: Not on  file   Interpersonal Safety: Low Risk  (10/12/2023)    Interpersonal Safety     Do you feel physically and emotionally safe where you currently live?: Yes     Within the past 12 months, have you been hit, slapped, kicked or otherwise physically hurt by someone?: No     Within the past 12 months, have you been humiliated or emotionally abused in other ways by your partner or ex-partner?: No   Housing Stability: Low Risk  (1/9/2024)    Housing Stability     Do you have housing? : Yes     Are you worried about losing your housing?: No            Medications:     Current Outpatient Medications   Medication Sig Dispense Refill    albuterol (PROAIR HFA/PROVENTIL HFA/VENTOLIN HFA) 108 (90 Base) MCG/ACT inhaler Inhale 2 puffs into the lungs every 4 hours as needed for shortness of breath Hold on file until needed 54 g 3    amLODIPine (NORVASC) 10 MG tablet Take 1 tablet (10 mg) by mouth daily 90 tablet 3    atenolol (TENORMIN) 25 MG tablet Take 1 tablet (25 mg) by mouth daily 90 tablet 3    dexAMETHasone (DECADRON) 4 MG tablet Take 2 tablets (8 mg) by mouth daily Take for 3 days, starting the day after chemo. Take with food. 6 tablet 3    fluticasone-salmeterol (ADVAIR) 500-50 MCG/ACT inhaler Inhale 1 puff into the lungs every 12 hours 60 each 11    guaiFENesin (MUCINEX MAXIMUM STRENGTH) 1200 MG TB12 Take 1 tablet by mouth daily      lisinopril (ZESTRIL) 40 MG tablet Take 1 tablet (40 mg) by mouth daily 90 tablet 3    magnesium oxide (MAG-OX) 400 MG tablet Take 400 mg by mouth daily (with lunch) For leg cramps      ondansetron (ZOFRAN) 8 MG tablet Take 1 tablet (8 mg) by mouth every 8 hours as needed for nausea (vomiting) 30 tablet 2    prochlorperazine (COMPAZINE) 10 MG tablet Take 1 tablet (10 mg) by mouth every 6 hours as needed for nausea or vomiting 30 tablet 2    roflumilast (DALIRESP) 500 MCG TABS tablet Take 1 tablet (500 mcg) by mouth daily 90 tablet 3    sodium chloride 0.9 % neb solution Take 3 mLs by  "nebulization 2 times daily 180 mL 3    tiotropium (SPIRIVA RESPIMAT) 2.5 MCG/ACT inhaler Inhale 2 puffs into the lungs daily 12 g 3    methylPREDNISolone (MEDROL DOSEPAK) 4 MG tablet therapy pack Follow Package Directions starting 1/9/24 (Patient not taking: Reported on 2/15/2024) 21 tablet 0    order for DME Equipment being ordered: Oxygen 3L via NC continuous.  O2 saturated noted to be 86% on room air at rest. 1 Units 0    order for DME Equipment being ordered: Nebulizer 1 Device 0    predniSONE (DELTASONE) 20 MG tablet Take 2 tablets (40 mg) by mouth daily (Patient not taking: Reported on 4/4/2024) 10 tablet 0     No current facility-administered medications for this visit.            Physical Exam:   /69 (BP Location: Right arm, Patient Position: Sitting, Cuff Size: Adult Small)   Pulse 101   Temp 99.2  F (37.3  C) (Tympanic)   Ht 1.6 m (5' 3\")   Wt 49.1 kg (108 lb 3.2 oz)   SpO2 97%   BMI 19.17 kg/m      Constitutional:   Awake, alert and in no apparent distress, pleasant male, on supplemental oxygen, thin male     Eyes:   nonicteric     HEENT:   Supple without supraclavicular or cervical lymphadenopathy     Lungs:   Reduced air flow b/l and in bases.  Prolonged expiratory phase.  No crackles. No rhonchi.  No wheezes. Speaking in full sentences.      Cardiovascular:   Normal S1 and S2.  RRR.  No murmur, gallop or rub.     Musculoskeletal:   No edema, digital clubbing present     Neurologic:   Alert and conversant.     Skin:   Warm, dry.  No rash on limited exam.  No lower extremity edema.             Data:   All laboratory and imaging data reviewed personally.      Specimen Description   Date Value Ref Range Status   04/13/2020 Urine  Final   04/10/2020 Sputum  Final   04/10/2020 Sputum  Final    Culture Micro   Date Value Ref Range Status   04/10/2020 Moderate growth  Normal latrice    Final   04/10/2020 Moderate growth  Escherichia coli   (A)  Final   04/10/2020 Moderate growth  Klebsiella " pneumoniae   (A)  Final        No results found for this or any previous visit (from the past 168 hour(s)).    PFT: Very severe obstructive lung disease. Air trapping and hyperinflation are noted. No significant bronchodilator response is noted. Diffusion capacity is severely reduced. Very reduced compared to prior studies noted in Dr. Wagner's note in 2016.     CT chest - 8/24/21 - personally reviewed: panlobular emphysema predominantly in the upper lobes bilaterally. No infiltrates. Some evidence of mucous impaction on the RLL. Small nodules as detailed below.     IMPRESSION:  1.  No evidence for pulmonary embolism.  2.  New finding of prominent mucus impaction leading to the right  lower lobe bronchus and small airways of the right lower lobe. This is  consistent with aspiration.  3.  New curvilinear opacity along the anterior left upper lobe  midchest could just be focal atelectasis but acute airspace disease  including pneumonia not excluded. New indeterminant pulmonary nodule  at the left lower lobe. Recommend follow-up CT chest in 6 months.  There are other stable nodules.  4.  Coronary artery calcifications.  5.  Emphysema.    CTPE - 4/24/22 -   Personally reviewed: apical predominant emphysema. Secretions/debris in the bilateral mainstem bronchi. New 9 mm spiculated nodule L major fissure.   IMPRESSION:   1. Exam is negative for acute pulmonary embolism.        Evidence for right heart strain or increased right heart pressures?   is not present.      2. New 9 mm spiculated solid nodule along the left major fissure,  consider follow-up low-dose chest CT in 3 months, PET/CT, or tissue  sampling per Fleischner's Society guidelines.     3. Debris/retained secretions within the bilateral mainstem bronchi,  and extending into the right lower lobe bronchi, concerning for  recurrent aspiration.     CT chest- 7/6/2022-personally reviewed and I agree with the radiologist read of:  IMPRESSION:   1.  Interval decrease  in size of previously seen new 9 mm spiculated  pulmonary nodule along the left major fissure, now measuring 3 mm.  2.  New scarlike nodule with a mean diameter of 5 mm in the lateral  left upper lobe.  3.  Unchanged 4 mm right mid lung pulmonary nodule.  4.  Severe pulmonary emphysema and bilateral scarring and/or  atelectasis.  5.  No new or progressive airspace consolidation or pleural fluid.  6.  Indeterminate enlarged right hilar lymph node, new to comparison.    CT chest w/ contrast - 3/9/23 - personally reviewed: CT CHEST WITH CONTRAST  3/9/2023 9:40 AM     CLINICAL HISTORY: Severe emphysema, evaluate nodules for change.  Evaluate right lower lobe bronchus filling defect. Pulmonary nodules.  Panlobular emphysema (H).     TECHNIQUE: CT chest with IV contrast. Multiplanar reformats were  obtained. Dose reduction techniques were used.     CONTRAST: 58 mL Isovue 370     COMPARISON: 1/20/2023, 7/6/2022, 4/24/2022     FINDINGS:   LUNGS AND PLEURA: Advanced emphysematous changes of the lungs are  present. Thin bandlike pleuroparenchymal scarring in the anterior left  upper lobe is stable. Similar-appearing atelectasis and/or scarring in  the inferomedial right middle lobe is unchanged. No airspace  consolidation or pleural fluid. Bronchial wall thickening and  endobronchial debris involving the right mainstem bronchus and  extending to the bronchus intermedius as well as segmental and  subsegmental branches to the right lower lobe are noted.     Benign densely calcified clustered pulmonary granulomas are seen in  the superior segment of the right lower lobe. Previously described 6  mm scarlike nodule in the right upper lobe is similar to comparison  measuring 5 mm (4-79). Previously described linear 7 mm pulmonary  nodule in the posterolateral right upper lobe now measures 6 mm  (4-110). A previously seen 4 mm noncalcified pulmonary nodule in the  lateral right mid lung is unchanged measuring 4 mm (4-152).  A  suspected fissural lymph node in the lateral right midlung is stable  as well (4-193). No new or enlarging pulmonary nodules.      MEDIASTINUM/AXILLAE: There is soft tissue density in the subcarinal  region and right hilum, which are thought to represent mildly enlarged  lymph nodes. Subcarinal lymph node measures 16 mm in short axis  (2-33). Right hilar lymph node measures 10 mm in short axis. Heart  size is normal. No pericardial effusion. Mild to moderate coronary  artery atherosclerosis is present. Thoracic aorta is normal in course  and caliber. Mild thoracic aortic atherosclerosis is present.     UPPER ABDOMEN: Bilateral renal cortical thinning and scarring.  Nonobstructing 2-3 mm left lower pole intrarenal calculi. Tiny  suspected medial left upper pole renal cortical cyst. No follow-up is  necessary. No hydronephrosis.     MUSCULOSKELETAL: Mild degenerative changes of the spine. No acute  osseous abnormality. Chronic-appearing rib fractures.                                                                      IMPRESSION:   1.  Bronchial wall thickening and endobronchial debris involving the  right mainstem bronchus, bronchus intermedius, and segmental and  subsegmental branches to the right lower lobe. Consider further  evaluation with bronchoscopy if clinically indicated.  2.  Nonspecific mildly enlarged subcarinal and right hilar lymph  nodes.  3.  Severe pulmonary emphysema.  4.  Unchanged subcentimeter pulmonary nodules. No new or enlarging  pulmonary nodules.  5.  Nonacute findings as above.    CT chest - 9/14 /23 - personally reviewed and I agree with the radiologist's read of: FINDINGS:   LUNGS AND PLEURA: Stable 4 mm nodule in the lateral aspect of the  right upper lobe on image 173 of series 5.  There is a new spiculated nodule in the base of the right upper lobe  along the right minor fissure measuring 16 x 13 mm on image 187 of  series 5. This is very flat along the minor fissure on the  coronal  view, image 23 of series 7. This may represent atelectasis or  scarring. Short-term follow-up or PET scan is recommended.     Stable irregular bronchial wall thickening with some likely luminal  mucus plugging involving the bronchus intermedius and right lower lobe  bronchi. Stable moderate background emphysema. No infiltrate or  effusion.  Nodules in the right upper lobe have resolved.     MEDIASTINUM/AXILLAE: Abnormal soft tissue mass in the subcarinal  region has increased since the previous exam and likely represents  adenopathy. It now measures 18 mm in short axis on image 82 compared  to 15 mm on the previous exam. Borderline enlarged right hilar lymph  node is seen on image 85 measuring 10 mm. Trace pericardial effusion  is present. No axillary adenopathy. The aorta is normal in caliber.     CORONARY ARTERY CALCIFICATION: Severe.     UPPER ABDOMEN: No significant finding.     MUSCULOSKELETAL: Mild degenerative changes are noted throughout the  spine.                                                                      IMPRESSION:   1.  New spiculated appearing nodular opacity along the right minor  fissure is very thin on coronal views and likely represents some  atelectasis or an inflammatory process. Short-term follow-up in one to  two months or PET scan would be recommended.  2.  Stable irregular and lobulated bronchial wall thickening with  intraluminal likely mucous plugging involving the bronchus intermedius  and right lower lobe bronchi is unchanged from the previous exam.  Bronchoscopy should be considered if it has not been performed  already.  3.  Interval increase in size of likely adenopathy in the subcarinal  region. Stable borderline enlarged right hilar lymph node.  4.  Stable background emphysema.  5.  Stable 4 mm right lower lobe nodule. Other nodules in the right  upper lung have resolved. If the patient is high risk, follow-up would  be recommended in one year.

## 2024-04-04 NOTE — NURSING NOTE
"Initial /69 (BP Location: Right arm, Patient Position: Sitting, Cuff Size: Adult Small)   Pulse 101   Temp 99.2  F (37.3  C) (Tympanic)   Ht 1.6 m (5' 3\")   Wt 49.1 kg (108 lb 3.2 oz)   SpO2 97%   BMI 19.17 kg/m   Estimated body mass index is 19.17 kg/m  as calculated from the following:    Height as of this encounter: 1.6 m (5' 3\").    Weight as of this encounter: 49.1 kg (108 lb 3.2 oz). .  Flores Haji MA    "

## 2024-04-08 NOTE — PATIENT INSTRUCTIONS
Luis,    It was nice seeing you!  I am glad to hear you are overall doing well.     Recommendations as follows:   - continue current therapies  - prednisone burst with taper down to 10 mg daily   - I have ordered a SW consult to look into additional resources to help obtain supplements given your weight loss   - you are due for a RSV vaccine but the timing of this with chemotherapy may be tricky; I will reach out to Dr. Lantigua   - Please call the pulmonary clinic with any questions. The pulmonary clinic number is: [phone].    Stay up to date with vaccinations including your yearly flu vaccine. Wash your hands regularly. Consider wearing a mask in crowded places to reduce the risk of respiratory illnesses. I recommend that you receive the COVID-19 vaccine and stay up to date with boosters.     Have a nice spring and stay well! Please let me know if you have questions or concerns.     Yamilet Cuellar MD  Pulmonary, Allergy, Critical Care, and Sleep Medicine   [facility]

## 2024-04-10 DIAGNOSIS — J43.2 CENTRILOBULAR EMPHYSEMA (H): ICD-10-CM

## 2024-04-10 RX ORDER — ALBUTEROL SULFATE 90 UG/1
2 AEROSOL, METERED RESPIRATORY (INHALATION) EVERY 4 HOURS PRN
Qty: 54 G | Refills: 3 | Status: SHIPPED | OUTPATIENT
Start: 2024-04-10 | End: 2024-07-22

## 2024-04-10 NOTE — TELEPHONE ENCOUNTER
Albuterol Inhaler      Last Written Prescription Date:  3/20/23  Last Fill Quantity: 54 g,   # refills: 3  Last Office Visit: 4/4/24  Future Office visit: None

## 2024-04-11 ENCOUNTER — HOSPITAL ENCOUNTER (OUTPATIENT)
Dept: PET IMAGING | Facility: CLINIC | Age: 59
Discharge: HOME | End: 2024-04-11
Attending: INTERNAL MEDICINE | Admitting: INTERNAL MEDICINE
Payer: COMMERCIAL

## 2024-04-11 ENCOUNTER — PATIENT OUTREACH (OUTPATIENT)
Dept: CARE COORDINATION | Facility: CLINIC | Age: 59
End: 2024-04-11
Payer: COMMERCIAL

## 2024-04-11 DIAGNOSIS — Z71.89 COORDINATION OF COMPLEX CARE: Primary | ICD-10-CM

## 2024-04-11 DIAGNOSIS — C34.11 MALIGNANT NEOPLASM OF UPPER LOBE, RIGHT BRONCHUS OR LUNG (H): ICD-10-CM

## 2024-04-11 DIAGNOSIS — C34.91 SQUAMOUS CELL LUNG CANCER, RIGHT (H): ICD-10-CM

## 2024-04-11 PROCEDURE — 343N000001 HC RX 343: Performed by: INTERNAL MEDICINE

## 2024-04-11 PROCEDURE — A9552 F18 FDG: HCPCS | Performed by: INTERNAL MEDICINE

## 2024-04-11 PROCEDURE — 71260 CT THORAX DX C+: CPT

## 2024-04-11 PROCEDURE — 250N000011 HC RX IP 250 OP 636: Performed by: INTERNAL MEDICINE

## 2024-04-11 PROCEDURE — 78815 PET IMAGE W/CT SKULL-THIGH: CPT | Mod: PS

## 2024-04-11 RX ORDER — IOPAMIDOL 755 MG/ML
10-135 INJECTION, SOLUTION INTRAVASCULAR ONCE
Status: COMPLETED | OUTPATIENT
Start: 2024-04-11 | End: 2024-04-11

## 2024-04-11 RX ADMIN — FLUDEOXYGLUCOSE F-18 10.1 MILLICURIE: 500 INJECTION, SOLUTION INTRAVENOUS at 08:55

## 2024-04-11 RX ADMIN — IOPAMIDOL 66 ML: 755 INJECTION, SOLUTION INTRAVENOUS at 08:56

## 2024-04-11 NOTE — PROGRESS NOTES
[facility] Outpatient Progress Note    Patient: Luis Cifuentes  MRN: [id number]  Date of Service: Apr 12, 2024          Reason for Visit    Lung cancer    Primary Oncologist: Formerly, Dr. Lantigua      Assessment/Plan  >Stage IIIB squamous cell lung cancer  Bilateral lung nodules, indeterminate  Chemo-induced anemia  Not a surgical nor definitive chemoradiation candidate given his severe COPD.  There are also some indeterminate lung nodules, either inflammatory or separate metastatic sites that need to be followed.      Luis has completed 2 cycles of palliative carboplatin, Taxol, pembrolizumab with Neulasta support.     He is tolerating treatment quite well with some fatigue, arthralgias and constipation.     Lab work: hgb 9.2, WBC 12.8, ANC 9.7, plat 650. CMP: Na 129 (stable), creatinine and LFTs WNL    PET scan shows near CR of the primary R perihilar mass and stable mildly avid thoracic nodes. 6 mm RUL nodule stable in size but slightly more avid. While the RADHA pneumonia consolidation has resolved, there are new patchy focal consolidations in RML and LLL which could represent infection/inflammation.    Presenting dyspnea improving.   He has mild cough, no fevers.   I do not think the PET changes are consistent with immunotherapy-induced pneumonitis, may be start of infection he is minimally symptomatic with. Clinically, stable to continue with chemo and will co-treat for possible pneumonia (see below).     Plan:  -Proceed with cycle 3, no dose adjustments.  -Claritin daily starting day 1 x 7 days for Taxol +/- Neulasta-induced arthralgias. Tylenol as needed.   -Return in 3 weeks to see Jennifer ahead of cycle 4 (will move to early in week for in-person visit)  -Repeat CT scan after cycle 4.  See Dr. Friedell at that time.  If still responding, may complete up to 6 cycles of the triplet-regimen if tolerating before transitioning to maintenance pembrolizumab alone    Severe COPD, chronic  oxygen use  Bilateral pneumonia L>R  Minimal symptoms of dry to slightly productive cough. No dyspnea, no hypoxia. Mild leukocytosis could be related to infection or Neulasta.     Given his severe COPD and on chemo, will treat for infection.     Plan:  -Augmentin Rx sent.   -Start Prednisone taper (has standing prednisone at home: Take 40 mg (2 tabs) daily for 5 days then taper as follow: THEN start 30 mg daily for 3 days followed by 20 mg daily for 3 days then 10 mg for 3 days)  -Reviewed he should be re-evaluated with progressive symptoms  ______________________________________________________________________________    History of Present Illness/ Interval History    Mr. Luis Hernandez  is a 58 year old with at least locally advanced non-operable lung cancer. He's completed 2 cycles of palliative carbo/Taxol/pembrolizumab (with Neulasta) and returns with PET scan ahead of C3.    Tolerating pretty well in his opinion.   Has some arthralgias x 1 week, then resolves. Manages with Tylenol with minimal relief.    Constipated and early satiety the first week. Took Miralax or mag citrate as needed with results. Now, resolved.   No nausea.   No neuropathy.  Dyspnea is improving, mild dry cough with mild yellow mucus production, no chest pain.   No fevers.  No new sites of pain.       ECOG Performance    1      Oncology History/Treatment  Diagnosis/Stage:   1/2024: Stage IIIB (cT4-cN2-cM0) squamous cell cancer (possible/indeterminate bilateral lung involvement, stage IV). Not candidate for definitive RT/surgery.  -Hx severe COPD (FEV1 18%), tobacco use (quit 2021)  -Followed in Pulmonary with CT for waxing/waning bilateral lung nodules  -9/2023 chest CT: new spiculated RUL nodule + persistent filling defect of bronchus intermedius/RLL bronchus concerning for possible endobronchial tumor vs mucus plugging; progressive subcarinal soft tissue mass  -PET: avid right  hilar mass encasing RML bronchus contiguous with subcarinal  node/mass, intraluminal soft tissue density RLL bronchus concerning for primary tumor; avid bilateral opacities indeterminate but favored as inflammatory. Incidentally, distal sigmoid/rectal avid soft tissue thickening noted.   -10/2023 Cologuard negative  -Treated with empiric antibiotics for pneumonia. Follow-up CT: RADHA improved; new nodules through bilateral lungs (up to 7 mm) and persistent right lung mass/bronchial findings.   -1/18/2024 flex + rigid bronch with tumor debulking and intermedius bronchial stent placement, with RUL, R mainstem bronchus and bronchus intermedius biopsy: mod-diff squamous cell cancer. PDL1 CPS 10-20% and TPS 5%.   -Lung NGS panel: CDKN2A and PIK3CA alterations   -Braiin MRI negative    Treatment:  2/29/2024 - present: palliative carbo, Taxol, pembrolizumab + Neulasta support      Physical Exam    GENERAL: Alert and oriented to time place and person. Seated comfortably. In no distress. Alone.   HEAD: Atraumatic and normocephalic. No alopecia.  CHEST: Regular respiratory effort. Oxygen via NC on.   NEURO: No gross deficit noted. Non-antalgic gait.      Lab Results    Recent Results (from the past 168 hour(s))   Comprehensive metabolic panel   Result Value Ref Range    Sodium 129 (L) 135 - 145 mmol/L    Potassium 4.6 3.4 - 5.3 mmol/L    Carbon Dioxide (CO2) 27 22 - 29 mmol/L    Anion Gap 11 7 - 15 mmol/L    Urea Nitrogen 9.7 6.0 - 20.0 mg/dL    Creatinine 0.61 (L) 0.67 - 1.17 mg/dL    GFR Estimate >90 >60 mL/min/1.73m2    Calcium 9.3 8.6 - 10.0 mg/dL    Chloride 91 (L) 98 - 107 mmol/L    Glucose 133 (H) 70 - 99 mg/dL    Alkaline Phosphatase 86 40 - 150 U/L    AST 15 0 - 45 U/L    ALT 34 0 - 70 U/L    Protein Total 6.9 6.4 - 8.3 g/dL    Albumin 3.7 3.5 - 5.2 g/dL    Bilirubin Total 0.2 <=1.2 mg/dL   TSH with free T4 reflex   Result Value Ref Range    TSH 0.44 0.30 - 4.20 uIU/mL   CBC with platelets and differential   Result Value Ref Range    WBC Count 12.8 (H) 4.0 - 11.0 10e3/uL    RBC  Count 3.06 (L) 4.40 - 5.90 10e6/uL    Hemoglobin 9.2 (L) 13.3 - 17.7 g/dL    Hematocrit 28.5 (L) 40.0 - 53.0 %    MCV 93 78 - 100 fL    MCH 30.1 26.5 - 33.0 pg    MCHC 32.3 31.5 - 36.5 g/dL    RDW 15.7 (H) 10.0 - 15.0 %    Platelet Count 650 (H) 150 - 450 10e3/uL    NRBCs per 100 WBC 0 <1 /100    Absolute NRBCs 0.0 10e3/uL   Manual Differential   Result Value Ref Range    % Neutrophils 76 %    % Lymphocytes 6 %    % Monocytes 17 %    % Eosinophils 1 %    % Basophils 0 %    Absolute Neutrophils 9.7 (H) 1.6 - 8.3 10e3/uL    Absolute Lymphocytes 0.8 0.8 - 5.3 10e3/uL    Absolute Monocytes 2.2 (H) 0.0 - 1.3 10e3/uL    Absolute Eosinophils 0.1 0.0 - 0.7 10e3/uL    Absolute Basophils 0.0 0.0 - 0.2 10e3/uL    RBC Morphology Confirmed RBC Indices     Platelet Assessment  Automated Count Confirmed. Platelet morphology is normal.     Automated Count Confirmed. Platelet morphology is normal.       Imaging    PET Oncology (Eyes to Thighs)    Result Date: 4/11/2024  EXAM: PET ONCOLOGY (EYES TO THIGHS), CT CHEST W CONTRAST LOCATION: [facility] DATE: 4/11/2024 INDICATION: Subsequent treatment strategy for restaging metastatic non-small cell lung cancer, malignant neoplasm of upper lobe, right bronchus or lung COMPARISON: PET/CT from 11/09/2023 CONTRAST:  66 mL Isovue-370 TECHNIQUE: Serum glucose level 101  mg/dL. One hour post intravenous administration of 10.1  mCi F-18 FDG, PET imaging was performed from the skull vertex to mid thighs, utilizing attenuation correction with concurrent axial CT and PET/CT image fusion. Separate diagnostic CT of the chest was performed. Dose reduction techniques were used. PET/CT FINDINGS:  Patches of FDG avid airspace consolidation have developed in the right middle and left lower lobes. Consolidation previously present in the right lower lobe has matured into a band of scarring with uniform mild inflammatory FDG activity. Consolidation previously present in the left  upper lobe has resolved. 0.6 x 0.4 cm right upper lobe pulmonary nodule has increased from a SUVmax 2.7 to 3.9. Perihilar right pulmonary mass previously present has nearly completely resolved, with only a 0.8 x 0.4 cm mildly FDG avid (SUVmax 3.0) remnant persisting. Stable minimally FDG avid left interlobar station 11L and left hilar station 10L lymph nodes. A representative station 10L node measures 1.3 x 0.6 cm (SUVmax 2.3), previously 1.2 x 0.6 cm (SUVmax 2.6). CT FINDINGS: Severe emphysema. Calcified granuloma right upper lobe. Bilateral lens replacements. Moderate calcified atherosclerosis, including three-vessel coronary disease. Bilateral kidney cysts, requiring no follow-up. 1 cm non-FDG avid fluid collection in the peripheral zone of the prostate just right of midline. Scattered colonic diverticula. Small fat-containing paraumbilical hernia. Mild degenerative change in the spine.     IMPRESSION: 1.  Perihilar right lung tumor has nearly completely resolved. 2.  6 mm right upper lobe pulmonary nodule has increased in FDG activity slightly and is indeterminate. 3.  Areas of pneumonia previously present have resolved, but new areas of pneumonia have developed in both lungs. 4.  Stable minimally FDG avid left hilar lymph nodes are likely reactive to left lower lobe pneumonia, but inflammation from immunotherapy or metastatic involvement can have the same appearance on PET/CT.     CT Chest w Contrast    Result Date: 4/11/2024  EXAM: PET ONCOLOGY (EYES TO THIGHS), CT CHEST W CONTRAST LOCATION: [facility] DATE: 4/11/2024 INDICATION: Subsequent treatment strategy for restaging metastatic non-small cell lung cancer, malignant neoplasm of upper lobe, right bronchus or lung COMPARISON: PET/CT from 11/09/2023 CONTRAST:  66 mL Isovue-370 TECHNIQUE: Serum glucose level 101  mg/dL. One hour post intravenous administration of 10.1  mCi F-18 FDG, PET imaging was performed from the skull vertex  to mid thighs, utilizing attenuation correction with concurrent axial CT and PET/CT image fusion. Separate diagnostic CT of the chest was performed. Dose reduction techniques were used. PET/CT FINDINGS:  Patches of FDG avid airspace consolidation have developed in the right middle and left lower lobes. Consolidation previously present in the right lower lobe has matured into a band of scarring with uniform mild inflammatory FDG activity. Consolidation previously present in the left upper lobe has resolved. 0.6 x 0.4 cm right upper lobe pulmonary nodule has increased from a SUVmax 2.7 to 3.9. Perihilar right pulmonary mass previously present has nearly completely resolved, with only a 0.8 x 0.4 cm mildly FDG avid (SUVmax 3.0) remnant persisting. Stable minimally FDG avid left interlobar station 11L and left hilar station 10L lymph nodes. A representative station 10L node measures 1.3 x 0.6 cm (SUVmax 2.3), previously 1.2 x 0.6 cm (SUVmax 2.6). CT FINDINGS: Severe emphysema. Calcified granuloma right upper lobe. Bilateral lens replacements. Moderate calcified atherosclerosis, including three-vessel coronary disease. Bilateral kidney cysts, requiring no follow-up. 1 cm non-FDG avid fluid collection in the peripheral zone of the prostate just right of midline. Scattered colonic diverticula. Small fat-containing paraumbilical hernia. Mild degenerative change in the spine.     IMPRESSION: 1.  Perihilar right lung tumor has nearly completely resolved. 2.  6 mm right upper lobe pulmonary nodule has increased in FDG activity slightly and is indeterminate. 3.  Areas of pneumonia previously present have resolved, but new areas of pneumonia have developed in both lungs. 4.  Stable minimally FDG avid left hilar lymph nodes are likely reactive to left lower lobe pneumonia, but inflammation from immunotherapy or metastatic involvement can have the same appearance on PET/CT.      Billing  Total time 45 minutes, to include face to  face visit, review of EMR, ordering, documentation and coordination of care on date of service   complexity modifier for longitudinal care.     Signed by: Jennifer Cabrera, NP

## 2024-04-11 NOTE — PROGRESS NOTES
Social Work - Intervention  [facility]  Data/Intervention:    Patient Name: Luis Hernandez Goes By: Luis    /Age: 1965 (58 year old)     Visit Type: telephone  Referral Source: Dr. Lantigua  Reason for Referral: Ensure Support     Psychosocial Information/Concerns:  SW called and spoke with Luis who endorsed that he utilizes Ensure to supplement intake and that it has been costly (6 pack is $12, currently taking 1 Ensure a day). SW encouraged outreaching to health insurance plan to explore if there is a benefit available.     Luis updated this clinician that he has a benefit through his plan where he gets $50/month to spend on any number of items in a catalog, of which Ensure is one. Luis reports that with this benefit, he would find Ensure more affordable. Denies further need.     Oriented Luis to SW support available. Luis appreciative of outreach.      Assessment/Plan:  Onc SW will continue to be available as needed for psychosocial support. Sandra sent with social work contact information.      Provided patient/family with contact information and availability.    Oanh Sumner, MSW, LICSW, OSW-C  Clinical - Adult Oncology  She/Her/Hers  Phone: [phone]  [facility]: M, Thu  *every other Tue, 8am-4:30pm  [facility]: W, F, *every other Tue, 8am-4:30pm

## 2024-04-12 ENCOUNTER — APPOINTMENT (OUTPATIENT)
Dept: LAB | Facility: CLINIC | Age: 59
End: 2024-04-12
Attending: NURSE PRACTITIONER
Payer: COMMERCIAL

## 2024-04-12 ENCOUNTER — INFUSION THERAPY VISIT (OUTPATIENT)
Dept: INFUSION THERAPY | Facility: CLINIC | Age: 59
End: 2024-04-12
Attending: NURSE PRACTITIONER
Payer: COMMERCIAL

## 2024-04-12 ENCOUNTER — VIRTUAL VISIT (OUTPATIENT)
Dept: ONCOLOGY | Facility: HOSPITAL | Age: 59
End: 2024-04-12
Attending: NURSE PRACTITIONER
Payer: COMMERCIAL

## 2024-04-12 VITALS
TEMPERATURE: 98.4 F | SYSTOLIC BLOOD PRESSURE: 110 MMHG | BODY MASS INDEX: 19.59 KG/M2 | DIASTOLIC BLOOD PRESSURE: 63 MMHG | OXYGEN SATURATION: 95 % | WEIGHT: 110.6 LBS | HEART RATE: 98 BPM

## 2024-04-12 VITALS — DIASTOLIC BLOOD PRESSURE: 80 MMHG | SYSTOLIC BLOOD PRESSURE: 125 MMHG

## 2024-04-12 DIAGNOSIS — C34.91 SQUAMOUS CELL LUNG CANCER, RIGHT (H): Primary | ICD-10-CM

## 2024-04-12 DIAGNOSIS — Z51.11 ENCOUNTER FOR ANTINEOPLASTIC CHEMOTHERAPY: ICD-10-CM

## 2024-04-12 DIAGNOSIS — C77.1: ICD-10-CM

## 2024-04-12 DIAGNOSIS — C44.92: ICD-10-CM

## 2024-04-12 DIAGNOSIS — J18.9 PNEUMONIA OF LEFT LUNG DUE TO INFECTIOUS ORGANISM, UNSPECIFIED PART OF LUNG: ICD-10-CM

## 2024-04-12 DIAGNOSIS — C34.91 SQUAMOUS CELL LUNG CANCER, RIGHT (H): ICD-10-CM

## 2024-04-12 LAB
ALBUMIN SERPL BCG-MCNC: 3.7 G/DL (ref 3.5–5.2)
ALP SERPL-CCNC: 86 U/L (ref 40–150)
ALT SERPL W P-5'-P-CCNC: 34 U/L (ref 0–70)
ANION GAP SERPL CALCULATED.3IONS-SCNC: 11 MMOL/L (ref 7–15)
AST SERPL W P-5'-P-CCNC: 15 U/L (ref 0–45)
BASOPHILS # BLD MANUAL: 0 10E3/UL (ref 0–0.2)
BASOPHILS NFR BLD MANUAL: 0 %
BILIRUB SERPL-MCNC: 0.2 MG/DL
BUN SERPL-MCNC: 9.7 MG/DL (ref 6–20)
CALCIUM SERPL-MCNC: 9.3 MG/DL (ref 8.6–10)
CHLORIDE SERPL-SCNC: 91 MMOL/L (ref 98–107)
CREAT SERPL-MCNC: 0.61 MG/DL (ref 0.67–1.17)
DEPRECATED HCO3 PLAS-SCNC: 27 MMOL/L (ref 22–29)
EGFRCR SERPLBLD CKD-EPI 2021: >90 ML/MIN/1.73M2
EOSINOPHIL # BLD MANUAL: 0.1 10E3/UL (ref 0–0.7)
EOSINOPHIL NFR BLD MANUAL: 1 %
ERYTHROCYTE [DISTWIDTH] IN BLOOD BY AUTOMATED COUNT: 15.7 % (ref 10–15)
GLUCOSE SERPL-MCNC: 133 MG/DL (ref 70–99)
HCT VFR BLD AUTO: 28.5 % (ref 40–53)
HGB BLD-MCNC: 9.2 G/DL (ref 13.3–17.7)
LYMPHOCYTES # BLD MANUAL: 0.8 10E3/UL (ref 0.8–5.3)
LYMPHOCYTES NFR BLD MANUAL: 6 %
MCH RBC QN AUTO: 30.1 PG (ref 26.5–33)
MCHC RBC AUTO-ENTMCNC: 32.3 G/DL (ref 31.5–36.5)
MCV RBC AUTO: 93 FL (ref 78–100)
MONOCYTES # BLD MANUAL: 2.2 10E3/UL (ref 0–1.3)
MONOCYTES NFR BLD MANUAL: 17 %
NEUTROPHILS # BLD MANUAL: 9.7 10E3/UL (ref 1.6–8.3)
NEUTROPHILS NFR BLD MANUAL: 76 %
NRBC # BLD AUTO: 0 10E3/UL
NRBC BLD AUTO-RTO: 0 /100
PLAT MORPH BLD: ABNORMAL
PLATELET # BLD AUTO: 650 10E3/UL (ref 150–450)
POTASSIUM SERPL-SCNC: 4.6 MMOL/L (ref 3.4–5.3)
PROT SERPL-MCNC: 6.9 G/DL (ref 6.4–8.3)
RBC # BLD AUTO: 3.06 10E6/UL (ref 4.4–5.9)
RBC MORPH BLD: ABNORMAL
SODIUM SERPL-SCNC: 129 MMOL/L (ref 135–145)
TSH SERPL DL<=0.005 MIU/L-ACNC: 0.44 UIU/ML (ref 0.3–4.2)
WBC # BLD AUTO: 12.8 10E3/UL (ref 4–11)

## 2024-04-12 PROCEDURE — 96372 THER/PROPH/DIAG INJ SC/IM: CPT | Performed by: NURSE PRACTITIONER

## 2024-04-12 PROCEDURE — 250N000011 HC RX IP 250 OP 636: Performed by: NURSE PRACTITIONER

## 2024-04-12 PROCEDURE — 96417 CHEMO IV INFUS EACH ADDL SEQ: CPT

## 2024-04-12 PROCEDURE — 36415 COLL VENOUS BLD VENIPUNCTURE: CPT | Performed by: INTERNAL MEDICINE

## 2024-04-12 PROCEDURE — 96413 CHEMO IV INFUSION 1 HR: CPT

## 2024-04-12 PROCEDURE — 85025 COMPLETE CBC W/AUTO DIFF WBC: CPT | Performed by: INTERNAL MEDICINE

## 2024-04-12 PROCEDURE — 84443 ASSAY THYROID STIM HORMONE: CPT | Performed by: INTERNAL MEDICINE

## 2024-04-12 PROCEDURE — 96375 TX/PRO/DX INJ NEW DRUG ADDON: CPT

## 2024-04-12 PROCEDURE — G2211 COMPLEX E/M VISIT ADD ON: HCPCS | Mod: 95 | Performed by: NURSE PRACTITIONER

## 2024-04-12 PROCEDURE — 85007 BL SMEAR W/DIFF WBC COUNT: CPT | Performed by: INTERNAL MEDICINE

## 2024-04-12 PROCEDURE — 80053 COMPREHEN METABOLIC PANEL: CPT | Performed by: INTERNAL MEDICINE

## 2024-04-12 PROCEDURE — 96415 CHEMO IV INFUSION ADDL HR: CPT

## 2024-04-12 PROCEDURE — 96377 APPLICATON ON-BODY INJECTOR: CPT | Mod: XS

## 2024-04-12 PROCEDURE — 96367 TX/PROPH/DG ADDL SEQ IV INF: CPT

## 2024-04-12 PROCEDURE — 99215 OFFICE O/P EST HI 40 MIN: CPT | Mod: 95 | Performed by: NURSE PRACTITIONER

## 2024-04-12 PROCEDURE — 258N000003 HC RX IP 258 OP 636: Performed by: NURSE PRACTITIONER

## 2024-04-12 RX ORDER — HEPARIN SODIUM (PORCINE) LOCK FLUSH IV SOLN 100 UNIT/ML 100 UNIT/ML
5 SOLUTION INTRAVENOUS
Status: CANCELLED | OUTPATIENT
Start: 2024-04-12

## 2024-04-12 RX ORDER — MEPERIDINE HYDROCHLORIDE 50 MG/ML
25 INJECTION INTRAMUSCULAR; INTRAVENOUS; SUBCUTANEOUS EVERY 30 MIN PRN
Status: CANCELLED | OUTPATIENT
Start: 2024-04-12

## 2024-04-12 RX ORDER — METHYLPREDNISOLONE SODIUM SUCCINATE 125 MG/2ML
125 INJECTION, POWDER, LYOPHILIZED, FOR SOLUTION INTRAMUSCULAR; INTRAVENOUS
Status: CANCELLED
Start: 2024-04-12

## 2024-04-12 RX ORDER — DEXAMETHASONE 4 MG/1
8 TABLET ORAL DAILY
Qty: 6 TABLET | Refills: 3 | Status: ON HOLD | OUTPATIENT
Start: 2024-04-12 | End: 2024-05-01

## 2024-04-12 RX ORDER — EPINEPHRINE 1 MG/ML
0.3 INJECTION, SOLUTION INTRAMUSCULAR; SUBCUTANEOUS EVERY 5 MIN PRN
Status: CANCELLED | OUTPATIENT
Start: 2024-04-12

## 2024-04-12 RX ORDER — LORAZEPAM 2 MG/ML
0.5 INJECTION INTRAMUSCULAR EVERY 4 HOURS PRN
Status: CANCELLED | OUTPATIENT
Start: 2024-04-12

## 2024-04-12 RX ORDER — HEPARIN SODIUM,PORCINE 10 UNIT/ML
5-20 VIAL (ML) INTRAVENOUS DAILY PRN
Status: CANCELLED | OUTPATIENT
Start: 2024-04-12

## 2024-04-12 RX ORDER — ALBUTEROL SULFATE 0.83 MG/ML
2.5 SOLUTION RESPIRATORY (INHALATION)
Status: CANCELLED | OUTPATIENT
Start: 2024-04-12

## 2024-04-12 RX ORDER — DIPHENHYDRAMINE HYDROCHLORIDE 50 MG/ML
50 INJECTION INTRAMUSCULAR; INTRAVENOUS
Status: CANCELLED
Start: 2024-04-12

## 2024-04-12 RX ORDER — DIPHENHYDRAMINE HCL 50 MG
50 CAPSULE ORAL ONCE
Status: CANCELLED
Start: 2024-04-12

## 2024-04-12 RX ORDER — PALONOSETRON 0.05 MG/ML
0.25 INJECTION, SOLUTION INTRAVENOUS ONCE
Status: COMPLETED | OUTPATIENT
Start: 2024-04-12 | End: 2024-04-12

## 2024-04-12 RX ORDER — PALONOSETRON 0.05 MG/ML
0.25 INJECTION, SOLUTION INTRAVENOUS ONCE
Status: CANCELLED | OUTPATIENT
Start: 2024-04-12

## 2024-04-12 RX ORDER — ALBUTEROL SULFATE 90 UG/1
1-2 AEROSOL, METERED RESPIRATORY (INHALATION)
Status: CANCELLED
Start: 2024-04-12

## 2024-04-12 RX ADMIN — PACLITAXEL 304 MG: 6 INJECTION, SOLUTION INTRAVENOUS at 10:27

## 2024-04-12 RX ADMIN — SODIUM CHLORIDE 250 ML: 9 INJECTION, SOLUTION INTRAVENOUS at 09:16

## 2024-04-12 RX ADMIN — PALONOSETRON 0.25 MG: 0.05 INJECTION, SOLUTION INTRAVENOUS at 09:18

## 2024-04-12 RX ADMIN — FAMOTIDINE 20 MG: 10 INJECTION, SOLUTION INTRAVENOUS at 09:18

## 2024-04-12 RX ADMIN — DEXAMETHASONE SODIUM PHOSPHATE: 10 INJECTION, SOLUTION INTRAMUSCULAR; INTRAVENOUS at 09:29

## 2024-04-12 RX ADMIN — SODIUM CHLORIDE 200 MG: 9 INJECTION, SOLUTION INTRAVENOUS at 09:53

## 2024-04-12 RX ADMIN — PEGFILGRASTIM 6 MG: KIT SUBCUTANEOUS at 14:02

## 2024-04-12 RX ADMIN — DIPHENHYDRAMINE HYDROCHLORIDE 50 MG: 50 INJECTION, SOLUTION INTRAMUSCULAR; INTRAVENOUS at 09:16

## 2024-04-12 RX ADMIN — CARBOPLATIN 600 MG: 10 INJECTION, SOLUTION INTRAVENOUS at 13:37

## 2024-04-12 ASSESSMENT — PAIN SCALES - GENERAL: PAINLEVEL: NO PAIN (0)

## 2024-04-12 NOTE — PROGRESS NOTES
Virtual Visit Details    Type of service:  Video Visit   Video Start Time: 8:16 AM  Video End Time: 8:47 AM    Originating Location (pt. Location): Other in clinic    Distant Location (provider location):  [facility]  [facility]    Patient here today prior to cycle 3 chemo. Does have some joint aches for several days after chemo and constipation for several days. Discussed taking senna for constipation and using claritin for the joint pain. Denies nausea. No other concerns at this time.  Rosa Maria Quick RN on 4/12/2024 at 8:20 AM

## 2024-04-12 NOTE — PROGRESS NOTES
Infusion Nursing Note:  Luis Hernandez presents today for Keytruda, Carboplatin, Taxol, and Neulasta OnPro  Patient seen by provider today: Yes: NP Jennifer Cabrera therefore assessment deferred   present during visit today: Not Applicable.    Note: N/A.      Intravenous Access:  Peripheral IV placed.    Treatment Conditions:  Lab Results   Component Value Date    HGB 9.2 (L) 04/12/2024    WBC 12.8 (H) 04/12/2024    ANEU 9.7 (H) 04/12/2024    ANEUTAUTO 5.2 03/21/2024     (H) 04/12/2024        Lab Results   Component Value Date     (L) 04/12/2024    POTASSIUM 4.6 04/12/2024    MAG 2.2 04/24/2022    CR 0.61 (L) 04/12/2024    MIKE 9.3 04/12/2024    BILITOTAL 0.2 04/12/2024    ALBUMIN 3.7 04/12/2024    ALT 34 04/12/2024    AST 15 04/12/2024       Results reviewed, labs MET treatment parameters, ok to proceed with treatment.      Post Infusion Assessment:  Patient tolerated infusion without incident.  Blood return noted pre and post infusion.  Site patent and intact, free from redness, edema or discomfort.  No evidence of extravasations.  Access discontinued per protocol.     ONPRO  Was placed on patient's: back of left arm.    Was placed at 2:00 PM    Patient education included: what patient can expect after application, what colored lights mean on the device, when to remove device, when and where to call with questions or issues, all patients questions answered, and that Neulasta administration will occur at 5:00pm 4/13/24.    Patient tolerated administration well.      Discharge Plan:   Copy of AVS reviewed with patient and/or family.  Patient will return 5/3/24 for next appointment.  Patient discharged in stable condition accompanied by: self.  Departure Mode: Ambulatory.      MONI DIANA RN

## 2024-04-23 ENCOUNTER — APPOINTMENT (OUTPATIENT)
Dept: CARDIOLOGY | Facility: CLINIC | Age: 59
DRG: 871 | End: 2024-04-23
Payer: COMMERCIAL

## 2024-04-23 ENCOUNTER — HOSPITAL ENCOUNTER (INPATIENT)
Facility: CLINIC | Age: 59
LOS: 8 days | Discharge: HOME HEALTH | DRG: 871 | End: 2024-05-01
Attending: FAMILY MEDICINE | Admitting: FAMILY MEDICINE
Payer: COMMERCIAL

## 2024-04-23 ENCOUNTER — APPOINTMENT (OUTPATIENT)
Dept: GENERAL RADIOLOGY | Facility: CLINIC | Age: 59
DRG: 871 | End: 2024-04-23
Attending: EMERGENCY MEDICINE
Payer: COMMERCIAL

## 2024-04-23 DIAGNOSIS — J96.02 ACUTE RESPIRATORY FAILURE WITH HYPOXIA AND HYPERCAPNIA (H): Primary | ICD-10-CM

## 2024-04-23 DIAGNOSIS — J96.01 ACUTE RESPIRATORY FAILURE WITH HYPOXIA AND HYPERCAPNIA (H): Primary | ICD-10-CM

## 2024-04-23 DIAGNOSIS — J96.22 ACUTE ON CHRONIC RESPIRATORY FAILURE WITH HYPOXIA AND HYPERCAPNIA (H): ICD-10-CM

## 2024-04-23 DIAGNOSIS — Z78.9 FULL CODE STATUS: ICD-10-CM

## 2024-04-23 DIAGNOSIS — J44.1 COPD WITH ACUTE EXACERBATION (H): ICD-10-CM

## 2024-04-23 DIAGNOSIS — I10 ESSENTIAL HYPERTENSION, BENIGN: ICD-10-CM

## 2024-04-23 DIAGNOSIS — J96.21 ACUTE ON CHRONIC RESPIRATORY FAILURE WITH HYPOXIA AND HYPERCAPNIA (H): ICD-10-CM

## 2024-04-23 DIAGNOSIS — F41.8 OTHER SPECIFIED ANXIETY DISORDERS: ICD-10-CM

## 2024-04-23 DIAGNOSIS — J18.9 PNEUMONIA OF BOTH LOWER LOBES DUE TO INFECTIOUS ORGANISM: ICD-10-CM

## 2024-04-23 DIAGNOSIS — F41.1 GAD (GENERALIZED ANXIETY DISORDER): ICD-10-CM

## 2024-04-23 DIAGNOSIS — C34.91 SQUAMOUS CELL LUNG CANCER, RIGHT (H): ICD-10-CM

## 2024-04-23 DIAGNOSIS — R09.89 AIR HUNGER: ICD-10-CM

## 2024-04-23 LAB
ALBUMIN SERPL BCG-MCNC: 4.2 G/DL (ref 3.5–5.2)
ALP SERPL-CCNC: 205 U/L (ref 40–150)
ALT SERPL W P-5'-P-CCNC: 19 U/L (ref 0–70)
ANION GAP SERPL CALCULATED.3IONS-SCNC: 12 MMOL/L (ref 7–15)
AST SERPL W P-5'-P-CCNC: 16 U/L (ref 0–45)
BASE EXCESS BLDV CALC-SCNC: 0.6 MMOL/L (ref -3–3)
BASE EXCESS BLDV CALC-SCNC: 4.9 MMOL/L (ref -3–3)
BILIRUB SERPL-MCNC: <0.2 MG/DL
BUN SERPL-MCNC: 9.9 MG/DL (ref 6–20)
C PNEUM DNA SPEC QL NAA+PROBE: NOT DETECTED
CALCIUM SERPL-MCNC: 9.6 MG/DL (ref 8.6–10)
CHLORIDE SERPL-SCNC: 93 MMOL/L (ref 98–107)
CREAT SERPL-MCNC: 0.56 MG/DL (ref 0.67–1.17)
CRP SERPL-MCNC: 5.46 MG/L
D DIMER PPP FEU-MCNC: 0.3 UG/ML FEU (ref 0–0.5)
DEPRECATED HCO3 PLAS-SCNC: 26 MMOL/L (ref 22–29)
EGFRCR SERPLBLD CKD-EPI 2021: >90 ML/MIN/1.73M2
ERYTHROCYTE [DISTWIDTH] IN BLOOD BY AUTOMATED COUNT: 16.8 % (ref 10–15)
FLUAV H1 2009 PAND RNA SPEC QL NAA+PROBE: NOT DETECTED
FLUAV H1 RNA SPEC QL NAA+PROBE: NOT DETECTED
FLUAV H3 RNA SPEC QL NAA+PROBE: NOT DETECTED
FLUAV RNA SPEC QL NAA+PROBE: NOT DETECTED
FLUBV RNA SPEC QL NAA+PROBE: NOT DETECTED
GLUCOSE BLDC GLUCOMTR-MCNC: 112 MG/DL (ref 70–99)
GLUCOSE BLDC GLUCOMTR-MCNC: 130 MG/DL (ref 70–99)
GLUCOSE SERPL-MCNC: 100 MG/DL (ref 70–99)
HADV DNA SPEC QL NAA+PROBE: NOT DETECTED
HCO3 BLDV-SCNC: 26 MMOL/L (ref 21–28)
HCO3 BLDV-SCNC: 32 MMOL/L (ref 21–28)
HCOV PNL SPEC NAA+PROBE: NOT DETECTED
HCT VFR BLD AUTO: 31.5 % (ref 40–53)
HGB BLD-MCNC: 9.9 G/DL (ref 13.3–17.7)
HMPV RNA SPEC QL NAA+PROBE: NOT DETECTED
HOLD SPECIMEN: NORMAL
HOLD SPECIMEN: NORMAL
HPIV1 RNA SPEC QL NAA+PROBE: NOT DETECTED
HPIV2 RNA SPEC QL NAA+PROBE: NOT DETECTED
HPIV3 RNA SPEC QL NAA+PROBE: NOT DETECTED
HPIV4 RNA SPEC QL NAA+PROBE: NOT DETECTED
L PNEUMO1 AG UR QL IA: NEGATIVE
LACTATE SERPL-SCNC: 1.4 MMOL/L (ref 0.7–2)
LVEF ECHO: NORMAL
M PNEUMO DNA SPEC QL NAA+PROBE: NOT DETECTED
MCH RBC QN AUTO: 29.4 PG (ref 26.5–33)
MCHC RBC AUTO-ENTMCNC: 31.4 G/DL (ref 31.5–36.5)
MCV RBC AUTO: 94 FL (ref 78–100)
NRBC # BLD AUTO: 0 10E3/UL
NRBC BLD AUTO-RTO: 0 /100
O2/TOTAL GAS SETTING VFR VENT: 28 %
O2/TOTAL GAS SETTING VFR VENT: 60 %
OXYHGB MFR BLDV: 44 % (ref 70–75)
OXYHGB MFR BLDV: 94 % (ref 70–75)
PCO2 BLDV: 45 MM HG (ref 40–50)
PCO2 BLDV: 61 MM HG (ref 40–50)
PH BLDV: 7.33 [PH] (ref 7.32–7.43)
PH BLDV: 7.37 [PH] (ref 7.32–7.43)
PLATELET # BLD AUTO: 404 10E3/UL (ref 150–450)
PO2 BLDV: 27 MM HG (ref 25–47)
PO2 BLDV: 82 MM HG (ref 25–47)
POTASSIUM SERPL-SCNC: 4.6 MMOL/L (ref 3.4–5.3)
PROCALCITONIN SERPL IA-MCNC: 0.06 NG/ML
PROT SERPL-MCNC: 6.9 G/DL (ref 6.4–8.3)
RBC # BLD AUTO: 3.37 10E6/UL (ref 4.4–5.9)
RSV RNA SPEC QL NAA+PROBE: NOT DETECTED
RSV RNA SPEC QL NAA+PROBE: NOT DETECTED
RV+EV RNA SPEC QL NAA+PROBE: DETECTED
S PNEUM AG SPEC QL: NEGATIVE
SAO2 % BLDV: 45.3 % (ref 70–75)
SAO2 % BLDV: 96.4 % (ref 70–75)
SODIUM SERPL-SCNC: 131 MMOL/L (ref 135–145)
TROPONIN T SERPL HS-MCNC: 11 NG/L
TSH SERPL DL<=0.005 MIU/L-ACNC: 0.77 UIU/ML (ref 0.3–4.2)
WBC # BLD AUTO: 46.3 10E3/UL (ref 4–11)

## 2024-04-23 PROCEDURE — 83605 ASSAY OF LACTIC ACID: CPT

## 2024-04-23 PROCEDURE — 250N000013 HC RX MED GY IP 250 OP 250 PS 637

## 2024-04-23 PROCEDURE — 93005 ELECTROCARDIOGRAM TRACING: CPT | Performed by: FAMILY MEDICINE

## 2024-04-23 PROCEDURE — 87040 BLOOD CULTURE FOR BACTERIA: CPT | Performed by: FAMILY MEDICINE

## 2024-04-23 PROCEDURE — 999N000208 ECHOCARDIOGRAM COMPLETE

## 2024-04-23 PROCEDURE — 86140 C-REACTIVE PROTEIN: CPT

## 2024-04-23 PROCEDURE — 87581 M.PNEUMON DNA AMP PROBE: CPT | Performed by: FAMILY MEDICINE

## 2024-04-23 PROCEDURE — 250N000009 HC RX 250

## 2024-04-23 PROCEDURE — 99285 EMERGENCY DEPT VISIT HI MDM: CPT | Mod: 25 | Performed by: FAMILY MEDICINE

## 2024-04-23 PROCEDURE — 250N000013 HC RX MED GY IP 250 OP 250 PS 637: Performed by: FAMILY MEDICINE

## 2024-04-23 PROCEDURE — 82805 BLOOD GASES W/O2 SATURATION: CPT

## 2024-04-23 PROCEDURE — 96365 THER/PROPH/DIAG IV INF INIT: CPT | Performed by: FAMILY MEDICINE

## 2024-04-23 PROCEDURE — 94660 CPAP INITIATION&MGMT: CPT

## 2024-04-23 PROCEDURE — 250N000009 HC RX 250: Performed by: EMERGENCY MEDICINE

## 2024-04-23 PROCEDURE — 84145 PROCALCITONIN (PCT): CPT

## 2024-04-23 PROCEDURE — 99291 CRITICAL CARE FIRST HOUR: CPT | Mod: FS | Performed by: FAMILY MEDICINE

## 2024-04-23 PROCEDURE — 85379 FIBRIN DEGRADATION QUANT: CPT

## 2024-04-23 PROCEDURE — 87633 RESP VIRUS 12-25 TARGETS: CPT | Performed by: FAMILY MEDICINE

## 2024-04-23 PROCEDURE — 258N000003 HC RX IP 258 OP 636: Performed by: FAMILY MEDICINE

## 2024-04-23 PROCEDURE — 250N000011 HC RX IP 250 OP 636: Performed by: EMERGENCY MEDICINE

## 2024-04-23 PROCEDURE — 85007 BL SMEAR W/DIFF WBC COUNT: CPT | Performed by: FAMILY MEDICINE

## 2024-04-23 PROCEDURE — 84443 ASSAY THYROID STIM HORMONE: CPT

## 2024-04-23 PROCEDURE — 87205 SMEAR GRAM STAIN: CPT

## 2024-04-23 PROCEDURE — 999N000157 HC STATISTIC RCP TIME EA 10 MIN

## 2024-04-23 PROCEDURE — 85025 COMPLETE CBC W/AUTO DIFF WBC: CPT | Performed by: FAMILY MEDICINE

## 2024-04-23 PROCEDURE — 99207 PR APP CREDIT; MD BILLING SHARED VISIT: CPT

## 2024-04-23 PROCEDURE — 84484 ASSAY OF TROPONIN QUANT: CPT | Performed by: FAMILY MEDICINE

## 2024-04-23 PROCEDURE — 999N000215 HC STATISTIC HFNC ADULT NON-CPAP

## 2024-04-23 PROCEDURE — 36415 COLL VENOUS BLD VENIPUNCTURE: CPT | Performed by: EMERGENCY MEDICINE

## 2024-04-23 PROCEDURE — 258N000003 HC RX IP 258 OP 636

## 2024-04-23 PROCEDURE — 93010 ELECTROCARDIOGRAM REPORT: CPT | Performed by: FAMILY MEDICINE

## 2024-04-23 PROCEDURE — 200N000001 HC R&B ICU

## 2024-04-23 PROCEDURE — 80053 COMPREHEN METABOLIC PANEL: CPT | Performed by: FAMILY MEDICINE

## 2024-04-23 PROCEDURE — 96375 TX/PRO/DX INJ NEW DRUG ADDON: CPT | Performed by: FAMILY MEDICINE

## 2024-04-23 PROCEDURE — 87899 AGENT NOS ASSAY W/OPTIC: CPT

## 2024-04-23 PROCEDURE — 82805 BLOOD GASES W/O2 SATURATION: CPT | Performed by: FAMILY MEDICINE

## 2024-04-23 PROCEDURE — 93306 TTE W/DOPPLER COMPLETE: CPT | Mod: 26 | Performed by: INTERNAL MEDICINE

## 2024-04-23 PROCEDURE — 36415 COLL VENOUS BLD VENIPUNCTURE: CPT

## 2024-04-23 PROCEDURE — 5A09357 ASSISTANCE WITH RESPIRATORY VENTILATION, LESS THAN 24 CONSECUTIVE HOURS, CONTINUOUS POSITIVE AIRWAY PRESSURE: ICD-10-PCS | Performed by: SURGERY

## 2024-04-23 PROCEDURE — 250N000011 HC RX IP 250 OP 636: Mod: JZ

## 2024-04-23 PROCEDURE — 250N000011 HC RX IP 250 OP 636: Performed by: FAMILY MEDICINE

## 2024-04-23 PROCEDURE — 71045 X-RAY EXAM CHEST 1 VIEW: CPT

## 2024-04-23 PROCEDURE — 94640 AIRWAY INHALATION TREATMENT: CPT

## 2024-04-23 PROCEDURE — 94644 CONT INHLJ TX 1ST HOUR: CPT

## 2024-04-23 PROCEDURE — 94640 AIRWAY INHALATION TREATMENT: CPT | Mod: 76

## 2024-04-23 RX ORDER — SODIUM CHLORIDE FOR INHALATION 0.9 %
3 VIAL, NEBULIZER (ML) INHALATION 2 TIMES DAILY
Status: DISCONTINUED | OUTPATIENT
Start: 2024-04-23 | End: 2024-04-27

## 2024-04-23 RX ORDER — AMLODIPINE BESYLATE 10 MG/1
10 TABLET ORAL DAILY
Status: DISCONTINUED | OUTPATIENT
Start: 2024-04-23 | End: 2024-05-01 | Stop reason: HOSPADM

## 2024-04-23 RX ORDER — IPRATROPIUM BROMIDE AND ALBUTEROL SULFATE 2.5; .5 MG/3ML; MG/3ML
3 SOLUTION RESPIRATORY (INHALATION) ONCE
Status: COMPLETED | OUTPATIENT
Start: 2024-04-23 | End: 2024-04-23

## 2024-04-23 RX ORDER — CEFTRIAXONE 1 G/1
1 INJECTION, POWDER, FOR SOLUTION INTRAMUSCULAR; INTRAVENOUS EVERY 24 HOURS
Status: DISCONTINUED | OUTPATIENT
Start: 2024-04-24 | End: 2024-04-25

## 2024-04-23 RX ORDER — ALBUTEROL SULFATE 0.83 MG/ML
SOLUTION RESPIRATORY (INHALATION)
Status: COMPLETED
Start: 2024-04-23 | End: 2024-04-23

## 2024-04-23 RX ORDER — METRONIDAZOLE 500 MG/100ML
500 INJECTION, SOLUTION INTRAVENOUS EVERY 12 HOURS
Status: DISCONTINUED | OUTPATIENT
Start: 2024-04-23 | End: 2024-04-26

## 2024-04-23 RX ORDER — ONDANSETRON 2 MG/ML
4 INJECTION INTRAMUSCULAR; INTRAVENOUS EVERY 6 HOURS PRN
Status: DISCONTINUED | OUTPATIENT
Start: 2024-04-23 | End: 2024-04-29

## 2024-04-23 RX ORDER — ONDANSETRON 4 MG/1
4 TABLET, ORALLY DISINTEGRATING ORAL EVERY 6 HOURS PRN
Status: DISCONTINUED | OUTPATIENT
Start: 2024-04-23 | End: 2024-04-29

## 2024-04-23 RX ORDER — GUAIFENESIN 600 MG/1
1200 TABLET, EXTENDED RELEASE ORAL 2 TIMES DAILY
Status: DISCONTINUED | OUTPATIENT
Start: 2024-04-23 | End: 2024-05-01 | Stop reason: HOSPADM

## 2024-04-23 RX ORDER — ATENOLOL 25 MG/1
25 TABLET ORAL DAILY
Status: DISCONTINUED | OUTPATIENT
Start: 2024-04-23 | End: 2024-04-23

## 2024-04-23 RX ORDER — CALCIUM CARBONATE 500 MG/1
1000 TABLET, CHEWABLE ORAL 4 TIMES DAILY PRN
Status: DISCONTINUED | OUTPATIENT
Start: 2024-04-23 | End: 2024-04-29

## 2024-04-23 RX ORDER — PREDNISONE 20 MG/1
40 TABLET ORAL DAILY
Status: DISCONTINUED | OUTPATIENT
Start: 2024-04-24 | End: 2024-04-28 | Stop reason: DRUGHIGH

## 2024-04-23 RX ORDER — NICOTINE POLACRILEX 4 MG
15-30 LOZENGE BUCCAL
Status: DISCONTINUED | OUTPATIENT
Start: 2024-04-23 | End: 2024-05-01 | Stop reason: HOSPADM

## 2024-04-23 RX ORDER — METHYLPREDNISOLONE SODIUM SUCCINATE 125 MG/2ML
125 INJECTION, POWDER, LYOPHILIZED, FOR SOLUTION INTRAMUSCULAR; INTRAVENOUS ONCE
Status: COMPLETED | OUTPATIENT
Start: 2024-04-23 | End: 2024-04-23

## 2024-04-23 RX ORDER — IPRATROPIUM BROMIDE AND ALBUTEROL SULFATE 2.5; .5 MG/3ML; MG/3ML
3 SOLUTION RESPIRATORY (INHALATION)
Status: DISCONTINUED | OUTPATIENT
Start: 2024-04-23 | End: 2024-04-27

## 2024-04-23 RX ORDER — LISINOPRIL 40 MG/1
40 TABLET ORAL DAILY
Status: DISCONTINUED | OUTPATIENT
Start: 2024-04-23 | End: 2024-04-24

## 2024-04-23 RX ORDER — DEXTROSE MONOHYDRATE 25 G/50ML
25-50 INJECTION, SOLUTION INTRAVENOUS
Status: DISCONTINUED | OUTPATIENT
Start: 2024-04-23 | End: 2024-05-01 | Stop reason: HOSPADM

## 2024-04-23 RX ORDER — ALBUTEROL SULFATE 0.83 MG/ML
2.5 SOLUTION RESPIRATORY (INHALATION)
Status: DISCONTINUED | OUTPATIENT
Start: 2024-04-23 | End: 2024-04-23

## 2024-04-23 RX ORDER — ATENOLOL 25 MG/1
25 TABLET ORAL DAILY
Status: DISCONTINUED | OUTPATIENT
Start: 2024-04-23 | End: 2024-04-25

## 2024-04-23 RX ORDER — CEFTRIAXONE 1 G/1
1 INJECTION, POWDER, FOR SOLUTION INTRAMUSCULAR; INTRAVENOUS ONCE
Status: COMPLETED | OUTPATIENT
Start: 2024-04-23 | End: 2024-04-23

## 2024-04-23 RX ORDER — ROFLUMILAST 500 UG/1
500 TABLET ORAL DAILY
Status: DISCONTINUED | OUTPATIENT
Start: 2024-04-23 | End: 2024-05-01 | Stop reason: HOSPADM

## 2024-04-23 RX ORDER — ALBUTEROL SULFATE 5 MG/ML
10 SOLUTION, NON-ORAL INHALATION CONTINUOUS
Status: DISCONTINUED | OUTPATIENT
Start: 2024-04-23 | End: 2024-04-23

## 2024-04-23 RX ORDER — FLUTICASONE FUROATE AND VILANTEROL 200; 25 UG/1; UG/1
1 POWDER RESPIRATORY (INHALATION) DAILY
Status: DISCONTINUED | OUTPATIENT
Start: 2024-04-23 | End: 2024-05-01 | Stop reason: HOSPADM

## 2024-04-23 RX ADMIN — IPRATROPIUM BROMIDE AND ALBUTEROL SULFATE 3 ML: 2.5; .5 SOLUTION RESPIRATORY (INHALATION) at 08:48

## 2024-04-23 RX ADMIN — ISODIUM CHLORIDE 3 ML: 0.03 SOLUTION RESPIRATORY (INHALATION) at 20:51

## 2024-04-23 RX ADMIN — SODIUM CHLORIDE, POTASSIUM CHLORIDE, SODIUM LACTATE AND CALCIUM CHLORIDE 500 ML: 600; 310; 30; 20 INJECTION, SOLUTION INTRAVENOUS at 16:05

## 2024-04-23 RX ADMIN — GUAIFENESIN 1200 MG: 600 TABLET ORAL at 19:52

## 2024-04-23 RX ADMIN — ALBUTEROL SULFATE 2.5 MG: 0.83 SOLUTION RESPIRATORY (INHALATION) at 06:15

## 2024-04-23 RX ADMIN — ALBUTEROL SULFATE 2.5 MG: 2.5 SOLUTION RESPIRATORY (INHALATION) at 06:15

## 2024-04-23 RX ADMIN — AZITHROMYCIN MONOHYDRATE 500 MG: 500 INJECTION, POWDER, LYOPHILIZED, FOR SOLUTION INTRAVENOUS at 06:57

## 2024-04-23 RX ADMIN — METHYLPREDNISOLONE SODIUM SUCCINATE 125 MG: 125 INJECTION, POWDER, FOR SOLUTION INTRAMUSCULAR; INTRAVENOUS at 05:55

## 2024-04-23 RX ADMIN — ALBUTEROL SULFATE 10 MG/HR: 2.5 SOLUTION RESPIRATORY (INHALATION) at 06:27

## 2024-04-23 RX ADMIN — UMECLIDINIUM 1 PUFF: 62.5 AEROSOL, POWDER ORAL at 09:43

## 2024-04-23 RX ADMIN — SODIUM CHLORIDE 1000 ML: 9 INJECTION, SOLUTION INTRAVENOUS at 08:13

## 2024-04-23 RX ADMIN — IPRATROPIUM BROMIDE AND ALBUTEROL SULFATE 3 ML: 2.5; .5 SOLUTION RESPIRATORY (INHALATION) at 16:30

## 2024-04-23 RX ADMIN — IPRATROPIUM BROMIDE AND ALBUTEROL SULFATE 3 ML: 2.5; .5 SOLUTION RESPIRATORY (INHALATION) at 06:12

## 2024-04-23 RX ADMIN — IPRATROPIUM BROMIDE AND ALBUTEROL SULFATE 3 ML: 2.5; .5 SOLUTION RESPIRATORY (INHALATION) at 20:19

## 2024-04-23 RX ADMIN — CEFTRIAXONE SODIUM 1 G: 1 INJECTION, POWDER, FOR SOLUTION INTRAMUSCULAR; INTRAVENOUS at 06:04

## 2024-04-23 RX ADMIN — ATENOLOL 25 MG: 25 TABLET ORAL at 12:50

## 2024-04-23 RX ADMIN — ISODIUM CHLORIDE 3 ML: 0.03 SOLUTION RESPIRATORY (INHALATION) at 08:49

## 2024-04-23 RX ADMIN — ROFLUMILAST 500 MCG: 500 TABLET ORAL at 09:54

## 2024-04-23 RX ADMIN — METRONIDAZOLE 500 MG: 500 INJECTION, SOLUTION INTRAVENOUS at 11:54

## 2024-04-23 RX ADMIN — SODIUM CHLORIDE, POTASSIUM CHLORIDE, SODIUM LACTATE AND CALCIUM CHLORIDE 500 ML: 600; 310; 30; 20 INJECTION, SOLUTION INTRAVENOUS at 11:07

## 2024-04-23 RX ADMIN — METRONIDAZOLE 500 MG: 500 INJECTION, SOLUTION INTRAVENOUS at 23:36

## 2024-04-23 RX ADMIN — IPRATROPIUM BROMIDE AND ALBUTEROL SULFATE 3 ML: 2.5; .5 SOLUTION RESPIRATORY (INHALATION) at 12:07

## 2024-04-23 RX ADMIN — GUAIFENESIN 1200 MG: 600 TABLET ORAL at 09:43

## 2024-04-23 RX ADMIN — FLUTICASONE FUROATE AND VILANTEROL TRIFENATATE 1 PUFF: 200; 25 POWDER RESPIRATORY (INHALATION) at 09:43

## 2024-04-23 ASSESSMENT — ENCOUNTER SYMPTOMS
NAUSEA: 0
DIAPHORESIS: 0
FEVER: 0
CONSTIPATION: 0
PALPITATIONS: 0
HEADACHES: 0
DIARRHEA: 0
BLOOD IN STOOL: 0
COUGH: 1
WHEEZING: 1
SINUS PRESSURE: 0
VOMITING: 0
SPUTUM PRODUCTION: 1
FREQUENCY: 0
SHORTNESS OF BREATH: 1
DYSURIA: 0
SORE THROAT: 0
CHILLS: 0
ABDOMINAL PAIN: 0

## 2024-04-23 ASSESSMENT — ACTIVITIES OF DAILY LIVING (ADL)
ADLS_ACUITY_SCORE: 23
ADLS_ACUITY_SCORE: 24
ADLS_ACUITY_SCORE: 23
ADLS_ACUITY_SCORE: 39
ADLS_ACUITY_SCORE: 23
ADLS_ACUITY_SCORE: 24
ADLS_ACUITY_SCORE: 23
ADLS_ACUITY_SCORE: 23
ADLS_ACUITY_SCORE: 39
ADLS_ACUITY_SCORE: 23
ADLS_ACUITY_SCORE: 39
ADLS_ACUITY_SCORE: 23
ADLS_ACUITY_SCORE: 24

## 2024-04-23 ASSESSMENT — COLUMBIA-SUICIDE SEVERITY RATING SCALE - C-SSRS
1. IN THE PAST MONTH, HAVE YOU WISHED YOU WERE DEAD OR WISHED YOU COULD GO TO SLEEP AND NOT WAKE UP?: NO
6. HAVE YOU EVER DONE ANYTHING, STARTED TO DO ANYTHING, OR PREPARED TO DO ANYTHING TO END YOUR LIFE?: NO
2. HAVE YOU ACTUALLY HAD ANY THOUGHTS OF KILLING YOURSELF IN THE PAST MONTH?: NO

## 2024-04-23 NOTE — MEDICATION SCRIBE - ADMISSION MEDICATION HISTORY
Medication Scribe Admission Medication History    Admission medication history is complete. The information provided in this note is only as accurate as the sources available at the time of the update.    Information Source(s): Patient and CareEverywhere/SureScripts via  with patient in room and finished at desk.    Pertinent Information: Has Albuterol with him today.  Used this morning.  Took no other medicines today.  Yesterday, on 5th day of Prednisone 40 mg and Augmentin.    Changes made to PTA medication list:  Added: None  Deleted: None  Changed: Oxygen to 2 L.    Allergies reviewed with patient and updates made in EHR: yes, no change.    Medication History Completed By: Shahnaz Huff 4/23/2024 11:46 AM    PTA Med List   Medication Sig Last Dose    albuterol (PROAIR HFA/PROVENTIL HFA/VENTOLIN HFA) 108 (90 Base) MCG/ACT inhaler Inhale 2 puffs into the lungs every 4 hours as needed for shortness of breath 4/23/2024 at 0500    amLODIPine (NORVASC) 10 MG tablet Take 1 tablet (10 mg) by mouth daily 4/22/2024 at am    amoxicillin-clavulanate (AUGMENTIN) 875-125 MG tablet Take 1 tablet by mouth 2 times daily for 10 days 4/22/2024 at 1700, day 5    atenolol (TENORMIN) 25 MG tablet Take 1 tablet (25 mg) by mouth daily 4/22/2024 at am    dexAMETHasone (DECADRON) 4 MG tablet Take 2 tablets (8 mg) by mouth daily Take for 3 days, starting the day after chemo. Take with food. Past Month    fluticasone-salmeterol (ADVAIR) 500-50 MCG/ACT inhaler Inhale 1 puff into the lungs every 12 hours 4/22/2024 at pm    guaiFENesin (MUCINEX MAXIMUM STRENGTH) 1200 MG TB12 Take 1 tablet by mouth daily 4/22/2024 at am    lisinopril (ZESTRIL) 40 MG tablet Take 1 tablet (40 mg) by mouth daily 4/22/2024 at am    magnesium oxide (MAG-OX) 400 MG tablet Take 400 mg by mouth daily (with lunch) For leg cramps 4/22/2024 at lunch    ondansetron (ZOFRAN) 8 MG tablet Take 1 tablet (8 mg) by mouth every 8 hours as needed for nausea (vomiting) never used  at on hand if needed    order for DME Equipment being ordered: Oxygen 3L via NC continuous.  O2 saturated noted to be 86% on room air at rest. (Patient taking differently: 2 L Equipment being ordered: Oxygen 3L via NC continuous.  O2 saturated noted to be 86% on room air at rest.) 4/23/2024 at am    order for DME Equipment being ordered: Nebulizer Unknown    predniSONE (DELTASONE) 10 MG tablet Take 40 mg (2 tabs) daily for 5 days then taper as follow: THEN start 30 mg daily for 3 days followed by 20 mg daily for 3 days then 10 mg daily thereafter. 4/22/2024 at am 40 mg day 5    prochlorperazine (COMPAZINE) 10 MG tablet Take 1 tablet (10 mg) by mouth every 6 hours as needed for nausea or vomiting never used at on hand if needed    roflumilast (DALIRESP) 500 MCG TABS tablet Take 1 tablet (500 mcg) by mouth daily 4/22/2024 at am    sodium chloride 0.9 % neb solution Take 3 mLs by nebulization 2 times daily Past Month at prn    tiotropium (SPIRIVA RESPIMAT) 2.5 MCG/ACT inhaler Inhale 2 puffs into the lungs daily 4/22/2024 at am

## 2024-04-23 NOTE — ED NOTES
"[facility]   Admission Handoff    The patient is Luis Hernandez, 58 year old who arrived in the ED by AMBULANCE from home with a complaint of Shortness of Breath  . The patient's current symptoms are new and during this time the symptoms have decreased. In the ED, patient was diagnosed with Pneumonia   Recently placed on antibiotics for pneumonia  Last night SOA worse and unable to catch his breath.  Placed on cpap by EMS and than bipap when arrived in ED.  Bipap settings 18/8 with O2 at 30%  Patient decreased WOB and SOA while in ED  Final diagnoses:   Pneumonia of both lower lobes due to infectious organism   COPD with acute exacerbation (H)   Acute respiratory failure with hypoxia and hypercapnia (H)   Full code status         Needed?: No    Allergies:    Allergies   Allergen Reactions    Hctz Other (See Comments)     He has hyponatremia - avoid use    Penicillins Unknown     Childhood reaction.       Past Medical Hx:   Past Medical History:   Diagnosis Date    Acute on chronic respiratory failure with hypoxia (H) 4/10/2020    Acute on chronic respiratory failure with hypoxia and hypercapnia (H) 4/1/2019    CAP (community acquired pneumonia) 4/14/2020    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)     COPD exacerbation (H) 4/1/2019    COPD exacerbation (H) 2/16/2020    Erectile dysfunction     Hypertension     Hyponatremia 4/1/2019    Pneumonia 4/10/2020       Initial vitals were: BP: (!) 141/98  Pulse: 117  Temp: 98.7  F (37.1  C)  Resp: 24  Height: 162.6 cm (5' 4\")  Weight: 50.5 kg (111 lb 4.8 oz)  SpO2: 100 %   Recent vital Signs: /87   Pulse (!) 135   Temp 98.7  F (37.1  C) (Axillary)   Resp 19   Ht 1.626 m (5' 4\")   Wt 50.5 kg (111 lb 4.8 oz)   SpO2 94%   BMI 19.10 kg/m      Elimination Status: Continent: Yes     Activity Level: SBA    Fall Status: Reason for falls risk:  Mobility and Reason for falls risk: High Risk " Medications  bed/chair alarm on    Baseline Mental status: WDL  Current Mental Status changes: at basesline    Infection present or suspected this encounter: yes respiratory  Sepsis suspected: No    Isolation type: Droplet    Bariatric equipment needed?: No    In the ED these meds were given:   Medications   albuterol (PROVENTIL) continous nebulization (10 mg/hr Nebulization $New Bag 4/23/24 0627)   albuterol (PROVENTIL) neb solution 2.5 mg ( Nebulization Canceled Entry 4/23/24 0631)   azithromycin (ZITHROMAX) 500 mg in sodium chloride 0.9 % 250 mL intermittent infusion (500 mg Intravenous $New Bag 4/23/24 0657)   ipratropium - albuterol 0.5 mg/2.5 mg/3 mL (DUONEB) neb solution 3 mL (3 mLs Nebulization $Given 4/23/24 0612)   methylPREDNISolone sodium succinate (solu-MEDROL) injection 125 mg (125 mg Intravenous $Given 4/23/24 0555)   cefTRIAXone (ROCEPHIN) 1 g vial to attach to  mL bag for ADULTS or NS 50 mL bag for PEDS (0 g Intravenous Stopped 4/23/24 0635)   albuterol (PROVENTIL) (2.5 MG/3ML) 0.083% neb solution (2.5 mg  $Given 4/23/24 0615)       Drips running?  Yes    Home pump  No    Current LDAs: Peripheral IV: Site RAC; Gauge 18  IV push only, no IV fluids  20g Left forearm     Results:   Labs/Imaging  Ordered and Resulted from Time of ED Arrival Up to the Time of Departure from the ED  Results for orders placed or performed during the hospital encounter of 04/23/24 (from the past 24 hour(s))   [place] Draw    Narrative    The following orders were created for panel order [place] Draw.  Procedure                               Abnormality         Status                     ---------                               -----------         ------                     Extra Blue Top Tube[[id number]]                              Final result               Extra Red Top Tube[[id number]]                               Final result                 Please view results for these tests on the individual orders.    Comprehensive metabolic panel   Result Value Ref Range    Sodium 131 (L) 135 - 145 mmol/L    Potassium 4.6 3.4 - 5.3 mmol/L    Carbon Dioxide (CO2) 26 22 - 29 mmol/L    Anion Gap 12 7 - 15 mmol/L    Urea Nitrogen 9.9 6.0 - 20.0 mg/dL    Creatinine 0.56 (L) 0.67 - 1.17 mg/dL    GFR Estimate >90 >60 mL/min/1.73m2    Calcium 9.6 8.6 - 10.0 mg/dL    Chloride 93 (L) 98 - 107 mmol/L    Glucose 100 (H) 70 - 99 mg/dL    Alkaline Phosphatase 205 (H) 40 - 150 U/L    AST 16 0 - 45 U/L    ALT 19 0 - 70 U/L    Protein Total 6.9 6.4 - 8.3 g/dL    Albumin 4.2 3.5 - 5.2 g/dL    Bilirubin Total <0.2 <=1.2 mg/dL   Troponin T, High Sensitivity   Result Value Ref Range    Troponin T, High Sensitivity 11 <=22 ng/L   CBC with platelets, differential    Narrative    The following orders were created for panel order CBC with platelets, differential.  Procedure                               Abnormality         Status                     ---------                               -----------         ------                     CBC with platelets and d...[[id number]]  Abnormal            Final result               Manual Differential[[id number]]                              In process                   Please view results for these tests on the individual orders.   Blood gas venous   Result Value Ref Range    pH Venous 7.33 7.32 - 7.43    pCO2 Venous 61 (H) 40 - 50 mm Hg    pO2 Venous 27 25 - 47 mm Hg    Bicarbonate Venous 32 (H) 21 - 28 mmol/L    Base Excess/Deficit Venous 4.9 (H) -3.0 - 3.0 mmol/L    FIO2 60     Oxyhemoglobin Venous 44 (L) 70 - 75 %    O2 Sat, Venous 45.3 (L) 70.0 - 75.0 %    Narrative    In healthy individuals, oxyhemoglobin (O2Hb) and oxygen saturation (SO2) are approximately equal. In the presence of dyshemoglobins, oxyhemoglobin can be considerably lower than oxygen saturation.   Extra Blue Top Tube   Result Value Ref Range    Hold Specimen JIC    Extra Red Top Tube   Result Value Ref Range    Hold Specimen JIC    CBC with  platelets and differential   Result Value Ref Range    WBC Count 46.3 (H) 4.0 - 11.0 10e3/uL    RBC Count 3.37 (L) 4.40 - 5.90 10e6/uL    Hemoglobin 9.9 (L) 13.3 - 17.7 g/dL    Hematocrit 31.5 (L) 40.0 - 53.0 %    MCV 94 78 - 100 fL    MCH 29.4 26.5 - 33.0 pg    MCHC 31.4 (L) 31.5 - 36.5 g/dL    RDW 16.8 (H) 10.0 - 15.0 %    Platelet Count 404 150 - 450 10e3/uL    NRBCs per 100 WBC 0 <1 /100    Absolute NRBCs 0.0 10e3/uL    Narrative    Sent for Review by Pathologist. Review comments will be entered. Results will be updated after review as applicable.     Procalcitonin   Result Value Ref Range    Procalcitonin 0.06 <0.50 ng/mL   CRP inflammation   Result Value Ref Range    CRP Inflammation 5.46 (H) <5.00 mg/L   Lactic acid whole blood   Result Value Ref Range    Lactic Acid 1.4 0.7 - 2.0 mmol/L   D dimer quantitative   Result Value Ref Range    D-Dimer Quantitative 0.30 0.00 - 0.50 ug/mL FEU    Narrative    This D-dimer assay is intended for use in conjunction with a clinical pretest probability assessment model to exclude pulmonary embolism (PE) and deep venous thrombosis (DVT) in outpatients suspected of PE or DVT. The cut-off value is 0.50 ug/mL FEU.    For patients 50 years of age or older, the application of age-adjusted cut-off values for D-Dimer may increase the specificity without significant effect on sensitivity. The literature suggested calculation age adjusted cut-off in ug/L = age in years x 10 ug/L. The results in this laboratory are reported as ug/mL rather than ug/L. The calculation for age adjusted cut off in ug/mL= age in years x 0.01 ug/mL. For example, the cut off for a 76 year old male is 78 x 0.01 ug/mL = 0.76 ug/mL (760 ug/L).    M Keesha et al. Age adjusted D-dimer cut-off levels to rule out pulmonary embolism: The ADJUST-PE Study. MARBELLA 2014;311:4135-3679.; COLLIN Wilson et al. Diagnostic accuracy of conventional or age adjusted D-dimer cutoff values in older patients with suspected venous  thromboembolism. Systemic review and meta-analysis. BMJ 2013:346:f2492.   XR Chest Port 1 View    Narrative    EXAM: XR CHEST PORT 1 VIEW  LOCATION: [facility]  DATE: 4/23/2024    INDICATION: Dyspnea, cough.  COMPARISON: 01/18/2024.      Impression    IMPRESSION: Normal heart size and pulmonary vascularity. Lungs are hyperinflated with emphysematous changes. Small hazy opacity over the left lower lung laterally could be seen with a focal area of pneumonitis and clinical correlation is needed.   Suspected prominent nipple shadow projects over the mid to lower lungs bilaterally. Stent material projects over the right side of the mediastinum, unchanged. Aortic calcification. No significant bony abnormalities.                 Respiratory Panel PCR    Specimen: Nasopharyngeal; Swab    Narrative    The following orders were created for panel order Respiratory Panel PCR.  Procedure                               Abnormality         Status                     ---------                               -----------         ------                     Respiratory Panel PCR, N...[[id number]]                      In process                   Please view results for these tests on the individual orders.       For the majority of the shift this patient's behavior was Green     Cardiac Rhythm: Tachycardia  Pt needs tele? Yes  Skin/wound Issues:  none    Code Status: DNR / DNI    Pain control: pt had none    Nausea control: pt had none    Abnormal labs/tests/findings requiring intervention: None    Patient tested for COVID 19 prior to admission: NO     OBS brochure/video discussed/provided to patient/family: N/A     Family present during ED course? No     Family Comments/Social Situation comments: lives at home     Tasks needing completion: None    Kristin Spaulding RN

## 2024-04-23 NOTE — PLAN OF CARE
[facility] care [id number]  Neuro: A&Ox4.   Cardiac: Sinus tachycardia heart rate 120's most of shift, improving to 100's this evening. VSS. This afternoon pt stood at the bedside to use the urinal, BIPAP still in place, pt became very short of breath, tachypnic, heart rate increased to 140's and pt experienced chest pain, which patient rated a 2/10. This RN had the charge RN page Dr. Ortiz, who saw patient at the bedside. Pt did recover and chest pain improved, work of breathing and shortness of breath also improved after about 10 minutes. Bedrest the rest of the shift.   Respiratory: On continues BIPAP throughout the shift. Tolerated 2L NC for about 10 minutes for a couple bites of lunch before patient became very short of breath. On BIPAP 18/8 28%.  GI/: Adequate urine output. No BM this shift.  Diet/appetite: Tolerating regular diet. Prefers ensure shakes with meals. Unable to tolerate being off BIPAP for dinner.  Activity:  Assist of SBA, stool at side of bed once today to use the urinal.  Pain: Chest pain improved after resting.  Skin: No new deficits noted.  LDA's: PIVx2. 500 LR bolus x2    Plan: Continue with POC. Notify primary team with changes. Plan to wear BIPAP overnight. Pt in droplet precautions for human rhinovirus.    Goal Outcome Evaluation:      Plan of Care Reviewed With: patient    Overall Patient Progress: no changeOverall Patient Progress: no change    Outcome Evaluation: Per Patient he's feeling better than when he came in, however still short of breath and can not tolerate being off the BIPAP.

## 2024-04-23 NOTE — PROGRESS NOTES
Writer has reviewed initial/admission skin assessment and Troy assessment and agrees with documentation and assessment findings.

## 2024-04-23 NOTE — ED NOTES
Patient switched to hospital Bipap on arrival via EMS with current settings 18/8, 28%.  Patient spontaneous RR-15-18 BPM currently.  Patient's SOA has improved with Bipap and several bronchodialators.  Currently on continuous Albuterol neb @ 10mg/hr.  BS are diminished with bilateral wheezes with increased aeration with treatments.  Patient says SOA is better.  RT will cont to monitor and adjust/tx as needed.

## 2024-04-23 NOTE — PROGRESS NOTES
"[facility] ADMISSION NOTE    Patient admitted to room 1007 at approximately 0840 via cart from emergency room. Patient was accompanied by other:staff.     Verbal SBAR report received from ED RN prior to patient arrival.     Patient ambulated to bed with stand-by assist. Patient alert and oriented X 3. The patient is not having any pain.  . Admission vital signs: Blood pressure 115/83, pulse 120, temperature 98.2  F (36.8  C), temperature source Axillary, resp. rate 15, height 1.626 m (5' 4\"), weight 50.5 kg (111 lb 4.8 oz), SpO2 96%. Patient was oriented to plan of care, call light, bed controls, tv, telephone, bathroom, and visiting hours.     Risk Assessment    The following safety risks were identified during admission: fall. Yellow risk band applied: YES.     Skin Initial Assessment    This writer admitted this patient and completed a full skin assessment and Troy score in the Adult PCS flowsheet. Appropriate interventions initiated as needed.     Secondary skin check completed by Jennifer FOSTER.    Troy Risk Assessment  Sensory Perception: 4-->no impairment  Moisture: 4-->rarely moist  Activity: 3-->walks occasionally  Mobility: 3-->slightly limited  Nutrition: 2-->probably inadequate  Friction and Shear: 3-->no apparent problem  Troy Score: 19  Nutrition Interventions: Maintain adequate hydration  Mattress: Low air loss (JOHN pump, Isolibrium, Skin Guard, Pulsate, Isoair, etc.)  Bed Frame: Standard width and length    Education    Patient has a [place] to Observation order: No  Observation education completed and documented: N/A      Deb Mackey RN      "

## 2024-04-23 NOTE — ED PROVIDER NOTES
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  He has been on carboplatin and Taxol and pembrolizumab as well as Neulasta.  Chronic hyponatremia related and last hemoglobin 9.2 white count 12.8.    Followed by  hematology oncology at [facility].  Was there for before his third chemo around April 12, 2024.   His PET scan demonstrated a right perihilar mass.  He had had on his last imaging a left upper lobe pneumonia consolidation that had resolved but there were new patchy infiltrates in the right middle and left lower lobes that were thought to possibly be due to infection.  He was started on 2 tablets twice a day for 3 days, then 1 tablet twice a day for 3 days, then 1/2 tablet twice a day for 3 days, then 1/2 tablet daily for three days for total of 14 days.      He had improved somewhat with his respiratory status since that time but then suddenly became worse yesterday with increased work of breathing increased cough shortness of breath wheezing.  He has previously been DNR at least on the last visit but when asked today he would want to be intubated if he needed it.   Patient did arrive by EMS in severe distress placed on BiPAP on arrival 18 over 1028% FiO2 is gradually improved since that time.  I saw the patient after my arrival he been in this department for less than 30 minutes.  He had been started on Solu-Medrol, DuoNebs x 2 continuous nebs following this.    He was tolerating the BiPAP other than his nose felt compressed but the mask was adjusted and he felt more comfortable.  Found to be wheezing significantly on arrival.  Appears improved after BiPAP on my initial  evaluation.      His COPD is chronically managed with Spiriva, Daliresp, Advair, and albuterol as needed.      From PFTs 12/2023    The FVC, FEV1 and FEV1/FVC ratio are reduced.  The TLC, RV, FRC and RV/TLC ratio are all increased.  Following administration of bronchodilators, there is no significant response.  The diffusing capacity is reduced.  However, the diffusing capacity was  not corrected for the patient's hemoglobin.        IMPRESSION:    Severe Airflow Obstruction without a significant bronchodilator response.      There is hyperinflation with air trapping.    Severe diffusion defect.           Allergies:  Allergies   Allergen Reactions    Hctz Other (See Comments)     He has hyponatremia - avoid use    Penicillins Unknown     Childhood reaction.       Problem List:    Patient Active Problem List    Diagnosis Date Noted    Encounter for antineoplastic chemotherapy 02/26/2024     Priority: Medium    Squamous cell lung cancer, right (H) 02/20/2024     Priority: Medium    COPD with acute exacerbation (H) 01/07/2024     Priority: Medium    Acute on chronic respiratory failure with hypoxia and hypercapnia (H) 11/06/2023     Priority: Medium    Anemia, unspecified type 05/07/2021     Priority: Medium    Peripheral edema 02/16/2020     Priority: Medium    Pulmonary hypertension (H) 02/16/2020     Priority: Medium    Tobacco abuse, 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   Stable 01/2014 to 07/2014, 6 month follow scan recommended.   Chest CT of 1/15/2015= stable nodule, f/u one year.      Advanced directives, counseling/discussion 11/25/2013     Priority: Medium     11/25/2013 Gave patient honoring choices forms to take home and review.  DAVEY Dial ([facility])       CARDIOVASCULAR SCREENING;  LDL GOAL LESS THAN 130 10/31/2010     Priority: Medium    COPD, very severe (H) 10/25/2010     Priority: Medium     Severe to very severe      Eczema 10/04/2010     Priority: Medium    ED (erectile dysfunction) 04/20/2009     Priority: Medium    Essential hypertension, benign 10/15/2007     Priority: Medium        Past Medical History:    Past Medical History:   Diagnosis Date    Acute on chronic respiratory failure with hypoxia (H) 4/10/2020    Acute on chronic respiratory failure with hypoxia and hypercapnia (H) 4/1/2019    CAP (community acquired pneumonia) 4/14/2020    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)     COPD exacerbation (H) 4/1/2019    COPD exacerbation (H) 2/16/2020    Erectile dysfunction     Hypertension     Hyponatremia 4/1/2019    Pneumonia 4/10/2020       Past Surgical History:    Past Surgical History:   Procedure Laterality Date    APPENDECTOMY      childhood    BRONCHOSCOPY, DILATE BRONCHUS, STENT BRONCHUS, COMBINED N/A 1/18/2024    Procedure: Bronchoscopy with tissue debulking,  and stent placement.;  Surgeon: Isac Prescott MD;  Location: [facility] OR    ENDOBRONCHIAL ULTRASOUND FLEXIBLE N/A 1/18/2024    Procedure: Endobronchial ultrasound with Endobronchial and Cryo biopsy.;  Surgeon: Isac Prescott MD;  Location: [facility] OR       Family History:    Family History   Problem Relation Age of Onset    Hypertension Mother     Ovarian Cancer Mother     Breast Cancer Mother     Hypertension Father     Lipids Father     C.A.D. Father     Heart Disease Father     Chronic Obstructive Pulmonary Disease Father     Cerebrovascular Disease Father     Diabetes Paternal Grandmother     Chronic Obstructive Pulmonary Disease Paternal Grandfather        Social History:  Marital Status:  Single [1]  Social History     Tobacco Use    Smoking status: Former     Current packs/day: 0.00     Average packs/day: 2.0 packs/day for 30.0 years (60.0 ttl pk-yrs)     Types: Cigarettes     Start date:  "1991     Quit date: 2021     Years since quittin.7    Smokeless tobacco: Former   Vaping Use    Vaping status: Never Used   Substance Use Topics    Alcohol use: Yes     Comment: rare    Drug use: No        Medications:    albuterol (PROAIR HFA/PROVENTIL HFA/VENTOLIN HFA) 108 (90 Base) MCG/ACT inhaler  amLODIPine (NORVASC) 10 MG tablet  atenolol (TENORMIN) 25 MG tablet  dexAMETHasone (DECADRON) 4 MG tablet  fluticasone-salmeterol (ADVAIR) 500-50 MCG/ACT inhaler  guaiFENesin (MUCINEX MAXIMUM STRENGTH) 1200 MG TB12  lisinopril (ZESTRIL) 40 MG tablet  magnesium oxide (MAG-OX) 400 MG tablet  ondansetron (ZOFRAN) 8 MG tablet  order for DME  order for DME  predniSONE (DELTASONE) 10 MG tablet  prochlorperazine (COMPAZINE) 10 MG tablet  roflumilast (DALIRESP) 500 MCG TABS tablet  sodium chloride 0.9 % neb solution  tiotropium (SPIRIVA RESPIMAT) 2.5 MCG/ACT inhaler          Review of Systems   Constitutional:  Negative for chills, diaphoresis and fever.   HENT:  Negative for ear pain, sinus pressure and sore throat.    Eyes:  Negative for visual disturbance.   Respiratory:  Positive for cough, shortness of breath and wheezing.    Cardiovascular:  Negative for chest pain and palpitations.   Gastrointestinal:  Negative for abdominal pain, blood in stool, constipation, diarrhea, nausea and vomiting.   Genitourinary:  Negative for dysuria, frequency and urgency.   Skin:  Negative for rash.   Neurological:  Negative for headaches.   All other systems reviewed and are negative.      Physical Exam   BP: (!) 141/98  Pulse: 117  Temp: 98.7  F (37.1  C)  Resp: 24  Height: 162.6 cm (5' 4\")  Weight: 50.5 kg (111 lb 4.8 oz)  SpO2: 100 %      Physical Exam  Constitutional:       General: He is in acute distress.      Appearance: He is not diaphoretic.   HENT:      Head: Atraumatic.   Eyes:      Conjunctiva/sclera: Conjunctivae normal.   Cardiovascular:      Rate and Rhythm: Regular rhythm. Tachycardia present.   Pulmonary:      " Effort: No respiratory distress.   Abdominal:      General: Abdomen is flat. There is no distension.      Palpations: Abdomen is soft. There is no mass.      Tenderness: There is no abdominal tenderness. There is no guarding.   Musculoskeletal:      Cervical back: Neck supple.      Right lower leg: No edema.      Left lower leg: No edema.   Skin:     Coloration: Skin is not pale.      Findings: No rash.   Neurological:      General: No focal deficit present.      Mental Status: He is alert.      Motor: No weakness.         ED Course        Procedures                EKG Interpretation:      Interpreted by Sylvester Blanton MD  EKG done at 0201  hrs. demonstrates a sinus rhythm at 129 bpm with a normal axis.  There is no ST change.  There is T wave peaking in leads V4 V5.  There is a poor R progression V1 through V4 and no Q waves.  Normal intervals.  Normal conduction.  No ectopy.  Impression sinus rhythm tachycardia at 129 bpm without significant acute other changes.           EKG Interpretation:      Interpreted by Sylvester Blanton MD  EKG done at 0827 hrs. demonstrates a sinus rhythm at 123 bpm with a normal axis.  There is no ST change that is significant no significant T wave change other than some prominence V4 V5.  Poor R progression V1 through V4.  No Q waves.  Normal intervals.  Normal conduction.  No ectopy.  Impression sinus rhythm 123 bpm with clear P waves no significant acute changes other than a tachycardia at 123 bpm likely related to nebulizer use but continuous      Critical Care time:  none               Results for orders placed or performed during the hospital encounter of 04/23/24 (from the past 24 hour(s))   [facility] Draw    Narrative    The following orders were created for panel order [place] Draw.  Procedure                               Abnormality         Status                     ---------                               -----------         ------                     Extra Blue Top  Tube[[id number]]                              In process                 Extra Red Top Tube[[id number]]                               In process                   Please view results for these tests on the individual orders.   Troponin T, High Sensitivity   Result Value Ref Range    Troponin T, High Sensitivity 11 <=22 ng/L   CBC with platelets, differential    Narrative    The following orders were created for panel order CBC with platelets, differential.  Procedure                               Abnormality         Status                     ---------                               -----------         ------                     CBC with platelets and d...[[id number]]                      In process                 Manual Differential[[id number]]                              In process                   Please view results for these tests on the individual orders.   Blood gas venous   Result Value Ref Range    pH Venous 7.33 7.32 - 7.43    pCO2 Venous 61 (H) 40 - 50 mm Hg    pO2 Venous 27 25 - 47 mm Hg    Bicarbonate Venous 32 (H) 21 - 28 mmol/L    Base Excess/Deficit Venous 4.9 (H) -3.0 - 3.0 mmol/L    FIO2 60     Oxyhemoglobin Venous 44 (L) 70 - 75 %    O2 Sat, Venous 45.3 (L) 70.0 - 75.0 %    Narrative    In healthy individuals, oxyhemoglobin (O2Hb) and oxygen saturation (SO2) are approximately equal. In the presence of dyshemoglobins, oxyhemoglobin can be considerably lower than oxygen saturation.       Medications   ipratropium - albuterol 0.5 mg/2.5 mg/3 mL (DUONEB) neb solution 3 mL (has no administration in time range)   albuterol (PROVENTIL) continous nebulization (has no administration in time range)   cefTRIAXone (ROCEPHIN) 1 g vial to attach to  mL bag for ADULTS or NS 50 mL bag for PEDS (1 g Intravenous $New Bag 4/23/24 0604)   albuterol (PROVENTIL) (2.5 MG/3ML) 0.083% neb solution (has no administration in time range)   albuterol (PROVENTIL) (2.5 MG/3ML) 0.083% neb solution (has no administration in  time range)   methylPREDNISolone sodium succinate (solu-MEDROL) injection 125 mg (125 mg Intravenous $Given 4/23/24 1087)       Assessments & Plan (with Medical Decision Making)     MDM; Luis Hernandez is a 58 year old male who presents with a history of severe COPD on triple inhaler therapy plus Daliresp with a recent COPD exacerbation and infiltrates noted on last oncology visit where he was treated for squamous cell lung cancer stage IIIb on palliative chemotherapy.  PET scan demonstrated infiltrates lower lung at the time of that last visit and he is currently on Augmentin still and corticosteroids from that point.  Arrived here in severe respiratory distress acute respiratory failure with hypoxia although requiring little oxygen support but mostly pressure support on BiPAP.  On BiPAP getting continuous nebs now but improving full code.  Rocephin was used empirically prior to the chest x-ray.  Plan for hospital stay ICU.    I have reviewed the nursing notes.    I have reviewed the findings, diagnosis, plan and need for follow up with the patient.       ED to Inpatient Handoff:    Discussed with Bryan Mace  Patient accepted for Inpatient Stay  Pending studies include none  Code Status: Full Code             Medical Decision Making  The patient's presentation was of high complexity (an acute health issue posing potential threat to life or bodily function).    The patient's evaluation involved:  ordering and/or review of 3+ test(s) in this encounter (see separate area of note for details)    The patient's management necessitated high risk (a decision regarding hospitalization).        New Prescriptions    No medications on file       Final diagnoses:   Pneumonia of both lower lobes due to infectious organism   COPD with acute exacerbation (H)   Acute respiratory failure with hypoxia and hypercapnia (H)   Full code status       4/23/2024   [facility] EMERGENCY DEPT       Sylvester Blanton,  MD  04/23/24 0708       Sylvester Blanton MD  04/23/24 1791

## 2024-04-23 NOTE — PROGRESS NOTES
"CLINICAL NUTRITION SERVICES - ASSESSMENT NOTE     Nutrition Prescription    RECOMMENDATIONS FOR MDs/PROVIDERS TO ORDER:  None at this time    Malnutrition Status:    Patient does not meet two of the established criteria necessary for diagnosing malnutrition    Recommendations already ordered by Registered Dietitian (RD):  Modify patient diet order for easy to chew  Enter in patient supplement order to health touch    Future/Additional Recommendations:  Monitor patient weight, intakes, meds/labs and GI/BM  Monitor patient tolerance to supplement     REASON FOR ASSESSMENT  Luis Hernandez is a/an 58 year old male assessed by the dietitian for RN Consult - help find easy to chew foods, unable to tolerate off Bipap for more than few min     NUTRITION HISTORY  Luis Hernandez is a 58 year old male who presents with a history of 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     Patient is currently requiring Bipap. Per RN notes patient unable to tolerate chewing foods for more than a few minutes when off of bipap. Patient able to speak in full sentences per chart review.    RD attempted to call patient room X 2 d/t precautions-writer unable to enter room. Unable to complete nutrition interview at this time.     CURRENT NUTRITION ORDERS  Diet: /Orders Placed This Encounter      Regular Diet Adult      Intake/Tolerance: Patient has one documented intake of 25%    LABS  Labs reviewed  Glucose elevated-130 mg/dL    MEDICATIONS  Medications reviewed    ANTHROPOMETRICS  Height: 162.6 cm (5' 4\")  Most Recent Weight: 50.5 kg (111 lb 4.8 oz)    IBW: 59 kg  BMI: Normal BMI  Weight History:   Wt Readings from Last 15 Encounters:   04/23/24 50.5 kg (111 lb 4.8 oz)   04/12/24 50.2 kg (110 lb 9.6 oz)   04/04/24 49.1 kg (108 lb 3.2 oz)   03/21/24 52.2 kg (115 " lb)   02/29/24 52 kg (114 lb 9.6 oz)   02/15/24 51.8 kg (114 lb 3.2 oz)   02/01/24 52.2 kg (115 lb)   01/18/24 50.3 kg (110 lb 14.3 oz)   01/09/24 48.5 kg (107 lb)   01/07/24 48.3 kg (106 lb 7.7 oz)   12/05/23 50.8 kg (112 lb)   11/17/23 49.9 kg (110 lb)   11/06/23 50.8 kg (112 lb 1.6 oz)   10/12/23 51 kg (112 lb 6.4 oz)   10/05/23 51 kg (112 lb 6.4 oz)   Patient weight loss does not meet criteria.    Dosing Weight: 50.5 kg-actual BW used.    ASSESSED NUTRITION NEEDS  Estimated Energy Needs: 1,262-1,515 kcals/day (25 - 30 kcals/kg)  Justification: Maintenance  Estimated Protein Needs: 61-76 grams protein/day (1.2 - 1.5 grams of pro/kg)  Justification: Increased needs  Estimated Fluid Needs: 1,262-1,515  mL/day (1 mL/kcal)   Justification: Per provider pending fluid status    PHYSICAL FINDINGS  See malnutrition section below.  Patient on Bipap    MALNUTRITION  % Intake: Decreased intake does not meet criteria  % Weight Loss: Weight loss does not meet criteria  Subcutaneous Fat Loss: Unable to assess  Muscle Loss: Unable to assess  Fluid Accumulation/Edema: None noted  Malnutrition Diagnosis: Patient does not meet two of the established criteria necessary for diagnosing malnutrition    NUTRITION DIAGNOSIS  Inadequate oral intake related to increased work of breathing as evidenced by patient unable to tolerate current oral diet.      INTERVENTIONS  Implementation  Nutrition Education: Will be provided if education needs arise   Collaboration with other providers-IDT rounds  Medical food supplement therapy-Ensure enlive with meals  Modify composition of meals/snacks-easy to chew modification    Goals  Patient to consume % of nutritionally adequate meal trays TID, or the equivalent with supplements/snacks.     Monitoring/Evaluation  Progress toward goals will be monitored and evaluated per protocol.  Radha Womack RDN, MONALISA  Clinical Dietitian  Office: [phone]  Weekend pager: [phone]

## 2024-04-23 NOTE — H&P
[facility]    History and Physical  Hospital Medicine       Date of Admission:  4/23/2024  Date of Service: 4/23/2024     Assessment & Plan   Luis Hernandez is a 58 year old male who presents on 4/23/2024 with progressive shortness of breath. He was found to have acute on chronic respiratory failure presumably from COPD exacerbation complicated by pneumonia. He is being admitted to ICU on BiPAP and IV antibiotic therapy.    Sepsis  Acute on chronic respiratory failure with hypoxia and hypercapnia, likely pneumonia complicated by exacerbation of COPD    Meets SIRS criteria with tachycardia and leukocytosis (46.3). No fever. Lactate WNL (1.4). CRP elevated (5.46). Procalcitonin normal (0.06). CXR with small hazy opacity over left lower lung laterally. Lungs with wheezing bilateral mid and lower lobes.  - Antibiotics for pneumonia as detailed below  - Continue BiPAP  - Bcx x 2 with NGTD (4/23)    Left lobe pneumonia    CXR with small hazy opacity over left lower lung laterally. Procalcitonin WNL. Recent concern by heme/onc 4/12 PET scan for pneumonia and started on Augmentin in which has completed 5 days of PTA. PET scan 4/11 during that time showing resolved consolidation RADHA pneumonia, however there was new patchy focal consolidation in RML and LLL which could represent infection/inflammation.  - Continue ceftriaxone 1 g q24hs, azithromycin 500 mg q24hrs, and metronidazole 500 mg q12hrs.  - Sputum culture, pending  - Legionella urine antigen and strep pneumo urine antigen ordered  - Respiratory panel (positive for rhino/entero virus)    Chronic obstructive pulmonary disease (COPD) with exacerbation  Chronic oxygen dependence    H/o severe COPD (FEV1 18%) on continuous oxygen 2 liters at baseline. Significant smoking history of 60 pack years. No increased oxygen demands PTA and denies hypoxia. Found to be in respiratory distress and placed on BiPAP. VBG without acidosis (7.33) and mild  hypercapnia (61). Recently started prednisone taper 4/11 by oncology. Given solumedrol in ED along with duoneb x 2. Managed PTA with tiotropium (Spiriva Respimat), roflumilast 500 mg, fluticasone-salmeterol (Advair) BID, normal saline nebulizer BID, and PRN albuterol inhaler.   - Continue BiPAP  - Continue PTA tiotropium (Spiriva Respimat), roflumilast 500 mg, fluticasone-salmeterol (Advair) BID, and normal saline nebs BID.  - Duoneb q4hrs  - Guaifenesin 1,200 mg BID  - Prednisone 40 mg daily (starting 4/24)  - Repeat VBG in A.M.    Tachycardia    Presented tachycardic 117. Tachycardia has been persistent and up to 135. Troponin WNL (11). Initial EKG showing sinus tachycardia with non-specific ST depression. Repeat EKG showing sinus tachycardia with unchanged non-specific ST depression. Denies chest pain, tightness, pressure, or discomfort. Does not appear volume down, however did respond some to 1 liter fluid bolus. Confirmed that PTA atenolol was taken 4/22. Initially held for concern for developing sepsis and did not want to blunt cardiac response. Was provided steroids which may be contributing to tachycardia. TSH normal (0.77). Considering pulmonary embolism as source of tachycardia, however given normal d-dimer did not pursue further imaging of chest.  - Telemetry to monitor  - 500 mL fluid bolus  - Continue PTA atenolol 25 mg  - Echocardiogram ordered    Squamous cell lung cancer, stage IIIB  Bilateral lung nodules, indeterminate    Has right parahilar squamous cell cancer with patent right bronchiole stent in place. Follows with oncology and currently undergoing palliative carboplatin, Taxol, pembrolizumab with Neulasta. Has completed x 3 cycles, last received 4/12. Not a surgical nor definite chemoradiation candidate given severe COPD. PET scan 4/11 shows near CR of the primary R perihilar mass and stable mildly avid thoracic nodes. 6 mm RUL nodule stable in size but slightly more avid.  - Continue with  hematology/oncology follow-up as planned.    Hypertension    Chronic. Blood pressures reviewed and hypertensive on presentation. BP normalized and borderline soft at times. Managed PTA with amlodipine 10 mg, lisinopril 40 mg, and atenolol 25 mg.  - Holding amlodipine and lisinopril  - Continue atenolol    Chronic hyponatremia    Sodium 131. Baseline sodium 131-133. Appears stable.  - Monitor with daily BMP    Chronic anemia    Hemoglobin 9.9 on admission. Baseline hemoglobin 10-11. No reported melena or hematochezia.   - Monitor daily with CBC       Diet: Regular Diet Adult  Snacks/Supplements Adult: Ensure Enlive; With Meals  DVT Prophylaxis: Pneumatic Compression Devices  Miller Catheter: Not present  Code Status: No CPR / Intubation OK  Lines: PIV    Disposition Plan   Medically Ready for Discharge: Anticipated in 2-4 Days    I have discussed patient and formulated plan with attending hospitalist physician, Dr. Ortiz.    JOZEF DOLAN PA-C        Primary Care Physician   System, Provider Not In None    History is obtained from the patient, emergency department physician, and review of old records via the EMR.    History of Present Illness   Luis Hernandez is a 58 year old male with past medical history of COPD with oxygen dependence, lung cancer on current palliative treatment, chronic anemia, chronic hyponatremia, pulmonary hypertension, and hypertension now presents on 4/23/2024 with worsening shortness of breath beyond baseline oxygen dependence.    Luis states that he has become progressively short of breath over the last few days. He has severe COPD with 2 liters oxygen dependence at baseline and squamous cell lung cancer with current palliative care. Last palliative treatment 4/12 which was the third round. COPD managed with iotropium (Spiriva Respimat), roflumilast 500 mg, fluticasone-salmeterol (Advair) BID, normal saline nebulizer BID, and PRN albuterol inhaler.  He denies fever or chills. He  endorsees worsening productive cough and feels congested in the chest. No other URI symptoms. He denies need to increased baseline oxygen or noticing hypoxia with checking his oxygen levels. He follows with oncology who recently prescribed course of Augment in which he has completed 5 days of for potential pneumonia in the setting of leukocytosis. Also at that time prescribed course of prednisone taper for presumed COPD component. He denies chest pain, tightness, pressure, or discomfort. No abdominal pain, nausea, vomiting, or diarrhea. No urinary symptoms. He was brought via EMS in severe distress and placed on BiPAP on arrival.    Review of Systems   Review of Systems   Constitutional:  Negative for chills and fever.   Respiratory:  Positive for cough, sputum production, shortness of breath and wheezing.    Cardiovascular:  Negative for chest pain and leg swelling.   Gastrointestinal:  Negative for abdominal pain, blood in stool, constipation, diarrhea, melena, nausea and vomiting.   Genitourinary:  Negative for dysuria and frequency.   Neurological:  Negative for headaches.     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)     COPD exacerbation (H) 4/1/2019    COPD exacerbation (H) 2/16/2020    Erectile dysfunction     Hypertension     Hyponatremia 4/1/2019    Pneumonia 4/10/2020     Past Surgical History   Past Surgical History:   Procedure Laterality Date    APPENDECTOMY      childhood    BRONCHOSCOPY, DILATE BRONCHUS, STENT BRONCHUS, COMBINED N/A 1/18/2024    Procedure: Bronchoscopy with tissue debulking,  and stent placement.;  Surgeon: Isac Prescott MD;  Location: [facility] OR    ENDOBRONCHIAL ULTRASOUND FLEXIBLE N/A 1/18/2024     Procedure: Endobronchial ultrasound with Endobronchial and Cryo biopsy.;  Surgeon: Isac Prescott MD;  Location: [facility] OR      Prior to Admission Medications   Prior to Admission Medications   Prescriptions Last Dose Informant Patient Reported? Taking?   albuterol (PROAIR HFA/PROVENTIL HFA/VENTOLIN HFA) 108 (90 Base) MCG/ACT inhaler 4/23/2024 at 0500 Self No Yes   Sig: Inhale 2 puffs into the lungs every 4 hours as needed for shortness of breath   amLODIPine (NORVASC) 10 MG tablet 4/22/2024 at am Self No Yes   Sig: Take 1 tablet (10 mg) by mouth daily   amoxicillin-clavulanate (AUGMENTIN) 875-125 MG tablet 4/22/2024 at 1700, day 5 Self No Yes   Sig: Take 1 tablet by mouth 2 times daily for 10 days   atenolol (TENORMIN) 25 MG tablet 4/22/2024 at am Self No Yes   Sig: Take 1 tablet (25 mg) by mouth daily   dexAMETHasone (DECADRON) 4 MG tablet Past Month Self No Yes   Sig: Take 2 tablets (8 mg) by mouth daily Take for 3 days, starting the day after chemo. Take with food.   fluticasone-salmeterol (ADVAIR) 500-50 MCG/ACT inhaler 4/22/2024 at pm Self No Yes   Sig: Inhale 1 puff into the lungs every 12 hours   guaiFENesin (MUCINEX MAXIMUM STRENGTH) 1200 MG TB12 4/22/2024 at am Self Yes Yes   Sig: Take 1 tablet by mouth daily   lisinopril (ZESTRIL) 40 MG tablet 4/22/2024 at am Self No Yes   Sig: Take 1 tablet (40 mg) by mouth daily   magnesium oxide (MAG-OX) 400 MG tablet 4/22/2024 at lunch Self Yes Yes   Sig: Take 400 mg by mouth daily (with lunch) For leg cramps   ondansetron (ZOFRAN) 8 MG tablet never used at on hand if needed Self No Yes   Sig: Take 1 tablet (8 mg) by mouth every 8 hours as needed for nausea (vomiting)   order for DME Unknown Self No Yes   Sig: Equipment being ordered: Nebulizer   order for DME 4/23/2024 at am Self No Yes   Sig: Equipment being ordered: Oxygen 3L via NC continuous.  O2 saturated noted to be 86% on room air at rest.   Patient taking differently: 2 L Equipment being ordered: Oxygen 3L via  NC continuous.  O2 saturated noted to be 86% on room air at rest.   predniSONE (DELTASONE) 10 MG tablet 4/22/2024 at am 40 mg day 5 Self No Yes   Sig: Take 40 mg (2 tabs) daily for 5 days then taper as follow: THEN start 30 mg daily for 3 days followed by 20 mg daily for 3 days then 10 mg daily thereafter.   prochlorperazine (COMPAZINE) 10 MG tablet never used at on hand if needed Self No Yes   Sig: Take 1 tablet (10 mg) by mouth every 6 hours as needed for nausea or vomiting   roflumilast (DALIRESP) 500 MCG TABS tablet 4/22/2024 at am Self No Yes   Sig: Take 1 tablet (500 mcg) by mouth daily   sodium chloride 0.9 % neb solution Past Month at prn Self No Yes   Sig: Take 3 mLs by nebulization 2 times daily   tiotropium (SPIRIVA RESPIMAT) 2.5 MCG/ACT inhaler 4/22/2024 at am Self No Yes   Sig: Inhale 2 puffs into the lungs daily      Facility-Administered Medications: None     Allergies   Allergies   Allergen Reactions    Hctz Other (See Comments)     He has hyponatremia - avoid use    Penicillins Unknown     Childhood reaction.     Family History    Family History   Problem Relation Age of Onset    Hypertension Mother     Ovarian Cancer Mother     Breast Cancer Mother     Hypertension Father     Lipids Father     C.A.D. Father     Heart Disease Father     Chronic Obstructive Pulmonary Disease Father     Cerebrovascular Disease Father     Diabetes Paternal Grandmother     Chronic Obstructive Pulmonary Disease Paternal Grandfather      Social History   Social History     Socioeconomic History    Marital status: Single     Spouse name: Not on file    Number of children: Not on file    Years of education: Not on file    Highest education level: Not on file   Occupational History    Not on file   Tobacco Use    Smoking status: Former     Current packs/day: 0.00     Average packs/day: 2.0 packs/day for 30.0 years (60.0 ttl pk-yrs)     Types: Cigarettes     Start date: 08/1991     Quit date: 08/2021     Years since quitting:  2.7    Smokeless tobacco: Former   Vaping Use    Vaping status: Never Used   Substance and Sexual Activity    Alcohol use: Yes     Comment: rare    Drug use: No    Sexual activity: Yes     Partners: Female   Other Topics Concern    Parent/sibling w/ CABG, MI or angioplasty before 65F 55M? No   Social History Narrative    The patient has a 60+ pk yr tobacco hx.  He has has no active use, quit Jan 2014.  Alcohol use is <1 alcoholic drinks per week.  He denies use of recreational drugs.  He is employed. [employer].  Works nights.   The patient is .  Has 1 child.Hot Tub Exposure: NORecent Travel: NO Hx of incarceration:  NOBird Exposure:   NOAnimal Exposure:  NOInhalation Exposure:  NO        Lives in [place], [place] with father. 2 dogs.          Social Determinants of Health     Financial Resource Strain: Low Risk  (1/9/2024)    Financial Resource Strain     Within the past 12 months, have you or your family members you live with been unable to get utilities (heat, electricity) when it was really needed?: No   Food Insecurity: Low Risk  (1/9/2024)    Food Insecurity     Within the past 12 months, did you worry that your food would run out before you got money to buy more?: No     Within the past 12 months, did the food you bought just not last and you didn t have money to get more?: No   Transportation Needs: Low Risk  (1/9/2024)    Transportation Needs     Within the past 12 months, has lack of transportation kept you from medical appointments, getting your medicines, non-medical meetings or appointments, work, or from getting things that you need?: No   Physical Activity: Inactive (8/30/2021)    Exercise Vital Sign     Days of Exercise per Week: 0 days     Minutes of Exercise per Session: 0 min   Stress: Not on file   Social Connections: Unknown (8/30/2021)    Social Connection and Isolation Panel [NHANES]     Frequency of Communication with Friends and Family: Twice a week     Frequency of Social Gatherings  "with Friends and Family: Twice a week     Attends Sabianist Services: Never     Active Member of Clubs or Organizations: No     Attends Club or Organization Meetings: Never     Marital Status: Not on file   Interpersonal Safety: Low Risk  (10/12/2023)    Interpersonal Safety     Do you feel physically and emotionally safe where you currently live?: Yes     Within the past 12 months, have you been hit, slapped, kicked or otherwise physically hurt by someone?: No     Within the past 12 months, have you been humiliated or emotionally abused in other ways by your partner or ex-partner?: No   Housing Stability: Low Risk  (1/9/2024)    Housing Stability     Do you have housing? : Yes     Are you worried about losing your housing?: No     Physical Exam   /79   Pulse 112   Temp 97.7  F (36.5  C)   Resp 20   Ht 1.626 m (5' 4\")   Wt 50.5 kg (111 lb 4.8 oz)   SpO2 96%   BMI 19.10 kg/m       Weight: 111 lbs 4.8 oz Body mass index is 19.1 kg/m .     Constitutional: Sitting upright in bed wearing BiPAP, elderly male appears older than stated age, alert, oriented x 4, cooperative, polite, no apparent distress, appears nontoxic.   Eyes: Eyes are clear, pupils are reactive.  HENT: Oropharynx is clear and moist. No evidence of cranial trauma.  Lymph/Hematologic: No epitrochlear, axillary, anterior or posterior cervical, or supraclavicular lymphadenopathy is appreciated.  Cardiovascular: Tachycardic rate and , normal S1 and S2, and no murmur noted. JVP is normal. Strong and regular radial pulse. No lower extremity edema bilaterally.  Respiratory: Lungs with poor air movement diffusely. Wheezes notable to mid and lower lungs bilaterally. No crackles. Oxygenating well on BiPAP. Mild increased work of breathing. No tripoding.   GI: Soft, flat, non-tender to palpation throughout, normal bowel sounds, no hepatomegaly.  Genitourinary: Deferred  Musculoskeletal: Normal muscle bulk and tone.  Skin: Warm and dry, no rashes. "   Neurologic: Neck supple. Cranial nerves are grossly intact.  is symmetric.     Data   Data reviewed today:     I have personally reviewed the following data over the past 24 hrs:    46.3 (H)  \   9.9 (L)   / 404     131 (L) 93 (L) 9.9 /  130 (H)   4.6 26 0.56 (L) \     ALT: 19 AST: 16 AP: 205 (H) TBILI: <0.2   ALB: 4.2 TOT PROTEIN: 6.9 LIPASE: N/A     Trop: 11 BNP: N/A     TSH: 0.77 T4: N/A A1C: N/A     Procal: 0.06 CRP: 5.46 (H) Lactic Acid: 1.4       INR:  N/A PTT:  N/A   D-dimer:  0.30 Fibrinogen:  N/A       Recent Results (from the past 24 hour(s))   XR Chest Port 1 View    Narrative    EXAM: XR CHEST PORT 1 VIEW  LOCATION: [facility]  DATE: 4/23/2024    INDICATION: Dyspnea, cough.  COMPARISON: 01/18/2024.      Impression    IMPRESSION: Normal heart size and pulmonary vascularity. Lungs are hyperinflated with emphysematous changes. Small hazy opacity over the left lower lung laterally could be seen with a focal area of pneumonitis and clinical correlation is needed.   Suspected prominent nipple shadow projects over the mid to lower lungs bilaterally. Stent material projects over the right side of the mediastinum, unchanged. Aortic calcification. No significant bony abnormalities.

## 2024-04-23 NOTE — PROGRESS NOTES
TELE ICU    New admission to the ICU.  I have reviewed the EMR and the TELE camera facilitated initial TELE evaluation of the patient.  The patient appears comfortable on BiPAP (18/8; rate 17, and FIO2 28%), and the patient is able to speak in full sentences.  The patient states that his shortness of breath did not come on suddenly but began rather slowly.  The patient is a 58 year old man with acute on chronic respiratory failure with hypoxia and hypercapnia.  The patient feels that he is getting enough air and he has no pain or chest tightness.   The patient has been treated for a right para-hilar squamous cell cancer and and continues  to undergo palliative chemo with carboplatin, Taxol, and pembrolizumab with Neulasta support.  The patient has a 60 pack year smoking history.      PAST MEDICAL HISTORY:  Past Medical History:   Diagnosis Date    Acute on chronic respiratory failure with hypoxia (H) 4/10/2020    Acute on chronic respiratory failure with hypoxia and hypercapnia (H) 4/1/2019    CAP (community acquired pneumonia) 4/14/2020    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)     COPD exacerbation (H) 4/1/2019    COPD exacerbation (H) 2/16/2020    Erectile dysfunction     Hypertension     Hyponatremia 4/1/2019    Pneumonia 4/10/2020      PAST SURGICAL HISTORY:  Past Surgical History:   Procedure Laterality Date    APPENDECTOMY      childhood    BRONCHOSCOPY, DILATE BRONCHUS, STENT BRONCHUS, COMBINED N/A 1/18/2024    Procedure: Bronchoscopy with tissue debulking,  and stent placement.;  Surgeon: Isac Prescott MD;  Location: [facility] OR    ENDOBRONCHIAL ULTRASOUND FLEXIBLE N/A 1/18/2024    Procedure: Endobronchial ultrasound with Endobronchial and Cryo biopsy.;  Surgeon: Isac Prescott MD;  Location: [facility] OR     MEDICATIONS:  Current Facility-Administered Medications   Medication Dose Route Frequency Provider Last Rate Last Admin    [Held by provider] amLODIPine (NORVASC) tablet 10 mg  10 mg  Oral Daily Bryan Mace PA-C        [Held by provider] atenolol (TENORMIN) tablet 25 mg  25 mg Oral Daily Bryan Mace PA-C        azithromycin (ZITHROMAX) 500 mg in sodium chloride 0.9 % 250 mL intermittent infusion  500 mg Intravenous Q24H Bryan Mace PA-C   Stopped at 04/23/24 0810    calcium carbonate (TUMS) chewable tablet 1,000 mg  1,000 mg Oral 4x Daily PRN Sylvestre Blanton MD        [START ON 4/24/2024] cefTRIAXone (ROCEPHIN) 1 g vial to attach to  mL bag for ADULTS or NS 50 mL bag for PEDS  1 g Intravenous Q24H Bryan Mace PA-C        glucose gel 15-30 g  15-30 g Oral Q15 Min PRN Bryan Mace PA-C        Or    dextrose 50 % injection 25-50 mL  25-50 mL Intravenous Q15 Min PRN Bryan Mace PA-C        Or    glucagon injection 1 mg  1 mg Subcutaneous Q15 Min PRN Bryan Mace PA-C        fluticasone-vilanterol (BREO ELLIPTA) 200-25 MCG/ACT inhaler 1 puff  1 puff Inhalation Daily Bryan Mace PA-C   1 puff at 04/23/24 0943    guaiFENesin (MUCINEX) 12 hr tablet 1,200 mg  1,200 mg Oral BID Bryan Mace PA-C   1,200 mg at 04/23/24 0943    ipratropium - albuterol 0.5 mg/2.5 mg/3 mL (DUONEB) neb solution 3 mL  3 mL Nebulization Q4H Bryan Mace PA-C   3 mL at 04/23/24 0848    [Held by provider] lisinopril (ZESTRIL) tablet 40 mg  40 mg Oral Daily Bryan Mace PA-C        ondansetron (ZOFRAN ODT) ODT tab 4 mg  4 mg Oral Q6H PRN Bryan Mace PA-C        Or    ondansetron (ZOFRAN) injection 4 mg  4 mg Intravenous Q6H PRN Bryan Mace PA-C        [START ON 4/24/2024] predniSONE (DELTASONE) tablet 40 mg  40 mg Oral Daily Bryan Mace PA-C        roflumilast (DALIRESP) tablet 500 mcg  500 mcg Oral Daily Bryan Mace PA-C   500 mcg at 04/23/24 0954    sodium chloride 0.9 % neb solution 3 mL  3 mL Nebulization BID Bryan aMce PA-C   3 mL at 04/23/24 0849    umeclidinium (INCRUSE ELLIPTA) 62.5 MCG/ACT inhaler 1 puff  1 puff Inhalation Daily Seth Ortiz  MD HEVER   1 puff at 24 0943     ALLERGIES:  Allergies   Allergen Reactions    Hctz Other (See Comments)     He has hyponatremia - avoid use    Penicillins Unknown     Childhood reaction.     SOCIAL HISTORY:  Social History     Socioeconomic History    Marital status: Single   Tobacco Use    Smoking status: Former     Current packs/day: 0.00     Average packs/day: 2.0 packs/day for 30.0 years (60.0 ttl pk-yrs)     Types: Cigarettes     Start date: 1991     Quit date: 2021     Years since quittin.7    Smokeless tobacco: Former   Vaping Use    Vaping status: Never Used   Substance and Sexual Activity    Alcohol use: Yes     Comment: rare    Drug use: No    Sexual activity: Yes     Partners: Female   Other Topics Concern    Parent/sibling w/ CABG, MI or angioplasty before 65F 55M? No   Social History Narrative    The patient has a 60+ pk yr tobacco hx.  He has has no active use, quit 2014.  Alcohol use is <1 alcoholic drinks per week.  He denies use of recreational drugs.  He is employed. [employer].  Works nights.   The patient is .  Has 1 child.Hot Tub Exposure: NORecent Travel: NO Hx of incarceration:  NOBird Exposure:   NOAnimal Exposure:  NOInhalation Exposure:  NO        Lives in [place], [place] with father. 2 dogs.          Social Determinants of Health     Financial Resource Strain: Low Risk  (2024)    Financial Resource Strain     Within the past 12 months, have you or your family members you live with been unable to get utilities (heat, electricity) when it was really needed?: No   Food Insecurity: Low Risk  (2024)    Food Insecurity     Within the past 12 months, did you worry that your food would run out before you got money to buy more?: No     Within the past 12 months, did the food you bought just not last and you didn t have money to get more?: No   Transportation Needs: Low Risk  (2024)    Transportation Needs     Within the past 12 months, has lack of  "transportation kept you from medical appointments, getting your medicines, non-medical meetings or appointments, work, or from getting things that you need?: No   Physical Activity: Inactive (8/30/2021)    Exercise Vital Sign     Days of Exercise per Week: 0 days     Minutes of Exercise per Session: 0 min   Social Connections: Unknown (8/30/2021)    Social Connection and Isolation Panel [NHANES]     Frequency of Communication with Friends and Family: Twice a week     Frequency of Social Gatherings with Friends and Family: Twice a week     Attends Mandaeism Services: Never     Active Member of Clubs or Organizations: No     Attends Club or Organization Meetings: Never   Interpersonal Safety: Low Risk  (10/12/2023)    Interpersonal Safety     Do you feel physically and emotionally safe where you currently live?: Yes     Within the past 12 months, have you been hit, slapped, kicked or otherwise physically hurt by someone?: No     Within the past 12 months, have you been humiliated or emotionally abused in other ways by your partner or ex-partner?: No   Housing Stability: Low Risk  (1/9/2024)    Housing Stability     Do you have housing? : Yes     Are you worried about losing your housing?: No     FAMILY HISTORY:  Family History   Problem Relation Age of Onset    Hypertension Mother     Ovarian Cancer Mother     Breast Cancer Mother     Hypertension Father     Lipids Father     C.A.D. Father     Heart Disease Father     Chronic Obstructive Pulmonary Disease Father     Cerebrovascular Disease Father     Diabetes Paternal Grandmother     Chronic Obstructive Pulmonary Disease Paternal Grandfather      Vital Signs: /83   Pulse 120   Temp 98.2  F (36.8  C) (Axillary)   Resp 15   Ht 1.626 m (5' 4\")   Wt 50.5 kg (111 lb 4.8 oz)   SpO2 96%   BMI 19.10 kg/m      The patient is a 58 year old man with acute on chronic respiratory failure with hypoxia and hypercapnia.  The patient feel he is getting enough air and he " has no pain or chest tightness.   The patient has a right parahilar squamous cell cancer with a patent right bronchiole stent in place; the patient is undergoing palliative chemo with carboplatin, Taxol, and pembrolizumab with Neulasta support.  The patient has a 60 pack year smoking history.  I agree with the ICU care plan to continue bronchodilators, steroids, antibiotics, and eventually resume palliative chemotherapy when the patient becomes a candidate again.  I have discussed the patient's presentation, ICU assessment and treatment plan with the bedside critical care provider, Bryan Mace today.      Odilon Serrano MD, PhD

## 2024-04-24 LAB
ANION GAP SERPL CALCULATED.3IONS-SCNC: 14 MMOL/L (ref 7–15)
BASE EXCESS BLDV CALC-SCNC: 1.3 MMOL/L (ref -3–3)
BASOPHILS # BLD MANUAL: 0 10E3/UL (ref 0–0.2)
BASOPHILS NFR BLD MANUAL: 0 %
BUN SERPL-MCNC: 14.8 MG/DL (ref 6–20)
CALCIUM SERPL-MCNC: 9.3 MG/DL (ref 8.6–10)
CHLORIDE SERPL-SCNC: 98 MMOL/L (ref 98–107)
CREAT SERPL-MCNC: 0.52 MG/DL (ref 0.67–1.17)
CRP SERPL-MCNC: <3 MG/L
DEPRECATED HCO3 PLAS-SCNC: 21 MMOL/L (ref 22–29)
EGFRCR SERPLBLD CKD-EPI 2021: >90 ML/MIN/1.73M2
EOSINOPHIL # BLD MANUAL: 0 10E3/UL (ref 0–0.7)
EOSINOPHIL NFR BLD MANUAL: 0 %
ERYTHROCYTE [DISTWIDTH] IN BLOOD BY AUTOMATED COUNT: 17.1 % (ref 10–15)
GLUCOSE BLDC GLUCOMTR-MCNC: 159 MG/DL (ref 70–99)
GLUCOSE BLDC GLUCOMTR-MCNC: 90 MG/DL (ref 70–99)
GLUCOSE BLDC GLUCOMTR-MCNC: 93 MG/DL (ref 70–99)
GLUCOSE BLDC GLUCOMTR-MCNC: 95 MG/DL (ref 70–99)
GLUCOSE SERPL-MCNC: 87 MG/DL (ref 70–99)
HCO3 BLDV-SCNC: 28 MMOL/L (ref 21–28)
HCT VFR BLD AUTO: 28.5 % (ref 40–53)
HGB BLD-MCNC: 9 G/DL (ref 13.3–17.7)
LYMPHOCYTES # BLD MANUAL: 1.9 10E3/UL (ref 0.8–5.3)
LYMPHOCYTES NFR BLD MANUAL: 4 %
MCH RBC QN AUTO: 29.6 PG (ref 26.5–33)
MCHC RBC AUTO-ENTMCNC: 31.6 G/DL (ref 31.5–36.5)
MCV RBC AUTO: 94 FL (ref 78–100)
MONOCYTES # BLD MANUAL: 3.8 10E3/UL (ref 0–1.3)
MONOCYTES NFR BLD MANUAL: 8 %
MYELOCYTES # BLD MANUAL: 0.5 10E3/UL
MYELOCYTES NFR BLD MANUAL: 1 %
NEUTROPHILS # BLD MANUAL: 41.6 10E3/UL (ref 1.6–8.3)
NEUTROPHILS NFR BLD MANUAL: 87 %
NRBC # BLD AUTO: 0 10E3/UL
NRBC BLD AUTO-RTO: 0 /100
O2/TOTAL GAS SETTING VFR VENT: 26 %
OXYHGB MFR BLDV: 91 % (ref 70–75)
PCO2 BLDV: 50 MM HG (ref 40–50)
PH BLDV: 7.36 [PH] (ref 7.32–7.43)
PLAT MORPH BLD: ABNORMAL
PLATELET # BLD AUTO: 353 10E3/UL (ref 150–450)
PO2 BLDV: 68 MM HG (ref 25–47)
POTASSIUM SERPL-SCNC: 4.8 MMOL/L (ref 3.4–5.3)
RBC # BLD AUTO: 3.04 10E6/UL (ref 4.4–5.9)
RBC MORPH BLD: ABNORMAL
RETICS # AUTO: 0.06 10E6/UL (ref 0.03–0.1)
RETICS/RBC NFR AUTO: 1.9 % (ref 0.5–2)
SAO2 % BLDV: 91.4 % (ref 70–75)
SODIUM SERPL-SCNC: 133 MMOL/L (ref 135–145)
WBC # BLD AUTO: 47.8 10E3/UL (ref 4–11)
WBC # BLD AUTO: 47.8 10E3/UL (ref 4–11)

## 2024-04-24 PROCEDURE — 250N000013 HC RX MED GY IP 250 OP 250 PS 637: Performed by: FAMILY MEDICINE

## 2024-04-24 PROCEDURE — 200N000001 HC R&B ICU

## 2024-04-24 PROCEDURE — 82805 BLOOD GASES W/O2 SATURATION: CPT

## 2024-04-24 PROCEDURE — 94640 AIRWAY INHALATION TREATMENT: CPT | Mod: 76

## 2024-04-24 PROCEDURE — 86140 C-REACTIVE PROTEIN: CPT | Performed by: FAMILY MEDICINE

## 2024-04-24 PROCEDURE — 94660 CPAP INITIATION&MGMT: CPT

## 2024-04-24 PROCEDURE — 250N000011 HC RX IP 250 OP 636

## 2024-04-24 PROCEDURE — 99291 CRITICAL CARE FIRST HOUR: CPT | Performed by: FAMILY MEDICINE

## 2024-04-24 PROCEDURE — 85045 AUTOMATED RETICULOCYTE COUNT: CPT | Performed by: FAMILY MEDICINE

## 2024-04-24 PROCEDURE — 250N000013 HC RX MED GY IP 250 OP 250 PS 637

## 2024-04-24 PROCEDURE — 80048 BASIC METABOLIC PNL TOTAL CA: CPT

## 2024-04-24 PROCEDURE — 85025 COMPLETE CBC W/AUTO DIFF WBC: CPT

## 2024-04-24 PROCEDURE — 258N000003 HC RX IP 258 OP 636

## 2024-04-24 PROCEDURE — 94640 AIRWAY INHALATION TREATMENT: CPT

## 2024-04-24 PROCEDURE — 36415 COLL VENOUS BLD VENIPUNCTURE: CPT

## 2024-04-24 PROCEDURE — 250N000012 HC RX MED GY IP 250 OP 636 PS 637

## 2024-04-24 PROCEDURE — 250N000009 HC RX 250

## 2024-04-24 PROCEDURE — 250N000011 HC RX IP 250 OP 636: Mod: JZ

## 2024-04-24 PROCEDURE — 999N000157 HC STATISTIC RCP TIME EA 10 MIN

## 2024-04-24 PROCEDURE — 85007 BL SMEAR W/DIFF WBC COUNT: CPT | Performed by: FAMILY MEDICINE

## 2024-04-24 PROCEDURE — 250N000011 HC RX IP 250 OP 636: Performed by: FAMILY MEDICINE

## 2024-04-24 RX ORDER — NALOXONE HYDROCHLORIDE 0.4 MG/ML
0.4 INJECTION, SOLUTION INTRAMUSCULAR; INTRAVENOUS; SUBCUTANEOUS
Status: DISCONTINUED | OUTPATIENT
Start: 2024-04-24 | End: 2024-05-01 | Stop reason: HOSPADM

## 2024-04-24 RX ORDER — NALOXONE HYDROCHLORIDE 0.4 MG/ML
0.2 INJECTION, SOLUTION INTRAMUSCULAR; INTRAVENOUS; SUBCUTANEOUS
Status: DISCONTINUED | OUTPATIENT
Start: 2024-04-24 | End: 2024-05-01 | Stop reason: HOSPADM

## 2024-04-24 RX ORDER — LIDOCAINE 40 MG/G
CREAM TOPICAL
Status: DISCONTINUED | OUTPATIENT
Start: 2024-04-24 | End: 2024-05-01 | Stop reason: HOSPADM

## 2024-04-24 RX ORDER — MORPHINE SULFATE 4 MG/ML
4 INJECTION, SOLUTION INTRAMUSCULAR; INTRAVENOUS EVERY 4 HOURS PRN
Status: DISCONTINUED | OUTPATIENT
Start: 2024-04-24 | End: 2024-05-01

## 2024-04-24 RX ORDER — LISINOPRIL 40 MG/1
40 TABLET ORAL DAILY
Status: DISCONTINUED | OUTPATIENT
Start: 2024-04-24 | End: 2024-05-01 | Stop reason: HOSPADM

## 2024-04-24 RX ORDER — ENOXAPARIN SODIUM 100 MG/ML
40 INJECTION SUBCUTANEOUS EVERY 24 HOURS
Status: DISCONTINUED | OUTPATIENT
Start: 2024-04-24 | End: 2024-05-01 | Stop reason: HOSPADM

## 2024-04-24 RX ADMIN — MORPHINE SULFATE 4 MG: 4 INJECTION, SOLUTION INTRAMUSCULAR; INTRAVENOUS at 09:12

## 2024-04-24 RX ADMIN — LISINOPRIL 40 MG: 40 TABLET ORAL at 11:09

## 2024-04-24 RX ADMIN — MORPHINE SULFATE 4 MG: 4 INJECTION, SOLUTION INTRAMUSCULAR; INTRAVENOUS at 20:15

## 2024-04-24 RX ADMIN — IPRATROPIUM BROMIDE AND ALBUTEROL SULFATE 3 ML: 2.5; .5 SOLUTION RESPIRATORY (INHALATION) at 23:56

## 2024-04-24 RX ADMIN — PREDNISONE 40 MG: 20 TABLET ORAL at 07:55

## 2024-04-24 RX ADMIN — ATENOLOL 25 MG: 25 TABLET ORAL at 07:55

## 2024-04-24 RX ADMIN — GUAIFENESIN 1200 MG: 600 TABLET ORAL at 07:55

## 2024-04-24 RX ADMIN — FLUTICASONE FUROATE AND VILANTEROL TRIFENATATE 1 PUFF: 200; 25 POWDER RESPIRATORY (INHALATION) at 07:55

## 2024-04-24 RX ADMIN — METRONIDAZOLE 500 MG: 500 INJECTION, SOLUTION INTRAVENOUS at 23:39

## 2024-04-24 RX ADMIN — IPRATROPIUM BROMIDE AND ALBUTEROL SULFATE 3 ML: 2.5; .5 SOLUTION RESPIRATORY (INHALATION) at 16:16

## 2024-04-24 RX ADMIN — CEFTRIAXONE SODIUM 1 G: 1 INJECTION, POWDER, FOR SOLUTION INTRAMUSCULAR; INTRAVENOUS at 05:52

## 2024-04-24 RX ADMIN — IPRATROPIUM BROMIDE AND ALBUTEROL SULFATE 3 ML: 2.5; .5 SOLUTION RESPIRATORY (INHALATION) at 11:02

## 2024-04-24 RX ADMIN — IPRATROPIUM BROMIDE AND ALBUTEROL SULFATE 3 ML: 2.5; .5 SOLUTION RESPIRATORY (INHALATION) at 20:14

## 2024-04-24 RX ADMIN — UMECLIDINIUM 1 PUFF: 62.5 AEROSOL, POWDER ORAL at 07:55

## 2024-04-24 RX ADMIN — MORPHINE SULFATE 4 MG: 4 INJECTION, SOLUTION INTRAMUSCULAR; INTRAVENOUS at 13:17

## 2024-04-24 RX ADMIN — ISODIUM CHLORIDE 3 ML: 0.03 SOLUTION RESPIRATORY (INHALATION) at 20:18

## 2024-04-24 RX ADMIN — ROFLUMILAST 500 MCG: 500 TABLET ORAL at 07:55

## 2024-04-24 RX ADMIN — METRONIDAZOLE 500 MG: 500 INJECTION, SOLUTION INTRAVENOUS at 11:09

## 2024-04-24 RX ADMIN — ENOXAPARIN SODIUM 40 MG: 40 INJECTION SUBCUTANEOUS at 11:09

## 2024-04-24 RX ADMIN — GUAIFENESIN 1200 MG: 600 TABLET ORAL at 20:12

## 2024-04-24 RX ADMIN — ONDANSETRON 4 MG: 2 INJECTION INTRAMUSCULAR; INTRAVENOUS at 17:32

## 2024-04-24 RX ADMIN — AZITHROMYCIN MONOHYDRATE 500 MG: 500 INJECTION, POWDER, LYOPHILIZED, FOR SOLUTION INTRAVENOUS at 06:31

## 2024-04-24 ASSESSMENT — ACTIVITIES OF DAILY LIVING (ADL)
ADLS_ACUITY_SCORE: 26
ADLS_ACUITY_SCORE: 28
ADLS_ACUITY_SCORE: 26
ADLS_ACUITY_SCORE: 24
ADLS_ACUITY_SCORE: 26
ADLS_ACUITY_SCORE: 28
ADLS_ACUITY_SCORE: 26
ADLS_ACUITY_SCORE: 28
ADLS_ACUITY_SCORE: 26
ADLS_ACUITY_SCORE: 24
ADLS_ACUITY_SCORE: 29
ADLS_ACUITY_SCORE: 29
ADLS_ACUITY_SCORE: 24
ADLS_ACUITY_SCORE: 28
ADLS_ACUITY_SCORE: 24
ADLS_ACUITY_SCORE: 28
ADLS_ACUITY_SCORE: 24
ADLS_ACUITY_SCORE: 26
ADLS_ACUITY_SCORE: 26

## 2024-04-24 NOTE — PROGRESS NOTES
[facility]           Assessment & Plan        Luis Hernandez is a 58 year old male who presents on 4/23/2024 with progressive shortness of breath. He was found to have acute on chronic respiratory failure presumably from COPD exacerbation complicated by pneumonia. He is being admitted to ICU on BiPAP and IV antibiotic therapy.     Sepsis  Acute on chronic respiratory failure with hypoxia and hypercapnia, likely pneumonia complicated by exacerbation of COPD as below     Meets SIRS criteria with tachycardia and leukocytosis (46.3). No fever. Lactate WNL (1.4). CRP elevated (5.46). Procalcitonin normal (0.06). CXR with small hazy opacity over left lower lung laterally. Lungs with wheezing bilateral mid and lower lobes.  - Antibiotics for pneumonia as detailed below  - Continue BiPAP  - Bcx x 2 with NGTD (4/23)     Left lobe pneumonia    CXR with small hazy opacity over left lower lung laterally. Procalcitonin WNL. Recent concern by heme/onc 4/12 PET scan for pneumonia and started on Augmentin in which has completed 5 days of PTA. PET scan 4/11 during that time showing resolved consolidation RADHA pneumonia, however there was new patchy focal consolidation in RML and LLL which could represent infection/inflammation.  - Continue ceftriaxone 1 g q24hs, azithromycin 500 mg q24hrs, and metronidazole 500 mg q12hrs.  - viral panel positive for rhinovirus/enterovirus - may be contributory, but still suspect underlying bacterial infection may be present as well.    - Sputum culture, pending  - Legionella urine antigen and strep pneumo urine antigen negative   - WBC still markedly up as below, but afebrile and overall appears to be improving      Chronic obstructive pulmonary disease (COPD) with exacerbation with acute on chronic respiratory failure primarily due to this   Chronic oxygen dependence    H/o severe COPD (FEV1 18%) on continuous oxygen 2 liters at baseline. Significant smoking history of  60 pack years. No increased oxygen demands PTA and denies hypoxia. Found to be in respiratory distress and placed on BiPAP. VBG without acidosis (7.33) and mild hypercapnia (61). Recently started prednisone taper 4/11 by oncology. Given solumedrol in ED along with duoneb x 2. Managed PTA with tiotropium (Spiriva Respimat), roflumilast 500 mg, fluticasone-salmeterol (Advair) BID, normal saline nebulizer BID, and PRN albuterol inhaler.   - admitted on BiPAP  - Continue PTA tiotropium (Spiriva Respimat), roflumilast 500 mg, fluticasone-salmeterol (Advair) BID, and normal saline nebs BID.  - Duoneb q4hrs  - Guaifenesin 1,200 mg BID  - Prednisone 40 mg daily (starting 4/24)  - VBG ok and sats 100% AM 4/24 with heart rate improving as below but patient still having episodes of severe dyspnea without other symptoms and has been refusing nebs.  Discussed with patient AM 4/24 - he will resume nebs and we'll try some prn morphine for air hunger and air hunger vs anxiety appears to be a significant contributor here.  If morphine ineffective, could try some prn ativan.    - continue on BIPAP      Tachycardia    Presented tachycardic 117. Tachycardia has been persistent and up to 135. Troponin WNL (11). Initial EKG showing sinus tachycardia with non-specific ST depression. Repeat EKG showing sinus tachycardia with unchanged non-specific ST depression. Denies chest pain, tightness, pressure, or discomfort. Does not appear volume down, however did respond some to 1 liter fluid bolus. Confirmed that PTA atenolol was taken 4/22. Initially held for concern for developing sepsis and did not want to blunt cardiac response. Was provided steroids which may be contributing to tachycardia. TSH normal (0.77). Considering pulmonary embolism as source of tachycardia, however given normal d-dimer did not pursue further imaging of chest.  - Telemetry to monitor  - received 2L in boluses 4/23.    - Continue PTA atenolol 25 mg  - Echocardiogram  4/23 showed moderate tricuspid regurgitation and severe pulmonary hypertension with hyperdynamic LV function - overall similar to prior echo from 2019.  - sinus tach improved 4/24, down to 110's.      - of note, patient has similar sinus tach during last admission for respiratory distress.      Leukocytosis   Presumed due to acute infection but markedly elevated.  Was essentially normal about 2 weeks prior to admission.    - CRP added and following 4/24   - Diff added and pending   - peripheral smear added and pending   - suspect due to acute infection, but will need further work-up if not improving.       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 Follows with oncology and currently undergoing palliative carboplatin, Taxol, pembrolizumab with Neulasta. Has completed x 3 cycles, last received 4/12. Not a surgical nor definite chemoradiation candidate given severe COPD. PET scan 4/11 shows near CR of the primary R perihilar mass and stable mildly avid thoracic nodes. 6 mm RUL nodule stable in size but slightly more avid.  - Continue with hematology/oncology follow-up as planned.     Hypertension    Chronic. Blood pressures reviewed and hypertensive on presentation. BP normalized and borderline soft at times. Managed PTA with amlodipine 10 mg, lisinopril 40 mg, and atenolol 25 mg.  - Continued atenolol  - Held amlodipine and lisinopril, then resumed lisinopril 4/24 and amlodipine 4/25 with holding parameters     Chronic hyponatremia    Sodium 131. Baseline sodium 131-133. Appears stable.  - 131 ? 133     Chronic anemia    Hemoglobin 9.9 on admission. Baseline hemoglobin 10-11. No reported melena or hematochezia.   - 9.9 ? 9.0   Suspect due to IVF for tachycardia as above.          DVT Prophylaxis: lovenox started 4/24   Miller Catheter: Not present  Code Status: No CPR / Intubation OK  Lines: PIV            Diet  Orders Placed This Encounter      Combination Diet Regular Diet; Easy to Chew (level 7); Thin Liquids (level 0)                        Disposition Plan        Medically Ready for Discharge: Anticipated in 2-4 Days                  Seth Ortiz MD, MD  Hospitalist Service  [facility]  Securely message with the Vocera Web Console (learn more here)  Text page via [facility] Paging/Directory             Interval History:   Patient did well overnight, but this AM feels more dyspneic and working harder to breath.  No chest pain, pressure or tightness, just wheezy and feels like he can't catch his breath even on BIPAP.  However, sats are 100% and VBG looks good with improved and just mild and compensated CO2 retention.  No other changes.    Of note, patient has been refusing his nebs overnight and this AM.                  Review of Systems:    ROS: 10 point ROS neg other than the symptoms noted above in the HPI.           Medications:   Current active medications and PTA medications reviewed, see medication list for details.            Physical Exam:   Vitals were reviewed  Patient Vitals for the past 24 hrs:   BP Temp Temp src Pulse Resp SpO2 Weight   04/24/24 0803 (!) 146/106 97.7  F (36.5  C) Axillary 120 26 95 % --   04/24/24 0700 (!) 135/94 -- -- 101 14 97 % --   04/24/24 0640 -- -- -- 101 13 98 % 49.9 kg (110 lb 0.2 oz)   04/24/24 0600 (!) 148/110 -- -- 107 10 95 % --   04/24/24 0500 (!) 139/96 -- -- 98 15 97 % --   04/24/24 0400 (!) 142/103 97.7  F (36.5  C) -- 99 12 97 % --   04/24/24 0358 -- -- -- 104 -- -- --   04/24/24 0300 (!) 132/100 -- -- 94 12 98 % --   04/24/24 0200 (!) 133/93 -- -- 98 (!) 8 97 % --   04/24/24 0100 (!) 124/95 -- -- 95 14 98 % --   04/24/24 0006 (!) 127/91 -- -- 92 13 97 % --   04/24/24 0000 (!) 127/91 -- -- 105 16 96 % --   04/23/24 2345 -- 97.6  F (36.4  C) Oral 96 17 98 % --   04/23/24 2300 (!) 114/99 -- -- 92 18 98 % --   04/23/24 2200 116/86 --  -- 105 14 97 % --   24 2100 (!) 133/91 -- -- 109 14 97 % --   24 -- -- -- 98 11 98 % --   24 2005 -- -- -- 103 -- -- --   24 2000 114/81 -- -- 102 11 98 % --   24 1955 -- 97.7  F (36.5  C) Oral 104 13 96 % --   24 1900 105/80 -- -- 105 16 97 % --   24 1800 114/81 -- -- 109 11 98 % --   24 1700 112/82 -- -- 109 14 97 % --   24 1600 93/67 98.1  F (36.7  C) Axillary 105 16 98 % --   24 1557 -- -- -- 107 -- -- --   24 1500 115/73 -- -- (!) 123 11 98 % --   24 1455 114/89 -- -- (!) 126 18 98 % --   24 1450 (!) 162/110 -- -- (!) 137 23 96 % --   24 1446 -- -- -- (!) 141 -- -- --   24 1445 -- -- -- (!) 146 -- -- --   24 1400 103/73 -- -- (!) 128 15 97 % --   24 1300 113/86 -- -- (!) 130 17 97 % --   24 1250 (!) 115/93 -- -- (!) 133 -- -- --   24 1245 -- -- -- -- 20 96 % --   24 1200 110/79 -- -- 112 12 97 % --   24 1110 -- 97.7  F (36.5  C) -- 117 15 96 % --   24 1100 113/85 -- -- 120 14 96 % --   24 1030 -- -- -- 116 -- 96 % --   24 1000 115/83 -- -- 120 15 96 % --       Temperatures:  Current - Temp: 97.7  F (36.5  C); Max - Temp  Av.8  F (36.6  C)  Min: 97.6  F (36.4  C)  Max: 98.1  F (36.7  C)  Respiration range: Resp  Av.6  Min: 8  Max: 26  Pulse range: Pulse  Av.7  Min: 92  Max: 146  Blood pressure range: Systolic (24hrs), Av , Min:93 , Max:162   ; Diastolic (24hrs), Av, Min:67, Max:110    Pulse oximetry range: SpO2  Av %  Min: 95 %  Max: 98 %  I/O last 3 completed shifts:  In: 1979 [P.O.:624; I.V.:105; IV Piggyback:1250]  Out: 925 [Urine:925]    Intake/Output Summary (Last 24 hours) at 2024 0919  Last data filed at 2024 0553  Gross per 24 hour   Intake 1729 ml   Output 925 ml   Net 804 ml     EXAM:  General: awake and alert, NAD, oriented x 3  Head: normocephalic  Neck: unremarkable, no lymphadenopathy   HEENT: oropharynx pink and  moist    Heart: Regular rate and rhythm, no murmurs, rubs, or gallops  Lungs: wheezing throughout, but decent air movement with no crackles or other focal changes.  Overall unchanged from yesterday.    Abdomen: soft, non-tender, no masses or organomegaly  Extremities: no edema in lower extremities   Skin unremarkable as visualized.               Data:     Reviewed data:  Results for orders placed or performed during the hospital encounter of 04/23/24 (from the past 24 hour(s))   Glucose by meter   Result Value Ref Range    GLUCOSE BY METER POCT 130 (H) 70 - 99 mg/dL   Blood gas venous   Result Value Ref Range    pH Venous 7.37 7.32 - 7.43    pCO2 Venous 45 40 - 50 mm Hg    pO2 Venous 82 (H) 25 - 47 mm Hg    Bicarbonate Venous 26 21 - 28 mmol/L    Base Excess/Deficit Venous 0.6 -3.0 - 3.0 mmol/L    FIO2 28     Oxyhemoglobin Venous 94 (H) 70 - 75 %    O2 Sat, Venous 96.4 (H) 70.0 - 75.0 %    Narrative    In healthy individuals, oxyhemoglobin (O2Hb) and oxygen saturation (SO2) are approximately equal. In the presence of dyshemoglobins, oxyhemoglobin can be considerably lower than oxygen saturation.   Respiratory Aerobic Bacterial Culture with Gram Stain    Specimen: Expectorate; Sputum   Result Value Ref Range    Gram Stain Result <10 Squamous epithelial cells/low power field (A)     Gram Stain Result >25 PMNs/low power field (A)     Gram Stain Result 2+ Gram negative bacilli (A)    Legionella pneumophila antigen urine    Specimen: Urine, Midstream   Result Value Ref Range    Legionella pneumophila serogroup 1 urinary antigen Negative Negative   Streptococcus pneumoniae antigen    Specimen: Urine, Midstream   Result Value Ref Range    Streptococcus pneumoniae antigen Negative Negative   Echocardiogram Complete   Result Value Ref Range    LVEF  65-70%     Narrative    [id number]  [id number]  [id number]  [id number]^[email or web address]^MAGDALENO     [facility]  Echocardiography Laboratory  [address] [place]  [address]     Name: MARVA CASTELLANOS  MRN: [id number]  : 1965  Study Date: 2024 02:06 PM  Age: 58 yrs  Gender: Male  Patient Location: [facility]  Reason For Study: Tachycardia  Ordering Physician: MAGDALENO HASKINS  Referring Physician: MAGDALENO HASKINS  Performed By: Ashlee Lopez     BSA: 1.5 m2  Height: 64 in  Weight: 111 lb  HR: 128  BP: 115/83 mmHg  ______________________________________________________________________________  Procedure  Complete Portable Echo Adult.  ______________________________________________________________________________  Interpretation Summary     1. The left ventricle is normal in size. Proximal septal thickening is noted.  Hyperdynamic left ventricular function. The visual ejection fraction is 65-  70%. No regional wall motion abnormalities noted.  2. The right ventricle is normal size. The right ventricular systolic function  is normal.  3. There is moderate (2+) tricuspid regurgitation. Right ventricular systolic  pressure is elevated, consistent with severe pulmonary hypertension.  4. No pericardial effusion.  5. In comparison to the previous report dated 2019, the findings are  similar.  ______________________________________________________________________________  Left Ventricle  The left ventricle is normal in size. Proximal septal thickening is noted.  Hyperdynamic left ventricular function. The visual ejection fraction is 65-  70%. No regional wall motion abnormalities noted.     Right Ventricle  The right ventricle is normal size. The right ventricular systolic function is  normal.     Atria  Normal left atrial size. Right atrial size is normal. There is no color  Doppler evidence of an atrial shunt.     Mitral Valve  There is trace mitral regurgitation.     Tricuspid Valve  There is moderate (2+) tricuspid regurgitation. The right ventricular systolic  pressure is approximated at 53.3 mmHg plus the right atrial pressure. IVC  diameter <2.1 cm collapsing >50% with  sniff suggests a normal RA pressure of 3  mmHg. Right ventricular systolic pressure is elevated, consistent with severe  pulmonary hypertension.     Aortic Valve  The aortic valve is trileaflet. No aortic regurgitation is present. No aortic  stenosis is present.     Pulmonic Valve  There is no pulmonic valvular stenosis.     Vessels  The inferior vena cava is normal.     Pericardium  There is no pericardial effusion.     Rhythm  The rhythm was sinus tachycardia.  ______________________________________________________________________________  MMode/2D Measurements & Calculations  IVSd: 0.82 cm     LVIDd: 3.6 cm  LVIDs: 2.3 cm  LVPWd: 0.70 cm  FS: 36.7 %  LV mass(C)d: 73.5 grams  LV mass(C)dI: 48.3 grams/m2  LVOT diam: 2.2 cm  LVOT area: 3.9 cm2  LA Volume (BP): 17.2 ml  LA Volume Index (BP): 11.3 ml/m2     LA Volume Indexed (AL/bp): 11.2 ml/m2  RWT: 0.38     Doppler Measurements & Calculations  PA V2 max: 101.2 cm/sec  PA max P.1 mmHg  TR max danielle: 365.0 cm/sec  TR max P.3 mmHg     ______________________________________________________________________________  Report approved by: Inocencio Coelho 2024 03:45 PM         Glucose by meter   Result Value Ref Range    GLUCOSE BY METER POCT 112 (H) 70 - 99 mg/dL   Glucose by meter   Result Value Ref Range    GLUCOSE BY METER POCT 90 70 - 99 mg/dL   Blood gas venous   Result Value Ref Range    pH Venous 7.36 7.32 - 7.43    pCO2 Venous 50 40 - 50 mm Hg    pO2 Venous 68 (H) 25 - 47 mm Hg    Bicarbonate Venous 28 21 - 28 mmol/L    Base Excess/Deficit Venous 1.3 -3.0 - 3.0 mmol/L    FIO2 26     Oxyhemoglobin Venous 91 (H) 70 - 75 %    O2 Sat, Venous 91.4 (H) 70.0 - 75.0 %    Narrative    In healthy individuals, oxyhemoglobin (O2Hb) and oxygen saturation (SO2) are approximately equal. In the presence of dyshemoglobins, oxyhemoglobin can be considerably lower than oxygen saturation.   Basic metabolic panel   Result Value Ref Range    Sodium 133 (L) 135 - 145 mmol/L     Potassium 4.8 3.4 - 5.3 mmol/L    Chloride 98 98 - 107 mmol/L    Carbon Dioxide (CO2) 21 (L) 22 - 29 mmol/L    Anion Gap 14 7 - 15 mmol/L    Urea Nitrogen 14.8 6.0 - 20.0 mg/dL    Creatinine 0.52 (L) 0.67 - 1.17 mg/dL    GFR Estimate >90 >60 mL/min/1.73m2    Calcium 9.3 8.6 - 10.0 mg/dL    Glucose 87 70 - 99 mg/dL   CBC with platelets   Result Value Ref Range    WBC Count 47.8 (H) 4.0 - 11.0 10e3/uL    RBC Count 3.04 (L) 4.40 - 5.90 10e6/uL    Hemoglobin 9.0 (L) 13.3 - 17.7 g/dL    Hematocrit 28.5 (L) 40.0 - 53.0 %    MCV 94 78 - 100 fL    MCH 29.6 26.5 - 33.0 pg    MCHC 31.6 31.5 - 36.5 g/dL    RDW 17.1 (H) 10.0 - 15.0 %    Platelet Count 353 150 - 450 10e3/uL   Glucose by meter   Result Value Ref Range    GLUCOSE BY METER POCT 93 70 - 99 mg/dL           Attestation:  I have reviewed today's vital signs, notes, medications, labs and imaging.  Amount of time spent managing this patient today providing critical care: 70  minutes.     Seth Ortiz MD

## 2024-04-24 NOTE — PROVIDER NOTIFICATION
Patient cont on Bipap 18/8, 26%. Patient refuses neb treatments as they make him initially bronchospasm.  Patient unable to be off Bipap for any length of time without significant SOA.  Will cont to monitor.

## 2024-04-24 NOTE — PLAN OF CARE
End Of Shift Note    Situation: 57 yo male patient admitted 4/23 in respiratory distress with shortness of breath & increased Work of breathing.      Plan: continued BiPAP and promote nutrition at mealtime.  Has little reserved to be off BiPAP at meal times,  mostly drinking Ensure or En live drinks.      Subjective/Objective:    Neuro: Alert and oriented X 4, using call light for needs    Cardiac: Episodes of increased HR with exertion,  SBP remains stable,  +2 edema to lower legs and ankles.      Resp: Prolonged expiratory phases with exertion and using both abdominal and intercostal muscle groups to get deep breathes.  RR increased to 28-30.  Lung sounds very diminished with tight expiratory wheezing noted especially to mid and upper lobes.  Has been refusing the Duonebs as they make him feel worse.      GI/: using urinal at bedside in adequate amounts.  No BM this shift, passing gas and active bowel sounds noted.      Endo: no issues, Blood sugars in the 90's to mid 100's.    MSK: Steady to stand & use urinal but that is the extent of indurrance.  Slept poorly    Skin: no issues noted.      LDAs: 2 PIV's flushed and patent.      Problem: Adult Inpatient Plan of Care  Goal: Plan of Care Review  Description: The Plan of Care Review/Shift note should be completed every shift.  The Outcome Evaluation is a brief statement about your assessment that the patient is improving, declining, or no change.  This information will be displayed automatically on your shift  note.  Outcome: Progressing  Flowsheets (Taken 4/24/2024 0616)  Outcome Evaluation: Remained on BiPAP settings of 18/8  28% FiO2 for entire night, refused to change out of his high Fowlers positioning as he becomes quickly dyspneic with any activity with HR increasing to 120's,  once resettled will recover quickly.  Voiding per urinal standing at bedside, once resettled back to bed takes about 2-3 minutes to get back to mid 90's.  Respiratory rate increases  "also with both abdominial and accessaey  muscle groups used to get a deep enough breath to compensate for exertion.  Continue to watch closely.  Plan of Care Reviewed With: patient  Overall Patient Progress: no change  Goal: Patient-Specific Goal (Individualized)  Description: You can add care plan individualizations to a care plan. Examples of Individualization might be:  \"Parent requests to be called daily at 9am for status\", \"I have a hard time hearing out of my right ear\", or \"Do not touch me to wake me up as it startles  me\".  Outcome: Progressing  Flowsheets (Taken 4/23/2024 2000)  Individualized Care Needs: denies needs at this time.  Goal: Absence of Hospital-Acquired Illness or Injury  Outcome: Progressing  Intervention: Identify and Manage Fall Risk  Recent Flowsheet Documentation  Taken 4/24/2024 0400 by Johanson, Cindy, RN  Safety Promotion/Fall Prevention:   activity supervised   assistive device/personal items within reach   clutter free environment maintained   increased rounding and observation   lighting adjusted  Taken 4/23/2024 2345 by Johanson, Cindy, RN  Safety Promotion/Fall Prevention:   activity supervised   assistive device/personal items within reach   clutter free environment maintained   increased rounding and observation   lighting adjusted  Taken 4/23/2024 2000 by Johanson, Cindy, RN  Safety Promotion/Fall Prevention:   activity supervised   assistive device/personal items within reach   clutter free environment maintained   increased rounding and observation   lighting adjusted  Intervention: Prevent Skin Injury  Recent Flowsheet Documentation  Taken 4/24/2024 0400 by Johanson, Cindy, RN  Body Position:   position changed independently   heels elevated   legs elevated  Device Skin Pressure Protection: absorbent pad utilized/changed  Taken 4/23/2024 2345 by Johanson, Cindy, RN  Body Position:   position changed independently   position maintained   sitting up in bed  Device Skin " Pressure Protection: absorbent pad utilized/changed  Taken 4/23/2024 2000 by Johanson, Cindy, RN  Body Position:   position changed independently   sitting up in bed   weight shifting  Device Skin Pressure Protection: absorbent pad utilized/changed  Intervention: Prevent and Manage VTE (Venous Thromboembolism) Risk  Recent Flowsheet Documentation  Taken 4/24/2024 0400 by Johanson, Cindy, RN  VTE Prevention/Management: SCDs (sequential compression devices) off  Taken 4/23/2024 2345 by Johanson, Cindy, RN  VTE Prevention/Management: SCDs (sequential compression devices) off  Taken 4/23/2024 2000 by Johanson, Cindy, RN  VTE Prevention/Management: SCDs (sequential compression devices) off  Intervention: Prevent Infection  Recent Flowsheet Documentation  Taken 4/24/2024 0400 by Johanson, Cindy, RN  Infection Prevention:   environmental surveillance performed   equipment surfaces disinfected   rest/sleep promoted   single patient room provided  Taken 4/23/2024 2345 by Johanson, Cindy, RN  Infection Prevention:   environmental surveillance performed   equipment surfaces disinfected   rest/sleep promoted   single patient room provided  Taken 4/23/2024 2000 by Johanson, Cindy, RN  Infection Prevention:   environmental surveillance performed   equipment surfaces disinfected   rest/sleep promoted   single patient room provided  Goal: Optimal Comfort and Wellbeing  Outcome: Progressing  Intervention: Provide Person-Centered Care  Recent Flowsheet Documentation  Taken 4/24/2024 0400 by Johanson, Cindy, RN  Trust Relationship/Rapport:   care explained   choices provided   questions encouraged   reassurance provided   thoughts/feelings acknowledged  Taken 4/23/2024 2345 by Johanson, Cindy, RN  Trust Relationship/Rapport:   care explained   choices provided   questions encouraged   reassurance provided   thoughts/feelings acknowledged  Taken 4/23/2024 2000 by Johanson, Cindy, RN  Trust Relationship/Rapport:   care explained    choices provided   questions encouraged   reassurance provided   thoughts/feelings acknowledged  Goal: Readiness for Transition of Care  Outcome: Progressing     Problem: Skin Injury Risk Increased  Goal: Skin Health and Integrity  Outcome: Progressing  Intervention: Optimize Skin Protection  Recent Flowsheet Documentation  Taken 4/24/2024 0400 by Johanson, Cindy, RN  Pressure Reduction Techniques:   heels elevated off bed   rest period provided between sit times   weight shift assistance provided  Pressure Reduction Devices: positioning supports utilized  Activity Management:   bedrest   dorsiflexion/plantar flexion performed   back to bed   standing at bedside  Head of Bed (HOB) Positioning: HOB at 60-90 degrees  Taken 4/23/2024 2345 by Johanson, Cindy, RN  Pressure Reduction Techniques:   heels elevated off bed   rest period provided between sit times   weight shift assistance provided  Pressure Reduction Devices: positioning supports utilized  Activity Management:   bedrest   dorsiflexion/plantar flexion performed  Head of Bed (HOB) Positioning: HOB at 60-90 degrees  Taken 4/23/2024 2000 by Johanson, Cindy, RN  Pressure Reduction Techniques:   heels elevated off bed   rest period provided between sit times   weight shift assistance provided  Pressure Reduction Devices: positioning supports utilized  Activity Management: bedrest  Head of Bed (HOB) Positioning: HOB at 60-90 degrees     Problem: Gas Exchange Impaired  Goal: Optimal Gas Exchange  Outcome: Progressing  Intervention: Optimize Oxygenation and Ventilation  Recent Flowsheet Documentation  Taken 4/24/2024 0400 by Johanson, Cindy, RN  Airway/Ventilation Management: airway patency maintained  Head of Bed (HOB) Positioning: HOB at 60-90 degrees  Taken 4/23/2024 2345 by Johanson, Cindy, RN  Airway/Ventilation Management: airway patency maintained  Head of Bed (HOB) Positioning: HOB at 60-90 degrees  Taken 4/23/2024 2000 by Johanson, Cindy,  RN  Airway/Ventilation Management: airway patency maintained  Head of Bed (HOB) Positioning: HOB at 60-90 degrees     Problem: Infection  Goal: Absence of Infection Signs and Symptoms  Outcome: Progressing  Intervention: Prevent or Manage Infection  Recent Flowsheet Documentation  Taken 4/24/2024 0400 by Johanson, Cindy, RN  Isolation Precautions: droplet precautions maintained  Taken 4/23/2024 2345 by Johanson, Cindy, RN  Isolation Precautions: droplet precautions maintained  Taken 4/23/2024 2000 by Johanson, Cindy, RN  Isolation Precautions: droplet precautions maintained     Goal Outcome Evaluation:      Plan of Care Reviewed With: patient    Overall Patient Progress: no changeOverall Patient Progress: no change    Outcome Evaluation: Remained on BiPAP settings of 18/8  28% FiO2 for entire night, refused to change out of his high Fowlers positioning as he becomes quickly dyspneic with any activity with HR increasing to 120's,  once resettled will recover quickly.  Voiding per urinal standing at bedside, once resettled back to bed takes about 2-3 minutes to get back to mid 90's.  Respiratory rate increases also with both abdominial and accessaey  muscle groups used to get a deep enough breath to compensate for exertion.  Continue to watch closely.

## 2024-04-24 NOTE — PROGRESS NOTES
Bipap settings changed 20/8 35% during bout of  shortness of air. Pt doing much better Spo2=97% VSS

## 2024-04-24 NOTE — PLAN OF CARE
Goal Outcome Evaluation:         End Of Shift Note    Situation: RLL Pneumonia and Lung Ca    Plan: Bipap, IV abx, PRN Morphine for air hunger    Subjective/Objective:    Neuro: A & O x 4, pleasant and cooperative    Cardiac: SR/ST, VSS    Resp: LS Exp Wheezes. Sats > 90% on Bipap or 3 L NC. Pt with profound SOB in AM.   Pt agreed to trying IV morphine for profound air hunger, admin'd with great results. Pt appreciative for medication and staffs care. Currently resting in bed wearing bipap.    GI/: voiding in urinal at edge of bed    Endo: BG WNL    MSK: ESPARZA, not ambulating at this time    Skin: scattered bruise.    LDAs: x 2 PIV, pt appreciates use of L PIV    Call light in reach. Pt tolerated lunch well on NC.    Kimberly Omer, RN BSN

## 2024-04-25 LAB
ANION GAP SERPL CALCULATED.3IONS-SCNC: 11 MMOL/L (ref 7–15)
BASE EXCESS BLDV CALC-SCNC: 4.7 MMOL/L (ref -3–3)
BASOPHILS # BLD MANUAL: 0 10E3/UL (ref 0–0.2)
BASOPHILS NFR BLD MANUAL: 0 %
BUN SERPL-MCNC: 21.2 MG/DL (ref 6–20)
CALCIUM SERPL-MCNC: 9.3 MG/DL (ref 8.6–10)
CHLORIDE SERPL-SCNC: 97 MMOL/L (ref 98–107)
CREAT SERPL-MCNC: 0.51 MG/DL (ref 0.67–1.17)
CRP SERPL-MCNC: 4.89 MG/L
DEPRECATED HCO3 PLAS-SCNC: 25 MMOL/L (ref 22–29)
EGFRCR SERPLBLD CKD-EPI 2021: >90 ML/MIN/1.73M2
EOSINOPHIL # BLD MANUAL: 0 10E3/UL (ref 0–0.7)
EOSINOPHIL NFR BLD MANUAL: 0 %
ERYTHROCYTE [DISTWIDTH] IN BLOOD BY AUTOMATED COUNT: 17.6 % (ref 10–15)
GLUCOSE SERPL-MCNC: 96 MG/DL (ref 70–99)
HCO3 BLDV-SCNC: 31 MMOL/L (ref 21–28)
HCT VFR BLD AUTO: 27.9 % (ref 40–53)
HGB BLD-MCNC: 8.9 G/DL (ref 13.3–17.7)
LYMPHOCYTES # BLD MANUAL: 1.4 10E3/UL (ref 0.8–5.3)
LYMPHOCYTES NFR BLD MANUAL: 3 %
MCH RBC QN AUTO: 30.1 PG (ref 26.5–33)
MCHC RBC AUTO-ENTMCNC: 31.9 G/DL (ref 31.5–36.5)
MCV RBC AUTO: 94 FL (ref 78–100)
MONOCYTES # BLD MANUAL: 3.2 10E3/UL (ref 0–1.3)
MONOCYTES NFR BLD MANUAL: 7 %
NEUTROPHILS # BLD MANUAL: 41.7 10E3/UL (ref 1.6–8.3)
NEUTROPHILS NFR BLD MANUAL: 90 %
O2/TOTAL GAS SETTING VFR VENT: 28 %
OXYHGB MFR BLDV: 56 % (ref 70–75)
PCO2 BLDV: 54 MM HG (ref 40–50)
PH BLDV: 7.36 [PH] (ref 7.32–7.43)
PLAT MORPH BLD: ABNORMAL
PLATELET # BLD AUTO: 322 10E3/UL (ref 150–450)
PO2 BLDV: 31 MM HG (ref 25–47)
POTASSIUM SERPL-SCNC: 4.5 MMOL/L (ref 3.4–5.3)
RBC # BLD AUTO: 2.96 10E6/UL (ref 4.4–5.9)
RBC MORPH BLD: ABNORMAL
SAO2 % BLDV: 57.7 % (ref 70–75)
SODIUM SERPL-SCNC: 133 MMOL/L (ref 135–145)
WBC # BLD AUTO: 37.2 10E3/UL (ref 4–11)

## 2024-04-25 PROCEDURE — 250N000013 HC RX MED GY IP 250 OP 250 PS 637: Performed by: FAMILY MEDICINE

## 2024-04-25 PROCEDURE — 94640 AIRWAY INHALATION TREATMENT: CPT

## 2024-04-25 PROCEDURE — 250N000012 HC RX MED GY IP 250 OP 636 PS 637

## 2024-04-25 PROCEDURE — 250N000013 HC RX MED GY IP 250 OP 250 PS 637

## 2024-04-25 PROCEDURE — 250N000011 HC RX IP 250 OP 636: Mod: JZ

## 2024-04-25 PROCEDURE — 250N000011 HC RX IP 250 OP 636

## 2024-04-25 PROCEDURE — 99291 CRITICAL CARE FIRST HOUR: CPT | Performed by: FAMILY MEDICINE

## 2024-04-25 PROCEDURE — 250N000009 HC RX 250

## 2024-04-25 PROCEDURE — 258N000003 HC RX IP 258 OP 636

## 2024-04-25 PROCEDURE — 85027 COMPLETE CBC AUTOMATED: CPT | Performed by: FAMILY MEDICINE

## 2024-04-25 PROCEDURE — 94660 CPAP INITIATION&MGMT: CPT

## 2024-04-25 PROCEDURE — 200N000001 HC R&B ICU

## 2024-04-25 PROCEDURE — 82805 BLOOD GASES W/O2 SATURATION: CPT | Performed by: FAMILY MEDICINE

## 2024-04-25 PROCEDURE — 80048 BASIC METABOLIC PNL TOTAL CA: CPT | Performed by: FAMILY MEDICINE

## 2024-04-25 PROCEDURE — 999N000157 HC STATISTIC RCP TIME EA 10 MIN

## 2024-04-25 PROCEDURE — 250N000011 HC RX IP 250 OP 636: Performed by: FAMILY MEDICINE

## 2024-04-25 PROCEDURE — 94640 AIRWAY INHALATION TREATMENT: CPT | Mod: 76

## 2024-04-25 PROCEDURE — 86140 C-REACTIVE PROTEIN: CPT | Performed by: FAMILY MEDICINE

## 2024-04-25 PROCEDURE — 36415 COLL VENOUS BLD VENIPUNCTURE: CPT | Performed by: FAMILY MEDICINE

## 2024-04-25 RX ORDER — CEFEPIME HYDROCHLORIDE 2 G/1
2 INJECTION, POWDER, FOR SOLUTION INTRAVENOUS EVERY 12 HOURS
Status: DISCONTINUED | OUTPATIENT
Start: 2024-04-25 | End: 2024-04-27

## 2024-04-25 RX ORDER — FLUTICASONE PROPIONATE 50 MCG
1 SPRAY, SUSPENSION (ML) NASAL DAILY
Status: DISCONTINUED | OUTPATIENT
Start: 2024-04-25 | End: 2024-05-01 | Stop reason: HOSPADM

## 2024-04-25 RX ORDER — LORAZEPAM 0.5 MG/1
0.5 TABLET ORAL EVERY 4 HOURS PRN
Status: DISCONTINUED | OUTPATIENT
Start: 2024-04-25 | End: 2024-05-01 | Stop reason: HOSPADM

## 2024-04-25 RX ADMIN — IPRATROPIUM BROMIDE AND ALBUTEROL SULFATE 3 ML: 2.5; .5 SOLUTION RESPIRATORY (INHALATION) at 15:40

## 2024-04-25 RX ADMIN — LORAZEPAM 0.5 MG: 0.5 TABLET ORAL at 15:49

## 2024-04-25 RX ADMIN — LISINOPRIL 40 MG: 40 TABLET ORAL at 08:47

## 2024-04-25 RX ADMIN — AMLODIPINE BESYLATE 10 MG: 10 TABLET ORAL at 08:47

## 2024-04-25 RX ADMIN — ENOXAPARIN SODIUM 40 MG: 40 INJECTION SUBCUTANEOUS at 10:57

## 2024-04-25 RX ADMIN — METRONIDAZOLE 500 MG: 500 INJECTION, SOLUTION INTRAVENOUS at 10:58

## 2024-04-25 RX ADMIN — ISODIUM CHLORIDE 3 ML: 0.03 SOLUTION RESPIRATORY (INHALATION) at 19:38

## 2024-04-25 RX ADMIN — MORPHINE SULFATE 4 MG: 4 INJECTION, SOLUTION INTRAMUSCULAR; INTRAVENOUS at 20:03

## 2024-04-25 RX ADMIN — MORPHINE SULFATE 4 MG: 4 INJECTION, SOLUTION INTRAMUSCULAR; INTRAVENOUS at 10:55

## 2024-04-25 RX ADMIN — IPRATROPIUM BROMIDE AND ALBUTEROL SULFATE 3 ML: 2.5; .5 SOLUTION RESPIRATORY (INHALATION) at 11:53

## 2024-04-25 RX ADMIN — IPRATROPIUM BROMIDE AND ALBUTEROL SULFATE 3 ML: 2.5; .5 SOLUTION RESPIRATORY (INHALATION) at 04:04

## 2024-04-25 RX ADMIN — PREDNISONE 40 MG: 20 TABLET ORAL at 08:48

## 2024-04-25 RX ADMIN — CEFEPIME 2 G: 2 INJECTION, POWDER, FOR SOLUTION INTRAVENOUS at 16:44

## 2024-04-25 RX ADMIN — UMECLIDINIUM 1 PUFF: 62.5 AEROSOL, POWDER ORAL at 08:48

## 2024-04-25 RX ADMIN — FLUTICASONE PROPIONATE 1 SPRAY: 50 SPRAY, METERED NASAL at 18:00

## 2024-04-25 RX ADMIN — IPRATROPIUM BROMIDE AND ALBUTEROL SULFATE 3 ML: 2.5; .5 SOLUTION RESPIRATORY (INHALATION) at 19:36

## 2024-04-25 RX ADMIN — ROFLUMILAST 500 MCG: 500 TABLET ORAL at 08:48

## 2024-04-25 RX ADMIN — FLUTICASONE FUROATE AND VILANTEROL TRIFENATATE 1 PUFF: 200; 25 POWDER RESPIRATORY (INHALATION) at 08:48

## 2024-04-25 RX ADMIN — IPRATROPIUM BROMIDE AND ALBUTEROL SULFATE 3 ML: 2.5; .5 SOLUTION RESPIRATORY (INHALATION) at 07:47

## 2024-04-25 RX ADMIN — MORPHINE SULFATE 4 MG: 4 INJECTION, SOLUTION INTRAMUSCULAR; INTRAVENOUS at 06:03

## 2024-04-25 RX ADMIN — GUAIFENESIN 1200 MG: 600 TABLET ORAL at 19:49

## 2024-04-25 RX ADMIN — CEFTRIAXONE SODIUM 1 G: 1 INJECTION, POWDER, FOR SOLUTION INTRAMUSCULAR; INTRAVENOUS at 06:03

## 2024-04-25 RX ADMIN — ATENOLOL 25 MG: 25 TABLET ORAL at 08:48

## 2024-04-25 RX ADMIN — GUAIFENESIN 1200 MG: 600 TABLET ORAL at 08:47

## 2024-04-25 RX ADMIN — AZITHROMYCIN MONOHYDRATE 500 MG: 500 INJECTION, POWDER, LYOPHILIZED, FOR SOLUTION INTRAVENOUS at 06:37

## 2024-04-25 ASSESSMENT — ACTIVITIES OF DAILY LIVING (ADL)
ADLS_ACUITY_SCORE: 29

## 2024-04-25 NOTE — PROGRESS NOTES
Goal Outcome Evaluation:     End Of Shift Note     Situation: RLL Pneumonia and Lung Ca     Plan: Bipap, IV abx, PRN Morphine or Ativan for air hunger/anxiousness     Subjective/Objective:     Neuro: A & O x 4, pleasant and cooperative     Cardiac: SR/ST, VSS     Resp: LS Exp Wheezes. Sats > 90% on Bipap or 2 L NC.   Pt wore bipap most of day shift. Prior to dinner meal, admin PRN PO ativan. Pt tolerated off bipap thus far. Sitting up straight in bed.     GI/: voiding in urinal at edge of bed     Endo: BG WNL     MSK: ESPARZA, stands at edge of bed to use urinal     Skin: scattered bruises.     LDAs: x 1 PIV     Call light in reach. Pt tolerated dinner well on NC.     Kimberly Omre RN BSN

## 2024-04-25 NOTE — PROGRESS NOTES
Patient continues on Bipap 20/8, 28%.  Patient doesn't tolerate over the nose mask. He gets very panicky and says he can't breath through his nose.  He prefers under-nose type mask.  Patient unable to tolerate being off Bipap for any length of time without becoming SOA and extremely anxious.  BS cont to be diminished with I and E wheezes throughout.

## 2024-04-25 NOTE — PLAN OF CARE
Goal Outcome Evaluation:      Plan of Care Reviewed With: patient    Overall Patient Progress: no changeOverall Patient Progress: no change    Outcome Evaluation: Still requiring bipap for ventilation and becomes dyspneic without it. PRN morphine given once this shift for air hunger.

## 2024-04-25 NOTE — PROGRESS NOTES
Pt remains on Bipap 20/8 28% Fio2 Pt . Continues to be very anxious ,Spo2=95% will monitor respiratory status closely .

## 2024-04-25 NOTE — PROGRESS NOTES
[facility] Hospitalist Progress Note           Assessment & Plan          Luis Hernandez is a 58 year old male who presents on 4/23/2024 with progressive shortness of breath. He was found to have acute on chronic respiratory failure presumably from COPD exacerbation complicated by pneumonia. He is being admitted to ICU on BiPAP and IV antibiotic therapy.     Sepsis  Acute on chronic respiratory failure with hypoxia and hypercapnia, likely pneumonia complicated by exacerbation of COPD as below     Meets SIRS criteria with tachycardia and leukocytosis (46.3). No fever. Lactate WNL (1.4). CRP elevated (5.46). Procalcitonin normal (0.06). CXR with small hazy opacity over left lower lung laterally. Lungs with wheezing bilateral mid and lower lobes.  - Antibiotics for pneumonia as detailed below  - admitted on BiPAP as below   - Bcx x 2 with NGTD (4/25)     Left lobe pneumonia    CXR with small hazy opacity over left lower lung laterally. Procalcitonin WNL. Recent concern by heme/onc 4/12 PET scan for pneumonia and started on Augmentin in which has completed 5 days of PTA. PET scan 4/11 during that time showing resolved consolidation RADHA pneumonia, however there was new patchy focal consolidation in RML and LLL which could represent infection/inflammation.  - Continue ceftriaxone 1 g q24hs, azithromycin 500 mg q24hrs, and metronidazole 500 mg q12hrs.  - viral panel positive for rhinovirus/enterovirus - may be contributory, but still suspect underlying bacterial infection may be present as well.    - Sputum culture, pending  - Legionella urine antigen and strep pneumo urine antigen negative   - sputum culture growing gram negative bacilli - pseudomonas and klebsiella, pending - switched rocephin to cefepime pm 4/25 - reassess 4/26 pending picture and sputum culture results.     - WBC still markedly up as below, but afebrile and overall appears to be improving      Chronic obstructive pulmonary disease (COPD) with  exacerbation with acute on chronic respiratory failure primarily due to this   Chronic oxygen dependence    H/o severe COPD (FEV1 18%) on continuous oxygen 2 liters at baseline. Significant smoking history of 60 pack years. No increased oxygen demands PTA and denies hypoxia. Found to be in respiratory distress and placed on BiPAP. VBG without acidosis (7.33) and mild hypercapnia (61). Recently started prednisone taper 4/11 by oncology. Given solumedrol in ED along with duoneb x 2. Managed PTA with tiotropium (Spiriva Respimat), roflumilast 500 mg, fluticasone-salmeterol (Advair) BID, normal saline nebulizer BID, and PRN albuterol inhaler.   - admitted on BiPAP  - Continue PTA roflumilast 500 mg, fluticasone-salmeterol (Advair) BID, and normal saline nebs BID.  Held home tiotropium (Spiriva Respimat) while on duonebs   - Duoneb q4hrs  - Guaifenesin 1,200 mg BID  - Prednisone 40 mg daily (starting 4/24)  - VBG ok and sats 100% AM 4/24 with heart rate improving as below but patient still having episodes of severe dyspnea without other symptoms and has been refusing nebs.   After discussion added prn morphine which has been helping and patient was agreeable with resuming nebs.    - numbers look good 4/25 but patient still gets tachypneic and tachycardic any time he's off the BIPAP especially with any activity.    - tried adding some lorazepam starting with just 0.5 mg every 4 hours prn as I think anxiety is a major component to his picture and the morphine is helping but inadequate.  Will continue morphine as an alternative but will try to do one or the other.  Could increase dose of ativan tomorrow if needed.     - hope to start to wean off BIPAP in the next 24h.         Tachycardia    Presented tachycardic 117. Tachycardia has been persistent and up to 135. Troponin WNL (11). Initial EKG showing sinus tachycardia with non-specific ST depression. Repeat EKG showing sinus tachycardia with unchanged non-specific ST  depression. Denies chest pain, tightness, pressure, or discomfort. Does not appear volume down, however did respond some to 1 liter fluid bolus. Confirmed that PTA atenolol was taken 4/22. Initially held for concern for developing sepsis and did not want to blunt cardiac response. Was provided steroids which may be contributing to tachycardia. TSH normal (0.77). Considering pulmonary embolism as source of tachycardia, however given normal d-dimer did not pursue further imaging of chest.  - Telemetry to monitor  - received 2L in boluses 4/23.    - Continue PTA atenolol 25 mg  - Echocardiogram 4/23 showed moderate tricuspid regurgitation and severe pulmonary hypertension with hyperdynamic LV function - overall similar to prior echo from 2019.  - sinus tach improved 4/24, down to 110's.    Down into 100's at times 4/25.   - switching atenolol 50 mg daily to metoprolol at slightly higher dose of 37.5 mg twice daily AM 4/25, may need to titrate this then discharge on once daily metoprolol if doing well.    - of note, patient has similar sinus tach during last admission for respiratory distress.      Leukocytosis   Presumed due to acute infection but markedly elevated.  Was essentially normal about 2 weeks prior to admission.    - Diff added and showed predominance of neutrophils consistent with infection   - peripheral smear added and pending   - suspect due to acute infection, but will need further work-up if not improving.     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 Follows with oncology and currently undergoing palliative carboplatin, Taxol, pembrolizumab with Neulasta. Has completed x 3 cycles, last received 4/12. Not a surgical nor definite chemoradiation candidate given severe COPD. PET scan 4/11 shows near CR of the primary R perihilar mass and stable mildly avid  thoracic nodes. 6 mm RUL nodule stable in size but slightly more avid.  - Continue with hematology/oncology follow-up as planned.     Hypertension    Chronic. Blood pressures reviewed and hypertensive on presentation. BP normalized and borderline soft at times. Managed PTA with amlodipine 10 mg, lisinopril 40 mg, and atenolol 25 mg.  - Continued atenolol  - Held amlodipine and lisinopril, then resumed lisinopril 4/24 and amlodipine 4/25 with holding parameters      Chronic hyponatremia    Sodium 131. Baseline sodium 131-133. Appears stable.  - 131 ? 133 ? 133      Chronic anemia    Hemoglobin 9.9 on admission. Baseline hemoglobin 10-11. No reported melena or hematochezia.   - 9.9 ? 9.0 ? 8.9    Suspect due to dilution from IVF for tachycardia as above.          DVT Prophylaxis: lovenox started 4/24   Miller Catheter: Not present  Code Status: No CPR / Intubation OK  Lines: PIV         Diet  Orders Placed This Encounter      Combination Diet Regular Diet; Easy to Chew (level 7); Thin Liquids (level 0)                      Disposition Plan     Continue ICU    Medically Ready for Discharge: Anticipated in 2-4 Days                  Seth Ortiz MD  Hospitalist Service  [facility]  Securely message with the Vocera Web Console (learn more here)  Text page via [address] [employer]             Interval History:   Sats and VBG looking pretty good, but patient still gets very worked up and tachycardic/tachypneic when off the BIPAP or when switching his mask.  No pain, overall says he feels like he's slowly improving.  No fever or chills.  No chest pain.                  Review of Systems:    ROS: 10 point ROS neg other than the symptoms noted above in the HPI.           Medications:   Current active medications and PTA medications reviewed, see medication list for details.            Physical Exam:   Vitals were reviewed  Patient Vitals for the past 24 hrs:   BP Temp Temp src Pulse Resp SpO2  Weight   24 1500 (!) 124/94 -- -- 96 13 98 % --   24 1400 (!) 129/94 -- -- 107 13 97 % --   24 1300 (!) 133/92 -- -- 106 14 96 % --   24 1200 (!) 131/98 -- -- 113 13 98 % --   24 1100 (!) 120/96 -- -- (!) 133 17 96 % --   24 1055 (!) 120/96 -- -- (!) 142 16 95 % --   24 0900 (!) 130/100 -- -- (!) 131 18 96 % --   24 0848 (!) 140/89 -- -- -- -- -- --   24 0800 (!) 140/89 98.3  F (36.8  C) Axillary 113 16 98 % --   24 0700 (!) 118/91 -- -- 105 12 97 % --   24 0606 (!) 136/93 -- -- 115 14 95 % 50.3 kg (110 lb 14.3 oz)   24 0500 115/88 -- -- 112 12 97 % --   24 0404 -- -- -- 103 -- -- --   24 0400 (!) 115/92 97.5  F (36.4  C) Axillary 101 13 98 % --   24 0300 113/85 -- -- 98 12 98 % --   24 0200 (!) 134/113 -- -- 105 15 98 % --   24 0100 110/87 -- -- 100 14 99 % --   24 0000 119/89 -- -- 98 15 99 % --   24 2340 -- 97.5  F (36.4  C) Axillary -- -- -- --   24 2300 96/75 -- -- 89 13 98 % --   24 2200 95/77 -- -- 94 12 100 % --   24 2100 (!) 139/102 -- -- 103 15 98 % --   24 -- -- -- -- -- 97 % --   24 (!) 120/90 97.3  F (36.3  C) Axillary 92 14 98 % --   24 -- -- -- 90 -- -- --   24 1900 105/79 -- -- 93 11 96 % --   24 1800 105/77 -- -- 99 12 97 % --   24 1700 (!) 113/94 -- -- 96 25 97 % --   24 1630 (!) 122/92 96.9  F (36.1  C) Axillary 95 18 97 % --   24 1557 -- -- -- 89 -- -- --       Temperatures:  Current - Temp: 98.3  F (36.8  C); Max - Temp  Av.5  F (36.4  C)  Min: 96.9  F (36.1  C)  Max: 98.3  F (36.8  C)  Respiration range: Resp  Av.5  Min: 11  Max: 25  Pulse range: Pulse  Av.4  Min: 89  Max: 142  Blood pressure range: Systolic (24hrs), Av , Min:95 , Max:140   ; Diastolic (24hrs), Av, Min:75, Max:113    Pulse oximetry range: SpO2  Av.3 %  Min: 95 %  Max: 100 %  I/O last 3 completed  shifts:  In: 360 [P.O.:350; I.V.:10]  Out: 900 [Urine:900]    Intake/Output Summary (Last 24 hours) at 4/25/2024 1544  Last data filed at 4/25/2024 1530  Gross per 24 hour   Intake 360 ml   Output 1480 ml   Net -1120 ml     EXAM:  General: awake and alert, NAD, oriented x 3  Head: normocephalic  Neck: unremarkable, no lymphadenopathy   HEENT: oropharynx pink and moist    Heart: Regular rate and rhythm, no murmurs, rubs, or gallops  Lungs: still tight with expiratory wheezing throughout, but no distress at rest although likes to be sitting up.  No focal changes.    Abdomen: soft, non-tender, no masses or organomegaly  Extremities:  no edema in lower extremities   Skin unremarkable as visualized.                Data:     Reviewed data:  Results for orders placed or performed during the hospital encounter of 04/23/24 (from the past 24 hour(s))   Glucose by meter   Result Value Ref Range    GLUCOSE BY METER POCT 159 (H) 70 - 99 mg/dL   CRP inflammation   Result Value Ref Range    CRP Inflammation 4.89 <5.00 mg/L   CBC with platelets   Result Value Ref Range    WBC Count 37.2 (H) 4.0 - 11.0 10e3/uL    RBC Count 2.96 (L) 4.40 - 5.90 10e6/uL    Hemoglobin 8.9 (L) 13.3 - 17.7 g/dL    Hematocrit 27.9 (L) 40.0 - 53.0 %    MCV 94 78 - 100 fL    MCH 30.1 26.5 - 33.0 pg    MCHC 31.9 31.5 - 36.5 g/dL    RDW 17.6 (H) 10.0 - 15.0 %    Platelet Count 322 150 - 450 10e3/uL   Basic metabolic panel   Result Value Ref Range    Sodium 133 (L) 135 - 145 mmol/L    Potassium 4.5 3.4 - 5.3 mmol/L    Chloride 97 (L) 98 - 107 mmol/L    Carbon Dioxide (CO2) 25 22 - 29 mmol/L    Anion Gap 11 7 - 15 mmol/L    Urea Nitrogen 21.2 (H) 6.0 - 20.0 mg/dL    Creatinine 0.51 (L) 0.67 - 1.17 mg/dL    GFR Estimate >90 >60 mL/min/1.73m2    Calcium 9.3 8.6 - 10.0 mg/dL    Glucose 96 70 - 99 mg/dL   Blood gas venous   Result Value Ref Range    pH Venous 7.36 7.32 - 7.43    pCO2 Venous 54 (H) 40 - 50 mm Hg    pO2 Venous 31 25 - 47 mm Hg    Bicarbonate Venous  31 (H) 21 - 28 mmol/L    Base Excess/Deficit Venous 4.7 (H) -3.0 - 3.0 mmol/L    FIO2 28     Oxyhemoglobin Venous 56 (L) 70 - 75 %    O2 Sat, Venous 57.7 (L) 70.0 - 75.0 %    Narrative    In healthy individuals, oxyhemoglobin (O2Hb) and oxygen saturation (SO2) are approximately equal. In the presence of dyshemoglobins, oxyhemoglobin can be considerably lower than oxygen saturation.           Attestation:  I have reviewed today's vital signs, notes, medications, labs and imaging.  Amount of time spent managing this patient today providing critical care: 50  minutes.

## 2024-04-25 NOTE — PLAN OF CARE
Problem: Adult Inpatient Plan of Care  Goal: Optimal Comfort and Wellbeing  Outcome: Progressing     Problem: Gas Exchange Impaired  Goal: Optimal Gas Exchange  Outcome: Progressing   Goal Outcome Evaluation:       End Of Shift Note    Situation/ Plan: COPD exacerbation, bipap.    Subjective/Objective:    Neuro: pt alert and oriented.    Cardiac: SR/ST, VSS     Resp: LS Exp Wheezes. Sats > 90% on Bipap or 3 L NC. Pt had dyspneic episode this morning was resolved with prn morphine. Wore bipap for this shift.     GI/: voiding in urinal at edge of bed, no BM this shift.     Endo: FSBS WNL     MSK: bedrest r/t dyspnea.     Skin: scattered bruising.     LDAs: x 2 PIV, pt appreciates use of L PIV

## 2024-04-26 LAB
ANION GAP SERPL CALCULATED.3IONS-SCNC: 10 MMOL/L (ref 7–15)
BASE EXCESS BLDV CALC-SCNC: 3.3 MMOL/L (ref -3–3)
BASOPHILS # BLD MANUAL: 0 10E3/UL (ref 0–0.2)
BASOPHILS NFR BLD MANUAL: 0 %
BUN SERPL-MCNC: 24.1 MG/DL (ref 6–20)
CALCIUM SERPL-MCNC: 9.1 MG/DL (ref 8.6–10)
CHLORIDE SERPL-SCNC: 96 MMOL/L (ref 98–107)
CREAT SERPL-MCNC: 0.49 MG/DL (ref 0.67–1.17)
DEPRECATED HCO3 PLAS-SCNC: 26 MMOL/L (ref 22–29)
EGFRCR SERPLBLD CKD-EPI 2021: >90 ML/MIN/1.73M2
EOSINOPHIL # BLD MANUAL: 0 10E3/UL (ref 0–0.7)
EOSINOPHIL NFR BLD MANUAL: 0 %
ERYTHROCYTE [DISTWIDTH] IN BLOOD BY AUTOMATED COUNT: 18.1 % (ref 10–15)
GLUCOSE SERPL-MCNC: 93 MG/DL (ref 70–99)
HCO3 BLDV-SCNC: 29 MMOL/L (ref 21–28)
HCT VFR BLD AUTO: 28.1 % (ref 40–53)
HGB BLD-MCNC: 8.9 G/DL (ref 13.3–17.7)
LYMPHOCYTES # BLD MANUAL: 0.3 10E3/UL (ref 0.8–5.3)
LYMPHOCYTES NFR BLD MANUAL: 1 %
MCH RBC QN AUTO: 30 PG (ref 26.5–33)
MCHC RBC AUTO-ENTMCNC: 31.7 G/DL (ref 31.5–36.5)
MCV RBC AUTO: 95 FL (ref 78–100)
MONOCYTES # BLD MANUAL: 1.7 10E3/UL (ref 0–1.3)
MONOCYTES NFR BLD MANUAL: 5 %
NEUTROPHILS # BLD MANUAL: 31.1 10E3/UL (ref 1.6–8.3)
NEUTROPHILS NFR BLD MANUAL: 94 %
NRBC # BLD AUTO: 0 10E3/UL
NRBC BLD AUTO-RTO: 0 /100
O2/TOTAL GAS SETTING VFR VENT: 28 %
OXYHGB MFR BLDV: 92 % (ref 70–75)
PATH REPORT.COMMENTS IMP SPEC: NORMAL
PATH REPORT.COMMENTS IMP SPEC: NORMAL
PATH REPORT.FINAL DX SPEC: NORMAL
PATH REPORT.MICROSCOPIC SPEC OTHER STN: NORMAL
PATH REPORT.MICROSCOPIC SPEC OTHER STN: NORMAL
PATH REPORT.RELEVANT HX SPEC: NORMAL
PCO2 BLDV: 49 MM HG (ref 40–50)
PH BLDV: 7.38 [PH] (ref 7.32–7.43)
PLAT MORPH BLD: ABNORMAL
PLATELET # BLD AUTO: 272 10E3/UL (ref 150–450)
PO2 BLDV: 69 MM HG (ref 25–47)
POTASSIUM SERPL-SCNC: 4.2 MMOL/L (ref 3.4–5.3)
RBC # BLD AUTO: 2.97 10E6/UL (ref 4.4–5.9)
RBC MORPH BLD: ABNORMAL
SAO2 % BLDV: 94.2 % (ref 70–75)
SODIUM SERPL-SCNC: 132 MMOL/L (ref 135–145)
WBC # BLD AUTO: 32.1 10E3/UL (ref 4–11)
WBC # BLD AUTO: 33.1 10E3/UL (ref 4–11)

## 2024-04-26 PROCEDURE — 250N000011 HC RX IP 250 OP 636: Mod: JZ

## 2024-04-26 PROCEDURE — 94640 AIRWAY INHALATION TREATMENT: CPT

## 2024-04-26 PROCEDURE — 36415 COLL VENOUS BLD VENIPUNCTURE: CPT | Performed by: FAMILY MEDICINE

## 2024-04-26 PROCEDURE — 94660 CPAP INITIATION&MGMT: CPT

## 2024-04-26 PROCEDURE — 200N000001 HC R&B ICU

## 2024-04-26 PROCEDURE — 85007 BL SMEAR W/DIFF WBC COUNT: CPT | Performed by: STUDENT IN AN ORGANIZED HEALTH CARE EDUCATION/TRAINING PROGRAM

## 2024-04-26 PROCEDURE — 250N000011 HC RX IP 250 OP 636

## 2024-04-26 PROCEDURE — 250N000011 HC RX IP 250 OP 636: Performed by: FAMILY MEDICINE

## 2024-04-26 PROCEDURE — 999N000157 HC STATISTIC RCP TIME EA 10 MIN

## 2024-04-26 PROCEDURE — 80048 BASIC METABOLIC PNL TOTAL CA: CPT | Performed by: FAMILY MEDICINE

## 2024-04-26 PROCEDURE — 250N000013 HC RX MED GY IP 250 OP 250 PS 637

## 2024-04-26 PROCEDURE — 250N000013 HC RX MED GY IP 250 OP 250 PS 637: Performed by: FAMILY MEDICINE

## 2024-04-26 PROCEDURE — 99233 SBSQ HOSP IP/OBS HIGH 50: CPT | Performed by: STUDENT IN AN ORGANIZED HEALTH CARE EDUCATION/TRAINING PROGRAM

## 2024-04-26 PROCEDURE — 250N000009 HC RX 250

## 2024-04-26 PROCEDURE — 85049 AUTOMATED PLATELET COUNT: CPT | Performed by: FAMILY MEDICINE

## 2024-04-26 PROCEDURE — 94640 AIRWAY INHALATION TREATMENT: CPT | Mod: 76

## 2024-04-26 PROCEDURE — 250N000013 HC RX MED GY IP 250 OP 250 PS 637: Performed by: STUDENT IN AN ORGANIZED HEALTH CARE EDUCATION/TRAINING PROGRAM

## 2024-04-26 PROCEDURE — 82805 BLOOD GASES W/O2 SATURATION: CPT | Performed by: FAMILY MEDICINE

## 2024-04-26 PROCEDURE — 258N000003 HC RX IP 258 OP 636

## 2024-04-26 PROCEDURE — 250N000012 HC RX MED GY IP 250 OP 636 PS 637

## 2024-04-26 PROCEDURE — 85060 BLOOD SMEAR INTERPRETATION: CPT | Performed by: PATHOLOGY

## 2024-04-26 PROCEDURE — 94799 UNLISTED PULMONARY SVC/PX: CPT

## 2024-04-26 RX ORDER — CLONIDINE HYDROCHLORIDE 0.1 MG/1
0.1 TABLET ORAL 2 TIMES DAILY
Status: DISCONTINUED | OUTPATIENT
Start: 2024-04-26 | End: 2024-04-27

## 2024-04-26 RX ADMIN — IPRATROPIUM BROMIDE AND ALBUTEROL SULFATE 3 ML: 2.5; .5 SOLUTION RESPIRATORY (INHALATION) at 11:52

## 2024-04-26 RX ADMIN — METOPROLOL TARTRATE 37.5 MG: 25 TABLET, FILM COATED ORAL at 20:19

## 2024-04-26 RX ADMIN — FLUTICASONE FUROATE AND VILANTEROL TRIFENATATE 1 PUFF: 200; 25 POWDER RESPIRATORY (INHALATION) at 08:13

## 2024-04-26 RX ADMIN — GUAIFENESIN 1200 MG: 600 TABLET ORAL at 10:59

## 2024-04-26 RX ADMIN — GUAIFENESIN 1200 MG: 600 TABLET ORAL at 20:19

## 2024-04-26 RX ADMIN — LORAZEPAM 0.5 MG: 0.5 TABLET ORAL at 12:26

## 2024-04-26 RX ADMIN — CLONIDINE HYDROCHLORIDE 0.1 MG: 0.1 TABLET ORAL at 20:19

## 2024-04-26 RX ADMIN — MORPHINE SULFATE 4 MG: 4 INJECTION, SOLUTION INTRAMUSCULAR; INTRAVENOUS at 20:09

## 2024-04-26 RX ADMIN — CEFEPIME 2 G: 2 INJECTION, POWDER, FOR SOLUTION INTRAVENOUS at 16:24

## 2024-04-26 RX ADMIN — METRONIDAZOLE 500 MG: 500 INJECTION, SOLUTION INTRAVENOUS at 11:02

## 2024-04-26 RX ADMIN — ENOXAPARIN SODIUM 40 MG: 40 INJECTION SUBCUTANEOUS at 10:59

## 2024-04-26 RX ADMIN — IPRATROPIUM BROMIDE AND ALBUTEROL SULFATE 3 ML: 2.5; .5 SOLUTION RESPIRATORY (INHALATION) at 00:07

## 2024-04-26 RX ADMIN — METOPROLOL TARTRATE 37.5 MG: 25 TABLET, FILM COATED ORAL at 08:12

## 2024-04-26 RX ADMIN — MORPHINE SULFATE 4 MG: 4 INJECTION, SOLUTION INTRAMUSCULAR; INTRAVENOUS at 01:15

## 2024-04-26 RX ADMIN — CLONIDINE HYDROCHLORIDE 0.1 MG: 0.1 TABLET ORAL at 08:12

## 2024-04-26 RX ADMIN — FLUTICASONE PROPIONATE 1 SPRAY: 50 SPRAY, METERED NASAL at 08:13

## 2024-04-26 RX ADMIN — LISINOPRIL 40 MG: 40 TABLET ORAL at 08:12

## 2024-04-26 RX ADMIN — CEFEPIME 2 G: 2 INJECTION, POWDER, FOR SOLUTION INTRAVENOUS at 04:54

## 2024-04-26 RX ADMIN — ROFLUMILAST 500 MCG: 500 TABLET ORAL at 08:12

## 2024-04-26 RX ADMIN — AZITHROMYCIN MONOHYDRATE 500 MG: 500 INJECTION, POWDER, LYOPHILIZED, FOR SOLUTION INTRAVENOUS at 07:01

## 2024-04-26 RX ADMIN — AMLODIPINE BESYLATE 10 MG: 10 TABLET ORAL at 08:12

## 2024-04-26 RX ADMIN — IPRATROPIUM BROMIDE AND ALBUTEROL SULFATE 3 ML: 2.5; .5 SOLUTION RESPIRATORY (INHALATION) at 19:34

## 2024-04-26 RX ADMIN — ISODIUM CHLORIDE 3 ML: 0.03 SOLUTION RESPIRATORY (INHALATION) at 19:36

## 2024-04-26 RX ADMIN — IPRATROPIUM BROMIDE AND ALBUTEROL SULFATE 3 ML: 2.5; .5 SOLUTION RESPIRATORY (INHALATION) at 15:40

## 2024-04-26 RX ADMIN — Medication 1 SPRAY: at 11:00

## 2024-04-26 RX ADMIN — METRONIDAZOLE 500 MG: 500 INJECTION, SOLUTION INTRAVENOUS at 00:01

## 2024-04-26 RX ADMIN — LORAZEPAM 0.5 MG: 0.5 TABLET ORAL at 07:04

## 2024-04-26 RX ADMIN — PREDNISONE 40 MG: 20 TABLET ORAL at 08:12

## 2024-04-26 ASSESSMENT — ACTIVITIES OF DAILY LIVING (ADL)
ADLS_ACUITY_SCORE: 29
ADLS_ACUITY_SCORE: 28
ADLS_ACUITY_SCORE: 29
ADLS_ACUITY_SCORE: 28
ADLS_ACUITY_SCORE: 28
ADLS_ACUITY_SCORE: 29
ADLS_ACUITY_SCORE: 28

## 2024-04-26 NOTE — PROGRESS NOTES
End Of Shift Note    Situation: Pt admitted 4/23 for treatment of COPD exacerbation    Plan: BIPAP overnight    Subjective/Objective:    Neuro: Pt is alert and orientated and is effectively communicating his needs    Cardiac: Tachycardic this shift,  this am    Resp: Bipap overnight, tolerated well, NC this morning    GI/: Voiding without difficulty, using urinal, good urine output    Endo: WNL    MSK: Pt is independently ambulating without difficulty     Skin: CDI    LDAs: PIV (L) forearm, patent    Disposition TBD

## 2024-04-26 NOTE — PLAN OF CARE
Problem: Gas Exchange Impaired  Goal: Optimal Gas Exchange  Outcome: Progressing     Problem: Adult Inpatient Plan of Care  Goal: Plan of Care Review  Description: The Plan of Care Review/Shift note should be completed every shift.  The Outcome Evaluation is a brief statement about your assessment that the patient is improving, declining, or no change.  This information will be displayed automatically on your shift  note.  Outcome: Met     Problem: Skin Injury Risk Increased  Goal: Skin Health and Integrity  Outcome: Met     Problem: Infection  Goal: Absence of Infection Signs and Symptoms  Outcome: Met

## 2024-04-26 NOTE — PROGRESS NOTES
Pt stable during shift - was able to tolerate being off bipap about 1h at a time for meals on 2L o2 per nc.  Pt requests bipap on when becomes dyspneic - fio2 28% w/ sats 97%.  Pt has significant dyspnea when stands at side of bed to urinate.

## 2024-04-26 NOTE — PROGRESS NOTES
Patient wearing Bipap 20/8, 28% for most of the night.  Currently taking a break and is tolerating a NC @ 2 liters.  BS cont to be wheezy and diminished with very little change in BS with TX.  Patient maybe slightly less/SOA and anxious this A.M.

## 2024-04-26 NOTE — PROGRESS NOTES
End Of Shift Note    Situation: Pt admitted 4/23 for treatment of COPD exacerbation    Plan: Bipap, IV abx, Lovenox, Duoneb, Steroids     Subjective/Objective:    Neuro: Pt is alert and orientated and is effectively communicating his needs    Cardiac: Tachycardic    Resp: Bipap overnight    GI/: Voiding without difficulty, independently using urinal    Endo: WNL    MSK: Independently ambulating, steady gait    Skin: WDL    LDAs: PIV X 1 patent

## 2024-04-26 NOTE — PROGRESS NOTES
[facility]    Medicine Progress Note - Hospitalist Service    Date of Admission:  4/23/2024    Assessment & Plan      Luis Hernandez is a 58 year old male who presents on 4/23/2024 with progressive shortness of breath. He was found to have acute on chronic respiratory failure presumably from COPD exacerbation complicated by pneumonia. He was being admitted to ICU on BiPAP and IV antibiotic therapy. Hospital course revealed Viral Pneumonia (Rhinovirus) and bacterial pneumonia (Kleb/PsA) for which patient is being treated for.     Acute on chronic respiratory failure with hypoxia,   likely 2/2 pneumonia causing exacerbation of COPD as below   Meets SIRS criteria with tachycardia and leukocytosis (46.3). No fever. Lactate WNL (1.4). CRP elevated (5.46). Procalcitonin normal (0.06). CXR with small hazy opacity over left lower lung laterally. Lungs with wheezing bilateral mid and lower lobes.    - Management of PNA and COPD as below    Sepsis   Left lobe pneumonia  Viral Pneumonia  Bacteria Pneumonia (Klebsiella and Pseudomonas)  CXR with small hazy opacity over left lower lung laterally. Procalcitonin WNL. Recent concern by heme/onc 4/12 PET scan for pneumonia and started on Augmentin in which has completed 5 days of PTA. PET scan 4/11 during that time showing resolved consolidation RADHA pneumonia, however there was new patchy focal consolidation in RML and LLL which could represent infection/inflammation.  Abx: was on standard CAP coverage with rocephin/azithro with additional anaerobic coverage with flagyl-> changed to cefepime after sputum Cx data with continued azithro/flagyl on 04/25 -> 04/26 cefe monotherapy  Urine Ag (strep/legionella): Negative  Respiratory PCR: + Rhinovirus  Sputum Cx: Klebsiella Pneumoniae (susceptible to cefe), Pseudomonas Aeurginosa (pending yancy)  Blood Cx x2 NGTD 04/26 ~3d    - Continue IV Cefepime 2g q12h pending sensitivities for PsA     AECOPD  Chronic Hypoxic  Respiratory Failure on 2L O2 via NC  COPD Stage D  H/o severe COPD (FEV1 18%) on continuous oxygen 2 liters at baseline. Significant smoking history of 60 pack years. No increased oxygen demands PTA and denies hypoxia. Found to be in respiratory distress and placed on BiPAP. VBG without acidosis (7.33) and mild hypercapnia (61).   Recently started prednisone taper 4/11 by oncology. Given solumedrol in ED along with duoneb x 2.   Home meds: LAMA, ICS/LABA, romflumilast, saline neb, prn XIOMARA  Interval assessment 04/26: improving but still dyspneic and hypoxic with minimal exertion requiring BIPAP. Gases have been okay so patient likely just needs positive pressure. No history of ANDRÉS or sx concerning for this.    - Continue intermittent BIPAP; should be able to wean as infection clears  - Continue PTA roflumilast 500 mg, fluticasone-salmeterol (Advair) BID, and normal saline nebs BID.  Held home tiotropium (Spiriva Respimat) while on duonebs   - Duoneb q4hrs  - Guaifenesin 1,200 mg BID  - Prednisone 40 mg daily (starting 4/24)  - PRN morphine available  - Clonidine for anxiety/tachycardia started 04/26     Sinus Tachycardia    Presented tachycardic 117. Tachycardia has been persistent and up to 135. Troponin WNL (11). Initial EKG showing sinus tachycardia with non-specific ST depression. Repeat EKG showing sinus tachycardia with unchanged non-specific ST depression. Denies chest pain, tightness, pressure, or discomfort. Does not appear volume down, however did respond some to 1 liter fluid bolus. Confirmed that PTA atenolol was taken 4/22. Initially held for concern for developing sepsis and did not want to blunt cardiac response. Was provided steroids which may be contributing to tachycardia. TSH normal (0.77). Considering pulmonary embolism as source of tachycardia, however given normal d-dimer did not pursue further imaging of chest.  - Telemetry to monitor  - received 2L in boluses 4/23.    - Continue PTA atenolol  25 mg  - Echocardiogram 4/23 showed moderate tricuspid regurgitation and severe pulmonary hypertension with hyperdynamic LV function - overall similar to prior echo from 2019.  - sinus tach improved 4/24, down to 110's.    Down into 100's at times 4/25.   - switching atenolol 50 mg daily to metoprolol at slightly higher dose of 37.5 mg twice daily AM 4/25, may need to titrate this then discharge on once daily metoprolol if doing well.    - of note, patient has similar sinus tach during last admission for respiratory distress.      Leukocytosis   Presumed leukemoid reaction due to acute infection but markedly elevated.  Was essentially normal about 2 weeks prior to admission. Immature cells present (myelos). Monocytes on e-diff.  Path blood smear: No dysplastic cells or blasts. No myelophthisic process identified.     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 Follows with oncology and currently undergoing palliative carboplatin, Taxol, pembrolizumab with Neulasta. Has completed x 3 cycles, last received 4/12. Not a surgical nor definite chemoradiation candidate given severe COPD. PET scan 4/11 shows near CR of the primary R perihilar mass and stable mildly avid thoracic nodes. 6 mm RUL nodule stable in size but slightly more avid.  - Continue with hematology/oncology follow-up as planned.     Hypertension    Chronic. Blood pressures reviewed and hypertensive on presentation. BP normalized and borderline soft at times. Managed PTA with amlodipine 10 mg, lisinopril 40 mg, and atenolol 25 mg.  - Continued atenolol  - Held  amlodipine and lisinopril, then resumed lisinopril 4/24 and amlodipine 4/25 with holding parameters      Chronic hyponatremia    Sodium 131. Baseline sodium 131-133. Appears stable.  - 131 ? 133 ? 133      Chronic anemia    Hemoglobin 9.9 on admission. Baseline hemoglobin 10-11. No reported melena or hematochezia.   - 9.9 ? 9.0 ? 8.9    Suspect due to dilution from IVF for tachycardia as above.          Diet: Snacks/Supplements Adult: Ensure Enlive; With Meals  Combination Diet Regular Diet; Easy to Chew (level 7); Thin Liquids (level 0)    DVT Prophylaxis: Enoxaparin (Lovenox) SQ  Miller Catheter: Not present  Lines: None     Cardiac Monitoring: ACTIVE order. Indication: ICU  Code Status:  DNR- okay with intubation    Clinically Significant Risk Factors                  # Hypertension: Noted on problem list            # COPD: noted on problem list        Disposition Plan     Medically Ready for Discharge: Anticipated in 2-4 Days             Gareth Briseno DO  Hospitalist Service  [facility]  Securely message with [employer] (more info)  Text page via [employer] Paging/Directory   ______________________________________________________________________    Interval History   NAEO. Still very dyspneic and requiring BIPAP    Physical Exam   Vital Signs: Temp: 98  F (36.7  C) Temp src: Axillary BP: 121/87 Pulse: 104   Resp: 29 SpO2: 97 % O2 Device: Nasal cannula Oxygen Delivery: 2 LPM  Weight: 110 lbs 14.26 oz    Physical Exam  Constitutional:       General: He is not in acute distress.  HENT:      Head: Normocephalic.      Nose: Nose normal.   Eyes:      General: No scleral icterus.  Cardiovascular:      Rate and Rhythm: Regular rhythm. Tachycardia present.      Pulses: Normal pulses.   Pulmonary:      Breath sounds: Wheezing present.      Comments: tachypnea  Abdominal:      Palpations: Abdomen is soft.   Musculoskeletal:      Right lower leg: No edema.      Left lower leg: No edema.   Skin:     General:  Skin is warm.      Capillary Refill: Capillary refill takes less than 2 seconds.   Neurological:      General: No focal deficit present.      Mental Status: He is alert.           Medical Decision Making       60 MINUTES SPENT BY ME on the date of service doing chart review, history, exam, documentation & further activities per the note.      Data     I have personally reviewed the following data over the past 24 hrs:    32.1 (H)  \   8.9 (L)   / 272     132 (L) 96 (L) 24.1 (H) /  93   4.2 26 0.49 (L) \       Imaging results reviewed over the past 24 hrs:   No results found for this or any previous visit (from the past 24 hour(s)).

## 2024-04-27 LAB
ALBUMIN SERPL BCG-MCNC: 3.4 G/DL (ref 3.5–5.2)
ALBUMIN SERPL BCG-MCNC: 3.4 G/DL (ref 3.5–5.2)
ALP SERPL-CCNC: 129 U/L (ref 40–150)
ALT SERPL W P-5'-P-CCNC: 51 U/L (ref 0–70)
ANION GAP SERPL CALCULATED.3IONS-SCNC: 8 MMOL/L (ref 7–15)
AST SERPL W P-5'-P-CCNC: 20 U/L (ref 0–45)
BACTERIA SPT CULT: ABNORMAL
BASOPHILS # BLD AUTO: 0 10E3/UL (ref 0–0.2)
BASOPHILS NFR BLD AUTO: 0 %
BILIRUB DIRECT SERPL-MCNC: <0.2 MG/DL (ref 0–0.3)
BILIRUB SERPL-MCNC: <0.2 MG/DL
BUN SERPL-MCNC: 27.1 MG/DL (ref 6–20)
CALCIUM SERPL-MCNC: 9.1 MG/DL (ref 8.6–10)
CHLORIDE SERPL-SCNC: 100 MMOL/L (ref 98–107)
CREAT SERPL-MCNC: 0.53 MG/DL (ref 0.67–1.17)
DEPRECATED HCO3 PLAS-SCNC: 27 MMOL/L (ref 22–29)
EGFRCR SERPLBLD CKD-EPI 2021: >90 ML/MIN/1.73M2
EOSINOPHIL # BLD AUTO: 0 10E3/UL (ref 0–0.7)
EOSINOPHIL NFR BLD AUTO: 0 %
ERYTHROCYTE [DISTWIDTH] IN BLOOD BY AUTOMATED COUNT: 18.2 % (ref 10–15)
FERRITIN SERPL-MCNC: 273 NG/ML (ref 31–409)
GLUCOSE SERPL-MCNC: 93 MG/DL (ref 70–99)
GRAM STAIN RESULT: ABNORMAL
HCT VFR BLD AUTO: 26.7 % (ref 40–53)
HGB BLD-MCNC: 8.3 G/DL (ref 13.3–17.7)
IMM GRANULOCYTES # BLD: 0.3 10E3/UL
IMM GRANULOCYTES NFR BLD: 1 %
LYMPHOCYTES # BLD AUTO: 0.7 10E3/UL (ref 0.8–5.3)
LYMPHOCYTES NFR BLD AUTO: 3 %
MCH RBC QN AUTO: 29.6 PG (ref 26.5–33)
MCHC RBC AUTO-ENTMCNC: 31.1 G/DL (ref 31.5–36.5)
MCV RBC AUTO: 95 FL (ref 78–100)
MONOCYTES # BLD AUTO: 1.3 10E3/UL (ref 0–1.3)
MONOCYTES NFR BLD AUTO: 6 %
NEUTROPHILS # BLD AUTO: 21.5 10E3/UL (ref 1.6–8.3)
NEUTROPHILS NFR BLD AUTO: 90 %
NRBC # BLD AUTO: 0 10E3/UL
NRBC BLD AUTO-RTO: 0 /100
PHOSPHATE SERPL-MCNC: 3.4 MG/DL (ref 2.5–4.5)
PLATELET # BLD AUTO: 256 10E3/UL (ref 150–450)
POTASSIUM SERPL-SCNC: 4.2 MMOL/L (ref 3.4–5.3)
PROT SERPL-MCNC: 5.8 G/DL (ref 6.4–8.3)
RBC # BLD AUTO: 2.8 10E6/UL (ref 4.4–5.9)
RETICS # AUTO: 0.09 10E6/UL (ref 0.03–0.1)
RETICS/RBC NFR AUTO: 3.2 % (ref 0.5–2)
SODIUM SERPL-SCNC: 135 MMOL/L (ref 135–145)
WBC # BLD AUTO: 23.9 10E3/UL (ref 4–11)

## 2024-04-27 PROCEDURE — 250N000011 HC RX IP 250 OP 636: Performed by: STUDENT IN AN ORGANIZED HEALTH CARE EDUCATION/TRAINING PROGRAM

## 2024-04-27 PROCEDURE — 85045 AUTOMATED RETICULOCYTE COUNT: CPT | Performed by: STUDENT IN AN ORGANIZED HEALTH CARE EDUCATION/TRAINING PROGRAM

## 2024-04-27 PROCEDURE — 94799 UNLISTED PULMONARY SVC/PX: CPT

## 2024-04-27 PROCEDURE — 80069 RENAL FUNCTION PANEL: CPT | Performed by: STUDENT IN AN ORGANIZED HEALTH CARE EDUCATION/TRAINING PROGRAM

## 2024-04-27 PROCEDURE — 250N000013 HC RX MED GY IP 250 OP 250 PS 637: Performed by: FAMILY MEDICINE

## 2024-04-27 PROCEDURE — 84100 ASSAY OF PHOSPHORUS: CPT | Performed by: STUDENT IN AN ORGANIZED HEALTH CARE EDUCATION/TRAINING PROGRAM

## 2024-04-27 PROCEDURE — 82248 BILIRUBIN DIRECT: CPT | Performed by: STUDENT IN AN ORGANIZED HEALTH CARE EDUCATION/TRAINING PROGRAM

## 2024-04-27 PROCEDURE — 250N000009 HC RX 250

## 2024-04-27 PROCEDURE — 999N000157 HC STATISTIC RCP TIME EA 10 MIN

## 2024-04-27 PROCEDURE — 250N000012 HC RX MED GY IP 250 OP 636 PS 637

## 2024-04-27 PROCEDURE — 250N000013 HC RX MED GY IP 250 OP 250 PS 637

## 2024-04-27 PROCEDURE — 94640 AIRWAY INHALATION TREATMENT: CPT | Mod: 76

## 2024-04-27 PROCEDURE — 82728 ASSAY OF FERRITIN: CPT | Performed by: STUDENT IN AN ORGANIZED HEALTH CARE EDUCATION/TRAINING PROGRAM

## 2024-04-27 PROCEDURE — 94660 CPAP INITIATION&MGMT: CPT

## 2024-04-27 PROCEDURE — 36415 COLL VENOUS BLD VENIPUNCTURE: CPT | Performed by: STUDENT IN AN ORGANIZED HEALTH CARE EDUCATION/TRAINING PROGRAM

## 2024-04-27 PROCEDURE — 94640 AIRWAY INHALATION TREATMENT: CPT

## 2024-04-27 PROCEDURE — 85025 COMPLETE CBC W/AUTO DIFF WBC: CPT | Performed by: STUDENT IN AN ORGANIZED HEALTH CARE EDUCATION/TRAINING PROGRAM

## 2024-04-27 PROCEDURE — 99233 SBSQ HOSP IP/OBS HIGH 50: CPT | Performed by: STUDENT IN AN ORGANIZED HEALTH CARE EDUCATION/TRAINING PROGRAM

## 2024-04-27 PROCEDURE — 200N000001 HC R&B ICU

## 2024-04-27 PROCEDURE — 250N000011 HC RX IP 250 OP 636: Performed by: FAMILY MEDICINE

## 2024-04-27 PROCEDURE — 250N000013 HC RX MED GY IP 250 OP 250 PS 637: Performed by: STUDENT IN AN ORGANIZED HEALTH CARE EDUCATION/TRAINING PROGRAM

## 2024-04-27 RX ORDER — IPRATROPIUM BROMIDE AND ALBUTEROL SULFATE 2.5; .5 MG/3ML; MG/3ML
3 SOLUTION RESPIRATORY (INHALATION) EVERY 4 HOURS PRN
Status: DISCONTINUED | OUTPATIENT
Start: 2024-04-27 | End: 2024-05-01 | Stop reason: HOSPADM

## 2024-04-27 RX ORDER — LEVOFLOXACIN 500 MG/1
500 TABLET, FILM COATED ORAL DAILY
Status: DISCONTINUED | OUTPATIENT
Start: 2024-04-27 | End: 2024-05-01 | Stop reason: HOSPADM

## 2024-04-27 RX ADMIN — FLUTICASONE PROPIONATE 1 SPRAY: 50 SPRAY, METERED NASAL at 08:19

## 2024-04-27 RX ADMIN — MORPHINE SULFATE 4 MG: 4 INJECTION, SOLUTION INTRAMUSCULAR; INTRAVENOUS at 07:17

## 2024-04-27 RX ADMIN — MORPHINE SULFATE 4 MG: 4 INJECTION, SOLUTION INTRAMUSCULAR; INTRAVENOUS at 19:01

## 2024-04-27 RX ADMIN — LISINOPRIL 40 MG: 40 TABLET ORAL at 08:18

## 2024-04-27 RX ADMIN — GUAIFENESIN 1200 MG: 600 TABLET ORAL at 08:18

## 2024-04-27 RX ADMIN — LORAZEPAM 0.5 MG: 0.5 TABLET ORAL at 11:51

## 2024-04-27 RX ADMIN — LEVOFLOXACIN 500 MG: 500 TABLET, FILM COATED ORAL at 08:18

## 2024-04-27 RX ADMIN — CLONIDINE HYDROCHLORIDE 0.1 MG: 0.1 TABLET ORAL at 08:18

## 2024-04-27 RX ADMIN — METOPROLOL TARTRATE 37.5 MG: 25 TABLET, FILM COATED ORAL at 08:18

## 2024-04-27 RX ADMIN — IPRATROPIUM BROMIDE AND ALBUTEROL SULFATE 3 ML: 2.5; .5 SOLUTION RESPIRATORY (INHALATION) at 04:15

## 2024-04-27 RX ADMIN — PREDNISONE 40 MG: 20 TABLET ORAL at 08:18

## 2024-04-27 RX ADMIN — FAMOTIDINE 20 MG: 10 INJECTION, SOLUTION INTRAVENOUS at 17:27

## 2024-04-27 RX ADMIN — GUAIFENESIN 1200 MG: 600 TABLET ORAL at 20:28

## 2024-04-27 RX ADMIN — ROFLUMILAST 500 MCG: 500 TABLET ORAL at 08:20

## 2024-04-27 RX ADMIN — ENOXAPARIN SODIUM 40 MG: 40 INJECTION SUBCUTANEOUS at 11:51

## 2024-04-27 RX ADMIN — MORPHINE SULFATE 4 MG: 4 INJECTION, SOLUTION INTRAMUSCULAR; INTRAVENOUS at 23:27

## 2024-04-27 RX ADMIN — LORAZEPAM 0.5 MG: 0.5 TABLET ORAL at 00:50

## 2024-04-27 RX ADMIN — IPRATROPIUM BROMIDE AND ALBUTEROL SULFATE 3 ML: 2.5; .5 SOLUTION RESPIRATORY (INHALATION) at 07:10

## 2024-04-27 RX ADMIN — LORAZEPAM 0.5 MG: 0.5 TABLET ORAL at 20:28

## 2024-04-27 RX ADMIN — FLUTICASONE FUROATE AND VILANTEROL TRIFENATATE 1 PUFF: 200; 25 POWDER RESPIRATORY (INHALATION) at 08:19

## 2024-04-27 RX ADMIN — CEFEPIME 2 G: 2 INJECTION, POWDER, FOR SOLUTION INTRAVENOUS at 04:10

## 2024-04-27 RX ADMIN — METOPROLOL TARTRATE 37.5 MG: 25 TABLET, FILM COATED ORAL at 20:28

## 2024-04-27 RX ADMIN — IPRATROPIUM BROMIDE AND ALBUTEROL SULFATE 3 ML: 2.5; .5 SOLUTION RESPIRATORY (INHALATION) at 00:15

## 2024-04-27 RX ADMIN — AMLODIPINE BESYLATE 10 MG: 10 TABLET ORAL at 08:18

## 2024-04-27 ASSESSMENT — ACTIVITIES OF DAILY LIVING (ADL)
ADLS_ACUITY_SCORE: 29

## 2024-04-27 NOTE — PROGRESS NOTES
Patient remains on BiPAP  S/T mode, IPAP 20, EPAP 8, Rate 12, FiO2 28%  Breath sounds diminished with expiratory wheezes  Unable to wean patient this shift.  RT to monitor and assess as needed.    Lauren Thomas RRT

## 2024-04-27 NOTE — PROGRESS NOTES
[facility]    Medicine Progress Note - Hospitalist Service    Date of Admission:  4/23/2024    Assessment & Plan      Luis Hernandez is a 58 year old male who presents on 4/23/2024 with progressive shortness of breath. He was found to have acute on chronic respiratory failure presumably from COPD exacerbation complicated by pneumonia. He was being admitted to ICU on BiPAP and IV antibiotic therapy. Hospital course revealed Viral Pneumonia (Rhinovirus) and bacterial pneumonia (Kleb/PsA) for which patient is being treated for.     Acute on chronic respiratory failure with hypoxia,   likely 2/2 pneumonia causing exacerbation of COPD as below   Meets SIRS criteria with tachycardia and leukocytosis (46.3). No fever. Lactate WNL (1.4). CRP elevated (5.46). Procalcitonin normal (0.06). CXR with small hazy opacity over left lower lung laterally. Lungs with wheezing bilateral mid and lower lobes.    - Management of PNA and COPD as below    Sepsis   Left lobe pneumonia  Viral Pneumonia  Bacteria Pneumonia (Klebsiella and Pseudomonas)  CXR with small hazy opacity over left lower lung laterally. Procalcitonin WNL. Recent concern by heme/onc 4/12 PET scan for pneumonia and started on Augmentin in which has completed 5 days of PTA. PET scan 4/11 during that time showing resolved consolidation RADHA pneumonia, however there was new patchy focal consolidation in RML and LLL which could represent infection/inflammation.  Abx: was on standard CAP coverage with rocephin/azithro with additional anaerobic coverage with flagyl-> changed to cefepime after sputum Cx data with continued azithro/flagyl on 04/25 -> 04/26 cefe monotherapy  Urine Ag (strep/legionella): Negative  Respiratory PCR: + Rhinovirus  Sputum Cx: Klebsiella Pneumoniae (susceptible to cefe), Pseudomonas Aeurginosa (intermediate/resistant to cefepime-> 04/27 changed to levaquin)  Blood Cx x2 NG 4d    - Changed IV Cefepime 2g q12h to Levaquin 500mg  Daily x 7 days (started 04/27)        AECOPD  Chronic Hypoxic Respiratory Failure on 2L O2 via NC  COPD Stage D  H/o severe COPD (FEV1 18%) on continuous oxygen 2 liters at baseline. Significant smoking history of 60 pack years. No increased oxygen demands PTA and denies hypoxia. Found to be in respiratory distress and placed on BiPAP. VBG without acidosis (7.33) and mild hypercapnia (61).   Recently started prednisone taper 4/11 by oncology. Given solumedrol in ED along with duoneb x 2.   Home meds: LAMA, ICS/LABA, romflumilast, saline neb, prn XIOMARA  Interval assessment 04/26: improving but still dyspneic and hypoxic with minimal exertion requiring BIPAP. Gases have been okay so patient likely just needs positive pressure. No history of ANDRÉS or sx concerning for this.    - Continue intermittent BIPAP; should be able to wean as infection clears  - Continue PTA roflumilast 500 mg, fluticasone-salmeterol (Advair) BID, and normal saline nebs BID.  Held home tiotropium (Spiriva Respimat) while on duonebs   - Duoneb q4hrs  - Guaifenesin 1,200 mg BID  - Prednisone 40 mg daily (starting 4/24)  - PRN morphine available  - Clonidine for anxiety/tachycardia started 04/26     Sinus Tachycardia    Presented tachycardic 117. Tachycardia has been persistent and up to 135. Troponin WNL (11). Initial EKG showing sinus tachycardia with non-specific ST depression. Repeat EKG showing sinus tachycardia with unchanged non-specific ST depression. Denies chest pain, tightness, pressure, or discomfort. Does not appear volume down, however did respond some to 1 liter fluid bolus. Confirmed that PTA atenolol was taken 4/22. Initially held for concern for developing sepsis and did not want to blunt cardiac response. Was provided steroids which may be contributing to tachycardia. TSH normal (0.77). Considering pulmonary embolism as source of tachycardia, however given normal d-dimer did not pursue further imaging of chest.  - Telemetry to  monitor  - received 2L in boluses 4/23.    - Continue PTA atenolol 25 mg  - Echocardiogram 4/23 showed moderate tricuspid regurgitation and severe pulmonary hypertension with hyperdynamic LV function - overall similar to prior echo from 2019.  - sinus tach improved 4/24, down to 110's.    Down into 100's at times 4/25.   - switching atenolol 50 mg daily to metoprolol at slightly higher dose of 37.5 mg twice daily AM 4/25, may need to titrate this then discharge on once daily metoprolol if doing well.    - of note, patient has similar sinus tach during last admission for respiratory distress.      Leukocytosis   Presumed leukemoid reaction due to acute infection but markedly elevated.  Was essentially normal about 2 weeks prior to admission. Immature cells present (myelos). Monocytes on e-diff.  Path blood smear: No dysplastic cells or blasts. No myelophthisic process identified.     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 Follows with oncology and currently undergoing palliative carboplatin, Taxol, pembrolizumab with Neulasta. Has completed x 3 cycles, last received 4/12. Not a surgical nor definite chemoradiation candidate given severe COPD. PET scan 4/11 shows near CR of the primary R perihilar mass and stable mildly avid thoracic nodes. 6 mm RUL nodule stable in size but slightly more avid.  - Continue with hematology/oncology follow-up as planned.     Hypertension    Chronic. Blood pressures reviewed and hypertensive on presentation. BP normalized and borderline soft at times. Managed PTA with amlodipine 10 mg,  lisinopril 40 mg, and atenolol 25 mg.  - Continued atenolol  - Held amlodipine and lisinopril, then resumed lisinopril 4/24 and amlodipine 4/25 with holding parameters      Chronic hyponatremia    Sodium 131. Baseline sodium 131-133. Appears stable.  - 131 ? 133 ? 133      Chronic anemia    Hemoglobin 9.9 on admission. Baseline hemoglobin 10-11. No reported melena or hematochezia.   - 9.9 ? 9.0 ? 8.9    Suspect due to dilution from IVF for tachycardia as above.          Diet: Snacks/Supplements Adult: Ensure Enlive; With Meals  Combination Diet Regular Diet; Easy to Chew (level 7); Thin Liquids (level 0)    DVT Prophylaxis: Enoxaparin (Lovenox) SQ  Miller Catheter: Not present  Lines: None     Cardiac Monitoring: ACTIVE order. Indication: ICU  Code Status:  DNR- okay with intubation    Clinically Significant Risk Factors              # Hypoalbuminemia: Lowest albumin = 3.4 g/dL at 4/27/2024  5:51 AM, will monitor as appropriate     # Hypertension: Noted on problem list            # COPD: noted on problem list        Disposition Plan     Medically Ready for Discharge: Anticipated in 2-4 Days             Gareth Briseno DO  Hospitalist Service  [facility]  Securely message with [employer] (more info)  Text page via [employer] Paging/Directory   ______________________________________________________________________    Interval History   NAEO. Improved from yesterday, tolerating 1-2 hours off BIPAP. Changed abx as susceptibilities came back; should continue to have improvement now that we are targeting bacteria with appropriate abx    Physical Exam   Vital Signs: Temp: 97.9  F (36.6  C) Temp src: Oral BP: 117/78 Pulse: 101   Resp: 24 SpO2: 97 % O2 Device: BiPAP/CPAP Oxygen Delivery: 2.5 LPM  Weight: 110 lbs 14.26 oz    Physical Exam  Constitutional:       General: He is not in acute distress.  HENT:      Head: Normocephalic.      Nose: Nose normal.   Eyes:      General: No scleral icterus.  Cardiovascular:       Rate and Rhythm: Regular rhythm. Tachycardia present.      Pulses: Normal pulses.   Pulmonary:      Breath sounds: Wheezing present.      Comments: tachypnea  Abdominal:      Palpations: Abdomen is soft.   Musculoskeletal:      Right lower leg: No edema.      Left lower leg: No edema.   Skin:     General: Skin is warm.      Capillary Refill: Capillary refill takes less than 2 seconds.   Neurological:      General: No focal deficit present.      Mental Status: He is alert.           Medical Decision Making       55 MINUTES SPENT BY ME on the date of service doing chart review, history, exam, documentation & further activities per the note.      Data     I have personally reviewed the following data over the past 24 hrs:    23.9 (H)  \   8.3 (L)   / 256     135 100 27.1 (H) /  93   4.2 27 0.53 (L) \     ALT: N/A AST: N/A AP: N/A TBILI: N/A   ALB: 3.4 (L) TOT PROTEIN: N/A LIPASE: N/A       Imaging results reviewed over the past 24 hrs:   No results found for this or any previous visit (from the past 24 hour(s)).

## 2024-04-27 NOTE — PLAN OF CARE
Goal Outcome Evaluation:    Pt tolerated bipap entire shift, off for sips of water. On 2.5L NC at 0600. Sats upper 90's with RR 14-18 on bipap. Pt states no distress at rest but does have increased work of breathing with any activity. Denies c/o pain/discomfort.   Redness to cheeks and bridge of nose.

## 2024-04-27 NOTE — PROGRESS NOTES
Pt stable during shift - vss.  Used bipap less today - tolerated being on o2 at 2.5L per nc (pt requests his home requirement) w/ sats 97% for up to 4h at a time.  Wore bipap in afternoon for 4h d/t dyspnea w/ fio2 at 28% & sats upper 90s.  Able to tolerate standing at side of bed w/ nasal cannula on to urinate - has not been able to stand at side of bed w/o bipap on since admission.  Eating, drinking, & voiding well.

## 2024-04-27 NOTE — PLAN OF CARE
Goal Outcome Evaluation:       Pt received a neb tx via bipap and pt called because he had increase work of breathing. He states the nebulizers do not work for him, he feels it makes his breathing much more difficult. IV morphine given and pt states some relief. He states he feels like the nebulizer tightens up his lungs. Pt sitting up right in bed and is more at ease, sats have stayed 97% tachycardic at 115.

## 2024-04-28 LAB
ALBUMIN SERPL BCG-MCNC: 3.6 G/DL (ref 3.5–5.2)
ANION GAP SERPL CALCULATED.3IONS-SCNC: 8 MMOL/L (ref 7–15)
BACTERIA BLD CULT: NO GROWTH
BACTERIA BLD CULT: NO GROWTH
BASOPHILS # BLD AUTO: 0 10E3/UL (ref 0–0.2)
BASOPHILS NFR BLD AUTO: 0 %
BUN SERPL-MCNC: 20.7 MG/DL (ref 6–20)
CALCIUM SERPL-MCNC: 9.5 MG/DL (ref 8.6–10)
CHLORIDE SERPL-SCNC: 98 MMOL/L (ref 98–107)
CREAT SERPL-MCNC: 0.5 MG/DL (ref 0.67–1.17)
DEPRECATED HCO3 PLAS-SCNC: 29 MMOL/L (ref 22–29)
EGFRCR SERPLBLD CKD-EPI 2021: >90 ML/MIN/1.73M2
EOSINOPHIL # BLD AUTO: 0 10E3/UL (ref 0–0.7)
EOSINOPHIL NFR BLD AUTO: 0 %
ERYTHROCYTE [DISTWIDTH] IN BLOOD BY AUTOMATED COUNT: 18.6 % (ref 10–15)
GLUCOSE SERPL-MCNC: 81 MG/DL (ref 70–99)
HCT VFR BLD AUTO: 29.4 % (ref 40–53)
HGB BLD-MCNC: 9.2 G/DL (ref 13.3–17.7)
IMM GRANULOCYTES # BLD: 0.3 10E3/UL
IMM GRANULOCYTES NFR BLD: 1 %
LYMPHOCYTES # BLD AUTO: 0.7 10E3/UL (ref 0.8–5.3)
LYMPHOCYTES NFR BLD AUTO: 4 %
MCH RBC QN AUTO: 30.1 PG (ref 26.5–33)
MCHC RBC AUTO-ENTMCNC: 31.3 G/DL (ref 31.5–36.5)
MCV RBC AUTO: 96 FL (ref 78–100)
MONOCYTES # BLD AUTO: 1.3 10E3/UL (ref 0–1.3)
MONOCYTES NFR BLD AUTO: 7 %
NEUTROPHILS # BLD AUTO: 17.6 10E3/UL (ref 1.6–8.3)
NEUTROPHILS NFR BLD AUTO: 88 %
NRBC # BLD AUTO: 0 10E3/UL
NRBC BLD AUTO-RTO: 0 /100
PHOSPHATE SERPL-MCNC: 2.7 MG/DL (ref 2.5–4.5)
PLATELET # BLD AUTO: 274 10E3/UL (ref 150–450)
POTASSIUM SERPL-SCNC: 4.3 MMOL/L (ref 3.4–5.3)
RBC # BLD AUTO: 3.06 10E6/UL (ref 4.4–5.9)
SODIUM SERPL-SCNC: 135 MMOL/L (ref 135–145)
WBC # BLD AUTO: 19.9 10E3/UL (ref 4–11)

## 2024-04-28 PROCEDURE — 250N000011 HC RX IP 250 OP 636: Performed by: STUDENT IN AN ORGANIZED HEALTH CARE EDUCATION/TRAINING PROGRAM

## 2024-04-28 PROCEDURE — 250N000013 HC RX MED GY IP 250 OP 250 PS 637

## 2024-04-28 PROCEDURE — 200N000001 HC R&B ICU

## 2024-04-28 PROCEDURE — 82040 ASSAY OF SERUM ALBUMIN: CPT | Performed by: STUDENT IN AN ORGANIZED HEALTH CARE EDUCATION/TRAINING PROGRAM

## 2024-04-28 PROCEDURE — 250N000013 HC RX MED GY IP 250 OP 250 PS 637: Performed by: STUDENT IN AN ORGANIZED HEALTH CARE EDUCATION/TRAINING PROGRAM

## 2024-04-28 PROCEDURE — 250N000013 HC RX MED GY IP 250 OP 250 PS 637: Performed by: FAMILY MEDICINE

## 2024-04-28 PROCEDURE — 250N000011 HC RX IP 250 OP 636: Performed by: FAMILY MEDICINE

## 2024-04-28 PROCEDURE — 999N000157 HC STATISTIC RCP TIME EA 10 MIN

## 2024-04-28 PROCEDURE — 94660 CPAP INITIATION&MGMT: CPT

## 2024-04-28 PROCEDURE — 250N000012 HC RX MED GY IP 250 OP 636 PS 637

## 2024-04-28 PROCEDURE — 36415 COLL VENOUS BLD VENIPUNCTURE: CPT | Performed by: STUDENT IN AN ORGANIZED HEALTH CARE EDUCATION/TRAINING PROGRAM

## 2024-04-28 PROCEDURE — 85004 AUTOMATED DIFF WBC COUNT: CPT | Performed by: STUDENT IN AN ORGANIZED HEALTH CARE EDUCATION/TRAINING PROGRAM

## 2024-04-28 PROCEDURE — 99233 SBSQ HOSP IP/OBS HIGH 50: CPT | Performed by: STUDENT IN AN ORGANIZED HEALTH CARE EDUCATION/TRAINING PROGRAM

## 2024-04-28 RX ORDER — PREDNISONE 20 MG/1
20 TABLET ORAL DAILY
Status: DISCONTINUED | OUTPATIENT
Start: 2024-05-02 | End: 2024-05-01 | Stop reason: HOSPADM

## 2024-04-28 RX ORDER — PREDNISONE 10 MG/1
10 TABLET ORAL DAILY
Status: DISCONTINUED | OUTPATIENT
Start: 2024-05-05 | End: 2024-05-01 | Stop reason: HOSPADM

## 2024-04-28 RX ADMIN — UMECLIDINIUM 1 PUFF: 62.5 AEROSOL, POWDER ORAL at 08:40

## 2024-04-28 RX ADMIN — ENOXAPARIN SODIUM 40 MG: 40 INJECTION SUBCUTANEOUS at 09:59

## 2024-04-28 RX ADMIN — MORPHINE SULFATE 4 MG: 4 INJECTION, SOLUTION INTRAMUSCULAR; INTRAVENOUS at 12:39

## 2024-04-28 RX ADMIN — LEVOFLOXACIN 500 MG: 500 TABLET, FILM COATED ORAL at 08:38

## 2024-04-28 RX ADMIN — FLUTICASONE PROPIONATE 1 SPRAY: 50 SPRAY, METERED NASAL at 08:40

## 2024-04-28 RX ADMIN — FLUTICASONE FUROATE AND VILANTEROL TRIFENATATE 1 PUFF: 200; 25 POWDER RESPIRATORY (INHALATION) at 08:39

## 2024-04-28 RX ADMIN — LORAZEPAM 0.5 MG: 0.5 TABLET ORAL at 05:46

## 2024-04-28 RX ADMIN — LISINOPRIL 40 MG: 40 TABLET ORAL at 08:38

## 2024-04-28 RX ADMIN — METOPROLOL TARTRATE 37.5 MG: 25 TABLET, FILM COATED ORAL at 19:52

## 2024-04-28 RX ADMIN — PREDNISONE 40 MG: 20 TABLET ORAL at 08:38

## 2024-04-28 RX ADMIN — LORAZEPAM 0.5 MG: 0.5 TABLET ORAL at 20:21

## 2024-04-28 RX ADMIN — FAMOTIDINE 20 MG: 10 INJECTION, SOLUTION INTRAVENOUS at 05:45

## 2024-04-28 RX ADMIN — GUAIFENESIN 1200 MG: 600 TABLET ORAL at 19:52

## 2024-04-28 RX ADMIN — ROFLUMILAST 500 MCG: 500 TABLET ORAL at 08:43

## 2024-04-28 RX ADMIN — MORPHINE SULFATE 4 MG: 4 INJECTION, SOLUTION INTRAMUSCULAR; INTRAVENOUS at 19:52

## 2024-04-28 RX ADMIN — LORAZEPAM 0.5 MG: 0.5 TABLET ORAL at 16:08

## 2024-04-28 RX ADMIN — LORAZEPAM 0.5 MG: 0.5 TABLET ORAL at 10:03

## 2024-04-28 RX ADMIN — FAMOTIDINE 20 MG: 10 INJECTION, SOLUTION INTRAVENOUS at 16:06

## 2024-04-28 RX ADMIN — GUAIFENESIN 1200 MG: 600 TABLET ORAL at 08:38

## 2024-04-28 RX ADMIN — AMLODIPINE BESYLATE 10 MG: 10 TABLET ORAL at 08:38

## 2024-04-28 RX ADMIN — METOPROLOL TARTRATE 37.5 MG: 25 TABLET, FILM COATED ORAL at 08:38

## 2024-04-28 ASSESSMENT — ACTIVITIES OF DAILY LIVING (ADL)
ADLS_ACUITY_SCORE: 28
ADLS_ACUITY_SCORE: 29
ADLS_ACUITY_SCORE: 28
ADLS_ACUITY_SCORE: 29
ADLS_ACUITY_SCORE: 28
ADLS_ACUITY_SCORE: 29
ADLS_ACUITY_SCORE: 28
ADLS_ACUITY_SCORE: 29
ADLS_ACUITY_SCORE: 28

## 2024-04-28 NOTE — PROVIDER NOTIFICATION
Patient remained on BiPAP @ night. No changes made. Pt tolerates well.    BiPAP settings/Parameters:        04/28/24 0400   Tech Time   $Tech Time (10 minute increments) 2   Mode: CPAP/ BiPAP/ AVAPS/ AVAPS AE   CPAP/BiPAP/ AVAPS/ AVAPS AE Mode BiPAP S/T   Equipment   Device v60   CPAP/BiPAP/Settings   BIPAP/CPAP On Standby On   IPAP/EPAP (cmH2O) 20/8   Rate (breaths/min) 12   Oxygen (%) 28   Timed Inspiration (sec) 0.75   IPAP RISE  Settings (V60) 2   CPAP/BiPAP Patient Parameters   IPAP (cm H2O) 20 cmH2O   EPAP (cm H2O) 8 cmH2O   Pressure Support (cm H2O) 12 cmH2O   RR Total (breaths/min) 18 breaths/min   Vt (mL) 572 mL   Minute Ventilation (L/min) 10.6 L/min   Peak Inspiratory Pressure (cm H2O) 20 cmH2O   Pt.  Leak (L/min) 19 L/min   CPAP/BiPAP/AVAPS/AVAPS AE Alarms   High Pressure (cm H2O) 30 cmH2O   Low Pressure (cm H2O) 8   Low Pressure Delay (sec) 20 sec   Lo Min Vent 2   High Rate (breaths/min) 40 breaths/min   Low Rate (breaths/min) 12   RT Device Skin Assessment   Oxygen Delivery Device CPAP/BiPAP Mask   Interface Under-nose Mask - Medium   Ventilator Arm In Place Yes   Site Appearance neck circumference Clean and dry   Site Appearance nares (outer) Clean and dry   Site Appearance nares (inner) Clean and dry   Site Appearance bridge of nose Clean and dry   Site Appearance occiput Clean and dry   Strap Tightness Finger Allowance between head and device strap   Device Skin Interventions Taken No adjustments needed   Respiratory WDL   Rhythm/Pattern, Respiratory dyspnea upon exertion   Cough Type nonproductive

## 2024-04-28 NOTE — PLAN OF CARE
Goal Outcome Evaluation:               Pt did wear bipap all night until 0600 and switched to the NC 2.5L. he is able to position self in bed, sit upright when feeling SOB. He did ask for ativan or morphine and states it does help relieve his SOB. No new issues noted overnight.

## 2024-04-28 NOTE — PROGRESS NOTES
Pt wore NC at 2.5L until lunch, then he switched to CPAP for the rest of shift with sats in upper 90s. Pt walked to bathroom 1x during shift, and he stood or sat at side of bed intermittently. When he was out of bed he would become dyspneic and tachypneic. He was able to recover while at rest.

## 2024-04-28 NOTE — PROGRESS NOTES
[facility]    Medicine Progress Note - Hospitalist Service    Date of Admission:  4/23/2024    Assessment & Plan   Luis Hernandez is a 58 year old male who presents on 4/23/2024 with progressive shortness of breath. He was found to have acute on chronic respiratory failure presumably from COPD exacerbation complicated by pneumonia. He was being admitted to ICU on BiPAP and IV antibiotic therapy. Hospital course revealed Viral Pneumonia (Rhinovirus) and bacterial pneumonia (Kleb/PsA) for which patient is being treated for.     Interval assessment (04/28):  Patient improving with tx of PNA, hope to wean off BIPAP/CPAP today and transfer out of ICU 04/29    Acute on chronic respiratory failure with hypoxia,   likely 2/2 pneumonia causing exacerbation of COPD as below   Meets SIRS criteria with tachycardia and leukocytosis (46.3). No fever. Lactate WNL (1.4). CRP elevated (5.46). Procalcitonin normal (0.06). CXR with small hazy opacity over left lower lung laterally. Lungs with wheezing bilateral mid and lower lobes.    - Management of PNA and COPD as below    Sepsis   Left lobe pneumonia  Viral Pneumonia  Bacteria Pneumonia (Klebsiella and Pseudomonas)  CXR with small hazy opacity over left lower lung laterally. Procalcitonin WNL. Recent concern by heme/onc 4/12 PET scan for pneumonia and started on Augmentin in which has completed 5 days of PTA. PET scan 4/11 during that time showing resolved consolidation RADHA pneumonia, however there was new patchy focal consolidation in RML and LLL which could represent infection/inflammation.  Abx: was on standard CAP coverage with rocephin/azithro with additional anaerobic coverage with flagyl-> changed to cefepime after sputum Cx data with continued azithro/flagyl on 04/25 -> 04/26 cefe monotherapy  Urine Ag (strep/legionella): Negative  Respiratory PCR: + Rhinovirus  Sputum Cx: Klebsiella Pneumoniae (susceptible to cefe), Pseudomonas Aeurginosa  (intermediate/resistant to cefepime-> 04/27 changed to levaquin)  Blood Cx x2 NG 4d    - Changed IV Cefepime 2g q12h to Levaquin 500mg Daily x 7 days (started 04/27)        AECOPD  Chronic Hypoxic Respiratory Failure on 2L O2 via NC  COPD Stage D  H/o severe COPD (FEV1 18%) on continuous oxygen 2 liters at baseline. Significant smoking history of 60 pack years. No increased oxygen demands PTA and denies hypoxia. Found to be in respiratory distress and placed on BiPAP. VBG without acidosis (7.33) and mild hypercapnia (61).   Recently started prednisone taper 4/11 by oncology. Given solumedrol in ED along with duoneb x 2.   Home meds: LAMA, ICS/LABA, romflumilast, saline neb, prn XIOMARA  Interval assessment 04/26: improving but still dyspneic and hypoxic with minimal exertion requiring BIPAP. Gases have been okay so patient likely just needs positive pressure. No history of ANDRÉS or sx concerning for this.    - CPAP/HFNC; if doesn't tolerate then can continue intermittent BIPAP; should be able to wean as infection clears  - Continue PTA roflumilast 500 mg, fluticasone-salmeterol (Advair) BID, and normal saline nebs BID.  Held home tiotropium (Spiriva Respimat) while on duonebs   - Duoneb q4hrs  - Guaifenesin 1,200 mg BID  - Prednisone 40 mg daily (starting 4/24)  - PRN morphine available     Sinus Tachycardia    Presented tachycardic 117. Tachycardia has been persistent and up to 135. Troponin WNL (11). Initial EKG showing sinus tachycardia with non-specific ST depression. Repeat EKG showing sinus tachycardia with unchanged non-specific ST depression. Denies chest pain, tightness, pressure, or discomfort. Does not appear volume down, however did respond some to 1 liter fluid bolus. Confirmed that PTA atenolol was taken 4/22. Initially held for concern for developing sepsis and did not want to blunt cardiac response. Was provided steroids which may be contributing to tachycardia. TSH normal (0.77). Considering pulmonary  embolism as source of tachycardia, however given normal d-dimer did not pursue further imaging of chest.  - Telemetry to monitor  - received 2L in boluses 4/23.    - Continue PTA atenolol 25 mg  - Echocardiogram 4/23 showed moderate tricuspid regurgitation and severe pulmonary hypertension with hyperdynamic LV function - overall similar to prior echo from 2019.  - sinus tach improved 4/24, down to 110's.    Down into 100's at times 4/25.   - switching atenolol 50 mg daily to metoprolol at slightly higher dose of 37.5 mg twice daily AM 4/25, may need to titrate this then discharge on once daily metoprolol if doing well.    - of note, patient has similar sinus tach during last admission for respiratory distress.      Leukocytosis   Presumed leukemoid reaction due to acute infection but markedly elevated.  Was essentially normal about 2 weeks prior to admission. Immature cells present (myelos). Monocytes on e-diff.  Path blood smear: No dysplastic cells or blasts. No myelophthisic process identified.     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 Follows with oncology and currently undergoing palliative carboplatin, Taxol, pembrolizumab with Neulasta. Has completed x 3 cycles, last received 4/12. Not a surgical nor definite chemoradiation candidate given severe COPD. PET scan 4/11 shows near CR of the primary R perihilar mass and stable mildly avid thoracic nodes. 6 mm RUL nodule stable in size but slightly more avid.  - Continue with hematology/oncology follow-up as planned.     Hypertension    Chronic. Blood pressures reviewed and hypertensive on presentation. BP normalized and borderline soft at times. Managed PTA with amlodipine 10 mg, lisinopril 40 mg, and atenolol 25 mg.  - Continued atenolol  - Held amlodipine and lisinopril, then resumed lisinopril 4/24 and  amlodipine 4/25 with holding parameters      Chronic hyponatremia    Sodium 131. Baseline sodium 131-133. Appears stable.  - 131 ? 133 ? 133      Chronic anemia    Hemoglobin 9.9 on admission. Baseline hemoglobin 10-11. No reported melena or hematochezia.   - 9.9 ? 9.0 ? 8.9    Suspect due to dilution from IVF for tachycardia as above.            Diet: Snacks/Supplements Adult: Ensure Enlive; With Meals  Combination Diet Regular Diet; Easy to Chew (level 7); Thin Liquids (level 0)    DVT Prophylaxis: Enoxaparin (Lovenox) SQ  Miller Catheter: Not present  Lines: None     Cardiac Monitoring: ACTIVE order. Indication: ICU  Code Status:  DNR- Okay with intubation    Clinically Significant Risk Factors              # Hypoalbuminemia: Lowest albumin = 3.4 g/dL at 4/27/2024  5:51 AM, will monitor as appropriate     # Hypertension: Noted on problem list            # COPD: noted on problem list        Disposition Plan     Medically Ready for Discharge: Anticipated in 2-4 Days             Gareth Briseno DO  Hospitalist Service  [facility]  Securely message with [employer] (more info)  Text page via [employer] Paging/Directory   ______________________________________________________________________    Interval History   NAEO. Patient continues to make daily progress, off BIPAP for up to 4 hours at a time. Will attempt CPAP/HFNC to see if patient just needs a little bit of PEEP rather than bilevel positive airway pressure    Physical Exam   Vital Signs: Temp: 98.1  F (36.7  C) Temp src: Oral BP: (!) 137/91 Pulse: 98   Resp: 20 SpO2: 96 % O2 Device: Nasal cannula Oxygen Delivery: 2.5 LPM  Weight: 109 lbs 9.1 oz    Physical Exam  Constitutional:       General: He is not in acute distress.  HENT:      Head: Normocephalic.      Nose: Nose normal.   Eyes:      General: No scleral icterus.  Cardiovascular:      Rate and Rhythm: Regular rhythm. Tachycardia present.      Pulses: Normal pulses.   Pulmonary:      Breath  sounds: Wheezing present.      Comments: tachypnea  Abdominal:      Palpations: Abdomen is soft.   Musculoskeletal:      Right lower leg: No edema.      Left lower leg: No edema.   Skin:     General: Skin is warm.      Capillary Refill: Capillary refill takes less than 2 seconds.   Neurological:      General: No focal deficit present.      Mental Status: He is alert    Medical Decision Making       60 MINUTES SPENT BY ME on the date of service doing chart review, history, exam, documentation & further activities per the note.      Data     I have personally reviewed the following data over the past 24 hrs:    19.9 (H)  \   9.2 (L)   / 274     135 98 20.7 (H) /  81   4.3 29 0.50 (L) \     ALT: N/A AST: N/A AP: N/A TBILI: N/A   ALB: 3.6 TOT PROTEIN: N/A LIPASE: N/A     Ferritin:  N/A % Retic:  N/A LDH:  N/A       Imaging results reviewed over the past 24 hrs:   No results found for this or any previous visit (from the past 24 hour(s)).

## 2024-04-29 ENCOUNTER — APPOINTMENT (OUTPATIENT)
Dept: PHYSICAL THERAPY | Facility: CLINIC | Age: 59
DRG: 871 | End: 2024-04-29
Payer: COMMERCIAL

## 2024-04-29 LAB
ALBUMIN SERPL BCG-MCNC: 3.8 G/DL (ref 3.5–5.2)
ANION GAP SERPL CALCULATED.3IONS-SCNC: 7 MMOL/L (ref 7–15)
BASOPHILS # BLD AUTO: 0 10E3/UL (ref 0–0.2)
BASOPHILS NFR BLD AUTO: 0 %
BUN SERPL-MCNC: 15.4 MG/DL (ref 6–20)
CALCIUM SERPL-MCNC: 9.4 MG/DL (ref 8.6–10)
CHLORIDE SERPL-SCNC: 96 MMOL/L (ref 98–107)
CREAT SERPL-MCNC: 0.43 MG/DL (ref 0.67–1.17)
DEPRECATED HCO3 PLAS-SCNC: 29 MMOL/L (ref 22–29)
EGFRCR SERPLBLD CKD-EPI 2021: >90 ML/MIN/1.73M2
EOSINOPHIL # BLD AUTO: 0 10E3/UL (ref 0–0.7)
EOSINOPHIL NFR BLD AUTO: 0 %
ERYTHROCYTE [DISTWIDTH] IN BLOOD BY AUTOMATED COUNT: 18 % (ref 10–15)
GLUCOSE SERPL-MCNC: 88 MG/DL (ref 70–99)
HCT VFR BLD AUTO: 30.7 % (ref 40–53)
HGB BLD-MCNC: 9.8 G/DL (ref 13.3–17.7)
IMM GRANULOCYTES # BLD: 0.2 10E3/UL
IMM GRANULOCYTES NFR BLD: 1 %
LYMPHOCYTES # BLD AUTO: 0.8 10E3/UL (ref 0.8–5.3)
LYMPHOCYTES NFR BLD AUTO: 4 %
MCH RBC QN AUTO: 30.2 PG (ref 26.5–33)
MCHC RBC AUTO-ENTMCNC: 31.9 G/DL (ref 31.5–36.5)
MCV RBC AUTO: 95 FL (ref 78–100)
MONOCYTES # BLD AUTO: 1.2 10E3/UL (ref 0–1.3)
MONOCYTES NFR BLD AUTO: 6 %
NEUTROPHILS # BLD AUTO: 16.5 10E3/UL (ref 1.6–8.3)
NEUTROPHILS NFR BLD AUTO: 89 %
NRBC # BLD AUTO: 0 10E3/UL
NRBC BLD AUTO-RTO: 0 /100
PHOSPHATE SERPL-MCNC: 2.6 MG/DL (ref 2.5–4.5)
PLATELET # BLD AUTO: 296 10E3/UL (ref 150–450)
POTASSIUM SERPL-SCNC: 4.4 MMOL/L (ref 3.4–5.3)
RBC # BLD AUTO: 3.24 10E6/UL (ref 4.4–5.9)
SODIUM SERPL-SCNC: 132 MMOL/L (ref 135–145)
WBC # BLD AUTO: 18.6 10E3/UL (ref 4–11)

## 2024-04-29 PROCEDURE — 120N000004 HC R&B MS OVERFLOW

## 2024-04-29 PROCEDURE — 94660 CPAP INITIATION&MGMT: CPT

## 2024-04-29 PROCEDURE — 250N000013 HC RX MED GY IP 250 OP 250 PS 637: Performed by: STUDENT IN AN ORGANIZED HEALTH CARE EDUCATION/TRAINING PROGRAM

## 2024-04-29 PROCEDURE — 99233 SBSQ HOSP IP/OBS HIGH 50: CPT | Performed by: STUDENT IN AN ORGANIZED HEALTH CARE EDUCATION/TRAINING PROGRAM

## 2024-04-29 PROCEDURE — 36415 COLL VENOUS BLD VENIPUNCTURE: CPT | Performed by: STUDENT IN AN ORGANIZED HEALTH CARE EDUCATION/TRAINING PROGRAM

## 2024-04-29 PROCEDURE — 97161 PT EVAL LOW COMPLEX 20 MIN: CPT | Mod: GP

## 2024-04-29 PROCEDURE — 80069 RENAL FUNCTION PANEL: CPT | Performed by: STUDENT IN AN ORGANIZED HEALTH CARE EDUCATION/TRAINING PROGRAM

## 2024-04-29 PROCEDURE — 250N000011 HC RX IP 250 OP 636: Performed by: STUDENT IN AN ORGANIZED HEALTH CARE EDUCATION/TRAINING PROGRAM

## 2024-04-29 PROCEDURE — 250N000011 HC RX IP 250 OP 636: Performed by: FAMILY MEDICINE

## 2024-04-29 PROCEDURE — 250N000013 HC RX MED GY IP 250 OP 250 PS 637: Performed by: FAMILY MEDICINE

## 2024-04-29 PROCEDURE — 97530 THERAPEUTIC ACTIVITIES: CPT | Mod: GP

## 2024-04-29 PROCEDURE — 999N000157 HC STATISTIC RCP TIME EA 10 MIN

## 2024-04-29 PROCEDURE — 250N000012 HC RX MED GY IP 250 OP 636 PS 637: Performed by: STUDENT IN AN ORGANIZED HEALTH CARE EDUCATION/TRAINING PROGRAM

## 2024-04-29 PROCEDURE — 85025 COMPLETE CBC W/AUTO DIFF WBC: CPT | Performed by: STUDENT IN AN ORGANIZED HEALTH CARE EDUCATION/TRAINING PROGRAM

## 2024-04-29 PROCEDURE — 250N000013 HC RX MED GY IP 250 OP 250 PS 637

## 2024-04-29 RX ORDER — SORBITOL SOLUTION 70 %
30 SOLUTION, ORAL MISCELLANEOUS ONCE
Status: DISCONTINUED | OUTPATIENT
Start: 2024-04-29 | End: 2024-04-29

## 2024-04-29 RX ADMIN — UMECLIDINIUM 1 PUFF: 62.5 AEROSOL, POWDER ORAL at 08:19

## 2024-04-29 RX ADMIN — MORPHINE SULFATE 4 MG: 4 INJECTION, SOLUTION INTRAMUSCULAR; INTRAVENOUS at 18:47

## 2024-04-29 RX ADMIN — LORAZEPAM 0.5 MG: 0.5 TABLET ORAL at 21:35

## 2024-04-29 RX ADMIN — FLUTICASONE PROPIONATE 1 SPRAY: 50 SPRAY, METERED NASAL at 08:18

## 2024-04-29 RX ADMIN — LORAZEPAM 0.5 MG: 0.5 TABLET ORAL at 10:53

## 2024-04-29 RX ADMIN — ROFLUMILAST 500 MCG: 500 TABLET ORAL at 08:19

## 2024-04-29 RX ADMIN — FAMOTIDINE 20 MG: 10 INJECTION, SOLUTION INTRAVENOUS at 17:42

## 2024-04-29 RX ADMIN — GUAIFENESIN 1200 MG: 600 TABLET ORAL at 21:34

## 2024-04-29 RX ADMIN — FLUTICASONE FUROATE AND VILANTEROL TRIFENATATE 1 PUFF: 200; 25 POWDER RESPIRATORY (INHALATION) at 08:19

## 2024-04-29 RX ADMIN — METOPROLOL TARTRATE 37.5 MG: 25 TABLET, FILM COATED ORAL at 08:18

## 2024-04-29 RX ADMIN — METOPROLOL TARTRATE 37.5 MG: 25 TABLET, FILM COATED ORAL at 21:35

## 2024-04-29 RX ADMIN — LISINOPRIL 40 MG: 40 TABLET ORAL at 08:18

## 2024-04-29 RX ADMIN — ENOXAPARIN SODIUM 40 MG: 40 INJECTION SUBCUTANEOUS at 09:37

## 2024-04-29 RX ADMIN — LORAZEPAM 0.5 MG: 0.5 TABLET ORAL at 01:25

## 2024-04-29 RX ADMIN — PREDNISONE 30 MG: 20 TABLET ORAL at 08:18

## 2024-04-29 RX ADMIN — LEVOFLOXACIN 500 MG: 500 TABLET, FILM COATED ORAL at 08:18

## 2024-04-29 RX ADMIN — FAMOTIDINE 20 MG: 10 INJECTION, SOLUTION INTRAVENOUS at 05:48

## 2024-04-29 RX ADMIN — AMLODIPINE BESYLATE 10 MG: 10 TABLET ORAL at 08:18

## 2024-04-29 RX ADMIN — GUAIFENESIN 1200 MG: 600 TABLET ORAL at 08:18

## 2024-04-29 ASSESSMENT — ACTIVITIES OF DAILY LIVING (ADL)
ADLS_ACUITY_SCORE: 29
ADLS_ACUITY_SCORE: 28
ADLS_ACUITY_SCORE: 32
ADLS_ACUITY_SCORE: 28
ADLS_ACUITY_SCORE: 29
ADLS_ACUITY_SCORE: 28
ADLS_ACUITY_SCORE: 29
ADLS_ACUITY_SCORE: 32
ADLS_ACUITY_SCORE: 29
ADLS_ACUITY_SCORE: 29
ADLS_ACUITY_SCORE: 32
ADLS_ACUITY_SCORE: 29
ADLS_ACUITY_SCORE: 28
ADLS_ACUITY_SCORE: 32
ADLS_ACUITY_SCORE: 32
ADLS_ACUITY_SCORE: 28
ADLS_ACUITY_SCORE: 32
ADLS_ACUITY_SCORE: 32
ADLS_ACUITY_SCORE: 28
ADLS_ACUITY_SCORE: 29
ADLS_ACUITY_SCORE: 29

## 2024-04-29 NOTE — PROGRESS NOTES
[facility]    Medicine Progress Note - Hospitalist Service    Date of Admission:  4/23/2024    Assessment & Plan   Luis Hernandez is a 58 year old male who presents on 4/23/2024 with progressive shortness of breath. He was found to have acute on chronic respiratory failure presumably from COPD exacerbation complicated by pneumonia. He was being admitted to ICU on BiPAP and IV antibiotic therapy. Hospital course revealed Viral Pneumonia (Rhinovirus) and bacterial pneumonia (Kleb/PsA) for which patient is being treated for.     Interval assessment (04/29):  Patient improving with tx of PNA, off BIPAP/CPAP. Transferred out of ICU to med/surg. Expect 1-2 more days.     Acute on chronic respiratory failure with hypoxia,   likely 2/2 pneumonia causing exacerbation of COPD as below   Meets SIRS criteria with tachycardia and leukocytosis (46.3). No fever. Lactate WNL (1.4). CRP elevated (5.46). Procalcitonin normal (0.06). CXR with small hazy opacity over left lower lung laterally. Lungs with wheezing bilateral mid and lower lobes.    - Management of PNA and COPD as below    Sepsis   Left lobe pneumonia  Viral Pneumonia  Bacteria Pneumonia (Klebsiella and Pseudomonas)  CXR with small hazy opacity over left lower lung laterally. Procalcitonin WNL. Recent concern by heme/onc 4/12 PET scan for pneumonia and started on Augmentin in which has completed 5 days of PTA. PET scan 4/11 during that time showing resolved consolidation RADHA pneumonia, however there was new patchy focal consolidation in RML and LLL which could represent infection/inflammation.  Abx: was on standard CAP coverage with rocephin/azithro with additional anaerobic coverage with flagyl-> changed to cefepime after sputum Cx data with continued azithro/flagyl on 04/25 -> 04/26 cefe monotherapy  Urine Ag (strep/legionella): Negative  Respiratory PCR: + Rhinovirus  Sputum Cx: Klebsiella Pneumoniae (susceptible to cefe), Pseudomonas  Aeurginosa (intermediate/resistant to cefepime-> 04/27 changed to levaquin)  Blood Cx x2 NG 4d    - Changed IV Cefepime 2g q12h to Levaquin 500mg Daily x 7 days (started 04/27)        AECOPD  Chronic Hypoxic Respiratory Failure on 2L O2 via NC  COPD Stage D  H/o severe COPD (FEV1 18%) on continuous oxygen 2 liters at baseline. Significant smoking history of 60 pack years. No increased oxygen demands PTA and denies hypoxia. Found to be in respiratory distress and placed on BiPAP. VBG without acidosis (7.33) and mild hypercapnia (61).   Recently started prednisone taper 4/11 by oncology. Given solumedrol in ED along with duoneb x 2.   Home meds: LAMA, ICS/LABA, romflumilast, saline neb, prn XIOMARA  Interval assessment 04/26: improving but still dyspneic and hypoxic with minimal exertion requiring BIPAP. Gases have been okay so patient likely just needs positive pressure. No history of ANDRÉS or sx concerning for this.    - HFNC prn dyspnea  - Continue PTA roflumilast 500 mg, fluticasone-salmeterol (Advair) BID, and normal saline nebs BID.  Held home tiotropium (Spiriva Respimat) while on duonebs   - Duoneb q4hrs  - Guaifenesin 1,200 mg BID  - Prednisone daily (starting 4/24)  - PRN morphine available     Sinus Tachycardia    Presented tachycardic 117. Tachycardia has been persistent and up to 135. Troponin WNL (11). Initial EKG showing sinus tachycardia with non-specific ST depression. Repeat EKG showing sinus tachycardia with unchanged non-specific ST depression. Denies chest pain, tightness, pressure, or discomfort. Does not appear volume down, however did respond some to 1 liter fluid bolus. Confirmed that PTA atenolol was taken 4/22. Initially held for concern for developing sepsis and did not want to blunt cardiac response. Was provided steroids which may be contributing to tachycardia. TSH normal (0.77). Considering pulmonary embolism as source of tachycardia, however given normal d-dimer did not pursue further  imaging of chest.  - Telemetry to monitor  - received 2L in boluses 4/23.    - Continue PTA atenolol 25 mg  - Echocardiogram 4/23 showed moderate tricuspid regurgitation and severe pulmonary hypertension with hyperdynamic LV function - overall similar to prior echo from 2019.  - sinus tach improved 4/24, down to 110's.    Down into 100's at times 4/25.   - switching atenolol 50 mg daily to metoprolol at slightly higher dose of 37.5 mg twice daily AM 4/25, may need to titrate this then discharge on once daily metoprolol if doing well.    - of note, patient has similar sinus tach during last admission for respiratory distress.      Leukocytosis   Presumed leukemoid reaction due to acute infection but markedly elevated.  Was essentially normal about 2 weeks prior to admission. Immature cells present (myelos). Monocytes on e-diff.  Path blood smear: No dysplastic cells or blasts. No myelophthisic process identified.     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 Follows with oncology and currently undergoing palliative carboplatin, Taxol, pembrolizumab with Neulasta. Has completed x 3 cycles, last received 4/12. Not a surgical nor definite chemoradiation candidate given severe COPD. PET scan 4/11 shows near CR of the primary R perihilar mass and stable mildly avid thoracic nodes. 6 mm RUL nodule stable in size but slightly more avid.  - Continue with hematology/oncology follow-up as planned.     Hypertension    Chronic. Blood pressures reviewed and hypertensive on presentation. BP normalized and borderline soft at times. Managed PTA with amlodipine 10 mg, lisinopril 40 mg, and atenolol 25 mg.  - Continued atenolol  - Held amlodipine and lisinopril, then resumed lisinopril 4/24 and amlodipine 4/25 with holding parameters      Chronic hyponatremia    Sodium 131.  Baseline sodium 131-133. Appears stable.  - 131 ? 133 ? 133      Chronic anemia    Hemoglobin 9.9 on admission. Baseline hemoglobin 10-11. No reported melena or hematochezia.   - 9.9 ? 9.0 ? 8.9    Suspect due to dilution from IVF for tachycardia as above.            Diet: Snacks/Supplements Adult: Ensure Enlive; With Meals  Combination Diet Regular Diet; Easy to Chew (level 7); Thin Liquids (level 0)    DVT Prophylaxis: Enoxaparin (Lovenox) SQ  Miller Catheter: Not present  Lines: None     Cardiac Monitoring: ACTIVE order. Indication: ICU  Code Status:  DNR- Okay with intubation    Clinically Significant Risk Factors              # Hypoalbuminemia: Lowest albumin = 3.4 g/dL at 4/27/2024  5:51 AM, will monitor as appropriate     # Hypertension: Noted on problem list            # COPD: noted on problem list        Disposition Plan     Medically Ready for Discharge: Anticipated in 2-4 Days             Gareth Briseno DO  Hospitalist Service  [facility]  Securely message with [employer] (more info)  Text page via [employer] Paging/Directory   ______________________________________________________________________    Interval History   NAEO. Patient continues to make daily progress. Transferred out of ICU. Tolerating NC for O2 today, but HFNC is available if needed.     Physical Exam   Vital Signs: Temp: 98  F (36.7  C) Temp src: Oral BP: (!) 119/97 Pulse: 89   Resp: 18 SpO2: 97 % O2 Device: Nasal cannula Oxygen Delivery: 2.5 LPM  Weight: 108 lbs 3.93 oz    Physical Exam  Constitutional:       General: He is not in acute distress.  HENT:      Head: Normocephalic.      Nose: Nose normal.   Eyes:      General: No scleral icterus.  Cardiovascular:      Rate and Rhythm: Regular rhythm. Tachycardia present.      Pulses: Normal pulses.   Pulmonary:      Breath sounds: Wheezing present but improved.   Abdominal:      Palpations: Abdomen is soft.   Musculoskeletal:      Right lower leg: No edema.      Left lower  leg: No edema.   Skin:     General: Skin is warm.      Capillary Refill: Capillary refill takes less than 2 seconds.   Neurological:      General: No focal deficit present.      Mental Status: He is alert    Medical Decision Making       60 MINUTES SPENT BY ME on the date of service doing chart review, history, exam, documentation & further activities per the note.      Data     I have personally reviewed the following data over the past 24 hrs:    18.6 (H)  \   9.8 (L)   / 296     132 (L) 96 (L) 15.4 /  88   4.4 29 0.43 (L) \     ALT: N/A AST: N/A AP: N/A TBILI: N/A   ALB: 3.8 TOT PROTEIN: N/A LIPASE: N/A       Imaging results reviewed over the past 24 hrs:   No results found for this or any previous visit (from the past 24 hour(s)).

## 2024-04-29 NOTE — PROGRESS NOTES
04/29/24 1153   Appointment Info   Signing Clinician's Name / Credentials (PT) Bree Kraft, PT   Living Environment   People in Home parent(s)  (father)   Current Living Arrangements house   Home Accessibility stairs within home   Number of Stairs, Within Home, Primary greater than 10 stairs  (16)   Stair Railings, Within Home, Primary railings safe and in good condition   Living Environment Comments bedroom located upstairs   Self-Care   Usual Activity Tolerance fair   Current Activity Tolerance poor   Equipment Currently Used at Home none   Fall history within last six months no   Activity/Exercise/Self-Care Comment indep with mobility without device, wears O2 at home, indep with ADL's/IADL's.   General Information   Onset of Illness/Injury or Date of Surgery 04/23/24   Referring Physician Gareth Briseno, DO   Patient/Family Therapy Goals Statement (PT) return home, feel better   Pertinent History of Current Problem (include personal factors and/or comorbidities that impact the POC) Luis Hernandez is a 58 year old male who presents on 4/23/2024 with progressive shortness of breath, found to have acute on chronic respiratory failure presumably from COPD exacerbation complicated by pneumonia. He was being admitted to ICU on BiPAP and IV antibiotic therapy. Hospital course revealed Viral Pneumonia (Rhinovirus) and bacterial pneumonia (Kleb/PsA) for which patient is being treated for.   Existing Precautions/Restrictions oxygen therapy device and L/min  (currently on 3L, wears 2L at home)   Cognition   Affect/Mental Status (Cognition) WFL   Pain Assessment   Patient Currently in Pain No   Range of Motion (ROM)   Range of Motion ROM is WFL   Strength (Manual Muscle Testing)   Strength Comments general LE weakness/deconditioning from reduced mobility   Bed Mobility   Comment, (Bed Mobility) pt sitting independently at EOB, bed mobility not assessed as pt stating discomfort with lying in bed   Transfers   Comment,  (Transfers) sit>stand from EOB without device and Ann   Gait/Stairs (Locomotion)   Comment, (Gait/Stairs) amb 5 steps bed>chair, states very fatigued after this short bout   Balance   Balance no deficits were identified   Clinical Impression   Criteria for Skilled Therapeutic Intervention Yes, treatment indicated   PT Diagnosis (PT) impaired functional mobility   Influenced by the following impairments LE weakness, reduced activity tolerance   Functional limitations due to impairments impaired gait, stairs   Clinical Presentation (PT Evaluation Complexity) evolving   Clinical Presentation Rationale clinical reasoning   Clinical Decision Making (Complexity) low complexity   Planned Therapy Interventions (PT) gait training;home exercise program;patient/family education;stair training;strengthening;transfer training;progressive activity/exercise;risk factor education;home program guidelines   Risk & Benefits of therapy have been explained evaluation/treatment results reviewed;care plan/treatment goals reviewed;risks/benefits reviewed;current/potential barriers reviewed;participants voiced agreement with care plan;participants included;patient   Clinical Impression Comments Pt currently presenting below baseline, gets fatigued very quickly however today is first day off Bipap so pt not wanting to push too hard, too fast. Anticipate with increased activity and clinical progression, pt will be safe to return home as previous.   PT Total Evaluation Time   PT Eval, Low Complexity Minutes (55851) 10   Physical Therapy Goals   PT Frequency 6x/week   PT Predicted Duration/Target Date for Goal Attainment 05/06/24   PT Goals Gait;Stairs   PT: Gait Supervision/stand-by assist;50 feet   PT: Stairs Supervision/stand-by assist;Greater than 10 stairs;Rail on both sides   Interventions   Interventions Quick Adds Therapeutic Activity   Therapeutic Activity   Therapeutic Activities: dynamic activities to improve functional performance  Minutes (85818) 10   Symptoms Noted During/After Treatment Fatigue;Shortness of breath   Treatment Detail/Skilled Intervention edu on role of PT to assess safe mobility and assist in safe discharge plan. see above for short mobility bout. pt fatigues very quickly and hesitant to progress to far, too quickly due to breathing. edu on importance of increasing activity with shorter, more frequent  bouts of mobility, pt in agreement. PT will continue to follow during hospitalization to progress pt safely, remains seated in chair at end of session, call light in reach.   PT Discharge Planning   PT Plan Mon 1/6; gait without device, monitor O2 sats, LE strength/endurance, stairs when appropriate   PT Discharge Recommendation (DC Rec) home with assist;home with home care physical therapy   PT Rationale for DC Rec anticipate with clinical progression, pt will progress to home with father as previous, may benefit from home care PT to increase indep and ease in own environment, pt declines need for TCU   PT Brief overview of current status sit>stand and amb 5 feet bed>chair with SBA and no device, fatigues quickly   PT Equipment Needed at Discharge   (anticipate no needs)   Total Session Time   Timed Code Treatment Minutes 10   Total Session Time (sum of timed and untimed services) 20

## 2024-04-29 NOTE — PROVIDER NOTIFICATION
Patient was on CPAP +8 part of the night shift. Pt was off for the 0400 check.  No changes to settings.    CPAP settings/Parameters:        04/29/24 0000   Tech Time   $Tech Time (10 minute increments) 1   Mode: CPAP/ BiPAP/ AVAPS/ AVAPS AE   CPAP/BiPAP/ AVAPS/ AVAPS AE Mode CPAP   Equipment   Device v60   CPAP/BiPAP/Settings   $CPAP/BiPAP Subsequent completed   BIPAP/CPAP On Standby On   IPAP/EPAP (cmH2O) 8   Oxygen (%) 28   CPAP/BiPAP Patient Parameters   CPAP (cm H2O) 8 cmh2o   RR Total (breaths/min) 18 breaths/min   Vt (mL) 476 mL   Minute Ventilation (L/min) 11.6 L/min   Peak Inspiratory Pressure (cm H2O) 8 cmH2O   Pt.  Leak (L/min) 32 L/min   CPAP/BiPAP/AVAPS/AVAPS AE Alarms   High Pressure (cm H2O) 30 cmH2O   Low Pressure (cm H2O) 8   Low Pressure Delay (sec) 20 sec   Lo Min Vent 2   High Rate (breaths/min) 40 breaths/min   Low Rate (breaths/min) 12   RT Device Skin Assessment   Oxygen Delivery Device CPAP/BiPAP Mask   Interface Under-nose Mask - Medium   Ventilator Arm In Place Yes   Site Appearance neck circumference Clean and dry   Site Appearance nares (outer) Clean and dry   Site Appearance nares (inner) Clean and dry   Site Appearance bridge of nose Clean and dry   Site Appearance occiput Clean and dry   Strap Tightness Finger Allowance between head and device strap   Device Skin Interventions Taken No adjustments needed   Respiratory WDL   Respiratory WDL X   Rhythm/Pattern, Respiratory dyspnea upon exertion   Expansion/Accessory Muscles/Retractions accessory muscle use   Chest Appearance barrel chest   Nailbeds pale   Mucous Membranes dry   Cough Frequency infrequent   Cough Type nonproductive   Breath Sounds   Breath Sounds All Fields   All Lung Fields Breath Sounds Anterior:;Posterior:   RADHA Breath Sounds clear;diminished   LLL Breath Sounds Posterior:;wheezes, expiratory;wheezes, inspiratory   RUL Breath Sounds Anterior:;Posterior:;clear;diminished   RLL Breath Sounds Posterior:;diminished

## 2024-04-29 NOTE — PLAN OF CARE
"Pt alert and oriented x4. Pleasant and cooperative with staff. Utilizing call light appropriately. Voiding adequately. Off Bipap around 0030 and placed on 2.5L with sats maintaining >90. Pt woke several times throughout night with, \"weird dreams,\" and \"feeling foggy.\" Staff told pt that was a side effect of PRN medications. Pt stated understanding.   "

## 2024-04-29 NOTE — PROGRESS NOTES
End Of Shift Note    Situation: Rhino virus, Lung Ca    Plan: continue Supp O2, abx & Steriods    Subjective/Objective:    Neuro: A & O x 4. No episodes of significant anxiety today.     Cardiac: SR/ST    Resp: LS with exp wheezes. Pt requested ativan & morphine PRN x 1 each. Did not feel like he needed to use HFNC and pt doesn't believe he'll need it nocs.    GI/: voiding in urinal    Endo: BG wnl    MSK: started working with PT today, ESPARZA generalized weakness from prolonged stay.    Skin: scattered bruises.    LDAs: x 1 PIV    Call light in reach.    Kimberly Omer RN BSN

## 2024-04-30 ENCOUNTER — APPOINTMENT (OUTPATIENT)
Dept: PHYSICAL THERAPY | Facility: CLINIC | Age: 59
DRG: 871 | End: 2024-04-30
Payer: COMMERCIAL

## 2024-04-30 LAB
ALBUMIN SERPL BCG-MCNC: 3.9 G/DL (ref 3.5–5.2)
ANION GAP SERPL CALCULATED.3IONS-SCNC: 7 MMOL/L (ref 7–15)
BASOPHILS # BLD AUTO: 0 10E3/UL (ref 0–0.2)
BASOPHILS NFR BLD AUTO: 0 %
BUN SERPL-MCNC: 17.1 MG/DL (ref 6–20)
CALCIUM SERPL-MCNC: 9.5 MG/DL (ref 8.6–10)
CHLORIDE SERPL-SCNC: 96 MMOL/L (ref 98–107)
CREAT SERPL-MCNC: 0.48 MG/DL (ref 0.67–1.17)
DEPRECATED HCO3 PLAS-SCNC: 32 MMOL/L (ref 22–29)
EGFRCR SERPLBLD CKD-EPI 2021: >90 ML/MIN/1.73M2
EOSINOPHIL # BLD AUTO: 0 10E3/UL (ref 0–0.7)
EOSINOPHIL NFR BLD AUTO: 0 %
ERYTHROCYTE [DISTWIDTH] IN BLOOD BY AUTOMATED COUNT: 17.9 % (ref 10–15)
GLUCOSE SERPL-MCNC: 90 MG/DL (ref 70–99)
HCT VFR BLD AUTO: 30.9 % (ref 40–53)
HGB BLD-MCNC: 9.9 G/DL (ref 13.3–17.7)
IMM GRANULOCYTES # BLD: 0 10E3/UL
IMM GRANULOCYTES NFR BLD: 0 %
LYMPHOCYTES # BLD AUTO: 0.7 10E3/UL (ref 0.8–5.3)
LYMPHOCYTES NFR BLD AUTO: 6 %
MCH RBC QN AUTO: 30.4 PG (ref 26.5–33)
MCHC RBC AUTO-ENTMCNC: 32 G/DL (ref 31.5–36.5)
MCV RBC AUTO: 95 FL (ref 78–100)
MONOCYTES # BLD AUTO: 1.1 10E3/UL (ref 0–1.3)
MONOCYTES NFR BLD AUTO: 9 %
NEUTROPHILS # BLD AUTO: 10.1 10E3/UL (ref 1.6–8.3)
NEUTROPHILS NFR BLD AUTO: 84 %
NRBC # BLD AUTO: 0 10E3/UL
NRBC BLD AUTO-RTO: 0 /100
PHOSPHATE SERPL-MCNC: 2.9 MG/DL (ref 2.5–4.5)
PLATELET # BLD AUTO: 324 10E3/UL (ref 150–450)
POTASSIUM SERPL-SCNC: 4 MMOL/L (ref 3.4–5.3)
RBC # BLD AUTO: 3.26 10E6/UL (ref 4.4–5.9)
SODIUM SERPL-SCNC: 135 MMOL/L (ref 135–145)
WBC # BLD AUTO: 12 10E3/UL (ref 4–11)

## 2024-04-30 PROCEDURE — 250N000011 HC RX IP 250 OP 636: Performed by: STUDENT IN AN ORGANIZED HEALTH CARE EDUCATION/TRAINING PROGRAM

## 2024-04-30 PROCEDURE — 94640 AIRWAY INHALATION TREATMENT: CPT

## 2024-04-30 PROCEDURE — 99232 SBSQ HOSP IP/OBS MODERATE 35: CPT | Performed by: STUDENT IN AN ORGANIZED HEALTH CARE EDUCATION/TRAINING PROGRAM

## 2024-04-30 PROCEDURE — 97110 THERAPEUTIC EXERCISES: CPT | Mod: GP | Performed by: PHYSICAL THERAPIST

## 2024-04-30 PROCEDURE — 250N000013 HC RX MED GY IP 250 OP 250 PS 637: Performed by: STUDENT IN AN ORGANIZED HEALTH CARE EDUCATION/TRAINING PROGRAM

## 2024-04-30 PROCEDURE — 85004 AUTOMATED DIFF WBC COUNT: CPT | Performed by: STUDENT IN AN ORGANIZED HEALTH CARE EDUCATION/TRAINING PROGRAM

## 2024-04-30 PROCEDURE — 36415 COLL VENOUS BLD VENIPUNCTURE: CPT | Performed by: STUDENT IN AN ORGANIZED HEALTH CARE EDUCATION/TRAINING PROGRAM

## 2024-04-30 PROCEDURE — 97530 THERAPEUTIC ACTIVITIES: CPT | Mod: GP | Performed by: PHYSICAL THERAPIST

## 2024-04-30 PROCEDURE — 999N000157 HC STATISTIC RCP TIME EA 10 MIN

## 2024-04-30 PROCEDURE — 120N000001 HC R&B MED SURG/OB

## 2024-04-30 PROCEDURE — 250N000012 HC RX MED GY IP 250 OP 636 PS 637: Performed by: STUDENT IN AN ORGANIZED HEALTH CARE EDUCATION/TRAINING PROGRAM

## 2024-04-30 PROCEDURE — 80069 RENAL FUNCTION PANEL: CPT | Performed by: STUDENT IN AN ORGANIZED HEALTH CARE EDUCATION/TRAINING PROGRAM

## 2024-04-30 RX ORDER — METOPROLOL TARTRATE 25 MG/1
50 TABLET, FILM COATED ORAL 2 TIMES DAILY
Status: DISCONTINUED | OUTPATIENT
Start: 2024-04-30 | End: 2024-05-01 | Stop reason: HOSPADM

## 2024-04-30 RX ADMIN — FAMOTIDINE 20 MG: 10 INJECTION, SOLUTION INTRAVENOUS at 06:26

## 2024-04-30 RX ADMIN — METOPROLOL TARTRATE 50 MG: 25 TABLET, FILM COATED ORAL at 20:40

## 2024-04-30 RX ADMIN — LORAZEPAM 0.5 MG: 0.5 TABLET ORAL at 09:54

## 2024-04-30 RX ADMIN — MORPHINE SULFATE 4 MG: 4 INJECTION, SOLUTION INTRAMUSCULAR; INTRAVENOUS at 11:59

## 2024-04-30 RX ADMIN — PREDNISONE 30 MG: 20 TABLET ORAL at 07:19

## 2024-04-30 RX ADMIN — METOPROLOL TARTRATE 37.5 MG: 25 TABLET, FILM COATED ORAL at 07:19

## 2024-04-30 RX ADMIN — ROFLUMILAST 500 MCG: 500 TABLET ORAL at 07:20

## 2024-04-30 RX ADMIN — MORPHINE SULFATE 4 MG: 4 INJECTION, SOLUTION INTRAMUSCULAR; INTRAVENOUS at 20:40

## 2024-04-30 RX ADMIN — GUAIFENESIN 1200 MG: 600 TABLET ORAL at 20:40

## 2024-04-30 RX ADMIN — LORAZEPAM 0.5 MG: 0.5 TABLET ORAL at 03:54

## 2024-04-30 RX ADMIN — FLUTICASONE PROPIONATE 1 SPRAY: 50 SPRAY, METERED NASAL at 07:20

## 2024-04-30 RX ADMIN — FLUTICASONE FUROATE AND VILANTEROL TRIFENATATE 1 PUFF: 200; 25 POWDER RESPIRATORY (INHALATION) at 07:20

## 2024-04-30 RX ADMIN — METOPROLOL TARTRATE 12.5 MG: 25 TABLET, FILM COATED ORAL at 09:53

## 2024-04-30 RX ADMIN — ENOXAPARIN SODIUM 40 MG: 40 INJECTION SUBCUTANEOUS at 09:51

## 2024-04-30 RX ADMIN — FAMOTIDINE 20 MG: 10 INJECTION, SOLUTION INTRAVENOUS at 17:35

## 2024-04-30 RX ADMIN — LEVOFLOXACIN 500 MG: 500 TABLET, FILM COATED ORAL at 07:19

## 2024-04-30 RX ADMIN — UMECLIDINIUM 1 PUFF: 62.5 AEROSOL, POWDER ORAL at 07:20

## 2024-04-30 RX ADMIN — GUAIFENESIN 1200 MG: 600 TABLET ORAL at 07:19

## 2024-04-30 RX ADMIN — AMLODIPINE BESYLATE 10 MG: 10 TABLET ORAL at 07:19

## 2024-04-30 RX ADMIN — FUROSEMIDE 20 MG: 10 INJECTION, SOLUTION INTRAMUSCULAR; INTRAVENOUS at 15:45

## 2024-04-30 RX ADMIN — LISINOPRIL 40 MG: 40 TABLET ORAL at 07:19

## 2024-04-30 ASSESSMENT — ACTIVITIES OF DAILY LIVING (ADL)
ADLS_ACUITY_SCORE: 27
ADLS_ACUITY_SCORE: 27
ADLS_ACUITY_SCORE: 26
ADLS_ACUITY_SCORE: 27
ADLS_ACUITY_SCORE: 26
ADLS_ACUITY_SCORE: 26
ADLS_ACUITY_SCORE: 27
ADLS_ACUITY_SCORE: 26
ADLS_ACUITY_SCORE: 26
ADLS_ACUITY_SCORE: 27
ADLS_ACUITY_SCORE: 26
ADLS_ACUITY_SCORE: 27
ADLS_ACUITY_SCORE: 26
ADLS_ACUITY_SCORE: 27
ADLS_ACUITY_SCORE: 26
DEPENDENT_IADLS:: INDEPENDENT
ADLS_ACUITY_SCORE: 27
ADLS_ACUITY_SCORE: 27

## 2024-04-30 NOTE — PLAN OF CARE
"  Problem: Adult Inpatient Plan of Care  Goal: Patient-Specific Goal (Individualized)  Description: You can add care plan individualizations to a care plan. Examples of Individualization might be:  \"Parent requests to be called daily at 9am for status\", \"I have a hard time hearing out of my right ear\", or \"Do not touch me to wake me up as it startles  me\".  Outcome: Progressing  Goal: Optimal Comfort and Wellbeing  Outcome: Progressing  Intervention: Monitor Pain and Promote Comfort  Recent Flowsheet Documentation  Taken 4/30/2024 1600 by Mary Almonte RN  Pain Management Interventions:   care clustered   quiet environment facilitated   rest  Taken 4/30/2024 1400 by Mary Almonte RN  Pain Management Interventions:   care clustered   quiet environment facilitated   rest  Taken 4/30/2024 1159 by Mary Almonte RN  Pain Management Interventions:   care clustered   quiet environment facilitated   rest  Taken 4/30/2024 1000 by Mary Almonte RN  Pain Management Interventions:   care clustered   quiet environment facilitated   rest  Taken 4/30/2024 0716 by Mary Almonte RN  Pain Management Interventions:   care clustered   quiet environment facilitated   rest  Intervention: Provide Person-Centered Care  Recent Flowsheet Documentation  Taken 4/30/2024 1600 by Mary Almonte RN  Trust Relationship/Rapport:   care explained   choices provided   questions answered   questions encouraged   reassurance provided   thoughts/feelings acknowledged  Taken 4/30/2024 0716 by Mary Almonte RN  Trust Relationship/Rapport:   care explained   choices provided   questions answered   questions encouraged   reassurance provided   thoughts/feelings acknowledged  Goal: Readiness for Transition of Care  Outcome: Progressing     Problem: Gas Exchange Impaired  Goal: Optimal Gas Exchange  Outcome: Progressing  Intervention: Optimize Oxygenation and Ventilation  Recent Flowsheet " "Documentation  Taken 4/30/2024 1000 by Mary Almonte RN  Head of Bed (HOB) Positioning: HOB at 30 degrees     Problem: Oral Intake Inadequate  Goal: Improved Oral Intake  Outcome: Progressing     Problem: Adult Inpatient Plan of Care  Goal: Plan of Care Review  Description: The Plan of Care Review/Shift note should be completed every shift.  The Outcome Evaluation is a brief statement about your assessment that the patient is improving, declining, or no change.  This information will be displayed automatically on your shift  note.    Home with Home care services  Outcome: Progressing  Goal: Patient-Specific Goal (Individualized)  Description: You can add care plan individualizations to a care plan. Examples of Individualization might be:  \"Parent requests to be called daily at 9am for status\", \"I have a hard time hearing out of my right ear\", or \"Do not touch me to wake me up as it startles  me\".  Outcome: Progressing  Goal: Absence of Hospital-Acquired Illness or Injury  Outcome: Progressing  Intervention: Identify and Manage Fall Risk  Recent Flowsheet Documentation  Taken 4/30/2024 1600 by Mary Almonte RN  Safety Promotion/Fall Prevention:   activity supervised   clutter free environment maintained   lighting adjusted   nonskid shoes/slippers when out of bed   patient and family education  Taken 4/30/2024 0716 by Mary Almonte RN  Safety Promotion/Fall Prevention:   activity supervised   clutter free environment maintained   lighting adjusted   nonskid shoes/slippers when out of bed   patient and family education  Intervention: Prevent Skin Injury  Recent Flowsheet Documentation  Taken 4/30/2024 1600 by Mary Almonte RN  Body Position: position changed independently  Taken 4/30/2024 1348 by Mary Almonte RN  Body Position: position changed independently  Taken 4/30/2024 1026 by Mary Almonte RN  Body Position: position changed independently  Taken " 4/30/2024 1000 by Mary Almonte RN  Body Position: position changed independently  Taken 4/30/2024 0716 by Mary Almonte RN  Body Position: position changed independently  Goal: Optimal Comfort and Wellbeing  Outcome: Progressing  Intervention: Monitor Pain and Promote Comfort  Recent Flowsheet Documentation  Taken 4/30/2024 1600 by Mary Almonte RN  Pain Management Interventions:   care clustered   quiet environment facilitated   rest  Taken 4/30/2024 1400 by Mary Almonte RN  Pain Management Interventions:   care clustered   quiet environment facilitated   rest  Taken 4/30/2024 1159 by Mary Almonte RN  Pain Management Interventions:   care clustered   quiet environment facilitated   rest  Taken 4/30/2024 1000 by Mary Almonte RN  Pain Management Interventions:   care clustered   quiet environment facilitated   rest  Taken 4/30/2024 0716 by Mary Almonte RN  Pain Management Interventions:   care clustered   quiet environment facilitated   rest  Intervention: Provide Person-Centered Care  Recent Flowsheet Documentation  Taken 4/30/2024 1600 by Mary Almonte RN  Trust Relationship/Rapport:   care explained   choices provided   questions answered   questions encouraged   reassurance provided   thoughts/feelings acknowledged  Taken 4/30/2024 0716 by Mary Almonte RN  Trust Relationship/Rapport:   care explained   choices provided   questions answered   questions encouraged   reassurance provided   thoughts/feelings acknowledged  Goal: Readiness for Transition of Care  Outcome: Progressing    Pt continues on 2.5 Lpm NC with O2 sats in the mid 90's. Pt given prn ativan and prn morphine x1 with some relief. Lung sounds diminished to all fields.

## 2024-04-30 NOTE — PROGRESS NOTES
[facility] TRANSPORT NOTE  Data:   Reason for Transport: Med/Surg status.    Luis Hernandez was transported to Med Surg Room 2304 via wheel chair at 1845. Patient was accompanied by RN. Equipment used for transport: None. Family was aware of reason for transport: Yes.    Action:  Report: received from Mary KATHLEEN RN    Response:  Patient's condition when transferred off unit was; stable.    Alannah Prince RN

## 2024-04-30 NOTE — PLAN OF CARE
Goal Outcome Evaluation:       Patient was able to remain off HFNC overnight.  Took PRN ativan x1 per his request and was calm and regulated overnight.      Up w/ SBA  PIV SL  Lungs continue to have expiratory wheezes throughout.    Able to make his needs known.

## 2024-04-30 NOTE — PROGRESS NOTES
CLINICAL NUTRITION SERVICES - REASSESSMENT NOTE     Nutrition Prescription    RECOMMENDATIONS FOR MDs/PROVIDERS TO ORDER:  None at this time    Malnutrition Status:    Patient does not meet two of the established criteria necessary for diagnosing malnutrition     Future/Additional Recommendations:  Monitor patient weight, intakes, meds/labs and GI/BM  Monitor patient tolerance to supplement     EVALUATION OF THE PROGRESS TOWARD GOALS   Diet: Orders Placed This Encounter      Combination Diet Regular Diet; Easy to Chew (level 7); Thin Liquids (level 0)  Nutrition Support: Ensure enlive sent with meal trays  Intake: patient consuming mostly 100% of meals since last assessment. Intake range from % and patient appetite noted to be good.        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     Patient intakes improving since last assessment. Patient noted to have good oral intakes and appetite improving. Patient weight appears to be relatively stable throughout admission. Patient meds/labs reviewed. Patient noted to no longer require bipap- Patient transferred out of ICU on 4/29.    MALNUTRITION  % Intake: Decreased intake does not meet criteria  % Weight Loss: Weight loss does not meet criteria  Subcutaneous Fat Loss: Unable to assess  Muscle Loss: Unable to assess  Fluid Accumulation/Edema: None noted  Malnutrition Diagnosis: Patient does not meet two of the established criteria necessary for diagnosing malnutrition     Previous Goals   Patient to consume % of nutritionally adequate meal trays TID, or the equivalent with supplements/snacks.   Evaluation: Progressing; patient eating % of meals; patient intakes improving.    Previous Nutrition  Diagnosis  Inadequate oral intake related to increased work of breathing as evidenced by patient unable to tolerate current oral diet.   Evaluation: Improving    CURRENT NUTRITION DIAGNOSIS  Inadequate oral intake related to increased work of breathing as evidenced by patient unable to tolerate current oral diet.     INTERVENTIONS  Implementation  Collaboration with other providers-IDT rounds    Goals  Patient to consume % of nutritionally adequate meal trays TID, or the equivalent with supplements/snacks.    Monitoring/Evaluation  Progress toward goals will be monitored and evaluated per protocol.    Radha Womack RDN, LD  Clinical Dietitian  Office: [phone]  Weekend pager: [phone]

## 2024-04-30 NOTE — PLAN OF CARE
"  Problem: Oral Intake Inadequate  Goal: Improved Oral Intake  Outcome: Progressing   Goal Outcome Evaluation:  Patient consuming % of meals since last assessment. Patient appetite noted to be \"good\". Patient off of bipap and Med/surg status.     Radha Womack RDN, LD  Clinical Dietitian  Office: [phone]  Weekend pager: [phone]                        "

## 2024-04-30 NOTE — CONSULTS
Care Management Initial Consult    General Information  Assessment completed with: Patient,    Type of CM/SW Visit: Initial Assessment    Primary Care Provider verified and updated as needed: Yes   Readmission within the last 30 days:        Reason for Consult: other (see comments), discharge planning (High Risk)  Advance Care Planning: Advance Care Planning Reviewed: no concerns identified        Communication Assessment  Patient's communication style: spoken language (English or Bilingual)    Hearing Difficulty or Deaf: no   Wear Glasses or Blind: no    Cognitive  Cognitive/Neuro/Behavioral: WDL                      Living Environment:   People in home: parent(s)     Current living Arrangements: house      Able to return to prior arrangements: yes     Family/Social Support:  Care provided by: self  Provides care for: no one  Marital Status: Single  Parent(s), Children          Description of Support System: Involved, Supportive    Support Assessment: Adequate family and caregiver support    Current Resources:   Patient receiving home care services: No     Community Resources: None  Equipment currently used at home: none  Supplies currently used at home: Oxygen Tubing/Supplies    Employment/Financial:  Employment Status: disabled        Financial Concerns: none   Referral to Financial Worker: No     Does the patient's insurance plan have a 3 day qualifying hospital stay waiver?  No    Lifestyle & Psychosocial Needs:  Social Determinants of Health     Food Insecurity: Low Risk  (1/9/2024)    Food Insecurity     Within the past 12 months, did you worry that your food would run out before you got money to buy more?: No     Within the past 12 months, did the food you bought just not last and you didn t have money to get more?: No   Depression: Not at risk (1/9/2024)    PHQ-2     PHQ-2 Score: 0   Housing Stability: Low Risk  (1/9/2024)    Housing Stability     Do you have housing? : Yes     Are you worried about losing  your housing?: No   Tobacco Use: Medium Risk (4/4/2024)    Patient History     Smoking Tobacco Use: Former     Smokeless Tobacco Use: Former     Passive Exposure: Not on file   Financial Resource Strain: Low Risk  (1/9/2024)    Financial Resource Strain     Within the past 12 months, have you or your family members you live with been unable to get utilities (heat, electricity) when it was really needed?: No   Alcohol Use: Not on file   Transportation Needs: Low Risk  (1/9/2024)    Transportation Needs     Within the past 12 months, has lack of transportation kept you from medical appointments, getting your medicines, non-medical meetings or appointments, work, or from getting things that you need?: No   Physical Activity: Inactive (8/30/2021)    Exercise Vital Sign     Days of Exercise per Week: 0 days     Minutes of Exercise per Session: 0 min   Interpersonal Safety: Low Risk  (10/12/2023)    Interpersonal Safety     Do you feel physically and emotionally safe where you currently live?: Yes     Within the past 12 months, have you been hit, slapped, kicked or otherwise physically hurt by someone?: No     Within the past 12 months, have you been humiliated or emotionally abused in other ways by your partner or ex-partner?: No   Stress: Not on file   Social Connections: Unknown (8/30/2021)    Social Connection and Isolation Panel [NHANES]     Frequency of Communication with Friends and Family: Twice a week     Frequency of Social Gatherings with Friends and Family: Twice a week     Attends Mosque Services: Never     Active Member of Clubs or Organizations: No     Attends Club or Organization Meetings: Never     Marital Status: Not on file   Health Literacy: Not on file       Functional Status:  Prior to admission patient needed assistance:   Dependent ADLs:: Independent  Dependent IADLs:: Independent       Mental Health Status:  Mental Health Status: No Current Concerns       Chemical Dependency Status:  Chemical  Dependency Status: No Current Concerns             Values/Beliefs:  Spiritual, Cultural Beliefs, Sabianist Practices, Values that affect care: no               Additional Information:  Consult received for High Risk score and Discharge Planning. Initial assessment completed with patient, at bedside.    Patient lives in a home with his Dad, Luis. Patient is independent with mobility and ADLs. Patient states he has a walker at home to use, if needed. Patient drives. He is able to get his own groceries and get to appts. If he isn't feeling well, his Dad can transport him. Patient is independent with medications.    Patient is on 2L NC O2, at baseline. He does not have home C-Pap.     Patient has Lung CA and is currently open with care coordination through Oncology.    Recommendation from inpatient team is home care for RN and O/T (energy conservation). Discussed with patient and he is in agreement. Referral sent to [facility] [facility] to secure agency.     High Risk - [facility] sent to schedule follow up appt.    Patient's Dad will transport home at discharge.    FREDY Mart  [facility] [phone]/ [facility] [phone]  Care Management

## 2024-04-30 NOTE — PROGRESS NOTES
[facility] TRANSPORT NOTE  Data:   Reason for Transport:  medsurg pt    Luis Hernandez was transported to 2304 via wheel chair at 1845.  Patient was accompanied by Registered Nurse. Equipment used for transport: Oxygen  Nasal Cannula. Family was aware of reason for transport: yes    Action:  Report: given to Alannah FOSTER    Response:  Patient's condition when transferred off unit was stable.    Mary Almonte RN

## 2024-04-30 NOTE — PLAN OF CARE
Goal Outcome Evaluation:pt continues with 2L NC, requested ativan at HS and states no resp distress at this time. Pt able to walk to the BR and position himself in bed without distress.

## 2024-04-30 NOTE — PROGRESS NOTES
[facility]    Medicine Progress Note - Hospitalist Service    Date of Admission:  4/23/2024    Assessment & Plan   Luis Hernandez is a 58 year old male who presents on 4/23/2024 with progressive shortness of breath. He was found to have acute on chronic respiratory failure presumably from COPD exacerbation complicated by pneumonia. He was being admitted to ICU on BiPAP and IV antibiotic therapy. Hospital course revealed Viral Pneumonia (Rhinovirus) and bacterial pneumonia (Kleb/PsA) for which patient is being treated for. Patient slowly continues to improve, hopeful discharge 05/01.    Acute on chronic respiratory failure with hypoxia,   likely 2/2 pneumonia causing exacerbation of COPD as below   Meets SIRS criteria with tachycardia and leukocytosis (46.3). No fever. Lactate WNL (1.4). CRP elevated (5.46). Procalcitonin normal (0.06). CXR with small hazy opacity over left lower lung laterally. Lungs with wheezing bilateral mid and lower lobes.    - Management of PNA and COPD as below    Sepsis   Left lobe pneumonia  Viral Pneumonia  Bacteria Pneumonia (Klebsiella and Pseudomonas)  CXR with small hazy opacity over left lower lung laterally. Procalcitonin WNL. Recent concern by heme/onc 4/12 PET scan for pneumonia and started on Augmentin in which has completed 5 days of PTA. PET scan 4/11 during that time showing resolved consolidation RADHA pneumonia, however there was new patchy focal consolidation in RML and LLL which could represent infection/inflammation.  Abx: was on standard CAP coverage with rocephin/azithro with additional anaerobic coverage with flagyl-> changed to cefepime after sputum Cx data with continued azithro/flagyl on 04/25 -> 04/26 cefe monotherapy  Urine Ag (strep/legionella): Negative  Respiratory PCR: + Rhinovirus  Sputum Cx: Klebsiella Pneumoniae (susceptible to cefe), Pseudomonas Aeurginosa (intermediate/resistant to cefepime-> 04/27 changed to levaquin)  Blood Cx  x2 NG 4d    - Changed IV Cefepime 2g q12h to Levaquin 500mg Daily x 7 days (started 04/27)     AECOPD  Chronic Hypoxic Respiratory Failure on 2L O2 via NC  COPD Stage D  H/o severe COPD (FEV1 18%) on continuous oxygen 2 liters at baseline. Significant smoking history of 60 pack years. No increased oxygen demands PTA and denies hypoxia. Found to be in respiratory distress and placed on BiPAP. VBG without acidosis (7.33) and mild hypercapnia (61).   Recently started prednisone taper 4/11 by oncology. Given solumedrol in ED along with duoneb x 2.   Home meds: LAMA, ICS/LABA, romflumilast, saline neb, prn XIOMARA  Interval assessment 04/26: improving but still dyspneic and hypoxic with minimal exertion requiring BIPAP. Gases have been okay so patient likely just needs positive pressure. No history of ANDRÉS or sx concerning for this.    - Continue PTA roflumilast 500 mg, fluticasone-salmeterol (Advair) BID, and normal saline nebs BID.  Held home tiotropium (Spiriva Respimat) while on duonebs   - Guaifenesin 1,200 mg BID  - Prednisone daily (starting 4/24)  - PRN morphine available  - Due to inability to tolerate nebulized duoneb/albuterol/saline/mucomyst, will attempt lasix nebulizer for dyspnea x1     Sinus Tachycardia    Presented tachycardic 117. Tachycardia has been persistent and up to 135. Troponin WNL (11). Initial EKG showing sinus tachycardia with non-specific ST depression. Repeat EKG showing sinus tachycardia with unchanged non-specific ST depression. Denies chest pain, tightness, pressure, or discomfort. Does not appear volume down, however did respond some to 1 liter fluid bolus. Confirmed that PTA atenolol was taken 4/22. Initially held for concern for developing sepsis and did not want to blunt cardiac response. Was provided steroids which may be contributing to tachycardia. TSH normal (0.77). Considering pulmonary embolism as source of tachycardia, however given normal d-dimer did not pursue further imaging of  chest.  - Telemetry to monitor  - received 2L in boluses 4/23.    - Continue PTA atenolol 25 mg  - Echocardiogram 4/23 showed moderate tricuspid regurgitation and severe pulmonary hypertension with hyperdynamic LV function - overall similar to prior echo from 2019.  - sinus tach improved 4/24, down to 110's.    Down into 100's at times 4/25.   - switching atenolol 50 mg daily to metoprolol at slightly higher dose of 37.5 mg twice daily AM 4/25, may need to titrate this then discharge on once daily metoprolol if doing well.    - of note, patient has similar sinus tach during last admission for respiratory distress.      Leukocytosis   Presumed leukemoid reaction due to acute infection but markedly elevated.  Was essentially normal about 2 weeks prior to admission. Immature cells present (myelos). Monocytes on e-diff.  Path blood smear: No dysplastic cells or blasts. No myelophthisic process identified.     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 Follows with oncology and currently undergoing palliative carboplatin, Taxol, pembrolizumab with Neulasta. Has completed x 3 cycles, last received 4/12. Not a surgical nor definite chemoradiation candidate given severe COPD. PET scan 4/11 shows near CR of the primary R perihilar mass and stable mildly avid thoracic nodes. 6 mm RUL nodule stable in size but slightly more avid.  - Continue with hematology/oncology follow-up as planned.     Hypertension    Chronic. Blood pressures reviewed and hypertensive on presentation. BP normalized and borderline soft at times. Managed PTA with amlodipine 10 mg, lisinopril 40 mg, and atenolol 25 mg.  - Continued atenolol  - Held amlodipine and lisinopril, then resumed lisinopril 4/24 and amlodipine 4/25 with holding parameters      Chronic hyponatremia    Sodium 131. Baseline sodium  131-133. Appears stable.  - 131 ? 133 ? 133      Chronic anemia    Hemoglobin 9.9 on admission. Baseline hemoglobin 10-11. No reported melena or hematochezia.   - 9.9 ? 9.0 ? 8.9    Suspect due to dilution from IVF for tachycardia as above.            Diet: Snacks/Supplements Adult: Ensure Enlive; With Meals  Combination Diet Regular Diet; Easy to Chew (level 7); Thin Liquids (level 0)    DVT Prophylaxis: Enoxaparin (Lovenox) SQ  Miller Catheter: Not present  Lines: None     Cardiac Monitoring: None  Code Status:  DNR- Okay with intubation    Clinically Significant Risk Factors              # Hypoalbuminemia: Lowest albumin = 3.4 g/dL at 4/27/2024  5:51 AM, will monitor as appropriate     # Hypertension: Noted on problem list            # Financial/Environmental Concerns: none  # COPD: noted on problem list        Disposition Plan     Medically Ready for Discharge: Anticipated in 2-4 Days             Gareth Briseno DO  Hospitalist Service  [facility]  Securely message with [employer] (more info)  Text page via [email or web address] Paging/Directory   ______________________________________________________________________    Interval History   NAEO. Patient continues to make daily progress. Transferred out of ICU. Tolerating NC for O2 today, but HFNC is available if needed.     Physical Exam   Vital Signs: Temp: 97.9  F (36.6  C) Temp src: Oral BP: (!) 117/91 Pulse: 91   Resp: 20 SpO2: 97 % O2 Device: Nasal cannula Oxygen Delivery: 2.5 LPM  Weight: 108 lbs 3.93 oz    Physical Exam  Constitutional:       General: He is not in acute distress.  HENT:      Head: Normocephalic.      Nose: Nose normal.   Eyes:      General: No scleral icterus.  Cardiovascular:      Rate and Rhythm: Regular rhythm. Tachycardia present.      Pulses: Normal pulses.   Pulmonary:      Breath sounds: Wheezing present but improved.   Abdominal:      Palpations: Abdomen is soft.   Musculoskeletal:      Right lower leg: No edema.      Left lower  leg: No edema.   Skin:     General: Skin is warm.      Capillary Refill: Capillary refill takes less than 2 seconds.   Neurological:      General: No focal deficit present.      Mental Status: He is alert    Medical Decision Making       60 MINUTES SPENT BY ME on the date of service doing chart review, history, exam, documentation & further activities per the note.      Data     I have personally reviewed the following data over the past 24 hrs:    12.0 (H)  \   9.9 (L)   / 324     135 96 (L) 17.1 /  90   4.0 32 (H) 0.48 (L) \     ALT: N/A AST: N/A AP: N/A TBILI: N/A   ALB: 3.9 TOT PROTEIN: N/A LIPASE: N/A       Imaging results reviewed over the past 24 hrs:   No results found for this or any previous visit (from the past 24 hour(s)).

## 2024-05-01 VITALS
WEIGHT: 107.14 LBS | DIASTOLIC BLOOD PRESSURE: 65 MMHG | RESPIRATION RATE: 20 BRPM | SYSTOLIC BLOOD PRESSURE: 123 MMHG | HEIGHT: 64 IN | OXYGEN SATURATION: 97 % | HEART RATE: 113 BPM | TEMPERATURE: 98 F | BODY MASS INDEX: 18.29 KG/M2

## 2024-05-01 LAB
ALBUMIN SERPL BCG-MCNC: 3.9 G/DL (ref 3.5–5.2)
ANION GAP SERPL CALCULATED.3IONS-SCNC: 7 MMOL/L (ref 7–15)
BASOPHILS # BLD AUTO: 0 10E3/UL (ref 0–0.2)
BASOPHILS NFR BLD AUTO: 0 %
BUN SERPL-MCNC: 21.7 MG/DL (ref 6–20)
CALCIUM SERPL-MCNC: 9.6 MG/DL (ref 8.6–10)
CHLORIDE SERPL-SCNC: 99 MMOL/L (ref 98–107)
CREAT SERPL-MCNC: 0.53 MG/DL (ref 0.67–1.17)
DEPRECATED HCO3 PLAS-SCNC: 31 MMOL/L (ref 22–29)
EGFRCR SERPLBLD CKD-EPI 2021: >90 ML/MIN/1.73M2
EOSINOPHIL # BLD AUTO: 0 10E3/UL (ref 0–0.7)
EOSINOPHIL NFR BLD AUTO: 0 %
ERYTHROCYTE [DISTWIDTH] IN BLOOD BY AUTOMATED COUNT: 18.3 % (ref 10–15)
GLUCOSE SERPL-MCNC: 96 MG/DL (ref 70–99)
HCT VFR BLD AUTO: 34.3 % (ref 40–53)
HGB BLD-MCNC: 10.6 G/DL (ref 13.3–17.7)
IMM GRANULOCYTES # BLD: 0.1 10E3/UL
IMM GRANULOCYTES NFR BLD: 1 %
LYMPHOCYTES # BLD AUTO: 0.7 10E3/UL (ref 0.8–5.3)
LYMPHOCYTES NFR BLD AUTO: 6 %
MCH RBC QN AUTO: 29.9 PG (ref 26.5–33)
MCHC RBC AUTO-ENTMCNC: 30.9 G/DL (ref 31.5–36.5)
MCV RBC AUTO: 97 FL (ref 78–100)
MONOCYTES # BLD AUTO: 1 10E3/UL (ref 0–1.3)
MONOCYTES NFR BLD AUTO: 9 %
NEUTROPHILS # BLD AUTO: 9.3 10E3/UL (ref 1.6–8.3)
NEUTROPHILS NFR BLD AUTO: 84 %
NRBC # BLD AUTO: 0 10E3/UL
NRBC BLD AUTO-RTO: 0 /100
PHOSPHATE SERPL-MCNC: 3.4 MG/DL (ref 2.5–4.5)
PLATELET # BLD AUTO: 365 10E3/UL (ref 150–450)
POTASSIUM SERPL-SCNC: 4.5 MMOL/L (ref 3.4–5.3)
RBC # BLD AUTO: 3.55 10E6/UL (ref 4.4–5.9)
SODIUM SERPL-SCNC: 137 MMOL/L (ref 135–145)
WBC # BLD AUTO: 11 10E3/UL (ref 4–11)

## 2024-05-01 PROCEDURE — 250N000012 HC RX MED GY IP 250 OP 636 PS 637: Performed by: STUDENT IN AN ORGANIZED HEALTH CARE EDUCATION/TRAINING PROGRAM

## 2024-05-01 PROCEDURE — 99239 HOSP IP/OBS DSCHRG MGMT >30: CPT | Performed by: STUDENT IN AN ORGANIZED HEALTH CARE EDUCATION/TRAINING PROGRAM

## 2024-05-01 PROCEDURE — 250N000011 HC RX IP 250 OP 636: Performed by: STUDENT IN AN ORGANIZED HEALTH CARE EDUCATION/TRAINING PROGRAM

## 2024-05-01 PROCEDURE — 250N000013 HC RX MED GY IP 250 OP 250 PS 637: Performed by: STUDENT IN AN ORGANIZED HEALTH CARE EDUCATION/TRAINING PROGRAM

## 2024-05-01 PROCEDURE — 80069 RENAL FUNCTION PANEL: CPT | Performed by: STUDENT IN AN ORGANIZED HEALTH CARE EDUCATION/TRAINING PROGRAM

## 2024-05-01 PROCEDURE — 36415 COLL VENOUS BLD VENIPUNCTURE: CPT | Performed by: STUDENT IN AN ORGANIZED HEALTH CARE EDUCATION/TRAINING PROGRAM

## 2024-05-01 PROCEDURE — 85025 COMPLETE CBC W/AUTO DIFF WBC: CPT | Performed by: STUDENT IN AN ORGANIZED HEALTH CARE EDUCATION/TRAINING PROGRAM

## 2024-05-01 RX ORDER — PREDNISONE 20 MG/1
TABLET ORAL
Qty: 5 TABLET | Refills: 0 | Status: SHIPPED | OUTPATIENT
Start: 2024-05-02 | End: 2024-05-09

## 2024-05-01 RX ORDER — METOPROLOL TARTRATE 50 MG
50 TABLET ORAL 2 TIMES DAILY
Qty: 60 TABLET | Refills: 0 | Status: SHIPPED | OUTPATIENT
Start: 2024-05-01 | End: 2024-05-09 | Stop reason: DRUGHIGH

## 2024-05-01 RX ORDER — LEVOFLOXACIN 500 MG/1
500 TABLET, FILM COATED ORAL DAILY
Qty: 2 TABLET | Refills: 0 | Status: SHIPPED | OUTPATIENT
Start: 2024-05-01 | End: 2024-05-03

## 2024-05-01 RX ORDER — LORAZEPAM 0.5 MG/1
0.5 TABLET ORAL EVERY 6 HOURS PRN
Qty: 15 TABLET | Refills: 0 | Status: SHIPPED | OUTPATIENT
Start: 2024-05-01

## 2024-05-01 RX ADMIN — LEVOFLOXACIN 500 MG: 500 TABLET, FILM COATED ORAL at 09:16

## 2024-05-01 RX ADMIN — GUAIFENESIN 1200 MG: 600 TABLET ORAL at 09:16

## 2024-05-01 RX ADMIN — FLUTICASONE FUROATE AND VILANTEROL TRIFENATATE 1 PUFF: 200; 25 POWDER RESPIRATORY (INHALATION) at 09:25

## 2024-05-01 RX ADMIN — FLUTICASONE PROPIONATE 1 SPRAY: 50 SPRAY, METERED NASAL at 09:25

## 2024-05-01 RX ADMIN — FAMOTIDINE 20 MG: 10 INJECTION, SOLUTION INTRAVENOUS at 06:20

## 2024-05-01 RX ADMIN — AMLODIPINE BESYLATE 10 MG: 10 TABLET ORAL at 09:16

## 2024-05-01 RX ADMIN — LISINOPRIL 40 MG: 40 TABLET ORAL at 09:17

## 2024-05-01 RX ADMIN — UMECLIDINIUM 1 PUFF: 62.5 AEROSOL, POWDER ORAL at 09:25

## 2024-05-01 RX ADMIN — PREDNISONE 30 MG: 20 TABLET ORAL at 09:16

## 2024-05-01 RX ADMIN — ROFLUMILAST 500 MCG: 500 TABLET ORAL at 09:23

## 2024-05-01 RX ADMIN — MORPHINE SULFATE 4 MG: 4 INJECTION, SOLUTION INTRAMUSCULAR; INTRAVENOUS at 02:52

## 2024-05-01 RX ADMIN — METOPROLOL TARTRATE 50 MG: 25 TABLET, FILM COATED ORAL at 09:17

## 2024-05-01 ASSESSMENT — ACTIVITIES OF DAILY LIVING (ADL)
ADLS_ACUITY_SCORE: 26

## 2024-05-01 NOTE — PLAN OF CARE
Physical Therapy Discharge Summary    Reason for therapy discharge:    Discharged to home with home therapy.    Progress towards therapy goal(s). See goals on Care Plan in [facility] electronic health record for goal details.  Goals partially met.  Barriers to achieving goals:   discharge from facility.    Therapy recommendation(s):    Continued therapy is recommended.  Rationale/Recommendations:  Recommend continued home care therapy to increase indep and ease in own environment.

## 2024-05-01 NOTE — PLAN OF CARE
Problem: Adult Inpatient Plan of Care  Goal: Absence of Hospital-Acquired Illness or Injury  Intervention: Prevent Skin Injury  Recent Flowsheet Documentation  Taken 5/1/2024 0302 by Thiago Gagnon, RN  Body Position: position changed independently  Goal: Optimal Comfort and Wellbeing  Outcome: Progressing     Problem: Skin Injury Risk Increased  Goal: Skin Health and Integrity  Intervention: Plan: Nurse Driven Intervention: Moisture Management  Recent Flowsheet Documentation  Taken 5/1/2024 0302 by Thiago Gagnon, RN  Moisture Interventions: Encourage regular toileting  Intervention: Optimize Skin Protection  Recent Flowsheet Documentation  Taken 5/1/2024 0302 by Thiago Gagnon, RN  Activity Management: activity adjusted per tolerance     Problem: Gas Exchange Impaired  Goal: Optimal Gas Exchange  Outcome: Progressing   Goal Outcome Evaluation:  Pt is up independently in room. Two doses of PRN morphine were given to manage SOB. Successful results. O2 lvls remain stable on baseline oxygen of 2L. No new complications noted.                        Detail Level: None Urine Pregnancy Test Result: negative

## 2024-05-01 NOTE — PROGRESS NOTES
Care Management Discharge Note    Discharge Date: 05/01/2024       Discharge Disposition: Home, Home Care    Discharge Services: None    Discharge DME: Oxygen    Discharge Transportation: family or friend will provide    Private pay costs discussed: Not applicable    Does the patient's insurance plan have a 3 day qualifying hospital stay waiver?  Yes     Which insurance plan 3 day waiver is available? Alternative insurance waiver    Will the waiver be used for post-acute placement? No    PAS Confirmation Code:    Patient/family educated on Medicare website which has current facility and service quality ratings: yes    Education Provided on the Discharge Plan: Yes  Persons Notified of Discharge Plans: patient  Patient/Family in Agreement with the Plan: yes    Handoff Referral Completed: Yes    Additional Information:  Per IDT rounds, MD states that pt is medically stable for discharge today.    PT/OT recommends home care services and pt & family are in agreement.    Pt is accepted for cares with [facility] Phone: [phone] Fax: [phone] for RN & OT services..    Transportation discussed and family to transport.      Follow up with PCP has been made & will show in AVS.      FREDY Gil

## 2024-05-01 NOTE — PROGRESS NOTES
[facility] DISCHARGE NOTE    Patient discharged to home at 11:09 PM via wheel chair. Accompanied by staff. Discharge instructions reviewed with patient, opportunity offered to ask questions. Prescriptions sent to patients preferred pharmacy. All belongings sent with patient.    Goldie Ramirez RN

## 2024-05-01 NOTE — DISCHARGE SUMMARY
[facility]  Hospitalist Discharge Summary      Date of Admission:  4/23/2024  Date of Discharge:  5/1/2024  Discharging Provider: Gareth Briseno DO  Discharge Service: Hospitalist Service    Discharge Diagnoses   Luis Hernandez is a 58 year old male who presents on 4/23/2024 with progressive shortness of breath. He was found to have acute on chronic respiratory failure presumably from COPD exacerbation complicated by pneumonia. He was being admitted to ICU on BiPAP and IV antibiotic therapy. Hospital course revealed Viral Pneumonia (Rhinovirus) and bacterial pneumonia (Kleb/PsA) for which patient is being treated for. Patient slowly improved and was discharged home with home health care.    Oxygen Documentation  I certify that this patient, Luis Hernandez has been under my care (or a nurse practitioner or physican's assistant working with me). This is the face-to-face encounter for oxygen medical necessity.      At the time of this encounter, I have reviewed the qualifying testing and have determined that supplemental oxygen is reasonable and necessary and is expected to improve the patient's condition in a home setting.         Patient has continued oxygen desaturation due to   Chronic Respiratory Failure (previously diagnosed)  COPD J44.9  Pneumonia J18.9  Stage 3 Lung Cancer.    If portability is ordered, is the patient mobile within the home? yes    Was this visit performed as a telehealth visit: No    Acute on chronic respiratory failure with hypoxia  Sepsis   Left lobe pneumonia  Viral Pneumonia  Bacteria Pneumonia (Klebsiella and Pseudomonas)  AECOPD  Chronic Hypoxic Respiratory Failure on 2L O2 via NC  COPD Stage D  Sinus Tachycardia  Leukocytosis   Squamous cell lung cancer, stage IIIB  Bilateral lung nodules, indeterminate  Hypertension  Generalized Anxiety Disorder  Air Hunger   Chronic hyponatremia  Chronic anemia    Clinically Significant Risk Factors          Follow-ups Needed  After Discharge   Follow-up Appointments     Follow-up and recommended labs and tests       Follow up with primary care provider, Shalini Washington, within 7 days   to evaluate medication change and for hospital follow- up.  The following   labs/tests are recommended: CBC and CMP in 1-2 weeks, may need titration   of lopressor if sinus tachycardia is persisting without an active   infection.            Unresulted Labs Ordered in the Past 30 Days of this Admission       No orders found from 3/24/2024 to 4/24/2024.        These results will be followed up by N/A    Discharge Disposition   Discharged to home  Condition at discharge: Fair    Hospital Course   Luis Hernandez is a 58 year old male who presents on 4/23/2024 with progressive shortness of breath. He was found to have acute on chronic respiratory failure presumably from COPD exacerbation complicated by pneumonia. He was being admitted to ICU on BiPAP and IV antibiotic therapy. Hospital course revealed Viral Pneumonia (Rhinovirus) and bacterial pneumonia (Kleb/PsA) for which patient is being treated for. Patient slowly continues to improve, hopeful discharge 05/01.      Acute on chronic respiratory failure with hypoxia,   likely 2/2 pneumonia causing exacerbation of COPD as below   Meets SIRS criteria with tachycardia and leukocytosis (46.3). No fever. Lactate WNL (1.4). CRP elevated (5.46). Procalcitonin normal (0.06). CXR with small hazy opacity over left lower lung laterally. Lungs with wheezing bilateral mid and lower lobes.    - Management of PNA and COPD as below    Sepsis   Left lobe pneumonia  Viral Pneumonia  Bacteria Pneumonia (Klebsiella and Pseudomonas)  CXR with small hazy opacity over left lower lung laterally. Procalcitonin WNL. Recent concern by heme/onc 4/12 PET scan for pneumonia and started on Augmentin in which has completed 5 days of PTA. PET scan 4/11 during that time showing resolved consolidation RADHA pneumonia, however there was new  patchy focal consolidation in RML and LLL which could represent infection/inflammation.  Abx: was on standard CAP coverage with rocephin/azithro with additional anaerobic coverage with flagyl-> changed to cefepime after sputum Cx data with continued azithro/flagyl on 04/25 -> 04/26 cefe monotherapy  Urine Ag (strep/legionella): Negative  Respiratory PCR: + Rhinovirus  Sputum Cx: Klebsiella Pneumoniae (susceptible to cefe), Pseudomonas Aeurginosa (intermediate/resistant to cefepime-> 04/27 changed to levaquin)  Blood Cx x2 NG 4d    - Changed IV Cefepime 2g q12h to Levaquin 500mg Daily x 7 days (started 04/27)     AECOPD  Chronic Hypoxic Respiratory Failure on 2L O2 via NC  COPD Stage D  H/o severe COPD (FEV1 18%) on continuous oxygen 2 liters at baseline. Significant smoking history of 60 pack years. No increased oxygen demands PTA and denies hypoxia. Found to be in respiratory distress and placed on BiPAP. VBG without acidosis (7.33) and mild hypercapnia (61).   Recently started prednisone taper 4/11 by oncology. Given solumedrol in ED along with duoneb x 2.   Home meds: LAMA, ICS/LABA, romflumilast, saline neb, prn XIOMARA  Interval assessment 04/26: improving but still dyspneic and hypoxic with minimal exertion requiring BIPAP. Gases have been okay so patient likely just needs positive pressure. No history of ANDRÉS or sx concerning for this.    - Continue PTA roflumilast 500 mg, fluticasone-salmeterol (Advair) BID, and normal saline nebs BID.  Held home tiotropium (Spiriva Respimat) while on duonebs   - Guaifenesin 1,200 mg BID  - Prednisone daily (starting 4/24)  - PRN morphine available  - Due to inability to tolerate nebulized duoneb/albuterol/saline/mucomyst, will attempt lasix nebulizer for dyspnea x1     Sinus Tachycardia    Presented tachycardic 117. Tachycardia has been persistent and up to 135. Troponin WNL (11). Initial EKG showing sinus tachycardia with non-specific ST depression. Repeat EKG showing sinus  tachycardia with unchanged non-specific ST depression. Denies chest pain, tightness, pressure, or discomfort. Does not appear volume down, however did respond some to 1 liter fluid bolus. Confirmed that PTA atenolol was taken 4/22. Initially held for concern for developing sepsis and did not want to blunt cardiac response. Was provided steroids which may be contributing to tachycardia. TSH normal (0.77). Considering pulmonary embolism as source of tachycardia, however given normal d-dimer did not pursue further imaging of chest.  - Telemetry to monitor  - received 2L in boluses 4/23.    - Continue PTA atenolol 25 mg  - Echocardiogram 4/23 showed moderate tricuspid regurgitation and severe pulmonary hypertension with hyperdynamic LV function - overall similar to prior echo from 2019.  - sinus tach improved 4/24, down to 110's.    Down into 100's at times 4/25.   - switching atenolol 50 mg daily to metoprolol at slightly higher dose of 37.5 mg twice daily AM 4/25, may need to titrate this then discharge on once daily metoprolol if doing well.    - of note, patient has similar sinus tach during last admission for respiratory distress.      Leukocytosis   Presumed leukemoid reaction due to acute infection but markedly elevated.  Was essentially normal about 2 weeks prior to admission. Immature cells present (myelos). Monocytes on e-diff.  Path blood smear: No dysplastic cells or blasts. No myelophthisic process identified.     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 Follows with oncology and currently undergoing palliative carboplatin, Taxol, pembrolizumab with Neulasta. Has completed x 3 cycles, last received 4/12. Not a surgical nor definite chemoradiation candidate given severe COPD. PET scan 4/11 shows near CR of the primary R perihilar mass and stable mildly  avid thoracic nodes. 6 mm RUL nodule stable in size but slightly more avid.  - Continue with hematology/oncology follow-up as planned.     Hypertension    Chronic. Blood pressures reviewed and hypertensive on presentation. BP normalized and borderline soft at times. Managed PTA with amlodipine 10 mg, lisinopril 40 mg, and atenolol 25 mg.  - Continued atenolol  - Held amlodipine and lisinopril, then resumed lisinopril 4/24 and amlodipine 4/25 with holding parameters      Chronic hyponatremia    Sodium 131. Baseline sodium 131-133. Appears stable.  - 131 ? 133 ? 133      Chronic anemia    Hemoglobin 9.9 on admission. Baseline hemoglobin 10-11. No reported melena or hematochezia.   - 9.9 ? 9.0 ? 8.9    Suspect due to dilution from IVF for tachycardia as above.      Consultations This Hospital Stay   NUTRITION SERVICES ADULT IP CONSULT  PHYSICAL THERAPY ADULT IP CONSULT  CARE MANAGEMENT / SOCIAL WORK IP CONSULT    Code Status   Special Code    Time Spent on this Encounter   Gareth PORTILLO DO, personally saw the patient today and spent greater than 30 minutes discharging this patient.       Gareth Briseno DO  [facility] SURGICAL  [address] [address]  Phone: [phone]  Fax: [phone]  ______________________________________________________________________    Physical Exam   Vital Signs: Temp: 98  F (36.7  C) Temp src: Oral BP: 123/65 Pulse: 113   Resp: 20 SpO2: 97 % O2 Device: None (Room air) Oxygen Delivery: 2 LPM  Weight: 107 lbs 2.3 oz  Physical Exam  Constitutional:       General: Pt is not in acute distress.  HENT:      Head: Normocephalic and atraumatic.      Nose: Nose normal.   Eyes:      Conjunctiva/sclera: Conjunctivae normal.   Pulmonary:      Effort: Pulmonary effort is normal.   Abdominal:      General: Abdomen is flat.   Skin:     Findings: No rash.   Neurological:      General: No focal deficit present.      Mental Status: He is alert.   Psychiatric:          Mood and Affect: Mood normal.          Primary Care Physician   Shalini Washington    Discharge Orders      Home Care Referral      Reason for your hospital stay    You were hospitalized for breathing difficulties related to bacterial pneumoniae. You were discharged home with antibiotics and the recommendation to follow up with your PCP for hospitalization follow up and specialty providers for your comorbid conditions.     Follow-up and recommended labs and tests     Follow up with primary care provider, Shalini Washington, within 7 days to evaluate medication change and for hospital follow- up.  The following labs/tests are recommended: CBC and CMP in 1-2 weeks, may need titration of lopressor if sinus tachycardia is persisting without an active infection.     Activity    Your activity upon discharge: activity as tolerated     Oxygen Adult/Peds    Oxygen Documentation  I certify that this patient, Luis Hernandez has been under my care (or a nurse practitioner or physican's assistant working with me). This is the face-to-face encounter for oxygen medical necessity.      At the time of this encounter, I have reviewed the qualifying testing and have determined that supplemental oxygen is reasonable and necessary and is expected to improve the patient's condition in a home setting.         Patient has continued oxygen desaturation due to   Chronic Respiratory Failure (previously diagnosed)  COPD J44.9  Pneumonia J18.9  Stage 3 Lung Cancer.    If portability is ordered, is the patient mobile within the home? yes    Was this visit performed as a telehealth visit: No     Diet    Follow this diet upon discharge: Orders Placed This Encounter      Snacks/Supplements Adult: Ensure Enlive; With Meals      Combination Diet Regular Diet; Easy to Chew (level 7); Thin Liquids (level 0)       Significant Results and Procedures   Results for orders placed or performed during the hospital encounter of 04/23/24   XR Chest Port 1 View     Narrative    EXAM: XR CHEST PORT 1 VIEW  LOCATION: [facility]  DATE: 2024    INDICATION: Dyspnea, cough.  COMPARISON: 2024.      Impression    IMPRESSION: Normal heart size and pulmonary vascularity. Lungs are hyperinflated with emphysematous changes. Small hazy opacity over the left lower lung laterally could be seen with a focal area of pneumonitis and clinical correlation is needed.   Suspected prominent nipple shadow projects over the mid to lower lungs bilaterally. Stent material projects over the right side of the mediastinum, unchanged. Aortic calcification. No significant bony abnormalities.                 Echocardiogram Complete     Value    LVEF  65-70%    Narrative    [id number]  [id number]  [id number]  [id number]^[email or web address]^MAGDALENO     [facility]  Echocardiography Laboratory  [address] [place] [address]     Name: MARVA CASTELLANOS  MRN: [id number]  : 1965  Study Date: 2024 02:06 PM  Age: 58 yrs  Gender: Male  Patient Location: [facility]  Reason For Study: Tachycardia  Ordering Physician: MAGDALENO HASKINS  Referring Physician: MAGDALENO HASKINS  Performed By: Ashlee Lopez     BSA: 1.5 m2  Height: 64 in  Weight: 111 lb  HR: 128  BP: 115/83 mmHg  ______________________________________________________________________________  Procedure  Complete Portable Echo Adult.  ______________________________________________________________________________  Interpretation Summary     1. The left ventricle is normal in size. Proximal septal thickening is noted.  Hyperdynamic left ventricular function. The visual ejection fraction is 65-  70%. No regional wall motion abnormalities noted.  2. The right ventricle is normal size. The right ventricular systolic function  is normal.  3. There is moderate (2+) tricuspid regurgitation. Right ventricular systolic  pressure is elevated, consistent with severe pulmonary hypertension.  4. No pericardial effusion.  5.  In comparison to the previous report dated 2019, the findings are  similar.  ______________________________________________________________________________  Left Ventricle  The left ventricle is normal in size. Proximal septal thickening is noted.  Hyperdynamic left ventricular function. The visual ejection fraction is 65-  70%. No regional wall motion abnormalities noted.     Right Ventricle  The right ventricle is normal size. The right ventricular systolic function is  normal.     Atria  Normal left atrial size. Right atrial size is normal. There is no color  Doppler evidence of an atrial shunt.     Mitral Valve  There is trace mitral regurgitation.     Tricuspid Valve  There is moderate (2+) tricuspid regurgitation. The right ventricular systolic  pressure is approximated at 53.3 mmHg plus the right atrial pressure. IVC  diameter <2.1 cm collapsing >50% with sniff suggests a normal RA pressure of 3  mmHg. Right ventricular systolic pressure is elevated, consistent with severe  pulmonary hypertension.     Aortic Valve  The aortic valve is trileaflet. No aortic regurgitation is present. No aortic  stenosis is present.     Pulmonic Valve  There is no pulmonic valvular stenosis.     Vessels  The inferior vena cava is normal.     Pericardium  There is no pericardial effusion.     Rhythm  The rhythm was sinus tachycardia.  ______________________________________________________________________________  MMode/2D Measurements & Calculations  IVSd: 0.82 cm     LVIDd: 3.6 cm  LVIDs: 2.3 cm  LVPWd: 0.70 cm  FS: 36.7 %  LV mass(C)d: 73.5 grams  LV mass(C)dI: 48.3 grams/m2  LVOT diam: 2.2 cm  LVOT area: 3.9 cm2  LA Volume (BP): 17.2 ml  LA Volume Index (BP): 11.3 ml/m2     LA Volume Indexed (AL/bp): 11.2 ml/m2  RWT: 0.38     Doppler Measurements & Calculations  PA V2 max: 101.2 cm/sec  PA max P.1 mmHg  TR max danielle: 365.0 cm/sec  TR max P.3 mmHg      ______________________________________________________________________________  Report approved by: Inocencio Coelho 04/23/2024 03:45 PM             Discharge Medications   Current Discharge Medication List        START taking these medications    Details   levofloxacin (LEVAQUIN) 500 MG tablet Take 1 tablet (500 mg) by mouth daily for 2 days  Qty: 2 tablet, Refills: 0    Associated Diagnoses: Pneumonia of both lower lobes due to infectious organism      LORazepam (ATIVAN) 0.5 MG tablet Take 1 tablet (0.5 mg) by mouth every 6 hours as needed for anxiety  Qty: 15 tablet, Refills: 0    Associated Diagnoses: DOC (generalized anxiety disorder); Air hunger; Other specified anxiety disorders      metoprolol tartrate (LOPRESSOR) 50 MG tablet Take 1 tablet (50 mg) by mouth 2 times daily for 30 days  Qty: 60 tablet, Refills: 0    Associated Diagnoses: Essential hypertension, benign           CONTINUE these medications which have CHANGED    Details   predniSONE (DELTASONE) 20 MG tablet Take 1 tablet (20 mg) by mouth daily for 3 days, THEN 0.5 tablets (10 mg) daily for 4 days.  Qty: 5 tablet, Refills: 0    Associated Diagnoses: Pneumonia of both lower lobes due to infectious organism; COPD with acute exacerbation (H)           CONTINUE these medications which have NOT CHANGED    Details   albuterol (PROAIR HFA/PROVENTIL HFA/VENTOLIN HFA) 108 (90 Base) MCG/ACT inhaler Inhale 2 puffs into the lungs every 4 hours as needed for shortness of breath  Qty: 54 g, Refills: 3    Comments: Pharmacy may dispense brand covered by insurance (Proair, or proventil or ventolin or generic albuterol inhaler)  Associated Diagnoses: Centrilobular emphysema (H)      amLODIPine (NORVASC) 10 MG tablet Take 1 tablet (10 mg) by mouth daily  Qty: 90 tablet, Refills: 3    Associated Diagnoses: Essential hypertension, benign      fluticasone-salmeterol (ADVAIR) 500-50 MCG/ACT inhaler Inhale 1 puff into the lungs every 12 hours  Qty: 60 each, Refills: 11     Associated Diagnoses: Acute on chronic respiratory failure with hypoxia and hypercapnia (H); Pulmonary emphysema, unspecified emphysema type (H)      guaiFENesin (MUCINEX MAXIMUM STRENGTH) 1200 MG TB12 Take 1 tablet by mouth daily      lisinopril (ZESTRIL) 40 MG tablet Take 1 tablet (40 mg) by mouth daily  Qty: 90 tablet, Refills: 3    Associated Diagnoses: Essential hypertension, benign      magnesium oxide (MAG-OX) 400 MG tablet Take 400 mg by mouth daily (with lunch) For leg cramps      ondansetron (ZOFRAN) 8 MG tablet Take 1 tablet (8 mg) by mouth every 8 hours as needed for nausea (vomiting)  Qty: 30 tablet, Refills: 2    Associated Diagnoses: Squamous cell lung cancer, right (H); Encounter for antineoplastic chemotherapy      !! order for DME Equipment being ordered: Oxygen 3L via NC continuous.  O2 saturated noted to be 86% on room air at rest.  Qty: 1 Units, Refills: 0    Associated Diagnoses: Mixed simple and mucopurulent chronic bronchitis (H)      !! order for DME Equipment being ordered: Nebulizer  Qty: 1 Device, Refills: 0    Associated Diagnoses: Simple chronic bronchitis (H)      prochlorperazine (COMPAZINE) 10 MG tablet Take 1 tablet (10 mg) by mouth every 6 hours as needed for nausea or vomiting  Qty: 30 tablet, Refills: 2    Associated Diagnoses: Squamous cell lung cancer, right (H); Encounter for antineoplastic chemotherapy      roflumilast (DALIRESP) 500 MCG TABS tablet Take 1 tablet (500 mcg) by mouth daily  Qty: 90 tablet, Refills: 3    Associated Diagnoses: Centrilobular emphysema (H); Pulmonary emphysema, unspecified emphysema type (H)      tiotropium (SPIRIVA RESPIMAT) 2.5 MCG/ACT inhaler Inhale 2 puffs into the lungs daily  Qty: 12 g, Refills: 3    Associated Diagnoses: Acute on chronic respiratory failure with hypoxia and hypercapnia (H); Pulmonary emphysema, unspecified emphysema type (H)       !! - Potential duplicate medications found. Please discuss with provider.        STOP taking  these medications       amoxicillin-clavulanate (AUGMENTIN) 875-125 MG tablet Comments:   Reason for Stopping:         atenolol (TENORMIN) 25 MG tablet Comments:   Reason for Stopping:         dexAMETHasone (DECADRON) 4 MG tablet Comments:   Reason for Stopping:         sodium chloride 0.9 % neb solution Comments:   Reason for Stopping:             Allergies   Allergies   Allergen Reactions    Hctz Other (See Comments)     He has hyponatremia - avoid use    Penicillins Unknown     Childhood reaction.

## 2024-05-02 ENCOUNTER — PATIENT OUTREACH (OUTPATIENT)
Dept: ONCOLOGY | Facility: CLINIC | Age: 59
End: 2024-05-02
Payer: COMMERCIAL

## 2024-05-02 ENCOUNTER — PATIENT OUTREACH (OUTPATIENT)
Dept: CARE COORDINATION | Facility: CLINIC | Age: 59
End: 2024-05-02
Payer: COMMERCIAL

## 2024-05-02 NOTE — PROGRESS NOTES
Clinic Care Coordination Contact  Clinic Care Coordination Contact  OUTREACH    Referral Information:            Chief Complaint   Patient presents with    Clinic Care Coordination - [facility]     Clinic Care Coordination RN         Universal Utilization:     [facility]  Hospitalist Discharge Summary       Date of Admission:  4/23/2024  Date of Discharge:  5/1/2024  Discharging Provider: Gareth Briseno DO  Discharge Service: Hospitalist Service        Discharge Diagnoses  Luis Hernandez is a 58 year old male who presents on 4/23/2024 with progressive shortness of breath. He was found to have acute on chronic respiratory failure presumably from COPD exacerbation complicated by pneumonia. He was being admitted to ICU on BiPAP and IV antibiotic therapy. Hospital course revealed Viral Pneumonia (Rhinovirus) and bacterial pneumonia (Kleb/PsA) for which patient is being treated for. Patient slowly improved and was discharged home with home health care.        Utilization      No Show Count (past year)  0             ED Visits  2             Hospital Admissions  3                    Current as of: 5/2/2024  8:47 AM                Clinical Concerns:  Current Medical Concerns:  Patient cancelled his chemotherapy today due to not feeling up to having the treatment.Patient has been doing his chemotherapy treatment for the last 3 months   Patient continues to use continuous oxygen at 2 liter Oximeter reading 94-95 %    Current Behavioral Concerns: No    Education Provided to patient: CC role introduced       Health Maintenance Reviewed:    Clinical Pathway: Clinic Care Coordination - COPD Clinical Pathway     COPD Symptoms:    Shortness of breath:: No  Cough: No  Wheezing: Yes  Anxiety: No  New, different, or worsening chest pain? : No  Chills: No  Fever: No  Fatigue:: Yes, improving  Overall your COPD symptoms are (GOAL):: Stable    COPD Management:   Taking COPD medications as prescribed?:  Yes  Are your medications effective in controlling your symptoms?: Yes  Taking steroids for COPD?: Yes  Do you understand the tapering instructions?: Yes  Do you have a pulse oximeter?: Yes  SpO2 (Patient Reported): 94    Medication Management:  Medication review status: Medications reviewed.  Changes noted per patient report.        Lifestyle & Psychosocial Needs:    Social Determinants of Health     Food Insecurity: Low Risk  (1/9/2024)    Food Insecurity     Within the past 12 months, did you worry that your food would run out before you got money to buy more?: No     Within the past 12 months, did the food you bought just not last and you didn t have money to get more?: No   Depression: Not at risk (1/9/2024)    PHQ-2     PHQ-2 Score: 0   Housing Stability: Low Risk  (1/9/2024)    Housing Stability     Do you have housing? : Yes     Are you worried about losing your housing?: No   Tobacco Use: Medium Risk (4/4/2024)    Patient History     Smoking Tobacco Use: Former     Smokeless Tobacco Use: Former     Passive Exposure: Not on file   Financial Resource Strain: Low Risk  (1/9/2024)    Financial Resource Strain     Within the past 12 months, have you or your family members you live with been unable to get utilities (heat, electricity) when it was really needed?: No   Alcohol Use: Not on file   Transportation Needs: Low Risk  (1/9/2024)    Transportation Needs     Within the past 12 months, has lack of transportation kept you from medical appointments, getting your medicines, non-medical meetings or appointments, work, or from getting things that you need?: No   Physical Activity: Inactive (8/30/2021)    Exercise Vital Sign     Days of Exercise per Week: 0 days     Minutes of Exercise per Session: 0 min   Interpersonal Safety: Low Risk  (10/12/2023)    Interpersonal Safety     Do you feel physically and emotionally safe where you currently live?: Yes     Within the past 12 months, have you been hit, slapped,  kicked or otherwise physically hurt by someone?: No     Within the past 12 months, have you been humiliated or emotionally abused in other ways by your partner or ex-partner?: No   Stress: Not on file   Social Connections: Unknown (8/30/2021)    Social Connection and Isolation Panel [NHANES]     Frequency of Communication with Friends and Family: Twice a week     Frequency of Social Gatherings with Friends and Family: Twice a week     Attends Yazidi Services: Never     Active Member of Clubs or Organizations: No     Attends Club or Organization Meetings: Never     Marital Status: Not on file   Health Literacy: Not on file                  Patient/Caregiver understanding: Patient expresses good understanding of discharge instructions        Future Appointments                In 1 week [facility]    In 1 week Friedell, Peter E, MD [facility]    In 1 week Shalini Washington NP [facility]  Arrive at: Clinic A    In [facility]    In 6 months Bianca Cuellar MD [facility]            Plan:   Patient is followed by Oncology and has a Oncology CC  Patient will keep hospital follow up with PCP 5/9/2024  No unmet needs, no further care coordination needed at this time     [facility]   India Andrade RN, Care Coordinator   [facility]   E-mail [email or web address]   [phone]

## 2024-05-03 ENCOUNTER — MEDICAL CORRESPONDENCE (OUTPATIENT)
Dept: HEALTH INFORMATION MANAGEMENT | Facility: CLINIC | Age: 59
End: 2024-05-03

## 2024-05-09 ENCOUNTER — OFFICE VISIT (OUTPATIENT)
Dept: FAMILY MEDICINE | Facility: CLINIC | Age: 59
End: 2024-05-09
Payer: COMMERCIAL

## 2024-05-09 ENCOUNTER — LAB (OUTPATIENT)
Dept: LAB | Facility: CLINIC | Age: 59
End: 2024-05-09
Payer: COMMERCIAL

## 2024-05-09 ENCOUNTER — ONCOLOGY VISIT (OUTPATIENT)
Dept: ONCOLOGY | Facility: CLINIC | Age: 59
End: 2024-05-09
Attending: NURSE PRACTITIONER
Payer: COMMERCIAL

## 2024-05-09 VITALS
SYSTOLIC BLOOD PRESSURE: 112 MMHG | OXYGEN SATURATION: 96 % | DIASTOLIC BLOOD PRESSURE: 83 MMHG | TEMPERATURE: 98 F | WEIGHT: 100 LBS | BODY MASS INDEX: 17.72 KG/M2 | RESPIRATION RATE: 12 BRPM | HEART RATE: 82 BPM | HEIGHT: 63 IN

## 2024-05-09 VITALS
DIASTOLIC BLOOD PRESSURE: 72 MMHG | HEIGHT: 64 IN | TEMPERATURE: 97.5 F | SYSTOLIC BLOOD PRESSURE: 106 MMHG | OXYGEN SATURATION: 95 % | BODY MASS INDEX: 17.04 KG/M2 | HEART RATE: 94 BPM | RESPIRATION RATE: 18 BRPM | WEIGHT: 99.8 LBS

## 2024-05-09 DIAGNOSIS — C34.91 SQUAMOUS CELL LUNG CANCER, RIGHT (H): ICD-10-CM

## 2024-05-09 DIAGNOSIS — E78.5 HYPERLIPIDEMIA LDL GOAL <100: ICD-10-CM

## 2024-05-09 DIAGNOSIS — J96.21 ACUTE ON CHRONIC RESPIRATORY FAILURE WITH HYPOXIA AND HYPERCAPNIA (H): ICD-10-CM

## 2024-05-09 DIAGNOSIS — C34.91 SQUAMOUS CELL LUNG CANCER, RIGHT (H): Primary | ICD-10-CM

## 2024-05-09 DIAGNOSIS — Z51.11 ENCOUNTER FOR ANTINEOPLASTIC CHEMOTHERAPY: ICD-10-CM

## 2024-05-09 DIAGNOSIS — J96.22 ACUTE ON CHRONIC RESPIRATORY FAILURE WITH HYPOXIA AND HYPERCAPNIA (H): ICD-10-CM

## 2024-05-09 DIAGNOSIS — Z00.00 ENCOUNTER FOR MEDICARE ANNUAL WELLNESS EXAM: Primary | ICD-10-CM

## 2024-05-09 DIAGNOSIS — Z79.899 HIGH RISK MEDICATION USE: ICD-10-CM

## 2024-05-09 DIAGNOSIS — J18.9 PNEUMONIA OF BOTH LOWER LOBES DUE TO INFECTIOUS ORGANISM: ICD-10-CM

## 2024-05-09 DIAGNOSIS — J44.9 COPD, VERY SEVERE (H): ICD-10-CM

## 2024-05-09 DIAGNOSIS — I27.20 PULMONARY HYPERTENSION (H): ICD-10-CM

## 2024-05-09 DIAGNOSIS — J44.1 COPD WITH ACUTE EXACERBATION (H): ICD-10-CM

## 2024-05-09 PROBLEM — J96.02 ACUTE RESPIRATORY FAILURE WITH HYPOXIA AND HYPERCAPNIA (H): Status: RESOLVED | Noted: 2024-04-23 | Resolved: 2024-05-09

## 2024-05-09 PROBLEM — J96.01 ACUTE RESPIRATORY FAILURE WITH HYPOXIA AND HYPERCAPNIA (H): Status: RESOLVED | Noted: 2024-04-23 | Resolved: 2024-05-09

## 2024-05-09 LAB
ALBUMIN SERPL BCG-MCNC: 4.2 G/DL (ref 3.5–5.2)
ALP SERPL-CCNC: 74 U/L (ref 40–150)
ALT SERPL W P-5'-P-CCNC: 21 U/L (ref 0–70)
ANION GAP SERPL CALCULATED.3IONS-SCNC: 7 MMOL/L (ref 7–15)
AST SERPL W P-5'-P-CCNC: 15 U/L (ref 0–45)
BASOPHILS # BLD AUTO: 0 10E3/UL (ref 0–0.2)
BASOPHILS NFR BLD AUTO: 0 %
BILIRUB SERPL-MCNC: 0.2 MG/DL
BUN SERPL-MCNC: 15 MG/DL (ref 6–20)
CALCIUM SERPL-MCNC: 9.5 MG/DL (ref 8.6–10)
CHLORIDE SERPL-SCNC: 97 MMOL/L (ref 98–107)
CHOLEST SERPL-MCNC: 188 MG/DL
CREAT SERPL-MCNC: 0.55 MG/DL (ref 0.67–1.17)
DEPRECATED HCO3 PLAS-SCNC: 31 MMOL/L (ref 22–29)
EGFRCR SERPLBLD CKD-EPI 2021: >90 ML/MIN/1.73M2
EOSINOPHIL # BLD AUTO: 0 10E3/UL (ref 0–0.7)
EOSINOPHIL NFR BLD AUTO: 0 %
ERYTHROCYTE [DISTWIDTH] IN BLOOD BY AUTOMATED COUNT: 19.9 % (ref 10–15)
GLUCOSE SERPL-MCNC: 83 MG/DL (ref 70–99)
HCT VFR BLD AUTO: 32.9 % (ref 40–53)
HDLC SERPL-MCNC: 73 MG/DL
HGB BLD-MCNC: 10.5 G/DL (ref 13.3–17.7)
IMM GRANULOCYTES # BLD: 0 10E3/UL
IMM GRANULOCYTES NFR BLD: 0 %
LDLC SERPL CALC-MCNC: 95 MG/DL
LYMPHOCYTES # BLD AUTO: 0.2 10E3/UL (ref 0.8–5.3)
LYMPHOCYTES NFR BLD AUTO: 2 %
MCH RBC QN AUTO: 31.1 PG (ref 26.5–33)
MCHC RBC AUTO-ENTMCNC: 31.9 G/DL (ref 31.5–36.5)
MCV RBC AUTO: 97 FL (ref 78–100)
MONOCYTES # BLD AUTO: 0.4 10E3/UL (ref 0–1.3)
MONOCYTES NFR BLD AUTO: 3 %
NEUTROPHILS # BLD AUTO: 12.9 10E3/UL (ref 1.6–8.3)
NEUTROPHILS NFR BLD AUTO: 95 %
NONHDLC SERPL-MCNC: 115 MG/DL
NRBC # BLD AUTO: 0 10E3/UL
NRBC BLD AUTO-RTO: 0 /100
PLATELET # BLD AUTO: 391 10E3/UL (ref 150–450)
POTASSIUM SERPL-SCNC: 5.3 MMOL/L (ref 3.4–5.3)
PROT SERPL-MCNC: 6.8 G/DL (ref 6.4–8.3)
RBC # BLD AUTO: 3.38 10E6/UL (ref 4.4–5.9)
SODIUM SERPL-SCNC: 135 MMOL/L (ref 135–145)
TRIGL SERPL-MCNC: 99 MG/DL
TSH SERPL DL<=0.005 MIU/L-ACNC: 0.37 UIU/ML (ref 0.3–4.2)
WBC # BLD AUTO: 13.6 10E3/UL (ref 4–11)

## 2024-05-09 PROCEDURE — 83718 ASSAY OF LIPOPROTEIN: CPT

## 2024-05-09 PROCEDURE — 99495 TRANSJ CARE MGMT MOD F2F 14D: CPT | Mod: 25 | Performed by: NURSE PRACTITIONER

## 2024-05-09 PROCEDURE — 36415 COLL VENOUS BLD VENIPUNCTURE: CPT | Performed by: NURSE PRACTITIONER

## 2024-05-09 PROCEDURE — 84443 ASSAY THYROID STIM HORMONE: CPT | Performed by: NURSE PRACTITIONER

## 2024-05-09 PROCEDURE — G0463 HOSPITAL OUTPT CLINIC VISIT: HCPCS | Performed by: INTERNAL MEDICINE

## 2024-05-09 PROCEDURE — 85025 COMPLETE CBC W/AUTO DIFF WBC: CPT | Performed by: NURSE PRACTITIONER

## 2024-05-09 PROCEDURE — 80053 COMPREHEN METABOLIC PANEL: CPT | Performed by: NURSE PRACTITIONER

## 2024-05-09 PROCEDURE — G0438 PPPS, INITIAL VISIT: HCPCS | Performed by: NURSE PRACTITIONER

## 2024-05-09 PROCEDURE — 99215 OFFICE O/P EST HI 40 MIN: CPT | Performed by: INTERNAL MEDICINE

## 2024-05-09 PROCEDURE — 82465 ASSAY BLD/SERUM CHOLESTEROL: CPT

## 2024-05-09 RX ORDER — LISINOPRIL 40 MG/1
40 TABLET ORAL DAILY
Qty: 90 TABLET | Refills: 3 | Status: SHIPPED | OUTPATIENT
Start: 2024-05-09 | End: 2024-07-09

## 2024-05-09 RX ORDER — METOPROLOL TARTRATE 25 MG/1
25 TABLET, FILM COATED ORAL 2 TIMES DAILY
Qty: 180 TABLET | Refills: 3 | Status: SHIPPED | OUTPATIENT
Start: 2024-05-09 | End: 2024-08-07

## 2024-05-09 RX ORDER — AMLODIPINE BESYLATE 10 MG/1
10 TABLET ORAL DAILY
Qty: 90 TABLET | Refills: 3 | Status: SHIPPED | OUTPATIENT
Start: 2024-05-09 | End: 2024-08-07

## 2024-05-09 ASSESSMENT — PAIN SCALES - GENERAL
PAINLEVEL: NO PAIN (0)
PAINLEVEL: NO PAIN (0)

## 2024-05-09 NOTE — PROGRESS NOTES
[facility] Hematology and Oncology Follow-up Note    Patient: Luis Hernandez  MRN: [id number]  Date of Service: May 9, 2024       Reason for Visit    Follow-up squamous cell carcinoma of the lung      Encounter Diagnoses Assessment and Plan:    Problem List Items Addressed This Visit       Squamous cell lung cancer, right (H) - Primary    Relevant Orders    Infusion Appointment Request    Encounter for antineoplastic chemotherapy    Relevant Orders    Infusion Appointment Request     Patient returns for follow-up.  He was hospitalized with respiratory failure from COPD and likely viral pneumonia.  CT of 4/11/2024 and chest x-ray of 4/23/2024 show almost complete resolution of the primary tumor.  At this point I recommended discontinuing chemotherapy but continuing immunotherapy with pembrolizumab.  The patient is agreeable to this plan.  Patient will return at a later time for his immunotherapy.  Follow-up in 3 weeks after that treatment is completed.    ______________________________________________________________________________    Staging History  Cancer Staging   No matching staging information was found for the patient.    ECOG Performance 2    History of Present Illness    Disease history per Dr. Lantigua  The patient has a longstanding history of severe COPD (FEV1=18%) with recurrent infection, prior tobacco use until August, 2021, pulmonary and essential hypertension.     On 10/5/2023, he was seen by his regular pulmonologist with a history of waxing/waning bilateral lung nodules with a follow-up 9/14/2023 CT with chest revealing a new spiculated nodule at the base of the right upper lobe with persistent and stable filling defect of the bronchus intermedius/right lower lobe bronchus concerning for possible endobronchial tumor versus mucous plugging, and progressive abnormal soft tissue mass in the subcarinal.       Because he was felt not to be able to tolerate bronchoscopy, a 11/9/2023 PET scan revealed  glucose avid right hilar soft tissue mass encasing the bronchus intermedius extending along the right middle lobe bronchus contiguous with subcarinal lymphadenopathy and intraluminal soft tissue density of the right lower lobe bronchus concerning for primary lung malignancy.  In addition, there were glucose avid opacities seen bilaterally involve the right lower lobe, left suprahilar region, medial left lower lobe and right upper lobe which were felt to be possibly inflammatory.  However endobronchial tumor spread cannot be excluded.  A hypermetabolic soft tissue thickening was seen in the distal sigmoid/upper rectum concerning for malignancy.  However, a 10/25/2023 Cologuard study was negative.     On 11/17/2023, the patient was seen in hematology/medical oncology consultation by Dr. Tim Proctor.  Augmentin was empirically initiated by Dr. Cuellar.     On 1/18/2024, the patient underwent flexible and rigid bronchoscopy with a right upper lobe, right mainstem bronchus and bronchus intermedius identified revealing a moderatey differentiated squamous cell carcinoma with a PD-L1 CPS 10-20% and TPS 5%.  Lung cancer NGS panel revealed CDKN2A and PIK3CA alterations.       The patient underwent tumor debulking with placement of a 10 x 15 mm bronchus intermedius stent and therapeutic suctioning of the airway.  The right middle lobe bronchus could not be identified.  In addition, right upper lobe mass needle aspirate confirmed cytologic evidence of a non-small cell carcinoma consistent with a squamous cell carcinoma which was p40 positive, TTF 1 and CK7 negative.  Following the bronchoscopic procedure, his breathing was better for perhaps a week but has now returned to baseline.     Follow-up 1/20/2023 CT chest without contrast revealed reduction in left upper lobe nodule, new nodules throughout both lungs up to 7 mm and continued nodular wall thickening of the bronchus intermedius, right lower lobe bronchus with masslike  "filling defect of the right lower lobe.     On 2/1/2024,Dr. Smith saw him in initial oncology consultation.  MR scan brain with contrast was unremarkable.  He subsequently elected interventional therapy although he had been inclined toward palliative care only at the time of his original consultation.    On 2/29/2024, cycle #1 upfront induction pembrolizumab, paclitaxel, carboplatin and pegfilgrastim were administered.  The patient tolerated well with bone aching for 6-7 days which resolved with breathlessness may be slightly better.  He denies any paresthesias.SH  A CBC revealed a white count 7700, hemoglobin 9.8, platelets 496,000, normal CMP and TSH    CT scan on 4/11/2024 showed almost complete resolution of the tumor.  4/23/2024 patient was admitted to the hospital with respiratory failure and viral pneumonia.  On 5/1/2024 patient was discharged from the hospital.    Presently he is still struggling with shortness of breath and requires oxygen 24/7.  He does not have chills, fevers or sweats.  His appetite is poor and he is lost about 12 pounds since being admitted to the hospital.  He is living with his father.  His daughter is able to come and help sometimes.           Review of systems.  Pertinent Findings are included in the History of Present Illness    Physical Exam    /83 (BP Location: Right arm, Patient Position: Sitting, Cuff Size: Adult Small)   Pulse 82   Temp 98  F (36.7  C)   Resp 12   Ht 1.6 m (5' 3\")   Wt 45.4 kg (100 lb)   SpO2 96%   BMI 17.71 kg/m       GENERAL APPEARANCE: Chronically ill-appearing man using nasal oxygen.  In no apparent distress.  HEAD: Atraumatic; normocephalic; without lesions.  EYES: Conjunctiva, corneas and eyelids normal; pupils equal, round, reactive to light; No Icterus.  MOUTH/OROPHARYNX: Oral mucosa intact upper and lower dentures noted.  NECK: Supple with no nodes.  LUNGS: Decreased breath sounds throughout with prolonged expiratory phase  HEART: Regular " rhythm and rate; S1 and S2 normal; no murmurs noted.  ABDOMEN: Soft; no masses or tenderness, no hepatosplenomegaly.  NEUROLOGIC: Alert and oriented.  No obvious focal findings.  EXTREMITIES: No cyanosis, or edema.  SKIN: No abnormal bruising or bleeding. No suspicious lesions noted on exposed skin.  PSYCHIATRIC: Mental status normal; no apparent psychiatric issues    Medications:    Current Outpatient Medications   Medication Sig Dispense Refill    albuterol (PROAIR HFA/PROVENTIL HFA/VENTOLIN HFA) 108 (90 Base) MCG/ACT inhaler Inhale 2 puffs into the lungs every 4 hours as needed for shortness of breath 54 g 3    amLODIPine (NORVASC) 10 MG tablet Take 1 tablet (10 mg) by mouth daily 90 tablet 3    fluticasone-salmeterol (ADVAIR) 500-50 MCG/ACT inhaler Inhale 1 puff into the lungs every 12 hours 60 each 11    guaiFENesin (MUCINEX MAXIMUM STRENGTH) 1200 MG TB12 Take 1 tablet by mouth daily      lisinopril (ZESTRIL) 40 MG tablet Take 1 tablet (40 mg) by mouth daily 90 tablet 3    LORazepam (ATIVAN) 0.5 MG tablet Take 1 tablet (0.5 mg) by mouth every 6 hours as needed for anxiety 15 tablet 0    magnesium oxide (MAG-OX) 400 MG tablet Take 400 mg by mouth daily (with lunch) For leg cramps      metoprolol tartrate (LOPRESSOR) 50 MG tablet Take 1 tablet (50 mg) by mouth 2 times daily for 30 days 60 tablet 0    ondansetron (ZOFRAN) 8 MG tablet Take 1 tablet (8 mg) by mouth every 8 hours as needed for nausea (vomiting) 30 tablet 2    order for DME Equipment being ordered: Oxygen 3L via NC continuous.  O2 saturated noted to be 86% on room air at rest. (Patient taking differently: 2 L Equipment being ordered: Oxygen 3L via NC continuous.  O2 saturated noted to be 86% on room air at rest.) 1 Units 0    order for DME Equipment being ordered: Nebulizer 1 Device 0    predniSONE (DELTASONE) 20 MG tablet Take 1 tablet (20 mg) by mouth daily for 3 days, THEN 0.5 tablets (10 mg) daily for 4 days. 5 tablet 0    prochlorperazine  (COMPAZINE) 10 MG tablet Take 1 tablet (10 mg) by mouth every 6 hours as needed for nausea or vomiting 30 tablet 2    roflumilast (DALIRESP) 500 MCG TABS tablet Take 1 tablet (500 mcg) by mouth daily 90 tablet 3    tiotropium (SPIRIVA RESPIMAT) 2.5 MCG/ACT inhaler Inhale 2 puffs into the lungs daily 12 g 3     No current facility-administered medications for this visit.           Past History    Past Medical History:   Diagnosis Date    Acute on chronic respiratory failure with hypoxia (H) 04/10/2020    Acute on chronic respiratory failure with hypoxia and hypercapnia (H) 04/01/2019    Acute respiratory failure with hypoxia and hypercapnia (H) 04/23/2024    CAP (community acquired pneumonia) 04/14/2020    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)     COPD exacerbation (H) 04/01/2019    COPD exacerbation (H) 02/16/2020    Erectile dysfunction     Hypertension     Hyponatremia 04/01/2019    Pneumonia 04/10/2020     Past Surgical History:   Procedure Laterality Date    APPENDECTOMY      childhood    BRONCHOSCOPY, DILATE BRONCHUS, STENT BRONCHUS, COMBINED N/A 1/18/2024    Procedure: Bronchoscopy with tissue debulking,  and stent placement.;  Surgeon: Isac Prescott MD;  Location: [facility] OR    ENDOBRONCHIAL ULTRASOUND FLEXIBLE N/A 1/18/2024    Procedure: Endobronchial ultrasound with Endobronchial and Cryo biopsy.;  Surgeon: Isac Prescott MD;  Location: [facility] OR     Allergies   Allergen Reactions    Hctz Other (See Comments)     He has hyponatremia - avoid use    Penicillins Unknown     Childhood reaction.     Family History   Problem Relation Age of Onset    Hypertension Mother     Ovarian Cancer Mother     Breast Cancer Mother     Hypertension Father     Lipids Father     C.A.D. Father     Heart Disease Father     Chronic Obstructive Pulmonary Disease Father     Cerebrovascular Disease Father     Diabetes Paternal Grandmother     Chronic Obstructive Pulmonary Disease Paternal Grandfather      Social  History     Socioeconomic History    Marital status: Single     Spouse name: None    Number of children: None    Years of education: None    Highest education level: None   Tobacco Use    Smoking status: Former     Current packs/day: 0.00     Average packs/day: 2.0 packs/day for 30.0 years (60.0 ttl pk-yrs)     Types: Cigarettes     Start date: 1991     Quit date: 2021     Years since quittin.7    Smokeless tobacco: Former   Vaping Use    Vaping status: Never Used   Substance and Sexual Activity    Alcohol use: Yes     Comment: rare    Drug use: No    Sexual activity: Yes     Partners: Female   Other Topics Concern    Parent/sibling w/ CABG, MI or angioplasty before 65F 55M? No   Social History Narrative    The patient has a 60+ pk yr tobacco hx.  He has has no active use, quit 2014.  Alcohol use is <1 alcoholic drinks per week.  He denies use of recreational drugs.  He is employed. [employer].  Works nights.   The patient is .  Has 1 child.Hot Tub Exposure: NORecent Travel: NO Hx of incarceration:  NOBird Exposure:   NOAnimal Exposure:  NOInhalation Exposure:  NO        Lives in [place], [place] with father. 2 dogs.          Social Determinants of Health     Financial Resource Strain: Low Risk  (2024)    Financial Resource Strain     Within the past 12 months, have you or your family members you live with been unable to get utilities (heat, electricity) when it was really needed?: No   Food Insecurity: Low Risk  (2024)    Food Insecurity     Within the past 12 months, did you worry that your food would run out before you got money to buy more?: No     Within the past 12 months, did the food you bought just not last and you didn t have money to get more?: No   Transportation Needs: Low Risk  (2024)    Transportation Needs     Within the past 12 months, has lack of transportation kept you from medical appointments, getting your medicines, non-medical meetings or appointments, work, or  from getting things that you need?: No   Physical Activity: Inactive (8/30/2021)    Exercise Vital Sign     Days of Exercise per Week: 0 days     Minutes of Exercise per Session: 0 min   Social Connections: Unknown (8/30/2021)    Social Connection and Isolation Panel [NHANES]     Frequency of Communication with Friends and Family: Twice a week     Frequency of Social Gatherings with Friends and Family: Twice a week     Attends Buddhism Services: Never     Active Member of Clubs or Organizations: No     Attends Club or Organization Meetings: Never   Interpersonal Safety: Low Risk  (10/12/2023)    Interpersonal Safety     Do you feel physically and emotionally safe where you currently live?: Yes     Within the past 12 months, have you been hit, slapped, kicked or otherwise physically hurt by someone?: No     Within the past 12 months, have you been humiliated or emotionally abused in other ways by your partner or ex-partner?: No   Housing Stability: Low Risk  (1/9/2024)    Housing Stability     Do you have housing? : Yes     Are you worried about losing your housing?: No           Lab Results    Recent Results (from the past 240 hour(s))   Renal panel    Collection Time: 04/30/24  5:13 AM   Result Value Ref Range    Sodium 135 135 - 145 mmol/L    Potassium 4.0 3.4 - 5.3 mmol/L    Chloride 96 (L) 98 - 107 mmol/L    Carbon Dioxide (CO2) 32 (H) 22 - 29 mmol/L    Anion Gap 7 7 - 15 mmol/L    Glucose 90 70 - 99 mg/dL    Urea Nitrogen 17.1 6.0 - 20.0 mg/dL    Creatinine 0.48 (L) 0.67 - 1.17 mg/dL    GFR Estimate >90 >60 mL/min/1.73m2    Calcium 9.5 8.6 - 10.0 mg/dL    Albumin 3.9 3.5 - 5.2 g/dL    Phosphorus 2.9 2.5 - 4.5 mg/dL   CBC with platelets and differential    Collection Time: 04/30/24  5:13 AM   Result Value Ref Range    WBC Count 12.0 (H) 4.0 - 11.0 10e3/uL    RBC Count 3.26 (L) 4.40 - 5.90 10e6/uL    Hemoglobin 9.9 (L) 13.3 - 17.7 g/dL    Hematocrit 30.9 (L) 40.0 - 53.0 %    MCV 95 78 - 100 fL    MCH 30.4 26.5 -  33.0 pg    MCHC 32.0 31.5 - 36.5 g/dL    RDW 17.9 (H) 10.0 - 15.0 %    Platelet Count 324 150 - 450 10e3/uL    % Neutrophils 84 %    % Lymphocytes 6 %    % Monocytes 9 %    % Eosinophils 0 %    % Basophils 0 %    % Immature Granulocytes 0 %    NRBCs per 100 WBC 0 <1 /100    Absolute Neutrophils 10.1 (H) 1.6 - 8.3 10e3/uL    Absolute Lymphocytes 0.7 (L) 0.8 - 5.3 10e3/uL    Absolute Monocytes 1.1 0.0 - 1.3 10e3/uL    Absolute Eosinophils 0.0 0.0 - 0.7 10e3/uL    Absolute Basophils 0.0 0.0 - 0.2 10e3/uL    Absolute Immature Granulocytes 0.0 <=0.4 10e3/uL    Absolute NRBCs 0.0 10e3/uL   Renal panel    Collection Time: 05/01/24  5:41 AM   Result Value Ref Range    Sodium 137 135 - 145 mmol/L    Potassium 4.5 3.4 - 5.3 mmol/L    Chloride 99 98 - 107 mmol/L    Carbon Dioxide (CO2) 31 (H) 22 - 29 mmol/L    Anion Gap 7 7 - 15 mmol/L    Glucose 96 70 - 99 mg/dL    Urea Nitrogen 21.7 (H) 6.0 - 20.0 mg/dL    Creatinine 0.53 (L) 0.67 - 1.17 mg/dL    GFR Estimate >90 >60 mL/min/1.73m2    Calcium 9.6 8.6 - 10.0 mg/dL    Albumin 3.9 3.5 - 5.2 g/dL    Phosphorus 3.4 2.5 - 4.5 mg/dL   CBC with platelets and differential    Collection Time: 05/01/24  5:41 AM   Result Value Ref Range    WBC Count 11.0 4.0 - 11.0 10e3/uL    RBC Count 3.55 (L) 4.40 - 5.90 10e6/uL    Hemoglobin 10.6 (L) 13.3 - 17.7 g/dL    Hematocrit 34.3 (L) 40.0 - 53.0 %    MCV 97 78 - 100 fL    MCH 29.9 26.5 - 33.0 pg    MCHC 30.9 (L) 31.5 - 36.5 g/dL    RDW 18.3 (H) 10.0 - 15.0 %    Platelet Count 365 150 - 450 10e3/uL    % Neutrophils 84 %    % Lymphocytes 6 %    % Monocytes 9 %    % Eosinophils 0 %    % Basophils 0 %    % Immature Granulocytes 1 %    NRBCs per 100 WBC 0 <1 /100    Absolute Neutrophils 9.3 (H) 1.6 - 8.3 10e3/uL    Absolute Lymphocytes 0.7 (L) 0.8 - 5.3 10e3/uL    Absolute Monocytes 1.0 0.0 - 1.3 10e3/uL    Absolute Eosinophils 0.0 0.0 - 0.7 10e3/uL    Absolute Basophils 0.0 0.0 - 0.2 10e3/uL    Absolute Immature Granulocytes 0.1 <=0.4 10e3/uL     Absolute NRBCs 0.0 10e3/uL   Comprehensive metabolic panel    Collection Time: 24  8:36 AM   Result Value Ref Range    Sodium 135 135 - 145 mmol/L    Potassium 5.3 3.4 - 5.3 mmol/L    Carbon Dioxide (CO2) 31 (H) 22 - 29 mmol/L    Anion Gap 7 7 - 15 mmol/L    Urea Nitrogen 15.0 6.0 - 20.0 mg/dL    Creatinine 0.55 (L) 0.67 - 1.17 mg/dL    GFR Estimate >90 >60 mL/min/1.73m2    Calcium 9.5 8.6 - 10.0 mg/dL    Chloride 97 (L) 98 - 107 mmol/L    Glucose 83 70 - 99 mg/dL    Alkaline Phosphatase 74 40 - 150 U/L    AST 15 0 - 45 U/L    ALT 21 0 - 70 U/L    Protein Total 6.8 6.4 - 8.3 g/dL    Albumin 4.2 3.5 - 5.2 g/dL    Bilirubin Total 0.2 <=1.2 mg/dL   TSH with free T4 reflex    Collection Time: 24  8:36 AM   Result Value Ref Range    TSH 0.37 0.30 - 4.20 uIU/mL   CBC with platelets and differential    Collection Time: 24  8:36 AM   Result Value Ref Range    WBC Count 13.6 (H) 4.0 - 11.0 10e3/uL    RBC Count 3.38 (L) 4.40 - 5.90 10e6/uL    Hemoglobin 10.5 (L) 13.3 - 17.7 g/dL    Hematocrit 32.9 (L) 40.0 - 53.0 %    MCV 97 78 - 100 fL    MCH 31.1 26.5 - 33.0 pg    MCHC 31.9 31.5 - 36.5 g/dL    RDW 19.9 (H) 10.0 - 15.0 %    Platelet Count 391 150 - 450 10e3/uL    % Neutrophils 95 %    % Lymphocytes 2 %    % Monocytes 3 %    % Eosinophils 0 %    % Basophils 0 %    % Immature Granulocytes 0 %    NRBCs per 100 WBC 0 <1 /100    Absolute Neutrophils 12.9 (H) 1.6 - 8.3 10e3/uL    Absolute Lymphocytes 0.2 (L) 0.8 - 5.3 10e3/uL    Absolute Monocytes 0.4 0.0 - 1.3 10e3/uL    Absolute Eosinophils 0.0 0.0 - 0.7 10e3/uL    Absolute Basophils 0.0 0.0 - 0.2 10e3/uL    Absolute Immature Granulocytes 0.0 <=0.4 10e3/uL    Absolute NRBCs 0.0 10e3/uL       Imaging Results    Echocardiogram Complete    Result Date: 2024  [id number]^[email or web address]^MAGDALENO  [facility] [address] [place] [address]  Name: MARVA CASTELLANOS MRN: [id number] : 1965  Study Date: 04/23/2024 02:06 PM Age: 58 yrs Gender: Male Patient Location: [facility] Reason For Study: Tachycardia Ordering Physician: MAGDALENO HASKINS Referring Physician: MAGDALENO HASKINS Performed By: Ashlee Lopez  BSA: 1.5 m2 Height: 64 in Weight: 111 lb HR: 128 BP: 115/83 mmHg ______________________________________________________________________________ Procedure Complete Portable Echo Adult. ______________________________________________________________________________ Interpretation Summary  1. The left ventricle is normal in size. Proximal septal thickening is noted. Hyperdynamic left ventricular function. The visual ejection fraction is 65- 70%. No regional wall motion abnormalities noted. 2. The right ventricle is normal size. The right ventricular systolic function is normal. 3. There is moderate (2+) tricuspid regurgitation. Right ventricular systolic pressure is elevated, consistent with severe pulmonary hypertension. 4. No pericardial effusion. 5. In comparison to the previous report dated 04/01/2019, the findings are similar. ______________________________________________________________________________ Left Ventricle The left ventricle is normal in size. Proximal septal thickening is noted. Hyperdynamic left ventricular function. The visual ejection fraction is 65- 70%. No regional wall motion abnormalities noted.  Right Ventricle The right ventricle is normal size. The right ventricular systolic function is normal.  Atria Normal left atrial size. Right atrial size is normal. There is no color Doppler evidence of an atrial shunt.  Mitral Valve There is trace mitral regurgitation.  Tricuspid Valve There is moderate (2+) tricuspid regurgitation. The right ventricular systolic pressure is approximated at 53.3 mmHg plus the right atrial pressure. IVC diameter <2.1 cm collapsing >50% with sniff suggests a normal RA pressure of 3 mmHg. Right ventricular systolic pressure is elevated, consistent with severe  pulmonary hypertension.  Aortic Valve The aortic valve is trileaflet. No aortic regurgitation is present. No aortic stenosis is present.  Pulmonic Valve There is no pulmonic valvular stenosis.  Vessels The inferior vena cava is normal.  Pericardium There is no pericardial effusion.  Rhythm The rhythm was sinus tachycardia. ______________________________________________________________________________ MMode/2D Measurements & Calculations IVSd: 0.82 cm  LVIDd: 3.6 cm LVIDs: 2.3 cm LVPWd: 0.70 cm FS: 36.7 % LV mass(C)d: 73.5 grams LV mass(C)dI: 48.3 grams/m2 LVOT diam: 2.2 cm LVOT area: 3.9 cm2 LA Volume (BP): 17.2 ml LA Volume Index (BP): 11.3 ml/m2  LA Volume Indexed (AL/bp): 11.2 ml/m2 RWT: 0.38  Doppler Measurements & Calculations PA V2 max: 101.2 cm/sec PA max P.1 mmHg TR max danielle: 365.0 cm/sec TR max P.3 mmHg  ______________________________________________________________________________ Report approved by: Inocencio Coelho 2024 03:45 PM       XR Chest Port 1 View    Result Date: 2024  EXAM: XR CHEST PORT 1 VIEW LOCATION: [facility] DATE: 2024 INDICATION: Dyspnea, cough. COMPARISON: 2024.     IMPRESSION: Normal heart size and pulmonary vascularity. Lungs are hyperinflated with emphysematous changes. Small hazy opacity over the left lower lung laterally could be seen with a focal area of pneumonitis and clinical correlation is needed. Suspected prominent nipple shadow projects over the mid to lower lungs bilaterally. Stent material projects over the right side of the mediastinum, unchanged. Aortic calcification. No significant bony abnormalities.        PET Oncology (Eyes to Thighs)    Result Date: 2024  EXAM: PET ONCOLOGY (EYES TO THIGHS), CT CHEST W CONTRAST LOCATION: [facility] DATE: 2024 INDICATION: Subsequent treatment strategy for restaging metastatic non-small cell lung cancer, malignant neoplasm of upper lobe,  right bronchus or lung COMPARISON: PET/CT from 11/09/2023 CONTRAST:  66 mL Isovue-370 TECHNIQUE: Serum glucose level 101  mg/dL. One hour post intravenous administration of 10.1  mCi F-18 FDG, PET imaging was performed from the skull vertex to mid thighs, utilizing attenuation correction with concurrent axial CT and PET/CT image fusion. Separate diagnostic CT of the chest was performed. Dose reduction techniques were used. PET/CT FINDINGS:  Patches of FDG avid airspace consolidation have developed in the right middle and left lower lobes. Consolidation previously present in the right lower lobe has matured into a band of scarring with uniform mild inflammatory FDG activity. Consolidation previously present in the left upper lobe has resolved. 0.6 x 0.4 cm right upper lobe pulmonary nodule has increased from a SUVmax 2.7 to 3.9. Perihilar right pulmonary mass previously present has nearly completely resolved, with only a 0.8 x 0.4 cm mildly FDG avid (SUVmax 3.0) remnant persisting. Stable minimally FDG avid left interlobar station 11L and left hilar station 10L lymph nodes. A representative station 10L node measures 1.3 x 0.6 cm (SUVmax 2.3), previously 1.2 x 0.6 cm (SUVmax 2.6). CT FINDINGS: Severe emphysema. Calcified granuloma right upper lobe. Bilateral lens replacements. Moderate calcified atherosclerosis, including three-vessel coronary disease. Bilateral kidney cysts, requiring no follow-up. 1 cm non-FDG avid fluid collection in the peripheral zone of the prostate just right of midline. Scattered colonic diverticula. Small fat-containing paraumbilical hernia. Mild degenerative change in the spine.     IMPRESSION: 1.  Perihilar right lung tumor has nearly completely resolved. 2.  6 mm right upper lobe pulmonary nodule has increased in FDG activity slightly and is indeterminate. 3.  Areas of pneumonia previously present have resolved, but new areas of pneumonia have developed in both lungs. 4.  Stable minimally  FDG avid left hilar lymph nodes are likely reactive to left lower lobe pneumonia, but inflammation from immunotherapy or metastatic involvement can have the same appearance on PET/CT.     CT Chest w Contrast    Result Date: 4/11/2024  EXAM: PET ONCOLOGY (EYES TO THIGHS), CT CHEST W CONTRAST LOCATION: [facility] DATE: 4/11/2024 INDICATION: Subsequent treatment strategy for restaging metastatic non-small cell lung cancer, malignant neoplasm of upper lobe, right bronchus or lung COMPARISON: PET/CT from 11/09/2023 CONTRAST:  66 mL Isovue-370 TECHNIQUE: Serum glucose level 101  mg/dL. One hour post intravenous administration of 10.1  mCi F-18 FDG, PET imaging was performed from the skull vertex to mid thighs, utilizing attenuation correction with concurrent axial CT and PET/CT image fusion. Separate diagnostic CT of the chest was performed. Dose reduction techniques were used. PET/CT FINDINGS:  Patches of FDG avid airspace consolidation have developed in the right middle and left lower lobes. Consolidation previously present in the right lower lobe has matured into a band of scarring with uniform mild inflammatory FDG activity. Consolidation previously present in the left upper lobe has resolved. 0.6 x 0.4 cm right upper lobe pulmonary nodule has increased from a SUVmax 2.7 to 3.9. Perihilar right pulmonary mass previously present has nearly completely resolved, with only a 0.8 x 0.4 cm mildly FDG avid (SUVmax 3.0) remnant persisting. Stable minimally FDG avid left interlobar station 11L and left hilar station 10L lymph nodes. A representative station 10L node measures 1.3 x 0.6 cm (SUVmax 2.3), previously 1.2 x 0.6 cm (SUVmax 2.6). CT FINDINGS: Severe emphysema. Calcified granuloma right upper lobe. Bilateral lens replacements. Moderate calcified atherosclerosis, including three-vessel coronary disease. Bilateral kidney cysts, requiring no follow-up. 1 cm non-FDG avid fluid collection in the  peripheral zone of the prostate just right of midline. Scattered colonic diverticula. Small fat-containing paraumbilical hernia. Mild degenerative change in the spine.     IMPRESSION: 1.  Perihilar right lung tumor has nearly completely resolved. 2.  6 mm right upper lobe pulmonary nodule has increased in FDG activity slightly and is indeterminate. 3.  Areas of pneumonia previously present have resolved, but new areas of pneumonia have developed in both lungs. 4.  Stable minimally FDG avid left hilar lymph nodes are likely reactive to left lower lobe pneumonia, but inflammation from immunotherapy or metastatic involvement can have the same appearance on PET/CT.        I spent 44 minutes on the patient's visit today.  This included preparation for the visit, face-to-face time with the patient and documentation following the visit.  It did not include teaching or procedure time.    Signed by: Peter E. Friedell, MD

## 2024-05-09 NOTE — PROGRESS NOTES
Assessment & Plan     Encounter for Medicare annual wellness exam  Patient declines vaccinations today.  All screenings up to date.  Reviewed preventative health and advised follow-up in 1 year for annual medicare wellness.  - PRIMARY CARE FOLLOW-UP SCHEDULING; Future  - REVIEW OF HEALTH MAINTENANCE PROTOCOL ORDERS    Acute on chronic respiratory failure with hypoxia and hypercapnia (H)  Stable.  Patient is still recovering but oxygen is stable.    Pneumonia of both lower lobes due to infectious organism  Stable.    COPD with acute exacerbation (H)  Stable.    COPD, very severe (H)  Stable.    Squamous cell lung cancer, right (H)  Saw oncology today and they feel that this was due to his chemo.  They are making changes to treatment plan.    Pulmonary hypertension (H)  BP has been low on metoprolol.  Will decrease to 25 mg twice daily and recheck BP daily at home and he will call if over 140/90.  I also refilled his lisinopril and amlodipine due to stable electrolytes and kidney function today.  - metoprolol tartrate (LOPRESSOR) 25 MG tablet; Take 1 tablet (25 mg) by mouth 2 times daily  - lisinopril (ZESTRIL) 40 MG tablet; Take 1 tablet (40 mg) by mouth daily  - amLODIPine (NORVASC) 10 MG tablet; Take 1 tablet (10 mg) by mouth daily    Hyperlipidemia LDL goal <100  I added Lipid to labs today for evaluation of cholesterol.  Patient will be notified of results.  - Lipid panel reflex to direct LDL Non-fasting; Future    MED REC REQUIRED    Post Medication Reconciliation Status: discharge medications reconciled and changed, per note/orders    See Patient Instructions    Subjective   Prasad is a 58 year old, presenting for the following health issues:  Hospital F/U        5/9/2024     9:45 AM   Additional Questions   Roomed by radhab   Accompanied by Daughter hung         5/9/2024     9:45 AM   Patient Reported Additional Medications   Patient reports taking the following new medications none     [facility]       Hospital  Follow-up Visit:    Hospital/Nursing Home/IP Rehab Facility: [facility]  Date of Admission: 04/23/2024  Date of Discharge: 05/01/2024  Reason(s) for Admission: Acute on chronic respiratory failure with hypoxia  Sepsis   Left lobe pneumonia  Viral Pneumonia  Bacteria Pneumonia (Klebsiella and Pseudomonas)  AECOPD  Chronic Hypoxic Respiratory Failure on 2L O2 via NC  COPD Stage D  Sinus Tachycardia  Leukocytosis   Squamous cell lung cancer, stage IIIB  Bilateral lung nodules, indeterminate  Hypertension  Generalized Anxiety Disorder  Air Hunger   Chronic hyponatremia  Chronic anemia  Was the patient in the ICU or did the patient experience delirium during hospitalization?  Yes         5/9/2024    10:18 AM   Mini-Cog Total Score   Mini Cog Total Score 5     Do you have any other stressors you would like to discuss with your provider? No    Problems taking medications regularly:  None  Medication changes since discharge: None  Problems adhering to non-medication therapy:  None    Summary of hospitalization:  [facility] discharge summary reviewed  Diagnostic Tests/Treatments reviewed.  Follow up needed: labs completed at oncology appointment today including TSH, CMP, and CBC  Other Healthcare Providers Involved in Patient s Care:         Care Coordination and Specialist appointment - oncology, pulmonology later this year  Update since discharge: stable.     Plan of care communicated with patient and family    Annual Wellness Visit     Patient has been advised of split billing requirements and indicates understanding: Yes     Health Care Directive  Patient does not have a Health Care Directive or Living Will: Discussed advance care planning with patient; information given to patient to review.  In general, how would you rate your overall physical health? (!) FAIR   Discussed with patient their rating of physical health; information has been provided.   Do you have a special  diet?  Regular (no restrictions)        2021   Exercise, Social Connection, Stress   Days per week of moderate/strenous exercise 0 days   Average minutes spent exercising at this level 0 min   Frequency of gathering with friends or relatives Twice     Do you see a dentist two times every year?  (!) NO  The patient was instructed to see the dentist every 6 months.  Has dentures  Have you been more tired than usual lately?  (!) YES   Discussed possible causes of fatigue.  Cancer  If you drink alcohol do you typically have >3 drinks per day or >7 drinks per week? No  Do you have a current opioid prescription? No  Do you use any other controlled substances or medications that are not prescribed by a provider? None  Social History     Tobacco Use    Smoking status: Former     Current packs/day: 0.00     Average packs/day: 2.0 packs/day for 30.0 years (60.0 ttl pk-yrs)     Types: Cigarettes     Start date: 1991     Quit date: 2021     Years since quittin.7    Smokeless tobacco: Former   Vaping Use    Vaping status: Never Used   Substance Use Topics    Alcohol use: Yes     Comment: rare    Drug use: No     Needs assistance for the following daily activities: no assistance needed  Which of the following safety concerns are present in your home?  none identified   Do you (or your family members) have any concerns about your safety while driving?  No, not currently driving  Do you have any of the following hearing concerns?: No hearing concerns  In the past 6 months, have you been bothered by leaking of urine? No        2024   Social Factors   Worry food won't last until get money to buy more No   Food not last or not have enough money for food? No   Do you have housing?  Yes   Are you worried about losing your housing? No   Lack of transportation? No   Unable to get utilities (heat,electricity)? No         2019   Fall Risk   Fallen 2 or more times in the past year? No          Today's PHQ-2 Score:        1/9/2024     9:51 AM   PHQ-2 ( 1999 [employer])   Q1: Little interest or pleasure in doing things 0   Q2: Feeling down, depressed or hopeless 0   PHQ-2 Score 0     Last PSA:   PSA   Date Value Ref Range Status   06/08/2020 1.41 0 - 4 ug/L Final     Comment:     Assay Method:  Chemiluminescence using Siemens Vista analyzer     ASCVD Risk   The ASCVD Risk score (Keyona VILA, et al., 2019) failed to calculate for the following reasons:    Cannot find a previous HDL lab    Cannot find a previous total cholesterol lab    Reviewed and updated as needed this visit by Provider   Tobacco  Allergies  Meds  Problems  Med Hx  Surg Hx  Fam Hx  Soc   Hx Sexual Activity          Past Medical History:   Diagnosis Date    Acute on chronic respiratory failure with hypoxia (H) 04/10/2020    Acute on chronic respiratory failure with hypoxia and hypercapnia (H) 04/01/2019    Acute respiratory failure with hypoxia and hypercapnia (H) 04/23/2024    CAP (community acquired pneumonia) 04/14/2020    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)     COPD exacerbation (H) 04/01/2019    COPD exacerbation (H) 02/16/2020    Erectile dysfunction     Hypertension     Hyponatremia 04/01/2019    Pneumonia 04/10/2020     Past Surgical History:   Procedure Laterality Date    APPENDECTOMY      childhood    BRONCHOSCOPY, DILATE BRONCHUS, STENT BRONCHUS, COMBINED N/A 1/18/2024    Procedure: Bronchoscopy with tissue debulking,  and stent placement.;  Surgeon: Isac Prescott MD;  Location: [facility] OR    ENDOBRONCHIAL ULTRASOUND FLEXIBLE N/A 1/18/2024    Procedure: Endobronchial ultrasound with Endobronchial and Cryo biopsy.;  Surgeon: Isac Prescott MD;  Location: [facility] OR     Lab work is in process  Labs reviewed in EPIC  BP Readings from Last 3 Encounters:   05/09/24 106/72   05/09/24 112/83   05/01/24 123/65    Wt Readings from Last 3 Encounters:   05/09/24 45.3 kg (99 lb 12.8 oz)   05/09/24 45.4 kg (100 lb)   05/01/24 48.6 kg  (107 lb 2.3 oz)                  Patient Active Problem List   Diagnosis    Essential hypertension, benign    ED (erectile dysfunction)    Eczema    COPD, very severe (H)    CARDIOVASCULAR SCREENING; LDL GOAL LESS THAN 130    Advanced directives, counseling/discussion    Incidental pulmonary nodule, > 3mm and < 8mm, new from     Tobacco abuse, in remission    Peripheral edema    Pulmonary hypertension (H)    Anemia, unspecified type    Acute on chronic respiratory failure with hypoxia and hypercapnia (H)    COPD with acute exacerbation (H)    Squamous cell lung cancer, right (H)    Encounter for antineoplastic chemotherapy    Full code status    Pneumonia of both lower lobes due to infectious organism    High risk medication use     Past Surgical History:   Procedure Laterality Date    APPENDECTOMY      childhood    BRONCHOSCOPY, DILATE BRONCHUS, STENT BRONCHUS, COMBINED N/A 2024    Procedure: Bronchoscopy with tissue debulking,  and stent placement.;  Surgeon: Isac Prescott MD;  Location: [facility] OR    ENDOBRONCHIAL ULTRASOUND FLEXIBLE N/A 2024    Procedure: Endobronchial ultrasound with Endobronchial and Cryo biopsy.;  Surgeon: Isac Prescott MD;  Location: [facility] OR       Social History     Tobacco Use    Smoking status: Former     Current packs/day: 0.00     Average packs/day: 2.0 packs/day for 30.0 years (60.0 ttl pk-yrs)     Types: Cigarettes     Start date: 1991     Quit date: 2021     Years since quittin.7    Smokeless tobacco: Former   Substance Use Topics    Alcohol use: Yes     Comment: rare     Family History   Problem Relation Age of Onset    Hypertension Mother     Ovarian Cancer Mother     Breast Cancer Mother     Hypertension Father     Lipids Father     C.A.D. Father     Heart Disease Father     Chronic Obstructive Pulmonary Disease Father     Cerebrovascular Disease Father     Diabetes Paternal Grandmother     Chronic Obstructive Pulmonary Disease Paternal Grandfather           Current Outpatient Medications   Medication Sig Dispense Refill    albuterol (PROAIR HFA/PROVENTIL HFA/VENTOLIN HFA) 108 (90 Base) MCG/ACT inhaler Inhale 2 puffs into the lungs every 4 hours as needed for shortness of breath 54 g 3    amLODIPine (NORVASC) 10 MG tablet Take 1 tablet (10 mg) by mouth daily 90 tablet 3    fluticasone-salmeterol (ADVAIR) 500-50 MCG/ACT inhaler Inhale 1 puff into the lungs every 12 hours 60 each 11    guaiFENesin (MUCINEX MAXIMUM STRENGTH) 1200 MG TB12 Take 1 tablet by mouth daily      lisinopril (ZESTRIL) 40 MG tablet Take 1 tablet (40 mg) by mouth daily 90 tablet 3    LORazepam (ATIVAN) 0.5 MG tablet Take 1 tablet (0.5 mg) by mouth every 6 hours as needed for anxiety 15 tablet 0    magnesium oxide (MAG-OX) 400 MG tablet Take 400 mg by mouth daily (with lunch) For leg cramps      metoprolol tartrate (LOPRESSOR) 25 MG tablet Take 1 tablet (25 mg) by mouth 2 times daily 180 tablet 3    ondansetron (ZOFRAN) 8 MG tablet Take 1 tablet (8 mg) by mouth every 8 hours as needed for nausea (vomiting) 30 tablet 2    order for DME Equipment being ordered: Oxygen 3L via NC continuous.  O2 saturated noted to be 86% on room air at rest. (Patient taking differently: 2 L Equipment being ordered: Oxygen 3L via NC continuous.  O2 saturated noted to be 86% on room air at rest.) 1 Units 0    order for DME Equipment being ordered: Nebulizer 1 Device 0    predniSONE (DELTASONE) 20 MG tablet Take 1 tablet (20 mg) by mouth daily for 3 days, THEN 0.5 tablets (10 mg) daily for 4 days. 5 tablet 0    prochlorperazine (COMPAZINE) 10 MG tablet Take 1 tablet (10 mg) by mouth every 6 hours as needed for nausea or vomiting 30 tablet 2    roflumilast (DALIRESP) 500 MCG TABS tablet Take 1 tablet (500 mcg) by mouth daily 90 tablet 3    tiotropium (SPIRIVA RESPIMAT) 2.5 MCG/ACT inhaler Inhale 2 puffs into the lungs daily 12 g 3     Allergies   Allergen Reactions    Hctz Other (See Comments)     He has hyponatremia -  avoid use    Penicillins Unknown     Childhood reaction.     Recent Labs   Lab Test 05/09/24  0836 05/01/24  0541 04/28/24  0626 04/27/24  0551 04/24/24  0544 04/23/24  0552 08/21/21 2019 07/01/21  0440 06/30/21  0919 05/12/17  1058 03/25/16  0919   LDL  --   --   --   --   --   --   --   --   --   --  87   HDL  --   --   --   --   --   --   --   --   --   --  79   TRIG  --   --   --   --   --   --   --   --   --   --  42   ALT 21  --   --  51  --  19   < > 25  --    < >  --    CR 0.55* 0.53*   < > 0.53*   < > 0.56*   < > 0.49* 0.68   < > 0.72   GFRESTIMATED >90 >90   < > >90   < > >90   < > >90 >90   < > >90  Non  GFR Calc     GFRESTBLACK  --   --   --   --   --   --   --  >90 >90   < > >90  African American GFR Calc     POTASSIUM 5.3 4.5   < > 4.2   < > 4.6   < > 4.0 4.1   < > 4.3   TSH 0.37  --   --   --   --  0.77   < >  --   --   --   --     < > = values in this interval not displayed.        Current providers sharing in care for this patient include:  Patient Care Team:  Shalini Washington NP as PCP - General (Family Medicine)  Kendy Edward [facility] as Pharmacist (Pharmacist)  Bianca Cuellar MD as Assigned Pulmonology Provider  Shekhar Warren DO as Pulmonologist (Pulmonary Disease)  Shalini Washington NP as Assigned PCP  Rosa Maria Quick RN as Specialty Care Coordinator (Hematology & Oncology)  Jennifer Cabrera NP as Assigned Cancer Care Provider    The following health maintenance items are reviewed in Epic and correct as of today:  Health Maintenance   Topic Date Due    ZOSTER IMMUNIZATION (1 of 2) Never done    HEPATITIS B IMMUNIZATION (1 of 3 - 19+ 3-dose series) Never done    MEDICARE ANNUAL WELLNESS VISIT  10/04/2011    LIPID  03/25/2021    Pneumococcal Vaccine: Pediatrics (0 to 5 Years) and At-Risk Patients (6 to 64 Years) (3 of 3 - PPSV23 or PCV20) 02/10/2022    ANNUAL REVIEW OF  ORDERS  08/11/2024    GLUCOSE  05/09/2027    ADVANCE CARE PLANNING  01/09/2029  "   DTAP/TDAP/TD IMMUNIZATION (3 - Td or Tdap) 06/08/2030    SPIROMETRY  Completed    COPD ACTION PLAN  Completed    PHQ-2 (once per calendar year)  Completed    INFLUENZA VACCINE  Completed    COVID-19 Vaccine  Completed    IPV IMMUNIZATION  Aged Out    HPV IMMUNIZATION  Aged Out    MENINGITIS IMMUNIZATION  Aged Out    RSV MONOCLONAL ANTIBODY  Aged Out    HEPATITIS C SCREENING  Discontinued    HIV SCREENING  Discontinued    COLORECTAL CANCER SCREENING  Discontinued    LUNG CANCER SCREENING  Discontinued       Appropriate preventive services were discussed with this patient, including applicable screening as appropriate for fall prevention, nutrition, physical activity, Tobacco-use cessation, weight loss and cognition.  Checklist reviewing preventive services available has been given to the patient.      Review of Systems  CONSTITUTIONAL: NEGATIVE for fever, chills, change in weight  INTEGUMENTARY/SKIN: NEGATIVE for worrisome rashes, moles or lesions  EYES: NEGATIVE for vision changes or irritation  ENT/MOUTH: NEGATIVE for ear, mouth and throat problems  RESP:POSITIVE for Hx COPD and Hx pneumonia  CV: NEGATIVE for chest pain, palpitations or peripheral edema  GI: NEGATIVE for nausea, abdominal pain, heartburn, or change in bowel habits  : negative for dysuria, hematuria, decreased urinary stream, erectile dysfunction  MUSCULOSKELETAL: NEGATIVE for significant arthralgias or myalgia  NEURO: NEGATIVE for weakness, dizziness or paresthesias  ENDOCRINE: NEGATIVE for temperature intolerance, skin/hair changes  HEME/ALLERGY/IMMUNE: NEGATIVE for bleeding problems  PSYCHIATRIC: NEGATIVE for changes in mood or affect  ROS otherwise negative      Objective    /72   Pulse 94   Temp 97.5  F (36.4  C) (Tympanic)   Resp 18   Ht 1.626 m (5' 4\")   Wt 45.3 kg (99 lb 12.8 oz)   SpO2 95%   BMI 17.13 kg/m    Body mass index is 17.13 kg/m .  Physical Exam   GENERAL: alert and no distress  EYES: Eyes grossly normal to " inspection, PERRL and conjunctivae and sclerae normal  HENT: ear canals and TM's normal, nose and mouth without ulcers or lesions  NECK: no adenopathy, no asymmetry, masses, or scars  RESP: decreased throughout with some inspiratory and expiratory wheezing, no crackles heard today on exam  CV: regular rate and rhythm, normal S1 S2, no S3 or S4, no murmur, click or rub, no peripheral edema  ABDOMEN: soft, nontender, no hepatosplenomegaly, no masses and bowel sounds normal  MS: no gross musculoskeletal defects noted, no edema  SKIN: no suspicious lesions or rashes  NEURO: Normal strength and tone, mentation intact and speech normal  PSYCH: mentation appears normal, affect normal/bright  LYMPH: normal ant/post cervical, supraclavicular nodes    Signed Electronically by: Shalini Washington NP

## 2024-05-09 NOTE — PATIENT INSTRUCTIONS
"Follow-up in 1 month for recheck on labs and to see how things are going.    Preventive Care Advice   This is general advice we often give to help people stay healthy. Your care team may have specific advice just for you. Please talk to your care team about your own preventive care needs.  Lifestyle  Exercise at least 150 minutes each week (30 minutes a day, 5 days a week).  Do muscle strengthening activities 2 days a week. These help control your weight and prevent disease.  No smoking.  Wear sunscreen to prevent skin cancer.  Have your home tested for radon every 2 to 5 years. Radon is a colorless, odorless gas that can harm your lungs. To learn more, go to www.health.[facility].mn.us and search for \"Radon in Homes.\"  Keep guns unloaded and locked up in a safe place like a safe or gun vault, or, use a gun lock and hide the keys. Always lock away bullets separately. To learn more, visit niid.to.[place].gov and search for \"safe gun storage.\"  Nutrition  Eat 5 or more servings of fruits and vegetables each day.  Try wheat bread, brown rice and whole grain pasta (instead of white bread, rice, and pasta).  Get enough calcium and vitamin D. Check the label on foods and aim for 100% of the RDA (recommended daily allowance).  Regular exams  Have a dental exam and cleaning every 6 months.  See your health care team every year to talk about:  Any changes in your health.  Any medicines your care team has prescribed.  Preventive care, family planning, and ways to prevent chronic diseases.  Shots (vaccines)   HPV shots (up to age 26), if you've never had them before.  Hepatitis B shots (up to age 59), if you've never had them before.  COVID-19 shot: Get this shot when it's due.  Flu shot: Get a flu shot every year.  Tetanus shot: Get a tetanus shot every 10 years.  Pneumococcal, hepatitis A, and RSV shots: Ask your care team if you need these based on your risk.  Shingles shot (for age 50 and up).  General health tests  Diabetes " screening:  Starting at age 35, Get screened for diabetes at least every 3 years.  If you are younger than age 35, ask your care team if you should be screened for diabetes.  Cholesterol test: At age 39, start having a cholesterol test every 5 years, or more often if advised.  Bone density scan (DEXA): At age 50, ask your care team if you should have this scan for osteoporosis (brittle bones).  Hepatitis C: Get tested at least once in your life.  Abdominal aortic aneurysm screening: Talk to your doctor about having this screening if you:  Have ever smoked; and  Are biologically male; and  Are between the ages of 65 and 75.  STIs (sexually transmitted infections)  Before age 24: Ask your care team if you should be screened for STIs.  After age 24: Get screened for STIs if you're at risk. You are at risk for STIs (including HIV) if:  You are sexually active with more than one person.  You don't use condoms every time.  You or a partner was diagnosed with a sexually transmitted infection.  If you are at risk for HIV, ask about PrEP medicine to prevent HIV.  Get tested for HIV at least once in your life, whether you are at risk for HIV or not.  Cancer screening tests  Cervical cancer screening: If you have a cervix, begin getting regular cervical cancer screening tests at age 21. Most people who have regular screenings with normal results can stop after age 65. Talk about this with your provider.  Breast cancer scan (mammogram): If you've ever had breasts, begin having regular mammograms starting at age 40. This is a scan to check for breast cancer.  Colon cancer screening: It is important to start screening for colon cancer at age 45.  Have a colonoscopy test every 10 years (or more often if you're at risk) Or, ask your provider about stool tests like a FIT test every year or Cologuard test every 3 years.  To learn more about your testing options, visit: www.[employer].Mobcart/034472.pdf.  For help making a decision, visit:  syeda.ly/vc52851.  Prostate cancer screening test: If you have a prostate and are age 55 to 69, ask your provider if you would benefit from a yearly prostate cancer screening test.  Lung cancer screening: If you are a current or former smoker age 50 to 80, ask your care team if ongoing lung cancer screenings are right for you.  For informational purposes only. Not to replace the advice of your health care provider. Copyright   2023 [facility]. All rights reserved. Clinically reviewed by the [facility] Transitions Program. [employer] [id number] - REV 04/24.

## 2024-05-09 NOTE — PROGRESS NOTES
"Oncology Rooming Note    May 9, 2024 8:59 AM   Luis Hernandez is a 58 year old male who presents for:    Chief Complaint   Patient presents with    Oncology Clinic Visit     Squamous cell lung cancer, right - Labs and provider      Initial Vitals: /83 (BP Location: Right arm, Patient Position: Sitting, Cuff Size: Adult Small)   Pulse 82   Temp 98  F (36.7  C)   Resp 12   Ht 1.6 m (5' 3\")   Wt 45.4 kg (100 lb)   SpO2 96%   BMI 17.71 kg/m   Estimated body mass index is 17.71 kg/m  as calculated from the following:    Height as of this encounter: 1.6 m (5' 3\").    Weight as of this encounter: 45.4 kg (100 lb). Body surface area is 1.42 meters squared.  No Pain (0) Comment: Data Unavailable   No LMP for male patient.  Allergies reviewed: Yes  Medications reviewed: Yes    Medications: Medication refills not needed today.  Pharmacy name entered into [employer]:    [id number] - [place], [place] - [address] [facility] - [facility]A SAUCEDO - [address] [place] [address] [facility] - [address] [facility] [address] [facility] S[facility]  [id number] - [place], [place] - [address] [place] [address]    Frailty Screening:   Is the patient here for a new oncology consult visit in cancer care? 2. No      Clinical concerns:  None      Feli Mead, HERMELINDA            "

## 2024-05-09 NOTE — LETTER
"    5/9/2024         RE: Luis Hernandez  [address] [facility] [address]        Dear Colleague,    Thank you for referring your patient, Luis Hernandez, to the [facility] CANCER [facility]. Please see a copy of my visit note below.    Oncology Rooming Note    May 9, 2024 8:59 AM   Luis Hernandez is a 58 year old male who presents for:    Chief Complaint   Patient presents with     Oncology Clinic Visit     Squamous cell lung cancer, right - Labs and provider      Initial Vitals: /83 (BP Location: Right arm, Patient Position: Sitting, Cuff Size: Adult Small)   Pulse 82   Temp 98  F (36.7  C)   Resp 12   Ht 1.6 m (5' 3\")   Wt 45.4 kg (100 lb)   SpO2 96%   BMI 17.71 kg/m   Estimated body mass index is 17.71 kg/m  as calculated from the following:    Height as of this encounter: 1.6 m (5' 3\").    Weight as of this encounter: 45.4 kg (100 lb). Body surface area is 1.42 meters squared.  No Pain (0) Comment: Data Unavailable   No LMP for male patient.  Allergies reviewed: Yes  Medications reviewed: Yes    Medications: Medication refills not needed today.  Pharmacy name entered into Simparel:    [id number] - [place], [place] - [address] [facility]  [facility] - [address] [facility] - TANGELA JALLOH - [address] [facility] AT PORTAL TO [facility] SITES  [id number] - [place], [place] - [address] [place] [address]    Frailty Screening:   Is the patient here for a new oncology consult visit in cancer care? 2. No      Clinical concerns:  None      [facility] Hematology and Oncology Follow-up Note    Patient: Luis Hernandez  MRN: [id number]  Date of Service: May 9, 2024       Reason for Visit    Follow-up squamous cell carcinoma of the lung      Encounter Diagnoses Assessment and Plan:    Problem List Items Addressed This Visit       Squamous cell lung cancer, right (H) - " Primary    Relevant Orders    Infusion Appointment Request    Encounter for antineoplastic chemotherapy    Relevant Orders    Infusion Appointment Request     Patient returns for follow-up.  He was hospitalized with respiratory failure from COPD and likely viral pneumonia.  CT of 4/11/2024 and chest x-ray of 4/23/2024 show almost complete resolution of the primary tumor.  At this point I recommended discontinuing chemotherapy but continuing immunotherapy with pembrolizumab.  The patient is agreeable to this plan.  Patient will return at a later time for his immunotherapy.  Follow-up in 3 weeks after that treatment is completed.    ______________________________________________________________________________    Staging History  Cancer Staging   No matching staging information was found for the patient.    ECOG Performance 2    History of Present Illness    Disease history per Dr. Lantigua  The patient has a longstanding history of severe COPD (FEV1=18%) with recurrent infection, prior tobacco use until August, 2021, pulmonary and essential hypertension.     On 10/5/2023, he was seen by his regular pulmonologist with a history of waxing/waning bilateral lung nodules with a follow-up 9/14/2023 CT with chest revealing a new spiculated nodule at the base of the right upper lobe with persistent and stable filling defect of the bronchus intermedius/right lower lobe bronchus concerning for possible endobronchial tumor versus mucous plugging, and progressive abnormal soft tissue mass in the subcarinal.       Because he was felt not to be able to tolerate bronchoscopy, a 11/9/2023 PET scan revealed glucose avid right hilar soft tissue mass encasing the bronchus intermedius extending along the right middle lobe bronchus contiguous with subcarinal lymphadenopathy and intraluminal soft tissue density of the right lower lobe bronchus concerning for primary lung malignancy.  In addition, there were glucose avid opacities seen  bilaterally involve the right lower lobe, left suprahilar region, medial left lower lobe and right upper lobe which were felt to be possibly inflammatory.  However endobronchial tumor spread cannot be excluded.  A hypermetabolic soft tissue thickening was seen in the distal sigmoid/upper rectum concerning for malignancy.  However, a 10/25/2023 Cologuard study was negative.     On 11/17/2023, the patient was seen in hematology/medical oncology consultation by Dr. Tim Proctor.  Augmentin was empirically initiated by Dr. Cuellar.     On 1/18/2024, the patient underwent flexible and rigid bronchoscopy with a right upper lobe, right mainstem bronchus and bronchus intermedius identified revealing a moderatey differentiated squamous cell carcinoma with a PD-L1 CPS 10-20% and TPS 5%.  Lung cancer NGS panel revealed CDKN2A and PIK3CA alterations.       The patient underwent tumor debulking with placement of a 10 x 15 mm bronchus intermedius stent and therapeutic suctioning of the airway.  The right middle lobe bronchus could not be identified.  In addition, right upper lobe mass needle aspirate confirmed cytologic evidence of a non-small cell carcinoma consistent with a squamous cell carcinoma which was p40 positive, TTF 1 and CK7 negative.  Following the bronchoscopic procedure, his breathing was better for perhaps a week but has now returned to baseline.     Follow-up 1/20/2023 CT chest without contrast revealed reduction in left upper lobe nodule, new nodules throughout both lungs up to 7 mm and continued nodular wall thickening of the bronchus intermedius, right lower lobe bronchus with masslike filling defect of the right lower lobe.     On 2/1/2024,Dr. Smith saw him in initial oncology consultation.  MR scan brain with contrast was unremarkable.  He subsequently elected interventional therapy although he had been inclined toward palliative care only at the time of his original consultation.    On 2/29/2024, cycle #1  "upfront induction pembrolizumab, paclitaxel, carboplatin and pegfilgrastim were administered.  The patient tolerated well with bone aching for 6-7 days which resolved with breathlessness may be slightly better.  He denies any paresthesias.SH  A CBC revealed a white count 7700, hemoglobin 9.8, platelets 496,000, normal CMP and TSH    CT scan on 4/11/2024 showed almost complete resolution of the tumor.  4/23/2024 patient was admitted to the hospital with respiratory failure and viral pneumonia.  On 5/1/2024 patient was discharged from the hospital.    Presently he is still struggling with shortness of breath and requires oxygen 24/7.  He does not have chills, fevers or sweats.  His appetite is poor and he is lost about 12 pounds since being admitted to the hospital.  He is living with his father.  His daughter is able to come and help sometimes.           Review of systems.  Pertinent Findings are included in the History of Present Illness    Physical Exam    /83 (BP Location: Right arm, Patient Position: Sitting, Cuff Size: Adult Small)   Pulse 82   Temp 98  F (36.7  C)   Resp 12   Ht 1.6 m (5' 3\")   Wt 45.4 kg (100 lb)   SpO2 96%   BMI 17.71 kg/m       GENERAL APPEARANCE: Chronically ill-appearing man using nasal oxygen.  In no apparent distress.  HEAD: Atraumatic; normocephalic; without lesions.  EYES: Conjunctiva, corneas and eyelids normal; pupils equal, round, reactive to light; No Icterus.  MOUTH/OROPHARYNX: Oral mucosa intact upper and lower dentures noted.  NECK: Supple with no nodes.  LUNGS: Decreased breath sounds throughout with prolonged expiratory phase  HEART: Regular rhythm and rate; S1 and S2 normal; no murmurs noted.  ABDOMEN: Soft; no masses or tenderness, no hepatosplenomegaly.  NEUROLOGIC: Alert and oriented.  No obvious focal findings.  EXTREMITIES: No cyanosis, or edema.  SKIN: No abnormal bruising or bleeding. No suspicious lesions noted on exposed skin.  PSYCHIATRIC: Mental status " normal; no apparent psychiatric issues    Medications:    Current Outpatient Medications   Medication Sig Dispense Refill     albuterol (PROAIR HFA/PROVENTIL HFA/VENTOLIN HFA) 108 (90 Base) MCG/ACT inhaler Inhale 2 puffs into the lungs every 4 hours as needed for shortness of breath 54 g 3     amLODIPine (NORVASC) 10 MG tablet Take 1 tablet (10 mg) by mouth daily 90 tablet 3     fluticasone-salmeterol (ADVAIR) 500-50 MCG/ACT inhaler Inhale 1 puff into the lungs every 12 hours 60 each 11     guaiFENesin (MUCINEX MAXIMUM STRENGTH) 1200 MG TB12 Take 1 tablet by mouth daily       lisinopril (ZESTRIL) 40 MG tablet Take 1 tablet (40 mg) by mouth daily 90 tablet 3     LORazepam (ATIVAN) 0.5 MG tablet Take 1 tablet (0.5 mg) by mouth every 6 hours as needed for anxiety 15 tablet 0     magnesium oxide (MAG-OX) 400 MG tablet Take 400 mg by mouth daily (with lunch) For leg cramps       metoprolol tartrate (LOPRESSOR) 50 MG tablet Take 1 tablet (50 mg) by mouth 2 times daily for 30 days 60 tablet 0     ondansetron (ZOFRAN) 8 MG tablet Take 1 tablet (8 mg) by mouth every 8 hours as needed for nausea (vomiting) 30 tablet 2     order for DME Equipment being ordered: Oxygen 3L via NC continuous.  O2 saturated noted to be 86% on room air at rest. (Patient taking differently: 2 L Equipment being ordered: Oxygen 3L via NC continuous.  O2 saturated noted to be 86% on room air at rest.) 1 Units 0     order for DME Equipment being ordered: Nebulizer 1 Device 0     predniSONE (DELTASONE) 20 MG tablet Take 1 tablet (20 mg) by mouth daily for 3 days, THEN 0.5 tablets (10 mg) daily for 4 days. 5 tablet 0     prochlorperazine (COMPAZINE) 10 MG tablet Take 1 tablet (10 mg) by mouth every 6 hours as needed for nausea or vomiting 30 tablet 2     roflumilast (DALIRESP) 500 MCG TABS tablet Take 1 tablet (500 mcg) by mouth daily 90 tablet 3     tiotropium (SPIRIVA RESPIMAT) 2.5 MCG/ACT inhaler Inhale 2 puffs into the lungs daily 12 g 3     No  current facility-administered medications for this visit.           Past History    Past Medical History:   Diagnosis Date     Acute on chronic respiratory failure with hypoxia (H) 04/10/2020     Acute on chronic respiratory failure with hypoxia and hypercapnia (H) 04/01/2019     Acute respiratory failure with hypoxia and hypercapnia (H) 04/23/2024     CAP (community acquired pneumonia) 04/14/2020     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)      COPD exacerbation (H) 04/01/2019     COPD exacerbation (H) 02/16/2020     Erectile dysfunction      Hypertension      Hyponatremia 04/01/2019     Pneumonia 04/10/2020     Past Surgical History:   Procedure Laterality Date     APPENDECTOMY      childhood     BRONCHOSCOPY, DILATE BRONCHUS, STENT BRONCHUS, COMBINED N/A 1/18/2024    Procedure: Bronchoscopy with tissue debulking,  and stent placement.;  Surgeon: Isac Prescott MD;  Location: [facility] OR     ENDOBRONCHIAL ULTRASOUND FLEXIBLE N/A 1/18/2024    Procedure: Endobronchial ultrasound with Endobronchial and Cryo biopsy.;  Surgeon: Isac Prescott MD;  Location: [facility] OR     Allergies   Allergen Reactions     Hctz Other (See Comments)     He has hyponatremia - avoid use     Penicillins Unknown     Childhood reaction.     Family History   Problem Relation Age of Onset     Hypertension Mother      Ovarian Cancer Mother      Breast Cancer Mother      Hypertension Father      Lipids Father      C.A.D. Father      Heart Disease Father      Chronic Obstructive Pulmonary Disease Father      Cerebrovascular Disease Father      Diabetes Paternal Grandmother      Chronic Obstructive Pulmonary Disease Paternal Grandfather      Social History     Socioeconomic History     Marital status: Single     Spouse name: None     Number of children: None     Years of education: None     Highest education level: None   Tobacco Use     Smoking status: Former     Current packs/day: 0.00     Average packs/day: 2.0 packs/day for  30.0 years (60.0 ttl pk-yrs)     Types: Cigarettes     Start date: 1991     Quit date: 2021     Years since quittin.7     Smokeless tobacco: Former   Vaping Use     Vaping status: Never Used   Substance and Sexual Activity     Alcohol use: Yes     Comment: rare     Drug use: No     Sexual activity: Yes     Partners: Female   Other Topics Concern     Parent/sibling w/ CABG, MI or angioplasty before 65F 55M? No   Social History Narrative    The patient has a 60+ pk yr tobacco hx.  He has has no active use, quit 2014.  Alcohol use is <1 alcoholic drinks per week.  He denies use of recreational drugs.  He is employed. [employer].  Works nights.   The patient is .  Has 1 child.Hot Tub Exposure: NORecent Travel: NO Hx of incarceration:  NOBird Exposure:   NOAnimal Exposure:  NOInhalation Exposure:  NO        Lives in [place], [place] with father. 2 dogs.          Social Determinants of Health     Financial Resource Strain: Low Risk  (2024)    Financial Resource Strain      Within the past 12 months, have you or your family members you live with been unable to get utilities (heat, electricity) when it was really needed?: No   Food Insecurity: Low Risk  (2024)    Food Insecurity      Within the past 12 months, did you worry that your food would run out before you got money to buy more?: No      Within the past 12 months, did the food you bought just not last and you didn t have money to get more?: No   Transportation Needs: Low Risk  (2024)    Transportation Needs      Within the past 12 months, has lack of transportation kept you from medical appointments, getting your medicines, non-medical meetings or appointments, work, or from getting things that you need?: No   Physical Activity: Inactive (2021)    Exercise Vital Sign      Days of Exercise per Week: 0 days      Minutes of Exercise per Session: 0 min   Social Connections: Unknown (2021)    Social Connection and Isolation Panel  [NHANES]      Frequency of Communication with Friends and Family: Twice a week      Frequency of Social Gatherings with Friends and Family: Twice a week      Attends Judaism Services: Never      Active Member of Clubs or Organizations: No      Attends Club or Organization Meetings: Never   Interpersonal Safety: Low Risk  (10/12/2023)    Interpersonal Safety      Do you feel physically and emotionally safe where you currently live?: Yes      Within the past 12 months, have you been hit, slapped, kicked or otherwise physically hurt by someone?: No      Within the past 12 months, have you been humiliated or emotionally abused in other ways by your partner or ex-partner?: No   Housing Stability: Low Risk  (1/9/2024)    Housing Stability      Do you have housing? : Yes      Are you worried about losing your housing?: No           Lab Results    Recent Results (from the past 240 hour(s))   Renal panel    Collection Time: 04/30/24  5:13 AM   Result Value Ref Range    Sodium 135 135 - 145 mmol/L    Potassium 4.0 3.4 - 5.3 mmol/L    Chloride 96 (L) 98 - 107 mmol/L    Carbon Dioxide (CO2) 32 (H) 22 - 29 mmol/L    Anion Gap 7 7 - 15 mmol/L    Glucose 90 70 - 99 mg/dL    Urea Nitrogen 17.1 6.0 - 20.0 mg/dL    Creatinine 0.48 (L) 0.67 - 1.17 mg/dL    GFR Estimate >90 >60 mL/min/1.73m2    Calcium 9.5 8.6 - 10.0 mg/dL    Albumin 3.9 3.5 - 5.2 g/dL    Phosphorus 2.9 2.5 - 4.5 mg/dL   CBC with platelets and differential    Collection Time: 04/30/24  5:13 AM   Result Value Ref Range    WBC Count 12.0 (H) 4.0 - 11.0 10e3/uL    RBC Count 3.26 (L) 4.40 - 5.90 10e6/uL    Hemoglobin 9.9 (L) 13.3 - 17.7 g/dL    Hematocrit 30.9 (L) 40.0 - 53.0 %    MCV 95 78 - 100 fL    MCH 30.4 26.5 - 33.0 pg    MCHC 32.0 31.5 - 36.5 g/dL    RDW 17.9 (H) 10.0 - 15.0 %    Platelet Count 324 150 - 450 10e3/uL    % Neutrophils 84 %    % Lymphocytes 6 %    % Monocytes 9 %    % Eosinophils 0 %    % Basophils 0 %    % Immature Granulocytes 0 %    NRBCs per 100  WBC 0 <1 /100    Absolute Neutrophils 10.1 (H) 1.6 - 8.3 10e3/uL    Absolute Lymphocytes 0.7 (L) 0.8 - 5.3 10e3/uL    Absolute Monocytes 1.1 0.0 - 1.3 10e3/uL    Absolute Eosinophils 0.0 0.0 - 0.7 10e3/uL    Absolute Basophils 0.0 0.0 - 0.2 10e3/uL    Absolute Immature Granulocytes 0.0 <=0.4 10e3/uL    Absolute NRBCs 0.0 10e3/uL   Renal panel    Collection Time: 05/01/24  5:41 AM   Result Value Ref Range    Sodium 137 135 - 145 mmol/L    Potassium 4.5 3.4 - 5.3 mmol/L    Chloride 99 98 - 107 mmol/L    Carbon Dioxide (CO2) 31 (H) 22 - 29 mmol/L    Anion Gap 7 7 - 15 mmol/L    Glucose 96 70 - 99 mg/dL    Urea Nitrogen 21.7 (H) 6.0 - 20.0 mg/dL    Creatinine 0.53 (L) 0.67 - 1.17 mg/dL    GFR Estimate >90 >60 mL/min/1.73m2    Calcium 9.6 8.6 - 10.0 mg/dL    Albumin 3.9 3.5 - 5.2 g/dL    Phosphorus 3.4 2.5 - 4.5 mg/dL   CBC with platelets and differential    Collection Time: 05/01/24  5:41 AM   Result Value Ref Range    WBC Count 11.0 4.0 - 11.0 10e3/uL    RBC Count 3.55 (L) 4.40 - 5.90 10e6/uL    Hemoglobin 10.6 (L) 13.3 - 17.7 g/dL    Hematocrit 34.3 (L) 40.0 - 53.0 %    MCV 97 78 - 100 fL    MCH 29.9 26.5 - 33.0 pg    MCHC 30.9 (L) 31.5 - 36.5 g/dL    RDW 18.3 (H) 10.0 - 15.0 %    Platelet Count 365 150 - 450 10e3/uL    % Neutrophils 84 %    % Lymphocytes 6 %    % Monocytes 9 %    % Eosinophils 0 %    % Basophils 0 %    % Immature Granulocytes 1 %    NRBCs per 100 WBC 0 <1 /100    Absolute Neutrophils 9.3 (H) 1.6 - 8.3 10e3/uL    Absolute Lymphocytes 0.7 (L) 0.8 - 5.3 10e3/uL    Absolute Monocytes 1.0 0.0 - 1.3 10e3/uL    Absolute Eosinophils 0.0 0.0 - 0.7 10e3/uL    Absolute Basophils 0.0 0.0 - 0.2 10e3/uL    Absolute Immature Granulocytes 0.1 <=0.4 10e3/uL    Absolute NRBCs 0.0 10e3/uL   Comprehensive metabolic panel    Collection Time: 05/09/24  8:36 AM   Result Value Ref Range    Sodium 135 135 - 145 mmol/L    Potassium 5.3 3.4 - 5.3 mmol/L    Carbon Dioxide (CO2) 31 (H) 22 - 29 mmol/L    Anion Gap 7 7 - 15 mmol/L     Urea Nitrogen 15.0 6.0 - 20.0 mg/dL    Creatinine 0.55 (L) 0.67 - 1.17 mg/dL    GFR Estimate >90 >60 mL/min/1.73m2    Calcium 9.5 8.6 - 10.0 mg/dL    Chloride 97 (L) 98 - 107 mmol/L    Glucose 83 70 - 99 mg/dL    Alkaline Phosphatase 74 40 - 150 U/L    AST 15 0 - 45 U/L    ALT 21 0 - 70 U/L    Protein Total 6.8 6.4 - 8.3 g/dL    Albumin 4.2 3.5 - 5.2 g/dL    Bilirubin Total 0.2 <=1.2 mg/dL   TSH with free T4 reflex    Collection Time: 24  8:36 AM   Result Value Ref Range    TSH 0.37 0.30 - 4.20 uIU/mL   CBC with platelets and differential    Collection Time: 24  8:36 AM   Result Value Ref Range    WBC Count 13.6 (H) 4.0 - 11.0 10e3/uL    RBC Count 3.38 (L) 4.40 - 5.90 10e6/uL    Hemoglobin 10.5 (L) 13.3 - 17.7 g/dL    Hematocrit 32.9 (L) 40.0 - 53.0 %    MCV 97 78 - 100 fL    MCH 31.1 26.5 - 33.0 pg    MCHC 31.9 31.5 - 36.5 g/dL    RDW 19.9 (H) 10.0 - 15.0 %    Platelet Count 391 150 - 450 10e3/uL    % Neutrophils 95 %    % Lymphocytes 2 %    % Monocytes 3 %    % Eosinophils 0 %    % Basophils 0 %    % Immature Granulocytes 0 %    NRBCs per 100 WBC 0 <1 /100    Absolute Neutrophils 12.9 (H) 1.6 - 8.3 10e3/uL    Absolute Lymphocytes 0.2 (L) 0.8 - 5.3 10e3/uL    Absolute Monocytes 0.4 0.0 - 1.3 10e3/uL    Absolute Eosinophils 0.0 0.0 - 0.7 10e3/uL    Absolute Basophils 0.0 0.0 - 0.2 10e3/uL    Absolute Immature Granulocytes 0.0 <=0.4 10e3/uL    Absolute NRBCs 0.0 10e3/uL       Imaging Results    Echocardiogram Complete    Result Date: 2024  [id number]^[email or web address]^MAGDALENO  [facility] [address] [place] [address]  Name: MARVA CASTELLANOS MRN: [id number] : 1965 Study Date: 2024 02:06 PM Age: 58 yrs Gender: Male Patient Location: [facility] Reason For Study: Tachycardia Ordering Physician: MAGDALENO HASKINS Referring Physician: MAGDALENO HASKINS Performed By: Ashlee Lopez  BSA: 1.5 m2 Height: 64 in Weight: 111 lb HR: 128 BP:  115/83 mmHg ______________________________________________________________________________ Procedure Complete Portable Echo Adult. ______________________________________________________________________________ Interpretation Summary  1. The left ventricle is normal in size. Proximal septal thickening is noted. Hyperdynamic left ventricular function. The visual ejection fraction is 65- 70%. No regional wall motion abnormalities noted. 2. The right ventricle is normal size. The right ventricular systolic function is normal. 3. There is moderate (2+) tricuspid regurgitation. Right ventricular systolic pressure is elevated, consistent with severe pulmonary hypertension. 4. No pericardial effusion. 5. In comparison to the previous report dated 04/01/2019, the findings are similar. ______________________________________________________________________________ Left Ventricle The left ventricle is normal in size. Proximal septal thickening is noted. Hyperdynamic left ventricular function. The visual ejection fraction is 65- 70%. No regional wall motion abnormalities noted.  Right Ventricle The right ventricle is normal size. The right ventricular systolic function is normal.  Atria Normal left atrial size. Right atrial size is normal. There is no color Doppler evidence of an atrial shunt.  Mitral Valve There is trace mitral regurgitation.  Tricuspid Valve There is moderate (2+) tricuspid regurgitation. The right ventricular systolic pressure is approximated at 53.3 mmHg plus the right atrial pressure. IVC diameter <2.1 cm collapsing >50% with sniff suggests a normal RA pressure of 3 mmHg. Right ventricular systolic pressure is elevated, consistent with severe pulmonary hypertension.  Aortic Valve The aortic valve is trileaflet. No aortic regurgitation is present. No aortic stenosis is present.  Pulmonic Valve There is no pulmonic valvular stenosis.  Vessels The inferior vena cava is normal.  Pericardium There is no  pericardial effusion.  Rhythm The rhythm was sinus tachycardia. ______________________________________________________________________________ MMode/2D Measurements & Calculations IVSd: 0.82 cm  LVIDd: 3.6 cm LVIDs: 2.3 cm LVPWd: 0.70 cm FS: 36.7 % LV mass(C)d: 73.5 grams LV mass(C)dI: 48.3 grams/m2 LVOT diam: 2.2 cm LVOT area: 3.9 cm2 LA Volume (BP): 17.2 ml LA Volume Index (BP): 11.3 ml/m2  LA Volume Indexed (AL/bp): 11.2 ml/m2 RWT: 0.38  Doppler Measurements & Calculations PA V2 max: 101.2 cm/sec PA max P.1 mmHg TR max danielle: 365.0 cm/sec TR max P.3 mmHg  ______________________________________________________________________________ Report approved by: Inocencio Coelho 2024 03:45 PM       XR Chest Port 1 View    Result Date: 2024  EXAM: XR CHEST PORT 1 VIEW LOCATION: [facility] DATE: 2024 INDICATION: Dyspnea, cough. COMPARISON: 2024.     IMPRESSION: Normal heart size and pulmonary vascularity. Lungs are hyperinflated with emphysematous changes. Small hazy opacity over the left lower lung laterally could be seen with a focal area of pneumonitis and clinical correlation is needed. Suspected prominent nipple shadow projects over the mid to lower lungs bilaterally. Stent material projects over the right side of the mediastinum, unchanged. Aortic calcification. No significant bony abnormalities.        PET Oncology (Eyes to Thighs)    Result Date: 2024  EXAM: PET ONCOLOGY (EYES TO THIGHS), CT CHEST W CONTRAST LOCATION: [facility] DATE: 2024 INDICATION: Subsequent treatment strategy for restaging metastatic non-small cell lung cancer, malignant neoplasm of upper lobe, right bronchus or lung COMPARISON: PET/CT from 2023 CONTRAST:  66 mL [id number] TECHNIQUE: Serum glucose level 101  mg/dL. One hour post intravenous administration of 10.1  mCi F-18 FDG, PET imaging was performed from the skull vertex to mid thighs,  utilizing attenuation correction with concurrent axial CT and PET/CT image fusion. Separate diagnostic CT of the chest was performed. Dose reduction techniques were used. PET/CT FINDINGS:  Patches of FDG avid airspace consolidation have developed in the right middle and left lower lobes. Consolidation previously present in the right lower lobe has matured into a band of scarring with uniform mild inflammatory FDG activity. Consolidation previously present in the left upper lobe has resolved. 0.6 x 0.4 cm right upper lobe pulmonary nodule has increased from a SUVmax 2.7 to 3.9. Perihilar right pulmonary mass previously present has nearly completely resolved, with only a 0.8 x 0.4 cm mildly FDG avid (SUVmax 3.0) remnant persisting. Stable minimally FDG avid left interlobar station 11L and left hilar station 10L lymph nodes. A representative station 10L node measures 1.3 x 0.6 cm (SUVmax 2.3), previously 1.2 x 0.6 cm (SUVmax 2.6). CT FINDINGS: Severe emphysema. Calcified granuloma right upper lobe. Bilateral lens replacements. Moderate calcified atherosclerosis, including three-vessel coronary disease. Bilateral kidney cysts, requiring no follow-up. 1 cm non-FDG avid fluid collection in the peripheral zone of the prostate just right of midline. Scattered colonic diverticula. Small fat-containing paraumbilical hernia. Mild degenerative change in the spine.     IMPRESSION: 1.  Perihilar right lung tumor has nearly completely resolved. 2.  6 mm right upper lobe pulmonary nodule has increased in FDG activity slightly and is indeterminate. 3.  Areas of pneumonia previously present have resolved, but new areas of pneumonia have developed in both lungs. 4.  Stable minimally FDG avid left hilar lymph nodes are likely reactive to left lower lobe pneumonia, but inflammation from immunotherapy or metastatic involvement can have the same appearance on PET/CT.     CT Chest w Contrast    Result Date: 4/11/2024  EXAM: PET ONCOLOGY  (EYES TO THIGHS), CT CHEST W CONTRAST LOCATION: [facility] DATE: 4/11/2024 INDICATION: Subsequent treatment strategy for restaging metastatic non-small cell lung cancer, malignant neoplasm of upper lobe, right bronchus or lung COMPARISON: PET/CT from 11/09/2023 CONTRAST:  66 mL Isovue-370 TECHNIQUE: Serum glucose level 101  mg/dL. One hour post intravenous administration of 10.1  mCi F-18 FDG, PET imaging was performed from the skull vertex to mid thighs, utilizing attenuation correction with concurrent axial CT and PET/CT image fusion. Separate diagnostic CT of the chest was performed. Dose reduction techniques were used. PET/CT FINDINGS:  Patches of FDG avid airspace consolidation have developed in the right middle and left lower lobes. Consolidation previously present in the right lower lobe has matured into a band of scarring with uniform mild inflammatory FDG activity. Consolidation previously present in the left upper lobe has resolved. 0.6 x 0.4 cm right upper lobe pulmonary nodule has increased from a SUVmax 2.7 to 3.9. Perihilar right pulmonary mass previously present has nearly completely resolved, with only a 0.8 x 0.4 cm mildly FDG avid (SUVmax 3.0) remnant persisting. Stable minimally FDG avid left interlobar station 11L and left hilar station 10L lymph nodes. A representative station 10L node measures 1.3 x 0.6 cm (SUVmax 2.3), previously 1.2 x 0.6 cm (SUVmax 2.6). CT FINDINGS: Severe emphysema. Calcified granuloma right upper lobe. Bilateral lens replacements. Moderate calcified atherosclerosis, including three-vessel coronary disease. Bilateral kidney cysts, requiring no follow-up. 1 cm non-FDG avid fluid collection in the peripheral zone of the prostate just right of midline. Scattered colonic diverticula. Small fat-containing paraumbilical hernia. Mild degenerative change in the spine.     IMPRESSION: 1.  Perihilar right lung tumor has nearly completely resolved. 2.  6  mm right upper lobe pulmonary nodule has increased in FDG activity slightly and is indeterminate. 3.  Areas of pneumonia previously present have resolved, but new areas of pneumonia have developed in both lungs. 4.  Stable minimally FDG avid left hilar lymph nodes are likely reactive to left lower lobe pneumonia, but inflammation from immunotherapy or metastatic involvement can have the same appearance on PET/CT.        I spent 44 minutes on the patient's visit today.  This included preparation for the visit, face-to-face time with the patient and documentation following the visit.  It did not include teaching or procedure time.    Signed by: Peter E. Friedell, MD          Again, thank you for allowing me to participate in the care of your patient.        Sincerely,        Peter E. Friedell, MD

## 2024-05-10 ENCOUNTER — MYC MEDICAL ADVICE (OUTPATIENT)
Dept: PULMONOLOGY | Facility: CLINIC | Age: 59
End: 2024-05-10
Payer: COMMERCIAL

## 2024-05-10 DIAGNOSIS — J44.9 COPD, VERY SEVERE (H): Primary | ICD-10-CM

## 2024-05-10 RX ORDER — MEPERIDINE HYDROCHLORIDE 25 MG/ML
25 INJECTION INTRAMUSCULAR; INTRAVENOUS; SUBCUTANEOUS EVERY 30 MIN PRN
Status: CANCELLED | OUTPATIENT
Start: 2024-05-15

## 2024-05-10 RX ORDER — EPINEPHRINE 1 MG/ML
0.3 INJECTION, SOLUTION, CONCENTRATE INTRAVENOUS EVERY 5 MIN PRN
Status: CANCELLED | OUTPATIENT
Start: 2024-05-15

## 2024-05-10 RX ORDER — HEPARIN SODIUM,PORCINE 10 UNIT/ML
5-20 VIAL (ML) INTRAVENOUS DAILY PRN
Status: CANCELLED | OUTPATIENT
Start: 2024-05-15

## 2024-05-10 RX ORDER — PREDNISONE 10 MG/1
10 TABLET ORAL DAILY
Qty: 90 TABLET | Refills: 1 | Status: SHIPPED | OUTPATIENT
Start: 2024-05-10

## 2024-05-10 RX ORDER — HEPARIN SODIUM (PORCINE) LOCK FLUSH IV SOLN 100 UNIT/ML 100 UNIT/ML
5 SOLUTION INTRAVENOUS
Status: CANCELLED | OUTPATIENT
Start: 2024-05-15

## 2024-05-10 RX ORDER — ALBUTEROL SULFATE 0.83 MG/ML
2.5 SOLUTION RESPIRATORY (INHALATION)
Status: CANCELLED | OUTPATIENT
Start: 2024-05-15

## 2024-05-10 RX ORDER — METHYLPREDNISOLONE SODIUM SUCCINATE 125 MG/2ML
125 INJECTION, POWDER, LYOPHILIZED, FOR SOLUTION INTRAMUSCULAR; INTRAVENOUS
Status: CANCELLED
Start: 2024-05-15

## 2024-05-10 RX ORDER — LORAZEPAM 2 MG/ML
0.5 INJECTION INTRAMUSCULAR EVERY 4 HOURS PRN
Status: CANCELLED | OUTPATIENT
Start: 2024-05-15

## 2024-05-10 RX ORDER — ALBUTEROL SULFATE 90 UG/1
1-2 AEROSOL, METERED RESPIRATORY (INHALATION)
Status: CANCELLED
Start: 2024-05-15

## 2024-05-10 RX ORDER — DIPHENHYDRAMINE HYDROCHLORIDE 50 MG/ML
50 INJECTION INTRAMUSCULAR; INTRAVENOUS
Status: CANCELLED
Start: 2024-05-15

## 2024-05-10 NOTE — TELEPHONE ENCOUNTER
Last Pulm visit 4/4/24. Per OV note:    -prednisone burst for recent/current exacerbation with taper to 10 mg prednisone daily   RTC 6 months advised.    See mychart message. Rx for Pred 10 mg daily approved per specialty scope of practice.    Viridiana Stark RN on 5/10/2024 at 9:53 AM

## 2024-05-13 ENCOUNTER — PATIENT OUTREACH (OUTPATIENT)
Dept: CARE COORDINATION | Facility: CLINIC | Age: 59
End: 2024-05-13
Payer: COMMERCIAL

## 2024-05-13 NOTE — PROGRESS NOTES
"Social Work - Distress Screen Intervention  [facility]    Identified Concern and Score from Distress Screenin. How concerned are you about your ability to eat? (!) 10     2. How concerned are you about unintended weight loss or your current weight? (!) 10     3. How concerned are you about feeling depressed or very sad?  3     4. How concerned are you about feeling anxious or very scared?  3     5. Do you struggle with the loss of meaning and barry in your life?  Not at all     6. How concerned are you about work and home life issues that may be affected by your cancer?  0     7. How concerned are you about knowing what resources are available to help you?  0     8. Do you currently have what you would describe as Hinduism or spiritual struggles? Not at all     9. If you want to be contacted by one of our professionals, I can send a message to them right now.  Oncology Social Worker       Date of Distress Screen: 24  Data: At time of last visit, patient scored positive on distress screening.  outreached to patient today to follow up on elevated distress and introduce psychosocial services and support.  Intervention/Education provided:  contacted patient by phone to discuss distress screening results.     Prasad reports that he is coping well from emotional and physical perspective, relieved to be home after recent hospitalization and feels \"ready\" for new treatment of immunotherapy on Wednesday of this week. Prasad endorses that he feels well supported by home care team. Prasad denies resource or emotional health need at present time, but pleased to know of social work availability as needed in the future.     Follow-up Required:  will remain available to patient for support as needed    ANSELMO Trinh, LICSW, OSW-C  Clinical - Adult Oncology  Phone: [phone]  She/Her/Hers  [facility] [place]: [place], [place]  8am-4:30pm  M " [facility]: KATY Romero F 8am-4:30pm   Support Groups at [facility]: Social Work Services for Cancer Patients ([email or web address])

## 2024-05-14 ENCOUNTER — TELEPHONE (OUTPATIENT)
Dept: ONCOLOGY | Facility: CLINIC | Age: 59
End: 2024-05-14
Payer: COMMERCIAL

## 2024-05-14 NOTE — PROGRESS NOTES
CLINICAL NUTRITION SERVICES     Reason for Contact: Questions from Oncology Distress Screening  1. How concerned are you about your ability to eat? :  10  2. How concerned are you about unintended weight loss or your current weight? : 10    Action: RD called patient indicating reason for phone call. Left a VM with a return call back number.     Follow up: Wait for a return phone call.    Jacquie Magdaleno RD, [facility] office [phone]

## 2024-05-15 ENCOUNTER — APPOINTMENT (OUTPATIENT)
Dept: LAB | Facility: CLINIC | Age: 59
End: 2024-05-15
Payer: COMMERCIAL

## 2024-05-15 ENCOUNTER — INFUSION THERAPY VISIT (OUTPATIENT)
Dept: INFUSION THERAPY | Facility: CLINIC | Age: 59
End: 2024-05-15
Attending: INTERNAL MEDICINE
Payer: COMMERCIAL

## 2024-05-15 VITALS
OXYGEN SATURATION: 99 % | SYSTOLIC BLOOD PRESSURE: 124 MMHG | TEMPERATURE: 98.6 F | DIASTOLIC BLOOD PRESSURE: 83 MMHG | RESPIRATION RATE: 16 BRPM | HEART RATE: 81 BPM

## 2024-05-15 DIAGNOSIS — Z51.11 ENCOUNTER FOR ANTINEOPLASTIC CHEMOTHERAPY: ICD-10-CM

## 2024-05-15 DIAGNOSIS — C34.91 SQUAMOUS CELL LUNG CANCER, RIGHT (H): Primary | ICD-10-CM

## 2024-05-15 LAB
ALBUMIN SERPL BCG-MCNC: 4.1 G/DL (ref 3.5–5.2)
ALP SERPL-CCNC: 66 U/L (ref 40–150)
ALT SERPL W P-5'-P-CCNC: 27 U/L (ref 0–70)
ANION GAP SERPL CALCULATED.3IONS-SCNC: 9 MMOL/L (ref 7–15)
AST SERPL W P-5'-P-CCNC: 16 U/L (ref 0–45)
BASOPHILS # BLD AUTO: 0 10E3/UL (ref 0–0.2)
BASOPHILS NFR BLD AUTO: 0 %
BILIRUB SERPL-MCNC: 0.2 MG/DL
BUN SERPL-MCNC: 15.9 MG/DL (ref 6–20)
CALCIUM SERPL-MCNC: 9.6 MG/DL (ref 8.6–10)
CHLORIDE SERPL-SCNC: 95 MMOL/L (ref 98–107)
CREAT SERPL-MCNC: 0.6 MG/DL (ref 0.67–1.17)
DEPRECATED HCO3 PLAS-SCNC: 31 MMOL/L (ref 22–29)
EGFRCR SERPLBLD CKD-EPI 2021: >90 ML/MIN/1.73M2
EOSINOPHIL # BLD AUTO: 0.1 10E3/UL (ref 0–0.7)
EOSINOPHIL NFR BLD AUTO: 1 %
ERYTHROCYTE [DISTWIDTH] IN BLOOD BY AUTOMATED COUNT: 20.5 % (ref 10–15)
GLUCOSE SERPL-MCNC: 101 MG/DL (ref 70–99)
HCT VFR BLD AUTO: 34.6 % (ref 40–53)
HGB BLD-MCNC: 10.7 G/DL (ref 13.3–17.7)
IMM GRANULOCYTES # BLD: 0 10E3/UL
IMM GRANULOCYTES NFR BLD: 0 %
LYMPHOCYTES # BLD AUTO: 0.3 10E3/UL (ref 0.8–5.3)
LYMPHOCYTES NFR BLD AUTO: 3 %
MCH RBC QN AUTO: 31.1 PG (ref 26.5–33)
MCHC RBC AUTO-ENTMCNC: 30.9 G/DL (ref 31.5–36.5)
MCV RBC AUTO: 101 FL (ref 78–100)
MONOCYTES # BLD AUTO: 0.5 10E3/UL (ref 0–1.3)
MONOCYTES NFR BLD AUTO: 5 %
NEUTROPHILS # BLD AUTO: 8.7 10E3/UL (ref 1.6–8.3)
NEUTROPHILS NFR BLD AUTO: 91 %
NRBC # BLD AUTO: 0 10E3/UL
NRBC BLD AUTO-RTO: 0 /100
PLATELET # BLD AUTO: 421 10E3/UL (ref 150–450)
POTASSIUM SERPL-SCNC: 5 MMOL/L (ref 3.4–5.3)
PROT SERPL-MCNC: 6.6 G/DL (ref 6.4–8.3)
RBC # BLD AUTO: 3.44 10E6/UL (ref 4.4–5.9)
SODIUM SERPL-SCNC: 135 MMOL/L (ref 135–145)
T4 FREE SERPL-MCNC: 1.41 NG/DL (ref 0.9–1.7)
TSH SERPL DL<=0.005 MIU/L-ACNC: 0.26 UIU/ML (ref 0.3–4.2)
WBC # BLD AUTO: 9.6 10E3/UL (ref 4–11)

## 2024-05-15 PROCEDURE — 258N000003 HC RX IP 258 OP 636: Performed by: INTERNAL MEDICINE

## 2024-05-15 PROCEDURE — 82040 ASSAY OF SERUM ALBUMIN: CPT | Performed by: INTERNAL MEDICINE

## 2024-05-15 PROCEDURE — 96413 CHEMO IV INFUSION 1 HR: CPT

## 2024-05-15 PROCEDURE — 96361 HYDRATE IV INFUSION ADD-ON: CPT

## 2024-05-15 PROCEDURE — 250N000011 HC RX IP 250 OP 636: Mod: JZ | Performed by: INTERNAL MEDICINE

## 2024-05-15 PROCEDURE — 84439 ASSAY OF FREE THYROXINE: CPT | Performed by: INTERNAL MEDICINE

## 2024-05-15 PROCEDURE — 84443 ASSAY THYROID STIM HORMONE: CPT | Performed by: INTERNAL MEDICINE

## 2024-05-15 PROCEDURE — 85025 COMPLETE CBC W/AUTO DIFF WBC: CPT | Performed by: INTERNAL MEDICINE

## 2024-05-15 RX ADMIN — SODIUM CHLORIDE 250 ML: 9 INJECTION, SOLUTION INTRAVENOUS at 08:57

## 2024-05-15 RX ADMIN — SODIUM CHLORIDE 200 MG: 9 INJECTION, SOLUTION INTRAVENOUS at 09:37

## 2024-05-15 ASSESSMENT — PAIN SCALES - GENERAL: PAINLEVEL: NO PAIN (0)

## 2024-05-15 NOTE — PROGRESS NOTES
Infusion Nursing Note:  Luis BURNS Vanedarien presents today for Keytruda.    Patient seen by provider today: No   present during visit today: Not Applicable.    Note: Labs drawn peripherally.      Intravenous Access:  Peripheral IV placed.    Treatment Conditions:  Lab Results   Component Value Date     05/15/2024    POTASSIUM 5.0 05/15/2024    MAG 2.2 04/24/2022    CR 0.60 (L) 05/15/2024    MIKE 9.6 05/15/2024    BILITOTAL 0.2 05/15/2024    ALBUMIN 4.1 05/15/2024    ALT 27 05/15/2024    AST 16 05/15/2024       Results reviewed, labs MET treatment parameters, ok to proceed with treatment.      Post Infusion Assessment:  Patient tolerated infusion without incident.  Site patent and intact, free from redness, edema or discomfort.  No evidence of extravasations.  Access discontinued per protocol.       Discharge Plan:   Patient discharged in stable condition accompanied by: self.  Departure Mode: Ambulatory.      Margarita Petit RN

## 2024-05-21 ENCOUNTER — HOSPITAL ENCOUNTER (OUTPATIENT)
Dept: CT IMAGING | Facility: CLINIC | Age: 59
Discharge: HOME | End: 2024-05-21
Attending: NURSE PRACTITIONER | Admitting: NURSE PRACTITIONER
Payer: COMMERCIAL

## 2024-05-21 DIAGNOSIS — C44.92: ICD-10-CM

## 2024-05-21 DIAGNOSIS — J18.9 PNEUMONIA OF LEFT LUNG DUE TO INFECTIOUS ORGANISM, UNSPECIFIED PART OF LUNG: ICD-10-CM

## 2024-05-21 DIAGNOSIS — C34.91 SQUAMOUS CELL LUNG CANCER, RIGHT (H): ICD-10-CM

## 2024-05-21 DIAGNOSIS — C77.1: ICD-10-CM

## 2024-05-21 PROCEDURE — 71260 CT THORAX DX C+: CPT

## 2024-05-21 PROCEDURE — 250N000009 HC RX 250: Performed by: RADIOLOGY

## 2024-05-21 PROCEDURE — 250N000011 HC RX IP 250 OP 636: Performed by: RADIOLOGY

## 2024-05-21 RX ORDER — IOPAMIDOL 755 MG/ML
49 INJECTION, SOLUTION INTRAVASCULAR ONCE
Status: COMPLETED | OUTPATIENT
Start: 2024-05-21 | End: 2024-05-21

## 2024-05-21 RX ADMIN — SODIUM CHLORIDE 52 ML: 9 INJECTION, SOLUTION INTRAVENOUS at 09:49

## 2024-05-21 RX ADMIN — IOPAMIDOL 49 ML: 755 INJECTION, SOLUTION INTRAVENOUS at 09:49

## 2024-05-24 ENCOUNTER — MYC REFILL (OUTPATIENT)
Dept: PULMONOLOGY | Facility: CLINIC | Age: 59
End: 2024-05-24
Payer: COMMERCIAL

## 2024-05-24 DIAGNOSIS — J43.2 CENTRILOBULAR EMPHYSEMA (H): ICD-10-CM

## 2024-05-24 DIAGNOSIS — J43.9 PULMONARY EMPHYSEMA, UNSPECIFIED EMPHYSEMA TYPE (H): ICD-10-CM

## 2024-05-24 RX ORDER — ROFLUMILAST 500 UG/1
500 TABLET ORAL DAILY
Qty: 90 TABLET | Refills: 3 | Status: SHIPPED | OUTPATIENT
Start: 2024-05-24 | End: 2024-08-21

## 2024-05-24 NOTE — TELEPHONE ENCOUNTER
"Refill request for roflumilast 500 mcg tab: 1 tab daily  Last filled 3/30/23 for 12 mo supply  LOV with Alisa 4/4/24  \"Recommendations as follows:   -Continue Advair, spiriva, albuterol  -Continue roflumilast; LFTs have remained stable  -Continue oral mucolytic, Mucinex  -Continue twice daily nebs for stent patency; discussed follow-up with IP but he is not interested at this time \"    Last CMP drawn 5/15/24:  AST 16  ALT 27    No [facility] refill protocol on this medication.    Routed to Dr Cuellar:  Can patient have refill as pended?    Isma FOSTER [facility]    "

## 2024-06-05 ENCOUNTER — INFUSION THERAPY VISIT (OUTPATIENT)
Dept: INFUSION THERAPY | Facility: CLINIC | Age: 59
End: 2024-06-05
Attending: INTERNAL MEDICINE
Payer: COMMERCIAL

## 2024-06-05 ENCOUNTER — APPOINTMENT (OUTPATIENT)
Dept: LAB | Facility: CLINIC | Age: 59
End: 2024-06-05
Payer: COMMERCIAL

## 2024-06-05 ENCOUNTER — ONCOLOGY VISIT (OUTPATIENT)
Dept: ONCOLOGY | Facility: CLINIC | Age: 59
End: 2024-06-05
Attending: INTERNAL MEDICINE
Payer: COMMERCIAL

## 2024-06-05 VITALS
SYSTOLIC BLOOD PRESSURE: 129 MMHG | RESPIRATION RATE: 16 BRPM | TEMPERATURE: 98 F | WEIGHT: 105 LBS | DIASTOLIC BLOOD PRESSURE: 85 MMHG | OXYGEN SATURATION: 94 % | HEART RATE: 95 BPM | HEIGHT: 64 IN | BODY MASS INDEX: 17.93 KG/M2

## 2024-06-05 VITALS
HEART RATE: 98 BPM | RESPIRATION RATE: 16 BRPM | TEMPERATURE: 98.2 F | SYSTOLIC BLOOD PRESSURE: 125 MMHG | DIASTOLIC BLOOD PRESSURE: 86 MMHG | OXYGEN SATURATION: 94 %

## 2024-06-05 DIAGNOSIS — C34.91 SQUAMOUS CELL LUNG CANCER, RIGHT (H): Primary | ICD-10-CM

## 2024-06-05 DIAGNOSIS — Z51.11 ENCOUNTER FOR ANTINEOPLASTIC CHEMOTHERAPY: ICD-10-CM

## 2024-06-05 LAB
ALBUMIN SERPL BCG-MCNC: 4.2 G/DL (ref 3.5–5.2)
ALP SERPL-CCNC: 56 U/L (ref 40–150)
ALT SERPL W P-5'-P-CCNC: 18 U/L (ref 0–70)
ANION GAP SERPL CALCULATED.3IONS-SCNC: 10 MMOL/L (ref 7–15)
AST SERPL W P-5'-P-CCNC: 17 U/L (ref 0–45)
BASOPHILS # BLD AUTO: 0 10E3/UL (ref 0–0.2)
BASOPHILS NFR BLD AUTO: 0 %
BILIRUB SERPL-MCNC: <0.2 MG/DL
BUN SERPL-MCNC: 21.1 MG/DL (ref 6–20)
CALCIUM SERPL-MCNC: 9.4 MG/DL (ref 8.6–10)
CHLORIDE SERPL-SCNC: 93 MMOL/L (ref 98–107)
CREAT SERPL-MCNC: 0.69 MG/DL (ref 0.67–1.17)
DEPRECATED HCO3 PLAS-SCNC: 28 MMOL/L (ref 22–29)
EGFRCR SERPLBLD CKD-EPI 2021: >90 ML/MIN/1.73M2
EOSINOPHIL # BLD AUTO: 0 10E3/UL (ref 0–0.7)
EOSINOPHIL NFR BLD AUTO: 0 %
ERYTHROCYTE [DISTWIDTH] IN BLOOD BY AUTOMATED COUNT: 17.2 % (ref 10–15)
GLUCOSE SERPL-MCNC: 129 MG/DL (ref 70–99)
HCT VFR BLD AUTO: 35.9 % (ref 40–53)
HGB BLD-MCNC: 11.4 G/DL (ref 13.3–17.7)
IMM GRANULOCYTES # BLD: 0 10E3/UL
IMM GRANULOCYTES NFR BLD: 0 %
LYMPHOCYTES # BLD AUTO: 0.6 10E3/UL (ref 0.8–5.3)
LYMPHOCYTES NFR BLD AUTO: 6 %
MCH RBC QN AUTO: 31.9 PG (ref 26.5–33)
MCHC RBC AUTO-ENTMCNC: 31.8 G/DL (ref 31.5–36.5)
MCV RBC AUTO: 101 FL (ref 78–100)
MONOCYTES # BLD AUTO: 0.5 10E3/UL (ref 0–1.3)
MONOCYTES NFR BLD AUTO: 5 %
NEUTROPHILS # BLD AUTO: 8.6 10E3/UL (ref 1.6–8.3)
NEUTROPHILS NFR BLD AUTO: 88 %
NRBC # BLD AUTO: 0 10E3/UL
NRBC BLD AUTO-RTO: 0 /100
PLATELET # BLD AUTO: 428 10E3/UL (ref 150–450)
POTASSIUM SERPL-SCNC: 4.8 MMOL/L (ref 3.4–5.3)
PROT SERPL-MCNC: 6.7 G/DL (ref 6.4–8.3)
RBC # BLD AUTO: 3.57 10E6/UL (ref 4.4–5.9)
SODIUM SERPL-SCNC: 131 MMOL/L (ref 135–145)
T4 FREE SERPL-MCNC: 1.21 NG/DL (ref 0.9–1.7)
TSH SERPL DL<=0.005 MIU/L-ACNC: 0.28 UIU/ML (ref 0.3–4.2)
WBC # BLD AUTO: 9.8 10E3/UL (ref 4–11)

## 2024-06-05 PROCEDURE — 85025 COMPLETE CBC W/AUTO DIFF WBC: CPT | Performed by: INTERNAL MEDICINE

## 2024-06-05 PROCEDURE — 99214 OFFICE O/P EST MOD 30 MIN: CPT | Performed by: INTERNAL MEDICINE

## 2024-06-05 PROCEDURE — 84439 ASSAY OF FREE THYROXINE: CPT | Performed by: INTERNAL MEDICINE

## 2024-06-05 PROCEDURE — 84443 ASSAY THYROID STIM HORMONE: CPT | Performed by: INTERNAL MEDICINE

## 2024-06-05 PROCEDURE — 82040 ASSAY OF SERUM ALBUMIN: CPT | Performed by: INTERNAL MEDICINE

## 2024-06-05 PROCEDURE — 250N000011 HC RX IP 250 OP 636: Mod: JZ | Performed by: INTERNAL MEDICINE

## 2024-06-05 PROCEDURE — 258N000003 HC RX IP 258 OP 636: Performed by: INTERNAL MEDICINE

## 2024-06-05 PROCEDURE — G0463 HOSPITAL OUTPT CLINIC VISIT: HCPCS | Performed by: INTERNAL MEDICINE

## 2024-06-05 PROCEDURE — 96413 CHEMO IV INFUSION 1 HR: CPT

## 2024-06-05 PROCEDURE — G2211 COMPLEX E/M VISIT ADD ON: HCPCS | Performed by: INTERNAL MEDICINE

## 2024-06-05 PROCEDURE — 36415 COLL VENOUS BLD VENIPUNCTURE: CPT | Performed by: INTERNAL MEDICINE

## 2024-06-05 RX ORDER — ALBUTEROL SULFATE 0.83 MG/ML
2.5 SOLUTION RESPIRATORY (INHALATION)
Status: CANCELLED | OUTPATIENT
Start: 2024-06-05

## 2024-06-05 RX ORDER — ALBUTEROL SULFATE 90 UG/1
1-2 AEROSOL, METERED RESPIRATORY (INHALATION)
Status: CANCELLED
Start: 2024-06-05

## 2024-06-05 RX ORDER — HEPARIN SODIUM (PORCINE) LOCK FLUSH IV SOLN 100 UNIT/ML 100 UNIT/ML
5 SOLUTION INTRAVENOUS
Status: CANCELLED | OUTPATIENT
Start: 2024-06-05

## 2024-06-05 RX ORDER — LORAZEPAM 2 MG/ML
0.5 INJECTION INTRAMUSCULAR EVERY 4 HOURS PRN
Status: CANCELLED | OUTPATIENT
Start: 2024-06-05

## 2024-06-05 RX ORDER — HEPARIN SODIUM,PORCINE 10 UNIT/ML
5-20 VIAL (ML) INTRAVENOUS DAILY PRN
Status: CANCELLED | OUTPATIENT
Start: 2024-06-05

## 2024-06-05 RX ORDER — MEPERIDINE HYDROCHLORIDE 25 MG/ML
25 INJECTION INTRAMUSCULAR; INTRAVENOUS; SUBCUTANEOUS EVERY 30 MIN PRN
Status: CANCELLED | OUTPATIENT
Start: 2024-06-05

## 2024-06-05 RX ORDER — EPINEPHRINE 1 MG/ML
0.3 INJECTION, SOLUTION, CONCENTRATE INTRAVENOUS EVERY 5 MIN PRN
Status: CANCELLED | OUTPATIENT
Start: 2024-06-05

## 2024-06-05 RX ORDER — METHYLPREDNISOLONE SODIUM SUCCINATE 125 MG/2ML
125 INJECTION, POWDER, LYOPHILIZED, FOR SOLUTION INTRAMUSCULAR; INTRAVENOUS
Status: CANCELLED
Start: 2024-06-05

## 2024-06-05 RX ORDER — DIPHENHYDRAMINE HYDROCHLORIDE 50 MG/ML
50 INJECTION INTRAMUSCULAR; INTRAVENOUS
Status: CANCELLED
Start: 2024-06-05

## 2024-06-05 RX ADMIN — SODIUM CHLORIDE 250 ML: 9 INJECTION, SOLUTION INTRAVENOUS at 08:59

## 2024-06-05 RX ADMIN — SODIUM CHLORIDE 200 MG: 9 INJECTION, SOLUTION INTRAVENOUS at 09:19

## 2024-06-05 ASSESSMENT — PAIN SCALES - GENERAL: PAINLEVEL: NO PAIN (0)

## 2024-06-05 NOTE — LETTER
"6/5/2024      Luis Hernandez  [id number] [facility] [address]      Dear Colleague,    Thank you for referring your patient, Luis Hernandez, to the [facility]. Please see a copy of my visit note below.    Oncology Rooming Note    June 5, 2024 8:27 AM   Luis Hernandez is a 59 year old male who presents for:    Chief Complaint   Patient presents with     Oncology Clinic Visit     Squamous cell lung cancer, right - Labs provider and infusion     Initial Vitals: /85 (BP Location: Right arm, Patient Position: Sitting, Cuff Size: Adult Small)   Pulse 95   Temp 98  F (36.7  C) (Tympanic)   Resp 16   Ht 1.626 m (5' 4\")   Wt 47.6 kg (105 lb)   SpO2 94%   BMI 18.02 kg/m   Estimated body mass index is 18.02 kg/m  as calculated from the following:    Height as of this encounter: 1.626 m (5' 4\").    Weight as of this encounter: 47.6 kg (105 lb). Body surface area is 1.47 meters squared.  No Pain (0) Comment: Data Unavailable   No LMP for male patient.  Allergies reviewed: Yes  Medications reviewed: Yes    Medications: Medication refills not needed today.  Pharmacy name entered into [employer]:    [id number] - [place], [place] - [address] [facility] [place] [facility] [address] [place] [address] [facility] SITES  [id number] - [place], [place] - [address] [place] [address]    Frailty Screening:   Is the patient here for a new oncology consult visit in cancer care? 2. No      Clinical concerns:  None      Feli Mead, HERMELINDA              [facility] Hematology and Oncology Follow-up Note    Patient: Luis Hernandez  MRN: [id number]  Date of Service: Jun 5, 2024       Reason for Visit    Follow-up squamous cell carcinoma of the lung      Encounter Diagnoses Assessment and Plan:    Problem List Items Addressed This Visit       Squamous cell lung cancer, " right (H) - Primary       Patient returns for follow-up.  He was hospitalized with respiratory failure from COPD and likely viral pneumonia.  CT of 4/11/2024 and chest x-ray of 4/23/2024 show almost complete resolution of the primary tumor.  CT scan on 5/21/2024 shows a few lymph nodes that appear slightly larger.  Continued surveillance is recommended.  PET CT scan is planned for about 8 weeks.  He tolerated his first cycle of pembrolizumab monotherapy without difficulty.  He did have fatigue for about 2 weeks, but this has resolved .  Patient will continue with pembrolizumab today and return in 3 weeks for cycle 6    ______________________________________________________________________________    Staging History   Cancer Staging   No matching staging information was found for the patient.    ECOG Performance 2    History of Present Illness    Disease history per Dr. Lantigua  The patient has a longstanding history of severe COPD (FEV1=18%) with recurrent infection, prior tobacco use until August, 2021, pulmonary and essential hypertension.     On 10/5/2023, he was seen by his regular pulmonologist with a history of waxing/waning bilateral lung nodules with a follow-up 9/14/2023 CT with chest revealing a new spiculated nodule at the base of the right upper lobe with persistent and stable filling defect of the bronchus intermedius/right lower lobe bronchus concerning for possible endobronchial tumor versus mucous plugging, and progressive abnormal soft tissue mass in the subcarinal.       Because he was felt not to be able to tolerate bronchoscopy, a 11/9/2023 PET scan revealed glucose avid right hilar soft tissue mass encasing the bronchus intermedius extending along the right middle lobe bronchus contiguous with subcarinal lymphadenopathy and intraluminal soft tissue density of the right lower lobe bronchus concerning for primary lung malignancy.  In addition, there were glucose avid opacities seen bilaterally involve  the right lower lobe, left suprahilar region, medial left lower lobe and right upper lobe which were felt to be possibly inflammatory.  However endobronchial tumor spread cannot be excluded.  A hypermetabolic soft tissue thickening was seen in the distal sigmoid/upper rectum concerning for malignancy.  However, a 10/25/2023 Cologuard study was negative.     On 11/17/2023, the patient was seen in hematology/medical oncology consultation by Dr. Tim Proctor.  Augmentin was empirically initiated by Dr. Cuellar.     On 1/18/2024, the patient underwent flexible and rigid bronchoscopy with a right upper lobe, right mainstem bronchus and bronchus intermedius identified revealing a moderatey differentiated squamous cell carcinoma with a PD-L1 CPS 10-20% and TPS 5%.  Lung cancer NGS panel revealed CDKN2A and PIK3CA alterations.       The patient underwent tumor debulking with placement of a 10 x 15 mm bronchus intermedius stent and therapeutic suctioning of the airway.  The right middle lobe bronchus could not be identified.  In addition, right upper lobe mass needle aspirate confirmed cytologic evidence of a non-small cell carcinoma consistent with a squamous cell carcinoma which was p40 positive, TTF 1 and CK7 negative.  Following the bronchoscopic procedure, his breathing was better for perhaps a week but has now returned to baseline.     Follow-up 1/20/2023 CT chest without contrast revealed reduction in left upper lobe nodule, new nodules throughout both lungs up to 7 mm and continued nodular wall thickening of the bronchus intermedius, right lower lobe bronchus with masslike filling defect of the right lower lobe.     On 2/1/2024,Dr. Smith saw him in initial oncology consultation.  MR scan brain with contrast was unremarkable.  He subsequently elected interventional therapy although he had been inclined toward palliative care only at the time of his original consultation.    On 2/29/2024, cycle #1 upfront induction  "pembrolizumab, paclitaxel, carboplatin and pegfilgrastim were administered.  The patient tolerated well with bone aching for 6-7 days which resolved with breathlessness may be slightly better.  He denies any paresthesias.SH  A CBC revealed a white count 7700, hemoglobin 9.8, platelets 496,000, normal CMP and TSH    CT scan on 4/11/2024 showed almost complete resolution of the tumor.  4/23/2024 patient was admitted to the hospital with respiratory failure and viral pneumonia.  On 5/1/2024 patient was discharged from the hospital.  On 5/21/2024 CT scan of the chest abdomen and pelvis is generally stable but there are still some slightly enlarging lymph nodes.  Continued surveillance is recommended.      Presently he is still breathing better but still requires oxygen 24/7.  He does not have chills, fevers or sweats.  His appetite is  and he is lost about 5 pounds since his last visit..  He is living with his father.  His daughter is able to come and help sometimes.           Review of systems.  Pertinent Findings are included in the History of Present Illness    Physical Exam    /85 (BP Location: Right arm, Patient Position: Sitting, Cuff Size: Adult Small)   Pulse 95   Temp 98  F (36.7  C) (Tympanic)   Resp 16   Ht 1.626 m (5' 4\")   Wt 47.6 kg (105 lb)   SpO2 94%   BMI 18.02 kg/m       GENERAL APPEARANCE: Chronically ill-appearing man using nasal oxygen.  In no apparent distress.  HEAD: Atraumatic; normocephalic; without lesions.  EYES: Conjunctiva, corneas and eyelids normal; pupils equal, round, reactive to light; No Icterus.  MOUTH/OROPHARYNX: Oral mucosa intact upper and lower dentures noted.  NECK: Supple with no nodes.  LUNGS: Decreased breath sounds throughout with prolonged expiratory phase  HEART: Regular rhythm and rate; S1 and S2 normal; no murmurs noted.  ABDOMEN: Soft; no masses or tenderness, no hepatosplenomegaly.  NEUROLOGIC: Alert and oriented.  No obvious focal findings.  EXTREMITIES: " No cyanosis, or edema.  SKIN: No abnormal bruising or bleeding. No suspicious lesions noted on exposed skin.  PSYCHIATRIC: Mental status normal; no apparent psychiatric issues    Medications:    Current Outpatient Medications   Medication Sig Dispense Refill     albuterol (PROAIR HFA/PROVENTIL HFA/VENTOLIN HFA) 108 (90 Base) MCG/ACT inhaler Inhale 2 puffs into the lungs every 4 hours as needed for shortness of breath 54 g 3     amLODIPine (NORVASC) 10 MG tablet Take 1 tablet (10 mg) by mouth daily 90 tablet 3     fluticasone-salmeterol (ADVAIR) 500-50 MCG/ACT inhaler Inhale 1 puff into the lungs every 12 hours 60 each 11     guaiFENesin (MUCINEX MAXIMUM STRENGTH) 1200 MG TB12 Take 1 tablet by mouth daily       lisinopril (ZESTRIL) 40 MG tablet Take 1 tablet (40 mg) by mouth daily 90 tablet 3     LORazepam (ATIVAN) 0.5 MG tablet Take 1 tablet (0.5 mg) by mouth every 6 hours as needed for anxiety 15 tablet 0     magnesium oxide (MAG-OX) 400 MG tablet Take 400 mg by mouth daily (with lunch) For leg cramps       metoprolol tartrate (LOPRESSOR) 25 MG tablet Take 1 tablet (25 mg) by mouth 2 times daily 180 tablet 3     ondansetron (ZOFRAN) 8 MG tablet Take 1 tablet (8 mg) by mouth every 8 hours as needed for nausea (vomiting) 30 tablet 2     order for DME Equipment being ordered: Oxygen 3L via NC continuous.  O2 saturated noted to be 86% on room air at rest. (Patient taking differently: 2 L Equipment being ordered: Oxygen 3L via NC continuous.  O2 saturated noted to be 86% on room air at rest.) 1 Units 0     order for DME Equipment being ordered: Nebulizer 1 Device 0     predniSONE (DELTASONE) 10 MG tablet Take 1 tablet (10 mg) by mouth daily 90 tablet 1     prochlorperazine (COMPAZINE) 10 MG tablet Take 1 tablet (10 mg) by mouth every 6 hours as needed for nausea or vomiting 30 tablet 2     roflumilast (DALIRESP) 500 MCG TABS tablet Take 1 tablet (500 mcg) by mouth daily 90 tablet 3     tiotropium (SPIRIVA RESPIMAT) 2.5  MCG/ACT inhaler Inhale 2 puffs into the lungs daily 12 g 3     No current facility-administered medications for this visit.     Facility-Administered Medications Ordered in Other Visits   Medication Dose Route Frequency Provider Last Rate Last Admin     pembrolizumab (KEYTRUDA) 200 mg in sodium chloride 0.9 % 63 mL infusion  200 mg Intravenous Once Friedell, Peter E, MD         sodium chloride 0.9% BOLUS 250 mL  250 mL Intravenous Once Friedell, Peter E, MD               Past History    Past Medical History:   Diagnosis Date     Acute on chronic respiratory failure with hypoxia (H) 04/10/2020     Acute on chronic respiratory failure with hypoxia and hypercapnia (H) 04/01/2019     Acute respiratory failure with hypoxia and hypercapnia (H) 04/23/2024     CAP (community acquired pneumonia) 04/14/2020     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)      COPD exacerbation (H) 04/01/2019     COPD exacerbation (H) 02/16/2020     Erectile dysfunction      Hypertension      Hyponatremia 04/01/2019     Pneumonia 04/10/2020     Past Surgical History:   Procedure Laterality Date     APPENDECTOMY      childhood     BRONCHOSCOPY, DILATE BRONCHUS, STENT BRONCHUS, COMBINED N/A 1/18/2024    Procedure: Bronchoscopy with tissue debulking,  and stent placement.;  Surgeon: Isac Prescott MD;  Location: [facility] OR     ENDOBRONCHIAL ULTRASOUND FLEXIBLE N/A 1/18/2024    Procedure: Endobronchial ultrasound with Endobronchial and Cryo biopsy.;  Surgeon: Isac Prescott MD;  Location: [facility] OR     Allergies   Allergen Reactions     Hctz Other (See Comments)     He has hyponatremia - avoid use     Penicillins Unknown     Childhood reaction.     Family History   Problem Relation Age of Onset     Hypertension Mother      Ovarian Cancer Mother      Breast Cancer Mother      Hypertension Father      Lipids Father      C.A.D. Father      Heart Disease Father      Chronic Obstructive Pulmonary Disease Father      Cerebrovascular  Disease Father      Diabetes Paternal Grandmother      Chronic Obstructive Pulmonary Disease Paternal Grandfather      Social History     Socioeconomic History     Marital status: Single     Spouse name: None     Number of children: None     Years of education: None     Highest education level: None   Tobacco Use     Smoking status: Former     Current packs/day: 0.00     Average packs/day: 2.0 packs/day for 30.0 years (60.0 ttl pk-yrs)     Types: Cigarettes     Start date: 1991     Quit date: 2021     Years since quittin.7     Smokeless tobacco: Former   Vaping Use     Vaping status: Never Used   Substance and Sexual Activity     Alcohol use: Yes     Comment: rare     Drug use: No     Sexual activity: Yes     Partners: Female   Other Topics Concern     Parent/sibling w/ CABG, MI or angioplasty before 65F 55M? No   Social History Narrative    The patient has a 60+ pk yr tobacco hx.  He has has no active use, quit 2014.  Alcohol use is <1 alcoholic drinks per week.  He denies use of recreational drugs.  He is employed. [employer].  Works nights.   The patient is .  Has 1 child.Hot Tub Exposure: NORecent Travel: NO Hx of incarceration:  NOBird Exposure:   NOAnimal Exposure:  NOInhalation Exposure:  NO        Lives in [place], [place] with father. 2 dogs.          Social Determinants of Health     Financial Resource Strain: Low Risk  (2024)    Financial Resource Strain      Within the past 12 months, have you or your family members you live with been unable to get utilities (heat, electricity) when it was really needed?: No   Food Insecurity: Low Risk  (2024)    Food Insecurity      Within the past 12 months, did you worry that your food would run out before you got money to buy more?: No      Within the past 12 months, did the food you bought just not last and you didn t have money to get more?: No   Transportation Needs: Low Risk  (2024)    Transportation Needs      Within the past 12  months, has lack of transportation kept you from medical appointments, getting your medicines, non-medical meetings or appointments, work, or from getting things that you need?: No   Physical Activity: Inactive (8/30/2021)    Exercise Vital Sign      Days of Exercise per Week: 0 days      Minutes of Exercise per Session: 0 min   Social Connections: Unknown (8/30/2021)    Social Connection and Isolation Panel [NHANES]      Frequency of Communication with Friends and Family: Twice a week      Frequency of Social Gatherings with Friends and Family: Twice a week      Attends Che[employer] Services: Never      Active Member of Clubs or Organizations: No      Attends Club or Organization Meetings: Never   Interpersonal Safety: Low Risk  (10/12/2023)    Interpersonal Safety      Do you feel physically and emotionally safe where you currently live?: Yes      Within the past 12 months, have you been hit, slapped, kicked or otherwise physically hurt by someone?: No      Within the past 12 months, have you been humiliated or emotionally abused in other ways by your partner or ex-partner?: No   Housing Stability: Low Risk  (1/9/2024)    Housing Stability      Do you have housing? : Yes      Are you worried about losing your housing?: No           Lab Results    Recent Results (from the past 240 hour(s))   Comprehensive metabolic panel    Collection Time: 06/05/24  8:05 AM   Result Value Ref Range    Sodium 131 (L) 135 - 145 mmol/L    Potassium 4.8 3.4 - 5.3 mmol/L    Carbon Dioxide (CO2) 28 22 - 29 mmol/L    Anion Gap 10 7 - 15 mmol/L    Urea Nitrogen 21.1 (H) 6.0 - 20.0 mg/dL    Creatinine 0.69 0.67 - 1.17 mg/dL    GFR Estimate >90 >60 mL/min/1.73m2    Calcium 9.4 8.6 - 10.0 mg/dL    Chloride 93 (L) 98 - 107 mmol/L    Glucose 129 (H) 70 - 99 mg/dL    Alkaline Phosphatase 56 40 - 150 U/L    AST 17 0 - 45 U/L    ALT 18 0 - 70 U/L    Protein Total 6.7 6.4 - 8.3 g/dL    Albumin 4.2 3.5 - 5.2 g/dL    Bilirubin Total <0.2 <=1.2 mg/dL    TSH with free T4 reflex    Collection Time: 06/05/24  8:05 AM   Result Value Ref Range    TSH 0.28 (L) 0.30 - 4.20 uIU/mL   CBC with platelets and differential    Collection Time: 06/05/24  8:05 AM   Result Value Ref Range    WBC Count 9.8 4.0 - 11.0 10e3/uL    RBC Count 3.57 (L) 4.40 - 5.90 10e6/uL    Hemoglobin 11.4 (L) 13.3 - 17.7 g/dL    Hematocrit 35.9 (L) 40.0 - 53.0 %     (H) 78 - 100 fL    MCH 31.9 26.5 - 33.0 pg    MCHC 31.8 31.5 - 36.5 g/dL    RDW 17.2 (H) 10.0 - 15.0 %    Platelet Count 428 150 - 450 10e3/uL    % Neutrophils 88 %    % Lymphocytes 6 %    % Monocytes 5 %    % Eosinophils 0 %    % Basophils 0 %    % Immature Granulocytes 0 %    NRBCs per 100 WBC 0 <1 /100    Absolute Neutrophils 8.6 (H) 1.6 - 8.3 10e3/uL    Absolute Lymphocytes 0.6 (L) 0.8 - 5.3 10e3/uL    Absolute Monocytes 0.5 0.0 - 1.3 10e3/uL    Absolute Eosinophils 0.0 0.0 - 0.7 10e3/uL    Absolute Basophils 0.0 0.0 - 0.2 10e3/uL    Absolute Immature Granulocytes 0.0 <=0.4 10e3/uL    Absolute NRBCs 0.0 10e3/uL       Imaging Results    CT Chest/Abdomen/Pelvis w Contrast    Result Date: 5/21/2024  CT CHEST/ABDOMEN/PELVIS WITH CONTRAST 5/21/2024 10:02 AM CLINICAL HISTORY: Metastatic lung cancer - assess chemo response. 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 TECHNIQUE: CT scan of the chest, abdomen, and pelvis was performed following injection of IV contrast. Multiplanar reformats were obtained. Dose reduction techniques were used. CONTRAST: 49mL [id number] COMPARISON: 4/11/2024, 11/9/2023 FINDINGS: LUNGS AND PLEURA: Severe upper lobe predominant emphysematous changes of the lungs are present. A few irregular scarlike pulmonary nodules are present, right greater than left. For example, an irregular scarlike nodule with subtle surrounding groundglass attenuation is  seen in the anterior right upper lobe measuring 10 x 8 mm (3-161), which is new to comparison. A scarlike nodule in the inferior right middle lobe measuring 13 x 10 mm (3-245) is also present, corresponding to a previously seen round 11 x 10 mm nodular opacity. Previously described subcentimeter nodularity with mild increased FDG uptake in the right upper lobe appears unchanged. A couple benign calcified granulomas are also noted. Irregular consolidative volume loss is seen in the anterior aspects of the lower lobes, left greater than right. There has been interval improvement in previously seen opacities in these regions. No new or progressive interstitial or airspace disease. No pleural fluid. A metallic endobronchial stent is seen in the bronchus intermedius with mild debris along the stent. Minimal endobronchial debris in the left lower lobe (3-235). Large airways are patent. MEDIASTINUM/AXILLAE: Heart size is normal. No pericardial fluid. Moderate coronary artery atherosclerosis. There is a mildly enlarged left hilar lymph node measuring 15 x 12 mm, which is nonspecific and appears similar to comparison. This lymph node previously demonstrated low level FDG uptake. No new or enlarging thoracic lymph nodes. Similar to the most recent prior PET imaging, the previously described perihilar right lung mass has resolved with minimal residual peribronchovascular soft tissue thickening at the right hilum. Thoracic aorta is normal in course and caliber. Mild thoracic aortic atherosclerosis. HEPATOBILIARY: Normal. PANCREAS: Normal. SPLEEN: Normal. ADRENAL GLANDS: Normal. KIDNEYS/BLADDER: Simple appearing bilateral renal cysts. No follow-up is necessary. No solid enhancing renal mass. No nephrolithiasis or hydronephrosis. No urinary bladder wall thickening. BOWEL: Colonic diverticulosis is present without evidence of diverticulitis. Large and small bowel loops are nonobstructed and noninflamed. PELVIC ORGANS: Prostate  gland is mildly enlarged. Similar-appearing low density/fluid in the posterior aspect of the prostate along the midline. ADDITIONAL FINDINGS: Moderate atherosclerosis of the abdominal aorta and iliac arteries. No aneurysmal dilation. Lobulated fat-containing noninflamed umbilical hernia. MUSCULOSKELETAL: Mild degenerative changes of the spine. No aggressive appearing lytic or blastic osseous lesions. Remote, healed right anterior second rib fracture.     IMPRESSION: 1.  Indeterminate irregular pulmonary nodules on the right. Recommend attention on surveillance imaging. 2.  Interval improvement in previously seen consolidative changes in the lower lobes. Mild residual atelectasis and/or pneumonitis on the left. 3.  Severe pulmonary emphysema. 4.  Minimal residual peribronchial vascular soft tissue thickening at the right hilum. No residual or recurrent right hilar mass. 5.  Similar appearing mildly enlarged left hilar lymph node. 6.  Nonacute findings as above. RENU BOSWELL MD   SYSTEM ID:  [id number]       I spent 25 minutes on the patient's visit today.  This included preparation for the visit, face-to-face time with the patient and documentation following the visit.  It did not include teaching or procedure time.    Signed by: Peter E. Friedell, MD          Again, thank you for allowing me to participate in the care of your patient.        Sincerely,        Peter E. Friedell, MD

## 2024-06-05 NOTE — LETTER
"    6/5/2024         RE: Luis Hernandez  [id number] [facility] [address]      Oncology Rooming Note    June 5, 2024 8:27 AM   Luis Hernandez is a 59 year old male who presents for:    Chief Complaint   Patient presents with     Oncology Clinic Visit     Squamous cell lung cancer, right - Labs provider and infusion     Initial Vitals: /85 (BP Location: Right arm, Patient Position: Sitting, Cuff Size: Adult Small)   Pulse 95   Temp 98  F (36.7  C) (Tympanic)   Resp 16   Ht 1.626 m (5' 4\")   Wt 47.6 kg (105 lb)   SpO2 94%   BMI 18.02 kg/m   Estimated body mass index is 18.02 kg/m  as calculated from the following:    Height as of this encounter: 1.626 m (5' 4\").    Weight as of this encounter: 47.6 kg (105 lb). Body surface area is 1.47 meters squared.  No Pain (0) Comment: Data Unavailable   No LMP for male patient.  Allergies reviewed: Yes  Medications reviewed: Yes    Medications: Medication refills not needed today.  Pharmacy name entered into [employer]:    [id number] - [place], [place] - [address] [facility] SPECIAL[facility] [place] - [place]ANGELA SAUCEDO - [address] [facility] [place]A JALLOH - [address] [facility] AT PORTAL TO [facility] SITES  [id number] - [place], [place] - [facility] [facility]    Frailty Screening:   Is the patient here for a new oncology consult visit in cancer care? 2. No      Clinical concerns:  None      [facility] Hematology and Oncology Follow-up Note    Patient: Luis Hernandez  MRN: [id number]  Date of Service: Jun 5, 2024       Reason for Visit    Follow-up squamous cell carcinoma of the lung      Encounter Diagnoses Assessment and Plan:    Problem List Items Addressed This Visit       Squamous cell lung cancer, right (H) - Primary       Patient returns for follow-up.  He was hospitalized with respiratory failure from COPD and likely viral pneumonia.  CT of 4/11/2024 " and chest x-ray of 4/23/2024 show almost complete resolution of the primary tumor.  CT scan on 5/21/2024 shows a few lymph nodes that appear slightly larger.  Continued surveillance is recommended.  PET CT scan is planned for about 8 weeks.  He tolerated his first cycle of pembrolizumab monotherapy without difficulty.  He did have fatigue for about 2 weeks, but this has resolved .  Patient will continue with pembrolizumab today and return in 3 weeks for cycle 6    ______________________________________________________________________________    Staging History   Cancer Staging   No matching staging information was found for the patient.    ECOG Performance 2    History of Present Illness    Disease history per Dr. Lantigua  The patient has a longstanding history of severe COPD (FEV1=18%) with recurrent infection, prior tobacco use until August, 2021, pulmonary and essential hypertension.     On 10/5/2023, he was seen by his regular pulmonologist with a history of waxing/waning bilateral lung nodules with a follow-up 9/14/2023 CT with chest revealing a new spiculated nodule at the base of the right upper lobe with persistent and stable filling defect of the bronchus intermedius/right lower lobe bronchus concerning for possible endobronchial tumor versus mucous plugging, and progressive abnormal soft tissue mass in the subcarinal.       Because he was felt not to be able to tolerate bronchoscopy, a 11/9/2023 PET scan revealed glucose avid right hilar soft tissue mass encasing the bronchus intermedius extending along the right middle lobe bronchus contiguous with subcarinal lymphadenopathy and intraluminal soft tissue density of the right lower lobe bronchus concerning for primary lung malignancy.  In addition, there were glucose avid opacities seen bilaterally involve the right lower lobe, left suprahilar region, medial left lower lobe and right upper lobe which were felt to be possibly inflammatory.  However endobronchial  tumor spread cannot be excluded.  A hypermetabolic soft tissue thickening was seen in the distal sigmoid/upper rectum concerning for malignancy.  However, a 10/25/2023 Cologuard study was negative.     On 11/17/2023, the patient was seen in hematology/medical oncology consultation by Dr. Tim Proctor.  Augmentin was empirically initiated by Dr. Cuellar.     On 1/18/2024, the patient underwent flexible and rigid bronchoscopy with a right upper lobe, right mainstem bronchus and bronchus intermedius identified revealing a moderatey differentiated squamous cell carcinoma with a PD-L1 CPS 10-20% and TPS 5%.  Lung cancer NGS panel revealed CDKN2A and PIK3CA alterations.       The patient underwent tumor debulking with placement of a 10 x 15 mm bronchus intermedius stent and therapeutic suctioning of the airway.  The right middle lobe bronchus could not be identified.  In addition, right upper lobe mass needle aspirate confirmed cytologic evidence of a non-small cell carcinoma consistent with a squamous cell carcinoma which was p40 positive, TTF 1 and CK7 negative.  Following the bronchoscopic procedure, his breathing was better for perhaps a week but has now returned to baseline.     Follow-up 1/20/2023 CT chest without contrast revealed reduction in left upper lobe nodule, new nodules throughout both lungs up to 7 mm and continued nodular wall thickening of the bronchus intermedius, right lower lobe bronchus with masslike filling defect of the right lower lobe.     On 2/1/2024,Dr. Smith saw him in initial oncology consultation.  MR scan brain with contrast was unremarkable.  He subsequently elected interventional therapy although he had been inclined toward palliative care only at the time of his original consultation.    On 2/29/2024, cycle #1 upfront induction pembrolizumab, paclitaxel, carboplatin and pegfilgrastim were administered.  The patient tolerated well with bone aching for 6-7 days which resolved with  "breathlessness may be slightly better.  He denies any paresthesias.  A CBC revealed a white count 7700, hemoglobin 9.8, platelets 496,000, normal CMP and TSH    CT scan on 4/11/2024 showed almost complete resolution of the tumor.  4/23/2024 patient was admitted to the hospital with respiratory failure and viral pneumonia.  On 5/1/2024 patient was discharged from the hospital.  On 5/21/2024 CT scan of the chest abdomen and pelvis is generally stable but there are still some slightly enlarging lymph nodes.  Continued surveillance is recommended.      Presently he is still breathing better but still requires oxygen 24/7.  He does not have chills, fevers or sweats.  His appetite is  and he is lost about 5 pounds since his last visit..  He is living with his father.  His daughter is able to come and help sometimes.           Review of systems.  Pertinent Findings are included in the History of Present Illness    Physical Exam    /85 (BP Location: Right arm, Patient Position: Sitting, Cuff Size: Adult Small)   Pulse 95   Temp 98  F (36.7  C) (Tympanic)   Resp 16   Ht 1.626 m (5' 4\")   Wt 47.6 kg (105 lb)   SpO2 94%   BMI 18.02 kg/m       GENERAL APPEARANCE: Chronically ill-appearing man using nasal oxygen.  In no apparent distress.  HEAD: Atraumatic; normocephalic; without lesions.  EYES: Conjunctiva, corneas and eyelids normal; pupils equal, round, reactive to light; No Icterus.  MOUTH/OROPHARYNX: Oral mucosa intact upper and lower dentures noted.  NECK: Supple with no nodes.  LUNGS: Decreased breath sounds throughout with prolonged expiratory phase  HEART: Regular rhythm and rate; S1 and S2 normal; no murmurs noted.  ABDOMEN: Soft; no masses or tenderness, no hepatosplenomegaly.  NEUROLOGIC: Alert and oriented.  No obvious focal findings.  EXTREMITIES: No cyanosis, or edema.  SKIN: No abnormal bruising or bleeding. No suspicious lesions noted on exposed skin.  PSYCHIATRIC: Mental status normal; no " apparent psychiatric issues    Medications:    Current Outpatient Medications   Medication Sig Dispense Refill     albuterol (PROAIR HFA/PROVENTIL HFA/VENTOLIN HFA) 108 (90 Base) MCG/ACT inhaler Inhale 2 puffs into the lungs every 4 hours as needed for shortness of breath 54 g 3     amLODIPine (NORVASC) 10 MG tablet Take 1 tablet (10 mg) by mouth daily 90 tablet 3     fluticasone-salmeterol (ADVAIR) 500-50 MCG/ACT inhaler Inhale 1 puff into the lungs every 12 hours 60 each 11     guaiFENesin (MUCINEX MAXIMUM STRENGTH) 1200 MG TB12 Take 1 tablet by mouth daily       lisinopril (ZESTRIL) 40 MG tablet Take 1 tablet (40 mg) by mouth daily 90 tablet 3     LORazepam (ATIVAN) 0.5 MG tablet Take 1 tablet (0.5 mg) by mouth every 6 hours as needed for anxiety 15 tablet 0     magnesium oxide (MAG-OX) 400 MG tablet Take 400 mg by mouth daily (with lunch) For leg cramps       metoprolol tartrate (LOPRESSOR) 25 MG tablet Take 1 tablet (25 mg) by mouth 2 times daily 180 tablet 3     ondansetron (ZOFRAN) 8 MG tablet Take 1 tablet (8 mg) by mouth every 8 hours as needed for nausea (vomiting) 30 tablet 2     order for DME Equipment being ordered: Oxygen 3L via NC continuous.  O2 saturated noted to be 86% on room air at rest. (Patient taking differently: 2 L Equipment being ordered: Oxygen 3L via NC continuous.  O2 saturated noted to be 86% on room air at rest.) 1 Units 0     order for DME Equipment being ordered: Nebulizer 1 Device 0     predniSONE (DELTASONE) 10 MG tablet Take 1 tablet (10 mg) by mouth daily 90 tablet 1     prochlorperazine (COMPAZINE) 10 MG tablet Take 1 tablet (10 mg) by mouth every 6 hours as needed for nausea or vomiting 30 tablet 2     roflumilast (DALIRESP) 500 MCG TABS tablet Take 1 tablet (500 mcg) by mouth daily 90 tablet 3     tiotropium (SPIRIVA RESPIMAT) 2.5 MCG/ACT inhaler Inhale 2 puffs into the lungs daily 12 g 3     No current facility-administered medications for this visit.      Facility-Administered Medications Ordered in Other Visits   Medication Dose Route Frequency Provider Last Rate Last Admin     pembrolizumab (KEYTRUDA) 200 mg in sodium chloride 0.9 % 63 mL infusion  200 mg Intravenous Once Friedell, Peter E, MD         sodium chloride 0.9% BOLUS 250 mL  250 mL Intravenous Once Friedell, Peter E, MD               Past History    Past Medical History:   Diagnosis Date     Acute on chronic respiratory failure with hypoxia (H) 04/10/2020     Acute on chronic respiratory failure with hypoxia and hypercapnia (H) 04/01/2019     Acute respiratory failure with hypoxia and hypercapnia (H) 04/23/2024     CAP (community acquired pneumonia) 04/14/2020     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)      COPD exacerbation (H) 04/01/2019     COPD exacerbation (H) 02/16/2020     Erectile dysfunction      Hypertension      Hyponatremia 04/01/2019     Pneumonia 04/10/2020     Past Surgical History:   Procedure Laterality Date     APPENDECTOMY      childhood     BRONCHOSCOPY, DILATE BRONCHUS, STENT BRONCHUS, COMBINED N/A 1/18/2024    Procedure: Bronchoscopy with tissue debulking,  and stent placement.;  Surgeon: Isac Prescott MD;  Location: [facility] OR     ENDOBRONCHIAL ULTRASOUND FLEXIBLE N/A 1/18/2024    Procedure: Endobronchial ultrasound with Endobronchial and Cryo biopsy.;  Surgeon: Isac Prescott MD;  Location: [facility] OR     Allergies   Allergen Reactions     Hctz Other (See Comments)     He has hyponatremia - avoid use     Penicillins Unknown     Childhood reaction.     Family History   Problem Relation Age of Onset     Hypertension Mother      Ovarian Cancer Mother      Breast Cancer Mother      Hypertension Father      Lipids Father      C.A.D. Father      Heart Disease Father      Chronic Obstructive Pulmonary Disease Father      Cerebrovascular Disease Father      Diabetes Paternal Grandmother      Chronic Obstructive Pulmonary Disease Paternal Grandfather      Social  History     Socioeconomic History     Marital status: Single     Spouse name: None     Number of children: None     Years of education: None     Highest education level: None   Tobacco Use     Smoking status: Former     Current packs/day: 0.00     Average packs/day: 2.0 packs/day for 30.0 years (60.0 ttl pk-yrs)     Types: Cigarettes     Start date: 1991     Quit date: 2021     Years since quittin.7     Smokeless tobacco: Former   Vaping Use     Vaping status: Never Used   Substance and Sexual Activity     Alcohol use: Yes     Comment: rare     Drug use: No     Sexual activity: Yes     Partners: Female   Other Topics Concern     Parent/sibling w/ CABG, MI or angioplasty before 65F 55M? No   Social History Narrative    The patient has a 60+ pk yr tobacco hx.  He has has no active use, quit 2014.  Alcohol use is <1 alcoholic drinks per week.  He denies use of recreational drugs.  He is employed. [employer].  Works nights.   The patient is .  Has 1 child.Hot Tub Exposure: NORecent Travel: NO Hx of incarceration:  NOBird Exposure:   NOAnimal Exposure:  NOInhalation Exposure:  NO        Lives in [place], [place] with father. 2 dogs.          Social Determinants of Health     Financial Resource Strain: Low Risk  (2024)    Financial Resource Strain      Within the past 12 months, have you or your family members you live with been unable to get utilities (heat, electricity) when it was really needed?: No   Food Insecurity: Low Risk  (2024)    Food Insecurity      Within the past 12 months, did you worry that your food would run out before you got money to buy more?: No      Within the past 12 months, did the food you bought just not last and you didn t have money to get more?: No   Transportation Needs: Low Risk  (2024)    Transportation Needs      Within the past 12 months, has lack of transportation kept you from medical appointments, getting your medicines, non-medical meetings or  appointments, work, or from getting things that you need?: No   Physical Activity: Inactive (8/30/2021)    Exercise Vital Sign      Days of Exercise per Week: 0 days      Minutes of Exercise per Session: 0 min   Social Connections: Unknown (8/30/2021)    Social Connection and Isolation Panel [NHANES]      Frequency of Communication with Friends and Family: Twice a week      Frequency of Social Gatherings with Friends and Family: Twice a week      Attends [employer]ec[employer]al Services: Never      Active Member of Clubs or Organizations: No      Attends Club or Organization Meetings: Never   Interpersonal Safety: Low Risk  (10/12/2023)    Interpersonal Safety      Do you feel physically and emotionally safe where you currently live?: Yes      Within the past 12 months, have you been hit, slapped, kicked or otherwise physically hurt by someone?: No      Within the past 12 months, have you been humiliated or emotionally abused in other ways by your partner or ex-partner?: No   Housing Stability: Low Risk  (1/9/2024)    Housing Stability      Do you have housing? : Yes      Are you worried about losing your housing?: No           Lab Results    Recent Results (from the past 240 hour(s))   Comprehensive metabolic panel    Collection Time: 06/05/24  8:05 AM   Result Value Ref Range    Sodium 131 (L) 135 - 145 mmol/L    Potassium 4.8 3.4 - 5.3 mmol/L    Carbon Dioxide (CO2) 28 22 - 29 mmol/L    Anion Gap 10 7 - 15 mmol/L    Urea Nitrogen 21.1 (H) 6.0 - 20.0 mg/dL    Creatinine 0.69 0.67 - 1.17 mg/dL    GFR Estimate >90 >60 mL/min/1.73m2    Calcium 9.4 8.6 - 10.0 mg/dL    Chloride 93 (L) 98 - 107 mmol/L    Glucose 129 (H) 70 - 99 mg/dL    Alkaline Phosphatase 56 40 - 150 U/L    AST 17 0 - 45 U/L    ALT 18 0 - 70 U/L    Protein Total 6.7 6.4 - 8.3 g/dL    Albumin 4.2 3.5 - 5.2 g/dL    Bilirubin Total <0.2 <=1.2 mg/dL   TSH with free T4 reflex    Collection Time: 06/05/24  8:05 AM   Result Value Ref Range    TSH 0.28 (L) 0.30 - 4.20  uIU/mL   CBC with platelets and differential    Collection Time: 06/05/24  8:05 AM   Result Value Ref Range    WBC Count 9.8 4.0 - 11.0 10e3/uL    RBC Count 3.57 (L) 4.40 - 5.90 10e6/uL    Hemoglobin 11.4 (L) 13.3 - 17.7 g/dL    Hematocrit 35.9 (L) 40.0 - 53.0 %     (H) 78 - 100 fL    MCH 31.9 26.5 - 33.0 pg    MCHC 31.8 31.5 - 36.5 g/dL    RDW 17.2 (H) 10.0 - 15.0 %    Platelet Count 428 150 - 450 10e3/uL    % Neutrophils 88 %    % Lymphocytes 6 %    % Monocytes 5 %    % Eosinophils 0 %    % Basophils 0 %    % Immature Granulocytes 0 %    NRBCs per 100 WBC 0 <1 /100    Absolute Neutrophils 8.6 (H) 1.6 - 8.3 10e3/uL    Absolute Lymphocytes 0.6 (L) 0.8 - 5.3 10e3/uL    Absolute Monocytes 0.5 0.0 - 1.3 10e3/uL    Absolute Eosinophils 0.0 0.0 - 0.7 10e3/uL    Absolute Basophils 0.0 0.0 - 0.2 10e3/uL    Absolute Immature Granulocytes 0.0 <=0.4 10e3/uL    Absolute NRBCs 0.0 10e3/uL       Imaging Results    CT Chest/Abdomen/Pelvis w Contrast    Result Date: 5/21/2024  CT CHEST/ABDOMEN/PELVIS WITH CONTRAST 5/21/2024 10:02 AM CLINICAL HISTORY: Metastatic lung cancer - assess chemo response. 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 TECHNIQUE: CT scan of the chest, abdomen, and pelvis was performed following injection of IV contrast. Multiplanar reformats were obtained. Dose reduction techniques were used. CONTRAST: 49mL [id number]-370 COMPARISON: 4/11/2024, 11/9/2023 FINDINGS: LUNGS AND PLEURA: Severe upper lobe predominant emphysematous changes of the lungs are present. A few irregular scarlike pulmonary nodules are present, right greater than left. For example, an irregular scarlike nodule with subtle surrounding groundglass attenuation is seen in the anterior right upper lobe measuring 10 x 8 mm (3-161), which is new to comparison. A scarlike nodule in  the inferior right middle lobe measuring 13 x 10 mm (3-245) is also present, corresponding to a previously seen round 11 x 10 mm nodular opacity. Previously described subcentimeter nodularity with mild increased FDG uptake in the right upper lobe appears unchanged. A couple benign calcified granulomas are also noted. Irregular consolidative volume loss is seen in the anterior aspects of the lower lobes, left greater than right. There has been interval improvement in previously seen opacities in these regions. No new or progressive interstitial or airspace disease. No pleural fluid. A metallic endobronchial stent is seen in the bronchus intermedius with mild debris along the stent. Minimal endobronchial debris in the left lower lobe (3-235). Large airways are patent. MEDIASTINUM/AXILLAE: Heart size is normal. No pericardial fluid. Moderate coronary artery atherosclerosis. There is a mildly enlarged left hilar lymph node measuring 15 x 12 mm, which is nonspecific and appears similar to comparison. This lymph node previously demonstrated low level FDG uptake. No new or enlarging thoracic lymph nodes. Similar to the most recent prior PET imaging, the previously described perihilar right lung mass has resolved with minimal residual peribronchovascular soft tissue thickening at the right hilum. Thoracic aorta is normal in course and caliber. Mild thoracic aortic atherosclerosis. HEPATOBILIARY: Normal. PANCREAS: Normal. SPLEEN: Normal. ADRENAL GLANDS: Normal. KIDNEYS/BLADDER: Simple appearing bilateral renal cysts. No follow-up is necessary. No solid enhancing renal mass. No nephrolithiasis or hydronephrosis. No urinary bladder wall thickening. BOWEL: Colonic diverticulosis is present without evidence of diverticulitis. Large and small bowel loops are nonobstructed and noninflamed. PELVIC ORGANS: Prostate gland is mildly enlarged. Similar-appearing low density/fluid in the posterior aspect of the prostate along the  midline. ADDITIONAL FINDINGS: Moderate atherosclerosis of the abdominal aorta and iliac arteries. No aneurysmal dilation. Lobulated fat-containing noninflamed umbilical hernia. MUSCULOSKELETAL: Mild degenerative changes of the spine. No aggressive appearing lytic or blastic osseous lesions. Remote, healed right anterior second rib fracture.     IMPRESSION: 1.  Indeterminate irregular pulmonary nodules on the right. Recommend attention on surveillance imaging. 2.  Interval improvement in previously seen consolidative changes in the lower lobes. Mild residual atelectasis and/or pneumonitis on the left. 3.  Severe pulmonary emphysema. 4.  Minimal residual peribronchial vascular soft tissue thickening at the right hilum. No residual or recurrent right hilar mass. 5.  Similar appearing mildly enlarged left hilar lymph node. 6.  Nonacute findings as above. RENU BOSWELL MD   SYSTEM ID:  [id number]       I spent 25 minutes on the patient's visit today.  This included preparation for the visit, face-to-face time with the patient and documentation following the visit.  It did not include teaching or procedure time.    Signed by: Peter E. Friedell, MD Peter E. Friedell, MD

## 2024-06-05 NOTE — PROGRESS NOTES
Infusion Nursing Note:  Luis eHrnandez presents today for C5 D1 Keytruda.    Note: Pt denies any new health changes or concerns.     Intravenous Access:  Peripheral IV placed.    Treatment Conditions:  Lab Results   Component Value Date    HGB 11.4 (L) 06/05/2024    WBC 9.8 06/05/2024    ANEU 31.1 (H) 04/26/2024    ANEUTAUTO 8.6 (H) 06/05/2024     06/05/2024        Lab Results   Component Value Date     (L) 06/05/2024    POTASSIUM 4.8 06/05/2024    MAG 2.2 04/24/2022    CR 0.69 06/05/2024    MIKE 9.4 06/05/2024    BILITOTAL <0.2 06/05/2024    ALBUMIN 4.2 06/05/2024    ALT 18 06/05/2024    AST 17 06/05/2024       Results reviewed, labs MET treatment parameters, ok to proceed with treatment.      Post Infusion Assessment:  Patient tolerated infusion without incident.  Blood return noted pre and post infusion.  Site patent and intact, free from redness, edema or discomfort.  No evidence of extravasations.  Access discontinued per protocol.       Discharge Plan:   Discharge instructions reviewed with: Patient.  Patient and/or family verbalized understanding of discharge instructions and all questions answered.  Patient discharged in stable condition accompanied by: self.  Departure Mode: Ambulatory.      Karlie Clay RN

## 2024-06-05 NOTE — PROGRESS NOTES
[facility] Follow-up Note    Patient: Luis Hernandez  MRN: [id number]  Date of Service: Jun 5, 2024       Reason for Visit    Follow-up squamous cell carcinoma of the lung      Encounter Diagnoses Assessment and Plan:    Problem List Items Addressed This Visit       Squamous cell lung cancer, right (H) - Primary       Patient returns for follow-up.  He was hospitalized with respiratory failure from COPD and likely viral pneumonia.  CT of 4/11/2024 and chest x-ray of 4/23/2024 show almost complete resolution of the primary tumor.  CT scan on 5/21/2024 shows a few lymph nodes that appear slightly larger.  Continued surveillance is recommended.  PET CT scan is planned for about 8 weeks.  He tolerated his first cycle of pembrolizumab monotherapy without difficulty.  He did have fatigue for about 2 weeks, but this has resolved .  Patient will continue with pembrolizumab today and return in 3 weeks for cycle 6    ______________________________________________________________________________    Staging History   Cancer Staging   Squamous cell lung cancer, right (H)  Staging form: Lung, AJCC 8th Edition  - Clinical: Stage IIIB (cT2a, cN3, cM0) - Signed by Friedell, Peter E, MD on 6/5/2024    ECOG Performance 2    History of Present Illness    Disease history per Dr. Lantigua  The patient has a longstanding history of severe COPD (FEV1=18%) with recurrent infection, prior tobacco use until August, 2021, pulmonary and essential hypertension.     On 10/5/2023, he was seen by his regular pulmonologist with a history of waxing/waning bilateral lung nodules with a follow-up 9/14/2023 CT with chest revealing a new spiculated nodule at the base of the right upper lobe with persistent and stable filling defect of the bronchus intermedius/right lower lobe bronchus concerning for possible endobronchial tumor versus mucous plugging, and progressive abnormal soft tissue mass in the subcarinal.       Because he  was felt not to be able to tolerate bronchoscopy, a 11/9/2023 PET scan revealed glucose avid right hilar soft tissue mass encasing the bronchus intermedius extending along the right middle lobe bronchus contiguous with subcarinal lymphadenopathy and intraluminal soft tissue density of the right lower lobe bronchus concerning for primary lung malignancy.  In addition, there were glucose avid opacities seen bilaterally involve the right lower lobe, left suprahilar region, medial left lower lobe and right upper lobe which were felt to be possibly inflammatory.  However endobronchial tumor spread cannot be excluded.  A hypermetabolic soft tissue thickening was seen in the distal sigmoid/upper rectum concerning for malignancy.  However, a 10/25/2023 Cologuard study was negative.     On 11/17/2023, the patient was seen in hematology/medical oncology consultation by Dr. Tim Proctor.  Augmentin was empirically initiated by Dr. Cuellar.     On 1/18/2024, the patient underwent flexible and rigid bronchoscopy with a right upper lobe, right mainstem bronchus and bronchus intermedius identified revealing a moderatey differentiated squamous cell carcinoma with a PD-L1 CPS 10-20% and TPS 5%.  Lung cancer NGS panel revealed CDKN2A and PIK3CA alterations.       The patient underwent tumor debulking with placement of a 10 x 15 mm bronchus intermedius stent and therapeutic suctioning of the airway.  The right middle lobe bronchus could not be identified.  In addition, right upper lobe mass needle aspirate confirmed cytologic evidence of a non-small cell carcinoma consistent with a squamous cell carcinoma which was p40 positive, TTF 1 and CK7 negative.  Following the bronchoscopic procedure, his breathing was better for perhaps a week but has now returned to baseline.     Follow-up 1/20/2023 CT chest without contrast revealed reduction in left upper lobe nodule, new nodules throughout both lungs up to 7 mm and continued nodular wall  "thickening of the bronchus intermedius, right lower lobe bronchus with masslike filling defect of the right lower lobe.     On 2/1/2024,Dr. Smith saw him in initial oncology consultation.  MR scan brain with contrast was unremarkable.  He subsequently elected interventional therapy although he had been inclined toward palliative care only at the time of his original consultation.    On 2/29/2024, cycle #1 upfront induction pembrolizumab, paclitaxel, carboplatin and pegfilgrastim were administered.  The patient tolerated well with bone aching for 6-7 days which resolved with breathlessness may be slightly better.  He denies any paresthesias.SH  A CBC revealed a white count 7700, hemoglobin 9.8, platelets 496,000, normal CMP and TSH    CT scan on 4/11/2024 showed almost complete resolution of the tumor.  4/23/2024 patient was admitted to the hospital with respiratory failure and viral pneumonia.  On 5/1/2024 patient was discharged from the hospital.  On 5/21/2024 CT scan of the chest abdomen and pelvis is generally stable but there are still some slightly enlarging lymph nodes.  Continued surveillance is recommended.      Presently he is still breathing better but still requires oxygen 24/7.  He does not have chills, fevers or sweats.  His appetite is  and he is lost about 5 pounds since his last visit..  He is living with his father.  His daughter is able to come and help sometimes.           Review of systems.  Pertinent Findings are included in the History of Present Illness    Physical Exam    /85 (BP Location: Right arm, Patient Position: Sitting, Cuff Size: Adult Small)   Pulse 95   Temp 98  F (36.7  C) (Tympanic)   Resp 16   Ht 1.626 m (5' 4\")   Wt 47.6 kg (105 lb)   SpO2 94%   BMI 18.02 kg/m       GENERAL APPEARANCE: Chronically ill-appearing man using nasal oxygen.  In no apparent distress.  HEAD: Atraumatic; normocephalic; without lesions.  EYES: Conjunctiva, corneas and eyelids normal; pupils " equal, round, reactive to light; No Icterus.  MOUTH/OROPHARYNX: Oral mucosa intact upper and lower dentures noted.  NECK: Supple with no nodes.  LUNGS: Decreased breath sounds throughout with prolonged expiratory phase  HEART: Regular rhythm and rate; S1 and S2 normal; no murmurs noted.  ABDOMEN: Soft; no masses or tenderness, no hepatosplenomegaly.  NEUROLOGIC: Alert and oriented.  No obvious focal findings.  EXTREMITIES: No cyanosis, or edema.  SKIN: No abnormal bruising or bleeding. No suspicious lesions noted on exposed skin.  PSYCHIATRIC: Mental status normal; no apparent psychiatric issues    Medications:    Current Outpatient Medications   Medication Sig Dispense Refill    albuterol (PROAIR HFA/PROVENTIL HFA/VENTOLIN HFA) 108 (90 Base) MCG/ACT inhaler Inhale 2 puffs into the lungs every 4 hours as needed for shortness of breath 54 g 3    amLODIPine (NORVASC) 10 MG tablet Take 1 tablet (10 mg) by mouth daily 90 tablet 3    fluticasone-salmeterol (ADVAIR) 500-50 MCG/ACT inhaler Inhale 1 puff into the lungs every 12 hours 60 each 11    guaiFENesin (MUCINEX MAXIMUM STRENGTH) 1200 MG TB12 Take 1 tablet by mouth daily      lisinopril (ZESTRIL) 40 MG tablet Take 1 tablet (40 mg) by mouth daily 90 tablet 3    LORazepam (ATIVAN) 0.5 MG tablet Take 1 tablet (0.5 mg) by mouth every 6 hours as needed for anxiety 15 tablet 0    magnesium oxide (MAG-OX) 400 MG tablet Take 400 mg by mouth daily (with lunch) For leg cramps      metoprolol tartrate (LOPRESSOR) 25 MG tablet Take 1 tablet (25 mg) by mouth 2 times daily 180 tablet 3    ondansetron (ZOFRAN) 8 MG tablet Take 1 tablet (8 mg) by mouth every 8 hours as needed for nausea (vomiting) 30 tablet 2    order for DME Equipment being ordered: Oxygen 3L via NC continuous.  O2 saturated noted to be 86% on room air at rest. (Patient taking differently: 2 L Equipment being ordered: Oxygen 3L via NC continuous.  O2 saturated noted to be 86% on room air at rest.) 1 Units 0    order  for DME Equipment being ordered: Nebulizer 1 Device 0    predniSONE (DELTASONE) 10 MG tablet Take 1 tablet (10 mg) by mouth daily 90 tablet 1    prochlorperazine (COMPAZINE) 10 MG tablet Take 1 tablet (10 mg) by mouth every 6 hours as needed for nausea or vomiting 30 tablet 2    roflumilast (DALIRESP) 500 MCG TABS tablet Take 1 tablet (500 mcg) by mouth daily 90 tablet 3    tiotropium (SPIRIVA RESPIMAT) 2.5 MCG/ACT inhaler Inhale 2 puffs into the lungs daily 12 g 3     No current facility-administered medications for this visit.           Past History    Past Medical History:   Diagnosis Date    Acute on chronic respiratory failure with hypoxia (H) 04/10/2020    Acute on chronic respiratory failure with hypoxia and hypercapnia (H) 04/01/2019    Acute respiratory failure with hypoxia and hypercapnia (H) 04/23/2024    CAP (community acquired pneumonia) 04/14/2020    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)     COPD exacerbation (H) 04/01/2019    COPD exacerbation (H) 02/16/2020    Erectile dysfunction     Hypertension     Hyponatremia 04/01/2019    Pneumonia 04/10/2020     Past Surgical History:   Procedure Laterality Date    APPENDECTOMY      childhood    BRONCHOSCOPY, DILATE BRONCHUS, STENT BRONCHUS, COMBINED N/A 1/18/2024    Procedure: Bronchoscopy with tissue debulking,  and stent placement.;  Surgeon: Isac Prescott MD;  Location: [facility] OR    ENDOBRONCHIAL ULTRASOUND FLEXIBLE N/A 1/18/2024    Procedure: Endobronchial ultrasound with Endobronchial and Cryo biopsy.;  Surgeon: Isac Prescott MD;  Location: [facility] OR     Allergies   Allergen Reactions    Hctz Other (See Comments)     He has hyponatremia - avoid use    Penicillins Unknown     Childhood reaction.     Family History   Problem Relation Age of Onset    Hypertension Mother     Ovarian Cancer Mother     Breast Cancer Mother     Hypertension Father     Lipids Father     C.A.D. Father     Heart Disease Father     Chronic Obstructive  Pulmonary Disease Father     Cerebrovascular Disease Father     Diabetes Paternal Grandmother     Chronic Obstructive Pulmonary Disease Paternal Grandfather      Social History     Socioeconomic History    Marital status: Single     Spouse name: None    Number of children: None    Years of education: None    Highest education level: None   Tobacco Use    Smoking status: Former     Current packs/day: 0.00     Average packs/day: 2.0 packs/day for 30.0 years (60.0 ttl pk-yrs)     Types: Cigarettes     Start date: 1991     Quit date: 2021     Years since quittin.7    Smokeless tobacco: Former   Vaping Use    Vaping status: Never Used   Substance and Sexual Activity    Alcohol use: Yes     Comment: rare    Drug use: No    Sexual activity: Yes     Partners: Female   Other Topics Concern    Parent/sibling w/ CABG, MI or angioplasty before 65F 55M? No   Social History Narrative    The patient has a 60+ pk yr tobacco hx.  He has has no active use, quit 2014.  Alcohol use is <1 alcoholic drinks per week.  He denies use of recreational drugs.  He is employed. [employer].  Works nights.   The patient is .  Has 1 child.Hot Tub Exposure: NORecent Travel: NO Hx of incarceration:  NOBird Exposure:   NOAnimal Exposure:  NOInhalation Exposure:  NO        Lives in [place], [place] with father. 2 dogs.          Social Determinants of Health     Financial Resource Strain: Low Risk  (2024)    Financial Resource Strain     Within the past 12 months, have you or your family members you live with been unable to get utilities (heat, electricity) when it was really needed?: No   Food Insecurity: Low Risk  (2024)    Food Insecurity     Within the past 12 months, did you worry that your food would run out before you got money to buy more?: No     Within the past 12 months, did the food you bought just not last and you didn t have money to get more?: No   Transportation Needs: Low Risk  (2024)    Transportation  Needs     Within the past 12 months, has lack of transportation kept you from medical appointments, getting your medicines, non-medical meetings or appointments, work, or from getting things that you need?: No   Physical Activity: Inactive (8/30/2021)    Exercise Vital Sign     Days of Exercise per Week: 0 days     Minutes of Exercise per Session: 0 min   Social Connections: Unknown (8/30/2021)    Social Connection and Isolation Panel [NHANES]     Frequency of Communication with Friends and Family: Twice a week     Frequency of Social Gatherings with Friends and Family: Twice a week     Attends Mu-ism Services: Never     Active Member of Clubs or Organizations: No     Attends Club or Organization Meetings: Never   Interpersonal Safety: Low Risk  (10/12/2023)    Interpersonal Safety     Do you feel physically and emotionally safe where you currently live?: Yes     Within the past 12 months, have you been hit, slapped, kicked or otherwise physically hurt by someone?: No     Within the past 12 months, have you been humiliated or emotionally abused in other ways by your partner or ex-partner?: No   Housing Stability: Low Risk  (1/9/2024)    Housing Stability     Do you have housing? : Yes     Are you worried about losing your housing?: No           Lab Results    Recent Results (from the past 240 hour(s))   Comprehensive metabolic panel    Collection Time: 06/05/24  8:05 AM   Result Value Ref Range    Sodium 131 (L) 135 - 145 mmol/L    Potassium 4.8 3.4 - 5.3 mmol/L    Carbon Dioxide (CO2) 28 22 - 29 mmol/L    Anion Gap 10 7 - 15 mmol/L    Urea Nitrogen 21.1 (H) 6.0 - 20.0 mg/dL    Creatinine 0.69 0.67 - 1.17 mg/dL    GFR Estimate >90 >60 mL/min/1.73m2    Calcium 9.4 8.6 - 10.0 mg/dL    Chloride 93 (L) 98 - 107 mmol/L    Glucose 129 (H) 70 - 99 mg/dL    Alkaline Phosphatase 56 40 - 150 U/L    AST 17 0 - 45 U/L    ALT 18 0 - 70 U/L    Protein Total 6.7 6.4 - 8.3 g/dL    Albumin 4.2 3.5 - 5.2 g/dL    Bilirubin Total  <0.2 <=1.2 mg/dL   TSH with free T4 reflex    Collection Time: 06/05/24  8:05 AM   Result Value Ref Range    TSH 0.28 (L) 0.30 - 4.20 uIU/mL   CBC with platelets and differential    Collection Time: 06/05/24  8:05 AM   Result Value Ref Range    WBC Count 9.8 4.0 - 11.0 10e3/uL    RBC Count 3.57 (L) 4.40 - 5.90 10e6/uL    Hemoglobin 11.4 (L) 13.3 - 17.7 g/dL    Hematocrit 35.9 (L) 40.0 - 53.0 %     (H) 78 - 100 fL    MCH 31.9 26.5 - 33.0 pg    MCHC 31.8 31.5 - 36.5 g/dL    RDW 17.2 (H) 10.0 - 15.0 %    Platelet Count 428 150 - 450 10e3/uL    % Neutrophils 88 %    % Lymphocytes 6 %    % Monocytes 5 %    % Eosinophils 0 %    % Basophils 0 %    % Immature Granulocytes 0 %    NRBCs per 100 WBC 0 <1 /100    Absolute Neutrophils 8.6 (H) 1.6 - 8.3 10e3/uL    Absolute Lymphocytes 0.6 (L) 0.8 - 5.3 10e3/uL    Absolute Monocytes 0.5 0.0 - 1.3 10e3/uL    Absolute Eosinophils 0.0 0.0 - 0.7 10e3/uL    Absolute Basophils 0.0 0.0 - 0.2 10e3/uL    Absolute Immature Granulocytes 0.0 <=0.4 10e3/uL    Absolute NRBCs 0.0 10e3/uL   T4 free    Collection Time: 06/05/24  8:05 AM   Result Value Ref Range    Free T4 1.21 0.90 - 1.70 ng/dL       Imaging Results    CT Chest/Abdomen/Pelvis w Contrast    Result Date: 5/21/2024  CT CHEST/ABDOMEN/PELVIS WITH CONTRAST 5/21/2024 10:02 AM CLINICAL HISTORY: Metastatic lung cancer - assess chemo response. 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 TECHNIQUE: CT scan of the chest, abdomen, and pelvis was performed following injection of IV contrast. Multiplanar reformats were obtained. Dose reduction techniques were used. CONTRAST: 49mL Isovue-370 COMPARISON: 4/11/2024, 11/9/2023 FINDINGS: LUNGS AND PLEURA: Severe upper lobe predominant emphysematous changes of the lungs are present. A few irregular scarlike pulmonary nodules are  present, right greater than left. For example, an irregular scarlike nodule with subtle surrounding groundglass attenuation is seen in the anterior right upper lobe measuring 10 x 8 mm (3-161), which is new to comparison. A scarlike nodule in the inferior right middle lobe measuring 13 x 10 mm (3-245) is also present, corresponding to a previously seen round 11 x 10 mm nodular opacity. Previously described subcentimeter nodularity with mild increased FDG uptake in the right upper lobe appears unchanged. A couple benign calcified granulomas are also noted. Irregular consolidative volume loss is seen in the anterior aspects of the lower lobes, left greater than right. There has been interval improvement in previously seen opacities in these regions. No new or progressive interstitial or airspace disease. No pleural fluid. A metallic endobronchial stent is seen in the bronchus intermedius with mild debris along the stent. Minimal endobronchial debris in the left lower lobe (3-235). Large airways are patent. MEDIASTINUM/AXILLAE: Heart size is normal. No pericardial fluid. Moderate coronary artery atherosclerosis. There is a mildly enlarged left hilar lymph node measuring 15 x 12 mm, which is nonspecific and appears similar to comparison. This lymph node previously demonstrated low level FDG uptake. No new or enlarging thoracic lymph nodes. Similar to the most recent prior PET imaging, the previously described perihilar right lung mass has resolved with minimal residual peribronchovascular soft tissue thickening at the right hilum. Thoracic aorta is normal in course and caliber. Mild thoracic aortic atherosclerosis. HEPATOBILIARY: Normal. PANCREAS: Normal. SPLEEN: Normal. ADRENAL GLANDS: Normal. KIDNEYS/BLADDER: Simple appearing bilateral renal cysts. No follow-up is necessary. No solid enhancing renal mass. No nephrolithiasis or hydronephrosis. No urinary bladder wall thickening. BOWEL: Colonic diverticulosis is present  without evidence of diverticulitis. Large and small bowel loops are nonobstructed and noninflamed. PELVIC ORGANS: Prostate gland is mildly enlarged. Similar-appearing low density/fluid in the posterior aspect of the prostate along the midline. ADDITIONAL FINDINGS: Moderate atherosclerosis of the abdominal aorta and iliac arteries. No aneurysmal dilation. Lobulated fat-containing noninflamed umbilical hernia. MUSCULOSKELETAL: Mild degenerative changes of the spine. No aggressive appearing lytic or blastic osseous lesions. Remote, healed right anterior second rib fracture.     IMPRESSION: 1.  Indeterminate irregular pulmonary nodules on the right. Recommend attention on surveillance imaging. 2.  Interval improvement in previously seen consolidative changes in the lower lobes. Mild residual atelectasis and/or pneumonitis on the left. 3.  Severe pulmonary emphysema. 4.  Minimal residual peribronchial vascular soft tissue thickening at the right hilum. No residual or recurrent right hilar mass. 5.  Similar appearing mildly enlarged left hilar lymph node. 6.  Nonacute findings as above. RENU BOSWELL MD   SYSTEM ID:  [id number]       I spent 25 minutes on the patient's visit today.  This included preparation for the visit, face-to-face time with the patient and documentation following the visit.  It did not include teaching or procedure time.    Signed by: Peter E. Friedell, MD

## 2024-06-05 NOTE — PROGRESS NOTES
"Oncology Rooming Note    June 5, 2024 8:27 AM   Luis Hernandez is a 59 year old male who presents for:    Chief Complaint   Patient presents with    Oncology Clinic Visit     Squamous cell lung cancer, right - Labs provider and infusion     Initial Vitals: /85 (BP Location: Right arm, Patient Position: Sitting, Cuff Size: Adult Small)   Pulse 95   Temp 98  F (36.7  C) (Tympanic)   Resp 16   Ht 1.626 m (5' 4\")   Wt 47.6 kg (105 lb)   SpO2 94%   BMI 18.02 kg/m   Estimated body mass index is 18.02 kg/m  as calculated from the following:    Height as of this encounter: 1.626 m (5' 4\").    Weight as of this encounter: 47.6 kg (105 lb). Body surface area is 1.47 meters squared.  No Pain (0) Comment: Data Unavailable   No LMP for male patient.  Allergies reviewed: Yes  Medications reviewed: Yes    Medications: Medication refills not needed today.  Pharmacy name entered into [facility]:    [id number] - [place], [place] - [address] [facility] [address] [place] [address] [facility] [place] - [address] [facility] AT [address] [facility] SITES  [id number] - [place], [place] - [address] [place] [address]    Frailty Screening:   Is the patient here for a new oncology consult visit in cancer care? 2. No      Clinical concerns:  None      Feli Mead CMA            "

## 2024-06-07 ENCOUNTER — OFFICE VISIT (OUTPATIENT)
Dept: FAMILY MEDICINE | Facility: CLINIC | Age: 59
End: 2024-06-07
Payer: COMMERCIAL

## 2024-06-07 VITALS
BODY MASS INDEX: 17.75 KG/M2 | SYSTOLIC BLOOD PRESSURE: 124 MMHG | HEART RATE: 86 BPM | OXYGEN SATURATION: 96 % | WEIGHT: 104 LBS | DIASTOLIC BLOOD PRESSURE: 78 MMHG | RESPIRATION RATE: 22 BRPM | TEMPERATURE: 96.4 F | HEIGHT: 64 IN

## 2024-06-07 DIAGNOSIS — I10 ESSENTIAL HYPERTENSION, BENIGN: ICD-10-CM

## 2024-06-07 DIAGNOSIS — Z23 ENCOUNTER FOR ADMINISTRATION OF VACCINE: ICD-10-CM

## 2024-06-07 DIAGNOSIS — I27.20 PULMONARY HYPERTENSION (H): Primary | ICD-10-CM

## 2024-06-07 PROCEDURE — 99213 OFFICE O/P EST LOW 20 MIN: CPT | Mod: 25 | Performed by: NURSE PRACTITIONER

## 2024-06-07 PROCEDURE — 90677 PCV20 VACCINE IM: CPT | Performed by: NURSE PRACTITIONER

## 2024-06-07 PROCEDURE — G0009 ADMIN PNEUMOCOCCAL VACCINE: HCPCS | Performed by: NURSE PRACTITIONER

## 2024-06-07 ASSESSMENT — PAIN SCALES - GENERAL: PAINLEVEL: NO PAIN (0)

## 2024-06-07 NOTE — PROGRESS NOTES
"  Assessment & Plan     Pulmonary hypertension (H)  BP's have been stable now on lower dose.  Patient is feeling no signs or symptoms of low BP.  Will continue this dose ongoing and recheck in a year or if symptoms return.    Essential hypertension, benign  See note above.    Encounter for administration of vaccine  PCV20 given today in clinic.  - Pneumococcal 20 Valent Conjugate (Prevnar 20)    See Patient Instructions    Subjective   Prasad is a 59 year old, presenting for the following health issues:  Respiratory Distress (F/U)        6/7/2024     8:48 AM   Additional Questions   Roomed by Anahi CARBONE CMA   Accompanied by Daughter - Analilia     History of Present Illness       Reason for visit:  Follow up from respiratory illness    He eats 0-1 servings of fruits and vegetables daily.He consumes 0 sweetened beverage(s) daily.He exercises with enough effort to increase his heart rate 9 or less minutes per day.  He exercises with enough effort to increase his heart rate 3 or less days per week.   He is taking medications regularly.     Patient states that he is feeling much      Review of Systems  CONSTITUTIONAL: NEGATIVE for fever, chills, change in weight  RESP:POSITIVE for recent hx of pneumonia in both lungs, still has a lot of yellow phlegm but feels better with less shortness of breath with walking distance  CV: NEGATIVE for chest pain, palpitations or peripheral edema; denies any further low blood pressure readings and symptoms have improved.  PSYCHIATRIC: NEGATIVE for changes in mood or affect  ROS otherwise negative      Objective    /78 (BP Location: Left arm, Patient Position: Sitting, Cuff Size: Adult Regular)   Pulse 86   Temp (!) 96.4  F (35.8  C) (Tympanic)   Resp 22   Ht 1.626 m (5' 4\")   Wt 47.2 kg (104 lb)   SpO2 96%   BMI 17.85 kg/m    Body mass index is 17.85 kg/m .  Physical Exam   GENERAL: alert and no distress  RESP: rhonchi throughout  CV: regular rate and rhythm, normal S1 S2, no S3 " or S4, no murmur, click or rub, no peripheral edema  PSYCH: mentation appears normal, affect normal/bright    Signed Electronically by: Shalini Washington, NP

## 2024-06-26 ENCOUNTER — PATIENT OUTREACH (OUTPATIENT)
Dept: ONCOLOGY | Facility: CLINIC | Age: 59
End: 2024-06-26
Payer: COMMERCIAL

## 2024-06-26 NOTE — CONFIDENTIAL NOTE
[facility]: Cancer Care                                                                                        Patient called stating he wont make it in for treatment today as he has developed a chest infection with SOB again started yesterday. He states he gets these often due to his COPD so he started taking his prednisone and amoxicillin again today. Let Dr. Friedell know and he can just come in at his next scheduled appt on 7/18 and See Jennifer Cabrera prior to treatment. Instructed patient to go to the ER if his symptoms worsen. Patient verbalized understanding and agreement to plan.       Signature:  Rosa Maria Quick RN

## 2024-07-02 ENCOUNTER — PATIENT OUTREACH (OUTPATIENT)
Dept: ONCOLOGY | Facility: CLINIC | Age: 59
End: 2024-07-02
Payer: COMMERCIAL

## 2024-07-09 DIAGNOSIS — I27.20 PULMONARY HYPERTENSION (H): ICD-10-CM

## 2024-07-11 RX ORDER — LISINOPRIL 40 MG/1
40 TABLET ORAL DAILY
Qty: 90 TABLET | Refills: 2 | Status: SHIPPED | OUTPATIENT
Start: 2024-07-11

## 2024-07-18 ENCOUNTER — LAB (OUTPATIENT)
Dept: LAB | Facility: CLINIC | Age: 59
End: 2024-07-18
Attending: INTERNAL MEDICINE
Payer: COMMERCIAL

## 2024-07-18 ENCOUNTER — INFUSION THERAPY VISIT (OUTPATIENT)
Dept: INFUSION THERAPY | Facility: CLINIC | Age: 59
End: 2024-07-18
Attending: INTERNAL MEDICINE
Payer: COMMERCIAL

## 2024-07-18 ENCOUNTER — ONCOLOGY VISIT (OUTPATIENT)
Dept: ONCOLOGY | Facility: CLINIC | Age: 59
End: 2024-07-18
Attending: NURSE PRACTITIONER
Payer: COMMERCIAL

## 2024-07-18 VITALS
TEMPERATURE: 97.4 F | WEIGHT: 106.6 LBS | OXYGEN SATURATION: 94 % | DIASTOLIC BLOOD PRESSURE: 80 MMHG | BODY MASS INDEX: 18.3 KG/M2 | HEART RATE: 78 BPM | RESPIRATION RATE: 12 BRPM | SYSTOLIC BLOOD PRESSURE: 124 MMHG

## 2024-07-18 VITALS — SYSTOLIC BLOOD PRESSURE: 122 MMHG | DIASTOLIC BLOOD PRESSURE: 84 MMHG | HEART RATE: 77 BPM

## 2024-07-18 DIAGNOSIS — Z79.899 ENCOUNTER FOR LONG-TERM (CURRENT) USE OF HIGH-RISK MEDICATION: Primary | ICD-10-CM

## 2024-07-18 DIAGNOSIS — C34.91 SQUAMOUS CELL LUNG CANCER, RIGHT (H): ICD-10-CM

## 2024-07-18 DIAGNOSIS — C44.92: ICD-10-CM

## 2024-07-18 DIAGNOSIS — C34.91 SQUAMOUS CELL LUNG CANCER, RIGHT (H): Primary | ICD-10-CM

## 2024-07-18 DIAGNOSIS — J44.9 COPD, VERY SEVERE (H): ICD-10-CM

## 2024-07-18 DIAGNOSIS — Z51.11 ENCOUNTER FOR ANTINEOPLASTIC CHEMOTHERAPY: ICD-10-CM

## 2024-07-18 DIAGNOSIS — C77.1: ICD-10-CM

## 2024-07-18 LAB
ALBUMIN SERPL BCG-MCNC: 4 G/DL (ref 3.5–5.2)
ALP SERPL-CCNC: 39 U/L (ref 40–150)
ALT SERPL W P-5'-P-CCNC: 18 U/L (ref 0–70)
ANION GAP SERPL CALCULATED.3IONS-SCNC: 8 MMOL/L (ref 7–15)
AST SERPL W P-5'-P-CCNC: 17 U/L (ref 0–45)
BASOPHILS # BLD AUTO: 0.1 10E3/UL (ref 0–0.2)
BASOPHILS NFR BLD AUTO: 1 %
BILIRUB SERPL-MCNC: <0.2 MG/DL
BUN SERPL-MCNC: 15.1 MG/DL (ref 6–20)
CALCIUM SERPL-MCNC: 9.4 MG/DL (ref 8.8–10.4)
CHLORIDE SERPL-SCNC: 92 MMOL/L (ref 98–107)
CREAT SERPL-MCNC: 0.56 MG/DL (ref 0.67–1.17)
EGFRCR SERPLBLD CKD-EPI 2021: >90 ML/MIN/1.73M2
EOSINOPHIL # BLD AUTO: 0.4 10E3/UL (ref 0–0.7)
EOSINOPHIL NFR BLD AUTO: 5 %
ERYTHROCYTE [DISTWIDTH] IN BLOOD BY AUTOMATED COUNT: 13.1 % (ref 10–15)
GLUCOSE SERPL-MCNC: 90 MG/DL (ref 70–99)
HCO3 SERPL-SCNC: 28 MMOL/L (ref 22–29)
HCT VFR BLD AUTO: 33.7 % (ref 40–53)
HGB BLD-MCNC: 11.3 G/DL (ref 13.3–17.7)
IMM GRANULOCYTES # BLD: 0 10E3/UL
IMM GRANULOCYTES NFR BLD: 0 %
LYMPHOCYTES # BLD AUTO: 1.3 10E3/UL (ref 0.8–5.3)
LYMPHOCYTES NFR BLD AUTO: 14 %
MCH RBC QN AUTO: 32.2 PG (ref 26.5–33)
MCHC RBC AUTO-ENTMCNC: 33.5 G/DL (ref 31.5–36.5)
MCV RBC AUTO: 96 FL (ref 78–100)
MONOCYTES # BLD AUTO: 1.3 10E3/UL (ref 0–1.3)
MONOCYTES NFR BLD AUTO: 15 %
NEUTROPHILS # BLD AUTO: 5.9 10E3/UL (ref 1.6–8.3)
NEUTROPHILS NFR BLD AUTO: 65 %
NRBC # BLD AUTO: 0 10E3/UL
NRBC BLD AUTO-RTO: 0 /100
PLATELET # BLD AUTO: 407 10E3/UL (ref 150–450)
POTASSIUM SERPL-SCNC: 4.6 MMOL/L (ref 3.4–5.3)
PROT SERPL-MCNC: 6.9 G/DL (ref 6.4–8.3)
RBC # BLD AUTO: 3.51 10E6/UL (ref 4.4–5.9)
SODIUM SERPL-SCNC: 128 MMOL/L (ref 135–145)
T4 FREE SERPL-MCNC: 1.27 NG/DL (ref 0.9–1.7)
TSH SERPL DL<=0.005 MIU/L-ACNC: 0.28 UIU/ML (ref 0.3–4.2)
WBC # BLD AUTO: 9 10E3/UL (ref 4–11)

## 2024-07-18 PROCEDURE — 99214 OFFICE O/P EST MOD 30 MIN: CPT | Performed by: NURSE PRACTITIONER

## 2024-07-18 PROCEDURE — G2211 COMPLEX E/M VISIT ADD ON: HCPCS | Performed by: NURSE PRACTITIONER

## 2024-07-18 PROCEDURE — 36415 COLL VENOUS BLD VENIPUNCTURE: CPT | Performed by: NURSE PRACTITIONER

## 2024-07-18 PROCEDURE — 84443 ASSAY THYROID STIM HORMONE: CPT | Performed by: NURSE PRACTITIONER

## 2024-07-18 PROCEDURE — 85004 AUTOMATED DIFF WBC COUNT: CPT | Performed by: NURSE PRACTITIONER

## 2024-07-18 PROCEDURE — 80053 COMPREHEN METABOLIC PANEL: CPT | Performed by: NURSE PRACTITIONER

## 2024-07-18 PROCEDURE — 258N000003 HC RX IP 258 OP 636: Performed by: NURSE PRACTITIONER

## 2024-07-18 PROCEDURE — G0463 HOSPITAL OUTPT CLINIC VISIT: HCPCS | Mod: 25 | Performed by: NURSE PRACTITIONER

## 2024-07-18 PROCEDURE — 84439 ASSAY OF FREE THYROXINE: CPT | Performed by: NURSE PRACTITIONER

## 2024-07-18 PROCEDURE — 96413 CHEMO IV INFUSION 1 HR: CPT

## 2024-07-18 PROCEDURE — 250N000011 HC RX IP 250 OP 636: Performed by: NURSE PRACTITIONER

## 2024-07-18 RX ORDER — EPINEPHRINE 1 MG/ML
0.3 INJECTION, SOLUTION, CONCENTRATE INTRAVENOUS EVERY 5 MIN PRN
Status: CANCELLED | OUTPATIENT
Start: 2024-07-18

## 2024-07-18 RX ORDER — HEPARIN SODIUM,PORCINE 10 UNIT/ML
5-20 VIAL (ML) INTRAVENOUS DAILY PRN
Status: CANCELLED | OUTPATIENT
Start: 2024-07-18

## 2024-07-18 RX ORDER — MEPERIDINE HYDROCHLORIDE 25 MG/ML
25 INJECTION INTRAMUSCULAR; INTRAVENOUS; SUBCUTANEOUS EVERY 30 MIN PRN
Status: CANCELLED | OUTPATIENT
Start: 2024-07-18

## 2024-07-18 RX ORDER — METHYLPREDNISOLONE SODIUM SUCCINATE 125 MG/2ML
125 INJECTION, POWDER, LYOPHILIZED, FOR SOLUTION INTRAMUSCULAR; INTRAVENOUS
Status: CANCELLED
Start: 2024-07-18

## 2024-07-18 RX ORDER — ALBUTEROL SULFATE 0.83 MG/ML
2.5 SOLUTION RESPIRATORY (INHALATION)
Status: CANCELLED | OUTPATIENT
Start: 2024-07-18

## 2024-07-18 RX ORDER — LORAZEPAM 2 MG/ML
0.5 INJECTION INTRAMUSCULAR EVERY 4 HOURS PRN
Status: CANCELLED | OUTPATIENT
Start: 2024-07-18

## 2024-07-18 RX ORDER — HEPARIN SODIUM (PORCINE) LOCK FLUSH IV SOLN 100 UNIT/ML 100 UNIT/ML
5 SOLUTION INTRAVENOUS
Status: CANCELLED | OUTPATIENT
Start: 2024-07-18

## 2024-07-18 RX ORDER — DIPHENHYDRAMINE HYDROCHLORIDE 50 MG/ML
50 INJECTION INTRAMUSCULAR; INTRAVENOUS
Status: CANCELLED
Start: 2024-07-18

## 2024-07-18 RX ORDER — ALBUTEROL SULFATE 90 UG/1
1-2 AEROSOL, METERED RESPIRATORY (INHALATION)
Status: CANCELLED
Start: 2024-07-18

## 2024-07-18 RX ADMIN — SODIUM CHLORIDE 250 ML: 9 INJECTION, SOLUTION INTRAVENOUS at 12:57

## 2024-07-18 RX ADMIN — SODIUM CHLORIDE 200 MG: 9 INJECTION, SOLUTION INTRAVENOUS at 12:57

## 2024-07-18 ASSESSMENT — PAIN SCALES - GENERAL: PAINLEVEL: NO PAIN (0)

## 2024-07-18 NOTE — LETTER
7/18/2024      Luis Hernandez  [address] [facility] [address]      Dear Colleague,    Thank you for referring your patient, Luis Hernandez, to the [facility] [place]. Please see a copy of my visit note below.    [facility] Hematology and Oncology Outpatient Progress Note    Patient: Luis Hernandez  MRN: [id number]  Date of Service: Jul 18, 2024          Reason for Visit    Lung cancer    Primary Oncologist: Formerly, Dr. Lantigua      Assessment/Plan  >Stage IIIB squamous cell lung cancer  Bilateral lung nodules, indeterminate  Chemo-induced anemia  Low TSH  Not a surgical nor definitive chemoradiation candidate given his severe COPD.  There are also some indeterminate lung nodules, either inflammatory or separate metastatic sites that need to be followed.      Luis completed 3 cycles of palliative carboplatin, Taxol, pembrolizumab with Neulasta support. Got an excellent response, but complicated by hospitalization for pneumonia/COPD exacerbation. Therefore, further chemo discontinued and continued maintenance pembro since May.     Tolerating well.   He was treated for a COPD exacerbation requiring steroids and antibiotics 3 weeks ago, so that cycle was held. He is recovered back to baseline.     Lab work: hgb 11.3, WBC, plat WNL. CMP: Na 128 (stable/chronic), creatinine and LFTs WNL TSH low 0.28 (stable), free T4 has been WNL and pending today.    Plan:  -Proceed with pembrolizumab today   -Slightly suppressed TSH. If T4 remains normal, will follow. May be immunotherapy-related and usually evolves to hypothyroidism in time  -Return in 3 weeks with PET scan and follow-up with Dr. Friedell before next cycle     Severe COPD, chronic oxygen use  Recurrent exacerbations  Has frequent exacerbations. Hospitalized in early May.   Recently treated as outpt with steroids + antibiotics 3 weeks ago. Symptoms and oxygen needs are back to baseline.    Plan:  -Follow with Pulmonary/PCP  -Will need to  follow closely for risk of autoimmune pneumonitis on pembro  ______________________________________________________________________________    History of Present Illness/ Interval History    Mr. Luis Hernandez  is a 59 year old with at least locally advanced non-operable lung cancer. He completed 3 cycles of palliative carbo/Taxol/pembrolizumab (with Neulasta) with excellent AK/near CR but complicated by pneumonia/respiratory failure after 3rd cycle so further chemo discontinued. He has continued maintenance pembrolizumab alone.     His last cycle was 6/5 (6 weeks ago). He skipped his last cycle as he required treatment for another COPD exacerbation with antibiotics + prednisone. He has frequent exacerbations. Symptoms recovered back to baseline after his treatment, completed two weeks ago.    He otherwise is feeling well.  No diarrhea, skin rash. Stable weight.     No new sites of pain. No headaches/neuro symptoms.       ECOG Performance    1      Oncology History/Treatment  Diagnosis/Stage:   1/2024: Stage IIIB (cT4-cN2-cM0) squamous cell cancer (possible/indeterminate bilateral lung involvement, stage IV). Not candidate for definitive RT/surgery.  -Hx severe COPD (FEV1 18%), tobacco use (quit 2021)  -Followed in Pulmonary with CT for waxing/waning bilateral lung nodules  -9/2023 chest CT: new spiculated RUL nodule + persistent filling defect of bronchus intermedius/RLL bronchus concerning for possible endobronchial tumor vs mucus plugging; progressive subcarinal soft tissue mass  -PET: avid right  hilar mass encasing RML bronchus contiguous with subcarinal node/mass, intraluminal soft tissue density RLL bronchus concerning for primary tumor; avid bilateral opacities indeterminate but favored as inflammatory. Incidentally, distal sigmoid/rectal avid soft tissue thickening noted.   -10/2023 Cologuard negative  -Treated with empiric antibiotics for pneumonia. Follow-up CT: RADHA improved; new nodules through bilateral  lungs (up to 7 mm) and persistent right lung mass/bronchial findings.   -1/18/2024 flex + rigid bronch with tumor debulking and intermedius bronchial stent placement, with RUL, R mainstem bronchus and bronchus intermedius biopsy: mod-diff squamous cell cancer. PDL1 CPS 10-20% and TPS 5%.   -Lung NGS panel: CDKN2A and PIK3CA alterations   -Brain MRI negative    Treatment:  2/29/2024 - 4/12/2024: palliative carbo, Taxol, pembrolizumab + Neulasta support. Completed 3 cycles  -->following 3 cycles, had overall good [place]/near CR; but complicated by hospitalization early May for hypoxic respiratory failure + pneumonia. Chemo stopped and treatment held until recovered.     5/15/2024 - present: maintenance pembrolizumab every 3 weeks      Physical Exam    GENERAL: Alert and oriented to time place and person. Seated comfortably. In no distress. Alone.   HEAD: Atraumatic and normocephalic. No alopecia.  LYMPH: No palpable cervical, supraclav nor axillary adenopathy  CHEST: Regular respiratory effort. 1.5 L Oxygen via NC on. Diiminished lungs and expiratory wheezes bilaterally.   HEART: RRR  ABD: soft, non-tender, non-distended  EXTREMITIES: No edema  NEURO: No gross deficit noted. Non-antalgic gait.      Lab Results    Recent Results (from the past 168 hour(s))   Comprehensive metabolic panel   Result Value Ref Range    Sodium 128 (L) 135 - 145 mmol/L    Potassium 4.6 3.4 - 5.3 mmol/L    Carbon Dioxide (CO2) 28 22 - 29 mmol/L    Anion Gap 8 7 - 15 mmol/L    Urea Nitrogen 15.1 6.0 - 20.0 mg/dL    Creatinine 0.56 (L) 0.67 - 1.17 mg/dL    GFR Estimate >90 >60 mL/min/1.73m2    Calcium 9.4 8.8 - 10.4 mg/dL    Chloride 92 (L) 98 - 107 mmol/L    Glucose 90 70 - 99 mg/dL    Alkaline Phosphatase 39 (L) 40 - 150 U/L    AST 17 0 - 45 U/L    ALT 18 0 - 70 U/L    Protein Total 6.9 6.4 - 8.3 g/dL    Albumin 4.0 3.5 - 5.2 g/dL    Bilirubin Total <0.2 <=1.2 mg/dL   TSH with free T4 reflex   Result Value Ref Range    TSH 0.28 (L) 0.30 - 4.20  "uIU/mL   CBC with platelets and differential   Result Value Ref Range    WBC Count 9.0 4.0 - 11.0 10e3/uL    RBC Count 3.51 (L) 4.40 - 5.90 10e6/uL    Hemoglobin 11.3 (L) 13.3 - 17.7 g/dL    Hematocrit 33.7 (L) 40.0 - 53.0 %    MCV 96 78 - 100 fL    MCH 32.2 26.5 - 33.0 pg    MCHC 33.5 31.5 - 36.5 g/dL    RDW 13.1 10.0 - 15.0 %    Platelet Count 407 150 - 450 10e3/uL    % Neutrophils 65 %    % Lymphocytes 14 %    % Monocytes 15 %    % Eosinophils 5 %    % Basophils 1 %    % Immature Granulocytes 0 %    NRBCs per 100 WBC 0 <1 /100    Absolute Neutrophils 5.9 1.6 - 8.3 10e3/uL    Absolute Lymphocytes 1.3 0.8 - 5.3 10e3/uL    Absolute Monocytes 1.3 0.0 - 1.3 10e3/uL    Absolute Eosinophils 0.4 0.0 - 0.7 10e3/uL    Absolute Basophils 0.1 0.0 - 0.2 10e3/uL    Absolute Immature Granulocytes 0.0 <=0.4 10e3/uL    Absolute NRBCs 0.0 10e3/uL         Imaging    No results found.    Billing  Total time 30 minutes, to include face to face visit, review of EMR, ordering, documentation and coordination of care on date of service   complexity modifier for longitudinal care.     Signed by: Jennifer Cabrera NP      Oncology Rooming Note    July 18, 2024 11:18 AM   Luis Hernandez is a 59 year old male who presents for:    Chief Complaint   Patient presents with     Oncology Clinic Visit     Squamous cell lung cancer, right - labs, provider, infusion     Initial Vitals: /80 (BP Location: Right arm, Patient Position: Sitting, Cuff Size: Adult Small)   Pulse 78   Temp 97.4  F (36.3  C) (Tympanic)   Resp 12   Wt 48.4 kg (106 lb 9.6 oz)   SpO2 94%   BMI 18.30 kg/m   Estimated body mass index is 18.3 kg/m  as calculated from the following:    Height as of 6/7/24: 1.626 m (5' 4\").    Weight as of this encounter: 48.4 kg (106 lb 9.6 oz). Body surface area is 1.48 meters squared.  No Pain (0) Comment: Data Unavailable   No LMP for male patient.  Allergies reviewed: Yes  Medications reviewed: Yes    Medications: Medication refills " not needed today.  Pharmacy name entered into [facility]:    [id number] - [place] - [address] [facility] [place] - [place] - [address]  [facility] - [place] - [address] [facility] AT PORTAL TO REGISTERED [facility] SITES  [id number] - [place], [place] - [address] [facility]    Frailty Screening:   Is the patient here for a new oncology consult visit in cancer care? 2. No      Clinical concerns: None today.       Deb Cash MA                Again, thank you for allowing me to participate in the care of your patient.        Sincerely,        Jennifer Cabrera, NP

## 2024-07-18 NOTE — PROGRESS NOTES
"Oncology Rooming Note    July 18, 2024 11:18 AM   Luis Hernandez is a 59 year old male who presents for:    Chief Complaint   Patient presents with    Oncology Clinic Visit     Squamous cell lung cancer, right - labs, provider, infusion     Initial Vitals: /80 (BP Location: Right arm, Patient Position: Sitting, Cuff Size: Adult Small)   Pulse 78   Temp 97.4  F (36.3  C) (Tympanic)   Resp 12   Wt 48.4 kg (106 lb 9.6 oz)   SpO2 94%   BMI 18.30 kg/m   Estimated body mass index is 18.3 kg/m  as calculated from the following:    Height as of 6/7/24: 1.626 m (5' 4\").    Weight as of this encounter: 48.4 kg (106 lb 9.6 oz). Body surface area is 1.48 meters squared.  No Pain (0) Comment: Data Unavailable   No LMP for male patient.  Allergies reviewed: Yes  Medications reviewed: Yes    Medications: Medication refills not needed today.  Pharmacy name entered into [employer]:    [id number] - [place], [place] - [address] [facility] SPECIALTY [place] - [place]A SAUCEDO - [address] [place] [address] [facility] - [place]A [place] - ONE [facility] AT PORTAL TO [facility] SITES  [id number] - [place], [place] - [address] [place] [address]    Frailty Screening:   Is the patient here for a new oncology consult visit in cancer care? 2. No      Clinical concerns: None today.       Deb Cash MA              "

## 2024-07-18 NOTE — PROGRESS NOTES
Infusion Nursing Note:  Luis Hernandez presents today for Keytruda.    Patient seen by provider today: yes, Jennifer Cabrera NP   present during visit today: Not Applicable.    Note: N/A.      Intravenous Access:  Peripheral IV placed.    Treatment Conditions:  Results reviewed, labs MET treatment parameters, ok to proceed with treatment.      Post Infusion Assessment:  Patient tolerated infusion without incident.  Access discontinued per protocol.       Discharge Plan:   [facility]  Patient will return 8/7 for next appointment.   Patient discharged in stable condition accompanied by: self.  Departure Mode: Ambulatory.      Deb Feldman RN

## 2024-07-18 NOTE — PROGRESS NOTES
[facility] Hematology and Oncology Outpatient Progress Note    Patient: Lusi Hernandez  MRN: [id number]  Date of Service: Jul 18, 2024          Reason for Visit    Lung cancer    Primary Oncologist: Formerly, Dr. Lantigua      Assessment/Plan  >Stage IIIB squamous cell lung cancer  Bilateral lung nodules, indeterminate  Chemo-induced anemia  Low TSH  Not a surgical nor definitive chemoradiation candidate given his severe COPD.  There are also some indeterminate lung nodules, either inflammatory or separate metastatic sites that need to be followed.      Luis completed 3 cycles of palliative carboplatin, Taxol, pembrolizumab with Neulasta support. Got an excellent response, but complicated by hospitalization for pneumonia/COPD exacerbation. Therefore, further chemo discontinued and continued maintenance pembro since May.     Tolerating well.   He was treated for a COPD exacerbation requiring steroids and antibiotics 3 weeks ago, so that cycle was held. He is recovered back to baseline.     Lab work: hgb 11.3, WBC, plat WNL. CMP: Na 128 (stable/chronic), creatinine and LFTs WNL TSH low 0.28 (stable), free T4 has been WNL and pending today.    Plan:  -Proceed with pembrolizumab today   -Slightly suppressed TSH. If T4 remains normal, will follow. May be immunotherapy-related and usually evolves to hypothyroidism in time  -Return in 3 weeks with PET scan and follow-up with Dr. Friedell before next cycle     Severe COPD, chronic oxygen use  Recurrent exacerbations  Has frequent exacerbations. Hospitalized in early May.   Recently treated as outpt with steroids + antibiotics 3 weeks ago. Symptoms and oxygen needs are back to baseline.    Plan:  -Follow with Pulmonary/PCP  -Will need to follow closely for risk of autoimmune pneumonitis on pembro  ______________________________________________________________________________    History of Present Illness/ Interval History    Mr. Luis Hernandez  is a 59 year old with at  least locally advanced non-operable lung cancer. He completed 3 cycles of palliative carbo/Taxol/pembrolizumab (with Neulasta) with excellent [place]/near CR but complicated by pneumonia/respiratory failure after 3rd cycle so further chemo discontinued. He has continued maintenance pembrolizumab alone.     His last cycle was 6/5 (6 weeks ago). He skipped his last cycle as he required treatment for another COPD exacerbation with antibiotics + prednisone. He has frequent exacerbations. Symptoms recovered back to baseline after his treatment, completed two weeks ago.    He otherwise is feeling well.  No diarrhea, skin rash. Stable weight.     No new sites of pain. No headaches/neuro symptoms.       ECOG Performance    1      Oncology History/Treatment  Diagnosis/Stage:   1/2024: Stage IIIB (cT4-cN2-cM0) squamous cell cancer (possible/indeterminate bilateral lung involvement, stage IV). Not candidate for definitive RT/surgery.  -Hx severe COPD (FEV1 18%), tobacco use (quit 2021)  -Followed in Pulmonary with CT for waxing/waning bilateral lung nodules  -9/2023 chest CT: new spiculated RUL nodule + persistent filling defect of bronchus intermedius/RLL bronchus concerning for possible endobronchial tumor vs mucus plugging; progressive subcarinal soft tissue mass  -PET: avid right  hilar mass encasing RML bronchus contiguous with subcarinal node/mass, intraluminal soft tissue density RLL bronchus concerning for primary tumor; avid bilateral opacities indeterminate but favored as inflammatory. Incidentally, distal sigmoid/rectal avid soft tissue thickening noted.   -10/2023 Cologuard negative  -Treated with empiric antibiotics for pneumonia. Follow-up CT: RADHA improved; new nodules through bilateral lungs (up to 7 mm) and persistent right lung mass/bronchial findings.   -1/18/2024 flex + rigid bronch with tumor debulking and intermedius bronchial stent placement, with RUL, R mainstem bronchus and bronchus intermedius biopsy:  mod-diff squamous cell cancer. PDL1 CPS 10-20% and TPS 5%.   -Lung NGS panel: CDKN2A and PIK3CA alterations   -Brain MRI negative    Treatment:  2/29/2024 - 4/12/2024: palliative carbo, Taxol, pembrolizumab + Neulasta support. Completed 3 cycles  -->following 3 cycles, had overall good [place]/near CR; but complicated by hospitalization early May for hypoxic respiratory failure + pneumonia. Chemo stopped and treatment held until recovered.     5/15/2024 - present: maintenance pembrolizumab every 3 weeks      Physical Exam    GENERAL: Alert and oriented to time place and person. Seated comfortably. In no distress. Alone.   HEAD: Atraumatic and normocephalic. No alopecia.  LYMPH: No palpable cervical, supraclav nor axillary adenopathy  CHEST: Regular respiratory effort. 1.5 L Oxygen via NC on. Diiminished lungs and expiratory wheezes bilaterally.   HEART: RRR  ABD: soft, non-tender, non-distended  EXTREMITIES: No edema  NEURO: No gross deficit noted. Non-antalgic gait.      Lab Results    Recent Results (from the past 168 hour(s))   Comprehensive metabolic panel   Result Value Ref Range    Sodium 128 (L) 135 - 145 mmol/L    Potassium 4.6 3.4 - 5.3 mmol/L    Carbon Dioxide (CO2) 28 22 - 29 mmol/L    Anion Gap 8 7 - 15 mmol/L    Urea Nitrogen 15.1 6.0 - 20.0 mg/dL    Creatinine 0.56 (L) 0.67 - 1.17 mg/dL    GFR Estimate >90 >60 mL/min/1.73m2    Calcium 9.4 8.8 - 10.4 mg/dL    Chloride 92 (L) 98 - 107 mmol/L    Glucose 90 70 - 99 mg/dL    Alkaline Phosphatase 39 (L) 40 - 150 U/L    AST 17 0 - 45 U/L    ALT 18 0 - 70 U/L    Protein Total 6.9 6.4 - 8.3 g/dL    Albumin 4.0 3.5 - 5.2 g/dL    Bilirubin Total <0.2 <=1.2 mg/dL   TSH with free T4 reflex   Result Value Ref Range    TSH 0.28 (L) 0.30 - 4.20 uIU/mL   CBC with platelets and differential   Result Value Ref Range    WBC Count 9.0 4.0 - 11.0 10e3/uL    RBC Count 3.51 (L) 4.40 - 5.90 10e6/uL    Hemoglobin 11.3 (L) 13.3 - 17.7 g/dL    Hematocrit 33.7 (L) 40.0 - 53.0 %    MCV  96 78 - 100 fL    MCH 32.2 26.5 - 33.0 pg    MCHC 33.5 31.5 - 36.5 g/dL    RDW 13.1 10.0 - 15.0 %    Platelet Count 407 150 - 450 10e3/uL    % Neutrophils 65 %    % Lymphocytes 14 %    % Monocytes 15 %    % Eosinophils 5 %    % Basophils 1 %    % Immature Granulocytes 0 %    NRBCs per 100 WBC 0 <1 /100    Absolute Neutrophils 5.9 1.6 - 8.3 10e3/uL    Absolute Lymphocytes 1.3 0.8 - 5.3 10e3/uL    Absolute Monocytes 1.3 0.0 - 1.3 10e3/uL    Absolute Eosinophils 0.4 0.0 - 0.7 10e3/uL    Absolute Basophils 0.1 0.0 - 0.2 10e3/uL    Absolute Immature Granulocytes 0.0 <=0.4 10e3/uL    Absolute NRBCs 0.0 10e3/uL         Imaging    No results found.    Billing  Total time 30 minutes, to include face to face visit, review of EMR, ordering, documentation and coordination of care on date of service   complexity modifier for longitudinal care.     Signed by: Jennifer Cabrera NP

## 2024-07-22 DIAGNOSIS — J43.9 PULMONARY EMPHYSEMA, UNSPECIFIED EMPHYSEMA TYPE (H): ICD-10-CM

## 2024-07-22 DIAGNOSIS — J96.21 ACUTE ON CHRONIC RESPIRATORY FAILURE WITH HYPOXIA AND HYPERCAPNIA (H): ICD-10-CM

## 2024-07-22 DIAGNOSIS — J96.22 ACUTE ON CHRONIC RESPIRATORY FAILURE WITH HYPOXIA AND HYPERCAPNIA (H): ICD-10-CM

## 2024-07-22 DIAGNOSIS — J43.2 CENTRILOBULAR EMPHYSEMA (H): ICD-10-CM

## 2024-07-22 NOTE — TELEPHONE ENCOUNTER
Requested Prescriptions   Pending Prescriptions Disp Refills    albuterol (PROAIR HFA/PROVENTIL HFA/VENTOLIN HFA) 108 (90 Base) MCG/ACT inhaler 54 g 3     Sig: Inhale 2 puffs into the lungs every 4 hours as needed for shortness of breath       There is no refill protocol information for this order       tiotropium (SPIRIVA RESPIMAT) 2.5 MCG/ACT inhaler 12 g 3     Sig: Inhale 2 puffs into the lungs daily       There is no refill protocol information for this order       fluticasone-salmeterol (ADVAIR) 500-50 MCG/ACT inhaler 60 each 11     Sig: Inhale 1 puff into the lungs every 12 hours       There is no refill protocol information for this order        Last office visit: 4/4/2024 ; last virtual visit: Visit date not found with prescribing provider:  Bianca Cuellar     Future Office Visit:      Digna Stout   Clinic [facility] [place]   [facility]  [phone]

## 2024-07-23 RX ORDER — FLUTICASONE PROPIONATE AND SALMETEROL 500; 50 UG/1; UG/1
1 POWDER RESPIRATORY (INHALATION) EVERY 12 HOURS
Qty: 60 EACH | Refills: 4 | Status: SHIPPED | OUTPATIENT
Start: 2024-07-23

## 2024-07-23 RX ORDER — ALBUTEROL SULFATE 90 UG/1
2 AEROSOL, METERED RESPIRATORY (INHALATION) EVERY 4 HOURS PRN
Qty: 54 G | Refills: 3 | Status: SHIPPED | OUTPATIENT
Start: 2024-07-23

## 2024-07-23 NOTE — TELEPHONE ENCOUNTER
Per 4/4/24 note:    Recommendations as follows:   -Continue Advair, spiriva, albuterol    Has Pulm follow up on 10/31.    Refilled per specialty scope of practice.    Viridiana Stark RN on 7/23/2024 at 8:58 AM

## 2024-07-24 NOTE — TELEPHONE ENCOUNTER
Received a 2nd request from [facility] for     albuterol (PROAIR HFA/PROVENTIL HFA/VENTOLIN HFA) 108 (90 Base) MCG/ACT inhaler  tiotropium (SPIRIVA RESPIMAT) 2.5 MCG/ACT inhaler  fluticasone-salmeterol (ADVAIR) 500-50 MCG/ACT inhaler      Digna Stout   Clinic Station    [facility]  [phone]

## 2024-07-24 NOTE — TELEPHONE ENCOUNTER
Duplicate requests. These meds were all approved yesterday.     Viridiana Stark RN on 7/24/2024 at 8:41 AM

## 2024-08-01 ENCOUNTER — HOSPITAL ENCOUNTER (OUTPATIENT)
Dept: PET IMAGING | Facility: CLINIC | Age: 59
Discharge: HOME | End: 2024-08-01
Attending: NURSE PRACTITIONER | Admitting: NURSE PRACTITIONER
Payer: COMMERCIAL

## 2024-08-01 DIAGNOSIS — C34.91 SQUAMOUS CELL LUNG CANCER, RIGHT (H): ICD-10-CM

## 2024-08-01 DIAGNOSIS — C44.92: ICD-10-CM

## 2024-08-01 DIAGNOSIS — C77.1: ICD-10-CM

## 2024-08-01 PROCEDURE — A9552 F18 FDG: HCPCS | Performed by: NURSE PRACTITIONER

## 2024-08-01 PROCEDURE — 343N000001 HC RX 343: Performed by: NURSE PRACTITIONER

## 2024-08-01 PROCEDURE — 78815 PET IMAGE W/CT SKULL-THIGH: CPT | Mod: PS

## 2024-08-01 RX ORDER — FLUDEOXYGLUCOSE F 18 200 MCI/ML
10-18 INJECTION, SOLUTION INTRAVENOUS ONCE
Status: COMPLETED | OUTPATIENT
Start: 2024-08-01 | End: 2024-08-01

## 2024-08-01 RX ADMIN — FLUDEOXYGLUCOSE F 18 13.45 MILLICURIE: 200 INJECTION, SOLUTION INTRAVENOUS at 12:49

## 2024-08-05 ENCOUNTER — TELEPHONE (OUTPATIENT)
Dept: FAMILY MEDICINE | Facility: CLINIC | Age: 59
End: 2024-08-05
Payer: COMMERCIAL

## 2024-08-05 NOTE — TELEPHONE ENCOUNTER
Different pharmacy ([facility]) is requesting the following scripts:    amLODIPine (NORVASC) 10 MG tablet   metoprolol tartrate (LOPRESSOR) 25 MG tablet     [facility]: [id number]  [id number]    Pharmacy fax: [phone]    Micheal Ware

## 2024-08-07 ENCOUNTER — INFUSION THERAPY VISIT (OUTPATIENT)
Dept: INFUSION THERAPY | Facility: CLINIC | Age: 59
End: 2024-08-07
Attending: NURSE PRACTITIONER
Payer: COMMERCIAL

## 2024-08-07 ENCOUNTER — APPOINTMENT (OUTPATIENT)
Dept: LAB | Facility: CLINIC | Age: 59
End: 2024-08-07
Attending: NURSE PRACTITIONER
Payer: COMMERCIAL

## 2024-08-07 ENCOUNTER — ONCOLOGY VISIT (OUTPATIENT)
Dept: ONCOLOGY | Facility: CLINIC | Age: 59
End: 2024-08-07
Attending: INTERNAL MEDICINE
Payer: COMMERCIAL

## 2024-08-07 VITALS
OXYGEN SATURATION: 92 % | BODY MASS INDEX: 18.44 KG/M2 | HEIGHT: 64 IN | HEART RATE: 86 BPM | DIASTOLIC BLOOD PRESSURE: 80 MMHG | WEIGHT: 108 LBS | TEMPERATURE: 97.8 F | SYSTOLIC BLOOD PRESSURE: 123 MMHG | RESPIRATION RATE: 12 BRPM

## 2024-08-07 DIAGNOSIS — I27.20 PULMONARY HYPERTENSION (H): ICD-10-CM

## 2024-08-07 DIAGNOSIS — C34.91 SQUAMOUS CELL LUNG CANCER, RIGHT (H): Primary | ICD-10-CM

## 2024-08-07 DIAGNOSIS — Z51.11 ENCOUNTER FOR ANTINEOPLASTIC CHEMOTHERAPY: ICD-10-CM

## 2024-08-07 LAB
ALBUMIN SERPL BCG-MCNC: 4 G/DL (ref 3.5–5.2)
ALP SERPL-CCNC: 40 U/L (ref 40–150)
ALT SERPL W P-5'-P-CCNC: 15 U/L (ref 0–70)
ANION GAP SERPL CALCULATED.3IONS-SCNC: 9 MMOL/L (ref 7–15)
AST SERPL W P-5'-P-CCNC: 13 U/L (ref 0–45)
BASOPHILS # BLD AUTO: 0.1 10E3/UL (ref 0–0.2)
BASOPHILS NFR BLD AUTO: 0 %
BILIRUB SERPL-MCNC: <0.2 MG/DL
BUN SERPL-MCNC: 18.9 MG/DL (ref 6–20)
CALCIUM SERPL-MCNC: 9.2 MG/DL (ref 8.8–10.4)
CHLORIDE SERPL-SCNC: 96 MMOL/L (ref 98–107)
CREAT SERPL-MCNC: 0.62 MG/DL (ref 0.67–1.17)
EGFRCR SERPLBLD CKD-EPI 2021: >90 ML/MIN/1.73M2
EOSINOPHIL # BLD AUTO: 0.3 10E3/UL (ref 0–0.7)
EOSINOPHIL NFR BLD AUTO: 2 %
ERYTHROCYTE [DISTWIDTH] IN BLOOD BY AUTOMATED COUNT: 12.9 % (ref 10–15)
GLUCOSE SERPL-MCNC: 97 MG/DL (ref 70–99)
HCO3 SERPL-SCNC: 32 MMOL/L (ref 22–29)
HCT VFR BLD AUTO: 37.1 % (ref 40–53)
HGB BLD-MCNC: 12 G/DL (ref 13.3–17.7)
IMM GRANULOCYTES # BLD: 0.1 10E3/UL
IMM GRANULOCYTES NFR BLD: 0 %
LYMPHOCYTES # BLD AUTO: 0.7 10E3/UL (ref 0.8–5.3)
LYMPHOCYTES NFR BLD AUTO: 4 %
MCH RBC QN AUTO: 32.2 PG (ref 26.5–33)
MCHC RBC AUTO-ENTMCNC: 32.3 G/DL (ref 31.5–36.5)
MCV RBC AUTO: 100 FL (ref 78–100)
MONOCYTES # BLD AUTO: 0.8 10E3/UL (ref 0–1.3)
MONOCYTES NFR BLD AUTO: 5 %
NEUTROPHILS # BLD AUTO: 14.2 10E3/UL (ref 1.6–8.3)
NEUTROPHILS NFR BLD AUTO: 88 %
NRBC # BLD AUTO: 0 10E3/UL
NRBC BLD AUTO-RTO: 0 /100
PLATELET # BLD AUTO: 330 10E3/UL (ref 150–450)
POTASSIUM SERPL-SCNC: 4.4 MMOL/L (ref 3.4–5.3)
PROT SERPL-MCNC: 6.3 G/DL (ref 6.4–8.3)
RBC # BLD AUTO: 3.73 10E6/UL (ref 4.4–5.9)
SODIUM SERPL-SCNC: 137 MMOL/L (ref 135–145)
TSH SERPL DL<=0.005 MIU/L-ACNC: 0.3 UIU/ML (ref 0.3–4.2)
WBC # BLD AUTO: 16.1 10E3/UL (ref 4–11)

## 2024-08-07 PROCEDURE — 250N000011 HC RX IP 250 OP 636: Performed by: INTERNAL MEDICINE

## 2024-08-07 PROCEDURE — G0463 HOSPITAL OUTPT CLINIC VISIT: HCPCS | Mod: 25 | Performed by: INTERNAL MEDICINE

## 2024-08-07 PROCEDURE — 96413 CHEMO IV INFUSION 1 HR: CPT

## 2024-08-07 PROCEDURE — 258N000003 HC RX IP 258 OP 636: Performed by: INTERNAL MEDICINE

## 2024-08-07 PROCEDURE — 84443 ASSAY THYROID STIM HORMONE: CPT | Performed by: NURSE PRACTITIONER

## 2024-08-07 PROCEDURE — 85025 COMPLETE CBC W/AUTO DIFF WBC: CPT | Performed by: NURSE PRACTITIONER

## 2024-08-07 PROCEDURE — 99214 OFFICE O/P EST MOD 30 MIN: CPT | Performed by: INTERNAL MEDICINE

## 2024-08-07 PROCEDURE — 80053 COMPREHEN METABOLIC PANEL: CPT | Performed by: NURSE PRACTITIONER

## 2024-08-07 PROCEDURE — 36415 COLL VENOUS BLD VENIPUNCTURE: CPT | Performed by: NURSE PRACTITIONER

## 2024-08-07 RX ORDER — METHYLPREDNISOLONE SODIUM SUCCINATE 125 MG/2ML
125 INJECTION, POWDER, LYOPHILIZED, FOR SOLUTION INTRAMUSCULAR; INTRAVENOUS
Status: CANCELLED
Start: 2024-08-08

## 2024-08-07 RX ORDER — AMLODIPINE BESYLATE 10 MG/1
10 TABLET ORAL DAILY
Qty: 90 TABLET | Refills: 3 | Status: SHIPPED | OUTPATIENT
Start: 2024-08-07

## 2024-08-07 RX ORDER — LORAZEPAM 2 MG/ML
0.5 INJECTION INTRAMUSCULAR EVERY 4 HOURS PRN
Status: CANCELLED | OUTPATIENT
Start: 2024-08-08

## 2024-08-07 RX ORDER — ALBUTEROL SULFATE 90 UG/1
1-2 AEROSOL, METERED RESPIRATORY (INHALATION)
Status: CANCELLED
Start: 2024-08-08

## 2024-08-07 RX ORDER — ALBUTEROL SULFATE 0.83 MG/ML
2.5 SOLUTION RESPIRATORY (INHALATION)
Status: CANCELLED | OUTPATIENT
Start: 2024-08-08

## 2024-08-07 RX ORDER — EPINEPHRINE 1 MG/ML
0.3 INJECTION, SOLUTION, CONCENTRATE INTRAVENOUS EVERY 5 MIN PRN
Status: CANCELLED | OUTPATIENT
Start: 2024-08-08

## 2024-08-07 RX ORDER — DIPHENHYDRAMINE HYDROCHLORIDE 50 MG/ML
50 INJECTION INTRAMUSCULAR; INTRAVENOUS
Status: CANCELLED
Start: 2024-08-08

## 2024-08-07 RX ORDER — HEPARIN SODIUM (PORCINE) LOCK FLUSH IV SOLN 100 UNIT/ML 100 UNIT/ML
5 SOLUTION INTRAVENOUS
Status: CANCELLED | OUTPATIENT
Start: 2024-08-08

## 2024-08-07 RX ORDER — MEPERIDINE HYDROCHLORIDE 25 MG/ML
25 INJECTION INTRAMUSCULAR; INTRAVENOUS; SUBCUTANEOUS EVERY 30 MIN PRN
Status: CANCELLED | OUTPATIENT
Start: 2024-08-08

## 2024-08-07 RX ORDER — HEPARIN SODIUM,PORCINE 10 UNIT/ML
5-20 VIAL (ML) INTRAVENOUS DAILY PRN
Status: CANCELLED | OUTPATIENT
Start: 2024-08-08

## 2024-08-07 RX ORDER — METOPROLOL TARTRATE 25 MG/1
25 TABLET, FILM COATED ORAL 2 TIMES DAILY
Qty: 180 TABLET | Refills: 3 | Status: SHIPPED | OUTPATIENT
Start: 2024-08-07

## 2024-08-07 RX ADMIN — SODIUM CHLORIDE 250 ML: 9 INJECTION, SOLUTION INTRAVENOUS at 11:16

## 2024-08-07 RX ADMIN — SODIUM CHLORIDE 200 MG: 9 INJECTION, SOLUTION INTRAVENOUS at 11:46

## 2024-08-07 ASSESSMENT — PAIN SCALES - GENERAL: PAINLEVEL: NO PAIN (0)

## 2024-08-07 NOTE — PROGRESS NOTES
[facility] Hematology and Oncology Outpatient Progress Note    Patient: Luis Hernandez  MRN: [id number]  Date of Service: Aug 7, 2024          Reason for Visit    Lung cancer    P    Assessment/Plan  >Stage IIIB squamous cell lung cancer  Bilateral lung nodules, indeterminate  Chemo-induced anemia  Low TSH  Not a surgical nor definitive chemoradiation candidate given his severe COPD.  There are also some indeterminate lung nodules, either inflammatory or separate metastatic sites that need to be followed.      Luis completed 3 cycles of palliative carboplatin, Taxol, pembrolizumab with Neulasta support. Got an excellent response, but complicated by hospitalization for pneumonia/COPD exacerbation. Therefore, further chemo discontinued and continued maintenance pembro since May.     Tolerating well.   He was treated for a COPD exacerbation requiring steroids and antibiotics 3 weeks ago, so that cycle was held. He is recovered back to baseline.       -Proceed with pembrolizumab today   -Slightly suppressed TSH. If T4 remains normal, will follow. May be immunotherapy-related and usually evolves to hypothyroidism in time  .Change pembrolizumab to 400 mg every 6 weeks at next visit.    -Follow with Pulmonary/PCP  -Will need to follow closely for risk of autoimmune pneumonitis on pembro  ______________________________________________________________________________    History of Present Illness/ Interval History    Mr. Luis Hernandez  is a 59 year old with at least locally advanced non-operable lung cancer. He completed 3 cycles of palliative carbo/Taxol/pembrolizumab (with Neulasta) with excellent [place]/near CR but complicated by pneumonia/respiratory failure after 3rd cycle so further chemo discontinued. He has continued maintenance pembrolizumab alone.     Patient returns for follow-up.  He continues on oxygen 24/7.  He is maintaining his weight.  Takes Ensure as needed.  He does not have chills, fevers or sweats.   He does have a persistent cough with clear sputum.  He does not have chest pain or shortness of breath at rest.  There is no change in bowel or bladder habit.  He has no neuropathy.  He can go about his daily activities with limitations from COPD.         ECOG Performance    1      Oncology History/Treatment  Diagnosis/Stage:   1/2024: Stage IIIB (cT4-cN2-cM0) squamous cell cancer (possible/indeterminate bilateral lung involvement, stage IV). Not candidate for definitive RT/surgery.  -Hx severe COPD (FEV1 18%), tobacco use (quit 2021)  -Followed in Pulmonary with CT for waxing/waning bilateral lung nodules  -9/2023 chest CT: new spiculated RUL nodule + persistent filling defect of bronchus intermedius/RLL bronchus concerning for possible endobronchial tumor vs mucus plugging; progressive subcarinal soft tissue mass  -PET: avid right  hilar mass encasing RML bronchus contiguous with subcarinal node/mass, intraluminal soft tissue density RLL bronchus concerning for primary tumor; avid bilateral opacities indeterminate but favored as inflammatory. Incidentally, distal sigmoid/rectal avid soft tissue thickening noted.   -10/2023 Cologuard negative  -Treated with empiric antibiotics for pneumonia. Follow-up CT: RADHA improved; new nodules through bilateral lungs (up to 7 mm) and persistent right lung mass/bronchial findings.   -1/18/2024 flex + rigid bronch with tumor debulking and intermedius bronchial stent placement, with RUL, R mainstem bronchus and bronchus intermedius biopsy: mod-diff squamous cell cancer. PDL1 CPS 10-20% and TPS 5%.   -Lung NGS panel: CDKN2A and PIK3CA alterations   -Brain MRI negative  8/2/2024 PET scan shows no active disease.    Treatment:  2/29/2024 - 4/12/2024: palliative carbo, Taxol, pembrolizumab + Neulasta support. Completed 3 cycles  -->following 3 cycles, had overall good [place]/near CR; but complicated by hospitalization early May for hypoxic respiratory failure + pneumonia. Chemo stopped  and treatment held until recovered.     5/15/2024 - present: maintenance pembrolizumab every 3 weeks  8/28/2024  change to 400mg dosage q 6 weeks        Physical Exam    GENERAL: Alert and oriented to time place and person. Seated comfortably. In no distress. Alone.   HEAD: Atraumatic and normocephalic. No alopecia.  LYMPH: No palpable cervical or supraclavicular soft small benign nodes right axilla  CHEST: Regular respiratory effort. 1.5 L Oxygen via NC on. Diiminished lungs and expiratory wheezes bilaterally.   HEART: RRR  ABD: soft, non-tender, non-distended  EXTREMITIES: No edema  NEURO: No gross deficit noted. Non-antalgic gait.      Lab Results    Recent Results (from the past 168 hour(s))   CBC with platelets and differential   Result Value Ref Range    WBC Count 16.1 (H) 4.0 - 11.0 10e3/uL    RBC Count 3.73 (L) 4.40 - 5.90 10e6/uL    Hemoglobin 12.0 (L) 13.3 - 17.7 g/dL    Hematocrit 37.1 (L) 40.0 - 53.0 %     78 - 100 fL    MCH 32.2 26.5 - 33.0 pg    MCHC 32.3 31.5 - 36.5 g/dL    RDW 12.9 10.0 - 15.0 %    Platelet Count 330 150 - 450 10e3/uL    % Neutrophils 88 %    % Lymphocytes 4 %    % Monocytes 5 %    % Eosinophils 2 %    % Basophils 0 %    % Immature Granulocytes 0 %    NRBCs per 100 WBC 0 <1 /100    Absolute Neutrophils 14.2 (H) 1.6 - 8.3 10e3/uL    Absolute Lymphocytes 0.7 (L) 0.8 - 5.3 10e3/uL    Absolute Monocytes 0.8 0.0 - 1.3 10e3/uL    Absolute Eosinophils 0.3 0.0 - 0.7 10e3/uL    Absolute Basophils 0.1 0.0 - 0.2 10e3/uL    Absolute Immature Granulocytes 0.1 <=0.4 10e3/uL    Absolute NRBCs 0.0 10e3/uL         Imaging    PET Oncology (Eyes to Thighs)    Result Date: 8/2/2024  EXAM: PET ONCOLOGY (EYES TO THIGHS) LOCATION: [facility] DATE: 8/1/2024 INDICATION: Subsequent treatment planning and restaging for malignant neoplasm of upper lobe, right bronchus or lung. Status post chemotherapy/immunotherapy completed in April 2024, currently receiving maintenance  immunotherapy. Monitor treatment response. COMPARISON: CT the chest and pelvis dated 5/21/2024 and FDG PET/CT dated 4/11/2024 CONTRAST: None TECHNIQUE: Serum glucose level 110 mg/dL. One hour post intravenous administration of 13.5 mCi F-18 FDG, PET imaging was performed from the skull vertex to mid thighs, utilizing attenuation correction with concurrent axial CT and PET/CT image fusion. Dose reduction techniques were used. PET/CT FINDINGS: Resolution of the left greater than right lower lobe inflammatory/infectious processes with development of additional scattered nodular opacities in the left upper, left lower, and right lower lobes, which are likely inflammatory in nature. Degenerative shoulder synovitis. Reflux esophagitis. The remaining FDG uptake is physiologic from the skull vertex to mid thigh. CT FINDINGS: No acute intracranial abnormality. Postoperative change of bilateral lenses. Mild carotid artery bifurcation calcification. Severe emphysema. Right upper lobe lung granuloma. Moderate coronary artery calcium. Fat-containing umbilical hernia.  Sigmoid diverticulosis. Mild multilevel degenerative changes in spine.     IMPRESSION: No metabolic evidence of active neoplasm       I spent 25 minutes on the patient's visit today.  This included preparation for the visit, face-to-face time with the patient and documentation following the visit.  It did not include teaching or procedure time.    Signed by: Peter E. Friedell, MD

## 2024-08-07 NOTE — LETTER
"8/7/2024      Luis Hernandez  [id number] [facility] [address]      Dear Colleague,    Thank you for referring your patient, Luis Hernandez, to the [facility] CANCER [facility]. Please see a copy of my visit note below.    Oncology Rooming Note    August 7, 2024 10:18 AM   Luis Hernandez is a 59 year old male who presents for:    Chief Complaint   Patient presents with     Oncology Clinic Visit     Squamous cell lung cancer, right - Labs provider and infusion     Initial Vitals: /80 (BP Location: Right arm, Patient Position: Sitting, Cuff Size: Adult Small)   Pulse 86   Temp 97.8  F (36.6  C) (Tympanic)   Resp 12   Ht 1.626 m (5' 4\")   Wt 49 kg (108 lb)   SpO2 92%   BMI 18.54 kg/m   Estimated body mass index is 18.54 kg/m  as calculated from the following:    Height as of this encounter: 1.626 m (5' 4\").    Weight as of this encounter: 49 kg (108 lb). Body surface area is 1.49 meters squared.  No Pain (0) Comment: Data Unavailable   No LMP for male patient.  Allergies reviewed: Yes  Medications reviewed: Yes    Medications: Medication refills not needed today.  Pharmacy name entered into [employer]:    [id number] - [place], [place] - [address] [facility] [facility] [place] - [address] [facility] - [facility] - [address] [facility] AT PORTAL TO [facility] SITES  [id number] - [place], [place] - [address] [place] [address]    Frailty Screening:   Is the patient here for a new oncology consult visit in cancer care? 2. No      Clinical concerns: None      Feli Mead, [facility] Hematology and Oncology Outpatient Progress Note    Patient: Luis Hernandez  MRN: [id number]  Date of Service: Aug 7, 2024          Reason for Visit    Lung cancer    P    Assessment/Plan  >Stage IIIB squamous cell lung cancer  Bilateral lung nodules, indeterminate  Chemo-induced anemia  Low TSH  Not a " surgical nor definitive chemoradiation candidate given his severe COPD.  There are also some indeterminate lung nodules, either inflammatory or separate metastatic sites that need to be followed.      Luis completed 3 cycles of palliative carboplatin, Taxol, pembrolizumab with Neulasta support. Got an excellent response, but complicated by hospitalization for pneumonia/COPD exacerbation. Therefore, further chemo discontinued and continued maintenance pembro since May.     Tolerating well.   He was treated for a COPD exacerbation requiring steroids and antibiotics 3 weeks ago, so that cycle was held. He is recovered back to baseline.       -Proceed with pembrolizumab today   -Slightly suppressed TSH. If T4 remains normal, will follow. May be immunotherapy-related and usually evolves to hypothyroidism in time  .Change pembrolizumab to 400 mg every 6 weeks at next visit.    -Follow with Pulmonary/PCP  -Will need to follow closely for risk of autoimmune pneumonitis on pembro  ______________________________________________________________________________    History of Present Illness/ Interval History    . Luis Hernandez  is a 59 year old with at least locally advanced non-operable lung cancer. He completed 3 cycles of palliative carbo/Taxol/pembrolizumab (with Neulasta) with excellent [place]/near CR but complicated by pneumonia/respiratory failure after 3rd cycle so further chemo discontinued. He has continued maintenance pembrolizumab alone.     Patient returns for follow-up.  He continues on oxygen 24/7.  He is maintaining his weight.  Takes Ensure as needed.  He does not have chills, fevers or sweats.  He does have a persistent cough with clear sputum.  He does not have chest pain or shortness of breath at rest.  There is no change in bowel or bladder habit.  He has no neuropathy.  He can go about his daily activities with limitations from COPD.         ECOG Performance    1      Oncology  History/Treatment  Diagnosis/Stage:   1/2024: Stage IIIB (cT4-cN2-cM0) squamous cell cancer (possible/indeterminate bilateral lung involvement, stage IV). Not candidate for definitive RT/surgery.  -Hx severe COPD (FEV1 18%), tobacco use (quit 2021)  -Followed in Pulmonary with CT for waxing/waning bilateral lung nodules  -9/2023 chest CT: new spiculated RUL nodule + persistent filling defect of bronchus intermedius/RLL bronchus concerning for possible endobronchial tumor vs mucus plugging; progressive subcarinal soft tissue mass  -PET: avid right  hilar mass encasing RML bronchus contiguous with subcarinal node/mass, intraluminal soft tissue density RLL bronchus concerning for primary tumor; avid bilateral opacities indeterminate but favored as inflammatory. Incidentally, distal sigmoid/rectal avid soft tissue thickening noted.   -10/2023 Cologuard negative  -Treated with empiric antibiotics for pneumonia. Follow-up CT: RADHA improved; new nodules through bilateral lungs (up to 7 mm) and persistent right lung mass/bronchial findings.   -1/18/2024 flex + rigid bronch with tumor debulking and intermedius bronchial stent placement, with RUL, R mainstem bronchus and bronchus intermedius biopsy: mod-diff squamous cell cancer. PDL1 CPS 10-20% and TPS 5%.   -Lung NGS panel: CDKN2A and PIK3CA alterations   -Brain MRI negative  8/2/2024 PET scan shows no active disease.    Treatment:  2/29/2024 - 4/12/2024: palliative carbo, Taxol, pembrolizumab + Neulasta support. Completed 3 cycles  -->following 3 cycles, had overall good [place]/near CR; but complicated by hospitalization early May for hypoxic respiratory failure + pneumonia. Chemo stopped and treatment held until recovered.     5/15/2024 - present: maintenance pembrolizumab every 3 weeks  8/28/2024  change to 400mg dosage q 6 weeks        Physical Exam    GENERAL: Alert and oriented to time place and person. Seated comfortably. In no distress. Alone.   HEAD: Atraumatic and  normocephalic. No alopecia.  LYMPH: No palpable cervical or supraclavicular soft small benign nodes right axilla  CHEST: Regular respiratory effort. 1.5 L Oxygen via NC on. Diiminished lungs and expiratory wheezes bilaterally.   HEART: RRR  ABD: soft, non-tender, non-distended  EXTREMITIES: No edema  NEURO: No gross deficit noted. Non-antalgic gait.      Lab Results    Recent Results (from the past 168 hour(s))   CBC with platelets and differential   Result Value Ref Range    WBC Count 16.1 (H) 4.0 - 11.0 10e3/uL    RBC Count 3.73 (L) 4.40 - 5.90 10e6/uL    Hemoglobin 12.0 (L) 13.3 - 17.7 g/dL    Hematocrit 37.1 (L) 40.0 - 53.0 %     78 - 100 fL    MCH 32.2 26.5 - 33.0 pg    MCHC 32.3 31.5 - 36.5 g/dL    RDW 12.9 10.0 - 15.0 %    Platelet Count 330 150 - 450 10e3/uL    % Neutrophils 88 %    % Lymphocytes 4 %    % Monocytes 5 %    % Eosinophils 2 %    % Basophils 0 %    % Immature Granulocytes 0 %    NRBCs per 100 WBC 0 <1 /100    Absolute Neutrophils 14.2 (H) 1.6 - 8.3 10e3/uL    Absolute Lymphocytes 0.7 (L) 0.8 - 5.3 10e3/uL    Absolute Monocytes 0.8 0.0 - 1.3 10e3/uL    Absolute Eosinophils 0.3 0.0 - 0.7 10e3/uL    Absolute Basophils 0.1 0.0 - 0.2 10e3/uL    Absolute Immature Granulocytes 0.1 <=0.4 10e3/uL    Absolute NRBCs 0.0 10e3/uL         Imaging    PET Oncology (Eyes to Thighs)    Result Date: 8/2/2024  EXAM: PET ONCOLOGY (EYES TO THIGHS) LOCATION: [facility] DATE: 8/1/2024 INDICATION: Subsequent treatment planning and restaging for malignant neoplasm of upper lobe, right bronchus or lung. Status post chemotherapy/immunotherapy completed in April 2024, currently receiving maintenance immunotherapy. Monitor treatment response. COMPARISON: CT the chest and pelvis dated 5/21/2024 and FDG PET/CT dated 4/11/2024 CONTRAST: None TECHNIQUE: Serum glucose level 110 mg/dL. One hour post intravenous administration of 13.5 mCi F-18 FDG, PET imaging was performed from the skull vertex  to mid thighs, utilizing attenuation correction with concurrent axial CT and PET/CT image fusion. Dose reduction techniques were used. PET/CT FINDINGS: Resolution of the left greater than right lower lobe inflammatory/infectious processes with development of additional scattered nodular opacities in the left upper, left lower, and right lower lobes, which are likely inflammatory in nature. Degenerative shoulder synovitis. Reflux esophagitis. The remaining FDG uptake is physiologic from the skull vertex to mid thigh. CT FINDINGS: No acute intracranial abnormality. Postoperative change of bilateral lenses. Mild carotid artery bifurcation calcification. Severe emphysema. Right upper lobe lung granuloma. Moderate coronary artery calcium. Fat-containing umbilical hernia.  Sigmoid diverticulosis. Mild multilevel degenerative changes in spine.     IMPRESSION: No metabolic evidence of active neoplasm       I spent 25 minutes on the patient's visit today.  This included preparation for the visit, face-to-face time with the patient and documentation following the visit.  It did not include teaching or procedure time.    Signed by: Peter E. Friedell, MD        Again, thank you for allowing me to participate in the care of your patient.        Sincerely,        Peter E. Friedell, MD

## 2024-08-07 NOTE — PROGRESS NOTES
Infusion Nursing Note:  Luis BURNS David presents today for Keytruda.    Patient seen by provider today: Yes: Dr. Friedell   present during visit today: Not Applicable.    Note: N/A.      Intravenous Access:  Peripheral IV placed.    Treatment Conditions:  Lab Results   Component Value Date    HGB 12.0 (L) 08/07/2024    WBC 16.1 (H) 08/07/2024    ANEU 31.1 (H) 04/26/2024    ANEUTAUTO 14.2 (H) 08/07/2024     08/07/2024        Lab Results   Component Value Date     08/07/2024    POTASSIUM 4.4 08/07/2024    MAG 2.2 04/24/2022    CR 0.62 (L) 08/07/2024    MIKE 9.2 08/07/2024    BILITOTAL <0.2 08/07/2024    ALBUMIN 4.0 08/07/2024    ALT 15 08/07/2024    AST 13 08/07/2024       Results reviewed, labs MET treatment parameters, ok to proceed with treatment.      Post Infusion Assessment:  Patient tolerated infusion without incident.  Blood return noted pre and post infusion.  Site patent and intact, free from redness, edema or discomfort.  No evidence of extravasations.  Access discontinued per protocol.       Discharge Plan:   Discharge instructions reviewed with: Patient.  Patient discharged in stable condition accompanied by: self.  Departure Mode: Ambulatory.      Dominga Castillo RN

## 2024-08-07 NOTE — PROGRESS NOTES
"Oncology Rooming Note    August 7, 2024 10:18 AM   Luis Hernandez is a 59 year old male who presents for:    Chief Complaint   Patient presents with    Oncology Clinic Visit     Squamous cell lung cancer, right - Labs provider and infusion     Initial Vitals: /80 (BP Location: Right arm, Patient Position: Sitting, Cuff Size: Adult Small)   Pulse 86   Temp 97.8  F (36.6  C) (Tympanic)   Resp 12   Ht 1.626 m (5' 4\")   Wt 49 kg (108 lb)   SpO2 92%   BMI 18.54 kg/m   Estimated body mass index is 18.54 kg/m  as calculated from the following:    Height as of this encounter: 1.626 m (5' 4\").    Weight as of this encounter: 49 kg (108 lb). Body surface area is 1.49 meters squared.  No Pain (0) Comment: Data Unavailable   No LMP for male patient.  Allergies reviewed: Yes  Medications reviewed: Yes    Medications: Medication refills not needed today.  Pharmacy name entered into [employer]:    [facility][id number] - [place], [place] - [address] [facility] [place] - [address] [place] [address] [facility] - [place] - [address] [facility] AT PORTAL TO [facility] SITES  [id number] - [place], [place] - [address] [place] [address]    Frailty Screening:   Is the patient here for a new oncology consult visit in cancer care? 2. No      Clinical concerns: None      Feli Mead CMA            "

## 2024-08-07 NOTE — TELEPHONE ENCOUNTER
Pending Prescriptions:                       Disp   Refills    amLODIPine (NORVASC) 10 MG tablet         90 tab*3            Sig: Take 1 tablet (10 mg) by mouth daily    metoprolol tartrate (LOPRESSOR) 25 MG tab*180 ta*3            Sig: Take 1 tablet (25 mg) by mouth 2 times daily

## 2024-08-08 NOTE — TELEPHONE ENCOUNTER
Called pt & updated that meds were filled & sent on 8/7 to [facility]  Pt verbalized understanding & states he already got a msg from [facility].    Lula Aguilar RN

## 2024-08-15 ENCOUNTER — HOSPITAL ENCOUNTER (EMERGENCY)
Facility: CLINIC | Age: 59
Discharge: HOME | End: 2024-08-15
Attending: FAMILY MEDICINE | Admitting: FAMILY MEDICINE
Payer: COMMERCIAL

## 2024-08-15 ENCOUNTER — APPOINTMENT (OUTPATIENT)
Dept: GENERAL RADIOLOGY | Facility: CLINIC | Age: 59
End: 2024-08-15
Attending: FAMILY MEDICINE
Payer: COMMERCIAL

## 2024-08-15 VITALS
SYSTOLIC BLOOD PRESSURE: 117 MMHG | DIASTOLIC BLOOD PRESSURE: 85 MMHG | RESPIRATION RATE: 22 BRPM | BODY MASS INDEX: 18.44 KG/M2 | WEIGHT: 108 LBS | OXYGEN SATURATION: 96 % | TEMPERATURE: 97.7 F | HEART RATE: 87 BPM | HEIGHT: 64 IN

## 2024-08-15 DIAGNOSIS — J44.1 COPD EXACERBATION (H): ICD-10-CM

## 2024-08-15 LAB
ANION GAP SERPL CALCULATED.3IONS-SCNC: 7 MMOL/L (ref 7–15)
BASOPHILS # BLD AUTO: 0.1 10E3/UL (ref 0–0.2)
BASOPHILS NFR BLD AUTO: 1 %
BUN SERPL-MCNC: 21 MG/DL (ref 6–20)
CALCIUM SERPL-MCNC: 8.9 MG/DL (ref 8.8–10.4)
CHLORIDE SERPL-SCNC: 97 MMOL/L (ref 98–107)
CREAT SERPL-MCNC: 0.61 MG/DL (ref 0.67–1.17)
EGFRCR SERPLBLD CKD-EPI 2021: >90 ML/MIN/1.73M2
EOSINOPHIL # BLD AUTO: 0.4 10E3/UL (ref 0–0.7)
EOSINOPHIL NFR BLD AUTO: 3 %
ERYTHROCYTE [DISTWIDTH] IN BLOOD BY AUTOMATED COUNT: 13.2 % (ref 10–15)
GLUCOSE SERPL-MCNC: 95 MG/DL (ref 70–99)
HCO3 SERPL-SCNC: 29 MMOL/L (ref 22–29)
HCT VFR BLD AUTO: 36.8 % (ref 40–53)
HGB BLD-MCNC: 11.8 G/DL (ref 13.3–17.7)
IMM GRANULOCYTES # BLD: 0 10E3/UL
IMM GRANULOCYTES NFR BLD: 0 %
LYMPHOCYTES # BLD AUTO: 1.2 10E3/UL (ref 0.8–5.3)
LYMPHOCYTES NFR BLD AUTO: 10 %
MCH RBC QN AUTO: 32.2 PG (ref 26.5–33)
MCHC RBC AUTO-ENTMCNC: 32.1 G/DL (ref 31.5–36.5)
MCV RBC AUTO: 100 FL (ref 78–100)
MONOCYTES # BLD AUTO: 1.3 10E3/UL (ref 0–1.3)
MONOCYTES NFR BLD AUTO: 10 %
NEUTROPHILS # BLD AUTO: 9.8 10E3/UL (ref 1.6–8.3)
NEUTROPHILS NFR BLD AUTO: 77 %
NRBC # BLD AUTO: 0 10E3/UL
NRBC BLD AUTO-RTO: 0 /100
PLATELET # BLD AUTO: 298 10E3/UL (ref 150–450)
POTASSIUM SERPL-SCNC: 4.3 MMOL/L (ref 3.4–5.3)
RBC # BLD AUTO: 3.67 10E6/UL (ref 4.4–5.9)
SODIUM SERPL-SCNC: 133 MMOL/L (ref 135–145)
TROPONIN T SERPL HS-MCNC: 14 NG/L
WBC # BLD AUTO: 12.8 10E3/UL (ref 4–11)

## 2024-08-15 PROCEDURE — 76604 US EXAM CHEST: CPT

## 2024-08-15 PROCEDURE — 93005 ELECTROCARDIOGRAM TRACING: CPT

## 2024-08-15 PROCEDURE — 76604 US EXAM CHEST: CPT | Mod: 26 | Performed by: FAMILY MEDICINE

## 2024-08-15 PROCEDURE — 84484 ASSAY OF TROPONIN QUANT: CPT | Performed by: FAMILY MEDICINE

## 2024-08-15 PROCEDURE — 250N000012 HC RX MED GY IP 250 OP 636 PS 637: Performed by: FAMILY MEDICINE

## 2024-08-15 PROCEDURE — 93010 ELECTROCARDIOGRAM REPORT: CPT | Performed by: FAMILY MEDICINE

## 2024-08-15 PROCEDURE — 36415 COLL VENOUS BLD VENIPUNCTURE: CPT | Performed by: FAMILY MEDICINE

## 2024-08-15 PROCEDURE — 80048 BASIC METABOLIC PNL TOTAL CA: CPT | Performed by: FAMILY MEDICINE

## 2024-08-15 PROCEDURE — 85025 COMPLETE CBC W/AUTO DIFF WBC: CPT | Performed by: FAMILY MEDICINE

## 2024-08-15 PROCEDURE — 250N000009 HC RX 250: Performed by: FAMILY MEDICINE

## 2024-08-15 PROCEDURE — 94640 AIRWAY INHALATION TREATMENT: CPT

## 2024-08-15 PROCEDURE — 99285 EMERGENCY DEPT VISIT HI MDM: CPT | Mod: 25

## 2024-08-15 PROCEDURE — 99284 EMERGENCY DEPT VISIT MOD MDM: CPT | Performed by: FAMILY MEDICINE

## 2024-08-15 PROCEDURE — 71046 X-RAY EXAM CHEST 2 VIEWS: CPT

## 2024-08-15 RX ORDER — AZITHROMYCIN 250 MG/1
TABLET, FILM COATED ORAL
Qty: 6 TABLET | Refills: 0 | Status: SHIPPED | OUTPATIENT
Start: 2024-08-15 | End: 2024-10-04

## 2024-08-15 RX ORDER — IPRATROPIUM BROMIDE AND ALBUTEROL SULFATE 2.5; .5 MG/3ML; MG/3ML
3 SOLUTION RESPIRATORY (INHALATION) ONCE
Status: COMPLETED | OUTPATIENT
Start: 2024-08-15 | End: 2024-08-15

## 2024-08-15 RX ORDER — PREDNISONE 20 MG/1
60 TABLET ORAL ONCE
Status: COMPLETED | OUTPATIENT
Start: 2024-08-15 | End: 2024-08-15

## 2024-08-15 RX ORDER — PREDNISONE 10 MG/1
TABLET ORAL
Qty: 32 TABLET | Refills: 0 | Status: SHIPPED | OUTPATIENT
Start: 2024-08-15 | End: 2024-08-25

## 2024-08-15 RX ADMIN — IPRATROPIUM BROMIDE AND ALBUTEROL SULFATE 3 ML: 2.5; .5 SOLUTION RESPIRATORY (INHALATION) at 11:18

## 2024-08-15 RX ADMIN — PREDNISONE 60 MG: 20 TABLET ORAL at 11:22

## 2024-08-15 ASSESSMENT — ACTIVITIES OF DAILY LIVING (ADL)
ADLS_ACUITY_SCORE: 40

## 2024-08-15 ASSESSMENT — COLUMBIA-SUICIDE SEVERITY RATING SCALE - C-SSRS
1. IN THE PAST MONTH, HAVE YOU WISHED YOU WERE DEAD OR WISHED YOU COULD GO TO SLEEP AND NOT WAKE UP?: NO
2. HAVE YOU ACTUALLY HAD ANY THOUGHTS OF KILLING YOURSELF IN THE PAST MONTH?: NO
6. HAVE YOU EVER DONE ANYTHING, STARTED TO DO ANYTHING, OR PREPARED TO DO ANYTHING TO END YOUR LIFE?: NO

## 2024-08-15 NOTE — ED NOTES
Pt medicated per orders and then transported to XR.  Lungs sounds are now louder and scant inspiratory and expiratory wheezing noted.  Pt remains acutely SOB.

## 2024-08-15 NOTE — ED TRIAGE NOTES
SOA since 8/7, was prescribed prednisone-completed course yesterday. Pt on 1.5 L O2 via NC at baseline. Pt reports activity intolerance, dyspnea on exertion. Pt reports hx of COPD     Triage Assessment (Adult)       Row Name 08/15/24 1008          Triage Assessment    Airway WDL WDL        Respiratory WDL    Respiratory WDL X;rhythm/pattern;cough     Rhythm/Pattern, Respiratory dyspnea upon exertion;shortness of breath;tachypneic     Cough Frequency frequent     Cough Type productive;good        Cardiac WDL    Cardiac WDL WDL        Cognitive/Neuro/Behavioral WDL    Cognitive/Neuro/Behavioral WDL WDL

## 2024-08-15 NOTE — DISCHARGE INSTRUCTIONS
ICD-10-CM    1. COPD exacerbation (H)  J44.1     take zithromax and prednisone. follow-up clinic

## 2024-08-15 NOTE — ED PROVIDER NOTES
History     Chief Complaint   Patient presents with    Shortness of Breath     SOA since 8/7, was prescribed prednisone-completed course yesterday. Pt on 1.5 L O2 via NC at baseline. Pt reports activity intolerance, dyspnea on exertion.      HPI  Luis Hernandez is a 59 year old male who presents with a history of increased work of breathing and tachypnea.  He had prolonged expiratory phase in triage and wheezing.  Completed a short course of steroids yesterday but still significantly dyspneic.  He has no associated chest pain.  He has significant dyspnea on exertion.  He has longstanding history of COPD and is on home oxygen at 1.5 liters  Cough that is productive of yellowish sputum.  No associated fever.  Symptoms appeared to start after he had his dose of Keytruda about a week ago.    He is on chronic Advair, and Spiriva.  Also on Daliresp.    Allergies:  Allergies   Allergen Reactions    Hctz Other (See Comments)     He has hyponatremia - avoid use    Penicillins Unknown     Childhood reaction.       Problem List:    Patient Active Problem List    Diagnosis Date Noted    Encounter for long-term (current) use of high-risk medication 07/18/2024     Priority: Medium    High risk medication use 05/09/2024     Priority: Medium    Full code status 04/23/2024     Priority: Medium    Pneumonia of both lower lobes due to infectious organism 04/23/2024     Priority: Medium    Encounter for antineoplastic chemotherapy 02/26/2024     Priority: Medium    Squamous cell lung cancer, right (H) 02/20/2024     Priority: Medium    COPD with acute exacerbation (H) 01/07/2024     Priority: Medium    Acute on chronic respiratory failure with hypoxia and hypercapnia (H) 11/06/2023     Priority: Medium    Anemia, unspecified type 05/07/2021     Priority: Medium    Peripheral edema 02/16/2020     Priority: Medium    Pulmonary hypertension (H) 02/16/2020     Priority: Medium    Tobacco abuse, 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   Stable 01/2014 to 07/2014, 6 month follow scan recommended.   Chest CT of 1/15/2015= stable nodule, f/u one year.      CARDIOVASCULAR SCREENING; LDL GOAL LESS THAN 130 10/31/2010     Priority: Medium    COPD, very severe (H) 10/25/2010     Priority: Medium     Severe to very severe      Eczema 10/04/2010     Priority: Medium    ED (erectile dysfunction) 04/20/2009     Priority: Medium    Essential hypertension, benign 10/15/2007     Priority: Medium        Past Medical History:    Past Medical History:   Diagnosis Date    Acute on chronic respiratory failure with hypoxia (H) 04/10/2020    Acute on chronic respiratory failure with hypoxia and hypercapnia (H) 04/01/2019    Acute respiratory failure with hypoxia and hypercapnia (H) 04/23/2024    CAP (community acquired pneumonia) 04/14/2020    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)     COPD exacerbation (H) 04/01/2019    COPD exacerbation (H) 02/16/2020    Erectile dysfunction     Hypertension     Hyponatremia 04/01/2019    Pneumonia 04/10/2020       Past Surgical History:    Past Surgical History:   Procedure Laterality Date    APPENDECTOMY      childhood    BRONCHOSCOPY, DILATE BRONCHUS, STENT BRONCHUS, COMBINED N/A 1/18/2024    Procedure: Bronchoscopy with tissue debulking,  and stent placement.;  Surgeon: Isac Prescott MD;  Location: [facility] OR    ENDOBRONCHIAL ULTRASOUND FLEXIBLE N/A 1/18/2024    Procedure: Endobronchial ultrasound with Endobronchial and Cryo biopsy.;  Surgeon: Isac Prescott MD;  Location[facility]       Family History:    Family History   Problem Relation Age of Onset    Hypertension Mother     Ovarian Cancer Mother     Breast Cancer Mother     Hypertension Father     Lipids Father     C.A.D. Father     Heart Disease Father     Chronic Obstructive Pulmonary  "Disease Father     Cerebrovascular Disease Father     Diabetes Paternal Grandmother     Chronic Obstructive Pulmonary Disease Paternal Grandfather        Social History:  Marital Status:  Single [1]  Social History     Tobacco Use    Smoking status: Former     Current packs/day: 0.00     Average packs/day: 2.0 packs/day for 30.0 years (60.0 ttl pk-yrs)     Types: Cigarettes     Start date: 08/1991     Quit date: 08/2021     Years since quitting: 3.0    Smokeless tobacco: Former   Vaping Use    Vaping status: Never Used   Substance Use Topics    Alcohol use: Yes     Comment: rare    Drug use: No        Medications:    albuterol (PROAIR HFA/PROVENTIL HFA/VENTOLIN HFA) 108 (90 Base) MCG/ACT inhaler  amLODIPine (NORVASC) 10 MG tablet  fluticasone-salmeterol (ADVAIR) 500-50 MCG/ACT inhaler  guaiFENesin (MUCINEX MAXIMUM STRENGTH) 1200 MG TB12  lisinopril (ZESTRIL) 40 MG tablet  LORazepam (ATIVAN) 0.5 MG tablet  magnesium oxide (MAG-OX) 400 MG tablet  metoprolol tartrate (LOPRESSOR) 25 MG tablet  ondansetron (ZOFRAN) 8 MG tablet  order for DME  order for DME  predniSONE (DELTASONE) 10 MG tablet  prochlorperazine (COMPAZINE) 10 MG tablet  roflumilast (DALIRESP) 500 MCG TABS tablet  tiotropium (SPIRIVA RESPIMAT) 2.5 MCG/ACT inhaler          Review of Systems    Physical Exam   BP: (!) 139/101  Pulse: 85  Temp: 97.7  F (36.5  C)  Resp: 28  Height: 162.6 cm (5' 4\")  Weight: 49 kg (108 lb)  SpO2: 98 %      Physical Exam  Constitutional:       General: He is in acute distress.      Appearance: He is not diaphoretic.   Eyes:      Conjunctiva/sclera: Conjunctivae normal.   Cardiovascular:      Rate and Rhythm: Normal rate and regular rhythm.      Heart sounds: No murmur heard.  Pulmonary:      Effort: Respiratory distress present.      Breath sounds: Wheezing present.   Abdominal:      General: Abdomen is flat. There is no distension.      Palpations: Abdomen is soft. There is no mass.      Tenderness: There is no abdominal " tenderness.   Musculoskeletal:      Cervical back: Neck supple.      Right lower leg: No edema.      Left lower leg: No edema.   Skin:     Coloration: Skin is not pale.      Findings: No rash.   Neurological:      Mental Status: He is alert.      Motor: No weakness.     Reduced chest excursion.  Wheezing.  Bilaterally.    ED Course        Procedures                EKG Interpretation:      Interpreted by Sylvester Blanton MD  EKG done at 1043 hrs. demonstrates a sinus rhythm 83 bpm normal axis.  There is no ST change.  There is prominent T waves in leads V3, V4, V5.  There is a normal R progression and no Q waves.  Normal intervals.  Normal conduction.  No ectopy.  Impression sinus rhythm 83 bpm and T wave prominence in the precordial leads.    Critical Care time:  none               Results for orders placed or performed during the hospital encounter of 08/15/24 (from the past 24 hour(s))   POC US CHEST B-SCAN    Impression    [facility] Procedure Note      Limited Bedside ED Cardiac Ultrasound:    PROCEDURE: PERFORMED BY: Dr. Sylvester Blanton MD  INDICATIONS/SYMPTOM:  Shortness of Breath  PROBE: Cardiac phased array probe and High frequency linear probe  BODY LOCATION: Chest  FINDINGS:   The ultrasound was performed utilizing the subcostal, parasternal long axis, parasternal short axis, and apical 4 chamber views.  Cardiac contractility:  Present  Gross estimation of cardiac kinesis: normal  Pericardial Effusion:  None  RV:LV ratio: LV =RV  IVC:    Diameter:  IVC diameter expiration (IVCe) 2 cm                                                   IVC diameter inspiration (IVCi) 0 cm                                                       Collapsibility:  IVC collapses > 50% with inspiration  INTERPRETATION:    Chamber size and motion were grossly normal with LV = RV, normal cardiac kinesis.  No pericardial effusion was found.  IVC visualized and findings indicate normovolemia.  IMAGE DOCUMENTATION: Images were  archived to PACs system.        [facility] Procedure Note      Limited Bedside ED Ultrasound of Thorax:    PROCEDURE: PERFORMED BY: Dr. Sylvester Blanton MD  INDICATIONS/SYMPTOM:  shortness of breath  PROBE: High frequency linear probe  BODY LOCATION: Chest  FINDINGS:  Images of both lung hemithoracies taken in 2D in multiple rib spaces        Right side:  Lung sliding artifact  Present     Comet tail artifacts  Absent   Left side:  Lung sliding artifact  Present     Comet tail artifacts  Absent   Hemothorax: Right side Absent     Left side Absent   Pleural effusion: Right side Absent      Left side Absent    INTERPRETATION: The exam was normal. There was no free fluid identified in the chest cavity. No evidence of pneumothorax, hemothorax or pleural effusion.  IMAGE DOCUMENTATION: Images were archived to PACs system.         CBC with platelets, differential    Narrative    The following orders were created for panel order CBC with platelets, differential.  Procedure                               Abnormality         Status                     ---------                               -----------         ------                     CBC with platelets and d...[[id number]]  Abnormal            Final result                 Please view results for these tests on the individual orders.   Basic metabolic panel   Result Value Ref Range    Sodium 133 (L) 135 - 145 mmol/L    Potassium 4.3 3.4 - 5.3 mmol/L    Chloride 97 (L) 98 - 107 mmol/L    Carbon Dioxide (CO2) 29 22 - 29 mmol/L    Anion Gap 7 7 - 15 mmol/L    Urea Nitrogen 21.0 (H) 6.0 - 20.0 mg/dL    Creatinine 0.61 (L) 0.67 - 1.17 mg/dL    GFR Estimate >90 >60 mL/min/1.73m2    Calcium 8.9 8.8 - 10.4 mg/dL    Glucose 95 70 - 99 mg/dL   Troponin T, High Sensitivity   Result Value Ref Range    Troponin T, High Sensitivity 14 <=22 ng/L   CBC with platelets and differential   Result Value Ref Range    WBC Count 12.8 (H) 4.0 - 11.0 10e3/uL    RBC Count 3.67 (L) 4.40 - 5.90  10e6/uL    Hemoglobin 11.8 (L) 13.3 - 17.7 g/dL    Hematocrit 36.8 (L) 40.0 - 53.0 %     78 - 100 fL    MCH 32.2 26.5 - 33.0 pg    MCHC 32.1 31.5 - 36.5 g/dL    RDW 13.2 10.0 - 15.0 %    Platelet Count 298 150 - 450 10e3/uL    % Neutrophils 77 %    % Lymphocytes 10 %    % Monocytes 10 %    % Eosinophils 3 %    % Basophils 1 %    % Immature Granulocytes 0 %    NRBCs per 100 WBC 0 <1 /100    Absolute Neutrophils 9.8 (H) 1.6 - 8.3 10e3/uL    Absolute Lymphocytes 1.2 0.8 - 5.3 10e3/uL    Absolute Monocytes 1.3 0.0 - 1.3 10e3/uL    Absolute Eosinophils 0.4 0.0 - 0.7 10e3/uL    Absolute Basophils 0.1 0.0 - 0.2 10e3/uL    Absolute Immature Granulocytes 0.0 <=0.4 10e3/uL    Absolute NRBCs 0.0 10e3/uL   Chest XR,  PA & LAT    Narrative    CHEST TWO VIEWS  8/15/2024 11:39 AM     HISTORY:  Dyspnea on exertion.    COMPARISON: 4/23/2024.      Impression    IMPRESSION: Hyperinflation both lungs, likely related to COPD. Lungs  are otherwise clear. No pleural effusions. No pneumothorax. Heart size  and pulmonary vascularity are within normal limits. A stent is again  projected over the right side of the mediastinum.     ADITI BROWN MD         SYSTEM ID:  [id number]       Medications   ipratropium - albuterol 0.5 mg/2.5 mg/3 mL (DUONEB) neb solution 3 mL (has no administration in time range)   predniSONE (DELTASONE) tablet 60 mg (has no administration in time range)       Assessments & Plan (with Medical Decision Making)     mdm: Luis Hernandez is a 59 year old male presenting with dyspnea, COPD exacerbation recently on prednisone and completed that course yesterday still with significant dyspnea on exertion.  No associated chest pain leg edema orthopnea.  EKG performed and does show prominent T's laterally.    Did confirm with the tach that leads were appropriately placed for this EKG.  Plan to perform a broader-based evaluation for dyspnea to confirm no other associated cause at this point.  Will treat as COPD  exacerbation for now in addition we will perform a bedside ultrasound, cxr as well    Findings are reassuring and this does appear to be a COPD exacerbation without superimposed other cardiopulmonary causes for his dyspnea.  Made recommendations as below.  Precautions are given for return.  Will extend the prednisone taper and continue home inhalers.    I have reviewed the nursing notes.    I have reviewed the findings, diagnosis, plan and need for follow up with the patient.           Medical Decision Making  The patient's presentation was of moderate complexity (a chronic illness mild to moderate exacerbation, progression, or side effect of treatment).    The patient's evaluation involved:  ordering and/or review of 3+ test(s) in this encounter (see separate area of note for details)    The patient's management necessitated moderate risk (prescription drug management including medications given in the ED).        New Prescriptions    No medications on file       Final diagnoses:   COPD exacerbation (H) - take zithromax and prednisone. follow-up clinic       8/15/2024   [facility] EMERGENCY DEPT       Sylvester Blanton MD  08/15/24 2006

## 2024-08-15 NOTE — ED NOTES
"Pt arrived via triage, ambulatory, appears to be in acute Resp distress.  Pt with increased RR and WOB, prolonged expiratory phase and is now sitting in tripod position on cot.  Pt on Home 02 at 1.5 lts/   Pt reports h/o CPOD and recent steroid use, that ended yesterday.  Pt states at rest he is at baseline, but with activity he is acutely SOB.  Pt states he \"know myself and when I need to come in.      Lungs:  CTA bilat not wheezing noted.  Clubbing to fingers noted, frail and cachectic, prominent rib cage noted.        Heart: SiS2, no MRG noted, pt denies CP.  Pt placed on our 02 at 1.5 lt.  Pt encourage to just sit in position of comfort.  Pt placed on full monitors.  VSS.  PLAN:  Will await MD assessment and orders.   "

## 2024-08-16 ENCOUNTER — PATIENT OUTREACH (OUTPATIENT)
Dept: CARE COORDINATION | Facility: CLINIC | Age: 59
End: 2024-08-16
Payer: COMMERCIAL

## 2024-08-16 NOTE — PROGRESS NOTES
[facility] [place] [facility]  Community Health Worker Initial Outreach    CHW Initial Information Gathering:  Referral Source: ED Follow-Up  CHW Additional Questions  If ED/Hospital discharge, follow-up appointment scheduled as recommended?: Other (Pt plans to follow up with oncologist, declined to schedule ED follow-up with PCP at this time)  Rheat active?: Yes    Patient accepts CC: No, patient declined any need for care coordination services at this time. Patient will be sent Care Coordination introduction letter for future reference.       Jacquie Michael  Community Health Worker  [facility], [facility]    *Connected Care Resource Team does NOT follow patient ongoing. Referrals are identified based on internal discharge reports and the outreach is to ensure patient has an understanding of their discharge instructions.

## 2024-08-21 DIAGNOSIS — J43.2 CENTRILOBULAR EMPHYSEMA (H): ICD-10-CM

## 2024-08-21 DIAGNOSIS — J43.9 PULMONARY EMPHYSEMA, UNSPECIFIED EMPHYSEMA TYPE (H): ICD-10-CM

## 2024-08-21 RX ORDER — ROFLUMILAST 500 UG/1
500 TABLET ORAL DAILY
Qty: 90 TABLET | Refills: 0 | Status: SHIPPED | OUTPATIENT
Start: 2024-08-21

## 2024-08-21 NOTE — TELEPHONE ENCOUNTER
Requested Prescriptions   Pending Prescriptions Disp Refills    roflumilast (DALIRESP) 500 MCG TABS tablet 90 tablet 3     Sig: Take 1 tablet (500 mcg) by mouth daily.       There is no refill protocol information for this order           Last office visit: 4/4/2024 ; last virtual visit: Visit date not found with prescribing provider:  Bianca Cuellar     Future Office Visit:        Digna Stout   [facility] [place]   [facility]  [phone]

## 2024-08-28 ENCOUNTER — ONCOLOGY VISIT (OUTPATIENT)
Dept: ONCOLOGY | Facility: CLINIC | Age: 59
End: 2024-08-28
Attending: INTERNAL MEDICINE
Payer: COMMERCIAL

## 2024-08-28 ENCOUNTER — INFUSION THERAPY VISIT (OUTPATIENT)
Dept: INFUSION THERAPY | Facility: CLINIC | Age: 59
End: 2024-08-28
Attending: INTERNAL MEDICINE
Payer: COMMERCIAL

## 2024-08-28 ENCOUNTER — LAB (OUTPATIENT)
Dept: LAB | Facility: CLINIC | Age: 59
End: 2024-08-28
Payer: COMMERCIAL

## 2024-08-28 VITALS
HEIGHT: 64 IN | DIASTOLIC BLOOD PRESSURE: 80 MMHG | TEMPERATURE: 97.5 F | OXYGEN SATURATION: 94 % | HEART RATE: 86 BPM | WEIGHT: 114 LBS | SYSTOLIC BLOOD PRESSURE: 115 MMHG | BODY MASS INDEX: 19.46 KG/M2 | RESPIRATION RATE: 16 BRPM

## 2024-08-28 VITALS — HEART RATE: 82 BPM | DIASTOLIC BLOOD PRESSURE: 80 MMHG | SYSTOLIC BLOOD PRESSURE: 120 MMHG

## 2024-08-28 DIAGNOSIS — Z79.899 HIGH RISK MEDICATION USE: ICD-10-CM

## 2024-08-28 DIAGNOSIS — Z51.11 ENCOUNTER FOR ANTINEOPLASTIC CHEMOTHERAPY: ICD-10-CM

## 2024-08-28 DIAGNOSIS — C34.91 SQUAMOUS CELL LUNG CANCER, RIGHT (H): Primary | ICD-10-CM

## 2024-08-28 LAB
ALBUMIN SERPL BCG-MCNC: 3.9 G/DL (ref 3.5–5.2)
ALP SERPL-CCNC: 49 U/L (ref 40–150)
ALT SERPL W P-5'-P-CCNC: 14 U/L (ref 0–70)
ANION GAP SERPL CALCULATED.3IONS-SCNC: 6 MMOL/L (ref 7–15)
AST SERPL W P-5'-P-CCNC: 13 U/L (ref 0–45)
BASOPHILS # BLD AUTO: 0.1 10E3/UL (ref 0–0.2)
BASOPHILS NFR BLD AUTO: 1 %
BILIRUB SERPL-MCNC: 0.2 MG/DL
BUN SERPL-MCNC: 11.4 MG/DL (ref 6–20)
CALCIUM SERPL-MCNC: 8.8 MG/DL (ref 8.8–10.4)
CHLORIDE SERPL-SCNC: 99 MMOL/L (ref 98–107)
CREAT SERPL-MCNC: 0.72 MG/DL (ref 0.67–1.17)
EGFRCR SERPLBLD CKD-EPI 2021: >90 ML/MIN/1.73M2
EOSINOPHIL # BLD AUTO: 0.4 10E3/UL (ref 0–0.7)
EOSINOPHIL NFR BLD AUTO: 4 %
ERYTHROCYTE [DISTWIDTH] IN BLOOD BY AUTOMATED COUNT: 13.2 % (ref 10–15)
GLUCOSE SERPL-MCNC: 94 MG/DL (ref 70–99)
HCO3 SERPL-SCNC: 31 MMOL/L (ref 22–29)
HCT VFR BLD AUTO: 37.4 % (ref 40–53)
HGB BLD-MCNC: 11.9 G/DL (ref 13.3–17.7)
IMM GRANULOCYTES # BLD: 0 10E3/UL
IMM GRANULOCYTES NFR BLD: 0 %
LYMPHOCYTES # BLD AUTO: 0.9 10E3/UL (ref 0.8–5.3)
LYMPHOCYTES NFR BLD AUTO: 9 %
MCH RBC QN AUTO: 32.1 PG (ref 26.5–33)
MCHC RBC AUTO-ENTMCNC: 31.8 G/DL (ref 31.5–36.5)
MCV RBC AUTO: 101 FL (ref 78–100)
MONOCYTES # BLD AUTO: 1.2 10E3/UL (ref 0–1.3)
MONOCYTES NFR BLD AUTO: 12 %
NEUTROPHILS # BLD AUTO: 7.5 10E3/UL (ref 1.6–8.3)
NEUTROPHILS NFR BLD AUTO: 74 %
NRBC # BLD AUTO: 0 10E3/UL
NRBC BLD AUTO-RTO: 0 /100
PLATELET # BLD AUTO: 284 10E3/UL (ref 150–450)
POTASSIUM SERPL-SCNC: 3.8 MMOL/L (ref 3.4–5.3)
PROT SERPL-MCNC: 6.4 G/DL (ref 6.4–8.3)
RBC # BLD AUTO: 3.71 10E6/UL (ref 4.4–5.9)
SODIUM SERPL-SCNC: 136 MMOL/L (ref 135–145)
T4 FREE SERPL-MCNC: 1.13 NG/DL (ref 0.9–1.7)
TSH SERPL DL<=0.005 MIU/L-ACNC: 0.24 UIU/ML (ref 0.3–4.2)
WBC # BLD AUTO: 10.2 10E3/UL (ref 4–11)

## 2024-08-28 PROCEDURE — 250N000011 HC RX IP 250 OP 636: Performed by: INTERNAL MEDICINE

## 2024-08-28 PROCEDURE — 84439 ASSAY OF FREE THYROXINE: CPT | Performed by: INTERNAL MEDICINE

## 2024-08-28 PROCEDURE — 85025 COMPLETE CBC W/AUTO DIFF WBC: CPT | Performed by: INTERNAL MEDICINE

## 2024-08-28 PROCEDURE — 36415 COLL VENOUS BLD VENIPUNCTURE: CPT | Performed by: INTERNAL MEDICINE

## 2024-08-28 PROCEDURE — 84443 ASSAY THYROID STIM HORMONE: CPT | Performed by: INTERNAL MEDICINE

## 2024-08-28 PROCEDURE — G0463 HOSPITAL OUTPT CLINIC VISIT: HCPCS | Performed by: INTERNAL MEDICINE

## 2024-08-28 PROCEDURE — 258N000003 HC RX IP 258 OP 636: Performed by: INTERNAL MEDICINE

## 2024-08-28 PROCEDURE — 96413 CHEMO IV INFUSION 1 HR: CPT

## 2024-08-28 PROCEDURE — 99214 OFFICE O/P EST MOD 30 MIN: CPT | Performed by: INTERNAL MEDICINE

## 2024-08-28 PROCEDURE — 80053 COMPREHEN METABOLIC PANEL: CPT | Performed by: INTERNAL MEDICINE

## 2024-08-28 RX ORDER — ALBUTEROL SULFATE 90 UG/1
1-2 AEROSOL, METERED RESPIRATORY (INHALATION)
Status: CANCELLED
Start: 2024-08-29

## 2024-08-28 RX ORDER — DIPHENHYDRAMINE HYDROCHLORIDE 50 MG/ML
50 INJECTION INTRAMUSCULAR; INTRAVENOUS
Status: CANCELLED
Start: 2024-08-29

## 2024-08-28 RX ORDER — HEPARIN SODIUM,PORCINE 10 UNIT/ML
5-20 VIAL (ML) INTRAVENOUS DAILY PRN
Status: CANCELLED | OUTPATIENT
Start: 2024-08-29

## 2024-08-28 RX ORDER — LORAZEPAM 2 MG/ML
0.5 INJECTION INTRAMUSCULAR EVERY 4 HOURS PRN
Status: CANCELLED | OUTPATIENT
Start: 2024-08-29

## 2024-08-28 RX ORDER — HEPARIN SODIUM (PORCINE) LOCK FLUSH IV SOLN 100 UNIT/ML 100 UNIT/ML
5 SOLUTION INTRAVENOUS
Status: CANCELLED | OUTPATIENT
Start: 2024-08-29

## 2024-08-28 RX ORDER — METHYLPREDNISOLONE SODIUM SUCCINATE 125 MG/2ML
125 INJECTION, POWDER, LYOPHILIZED, FOR SOLUTION INTRAMUSCULAR; INTRAVENOUS
Status: CANCELLED
Start: 2024-08-29

## 2024-08-28 RX ORDER — ALBUTEROL SULFATE 0.83 MG/ML
2.5 SOLUTION RESPIRATORY (INHALATION)
Status: CANCELLED | OUTPATIENT
Start: 2024-08-29

## 2024-08-28 RX ORDER — MEPERIDINE HYDROCHLORIDE 25 MG/ML
25 INJECTION INTRAMUSCULAR; INTRAVENOUS; SUBCUTANEOUS EVERY 30 MIN PRN
Status: CANCELLED | OUTPATIENT
Start: 2024-08-29

## 2024-08-28 RX ORDER — EPINEPHRINE 1 MG/ML
0.3 INJECTION, SOLUTION, CONCENTRATE INTRAVENOUS EVERY 5 MIN PRN
Status: CANCELLED | OUTPATIENT
Start: 2024-08-29

## 2024-08-28 RX ADMIN — SODIUM CHLORIDE 400 MG: 9 INJECTION, SOLUTION INTRAVENOUS at 12:00

## 2024-08-28 RX ADMIN — SODIUM CHLORIDE 250 ML: 9 INJECTION, SOLUTION INTRAVENOUS at 11:35

## 2024-08-28 ASSESSMENT — PAIN SCALES - GENERAL: PAINLEVEL: NO PAIN (0)

## 2024-08-28 NOTE — LETTER
"8/28/2024      Luis Hernandez  [address] [facility] [address]      Dear Colleague,    Thank you for referring your patient, Luis Hernandez, to the [facility]. Please see a copy of my visit note below.    Oncology Rooming Note    August 28, 2024 10:38 AM   Luis Hernandez is a 59 year old male who presents for:    Chief Complaint   Patient presents with     Oncology Clinic Visit     Squamous cell lung cancer, right - Labs provider and infusion     Initial Vitals: /80 (BP Location: Right arm, Patient Position: Sitting, Cuff Size: Adult Small)   Pulse 86   Temp 97.5  F (36.4  C) (Tympanic)   Resp 16   Ht 1.626 m (5' 4\")   Wt 51.7 kg (114 lb)   SpO2 94%   BMI 19.57 kg/m   Estimated body mass index is 19.57 kg/m  as calculated from the following:    Height as of this encounter: 1.626 m (5' 4\").    Weight as of this encounter: 51.7 kg (114 lb). Body surface area is 1.53 meters squared.  No Pain (0) Comment: Data Unavailable   No LMP for male patient.  Allergies reviewed: Yes  Medications reviewed: Yes    Medications: Medication refills not needed today.  Pharmacy name entered into [facility]:    [id number] - [place], [place] - [address] [id number] - [place], [place] - [address] [place] [address]    Frailty Screening:   Is the patient here for a new oncology consult visit in cancer care? 2. No      Clinical concerns:  None      Feli Mead, [facility] Outpatient Progress Note    Patient: Luis Hernandez  MRN: [id number]  Date of Service: Aug 28, 2024          Reason for Visit    Lung cancer    Assessment/Plan  >Stage IIIB squamous cell lung cancer  Bilateral lung nodules, indeterminate  Chemo-induced anemia  Low TSH  Not a surgical nor definitive chemoradiation candidate given his severe COPD.  There are also some indeterminate lung nodules, either inflammatory or separate metastatic sites that need to be " followed.      Luis completed 3 cycles of palliative carboplatin, Taxol, pembrolizumab with Neulasta support. Got an excellent response, but complicated by hospitalization for pneumonia/COPD exacerbation. Therefore, further chemo discontinued and continued maintenance pembro since May.       -Proceed with pembrolizumab today   -Slightly suppressed TSH. If T4 remains normal, will follow. .Change pembrolizumab to 400 mg every 6 weeks at next visit.    -Follow with Pulmonary/PCP  -Will need to follow closely for risk of autoimmune pneumonitis on pembro  ______________________________________________________________________________    History of Present Illness/ Interval History    Mr. Luis Hernandez  is a 59 year old with at least locally advanced non-operable lung cancer. He completed 3 cycles of palliative carbo/Taxol/pembrolizumab (with Neulasta) with excellent NV/near CR but complicated by pneumonia/respiratory failure after 3rd cycle so further chemo discontinued. He has continued maintenance pembrolizumab alone.     Patient returns for follow-up.  He continues on oxygen 24/7.  He is maintaining his weight.  Takes Ensure as needed.  He does not have chills, fevers or sweats.  He does have a persistent cough with clear sputum.  He does not have chest pain or shortness of breath at rest.  There is no change in bowel or bladder habit.  He has no neuropathy.  He can go about his daily activities with limitations from COPD.  He was recently in the emergency room with some retained mucus in his throat.  The mucus was cleared and he is comfortable breathing at rest with his oxygen.         ECOG Performance    1      Oncology History/Treatment  Diagnosis/Stage:   1/2024: Stage IIIB (cT4-cN2-cM0) squamous cell cancer (possible/indeterminate bilateral lung involvement, stage IV). Not candidate for definitive RT/surgery.  -Hx severe COPD (FEV1 18%), tobacco use (quit 2021)  -Followed in Pulmonary with CT for waxing/waning  "bilateral lung nodules  -9/2023 chest CT: new spiculated RUL nodule + persistent filling defect of bronchus intermedius/RLL bronchus concerning for possible endobronchial tumor vs mucus plugging; progressive subcarinal soft tissue mass  -PET: avid right  hilar mass encasing RML bronchus contiguous with subcarinal node/mass, intraluminal soft tissue density RLL bronchus concerning for primary tumor; avid bilateral opacities indeterminate but favored as inflammatory. Incidentally, distal sigmoid/rectal avid soft tissue thickening noted.   -10/2023 Cologuard negative  -Treated with empiric antibiotics for pneumonia. Follow-up CT: RADHA improved; new nodules through bilateral lungs (up to 7 mm) and persistent right lung mass/bronchial findings.   -1/18/2024 flex + rigid bronch with tumor debulking and intermedius bronchial stent placement, with RUL, R mainstem bronchus and bronchus intermedius biopsy: mod-diff squamous cell cancer. PDL1 CPS 10-20% and TPS 5%.   -Lung NGS panel: CDKN2A and PIK3CA alterations   -Brain MRI negative  8/2/2024 PET scan shows no active disease.    Treatment:  2/29/2024 - 4/12/2024: palliative carbo, Taxol, pembrolizumab + Neulasta support. Completed 3 cycles  -->following 3 cycles, had overall good OH/near CR; but complicated by hospitalization early May for hypoxic respiratory failure + pneumonia. Chemo stopped and treatment held until recovered.     5/15/2024 - present: maintenance pembrolizumab every 3 weeks  8/28/2024  change to 400mg dosage q 6 weeks        Physical Exam    /80 (BP Location: Right arm, Patient Position: Sitting, Cuff Size: Adult Small)   Pulse 86   Temp 97.5  F (36.4  C) (Tympanic)   Resp 16   Ht 1.626 m (5' 4\")   Wt 51.7 kg (114 lb)   SpO2 94%   BMI 19.57 kg/m        GENERAL APPEARANCE: 59-year-old man in no apparent distress.  HEAD: Atraumatic; normocephalic; without lesions.  EYES: Conjunctiva, corneas and eyelids normal; pupils equal, round, reactive to " light; No Icterus.  MOUTH/OROPHARYNX: Oral mucosa intact  NECK: Supple with no nodes.  LUNGS: Decreased breath sounds throughout.  HEART: Regular rhythm and rate; S1 and S2 normal; no murmurs noted.  ABDOMEN: Soft; no masses or tenderness, no hepatosplenomegaly.  NEUROLOGIC: Alert and oriented.  No obvious focal findings.  EXTREMITIES: No cyanosis, or edema.  SKIN: No abnormal bruising or bleeding. No suspicious lesions noted on exposed skin.  PSYCHIATRIC: Mental status normal; no apparent psychiatric issues        Lab Results    Recent Results (from the past 168 hour(s))   Comprehensive metabolic panel   Result Value Ref Range    Sodium 136 135 - 145 mmol/L    Potassium 3.8 3.4 - 5.3 mmol/L    Carbon Dioxide (CO2) 31 (H) 22 - 29 mmol/L    Anion Gap 6 (L) 7 - 15 mmol/L    Urea Nitrogen 11.4 6.0 - 20.0 mg/dL    Creatinine 0.72 0.67 - 1.17 mg/dL    GFR Estimate >90 >60 mL/min/1.73m2    Calcium 8.8 8.8 - 10.4 mg/dL    Chloride 99 98 - 107 mmol/L    Glucose 94 70 - 99 mg/dL    Alkaline Phosphatase 49 40 - 150 U/L    AST 13 0 - 45 U/L    ALT 14 0 - 70 U/L    Protein Total 6.4 6.4 - 8.3 g/dL    Albumin 3.9 3.5 - 5.2 g/dL    Bilirubin Total 0.2 <=1.2 mg/dL   TSH with free T4 reflex   Result Value Ref Range    TSH 0.24 (L) 0.30 - 4.20 uIU/mL   CBC with platelets and differential   Result Value Ref Range    WBC Count 10.2 4.0 - 11.0 10e3/uL    RBC Count 3.71 (L) 4.40 - 5.90 10e6/uL    Hemoglobin 11.9 (L) 13.3 - 17.7 g/dL    Hematocrit 37.4 (L) 40.0 - 53.0 %     (H) 78 - 100 fL    MCH 32.1 26.5 - 33.0 pg    MCHC 31.8 31.5 - 36.5 g/dL    RDW 13.2 10.0 - 15.0 %    Platelet Count 284 150 - 450 10e3/uL    % Neutrophils 74 %    % Lymphocytes 9 %    % Monocytes 12 %    % Eosinophils 4 %    % Basophils 1 %    % Immature Granulocytes 0 %    NRBCs per 100 WBC 0 <1 /100    Absolute Neutrophils 7.5 1.6 - 8.3 10e3/uL    Absolute Lymphocytes 0.9 0.8 - 5.3 10e3/uL    Absolute Monocytes 1.2 0.0 - 1.3 10e3/uL    Absolute Eosinophils  0.4 0.0 - 0.7 10e3/uL    Absolute Basophils 0.1 0.0 - 0.2 10e3/uL    Absolute Immature Granulocytes 0.0 <=0.4 10e3/uL    Absolute NRBCs 0.0 10e3/uL         Imaging    Chest XR,  PA & LAT    Result Date: 8/15/2024  CHEST TWO VIEWS  8/15/2024 11:39 AM HISTORY:  Dyspnea on exertion. COMPARISON: 4/23/2024.     IMPRESSION: Hyperinflation both lungs, likely related to COPD. Lungs are otherwise clear. No pleural effusions. No pneumothorax. Heart size and pulmonary vascularity are within normal limits. A stent is again projected over the right side of the mediastinum. ADITI BROWN MD   SYSTEM ID:  [id number]    POC US CHEST B-SCAN    [facility] Procedure Note   Limited Bedside ED Cardiac Ultrasound: PROCEDURE: PERFORMED BY: Dr. Sylvester Blanton MD INDICATIONS/SYMPTOM:  Shortness of Breath PROBE: Cardiac phased array probe and High frequency linear probe BODY LOCATION: Chest FINDINGS: The ultrasound was performed utilizing the subcostal, parasternal long axis, parasternal short axis, and apical 4 chamber views. Cardiac contractility:  Present Gross estimation of cardiac kinesis: normal Pericardial Effusion:  None RV:LV ratio: LV =RV IVC:   Diameter:  IVC diameter expiration (IVCe) 2 cm                                                  IVC diameter inspiration (IVCi) 0 cm                                                     Collapsibility:  IVC collapses > 50% with inspiration INTERPRETATION:    Chamber size and motion were grossly normal with LV = RV, normal cardiac kinesis.  No pericardial effusion was found.  IVC visualized and findings indicate normovolemia. IMAGE DOCUMENTATION: Images were archived to PACs system.   [facility] Procedure Note   Limited Bedside ED Ultrasound of Thorax: PROCEDURE: PERFORMED BY: Dr. Sylvester Blanton MD INDICATIONS/SYMPTOM:  shortness of breath PROBE: High frequency linear probe BODY LOCATION: Chest FINDINGS: Images of both lung hemithoracies taken in 2D in multiple rib spaces       Right side:  Lung sliding artifact  Present    Comet tail artifacts  Absent  Left side:  Lung sliding artifact  Present    Comet tail artifacts  Absent  Hemothorax: Right side Absent    Left side Absent  Pleural effusion: Right side Absent     Left side Absent INTERPRETATION: The exam was normal. There was no free fluid identified in the chest cavity. No evidence of pneumothorax, hemothorax or pleural effusion. IMAGE DOCUMENTATION: Images were archived to PACs system.     PET Oncology (Eyes to Thighs)    Result Date: 8/2/2024  EXAM: PET ONCOLOGY (EYES TO THIGHS) LOCATION: [facility] DATE: 8/1/2024 INDICATION: Subsequent treatment planning and restaging for malignant neoplasm of upper lobe, right bronchus or lung. Status post chemotherapy/immunotherapy completed in April 2024, currently receiving maintenance immunotherapy. Monitor treatment response. COMPARISON: CT the chest and pelvis dated 5/21/2024 and FDG PET/CT dated 4/11/2024 CONTRAST: None TECHNIQUE: Serum glucose level 110 mg/dL. One hour post intravenous administration of 13.5 mCi F-18 FDG, PET imaging was performed from the skull vertex to mid thighs, utilizing attenuation correction with concurrent axial CT and PET/CT image fusion. Dose reduction techniques were used. PET/CT FINDINGS: Resolution of the left greater than right lower lobe inflammatory/infectious processes with development of additional scattered nodular opacities in the left upper, left lower, and right lower lobes, which are likely inflammatory in nature. Degenerative shoulder synovitis. Reflux esophagitis. The remaining FDG uptake is physiologic from the skull vertex to mid thigh. CT FINDINGS: No acute intracranial abnormality. Postoperative change of bilateral lenses. Mild carotid artery bifurcation calcification. Severe emphysema. Right upper lobe lung granuloma. Moderate coronary artery calcium. Fat-containing umbilical hernia.  Sigmoid diverticulosis.  Mild multilevel degenerative changes in spine.     IMPRESSION: No metabolic evidence of active neoplasm       I spent 20 minutes on the patient's visit today.  This included preparation for the visit, face-to-face time with the patient and documentation following the visit.  It did not include teaching or procedure time.    Signed by: Peter E. Friedell, MD        Again, thank you for allowing me to participate in the care of your patient.        Sincerely,        Peter E. Friedell, MD

## 2024-08-28 NOTE — PROGRESS NOTES
Infusion Nursing Note:  Luis Hernandez presents today for Keytruda.    Patient seen by provider today: Yes: Dr. Friedell   present during visit today: Not Applicable.    Note: N/A.      Intravenous Access:    Treatment Conditions:  Results reviewed, labs MET treatment parameters, ok to proceed with treatment.      Post Infusion Assessment:  Patient tolerated infusion without incident.  Blood return noted pre and post infusion.  Access discontinued per protocol.       Discharge Plan:   AVS to patient via MYCHART.  Patient will return 10/9 for next appointment.   Patient discharged in stable condition accompanied by: self.  Departure Mode: Ambulatory.      Deb Feldman RN

## 2024-08-28 NOTE — PROGRESS NOTES
"Oncology Rooming Note    August 28, 2024 10:38 AM   Luis Hernandez is a 59 year old male who presents for:    Chief Complaint   Patient presents with    Oncology Clinic Visit     Squamous cell lung cancer, right - Labs provider and infusion     Initial Vitals: /80 (BP Location: Right arm, Patient Position: Sitting, Cuff Size: Adult Small)   Pulse 86   Temp 97.5  F (36.4  C) (Tympanic)   Resp 16   Ht 1.626 m (5' 4\")   Wt 51.7 kg (114 lb)   SpO2 94%   BMI 19.57 kg/m   Estimated body mass index is 19.57 kg/m  as calculated from the following:    Height as of this encounter: 1.626 m (5' 4\").    Weight as of this encounter: 51.7 kg (114 lb). Body surface area is 1.53 meters squared.  No Pain (0) Comment: Data Unavailable   No LMP for male patient.  Allergies reviewed: Yes  Medications reviewed: Yes    Medications: Medication refills not needed today.  Pharmacy name entered into [facility]:    [id number] - [place], [place] - [address] [id number] - [place], [place] - [address] [place] [address]    Frailty Screening:   Is the patient here for a new oncology consult visit in cancer care? 2. No      Clinical concerns:  None      Feli Mead CMA            "

## 2024-08-28 NOTE — PROGRESS NOTES
[facility] Outpatient Progress Note    Patient: Luis Hernandez  MRN: [id number]  Date of Service: Aug 28, 2024          Reason for Visit    Lung cancer    Assessment/Plan  >Stage IIIB squamous cell lung cancer  Bilateral lung nodules, indeterminate  Chemo-induced anemia  Low TSH  Not a surgical nor definitive chemoradiation candidate given his severe COPD.  There are also some indeterminate lung nodules, either inflammatory or separate metastatic sites that need to be followed.      Luis completed 3 cycles of palliative carboplatin, Taxol, pembrolizumab with Neulasta support. Got an excellent response, but complicated by hospitalization for pneumonia/COPD exacerbation. Therefore, further chemo discontinued and continued maintenance pembro since May.       -Proceed with pembrolizumab today   -Slightly suppressed TSH. If T4 remains normal, will follow. .Change pembrolizumab to 400 mg every 6 weeks at next visit.    -Follow with Pulmonary/PCP  -Will need to follow closely for risk of autoimmune pneumonitis on pembro  ______________________________________________________________________________    History of Present Illness/ Interval History    Mr. Luis Hernandez  is a 59 year old with at least locally advanced non-operable lung cancer. He completed 3 cycles of palliative carbo/Taxol/pembrolizumab (with Neulasta) with excellent CO/near CR but complicated by pneumonia/respiratory failure after 3rd cycle so further chemo discontinued. He has continued maintenance pembrolizumab alone.     Patient returns for follow-up.  He continues on oxygen 24/7.  He is maintaining his weight.  Takes Ensure as needed.  He does not have chills, fevers or sweats.  He does have a persistent cough with clear sputum.  He does not have chest pain or shortness of breath at rest.  There is no change in bowel or bladder habit.  He has no neuropathy.  He can go about his daily activities with limitations from  COPD.  He was recently in the emergency room with some retained mucus in his throat.  The mucus was cleared and he is comfortable breathing at rest with his oxygen.         ECOG Performance    1      Oncology History/Treatment  Diagnosis/Stage:   1/2024: Stage IIIB (cT4-cN2-cM0) squamous cell cancer (possible/indeterminate bilateral lung involvement, stage IV). Not candidate for definitive RT/surgery.  -Hx severe COPD (FEV1 18%), tobacco use (quit 2021)  -Followed in Pulmonary with CT for waxing/waning bilateral lung nodules  -9/2023 chest CT: new spiculated RUL nodule + persistent filling defect of bronchus intermedius/RLL bronchus concerning for possible endobronchial tumor vs mucus plugging; progressive subcarinal soft tissue mass  -PET: avid right  hilar mass encasing RML bronchus contiguous with subcarinal node/mass, intraluminal soft tissue density RLL bronchus concerning for primary tumor; avid bilateral opacities indeterminate but favored as inflammatory. Incidentally, distal sigmoid/rectal avid soft tissue thickening noted.   -10/2023 Cologuard negative  -Treated with empiric antibiotics for pneumonia. Follow-up CT: RADHA improved; new nodules through bilateral lungs (up to 7 mm) and persistent right lung mass/bronchial findings.   -1/18/2024 flex + rigid bronch with tumor debulking and intermedius bronchial stent placement, with RUL, R mainstem bronchus and bronchus intermedius biopsy: mod-diff squamous cell cancer. PDL1 CPS 10-20% and TPS 5%.   -Lung NGS panel: CDKN2A and PIK3CA alterations   -Brain MRI negative  8/2/2024 PET scan shows no active disease.    Treatment:  2/29/2024 - 4/12/2024: palliative carbo, Taxol, pembrolizumab + Neulasta support. Completed 3 cycles  -->following 3 cycles, had overall good NM/near CR; but complicated by hospitalization early May for hypoxic respiratory failure + pneumonia. Chemo stopped and treatment held until recovered.     5/15/2024 - present: maintenance  "pembrolizumab every 3 weeks  8/28/2024  change to 400mg dosage q 6 weeks        Physical Exam    /80 (BP Location: Right arm, Patient Position: Sitting, Cuff Size: Adult Small)   Pulse 86   Temp 97.5  F (36.4  C) (Tympanic)   Resp 16   Ht 1.626 m (5' 4\")   Wt 51.7 kg (114 lb)   SpO2 94%   BMI 19.57 kg/m        GENERAL APPEARANCE: 59-year-old man in no apparent distress.  HEAD: Atraumatic; normocephalic; without lesions.  EYES: Conjunctiva, corneas and eyelids normal; pupils equal, round, reactive to light; No Icterus.  MOUTH/OROPHARYNX: Oral mucosa intact  NECK: Supple with no nodes.  LUNGS: Decreased breath sounds throughout.  HEART: Regular rhythm and rate; S1 and S2 normal; no murmurs noted.  ABDOMEN: Soft; no masses or tenderness, no hepatosplenomegaly.  NEUROLOGIC: Alert and oriented.  No obvious focal findings.  EXTREMITIES: No cyanosis, or edema.  SKIN: No abnormal bruising or bleeding. No suspicious lesions noted on exposed skin.  PSYCHIATRIC: Mental status normal; no apparent psychiatric issues        Lab Results    Recent Results (from the past 168 hour(s))   Comprehensive metabolic panel   Result Value Ref Range    Sodium 136 135 - 145 mmol/L    Potassium 3.8 3.4 - 5.3 mmol/L    Carbon Dioxide (CO2) 31 (H) 22 - 29 mmol/L    Anion Gap 6 (L) 7 - 15 mmol/L    Urea Nitrogen 11.4 6.0 - 20.0 mg/dL    Creatinine 0.72 0.67 - 1.17 mg/dL    GFR Estimate >90 >60 mL/min/1.73m2    Calcium 8.8 8.8 - 10.4 mg/dL    Chloride 99 98 - 107 mmol/L    Glucose 94 70 - 99 mg/dL    Alkaline Phosphatase 49 40 - 150 U/L    AST 13 0 - 45 U/L    ALT 14 0 - 70 U/L    Protein Total 6.4 6.4 - 8.3 g/dL    Albumin 3.9 3.5 - 5.2 g/dL    Bilirubin Total 0.2 <=1.2 mg/dL   TSH with free T4 reflex   Result Value Ref Range    TSH 0.24 (L) 0.30 - 4.20 uIU/mL   CBC with platelets and differential   Result Value Ref Range    WBC Count 10.2 4.0 - 11.0 10e3/uL    RBC Count 3.71 (L) 4.40 - 5.90 10e6/uL    Hemoglobin 11.9 (L) 13.3 - 17.7 " g/dL    Hematocrit 37.4 (L) 40.0 - 53.0 %     (H) 78 - 100 fL    MCH 32.1 26.5 - 33.0 pg    MCHC 31.8 31.5 - 36.5 g/dL    RDW 13.2 10.0 - 15.0 %    Platelet Count 284 150 - 450 10e3/uL    % Neutrophils 74 %    % Lymphocytes 9 %    % Monocytes 12 %    % Eosinophils 4 %    % Basophils 1 %    % Immature Granulocytes 0 %    NRBCs per 100 WBC 0 <1 /100    Absolute Neutrophils 7.5 1.6 - 8.3 10e3/uL    Absolute Lymphocytes 0.9 0.8 - 5.3 10e3/uL    Absolute Monocytes 1.2 0.0 - 1.3 10e3/uL    Absolute Eosinophils 0.4 0.0 - 0.7 10e3/uL    Absolute Basophils 0.1 0.0 - 0.2 10e3/uL    Absolute Immature Granulocytes 0.0 <=0.4 10e3/uL    Absolute NRBCs 0.0 10e3/uL         Imaging    Chest XR,  PA & LAT    Result Date: 8/15/2024  CHEST TWO VIEWS  8/15/2024 11:39 AM HISTORY:  Dyspnea on exertion. COMPARISON: 4/23/2024.     IMPRESSION: Hyperinflation both lungs, likely related to COPD. Lungs are otherwise clear. No pleural effusions. No pneumothorax. Heart size and pulmonary vascularity are within normal limits. A stent is again projected over the right side of the mediastinum. ADITI BROWN MD   SYSTEM ID:  [id number]    POC US CHEST B-SCAN    [facility] Procedure Note   Limited Bedside ED Cardiac Ultrasound: PROCEDURE: PERFORMED BY: Dr. Sylvester Blanton MD INDICATIONS/SYMPTOM:  Shortness of Breath PROBE: Cardiac phased array probe and High frequency linear probe BODY LOCATION: Chest FINDINGS: The ultrasound was performed utilizing the subcostal, parasternal long axis, parasternal short axis, and apical 4 chamber views. Cardiac contractility:  Present Gross estimation of cardiac kinesis: normal Pericardial Effusion:  None RV:LV ratio: LV =RV IVC:   Diameter:  IVC diameter expiration (IVCe) 2 cm                                                  IVC diameter inspiration (IVCi) 0 cm                                                     Collapsibility:  IVC collapses > 50% with inspiration INTERPRETATION:    Chamber size and  motion were grossly normal with LV = RV, normal cardiac kinesis.  No pericardial effusion was found.  IVC visualized and findings indicate normovolemia. IMAGE DOCUMENTATION: Images were archived to PACs system.   [facility] Procedure Note   Limited Bedside ED Ultrasound of Thorax: PROCEDURE: PERFORMED BY: Dr. Sylvester Blanton MD INDICATIONS/SYMPTOM:  shortness of breath PROBE: High frequency linear probe BODY LOCATION: Chest FINDINGS: Images of both lung hemithoracies taken in 2D in multiple rib spaces      Right side:  Lung sliding artifact  Present    Comet tail artifacts  Absent  Left side:  Lung sliding artifact  Present    Comet tail artifacts  Absent  Hemothorax: Right side Absent    Left side Absent  Pleural effusion: Right side Absent     Left side Absent INTERPRETATION: The exam was normal. There was no free fluid identified in the chest cavity. No evidence of pneumothorax, hemothorax or pleural effusion. IMAGE DOCUMENTATION: Images were archived to PACs system.     PET Oncology (Eyes to Thighs)    Result Date: 8/2/2024  EXAM: PET ONCOLOGY (EYES TO THIGHS) LOCATION: [facility] DATE: 8/1/2024 INDICATION: Subsequent treatment planning and restaging for malignant neoplasm of upper lobe, right bronchus or lung. Status post chemotherapy/immunotherapy completed in April 2024, currently receiving maintenance immunotherapy. Monitor treatment response. COMPARISON: CT the chest and pelvis dated 5/21/2024 and FDG PET/CT dated 4/11/2024 CONTRAST: None TECHNIQUE: Serum glucose level 110 mg/dL. One hour post intravenous administration of 13.5 mCi F-18 FDG, PET imaging was performed from the skull vertex to mid thighs, utilizing attenuation correction with concurrent axial CT and PET/CT image fusion. Dose reduction techniques were used. PET/CT FINDINGS: Resolution of the left greater than right lower lobe inflammatory/infectious processes with development of additional scattered nodular  opacities in the left upper, left lower, and right lower lobes, which are likely inflammatory in nature. Degenerative shoulder synovitis. Reflux esophagitis. The remaining FDG uptake is physiologic from the skull vertex to mid thigh. CT FINDINGS: No acute intracranial abnormality. Postoperative change of bilateral lenses. Mild carotid artery bifurcation calcification. Severe emphysema. Right upper lobe lung granuloma. Moderate coronary artery calcium. Fat-containing umbilical hernia.  Sigmoid diverticulosis. Mild multilevel degenerative changes in spine.     IMPRESSION: No metabolic evidence of active neoplasm       I spent 20 minutes on the patient's visit today.  This included preparation for the visit, face-to-face time with the patient and documentation following the visit.  It did not include teaching or procedure time.    Signed by: Peter E. Friedell, MD

## 2024-08-29 ENCOUNTER — HOSPITAL ENCOUNTER (EMERGENCY)
Facility: CLINIC | Age: 59
Discharge: HOME | End: 2024-08-29
Attending: PHYSICIAN ASSISTANT | Admitting: PHYSICIAN ASSISTANT
Payer: COMMERCIAL

## 2024-08-29 VITALS
OXYGEN SATURATION: 97 % | RESPIRATION RATE: 26 BRPM | HEART RATE: 93 BPM | SYSTOLIC BLOOD PRESSURE: 124 MMHG | TEMPERATURE: 98.2 F | DIASTOLIC BLOOD PRESSURE: 83 MMHG

## 2024-08-29 DIAGNOSIS — M71.122 INFECTED OLECRANON BURSA, LEFT: ICD-10-CM

## 2024-08-29 PROCEDURE — G0463 HOSPITAL OUTPT CLINIC VISIT: HCPCS

## 2024-08-29 PROCEDURE — 99213 OFFICE O/P EST LOW 20 MIN: CPT | Performed by: PHYSICIAN ASSISTANT

## 2024-08-29 RX ORDER — CEPHALEXIN 500 MG/1
500 CAPSULE ORAL 4 TIMES DAILY
Qty: 40 CAPSULE | Refills: 0 | Status: SHIPPED | OUTPATIENT
Start: 2024-08-29 | End: 2024-09-08

## 2024-08-29 ASSESSMENT — ENCOUNTER SYMPTOMS: CONSTITUTIONAL NEGATIVE: 1

## 2024-08-29 NOTE — ED PROVIDER NOTES
History     Chief Complaint   Patient presents with    Arm Pain     Left arm inflammation , redness and pain , patient had drainage a few days ago but that has now subsided     No injury      HPI  Luis Hernandez is a 59 year old male who presents to Urgent Care with complaints of left elbow swelling and redness for the past 4 days.  Patient states he initially had a scab present overlying the left elbow that peeled off and he subsequently developed redness, swelling, and warmth overlying the left elbow and now extending into the left forearm.  Denies any falls or preceding trauma to the elbow but does state that he frequently rests his arms on the counter with his elbow.  Patient denies any pain in the elbow.  He has full range of motion of the elbow without pain.  Denies fevers or chills.  No systemic symptoms.  He is currently undergoing treatment for non-small cell lung cancer.  Patient is not neutropenic on his most recent labs.      Allergies:  Allergies   Allergen Reactions    Hctz Other (See Comments)     He has hyponatremia - avoid use    Penicillins Unknown     Childhood reaction.       Problem List:    Patient Active Problem List    Diagnosis Date Noted    Encounter for long-term (current) use of high-risk medication 07/18/2024     Priority: Medium    High risk medication use 05/09/2024     Priority: Medium    Full code status 04/23/2024     Priority: Medium    Pneumonia of both lower lobes due to infectious organism 04/23/2024     Priority: Medium    Encounter for antineoplastic chemotherapy 02/26/2024     Priority: Medium    Squamous cell lung cancer, right (H) 02/20/2024     Priority: Medium    COPD with acute exacerbation (H) 01/07/2024     Priority: Medium    Acute on chronic respiratory failure with hypoxia and hypercapnia (H) 11/06/2023     Priority: Medium    Anemia, unspecified type 05/07/2021     Priority: Medium    Peripheral edema 02/16/2020     Priority: Medium    Pulmonary hypertension (H)  02/16/2020     Priority: Medium    Tobacco abuse, 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   Stable 01/2014 to 07/2014, 6 month follow scan recommended.   Chest CT of 1/15/2015= stable nodule, f/u one year.      CARDIOVASCULAR SCREENING; LDL GOAL LESS THAN 130 10/31/2010     Priority: Medium    COPD, very severe (H) 10/25/2010     Priority: Medium     Severe to very severe      Eczema 10/04/2010     Priority: Medium    ED (erectile dysfunction) 04/20/2009     Priority: Medium    Essential hypertension, benign 10/15/2007     Priority: Medium        Past Medical History:    Past Medical History:   Diagnosis Date    Acute on chronic respiratory failure with hypoxia (H) 04/10/2020    Acute on chronic respiratory failure with hypoxia and hypercapnia (H) 04/01/2019    Acute respiratory failure with hypoxia and hypercapnia (H) 04/23/2024    CAP (community acquired pneumonia) 04/14/2020    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)     COPD exacerbation (H) 04/01/2019    COPD exacerbation (H) 02/16/2020    Erectile dysfunction     Hypertension     Hyponatremia 04/01/2019    Pneumonia 04/10/2020       Past Surgical History:    Past Surgical History:   Procedure Laterality Date    APPENDECTOMY      childhood    BRONCHOSCOPY, DILATE BRONCHUS, STENT BRONCHUS, COMBINED N/A 1/18/2024    Procedure: Bronchoscopy with tissue debulking,  and stent placement.;  Surgeon: Isac Prescott MD;  Location: [facility] OR    ENDOBRONCHIAL ULTRASOUND FLEXIBLE N/A 1/18/2024    Procedure: Endobronchial ultrasound with Endobronchial and Cryo biopsy.;  Surgeon: Isac Prescott MD;  Location: [facility] OR       Family History:    Family History   Problem Relation Age of Onset    Hypertension Mother     Ovarian Cancer Mother     Breast Cancer Mother     Hypertension Father      Lipids Father     C.A.D. Father     Heart Disease Father     Chronic Obstructive Pulmonary Disease Father     Cerebrovascular Disease Father     Diabetes Paternal Grandmother     Chronic Obstructive Pulmonary Disease Paternal Grandfather        Social History:  Marital Status:  Single [1]  Social History     Tobacco Use    Smoking status: Former     Current packs/day: 0.00     Average packs/day: 2.0 packs/day for 30.0 years (60.0 ttl pk-yrs)     Types: Cigarettes     Start date: 08/1991     Quit date: 08/2021     Years since quitting: 3.0    Smokeless tobacco: Former   Vaping Use    Vaping status: Never Used   Substance Use Topics    Alcohol use: Yes     Comment: rare    Drug use: No        Medications:    cephALEXin (KEFLEX) 500 MG capsule  albuterol (PROAIR HFA/PROVENTIL HFA/VENTOLIN HFA) 108 (90 Base) MCG/ACT inhaler  amLODIPine (NORVASC) 10 MG tablet  fluticasone-salmeterol (ADVAIR) 500-50 MCG/ACT inhaler  guaiFENesin (MUCINEX MAXIMUM STRENGTH) 1200 MG TB12  lisinopril (ZESTRIL) 40 MG tablet  LORazepam (ATIVAN) 0.5 MG tablet  magnesium oxide (MAG-OX) 400 MG tablet  metoprolol tartrate (LOPRESSOR) 25 MG tablet  ondansetron (ZOFRAN) 8 MG tablet  order for DME  order for DME  predniSONE (DELTASONE) 10 MG tablet  prochlorperazine (COMPAZINE) 10 MG tablet  roflumilast (DALIRESP) 500 MCG TABS tablet  tiotropium (SPIRIVA RESPIMAT) 2.5 MCG/ACT inhaler          Review of Systems   Constitutional: Negative.    Musculoskeletal:         Left elbow swelling and redness   All other systems reviewed and are negative.      Physical Exam   BP: 124/83  Pulse: 93  Temp: 98.2  F (36.8  C)  Resp: 26  SpO2: 97 %      Physical Exam  Constitutional:       General: He is not in acute distress.     Appearance: Normal appearance. He is well-developed. He is not ill-appearing, toxic-appearing or diaphoretic.   HENT:      Head: Normocephalic and atraumatic.   Eyes:      Conjunctiva/sclera: Conjunctivae normal.   Cardiovascular:      Pulses:  Normal pulses.   Pulmonary:      Effort: Pulmonary effort is normal.   Musculoskeletal:         General: Normal range of motion.      Left upper arm: Normal. No swelling, edema or bony tenderness.      Left elbow: Swelling present. Normal range of motion. No tenderness.      Left forearm: No swelling, edema or bony tenderness.      Left hand: Normal. No swelling or bony tenderness. Normal range of motion. Normal strength. Normal sensation. There is no disruption of two-point discrimination. Normal capillary refill. Normal pulse.      Cervical back: Neck supple.      Comments: Localized swelling over the left olecranon with overlying erythema and warmth extending into the dorsal left forearm.  Full range of motion of the left elbow without pain.  No tenderness about the left elbow.  There is a central scab present over the left olecranon.  No drainage.  No forearm edema.   Skin:     General: Skin is warm and dry.      Capillary Refill: Capillary refill takes less than 2 seconds.   Neurological:      General: No focal deficit present.      Mental Status: He is alert.      Sensory: Sensation is intact.      Motor: Motor function is intact.         ED Course        Procedures    No results found for this or any previous visit (from the past 24 hour(s)).    Medications - No data to display    Assessments & Plan (with Medical Decision Making)     Pt is a 59 year old male who presents to Urgent Care with complaints of left elbow swelling and redness for the past 4 days.  Patient states he initially had a scab present overlying the left elbow that peeled off and he subsequently developed redness, swelling, and warmth overlying the left elbow and now extending into the left forearm.  Denies any falls or preceding trauma to the elbow but does state that he frequently rests his arms on the counter with his elbow.  Patient denies any pain in the elbow.  He has full range of motion of the elbow without pain.  Denies fevers or  chills.  No systemic symptoms.  He is currently undergoing treatment for non-small cell lung cancer.    Pt is afebrile on arrival.  Exam as above.  History and exam consistent with olecranon bursitis that now appears infected.  Will start oral antibiotics.  Instructed patient to protect this elbow to avoid any repetitive trauma to the area.  Instructed him to monitor the redness closely.  Orthopedic referral was placed for follow-up.  He was instructed to return to the emergency department sooner should he develop any persistent or rapidly progressing redness, swelling, pain, fevers, vomiting, or any other symptoms of concern.  He expresses understanding of this.  Review precautions reviewed.  Hand-outs were provided.    Patient was sent with Keflex and was instructed to follow-up with orthopedics for continued care and management.  He is to return to the ED for persistent and/or worsening symptoms.  Patient expressed understanding of the diagnosis and plan and was discharged home in good condition.    I have reviewed the nursing notes.    I have reviewed the findings, diagnosis, plan and need for follow up with the patient.    Discharge Medication List as of 8/29/2024 12:50 PM        START taking these medications    Details   cephALEXin (KEFLEX) 500 MG capsule Take 1 capsule (500 mg) by mouth 4 times daily for 10 days., Disp-40 capsule, R-0, E-Prescribe             Final diagnoses:   Infected olecranon bursa, left       8/29/2024   [facility] EMERGENCY DEPT      Disclaimer:  This note consists of symbols derived from keyboarding, dictation and/or voice recognition software.  As a result, there may be errors in the script that have gone undetected.  Please consider this when interpreting information found in this chart.     Silvia Nava PA-C  08/29/24 1310

## 2024-10-04 ENCOUNTER — MYC MEDICAL ADVICE (OUTPATIENT)
Dept: PULMONOLOGY | Facility: CLINIC | Age: 59
End: 2024-10-04
Payer: COMMERCIAL

## 2024-10-04 DIAGNOSIS — J44.1 COPD EXACERBATION (H): Primary | ICD-10-CM

## 2024-10-04 RX ORDER — AZITHROMYCIN 250 MG/1
TABLET, FILM COATED ORAL
Qty: 6 TABLET | Refills: 0 | Status: SHIPPED | OUTPATIENT
Start: 2024-10-04 | End: 2024-10-25

## 2024-10-07 ENCOUNTER — APPOINTMENT (OUTPATIENT)
Dept: GENERAL RADIOLOGY | Facility: CLINIC | Age: 59
End: 2024-10-07
Attending: EMERGENCY MEDICINE
Payer: COMMERCIAL

## 2024-10-07 ENCOUNTER — HOSPITAL ENCOUNTER (EMERGENCY)
Facility: CLINIC | Age: 59
Discharge: HOME | End: 2024-10-07
Attending: EMERGENCY MEDICINE | Admitting: EMERGENCY MEDICINE
Payer: COMMERCIAL

## 2024-10-07 VITALS
DIASTOLIC BLOOD PRESSURE: 88 MMHG | HEIGHT: 64 IN | SYSTOLIC BLOOD PRESSURE: 126 MMHG | RESPIRATION RATE: 28 BRPM | WEIGHT: 110 LBS | TEMPERATURE: 98 F | BODY MASS INDEX: 18.78 KG/M2 | OXYGEN SATURATION: 97 % | HEART RATE: 93 BPM

## 2024-10-07 DIAGNOSIS — E87.1 HYPONATREMIA: ICD-10-CM

## 2024-10-07 DIAGNOSIS — J44.1 COPD WITH ACUTE EXACERBATION (H): ICD-10-CM

## 2024-10-07 DIAGNOSIS — J45.41 MODERATE PERSISTENT REACTIVE AIRWAY DISEASE WITH ACUTE EXACERBATION: ICD-10-CM

## 2024-10-07 LAB
ANION GAP SERPL CALCULATED.3IONS-SCNC: 10 MMOL/L (ref 7–15)
BASOPHILS # BLD MANUAL: 0 10E3/UL (ref 0–0.2)
BASOPHILS NFR BLD MANUAL: 0 %
BUN SERPL-MCNC: 8.4 MG/DL (ref 6–20)
CALCIUM SERPL-MCNC: 9.2 MG/DL (ref 8.8–10.4)
CHLORIDE SERPL-SCNC: 85 MMOL/L (ref 98–107)
CREAT SERPL-MCNC: 0.54 MG/DL (ref 0.67–1.17)
EGFRCR SERPLBLD CKD-EPI 2021: >90 ML/MIN/1.73M2
EOSINOPHIL # BLD MANUAL: 2.1 10E3/UL (ref 0–0.7)
EOSINOPHIL NFR BLD MANUAL: 18 %
ERYTHROCYTE [DISTWIDTH] IN BLOOD BY AUTOMATED COUNT: 12.4 % (ref 10–15)
FLUAV RNA SPEC QL NAA+PROBE: NEGATIVE
FLUBV RNA RESP QL NAA+PROBE: NEGATIVE
GLUCOSE SERPL-MCNC: 81 MG/DL (ref 70–99)
HCO3 SERPL-SCNC: 28 MMOL/L (ref 22–29)
HCT VFR BLD AUTO: 37.1 % (ref 40–53)
HGB BLD-MCNC: 12.6 G/DL (ref 13.3–17.7)
HOLD SPECIMEN: NORMAL
LYMPHOCYTES # BLD MANUAL: 1.5 10E3/UL (ref 0.8–5.3)
LYMPHOCYTES NFR BLD MANUAL: 13 %
MCH RBC QN AUTO: 31 PG (ref 26.5–33)
MCHC RBC AUTO-ENTMCNC: 34 G/DL (ref 31.5–36.5)
MCV RBC AUTO: 91 FL (ref 78–100)
MONOCYTES # BLD MANUAL: 1.3 10E3/UL (ref 0–1.3)
MONOCYTES NFR BLD MANUAL: 11 %
NEUTROPHILS # BLD MANUAL: 6.6 10E3/UL (ref 1.6–8.3)
NEUTROPHILS NFR BLD MANUAL: 58 %
NRBC # BLD AUTO: 0 10E3/UL
NRBC BLD AUTO-RTO: 0 /100
PLAT MORPH BLD: ABNORMAL
PLATELET # BLD AUTO: 359 10E3/UL (ref 150–450)
POTASSIUM SERPL-SCNC: 4.7 MMOL/L (ref 3.4–5.3)
RBC # BLD AUTO: 4.06 10E6/UL (ref 4.4–5.9)
RBC MORPH BLD: ABNORMAL
RSV RNA SPEC NAA+PROBE: NEGATIVE
SARS-COV-2 RNA RESP QL NAA+PROBE: NEGATIVE
SODIUM SERPL-SCNC: 123 MMOL/L (ref 135–145)
WBC # BLD AUTO: 11.4 10E3/UL (ref 4–11)

## 2024-10-07 PROCEDURE — 85007 BL SMEAR W/DIFF WBC COUNT: CPT | Performed by: EMERGENCY MEDICINE

## 2024-10-07 PROCEDURE — 80048 BASIC METABOLIC PNL TOTAL CA: CPT | Performed by: EMERGENCY MEDICINE

## 2024-10-07 PROCEDURE — 250N000012 HC RX MED GY IP 250 OP 636 PS 637: Performed by: EMERGENCY MEDICINE

## 2024-10-07 PROCEDURE — 36415 COLL VENOUS BLD VENIPUNCTURE: CPT | Performed by: EMERGENCY MEDICINE

## 2024-10-07 PROCEDURE — 250N000009 HC RX 250: Performed by: EMERGENCY MEDICINE

## 2024-10-07 PROCEDURE — 94640 AIRWAY INHALATION TREATMENT: CPT

## 2024-10-07 PROCEDURE — 999N000158 HC STATISTIC RCP TIME ED VENT EA 10 MIN

## 2024-10-07 PROCEDURE — 99285 EMERGENCY DEPT VISIT HI MDM: CPT | Mod: 25 | Performed by: EMERGENCY MEDICINE

## 2024-10-07 PROCEDURE — 93010 ELECTROCARDIOGRAM REPORT: CPT | Performed by: EMERGENCY MEDICINE

## 2024-10-07 PROCEDURE — 85014 HEMATOCRIT: CPT | Performed by: EMERGENCY MEDICINE

## 2024-10-07 PROCEDURE — 71046 X-RAY EXAM CHEST 2 VIEWS: CPT

## 2024-10-07 PROCEDURE — 99284 EMERGENCY DEPT VISIT MOD MDM: CPT | Performed by: EMERGENCY MEDICINE

## 2024-10-07 PROCEDURE — 93005 ELECTROCARDIOGRAM TRACING: CPT | Performed by: EMERGENCY MEDICINE

## 2024-10-07 PROCEDURE — 87637 SARSCOV2&INF A&B&RSV AMP PRB: CPT | Performed by: EMERGENCY MEDICINE

## 2024-10-07 RX ORDER — PREDNISONE 20 MG/1
60 TABLET ORAL ONCE
Status: COMPLETED | OUTPATIENT
Start: 2024-10-07 | End: 2024-10-07

## 2024-10-07 RX ORDER — IPRATROPIUM BROMIDE AND ALBUTEROL SULFATE 2.5; .5 MG/3ML; MG/3ML
3 SOLUTION RESPIRATORY (INHALATION) ONCE
Status: COMPLETED | OUTPATIENT
Start: 2024-10-07 | End: 2024-10-07

## 2024-10-07 RX ORDER — ALBUTEROL SULFATE 0.83 MG/ML
2.5 SOLUTION RESPIRATORY (INHALATION) ONCE
Status: COMPLETED | OUTPATIENT
Start: 2024-10-07 | End: 2024-10-07

## 2024-10-07 RX ORDER — ALBUTEROL SULFATE 0.83 MG/ML
2.5 SOLUTION RESPIRATORY (INHALATION) EVERY 6 HOURS PRN
Qty: 360 ML | Refills: 0 | Status: SHIPPED | OUTPATIENT
Start: 2024-10-07 | End: 2024-10-31

## 2024-10-07 RX ORDER — PREDNISONE 20 MG/1
TABLET ORAL
Qty: 12 TABLET | Refills: 0 | Status: SHIPPED | OUTPATIENT
Start: 2024-10-07 | End: 2024-10-25

## 2024-10-07 RX ADMIN — IPRATROPIUM BROMIDE AND ALBUTEROL SULFATE 3 ML: 2.5; .5 SOLUTION RESPIRATORY (INHALATION) at 07:31

## 2024-10-07 RX ADMIN — PREDNISONE 60 MG: 20 TABLET ORAL at 08:15

## 2024-10-07 RX ADMIN — IPRATROPIUM BROMIDE AND ALBUTEROL SULFATE 3 ML: 2.5; .5 SOLUTION RESPIRATORY (INHALATION) at 08:36

## 2024-10-07 RX ADMIN — ALBUTEROL SULFATE 2.5 MG: 2.5 SOLUTION RESPIRATORY (INHALATION) at 08:46

## 2024-10-07 ASSESSMENT — ACTIVITIES OF DAILY LIVING (ADL)
ADLS_ACUITY_SCORE: 40

## 2024-10-07 NOTE — DISCHARGE INSTRUCTIONS
Increase DuoNeb use to 4 times daily    May use albuterol nebs if needed.    Continue prednisone for 6 more days.

## 2024-10-07 NOTE — ED TRIAGE NOTES
Reports worsening shortness of breath for the past week. History of COPD and lung cancer; is normally on 1.5L O2 at home. Denies chest pain. Denies cough.     Work of breathing increased. Audible wheezes noted.      Triage Assessment (Adult)       Row Name 10/07/24 0711          Triage Assessment    Airway WDL WDL        Respiratory WDL    Respiratory WDL X;rhythm/pattern     Rhythm/Pattern, Respiratory shortness of breath;dyspnea upon exertion        Skin Circulation/Temperature WDL    Skin Circulation/Temperature WDL WDL        Cardiac WDL    Cardiac WDL WDL        Peripheral/Neurovascular WDL    Peripheral Neurovascular WDL WDL        Cognitive/Neuro/Behavioral WDL    Cognitive/Neuro/Behavioral WDL WDL

## 2024-10-07 NOTE — ED PROVIDER NOTES
History     Chief Complaint   Patient presents with    Shortness of Breath     HPI  History per patient, review of Psychiatric EMR and Care Everywhere EMR, including review of his nurse triage note from earlier today and most recent oncology clinic encounter note from 8/28/2024, his most recent chest x-ray 8/15/2024, his most recent chest CT study 5/21/2024, most recent laboratory evaluation from 8/28/2024 (CBC, comprehensive metabolic panel, TSH), most recent Echocardiogram 4/23/24.  Luis Hernandez is a 59 year old male with history of right lung squamous cell carcinoma, undergoing antineoplastic therapy with Keytruda, Carboplatin and Taxol with several days of worsening SOA and wheezing but no change in chronic nonproductive cough, which he reports is concerning for recurrent pneumonia.  He contacted his pulmonology clinic several days ago and they prescribed azithromycin, but he has not yet picked the prescription up or started this.  He has no fever, chills, chest pain, hemoptysis or leg pain or leg swelling.  No orthopnea, PND or leg swelling.  He is compliant with DuoNebs but only uses these once or twice daily, and Advair, Spiriva inhaler and Roflumilast/phosphodiesterase-4 inhibitor orally.  No recent travel or known infectious border. No other pertinent history or acute complaints or concerns.    Previous Records Reviewed:  4//23/24 Echocardiogram     1. The left ventricle is normal in size. Proximal septal thickening is noted.  Hyperdynamic left ventricular function. The visual ejection fraction is 65-  70%. No regional wall motion abnormalities noted.  2. The right ventricle is normal size. The right ventricular systolic function  is normal.  3. There is moderate (2+) tricuspid regurgitation. Right ventricular systolic  pressure is elevated, consistent with severe pulmonary hypertension.  4. No pericardial effusion.  5. In comparison to the previous report dated 04/01/2019, the findings are  similar.24  echocardiogram     CT CHEST/ABDOMEN/PELVIS WITH CONTRAST 5/21/2024   IMPRESSION:  1.  Indeterminate irregular pulmonary nodules on the right. Recommend attention on surveillance imaging.  2.  Interval improvement in previously seen consolidative changes in the lower lobes. Mild residual atelectasis and/or pneumonitis on the left.  3.  Severe pulmonary emphysema.  4.  Minimal residual peribronchial vascular soft tissue thickening at the right hilum. No residual or recurrent right hilar mass.  5.  Similar appearing mildly enlarged left hilar lymph node.  6.  Nonacute findings as above.    Allergies:  Allergies   Allergen Reactions    Hctz Other (See Comments)     He has hyponatremia - avoid use    Penicillins Unknown     Childhood reaction.       Problem List:    Patient Active Problem List    Diagnosis Date Noted    Encounter for long-term (current) use of high-risk medication 07/18/2024     Priority: Medium    High risk medication use 05/09/2024     Priority: Medium    Full code status 04/23/2024     Priority: Medium    Pneumonia of both lower lobes due to infectious organism 04/23/2024     Priority: Medium    Encounter for antineoplastic chemotherapy 02/26/2024     Priority: Medium    Squamous cell lung cancer, right (H) 02/20/2024     Priority: Medium    COPD with acute exacerbation (H) 01/07/2024     Priority: Medium    Acute on chronic respiratory failure with hypoxia and hypercapnia (H) 11/06/2023     Priority: Medium    Anemia, unspecified type 05/07/2021     Priority: Medium    Peripheral edema 02/16/2020     Priority: Medium    Pulmonary hypertension (H) 02/16/2020     Priority: Medium    Tobacco abuse, 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   Stable 01/2014 to 07/2014, 6 month follow scan recommended.   Chest CT of 1/15/2015= stable nodule, f/u  one year.      CARDIOVASCULAR SCREENING; LDL GOAL LESS THAN 130 10/31/2010     Priority: Medium    COPD, very severe (H) 10/25/2010     Priority: Medium     Severe to very severe      Eczema 10/04/2010     Priority: Medium    ED (erectile dysfunction) 04/20/2009     Priority: Medium    Essential hypertension, benign 10/15/2007     Priority: Medium        Past Medical History:    Past Medical History:   Diagnosis Date    Acute on chronic respiratory failure with hypoxia (H) 04/10/2020    Acute on chronic respiratory failure with hypoxia and hypercapnia (H) 04/01/2019    Acute respiratory failure with hypoxia and hypercapnia (H) 04/23/2024    CAP (community acquired pneumonia) 04/14/2020    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)     COPD exacerbation (H) 04/01/2019    COPD exacerbation (H) 02/16/2020    Erectile dysfunction     Hypertension     Hyponatremia 04/01/2019    Pneumonia 04/10/2020       Past Surgical History:    Past Surgical History:   Procedure Laterality Date    APPENDECTOMY      childhood    BRONCHOSCOPY, DILATE BRONCHUS, STENT BRONCHUS, COMBINED N/A 1/18/2024    Procedure: Bronchoscopy with tissue debulking,  and stent placement.;  Surgeon: Isac Prescott MD;  Location: [facility] OR    ENDOBRONCHIAL ULTRASOUND FLEXIBLE N/A 1/18/2024    Procedure: Endobronchial ultrasound with Endobronchial and Cryo biopsy.;  Surgeon: Isac Prescott MD;  Location: [facility] OR       Family History:    Family History   Problem Relation Age of Onset    Hypertension Mother     Ovarian Cancer Mother     Breast Cancer Mother     Hypertension Father     Lipids Father     C.A.D. Father     Heart Disease Father     Chronic Obstructive Pulmonary Disease Father     Cerebrovascular Disease Father     Diabetes Paternal Grandmother     Chronic Obstructive Pulmonary Disease Paternal Grandfather        Social History:  Marital Status:  Single 1  Social History     Tobacco Use    Smoking status: Former     Current  "packs/day: 0.00     Average packs/day: 2.0 packs/day for 30.0 years (60.0 ttl pk-yrs)     Types: Cigarettes     Start date: 08/1991     Quit date: 08/2021     Years since quitting: 3.1    Smokeless tobacco: Former   Vaping Use    Vaping status: Never Used   Substance Use Topics    Alcohol use: Yes     Comment: rare    Drug use: No        Medications:    albuterol (PROVENTIL) (2.5 MG/3ML) 0.083% neb solution  predniSONE (DELTASONE) 20 MG tablet  albuterol (PROAIR HFA/PROVENTIL HFA/VENTOLIN HFA) 108 (90 Base) MCG/ACT inhaler  amLODIPine (NORVASC) 10 MG tablet  azithromycin (ZITHROMAX Z-BLAYNE) 250 MG tablet  fluticasone-salmeterol (ADVAIR) 500-50 MCG/ACT inhaler  guaiFENesin (MUCINEX MAXIMUM STRENGTH) 1200 MG TB12  lisinopril (ZESTRIL) 40 MG tablet  LORazepam (ATIVAN) 0.5 MG tablet  magnesium oxide (MAG-OX) 400 MG tablet  metoprolol tartrate (LOPRESSOR) 25 MG tablet  ondansetron (ZOFRAN) 8 MG tablet  order for DME  order for DME  predniSONE (DELTASONE) 10 MG tablet  prochlorperazine (COMPAZINE) 10 MG tablet  roflumilast (DALIRESP) 500 MCG TABS tablet  tiotropium (SPIRIVA RESPIMAT) 2.5 MCG/ACT inhaler      Review of Systems  As mentioned in the HPI, in addition focused review of systems was negative.    Physical Exam   BP: 128/89  Pulse: 98  Temp: 98  F (36.7  C)  Resp: 28  Height: 162.6 cm (5' 4\")  Weight: 49.9 kg (110 lb)  SpO2: 97 %      Physical Exam  Vitals and nursing note reviewed.   Constitutional:       General: He is not in acute distress.     Appearance: Normal appearance. He is well-developed. He is not ill-appearing or diaphoretic.      Comments: Pleasant, cooperative, normal O2 sats on 1.5 L/NC.  He does not appear ill, comfortable or in no respiratory distress.   HENT:      Right Ear: External ear normal.      Left Ear: External ear normal.      Nose: Nose normal.   Eyes:      General: No scleral icterus.     Extraocular Movements: Extraocular movements intact.      Conjunctiva/sclera: Conjunctivae normal. "   Neck:      Trachea: No tracheal deviation.   Cardiovascular:      Rate and Rhythm: Normal rate and regular rhythm.      Heart sounds: Normal heart sounds. No murmur heard.     No friction rub. No gallop.   Pulmonary:      Effort: Tachypnea and accessory muscle usage present. No respiratory distress.      Breath sounds: No stridor. Decreased breath sounds and wheezing present. No rhonchi or rales.      Comments: He has decreased breath sounds bilaterally, symmetrically, with prolonged expiratory phase and scattered expiratory wheezes with intercostal retractions but is able to speak in normal sentences.  Abdominal:      General: There is no distension.   Musculoskeletal:         General: No tenderness. Normal range of motion.      Cervical back: Normal range of motion and neck supple.      Right lower leg: No tenderness. No edema.      Left lower leg: No tenderness. No edema.   Skin:     General: Skin is warm and dry.      Coloration: Skin is not cyanotic or pale.      Findings: No erythema or rash.   Neurological:      General: No focal deficit present.      Mental Status: He is alert and oriented to person, place, and time.      Coordination: Coordination normal.   Psychiatric:         Mood and Affect: Mood normal.         Behavior: Behavior normal.         ED Course        Procedures              EKG Interpretation:      Interpreted by Michael Springer MD  Time reviewed: Upon completion  Symptoms at time of EKG: Shortness of breath  Rhythm: normal sinus   Rate: normal  Axis: normal  Ectopy: none  Conduction: normal  ST Segments/ T Waves: No ST-T wave changes  Q Waves: none  Clinical Impression: normal EKG         Results for orders placed or performed during the hospital encounter of 10/07/24   XR Chest 2 Views     Status: None    Narrative    CHEST TWO VIEWS 10/7/2024 8:07 AM     HISTORY: SOA    COMPARISON: August 15, 2024       Impression    IMPRESSION: Hyperexpansion. There are no acute infiltrates.  The  cardiac silhouette is not enlarged. Pulmonary vasculature is  unremarkable.    ECTOR POLLARD MD         SYSTEM ID:  [id number]   Basic metabolic panel     Status: Abnormal   Result Value Ref Range    Sodium 123 (L) 135 - 145 mmol/L    Potassium 4.7 3.4 - 5.3 mmol/L    Chloride 85 (L) 98 - 107 mmol/L    Carbon Dioxide (CO2) 28 22 - 29 mmol/L    Anion Gap 10 7 - 15 mmol/L    Urea Nitrogen 8.4 6.0 - 20.0 mg/dL    Creatinine 0.54 (L) 0.67 - 1.17 mg/dL    GFR Estimate >90 >60 mL/min/1.73m2    Calcium 9.2 8.8 - 10.4 mg/dL    Glucose 81 70 - 99 mg/dL   [place] Draw     Status: None    Narrative    The following orders were created for panel order [place] Draw.  Procedure                               Abnormality         Status                     ---------                               -----------         ------                     Extra Heparinized Syringe[[id number]]                        Final result                 Please view results for these tests on the individual orders.   CBC with platelets and differential     Status: Abnormal   Result Value Ref Range    WBC Count 11.4 (H) 4.0 - 11.0 10e3/uL    RBC Count 4.06 (L) 4.40 - 5.90 10e6/uL    Hemoglobin 12.6 (L) 13.3 - 17.7 g/dL    Hematocrit 37.1 (L) 40.0 - 53.0 %    MCV 91 78 - 100 fL    MCH 31.0 26.5 - 33.0 pg    MCHC 34.0 31.5 - 36.5 g/dL    RDW 12.4 10.0 - 15.0 %    Platelet Count 359 150 - 450 10e3/uL    NRBCs per 100 WBC 0 <1 /100    Absolute NRBCs 0.0 10e3/uL   Extra Heparinized Syringe     Status: None   Result Value Ref Range    Hold Specimen JI    Symptomatic Influenza A/B, RSV, & SARS-CoV2 PCR (COVID-19) Nose     Status: Normal    Specimen: Nose; Swab   Result Value Ref Range    Influenza A PCR Negative Negative    Influenza B PCR Negative Negative    RSV PCR Negative Negative    SARS CoV2 PCR Negative Negative    Narrative    Testing was performed using the Xpert Xpress CoV2/Flu/RSV Assay on the Cepheid GeneXpert Instrument. This test should be ordered  for the detection of SARS-CoV2, influenza, and RSV viruses in individuals with signs and symptoms of respiratory tract infection. This test is for in vitro diagnostic use under the [place] FDA for laboratories certified under CLIA to perform high or moderate complexity testing. This test has been [place] FDA cleared. A negative result does not rule out the presence of PCR inhibitors in the specimen or target RNA in concentration below the limit of detection for the assay. If only one viral target is positive but coinfection with multiple targets is suspected, the sample should be re-tested with another FDA cleared, approved, or authorized test, if coninfection would change clinical management. This test was validated by the [facility]. These laboratories are certified under the Clinical Laboratory Improvement Amendments of 1988 (CLIA-88) as qualified to perfom high complexity laboratory testing.   Manual Differential     Status: Abnormal   Result Value Ref Range    % Neutrophils 58 %    % Lymphocytes 13 %    % Monocytes 11 %    % Eosinophils 18 %    % Basophils 0 %    Absolute Neutrophils 6.6 1.6 - 8.3 10e3/uL    Absolute Lymphocytes 1.5 0.8 - 5.3 10e3/uL    Absolute Monocytes 1.3 0.0 - 1.3 10e3/uL    Absolute Eosinophils 2.1 (H) 0.0 - 0.7 10e3/uL    Absolute Basophils 0.0 0.0 - 0.2 10e3/uL    RBC Morphology Confirmed RBC Indices     Platelet Assessment  Automated Count Confirmed. Platelet morphology is normal.     Automated Count Confirmed. Platelet morphology is normal.   CBC with platelets, differential     Status: Abnormal    Narrative    The following orders were created for panel order CBC with platelets, differential.  Procedure                               Abnormality         Status                     ---------                               -----------         ------                     CBC with platelets and d...[[id number]]  Abnormal            Final result               Manual  Differential[[id number]]          Abnormal            Final result                 Please view results for these tests on the individual orders.     I independently reviewed the X-rays: Agree with the Radiologist's interpretation, COPD changes, no evidence of pneumonia or CHF.      Medications   ipratropium - albuterol 0.5 mg/2.5 mg/3 mL (DUONEB) neb solution 3 mL (3 mLs Nebulization $Given 10/7/24 0731)   predniSONE (DELTASONE) tablet 60 mg (60 mg Oral $Given 10/7/24 0815)   ipratropium - albuterol 0.5 mg/2.5 mg/3 mL (DUONEB) neb solution 3 mL (3 mLs Nebulization $Given 10/7/24 0836)   albuterol (PROVENTIL) neb solution 2.5 mg (2.5 mg Nebulization $Given 10/7/24 0846)     He feels improved after first DuoNeb, no significant change in breath sounds on reexamination.    He feels further improved after second DuoNeb and has increased air movement and decreased wheezing.    He has further improved after third neb of albuterol and is comfortable with discharge home.  Chest x-ray is unremarkable for any acute abnormality.  Stable and comfortable for discharge.    Assessments & Plan (with Medical Decision Making)   59 year old male with history of right lung squamous cell carcinoma, undergoing antineoplastic therapy with Keytruda, Carboplatin and Taxol with several days of worsening SOA and wheezing but no change in chronic nonproductive cough, which he reports is concerning for recurrent pneumonia.  He contacted his pulmonology clinic several days ago and they prescribed azithromycin, but he has not yet picked the prescription up or started this.  He is satting normally on his baseline of 1.5 L/nasal cannula but had tachypnea, retractions and then expiratory wheezes.  He significantly improved after 3 nebs in the ED, 2 DuoNebs and an albuterol neb.  A prednisone burst was initiated.  His chest x-ray is unremarkable and EKG was unremarkable.  Labs remarkable for hyponatremia, asymptomatic and of unclear significance.  He  is also hypochloremic.  I will make a primary care clinic follow-up for his reevaluation and follow-up of hyponatremia.  He is much improved and comfortable with discharge home and will initiate the azithromycin recently prescribed for him, and I will prescribe prednisone burst initiated in the ED, and albuterol neb solution to use as needed.  He is only using DuoNebs once or twice daily and I told him that he can use these 4 times daily as needed.  He has scheduled follow-up in Oncology clinic in 2 days and Pulmonology clinic 10/31/24.  He was provided instructions for supportive care and will return as needed for worsened condition or worsening symptoms, or new problems or concerns.    I have reviewed the nursing notes.    I have reviewed the findings, diagnosis, plan and need for follow up with the patient.    Medical Decision Making: High Complexity  Hospitalization for neb theray, observation, cardiac monitoring was considered and deferred. Currently appears stable and appropriate for outpatient management with close f/u.  Escalation of care including admission/observation was considered given the complexity and risk of the patient's presenting complaint, exam findings, and underlying comorbidities. However, ultimately I feel the patient is safe for outpatient management with close follow up. Work-up reassuring, does not reveal any acute life/organ threatening processes, patient's symptoms well controlled upon reevaluation, reexamination is reassuring, vitals are stable, patient agreeable with discharge, reliable for follow-up.           Discharge Medication List as of 10/7/2024 10:37 AM    Disp Refills Start End ALEXIS   PredniSONE (DELTASONE) 20 MG tablet 12 tablet 0 10/7/2024 -- --   Sig: Take two tablets (40mg) po once daily for 6 days beginning Tuesday, 10/8/2024 (first dose given in the emergency department).   Sent to pharmacy as: predniSONE 20 MG Oral Tablet (DELTASONE)   Class: E-Prescribe   Disp Refills  Start End ALEXIS   Albuterol (PROVENTIL) (2.5 MG/3ML) 0.083% neb solution 360 mL 0 10/7/2024 -- No   Sig - Route: Take 1 vial (2.5 mg) by nebulization every 6 hours as needed for shortness of breath, wheezing or cough. - Nebulization   Sent to pharmacy as: Albuterol Sulfate (2.5 MG/3ML) 0.083% Inhalation Nebulization Solution (PROVENTIL)   Class: E-Prescribe             Final diagnoses:   COPD with acute exacerbation (H)   Moderate persistent reactive airway disease with acute exacerbation   Hyponatremia       10/7/2024   [facility] EMERGENCY DEPT       Michael Springer MD  10/08/24 193

## 2024-10-09 ENCOUNTER — MYC MEDICAL ADVICE (OUTPATIENT)
Dept: SURGERY | Facility: CLINIC | Age: 59
End: 2024-10-09
Payer: COMMERCIAL

## 2024-10-09 DIAGNOSIS — J44.9 COPD, VERY SEVERE (H): ICD-10-CM

## 2024-10-09 RX ORDER — PREDNISONE 10 MG/1
10 TABLET ORAL DAILY
Qty: 90 TABLET | Refills: 0 | Status: SHIPPED | OUTPATIENT
Start: 2024-10-09

## 2024-10-09 NOTE — TELEPHONE ENCOUNTER
Pt used his daily prednisone when last flare and needs refill.    Refill provided so he does not run out.  Nancy GARCIA   [facility]

## 2024-10-09 NOTE — TELEPHONE ENCOUNTER
Requested Prescriptions   Pending Prescriptions Disp Refills    predniSONE (DELTASONE) 10 MG tablet 90 tablet 1     Sig: Take 1 tablet (10 mg) by mouth daily.       There is no refill protocol information for this order           Last office visit: 4/4/2024 ; last virtual visit: Visit date not found with prescribing provider:  Bianca Cuellar     Future Office Visit:      [facility] [place]   [facility] [place]   [facility]  [phone]

## 2024-10-23 NOTE — PROGRESS NOTES
[facility] Hematology and Oncology Outpatient Progress Note    Patient: Luis Hernandez  MRN: [id number]  Date of Service: Oct 24, 2024          Reason for Visit    Lung cancer    Primary Oncologist: Formerly, Dr. Lantigua      Assessment/Plan  >Stage IIIB squamous cell lung cancer  Bilateral lung nodules, indeterminate  Low TSH, resolved  Leukocytosis, likely steroid-induced   Not a surgical nor definitive chemoradiation candidate given his severe COPD.  There are also some indeterminate lung nodules, either inflammatory or separate metastatic sites that need to be followed.      Luis completed 3 cycles of palliative carboplatin, Taxol, pembrolizumab with Neulasta support. Got an excellent response, but complicated by hospitalization for pneumonia/COPD exacerbation. Therefore, further chemo discontinued and he has continued maintenance pembro since May.   Pembro was changed to 400 mg every 6 weeks with the last cycle.     Tolerating immunotherapy well.   He has been treated for frequent COPD exacerbations every 1-2 mths; last completed antibiotics + steroids this week and feels recovered back to baseline.     PET scan early August showed CR - no evidence of cancer. Prior lung infiltrates resolved, no evident pneumonitis noted.     While there is a risk his recurrent respiratory symptoms are immunotherapy induced pneumonitis, this has been his normal baseline predating the start of his cancer treatment and he recovers after short courses of steroids. His last PET images showed no concerns for this (although could be less sensitive than a dedicated chest CT). Today, he feels back to baseline. No fevers/chills .    Lab work: hgb 12.0, WBC 18.2/ANC 14.0, plat WNL. CMP WNL. TSH WNL.    Plan:  -Proceed with pembrolizumab 400 mg IV today   -Return in 6 weeks with CT, Dr. Friedell and next cycle  -Has had intermittently suppressed TSH, currently in normal range. May be immunotherapy-related and usually evolves to  hypothyroidism in time    Severe COPD (emphysema), chronic oxygen use  Recurrent/frequent exacerbations  Has had frequent exacerbations - appears to require antibiotic/prednisone about every 1-2 mths. He reports this as his baseline predating starting treatment for his cancer; so I don't think it is an immunotherapy-induced complication.     Recently treated as outpt with steroids + antibiotics 2 weeks ago. Symptoms and oxygen needs are back to baseline.    Plan:  -Follow with Pulmonary 10/31  -Will need to follow closely for risk of autoimmune pneumonitis on pembro  ______________________________________________________________________________    History of Present Illness/ Interval History    Mr. Luis Hernandez is a 59 year old with at least locally advanced non-operable lung cancer. He completed 3 cycles of palliative carbo/Taxol/pembrolizumab (with Neulasta) with excellent [place]/near CR but complicated by pneumonia/respiratory failure after 3rd cycle so further chemo discontinued. He has continued maintenance pembrolizumab alone. He's had some interruptions due to frequent COPD exacerbations. With his cycle 6 weeks, ago this was changed to 400 mg every 6-week dosing.     The day after his last cycle (8/29), was evaluated in ED for 4-day hx left elbow swelling/redness and warmth. This was favored as bursitis + cellulitis, treated with outpatient Keflex and referred to Ortho. This resolved and has had no recurrence.    He was then re-evaluated in ED 10/7 for acute/chronic dyspnea without cough, fever nor cardiac concerns. CXR and EKG were normal. Stable hypoxia to baseline, wears 1.5L O2. He was treated with duonebs with improvement and dismissed on outpatient prednisone + azithromycin which he completed a few days ago.  His symptoms are improving back to baseline. No fevers/chills.    He reports baseline COPD exacerbations every 1-2 mths for last few years, pre-dating immunotherapy.    He otherwise is feeling  well.  No diarrhea, skin rash. Stable weight. No urinary symptoms .    No new sites of pain. No headaches/neuro symptoms.       ECOG Performance    1      Oncology History/Treatment  Diagnosis/Stage:   1/2024: Stage IIIB (cT4-cN2-cM0) squamous cell cancer (possible/indeterminate bilateral lung involvement, stage IV). Not candidate for definitive RT/surgery.  -Hx severe COPD (FEV1 18%), tobacco use (quit 2021)  -Followed in Pulmonary with CT for waxing/waning bilateral lung nodules  -9/2023 chest CT: new spiculated RUL nodule + persistent filling defect of bronchus intermedius/RLL bronchus concerning for possible endobronchial tumor vs mucus plugging; progressive subcarinal soft tissue mass  -PET: avid right  hilar mass encasing RML bronchus contiguous with subcarinal node/mass, intraluminal soft tissue density RLL bronchus concerning for primary tumor; avid bilateral opacities indeterminate but favored as inflammatory. Incidentally, distal sigmoid/rectal avid soft tissue thickening noted.   -10/2023 Cologuard negative  -Treated with empiric antibiotics for pneumonia. Follow-up CT: RADHA improved; new nodules through bilateral lungs (up to 7 mm) and persistent right lung mass/bronchial findings.   -1/18/2024 flex + rigid bronch with tumor debulking and intermedius bronchial stent placement, with RUL, R mainstem bronchus and bronchus intermedius biopsy: mod-diff squamous cell cancer. PDL1 CPS 10-20% and TPS 5%.   -Lung NGS panel: CDKN2A and PIK3CA alterations   -Brain MRI negative    Treatment:  2/29/2024 - 4/12/2024: palliative carbo, Taxol, pembrolizumab + Neulasta support. Completed 3 cycles  -->following 3 cycles, had overall good [place]/near CR; but complicated by hospitalization early May for hypoxic respiratory failure + pneumonia. Chemo stopped and treatment held until recovered.     5/15/2024 - present: maintenance pembrolizumab   -Initially, 200 mg every 3 weeks  -8/28/2024 - present: 400 mg every 6  weeks        Physical Exam    GENERAL: Alert and oriented to time place and person. Seated comfortably.  In no distress. Family member with.   HEAD: Atraumatic and normocephalic. No alopecia.  LYMPH: No palpable cervical, supraclav nor axillary adenopathy  CHEST: Regular respiratory effort. 2 L Oxygen via NC on. Diiminished lungs and expiratory wheezes bilaterally.   HEART: RRR  ABD: soft, non-tender, non-distended  EXTREMITIES: No edema  NEURO: No gross deficit noted. Non-antalgic gait.      Lab Results    Recent Results (from the past week)   Comprehensive metabolic panel   Result Value Ref Range    Sodium 140 135 - 145 mmol/L    Potassium 4.6 3.4 - 5.3 mmol/L    Carbon Dioxide (CO2) 33 (H) 22 - 29 mmol/L    Anion Gap 8 7 - 15 mmol/L    Urea Nitrogen 26.9 (H) 6.0 - 20.0 mg/dL    Creatinine 0.75 0.67 - 1.17 mg/dL    GFR Estimate >90 >60 mL/min/1.73m2    Calcium 9.1 8.8 - 10.4 mg/dL    [place] 99 98 - 107 mmol/L    Glucose 89 70 - 99 mg/dL    Alkaline Phosphatase 47 40 - 150 U/L    AST 17 0 - 45 U/L    ALT 16 0 - 70 U/L    Protein Total 6.7 6.4 - 8.3 g/dL    Albumin 4.2 3.5 - 5.2 g/dL    Bilirubin Total <0.2 <=1.2 mg/dL   TSH with free T4 reflex   Result Value Ref Range    TSH 0.52 0.30 - 4.20 uIU/mL   CBC with platelets and differential   Result Value Ref Range    WBC Count 18.0 (H) 4.0 - 11.0 10e3/uL    RBC Count 3.90 (L) 4.40 - 5.90 10e6/uL    Hemoglobin 12.0 (L) 13.3 - 17.7 g/dL    Hematocrit 39.3 (L) 40.0 - 53.0 %     (H) 78 - 100 fL    MCH 30.8 26.5 - 33.0 pg    MCHC 30.5 (L) 31.5 - 36.5 g/dL    RDW 13.9 10.0 - 15.0 %    Platelet Count 358 150 - 450 10e3/uL    % Neutrophils 77 %    % Lymphocytes 7 %    % Monocytes 13 %    % Eosinophils 2 %    % Basophils 0 %    % Immature Granulocytes 1 %    NRBCs per 100 WBC 0 <1 /100    Absolute Neutrophils 14.0 (H) 1.6 - 8.3 10e3/uL    Absolute Lymphocytes 1.2 0.8 - 5.3 10e3/uL    Absolute Monocytes 2.4 (H) 0.0 - 1.3 10e3/uL    Absolute Eosinophils 0.3 0.0 - 0.7  10e3/uL    Absolute Basophils 0.1 0.0 - 0.2 10e3/uL    Absolute Immature Granulocytes 0.1 <=0.4 10e3/uL    Absolute NRBCs 0.0 10e3/uL   RBC and Platelet Morphology   Result Value Ref Range    RBC Morphology Confirmed RBC Indices     Platelet Assessment  Automated Count Confirmed. Platelet morphology is normal.     Automated Count Confirmed. Platelet morphology is normal.           Imaging    XR Chest 2 Views    Result Date: 10/7/2024  CHEST TWO VIEWS 10/7/2024 8:07 AM HISTORY: SOA COMPARISON: August 15, 2024     IMPRESSION: Hyperexpansion. There are no acute infiltrates. The cardiac silhouette is not enlarged. Pulmonary vasculature is unremarkable. ECTOR POLLARD MD   SYSTEM ID:  [id number]     Billing  Total time 35 minutes, to include face to face visit, review of EMR, ordering, documentation and coordination of care on date of service   complexity modifier for longitudinal care.     Signed by: Jennifer Cabrera NP

## 2024-10-24 ENCOUNTER — INFUSION THERAPY VISIT (OUTPATIENT)
Dept: INFUSION THERAPY | Facility: CLINIC | Age: 59
End: 2024-10-24
Attending: INTERNAL MEDICINE
Payer: COMMERCIAL

## 2024-10-24 ENCOUNTER — ONCOLOGY VISIT (OUTPATIENT)
Dept: ONCOLOGY | Facility: CLINIC | Age: 59
End: 2024-10-24
Attending: INTERNAL MEDICINE
Payer: COMMERCIAL

## 2024-10-24 ENCOUNTER — LAB (OUTPATIENT)
Dept: LAB | Facility: CLINIC | Age: 59
End: 2024-10-24
Attending: INTERNAL MEDICINE
Payer: COMMERCIAL

## 2024-10-24 VITALS
HEART RATE: 74 BPM | DIASTOLIC BLOOD PRESSURE: 88 MMHG | OXYGEN SATURATION: 94 % | SYSTOLIC BLOOD PRESSURE: 126 MMHG | BODY MASS INDEX: 20.83 KG/M2 | TEMPERATURE: 97.6 F | HEIGHT: 64 IN | RESPIRATION RATE: 12 BRPM | WEIGHT: 122 LBS

## 2024-10-24 VITALS — DIASTOLIC BLOOD PRESSURE: 87 MMHG | SYSTOLIC BLOOD PRESSURE: 127 MMHG | HEART RATE: 82 BPM

## 2024-10-24 DIAGNOSIS — Z79.899 ENCOUNTER FOR LONG-TERM (CURRENT) USE OF HIGH-RISK MEDICATION: Primary | ICD-10-CM

## 2024-10-24 DIAGNOSIS — C34.91 SQUAMOUS CELL LUNG CANCER, RIGHT (H): ICD-10-CM

## 2024-10-24 DIAGNOSIS — J44.1 COPD WITH ACUTE EXACERBATION (H): ICD-10-CM

## 2024-10-24 DIAGNOSIS — C34.91 SQUAMOUS CELL LUNG CANCER, RIGHT (H): Primary | ICD-10-CM

## 2024-10-24 DIAGNOSIS — Z51.11 ENCOUNTER FOR ANTINEOPLASTIC CHEMOTHERAPY: ICD-10-CM

## 2024-10-24 LAB
ALBUMIN SERPL BCG-MCNC: 4.2 G/DL (ref 3.5–5.2)
ALP SERPL-CCNC: 47 U/L (ref 40–150)
ALT SERPL W P-5'-P-CCNC: 16 U/L (ref 0–70)
ANION GAP SERPL CALCULATED.3IONS-SCNC: 8 MMOL/L (ref 7–15)
AST SERPL W P-5'-P-CCNC: 17 U/L (ref 0–45)
BASOPHILS # BLD AUTO: 0.1 10E3/UL (ref 0–0.2)
BASOPHILS NFR BLD AUTO: 0 %
BILIRUB SERPL-MCNC: <0.2 MG/DL
BUN SERPL-MCNC: 26.9 MG/DL (ref 6–20)
CALCIUM SERPL-MCNC: 9.1 MG/DL (ref 8.8–10.4)
CHLORIDE SERPL-SCNC: 99 MMOL/L (ref 98–107)
CREAT SERPL-MCNC: 0.75 MG/DL (ref 0.67–1.17)
EGFRCR SERPLBLD CKD-EPI 2021: >90 ML/MIN/1.73M2
EOSINOPHIL # BLD AUTO: 0.3 10E3/UL (ref 0–0.7)
EOSINOPHIL NFR BLD AUTO: 2 %
ERYTHROCYTE [DISTWIDTH] IN BLOOD BY AUTOMATED COUNT: 13.9 % (ref 10–15)
GLUCOSE SERPL-MCNC: 89 MG/DL (ref 70–99)
HCO3 SERPL-SCNC: 33 MMOL/L (ref 22–29)
HCT VFR BLD AUTO: 39.3 % (ref 40–53)
HGB BLD-MCNC: 12 G/DL (ref 13.3–17.7)
IMM GRANULOCYTES # BLD: 0.1 10E3/UL
IMM GRANULOCYTES NFR BLD: 1 %
LYMPHOCYTES # BLD AUTO: 1.2 10E3/UL (ref 0.8–5.3)
LYMPHOCYTES NFR BLD AUTO: 7 %
MCH RBC QN AUTO: 30.8 PG (ref 26.5–33)
MCHC RBC AUTO-ENTMCNC: 30.5 G/DL (ref 31.5–36.5)
MCV RBC AUTO: 101 FL (ref 78–100)
MONOCYTES # BLD AUTO: 2.4 10E3/UL (ref 0–1.3)
MONOCYTES NFR BLD AUTO: 13 %
NEUTROPHILS # BLD AUTO: 14 10E3/UL (ref 1.6–8.3)
NEUTROPHILS NFR BLD AUTO: 77 %
NRBC # BLD AUTO: 0 10E3/UL
NRBC BLD AUTO-RTO: 0 /100
PLAT MORPH BLD: NORMAL
PLATELET # BLD AUTO: 358 10E3/UL (ref 150–450)
POTASSIUM SERPL-SCNC: 4.6 MMOL/L (ref 3.4–5.3)
PROT SERPL-MCNC: 6.7 G/DL (ref 6.4–8.3)
RBC # BLD AUTO: 3.9 10E6/UL (ref 4.4–5.9)
RBC MORPH BLD: NORMAL
SODIUM SERPL-SCNC: 140 MMOL/L (ref 135–145)
TSH SERPL DL<=0.005 MIU/L-ACNC: 0.52 UIU/ML (ref 0.3–4.2)
WBC # BLD AUTO: 18 10E3/UL (ref 4–11)

## 2024-10-24 PROCEDURE — 250N000011 HC RX IP 250 OP 636: Mod: JZ | Performed by: NURSE PRACTITIONER

## 2024-10-24 PROCEDURE — 84443 ASSAY THYROID STIM HORMONE: CPT | Performed by: INTERNAL MEDICINE

## 2024-10-24 PROCEDURE — G0463 HOSPITAL OUTPT CLINIC VISIT: HCPCS | Performed by: NURSE PRACTITIONER

## 2024-10-24 PROCEDURE — 36415 COLL VENOUS BLD VENIPUNCTURE: CPT | Performed by: INTERNAL MEDICINE

## 2024-10-24 PROCEDURE — 96413 CHEMO IV INFUSION 1 HR: CPT

## 2024-10-24 PROCEDURE — G2211 COMPLEX E/M VISIT ADD ON: HCPCS | Performed by: NURSE PRACTITIONER

## 2024-10-24 PROCEDURE — 258N000003 HC RX IP 258 OP 636: Performed by: NURSE PRACTITIONER

## 2024-10-24 PROCEDURE — 99214 OFFICE O/P EST MOD 30 MIN: CPT | Performed by: NURSE PRACTITIONER

## 2024-10-24 PROCEDURE — 85025 COMPLETE CBC W/AUTO DIFF WBC: CPT | Performed by: INTERNAL MEDICINE

## 2024-10-24 PROCEDURE — 80053 COMPREHEN METABOLIC PANEL: CPT | Performed by: INTERNAL MEDICINE

## 2024-10-24 RX ORDER — ALBUTEROL SULFATE 0.83 MG/ML
2.5 SOLUTION RESPIRATORY (INHALATION)
Status: CANCELLED | OUTPATIENT
Start: 2024-10-24

## 2024-10-24 RX ORDER — ALBUTEROL SULFATE 90 UG/1
1-2 INHALANT RESPIRATORY (INHALATION)
Status: CANCELLED
Start: 2024-10-24

## 2024-10-24 RX ORDER — METHYLPREDNISOLONE SODIUM SUCCINATE 125 MG/2ML
125 INJECTION INTRAMUSCULAR; INTRAVENOUS
Status: CANCELLED
Start: 2024-10-24

## 2024-10-24 RX ORDER — MEPERIDINE HYDROCHLORIDE 25 MG/ML
25 INJECTION INTRAMUSCULAR; INTRAVENOUS; SUBCUTANEOUS EVERY 30 MIN PRN
Status: CANCELLED | OUTPATIENT
Start: 2024-10-24

## 2024-10-24 RX ORDER — HEPARIN SODIUM (PORCINE) LOCK FLUSH IV SOLN 100 UNIT/ML 100 UNIT/ML
5 SOLUTION INTRAVENOUS
Status: CANCELLED | OUTPATIENT
Start: 2024-10-24

## 2024-10-24 RX ORDER — EPINEPHRINE 1 MG/ML
0.3 INJECTION, SOLUTION, CONCENTRATE INTRAVENOUS EVERY 5 MIN PRN
Status: CANCELLED | OUTPATIENT
Start: 2024-10-24

## 2024-10-24 RX ORDER — DIPHENHYDRAMINE HYDROCHLORIDE 50 MG/ML
50 INJECTION INTRAMUSCULAR; INTRAVENOUS
Status: CANCELLED
Start: 2024-10-24

## 2024-10-24 RX ORDER — LORAZEPAM 2 MG/ML
0.5 INJECTION INTRAMUSCULAR EVERY 4 HOURS PRN
Status: CANCELLED | OUTPATIENT
Start: 2024-10-24

## 2024-10-24 RX ORDER — HEPARIN SODIUM,PORCINE 10 UNIT/ML
5-20 VIAL (ML) INTRAVENOUS DAILY PRN
Status: CANCELLED | OUTPATIENT
Start: 2024-10-24

## 2024-10-24 RX ADMIN — SODIUM CHLORIDE 400 MG: 9 INJECTION, SOLUTION INTRAVENOUS at 10:26

## 2024-10-24 ASSESSMENT — PAIN SCALES - GENERAL: PAINLEVEL_OUTOF10: NO PAIN (0)

## 2024-10-24 NOTE — PROGRESS NOTES
"Oncology Rooming Note    October 24, 2024 9:14 AM   Luis Hernandez is a 59 year old male who presents for:    Chief Complaint   Patient presents with    Oncology Clinic Visit     Squamous cell lung cancer, right - Labs provider and infusion       Initial Vitals: /88 (BP Location: Right arm, Patient Position: Sitting, Cuff Size: Adult Small)   Pulse 74   Temp 97.6  F (36.4  C) (Tympanic)   Resp 12   Ht 1.626 m (5' 4\")   Wt 55.3 kg (122 lb)   SpO2 94%   BMI 20.94 kg/m   Estimated body mass index is 20.94 kg/m  as calculated from the following:    Height as of this encounter: 1.626 m (5' 4\").    Weight as of this encounter: 55.3 kg (122 lb). Body surface area is 1.58 meters squared.  No Pain (0) Comment: Data Unavailable   No LMP for male patient.  Allergies reviewed: Yes  Medications reviewed: Yes    Medications: Medication refills not needed today.  Pharmacy name entered into [facility]:    [id number] - [place], [place] - [address] [id number] - [place], [place] - [address] [place] [address]    Frailty Screening:   Is the patient here for a new oncology consult visit in cancer care? 2. No      Clinical concerns:  None      Feli Mead CMA              "

## 2024-10-24 NOTE — LETTER
10/24/2024      Luis Hernandez  [address] [address] [facility] [address]      Dear Colleague,    Thank you for referring your patient, Luis Hernandez, to the [facility]. Please see a copy of my visit note below.    [facility] Hematology and Oncology Outpatient Progress Note    Patient: Luis Hernandez  MRN: [id number]  Date of Service: Oct 24, 2024          Reason for Visit    Lung cancer    Primary Oncologist: Formerly, Dr. Lantigua      Assessment/Plan  >Stage IIIB squamous cell lung cancer  Bilateral lung nodules, indeterminate  Low TSH, resolved  Leukocytosis, likely steroid-induced   Not a surgical nor definitive chemoradiation candidate given his severe COPD.  There are also some indeterminate lung nodules, either inflammatory or separate metastatic sites that need to be followed.      Luis completed 3 cycles of palliative carboplatin, Taxol, pembrolizumab with Neulasta support. Got an excellent response, but complicated by hospitalization for pneumonia/COPD exacerbation. Therefore, further chemo discontinued and he has continued maintenance pembro since May.   Pembro was changed to 400 mg every 6 weeks with the last cycle.     Tolerating immunotherapy well.   He has been treated for frequent COPD exacerbations every 1-2 mths; last completed antibiotics + steroids this week and feels recovered back to baseline.     PET scan early August showed CR - no evidence of cancer. Prior lung infiltrates resolved, no evident pneumonitis noted.     While there is a risk his recurrent respiratory symptoms are immunotherapy induced pneumonitis, this has been his normal baseline predating the start of his cancer treatment and he recovers after short courses of steroids. His last PET images showed no concerns for this (although could be less sensitive than a dedicated chest CT). Today, he feels back to baseline. No fevers/chills .    Lab work: hgb 12.0, WBC 18.2/ANC 14.0, plat WNL. CMP WNL. TSH  BRANDON.    Plan:  -Proceed with pembrolizumab 400 mg IV today   -Return in 6 weeks with CT, Dr. Friedell and next cycle  -Has had intermittently suppressed TSH, currently in normal range. May be immunotherapy-related and usually evolves to hypothyroidism in time    Severe COPD (emphysema), chronic oxygen use  Recurrent/frequent exacerbations  Has had frequent exacerbations - appears to require antibiotic/prednisone about every 1-2 mths. He reports this as his baseline predating starting treatment for his cancer; so I don't think it is an immunotherapy-induced complication.     Recently treated as outpt with steroids + antibiotics 2 weeks ago. Symptoms and oxygen needs are back to baseline.    Plan:  -Follow with Pulmonary 10/31  -Will need to follow closely for risk of autoimmune pneumonitis on pembro  ______________________________________________________________________________    History of Present Illness/ Interval History    Mr. Luis Hernandez is a 59 year old with at least locally advanced non-operable lung cancer. He completed 3 cycles of palliative carbo/Taxol/pembrolizumab (with Neulasta) with excellent [place]/near CR but complicated by pneumonia/respiratory failure after 3rd cycle so further chemo discontinued. He has continued maintenance pembrolizumab alone. He's had some interruptions due to frequent COPD exacerbations. With his cycle 6 weeks, ago this was changed to 400 mg every 6-week dosing.     The day after his last cycle (8/29), was evaluated in ED for 4-day hx left elbow swelling/redness and warmth. This was favored as bursitis + cellulitis, treated with outpatient Keflex and referred to Ortho. This resolved and has had no recurrence.    He was then re-evaluated in ED 10/7 for acute/chronic dyspnea without cough, fever nor cardiac concerns. CXR and EKG were normal. Stable hypoxia to baseline, wears 1.5L O2. He was treated with duonebs with improvement and dismissed on outpatient prednisone +  azithromycin which he completed a few days ago.  His symptoms are improving back to baseline. No fevers/chills.    He reports baseline COPD exacerbations every 1-2 mths for last few years, pre-dating immunotherapy.    He otherwise is feeling well.  No diarrhea, skin rash. Stable weight. No urinary symptoms .    No new sites of pain. No headaches/neuro symptoms.       ECOG Performance    1      Oncology History/Treatment  Diagnosis/Stage:   1/2024: Stage IIIB (cT4-cN2-cM0) squamous cell cancer (possible/indeterminate bilateral lung involvement, stage IV). Not candidate for definitive RT/surgery.  -Hx severe COPD (FEV1 18%), tobacco use (quit 2021)  -Followed in Pulmonary with CT for waxing/waning bilateral lung nodules  -9/2023 chest CT: new spiculated RUL nodule + persistent filling defect of bronchus intermedius/RLL bronchus concerning for possible endobronchial tumor vs mucus plugging; progressive subcarinal soft tissue mass  -PET: avid right  hilar mass encasing RML bronchus contiguous with subcarinal node/mass, intraluminal soft tissue density RLL bronchus concerning for primary tumor; avid bilateral opacities indeterminate but favored as inflammatory. Incidentally, distal sigmoid/rectal avid soft tissue thickening noted.   -10/2023 Cologuard negative  -Treated with empiric antibiotics for pneumonia. Follow-up CT: RADHA improved; new nodules through bilateral lungs (up to 7 mm) and persistent right lung mass/bronchial findings.   -1/18/2024 flex + rigid bronch with tumor debulking and intermedius bronchial stent placement, with RUL, R mainstem bronchus and bronchus intermedius biopsy: mod-diff squamous cell cancer. PDL1 CPS 10-20% and TPS 5%.   -Lung NGS panel: CDKN2A and PIK3CA alterations   -Brain MRI negative    Treatment:  2/29/2024 - 4/12/2024: palliative carbo, Taxol, pembrolizumab + Neulasta support. Completed 3 cycles  -->following 3 cycles, had overall good [place]/near CR; but complicated by hospitalization  early May for hypoxic respiratory failure + pneumonia. Chemo stopped and treatment held until recovered.     5/15/2024 - present: maintenance pembrolizumab   -Initially, 200 mg every 3 weeks  -8/28/2024 - present: 400 mg every 6 weeks        Physical Exam    GENERAL: Alert and oriented to time place and person. Seated comfortably.  In no distress. Family member with.   HEAD: Atraumatic and normocephalic. No alopecia.  LYMPH: No palpable cervical, supraclav nor axillary adenopathy  CHEST: Regular respiratory effort. 2 L Oxygen via NC on. Diiminished lungs and expiratory wheezes bilaterally.   HEART: RRR  ABD: soft, non-tender, non-distended  EXTREMITIES: No edema  NEURO: No gross deficit noted. Non-antalgic gait.      Lab Results    Recent Results (from the past week)   Comprehensive metabolic panel   Result Value Ref Range    Sodium 140 135 - 145 mmol/L    Potassium 4.6 3.4 - 5.3 mmol/L    Carbon Dioxide (CO2) 33 (H) 22 - 29 mmol/L    Anion Gap 8 7 - 15 mmol/L    Urea Nitrogen 26.9 (H) 6.0 - 20.0 mg/dL    Creatinine 0.75 0.67 - 1.17 mg/dL    GFR Estimate >90 >60 mL/min/1.73m2    Calcium 9.1 8.8 - 10.4 mg/dL    [place] 99 98 - 107 mmol/L    Glucose 89 70 - 99 mg/dL    Alkaline Phosphatase 47 40 - 150 U/L    AST 17 0 - 45 U/L    ALT 16 0 - 70 U/L    Protein Total 6.7 6.4 - 8.3 g/dL    Albumin 4.2 3.5 - 5.2 g/dL    Bilirubin Total <0.2 <=1.2 mg/dL   TSH with free T4 reflex   Result Value Ref Range    TSH 0.52 0.30 - 4.20 uIU/mL   CBC with platelets and differential   Result Value Ref Range    WBC Count 18.0 (H) 4.0 - 11.0 10e3/uL    RBC Count 3.90 (L) 4.40 - 5.90 10e6/uL    Hemoglobin 12.0 (L) 13.3 - 17.7 g/dL    Hematocrit 39.3 (L) 40.0 - 53.0 %     (H) 78 - 100 fL    MCH 30.8 26.5 - 33.0 pg    MCHC 30.5 (L) 31.5 - 36.5 g/dL    RDW 13.9 10.0 - 15.0 %    Platelet Count 358 150 - 450 10e3/uL    % Neutrophils 77 %    % Lymphocytes 7 %    % Monocytes 13 %    % Eosinophils 2 %    % Basophils 0 %    % Immature  "Granulocytes 1 %    NRBCs per 100 WBC 0 <1 /100    Absolute Neutrophils 14.0 (H) 1.6 - 8.3 10e3/uL    Absolute Lymphocytes 1.2 0.8 - 5.3 10e3/uL    Absolute Monocytes 2.4 (H) 0.0 - 1.3 10e3/uL    Absolute Eosinophils 0.3 0.0 - 0.7 10e3/uL    Absolute Basophils 0.1 0.0 - 0.2 10e3/uL    Absolute Immature Granulocytes 0.1 <=0.4 10e3/uL    Absolute NRBCs 0.0 10e3/uL   RBC and Platelet Morphology   Result Value Ref Range    RBC Morphology Confirmed RBC Indices     Platelet Assessment  Automated Count Confirmed. Platelet morphology is normal.     Automated Count Confirmed. Platelet morphology is normal.           Imaging    XR Chest 2 Views    Result Date: 10/7/2024  CHEST TWO VIEWS 10/7/2024 8:07 AM HISTORY: SOA COMPARISON: August 15, 2024     IMPRESSION: Hyperexpansion. There are no acute infiltrates. The cardiac silhouette is not enlarged. Pulmonary vasculature is unremarkable. ECTOR POLLARD MD   SYSTEM ID:  [id number]     Billing  Total time 35 minutes, to include face to face visit, review of EMR, ordering, documentation and coordination of care on date of service   complexity modifier for longitudinal care.     Signed by: Jennifer Cabrera NP      Oncology Rooming Note    October 24, 2024 9:14 AM   Luis Hernandez is a 59 year old male who presents for:    Chief Complaint   Patient presents with     Oncology Clinic Visit     Squamous cell lung cancer, right - Labs provider and infusion       Initial Vitals: /88 (BP Location: Right arm, Patient Position: Sitting, Cuff Size: Adult Small)   Pulse 74   Temp 97.6  F (36.4  C) (Tympanic)   Resp 12   Ht 1.626 m (5' 4\")   Wt 55.3 kg (122 lb)   SpO2 94%   BMI 20.94 kg/m   Estimated body mass index is 20.94 kg/m  as calculated from the following:    Height as of this encounter: 1.626 m (5' 4\").    Weight as of this encounter: 55.3 kg (122 lb). Body surface area is 1.58 meters squared.  No Pain (0) Comment: Data Unavailable   No LMP for male patient.  Allergies " reviewed: Yes  Medications reviewed: Yes    Medications: Medication refills not needed today.  Pharmacy name entered into [facility]:    [id number] - [place], [place] - [address] [id number] - [place], [place] - [facility] [facility]    Frailty Screening:   Is the patient here for a new oncology consult visit in cancer care? 2. No      Clinical concerns:  None      Feli Mead CMA                Again, thank you for allowing me to participate in the care of your patient.        Sincerely,        Jennifer Cabrera, NP

## 2024-10-24 NOTE — PROGRESS NOTES
Infusion Nursing Note:  Luis Hernandez presents today for C9D1 Keytruda.    Patient seen by provider today: Yes: Jennifer Cabrera NP   present during visit today: Not Applicable.    Note: N/A.      Intravenous Access:  Labs drawn without difficulty.  Peripheral IV placed.    Treatment Conditions:  Lab Results   Component Value Date    HGB 12.0 (L) 10/24/2024    WBC 18.0 (H) 10/24/2024    ANEU 6.6 10/07/2024    ANEUTAUTO 14.0 (H) 10/24/2024     10/24/2024        Lab Results   Component Value Date     10/24/2024    POTASSIUM 4.6 10/24/2024    MAG 2.2 04/24/2022    CR 0.75 10/24/2024    MIKE 9.1 10/24/2024    BILITOTAL <0.2 10/24/2024    ALBUMIN 4.2 10/24/2024    ALT 16 10/24/2024    AST 17 10/24/2024       Results reviewed, labs MET treatment parameters, ok to proceed with treatment.      Post Infusion Assessment:  Patient tolerated infusion without incident.  Blood return noted pre and post infusion.  Site patent and intact, free from redness, edema or discomfort.  No evidence of extravasations.  Access discontinued per protocol.       Discharge Plan:   Patient discharged in stable condition accompanied by: daughter, Analilia.  Departure Mode: Ambulatory.  Pt to return on 12/3/24 for labs followed by 12/4/24 at 8:30 am for clinic visit with Dr. Friedell followed by C10D1 Chris.      Nabila Mhoamud RN

## 2024-10-25 ENCOUNTER — VIRTUAL VISIT (OUTPATIENT)
Dept: FAMILY MEDICINE | Facility: CLINIC | Age: 59
End: 2024-10-25
Attending: EMERGENCY MEDICINE
Payer: COMMERCIAL

## 2024-10-25 DIAGNOSIS — J45.41 MODERATE PERSISTENT REACTIVE AIRWAY DISEASE WITH ACUTE EXACERBATION: ICD-10-CM

## 2024-10-25 DIAGNOSIS — J44.1 COPD WITH ACUTE EXACERBATION (H): ICD-10-CM

## 2024-10-25 DIAGNOSIS — E87.1 HYPONATREMIA: ICD-10-CM

## 2024-10-25 PROCEDURE — 99441 PR PHYSICIAN TELEPHONE EVALUATION 5-10 MIN: CPT | Mod: 93 | Performed by: NURSE PRACTITIONER

## 2024-10-25 RX ORDER — LEVOFLOXACIN 500 MG/1
500 TABLET, FILM COATED ORAL DAILY
Qty: 5 TABLET | Refills: 0 | Status: SHIPPED | OUTPATIENT
Start: 2024-10-25 | End: 2024-10-30

## 2024-10-25 NOTE — PROGRESS NOTES
Prasad is a 59 year old who is being evaluated via a billable telephone visit.    What phone number would you like to be contacted at? [phone]  How would you like to obtain your AVS? Mail a copy  Originating Location (pt. Location): Home  Distant Location (provider location):  On-site    Assessment & Plan     COPD with acute exacerbation (H)  Patient continues to have a lot of yellow sputum and this is not improving.  Will try 5 days of Levofloxacin to see if this improves symptoms.  - Primary Care Referral  - levofloxacin (LEVAQUIN) 500 MG tablet; Take 1 tablet (500 mg) by mouth daily for 5 days.    Moderate persistent reactive airway disease with acute exacerbation  See note above.  - Primary Care Referral    Hyponatremia  Repeat CMP yesterday by oncology with normal sodium.    - Primary Care Referral        MED REC REQUIRED    Post Medication Reconciliation Status: discharge medications reconciled and changed, per note/orders    See Patient Instructions    Subjective   Prasad is a 59 year old, presenting for the following health issues:  Hospital F/U        10/25/2024     8:25 AM   Additional Questions   Roomed by rmb   Accompanied by self         10/25/2024     8:25 AM   Patient Reported Additional Medications   Patient reports taking the following new medications none     HPI     ED/UC Followup:    Facility:  [facility] [place]  Date of visit: 10/07/2024  Reason for visit: copd  Current Status: discharge  Patient continues to have a cough that is productive and sputum is yellow in all day long.  He did feel that this improved after his ER visit on 10/7/2024 and taking the azithromycin but it seems to be worsening a little bit more and he feels that his breathing is starting to be affected.  He currently denies any fevers.  He is able to talk to me today in full sentences without trouble breathing.      Review of Systems  CONSTITUTIONAL: NEGATIVE for fever, chills, change in weight  RESP:POSITIVE for  cough-productive, Hx COPD, and sputum yellow and all day  CV: NEGATIVE for chest pain, palpitations or peripheral edema  PSYCHIATRIC: NEGATIVE for changes in mood or affect  ROS otherwise negative      Objective    Vitals - Patient Reported  Weight (Patient Reported): 55.3 kg (122 lb)  Pain Score: No Pain (0)        Physical Exam   General: Alert and no distress //Respiratory: No audible wheeze, cough, or shortness of breath // Psychiatric:  Appropriate affect, tone, and pace of words      Phone call duration: 8 minutes  Signed Electronically by: Shalini Washington NP

## 2024-10-25 NOTE — PATIENT INSTRUCTIONS
Sodium was improved on labs yesterday.  Make sure you are watching your fluid intake and follow-up if any symptoms.  Usually you feel nausea and malaise, and this can lead to headache, lethargy, obtundation and eventually ?eiz?re?, ??m?, and respiratory arrest can occur if the serum sodium concentration falls too low.  I have ordered 5 days of levofloxacin to see if this will get rid of the bronchitis due to daily sputum.  If this does not improve, you will need to follow-up in clinic to get a sputum sample.

## 2024-10-28 ENCOUNTER — TELEPHONE (OUTPATIENT)
Dept: PULMONOLOGY | Facility: CLINIC | Age: 59
End: 2024-10-28
Payer: COMMERCIAL

## 2024-10-28 DIAGNOSIS — J43.2 CENTRILOBULAR EMPHYSEMA (H): ICD-10-CM

## 2024-10-28 DIAGNOSIS — J43.9 PULMONARY EMPHYSEMA, UNSPECIFIED EMPHYSEMA TYPE (H): ICD-10-CM

## 2024-10-28 NOTE — TELEPHONE ENCOUNTER
Received refill request for   roflumilast (DALIRESP) 500 MCG TABS tablet however medication is not in med list.    [facility]              [facility]   Clinic Station    [facility]  [phone]

## 2024-10-29 RX ORDER — ROFLUMILAST 500 UG/1
500 TABLET ORAL DAILY
Qty: 90 TABLET | Refills: 0 | Status: SHIPPED | OUTPATIENT
Start: 2024-10-29 | End: 2024-10-31

## 2024-10-29 NOTE — TELEPHONE ENCOUNTER
Yes, per my last note. Not sure why it's discontinued.    Can you send the refill?    Thanks!    Yamilet

## 2024-10-31 ENCOUNTER — OFFICE VISIT (OUTPATIENT)
Dept: PULMONOLOGY | Facility: CLINIC | Age: 59
End: 2024-10-31
Attending: INTERNAL MEDICINE
Payer: COMMERCIAL

## 2024-10-31 VITALS
DIASTOLIC BLOOD PRESSURE: 97 MMHG | HEART RATE: 92 BPM | OXYGEN SATURATION: 96 % | SYSTOLIC BLOOD PRESSURE: 148 MMHG | TEMPERATURE: 97.8 F

## 2024-10-31 DIAGNOSIS — J96.21 ACUTE ON CHRONIC RESPIRATORY FAILURE WITH HYPOXIA AND HYPERCAPNIA (H): ICD-10-CM

## 2024-10-31 DIAGNOSIS — J96.22 ACUTE ON CHRONIC RESPIRATORY FAILURE WITH HYPOXIA AND HYPERCAPNIA (H): ICD-10-CM

## 2024-10-31 DIAGNOSIS — J43.9 PULMONARY EMPHYSEMA, UNSPECIFIED EMPHYSEMA TYPE (H): ICD-10-CM

## 2024-10-31 DIAGNOSIS — R91.8 LUNG MASS: Primary | ICD-10-CM

## 2024-10-31 DIAGNOSIS — J44.9 CHRONIC OBSTRUCTIVE PULMONARY DISEASE, UNSPECIFIED COPD TYPE (H): Primary | ICD-10-CM

## 2024-10-31 PROCEDURE — 99215 OFFICE O/P EST HI 40 MIN: CPT | Performed by: INTERNAL MEDICINE

## 2024-10-31 RX ORDER — ALBUTEROL SULFATE 0.83 MG/ML
2.5 SOLUTION RESPIRATORY (INHALATION) EVERY 4 HOURS PRN
Qty: 360 ML | Refills: 5 | Status: SHIPPED | OUTPATIENT
Start: 2024-10-31

## 2024-10-31 RX ORDER — FLUTICASONE PROPIONATE AND SALMETEROL 500; 50 UG/1; UG/1
1 POWDER RESPIRATORY (INHALATION) EVERY 12 HOURS
Qty: 60 EACH | Refills: 11 | Status: SHIPPED | OUTPATIENT
Start: 2024-10-31

## 2024-10-31 RX ORDER — AZITHROMYCIN 250 MG/1
250 TABLET, FILM COATED ORAL DAILY
Qty: 30 TABLET | Refills: 11 | Status: SHIPPED | OUTPATIENT
Start: 2024-10-31

## 2024-10-31 NOTE — PROGRESS NOTES
[facility]  [address] [address]  Phone: [phone]    Patient:  Luis Hernandez, Date of birth 1965  Date of Visit:  10/31/2024  Referring Provider Bianca Cuellar      Assessment & Plan    Mr. Luis Hernandez is a 59 year old male with medical history significant for very severe COPD, hypertension who was referred after recent hospitalization for COPD exacerbation.  He had 3x hospitalizations in 2021 for COPD exacerbations during which time he grew E. coli, Klebsiella, stenotrophomonas. In 2022 he had recurrent exacerbations and hospitalizations including a stay at [facility] from 4/24/22 through 4/27/22.  He was started on roflumilast with an excellent response.     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 He is s/p chemotherapy (pembrolizumab, paclitaxel and carboplatin) with CR achieved and is now on maintenance pembrolizumab [id number]. Weight has improved since and he has discontinued roflumilast given concern that this could have contributed to weight loss. Will start him on azithromycin for frequent exacerbations and he continues on low dose prednisone.     1. Very severe COPD (FEV1 18%), panlobular emphysema, minimal cough but mucus production and chronic hypoxic respiratory failure, on 1.5 L (A1ATD neg)  2. Prior tobacco dependence, greater than 50 years, quit in August 2021  3. Likely pulmonary hypertension with RVP of 55 mmHg plus RAP (2019 ECHO), dilated RV but normal RV EF  4. New spiculated 9 mm nodule in L major fissure (4/24/22) - decreased to 3 mm on 7/6/22 CT  5. indeterminate left lower lobe nodule, indeterminate left upper lobe opacity on 8/26/2021 CT  6. New 5 mm nodule in RADHA on 7/6/22 chest CT   7. 4 mm R mid lung pulmonary nodule - unchanged on 7/6/22, 3/9/23  8. 7 mm nodule in posterolateral RUL now 6 mm (from 7 mm), resolved  9. Fissural  nodule R mid lung stable, resolved  10.  Endobronchial debris soft tissue versus mucous plugging, mildly enlarged subcarinal and right hilar lymph nodes  11. New spiculated 1.6 cm nodule at base of the RUL on 9/14/23 CT   12. Non-operable R lung SCC and is now s/p  chemotherapy (pembrolizumab, paclitaxel and carboplatin) and now on maintenance pembrolizumab    Recommendations as follows:   -Continue Advair, spiriva, albuterol  -Discontinued roflumilast (~ 1 month ago)  -he stopped mucinex  -Continue twice daily nebs for stent patency; will reach out to IP re: follow-up and ongoing management of stent   -Continue aerobika use at least 2-3 times per day for 10-minute sessions after albuterol inhaler or nebulizer therapy   -prednisone 10 mg prednisone daily and we will work to taper this over next 6 months   -he is due for covid booster, influenza vaccine, RSV but will need to see appropriate timing based on immunotherapy    I certify that this patient, Luis Hernandez has been under my care (or a nurse practitioner or physican's assistant working with me). This is the face-to-face encounter for oxygen medical necessity.      Luis Hernandez is now in a chronic stable state and continues to require supplemental oxygen. Patient has continued oxygen desaturation due to COPD J44.9.    Alternative treatment(s) tried or considered and deemed clinically infective for treatment of Chronic Respiratory Failure with Hypoxia J93.11 include nebulizers, inhalers and pulmonary toileting.  If portability is ordered, is the patient mobile within the home? yes    Return to clinic in 6 months.    Bianca Cuellar MD  Pulmonary, Allergy, Critical Care, and Sleep Medicine   Pager: [phone]    This note was created using dictation software and may contain errors.  Please contact the creator for any clarifications that are needed.    I have spent 45 minutes on the day of the visit to review the chart, interview and examine the patient, review labs  and imaging, formulate a plan, document and submit orders. Time documented is excluding time spent for PFT interpretation        Medical Decision Making           Bianca Cuellar MD             HPI:    Mr. Luis Hernandez is a 59 year old male with medical history significant for very severe COPD, hypertension. He has been diagnosed with non-operable R lung SCC and is now s/p  chemotherapy (pembrolizumab, paclitaxel and carboplatin) and has been initiated on pembrolizumab.     He is currently doing okay. Has had several exacerbations with the most recent requiring ER visit on 10/7. He was started on prednisone/azithromycin and returned to his baseline. He wonders if his oxygenation is worse as he has increased his home oxygen to 2 L with SpO2 96%. He was using 1.5 L. We discussed goal SpO2 being closer to 90% and he will reduce back down to 1.5 L. Mucous has returned to baseline, he denies hemoptysis.     Weight is now up with a jennifer of 99#. He attributes some of this improvement with stopping his roflumilast, ~ 1 month ago. He denies diarrhea when he was on the medication.     He is currently using the albuterol nebulizer 1-2x/ay. He continues on advair. Also using aerobika 3x / day. He continues on prednisone 10 mg daily. He has stopped mucinex as mucous has not been overly thick. Seen recently by oncology and he will be maintained on Q6week pembrolizumab. So far he is tolerating this.         Prior history:   He is overall doing okay.  He has had some bone pain after chemo as well as bloating.  The symptoms have improved however and he will undergo repeat PET on 4/8 with possible chemo (cycle 2) thereafter.  Appetite has been good but his weight is down about 7 to 8 pounds.  Eating has been limited by intermittent constipation as well as bloating.  With daily MiraLAX for 3 days in a row this has improved.  He is using Ensure or boost supplements but these are costly.    Breathing is actually been feeling  "better during chemotherapy and dyspnea that is typically worsened in the afternoon has not been present.  He does continue to get quite winded with walking into clinic.  For about 5 days he has had increased yellow mucus production.  He did take azithromycin but this has not changed his symptoms.  He is using his Spiriva, Advair, albuterol nebs along with hypertonic saline.  He continues to cough.  He has been using Aerobika 3 times daily and is also using Roflumilast.  He continues on 2 L of oxygen 24/7.  He denies fever but has had morning chills which was ongoing for him.    We discussed chronic use of low-dose prednisone to optimize his breathing, and energy level.  He says that his spirits are good and he is overall tolerating his chemotherapy to this point.  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  We did discuss returning to [place] for repeat bronchoscopy and reevaluation of stent/debulking.  He does not want to do this since he is not interested in the travel, repeat physician visits, and also feels that this might buy him some time but overall is not worth the burden of care.  His daughter continues to be the main individual of support for him.       Prior history:   Currently feels at his baseline. About one month ago was unable to cough up typical morning mucous. His lungs felt \"dry.\" Breathing became worse so he started his home prednisone course. This helped his breathing. Started mucinex about a week ago and now lots of thin mucous is coming up regularly throughout the day. Also using Aerobika 3x/day. Also utilizing PSB. Prednisone taper ended Monday and no change since. Breathing at baseline.     He has lost about 20# / 6 months. Gained 5# back. Appetite is good and he is eating well. No screening colonoscopy but stools are normal. Has had chronic fatigue.     Exercising daily, primarily with " walking up stairs, using pedaller. He continues on 2 L O2, 1.5 LPM at nighttime. Sleep eval is on hold with upcoming cataract surgeries and also planning to get TTE.     + PND but controlled on daily flonase. Very mild seasonal allergy symptoms including sneezing.     Prior:   Currently feeling okay. Recently had a flare up after exposure to smoke from a neighbor burning wood. Feels mucous is thick currently but he is able to cough this up. Discussed using aerobika more, consideration of neb.   He is using his oxygen 24/7.     Reports that prior neb therapy made breathing worse but he will try using albuterol inhaler first then a neb.     He is using roflumilast, respimat, advair, albuterol prn.       Prior history:   He continues to do well and in fact feels better than he has in years. He is using advair, spiriva, albuterol, and roflumilast. No exacerbations since his last visit. He has established care with sleep medicine but is unclear when his evaluation will be - he asks that I reach out to them.     No significant coughing or wheeze. Still with ESPARZA. He remains active with regular exercise at least 3 times per week. He continues to use aerobika device twice daily.  Weight is stable and appetite is excellent.     His daughter had asked about lung transplant possibility at a prior visit and we discussed some of the intricacies of this including the extensive evaluation and needing to make sure that previously seen nodule is not malignant before moving forward with evaluation. He says that he is not interested in transplant evaluation at this time but would continue to think about it and possibly discuss with family.     Prior history:   He says his breathing has improved quite a bit.  He continues on 2 L O2. He did graduate from pulmonary rehab which was beneficial for him.  He continues to walk and use weights at home.  He is using Advair and Spiriva in the morning.  He does feel like nights are difficult for  him due to panicking while sleeping but also due to shortness of breath.  Causes him to wake up.  He is using his albuterol nebulizer once to twice per day.  He does continue to use Roflumilast.  His last exacerbation was in June.  He is using his aerobic 10 minutes 3 times a day and this is beneficial.  He does need to be evaluated for obstructive sleep apnea and usage of positive chronic pressure ventilation at night.    Prior history:  He has had 2 exacerbations in 2022; first in February when he was admitted fro 2/8 - 2/10 and discharged on hospice. Later admitted to [facility] with exacerbation from 4/24 - 4/27 and started on roflumilast. He was seen by the pulmonary consult team during his most recent hospitalization and started on a prolonged taper of prednisone.     He has been doing overall well since discharged. He has started pulmonary rehab and has had 2 sessions with plan for 18 sesssion. He does have an am cough but is able to clear things out with use of aerobika. Currently on prednisone 10 mg daily with ongoing taper plan; no questions about this. Appetite is good and weight is increasing. He is walking more each day and has a personal goal to do 1 flight of stairs (16 steps) 10x per day. Currently using 2 LPM at rest and 3 LPM with movement. Tolerating roflumilast. Using spiriva, advair, and albuterol and he feels this helps; has felt albuterol nebs make his breathing more difficult. Currently taking low dose lasix to hep with swollen feet and ankles. + some low grade allergy symptoms but very minimal and not taking anything; will continue to monitor.     Prior history:   He reports that he is currently doing well.  He has not had any illnesses since his last visit.  He continues to have a minimal cough with clear mucus production predominantly in the morning.  He is able to clear this well and is using the aerobic advice 3-5 times per day.  He did complete his course of Bactrim and noted that when he  finished this he felt much better.  He now can sleep laying down flat which she was unable to do prior to his last visit.    He does do pursed lip breathing and he feels that this helps.  He denies significant wheezing.  He has noted that weather changes affect his breathing. He is very limited with his ability to do activities at home and notes that he gets winded just making a sandwich.  He is using Advair, Spiriva, albuterol about 3 times per day.  He has tried using the DuoNebs but feels that this does not help.    He denies GERD or allergies.  He continues to live with his father and he is not smoking.           Review of Systems:     A 13 point ROS was performed and was negative except as listed above in the HPI.  Weight is increasing.  No orthopnea/PND.           Past Medical and Surgical History:     Past Medical History:   Diagnosis Date    Acute on chronic respiratory failure with hypoxia (H) 04/10/2020    Acute on chronic respiratory failure with hypoxia and hypercapnia (H) 04/01/2019    Acute respiratory failure with hypoxia and hypercapnia (H) 04/23/2024    CAP (community acquired pneumonia) 04/14/2020    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)     COPD exacerbation (H) 04/01/2019    COPD exacerbation (H) 02/16/2020    Erectile dysfunction     Hypertension     Hyponatremia 04/01/2019    Pneumonia 04/10/2020     Past Surgical History:   Procedure Laterality Date    APPENDECTOMY      childhood    BRONCHOSCOPY, DILATE BRONCHUS, STENT BRONCHUS, COMBINED N/A 1/18/2024    Procedure: Bronchoscopy with tissue debulking,  and stent placement.;  Surgeon: Isac Prescott MD;  Location: [facility] OR    ENDOBRONCHIAL ULTRASOUND FLEXIBLE N/A 1/18/2024    Procedure: Endobronchial ultrasound with Endobronchial and Cryo biopsy.;  Surgeon: Isac Prescott MD;  Location: [facility] OR           Family History:     Family History   Problem Relation Age of Onset    Hypertension Mother     Ovarian Cancer Mother      Breast Cancer Mother     Hypertension Father     Lipids Father     C.A.D. Father     Heart Disease Father     Chronic Obstructive Pulmonary Disease Father     Cerebrovascular Disease Father     Diabetes Paternal Grandmother     Chronic Obstructive Pulmonary Disease Paternal Grandfather             Social History:     Social History     Socioeconomic History    Marital status: Single     Spouse name: Not on file    Number of children: Not on file    Years of education: Not on file    Highest education level: Not on file   Occupational History    Not on file   Tobacco Use    Smoking status: Former     Current packs/day: 0.00     Average packs/day: 2.0 packs/day for 30.0 years (60.0 ttl pk-yrs)     Types: Cigarettes     Start date: 08/1991     Quit date: 08/2021     Years since quitting: 3.2    Smokeless tobacco: Former   Vaping Use    Vaping status: Never Used   Substance and Sexual Activity    Alcohol use: Yes     Comment: rare    Drug use: No    Sexual activity: Yes     Partners: Female   Other Topics Concern    Parent/sibling w/ CABG, MI or angioplasty before 65F 55M? No   Social History Narrative    The patient has a 60+ pk yr tobacco hx.  He has has no active use, quit Jan 2014.  Alcohol use is <1 alcoholic drinks per week.  He denies use of recreational drugs.  He is employed. [employer].  Works nights.   The patient is .  Has 1 child.Hot Tub Exposure: NORecent Travel: NO Hx of incarceration:  NOBird Exposure:   NOAnimal Exposure:  NOInhalation Exposure:  NO        Lives in [place], [place] with father. 2 dogs.          Social Drivers of Health     Financial Resource Strain: Low Risk  (1/9/2024)    Financial Resource Strain     Within the past 12 months, have you or your family members you live with been unable to get utilities (heat, electricity) when it was really needed?: No   Food Insecurity: Low Risk  (1/9/2024)    Food Insecurity     Within the past 12 months, did you worry that your food would  run out before you got money to buy more?: No     Within the past 12 months, did the food you bought just not last and you didn t have money to get more?: No   Transportation Needs: Low Risk  (1/9/2024)    Transportation Needs     Within the past 12 months, has lack of transportation kept you from medical appointments, getting your medicines, non-medical meetings or appointments, work, or from getting things that you need?: No   Physical Activity: Inactive (8/30/2021)    Exercise Vital Sign     Days of Exercise per Week: 0 days     Minutes of Exercise per Session: 0 min   Stress: Not on file   Social Connections: Unknown (8/30/2021)    Social Connection and Isolation Panel [NHANES]     Frequency of Communication with Friends and Family: Twice a week     Frequency of Social Gatherings with Friends and Family: Twice a week     Attends Baptism Services: Never     Active Member of Clubs or Organizations: No     Attends Club or Organization Meetings: Never     Marital Status: Not on file   Interpersonal Safety: Low Risk  (10/12/2023)    Interpersonal Safety     Do you feel physically and emotionally safe where you currently live?: Yes     Within the past 12 months, have you been hit, slapped, kicked or otherwise physically hurt by someone?: No     Within the past 12 months, have you been humiliated or emotionally abused in other ways by your partner or ex-partner?: No   Housing Stability: Low Risk  (1/9/2024)    Housing Stability     Do you have housing? : Yes     Are you worried about losing your housing?: No            Medications:     Current Outpatient Medications   Medication Sig Dispense Refill    albuterol (PROAIR HFA/PROVENTIL HFA/VENTOLIN HFA) 108 (90 Base) MCG/ACT inhaler Inhale 2 puffs into the lungs every 4 hours as needed for shortness of breath 54 g 3    albuterol (PROVENTIL) (2.5 MG/3ML) 0.083% neb solution Take 1 vial (2.5 mg) by nebulization every 6 hours as needed for shortness of breath, wheezing or  cough. 360 mL 0    amLODIPine (NORVASC) 10 MG tablet Take 1 tablet (10 mg) by mouth daily 90 tablet 3    fluticasone-salmeterol (ADVAIR) 500-50 MCG/ACT inhaler Inhale 1 puff into the lungs every 12 hours 60 each 4    guaiFENesin (MUCINEX MAXIMUM STRENGTH) 1200 MG TB12 Take 1 tablet by mouth daily      lisinopril (ZESTRIL) 40 MG tablet Take 1 tablet (40 mg) by mouth daily 90 tablet 2    magnesium oxide (MAG-OX) 400 MG tablet Take 400 mg by mouth daily (with lunch) For leg cramps      metoprolol tartrate (LOPRESSOR) 25 MG tablet Take 1 tablet (25 mg) by mouth 2 times daily 180 tablet 3    order for DME Equipment being ordered: Oxygen 3L via NC continuous.  O2 saturated noted to be 86% on room air at rest. 1 Units 0    order for DME Equipment being ordered: Nebulizer 1 Device 0    predniSONE (DELTASONE) 10 MG tablet Take 1 tablet (10 mg) by mouth daily. 90 tablet 0    roflumilast (DALIRESP) 500 MCG TABS tablet Take 1 tablet (500 mcg) by mouth daily. 90 tablet 0    tiotropium (SPIRIVA RESPIMAT) 2.5 MCG/ACT inhaler Inhale 2 puffs into the lungs daily 12 g 4     No current facility-administered medications for this visit.            Physical Exam:   BP (!) 148/97 (BP Location: Right arm, Patient Position: Sitting, Cuff Size: Adult Regular)   Pulse 92   Temp 97.8  F (36.6  C) (Tympanic)   SpO2 96%     Constitutional:   Awake, alert and in no apparent distress, pleasant male, on supplemental oxygen, thin male, moon facies      Eyes:   nonicteric     HEENT:   Supple without supraclavicular or cervical lymphadenopathy     Lungs:   Reduced air flow b/l and in bases.  Prolonged expiratory phase.  No crackles. No rhonchi.  No wheezes. Speaking in full sentences.      Cardiovascular:   Normal S1 and S2.  RRR.  No murmur, gallop or rub.     Musculoskeletal:   No edema, digital clubbing present     Neurologic:   Alert and conversant.     Skin:   Warm, dry.  No rash on limited exam.  No lower extremity edema.             Data:    All laboratory and imaging data reviewed personally.      Specimen Description   Date Value Ref Range Status   04/13/2020 Urine  Final   04/10/2020 Sputum  Final   04/10/2020 Sputum  Final    Culture Micro   Date Value Ref Range Status   04/10/2020 Moderate growth  Normal latrice    Final   04/10/2020 Moderate growth  Escherichia coli   (A)  Final   04/10/2020 Moderate growth  Klebsiella pneumoniae   (A)  Final        No results found for this or any previous visit (from the past week).    PFT: Very severe obstructive lung disease. Air trapping and hyperinflation are noted. No significant bronchodilator response is noted. Diffusion capacity is severely reduced. Very reduced compared to prior studies noted in Dr. Wagner's note in 2016.     PET - 8/1/24 -   PET/CT FINDINGS: Resolution of the left greater than right lower lobe inflammatory/infectious processes with development of additional scattered nodular opacities in the left upper, left lower, and right lower lobes, which are likely inflammatory in   nature. Degenerative shoulder synovitis. Reflux esophagitis. The remaining FDG uptake is physiologic from the skull vertex to mid thigh.     CT FINDINGS: No acute intracranial abnormality. Postoperative change of bilateral lenses. Mild carotid artery bifurcation calcification. Severe emphysema. Right upper lobe lung granuloma. Moderate coronary artery calcium. Fat-containing umbilical hernia.   Sigmoid diverticulosis. Mild multilevel degenerative changes in spine.      CT chest - 8/24/21 - personally reviewed: panlobular emphysema predominantly in the upper lobes bilaterally. No infiltrates. Some evidence of mucous impaction on the RLL. Small nodules as detailed below.     IMPRESSION:  1.  No evidence for pulmonary embolism.  2.  New finding of prominent mucus impaction leading to the right  lower lobe bronchus and small airways of the right lower lobe. This is  consistent with aspiration.  3.  New curvilinear opacity  along the anterior left upper lobe  midchest could just be focal atelectasis but acute airspace disease  including pneumonia not excluded. New indeterminant pulmonary nodule  at the left lower lobe. Recommend follow-up CT chest in 6 months.  There are other stable nodules.  4.  Coronary artery calcifications.  5.  Emphysema.    CTPE - 4/24/22 -   Personally reviewed: apical predominant emphysema. Secretions/debris in the bilateral mainstem bronchi. New 9 mm spiculated nodule L major fissure.   IMPRESSION:   1. Exam is negative for acute pulmonary embolism.        Evidence for right heart strain or increased right heart pressures?   is not present.      2. New 9 mm spiculated solid nodule along the left major fissure,  consider follow-up low-dose chest CT in 3 months, PET/CT, or tissue  sampling per Fleischner's Society guidelines.     3. Debris/retained secretions within the bilateral mainstem bronchi,  and extending into the right lower lobe bronchi, concerning for  recurrent aspiration.     CT chest- 7/6/2022-personally reviewed and I agree with the radiologist read of:  IMPRESSION:   1.  Interval decrease in size of previously seen new 9 mm spiculated  pulmonary nodule along the left major fissure, now measuring 3 mm.  2.  New scarlike nodule with a mean diameter of 5 mm in the lateral  left upper lobe.  3.  Unchanged 4 mm right mid lung pulmonary nodule.  4.  Severe pulmonary emphysema and bilateral scarring and/or  atelectasis.  5.  No new or progressive airspace consolidation or pleural fluid.  6.  Indeterminate enlarged right hilar lymph node, new to comparison.    CT chest w/ contrast - 3/9/23 - personally reviewed: CT CHEST WITH CONTRAST  3/9/2023 9:40 AM     CLINICAL HISTORY: Severe emphysema, evaluate nodules for change.  Evaluate right lower lobe bronchus filling defect. Pulmonary nodules.  Panlobular emphysema (H).     TECHNIQUE: CT chest with IV contrast. Multiplanar reformats were  obtained. Dose  reduction techniques were used.     CONTRAST: 58 mL Isovue 370     COMPARISON: 1/20/2023, 7/6/2022, 4/24/2022     FINDINGS:   LUNGS AND PLEURA: Advanced emphysematous changes of the lungs are  present. Thin bandlike pleuroparenchymal scarring in the anterior left  upper lobe is stable. Similar-appearing atelectasis and/or scarring in  the inferomedial right middle lobe is unchanged. No airspace  consolidation or pleural fluid. Bronchial wall thickening and  endobronchial debris involving the right mainstem bronchus and  extending to the bronchus intermedius as well as segmental and  subsegmental branches to the right lower lobe are noted.     Benign densely calcified clustered pulmonary granulomas are seen in  the superior segment of the right lower lobe. Previously described 6  mm scarlike nodule in the right upper lobe is similar to comparison  measuring 5 mm (4-79). Previously described linear 7 mm pulmonary  nodule in the posterolateral right upper lobe now measures 6 mm  (4-110). A previously seen 4 mm noncalcified pulmonary nodule in the  lateral right mid lung is unchanged measuring 4 mm (4-152). A  suspected fissural lymph node in the lateral right midlung is stable  as well (4-193). No new or enlarging pulmonary nodules.      MEDIASTINUM/AXILLAE: There is soft tissue density in the subcarinal  region and right hilum, which are thought to represent mildly enlarged  lymph nodes. Subcarinal lymph node measures 16 mm in short axis  (2-33). Right hilar lymph node measures 10 mm in short axis. Heart  size is normal. No pericardial effusion. Mild to moderate coronary  artery atherosclerosis is present. Thoracic aorta is normal in course  and caliber. Mild thoracic aortic atherosclerosis is present.     UPPER ABDOMEN: Bilateral renal cortical thinning and scarring.  Nonobstructing 2-3 mm left lower pole intrarenal calculi. Tiny  suspected medial left upper pole renal cortical cyst. No follow-up is  necessary. No  hydronephrosis.     MUSCULOSKELETAL: Mild degenerative changes of the spine. No acute  osseous abnormality. Chronic-appearing rib fractures.                                                                      IMPRESSION:   1.  Bronchial wall thickening and endobronchial debris involving the  right mainstem bronchus, bronchus intermedius, and segmental and  subsegmental branches to the right lower lobe. Consider further  evaluation with bronchoscopy if clinically indicated.  2.  Nonspecific mildly enlarged subcarinal and right hilar lymph  nodes.  3.  Severe pulmonary emphysema.  4.  Unchanged subcentimeter pulmonary nodules. No new or enlarging  pulmonary nodules.  5.  Nonacute findings as above.    CT chest - 9/14 /23 - personally reviewed and I agree with the radiologist's read of: FINDINGS:   LUNGS AND PLEURA: Stable 4 mm nodule in the lateral aspect of the  right upper lobe on image 173 of series 5.  There is a new spiculated nodule in the base of the right upper lobe  along the right minor fissure measuring 16 x 13 mm on image 187 of  series 5. This is very flat along the minor fissure on the coronal  view, image 23 of series 7. This may represent atelectasis or  scarring. Short-term follow-up or PET scan is recommended.     Stable irregular bronchial wall thickening with some likely luminal  mucus plugging involving the bronchus intermedius and right lower lobe  bronchi. Stable moderate background emphysema. No infiltrate or  effusion.  Nodules in the right upper lobe have resolved.     MEDIASTINUM/AXILLAE: Abnormal soft tissue mass in the subcarinal  region has increased since the previous exam and likely represents  adenopathy. It now measures 18 mm in short axis on image 82 compared  to 15 mm on the previous exam. Borderline enlarged right hilar lymph  node is seen on image 85 measuring 10 mm. Trace pericardial effusion  is present. No axillary adenopathy. The aorta is normal in caliber.     CORONARY  ARTERY CALCIFICATION: Severe.     UPPER ABDOMEN: No significant finding.     MUSCULOSKELETAL: Mild degenerative changes are noted throughout the  spine.                                                                      IMPRESSION:   1.  New spiculated appearing nodular opacity along the right minor  fissure is very thin on coronal views and likely represents some  atelectasis or an inflammatory process. Short-term follow-up in one to  two months or PET scan would be recommended.  2.  Stable irregular and lobulated bronchial wall thickening with  intraluminal likely mucous plugging involving the bronchus intermedius  and right lower lobe bronchi is unchanged from the previous exam.  Bronchoscopy should be considered if it has not been performed  already.  3.  Interval increase in size of likely adenopathy in the subcarinal  region. Stable borderline enlarged right hilar lymph node.  4.  Stable background emphysema.  5.  Stable 4 mm right lower lobe nodule. Other nodules in the right  upper lung have resolved. If the patient is high risk, follow-up would  be recommended in one year.

## 2024-10-31 NOTE — PATIENT INSTRUCTIONS
"Prasad,    It was nice seeing you!  I am glad to hear you are doing well.     Recommendations as follows:   - continue advair, duonebs as needed  - continue aerobika  - continue to hold the daliresp indefinitely; this has been discontinued from your medication list  - start azithromycin 250 mg once daily   - continue saline nebs twice a day for stent patency; I'll reach out to the interventional pulm team to see what they want to do with the stent (leave in place vs. Consider removal)  - I will reach out to your oncology team regarding timing of immunizations, you are due for covid booster, influenza vaccine, and RSV  - goal SpO2 is 90%   - Please call the pulmonary clinic with any questions. The pulmonary clinic number is: [phone], press 2 for \"specialty clinic\" and follow the prompt.     Stay up to date with vaccinations including your yearly flu vaccine. Wash your hands regularly. Consider wearing a mask in crowded places to reduce the risk of respiratory illnesses. I recommend that you receive the COVID-19 vaccine and stay up to date with boosters.     Have a nice fall/winter and stay well! Please let me know if you have questions or concerns.     Yamilet Cuellar MD  Pulmonary, Allergy, Critical Care, and Sleep Medicine   [facility]        "

## 2024-10-31 NOTE — NURSING NOTE
"Initial BP (!) 148/97 (BP Location: Right arm, Patient Position: Sitting, Cuff Size: Adult Regular)   Pulse 92   Temp 97.8  F (36.6  C) (Tympanic)   SpO2 96%  Estimated body mass index is 20.94 kg/m  as calculated from the following:    Height as of 10/24/24: 1.626 m (5' 4\").    Weight as of 10/24/24: 55.3 kg (122 lb). .  Naomi Vogel MA on 10/31/2024 at 9:11 AM    "

## 2024-10-31 NOTE — PROGRESS NOTES
IP note:     BI Aero stent placed in January 2024. Lost to follow-up since then. Received chemotherapy for his SCC. He is now in clinical remission but the stent remains in place and he has frequent copd exacerbations. Will schedule in OR for stent revision.  Discussed with Dr. Prescott.

## 2024-11-07 ENCOUNTER — OFFICE VISIT (OUTPATIENT)
Dept: FAMILY MEDICINE | Facility: CLINIC | Age: 59
End: 2024-11-07
Payer: COMMERCIAL

## 2024-11-07 VITALS
SYSTOLIC BLOOD PRESSURE: 136 MMHG | WEIGHT: 121.4 LBS | TEMPERATURE: 97.6 F | HEART RATE: 81 BPM | RESPIRATION RATE: 20 BRPM | HEIGHT: 65 IN | DIASTOLIC BLOOD PRESSURE: 92 MMHG | BODY MASS INDEX: 20.23 KG/M2 | OXYGEN SATURATION: 96 %

## 2024-11-07 DIAGNOSIS — J44.9 COPD, VERY SEVERE (H): ICD-10-CM

## 2024-11-07 DIAGNOSIS — I10 ESSENTIAL HYPERTENSION, BENIGN: ICD-10-CM

## 2024-11-07 DIAGNOSIS — Z01.818 PREOP GENERAL PHYSICAL EXAM: Primary | ICD-10-CM

## 2024-11-07 DIAGNOSIS — C34.91 SQUAMOUS CELL LUNG CANCER, RIGHT (H): ICD-10-CM

## 2024-11-07 PROCEDURE — 99214 OFFICE O/P EST MOD 30 MIN: CPT | Performed by: NURSE PRACTITIONER

## 2024-11-07 ASSESSMENT — PAIN SCALES - GENERAL: PAINLEVEL_OUTOF10: NO PAIN (0)

## 2024-11-07 NOTE — PATIENT INSTRUCTIONS
Hold lisinopril the morning of surgery.  No Aspirin for one week prior to surgery.  No Naproxen (Aleve) for 3 days prior to surgery.  No ibuprofen (Motrin) for 1 day prior to surgery.    For pain, it is fine to use acetaminophen (Tylenol).    Tylenol (Acetominophen) Discharge Instructions:  You may take 2 tablets of regular strength, over-the-counter, Tylenol (acetaminophen) every 4-6 hours as needed for pain.  Take no more than 4000 mg of Tylenol in a 24-hour period.      Avoid taking more than 1 acetaminophen-containing product at a time and be aware that many over-the-counter medications contain a combination of acetaminophen and other products.  If you are taking Tylenol in addition to a combination product please keep track of your daily acetaminophen dose to make sure you do not exceed the recommended 4000 mg.  Taking too much acetaminophen can cause permanent damage to your liver.

## 2024-11-07 NOTE — PROGRESS NOTES
Preoperative Evaluation  [facility]  [address] [place] [address]  Phone: [phone]  Primary Provider: Shalini Washington NP  Pre-op Performing Provider: CHITO Naqvi CNP  Nov 7, 2024 11/7/2024   Surgical Information   What procedure is being done? bronchoscopy    Facility or Hospital where procedure/surgery will be performed: [facility]    Who is doing the procedure / surgery? Dr Roque    Date of surgery / procedure: 11/15/24    Time of surgery / procedure: 7:30    Where do you plan to recover after surgery? at home with family        Patient-reported     Fax number for surgical facility: Note does not need to be faxed, will be available electronically in Epic.    Assessment & Plan     The proposed surgical procedure is considered INTERMEDIATE risk.    Preop general physical exam  On 1.5L O2 at baseline.     Squamous cell lung cancer, right (H)  Planned procedure for tumor debulking, stent revision.    COPD, very severe (H)  Current regimen of Advair, Spiriva, albuterol, aerobika, prednisone and daily azithromycin.     Essential hypertension, benign  Controlled.            - No identified additional risk factors other than previously addressed    Antiplatelet or Anticoagulation Medication Instructions   - Patient is on no antiplatelet or anticoagulation medications.    Additional Medication Instructions   - ACE/ARB: Continue without modification (e.g., MAC anesthesia, neurosurgery, spine surgery, heart failure, or labile hypertension with risk of hypertension).   - Beta Blockers: Continue taking on the day of surgery.   - Calcium Channel Blockers: May be continued on the day of surgery.   - LABA, inhaled corticosteroid, long-acting anticholinergics: Continue without modification.   - rescue Inhaler: Continue PRN. Bring to hospital on the day of surgery.   - Intraoperative stress dose steroids may be indicated due to chronic steroid use in the last 3  months (e.g. > 3 weeks of prednisone 20 mg or daily prednisone 5 mg).    Recommendation  Approval given to proceed with proposed procedure, without further diagnostic evaluation.    Darell Parham is a 59 year old, presenting for the following:  Pre-Op Exam (DOS 11/15/24)          11/7/2024     7:54 AM   Additional Questions   Roomed by Connie Hill         11/7/2024   Forms   Any forms needing to be completed Yes          11/7/2024     7:54 AM   Patient Reported Additional Medications   Patient reports taking the following new medications none     HPI related to upcoming procedure: history of lung cancer        11/7/2024   Pre-Op Questionnaire   Have you ever had a heart attack or stroke? No    Have you ever had surgery on your heart or blood vessels, such as a stent placement, a coronary artery bypass, or surgery on an artery in your head, neck, heart, or legs? (!) YES pulmonary stent    Do you have chest pain with activity? No    Do you have a history of heart failure? No    Do you currently have a cold, bronchitis or symptoms of other infection? No    Do you have a cough, shortness of breath, or wheezing? No    Do you or anyone in your family have previous history of blood clots? No    Do you or does anyone in your family have a serious bleeding problem such as prolonged bleeding following surgeries or cuts? No    Have you ever had problems with anemia or been told to take iron pills? No    Have you had any abnormal blood loss such as black, tarry or bloody stools? No    Have you ever had a blood transfusion? No    Are you willing to have a blood transfusion if it is medically needed before, during, or after your surgery? Yes    Have you or any of your relatives ever had problems with anesthesia? No    Do you have sleep apnea, excessive snoring or daytime drowsiness? No    Do you have any artifical heart valves or other implanted medical devices like a pacemaker, defibrillator, or continuous glucose monitor?  No    Do you have artificial joints? No    Are you allergic to latex? No        Patient-reported     Health Care Directive  Patient does not have a Health Care Directive:     Preoperative Review of    reviewed - controlled substances reflected in medication list.      Status of Chronic Conditions:  COPD - Patient has a longstanding history of moderate-severe COPD . Patient has been doing well overall noting SOB, COUGH, and WHEEZING and continues on medication regimen consisting of inhalers, nebs, aerobika without adverse reactions or side effects.    HYPERTENSION - Patient has longstanding history of HTN , currently denies any symptoms referable to elevated blood pressure. Specifically denies chest pain, palpitations, dyspnea, orthopnea, PND or peripheral edema. Blood pressure readings have been in normal range. Current medication regimen is as listed below. Patient denies any side effects of medication.     Patient Active Problem List    Diagnosis Date Noted    Encounter for long-term (current) use of high-risk medication 07/18/2024     Priority: Medium    High risk medication use 05/09/2024     Priority: Medium    Full code status 04/23/2024     Priority: Medium    Pneumonia of both lower lobes due to infectious organism 04/23/2024     Priority: Medium    Encounter for antineoplastic chemotherapy 02/26/2024     Priority: Medium    Squamous cell lung cancer, right (H) 02/20/2024     Priority: Medium    COPD with acute exacerbation (H) 01/07/2024     Priority: Medium    Acute on chronic respiratory failure with hypoxia and hypercapnia (H) 11/06/2023     Priority: Medium    Anemia, unspecified type 05/07/2021     Priority: Medium    Peripheral edema 02/16/2020     Priority: Medium    Pulmonary hypertension (H) 02/16/2020     Priority: Medium    Tobacco abuse, 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   Stable 01/2014 to 07/2014, 6 month follow scan recommended.   Chest CT of 1/15/2015= stable nodule, f/u one year.      CARDIOVASCULAR SCREENING; LDL GOAL LESS THAN 130 10/31/2010     Priority: Medium    COPD, very severe (H) 10/25/2010     Priority: Medium     Severe to very severe      Eczema 10/04/2010     Priority: Medium    ED (erectile dysfunction) 04/20/2009     Priority: Medium    Essential hypertension, benign 10/15/2007     Priority: Medium      Past Medical History:   Diagnosis Date    Acute on chronic respiratory failure with hypoxia (H) 04/10/2020    Acute on chronic respiratory failure with hypoxia and hypercapnia (H) 04/01/2019    Acute respiratory failure with hypoxia and hypercapnia (H) 04/23/2024    CAP (community acquired pneumonia) 04/14/2020    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)     COPD exacerbation (H) 04/01/2019    COPD exacerbation (H) 02/16/2020    Erectile dysfunction     Hypertension     Hyponatremia 04/01/2019    Pneumonia 04/10/2020     Past Surgical History:   Procedure Laterality Date    APPENDECTOMY      childhood    BRONCHOSCOPY, DILATE BRONCHUS, STENT BRONCHUS, COMBINED N/A 1/18/2024    Procedure: Bronchoscopy with tissue debulking,  and stent placement.;  Surgeon: Isac Prescott MD;  Location: [facility] OR    ENDOBRONCHIAL ULTRASOUND FLEXIBLE N/A 1/18/2024    Procedure: Endobronchial ultrasound with Endobronchial and Cryo biopsy.;  Surgeon: Isac Prescott MD;  Location: [facility] OR     Current Outpatient Medications   Medication Sig Dispense Refill    albuterol (PROAIR HFA/PROVENTIL HFA/VENTOLIN HFA) 108 (90 Base) MCG/ACT inhaler Inhale 2 puffs into the lungs every 4 hours as needed for shortness of breath 54 g 3    albuterol (PROVENTIL) (2.5 MG/3ML) 0.083% neb solution Take 1 vial (2.5 mg) by nebulization every 4 hours as needed for shortness of breath, wheezing or cough. 360 mL 5    amLODIPine (NORVASC) 10  MG tablet Take 1 tablet (10 mg) by mouth daily 90 tablet 3    azithromycin (ZITHROMAX) 250 MG tablet Take 1 tablet (250 mg) by mouth daily. 30 tablet 11    fluticasone-salmeterol (ADVAIR) 500-50 MCG/ACT inhaler Inhale 1 puff into the lungs every 12 hours. 60 each 11    lisinopril (ZESTRIL) 40 MG tablet Take 1 tablet (40 mg) by mouth daily 90 tablet 2    magnesium oxide (MAG-OX) 400 MG tablet Take 400 mg by mouth daily (with lunch) For leg cramps      metoprolol tartrate (LOPRESSOR) 25 MG tablet Take 1 tablet (25 mg) by mouth 2 times daily 180 tablet 3    predniSONE (DELTASONE) 10 MG tablet Take 1 tablet (10 mg) by mouth daily. 90 tablet 0    tiotropium (SPIRIVA RESPIMAT) 2.5 MCG/ACT inhaler Inhale 2 puffs into the lungs daily 12 g 4    order for DME Equipment being ordered: Oxygen 3L via NC continuous.  O2 saturated noted to be 86% on room air at rest. 1 Units 0    order for DME Equipment being ordered: Nebulizer 1 Device 0       Allergies   Allergen Reactions    Hctz Other (See Comments)     He has hyponatremia - avoid use    Penicillins Unknown     Childhood reaction.        Social History     Tobacco Use    Smoking status: Former     Current packs/day: 0.00     Average packs/day: 2.0 packs/day for 30.0 years (60.0 ttl pk-yrs)     Types: Cigarettes     Start date: 08/1991     Quit date: 08/2021     Years since quitting: 3.2    Smokeless tobacco: Former   Substance Use Topics    Alcohol use: Yes     Comment: rare     Family History   Problem Relation Age of Onset    Hypertension Mother     Ovarian Cancer Mother     Breast Cancer Mother     Hypertension Father     Lipids Father     C.A.D. Father     Heart Disease Father     Chronic Obstructive Pulmonary Disease Father     Cerebrovascular Disease Father     Diabetes Paternal Grandmother     Chronic Obstructive Pulmonary Disease Paternal Grandfather      History   Drug Use No             Review of Systems  Constitutional, neuro, ENT, endocrine, pulmonary, cardiac,  "gastrointestinal, genitourinary, musculoskeletal, integument and psychiatric systems are negative, except as otherwise noted.    Objective    BP (!) 136/92   Pulse 81   Temp 97.6  F (36.4  C) (Tympanic)   Resp 20   Ht 1.651 m (5' 5\")   Wt 55.1 kg (121 lb 6.4 oz)   SpO2 96%   BMI 20.20 kg/m     Estimated body mass index is 20.2 kg/m  as calculated from the following:    Height as of this encounter: 1.651 m (5' 5\").    Weight as of this encounter: 55.1 kg (121 lb 6.4 oz).  Physical Exam  GENERAL: alert and no distress  EYES: Eyes grossly normal to inspection, PERRL and conjunctivae and sclerae normal  HENT: ear canals and TM's normal, nose and mouth without ulcers or lesions  NECK: no adenopathy, no asymmetry, masses, or scars  RESP: lungs clear to auscultation - no rales, rhonchi or wheezes, expiratory wheezes bilateral and throughout, and decreased breath sounds bilateral  CV: regular rate and rhythm, normal S1 S2, no S3 or S4, no murmur, click or rub, no peripheral edema  ABDOMEN: soft, nontender, no hepatosplenomegaly, no masses and bowel sounds normal  MS: no gross musculoskeletal defects noted, no edema  SKIN: no suspicious lesions or rashes  NEURO: Normal strength and tone, mentation intact and speech normal  PSYCH: mentation appears normal, affect normal/bright    Recent Labs   Lab Test 10/24/24  0843 10/07/24  0741 01/08/24  0522 01/07/24  0949   HGB 12.0* 12.6*   < > 12.1*    359   < > 389   INR  --   --   --  0.94    123*   < > 128*   POTASSIUM 4.6 4.7   < > 4.5   CR 0.75 0.54*   < > 0.70    < > = values in this interval not displayed.        Diagnostics  No labs were ordered during this visit.   No EKG this visit, completed in the last 90 days.    Revised Cardiac Risk Index (RCRI)  The patient has the following serious cardiovascular risks for perioperative complications:   - No serious cardiac risks = 0 points     RCRI Interpretation: 0 points: Class I (very low risk - 0.4% " complication rate)         Signed Electronically by: CHITO Naqvi CNP  A copy of this evaluation report is provided to the requesting physician.

## 2024-11-14 ENCOUNTER — ANESTHESIA EVENT (OUTPATIENT)
Dept: SURGERY | Facility: CLINIC | Age: 59
End: 2024-11-14
Payer: COMMERCIAL

## 2024-11-14 RX ORDER — ACETAMINOPHEN 325 MG/1
975 TABLET ORAL ONCE
Status: CANCELLED | OUTPATIENT
Start: 2024-11-14 | End: 2024-11-14

## 2024-11-14 ASSESSMENT — COPD QUESTIONNAIRES
CAT_SEVERITY: MODERATE
COPD: 1

## 2024-11-14 ASSESSMENT — LIFESTYLE VARIABLES: TOBACCO_USE: 1

## 2024-11-14 NOTE — ANESTHESIA PREPROCEDURE EVALUATION
Anesthesia Pre-Procedure Evaluation    Patient: Luis Hernandez   MRN: [id number] : 1965        Procedure : Procedure(s):  BRONCHOSCOPY, RIGID, tissue debulking, stent revision.          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)     COPD exacerbation (H) 2019    COPD exacerbation (H) 2020    Erectile dysfunction     Hypertension     Hyponatremia 2019    Pneumonia 04/10/2020      Past Surgical History:   Procedure Laterality Date    APPENDECTOMY      childhood    BRONCHOSCOPY, DILATE BRONCHUS, STENT BRONCHUS, COMBINED N/A 2024    Procedure: Bronchoscopy with tissue debulking,  and stent placement.;  Surgeon: Isac Prescott MD;  Location: [facility] OR    ENDOBRONCHIAL ULTRASOUND FLEXIBLE N/A 2024    Procedure: Endobronchial ultrasound with Endobronchial and Cryo biopsy.;  Surgeon: Isac Prescott MD;  Location: [facility] OR      Allergies   Allergen Reactions    Hctz Other (See Comments)     He has hyponatremia - avoid use    Penicillins Unknown     Childhood reaction.      Social History     Tobacco Use    Smoking status: Former     Current packs/day: 0.00     Average packs/day: 2.0 packs/day for 30.0 years (60.0 ttl pk-yrs)     Types: Cigarettes     Start date: 1991     Quit date: 2021     Years since quitting: 3.2    Smokeless tobacco: Former   Substance Use Topics    Alcohol use: Yes     Comment: rare      Wt Readings from Last 1 Encounters:   24 55.1 kg (121 lb 6.4 oz)        Anesthesia Evaluation   Pt has had prior anesthetic. Type: General.    No history of anesthetic complications       ROS/MED HX  ENT/Pulmonary:     (+)                tobacco  use, Past use,        moderate,  COPD, O2 dependent, during Both, 1.5-2 liters/min, recent URI (Recent COPD exacerbation with hospitalization 10/7/24),  Baseline SOB, COUGH, and WHEEZING:        Neurologic:  - neg neurologic ROS   (+)    no peripheral neuropathy                         (-) no CVA and Delerium   Cardiovascular:     (+)  hypertension-range: 136/92/ -   -  - -                                pulmonary hypertension, Previous cardiac testing   Echo: Date: 4/23/24 Results:  1. The left ventricle is normal in size. Proximal septal thickening is noted.  Hyperdynamic left ventricular function. The visual ejection fraction is 65-  70%. No regional wall motion abnormalities noted.  2. The right ventricle is normal size. The right ventricular systolic function  is normal.  3. There is moderate (2+) tricuspid regurgitation. Right ventricular systolic  pressure is elevated, consistent with severe pulmonary hypertension.  4. No pericardial effusion.  5. In comparison to the previous report dated 04/01/2019, the findings are  similar.  Left Ventricle  The left ventricle is normal in size. Proximal septal thickening is noted.  Hyperdynamic left ventricular function. The visual ejection fraction is 65-  70%. No regional wall motion abnormalities noted.     Right Ventricle  The right ventricle is normal size. The right ventricular systolic function is  normal.     Atria  Normal left atrial size. Right atrial size is normal. There is no color  Doppler evidence of an atrial shunt.     Mitral Valve  There is trace mitral regurgitation.     Tricuspid Valve  There is moderate (2+) tricuspid regurgitation. The right ventricular systolic  pressure is approximated at 53.3 mmHg plus the right atrial pressure. IVC  diameter <2.1 cm collapsing >50% with sniff suggests a normal RA pressure of 3  mmHg. Right ventricular systolic pressure is elevated, consistent with severe  pulmonary hypertension.     Aortic Valve  The aortic valve  is trileaflet. No aortic regurgitation is present. No aortic  stenosis is present.     Pulmonic Valve  There is no pulmonic valvular stenosis.     Vessels  The inferior vena cava is normal.     Pericardium  There is no pericardial effusion.     Rhythm  The rhythm was sinus tachycardia.    Stress Test:  Date: Results:    ECG Reviewed:  Date: 10/7/24 Results:  10/7/24 NSR. Low voltage in limb leads  Cath:  Date: Results:      METS/Exercise Tolerance:     Hematologic:    (-) anemia   Musculoskeletal:  - neg musculoskeletal ROS     GI/Hepatic:  - neg GI/hepatic ROS  (-) GERD and liver disease   Renal/Genitourinary:       Endo: Comment: Hyponatremia 2/2 presumed SIADH   (-) Type II DM and obesity   Psychiatric/Substance Use:  - neg psychiatric ROS     Infectious Disease:       Malignancy: Comment: R lung SCC undergoing antineoplastic therapy w/ keytruda, carboplatin, taxol    (+) Malignancy, History of Lung.  Lung CA Active status post Chemo.      Other:            Physical Exam    Airway        Mallampati: II   TM distance: > 3 FB   Neck ROM: full   Mouth opening: > 3 cm    Respiratory Devices and Support         Dental       (+) Edentulous      Cardiovascular          Rhythm and rate: regular and normal     Pulmonary       Comment: Decreased BS bilat    (+) decreased breath sounds           OUTSIDE LABS:  CBC:   Lab Results   Component Value Date    WBC 18.0 (H) 10/24/2024    WBC 11.4 (H) 10/07/2024    HGB 12.0 (L) 10/24/2024    HGB 12.6 (L) 10/07/2024    HCT 39.3 (L) 10/24/2024    HCT 37.1 (L) 10/07/2024     10/24/2024     10/07/2024     BMP:   Lab Results   Component Value Date     10/24/2024     (L) 10/07/2024    POTASSIUM 4.6 10/24/2024    POTASSIUM 4.7 10/07/2024    CHLORIDE 99 10/24/2024    CHLORIDE 85 (L) 10/07/2024    CO2 33 (H) 10/24/2024    CO2 28 10/07/2024    BUN 26.9 (H) 10/24/2024    BUN 8.4 10/07/2024    CR 0.75 10/24/2024    CR 0.54 (L) 10/07/2024    GLC 89 10/24/2024    GLC 81  10/07/2024     COAGS:   Lab Results   Component Value Date    PTT 31 01/07/2014    INR 0.94 01/07/2024     POC:   Lab Results   Component Value Date     (H) 06/30/2021     HEPATIC:   Lab Results   Component Value Date    ALBUMIN 4.2 10/24/2024    PROTTOTAL 6.7 10/24/2024    ALT 16 10/24/2024    AST 17 10/24/2024    ALKPHOS 47 10/24/2024    BILITOTAL <0.2 10/24/2024     OTHER:   Lab Results   Component Value Date    PH 7.32 (L) 04/26/2022    LACT 1.4 04/23/2024    MIKE 9.1 10/24/2024    PHOS 3.4 05/01/2024    MAG 2.2 04/24/2022    LIPASE 210 04/22/2022    TSH 0.52 10/24/2024    T4 1.13 08/28/2024    CRP <2.9 04/24/2022       Anesthesia Plan    ASA Status:  3       Anesthesia Type: General.     - Airway: Mask Only   Induction: Intravenous.   Maintenance: TIVA.   Techniques and Equipment:     - Lines/Monitors: BIS     Consents    Anesthesia Plan(s) and associated risks, benefits, and realistic alternatives discussed. Questions answered and patient/representative(s) expressed understanding.     - Discussed: Risks, Benefits and Alternatives for BOTH SEDATION and the PROCEDURE were discussed     - Discussed with:  Patient      - Extended Intubation/Ventilatory Support Discussed: Yes.      - Patient is DNR/DNI Status: No     Use of blood products discussed: No .     Postoperative Care    Pain management: IV analgesics, Oral pain medications, Multi-modal analgesia.   PONV prophylaxis: Ondansetron (or other 5HT-3), Background Propofol Infusion, Dexamethasone or Solumedrol     Comments:    Other Comments: I discussed the risks and benefits of general anesthesia with the patient, specifically including increased risk of respiratory failure given severe COPD and chronic respiratory failure.  Questions were sought and answered.      Jer Casillas MD  Attending Anesthesiologist                 Frandy Parekh MD    I have reviewed the pertinent notes and labs in the chart from the past 30 days and (re)examined the  patient.  Any updates or changes from those notes are reflected in this note.               # Hypertension: Noted on problem list               # Financial/Environmental Concerns:

## 2024-11-15 ENCOUNTER — ANESTHESIA (OUTPATIENT)
Dept: SURGERY | Facility: CLINIC | Age: 59
End: 2024-11-15
Payer: COMMERCIAL

## 2024-11-15 ENCOUNTER — HOSPITAL ENCOUNTER (OUTPATIENT)
Facility: CLINIC | Age: 59
Discharge: HOME | End: 2024-11-15
Attending: INTERNAL MEDICINE | Admitting: INTERNAL MEDICINE
Payer: COMMERCIAL

## 2024-11-15 VITALS
HEART RATE: 82 BPM | WEIGHT: 122.14 LBS | BODY MASS INDEX: 20.85 KG/M2 | RESPIRATION RATE: 18 BRPM | TEMPERATURE: 98 F | HEIGHT: 64 IN | SYSTOLIC BLOOD PRESSURE: 132 MMHG | DIASTOLIC BLOOD PRESSURE: 85 MMHG | OXYGEN SATURATION: 96 %

## 2024-11-15 PROCEDURE — 250N000009 HC RX 250

## 2024-11-15 PROCEDURE — 710N000012 HC RECOVERY PHASE 2, PER MINUTE: Performed by: INTERNAL MEDICINE

## 2024-11-15 PROCEDURE — 250N000011 HC RX IP 250 OP 636

## 2024-11-15 PROCEDURE — 250N000009 HC RX 250: Performed by: ANESTHESIOLOGY

## 2024-11-15 PROCEDURE — 258N000003 HC RX IP 258 OP 636

## 2024-11-15 PROCEDURE — 710N000010 HC RECOVERY PHASE 1, LEVEL 2, PER MIN: Performed by: INTERNAL MEDICINE

## 2024-11-15 PROCEDURE — 370N000017 HC ANESTHESIA TECHNICAL FEE, PER MIN: Performed by: INTERNAL MEDICINE

## 2024-11-15 PROCEDURE — 999N000141 HC STATISTIC PRE-PROCEDURE NURSING ASSESSMENT: Performed by: INTERNAL MEDICINE

## 2024-11-15 PROCEDURE — 360N000083 HC SURGERY LEVEL 3 W/ FLUORO, PER MIN: Performed by: INTERNAL MEDICINE

## 2024-11-15 PROCEDURE — 94640 AIRWAY INHALATION TREATMENT: CPT

## 2024-11-15 PROCEDURE — 250N000009 HC RX 250: Performed by: STUDENT IN AN ORGANIZED HEALTH CARE EDUCATION/TRAINING PROGRAM

## 2024-11-15 RX ORDER — SODIUM CHLORIDE, SODIUM LACTATE, POTASSIUM CHLORIDE, CALCIUM CHLORIDE 600; 310; 30; 20 MG/100ML; MG/100ML; MG/100ML; MG/100ML
INJECTION, SOLUTION INTRAVENOUS CONTINUOUS
Status: DISCONTINUED | OUTPATIENT
Start: 2024-11-15 | End: 2024-11-15 | Stop reason: HOSPADM

## 2024-11-15 RX ORDER — ONDANSETRON 2 MG/ML
4 INJECTION INTRAMUSCULAR; INTRAVENOUS EVERY 30 MIN PRN
Status: DISCONTINUED | OUTPATIENT
Start: 2024-11-15 | End: 2024-11-15 | Stop reason: HOSPADM

## 2024-11-15 RX ORDER — FENTANYL CITRATE 50 UG/ML
50 INJECTION, SOLUTION INTRAMUSCULAR; INTRAVENOUS EVERY 5 MIN PRN
Status: DISCONTINUED | OUTPATIENT
Start: 2024-11-15 | End: 2024-11-15 | Stop reason: HOSPADM

## 2024-11-15 RX ORDER — LIDOCAINE HYDROCHLORIDE 20 MG/ML
INJECTION, SOLUTION INFILTRATION; PERINEURAL PRN
Status: DISCONTINUED | OUTPATIENT
Start: 2024-11-15 | End: 2024-11-15

## 2024-11-15 RX ORDER — NALOXONE HYDROCHLORIDE 0.4 MG/ML
0.1 INJECTION, SOLUTION INTRAMUSCULAR; INTRAVENOUS; SUBCUTANEOUS
Status: DISCONTINUED | OUTPATIENT
Start: 2024-11-15 | End: 2024-11-15 | Stop reason: HOSPADM

## 2024-11-15 RX ORDER — OXYCODONE HYDROCHLORIDE 10 MG/1
10 TABLET ORAL
Status: DISCONTINUED | OUTPATIENT
Start: 2024-11-15 | End: 2024-11-15 | Stop reason: HOSPADM

## 2024-11-15 RX ORDER — PROPOFOL 10 MG/ML
INJECTION, EMULSION INTRAVENOUS CONTINUOUS PRN
Status: DISCONTINUED | OUTPATIENT
Start: 2024-11-15 | End: 2024-11-15

## 2024-11-15 RX ORDER — HYDROMORPHONE HCL IN WATER/PF 6 MG/30 ML
0.4 PATIENT CONTROLLED ANALGESIA SYRINGE INTRAVENOUS EVERY 5 MIN PRN
Status: DISCONTINUED | OUTPATIENT
Start: 2024-11-15 | End: 2024-11-15 | Stop reason: HOSPADM

## 2024-11-15 RX ORDER — ONDANSETRON 4 MG/1
4 TABLET, ORALLY DISINTEGRATING ORAL EVERY 30 MIN PRN
Status: DISCONTINUED | OUTPATIENT
Start: 2024-11-15 | End: 2024-11-15 | Stop reason: HOSPADM

## 2024-11-15 RX ORDER — ONDANSETRON 2 MG/ML
INJECTION INTRAMUSCULAR; INTRAVENOUS PRN
Status: DISCONTINUED | OUTPATIENT
Start: 2024-11-15 | End: 2024-11-15

## 2024-11-15 RX ORDER — PROPOFOL 10 MG/ML
INJECTION, EMULSION INTRAVENOUS PRN
Status: DISCONTINUED | OUTPATIENT
Start: 2024-11-15 | End: 2024-11-15

## 2024-11-15 RX ORDER — DEXAMETHASONE SODIUM PHOSPHATE 4 MG/ML
4 INJECTION, SOLUTION INTRA-ARTICULAR; INTRALESIONAL; INTRAMUSCULAR; INTRAVENOUS; SOFT TISSUE
Status: DISCONTINUED | OUTPATIENT
Start: 2024-11-15 | End: 2024-11-15 | Stop reason: HOSPADM

## 2024-11-15 RX ORDER — LIDOCAINE 40 MG/G
CREAM TOPICAL
Status: DISCONTINUED | OUTPATIENT
Start: 2024-11-15 | End: 2024-11-15 | Stop reason: HOSPADM

## 2024-11-15 RX ORDER — ACETYLCYSTEINE 100 MG/ML
4 SOLUTION ORAL; RESPIRATORY (INHALATION) ONCE
Status: COMPLETED | OUTPATIENT
Start: 2024-11-15 | End: 2024-11-15

## 2024-11-15 RX ORDER — HYDROMORPHONE HCL IN WATER/PF 6 MG/30 ML
0.2 PATIENT CONTROLLED ANALGESIA SYRINGE INTRAVENOUS EVERY 5 MIN PRN
Status: DISCONTINUED | OUTPATIENT
Start: 2024-11-15 | End: 2024-11-15 | Stop reason: HOSPADM

## 2024-11-15 RX ORDER — OXYCODONE HYDROCHLORIDE 5 MG/1
5 TABLET ORAL
Status: DISCONTINUED | OUTPATIENT
Start: 2024-11-15 | End: 2024-11-15 | Stop reason: HOSPADM

## 2024-11-15 RX ORDER — SODIUM CHLORIDE, SODIUM LACTATE, POTASSIUM CHLORIDE, CALCIUM CHLORIDE 600; 310; 30; 20 MG/100ML; MG/100ML; MG/100ML; MG/100ML
INJECTION, SOLUTION INTRAVENOUS CONTINUOUS PRN
Status: DISCONTINUED | OUTPATIENT
Start: 2024-11-15 | End: 2024-11-15

## 2024-11-15 RX ORDER — FENTANYL CITRATE 50 UG/ML
25 INJECTION, SOLUTION INTRAMUSCULAR; INTRAVENOUS EVERY 5 MIN PRN
Status: DISCONTINUED | OUTPATIENT
Start: 2024-11-15 | End: 2024-11-15 | Stop reason: HOSPADM

## 2024-11-15 RX ORDER — IPRATROPIUM BROMIDE AND ALBUTEROL SULFATE 2.5; .5 MG/3ML; MG/3ML
3 SOLUTION RESPIRATORY (INHALATION)
Status: COMPLETED | OUTPATIENT
Start: 2024-11-15 | End: 2024-11-15

## 2024-11-15 RX ORDER — DEXAMETHASONE SODIUM PHOSPHATE 4 MG/ML
INJECTION, SOLUTION INTRA-ARTICULAR; INTRALESIONAL; INTRAMUSCULAR; INTRAVENOUS; SOFT TISSUE PRN
Status: DISCONTINUED | OUTPATIENT
Start: 2024-11-15 | End: 2024-11-15

## 2024-11-15 RX ADMIN — PROPOFOL 100 MG: 10 INJECTION, EMULSION INTRAVENOUS at 07:38

## 2024-11-15 RX ADMIN — ONDANSETRON 4 MG: 2 INJECTION INTRAMUSCULAR; INTRAVENOUS at 07:52

## 2024-11-15 RX ADMIN — IPRATROPIUM BROMIDE AND ALBUTEROL SULFATE 3 ML: .5; 3 SOLUTION RESPIRATORY (INHALATION) at 08:17

## 2024-11-15 RX ADMIN — Medication 50 MG: at 07:38

## 2024-11-15 RX ADMIN — NOREPINEPHRINE BITARTRATE 9.6 MCG: 1 INJECTION, SOLUTION, CONCENTRATE INTRAVENOUS at 07:45

## 2024-11-15 RX ADMIN — DEXAMETHASONE SODIUM PHOSPHATE 8 MG: 4 INJECTION, SOLUTION INTRA-ARTICULAR; INTRALESIONAL; INTRAMUSCULAR; INTRAVENOUS; SOFT TISSUE at 07:44

## 2024-11-15 RX ADMIN — PROPOFOL 125 MCG/KG/MIN: 10 INJECTION, EMULSION INTRAVENOUS at 07:38

## 2024-11-15 RX ADMIN — Medication 200 MG: at 07:48

## 2024-11-15 RX ADMIN — NOREPINEPHRINE BITARTRATE 9.6 MCG: 1 INJECTION, SOLUTION, CONCENTRATE INTRAVENOUS at 07:43

## 2024-11-15 RX ADMIN — Medication 100 MG: at 07:55

## 2024-11-15 RX ADMIN — MIDAZOLAM 1 MG: 1 INJECTION INTRAMUSCULAR; INTRAVENOUS at 07:19

## 2024-11-15 RX ADMIN — ACETYLCYSTEINE 4 ML: 100 SOLUTION ORAL; RESPIRATORY (INHALATION) at 08:52

## 2024-11-15 RX ADMIN — SODIUM CHLORIDE, POTASSIUM CHLORIDE, SODIUM LACTATE AND CALCIUM CHLORIDE: 600; 310; 30; 20 INJECTION, SOLUTION INTRAVENOUS at 07:27

## 2024-11-15 RX ADMIN — LIDOCAINE HYDROCHLORIDE 60 MG: 20 INJECTION, SOLUTION INFILTRATION; PERINEURAL at 07:38

## 2024-11-15 ASSESSMENT — ACTIVITIES OF DAILY LIVING (ADL)
ADLS_ACUITY_SCORE: 0

## 2024-11-15 NOTE — DISCHARGE INSTRUCTIONS
Contacting your Doctor -   To contact a doctor, call Dr Russell Chang @ [phone]  or:  [phone] and ask for the resident on call for Pulmonology (answered 24 hours a day)   Emergency Department:  [facility]: [phone]  [facility]: [phone] 911 if you are in need of immediate or emergent help       Post Bronchoscopy Patient Instructions:    November 15, 2024  Luis KATY David    Your procedure completed (bronchoscopy with stent removal) without any immediate complications.  You may cough up scant amount of blood for the next 12-24 hours. If you have excessive cough with blood, chest pain, shortness of breath, please report to the closest emergency room.    You may experience low grade (less than 100.5 F) fever next 24 hours, if so, you can take Tylenol. If the fever persists more than 24 hours, please contact to our office or your primary care provider.    You may resume your diet as it was prior to procedure.    You may resume your medications after the procedure unless you are instructed to do differently.     Please follow instructions from the nursing staff upon discharge in terms of activity. In general, you should avoid any attention or motor skill requiring activities (e.g., driving or operating any motorized vehicle) for 24 hours as you might be still under the effect of sedation medications. Please make sure an adult to accompany you next 24 hours.     Should you have any question, please do not hesitate to call our office Jacquie Bradford [phone].     Please take a few moments to evaluate the care you received by me on this link: [email or web address]  Interventional Pulmonary Fellow

## 2024-11-15 NOTE — ANESTHESIA POSTPROCEDURE EVALUATION
Patient: Luis Hernandez    Procedure: Procedure(s):  BRONCHOSCOPY, RIGID and flexible, stent removal       Anesthesia Type:  General    Note:  Disposition: Outpatient   Postop Pain Control: Uneventful            Sign Out: Well controlled pain   PONV: No   Neuro/Psych: Uneventful            Sign Out: Acceptable/Baseline neuro status   Airway/Respiratory: Uneventful            Sign Out: Acceptable/Baseline resp. status   CV/Hemodynamics: Uneventful            Sign Out: Acceptable CV status; No obvious hypovolemia; No obvious fluid overload   Other NRE: NONE   DID A NON-ROUTINE EVENT OCCUR? No           Last vitals:  Vitals Value Taken Time   /83 11/15/24 0900   Temp 36.5  C (97.7  F) 11/15/24 0805   Pulse 81 11/15/24 0909   Resp 12 11/15/24 0909   SpO2 100 % 11/15/24 0909   Vitals shown include unfiled device data.    Electronically Signed By: RL GONZALES MD  November 15, 2024  9:09 AM

## 2024-11-15 NOTE — PROCEDURES
INTERVENTIONAL PULMONOLOGY       Procedure(s):    A flexible and rigid bronchoscopy   Airway exam  Airway stent removal (1 stents)  Therapeutic suctioning (2 sites)    Indication:  lung cancer SCC received treatment for stent removal    Attending of Record:  Scout Wells MD    Interventional Pulmonary Fellow   Raffy Garcia    Trainees Present:   None     Medications:    General Anesthesia - See anesthesia flowsheet for details    Sedation Time:   Per Anesthesia Care Provider    Time Out:  Performed    The patient's medical record has been reviewed.  The indication for the procedure was reviewed.  The necessary history and physical examination was performed and reviewed.  The risks, benefits and alternatives of the procedure were discussed with the the patient in detail and he had the opportunity to ask questions.  I discussed in particular the potential complications including risks of minor or life-threatening bleeding and/or infection, respiratory failure, vocal cord trauma / paralysis, pneumothorax, and discomfort. Sedation risks were also discussed including abnormal heart rhythms, low blood pressure, and respiratory failure. All questions were answered to the best of my ability.  Verbal and written informed consent was obtained.  The proposed procedure and the patient's identification were verified prior to the procedure by the physician and the nurse.    The patient was assessed for the adequacy for the procedure and to receive medications.   Mental Status:  Alert and oriented x 3  Airway examination:  Class I (Complete visualization of soft palate)  Pulmonary:  Non labored respirations  CV:  Regular rate  ASA Grade:  (II)  Mild systemic disease    After clinical evaluation and reviewing the indication, risks, alternatives and benefits of the procedure the patient was deemed to be in satisfactory condition to undergo the procedure.      Immediately before administration of medications the patient  was re-assessed for adequacy to receive sedatives including the heart rate, respiratory rate, mental status, oxygen saturation, blood pressure and adequacy of pulmonary ventilation. These same parameters were continuously monitored throughout the procedure.    A Tuberculosis risk assessment was performed:  The patient has no known RISK of Tuberculosis    The procedure was performed in a negative airflow room: The patient could not be moved to a negative airflow room because of needed OR for the procedure    Maneuvers / Procedure:      A Flexible and 12mm Rigid bronchoscope bronchoscope was used for the procedure. The rigid bronchoscope was inserted into the mouth. Uvula, epiglottis and vocal cords were seen. The scope was advanced turning the bevel to 90 degress while passing through the cords and into the trachea.   Airway Examination: A complete airway examination was performed from the distal trachea to the subsegmental level in each lobe of both lungs.  Pertinent findings include left Aero stent in place with moderate amount of granulation tissue grew at  the edges.         Stent was removed with the rigid 50 cm grasper. Airway malacia seen at the stent place when removed.     Therapeutic suctioning: 15-20min of operative time was spent clearing out the airway of debris, blood and mucous prior to the intervention.     Any disposable equipment was visually inspected and deemed to be intact immediately post procedure.      Relevant Pictures  Taken but not saved due to technical issues.     Assessment and Recommendations:     Successful completion of RB with BI Aero stent removal. moderate amount of granulation tissue grew at  the edges.     Airway malacia seen at the stent place when removed.   Ok to discharge once medically stable.   Follow up bronchoscopy as needed depending on symptoms or radiology changes.           Raffy Garcia  Interventional pulmonary fellow  Pager: [phone]

## 2024-11-18 DIAGNOSIS — I27.20 PULMONARY HYPERTENSION (H): ICD-10-CM

## 2024-11-18 RX ORDER — AMLODIPINE BESYLATE 10 MG/1
10 TABLET ORAL DAILY
Qty: 90 TABLET | Refills: 3 | Status: CANCELLED | OUTPATIENT
Start: 2024-11-18

## 2024-11-18 NOTE — TELEPHONE ENCOUNTER
Pending Prescriptions:                       Disp   Refills    amLODIPine (NORVASC) 10 MG tablet         90 tab*3            Sig: Take 1 tablet (10 mg) by mouth daily.

## 2024-11-21 ENCOUNTER — PATIENT OUTREACH (OUTPATIENT)
Dept: ONCOLOGY | Facility: CLINIC | Age: 59
End: 2024-11-21
Payer: COMMERCIAL

## 2024-11-21 NOTE — ANESTHESIA CARE TRANSFER NOTE
Patient: Luis Hernandez    Procedure: Procedure(s):  BRONCHOSCOPY, RIGID and flexible, stent removal       Diagnosis: Lung mass [R91.8]  Diagnosis Additional Information: No value filed.    Anesthesia Type:   General     Note:    Oropharynx: oropharynx clear of all foreign objects  Level of Consciousness: awake  Oxygen Supplementation: face mask    Independent Airway: airway patency satisfactory and stable  Dentition: dentition unchanged  Vital Signs Stable: post-procedure vital signs reviewed and stable  Report to RN Given: handoff report given  Patient transferred to: PACU    Handoff Report: Identifed the Patient, Identified the Reponsible Provider, Reviewed the pertinent medical history, Discussed the surgical course, Reviewed Intra-OP anesthesia mangement and issues during anesthesia, Set expectations for post-procedure period and Allowed opportunity for questions and acknowledgement of understanding      Vitals:  Vitals Value Taken Time   /79 11/15/24 1000   Temp 36.9  C (98.5  F) 11/15/24 0915   Pulse 81 11/15/24 1000   Resp 18 11/15/24 1000   SpO2 96 % 11/15/24 1000       Electronically Signed By: Jer Casillas MD  November 21, 2024  3:34 PM

## 2024-11-28 ENCOUNTER — APPOINTMENT (OUTPATIENT)
Dept: GENERAL RADIOLOGY | Facility: CLINIC | Age: 59
End: 2024-11-28
Payer: COMMERCIAL

## 2024-11-28 ENCOUNTER — HOSPITAL ENCOUNTER (EMERGENCY)
Facility: CLINIC | Age: 59
Discharge: HOME | End: 2024-11-28
Payer: COMMERCIAL

## 2024-11-28 VITALS
BODY MASS INDEX: 20.49 KG/M2 | RESPIRATION RATE: 16 BRPM | HEIGHT: 64 IN | TEMPERATURE: 98.4 F | DIASTOLIC BLOOD PRESSURE: 91 MMHG | SYSTOLIC BLOOD PRESSURE: 113 MMHG | OXYGEN SATURATION: 95 % | HEART RATE: 101 BPM | WEIGHT: 120 LBS

## 2024-11-28 DIAGNOSIS — J44.1 COPD EXACERBATION (H): Primary | ICD-10-CM

## 2024-11-28 LAB
ALBUMIN SERPL BCG-MCNC: 4.3 G/DL (ref 3.5–5.2)
ALP SERPL-CCNC: 50 U/L (ref 40–150)
ALT SERPL W P-5'-P-CCNC: 14 U/L (ref 0–70)
ANION GAP SERPL CALCULATED.3IONS-SCNC: 8 MMOL/L (ref 7–15)
AST SERPL W P-5'-P-CCNC: 17 U/L (ref 0–45)
ATRIAL RATE - MUSE: 89 BPM
BASE EXCESS BLDV CALC-SCNC: 7.3 MMOL/L (ref -3–3)
BASOPHILS # BLD AUTO: 0.1 10E3/UL (ref 0–0.2)
BASOPHILS NFR BLD AUTO: 1 %
BILIRUB SERPL-MCNC: 0.3 MG/DL
BUN SERPL-MCNC: 12.5 MG/DL (ref 6–20)
CALCIUM SERPL-MCNC: 9.4 MG/DL (ref 8.8–10.4)
CHLORIDE SERPL-SCNC: 87 MMOL/L (ref 98–107)
CREAT SERPL-MCNC: 0.58 MG/DL (ref 0.67–1.17)
DIASTOLIC BLOOD PRESSURE - MUSE: NORMAL MMHG
EGFRCR SERPLBLD CKD-EPI 2021: >90 ML/MIN/1.73M2
EOSINOPHIL # BLD AUTO: 0.5 10E3/UL (ref 0–0.7)
EOSINOPHIL NFR BLD AUTO: 5 %
ERYTHROCYTE [DISTWIDTH] IN BLOOD BY AUTOMATED COUNT: 13.3 % (ref 10–15)
GLUCOSE SERPL-MCNC: 85 MG/DL (ref 70–99)
HCO3 BLDV-SCNC: 36 MMOL/L (ref 21–28)
HCO3 SERPL-SCNC: 32 MMOL/L (ref 22–29)
HCT VFR BLD AUTO: 38.8 % (ref 40–53)
HGB BLD-MCNC: 12.2 G/DL (ref 13.3–17.7)
IMM GRANULOCYTES # BLD: 0 10E3/UL
IMM GRANULOCYTES NFR BLD: 0 %
INTERPRETATION ECG - MUSE: NORMAL
LYMPHOCYTES # BLD AUTO: 1 10E3/UL (ref 0.8–5.3)
LYMPHOCYTES NFR BLD AUTO: 10 %
MCH RBC QN AUTO: 29.8 PG (ref 26.5–33)
MCHC RBC AUTO-ENTMCNC: 31.4 G/DL (ref 31.5–36.5)
MCV RBC AUTO: 95 FL (ref 78–100)
MONOCYTES # BLD AUTO: 1.4 10E3/UL (ref 0–1.3)
MONOCYTES NFR BLD AUTO: 14 %
NEUTROPHILS # BLD AUTO: 6.9 10E3/UL (ref 1.6–8.3)
NEUTROPHILS NFR BLD AUTO: 70 %
NRBC # BLD AUTO: 0 10E3/UL
NRBC BLD AUTO-RTO: 0 /100
O2/TOTAL GAS SETTING VFR VENT: 30 %
OXYHGB MFR BLDV: 67 % (ref 70–75)
P AXIS - MUSE: 86 DEGREES
PCO2 BLDV: 75 MM HG (ref 40–50)
PH BLDV: 7.29 [PH] (ref 7.32–7.43)
PLATELET # BLD AUTO: 347 10E3/UL (ref 150–450)
PO2 BLDV: 37 MM HG (ref 25–47)
POTASSIUM SERPL-SCNC: 4.3 MMOL/L (ref 3.4–5.3)
PR INTERVAL - MUSE: 130 MS
PROT SERPL-MCNC: 7.1 G/DL (ref 6.4–8.3)
QRS DURATION - MUSE: 82 MS
QT - MUSE: 330 MS
QTC - MUSE: 401 MS
R AXIS - MUSE: 88 DEGREES
RBC # BLD AUTO: 4.1 10E6/UL (ref 4.4–5.9)
SAO2 % BLDV: 68.4 % (ref 70–75)
SODIUM SERPL-SCNC: 127 MMOL/L (ref 135–145)
SYSTOLIC BLOOD PRESSURE - MUSE: NORMAL MMHG
T AXIS - MUSE: 75 DEGREES
TROPONIN T SERPL HS-MCNC: 13 NG/L
TROPONIN T SERPL HS-MCNC: 15 NG/L
VENTRICULAR RATE- MUSE: 89 BPM
WBC # BLD AUTO: 9.9 10E3/UL (ref 4–11)

## 2024-11-28 PROCEDURE — 272N000272 HC CONTINUOUS NEBULIZER MICRO PUMP

## 2024-11-28 PROCEDURE — 99284 EMERGENCY DEPT VISIT MOD MDM: CPT

## 2024-11-28 PROCEDURE — 99285 EMERGENCY DEPT VISIT HI MDM: CPT | Mod: 25

## 2024-11-28 PROCEDURE — 82805 BLOOD GASES W/O2 SATURATION: CPT

## 2024-11-28 PROCEDURE — 82247 BILIRUBIN TOTAL: CPT

## 2024-11-28 PROCEDURE — 250N000013 HC RX MED GY IP 250 OP 250 PS 637

## 2024-11-28 PROCEDURE — 85025 COMPLETE CBC W/AUTO DIFF WBC: CPT

## 2024-11-28 PROCEDURE — 84484 ASSAY OF TROPONIN QUANT: CPT

## 2024-11-28 PROCEDURE — 250N000012 HC RX MED GY IP 250 OP 636 PS 637

## 2024-11-28 PROCEDURE — 82040 ASSAY OF SERUM ALBUMIN: CPT

## 2024-11-28 PROCEDURE — 36415 COLL VENOUS BLD VENIPUNCTURE: CPT

## 2024-11-28 PROCEDURE — 272N000055 HC CANNULA HIGH FLOW, PED

## 2024-11-28 PROCEDURE — 999N000157 HC STATISTIC RCP TIME EA 10 MIN

## 2024-11-28 PROCEDURE — 94640 AIRWAY INHALATION TREATMENT: CPT

## 2024-11-28 PROCEDURE — 250N000009 HC RX 250

## 2024-11-28 PROCEDURE — 93005 ELECTROCARDIOGRAM TRACING: CPT

## 2024-11-28 PROCEDURE — 71046 X-RAY EXAM CHEST 2 VIEWS: CPT

## 2024-11-28 RX ORDER — PREDNISONE 20 MG/1
60 TABLET ORAL ONCE
Status: COMPLETED | OUTPATIENT
Start: 2024-11-28 | End: 2024-11-28

## 2024-11-28 RX ORDER — CEFDINIR 300 MG/1
300 CAPSULE ORAL ONCE
Status: COMPLETED | OUTPATIENT
Start: 2024-11-28 | End: 2024-11-28

## 2024-11-28 RX ORDER — IPRATROPIUM BROMIDE AND ALBUTEROL SULFATE 2.5; .5 MG/3ML; MG/3ML
3 SOLUTION RESPIRATORY (INHALATION) ONCE
Status: COMPLETED | OUTPATIENT
Start: 2024-11-28 | End: 2024-11-28

## 2024-11-28 RX ORDER — DOXYCYCLINE 100 MG/1
100 CAPSULE ORAL ONCE
Status: COMPLETED | OUTPATIENT
Start: 2024-11-28 | End: 2024-11-28

## 2024-11-28 RX ORDER — PREDNISONE 20 MG/1
TABLET ORAL
Qty: 10 TABLET | Refills: 0 | Status: SHIPPED | OUTPATIENT
Start: 2024-11-29

## 2024-11-28 RX ORDER — DOXYCYCLINE HYCLATE 100 MG
100 TABLET ORAL 2 TIMES DAILY
Qty: 14 TABLET | Refills: 0 | Status: SHIPPED | OUTPATIENT
Start: 2024-11-28 | End: 2024-12-05

## 2024-11-28 RX ORDER — CEFDINIR 300 MG/1
300 CAPSULE ORAL 2 TIMES DAILY
Qty: 14 CAPSULE | Refills: 0 | Status: SHIPPED | OUTPATIENT
Start: 2024-11-28 | End: 2024-12-05

## 2024-11-28 RX ADMIN — CEFDINIR 300 MG: 300 CAPSULE ORAL at 09:37

## 2024-11-28 RX ADMIN — DOXYCYCLINE HYCLATE 100 MG: 100 CAPSULE, GELATIN COATED ORAL at 09:38

## 2024-11-28 RX ADMIN — PREDNISONE 60 MG: 20 TABLET ORAL at 09:37

## 2024-11-28 RX ADMIN — IPRATROPIUM BROMIDE AND ALBUTEROL SULFATE 3 ML: .5; 3 SOLUTION RESPIRATORY (INHALATION) at 08:25

## 2024-11-28 RX ADMIN — IPRATROPIUM BROMIDE AND ALBUTEROL SULFATE 3 ML: 2.5; .5 SOLUTION RESPIRATORY (INHALATION) at 10:01

## 2024-11-28 RX ADMIN — IPRATROPIUM BROMIDE AND ALBUTEROL SULFATE 3 ML: 2.5; .5 SOLUTION RESPIRATORY (INHALATION) at 08:31

## 2024-11-28 ASSESSMENT — ACTIVITIES OF DAILY LIVING (ADL)
ADLS_ACUITY_SCORE: 55

## 2024-11-28 ASSESSMENT — COLUMBIA-SUICIDE SEVERITY RATING SCALE - C-SSRS
2. HAVE YOU ACTUALLY HAD ANY THOUGHTS OF KILLING YOURSELF IN THE PAST MONTH?: NO
6. HAVE YOU EVER DONE ANYTHING, STARTED TO DO ANYTHING, OR PREPARED TO DO ANYTHING TO END YOUR LIFE?: NO
1. IN THE PAST MONTH, HAVE YOU WISHED YOU WERE DEAD OR WISHED YOU COULD GO TO SLEEP AND NOT WAKE UP?: NO

## 2024-11-28 NOTE — ED PROVIDER NOTES
"[facility]  Emergency Department Visit Note    PATIENT:  Luis Hernandez     59 year old     male      [id number]    Chief complaint:  Chief Complaint   Patient presents with    Shortness of Breath     Increased SOB over the past week with a cold, hx of COPD          History of present illness:  Patient is a 59 year old male with (2 L home O2), pulmonary hypertension, hypertension presenting for evaluation of dyspnea.    Reports roughly 1 week of increased shortness of breath, chills, decreased appetite.  Also having sensation of phlegm getting stuck in the back of his throat that he feels like makes it difficult to inhale and exhale at times.  No fever, chest pain, vomiting, abdominal pain.      Review of Systems:  As in HPI above    BP (!) 113/91   Pulse 101   Temp 98.4  F (36.9  C) (Oral)   Resp 16   Ht 1.626 m (5' 4\")   Wt 54.4 kg (120 lb)   SpO2 95%   BMI 20.60 kg/m        Physical Exam:  Constitutional: Sitting up in hospital bed, alert, oriented, answering questions in short sentences  HEENT: normocephalic, atraumatic and sclerae anicteric  Neck: no stridor  Cardiovascular: tachycardic, regular rhythm, and no murmurs, rubs, or extra heart sounds  Pulmonary: Mild to moderately increased work of breathing with accessory muscle use, poor air movement, diffuse wheezing  Abdominal: soft, non-tender, non-distended  Extremities/MSK: no peripheral edema  Skin: warm, dry  Neurologic: moves all four extremities spontaneously  Psychiatric: calm, appropriate      MDM:  Patient is a 59 year old male with above history presenting for evaluation of dyspnea.    Vitals notable for tachypnea though satting mid 90s on his normal 2 L home O2, afebrile. Exam notable for mild to moderately increased work of breathing with accessory muscle use and diffuse wheezing.    Likely experiencing COPD exacerbation.  Not hypoxic, per se, though increased work of breathing noted.  Discussion with him regarding " starting on BiPAP for work of breathing, he is agreeable.  Not having chest pain concerning for ACS.  PE possible but given wheezing and poor air movement presentation much more consistent with COPD exacerbation.    Labs, chest x-ray, ECG for workup.  Ipratropium-albuterol neb x 3, prednisone, doxycycline, cefdinir for initial therapeutics.    Disposition pending above workup and response to therapeutics. Remainder of ED course below.    ED COURSE:  ED Course as of 11/28/24 1359   Thu Nov 28, 2024   0838 CBC with platelets, differential(!)  Reassuring, no leukocytosis reassuring against significant systemic infectious inflammatory process.  Chronic mild anemia around his baseline.  Platelets normal.   0904 Comprehensive metabolic panel(!)  New hyponatremia, unclear etiology, appears euvolemic.  Chronic CO2 retention.  No kidney injury.  No other concerning electrolyte abnormality.   0905 Chest XR,  PA & LAT  Without clearly acute process.  Emphysematous lungs.   0914 EKG 12 lead  ECG:  - Ventricular rate 89 bpm, regular  - OH, QRS, QT intervals normal  - Axis normal  -Perhaps more conspicuous ST elevation in inferior leads without reciprocal changes  - Comparison to prior ECGs: As above  - My independent interpretation: Normal sinus rhythm, again with perhaps more conspicuous ST elevation in aVF though not convincing for acute ischemia, awaiting troponin, lateral ST elevations similar to prior     0942 Troponin T, High Sensitivity: 15  In the setting of ECG without convincing acute ischemic changes, low concern for ACS   1018 Blood gas venous(!)  Consistent with decompensated chronic hypercapnic respiratory failure with chronic metabolic compensation.   1136 Patient reassessed, doing well on BiPAP, will trial off now that he has had additional neb and steroids.   1353 Patient reassessed.  Comfortably watching television.  Lungs still diffusely wheezy.  He feels like his breathing is back to his baseline for him.  We  discussed he is somewhat borderline, though again not hypoxic on his 2 L.  He does not want to be admitted to the hospital and would like to try things at home, which I think is reasonable.    Sending with 5 days of prednisone as well as antibiotics.  Discussed the importance of follow-up within 1 week.  ED return precautions discussed in the event of new or worsening symptoms.  He expressed understanding of and agreement with this.     Encounter Diagnoses:  Final diagnoses:   COPD exacerbation (H)       Final disposition: discharge    Scout Nathan MD  11/28/2024  1:59 PM   Emergency Medicine  [facility]      Scout Nathan MD  11/28/24 2441

## 2024-11-28 NOTE — DISCHARGE INSTRUCTIONS
You were seen in the emergency department today for shortness of breath. We did tests including blood test, EKG, and chest x-ray that showed no clear cause for your symptoms.  I suspect you are likely experiencing a flare of your COPD.  I think you are getting phlegm/mucus caught in your upper throat.      To treat this COPD flare I am sending you with 3 medications:  1) prednisone - this is a steroid medication to help with inflammation  2) cefdinir -this is an antibiotic  3) doxycycline - this is an antibiotic    To help with mucus stuck in the throat, I recommend saline nebulizer treatments to help loosen this up.    Please follow up with your primary doctor next week for ongoing evaluation and management of your symptoms.    Return to the emergency department with new or worsening symptoms that you find concerning.

## 2024-11-28 NOTE — ED NOTES
MD in to speak with pt.  Pt tolerating BiPap well, wishes to try it off and see how he does.  RT in room now, and will place pt back on 2 lts per NC.   PLAN:  Will continue to monitor, and await further plan of care.

## 2024-11-28 NOTE — PROGRESS NOTES
A BiPAP of  14/7 @ 30% was applied to the pt via the mask for SOB.  The skin in contact with the mask and straps looks good and intact. Pt is tolerating it well. RT will continue to monitor and assess the pt's respiratory status and needs.

## 2024-11-28 NOTE — ED NOTES
Pt arrived via triage, in WC, tripoding.  Pt became even more acutely SOB with transfer to cot.  Pt sitting at edge of bed with increased RR and WOB noted.   Pt frequently here for COPD exacerbation.  Pt states recently sent home with steroids and antibiotics.  Pt reports he did a Albuteral Neb 1 hr prior to arrival.      Lungs:  inspirator and expiratory wheezing noted through out.  Pt with increased WOB and RR, and prolonged expiratory phase.   Pt immediately started on duo neb.        20g IV started and labs drawn and sent.    PLAN:  Will await test results and await further orders.

## 2024-11-28 NOTE — ED TRIAGE NOTES
Pt reports increased cough and SOB over the past week. Hx of COPD, uses home O2 1.5 L NC. Pt said SOB worse with activity. Pt requested to go up to 2 L NC upon arrival after moving from chair to bed. O2 sats 93 on 1.5-2 LNC. Pt reports he feels like he has phlegm he can't get up. Pt said this happened to him one month ago, he was given steroids, antibiotics and discharged.      Triage Assessment (Adult)       Row Name 11/28/24 0817          Triage Assessment    Airway WDL WDL        Respiratory WDL    Respiratory WDL X;cough;rhythm/pattern     Rhythm/Pattern, Respiratory shortness of breath  worse with activity     Cough Frequency frequent        Cardiac WDL    Cardiac WDL WDL        Cognitive/Neuro/Behavioral WDL    Cognitive/Neuro/Behavioral WDL WDL        [place] Coma Scale    Best Eye Response 4-->(E4) spontaneous     Best Motor Response 6-->(M6) obeys commands     Best Verbal Response 5-->(V5) oriented     [place] Coma Scale Score 15

## 2024-11-28 NOTE — ED NOTES
"   Pt resting on cot, with 2lt O2 per NC.  Pt states he feels \"OK\", looks SOB still with WOB.   WIll speak with MD about plan of care.  "

## 2024-11-28 NOTE — ED NOTES
Pt continues to tolerate BiPap well.  Updated on admit status.  Pt states he does not wish to transfer.  Informed we have no options here currently.  Will update MD Nathan and Charge RN.

## 2024-11-28 NOTE — ED NOTES
Pt started on BiPap, RT updated and discussed plan of care with pt.  Pt states he has been on it in the past and states he tolerates it well.     PLAN:  will medicate per orders, and continue to monitor closely.

## 2024-11-28 NOTE — MEDICATION SCRIBE - ADMISSION MEDICATION HISTORY
Medication Scribe Admission Medication History    Admission medication history is complete. The information provided in this note is only as accurate as the sources available at the time of the update.    Information Source(s): Patient and CareEverywhere/SureScripts via  with patient in room and finished at desk.    Pertinent Information:   Stopped taking Prednisone on 11/13/24.  He states that a doctor told him to stop taking this.  He received a 90 day supply from mail order pharmacy written on 10/9/24.  Was using 1.5 L of Oxygen cont.  Only has Albuterol Inhaler with him today.    Changes made to PTA medication list:  Added: None  Deleted: None  Changed: None    Allergies reviewed with patient and updates made in EHR: yes, no change.    Medication History Completed By: Shahnaz Huff 11/28/2024 10:23 AM    PTA Med List   Medication Sig Note Last Dose/Taking    albuterol (PROAIR HFA/PROVENTIL HFA/VENTOLIN HFA) 108 (90 Base) MCG/ACT inhaler Inhale 2 puffs into the lungs every 4 hours as needed for shortness of breath 11/28/2024: He has this inhaler with him today. 11/28/2024 Morning    albuterol (PROVENTIL) (2.5 MG/3ML) 0.083% neb solution Take 1 vial (2.5 mg) by nebulization every 4 hours as needed for shortness of breath, wheezing or cough.  11/28/2024 Morning    amLODIPine (NORVASC) 10 MG tablet Take 1 tablet (10 mg) by mouth daily  11/28/2024 Morning    azithromycin (ZITHROMAX) 250 MG tablet Take 1 tablet (250 mg) by mouth daily.  11/28/2024 Morning    fluticasone-salmeterol (ADVAIR) 500-50 MCG/ACT inhaler Inhale 1 puff into the lungs every 12 hours.  11/28/2024 Morning    lisinopril (ZESTRIL) 40 MG tablet Take 1 tablet (40 mg) by mouth daily  11/28/2024 Morning    magnesium oxide (MAG-OX) 400 MG tablet Take 400 mg by mouth daily (with lunch) For leg cramps  Past Month    metoprolol tartrate (LOPRESSOR) 25 MG tablet Take 1 tablet (25 mg) by mouth 2 times daily  11/28/2024 Morning    order for DME Equipment being  ordered: Oxygen 3L via NC continuous.  O2 saturated noted to be 86% on room air at rest. (Patient taking differently: 1.5 L. Equipment being ordered: Oxygen 3L via NC continuous.  O2 saturated noted to be 86% on room air at rest.)  11/28/2024 Morning    order for DME Equipment being ordered: Nebulizer  11/28/2024 Morning    predniSONE (DELTASONE) 10 MG tablet Take 1 tablet (10 mg) by mouth daily. 11/28/2024: Stopped taking on 11/13/24.  He states that a doctor told him to stop taking this.  He received a 90 day supply from mail order pharmacy written on 10/9/24. Past Month Morning    tiotropium (SPIRIVA RESPIMAT) 2.5 MCG/ACT inhaler Inhale 2 puffs into the lungs daily  11/28/2024 Morning

## 2024-11-28 NOTE — ED NOTES
Pt medicated and given water to drink.  Pt tolerating BiPap well.  Pt aware of admit status.   Awaiting bed placement.   Lights out in room for comfort, offered but declined TV or music.

## 2024-12-03 ENCOUNTER — HOSPITAL ENCOUNTER (OUTPATIENT)
Dept: CT IMAGING | Facility: CLINIC | Age: 59
Discharge: HOME | End: 2024-12-03
Attending: NURSE PRACTITIONER
Payer: COMMERCIAL

## 2024-12-03 ENCOUNTER — APPOINTMENT (OUTPATIENT)
Dept: LAB | Facility: CLINIC | Age: 59
End: 2024-12-03
Attending: NURSE PRACTITIONER
Payer: COMMERCIAL

## 2024-12-03 DIAGNOSIS — C34.91 SQUAMOUS CELL LUNG CANCER, RIGHT (H): ICD-10-CM

## 2024-12-03 LAB
ALBUMIN SERPL BCG-MCNC: 3.8 G/DL (ref 3.5–5.2)
ALP SERPL-CCNC: 42 U/L (ref 40–150)
ALT SERPL W P-5'-P-CCNC: 16 U/L (ref 0–70)
ANION GAP SERPL CALCULATED.3IONS-SCNC: 4 MMOL/L (ref 7–15)
AST SERPL W P-5'-P-CCNC: 15 U/L (ref 0–45)
BASOPHILS # BLD AUTO: 0.1 10E3/UL (ref 0–0.2)
BASOPHILS NFR BLD AUTO: 1 %
BILIRUB SERPL-MCNC: 0.2 MG/DL
BUN SERPL-MCNC: 24.8 MG/DL (ref 6–20)
CALCIUM SERPL-MCNC: 9.1 MG/DL (ref 8.8–10.4)
CHLORIDE SERPL-SCNC: 100 MMOL/L (ref 98–107)
CREAT SERPL-MCNC: 0.74 MG/DL (ref 0.67–1.17)
EGFRCR SERPLBLD CKD-EPI 2021: >90 ML/MIN/1.73M2
EOSINOPHIL # BLD AUTO: 0.3 10E3/UL (ref 0–0.7)
EOSINOPHIL NFR BLD AUTO: 3 %
ERYTHROCYTE [DISTWIDTH] IN BLOOD BY AUTOMATED COUNT: 14.1 % (ref 10–15)
GLUCOSE SERPL-MCNC: 97 MG/DL (ref 70–99)
HCO3 SERPL-SCNC: 34 MMOL/L (ref 22–29)
HCT VFR BLD AUTO: 37.2 % (ref 40–53)
HGB BLD-MCNC: 11.3 G/DL (ref 13.3–17.7)
IMM GRANULOCYTES # BLD: 0 10E3/UL
IMM GRANULOCYTES NFR BLD: 0 %
LYMPHOCYTES # BLD AUTO: 1.3 10E3/UL (ref 0.8–5.3)
LYMPHOCYTES NFR BLD AUTO: 14 %
MCH RBC QN AUTO: 30.2 PG (ref 26.5–33)
MCHC RBC AUTO-ENTMCNC: 30.4 G/DL (ref 31.5–36.5)
MCV RBC AUTO: 100 FL (ref 78–100)
MONOCYTES # BLD AUTO: 1.1 10E3/UL (ref 0–1.3)
MONOCYTES NFR BLD AUTO: 12 %
NEUTROPHILS # BLD AUTO: 6.4 10E3/UL (ref 1.6–8.3)
NEUTROPHILS NFR BLD AUTO: 70 %
NRBC # BLD AUTO: 0 10E3/UL
NRBC BLD AUTO-RTO: 0 /100
PLATELET # BLD AUTO: 394 10E3/UL (ref 150–450)
POTASSIUM SERPL-SCNC: 4.7 MMOL/L (ref 3.4–5.3)
PROT SERPL-MCNC: 6.2 G/DL (ref 6.4–8.3)
RBC # BLD AUTO: 3.74 10E6/UL (ref 4.4–5.9)
SODIUM SERPL-SCNC: 138 MMOL/L (ref 135–145)
TSH SERPL DL<=0.005 MIU/L-ACNC: 0.82 UIU/ML (ref 0.3–4.2)
WBC # BLD AUTO: 9.2 10E3/UL (ref 4–11)

## 2024-12-03 PROCEDURE — 74177 CT ABD & PELVIS W/CONTRAST: CPT

## 2024-12-03 PROCEDURE — 250N000009 HC RX 250: Performed by: RADIOLOGY

## 2024-12-03 PROCEDURE — 36415 COLL VENOUS BLD VENIPUNCTURE: CPT | Performed by: INTERNAL MEDICINE

## 2024-12-03 PROCEDURE — 250N000011 HC RX IP 250 OP 636: Performed by: RADIOLOGY

## 2024-12-03 PROCEDURE — 84460 ALANINE AMINO (ALT) (SGPT): CPT | Performed by: INTERNAL MEDICINE

## 2024-12-03 PROCEDURE — 85025 COMPLETE CBC W/AUTO DIFF WBC: CPT | Performed by: INTERNAL MEDICINE

## 2024-12-03 PROCEDURE — 84443 ASSAY THYROID STIM HORMONE: CPT | Performed by: INTERNAL MEDICINE

## 2024-12-03 PROCEDURE — 71260 CT THORAX DX C+: CPT

## 2024-12-03 PROCEDURE — 84155 ASSAY OF PROTEIN SERUM: CPT | Performed by: INTERNAL MEDICINE

## 2024-12-03 RX ORDER — IOPAMIDOL 755 MG/ML
58 INJECTION, SOLUTION INTRAVASCULAR ONCE
Status: COMPLETED | OUTPATIENT
Start: 2024-12-03 | End: 2024-12-03

## 2024-12-03 RX ADMIN — IOPAMIDOL 58 ML: 755 INJECTION, SOLUTION INTRAVENOUS at 09:02

## 2024-12-03 RX ADMIN — SODIUM CHLORIDE 55 ML: 9 INJECTION, SOLUTION INTRAVENOUS at 09:02

## 2024-12-04 ENCOUNTER — INFUSION THERAPY VISIT (OUTPATIENT)
Dept: INFUSION THERAPY | Facility: CLINIC | Age: 59
End: 2024-12-04
Attending: INTERNAL MEDICINE
Payer: COMMERCIAL

## 2024-12-04 ENCOUNTER — ONCOLOGY VISIT (OUTPATIENT)
Dept: ONCOLOGY | Facility: CLINIC | Age: 59
End: 2024-12-04
Attending: INTERNAL MEDICINE
Payer: COMMERCIAL

## 2024-12-04 VITALS
HEART RATE: 95 BPM | OXYGEN SATURATION: 99 % | TEMPERATURE: 98.1 F | SYSTOLIC BLOOD PRESSURE: 145 MMHG | WEIGHT: 122 LBS | RESPIRATION RATE: 16 BRPM | DIASTOLIC BLOOD PRESSURE: 102 MMHG | HEIGHT: 64 IN | BODY MASS INDEX: 20.83 KG/M2

## 2024-12-04 VITALS — DIASTOLIC BLOOD PRESSURE: 89 MMHG | SYSTOLIC BLOOD PRESSURE: 130 MMHG | HEART RATE: 77 BPM

## 2024-12-04 DIAGNOSIS — Z51.11 ENCOUNTER FOR ANTINEOPLASTIC CHEMOTHERAPY: ICD-10-CM

## 2024-12-04 DIAGNOSIS — C34.91 SQUAMOUS CELL LUNG CANCER, RIGHT (H): Primary | ICD-10-CM

## 2024-12-04 PROCEDURE — 99214 OFFICE O/P EST MOD 30 MIN: CPT | Performed by: INTERNAL MEDICINE

## 2024-12-04 PROCEDURE — G2211 COMPLEX E/M VISIT ADD ON: HCPCS | Performed by: INTERNAL MEDICINE

## 2024-12-04 PROCEDURE — 250N000011 HC RX IP 250 OP 636: Mod: JZ | Performed by: INTERNAL MEDICINE

## 2024-12-04 PROCEDURE — 96413 CHEMO IV INFUSION 1 HR: CPT

## 2024-12-04 PROCEDURE — G0463 HOSPITAL OUTPT CLINIC VISIT: HCPCS | Performed by: INTERNAL MEDICINE

## 2024-12-04 PROCEDURE — 258N000003 HC RX IP 258 OP 636: Performed by: INTERNAL MEDICINE

## 2024-12-04 RX ORDER — HEPARIN SODIUM,PORCINE 10 UNIT/ML
5-20 VIAL (ML) INTRAVENOUS DAILY PRN
Status: CANCELLED | OUTPATIENT
Start: 2024-12-05

## 2024-12-04 RX ORDER — HEPARIN SODIUM (PORCINE) LOCK FLUSH IV SOLN 100 UNIT/ML 100 UNIT/ML
5 SOLUTION INTRAVENOUS
Status: CANCELLED | OUTPATIENT
Start: 2024-12-05

## 2024-12-04 RX ORDER — ALBUTEROL SULFATE 0.83 MG/ML
2.5 SOLUTION RESPIRATORY (INHALATION)
Status: CANCELLED | OUTPATIENT
Start: 2024-12-05

## 2024-12-04 RX ORDER — LORAZEPAM 2 MG/ML
0.5 INJECTION INTRAMUSCULAR EVERY 4 HOURS PRN
Status: CANCELLED | OUTPATIENT
Start: 2024-12-05

## 2024-12-04 RX ORDER — DIPHENHYDRAMINE HYDROCHLORIDE 50 MG/ML
50 INJECTION INTRAMUSCULAR; INTRAVENOUS
Status: CANCELLED
Start: 2024-12-05

## 2024-12-04 RX ORDER — METHYLPREDNISOLONE SODIUM SUCCINATE 125 MG/2ML
125 INJECTION INTRAMUSCULAR; INTRAVENOUS
Status: CANCELLED
Start: 2024-12-05

## 2024-12-04 RX ORDER — ALBUTEROL SULFATE 90 UG/1
1-2 INHALANT RESPIRATORY (INHALATION)
Status: CANCELLED
Start: 2024-12-05

## 2024-12-04 RX ORDER — MEPERIDINE HYDROCHLORIDE 25 MG/ML
25 INJECTION INTRAMUSCULAR; INTRAVENOUS; SUBCUTANEOUS EVERY 30 MIN PRN
Status: CANCELLED | OUTPATIENT
Start: 2024-12-05

## 2024-12-04 RX ORDER — EPINEPHRINE 1 MG/ML
0.3 INJECTION, SOLUTION, CONCENTRATE INTRAVENOUS EVERY 5 MIN PRN
Status: CANCELLED | OUTPATIENT
Start: 2024-12-05

## 2024-12-04 RX ADMIN — SODIUM CHLORIDE 50 ML: 9 INJECTION, SOLUTION INTRAVENOUS at 09:52

## 2024-12-04 RX ADMIN — SODIUM CHLORIDE 400 MG: 9 INJECTION, SOLUTION INTRAVENOUS at 09:52

## 2024-12-04 ASSESSMENT — PAIN SCALES - GENERAL: PAINLEVEL_OUTOF10: NO PAIN (0)

## 2024-12-04 NOTE — PROGRESS NOTES
Infusion Nursing Note:  Luis Hernandez presents today for Keytruda.    Patient seen by provider today: Yes: Dr. Friedell   present during visit today: Not Applicable.    Note: N/A.    Intravenous Access:  Peripheral IV placed.    Treatment Conditions:  Lab Results   Component Value Date     12/03/2024    POTASSIUM 4.7 12/03/2024    MAG 2.2 04/24/2022    CR 0.74 12/03/2024    MIKE 9.1 12/03/2024    BILITOTAL 0.2 12/03/2024    ALBUMIN 3.8 12/03/2024    ALT 16 12/03/2024    AST 15 12/03/2024       Results reviewed, labs MET treatment parameters, ok to proceed with treatment.    Post Infusion Assessment:  Patient tolerated infusion without incident.  Blood return noted pre and post infusion.  Site patent and intact, free from redness, edema or discomfort.  No evidence of extravasations.  Access discontinued per protocol.     Discharge Plan:   Patient and/or family verbalized understanding of discharge instructions and all questions answered.  Patient discharged in stable condition accompanied by: self.  Departure Mode: Ambulatory.    Den Rodriguez RN

## 2024-12-04 NOTE — PROGRESS NOTES
[facility] Hematology and Oncology Outpatient Progress Note    Patient: Luis Hernandez  MRN: [id number]  Date of Service: Dec 4, 2024          Reason for Visit    Lung cancer    Assessment/Plan  >Stage IIIB squamous cell lung cancer  Bilateral lung nodules, indeterminate  Chemo-induced anemia  Low TSH  Not a surgical nor definitive chemoradiation candidate given his severe COPD.  There are also some indeterminate lung nodules, either inflammatory or separate metastatic sites that need to be followed.      Luis completed 3 cycles of palliative carboplatin, Taxol, pembrolizumab with Neulasta support. Got an excellent response, but complicated by hospitalization for pneumonia/COPD exacerbation. Therefore, further chemo discontinued and continued maintenance pembro since May.       -Proceed with pembrolizumab today q 6 week dosing  Revisit in  6 weeks at time of next pembrolizumab infusion.    -Follow with Pulmonary/PCP  -Will need to follow closely for risk of autoimmune pneumonitis on pembro  ______________________________________________________________________________    History of Present Illness/ Interval History    Mr. Luis Hernandez  is a 59 year old with at least locally advanced non-operable lung cancer. He completed 3 cycles of palliative carbo/Taxol/pembrolizumab (with Neulasta) with excellent MO/near CR but complicated by pneumonia/respiratory failure after 3rd cycle so further chemo discontinued. He has continued maintenance pembrolizumab alone.     Patient returns for follow-up.  He continues on oxygen 24/7.  He is maintaining his weight.  Takes Ensure as needed.  He does not have chills, fevers or sweats.  He does have a persistent cough with clear sputum.  He does not have chest pain or shortness of breath at rest.  There is no change in bowel or bladder habit.  He has no neuropathy.  He can go about his daily activities with limitations from COPD.  He has recurrent emergency room visits,  feeling that his airway is blocked. Inhalers don't help.  Eventually this symptom resolves.           ECOG Performance    1      Oncology History/Treatment  Diagnosis/Stage:   1/2024: Stage IIIB (cT4-cN2-cM0) squamous cell cancer (possible/indeterminate bilateral lung involvement, stage IV). Not candidate for definitive RT/surgery.  -Hx severe COPD (FEV1 18%), tobacco use (quit 2021)  -Followed in Pulmonary with CT for waxing/waning bilateral lung nodules  -9/2023 chest CT: new spiculated RUL nodule + persistent filling defect of bronchus intermedius/RLL bronchus concerning for possible endobronchial tumor vs mucus plugging; progressive subcarinal soft tissue mass  -PET: avid right  hilar mass encasing RML bronchus contiguous with subcarinal node/mass, intraluminal soft tissue density RLL bronchus concerning for primary tumor; avid bilateral opacities indeterminate but favored as inflammatory. Incidentally, distal sigmoid/rectal avid soft tissue thickening noted.   -10/2023 Cologuard negative  -Treated with empiric antibiotics for pneumonia. Follow-up CT: RADHA improved; new nodules through bilateral lungs (up to 7 mm) and persistent right lung mass/bronchial findings.   -1/18/2024 flex + rigid bronch with tumor debulking and intermedius bronchial stent placement, with RUL, R mainstem bronchus and bronchus intermedius biopsy: mod-diff squamous cell cancer. PDL1 CPS 10-20% and TPS 5%.   -Lung NGS panel: CDKN2A and PIK3CA alterations   -Brain MRI negative  8/2/2024 PET scan shows no active disease.  12/3/2024  Ct scan chest shows multiple new scattered nodules about 1 cm in size.  Short term follow up is ordered.  Patient on daily antibiotics now.       Treatment:  2/29/2024 - 4/12/2024: palliative carbo, Taxol, pembrolizumab + Neulasta support. Completed 3 cycles  -->following 3 cycles, had overall good HI/near CR; but complicated by hospitalization early May for hypoxic respiratory failure + pneumonia. Chemo stopped  "and treatment held until recovered.     5/15/2024 - present: maintenance pembrolizumab every 3 weeks  8/28/2024  change to 400mg dosage q 6 weeks        Physical Exam    BP (!) 145/102 (BP Location: Right arm, Patient Position: Sitting, Cuff Size: Adult Small)   Pulse 95   Temp 98.1  F (36.7  C) (Tympanic)   Resp 16   Ht 1.626 m (5' 4\")   Wt 55.3 kg (122 lb)   SpO2 99%   BMI 20.94 kg/m        GENERAL APPEARANCE: 59-year-old man in no apparent distress.  HEAD: Atraumatic; normocephalic; without lesions.  EYES: Conjunctiva, corneas and eyelids normal; pupils equal, round, reactive to light; No Icterus.  MOUTH/OROPHARYNX: Oral mucosa intact  NECK: Supple with no nodes.  LUNGS: Decreased breath sounds throughout.  HEART: Regular rhythm and rate; S1 and S2 normal; no murmurs noted.  ABDOMEN: Soft; no masses or tenderness, no hepatosplenomegaly.  NEUROLOGIC: Alert and oriented.  No obvious focal findings.  EXTREMITIES: No cyanosis, or edema.  SKIN: No abnormal bruising or bleeding. No suspicious lesions noted on exposed skin.  PSYCHIATRIC: Mental status normal; no apparent psychiatric issues        Lab Results    Recent Results (from the past week)   Comprehensive metabolic panel   Result Value Ref Range    Sodium 127 (L) 135 - 145 mmol/L    Potassium 4.3 3.4 - 5.3 mmol/L    Carbon Dioxide (CO2) 32 (H) 22 - 29 mmol/L    Anion Gap 8 7 - 15 mmol/L    Urea Nitrogen 12.5 6.0 - 20.0 mg/dL    Creatinine 0.58 (L) 0.67 - 1.17 mg/dL    GFR Estimate >90 >60 mL/min/1.73m2    Calcium 9.4 8.8 - 10.4 mg/dL    Chloride 87 (L) 98 - 107 mmol/L    Glucose 85 70 - 99 mg/dL    Alkaline Phosphatase 50 40 - 150 U/L    AST 17 0 - 45 U/L    ALT 14 0 - 70 U/L    Protein Total 7.1 6.4 - 8.3 g/dL    Albumin 4.3 3.5 - 5.2 g/dL    Bilirubin Total 0.3 <=1.2 mg/dL   CBC with platelets and differential   Result Value Ref Range    WBC Count 9.9 4.0 - 11.0 10e3/uL    RBC Count 4.10 (L) 4.40 - 5.90 10e6/uL    Hemoglobin 12.2 (L) 13.3 - 17.7 g/dL    " Hematocrit 38.8 (L) 40.0 - 53.0 %    MCV 95 78 - 100 fL    MCH 29.8 26.5 - 33.0 pg    MCHC 31.4 (L) 31.5 - 36.5 g/dL    RDW 13.3 10.0 - 15.0 %    Platelet Count 347 150 - 450 10e3/uL    % Neutrophils 70 %    % Lymphocytes 10 %    % Monocytes 14 %    % Eosinophils 5 %    % Basophils 1 %    % Immature Granulocytes 0 %    NRBCs per 100 WBC 0 <1 /100    Absolute Neutrophils 6.9 1.6 - 8.3 10e3/uL    Absolute Lymphocytes 1.0 0.8 - 5.3 10e3/uL    Absolute Monocytes 1.4 (H) 0.0 - 1.3 10e3/uL    Absolute Eosinophils 0.5 0.0 - 0.7 10e3/uL    Absolute Basophils 0.1 0.0 - 0.2 10e3/uL    Absolute Immature Granulocytes 0.0 <=0.4 10e3/uL    Absolute NRBCs 0.0 10e3/uL   Troponin T, High Sensitivity   Result Value Ref Range    Troponin T, High Sensitivity 15 <=22 ng/L   EKG 12 lead   Result Value Ref Range    Systolic Blood Pressure  mmHg    Diastolic Blood Pressure  mmHg    Ventricular Rate 89 BPM    Atrial Rate 89 BPM    AZ Interval 130 ms    QRS Duration 82 ms     ms    QTc 401 ms    P Axis 86 degrees    R AXIS 88 degrees    T Axis 75 degrees    Interpretation ECG       Sinus rhythm  Normal ECG  When compared with ECG of 23-Aug-2021 18:54,  Nonspecific T wave abnormality no longer evident in Inferior leads  Confirmed by SEE ED PROVIDER NOTE FOR, ECG INTERPRETATION ([id number]),  Olegario Patiño ([id number]) on 11/28/2024 9:12:15 AM     Blood gas venous   Result Value Ref Range    pH Venous 7.29 (L) 7.32 - 7.43    pCO2 Venous 75 (H) 40 - 50 mm Hg    pO2 Venous 37 25 - 47 mm Hg    Bicarbonate Venous 36 (H) 21 - 28 mmol/L    Base Excess/Deficit Venous 7.3 (H) -3.0 - 3.0 mmol/L    FIO2 30     Oxyhemoglobin Venous 67 (L) 70 - 75 %    O2 Sat, Venous 68.4 (L) 70.0 - 75.0 %   Troponin T, High Sensitivity   Result Value Ref Range    Troponin T, High Sensitivity 13 <=22 ng/L   Comprehensive metabolic panel   Result Value Ref Range    Sodium 138 135 - 145 mmol/L    Potassium 4.7 3.4 - 5.3 mmol/L    Carbon Dioxide (CO2) 34 (H) 22 - 29 mmol/L     Anion Gap 4 (L) 7 - 15 mmol/L    Urea Nitrogen 24.8 (H) 6.0 - 20.0 mg/dL    Creatinine 0.74 0.67 - 1.17 mg/dL    GFR Estimate >90 >60 mL/min/1.73m2    Calcium 9.1 8.8 - 10.4 mg/dL    Chloride 100 98 - 107 mmol/L    Glucose 97 70 - 99 mg/dL    Alkaline Phosphatase 42 40 - 150 U/L    AST 15 0 - 45 U/L    ALT 16 0 - 70 U/L    Protein Total 6.2 (L) 6.4 - 8.3 g/dL    Albumin 3.8 3.5 - 5.2 g/dL    Bilirubin Total 0.2 <=1.2 mg/dL   TSH with free T4 reflex   Result Value Ref Range    TSH 0.82 0.30 - 4.20 uIU/mL   CBC with platelets and differential   Result Value Ref Range    WBC Count 9.2 4.0 - 11.0 10e3/uL    RBC Count 3.74 (L) 4.40 - 5.90 10e6/uL    Hemoglobin 11.3 (L) 13.3 - 17.7 g/dL    Hematocrit 37.2 (L) 40.0 - 53.0 %     78 - 100 fL    MCH 30.2 26.5 - 33.0 pg    MCHC 30.4 (L) 31.5 - 36.5 g/dL    RDW 14.1 10.0 - 15.0 %    Platelet Count 394 150 - 450 10e3/uL    % Neutrophils 70 %    % Lymphocytes 14 %    % Monocytes 12 %    % Eosinophils 3 %    % Basophils 1 %    % Immature Granulocytes 0 %    NRBCs per 100 WBC 0 <1 /100    Absolute Neutrophils 6.4 1.6 - 8.3 10e3/uL    Absolute Lymphocytes 1.3 0.8 - 5.3 10e3/uL    Absolute Monocytes 1.1 0.0 - 1.3 10e3/uL    Absolute Eosinophils 0.3 0.0 - 0.7 10e3/uL    Absolute Basophils 0.1 0.0 - 0.2 10e3/uL    Absolute Immature Granulocytes 0.0 <=0.4 10e3/uL    Absolute NRBCs 0.0 10e3/uL         Imaging    CT Chest/Abdomen/Pelvis w Contrast    Result Date: 12/3/2024  CT CHEST/ABDOMEN/PELVIS W CONTRAST 12/3/2024 9:15 AM CLINICAL HISTORY: Locally advanced lung cancer - has achieved CR on treatment; Surveillance on immunotherapy rule out progression; Squamous cell lung cancer, right (H). TECHNIQUE: CT scan of the chest, abdomen, and pelvis was performed following injection of IV contrast. Multiplanar reformats were obtained. Dose reduction techniques were used. CONTRAST: 58mL Isovue 370 COMPARISON: 8/1/2024, 5/21/2024, and 4/11/2024 FINDINGS: LUNGS AND PLEURA: New irregular  opacity is seen in the posterior right lower lobe on image 171 of series 4. There are multiple new and slightly enlarged nodules throughout both lungs, but more so in the right lung. One of the largest measures 11 mm in the lateral right upper lobe on image 137 of series 4. Another is seen measuring 11 mm in the posterior right upper lobe on image 143. A few calcified granulomas are seen. Moderate emphysema is seen in the upper lungs. Stent in the bronchus intermedius has been removed. Some mucous plugging is noted in left lower lobe bronchi. No infiltrate, effusion, or consolidation. MEDIASTINUM/AXILLAE: No axillary adenopathy. The aorta is normal in caliber. Single borderline enlarged left hilar lymph node is unchanged at 10 mm short axis on image 92 of series 3. Soft tissue thickening about the bronchus intermedius appears unchanged. No pericardial effusion. CORONARY ARTERY CALCIFICATION: Moderate HEPATOBILIARY: Normal. PANCREAS: Normal. SPLEEN: Normal. ADRENAL GLANDS: Normal. KIDNEYS/BLADDER: Both kidneys are lobulated and show a few small cysts. No follow-up needed. No evidence of enhancing mass or hydronephrosis on either side. The bladder is unremarkable. BOWEL: No evidence for bowel obstruction. The majority of the small bowel is in the right abdomen. The duodenum does not cross midline. The majority of the colon is in the left abdomen. No colonic wall thickening or inflammatory change. Sigmoid diverticulosis is noted without evidence of diverticulitis. PELVIC ORGANS: No free fluid or adenopathy in the pelvis. ADDITIONAL FINDINGS: The aorta is normal in caliber with moderate atherosclerotic calcification. No adenopathy through the abdomen. Moderate-sized fat-containing periumbilical hernia is unchanged. MUSCULOSKELETAL: Degenerative changes are noted through the spine.     IMPRESSION: 1.  New irregular opacities in the posterior right lower lobe and multiple new or mildly enlarging nodules throughout both  lungs, predominately in the right lung. These measure up to 10 mm in size. These would be concerning for recurrent or metastatic disease. 2.  Moderate emphysema. 3.  Stable borderline enlarged left hilar lymph node and stable soft tissue thickening in the right hilum surrounding the bronchus intermedius. Previous bronchus intermedius stent has been removed. 4.  Congenital malrotation of the bowel with the majority of the small bowel in the right abdomen and the majority of the colon in the left abdomen. ULISES CORONA MD   SYSTEM ID:  [id number]    Chest XR,  PA & LAT    Result Date: 11/28/2024  EXAM: XR CHEST 2 VIEWS LOCATION: [facility] DATE: 11/28/2024 INDICATION: 59F, hx COPD, here with cough COMPARISON: PET scan from 8/1/2024.     IMPRESSION: Hyperinflated emphysematous lungs with patchy bibasilar atelectasis and scarring. No convincing focal consolidation, effusion, or pneumothorax. Mild wall thickening. Heart size is normal. No acute osseous findings.       I spent 35 minutes on the patient's visit today.  This included preparation for the visit, face-to-face time with the patient and documentation following the visit.  It did not include teaching or procedure time.    Signed by: Peter E. Friedell, MD

## 2024-12-04 NOTE — PROGRESS NOTES
"Oncology Rooming Note    December 4, 2024 8:33 AM   Luis Hernandez is a 59 year old male who presents for:    Chief Complaint   Patient presents with    Oncology Clinic Visit     Squamous cell lung cancer, right - Provider and infusion     Initial Vitals: BP (!) 145/102 (BP Location: Right arm, Patient Position: Sitting, Cuff Size: Adult Small)   Pulse 95   Temp 98.1  F (36.7  C) (Tympanic)   Resp 16   Ht 1.626 m (5' 4\")   Wt 55.3 kg (122 lb)   SpO2 99%   BMI 20.94 kg/m   Estimated body mass index is 20.94 kg/m  as calculated from the following:    Height as of this encounter: 1.626 m (5' 4\").    Weight as of this encounter: 55.3 kg (122 lb). Body surface area is 1.58 meters squared.  No Pain (0) Comment: Data Unavailable   No LMP for male patient.  Allergies reviewed: Yes  Medications reviewed: Yes    Medications: Medication refills not needed today.  Pharmacy name entered into [facility]:    [id number] - [place], [place] - [address] [id number] - [place], [place] - [address] [place] [address]    Frailty Screening:   Is the patient here for a new oncology consult visit in cancer care? 2. No      Clinical concerns:  None today.      Feli Mead CMA            "

## 2024-12-04 NOTE — LETTER
"12/4/2024      Luis Hernandez  [id number] [facility] [id number]      Dear Colleague,    Thank you for referring your patient, Luis Hernandez, to the [facility]. Please see a copy of my visit note below.    Oncology Rooming Note    December 4, 2024 8:33 AM   Luis Hernandez is a 59 year old male who presents for:    Chief Complaint   Patient presents with     Oncology Clinic Visit     Squamous cell lung cancer, right - Provider and infusion     Initial Vitals: BP (!) 145/102 (BP Location: Right arm, Patient Position: Sitting, Cuff Size: Adult Small)   Pulse 95   Temp 98.1  F (36.7  C) (Tympanic)   Resp 16   Ht 1.626 m (5' 4\")   Wt 55.3 kg (122 lb)   SpO2 99%   BMI 20.94 kg/m   Estimated body mass index is 20.94 kg/m  as calculated from the following:    Height as of this encounter: 1.626 m (5' 4\").    Weight as of this encounter: 55.3 kg (122 lb). Body surface area is 1.58 meters squared.  No Pain (0) Comment: Data Unavailable   No LMP for male patient.  Allergies reviewed: Yes  Medications reviewed: Yes    Medications: Medication refills not needed today.  Pharmacy name entered into [employer]:    [id number] - [place], [place] - [address] [id number] - [place], [place] - [address] [place] [address]    Frailty Screening:   Is the patient here for a new oncology consult visit in cancer care? 2. No      Clinical concerns:  None today.      Feli Mead, [facility]ology [facility] Oncology Outpatient Progress Note    Patient: Luis Hernandez  MRN: [id number]  Date of Service: Dec 4, 2024          Reason for Visit    Lung cancer    Assessment/Plan  >Stage IIIB squamous cell lung cancer  Bilateral lung nodules, indeterminate  Chemo-induced anemia  Low TSH  Not a surgical nor definitive chemoradiation candidate given his severe COPD.  There are also some indeterminate lung nodules, either inflammatory or separate metastatic sites that need " to be followed.      Luis completed 3 cycles of palliative carboplatin, Taxol, pembrolizumab with Neulasta support. Got an excellent response, but complicated by hospitalization for pneumonia/COPD exacerbation. Therefore, further chemo discontinued and continued maintenance pembro since May.       -Proceed with pembrolizumab today q 6 week dosing  Revisit in  6 weeks at time of next pembrolizumab infusion.    -Follow with Pulmonary/PCP  -Will need to follow closely for risk of autoimmune pneumonitis on pembro  ______________________________________________________________________________    History of Present Illness/ Interval History    Mr. Luis Hernandez  is a 59 year old with at least locally advanced non-operable lung cancer. He completed 3 cycles of palliative carbo/Taxol/pembrolizumab (with Neulasta) with excellent NV/near CR but complicated by pneumonia/respiratory failure after 3rd cycle so further chemo discontinued. He has continued maintenance pembrolizumab alone.     Patient returns for follow-up.  He continues on oxygen 24/7.  He is maintaining his weight.  Takes Ensure as needed.  He does not have chills, fevers or sweats.  He does have a persistent cough with clear sputum.  He does not have chest pain or shortness of breath at rest.  There is no change in bowel or bladder habit.  He has no neuropathy.  He can go about his daily activities with limitations from COPD.  He has recurrent emergency room visits, feeling that his airway is blocked. Inhalers don't help.  Eventually this symptom resolves.           ECOG Performance    1      Oncology History/Treatment  Diagnosis/Stage:   1/2024: Stage IIIB (cT4-cN2-cM0) squamous cell cancer (possible/indeterminate bilateral lung involvement, stage IV). Not candidate for definitive RT/surgery.  -Hx severe COPD (FEV1 18%), tobacco use (quit 2021)  -Followed in Pulmonary with CT for waxing/waning bilateral lung nodules  -9/2023 chest CT: new spiculated RUL  "nodule + persistent filling defect of bronchus intermedius/RLL bronchus concerning for possible endobronchial tumor vs mucus plugging; progressive subcarinal soft tissue mass  -PET: avid right  hilar mass encasing RML bronchus contiguous with subcarinal node/mass, intraluminal soft tissue density RLL bronchus concerning for primary tumor; avid bilateral opacities indeterminate but favored as inflammatory. Incidentally, distal sigmoid/rectal avid soft tissue thickening noted.   -10/2023 Cologuard negative  -Treated with empiric antibiotics for pneumonia. Follow-up CT: RADHA improved; new nodules through bilateral lungs (up to 7 mm) and persistent right lung mass/bronchial findings.   -1/18/2024 flex + rigid bronch with tumor debulking and intermedius bronchial stent placement, with RUL, R mainstem bronchus and bronchus intermedius biopsy: mod-diff squamous cell cancer. PDL1 CPS 10-20% and TPS 5%.   -Lung NGS panel: CDKN2A and PIK3CA alterations   -Brain MRI negative  8/2/2024 PET scan shows no active disease.  12/3/2024  Ct scan chest shows multiple new scattered nodules about 1 cm in size.  Short term follow up is ordered.  Patient on daily antibiotics now.       Treatment:  2/29/2024 - 4/12/2024: palliative carbo, Taxol, pembrolizumab + Neulasta support. Completed 3 cycles  -->following 3 cycles, had overall good OR/near CR; but complicated by hospitalization early May for hypoxic respiratory failure + pneumonia. Chemo stopped and treatment held until recovered.     5/15/2024 - present: maintenance pembrolizumab every 3 weeks  8/28/2024  change to 400mg dosage q 6 weeks        Physical Exam    BP (!) 145/102 (BP Location: Right arm, Patient Position: Sitting, Cuff Size: Adult Small)   Pulse 95   Temp 98.1  F (36.7  C) (Tympanic)   Resp 16   Ht 1.626 m (5' 4\")   Wt 55.3 kg (122 lb)   SpO2 99%   BMI 20.94 kg/m        GENERAL APPEARANCE: 59-year-old man in no apparent distress.  HEAD: Atraumatic; normocephalic; " without lesions.  EYES: Conjunctiva, corneas and eyelids normal; pupils equal, round, reactive to light; No Icterus.  MOUTH/OROPHARYNX: Oral mucosa intact  NECK: Supple with no nodes.  LUNGS: Decreased breath sounds throughout.  HEART: Regular rhythm and rate; S1 and S2 normal; no murmurs noted.  ABDOMEN: Soft; no masses or tenderness, no hepatosplenomegaly.  NEUROLOGIC: Alert and oriented.  No obvious focal findings.  EXTREMITIES: No cyanosis, or edema.  SKIN: No abnormal bruising or bleeding. No suspicious lesions noted on exposed skin.  PSYCHIATRIC: Mental status normal; no apparent psychiatric issues        Lab Results    Recent Results (from the past week)   Comprehensive metabolic panel   Result Value Ref Range    Sodium 127 (L) 135 - 145 mmol/L    Potassium 4.3 3.4 - 5.3 mmol/L    Carbon Dioxide (CO2) 32 (H) 22 - 29 mmol/L    Anion Gap 8 7 - 15 mmol/L    Urea Nitrogen 12.5 6.0 - 20.0 mg/dL    Creatinine 0.58 (L) 0.67 - 1.17 mg/dL    GFR Estimate >90 >60 mL/min/1.73m2    Calcium 9.4 8.8 - 10.4 mg/dL    Chloride 87 (L) 98 - 107 mmol/L    Glucose 85 70 - 99 mg/dL    Alkaline Phosphatase 50 40 - 150 U/L    AST 17 0 - 45 U/L    ALT 14 0 - 70 U/L    Protein Total 7.1 6.4 - 8.3 g/dL    Albumin 4.3 3.5 - 5.2 g/dL    Bilirubin Total 0.3 <=1.2 mg/dL   CBC with platelets and differential   Result Value Ref Range    WBC Count 9.9 4.0 - 11.0 10e3/uL    RBC Count 4.10 (L) 4.40 - 5.90 10e6/uL    Hemoglobin 12.2 (L) 13.3 - 17.7 g/dL    Hematocrit 38.8 (L) 40.0 - 53.0 %    MCV 95 78 - 100 fL    MCH 29.8 26.5 - 33.0 pg    MCHC 31.4 (L) 31.5 - 36.5 g/dL    RDW 13.3 10.0 - 15.0 %    Platelet Count 347 150 - 450 10e3/uL    % Neutrophils 70 %    % Lymphocytes 10 %    % Monocytes 14 %    % Eosinophils 5 %    % Basophils 1 %    % Immature Granulocytes 0 %    NRBCs per 100 WBC 0 <1 /100    Absolute Neutrophils 6.9 1.6 - 8.3 10e3/uL    Absolute Lymphocytes 1.0 0.8 - 5.3 10e3/uL    Absolute Monocytes 1.4 (H) 0.0 - 1.3 10e3/uL     Absolute Eosinophils 0.5 0.0 - 0.7 10e3/uL    Absolute Basophils 0.1 0.0 - 0.2 10e3/uL    Absolute Immature Granulocytes 0.0 <=0.4 10e3/uL    Absolute NRBCs 0.0 10e3/uL   Troponin T, High Sensitivity   Result Value Ref Range    Troponin T, High Sensitivity 15 <=22 ng/L   EKG 12 lead   Result Value Ref Range    Systolic Blood Pressure  mmHg    Diastolic Blood Pressure  mmHg    Ventricular Rate 89 BPM    Atrial Rate 89 BPM    ID Interval 130 ms    QRS Duration 82 ms     ms    QTc 401 ms    P Axis 86 degrees    R AXIS 88 degrees    T Axis 75 degrees    Interpretation ECG       Sinus rhythm  Normal ECG  When compared with ECG of 23-Aug-2021 18:54,  Nonspecific T wave abnormality no longer evident in Inferior leads  Confirmed by SEE ED PROVIDER NOTE FOR, ECG INTERPRETATION ([id number]),  Olegario Patiño ([id number]) on 11/28/2024 9:12:15 AM     Blood gas venous   Result Value Ref Range    pH Venous 7.29 (L) 7.32 - 7.43    pCO2 Venous 75 (H) 40 - 50 mm Hg    pO2 Venous 37 25 - 47 mm Hg    Bicarbonate Venous 36 (H) 21 - 28 mmol/L    Base Excess/Deficit Venous 7.3 (H) -3.0 - 3.0 mmol/L    FIO2 30     Oxyhemoglobin Venous 67 (L) 70 - 75 %    O2 Sat, Venous 68.4 (L) 70.0 - 75.0 %   Troponin T, High Sensitivity   Result Value Ref Range    Troponin T, High Sensitivity 13 <=22 ng/L   Comprehensive metabolic panel   Result Value Ref Range    Sodium 138 135 - 145 mmol/L    Potassium 4.7 3.4 - 5.3 mmol/L    Carbon Dioxide (CO2) 34 (H) 22 - 29 mmol/L    Anion Gap 4 (L) 7 - 15 mmol/L    Urea Nitrogen 24.8 (H) 6.0 - 20.0 mg/dL    Creatinine 0.74 0.67 - 1.17 mg/dL    GFR Estimate >90 >60 mL/min/1.73m2    Calcium 9.1 8.8 - 10.4 mg/dL    Chloride 100 98 - 107 mmol/L    Glucose 97 70 - 99 mg/dL    Alkaline Phosphatase 42 40 - 150 U/L    AST 15 0 - 45 U/L    ALT 16 0 - 70 U/L    Protein Total 6.2 (L) 6.4 - 8.3 g/dL    Albumin 3.8 3.5 - 5.2 g/dL    Bilirubin Total 0.2 <=1.2 mg/dL   TSH with free T4 reflex   Result Value Ref Range    TSH 0.82  0.30 - 4.20 uIU/mL   CBC with platelets and differential   Result Value Ref Range    WBC Count 9.2 4.0 - 11.0 10e3/uL    RBC Count 3.74 (L) 4.40 - 5.90 10e6/uL    Hemoglobin 11.3 (L) 13.3 - 17.7 g/dL    Hematocrit 37.2 (L) 40.0 - 53.0 %     78 - 100 fL    MCH 30.2 26.5 - 33.0 pg    MCHC 30.4 (L) 31.5 - 36.5 g/dL    RDW 14.1 10.0 - 15.0 %    Platelet Count 394 150 - 450 10e3/uL    % Neutrophils 70 %    % Lymphocytes 14 %    % Monocytes 12 %    % Eosinophils 3 %    % Basophils 1 %    % Immature Granulocytes 0 %    NRBCs per 100 WBC 0 <1 /100    Absolute Neutrophils 6.4 1.6 - 8.3 10e3/uL    Absolute Lymphocytes 1.3 0.8 - 5.3 10e3/uL    Absolute Monocytes 1.1 0.0 - 1.3 10e3/uL    Absolute Eosinophils 0.3 0.0 - 0.7 10e3/uL    Absolute Basophils 0.1 0.0 - 0.2 10e3/uL    Absolute Immature Granulocytes 0.0 <=0.4 10e3/uL    Absolute NRBCs 0.0 10e3/uL         Imaging    CT Chest/Abdomen/Pelvis w Contrast    Result Date: 12/3/2024  CT CHEST/ABDOMEN/PELVIS W CONTRAST 12/3/2024 9:15 AM CLINICAL HISTORY: Locally advanced lung cancer - has achieved CR on treatment; Surveillance on immunotherapy rule out progression; Squamous cell lung cancer, right (H). TECHNIQUE: CT scan of the chest, abdomen, and pelvis was performed following injection of IV contrast. Multiplanar reformats were obtained. Dose reduction techniques were used. CONTRAST: 58mL Isovue 370 COMPARISON: 8/1/2024, 5/21/2024, and 4/11/2024 FINDINGS: LUNGS AND PLEURA: New irregular opacity is seen in the posterior right lower lobe on image 171 of series 4. There are multiple new and slightly enlarged nodules throughout both lungs, but more so in the right lung. One of the largest measures 11 mm in the lateral right upper lobe on image 137 of series 4. Another is seen measuring 11 mm in the posterior right upper lobe on image 143. A few calcified granulomas are seen. Moderate emphysema is seen in the upper lungs. Stent in the bronchus intermedius has been removed.  Some mucous plugging is noted in left lower lobe bronchi. No infiltrate, effusion, or consolidation. MEDIASTINUM/AXILLAE: No axillary adenopathy. The aorta is normal in caliber. Single borderline enlarged left hilar lymph node is unchanged at 10 mm short axis on image 92 of series 3. Soft tissue thickening about the bronchus intermedius appears unchanged. No pericardial effusion. CORONARY ARTERY CALCIFICATION: Moderate HEPATOBILIARY: Normal. PANCREAS: Normal. SPLEEN: Normal. ADRENAL GLANDS: Normal. KIDNEYS/BLADDER: Both kidneys are lobulated and show a few small cysts. No follow-up needed. No evidence of enhancing mass or hydronephrosis on either side. The bladder is unremarkable. BOWEL: No evidence for bowel obstruction. The majority of the small bowel is in the right abdomen. The duodenum does not cross midline. The majority of the colon is in the left abdomen. No colonic wall thickening or inflammatory change. Sigmoid diverticulosis is noted without evidence of diverticulitis. PELVIC ORGANS: No free fluid or adenopathy in the pelvis. ADDITIONAL FINDINGS: The aorta is normal in caliber with moderate atherosclerotic calcification. No adenopathy through the abdomen. Moderate-sized fat-containing periumbilical hernia is unchanged. MUSCULOSKELETAL: Degenerative changes are noted through the spine.     IMPRESSION: 1.  New irregular opacities in the posterior right lower lobe and multiple new or mildly enlarging nodules throughout both lungs, predominately in the right lung. These measure up to 10 mm in size. These would be concerning for recurrent or metastatic disease. 2.  Moderate emphysema. 3.  Stable borderline enlarged left hilar lymph node and stable soft tissue thickening in the right hilum surrounding the bronchus intermedius. Previous bronchus intermedius stent has been removed. 4.  Congenital malrotation of the bowel with the majority of the small bowel in the right abdomen and the majority of the colon in  the left abdomen. ULISES CORONA MD   SYSTEM ID:  [id number]    Chest XR,  PA & LAT    Result Date: 11/28/2024  EXAM: XR CHEST 2 VIEWS LOCATION: [facility] DATE: 11/28/2024 INDICATION: 59F, hx COPD, here with cough COMPARISON: PET scan from 8/1/2024.     IMPRESSION: Hyperinflated emphysematous lungs with patchy bibasilar atelectasis and scarring. No convincing focal consolidation, effusion, or pneumothorax. Mild wall thickening. Heart size is normal. No acute osseous findings.       I spent 35 minutes on the patient's visit today.  This included preparation for the visit, face-to-face time with the patient and documentation following the visit.  It did not include teaching or procedure time.    Signed by: Peter E. Friedell, MD        Again, thank you for allowing me to participate in the care of your patient.        Sincerely,        Peter E. Friedell, MD

## 2024-12-22 ENCOUNTER — HOSPITAL ENCOUNTER (INPATIENT)
Facility: CLINIC | Age: 59
LOS: 4 days | Discharge: HOME | End: 2024-12-26
Attending: EMERGENCY MEDICINE | Admitting: INTERNAL MEDICINE
Payer: COMMERCIAL

## 2024-12-22 ENCOUNTER — APPOINTMENT (OUTPATIENT)
Dept: GENERAL RADIOLOGY | Facility: CLINIC | Age: 59
DRG: 190 | End: 2024-12-22
Attending: EMERGENCY MEDICINE
Payer: COMMERCIAL

## 2024-12-22 DIAGNOSIS — Z99.81 DEPENDENCE ON SUPPLEMENTAL OXYGEN: ICD-10-CM

## 2024-12-22 DIAGNOSIS — J96.22 ACUTE ON CHRONIC RESPIRATORY FAILURE WITH HYPERCAPNIA (H): ICD-10-CM

## 2024-12-22 DIAGNOSIS — J44.1 COPD EXACERBATION (H): ICD-10-CM

## 2024-12-22 DIAGNOSIS — R00.0 TACHYCARDIA, UNSPECIFIED: Primary | ICD-10-CM

## 2024-12-22 LAB
ANION GAP SERPL CALCULATED.3IONS-SCNC: 12 MMOL/L (ref 7–15)
BASE EXCESS BLDV CALC-SCNC: 5.2 MMOL/L (ref -3–3)
BASOPHILS # BLD AUTO: 0.2 10E3/UL (ref 0–0.2)
BASOPHILS NFR BLD AUTO: 2 %
BUN SERPL-MCNC: 12.6 MG/DL (ref 6–20)
CALCIUM SERPL-MCNC: 9.4 MG/DL (ref 8.8–10.4)
CHLORIDE SERPL-SCNC: 84 MMOL/L (ref 98–107)
CREAT SERPL-MCNC: 0.69 MG/DL (ref 0.67–1.17)
EGFRCR SERPLBLD CKD-EPI 2021: >90 ML/MIN/1.73M2
EOSINOPHIL # BLD AUTO: 0.7 10E3/UL (ref 0–0.7)
EOSINOPHIL NFR BLD AUTO: 5 %
ERYTHROCYTE [DISTWIDTH] IN BLOOD BY AUTOMATED COUNT: 12.9 % (ref 10–15)
FLUAV RNA SPEC QL NAA+PROBE: NEGATIVE
FLUBV RNA RESP QL NAA+PROBE: NEGATIVE
GLUCOSE SERPL-MCNC: 98 MG/DL (ref 70–99)
HCO3 BLDV-SCNC: 35 MMOL/L (ref 21–28)
HCO3 SERPL-SCNC: 30 MMOL/L (ref 22–29)
HCT VFR BLD AUTO: 37.2 % (ref 40–53)
HGB BLD-MCNC: 12.2 G/DL (ref 13.3–17.7)
HOLD SPECIMEN: NORMAL
HOLD SPECIMEN: YES
IMM GRANULOCYTES # BLD: 0.1 10E3/UL
IMM GRANULOCYTES NFR BLD: 0 %
LYMPHOCYTES # BLD AUTO: 2.2 10E3/UL (ref 0.8–5.3)
LYMPHOCYTES NFR BLD AUTO: 17 %
MCH RBC QN AUTO: 30.7 PG (ref 26.5–33)
MCHC RBC AUTO-ENTMCNC: 32.8 G/DL (ref 31.5–36.5)
MCV RBC AUTO: 94 FL (ref 78–100)
MONOCYTES # BLD AUTO: 1.7 10E3/UL (ref 0–1.3)
MONOCYTES NFR BLD AUTO: 13 %
NEUTROPHILS # BLD AUTO: 8.3 10E3/UL (ref 1.6–8.3)
NEUTROPHILS NFR BLD AUTO: 63 %
NRBC # BLD AUTO: 0 10E3/UL
NRBC BLD AUTO-RTO: 0 /100
NT-PROBNP SERPL-MCNC: 129 PG/ML (ref 0–900)
O2/TOTAL GAS SETTING VFR VENT: 35 %
OXYHGB MFR BLDV: 65 % (ref 70–75)
PCO2 BLDV: 77 MM HG (ref 40–50)
PH BLDV: 7.26 [PH] (ref 7.32–7.43)
PLAT MORPH BLD: NORMAL
PLATELET # BLD AUTO: 389 10E3/UL (ref 150–450)
PO2 BLDV: 38 MM HG (ref 25–47)
POTASSIUM SERPL-SCNC: 4.5 MMOL/L (ref 3.4–5.3)
RBC # BLD AUTO: 3.98 10E6/UL (ref 4.4–5.9)
RBC MORPH BLD: NORMAL
RSV RNA SPEC NAA+PROBE: NEGATIVE
SAO2 % BLDV: 67 % (ref 70–75)
SARS-COV-2 RNA RESP QL NAA+PROBE: NEGATIVE
SODIUM SERPL-SCNC: 126 MMOL/L (ref 135–145)
WBC # BLD AUTO: 13.2 10E3/UL (ref 4–11)

## 2024-12-22 PROCEDURE — 99291 CRITICAL CARE FIRST HOUR: CPT | Mod: 25 | Performed by: EMERGENCY MEDICINE

## 2024-12-22 PROCEDURE — 93010 ELECTROCARDIOGRAM REPORT: CPT | Performed by: EMERGENCY MEDICINE

## 2024-12-22 PROCEDURE — 94667 MNPJ CHEST WALL 1ST: CPT

## 2024-12-22 PROCEDURE — 250N000013 HC RX MED GY IP 250 OP 250 PS 637: Performed by: STUDENT IN AN ORGANIZED HEALTH CARE EDUCATION/TRAINING PROGRAM

## 2024-12-22 PROCEDURE — 36415 COLL VENOUS BLD VENIPUNCTURE: CPT | Performed by: EMERGENCY MEDICINE

## 2024-12-22 PROCEDURE — 94640 AIRWAY INHALATION TREATMENT: CPT

## 2024-12-22 PROCEDURE — 82805 BLOOD GASES W/O2 SATURATION: CPT | Performed by: EMERGENCY MEDICINE

## 2024-12-22 PROCEDURE — 87637 SARSCOV2&INF A&B&RSV AMP PRB: CPT | Performed by: EMERGENCY MEDICINE

## 2024-12-22 PROCEDURE — 71045 X-RAY EXAM CHEST 1 VIEW: CPT

## 2024-12-22 PROCEDURE — 80048 BASIC METABOLIC PNL TOTAL CA: CPT | Performed by: EMERGENCY MEDICINE

## 2024-12-22 PROCEDURE — 85025 COMPLETE CBC W/AUTO DIFF WBC: CPT | Performed by: EMERGENCY MEDICINE

## 2024-12-22 PROCEDURE — 99291 CRITICAL CARE FIRST HOUR: CPT | Performed by: EMERGENCY MEDICINE

## 2024-12-22 PROCEDURE — 999N000215 HC STATISTIC HFNC ADULT NON-CPAP

## 2024-12-22 PROCEDURE — 250N000011 HC RX IP 250 OP 636: Performed by: STUDENT IN AN ORGANIZED HEALTH CARE EDUCATION/TRAINING PROGRAM

## 2024-12-22 PROCEDURE — 200N000001 HC R&B ICU

## 2024-12-22 PROCEDURE — 5A09357 ASSISTANCE WITH RESPIRATORY VENTILATION, LESS THAN 24 CONSECUTIVE HOURS, CONTINUOUS POSITIVE AIRWAY PRESSURE: ICD-10-PCS | Performed by: EMERGENCY MEDICINE

## 2024-12-22 PROCEDURE — 94660 CPAP INITIATION&MGMT: CPT

## 2024-12-22 PROCEDURE — 250N000009 HC RX 250: Performed by: EMERGENCY MEDICINE

## 2024-12-22 PROCEDURE — 250N000009 HC RX 250: Performed by: STUDENT IN AN ORGANIZED HEALTH CARE EDUCATION/TRAINING PROGRAM

## 2024-12-22 PROCEDURE — 999N000157 HC STATISTIC RCP TIME EA 10 MIN

## 2024-12-22 PROCEDURE — 99222 1ST HOSP IP/OBS MODERATE 55: CPT | Performed by: STUDENT IN AN ORGANIZED HEALTH CARE EDUCATION/TRAINING PROGRAM

## 2024-12-22 PROCEDURE — 83880 ASSAY OF NATRIURETIC PEPTIDE: CPT | Performed by: EMERGENCY MEDICINE

## 2024-12-22 PROCEDURE — 93005 ELECTROCARDIOGRAM TRACING: CPT | Performed by: EMERGENCY MEDICINE

## 2024-12-22 RX ORDER — METOPROLOL TARTRATE 25 MG/1
25 TABLET, FILM COATED ORAL 2 TIMES DAILY
Status: DISCONTINUED | OUTPATIENT
Start: 2024-12-22 | End: 2024-12-26 | Stop reason: HOSPADM

## 2024-12-22 RX ORDER — POLYETHYLENE GLYCOL 3350 17 G/17G
17 POWDER, FOR SOLUTION ORAL DAILY PRN
Status: DISCONTINUED | OUTPATIENT
Start: 2024-12-22 | End: 2024-12-26 | Stop reason: HOSPADM

## 2024-12-22 RX ORDER — PREDNISONE 20 MG/1
40 TABLET ORAL DAILY
Status: DISCONTINUED | OUTPATIENT
Start: 2024-12-23 | End: 2024-12-26 | Stop reason: HOSPADM

## 2024-12-22 RX ORDER — CARBOXYMETHYLCELLULOSE SODIUM 5 MG/ML
1 SOLUTION/ DROPS OPHTHALMIC
Status: DISCONTINUED | OUTPATIENT
Start: 2024-12-22 | End: 2024-12-26 | Stop reason: HOSPADM

## 2024-12-22 RX ORDER — AMOXICILLIN 250 MG
1 CAPSULE ORAL 2 TIMES DAILY PRN
Status: DISCONTINUED | OUTPATIENT
Start: 2024-12-22 | End: 2024-12-26 | Stop reason: HOSPADM

## 2024-12-22 RX ORDER — IPRATROPIUM BROMIDE AND ALBUTEROL SULFATE 2.5; .5 MG/3ML; MG/3ML
3 SOLUTION RESPIRATORY (INHALATION)
Status: DISCONTINUED | OUTPATIENT
Start: 2024-12-22 | End: 2024-12-24

## 2024-12-22 RX ORDER — AMOXICILLIN 250 MG
2 CAPSULE ORAL 2 TIMES DAILY PRN
Status: DISCONTINUED | OUTPATIENT
Start: 2024-12-22 | End: 2024-12-26 | Stop reason: HOSPADM

## 2024-12-22 RX ORDER — ENOXAPARIN SODIUM 100 MG/ML
40 INJECTION SUBCUTANEOUS EVERY 24 HOURS
Status: DISCONTINUED | OUTPATIENT
Start: 2024-12-22 | End: 2024-12-26 | Stop reason: HOSPADM

## 2024-12-22 RX ORDER — NICOTINE POLACRILEX 4 MG
15-30 LOZENGE BUCCAL
Status: DISCONTINUED | OUTPATIENT
Start: 2024-12-22 | End: 2024-12-26 | Stop reason: HOSPADM

## 2024-12-22 RX ORDER — DEXTROSE MONOHYDRATE 25 G/50ML
25-50 INJECTION, SOLUTION INTRAVENOUS
Status: DISCONTINUED | OUTPATIENT
Start: 2024-12-22 | End: 2024-12-26 | Stop reason: HOSPADM

## 2024-12-22 RX ORDER — IBUPROFEN 200 MG
3 TABLET ORAL EVERY 6 HOURS PRN
COMMUNITY

## 2024-12-22 RX ORDER — ONDANSETRON 2 MG/ML
4 INJECTION INTRAMUSCULAR; INTRAVENOUS EVERY 6 HOURS PRN
Status: DISCONTINUED | OUTPATIENT
Start: 2024-12-22 | End: 2024-12-26 | Stop reason: HOSPADM

## 2024-12-22 RX ORDER — FLUTICASONE FUROATE AND VILANTEROL 200; 25 UG/1; UG/1
1 POWDER RESPIRATORY (INHALATION) DAILY
Status: DISCONTINUED | OUTPATIENT
Start: 2024-12-23 | End: 2024-12-26 | Stop reason: HOSPADM

## 2024-12-22 RX ORDER — ONDANSETRON 4 MG/1
4 TABLET, ORALLY DISINTEGRATING ORAL EVERY 6 HOURS PRN
Status: DISCONTINUED | OUTPATIENT
Start: 2024-12-22 | End: 2024-12-26 | Stop reason: HOSPADM

## 2024-12-22 RX ORDER — PROCHLORPERAZINE MALEATE 5 MG/1
10 TABLET ORAL EVERY 6 HOURS PRN
Status: DISCONTINUED | OUTPATIENT
Start: 2024-12-22 | End: 2024-12-26 | Stop reason: HOSPADM

## 2024-12-22 RX ORDER — METOPROLOL TARTRATE 25 MG/1
25 TABLET, FILM COATED ORAL 2 TIMES DAILY
Status: DISCONTINUED | OUTPATIENT
Start: 2024-12-22 | End: 2024-12-22

## 2024-12-22 RX ORDER — AZITHROMYCIN 250 MG/1
250 TABLET, FILM COATED ORAL DAILY
Status: DISCONTINUED | OUTPATIENT
Start: 2024-12-23 | End: 2024-12-26 | Stop reason: HOSPADM

## 2024-12-22 RX ORDER — ALBUTEROL SULFATE 90 UG/1
2 INHALANT RESPIRATORY (INHALATION) EVERY 4 HOURS PRN
Status: DISCONTINUED | OUTPATIENT
Start: 2024-12-22 | End: 2024-12-23

## 2024-12-22 RX ORDER — LISINOPRIL 40 MG/1
40 TABLET ORAL DAILY
Status: DISCONTINUED | OUTPATIENT
Start: 2024-12-22 | End: 2024-12-26 | Stop reason: HOSPADM

## 2024-12-22 RX ORDER — ALBUTEROL SULFATE 0.83 MG/ML
2.5 SOLUTION RESPIRATORY (INHALATION)
Status: DISCONTINUED | OUTPATIENT
Start: 2024-12-22 | End: 2024-12-24

## 2024-12-22 RX ORDER — IPRATROPIUM BROMIDE AND ALBUTEROL SULFATE 2.5; .5 MG/3ML; MG/3ML
3 SOLUTION RESPIRATORY (INHALATION) ONCE
Status: COMPLETED | OUTPATIENT
Start: 2024-12-22 | End: 2024-12-22

## 2024-12-22 RX ORDER — AMLODIPINE BESYLATE 10 MG/1
10 TABLET ORAL DAILY
Status: DISCONTINUED | OUTPATIENT
Start: 2024-12-22 | End: 2024-12-26 | Stop reason: HOSPADM

## 2024-12-22 RX ADMIN — ENOXAPARIN SODIUM 40 MG: 40 INJECTION SUBCUTANEOUS at 14:10

## 2024-12-22 RX ADMIN — ALBUTEROL SULFATE 2 PUFF: 90 AEROSOL, METERED RESPIRATORY (INHALATION) at 14:40

## 2024-12-22 RX ADMIN — IPRATROPIUM BROMIDE AND ALBUTEROL SULFATE 3 ML: .5; 3 SOLUTION RESPIRATORY (INHALATION) at 09:34

## 2024-12-22 RX ADMIN — IPRATROPIUM BROMIDE AND ALBUTEROL SULFATE 3 ML: 2.5; .5 SOLUTION RESPIRATORY (INHALATION) at 21:23

## 2024-12-22 RX ADMIN — METOPROLOL TARTRATE 25 MG: 25 TABLET, FILM COATED ORAL at 22:01

## 2024-12-22 ASSESSMENT — ACTIVITIES OF DAILY LIVING (ADL)
CONCENTRATING,_REMEMBERING_OR_MAKING_DECISIONS_DIFFICULTY: NO
ADLS_ACUITY_SCORE: 31
HEARING_DIFFICULTY_OR_DEAF: NO
DIFFICULTY_COMMUNICATING: NO
ADLS_ACUITY_SCORE: 55
ADLS_ACUITY_SCORE: 31
DRESSING/BATHING_DIFFICULTY: NO
ADLS_ACUITY_SCORE: 29
ADLS_ACUITY_SCORE: 29
ADLS_ACUITY_SCORE: 31
ADLS_ACUITY_SCORE: 31
WEAR_GLASSES_OR_BLIND: NO
ADLS_ACUITY_SCORE: 31
DOING_ERRANDS_INDEPENDENTLY_DIFFICULTY: NO
ADLS_ACUITY_SCORE: 31
CHANGE_IN_FUNCTIONAL_STATUS_SINCE_ONSET_OF_CURRENT_ILLNESS/INJURY: YES
ADLS_ACUITY_SCORE: 31
ADLS_ACUITY_SCORE: 31
WALKING_OR_CLIMBING_STAIRS_DIFFICULTY: NO
FALL_HISTORY_WITHIN_LAST_SIX_MONTHS: NO
EQUIPMENT_CURRENTLY_USED_AT_HOME: WALKER, STANDARD
TOILETING_ISSUES: NO
ADLS_ACUITY_SCORE: 55
DIFFICULTY_EATING/SWALLOWING: NO
ADLS_ACUITY_SCORE: 55
ADLS_ACUITY_SCORE: 31
ADLS_ACUITY_SCORE: 31

## 2024-12-22 ASSESSMENT — COLUMBIA-SUICIDE SEVERITY RATING SCALE - C-SSRS
2. HAVE YOU ACTUALLY HAD ANY THOUGHTS OF KILLING YOURSELF IN THE PAST MONTH?: NO
1. IN THE PAST MONTH, HAVE YOU WISHED YOU WERE DEAD OR WISHED YOU COULD GO TO SLEEP AND NOT WAKE UP?: NO
6. HAVE YOU EVER DONE ANYTHING, STARTED TO DO ANYTHING, OR PREPARED TO DO ANYTHING TO END YOUR LIFE?: NO

## 2024-12-22 NOTE — MEDICATION SCRIBE - ADMISSION MEDICATION HISTORY
Medication Scribe Admission Medication History    Admission medication history is complete. The information provided in this note is only as accurate as the sources available at the time of the update.    Information Source(s): Patient via in-person    Pertinent Information:     Changes made to PTA medication list:  Added: tylenol, nasal spray  Deleted: prednisone 10 and 20, mag oxide  Changed: None    Allergies reviewed with patient and updates made in EHR: yes    Medication History Completed By: Kerry Sparks 12/22/2024 10:31 AM    PTA Med List   Medication Sig Last Dose/Taking    albuterol (PROAIR HFA/PROVENTIL HFA/VENTOLIN HFA) 108 (90 Base) MCG/ACT inhaler Inhale 2 puffs into the lungs every 4 hours as needed for shortness of breath 12/22/2024    albuterol (PROVENTIL) (2.5 MG/3ML) 0.083% neb solution Take 1 vial (2.5 mg) by nebulization every 4 hours as needed for shortness of breath, wheezing or cough. 12/22/2024    amLODIPine (NORVASC) 10 MG tablet Take 1 tablet (10 mg) by mouth daily. 12/21/2024 Morning    azithromycin (ZITHROMAX) 250 MG tablet Take 1 tablet (250 mg) by mouth daily. 12/21/2024 Morning    fluticasone-salmeterol (ADVAIR) 500-50 MCG/ACT inhaler Inhale 1 puff into the lungs every 12 hours. 12/22/2024 Morning    ibuprofen (ADVIL/MOTRIN) 200 MG tablet Take 3 tablets by mouth every 6 hours as needed for pain. 12/21/2024 Bedtime    lisinopril (ZESTRIL) 40 MG tablet Take 1 tablet (40 mg) by mouth daily 12/21/2024 Morning    metoprolol tartrate (LOPRESSOR) 25 MG tablet Take 1 tablet (25 mg) by mouth 2 times daily 12/21/2024 Bedtime    Oxymetazoline HCl (NASAL SPRAY NA) Spray in nostril as needed. Past Week    tiotropium (SPIRIVA RESPIMAT) 2.5 MCG/ACT inhaler Inhale 2 puffs into the lungs daily 12/22/2024 Morning

## 2024-12-22 NOTE — ED TRIAGE NOTES
Here via EMS from home for possible COPD exacerbation, 2 nebs and 125mg solumedrol given via EMS. EMS requested BiPAP upon arrival, RT to bedside. Pt normally on 1.5/2L O2 via NC at baseline.

## 2024-12-22 NOTE — PROGRESS NOTES
[place] [facility] [place]    Patient admitted to room 6 at approximately 1145 via cart from emergency room. Patient was accompanied by nurse.     Verbal SBAR report received from Valerie FOSTER prior to patient arrival.     Patient trasferred to bed via air kylie. Patient alert and oriented X 3. The patient is not having any pain.  . Admission vital signs: Blood pressure (!) 133/94, pulse 120, temperature 97.9  F (36.6  C), temperature source Oral, resp. rate 22, SpO2 97%. Patient was oriented to plan of care, call light, bed controls, tv, telephone, bathroom, and visiting hours.     Risk Assessment    The following safety risks were identified during admission: none. Yellow risk band applied: NO.     Skin Initial Assessment    This writer admitted this patient and completed a full skin assessment and [place] score in the Adult PCS flowsheet.   Photo documentation of skin problem and/or wound competed via [employer] application (located under Media):  N/A    Appropriate interventions initiated as needed.     Secondary skin check completed by Jennifer FOSTER.         Education    Patient has a [place] to Observation order: No  Observation education completed and documented: N/A      Hillary Alvares RN

## 2024-12-22 NOTE — ED PROVIDER NOTES
History     Chief Complaint   Patient presents with    Respiratory Distress     HPI  Luis Hernandez is a 59 year old male with a history of COPD who presents via EMS for shortness of breath.  The patient reports that this has been getting worse over the past day.  He says it feels like his normal COPD exacerbation.  He has been using albuterol twice at home.  EMS reports that they gave albuterol as well and put him on BiPAP for respiratory support.  The patient says he is oxygen dependent and uses oxygen via nasal cannula at 1.5 L/min at all times.  He has had to turn this up to 2 L/min recently.  EMS also reports that they gave IV methylprednisolone for symptoms.  The patient denies any chest pain or fever.  Cough is nonproductive.  No hemoptysis.  No abdominal pain, nausea, vomiting, diarrhea, or lower extremity pain or swelling.    Allergies:  Allergies   Allergen Reactions    Hctz Other (See Comments)     He has hyponatremia - avoid use    Penicillins Unknown     Childhood reaction.       Problem List:    Patient Active Problem List    Diagnosis Date Noted    Acute on chronic respiratory failure with hypercapnia (H) 12/22/2024     Priority: Medium    Encounter for long-term (current) use of high-risk medication 07/18/2024     Priority: Medium    High risk medication use 05/09/2024     Priority: Medium    Full code status 04/23/2024     Priority: Medium    Pneumonia of both lower lobes due to infectious organism 04/23/2024     Priority: Medium    Encounter for antineoplastic chemotherapy 02/26/2024     Priority: Medium    Squamous cell lung cancer, right (H) 02/20/2024     Priority: Medium    COPD with acute exacerbation (H) 01/07/2024     Priority: Medium    Acute on chronic respiratory failure with hypoxia and hypercapnia (H) 11/06/2023     Priority: Medium    Anemia, unspecified type 05/07/2021     Priority: Medium    Peripheral edema 02/16/2020     Priority: Medium    Pulmonary hypertension (H) 02/16/2020      Priority: Medium    Tobacco abuse, 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   Stable 01/2014 to 07/2014, 6 month follow scan recommended.   Chest CT of 1/15/2015= stable nodule, f/u one year.      CARDIOVASCULAR SCREENING; LDL GOAL LESS THAN 130 10/31/2010     Priority: Medium    COPD, very severe (H) 10/25/2010     Priority: Medium     Severe to very severe      Eczema 10/04/2010     Priority: Medium    ED (erectile dysfunction) 04/20/2009     Priority: Medium    Essential hypertension, benign 10/15/2007     Priority: Medium        Past Medical History:    Past Medical History:   Diagnosis Date    Acute on chronic respiratory failure with hypoxia (H) 04/10/2020    Acute on chronic respiratory failure with hypoxia and hypercapnia (H) 04/01/2019    Acute respiratory failure with hypoxia and hypercapnia (H) 04/23/2024    CAP (community acquired pneumonia) 04/14/2020    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)     COPD exacerbation (H) 04/01/2019    COPD exacerbation (H) 02/16/2020    Erectile dysfunction     Hypertension     Hyponatremia 04/01/2019    Pneumonia 04/10/2020       Past Surgical History:    Past Surgical History:   Procedure Laterality Date    APPENDECTOMY      childhood    BRONCHOSCOPY, DILATE BRONCHUS, STENT BRONCHUS, COMBINED N/A 1/18/2024    Procedure: Bronchoscopy with tissue debulking,  and stent placement.;  Surgeon: Isac Prescott MD;  Location: [facility] OR    BRONCHOSCOPY, DILATE BRONCHUS, STENT BRONCHUS, COMBINED N/A 11/15/2024    Procedure: BRONCHOSCOPY, RIGID and flexible, stent removal;  Surgeon: Scout Wells MD;  Location: [facility] OR    ENDOBRONCHIAL ULTRASOUND FLEXIBLE N/A 1/18/2024    Procedure: Endobronchial ultrasound with Endobronchial and Cryo biopsy.;  Surgeon: Isac Prescott MD;  Location: [facility] OR        Family History:    Family History   Problem Relation Age of Onset    Hypertension Mother     Ovarian Cancer Mother     Breast Cancer Mother     Hypertension Father     Lipids Father     C.A.D. Father     Heart Disease Father     Chronic Obstructive Pulmonary Disease Father     Cerebrovascular Disease Father     Diabetes Paternal Grandmother     Chronic Obstructive Pulmonary Disease Paternal Grandfather        Social History:  Marital Status:  Single [1]  Social History     Tobacco Use    Smoking status: Former     Current packs/day: 0.00     Average packs/day: 2.0 packs/day for 30.0 years (60.0 ttl pk-yrs)     Types: Cigarettes     Start date: 08/1991     Quit date: 08/2021     Years since quitting: 3.3    Smokeless tobacco: Former   Vaping Use    Vaping status: Never Used   Substance Use Topics    Alcohol use: Yes     Comment: rare    Drug use: No        Medications:    No current outpatient medications on file.        Review of Systems    Physical Exam   BP: (!) 128/92  Pulse: (!) 132  Temp: 97.7  F (36.5  C)  Resp: 24  SpO2: 98 %      Physical Exam  Constitutional:       General: He is in acute distress.      Appearance: He is well-developed.   HENT:      Head: Normocephalic and atraumatic.   Cardiovascular:      Rate and Rhythm: Tachycardia present.   Pulmonary:      Effort: Respiratory distress present.      Breath sounds: No stridor. Wheezing present.   Musculoskeletal:      Right lower leg: No edema.      Left lower leg: No edema.   Skin:     General: Skin is warm and dry.   Neurological:      Mental Status: He is alert.         ED Course        Procedures              EKG Interpretation:      Interpreted by Shine Franco MD  Time reviewed: 0904  Symptoms at time of EKG: dyspnea   Rhythm: sinus tachycardia  Rate: 118  Axis: Right  Ectopy: none  Conduction: normal  ST Segments/ T Waves: No acute ischemic changes  Q Waves: none    Clinical Impression: Sinus tachycardia                Results for  orders placed or performed during the hospital encounter of 12/22/24 (from the past 24 hours)   [facility] Draw    Narrative    The following orders were created for panel order [facility] Draw.  Procedure                               Abnormality         Status                     ---------                               -----------         ------                     Extra Blue Top Tube[[id number]]                              Final result               Extra Green Top (Lithium...[[id number]]                      Final result               Extra Purple Top Tube[[id number]]                            Final result               Extra Heparinized Syringe[[id number]]                        Final result                 Please view results for these tests on the individual orders.   Extra Blue Top Tube   Result Value Ref Range    Hold Specimen JIC    Extra Green Top (Lithium Heparin) Tube   Result Value Ref Range    Hold Specimen JIC    Extra Purple Top Tube   Result Value Ref Range    Hold Specimen JIC    Extra Heparinized Syringe   Result Value Ref Range    Hold Specimen yes    Basic metabolic panel   Result Value Ref Range    Sodium 126 (L) 135 - 145 mmol/L    Potassium 4.5 3.4 - 5.3 mmol/L    Chloride 84 (L) 98 - 107 mmol/L    Carbon Dioxide (CO2) 30 (H) 22 - 29 mmol/L    Anion Gap 12 7 - 15 mmol/L    Urea Nitrogen 12.6 6.0 - 20.0 mg/dL    Creatinine 0.69 0.67 - 1.17 mg/dL    GFR Estimate >90 >60 mL/min/1.73m2    Calcium 9.4 8.8 - 10.4 mg/dL    Glucose 98 70 - 99 mg/dL   CBC with Platelets & Differential    Narrative    The following orders were created for panel order CBC with Platelets & Differential.  Procedure                               Abnormality         Status                     ---------                               -----------         ------                     CBC with platelets and d...[[id number]]  Abnormal            Final result               RBC and Platelet Morphology[[id number]]                      Final  result                 Please view results for these tests on the individual orders.   Blood gas venous   Result Value Ref Range    pH Venous 7.26 (L) 7.32 - 7.43    pCO2 Venous 77 (HH) 40 - 50 mm Hg    pO2 Venous 38 25 - 47 mm Hg    Bicarbonate Venous 35 (H) 21 - 28 mmol/L    Base Excess/Deficit Venous 5.2 (H) -3.0 - 3.0 mmol/L    FIO2 35     Oxyhemoglobin Venous 65 (L) 70 - 75 %    O2 Sat, Venous 67.0 (L) 70.0 - 75.0 %    Narrative    In healthy individuals, oxyhemoglobin (O2Hb) and oxygen saturation (SO2) are approximately equal. In the presence of dyshemoglobins, oxyhemoglobin can be considerably lower than oxygen saturation.   Nt probnp inpatient   Result Value Ref Range    N terminal Pro BNP Inpatient 129 0 - 900 pg/mL   CBC with platelets and differential   Result Value Ref Range    WBC Count 13.2 (H) 4.0 - 11.0 10e3/uL    RBC Count 3.98 (L) 4.40 - 5.90 10e6/uL    Hemoglobin 12.2 (L) 13.3 - 17.7 g/dL    Hematocrit 37.2 (L) 40.0 - 53.0 %    MCV 94 78 - 100 fL    MCH 30.7 26.5 - 33.0 pg    MCHC 32.8 31.5 - 36.5 g/dL    RDW 12.9 10.0 - 15.0 %    Platelet Count 389 150 - 450 10e3/uL    % Neutrophils 63 %    % Lymphocytes 17 %    % Monocytes 13 %    % Eosinophils 5 %    % Basophils 2 %    % Immature Granulocytes 0 %    NRBCs per 100 WBC 0 <1 /100    Absolute Neutrophils 8.3 1.6 - 8.3 10e3/uL    Absolute Lymphocytes 2.2 0.8 - 5.3 10e3/uL    Absolute Monocytes 1.7 (H) 0.0 - 1.3 10e3/uL    Absolute Eosinophils 0.7 0.0 - 0.7 10e3/uL    Absolute Basophils 0.2 0.0 - 0.2 10e3/uL    Absolute Immature Granulocytes 0.1 <=0.4 10e3/uL    Absolute NRBCs 0.0 10e3/uL   RBC and Platelet Morphology   Result Value Ref Range    RBC Morphology Confirmed RBC Indices     Platelet Assessment  Automated Count Confirmed. Platelet morphology is normal.     Automated Count Confirmed. Platelet morphology is normal.   Influenza A/B, RSV and SARS-CoV2 PCR (COVID-19) Nasopharyngeal    Specimen: Nasopharyngeal; Swab   Result Value Ref Range     Influenza A PCR Negative Negative    Influenza B PCR Negative Negative    RSV PCR Negative Negative    SARS CoV2 PCR Negative Negative    Narrative    Testing was performed using the Xpert Xpress CoV2/Flu/RSV Assay on the Cepheid GeneXpert Instrument. This test should be ordered for the detection of SARS-CoV2, influenza, and RSV viruses in individuals with signs and symptoms of respiratory tract infection. This test is for in vitro diagnostic use under the [place] FDA for laboratories certified under CLIA to perform high or moderate complexity testing. This test has been [place] FDA cleared. A negative result does not rule out the presence of PCR inhibitors in the specimen or target RNA in concentration below the limit of detection for the assay. If only one viral target is positive but coinfection with multiple targets is suspected, the sample should be re-tested with another FDA cleared, approved, or authorized test, if coninfection would change clinical management. This test was validated by the [facility] [employer]. These laboratories are certified under the Clinical Laboratory Improvement Amendments of 1988 (CLIA-88) as qualified to perfom high complexity laboratory testing.   EKG 12-lead, tracing only   Result Value Ref Range    Systolic Blood Pressure  mmHg    Diastolic Blood Pressure  mmHg    Ventricular Rate 118 BPM    Atrial Rate 118 BPM    PA Interval 132 ms    QRS Duration 84 ms     ms    QTc 442 ms    P Axis 88 degrees    R AXIS 109 degrees    T Axis 77 degrees    Interpretation ECG       Sinus tachycardia  Right atrial enlargement  Rightward axis  Pulmonary disease pattern  Abnormal ECG  When compared with ECG of 28-Nov-2024 09:10,  No significant change was found     XR Chest Port 1 View    Narrative    EXAM: XR CHEST PORT 1 VIEW  LOCATION: [facility]  DATE: 12/22/2024    INDICATION: cough, shortness of breath, respiratory failure  COMPARISON: CT of the chest  12/3/2024      Impression    IMPRESSION:     The upper lobes are lucent consistent with severe emphysema. There are bands of opacity in the mid lungs consistent with foci of subsegmental atelectasis, unchanged from the prior CT. No new airspace opacities or focal lung volume loss. Diaphragm   curvature is unchanged. No pleural effusion or pneumothorax.    Cardiac silhouette is normal in size. Mild aortic atheromatous calcifications.       Medications   No lozenges or gum should be given while patient on BIPAP/AVAPS/AVAPS AE (has no administration in time range)   carboxymethylcellulose PF (REFRESH PLUS) 0.5 % ophthalmic solution 1 drop (has no administration in time range)   Patient may continue current oral medications (has no administration in time range)   albuterol (PROVENTIL HFA/VENTOLIN HFA) inhaler (has no administration in time range)   amLODIPine (NORVASC) tablet 10 mg ( Oral Automatically Held 12/25/24 0800)   fluticasone-vilanterol (BREO ELLIPTA) 200-25 MCG/ACT inhaler 1 puff (has no administration in time range)   lisinopril (ZESTRIL) tablet 40 mg ( Oral Automatically Held 12/25/24 0800)   metoprolol tartrate (LOPRESSOR) tablet 25 mg ( Oral Automatically Held 12/25/24 2000)   tiotropium (SPIRIVA RESPIMAT) inhalation aerosol 2 puff (has no administration in time range)   ipratropium - albuterol 0.5 mg/2.5 mg/3 mL (DUONEB) neb solution 3 mL (3 mLs Nebulization $Given 12/22/24 0934)       Assessments & Plan (with Medical Decision Making)   59-year-old male with a history of COPD who presents for shortness of breath and respiratory distress and respiratory failure on BiPAP.  He is transition to BiPAP here.  He is given additional DuoNeb.  EKG shows sinus tachycardia without signs of acute ischemia.  His BNP is normal.  Chest x-ray obtained, images interpreted independently as well as radiology reviewed, no signs of pneumonia.  Electrolytes are within normal limits.  His VBG is concerning for pH of 7.26 and  pCO2 of 72.  Nasal swab is negative for COVID-19, RSV, or influenza.  The patient is acutely ill with respiratory failure and requires admission to the ICU for close management and observation and further treatment of his respiratory failure.  I have discussed the case with the on-call hospitalist.  We discussed ongoing management patient and we discussed the indication for antibiotics and we have decided to hold off on antibiotics at this time.  He will be admitted to the ICU for further treatment.    I have reviewed the nursing notes.    I have reviewed the findings, diagnosis, plan and need for follow up with the patient.      Critical care time (excluding teaching time and procedures): 45 minutes.           Medical Decision Making  The patient's presentation was of high complexity (a chronic illness severe exacerbation, progression, or side effect of treatment).    The patient's evaluation involved:  ordering and/or review of 3+ test(s) in this encounter (see separate area of note for details)  independent interpretation of testing performed by another health professional (see separate area of note for details)  discussion of management or test interpretation with another health professional (see separate area of note for details)    The patient's management necessitated high risk (a decision regarding hospitalization).        Current Discharge Medication List          Final diagnoses:   Acute on chronic respiratory failure with hypercapnia (H)   COPD exacerbation (H)       12/22/2024   [facility] EMERGENCY DEPT       Shine Franco MD  12/22/24 0842

## 2024-12-22 NOTE — ED TRIAGE NOTES
Triage Assessment (Adult)       Row Name 12/22/24 0837          Triage Assessment    Airway WDL WDL        Respiratory WDL    Respiratory WDL X;rhythm/pattern     Rhythm/Pattern, Respiratory tachypneic;shortness of breath        Skin Circulation/Temperature WDL    Skin Circulation/Temperature WDL WDL        Cardiac WDL    Cardiac WDL X;rhythm     Pulse Rate & Regularity tachycardic        Peripheral/Neurovascular WDL    Peripheral Neurovascular WDL WDL        Cognitive/Neuro/Behavioral WDL    Cognitive/Neuro/Behavioral WDL WDL

## 2024-12-22 NOTE — ED NOTES
[facility]   Admission Handoff    The patient is Luis Hernandez, 59 year old who arrived in the ED by AMBULANCE from home with a complaint of Respiratory Distress  . The patient's current symptoms are an exacerbation of a chronic illness and during this time the symptoms have remained the same. In the ED, patient was diagnosed with   Final diagnoses:   Acute on chronic respiratory failure with hypercapnia (H)   COPD exacerbation (H)         Needed?: No    Allergies:    Allergies   Allergen Reactions    Hctz Other (See Comments)     He has hyponatremia - avoid use    Penicillins Unknown     Childhood reaction.       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)     COPD exacerbation (H) 04/01/2019    COPD exacerbation (H) 02/16/2020    Erectile dysfunction     Hypertension     Hyponatremia 04/01/2019    Pneumonia 04/10/2020       Initial vitals were: BP: (!) 128/92  Pulse: (!) 132  Temp: 97.7  F (36.5  C)  Resp: 24  SpO2: 98 %   Recent vital Signs: BP (!) 122/94   Pulse 113   Temp 97.7  F (36.5  C) (Axillary)   Resp 18   SpO2 97%     Elimination Status: Continent: Yes     Activity Level: SBA/Assist of 1    Fall Status: Reason for falls risk:  Mobility  bed/chair alarm on, nonskid shoes/slippers when out of bed, arm band in place, and patient and family education    Baseline Mental status: WDL  Current Mental Status changes: at basesline    Infection present or suspected this encounter: no  Sepsis suspected: No    Isolation type: N/A    Bariatric equipment needed?: No    In the ED these meds were given:   Medications   No  lozenges or gum should be given while patient on BIPAP/AVAPS/AVAPS AE (has no administration in time range)   carboxymethylcellulose PF (REFRESH PLUS) 0.5 % ophthalmic solution 1 drop (has no administration in time range)   Patient may continue current oral medications (has no administration in time range)   ipratropium - albuterol 0.5 mg/2.5 mg/3 mL (DUONEB) neb solution 3 mL (3 mLs Nebulization $Given 12/22/24 0982)       Drips running?  No    Home pump  No    Current LDAs: Peripheral IV: Site Left AC; Gauge 20  none     Results:   Labs/Imaging  Ordered and Resulted from Time of ED Arrival Up to the Time of Departure from the ED  Results for orders placed or performed during the hospital encounter of 12/22/24 (from the past 24 hours)   [place] Draw    Narrative    The following orders were created for panel order [place] Draw.  Procedure                               Abnormality         Status                     ---------                               -----------         ------                     Extra Blue Top Tube[[id number]]                              Final result               Extra Green Top (Lithium...[[id number]]                      Final result               Extra Purple Top Tube[[id number]]                            Final result               Extra Heparinized Syringe[[id number]]                        Final result                 Please view results for these tests on the individual orders.   Extra Blue Top Tube   Result Value Ref Range    Hold Specimen JIC    Extra Green Top (Lithium Heparin) Tube   Result Value Ref Range    Hold Specimen JIC    Extra Purple Top Tube   Result Value Ref Range    Hold Specimen JIC    Extra Heparinized Syringe   Result Value Ref Range    Hold Specimen yes    Basic metabolic panel   Result Value Ref Range    Sodium 126 (L) 135 - 145 mmol/L    Potassium 4.5 3.4 - 5.3 mmol/L    Chloride 84 (L) 98 - 107 mmol/L    Carbon Dioxide (CO2) 30 (H) 22 - 29 mmol/L    Anion Gap 12 7 -  15 mmol/L    Urea Nitrogen 12.6 6.0 - 20.0 mg/dL    Creatinine 0.69 0.67 - 1.17 mg/dL    GFR Estimate >90 >60 mL/min/1.73m2    Calcium 9.4 8.8 - 10.4 mg/dL    Glucose 98 70 - 99 mg/dL   CBC with Platelets & Differential    Narrative    The following orders were created for panel order CBC with Platelets & Differential.  Procedure                               Abnormality         Status                     ---------                               -----------         ------                     CBC with platelets and d...[[id number]]  Abnormal            Final result               RBC and Platelet Morphology[[id number]]                      Final result                 Please view results for these tests on the individual orders.   Blood gas venous   Result Value Ref Range    pH Venous 7.26 (L) 7.32 - 7.43    pCO2 Venous 77 (HH) 40 - 50 mm Hg    pO2 Venous 38 25 - 47 mm Hg    Bicarbonate Venous 35 (H) 21 - 28 mmol/L    Base Excess/Deficit Venous 5.2 (H) -3.0 - 3.0 mmol/L    FIO2 35     Oxyhemoglobin Venous 65 (L) 70 - 75 %    O2 Sat, Venous 67.0 (L) 70.0 - 75.0 %    Narrative    In healthy individuals, oxyhemoglobin (O2Hb) and oxygen saturation (SO2) are approximately equal. In the presence of dyshemoglobins, oxyhemoglobin can be considerably lower than oxygen saturation.   Nt probnp inpatient   Result Value Ref Range    N terminal Pro BNP Inpatient 129 0 - 900 pg/mL   CBC with platelets and differential   Result Value Ref Range    WBC Count 13.2 (H) 4.0 - 11.0 10e3/uL    RBC Count 3.98 (L) 4.40 - 5.90 10e6/uL    Hemoglobin 12.2 (L) 13.3 - 17.7 g/dL    Hematocrit 37.2 (L) 40.0 - 53.0 %    MCV 94 78 - 100 fL    MCH 30.7 26.5 - 33.0 pg    MCHC 32.8 31.5 - 36.5 g/dL    RDW 12.9 10.0 - 15.0 %    Platelet Count 389 150 - 450 10e3/uL    % Neutrophils 63 %    % Lymphocytes 17 %    % Monocytes 13 %    % Eosinophils 5 %    % Basophils 2 %    % Immature Granulocytes 0 %    NRBCs per 100 WBC 0 <1 /100    Absolute Neutrophils 8.3 1.6  - 8.3 10e3/uL    Absolute Lymphocytes 2.2 0.8 - 5.3 10e3/uL    Absolute Monocytes 1.7 (H) 0.0 - 1.3 10e3/uL    Absolute Eosinophils 0.7 0.0 - 0.7 10e3/uL    Absolute Basophils 0.2 0.0 - 0.2 10e3/uL    Absolute Immature Granulocytes 0.1 <=0.4 10e3/uL    Absolute NRBCs 0.0 10e3/uL   RBC and Platelet Morphology   Result Value Ref Range    RBC Morphology Confirmed RBC Indices     Platelet Assessment  Automated Count Confirmed. Platelet morphology is normal.     Automated Count Confirmed. Platelet morphology is normal.   Influenza A/B, RSV and SARS-CoV2 PCR (COVID-19) Nasopharyngeal    Specimen: Nasopharyngeal; Swab   Result Value Ref Range    Influenza A PCR Negative Negative    Influenza B PCR Negative Negative    RSV PCR Negative Negative    SARS CoV2 PCR Negative Negative    Narrative    Testing was performed using the Xpert Xpress CoV2/Flu/RSV Assay on the [employer] GeneXpert Instrument. This test should be ordered for the detection of SARS-CoV2, influenza, and RSV viruses in individuals with signs and symptoms of respiratory tract infection. This test is for in vitro diagnostic use under the [place] FDA for laboratories certified under CLIA to perform high or moderate complexity testing. This test has been [place] FDA cleared. A negative result does not rule out the presence of PCR inhibitors in the specimen or target RNA in concentration below the limit of detection for the assay. If only one viral target is positive but coinfection with multiple targets is suspected, the sample should be re-tested with another FDA cleared, approved, or authorized test, if coninfection would change clinical management. This test was validated by the [facility] [employer]. These laboratories are certified under the Clinical Laboratory Improvement Amendments of 1988 (CLIA-88) as qualified to perfom high complexity laboratory testing.   EKG 12-lead, tracing only   Result Value Ref Range    Systolic Blood Pressure  mmHg    Diastolic  Blood Pressure  mmHg    Ventricular Rate 118 BPM    Atrial Rate 118 BPM    MO Interval 132 ms    QRS Duration 84 ms     ms    QTc 442 ms    P Axis 88 degrees    R AXIS 109 degrees    T Axis 77 degrees    Interpretation ECG       Sinus tachycardia  Right atrial enlargement  Rightward axis  Pulmonary disease pattern  Abnormal ECG  When compared with ECG of 28-Nov-2024 09:10,  No significant change was found     XR Chest Port 1 View    Narrative    EXAM: XR CHEST PORT 1 VIEW  LOCATION: [facility]  DATE: 12/22/2024    INDICATION: cough, shortness of breath, respiratory failure  COMPARISON: CT of the chest 12/3/2024      Impression    IMPRESSION:     The upper lobes are lucent consistent with severe emphysema. There are bands of opacity in the mid lungs consistent with foci of subsegmental atelectasis, unchanged from the prior CT. No new airspace opacities or focal lung volume loss. Diaphragm   curvature is unchanged. No pleural effusion or pneumothorax.    Cardiac silhouette is normal in size. Mild aortic atheromatous calcifications.       For the majority of the shift this patient's behavior was Green     Cardiac Rhythm: Normal Sinus/ST   Pt needs tele? Yes  Skin/wound Issues: None    Code Status: upon discussion with admitting provider    Pain control: pt had none    Nausea control: pt had none    Abnormal labs/tests/findings requiring intervention: PCO2 77-on BiPAP    Patient tested for COVID 19 prior to admission: YES     OBS brochure/video discussed/provided to patient/family: N/A     Family present during ED course? No     Family Comments/Social Situation comments:     Tasks needing completion: None    Rosa Quiñones RN

## 2024-12-23 LAB
ANION GAP SERPL CALCULATED.3IONS-SCNC: 12 MMOL/L (ref 7–15)
ATRIAL RATE - MUSE: 118 BPM
BASE EXCESS BLDV CALC-SCNC: 4.3 MMOL/L (ref -3–3)
BASE EXCESS BLDV CALC-SCNC: 4.8 MMOL/L (ref -3–3)
BASE EXCESS BLDV CALC-SCNC: 7.1 MMOL/L (ref -3–3)
BUN SERPL-MCNC: 32.5 MG/DL (ref 6–20)
C PNEUM DNA SPEC QL NAA+PROBE: NOT DETECTED
CALCIUM SERPL-MCNC: 9.6 MG/DL (ref 8.8–10.4)
CHLORIDE SERPL-SCNC: 87 MMOL/L (ref 98–107)
CREAT SERPL-MCNC: 0.83 MG/DL (ref 0.67–1.17)
CRP SERPL-MCNC: 4.96 MG/L
DIASTOLIC BLOOD PRESSURE - MUSE: NORMAL MMHG
EGFRCR SERPLBLD CKD-EPI 2021: >90 ML/MIN/1.73M2
ERYTHROCYTE [DISTWIDTH] IN BLOOD BY AUTOMATED COUNT: 13 % (ref 10–15)
FLUAV H1 2009 PAND RNA SPEC QL NAA+PROBE: NOT DETECTED
FLUAV H1 RNA SPEC QL NAA+PROBE: NOT DETECTED
FLUAV H3 RNA SPEC QL NAA+PROBE: NOT DETECTED
FLUAV RNA SPEC QL NAA+PROBE: NOT DETECTED
FLUBV RNA SPEC QL NAA+PROBE: NOT DETECTED
GLUCOSE BLDC GLUCOMTR-MCNC: 103 MG/DL (ref 70–99)
GLUCOSE BLDC GLUCOMTR-MCNC: 121 MG/DL (ref 70–99)
GLUCOSE BLDC GLUCOMTR-MCNC: 126 MG/DL (ref 70–99)
GLUCOSE SERPL-MCNC: 118 MG/DL (ref 70–99)
HADV DNA SPEC QL NAA+PROBE: NOT DETECTED
HCO3 BLDV-SCNC: 33 MMOL/L (ref 21–28)
HCO3 BLDV-SCNC: 34 MMOL/L (ref 21–28)
HCO3 BLDV-SCNC: 35 MMOL/L (ref 21–28)
HCO3 SERPL-SCNC: 29 MMOL/L (ref 22–29)
HCOV PNL SPEC NAA+PROBE: NOT DETECTED
HCT VFR BLD AUTO: 35.2 % (ref 40–53)
HGB BLD-MCNC: 11.5 G/DL (ref 13.3–17.7)
HMPV RNA SPEC QL NAA+PROBE: NOT DETECTED
HPIV1 RNA SPEC QL NAA+PROBE: NOT DETECTED
HPIV2 RNA SPEC QL NAA+PROBE: NOT DETECTED
HPIV3 RNA SPEC QL NAA+PROBE: NOT DETECTED
HPIV4 RNA SPEC QL NAA+PROBE: NOT DETECTED
INTERPRETATION ECG - MUSE: NORMAL
M PNEUMO DNA SPEC QL NAA+PROBE: NOT DETECTED
MAGNESIUM SERPL-MCNC: 2.1 MG/DL (ref 1.7–2.3)
MCH RBC QN AUTO: 30.3 PG (ref 26.5–33)
MCHC RBC AUTO-ENTMCNC: 32.7 G/DL (ref 31.5–36.5)
MCV RBC AUTO: 93 FL (ref 78–100)
O2/TOTAL GAS SETTING VFR VENT: 0 %
O2/TOTAL GAS SETTING VFR VENT: 25 %
O2/TOTAL GAS SETTING VFR VENT: 30 %
OXYHGB MFR BLDV: 83 % (ref 70–75)
OXYHGB MFR BLDV: 83 % (ref 70–75)
OXYHGB MFR BLDV: 84 % (ref 70–75)
P AXIS - MUSE: 88 DEGREES
PCO2 BLDV: 68 MM HG (ref 40–50)
PCO2 BLDV: 69 MM HG (ref 40–50)
PCO2 BLDV: 74 MM HG (ref 40–50)
PH BLDV: 7.27 [PH] (ref 7.32–7.43)
PH BLDV: 7.29 [PH] (ref 7.32–7.43)
PH BLDV: 7.32 [PH] (ref 7.32–7.43)
PLATELET # BLD AUTO: 382 10E3/UL (ref 150–450)
PO2 BLDV: 52 MM HG (ref 25–47)
PO2 BLDV: 54 MM HG (ref 25–47)
PO2 BLDV: 57 MM HG (ref 25–47)
POTASSIUM SERPL-SCNC: 5.1 MMOL/L (ref 3.4–5.3)
PR INTERVAL - MUSE: 132 MS
PROCALCITONIN SERPL IA-MCNC: 0.1 NG/ML
QRS DURATION - MUSE: 84 MS
QT - MUSE: 316 MS
QTC - MUSE: 442 MS
R AXIS - MUSE: 109 DEGREES
RBC # BLD AUTO: 3.8 10E6/UL (ref 4.4–5.9)
RSV RNA SPEC QL NAA+PROBE: NOT DETECTED
RSV RNA SPEC QL NAA+PROBE: NOT DETECTED
RV+EV RNA SPEC QL NAA+PROBE: NOT DETECTED
SAO2 % BLDV: 84.3 % (ref 70–75)
SAO2 % BLDV: 84.8 % (ref 70–75)
SAO2 % BLDV: 85.4 % (ref 70–75)
SODIUM SERPL-SCNC: 128 MMOL/L (ref 135–145)
SYSTOLIC BLOOD PRESSURE - MUSE: NORMAL MMHG
T AXIS - MUSE: 77 DEGREES
VENTRICULAR RATE- MUSE: 118 BPM
WBC # BLD AUTO: 14.2 10E3/UL (ref 4–11)

## 2024-12-23 PROCEDURE — 258N000003 HC RX IP 258 OP 636

## 2024-12-23 PROCEDURE — 83735 ASSAY OF MAGNESIUM: CPT

## 2024-12-23 PROCEDURE — 272N000735 HC KIT, CIRCUIT WITH NEB, VOLERA

## 2024-12-23 PROCEDURE — 99233 SBSQ HOSP IP/OBS HIGH 50: CPT

## 2024-12-23 PROCEDURE — 36415 COLL VENOUS BLD VENIPUNCTURE: CPT

## 2024-12-23 PROCEDURE — 999N000215 HC STATISTIC HFNC ADULT NON-CPAP

## 2024-12-23 PROCEDURE — 82805 BLOOD GASES W/O2 SATURATION: CPT | Performed by: STUDENT IN AN ORGANIZED HEALTH CARE EDUCATION/TRAINING PROGRAM

## 2024-12-23 PROCEDURE — 80048 BASIC METABOLIC PNL TOTAL CA: CPT | Performed by: STUDENT IN AN ORGANIZED HEALTH CARE EDUCATION/TRAINING PROGRAM

## 2024-12-23 PROCEDURE — 36415 COLL VENOUS BLD VENIPUNCTURE: CPT | Performed by: STUDENT IN AN ORGANIZED HEALTH CARE EDUCATION/TRAINING PROGRAM

## 2024-12-23 PROCEDURE — 84145 PROCALCITONIN (PCT): CPT

## 2024-12-23 PROCEDURE — 258N000003 HC RX IP 258 OP 636: Performed by: INTERNAL MEDICINE

## 2024-12-23 PROCEDURE — 250N000013 HC RX MED GY IP 250 OP 250 PS 637: Performed by: STUDENT IN AN ORGANIZED HEALTH CARE EDUCATION/TRAINING PROGRAM

## 2024-12-23 PROCEDURE — 87486 CHLMYD PNEUM DNA AMP PROBE: CPT

## 2024-12-23 PROCEDURE — 200N000001 HC R&B ICU

## 2024-12-23 PROCEDURE — 85018 HEMOGLOBIN: CPT | Performed by: STUDENT IN AN ORGANIZED HEALTH CARE EDUCATION/TRAINING PROGRAM

## 2024-12-23 PROCEDURE — 250N000009 HC RX 250: Performed by: STUDENT IN AN ORGANIZED HEALTH CARE EDUCATION/TRAINING PROGRAM

## 2024-12-23 PROCEDURE — 94660 CPAP INITIATION&MGMT: CPT

## 2024-12-23 PROCEDURE — 250N000013 HC RX MED GY IP 250 OP 250 PS 637

## 2024-12-23 PROCEDURE — 250N000011 HC RX IP 250 OP 636: Performed by: STUDENT IN AN ORGANIZED HEALTH CARE EDUCATION/TRAINING PROGRAM

## 2024-12-23 PROCEDURE — 94799 UNLISTED PULMONARY SVC/PX: CPT

## 2024-12-23 PROCEDURE — 999N000157 HC STATISTIC RCP TIME EA 10 MIN

## 2024-12-23 PROCEDURE — 250N000012 HC RX MED GY IP 250 OP 636 PS 637: Performed by: STUDENT IN AN ORGANIZED HEALTH CARE EDUCATION/TRAINING PROGRAM

## 2024-12-23 PROCEDURE — 82805 BLOOD GASES W/O2 SATURATION: CPT

## 2024-12-23 PROCEDURE — 86140 C-REACTIVE PROTEIN: CPT

## 2024-12-23 PROCEDURE — 94640 AIRWAY INHALATION TREATMENT: CPT | Mod: 76

## 2024-12-23 PROCEDURE — 94640 AIRWAY INHALATION TREATMENT: CPT

## 2024-12-23 RX ORDER — ROPIVACAINE IN 0.9% SOD CHL/PF 0.1 %
.01-.125 PLASTIC BAG, INJECTION (ML) EPIDURAL CONTINUOUS
Status: DISCONTINUED | OUTPATIENT
Start: 2024-12-23 | End: 2024-12-25

## 2024-12-23 RX ADMIN — METOPROLOL TARTRATE 25 MG: 25 TABLET, FILM COATED ORAL at 07:43

## 2024-12-23 RX ADMIN — PREDNISONE 40 MG: 20 TABLET ORAL at 07:42

## 2024-12-23 RX ADMIN — FLUTICASONE FUROATE AND VILANTEROL TRIFENATATE 1 PUFF: 200; 25 POWDER RESPIRATORY (INHALATION) at 07:45

## 2024-12-23 RX ADMIN — SODIUM CHLORIDE 1000 ML: 9 INJECTION, SOLUTION INTRAVENOUS at 23:36

## 2024-12-23 RX ADMIN — IPRATROPIUM BROMIDE AND ALBUTEROL SULFATE 3 ML: 2.5; .5 SOLUTION RESPIRATORY (INHALATION) at 08:04

## 2024-12-23 RX ADMIN — ENOXAPARIN SODIUM 40 MG: 40 INJECTION SUBCUTANEOUS at 12:10

## 2024-12-23 RX ADMIN — ALBUTEROL SULFATE 2.5 MG: 2.5 SOLUTION RESPIRATORY (INHALATION) at 04:46

## 2024-12-23 RX ADMIN — IPRATROPIUM BROMIDE AND ALBUTEROL SULFATE 3 ML: 2.5; .5 SOLUTION RESPIRATORY (INHALATION) at 13:20

## 2024-12-23 RX ADMIN — AZITHROMYCIN DIHYDRATE 250 MG: 250 TABLET, FILM COATED ORAL at 07:43

## 2024-12-23 RX ADMIN — IPRATROPIUM BROMIDE AND ALBUTEROL SULFATE 3 ML: 2.5; .5 SOLUTION RESPIRATORY (INHALATION) at 19:45

## 2024-12-23 RX ADMIN — SODIUM CHLORIDE 500 ML: 9 INJECTION, SOLUTION INTRAVENOUS at 12:10

## 2024-12-23 RX ADMIN — METOPROLOL TARTRATE 25 MG: 25 TABLET, FILM COATED ORAL at 20:17

## 2024-12-23 RX ADMIN — ONDANSETRON 4 MG: 4 TABLET, ORALLY DISINTEGRATING ORAL at 02:31

## 2024-12-23 RX ADMIN — UMECLIDINIUM 1 PUFF: 62.5 AEROSOL, POWDER ORAL at 07:45

## 2024-12-23 RX ADMIN — IPRATROPIUM BROMIDE AND ALBUTEROL SULFATE 3 ML: 2.5; .5 SOLUTION RESPIRATORY (INHALATION) at 16:58

## 2024-12-23 RX ADMIN — PROCHLORPERAZINE EDISYLATE 10 MG: 5 INJECTION INTRAMUSCULAR; INTRAVENOUS at 06:36

## 2024-12-23 ASSESSMENT — ACTIVITIES OF DAILY LIVING (ADL)
ADLS_ACUITY_SCORE: 37
ADLS_ACUITY_SCORE: 37
ADLS_ACUITY_SCORE: 32
ADLS_ACUITY_SCORE: 32
DEPENDENT_IADLS:: INDEPENDENT
ADLS_ACUITY_SCORE: 31
ADLS_ACUITY_SCORE: 32
ADLS_ACUITY_SCORE: 31
ADLS_ACUITY_SCORE: 37
ADLS_ACUITY_SCORE: 31
ADLS_ACUITY_SCORE: 31
ADLS_ACUITY_SCORE: 37
ADLS_ACUITY_SCORE: 31
ADLS_ACUITY_SCORE: 37
ADLS_ACUITY_SCORE: 32
ADLS_ACUITY_SCORE: 37
ADLS_ACUITY_SCORE: 31
ADLS_ACUITY_SCORE: 31
ADLS_ACUITY_SCORE: 32
ADLS_ACUITY_SCORE: 32
ADLS_ACUITY_SCORE: 31
ADLS_ACUITY_SCORE: 37
ADLS_ACUITY_SCORE: 31
ADLS_ACUITY_SCORE: 32

## 2024-12-23 NOTE — PLAN OF CARE
Goal Outcome Evaluation:       End Of Shift Note    Situation: pt admitted with SOB, increased oxygen needs, C02 77.    Plan: bipap, supplemental oxygen.    Subjective/Objective:    Neuro: pt alert, oriented, appropriate during this shift.     Cardiac: pt tachycardic 110's for most of shift, no edema noted. VSS.    Resp: lung sounds coarse/wheezy, on 3L NC. Bipap at 16/6 30%, has not worn since arrival to unit. Albuterol inhaler used x1 this shift.    GI/: urinal at bedside, pt has not urinated since arrival states he doesn't feel like he needs to at this point.     MSK: pt has generalized weakness, SOB with minimal exertion.    Skin: skin dry and intact.    LDAs: PIV x1.

## 2024-12-23 NOTE — H&P
[facility]    History and Physical - Hospitalist Service       Date of Admission:  12/22/2024    Assessment & Plan      Luis Hernandez is a 59 year old male with past medical history of COPD on chronic oxygen, squamous cells carcinoma of the right lung, pulmonary hypertension and hypertension that presented on 12/22/2024 with SOB and was admitted on BIPAP with a COPD exacerbation.     # Acute on chronic respiratory failure with hypercapnia   # Acute COPD exacerbation   # Hx pulmonary hypertension   He is on 1.5 liters of oxygen PTA and comes to this admission with one day of worsening SOB and increasing O2 requirement. Initially given solumedrol 125 mg and started on nebs but did require start of BIPAP. CXR this admission with upper lobe lucency consistent with severe emphysema and band of opacity in the mid lungs that are noted to be unchanged from prior imaging. Unclear trigger of his exacerbation with clear chest ant initial viral testing negative. Will expand viral testing, continue steroids, and respiratory support.    - BIPAP available, but currently back on baseline O2   - Continued PTA inhalers with formulary substitution   - Continued PTA azithromycin, on chronic suppressive therapy   - Prednisone 40 mg every day ordered   - PRN albuterol inhaler available   - Duo nebs Q4H while awake   - RCAT order placed     # Stage IIIB squamous cell lung cancer   Patient follows with oncology and was last seen on 12/4. Not a surgical of chemoradiation candidate with his severe COPD. Still continuing on chemotherapy with his last dose of penbrolizumab on 12/4.   - Plan for continued oncology follow up outpatient     # Leukocytosis   WBC count of 13.2 on presentation. Suspect related to stress and noted that patient had already been given Solumedrol by EMS. With no fever and unchanged chest imaging low concern for infection.     # Hypertension   Patient on lisinopril 20 mg every day, metoprolol  25 mg bid, and amlodipine 5 mg every day PTA. With borderline hypotension on presentation held PTA medications.  - Continued PTA metoprolol   - Holding PTA amlodipine and lisinopril pending stability in hemodynamics.     # Hyponatremia   Na of 126 on presentation. Interestedly patients labs from previous healthcare interactions alternate low and normal Na. Possible hypovolemic. Anticipate improvement by am labs.  - BMP with am labs     # Normocytic anemia, chemo induced   Hgb near baseline at 12.2 on presentation. No concern for bleeding.   - CBC with am labs         Diet: Advance Diet as Tolerated: Regular Diet Adult    DVT Prophylaxis: Enoxaparin (Lovenox) SQ  Miller Catheter: Not present  Lines: None     Cardiac Monitoring: ACTIVE order. Indication: ICU  Code Status: No CPR- Pre-arrest intubation OK      Clinically Significant Risk Factors Present on Admission         # Hyponatremia: Lowest Na = 126 mmol/L in last 2 days, will monitor as appropriate  # Hypochloremia: Lowest Cl = 84 mmol/L in last 2 days, will monitor as appropriate          # Hypertension: Noted on problem list     # Acute Hypercapnic Respiratory Failure: based on venous blood gas results.  Continue supplemental oxygen and ventilatory support as indicated.            # Financial/Environmental Concerns:    # COPD: noted on problem list        Disposition Plan     Medically Ready for Discharge: Anticipated in 2-4 Days           Jonah Mariee MD  Hospitalist Service  [facility]  Securely message with [employer] (more info)  Text page via [employer] Paging/Directory     ______________________________________________________________________    Chief Complaint   SOB    History is obtained from the patient and chart review     History of Present Illness   Luis Hernandez is a 59 year old male who With past medical history of COPD on chronic oxygen, squamous cells carcinoma of the right lung, pulmonary hypertension and hypertension  that was brought to the ED by EMS for SOB.     Brought into the ED with suspicion of COPD exacerbation was given 2 nebs and 125 mg of solumedrol by EMS and put on BIPAP.  Patient is typically on 1.5 to 2L of oxygen at baseline. He had felt that his Shortness of breath had been worsening through out the day. He denied chest pain, abdominal pain or fever. Not to be in acute distress and wheezing on arrival. BNP normal. CXR clear. COVID flu RSV negative.     On arrival in the ICU he states that he is feeling better. He feels that his breathing has improved significantly. He denies having had any fevers or chills. No longer on bipap.     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)     COPD exacerbation (H) 04/01/2019    COPD exacerbation (H) 02/16/2020    Erectile dysfunction     Hypertension     Hyponatremia 04/01/2019    Pneumonia 04/10/2020       Past Surgical History   Past Surgical History:   Procedure Laterality Date    APPENDECTOMY      childhood    BRONCHOSCOPY, DILATE BRONCHUS, STENT BRONCHUS, COMBINED N/A 1/18/2024    Procedure: Bronchoscopy with tissue debulking,  and stent placement.;  Surgeon: Isac Prescott MD;  Location: [facility] OR    BRONCHOSCOPY, DILATE BRONCHUS, STENT BRONCHUS, COMBINED N/A 11/15/2024    Procedure: BRONCHOSCOPY, RIGID and flexible, stent removal;  Surgeon: Scout Wells MD;  Location: [facility] OR    ENDOBRONCHIAL ULTRASOUND FLEXIBLE N/A 1/18/2024    Procedure: Endobronchial ultrasound with Endobronchial and Cryo biopsy.;  Surgeon: Isac Prescott MD;  Location: [facility] OR       Prior to Admission Medications   Prior to Admission Medications    Prescriptions Last Dose Informant Patient Reported? Taking?   Oxymetazoline HCl (NASAL SPRAY NA) Past Week Self Yes Yes   Sig: Spray in nostril as needed.   albuterol (PROAIR HFA/PROVENTIL HFA/VENTOLIN HFA) 108 (90 Base) MCG/ACT inhaler 12/22/2024 Self No Yes   Sig: Inhale 2 puffs into the lungs every 4 hours as needed for shortness of breath   albuterol (PROVENTIL) (2.5 MG/3ML) 0.083% neb solution 12/22/2024 Self No Yes   Sig: Take 1 vial (2.5 mg) by nebulization every 4 hours as needed for shortness of breath, wheezing or cough.   amLODIPine (NORVASC) 10 MG tablet 12/21/2024 Morning Self No Yes   Sig: Take 1 tablet (10 mg) by mouth daily.   azithromycin (ZITHROMAX) 250 MG tablet 12/21/2024 Morning Self No Yes   Sig: Take 1 tablet (250 mg) by mouth daily.   fluticasone-salmeterol (ADVAIR) 500-50 MCG/ACT inhaler 12/22/2024 Morning Self No Yes   Sig: Inhale 1 puff into the lungs every 12 hours.   ibuprofen (ADVIL/MOTRIN) 200 MG tablet 12/21/2024 Bedtime Self Yes Yes   Sig: Take 3 tablets by mouth every 6 hours as needed for pain.   lisinopril (ZESTRIL) 40 MG tablet 12/21/2024 Morning Self No Yes   Sig: Take 1 tablet (40 mg) by mouth daily   metoprolol tartrate (LOPRESSOR) 25 MG tablet 12/21/2024 Bedtime Self No Yes   Sig: Take 1 tablet (25 mg) by mouth 2 times daily   order for DME  Self No No   Sig: Equipment being ordered: Nebulizer   order for DME  Self No No   Sig: Equipment being ordered: Oxygen 3L via NC continuous.  O2 saturated noted to be 86% on room air at rest.   Patient taking differently: 1.5 L. Equipment being ordered: Oxygen 3L via NC continuous.  O2 saturated noted to be 86% on room air at rest.   tiotropium (SPIRIVA RESPIMAT) 2.5 MCG/ACT inhaler 12/22/2024 Morning Self No Yes   Sig: Inhale 2 puffs into the lungs daily      Facility-Administered Medications: None      ------------------------------------------------------------------------     Physical Exam   Vital Signs: Temp: 97.7  F (36.5  C)  Temp src: Oral BP: 103/78 Pulse: 113   Resp: 18 SpO2: 97 % O2 Device: Nasal cannula Oxygen Delivery: 3 LPM  Weight: 0 lbs 0 oz    General Appearance: Awake and alert, sitting up in bed in no acute distress   Respiratory: Breathing easily on nasal canula, lungs sound distant in bilateral lung fields   Cardiovascular: Tachycardic with a regular rhythm, extremities warm and well perfused   GI: Abdomen soft, non-tender, and non-distended   Skin: No rashes or lesions   Other: Appropriate mood and affect, no focal deficits appreciated      Medical Decision Making       55 MINUTES SPENT BY ME on the date of service doing chart review, history, exam, documentation & further activities per the note.      Data     I have personally reviewed the following data over the past 24 hrs:    13.2 (H)  \   12.2 (L)   / 389     126 (L) 84 (L) 12.6 /  98   4.5 30 (H) 0.69 \     Trop: N/A BNP: 129       Imaging results reviewed over the past 24 hrs:   Recent Results (from the past 24 hours)   XR Chest Port 1 View    Narrative    EXAM: XR CHEST PORT 1 VIEW  LOCATION: [facility]  DATE: 12/22/2024    INDICATION: cough, shortness of breath, respiratory failure  COMPARISON: CT of the chest 12/3/2024      Impression    IMPRESSION:     The upper lobes are lucent consistent with severe emphysema. There are bands of opacity in the mid lungs consistent with foci of subsegmental atelectasis, unchanged from the prior CT. No new airspace opacities or focal lung volume loss. Diaphragm   curvature is unchanged. No pleural effusion or pneumothorax.    Cardiac silhouette is normal in size. Mild aortic atheromatous calcifications.

## 2024-12-23 NOTE — PLAN OF CARE
Pt A & O x 4, able to make needs known, denies pain, PIV in R AC, saline locked/flushed/capped, reinforced with Tegaderm. Using urinal, becomes hypoxic even with BIPAP on when standing to urinate. Low oral intake due to BIPAP and SOA.     500 ml NS bolus this afternoon    Telemetry tachy with occasional PACs, Pleasant in mood considering health. Washed pt and changed tele pads, and gown. Watching television in upright position, side rails in use for safety,   Goal Outcome Evaluation    Problem: Adult Inpatient Plan of Care  Goal: Optimal Comfort and Wellbeing  Intervention: Provide Person-Centered Care  Recent Flowsheet Documentation  Taken 12/23/2024 1500 by Goldie Ramirez RN  Trust Relationship/Rapport:   care explained   choices provided  Taken 12/23/2024 0700 by Goldie Ramirez RN  Trust Relationship/Rapport:   care explained   choices provided   Problem: Risk for Delirium  Goal: Improved Sleep  Outcome: Progressing   Problem: Risk for Delirium  Goal: Improved Attention and Thought Clarity  Intervention: Maximize Cognitive Function  Recent Flowsheet Documentation  Taken 12/23/2024 1500 by Goldie Ramirez RN  Reorientation Measures: clock in view   Problem: Gas Exchange Impaired  Goal: Optimal Gas Exchange  Outcome: Not Progressing  Intervention: Optimize Oxygenation and Ventilation  Recent Flowsheet Documentation  Taken 12/23/2024 1500 by Goldie Ramirez RN  Head of Bed (HOB) Positioning: HOB at 20-30 degrees  Taken 12/23/2024 0700 by Goldie Ramirez RN  Head of Bed (HOB) Positioning: HOB at 20-30 degrees

## 2024-12-23 NOTE — CONSULTS
Care Management Initial Consult    General Information  Assessment completed with: Patient    Type of CM/SW Visit: Offer D/C Planning    Primary Care Provider verified and updated as needed: Yes   Readmission within the last 30 days: No      Reason for Consult: discharge planning  Advance Care Planning: No HCD on file       Communication Assessment  Patient's communication style: spoken language (English or Bilingual)    Hearing Difficulty or Deaf: no   Wear Glasses or Blind: no    Cognitive  Cognitive/Neuro/Behavioral: WDL                      Living Environment:   People in home: parent(s)     Current living Arrangements: house      Able to return to prior arrangements:  Yes     Family/Social Support:  Care provided by: self  Provides care for:  No one     Support system:  Parents          Description of Support System:   supportive/involved     Current Resources:   Patient receiving home care services: No     Community Resources: None  Equipment currently used at home: walker, standard  Supplies currently used at home: Oxygen Tubing/Supplies    Employment/Financial:  Employment Status: disabled        Financial Concerns:   N/A     Does the patient's insurance plan have a 3 day qualifying hospital stay waiver?  Yes     Which insurance plan 3 day waiver is available? Alternative insurance waiver    Will the waiver be used for post-acute placement? No    Lifestyle & Psychosocial Needs:  Social Drivers of Health     Food Insecurity: Low Risk  (12/22/2024)    Food Insecurity     Within the past 12 months, did you worry that your food would run out before you got money to buy more?: No     Within the past 12 months, did the food you bought just not last and you didn t have money to get more?: No   Depression: Not at risk (1/9/2024)    PHQ-2     PHQ-2 Score: 0   Housing Stability: Low Risk  (12/22/2024)    Housing Stability     Do you have housing? : Yes     Are you worried about losing your housing?: No   Tobacco Use:  Medium Risk (11/28/2024)    Patient History     Smoking Tobacco Use: Former     Smokeless Tobacco Use: Former     Passive Exposure: Not on file   Financial Resource Strain: Low Risk  (12/22/2024)    Financial Resource Strain     Within the past 12 months, have you or your family members you live with been unable to get utilities (heat, electricity) when it was really needed?: No   Alcohol Use: Not on file   Transportation Needs: Low Risk  (12/22/2024)    Transportation Needs     Within the past 12 months, has lack of transportation kept you from medical appointments, getting your medicines, non-medical meetings or appointments, work, or from getting things that you need?: No   Physical Activity: Inactive (8/30/2021)    Exercise Vital Sign     Days of Exercise per Week: 0 days     Minutes of Exercise per Session: 0 min   Interpersonal Safety: Low Risk  (12/22/2024)    Interpersonal Safety     Do you feel physically and emotionally safe where you currently live?: Yes     Within the past 12 months, have you been hit, slapped, kicked or otherwise physically hurt by someone?: No     Within the past 12 months, have you been humiliated or emotionally abused in other ways by your partner or ex-partner?: No   Stress: Not on file   Social Connections: Unknown (8/30/2021)    Social Connection and Isolation Panel [NHANES]     Frequency of Communication with Friends and Family: Twice a week     Frequency of Social Gatherings with Friends and Family: Twice a week     Attends Worship Services: Never     Active Member of Clubs or Organizations: No     Attends Club or Organization Meetings: Never     Marital Status: Not on file   Health Literacy: Not on file       Functional Status:  Prior to admission patient needed assistance:   Dependent ADLs:: Independent  Dependent IADLs:: Independent       Mental Health Status:   N/A       Chemical Dependency Status:      N/A        Values/Beliefs:  Spiritual, Cultural Beliefs, Worship  Practices, Values that affect care: no               Discussed  Partnership in Safe Discharge Planning  document with patient/family: Yes    Additional Information:  Referral received due to elevated risk score.     Pt is familiar to the CM dept from previous admissions.     Has been living with his parents and receiving infusions from the [facility].     At baseline is ambulatory. Currently walking independently in room.   Has Home O2-- 1.5 liters    Next Steps: CM to follow as the Pt's condition evolves to assess for needs on discharge.     Will assist with Follow up PCP appointment and Handoff to Clinic Care Coordination       FREDY Vega  [facility] [phone]   [facility]  [phone]

## 2024-12-23 NOTE — PROGRESS NOTES
Pt feels SOA when writer walked in room O2 87% with current BIPAP 16/6 25%FIO, Indcrease FIO to 100% 2 minute interval recovered well and RT at bedside. Change mask to over the nose.

## 2024-12-23 NOTE — PLAN OF CARE
Pt has been on Bipap 16/6 25% throughout the night. LS tight, diminished, with coarse sounds, wheezes intermittently which improved with nebulizers. Pt slept most of the night.   He became nauseated which he feels is a side effect from Keytruda - Zofran 4 mg ODT given.   Bp upper 90's systolic, tachycardic 's which improved with his regularly dosed Metoprolol.     Pt came off Bipap at 0530 and placed on NC @ 3 L/min. NC came disconnected from O2 and pt's O2sat dropped quickly to 78%. Pt recovered and needing 4 L/min to maintain O2sat >90%. Pt pursed lipped breathing with audible wheezes. He is coughing with occas sputum production. Declines neb treatment at this time.   Con't to feel nauseated and declining meds at this time.

## 2024-12-23 NOTE — PROGRESS NOTES
CPAP/BiPAP Settings  IPAP: 16  EPAP: 6  Rate: 14  FiO2: 25      CPAP/BiPAP/AVAPS/AVAPS AE Patient Assessment  Skin Assessment: good, clean, dry  Barriers Applied: NA ON/OFF  Lung Sounds: DIMINISHED, WHEEZY, COURSE    Plan:  WEAN OFF O2 and bipap as able

## 2024-12-23 NOTE — PROGRESS NOTES
[facility]    Medicine Progress Note - Hospitalist Service    Date of Admission:  12/22/2024    Assessment & Plan   Luis Hernandez is a 59 year old male with past medical history of COPD on chronic oxygen (1.5L), squamous cells carcinoma of the right lung, pulmonary hypertension and hypertension that presented on 12/22/2024 with SOB and was admitted on BIPAP with a COPD exacerbation.     # Acute on chronic hypoxic respiratory failure with hypercapnia   # Acute COPD exacerbation   # Hx pulmonary hypertension     He is on 1.5L of oxygen PTA and comes to this admission with one day of worsening SOB and increasing O2 requirement. Initially given solumedrol 125 mg and started on nebs but did require start of BIPAP. CXR this admission with upper lobe lucency consistent with severe emphysema and band of opacity in the mid lungs that are noted to be unchanged from prior imaging. Unclear trigger of his exacerbation with clear chest and initial viral testing negative. Will expand viral testing, continue steroids, and respiratory support. Patient does not feel this is a COPD exacerbation. Afebrile, CRP 5, procal WNL, less likely infectious source.    - BIPAP available, requiring continuously due to air hunger while on nasal cannula despite good SpO2  - Respiratory panel pending  - Continued PTA inhalers with formulary substitution   - Continued PTA Azithromycin 250mg, on chronic suppressive therapy   - Prednisone 40 mg every day ordered   - Duo nebs Q4H while awake   - RCAT order placed, not able to tolerate chest PT    # Stage IIIB squamous cell lung cancer   Patient follows with oncology and was last seen on 12/4. Not a surgical of chemoradiation candidate with his severe COPD. Still continuing on chemotherapy with his last dose of penbrolizumab on 12/4.   - Plan for continued oncology follow up outpatient     # Leukocytosis   WBC count of 13.2 on presentation. Suspect related to stress and noted  that patient had already been given Solumedrol by EMS. With no fever and unchanged chest imaging low concern for infection.     # Hypertension   Patient on Lisinopril 20 mg every day, Metoprolol 25 mg BID, and Amlodipine 5 mg every day PTA. With borderline hypotension on presentation held PTA medications.  - Continued PTA Metoprolol   - Held PTA Amlodipine and Lisinopril on admission due to borderline hypotension, restarted 12/24     # Hyponatremia   Na of 126 on presentation. Interestedly patients labs from previous healthcare interactions alternate low and normal Na. Possible hypovolemic. Anticipate improvement by am labs.  - BMP with am labs     # Normocytic anemia, chemo induced   Hgb near baseline at 12.2 on presentation. No concern for bleeding.   - CBC with am labs           Diet: Advance Diet as Tolerated: Regular Diet Adult    DVT Prophylaxis: Enoxaparin (Lovenox) SQ  Miller Catheter: Not present  Lines: None     Cardiac Monitoring: ACTIVE order. Indication: ICU  Code Status: No CPR- Pre-arrest intubation OK      Clinically Significant Risk Factors Present on Admission         # Hyponatremia: Lowest Na = 126 mmol/L in last 2 days, will monitor as appropriate  # Hypochloremia: Lowest Cl = 84 mmol/L in last 2 days, will monitor as appropriate          # Hypertension: Noted on problem list     # Acute Hypercapnic Respiratory Failure: based on venous blood gas results.  Continue supplemental oxygen and ventilatory support as indicated.            # Financial/Environmental Concerns:    # COPD: noted on problem list        Social Drivers of Health    Tobacco Use: Medium Risk (11/28/2024)    Patient History     Smoking Tobacco Use: Former     Smokeless Tobacco Use: Former   Physical Activity: Inactive (8/30/2021)    Exercise Vital Sign     Days of Exercise per Week: 0 days     Minutes of Exercise per Session: 0 min   Social Connections: Unknown (8/30/2021)    Social Connection and Isolation Panel [NHANES]      Frequency of Communication with Friends and Family: Twice a week     Frequency of Social Gatherings with Friends and Family: Twice a week     Attends Cheondoism Services: Never     Active Member of Clubs or Organizations: No     Attends Club or Organization Meetings: Never          Disposition Plan     Medically Ready for Discharge: Anticipated in 2-4 Days           The patient's care was discussed with the Attending Physician, Dr. Frasre, Bedside Nurse, Care Coordinator/, and Patient.    Charlie Atkinson PA-C  Hospitalist Service  [facility]  Securely message with [employer] (more info)  Text page via [facility] Paging/Directory   ______________________________________________________________________    Interval History   Required BiPAP overnight and this morning. Cannot tolerate eating on NC due to air hunger, although SpO2 remains ~90% during these episodes. He states breathing is not significantly changed from last night. He states it does not feel like a COPD exacerbation, but steroids always help him.     Physical Exam   Vital Signs: Temp: 98.1  F (36.7  C) Temp src: Oral BP: (!) 145/98 Pulse: 112   Resp: 23 SpO2: 95 % O2 Device: Nasal cannula Oxygen Delivery: 3 LPM  Weight: 114 lbs 6.7 oz    Constitutional: Alert, oriented, cooperative, in no acute distress, appears nontoxic.  HENT: Oropharynx is clear and moist. No evidence of cranial trauma. Normocephalic. Eyes anicteric.   Cardiovascular: Tachycardic and regular rhythm, normal S1 and S2, and no murmur noted. Good peripheral pulses in wrists bilaterally. No lower extremity edema. Cap refill <2s. No calf tenderness bilaterally.  Respiratory: Biphasic rhonchi and wheezing diffusely. On BiPAP with SpO2 mid 90s.  GI: Soft, non-tender, normal bowel sounds, no hepatosplenomegaly, no masses appreciated.  Musculoskeletal: Normal muscle bulk and tone. FROM in all extremities   Skin: Warm and dry, no rashes on exposed skin.   Psych:  Normal affect and speech.  Neurologic: Cranial nerves 2-12 are grossly intact.       Medical Decision Making       60 MINUTES SPENT BY ME on the date of service doing chart review, history, exam, documentation & further activities per the note.      Data     I have personally reviewed the following data over the past 24 hrs:    14.2 (H)  \   11.5 (L)   / 382     128 (L) 87 (L) 32.5 (H) /  126 (H)   5.1 29 0.83 \     Procal: 0.10 CRP: 4.96 Lactic Acid: N/A         Imaging results reviewed over the past 24 hrs:   No results found for this or any previous visit (from the past 24 hours).

## 2024-12-24 LAB
ANION GAP SERPL CALCULATED.3IONS-SCNC: 7 MMOL/L (ref 7–15)
BASE EXCESS BLDV CALC-SCNC: 6.4 MMOL/L (ref -3–3)
BUN SERPL-MCNC: 25.1 MG/DL (ref 6–20)
CALCIUM SERPL-MCNC: 8.9 MG/DL (ref 8.8–10.4)
CHLORIDE SERPL-SCNC: 93 MMOL/L (ref 98–107)
CREAT SERPL-MCNC: 0.6 MG/DL (ref 0.67–1.17)
EGFRCR SERPLBLD CKD-EPI 2021: >90 ML/MIN/1.73M2
ERYTHROCYTE [DISTWIDTH] IN BLOOD BY AUTOMATED COUNT: 13.5 % (ref 10–15)
GLUCOSE BLDC GLUCOMTR-MCNC: 96 MG/DL (ref 70–99)
GLUCOSE SERPL-MCNC: 95 MG/DL (ref 70–99)
HCO3 BLDV-SCNC: 33 MMOL/L (ref 21–28)
HCO3 SERPL-SCNC: 30 MMOL/L (ref 22–29)
HCT VFR BLD AUTO: 34.4 % (ref 40–53)
HGB BLD-MCNC: 10.7 G/DL (ref 13.3–17.7)
MCH RBC QN AUTO: 29.7 PG (ref 26.5–33)
MCHC RBC AUTO-ENTMCNC: 31.1 G/DL (ref 31.5–36.5)
MCV RBC AUTO: 96 FL (ref 78–100)
O2/TOTAL GAS SETTING VFR VENT: 36 %
OXYHGB MFR BLDV: 81 % (ref 70–75)
PCO2 BLDV: 72 MM HG (ref 40–50)
PH BLDV: 7.28 [PH] (ref 7.32–7.43)
PLATELET # BLD AUTO: 339 10E3/UL (ref 150–450)
PO2 BLDV: 44 MM HG (ref 25–47)
POTASSIUM SERPL-SCNC: 4.8 MMOL/L (ref 3.4–5.3)
RBC # BLD AUTO: 3.6 10E6/UL (ref 4.4–5.9)
SAO2 % BLDV: 83.5 % (ref 70–75)
SODIUM SERPL-SCNC: 130 MMOL/L (ref 135–145)
WBC # BLD AUTO: 12.5 10E3/UL (ref 4–11)

## 2024-12-24 PROCEDURE — 82805 BLOOD GASES W/O2 SATURATION: CPT

## 2024-12-24 PROCEDURE — 999N000215 HC STATISTIC HFNC ADULT NON-CPAP

## 2024-12-24 PROCEDURE — 258N000003 HC RX IP 258 OP 636

## 2024-12-24 PROCEDURE — 94660 CPAP INITIATION&MGMT: CPT

## 2024-12-24 PROCEDURE — 99233 SBSQ HOSP IP/OBS HIGH 50: CPT

## 2024-12-24 PROCEDURE — 85018 HEMOGLOBIN: CPT

## 2024-12-24 PROCEDURE — 80048 BASIC METABOLIC PNL TOTAL CA: CPT

## 2024-12-24 PROCEDURE — 250N000012 HC RX MED GY IP 250 OP 636 PS 637: Performed by: STUDENT IN AN ORGANIZED HEALTH CARE EDUCATION/TRAINING PROGRAM

## 2024-12-24 PROCEDURE — 250N000013 HC RX MED GY IP 250 OP 250 PS 637: Performed by: STUDENT IN AN ORGANIZED HEALTH CARE EDUCATION/TRAINING PROGRAM

## 2024-12-24 PROCEDURE — 200N000001 HC R&B ICU

## 2024-12-24 PROCEDURE — 36415 COLL VENOUS BLD VENIPUNCTURE: CPT

## 2024-12-24 PROCEDURE — 250N000013 HC RX MED GY IP 250 OP 250 PS 637

## 2024-12-24 PROCEDURE — 250N000011 HC RX IP 250 OP 636: Performed by: STUDENT IN AN ORGANIZED HEALTH CARE EDUCATION/TRAINING PROGRAM

## 2024-12-24 PROCEDURE — 82310 ASSAY OF CALCIUM: CPT

## 2024-12-24 PROCEDURE — 999N000157 HC STATISTIC RCP TIME EA 10 MIN

## 2024-12-24 RX ORDER — ALBUTEROL SULFATE 90 UG/1
2 INHALANT RESPIRATORY (INHALATION)
Status: DISCONTINUED | OUTPATIENT
Start: 2024-12-24 | End: 2024-12-26 | Stop reason: HOSPADM

## 2024-12-24 RX ORDER — ALBUTEROL SULFATE 0.83 MG/ML
2.5 SOLUTION RESPIRATORY (INHALATION) 4 TIMES DAILY
Status: DISCONTINUED | OUTPATIENT
Start: 2024-12-24 | End: 2024-12-24

## 2024-12-24 RX ADMIN — ALBUTEROL SULFATE 2 PUFF: 90 AEROSOL, METERED RESPIRATORY (INHALATION) at 19:33

## 2024-12-24 RX ADMIN — METOPROLOL TARTRATE 25 MG: 25 TABLET, FILM COATED ORAL at 08:26

## 2024-12-24 RX ADMIN — ENOXAPARIN SODIUM 40 MG: 40 INJECTION SUBCUTANEOUS at 14:16

## 2024-12-24 RX ADMIN — AZITHROMYCIN DIHYDRATE 250 MG: 250 TABLET, FILM COATED ORAL at 08:26

## 2024-12-24 RX ADMIN — SODIUM CHLORIDE 500 ML: 9 INJECTION, SOLUTION INTRAVENOUS at 14:21

## 2024-12-24 RX ADMIN — PREDNISONE 40 MG: 20 TABLET ORAL at 08:26

## 2024-12-24 RX ADMIN — FLUTICASONE FUROATE AND VILANTEROL TRIFENATATE 1 PUFF: 200; 25 POWDER RESPIRATORY (INHALATION) at 08:27

## 2024-12-24 RX ADMIN — ALBUTEROL SULFATE 2 PUFF: 90 AEROSOL, METERED RESPIRATORY (INHALATION) at 14:16

## 2024-12-24 RX ADMIN — UMECLIDINIUM 1 PUFF: 62.5 AEROSOL, POWDER ORAL at 08:27

## 2024-12-24 RX ADMIN — METOPROLOL TARTRATE 25 MG: 25 TABLET, FILM COATED ORAL at 19:33

## 2024-12-24 ASSESSMENT — ACTIVITIES OF DAILY LIVING (ADL)
ADLS_ACUITY_SCORE: 37
ADLS_ACUITY_SCORE: 37
ADLS_ACUITY_SCORE: 43
ADLS_ACUITY_SCORE: 37
ADLS_ACUITY_SCORE: 43
ADLS_ACUITY_SCORE: 44
ADLS_ACUITY_SCORE: 37
ADLS_ACUITY_SCORE: 43
ADLS_ACUITY_SCORE: 37
ADLS_ACUITY_SCORE: 37
ADLS_ACUITY_SCORE: 43
ADLS_ACUITY_SCORE: 37

## 2024-12-24 NOTE — PLAN OF CARE
Pt became hypotensive over night iram when laying on R side (BP cuff on L arm). MAP down to 58. MD notified and NS 1 L bolus given with improved readings. BP better when laying on back and hypertensive when awake sitting upright. Encouraging PO intake while off Bipap.   Bipap on all shift until 6 am when pt wanted to take a break and fix his dentures.   LS very decreased, tight, coarse with exp wheezes.   Pt feels he is improving some.

## 2024-12-24 NOTE — PROGRESS NOTES
Patient currently on BiPAP  S/T mode, IPAP 16, EPAP 6, Rate 14, FiO2 30%  Breath sounds diminished with expiratory wheezes and coarse  Patient switching between BiPAP and 3 lpm Nasal cannula  RT to monitor and assess as needed.    Lauren Thomas, RT on 12/24/2024 at 4:35 PM

## 2024-12-24 NOTE — PROGRESS NOTES
Notified of hypotension to 71/48.   Also tachycardic to the 120s.  Will give a 1 L normal saline bolus.  If blood pressure does not improve with that, will start on peripheral Levophed  .  Put  lisinopril, metoprolol, and Norvasc on hold.

## 2024-12-24 NOTE — PROGRESS NOTES
[facility]    Medicine Progress Note - Hospitalist Service    Date of Admission:  12/22/2024    Assessment & Plan   Luis Hernandez is a 59 year old male with past medical history of COPD on chronic oxygen (1.5L), squamous cells carcinoma of the right lung, pulmonary hypertension and hypertension that presented on 12/22/2024 with SOB and was admitted on BIPAP with a COPD exacerbation.     # Acute on chronic hypoxic respiratory failure with hypercapnia   # Acute COPD exacerbation   # Acute respiratory acidosis  # Hx pulmonary hypertension     He is on 1.5L of oxygen PTA and comes to this admission with one day of worsening SOB and increasing O2 requirement. Initially given solumedrol 125 mg and started on nebs but did require start of BIPAP. CXR this admission with upper lobe lucency consistent with severe emphysema and band of opacity in the mid lungs that are noted to be unchanged from prior imaging. Unclear trigger of his exacerbation with clear chest and initial viral testing negative. Will expand viral testing, continue steroids, and respiratory support. Patient does not feel this is a COPD exacerbation. Afebrile, CRP 5, procal WNL, respiratory panel negative, less likely infectious source.    - BIPAP; requiring continuously due to air hunger while on nasal cannula despite good SpO2. Not improving hypercapnia however  - Continued PTA inhalers with formulary substitution   - Continued PTA Azithromycin 250mg, on chronic suppressive therapy   - Prednisone 40 mg every day    - Albuterol and Ipratropium Q4h WA (does not tolerate nebulized medications)  - RCAT order placed, not able to tolerate chest PT    # Hypertension   # Episodic hypotension    Patient on Lisinopril 20 mg every day, Metoprolol 25 mg BID, and Amlodipine 5 mg every day PTA. With borderline hypotension on presentation held PTA medications.    Patient with continued intermittent hypotension to the 70s, however he's  asymptomatic and it resolves with position changes. Not in shock due to not requiring vasopressors.  - Continued PTA Metoprolol 25 BID  - Held PTA Amlodipine and Lisinopril on admission due to borderline hypotension, however hypotension is positional  - PRN Norepi available, however encourage BP recheck prior to initiation as hypotension is positional    # Stage IIIB squamous cell lung cancer   Patient follows with oncology and was last seen on 12/4. Not a surgical of chemoradiation candidate with his severe COPD. Still continuing on chemotherapy with his last dose of penbrolizumab on 12/4.   - Plan for continued oncology follow up outpatient     # Leukocytosis   WBC count of 13.2 on presentation. Suspect related to stress and noted that patient had already been given Solumedrol by EMS. With no fever and unchanged chest imaging low concern for infection.     # Hyponatremia, improving  Na of 126 on presentation. Interestedly patients labs from previous healthcare interactions alternate low and normal Na. Possible hypovolemic. Anticipate improvement by am labs.  - BMP with am labs     # Normocytic anemia, chemo induced   Hgb near baseline at 12.2 on presentation. No concern for bleeding.   - CBC with am labs           Diet: Advance Diet as Tolerated: Regular Diet Adult    DVT Prophylaxis: Enoxaparin (Lovenox) SQ  Miller Catheter: Not present  Lines: None     Cardiac Monitoring: ACTIVE order. Indication: ICU  Code Status: No CPR- Pre-arrest intubation OK      Clinically Significant Risk Factors         # Hyponatremia: Lowest Na = 126 mmol/L in last 2 days, will monitor as appropriate  # Hypochloremia: Lowest Cl = 84 mmol/L in last 2 days, will monitor as appropriate          # Hypertension: Noted on problem list     # Acute Hypercapnic Respiratory Failure: based on venous blood gas results.  Continue supplemental oxygen and ventilatory support as indicated.             # Financial/Environmental Concerns:    # COPD:  noted on problem list        Social Drivers of Health    Tobacco Use: Medium Risk (11/28/2024)    Patient History     Smoking Tobacco Use: Former     Smokeless Tobacco Use: Former   Physical Activity: Inactive (8/30/2021)    Exercise Vital Sign     Days of Exercise per Week: 0 days     Minutes of Exercise per Session: 0 min   Social Connections: Unknown (8/30/2021)    Social Connection and Isolation Panel [NHANES]     Frequency of Communication with Friends and Family: Twice a week     Frequency of Social Gatherings with Friends and Family: Twice a week     Attends Christian Services: Never     Active Member of Clubs or Organizations: No     Attends Club or Organization Meetings: Never          Disposition Plan     Medically Ready for Discharge: Anticipated in 2-4 Days           The patient's care was discussed with the Attending Physician, Dr. Fraser, Bedside Nurse, Care Coordinator/, and Patient.    Charlie Atkinson PA-C  Hospitalist Service  [facility]  Securely message with [employer] (more info)  Text page via [employer] Paging/Directory   ______________________________________________________________________    Interval History   NAEO. Intermittent hypotension that resolves with position changes. Feels like breathing is improving slowly, but still significant dyspnea and desaturation while moving. Not able to tolerate chest PT or nebulized medications. Able to eat full meal today. Denies orthopnea, edema, diarrhea, nausea. Prefers BiPAP.    Physical Exam   Vital Signs: Temp: 97.7  F (36.5  C) Temp src: Axillary BP: (!) 119/90 Pulse: 90   Resp: 11 SpO2: 100 % O2 Device: Nasal cannula Oxygen Delivery: 3 LPM  Weight: 116 lbs 9.97 oz    Constitutional: Alert, oriented, cooperative, in no acute distress, appears nontoxic.  HENT: Oropharynx is clear and moist. No evidence of cranial trauma. Normocephalic. Eyes anicteric.   Cardiovascular: Regular rate and rhythm, normal S1 and S2,  and no murmur noted. Good peripheral pulses in wrists bilaterally. No lower extremity edema. No tenting of skin. Cap refill <2s  Respiratory: Biphasic wheezing in all lung fields. On 3L NC at rest without hypoxia, but desaturation with standing to urinate  GI: Soft, non-tender, normal bowel sounds, no hepatosplenomegaly, no masses appreciated.  Musculoskeletal: Normal muscle bulk and tone. FROM in all extremities   Skin: Warm and dry, no rashes on exposed skin.   Psych: Normal affect and speech.  Neurologic: Cranial nerves 2-12 are grossly intact.       Medical Decision Making       50 MINUTES SPENT BY ME on the date of service doing chart review, history, exam, documentation & further activities per the note.      Data     I have personally reviewed the following data over the past 24 hrs:    12.5 (H)  \   10.7 (L)   / 339     130 (L) 93 (L) 25.1 (H) /  95   4.8 30 (H) 0.60 (L) \       Imaging results reviewed over the past 24 hrs:   No results found for this or any previous visit (from the past 24 hours).

## 2024-12-24 NOTE — PROGRESS NOTES
Pt sleeping soundly and now hypotensive 71/48 repeat 65/52. Informed MD on call  Read and IV fluids ordered. /89 after pt awoke and rolled onto back. Will give IV fluids and monitor BP. Norepi gtt order available if needed for continued hypotension.

## 2024-12-25 LAB
ALBUMIN SERPL BCG-MCNC: 3.7 G/DL (ref 3.5–5.2)
ALP SERPL-CCNC: 38 U/L (ref 40–150)
ALT SERPL W P-5'-P-CCNC: 14 U/L (ref 0–70)
ANION GAP SERPL CALCULATED.3IONS-SCNC: 7 MMOL/L (ref 7–15)
AST SERPL W P-5'-P-CCNC: 19 U/L (ref 0–45)
BASE EXCESS BLDV CALC-SCNC: 6.9 MMOL/L (ref -3–3)
BILIRUB SERPL-MCNC: 0.2 MG/DL
BUN SERPL-MCNC: 19.2 MG/DL (ref 6–20)
CALCIUM SERPL-MCNC: 8.9 MG/DL (ref 8.8–10.4)
CHLORIDE SERPL-SCNC: 96 MMOL/L (ref 98–107)
CREAT SERPL-MCNC: 0.55 MG/DL (ref 0.67–1.17)
EGFRCR SERPLBLD CKD-EPI 2021: >90 ML/MIN/1.73M2
ERYTHROCYTE [DISTWIDTH] IN BLOOD BY AUTOMATED COUNT: 13.2 % (ref 10–15)
GLUCOSE SERPL-MCNC: 99 MG/DL (ref 70–99)
HCO3 BLDV-SCNC: 35 MMOL/L (ref 21–28)
HCO3 SERPL-SCNC: 31 MMOL/L (ref 22–29)
HCT VFR BLD AUTO: 32.6 % (ref 40–53)
HGB BLD-MCNC: 10.3 G/DL (ref 13.3–17.7)
MCH RBC QN AUTO: 30.4 PG (ref 26.5–33)
MCHC RBC AUTO-ENTMCNC: 31.6 G/DL (ref 31.5–36.5)
MCV RBC AUTO: 96 FL (ref 78–100)
O2/TOTAL GAS SETTING VFR VENT: 0 %
OXYHGB MFR BLDV: 67 % (ref 70–75)
PCO2 BLDV: 74 MM HG (ref 40–50)
PH BLDV: 7.29 [PH] (ref 7.32–7.43)
PLATELET # BLD AUTO: 336 10E3/UL (ref 150–450)
PO2 BLDV: 37 MM HG (ref 25–47)
POTASSIUM SERPL-SCNC: 4.2 MMOL/L (ref 3.4–5.3)
PROT SERPL-MCNC: 6.2 G/DL (ref 6.4–8.3)
RBC # BLD AUTO: 3.39 10E6/UL (ref 4.4–5.9)
SAO2 % BLDV: 67.9 % (ref 70–75)
SODIUM SERPL-SCNC: 134 MMOL/L (ref 135–145)
WBC # BLD AUTO: 12.3 10E3/UL (ref 4–11)

## 2024-12-25 PROCEDURE — 99232 SBSQ HOSP IP/OBS MODERATE 35: CPT | Performed by: INTERNAL MEDICINE

## 2024-12-25 PROCEDURE — 250N000013 HC RX MED GY IP 250 OP 250 PS 637: Performed by: INTERNAL MEDICINE

## 2024-12-25 PROCEDURE — 250N000013 HC RX MED GY IP 250 OP 250 PS 637

## 2024-12-25 PROCEDURE — 94660 CPAP INITIATION&MGMT: CPT

## 2024-12-25 PROCEDURE — 85041 AUTOMATED RBC COUNT: CPT

## 2024-12-25 PROCEDURE — 36415 COLL VENOUS BLD VENIPUNCTURE: CPT

## 2024-12-25 PROCEDURE — 120N000013 HC R&B IMCU

## 2024-12-25 PROCEDURE — 250N000013 HC RX MED GY IP 250 OP 250 PS 637: Performed by: STUDENT IN AN ORGANIZED HEALTH CARE EDUCATION/TRAINING PROGRAM

## 2024-12-25 PROCEDURE — 999N000157 HC STATISTIC RCP TIME EA 10 MIN

## 2024-12-25 PROCEDURE — 250N000012 HC RX MED GY IP 250 OP 636 PS 637: Performed by: STUDENT IN AN ORGANIZED HEALTH CARE EDUCATION/TRAINING PROGRAM

## 2024-12-25 PROCEDURE — 80053 COMPREHEN METABOLIC PANEL: CPT

## 2024-12-25 PROCEDURE — 82805 BLOOD GASES W/O2 SATURATION: CPT

## 2024-12-25 PROCEDURE — 250N000011 HC RX IP 250 OP 636: Performed by: INTERNAL MEDICINE

## 2024-12-25 PROCEDURE — 85018 HEMOGLOBIN: CPT

## 2024-12-25 RX ORDER — GUAIFENESIN 600 MG/1
600 TABLET, EXTENDED RELEASE ORAL 2 TIMES DAILY
Status: DISCONTINUED | OUTPATIENT
Start: 2024-12-25 | End: 2024-12-26 | Stop reason: HOSPADM

## 2024-12-25 RX ORDER — GUAIFENESIN 600 MG/1
600 TABLET, EXTENDED RELEASE ORAL 2 TIMES DAILY
COMMUNITY

## 2024-12-25 RX ADMIN — UMECLIDINIUM 1 PUFF: 62.5 AEROSOL, POWDER ORAL at 07:37

## 2024-12-25 RX ADMIN — FLUTICASONE FUROATE AND VILANTEROL TRIFENATATE 1 PUFF: 200; 25 POWDER RESPIRATORY (INHALATION) at 07:37

## 2024-12-25 RX ADMIN — ALBUTEROL SULFATE 2 PUFF: 90 AEROSOL, METERED RESPIRATORY (INHALATION) at 17:51

## 2024-12-25 RX ADMIN — ALBUTEROL SULFATE 2 PUFF: 90 AEROSOL, METERED RESPIRATORY (INHALATION) at 13:51

## 2024-12-25 RX ADMIN — AZITHROMYCIN DIHYDRATE 250 MG: 250 TABLET, FILM COATED ORAL at 07:37

## 2024-12-25 RX ADMIN — METOPROLOL TARTRATE 25 MG: 25 TABLET, FILM COATED ORAL at 07:37

## 2024-12-25 RX ADMIN — GUAIFENESIN 600 MG: 600 TABLET ORAL at 20:21

## 2024-12-25 RX ADMIN — ALBUTEROL SULFATE 2 PUFF: 90 AEROSOL, METERED RESPIRATORY (INHALATION) at 20:21

## 2024-12-25 RX ADMIN — PREDNISONE 40 MG: 20 TABLET ORAL at 07:37

## 2024-12-25 RX ADMIN — METOPROLOL TARTRATE 25 MG: 25 TABLET, FILM COATED ORAL at 20:21

## 2024-12-25 RX ADMIN — ALBUTEROL SULFATE 2 PUFF: 90 AEROSOL, METERED RESPIRATORY (INHALATION) at 05:57

## 2024-12-25 RX ADMIN — ALBUTEROL SULFATE 2 PUFF: 90 AEROSOL, METERED RESPIRATORY (INHALATION) at 10:03

## 2024-12-25 RX ADMIN — ENOXAPARIN SODIUM 40 MG: 40 INJECTION SUBCUTANEOUS at 13:51

## 2024-12-25 ASSESSMENT — ACTIVITIES OF DAILY LIVING (ADL)
ADLS_ACUITY_SCORE: 45
ADLS_ACUITY_SCORE: 45
ADLS_ACUITY_SCORE: 43
ADLS_ACUITY_SCORE: 43
ADLS_ACUITY_SCORE: 45
ADLS_ACUITY_SCORE: 43
ADLS_ACUITY_SCORE: 45
ADLS_ACUITY_SCORE: 43
ADLS_ACUITY_SCORE: 45
ADLS_ACUITY_SCORE: 45
ADLS_ACUITY_SCORE: 43
ADLS_ACUITY_SCORE: 45
ADLS_ACUITY_SCORE: 43

## 2024-12-25 NOTE — PROGRESS NOTES
[facility]    Hospitalist Progress Note    Assessment & Plan   Luis Hernandez is a 59 year old male with past medical history of COPD on chronic oxygen (1.5L), squamous cells carcinoma of the right lung, pulmonary hypertension and hypertension that presented on 12/22/2024 with SOB and was admitted on BIPAP with a COPD exacerbation.      Acute on chronic hypoxic respiratory failure with hypercapnia   Acute COPD exacerbation   Acute respiratory acidosis  Hx pulmonary hypertension     He is on 1.5L of oxygen PTA and comes to this admission with one day of worsening SOB and increasing O2 requirement. Initially given solumedrol 125 mg and started on nebs but did require start of BIPAP. CXR this admission with upper lobe lucency consistent with severe emphysema and band of opacity in the mid lungs that are noted to be unchanged from prior imaging. Unclear trigger of his exacerbation with clear chest and initial viral testing negative. Will expand viral testing, continue steroids, and respiratory support. Patient does not feel this is a COPD exacerbation. Afebrile, CRP 5, procal WNL, respiratory panel negative, less likely infectious source.     - Decreased perceived need for BiPAP this AM, but used it overnight  - Continued PTA inhalers with formulary substitution   - Continued PTA Azithromycin 250mg, on chronic suppressive therapy   - Prednisone 40 mg every day    - Albuterol and Ipratropium Q4h WA (does not tolerate nebulized medications)  - RCAT order placed, not able to tolerate chest PT  - Pt to attempt to remain off BiPAP overnight, will transfer to [facility] on 12/25     Hypertension   Episodic hypotension    Patient on Lisinopril 20 mg every day, Metoprolol 25 mg BID, and Amlodipine 5 mg every day PTA. With borderline hypotension on presentation held PTA medications.    Patient with continued intermittent hypotension to the 70s, however he's asymptomatic and it resolves with position changes.  Not in shock due to not requiring vasopressors.  - Continued PTA Metoprolol 25 BID  - Held PTA Amlodipine and Lisinopril on admission due to borderline hypotension, however hypotension is positional     Stage IIIB squamous cell lung cancer   Patient follows with oncology and was last seen on 12/4. Not a surgical of chemoradiation candidate with his severe COPD. Still continuing on chemotherapy with his last dose of penbrolizumab on 12/4.   - Plan for continued oncology follow up outpatient      Leukocytosis   WBC count of 13.2 on presentation. Suspect related to stress and noted that patient had already been given Solumedrol by EMS. With no fever and unchanged chest imaging low concern for infection.      Hyponatremia, improving  Na of 126 on presentation. Interestedly patients labs from previous healthcare interactions alternate low and normal Na. Possible hypovolemic. Anticipate improvement by am labs.  - Follow- up outpatient BMP     Normocytic anemia, chemo induced   Hgb near baseline at 12.2 on presentation. No concern for bleeding.   - Follow- up outpatient CBC     Diet: Advance Diet as Tolerated: Regular Diet Adult    DVT Prophylaxis: Enoxaparin (Lovenox) SQ  Miller Catheter: Not present  Lines: None     Cardiac Monitoring: ACTIVE order. Indication: ICU  Code Status: No CPR- Pre-arrest intubation OK       Clinically Significant Risk Factors      # Hyponatremia: Lowest Na = 130 mmol/L in last 2 days, will monitor as appropriate  # Hypochloremia: Lowest Cl = 93 mmol/L in last 2 days, will monitor as appropriate          # Hypertension: Noted on problem list     # Acute Hypercapnic Respiratory Failure: based on venous blood gas results.  Continue supplemental oxygen and ventilatory support as indicated.             # Financial/Environmental Concerns:    # COPD: noted on problem list      Date of Service (when I saw the patient): 12/25/2024    Disposition: Medically Ready for Discharge: Anticipated  Tomorrow    Tito Fraser MD    Interval History   Patient states he is feeling better today, with less dyspnea the morning.  No acute complaints.  Complains of congestion in his chest that is difficult to cough up.    -Data reviewed today: I reviewed all new labs and imaging results over the last 24 hours. I personally reviewed no ECG or imaging today.    No results found for this or any previous visit (from the past 24 hours).  Significant Results and Procedures     Physical Exam   Temp: 98.5  F (36.9  C) Temp src: Oral BP: (!) 123/90 Pulse: 81   Resp: 18 SpO2: 96 % O2 Device: Nasal cannula Oxygen Delivery: 1.5 LPM  Vitals:    12/23/24 0622 12/24/24 0558 12/25/24 0524   Weight: 51.9 kg (114 lb 6.7 oz) 52.9 kg (116 lb 10 oz) 51.2 kg (112 lb 14 oz)     Vital Signs with Ranges  Temp:  [97.5  F (36.4  C)-98.5  F (36.9  C)] 98.5  F (36.9  C)  Pulse:  [] 81  Resp:  [13-32] 18  BP: ()/() 123/90  FiO2 (%):  [30 %] 30 %  SpO2:  [93 %-99 %] 96 %  I/O last 3 completed shifts:  In: 1170 [P.O.:1170]  Out: 1800 [Urine:1800]    Gen: Cachectic, chronically ill appearing male, alert and oriented x 3, no acute distressed  HEENT: Atraumatic, normocephalic; sclera non-injected, anicterric; oral mucosa moist, no lesion, no exudate  Lungs: Few rhonchi, no wheezes, no rales  Heart: Regular rate, regular rhythm, no gallops, no rubs, no murmurs  GI: Bowel sound normal, no hepatosplenomegaly, no masses, non-tender, non-distended, no guarding, no rebound tenderness  Extremities: No rashes, no edema  Musculoskeletal: Normal muscle mass and tone, no arthritis    Medications   Current Facility-Administered Medications   Medication Dose Route Frequency Provider Last Rate Last Admin    No lozenges or gum should be given while patient on BIPAP/AVAPS/AVAPS AE   Does not apply Continuous PRN Tito Fraser MD        Patient may continue current oral medications   Does not apply Continuous PRN Tito Fraser MD          Current Facility-Administered Medications   Medication Dose Route Frequency Provider Last Rate Last Admin    albuterol (PROVENTIL HFA/VENTOLIN HFA) inhaler  2 puff Inhalation Q4H While awake Charlie Atkinson PA-C   2 puff at 12/25/24 1003    [Held by provider] amLODIPine (NORVASC) tablet 10 mg  10 mg Oral Daily Charlie Atkinson PA-C        azithromycin (ZITHROMAX) tablet 250 mg  250 mg Oral Daily Jonah Mariee MD   250 mg at 12/25/24 0737    enoxaparin ANTICOAGULANT (LOVENOX) injection 40 mg  40 mg Subcutaneous Q24H Jonah Mariee MD   40 mg at 12/24/24 1416    fluticasone-vilanterol (BREO ELLIPTA) 200-25 MCG/ACT inhaler 1 puff  1 puff Inhalation Daily Jonah Mariee MD   1 puff at 12/25/24 0737    [Held by provider] lisinopril (ZESTRIL) tablet 40 mg  40 mg Oral Daily Charlie Atkinson PA-C        metoprolol tartrate (LOPRESSOR) tablet 25 mg  25 mg Oral BID Charlie Atkinson PA-C   25 mg at 12/25/24 0737    predniSONE (DELTASONE) tablet 40 mg  40 mg Oral Daily Jonah Mariee MD   40 mg at 12/25/24 0737    umeclidinium (INCRUSE ELLIPTA) 62.5 MCG/ACT inhaler 1 puff  1 puff Inhalation Daily Jonah Mariee MD   1 puff at 12/25/24 0737       Data   Recent Labs   Lab 12/25/24  0556 12/24/24  0644 12/24/24  0225 12/23/24  0723 12/23/24  0624   WBC 12.3* 12.5*  --   --  14.2*   HGB 10.3* 10.7*  --   --  11.5*   MCV 96 96  --   --  93    339  --   --  382   * 130*  --   --  128*   POTASSIUM 4.2 4.8  --   --  5.1   CHLORIDE 96* 93*  --   --  87*   CO2 31* 30*  --   --  29   BUN 19.2 25.1*  --   --  32.5*   CR 0.55* 0.60*  --   --  0.83   ANIONGAP 7 7  --   --  12   MIKE 8.9 8.9  --   --  9.6   GLC 99 95 96   < > 118*   ALBUMIN 3.7  --   --   --   --    PROTTOTAL 6.2*  --   --   --   --    BILITOTAL 0.2  --   --   --   --    ALKPHOS 38*  --   --   --   --    ALT 14  --   --   --   --    AST 19  --   --   --   --     < > = values in this interval not displayed.

## 2024-12-25 NOTE — PLAN OF CARE
Goal Outcome Evaluation:      Plan of Care Reviewed With: patient    Overall Patient Progress: improvingOverall Patient Progress: improving         Neuro: A&Ox4.   Cardiac: Afib rates in low 100's; improving, bolus 500ml given,  VSS.   Respiratory: Sating low 90's on 4L NC for meals and then back on bipap at 30% fio2, tolerating well. Does not like nose piece mask prefers full face mask.  Talked about skin care.  GI/: Adequate urine output. No bm noted today.  Diet/appetite: Tolerating regular diet. Eating well.  Activity:  Assist of 1, up to chair for breakfast and then stayed in bed this afternoon.   Pain: At acceptable level on current regimen.Denies today.  Skin: No new deficits noted.Denies any rashes or open areas below the waist, bed bath given per patient tolerance above the waist.   LDA's: piv x 1 right arm.     Plan: Patient would like a VBG in the am, Md monitor a couple days to see if co2 resolves. Continue with POC. Notify primary team with changes.

## 2024-12-25 NOTE — PLAN OF CARE
End Of Shift Note        Neuro: Alert and orientated x4.    Cardiac: Afbb rate controlled. 's most of day.    Respiratory: Weaned to home baseline 1.5L, tolerating well. States overall feels improved today. Planning to wear BiPAP again tonight.     GI/: Adequate urine output, uses urinal at bedside.     Diet/appetite: Tolerating regular diet. Eating well.    Activity:  SBA to independent. Patient stands at edge of bed to void and also transfers to chair next to bed. Steady on feet.     Skin: No new issues.     LDA's: PIV x 1 SL     Kelley Greene RN on 12/25/2024 at 5:48 PM

## 2024-12-25 NOTE — PLAN OF CARE
Goal Outcome Evaluation:      Plan of Care Reviewed With: patient    Overall Patient Progress: improvingOverall Patient Progress: improving    Outcome Evaluation: Patient maintaining O2 sat on home oxygen baseline of 1.5L. Voiding well, using urinal at bedside. Good appetite. States breathing feels improved. Able to make needs known. Denies pain.

## 2024-12-25 NOTE — PLAN OF CARE
Pt alert and oriented x4. Pleasant and cooperative with staff. Monitor showing SR/ST. No bowel movement. Using urinal at bedside having adequate amount of urine output. Wore BiPap all night, switched to 2L NC after AM labs drawn with sats>90%. Denies pain. Makes needs known.

## 2024-12-26 VITALS
OXYGEN SATURATION: 95 % | WEIGHT: 112.88 LBS | DIASTOLIC BLOOD PRESSURE: 94 MMHG | TEMPERATURE: 98.8 F | SYSTOLIC BLOOD PRESSURE: 143 MMHG | RESPIRATION RATE: 20 BRPM | HEART RATE: 90 BPM | BODY MASS INDEX: 19.38 KG/M2

## 2024-12-26 LAB
ANION GAP SERPL CALCULATED.3IONS-SCNC: 7 MMOL/L (ref 7–15)
BASE EXCESS BLDV CALC-SCNC: 8.3 MMOL/L (ref -3–3)
BUN SERPL-MCNC: 14.2 MG/DL (ref 6–20)
CALCIUM SERPL-MCNC: 9 MG/DL (ref 8.8–10.4)
CHLORIDE SERPL-SCNC: 95 MMOL/L (ref 98–107)
CREAT SERPL-MCNC: 0.55 MG/DL (ref 0.67–1.17)
EGFRCR SERPLBLD CKD-EPI 2021: >90 ML/MIN/1.73M2
ERYTHROCYTE [DISTWIDTH] IN BLOOD BY AUTOMATED COUNT: 13.3 % (ref 10–15)
GLUCOSE SERPL-MCNC: 87 MG/DL (ref 70–99)
HCO3 BLDV-SCNC: 37 MMOL/L (ref 21–28)
HCO3 SERPL-SCNC: 33 MMOL/L (ref 22–29)
HCT VFR BLD AUTO: 32.9 % (ref 40–53)
HGB BLD-MCNC: 10.3 G/DL (ref 13.3–17.7)
MCH RBC QN AUTO: 30 PG (ref 26.5–33)
MCHC RBC AUTO-ENTMCNC: 31.3 G/DL (ref 31.5–36.5)
MCV RBC AUTO: 96 FL (ref 78–100)
O2/TOTAL GAS SETTING VFR VENT: 28 %
OXYHGB MFR BLDV: 81 % (ref 70–75)
PCO2 BLDV: 77 MM HG (ref 40–50)
PH BLDV: 7.29 [PH] (ref 7.32–7.43)
PLATELET # BLD AUTO: 331 10E3/UL (ref 150–450)
PO2 BLDV: 48 MM HG (ref 25–47)
POTASSIUM SERPL-SCNC: 4.1 MMOL/L (ref 3.4–5.3)
RBC # BLD AUTO: 3.43 10E6/UL (ref 4.4–5.9)
SAO2 % BLDV: 83 % (ref 70–75)
SODIUM SERPL-SCNC: 135 MMOL/L (ref 135–145)
WBC # BLD AUTO: 13.4 10E3/UL (ref 4–11)

## 2024-12-26 PROCEDURE — 99239 HOSP IP/OBS DSCHRG MGMT >30: CPT | Performed by: INTERNAL MEDICINE

## 2024-12-26 PROCEDURE — 250N000012 HC RX MED GY IP 250 OP 636 PS 637: Performed by: INTERNAL MEDICINE

## 2024-12-26 PROCEDURE — 80048 BASIC METABOLIC PNL TOTAL CA: CPT | Performed by: INTERNAL MEDICINE

## 2024-12-26 PROCEDURE — 36415 COLL VENOUS BLD VENIPUNCTURE: CPT | Performed by: INTERNAL MEDICINE

## 2024-12-26 PROCEDURE — 82805 BLOOD GASES W/O2 SATURATION: CPT | Performed by: INTERNAL MEDICINE

## 2024-12-26 PROCEDURE — 250N000013 HC RX MED GY IP 250 OP 250 PS 637

## 2024-12-26 PROCEDURE — 250N000013 HC RX MED GY IP 250 OP 250 PS 637: Performed by: INTERNAL MEDICINE

## 2024-12-26 PROCEDURE — 85014 HEMATOCRIT: CPT | Performed by: INTERNAL MEDICINE

## 2024-12-26 RX ORDER — PREDNISONE 10 MG/1
TABLET ORAL
Qty: 30 TABLET | Refills: 0 | Status: SHIPPED | OUTPATIENT
Start: 2024-12-26

## 2024-12-26 RX ADMIN — AZITHROMYCIN DIHYDRATE 250 MG: 250 TABLET, FILM COATED ORAL at 08:01

## 2024-12-26 RX ADMIN — ALBUTEROL SULFATE 2 PUFF: 90 AEROSOL, METERED RESPIRATORY (INHALATION) at 05:57

## 2024-12-26 RX ADMIN — METOPROLOL TARTRATE 25 MG: 25 TABLET, FILM COATED ORAL at 08:01

## 2024-12-26 RX ADMIN — FLUTICASONE FUROATE AND VILANTEROL TRIFENATATE 1 PUFF: 200; 25 POWDER RESPIRATORY (INHALATION) at 08:00

## 2024-12-26 RX ADMIN — UMECLIDINIUM 1 PUFF: 62.5 AEROSOL, POWDER ORAL at 08:00

## 2024-12-26 RX ADMIN — PREDNISONE 40 MG: 20 TABLET ORAL at 08:01

## 2024-12-26 RX ADMIN — ALBUTEROL SULFATE 2 PUFF: 90 AEROSOL, METERED RESPIRATORY (INHALATION) at 09:41

## 2024-12-26 RX ADMIN — GUAIFENESIN 600 MG: 600 TABLET ORAL at 08:01

## 2024-12-26 ASSESSMENT — ACTIVITIES OF DAILY LIVING (ADL)
ADLS_ACUITY_SCORE: 38
ADLS_ACUITY_SCORE: 38
ADLS_ACUITY_SCORE: 36
ADLS_ACUITY_SCORE: 38
ADLS_ACUITY_SCORE: 36
ADLS_ACUITY_SCORE: 38
ADLS_ACUITY_SCORE: 38

## 2024-12-26 NOTE — PROGRESS NOTES
[facility] DISCHARGE NOTE    Patient discharged to home at 11:22 AM via wheel chair. Accompanied by other staff. Discharge instructions reviewed with patient, opportunity offered to ask questions. Prescriptions sent to patients preferred pharmacy. All belongings sent with patient. IV removed before discharge.    Deb Mackey RN

## 2024-12-26 NOTE — DISCHARGE SUMMARY
[facility]  Hospitalist Discharge Summary       Date of Admission:  12/22/2024  Date of Discharge:  December 26, 2024  Discharging Provider: Tito Fraser MD      Discharge Diagnoses     Acute on chronic hypoxic respiratory failure with hypercapnia   Acute COPD exacerbation   Acute respiratory acidosis  Hx pulmonary hypertension   Hypertension   Episodic hypotension  Stage IIIB squamous cell lung cancer   Leukocytosis   Hyponatremia, improving  Normocytic anemia, chemo induced   Hyponatremia  Hypochloremia  Hypertension  Acute Hypercapnic Respiratory Failure: based on venous blood gas results.    Financial/Environmental Concerns  COPD    Follow-ups Needed After Discharge   Follow-up Appointments       Follow-up and recommended labs and tests       Follow up with primary care provider, Shalini Washington, within 7 days for hospital follow- up.  The following labs/tests are recommended: CBC and BMP.              Discharge Disposition   Discharged to home    Hospital Course   Luis Hernandez is a 59 year old male with past medical history of COPD on chronic oxygen (1.5L), squamous cells carcinoma of the right lung, pulmonary hypertension and hypertension that presented on 12/22/2024 with SOB and was admitted on BIPAP with a COPD exacerbation.      Acute on chronic hypoxic respiratory failure with hypercapnia   Acute COPD exacerbation   Acute respiratory acidosis  Hx pulmonary hypertension     He is on 1.5L of oxygen PTA and comes to this admission with one day of worsening SOB and increasing O2 requirement. Initially given solumedrol 125 mg and started on nebs but did require start of BIPAP. CXR this admission with upper lobe lucency consistent with severe emphysema and band of opacity in the mid lungs that are noted to be unchanged from prior imaging. Unclear trigger of his exacerbation with clear chest and initial viral testing negative. Will expand viral testing, continue steroids,  and respiratory support. Patient does not feel this is a COPD exacerbation. Afebrile, CRP 5, procal WNL, respiratory panel negative, less likely infectious source.  - Patient initially required BiPAP during his hospitalization, but was gradually weaned back to his baseline of 2 L nasal cannula  - Resume PTA Advair and Spiriva on discharge  - Continued PTA Azithromycin 250mg, on chronic suppressive therapy   - Prednisone 2-week taper  - Albuterol MDI  - Patient to follow-up with PCP and pulmonologist     Hypertension   Episodic hypotension    Patient on Lisinopril 20 mg every day, Metoprolol 25 mg BID, and Amlodipine 5 mg every day PTA. With borderline hypotension on presentation held PTA medications.    Patient with continued intermittent hypotension to the 70s, however he's asymptomatic and it resolves with position changes. Not in shock due to not requiring vasopressors.  - Continued PTA Metoprolol 25 BID  - Held PTA Amlodipine and Lisinopril on admission due to borderline hypotension, reevaluate at follow-up     Stage IIIB squamous cell lung cancer   Patient follows with oncology and was last seen on 12/4. Not a surgical of chemoradiation candidate with his severe COPD. Still continuing on chemotherapy with his last dose of penbrolizumab on 12/4.   - Plan for continued oncology follow up outpatient      Leukocytosis   WBC count of 13.2 on presentation. Suspect related to stress and noted that patient had already been given Solumedrol by EMS. With no fever and unchanged chest imaging low concern for infection.      Hyponatremia, improving  Na of 126 on presentation. Interestedly patients labs from previous healthcare interactions alternate low and normal Na. Possible hypovolemic. Anticipate improvement by am labs.  - Follow- up outpatient BMP     Normocytic anemia, chemo induced   Hgb near baseline at 12.2 on presentation. No concern for bleeding.   - Follow- up outpatient CBC     Clinically Significant Risk  Factors      # Hyponatremia: Lowest Na = 134 mmol/L in last 2 days, will monitor as appropriate  # Hypochloremia: Lowest Cl = 95 mmol/L in last 2 days, will monitor as appropriate          # Hypertension: Noted on problem list     # Acute Hypercapnic Respiratory Failure: based on venous blood gas results.  Continue supplemental oxygen and ventilatory support as indicated.             # Financial/Environmental Concerns:    # COPD: noted on problem list      Consultations This Hospital Stay Code Status No CPR- Pre-arrest intubation OK    40 MINUTES SPENT BY ME on the date of service doing chart review, history, exam, documentation & further activities per the note.     Tito Fraser MD  [facility]  ______________________________________________________________________    Physical Exam   Vital Signs: Temp: 98.8  F (37.1  C) Temp src: Oral BP: (!) 143/94 Pulse: 90   Resp: 20 SpO2: 95 % O2 Device: Nasal cannula Oxygen Delivery: 2 LPM  Weight: 112 lbs 14.01 oz       Gen: Cachectic, chronically ill-appearing male, alert and oriented x 3, no acute distressed  HEENT: Atraumatic, normocephalic; sclera non-injected, anicterric; oral mucosa moist, no lesion, no exudate  Lungs: Generalized decreased breath sounds, no wheezes, no rhonchi, no rales  Heart: Regular rate, regular rhythm, no gallops, no rubs, no murmurs  GI: Bowel sound normal, no hepatosplenomegaly, no masses, non-tender, non-distended, no guarding, no rebound tenderness  Lymph: No lymphadenopathy, no edema  Skin: No rashes, no chronic venous stasis     Primary Care Physician   Shalini Washington    Discharge Orders      Primary Care - Care Coordination Referral      Reason for your hospital stay    This is a 59-year-old male admitted with a COPD exacerbation.     Follow-up and recommended labs and tests     Follow up with primary care provider, Shalini Washington, within 7 days for hospital follow- up.  The following labs/tests are  recommended: CBC and BMP.     Activity    Your activity upon discharge: activity as tolerated     No CPR- Pre-arrest intubation OK     Oxygen Adult/Peds    Oxygen Documentation  I certify that this patient, Luis Hernandez has been under my care (or a nurse practitioner or physican's assistant working with me). This is the face-to-face encounter for oxygen medical necessity.      At the time of this encounter, I have reviewed the qualifying testing and have determined that supplemental oxygen is reasonable and necessary and is expected to improve the patient's condition in a home setting.         Patient has continued oxygen desaturation due to COPD J44.9.    If portability is ordered, is the patient mobile within the home? yes    Was this visit performed as a telehealth visit: No     Diet    Follow this diet upon discharge: Orders Placed This Encounter      Advance Diet as Tolerated: Regular Diet Adult         Significant Results and Procedures     Discharge Medications   Current Discharge Medication List        START taking these medications    Details   predniSONE (DELTASONE) 10 MG tablet 4 tabs daily for 3 days, then 3 tabs daily for 3 days, then 2 tabs daily for 3 days, then 1 tab daily for 3 days, then stop  Qty: 30 tablet, Refills: 0    Associated Diagnoses: COPD exacerbation (H)           CONTINUE these medications which have NOT CHANGED    Details   albuterol (PROAIR HFA/PROVENTIL HFA/VENTOLIN HFA) 108 (90 Base) MCG/ACT inhaler Inhale 2 puffs into the lungs every 4 hours as needed for shortness of breath  Qty: 54 g, Refills: 3    Comments: Pharmacy may dispense brand covered by insurance (Proair, or proventil or ventolin or generic albuterol inhaler)  Associated Diagnoses: Centrilobular emphysema (H)      albuterol (PROVENTIL) (2.5 MG/3ML) 0.083% neb solution Take 1 vial (2.5 mg) by nebulization every 4 hours as needed for shortness of breath, wheezing or cough.  Qty: 360 mL, Refills: 5    Associated  Diagnoses: Chronic obstructive pulmonary disease, unspecified COPD type (H)      azithromycin (ZITHROMAX) 250 MG tablet Take 1 tablet (250 mg) by mouth daily.  Qty: 30 tablet, Refills: 11    Associated Diagnoses: Chronic obstructive pulmonary disease, unspecified COPD type (H)      fluticasone-salmeterol (ADVAIR) 500-50 MCG/ACT inhaler Inhale 1 puff into the lungs every 12 hours.  Qty: 60 each, Refills: 11    Associated Diagnoses: Acute on chronic respiratory failure with hypoxia and hypercapnia (H); Pulmonary emphysema, unspecified emphysema type (H)      guaiFENesin (MUCINEX) 600 MG 12 hr tablet Take 600 mg by mouth 2 times daily.      ibuprofen (ADVIL/MOTRIN) 200 MG tablet Take 3 tablets by mouth every 6 hours as needed for pain.      metoprolol tartrate (LOPRESSOR) 25 MG tablet Take 1 tablet (25 mg) by mouth 2 times daily  Qty: 180 tablet, Refills: 3    Associated Diagnoses: Pulmonary hypertension (H)      Oxymetazoline HCl (NASAL SPRAY NA) Spray in nostril as needed.      tiotropium (SPIRIVA RESPIMAT) 2.5 MCG/ACT inhaler Inhale 2 puffs into the lungs daily  Qty: 12 g, Refills: 4    Associated Diagnoses: Acute on chronic respiratory failure with hypoxia and hypercapnia (H); Pulmonary emphysema, unspecified emphysema type (H)      !! order for DME Equipment being ordered: Oxygen 3L via NC continuous.  O2 saturated noted to be 86% on room air at rest.  Qty: 1 Units, Refills: 0    Associated Diagnoses: Mixed simple and mucopurulent chronic bronchitis (H)      !! order for DME Equipment being ordered: Nebulizer  Qty: 1 Device, Refills: 0    Associated Diagnoses: Simple chronic bronchitis (H)       !! - Potential duplicate medications found. Please discuss with provider.        STOP taking these medications       amLODIPine (NORVASC) 10 MG tablet Comments:   Reason for Stopping:         lisinopril (ZESTRIL) 40 MG tablet Comments:   Reason for Stopping:             Allergies   Allergies   Allergen Reactions    Hctz  Other (See Comments)     He has hyponatremia - avoid use    Penicillins Unknown     Childhood reaction.

## 2024-12-26 NOTE — PROGRESS NOTES
Bipap on standby. Pt is on 1.5L NC sating 97%. No changes made at this shift. Rt will continue to monitor.

## 2024-12-26 NOTE — PROGRESS NOTES
"Care Management Discharge Note    Discharge Date: 12/26/2024     Discharge Disposition: Home    Discharge Services:  Pt declines services at this time    Discharge DME: Oxygen--has [facility] O2 at home    Discharge Transportation: family  will provide    Private pay costs discussed: Not applicable    Does the patient's insurance plan have a 3 day qualifying hospital stay waiver?  Yes     Which insurance plan 3 day waiver is available? Alternative insurance waiver    Will the waiver be used for post-acute placement? No    Patient/family educated on Medicare website which has current facility and service quality ratings:  Yes    Education Provided on the Discharge Plan: Yes  Persons Notified of Discharge Plans: Patient  Patient/Family in Agreement with the Plan:  yes    Handoff Referral Completed: Yes, [facility] PCP: Internal handoff referral completed    Additional Information:  Update received during IDT rounds. Informed Pt is medically stable for discharge to home today.     CM met with the Pt to introduce self and role. Pt states he lives with his Father Luis. States he feels comfortable with discharging home today with no home care or additional services but had several questions regarding Hospice for the \"near future\"    CM addressed the pt's questions to his satisfaction   Discussed hospice services and how the care is provided in the home vs SNF  Pt believes he will need to apply for long term care medical assistance. Does not have a primary care giver in the home should his need progress. States he lives with his Father who is elderly.     Provided the patient with a copy of a blank Health Care directive and a blank Power of  form to complete at his leisure     Pt interested in making his daughter Analilia his Health Care Agent and Power of .     Pt will be arranging his transportation home      Discharge Plan: Return home  No home care needs at this time.     Handoff completed to [facility] " Coordination      Follow up appointment with PCP -- January 9th       FREDY Vega  [facility] [phone]   [facility]  [phone]

## 2024-12-26 NOTE — PLAN OF CARE
Goal Outcome Evaluation:      Plan of Care Reviewed With: patient    Overall Patient Progress: improvingOverall Patient Progress: improving    Outcome Evaluation: Plan to d/c home today

## 2024-12-26 NOTE — PLAN OF CARE
Goal Outcome Evaluation:      Plan of Care Reviewed With: patient    Overall Patient Progress: improvingOverall Patient Progress: improving    Outcome Evaluation: Pt did have increase in O2 sat need while sleeping-- moved to 2L.  Voiding well with urinal and had BM x1.  Denies pain; states breathing improved.  Communicates needs well.  D/C criteria dependent on labs and MD decision-making today.

## 2024-12-27 ENCOUNTER — PATIENT OUTREACH (OUTPATIENT)
Dept: CARE COORDINATION | Facility: CLINIC | Age: 59
End: 2024-12-27
Payer: COMMERCIAL

## 2024-12-27 NOTE — PROGRESS NOTES
Clinic Care Coordination Contact  [facility]/Voicemail    [facility]  Hospitalist Discharge Summary       Date of Admission:  12/22/2024  Date of Discharge:  December 26, 2024  Discharging Provider: Tito Fraser MD           Discharge Diagnoses  Acute on chronic hypoxic respiratory failure with hypercapnia   Acute COPD exacerbation   Acute respiratory acidosis  Hx pulmonary hypertension   Hypertension   Episodic hypotension  Stage IIIB squamous cell lung cancer   Leukocytosis   Hyponatremia, improving  Normocytic anemia, chemo induced   Hyponatremia  Hypochloremia  Hypertension  Acute Hypercapnic Respiratory Failure: based on venous blood gas results.    Financial/Environmental Concerns  COPD          Clinical Data: Care Coordinator Outreach    Outreach Documentation Number of Outreach Attempt   12/27/2024   9:24 AM 1       Left message on patient's voicemail with call back information and requested return call.      Plan:. Care Coordinator will try to reach patient again in 1-2 business days.    [facility]   India Andrade RN, Care Coordinator   [facility]   E-mail [email or web address]   [phone]

## 2024-12-30 ASSESSMENT — ACTIVITIES OF DAILY LIVING (ADL): DEPENDENT_IADLS:: INDEPENDENT

## 2024-12-30 NOTE — PROGRESS NOTES
Clinic Care Coordination Contact  Clinic Care Coordination Contact  OUTREACH    Referral Information:  Referral Source: IP Handoff    Primary Diagnosis: COPD    Chief Complaint   Patient presents with    Clinic Care Coordination - Post Hospital     Clinic Care Coordination RN         Universal Utilization:   [facility]  Hospitalist Discharge Summary       Date of Admission:  12/22/2024  Date of Discharge:  December 26, 2024  Discharging Provider: Tito Fraser MD           Discharge Diagnoses  Acute on chronic hypoxic respiratory failure with hypercapnia   Acute COPD exacerbation   Acute respiratory acidosis  Hx pulmonary hypertension   Hypertension   Episodic hypotension  Stage IIIB squamous cell lung cancer   Leukocytosis   Hyponatremia, improving  Normocytic anemia, chemo induced   Hyponatremia  Hypochloremia  Hypertension  Acute Hypercapnic Respiratory Failure: based on venous blood gas results.    Financial/Environmental Concerns  COPD        Clinic Utilization  Difficulty keeping appointments:: Yes  No-Show Concerns: Yes  No PCP office visit in Past Year: No  Utilization      No Show Count (past year)  0             ED Visits  7             Hospital Admissions  5                    Current as of: 12/30/2024 11:57 AM                Clinical Concerns:  Current Medical Concerns:  Patient continues to wear oxygen continuously at 1.5 liters and Oximeter reading is 92-94 %    Current Behavioral Concerns: No    Education Provided to patient: CC RN role introduced   Pain  Pain (GOAL):: No  Health Maintenance Reviewed: Not assessed  Clinical Pathway: Clinic Care Coordination - COPD Clinical Pathway     COPD Symptoms:    Shortness of breath:: No  Cough: No  Wheezing: Yes  Anxiety: No  New, different, or worsening chest pain? : No  Chills: No  Fever: No  Overall your COPD symptoms are (GOAL):: Stable    COPD Management:   Taking COPD medications as prescribed?: Yes  Are your medications  effective in controlling your symptoms?: Yes  Taking steroids for COPD?: Yes  Do you understand the tapering instructions?: Yes  Do you have a pulse oximeter?: Yes  SpO2 (Patient Reported):  (94 95)  Are you using oxygen?: Yes    Medication Management:  Medication review status: Medications reviewed.  Changes noted per patient report.       Functional Status:  Dependent ADLs:: Ambulation-walker  Dependent IADLs:: Independent  Mobility Status: Independent w/Device    Living Situation:  Current living arrangement:: I live in a private home with family    Lifestyle & Psychosocial Needs:    Social Drivers of Health     Food Insecurity: Low Risk  (12/22/2024)    Food Insecurity     Within the past 12 months, did you worry that your food would run out before you got money to buy more?: No     Within the past 12 months, did the food you bought just not last and you didn t have money to get more?: No   Depression: Not at risk (1/9/2024)    PHQ-2     PHQ-2 Score: 0   Housing Stability: Low Risk  (12/22/2024)    Housing Stability     Do you have housing? : Yes     Are you worried about losing your housing?: No   Tobacco Use: Medium Risk (11/28/2024)    Patient History     Smoking Tobacco Use: Former     Smokeless Tobacco Use: Former     Passive Exposure: Not on file   Financial Resource Strain: Low Risk  (12/22/2024)    Financial Resource Strain     Within the past 12 months, have you or your family members you live with been unable to get utilities (heat, electricity) when it was really needed?: No   Alcohol Use: Not on file   Transportation Needs: Low Risk  (12/22/2024)    Transportation Needs     Within the past 12 months, has lack of transportation kept you from medical appointments, getting your medicines, non-medical meetings or appointments, work, or from getting things that you need?: No   Physical Activity: Inactive (8/30/2021)    Exercise Vital Sign     Days of Exercise per Week: 0 days     Minutes of Exercise per  Session: 0 min   Interpersonal Safety: Low Risk  (12/22/2024)    Interpersonal Safety     Do you feel physically and emotionally safe where you currently live?: Yes     Within the past 12 months, have you been hit, slapped, kicked or otherwise physically hurt by someone?: No     Within the past 12 months, have you been humiliated or emotionally abused in other ways by your partner or ex-partner?: No   Stress: Not on file   Social Connections: Unknown (8/30/2021)    Social Connection and Isolation Panel [NHANES]     Frequency of Communication with Friends and Family: Twice a week     Frequency of Social Gatherings with Friends and Family: Twice a week     Attends Nondenominational Services: Never     Active Member of Clubs or Organizations: No     Attends Club or Organization Meetings: Never     Marital Status: Not on file   Health Literacy: Not on file     Diet:: Regular  Inadequate nutrition (GOAL):: No  Tube Feeding: No  Inadequate activity/exercise (GOAL):: No  Significant changes in sleep pattern (GOAL): No        Nondenominational or spiritual beliefs that impact treatment:: No  Mental health DX:: No  Mental health management concern (GOAL):: No  Chemical Dependency Status: No Current Concerns  Informal Support system:: Parent             Resources and Interventions:  Current Resources:      Community Resources: None     Equipment Currently Used at Home: walker, standard            Advance Care Plan/Directive  Advanced Care Plans/Directives on file:: No    Referrals Placed: None    Patient/Caregiver understanding: Patient expresses good understanding of discharge instruction     Outreach Frequency: weekly, more frequently as needed  Future Appointments                In 1 week Shalini Washington NP [facility]  Arrive at: Clinic A    In 2 weeks [address]    In 2 weeks [facility]    In 2 weeks  Jennifer Cabrera, NP [facility]    In 2 weeks [place] IN[facility]ION CHAIR; [place] [facility] INFUSION NURSE [facility]    In 4 months Bianca Cuellar MD [facility]            Plan:   Patient will finish course of antibiotic and 2 week Prednisone taper   Patient will keep his hospital follow up appointment 1/9/2025  Declines Care Coordination at this time    [facility]   India Andrade RN, Care Coordinator   [facility]'s   E-mail [email or web address]   [phone]

## 2024-12-31 ENCOUNTER — TELEPHONE (OUTPATIENT)
Dept: MULTI SPECIALTY CLINIC | Facility: CLINIC | Age: 59
End: 2024-12-31
Payer: COMMERCIAL

## 2024-12-31 DIAGNOSIS — J44.1 COPD EXACERBATION (H): Primary | ICD-10-CM

## 2024-12-31 RX ORDER — PREDNISONE 10 MG/1
10 TABLET ORAL DAILY
Qty: 30 TABLET | Refills: 5 | Status: SHIPPED | OUTPATIENT
Start: 2024-12-31

## 2024-12-31 RX ORDER — SODIUM CHLORIDE FOR INHALATION 0.9 %
3 VIAL, NEBULIZER (ML) INHALATION 2 TIMES DAILY PRN
Qty: 180 ML | Refills: 5 | Status: SHIPPED | OUTPATIENT
Start: 2024-12-31

## 2024-12-31 RX ORDER — ROFLUMILAST 500 UG/1
500 TABLET ORAL DAILY
Qty: 30 TABLET | Refills: 5 | Status: SHIPPED | OUTPATIENT
Start: 2024-12-31

## 2024-12-31 NOTE — TELEPHONE ENCOUNTER
Received a Mychart from Prasad. Called him to discuss recent hospital admission/ED visits.     He underwent R bronchus stent removal on 11/15. Airway was noted to be widely patent but malacia was seen at the site of previous stent. He reports that since stent removal his breathing has worsened. He has been seen in the ED for increasing SOB on 11/28, and then on 12/22. He reports that his breathing symptoms improved with prednisone but once the steroid course is completed, he has more difficulty. Today he reports that recently his symptoms did not feel similar to COPD exacerbation. His breathing is ok at baseline but with exertion he is more SOB. Recent VBG showed metabolic acidosis with pCO2 in the 70's. Currently he feels his main difficulty is clearing thick mucous. He is using albuterol nebulizer, aerobika, advair, spiriva, azithromycin.  He is on a 2 week steroid taper, currently 30 mg daily.     CT chest from 12/3 reviewed and he saw Dr. Friedell with onc on 12/4 with plan for ongoing keytruda [id number].     Plan:   - continue prednisone taper to 10 mg daily until seen in clinic  - restart daliresp, lab monitoring ordered  - start mucolytic (normal saline) nebs bid  - continue current therapies  - pulmonary rehab if he would be willing (order placed)  - I will discuss with IP whether he may need repeat bronch for area of malacia potentially contributing to symptoms vs. Nocturnal PPV (respiratory acidosis noted while sick)    Addendum: discussed with IP and they would like an updated chest CT. There is already a scheduled chest CT on 1/13/25 ordered.     Bianca Cuellar MD  Pulmonary, Allergy, Critical Care, and Sleep Medicine   [facility]   Pager: [phone]

## 2025-01-09 ENCOUNTER — OFFICE VISIT (OUTPATIENT)
Dept: FAMILY MEDICINE | Facility: CLINIC | Age: 60
End: 2025-01-09
Payer: COMMERCIAL

## 2025-01-09 VITALS
TEMPERATURE: 97.6 F | HEIGHT: 64 IN | OXYGEN SATURATION: 98 % | BODY MASS INDEX: 20.11 KG/M2 | HEART RATE: 90 BPM | RESPIRATION RATE: 22 BRPM | SYSTOLIC BLOOD PRESSURE: 112 MMHG | WEIGHT: 117.8 LBS | DIASTOLIC BLOOD PRESSURE: 62 MMHG

## 2025-01-09 DIAGNOSIS — D72.829 LEUKOCYTOSIS, UNSPECIFIED TYPE: ICD-10-CM

## 2025-01-09 DIAGNOSIS — I10 ESSENTIAL HYPERTENSION, BENIGN: ICD-10-CM

## 2025-01-09 DIAGNOSIS — E87.1 HYPONATREMIA: ICD-10-CM

## 2025-01-09 DIAGNOSIS — Z23 ENCOUNTER FOR VACCINATION: ICD-10-CM

## 2025-01-09 DIAGNOSIS — D64.9 NORMOCYTIC ANEMIA: ICD-10-CM

## 2025-01-09 DIAGNOSIS — J44.1 COPD WITH ACUTE EXACERBATION (H): Primary | ICD-10-CM

## 2025-01-09 PROBLEM — J96.21 ACUTE ON CHRONIC RESPIRATORY FAILURE WITH HYPOXIA AND HYPERCAPNIA (H): Status: RESOLVED | Noted: 2023-11-06 | Resolved: 2025-01-09

## 2025-01-09 PROBLEM — J96.22 ACUTE ON CHRONIC RESPIRATORY FAILURE WITH HYPOXIA AND HYPERCAPNIA (H): Status: RESOLVED | Noted: 2023-11-06 | Resolved: 2025-01-09

## 2025-01-09 PROBLEM — J18.9 PNEUMONIA OF BOTH LOWER LOBES DUE TO INFECTIOUS ORGANISM: Status: RESOLVED | Noted: 2024-04-23 | Resolved: 2025-01-09

## 2025-01-09 PROBLEM — J96.22 ACUTE ON CHRONIC RESPIRATORY FAILURE WITH HYPERCAPNIA (H): Status: RESOLVED | Noted: 2024-12-22 | Resolved: 2025-01-09

## 2025-01-09 RX ORDER — AMLODIPINE BESYLATE 5 MG/1
5 TABLET ORAL DAILY
Qty: 90 TABLET | Refills: 0 | Status: SHIPPED | OUTPATIENT
Start: 2025-01-09

## 2025-01-09 RX ORDER — LISINOPRIL 40 MG/1
40 TABLET ORAL DAILY
Qty: 90 TABLET | Refills: 3 | Status: SHIPPED | OUTPATIENT
Start: 2025-01-09

## 2025-01-09 ASSESSMENT — PAIN SCALES - GENERAL: PAINLEVEL_OUTOF10: NO PAIN (0)

## 2025-01-09 NOTE — PATIENT INSTRUCTIONS
Call [phone] and make a nursing appointment for BP cuff check.  Get my labs done next week with oncology labs.  Follow-up with pulmonology as scheduled asap.

## 2025-01-09 NOTE — PROGRESS NOTES
Assessment & Plan     COPD with acute exacerbation (H)  Patient's acute exacerbation is resolved.  Patient is stable today with 98% sats on his oxygen.  He is taking his prednisone, spiriva and as needed albuterol.      Essential hypertension, benign  BP is mildly low.  Reducing amlodipine to 5 mg daily and continue atenolol and lisinopril at current doses.  BMP ordered for recheck today.  Patient will complete this at his lab visit with oncology next week.  - Basic metabolic panel  (Ca, Cl, CO2, Creat, Gluc, K, Na, BUN); Future  - lisinopril (ZESTRIL) 40 MG tablet; Take 1 tablet (40 mg) by mouth daily.  - amLODIPine (NORVASC) 5 MG tablet; Take 1 tablet (5 mg) by mouth daily.    Leukocytosis, unspecified type  CBC ordered for monitoring leukocytosis that was seen on hospitalization.  - CBC with platelets; Future    Hyponatremia  BMP ordered for monitoring.  - Basic metabolic panel  (Ca, Cl, CO2, Creat, Gluc, K, Na, BUN); Future    Normocytic anemia  CBC ordered for monitoring.  - CBC with platelets; Future    Encounter for vaccination  - INFLUENZA VACCINE TRIVALENT(FLUBLOK)  - COVID-19 12+ (PFIZER)        MED REC REQUIRED     Post Medication Reconciliation Status: discharge medications reconciled and changed, per note/orders    See Patient Instructions    Darell Parham is a 59 year old, presenting for the following health issues:  Hospital F/U      1/9/2025     9:24 AM   Additional Questions   Roomed by rmb   Accompanied by self         1/9/2025     9:24 AM   Patient Reported Additional Medications   Patient reports taking the following new medications amlopidine,lisinopril     [facility]       Hospital Follow-up Visit:    Hospital/Nursing Home/IP Rehab Facility: [facility]  Date of Admission: 12/22/2024  Date of Discharge: 12/26/2024  Reason(s) for Admission: Acute on chronic respiratory failure with hypercapnia   Was the patient in the ICU or did the patient experience delirium during  "hospitalization?  No  Do you have any other stressors you would like to discuss with your provider? No    Problems taking medications regularly:  None  Medication changes since discharge: None  Problems adhering to non-medication therapy:  None    Summary of hospitalization:  [facility] discharge summary reviewed  Diagnostic Tests/Treatments reviewed.  Follow up needed: CBC and BMP  Other Healthcare Providers Involved in Patient s Care:         Specialist appointment - Oncology and pulmonology appointment's coming up  Update since discharge: improved.    Plan of care communicated with patient     Patient is doing much better and back to baseline.  He states that when he got home his blood pressure was elevating and he started getting headaches so he restarted his blood pressure medications.  It is on the lower side of normal today so we did discuss possibly cutting back on one of his medications which she is okay with.  He also is wondering if his cuff is working correctly.      Review of Systems  CONSTITUTIONAL: NEGATIVE for fever, chills, change in weight  RESP:POSITIVE for Hx COPD and lung cancer in the right side  CV: NEGATIVE for chest pain, palpitations or peripheral edema  PSYCHIATRIC: NEGATIVE for changes in mood or affect  ROS otherwise negative      Objective    /62   Pulse 90   Temp 97.6  F (36.4  C) (Tympanic)   Resp 22   Ht 1.626 m (5' 4\")   Wt 53.4 kg (117 lb 12.8 oz)   SpO2 98%   BMI 20.22 kg/m    Body mass index is 20.22 kg/m .  Physical Exam   GENERAL: alert and no distress  RESP: expiratory wheezes R upper posterior, R mid posterior, R lower posterior, and L lower posterior, inspiratory wheezes R upper posterior, R mid posterior, R lower posterior, and L lower posterior, and decreased breath sounds throughout  CV: regular rate and rhythm, normal S1 S2, no S3 or S4, no murmur, click or rub, no peripheral edema  PSYCH: mentation appears normal, affect normal/bright    "   Signed Electronically by: Shalini Washington, NP

## 2025-01-13 ENCOUNTER — HOSPITAL ENCOUNTER (OUTPATIENT)
Dept: CT IMAGING | Facility: CLINIC | Age: 60
Discharge: HOME | End: 2025-01-13
Attending: INTERNAL MEDICINE | Admitting: INTERNAL MEDICINE
Payer: COMMERCIAL

## 2025-01-13 DIAGNOSIS — Z51.11 ENCOUNTER FOR ANTINEOPLASTIC CHEMOTHERAPY: ICD-10-CM

## 2025-01-13 DIAGNOSIS — C34.91 SQUAMOUS CELL LUNG CANCER, RIGHT (H): ICD-10-CM

## 2025-01-13 PROCEDURE — 250N000009 HC RX 250: Performed by: INTERNAL MEDICINE

## 2025-01-13 PROCEDURE — 250N000011 HC RX IP 250 OP 636: Performed by: INTERNAL MEDICINE

## 2025-01-13 PROCEDURE — 71260 CT THORAX DX C+: CPT

## 2025-01-13 RX ORDER — IOPAMIDOL 755 MG/ML
64 INJECTION, SOLUTION INTRAVASCULAR ONCE
Status: COMPLETED | OUTPATIENT
Start: 2025-01-13 | End: 2025-01-13

## 2025-01-13 RX ADMIN — SODIUM CHLORIDE 56 ML: 9 INJECTION, SOLUTION INTRAVENOUS at 08:50

## 2025-01-13 RX ADMIN — IOPAMIDOL 64 ML: 755 INJECTION, SOLUTION INTRAVENOUS at 08:50

## 2025-01-15 ENCOUNTER — TELEPHONE (OUTPATIENT)
Dept: PULMONOLOGY | Facility: CLINIC | Age: 60
End: 2025-01-15
Payer: COMMERCIAL

## 2025-01-15 NOTE — TELEPHONE ENCOUNTER
Dr. Cuellar asked that this patient be given a sooner appointment time. Pt was assisted by schedulers to get a sooner apt time.     Nai Jacome RN on 1/15/2025 at 1:03 PM

## 2025-01-15 NOTE — TELEPHONE ENCOUNTER
[facility] Call Center    Phone Message    May a detailed message be left on voicemail: yes     Reason for Call: Other: Pt is calling to schedule an appointment with Dr Cuellar. Pt was to follow up from hospital visit and to go over results from CT scan on 1/13. There is nothing available until June. Please call pt back at ph: [phone].     Action Taken: Message routed to:  Other: [place] [facility]m     Travel Screening: Not Applicable     Date of Service: 1/15

## 2025-01-15 NOTE — TELEPHONE ENCOUNTER
Outgoing call: Pt had called to set up an apt after his telephone visit with Dr. Cuellar on 12/31, but has had the CT done on 1/13.    -Pred taper went well and he's taking the 10 mg daily  -Daliresp going well   -Pt reports doing well and feeling well with no additional concerns.   -Patient did have additional CT done on 1/13/2025 but hasn't reviewed this with Dr. Cuellar, but will be seeing Dr. Friedell 1/16/25 to go over the results further.     Will route to Dr. Cuellar to advise on how she would like to proceed.    Nai Jacome RN on 1/15/2025 at 8:55 AM

## 2025-01-16 ENCOUNTER — ONCOLOGY VISIT (OUTPATIENT)
Dept: ONCOLOGY | Facility: CLINIC | Age: 60
End: 2025-01-16
Attending: NURSE PRACTITIONER
Payer: COMMERCIAL

## 2025-01-16 ENCOUNTER — APPOINTMENT (OUTPATIENT)
Dept: LAB | Facility: CLINIC | Age: 60
End: 2025-01-16
Payer: COMMERCIAL

## 2025-01-16 ENCOUNTER — INFUSION THERAPY VISIT (OUTPATIENT)
Dept: INFUSION THERAPY | Facility: CLINIC | Age: 60
End: 2025-01-16
Attending: INTERNAL MEDICINE
Payer: COMMERCIAL

## 2025-01-16 VITALS
BODY MASS INDEX: 20.14 KG/M2 | SYSTOLIC BLOOD PRESSURE: 150 MMHG | TEMPERATURE: 97.9 F | DIASTOLIC BLOOD PRESSURE: 98 MMHG | HEART RATE: 87 BPM | RESPIRATION RATE: 16 BRPM | WEIGHT: 118 LBS | HEIGHT: 64 IN | OXYGEN SATURATION: 94 %

## 2025-01-16 VITALS — DIASTOLIC BLOOD PRESSURE: 98 MMHG | HEART RATE: 98 BPM | SYSTOLIC BLOOD PRESSURE: 148 MMHG

## 2025-01-16 DIAGNOSIS — Z51.11 ENCOUNTER FOR ANTINEOPLASTIC CHEMOTHERAPY: Primary | ICD-10-CM

## 2025-01-16 DIAGNOSIS — C34.91 SQUAMOUS CELL LUNG CANCER, RIGHT (H): ICD-10-CM

## 2025-01-16 DIAGNOSIS — C34.91 SQUAMOUS CELL LUNG CANCER, RIGHT (H): Primary | ICD-10-CM

## 2025-01-16 LAB
ALBUMIN SERPL BCG-MCNC: 3.9 G/DL (ref 3.5–5.2)
ALP SERPL-CCNC: 44 U/L (ref 40–150)
ALT SERPL W P-5'-P-CCNC: 15 U/L (ref 0–70)
ANION GAP SERPL CALCULATED.3IONS-SCNC: 5 MMOL/L (ref 7–15)
AST SERPL W P-5'-P-CCNC: 14 U/L (ref 0–45)
BASOPHILS # BLD AUTO: 0.1 10E3/UL (ref 0–0.2)
BASOPHILS NFR BLD AUTO: 1 %
BILIRUB SERPL-MCNC: 0.2 MG/DL
BUN SERPL-MCNC: 13.7 MG/DL (ref 6–20)
CALCIUM SERPL-MCNC: 9.1 MG/DL (ref 8.8–10.4)
CHLORIDE SERPL-SCNC: 94 MMOL/L (ref 98–107)
CREAT SERPL-MCNC: 0.73 MG/DL (ref 0.67–1.17)
EGFRCR SERPLBLD CKD-EPI 2021: >90 ML/MIN/1.73M2
EOSINOPHIL # BLD AUTO: 0.6 10E3/UL (ref 0–0.7)
EOSINOPHIL NFR BLD AUTO: 8 %
ERYTHROCYTE [DISTWIDTH] IN BLOOD BY AUTOMATED COUNT: 13.7 % (ref 10–15)
GLUCOSE SERPL-MCNC: 90 MG/DL (ref 70–99)
HCO3 SERPL-SCNC: 35 MMOL/L (ref 22–29)
HCT VFR BLD AUTO: 35.9 % (ref 40–53)
HGB BLD-MCNC: 11.1 G/DL (ref 13.3–17.7)
IMM GRANULOCYTES # BLD: 0 10E3/UL
IMM GRANULOCYTES NFR BLD: 0 %
LYMPHOCYTES # BLD AUTO: 1.4 10E3/UL (ref 0.8–5.3)
LYMPHOCYTES NFR BLD AUTO: 20 %
MCH RBC QN AUTO: 30.1 PG (ref 26.5–33)
MCHC RBC AUTO-ENTMCNC: 30.9 G/DL (ref 31.5–36.5)
MCV RBC AUTO: 97 FL (ref 78–100)
MONOCYTES # BLD AUTO: 1 10E3/UL (ref 0–1.3)
MONOCYTES NFR BLD AUTO: 15 %
NEUTROPHILS # BLD AUTO: 3.9 10E3/UL (ref 1.6–8.3)
NEUTROPHILS NFR BLD AUTO: 56 %
NRBC # BLD AUTO: 0 10E3/UL
NRBC BLD AUTO-RTO: 0 /100
PLATELET # BLD AUTO: 344 10E3/UL (ref 150–450)
POTASSIUM SERPL-SCNC: 4.5 MMOL/L (ref 3.4–5.3)
PROT SERPL-MCNC: 6.5 G/DL (ref 6.4–8.3)
RBC # BLD AUTO: 3.69 10E6/UL (ref 4.4–5.9)
SODIUM SERPL-SCNC: 134 MMOL/L (ref 135–145)
TSH SERPL DL<=0.005 MIU/L-ACNC: 0.35 UIU/ML (ref 0.3–4.2)
WBC # BLD AUTO: 7.1 10E3/UL (ref 4–11)

## 2025-01-16 PROCEDURE — 84155 ASSAY OF PROTEIN SERUM: CPT | Performed by: NURSE PRACTITIONER

## 2025-01-16 PROCEDURE — 36415 COLL VENOUS BLD VENIPUNCTURE: CPT | Performed by: NURSE PRACTITIONER

## 2025-01-16 PROCEDURE — 84460 ALANINE AMINO (ALT) (SGPT): CPT | Performed by: NURSE PRACTITIONER

## 2025-01-16 PROCEDURE — 258N000003 HC RX IP 258 OP 636: Performed by: NURSE PRACTITIONER

## 2025-01-16 PROCEDURE — 84450 TRANSFERASE (AST) (SGOT): CPT | Performed by: NURSE PRACTITIONER

## 2025-01-16 PROCEDURE — 84443 ASSAY THYROID STIM HORMONE: CPT | Performed by: NURSE PRACTITIONER

## 2025-01-16 PROCEDURE — 85041 AUTOMATED RBC COUNT: CPT | Performed by: NURSE PRACTITIONER

## 2025-01-16 PROCEDURE — G0463 HOSPITAL OUTPT CLINIC VISIT: HCPCS | Performed by: NURSE PRACTITIONER

## 2025-01-16 RX ORDER — DIPHENHYDRAMINE HYDROCHLORIDE 50 MG/ML
50 INJECTION INTRAMUSCULAR; INTRAVENOUS
Status: CANCELLED
Start: 2025-01-16

## 2025-01-16 RX ORDER — ALBUTEROL SULFATE 90 UG/1
1-2 INHALANT RESPIRATORY (INHALATION)
Status: CANCELLED
Start: 2025-01-16

## 2025-01-16 RX ORDER — HEPARIN SODIUM (PORCINE) LOCK FLUSH IV SOLN 100 UNIT/ML 100 UNIT/ML
5 SOLUTION INTRAVENOUS
Status: CANCELLED | OUTPATIENT
Start: 2025-01-16

## 2025-01-16 RX ORDER — ALBUTEROL SULFATE 0.83 MG/ML
2.5 SOLUTION RESPIRATORY (INHALATION)
Status: CANCELLED | OUTPATIENT
Start: 2025-01-16

## 2025-01-16 RX ORDER — METHYLPREDNISOLONE SODIUM SUCCINATE 125 MG/2ML
125 INJECTION INTRAMUSCULAR; INTRAVENOUS
Status: CANCELLED
Start: 2025-01-16

## 2025-01-16 RX ORDER — EPINEPHRINE 1 MG/ML
0.3 INJECTION, SOLUTION, CONCENTRATE INTRAVENOUS EVERY 5 MIN PRN
Status: CANCELLED | OUTPATIENT
Start: 2025-01-16

## 2025-01-16 RX ORDER — LORAZEPAM 2 MG/ML
0.5 INJECTION INTRAMUSCULAR EVERY 4 HOURS PRN
Status: CANCELLED | OUTPATIENT
Start: 2025-01-16

## 2025-01-16 RX ORDER — MEPERIDINE HYDROCHLORIDE 25 MG/ML
25 INJECTION INTRAMUSCULAR; INTRAVENOUS; SUBCUTANEOUS EVERY 30 MIN PRN
Status: CANCELLED | OUTPATIENT
Start: 2025-01-16

## 2025-01-16 RX ORDER — HEPARIN SODIUM,PORCINE 10 UNIT/ML
5-20 VIAL (ML) INTRAVENOUS DAILY PRN
Status: CANCELLED | OUTPATIENT
Start: 2025-01-16

## 2025-01-16 RX ADMIN — SODIUM CHLORIDE 250 ML: 9 INJECTION, SOLUTION INTRAVENOUS at 09:20

## 2025-01-16 RX ADMIN — SODIUM CHLORIDE 400 MG: 9 INJECTION, SOLUTION INTRAVENOUS at 09:17

## 2025-01-16 ASSESSMENT — PAIN SCALES - GENERAL: PAINLEVEL_OUTOF10: NO PAIN (0)

## 2025-01-16 NOTE — PROGRESS NOTES
Infusion Nursing Note:  Luis Hernandez presents today for Keytruda C11D1.    Patient seen by provider today: Yes: Jennifer Cabrera NP; therefore, assessment deferred   present during visit today: Not Applicable.    Note: N/A.    Intravenous Access:  Peripheral IV placed.    Treatment Conditions:  Lab Results   Component Value Date     (L) 01/16/2025    POTASSIUM 4.5 01/16/2025    MAG 2.1 12/23/2024    CR 0.73 01/16/2025    MIKE 9.1 01/16/2025    BILITOTAL 0.2 01/16/2025    ALBUMIN 3.9 01/16/2025    ALT 15 01/16/2025    AST 14 01/16/2025   Results reviewed, labs MET treatment parameters, ok to proceed with treatment.    Post Infusion Assessment:  Patient tolerated infusion without incident.  Blood return noted pre and post infusion.  Site patent and intact, free from redness, edema or discomfort.  No evidence of extravasations.  Access discontinued per protocol.     Discharge Plan:   Discharge instructions reviewed with: Patient.  Patient and/or family verbalized understanding of discharge instructions and all questions answered.  AVS to patient via [employer]  Patient will return 2/26/2025 for next appointment.   Patient discharged in stable condition accompanied by: self.  Departure Mode: Ambulatory.    Goldie Hines RN

## 2025-01-16 NOTE — LETTER
1/16/2025      Luis Hernandez  [address] [facility] [address]      Dear Colleague,    Thank you for referring your patient, Luis Hernandez, to the [facility] [place]. Please see a copy of my visit note below.    [facility] Hematology and Oncology Outpatient Progress Note    Patient: Luis Hernandez  MRN: [id number]  Date of Service: Jan 16, 2025          Reason for Visit    Lung cancer    Primary Oncologist: Dr. Friedell      Assessment/Plan  >Stage IIIB squamous cell lung cancer  Bilateral lung nodules, indeterminate for inflammation/cancer  Low TSH, intermittent  Not a surgical nor definitive chemoradiation candidate given his severe COPD.  There are also some indeterminate lung nodules, either inflammatory or separate metastatic sites that need to be followed.      Luis completed 3 cycles of palliative carboplatin, Taxol, pembrolizumab with Neulasta support. Got an excellent response, but complicated by hospitalization for pneumonia/COPD exacerbation. Therefore, further chemo discontinued and he has continued maintenance pembro since May.   Pembro was changed to 400 mg every 6 weeks.    Tolerating immunotherapy well.   He has been treated for frequent COPD exacerbations every 1-2 mths; last completed antibiotics + steroids this week and feels recovered back to baseline. This has been a chronic baseline predating immunotherapy, so unlikely immunotherapy-induced.     CT shows possible mild progression in the cancer, but somewhat indeterminate with his recurrent COPD exacerbations what may be inflammatory. A spiculated RUL nodule is 1.4 cm (compared to 1.2 cm prior) and several new/enlarging nodules predominate in right lung are subcm in size but suspicious. A new RLL basilar consolidation is likely inflammatory.     It appears cancer is likley starting to progress very minimally on immunotherapy, there has been mild changes over the last 3 mths and hard to differentiate with  certainty given his frequent respiratory infections/COPD. Dr. Friedell favors the spiculated RUL and other new/enlarging lung nodules (all subcm) to be his cancer but all have progressed <20%. Options are to continue immunotherapy alone for another 2 cycles (3 mths) and recheck chest CT vs returning to chemotherapy now. Dr. Friedell would recommend revisiting carbo/Taxol (without pembro) given his excellent previous response on this but with 40% reduction in both drugs to lessen his toxicity risk. His COPD is carrying more threatening risk to him then his cancer is at this time. Both options discussed with patient and he prefers continuing immunotherapy alone given his recall of how poorly he felt on chemo previously. With goals of treatment being palliative, he's not sure he will want to go back on chemo.     Lab work: hgb 11.1, rest CBC WNL. CMP WNL. TSH pending.    Plan:  -Continue pembrolizumab every 6 weeks  -Return to see me in 6 weeks with next cylce  -Repeat CT and see Dr. Friedell in 12 weeks. At time of clear progression, will need to decide if we return to dose-modified chemo vs no treatment at that time.  -Has had intermittently suppressed TSH, currently in normal range. May be immunotherapy-related and usually evolves to hypothyroidism in time    Severe COPD (emphysema), chronic oxygen use  Recurrent/frequent exacerbations  Has had frequent exacerbations - appears to require antibiotic/prednisone about every 1-2 mths. He reports this as his baseline predating starting treatment for his cancer; so I don't think it is an immunotherapy-induced complication.     Recently treated as outpt with steroids + antibiotics 2 weeks ago. Symptoms and oxygen needs are back to baseline.    Plan:  -Follow with Pulmonary in 2 weeks  -Will have high risk for complications on chemo, so deferring at this time. His COPD is currently a more pressing issue and barrier on his prognosis than his cancer  is  ______________________________________________________________________________    History of Present Illness/ Interval History    Mr. Luis Hernandez is a 59 year old with at least locally advanced non-operable lung cancer. He completed 3 cycles of palliative carbo/Taxol/pembrolizumab (with Neulasta) with excellent [place]/near CR but complicated by pneumonia/respiratory failure after 3rd cycle so further chemo discontinued. He has continued maintenance pembrolizumab alone. He's had some interruptions due to frequent COPD exacerbations. With his cycle 6, dosing was changed to 400 mg every 6-week dosing.     Recently hospitalized for another COPD exacerbation with respiratory failure, requiring BiPap in late December. He was treated with antibiotics and 2-week prednisone (completed last week), continues on chronic 10 mg prednisone daily. His respiratory status is back to baseline. Wears chronic oxygen 1.5L. Will follow-up with Pulmonary in a few weeks.    He reports baseline COPD exacerbations every 1-2 mths for last few years, even pre-dating immunotherapy.    He otherwise is feeling well.  No diarrhea, skin rash. Stable weight. No urinary symptoms .    No new sites of pain. No headaches/neuro symptoms.       ECOG Performance    1-2      Oncology History/Treatment  Diagnosis/Stage:   1/2024: Stage IIIB (cT4-cN2-cM0) squamous cell cancer (possible/indeterminate bilateral lung involvement, stage IV). Not candidate for definitive RT/surgery.  -Hx severe COPD (FEV1 18%), tobacco use (quit 2021)  -Followed in Pulmonary with CT for waxing/waning bilateral lung nodules  -9/2023 chest CT: new spiculated RUL nodule + persistent filling defect of bronchus intermedius/RLL bronchus concerning for possible endobronchial tumor vs mucus plugging; progressive subcarinal soft tissue mass  -PET: avid right  hilar mass encasing RML bronchus contiguous with subcarinal node/mass, intraluminal soft tissue density RLL bronchus concerning  for primary tumor; avid bilateral opacities indeterminate but favored as inflammatory. Incidentally, distal sigmoid/rectal avid soft tissue thickening noted.   -10/2023 Cologuard negative  -Treated with empiric antibiotics for pneumonia. Follow-up CT: RADHA improved; new nodules through bilateral lungs (up to 7 mm) and persistent right lung mass/bronchial findings.   -1/18/2024 flex + rigid bronch with tumor debulking and intermedius bronchial stent placement, with RUL, R mainstem bronchus and bronchus intermedius biopsy: mod-diff squamous cell cancer. PDL1 CPS 10-20% and TPS 5%.   -Lung NGS panel: CDKN2A and PIK3CA alterations   -Brain MRI negative    Treatment:  2/29/2024 - 4/12/2024: palliative carbo, Taxol, pembrolizumab + Neulasta support. Completed 3 cycles  -->following 3 cycles, had overall good MI/near CR; but complicated by hospitalization early May for hypoxic respiratory failure + pneumonia. Chemo stopped and treatment held until recovered.     5/15/2024 - present: maintenance pembrolizumab   -Initially, 200 mg every 3 weeks  -8/28/2024 - present: 400 mg every 6 weeks        Physical Exam    GENERAL: Alert and oriented to time place and person. Seated comfortably.  In no distress. Family member with.   HEAD: Atraumatic and normocephalic. No alopecia.  LYMPH: No palpable cervical, supraclav nor axillary adenopathy  CHEST: Regular respiratory effort. 1.5 L Oxygen via NC on. Diminished lungs and expiratory wheezes bilaterally.   HEART: RRR  ABD: soft, non-tender, non-distended  EXTREMITIES: No edema  NEURO: No gross deficit noted. Non-antalgic gait.      Lab Results    Recent Results (from the past week)   Comprehensive metabolic panel   Result Value Ref Range    Sodium 134 (L) 135 - 145 mmol/L    Potassium 4.5 3.4 - 5.3 mmol/L    Carbon Dioxide (CO2) 35 (H) 22 - 29 mmol/L    Anion Gap 5 (L) 7 - 15 mmol/L    Urea Nitrogen 13.7 6.0 - 20.0 mg/dL    Creatinine 0.73 0.67 - 1.17 mg/dL    GFR Estimate >90 >60  mL/min/1.73m2    Calcium 9.1 8.8 - 10.4 mg/dL    Chloride 94 (L) 98 - 107 mmol/L    Glucose 90 70 - 99 mg/dL    Alkaline Phosphatase 44 40 - 150 U/L    AST 14 0 - 45 U/L    ALT 15 0 - 70 U/L    Protein Total 6.5 6.4 - 8.3 g/dL    Albumin 3.9 3.5 - 5.2 g/dL    Bilirubin Total 0.2 <=1.2 mg/dL   TSH with free T4 reflex   Result Value Ref Range    TSH 0.35 0.30 - 4.20 uIU/mL   CBC with platelets and differential   Result Value Ref Range    WBC Count 7.1 4.0 - 11.0 10e3/uL    RBC Count 3.69 (L) 4.40 - 5.90 10e6/uL    Hemoglobin 11.1 (L) 13.3 - 17.7 g/dL    Hematocrit 35.9 (L) 40.0 - 53.0 %    MCV 97 78 - 100 fL    MCH 30.1 26.5 - 33.0 pg    MCHC 30.9 (L) 31.5 - 36.5 g/dL    RDW 13.7 10.0 - 15.0 %    Platelet Count 344 150 - 450 10e3/uL    % Neutrophils 56 %    % Lymphocytes 20 %    % Monocytes 15 %    % Eosinophils 8 %    % Basophils 1 %    % Immature Granulocytes 0 %    NRBCs per 100 WBC 0 <1 /100    Absolute Neutrophils 3.9 1.6 - 8.3 10e3/uL    Absolute Lymphocytes 1.4 0.8 - 5.3 10e3/uL    Absolute Monocytes 1.0 0.0 - 1.3 10e3/uL    Absolute Eosinophils 0.6 0.0 - 0.7 10e3/uL    Absolute Basophils 0.1 0.0 - 0.2 10e3/uL    Absolute Immature Granulocytes 0.0 <=0.4 10e3/uL    Absolute NRBCs 0.0 10e3/uL             Imaging    CT Chest w Contrast    Result Date: 1/13/2025  EXAM: CT CHEST W CONTRAST LOCATION: [facility] DATE: 1/13/2025 INDICATION: Follow up for pulmonary nodules COMPARISON: 12/3/2024, 8/1/2024 TECHNIQUE: CT chest with IV contrast. Multiplanar reformats were obtained. Dose reduction techniques were used. CONTRAST: 64mL Isovue 370 FINDINGS: LUNGS AND PLEURA: There is a new right lower lobe basilar consolidation measuring 3.1 x 2.6 cm (series 4, image 267) with multiple adjacent foci of bronchial mucus plugging. Stable patchy opacity in the superior lateral right lower lobe (series 4, image 181). Interval increased size of a spiculated right upper lobe mass measuring 1.4 x 1.4 cm,  previously 1.2 x 0.9 cm on 12/3/2024 (series 4, image 99). Several other new and enlarging nodules predominantly in the right lung. For example, a lateral right upper lobe 1.0 x 0.7 cm nodule abutting the right major fissure is new (series 4, image 144) and a right apical 0.8 cm nodule is new (4/75). Mild bronchial plugging in the left lung base. Advanced emphysema. Mild bronchiectasis at the lung bases. No pleural effusion. MEDIASTINUM/AXILLAE: No lymphadenopathy. No thoracic aneurysm. CORONARY ARTERY CALCIFICATION: Moderate. UPPER ABDOMEN: Few renal cysts bilaterally, stable. MUSCULOSKELETAL: No suspicious lesions in the bones.     IMPRESSION: 1.  Interval increased size of a right upper lobe spiculated nodule, and few new and enlarging right upper lobe adjacent nodules, indeterminate but suspicious for recurrent malignancy. 2.  New focus of consolidation in the right lower lobe at the lung base and several other new nodular and patchy opacities in the lungs are indeterminate, may be due to an infectious/inflammatory process or metastatic disease. 3.  Consider a short interval follow-up chest CT or PET CT to assess for changes.    XR Chest Port 1 View    Result Date: 12/22/2024  EXAM: XR CHEST PORT 1 VIEW LOCATION: [facility] DATE: 12/22/2024 INDICATION: cough, shortness of breath, respiratory failure COMPARISON: CT of the chest 12/3/2024     IMPRESSION: The upper lobes are lucent consistent with severe emphysema. There are bands of opacity in the mid lungs consistent with foci of subsegmental atelectasis, unchanged from the prior CT. No new airspace opacities or focal lung volume loss. Diaphragm curvature is unchanged. No pleural effusion or pneumothorax. Cardiac silhouette is normal in size. Mild aortic atheromatous calcifications.     Billing  Total time 35 minutes, to include face to face visit, review of EMR, ordering, documentation and coordination of care on date of  "service   complexity modifier for longitudinal care.     Signed by: Jennifer Cabrera NP      Oncology Rooming Note    January 16, 2025 8:03 AM   Luis Hernandez is a 59 year old male who presents for:    Chief Complaint   Patient presents with     Oncology Clinic Visit     Squamous cell lung cancer, right - Labs provider and infusion     Initial Vitals: BP (!) 150/98 (BP Location: Right arm, Patient Position: Sitting, Cuff Size: Adult Regular)   Pulse 87   Temp 97.9  F (36.6  C) (Tympanic)   Resp 16   Ht 1.626 m (5' 4\")   Wt 53.5 kg (118 lb)   SpO2 94%   BMI 20.25 kg/m   Estimated body mass index is 20.25 kg/m  as calculated from the following:    Height as of this encounter: 1.626 m (5' 4\").    Weight as of this encounter: 53.5 kg (118 lb). Body surface area is 1.55 meters squared.  No Pain (0) Comment: Data Unavailable   No LMP for male patient.  Allergies reviewed: Yes  Medications reviewed: Yes    Medications: Medication refills not needed today.  Pharmacy name entered into [facility]:    [id number] - [place], [place] - [address] [id number] - [address]    Frailty Screening:   Is the patient here for a new oncology consult visit in cancer care? 2. No      Clinical concerns:  None today.       Feli Mead [facility]                Again, thank you for allowing me to participate in the care of your patient.        Sincerely,        Jennifer Cabrera NP    Electronically signed"

## 2025-01-16 NOTE — PROGRESS NOTES
"Oncology Rooming Note    January 16, 2025 8:03 AM   Luis Hernandez is a 59 year old male who presents for:    Chief Complaint   Patient presents with    Oncology Clinic Visit     Squamous cell lung cancer, right - Labs provider and infusion     Initial Vitals: BP (!) 150/98 (BP Location: Right arm, Patient Position: Sitting, Cuff Size: Adult Regular)   Pulse 87   Temp 97.9  F (36.6  C) (Tympanic)   Resp 16   Ht 1.626 m (5' 4\")   Wt 53.5 kg (118 lb)   SpO2 94%   BMI 20.25 kg/m   Estimated body mass index is 20.25 kg/m  as calculated from the following:    Height as of this encounter: 1.626 m (5' 4\").    Weight as of this encounter: 53.5 kg (118 lb). Body surface area is 1.55 meters squared.  No Pain (0) Comment: Data Unavailable   No LMP for male patient.  Allergies reviewed: Yes  Medications reviewed: Yes    Medications: Medication refills not needed today.  Pharmacy name entered into [facility]:    [id number] - [place], [place] - [address] [id number] - [place], [place] - [address] [place] [address]    Frailty Screening:   Is the patient here for a new oncology consult visit in cancer care? 2. No      Clinical concerns:  None today.       Feli Mead CMA              "

## 2025-01-16 NOTE — PROGRESS NOTES
[facility] Hematology and Oncology Outpatient Progress Note    Patient: Luis Hernandez  MRN: [id number]  Date of Service: Jan 16, 2025          Reason for Visit    Lung cancer    Primary Oncologist: Dr. Friedell      Assessment/Plan  >Stage IIIB squamous cell lung cancer  Bilateral lung nodules, indeterminate for inflammation/cancer  Low TSH, intermittent  Not a surgical nor definitive chemoradiation candidate given his severe COPD.  There are also some indeterminate lung nodules, either inflammatory or separate metastatic sites that need to be followed.      Luis completed 3 cycles of palliative carboplatin, Taxol, pembrolizumab with Neulasta support. Got an excellent response, but complicated by hospitalization for pneumonia/COPD exacerbation. Therefore, further chemo discontinued and he has continued maintenance pembro since May.   Pembro was changed to 400 mg every 6 weeks.    Tolerating immunotherapy well.   He has been treated for frequent COPD exacerbations every 1-2 mths; last completed antibiotics + steroids this week and feels recovered back to baseline. This has been a chronic baseline predating immunotherapy, so unlikely immunotherapy-induced.     CT shows possible mild progression in the cancer, but somewhat indeterminate with his recurrent COPD exacerbations what may be inflammatory. A spiculated RUL nodule is 1.4 cm (compared to 1.2 cm prior) and several new/enlarging nodules predominate in right lung are subcm in size but suspicious. A new RLL basilar consolidation is likely inflammatory.     It appears cancer is likley starting to progress very minimally on immunotherapy, there has been mild changes over the last 3 mths and hard to differentiate with certainty given his frequent respiratory infections/COPD. Dr. Friedell favors the spiculated RUL and other new/enlarging lung nodules (all subcm) to be his cancer but all have progressed <20%. Options are to continue immunotherapy alone for  another 2 cycles (3 mths) and recheck chest CT vs returning to chemotherapy now. Dr. Friedell would recommend revisiting carbo/Taxol (without pembro) given his excellent previous response on this but with 40% reduction in both drugs to lessen his toxicity risk. His COPD is carrying more threatening risk to him then his cancer is at this time. Both options discussed with patient and he prefers continuing immunotherapy alone given his recall of how poorly he felt on chemo previously. With goals of treatment being palliative, he's not sure he will want to go back on chemo.     Lab work: hgb 11.1, rest CBC WNL. CMP WNL. TSH pending.    Plan:  -Continue pembrolizumab every 6 weeks  -Return to see me in 6 weeks with next cylce  -Repeat CT and see Dr. Friedell in 12 weeks. At time of clear progression, will need to decide if we return to dose-modified chemo vs no treatment at that time.  -Has had intermittently suppressed TSH, currently in normal range. May be immunotherapy-related and usually evolves to hypothyroidism in time    Severe COPD (emphysema), chronic oxygen use  Recurrent/frequent exacerbations  Has had frequent exacerbations - appears to require antibiotic/prednisone about every 1-2 mths. He reports this as his baseline predating starting treatment for his cancer; so I don't think it is an immunotherapy-induced complication.     Recently treated as outpt with steroids + antibiotics 2 weeks ago. Symptoms and oxygen needs are back to baseline.    Plan:  -Follow with Pulmonary in 2 weeks  -Will have high risk for complications on chemo, so deferring at this time. His COPD is currently a more pressing issue and barrier on his prognosis than his cancer is  ______________________________________________________________________________    History of Present Illness/ Interval History    Mr. Luis Hernandez is a 59 year old with at least locally advanced non-operable lung cancer. He completed 3 cycles of palliative  carbo/Taxol/pembrolizumab (with Neulasta) with excellent IL/near CR but complicated by pneumonia/respiratory failure after 3rd cycle so further chemo discontinued. He has continued maintenance pembrolizumab alone. He's had some interruptions due to frequent COPD exacerbations. With his cycle 6, dosing was changed to 400 mg every 6-week dosing.     Recently hospitalized for another COPD exacerbation with respiratory failure, requiring BiPap in late December. He was treated with antibiotics and 2-week prednisone (completed last week), continues on chronic 10 mg prednisone daily. His respiratory status is back to baseline. Wears chronic oxygen 1.5L. Will follow-up with Pulmonary in a few weeks.    He reports baseline COPD exacerbations every 1-2 mths for last few years, even pre-dating immunotherapy.    He otherwise is feeling well.  No diarrhea, skin rash. Stable weight. No urinary symptoms .    No new sites of pain. No headaches/neuro symptoms.       ECOG Performance    1-2      Oncology History/Treatment  Diagnosis/Stage:   1/2024: Stage IIIB (cT4-cN2-cM0) squamous cell cancer (possible/indeterminate bilateral lung involvement, stage IV). Not candidate for definitive RT/surgery.  -Hx severe COPD (FEV1 18%), tobacco use (quit 2021)  -Followed in Pulmonary with CT for waxing/waning bilateral lung nodules  -9/2023 chest CT: new spiculated RUL nodule + persistent filling defect of bronchus intermedius/RLL bronchus concerning for possible endobronchial tumor vs mucus plugging; progressive subcarinal soft tissue mass  -PET: avid right  hilar mass encasing RML bronchus contiguous with subcarinal node/mass, intraluminal soft tissue density RLL bronchus concerning for primary tumor; avid bilateral opacities indeterminate but favored as inflammatory. Incidentally, distal sigmoid/rectal avid soft tissue thickening noted.   -10/2023 Cologuard negative  -Treated with empiric antibiotics for pneumonia. Follow-up CT: RADHA improved;  new nodules through bilateral lungs (up to 7 mm) and persistent right lung mass/bronchial findings.   -1/18/2024 flex + rigid bronch with tumor debulking and intermedius bronchial stent placement, with RUL, R mainstem bronchus and bronchus intermedius biopsy: mod-diff squamous cell cancer. PDL1 CPS 10-20% and TPS 5%.   -Lung NGS panel: CDKN2A and PIK3CA alterations   -Brain MRI negative    Treatment:  2/29/2024 - 4/12/2024: palliative carbo, Taxol, pembrolizumab + Neulasta support. Completed 3 cycles  -->following 3 cycles, had overall good AL/near CR; but complicated by hospitalization early May for hypoxic respiratory failure + pneumonia. Chemo stopped and treatment held until recovered.     5/15/2024 - present: maintenance pembrolizumab   -Initially, 200 mg every 3 weeks  -8/28/2024 - present: 400 mg every 6 weeks        Physical Exam    GENERAL: Alert and oriented to time place and person. Seated comfortably.  In no distress. Family member with.   HEAD: Atraumatic and normocephalic. No alopecia.  LYMPH: No palpable cervical, supraclav nor axillary adenopathy  CHEST: Regular respiratory effort. 1.5 L Oxygen via NC on. Diminished lungs and expiratory wheezes bilaterally.   HEART: RRR  ABD: soft, non-tender, non-distended  EXTREMITIES: No edema  NEURO: No gross deficit noted. Non-antalgic gait.      Lab Results    Recent Results (from the past week)   Comprehensive metabolic panel   Result Value Ref Range    Sodium 134 (L) 135 - 145 mmol/L    Potassium 4.5 3.4 - 5.3 mmol/L    Carbon Dioxide (CO2) 35 (H) 22 - 29 mmol/L    Anion Gap 5 (L) 7 - 15 mmol/L    Urea Nitrogen 13.7 6.0 - 20.0 mg/dL    Creatinine 0.73 0.67 - 1.17 mg/dL    GFR Estimate >90 >60 mL/min/1.73m2    Calcium 9.1 8.8 - 10.4 mg/dL    Chloride 94 (L) 98 - 107 mmol/L    Glucose 90 70 - 99 mg/dL    Alkaline Phosphatase 44 40 - 150 U/L    AST 14 0 - 45 U/L    ALT 15 0 - 70 U/L    Protein Total 6.5 6.4 - 8.3 g/dL    Albumin 3.9 3.5 - 5.2 g/dL    Bilirubin  Total 0.2 <=1.2 mg/dL   TSH with free T4 reflex   Result Value Ref Range    TSH 0.35 0.30 - 4.20 uIU/mL   CBC with platelets and differential   Result Value Ref Range    WBC Count 7.1 4.0 - 11.0 10e3/uL    RBC Count 3.69 (L) 4.40 - 5.90 10e6/uL    Hemoglobin 11.1 (L) 13.3 - 17.7 g/dL    Hematocrit 35.9 (L) 40.0 - 53.0 %    MCV 97 78 - 100 fL    MCH 30.1 26.5 - 33.0 pg    MCHC 30.9 (L) 31.5 - 36.5 g/dL    RDW 13.7 10.0 - 15.0 %    Platelet Count 344 150 - 450 10e3/uL    % Neutrophils 56 %    % Lymphocytes 20 %    % Monocytes 15 %    % Eosinophils 8 %    % Basophils 1 %    % Immature Granulocytes 0 %    NRBCs per 100 WBC 0 <1 /100    Absolute Neutrophils 3.9 1.6 - 8.3 10e3/uL    Absolute Lymphocytes 1.4 0.8 - 5.3 10e3/uL    Absolute Monocytes 1.0 0.0 - 1.3 10e3/uL    Absolute Eosinophils 0.6 0.0 - 0.7 10e3/uL    Absolute Basophils 0.1 0.0 - 0.2 10e3/uL    Absolute Immature Granulocytes 0.0 <=0.4 10e3/uL    Absolute NRBCs 0.0 10e3/uL             Imaging    CT Chest w Contrast    Result Date: 1/13/2025  EXAM: CT CHEST W CONTRAST LOCATION: [facility] DATE: 1/13/2025 INDICATION: Follow up for pulmonary nodules COMPARISON: 12/3/2024, 8/1/2024 TECHNIQUE: CT chest with IV contrast. Multiplanar reformats were obtained. Dose reduction techniques were used. CONTRAST: 64mL Isovue 370 FINDINGS: LUNGS AND PLEURA: There is a new right lower lobe basilar consolidation measuring 3.1 x 2.6 cm (series 4, image 267) with multiple adjacent foci of bronchial mucus plugging. Stable patchy opacity in the superior lateral right lower lobe (series 4, image 181). Interval increased size of a spiculated right upper lobe mass measuring 1.4 x 1.4 cm, previously 1.2 x 0.9 cm on 12/3/2024 (series 4, image 99). Several other new and enlarging nodules predominantly in the right lung. For example, a lateral right upper lobe 1.0 x 0.7 cm nodule abutting the right major fissure is new (series 4, image 144) and a right apical  0.8 cm nodule is new (4/75). Mild bronchial plugging in the left lung base. Advanced emphysema. Mild bronchiectasis at the lung bases. No pleural effusion. MEDIASTINUM/AXILLAE: No lymphadenopathy. No thoracic aneurysm. CORONARY ARTERY CALCIFICATION: Moderate. UPPER ABDOMEN: Few renal cysts bilaterally, stable. MUSCULOSKELETAL: No suspicious lesions in the bones.     IMPRESSION: 1.  Interval increased size of a right upper lobe spiculated nodule, and few new and enlarging right upper lobe adjacent nodules, indeterminate but suspicious for recurrent malignancy. 2.  New focus of consolidation in the right lower lobe at the lung base and several other new nodular and patchy opacities in the lungs are indeterminate, may be due to an infectious/inflammatory process or metastatic disease. 3.  Consider a short interval follow-up chest CT or PET CT to assess for changes.    XR Chest Port 1 View    Result Date: 12/22/2024  EXAM: XR CHEST PORT 1 VIEW LOCATION: [facility] DATE: 12/22/2024 INDICATION: cough, shortness of breath, respiratory failure COMPARISON: CT of the chest 12/3/2024     IMPRESSION: The upper lobes are lucent consistent with severe emphysema. There are bands of opacity in the mid lungs consistent with foci of subsegmental atelectasis, unchanged from the prior CT. No new airspace opacities or focal lung volume loss. Diaphragm curvature is unchanged. No pleural effusion or pneumothorax. Cardiac silhouette is normal in size. Mild aortic atheromatous calcifications.     Billing  Total time 35 minutes, to include face to face visit, review of EMR, ordering, documentation and coordination of care on date of service   complexity modifier for longitudinal care.     Signed by: Jennifer Cabrera NP

## 2025-02-06 ENCOUNTER — OFFICE VISIT (OUTPATIENT)
Dept: PULMONOLOGY | Facility: CLINIC | Age: 60
End: 2025-02-06
Payer: COMMERCIAL

## 2025-02-06 VITALS
TEMPERATURE: 98.2 F | BODY MASS INDEX: 20.14 KG/M2 | DIASTOLIC BLOOD PRESSURE: 98 MMHG | HEIGHT: 64 IN | HEART RATE: 100 BPM | SYSTOLIC BLOOD PRESSURE: 144 MMHG | WEIGHT: 118 LBS | OXYGEN SATURATION: 96 %

## 2025-02-06 DIAGNOSIS — J96.22 ACUTE ON CHRONIC RESPIRATORY FAILURE WITH HYPOXIA AND HYPERCAPNIA (H): ICD-10-CM

## 2025-02-06 DIAGNOSIS — J43.9 PULMONARY EMPHYSEMA, UNSPECIFIED EMPHYSEMA TYPE (H): ICD-10-CM

## 2025-02-06 DIAGNOSIS — J96.21 ACUTE ON CHRONIC RESPIRATORY FAILURE WITH HYPOXIA AND HYPERCAPNIA (H): ICD-10-CM

## 2025-02-06 DIAGNOSIS — C34.91 SQUAMOUS CELL LUNG CANCER, RIGHT (H): Primary | ICD-10-CM

## 2025-02-06 PROCEDURE — 99214 OFFICE O/P EST MOD 30 MIN: CPT | Performed by: INTERNAL MEDICINE

## 2025-02-06 NOTE — PROGRESS NOTES
[facility]  [address] [place] [address]  Phone: [phone]    Patient:  Luis Hernandez, Date of birth 1965  Date of Visit:  02/06/2025  Referring Provider Bianca Cuellar      Assessment & Plan    Mr. Luis Hernandez is a 59 year old male with medical history significant for very severe COPD, hypertension who was referred after recent hospitalization for COPD exacerbation.  He had 3x hospitalizations in 2021 for COPD exacerbations during which time he grew E. coli, Klebsiella, stenotrophomonas. In 2022 he had recurrent exacerbations and hospitalizations including a stay at [facility] from 4/24/22 through 4/27/22.      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 He is s/p chemotherapy (pembrolizumab, paclitaxel and carboplatin) with CR achieved and is now on maintenance pembrolizumab [id number].  Most recent chest CT shows increased right upper lobe spiculated nodule as well as several new and enlarging nodules in the right upper lobe which is concerning for recurrence of malignancy.  He had worsening of symptoms in December but these have improved with reinitiation of roflumilast, azithromycin and chronic low-dose prednisone.      1. Very severe COPD (FEV1 18%), panlobular emphysema, minimal cough but mucus production and chronic hypoxic respiratory failure, on 1.5 L (A1ATD neg)  2. Prior tobacco dependence, greater than 50 years, quit in August 2021  3. Likely pulmonary hypertension with RVP of 55 mmHg plus RAP (2019 ECHO), dilated RV but normal RV EF  4. New spiculated 9 mm nodule in L major fissure (4/24/22) - decreased to 3 mm on 7/6/22 CT  5. indeterminate left lower lobe nodule, indeterminate left upper lobe opacity on 8/26/2021 CT  6. New 5 mm nodule in RADHA on 7/6/22 chest CT   7. 4 mm R mid lung pulmonary nodule - unchanged on 7/6/22, 3/9/23  8. 7 mm nodule in  posterolateral RUL now 6 mm (from 7 mm), resolved  9. Fissural nodule R mid lung stable, resolved  10.  Endobronchial debris soft tissue versus mucous plugging, mildly enlarged subcarinal and right hilar lymph nodes; removal of R mainstem bronchus stent 11/15  11. New spiculated 1.4 cm nodule in right upper lobe along with several other new and enlarging nodules (CT chest 1/13/2025)  12. Non-operable R lung SCC and is now s/p  chemotherapy (pembrolizumab, paclitaxel and carboplatin) and now on maintenance pembrolizumab    Recommendations as follows:   -Continue Advair, spiriva, albuterol  -Continue roflumilast, 500 mg daily and blood work later this month  -Mucinex as needed  -Continue twice daily nebs to help with airway clearance; he would like to hold off on involving re-involving IP for evaluation for stent replacement at this time   -Continue aerobika use at least 2-3 times per day for 10-minute sessions after albuterol inhaler or nebulizer therapy   -prednisone 10 mg prednisone daily  -he is up to date with covid booster, influenza vaccine; needs RSV which he is agreeable to obtaining    I certify that this patient, Luis Hernandez has been under my care (or a nurse practitioner or physican's assistant working with me). This is the face-to-face encounter for oxygen medical necessity.      Luis Hernandez is now in a chronic stable state and continues to require supplemental oxygen. Patient has continued oxygen desaturation due to COPD J44.9.    Alternative treatment(s) tried or considered and deemed clinically infective for treatment of Chronic Respiratory Failure with Hypoxia J93.11 include nebulizers, inhalers and pulmonary toileting.  If portability is ordered, is the patient mobile within the home? yes    Return to clinic in May as currently scheduled.     Bianca Cuellar MD  Pulmonary, Allergy, Critical Care, and Sleep Medicine   Pager: [phone]    This note was created using dictation software and may contain  errors.  Please contact the creator for any clarifications that are needed.    I have spent 35 minutes on the day of the visit to review the chart, interview and examine the patient, review labs and imaging, formulate a plan, document and submit orders. Time documented is excluding time spent for PFT interpretation        Medical Decision Making           Bianca Cuellar MD             HPI:    Mr. Luis Hernandez is a 59 year old male with medical history significant for very severe COPD, hypertension. He has been diagnosed with non-operable R lung SCC and is now s/p  chemotherapy (pembrolizumab, paclitaxel and carboplatin) and has been initiated on pembrolizumab.     Prasad underwent right mainstem bronchus stent removal and November and then had worsened breathing afterwards.  In late December we started the following:  - continue prednisone taper to 10 mg daily   - restart daliresp, lab monitoring ordered  - start mucolytic (normal saline) nebs bid  - continue current therapies  - pulmonary rehab if he would be willing (order placed)  - I will discuss with IP whether he may need repeat bronch for area of malacia potentially contributing to symptoms vs. Nocturnal PPV (respiratory acidosis noted while sick)    He is here today with his daughter and he reports that his breathing feels rather good.  He does continue to have coughing spells where mucus does get stuck centrally and he has to work hard to clear it out.  He is using his nebs and inhalers as directed, azithromycin, full dose Daliresp.  He also does use Mucinex as needed.  Appetite is good while on prednisone and he has gained some weight.  He was seen by oncology after recent CT scan and there is concern for possible recurrence.  He will be due for his maintenance immunotherapy later this month and will undergo repeat chest CT in April to determine next stage of care.  He continues on his 1.5 L oxygen and is able to turn this off at rest at  times.      Prior history:  He is currently doing okay. Has had several exacerbations with the most recent requiring ER visit on 10/7. He was started on prednisone/azithromycin and returned to his baseline. He wonders if his oxygenation is worse as he has increased his home oxygen to 2 L with SpO2 96%. He was using 1.5 L. We discussed goal SpO2 being closer to 90% and he will reduce back down to 1.5 L. Mucous has returned to baseline, he denies hemoptysis.     Weight is now up with a jennifer of 99#. He attributes some of this improvement with stopping his roflumilast, ~ 1 month ago. He denies diarrhea when he was on the medication.     He is currently using the albuterol nebulizer 1-2x/ay. He continues on advair. Also using aerobika 3x / day. He continues on prednisone 10 mg daily. He has stopped mucinex as mucous has not been overly thick. Seen recently by oncology and he will be maintained on Q6week pembrolizumab. So far he is tolerating this.         Prior history:   He is overall doing okay.  He has had some bone pain after chemo as well as bloating.  The symptoms have improved however and he will undergo repeat PET on 4/8 with possible chemo (cycle 2) thereafter.  Appetite has been good but his weight is down about 7 to 8 pounds.  Eating has been limited by intermittent constipation as well as bloating.  With daily MiraLAX for 3 days in a row this has improved.  He is using Ensure or boost supplements but these are costly.    Breathing is actually been feeling better during chemotherapy and dyspnea that is typically worsened in the afternoon has not been present.  He does continue to get quite winded with walking into clinic.  For about 5 days he has had increased yellow mucus production.  He did take azithromycin but this has not changed his symptoms.  He is using his Spiriva, Advair, albuterol nebs along with hypertonic saline.  He continues to cough.  He has been using Aerobika 3 times daily and is also using  "Roflumilast.  He continues on 2 L of oxygen 24/7.  He denies fever but has had morning chills which was ongoing for him.    We discussed chronic use of low-dose prednisone to optimize his breathing, and energy level.  He says that his spirits are good and he is overall tolerating his chemotherapy to this point.  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  We did discuss returning to [place] for repeat bronchoscopy and reevaluation of stent/debulking.  He does not want to do this since he is not interested in the travel, repeat physician visits, and also feels that this might buy him some time but overall is not worth the burden of care.  His daughter continues to be the main individual of support for him.       Prior history:   Currently feels at his baseline. About one month ago was unable to cough up typical morning mucous. His lungs felt \"dry.\" Breathing became worse so he started his home prednisone course. This helped his breathing. Started mucinex about a week ago and now lots of thin mucous is coming up regularly throughout the day. Also using Aerobika 3x/day. Also utilizing PSB. Prednisone taper ended Monday and no change since. Breathing at baseline.     He has lost about 20# / 6 months. Gained 5# back. Appetite is good and he is eating well. No screening colonoscopy but stools are normal. Has had chronic fatigue.     Exercising daily, primarily with walking up stairs, using pedaller. He continues on 2 L O2, 1.5 LPM at nighttime. Sleep eval is on hold with upcoming cataract surgeries and also planning to get TTE.     + PND but controlled on daily flonase. Very mild seasonal allergy symptoms including sneezing.     Prior:   Currently feeling okay. Recently had a flare up after exposure to smoke from a neighbor burning wood. Feels mucous is thick currently but he is able to cough this up. Discussed using " aerobika more, consideration of neb.   He is using his oxygen 24/7.     Reports that prior neb therapy made breathing worse but he will try using albuterol inhaler first then a neb.     He is using roflumilast, respimat, advair, albuterol prn.       Prior history:   He continues to do well and in fact feels better than he has in years. He is using advair, spiriva, albuterol, and roflumilast. No exacerbations since his last visit. He has established care with sleep medicine but is unclear when his evaluation will be - he asks that I reach out to them.     No significant coughing or wheeze. Still with ESPARZA. He remains active with regular exercise at least 3 times per week. He continues to use aerobika device twice daily.  Weight is stable and appetite is excellent.     His daughter had asked about lung transplant possibility at a prior visit and we discussed some of the intricacies of this including the extensive evaluation and needing to make sure that previously seen nodule is not malignant before moving forward with evaluation. He says that he is not interested in transplant evaluation at this time but would continue to think about it and possibly discuss with family.     Prior history:   He says his breathing has improved quite a bit.  He continues on 2 L O2. He did graduate from pulmonary rehab which was beneficial for him.  He continues to walk and use weights at home.  He is using Advair and Spiriva in the morning.  He does feel like nights are difficult for him due to panicking while sleeping but also due to shortness of breath.  Causes him to wake up.  He is using his albuterol nebulizer once to twice per day.  He does continue to use Roflumilast.  His last exacerbation was in June.  He is using his aerobic 10 minutes 3 times a day and this is beneficial.  He does need to be evaluated for obstructive sleep apnea and usage of positive chronic pressure ventilation at night.    Prior history:  He has had 2  exacerbations in 2022; first in February when he was admitted fro 2/8 - 2/10 and discharged on hospice. Later admitted to [facility] with exacerbation from 4/24 - 4/27 and started on roflumilast. He was seen by the pulmonary consult team during his most recent hospitalization and started on a prolonged taper of prednisone.     He has been doing overall well since discharged. He has started pulmonary rehab and has had 2 sessions with plan for 18 sesssion. He does have an am cough but is able to clear things out with use of aerobika. Currently on prednisone 10 mg daily with ongoing taper plan; no questions about this. Appetite is good and weight is increasing. He is walking more each day and has a personal goal to do 1 flight of stairs (16 steps) 10x per day. Currently using 2 LPM at rest and 3 LPM with movement. Tolerating roflumilast. Using spiriva, advair, and albuterol and he feels this helps; has felt albuterol nebs make his breathing more difficult. Currently taking low dose lasix to hep with swollen feet and ankles. + some low grade allergy symptoms but very minimal and not taking anything; will continue to monitor.     Prior history:   He reports that he is currently doing well.  He has not had any illnesses since his last visit.  He continues to have a minimal cough with clear mucus production predominantly in the morning.  He is able to clear this well and is using the aerobic advice 3-5 times per day.  He did complete his course of Bactrim and noted that when he finished this he felt much better.  He now can sleep laying down flat which she was unable to do prior to his last visit.    He does do pursed lip breathing and he feels that this helps.  He denies significant wheezing.  He has noted that weather changes affect his breathing. He is very limited with his ability to do activities at home and notes that he gets winded just making a sandwich.  He is using Advair, Spiriva, albuterol about 3 times per day.  He  has tried using the DuoNebs but feels that this does not help.    He denies GERD or allergies.  He continues to live with his father and he is not smoking.           Review of Systems:     A 13 point ROS was performed and was negative except as listed above in the HPI.  Weight is increasing.  No orthopnea/PND.           Past Medical and Surgical History:     Past Medical History:   Diagnosis Date    Acute on chronic respiratory failure with hypoxia (H) 04/10/2020    Acute on chronic respiratory failure with hypoxia and hypercapnia (H) 04/01/2019    Acute respiratory failure with hypoxia and hypercapnia (H) 04/23/2024    CAP (community acquired pneumonia) 04/14/2020    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)     COPD exacerbation (H) 04/01/2019    COPD exacerbation (H) 02/16/2020    Erectile dysfunction     Hypertension     Hyponatremia 04/01/2019    Pneumonia 04/10/2020     Past Surgical History:   Procedure Laterality Date    APPENDECTOMY      childhood    BRONCHOSCOPY, DILATE BRONCHUS, STENT BRONCHUS, COMBINED N/A 1/18/2024    Procedure: Bronchoscopy with tissue debulking,  and stent placement.;  Surgeon: Isac Prescott MD;  Location: [facility] OR    BRONCHOSCOPY, DILATE BRONCHUS, STENT BRONCHUS, COMBINED N/A 11/15/2024    Procedure: BRONCHOSCOPY, RIGID and flexible, stent removal;  Surgeon: Scout Wells MD;  Location: [facility] OR    ENDOBRONCHIAL ULTRASOUND FLEXIBLE N/A 1/18/2024    Procedure: Endobronchial ultrasound with Endobronchial and Cryo biopsy.;  Surgeon: Isac Prescott MD;  Location: [facility] OR           Family History:     Family History   Problem Relation Age of Onset    Hypertension Mother     Ovarian Cancer Mother     Breast Cancer Mother     Hypertension Father     Lipids Father     C.A.D. Father     Heart Disease Father     Chronic Obstructive Pulmonary Disease Father     Cerebrovascular Disease Father     Diabetes Paternal Grandmother     Chronic Obstructive Pulmonary Disease  Paternal Grandfather             Social History:     Social History     Socioeconomic History    Marital status: Single     Spouse name: Not on file    Number of children: Not on file    Years of education: Not on file    Highest education level: Not on file   Occupational History    Not on file   Tobacco Use    Smoking status: Former     Current packs/day: 0.00     Average packs/day: 2.0 packs/day for 30.0 years (60.0 ttl pk-yrs)     Types: Cigarettes     Start date: 08/1991     Quit date: 08/2021     Years since quitting: 3.5    Smokeless tobacco: Former   Vaping Use    Vaping status: Never Used   Substance and Sexual Activity    Alcohol use: Yes     Comment: rare    Drug use: No    Sexual activity: Yes     Partners: Female   Other Topics Concern    Parent/sibling w/ CABG, MI or angioplasty before 65F 55M? No   Social History Narrative    The patient has a 60+ pk yr tobacco hx.  He has has no active use, quit Jan 2014.  Alcohol use is <1 alcoholic drinks per week.  He denies use of recreational drugs.  He is employed. [employer].  Works nights.   The patient is .  Has 1 child.Hot Tub Exposure: NORecent Travel: NO Hx of incarceration:  NOBird Exposure:   NOAnimal Exposure:  NOInhalation Exposure:  NO        Lives in [place], [place] with father. 2 dogs.          Social Drivers of Health     Financial Resource Strain: Low Risk  (12/22/2024)    Financial Resource Strain     Within the past 12 months, have you or your family members you live with been unable to get utilities (heat, electricity) when it was really needed?: No   Food Insecurity: Low Risk  (12/22/2024)    Food Insecurity     Within the past 12 months, did you worry that your food would run out before you got money to buy more?: No     Within the past 12 months, did the food you bought just not last and you didn t have money to get more?: No   Transportation Needs: Low Risk  (12/22/2024)    Transportation Needs     Within the past 12 months, has  lack of transportation kept you from medical appointments, getting your medicines, non-medical meetings or appointments, work, or from getting things that you need?: No   Physical Activity: Inactive (8/30/2021)    Exercise Vital Sign     Days of Exercise per Week: 0 days     Minutes of Exercise per Session: 0 min   Stress: Not on file   Social Connections: Unknown (8/30/2021)    Social Connection and Isolation Panel [NHANES]     Frequency of Communication with Friends and Family: Twice a week     Frequency of Social Gatherings with Friends and Family: Twice a week     Attends Adventism Services: Never     Active Member of Clubs or Organizations: No     Attends Club or Organization Meetings: Never     Marital Status: Not on file   Interpersonal Safety: Low Risk  (12/22/2024)    Interpersonal Safety     Do you feel physically and emotionally safe where you currently live?: Yes     Within the past 12 months, have you been hit, slapped, kicked or otherwise physically hurt by someone?: No     Within the past 12 months, have you been humiliated or emotionally abused in other ways by your partner or ex-partner?: No   Housing Stability: Low Risk  (12/22/2024)    Housing Stability     Do you have housing? : Yes     Are you worried about losing your housing?: No            Medications:     Current Outpatient Medications   Medication Sig Dispense Refill    albuterol (PROAIR HFA/PROVENTIL HFA/VENTOLIN HFA) 108 (90 Base) MCG/ACT inhaler Inhale 2 puffs into the lungs every 4 hours as needed for shortness of breath 54 g 3    albuterol (PROVENTIL) (2.5 MG/3ML) 0.083% neb solution Take 1 vial (2.5 mg) by nebulization every 4 hours as needed for shortness of breath, wheezing or cough. 360 mL 5    azithromycin (ZITHROMAX) 250 MG tablet Take 1 tablet (250 mg) by mouth daily. 30 tablet 11    fluticasone-salmeterol (ADVAIR) 500-50 MCG/ACT inhaler Inhale 1 puff into the lungs every 12 hours. 60 each 11    guaiFENesin (MUCINEX) 600 MG 12  "hr tablet Take 600 mg by mouth 2 times daily.      metoprolol tartrate (LOPRESSOR) 25 MG tablet Take 1 tablet (25 mg) by mouth 2 times daily 180 tablet 3    Oxymetazoline HCl (NASAL SPRAY NA) Spray in nostril as needed.      predniSONE (DELTASONE) 10 MG tablet Take 1 tablet (10 mg) by mouth daily. 30 tablet 5    roflumilast (DALIRESP) 500 MCG TABS tablet Take 1 tablet (500 mcg) by mouth daily. 30 tablet 5    sodium chloride 0.9 % neb solution Take 3 mLs by nebulization 2 times daily as needed for wheezing. 180 mL 5    tiotropium (SPIRIVA RESPIMAT) 2.5 MCG/ACT inhaler Inhale 2 puffs into the lungs daily 12 g 4    amLODIPine (NORVASC) 5 MG tablet Take 1 tablet (5 mg) by mouth daily. (Patient not taking: Reported on 1/16/2025) 90 tablet 0    ibuprofen (ADVIL/MOTRIN) 200 MG tablet Take 3 tablets by mouth every 6 hours as needed for pain.      lisinopril (ZESTRIL) 40 MG tablet Take 1 tablet (40 mg) by mouth daily. (Patient not taking: Reported on 1/16/2025) 90 tablet 3    order for DME Equipment being ordered: Oxygen 3L via NC continuous.  O2 saturated noted to be 86% on room air at rest. 1 Units 0    order for DME Equipment being ordered: Nebulizer 1 Device 0     No current facility-administered medications for this visit.            Physical Exam:   BP (!) 144/98 (BP Location: Right arm, Patient Position: Sitting, Cuff Size: Adult Regular)   Pulse 100   Temp 98.2  F (36.8  C) (Tympanic)   Ht 1.626 m (5' 4\")   Wt 53.5 kg (118 lb)   SpO2 96%   BMI 20.25 kg/m      Constitutional:   Awake, alert and in no apparent distress, pleasant male, on supplemental oxygen, thin male, moon facies      Eyes:   nonicteric     HEENT:   Supple without supraclavicular or cervical lymphadenopathy     Lungs:   Reduced air flow b/l and in bases.  Prolonged expiratory phase.  No crackles. No rhonchi.  No wheezes. Speaking in full sentences. At times has a cough.      Cardiovascular:   Normal S1 and S2.  RRR.  No murmur, gallop or rub. "     Musculoskeletal:   No edema, digital clubbing present     Neurologic:   Alert and conversant.     Skin:   Warm, dry.  No rash on limited exam.  No lower extremity edema.             Data:   All laboratory and imaging data reviewed personally.      Specimen Description   Date Value Ref Range Status   04/13/2020 Urine  Final   04/10/2020 Sputum  Final   04/10/2020 Sputum  Final    Culture Micro   Date Value Ref Range Status   04/10/2020 Moderate growth  Normal latrice    Final   04/10/2020 Moderate growth  Escherichia coli   (A)  Final   04/10/2020 Moderate growth  Klebsiella pneumoniae   (A)  Final        No results found for this or any previous visit (from the past week).    PFT: Very severe obstructive lung disease. Air trapping and hyperinflation are noted. No significant bronchodilator response is noted. Diffusion capacity is severely reduced. Very reduced compared to prior studies noted in Dr. Wagner's note in 2016.     PET - 8/1/24 -   PET/CT FINDINGS: Resolution of the left greater than right lower lobe inflammatory/infectious processes with development of additional scattered nodular opacities in the left upper, left lower, and right lower lobes, which are likely inflammatory in   nature. Degenerative shoulder synovitis. Reflux esophagitis. The remaining FDG uptake is physiologic from the skull vertex to mid thigh.     CT FINDINGS: No acute intracranial abnormality. Postoperative change of bilateral lenses. Mild carotid artery bifurcation calcification. Severe emphysema. Right upper lobe lung granuloma. Moderate coronary artery calcium. Fat-containing umbilical hernia.   Sigmoid diverticulosis. Mild multilevel degenerative changes in spine.      CT chest - 8/24/21 - personally reviewed: panlobular emphysema predominantly in the upper lobes bilaterally. No infiltrates. Some evidence of mucous impaction on the RLL. Small nodules as detailed below.     IMPRESSION:  1.  No evidence for pulmonary embolism.  2.   New finding of prominent mucus impaction leading to the right  lower lobe bronchus and small airways of the right lower lobe. This is  consistent with aspiration.  3.  New curvilinear opacity along the anterior left upper lobe  midchest could just be focal atelectasis but acute airspace disease  including pneumonia not excluded. New indeterminant pulmonary nodule  at the left lower lobe. Recommend follow-up CT chest in 6 months.  There are other stable nodules.  4.  Coronary artery calcifications.  5.  Emphysema.    CTPE - 4/24/22 -   Personally reviewed: apical predominant emphysema. Secretions/debris in the bilateral mainstem bronchi. New 9 mm spiculated nodule L major fissure.   IMPRESSION:   1. Exam is negative for acute pulmonary embolism.        Evidence for right heart strain or increased right heart pressures?   is not present.      2. New 9 mm spiculated solid nodule along the left major fissure,  consider follow-up low-dose chest CT in 3 months, PET/CT, or tissue  sampling per Fleischner's Society guidelines.     3. Debris/retained secretions within the bilateral mainstem bronchi,  and extending into the right lower lobe bronchi, concerning for  recurrent aspiration.     CT chest- 7/6/2022-personally reviewed and I agree with the radiologist read of:  IMPRESSION:   1.  Interval decrease in size of previously seen new 9 mm spiculated  pulmonary nodule along the left major fissure, now measuring 3 mm.  2.  New scarlike nodule with a mean diameter of 5 mm in the lateral  left upper lobe.  3.  Unchanged 4 mm right mid lung pulmonary nodule.  4.  Severe pulmonary emphysema and bilateral scarring and/or  atelectasis.  5.  No new or progressive airspace consolidation or pleural fluid.  6.  Indeterminate enlarged right hilar lymph node, new to comparison.    CT chest w/ contrast - 3/9/23 - personally reviewed: CT CHEST WITH CONTRAST  3/9/2023 9:40 AM     CLINICAL HISTORY: Severe emphysema, evaluate nodules for  change.  Evaluate right lower lobe bronchus filling defect. Pulmonary nodules.  Panlobular emphysema (H).     TECHNIQUE: CT chest with IV contrast. Multiplanar reformats were  obtained. Dose reduction techniques were used.     CONTRAST: 58 mL Isovue 370     COMPARISON: 1/20/2023, 7/6/2022, 4/24/2022     FINDINGS:   LUNGS AND PLEURA: Advanced emphysematous changes of the lungs are  present. Thin bandlike pleuroparenchymal scarring in the anterior left  upper lobe is stable. Similar-appearing atelectasis and/or scarring in  the inferomedial right middle lobe is unchanged. No airspace  consolidation or pleural fluid. Bronchial wall thickening and  endobronchial debris involving the right mainstem bronchus and  extending to the bronchus intermedius as well as segmental and  subsegmental branches to the right lower lobe are noted.     Benign densely calcified clustered pulmonary granulomas are seen in  the superior segment of the right lower lobe. Previously described 6  mm scarlike nodule in the right upper lobe is similar to comparison  measuring 5 mm (4-79). Previously described linear 7 mm pulmonary  nodule in the posterolateral right upper lobe now measures 6 mm  (4-110). A previously seen 4 mm noncalcified pulmonary nodule in the  lateral right mid lung is unchanged measuring 4 mm (4-152). A  suspected fissural lymph node in the lateral right midlung is stable  as well (4-193). No new or enlarging pulmonary nodules.      MEDIASTINUM/AXILLAE: There is soft tissue density in the subcarinal  region and right hilum, which are thought to represent mildly enlarged  lymph nodes. Subcarinal lymph node measures 16 mm in short axis  (2-33). Right hilar lymph node measures 10 mm in short axis. Heart  size is normal. No pericardial effusion. Mild to moderate coronary  artery atherosclerosis is present. Thoracic aorta is normal in course  and caliber. Mild thoracic aortic atherosclerosis is present.     UPPER ABDOMEN: Bilateral  renal cortical thinning and scarring.  Nonobstructing 2-3 mm left lower pole intrarenal calculi. Tiny  suspected medial left upper pole renal cortical cyst. No follow-up is  necessary. No hydronephrosis.     MUSCULOSKELETAL: Mild degenerative changes of the spine. No acute  osseous abnormality. Chronic-appearing rib fractures.                                                                      IMPRESSION:   1.  Bronchial wall thickening and endobronchial debris involving the  right mainstem bronchus, bronchus intermedius, and segmental and  subsegmental branches to the right lower lobe. Consider further  evaluation with bronchoscopy if clinically indicated.  2.  Nonspecific mildly enlarged subcarinal and right hilar lymph  nodes.  3.  Severe pulmonary emphysema.  4.  Unchanged subcentimeter pulmonary nodules. No new or enlarging  pulmonary nodules.  5.  Nonacute findings as above.    CT chest - 9/14 /23 - personally reviewed and I agree with the radiologist's read of: FINDINGS:   LUNGS AND PLEURA: Stable 4 mm nodule in the lateral aspect of the  right upper lobe on image 173 of series 5.  There is a new spiculated nodule in the base of the right upper lobe  along the right minor fissure measuring 16 x 13 mm on image 187 of  series 5. This is very flat along the minor fissure on the coronal  view, image 23 of series 7. This may represent atelectasis or  scarring. Short-term follow-up or PET scan is recommended.     Stable irregular bronchial wall thickening with some likely luminal  mucus plugging involving the bronchus intermedius and right lower lobe  bronchi. Stable moderate background emphysema. No infiltrate or  effusion.  Nodules in the right upper lobe have resolved.     MEDIASTINUM/AXILLAE: Abnormal soft tissue mass in the subcarinal  region has increased since the previous exam and likely represents  adenopathy. It now measures 18 mm in short axis on image 82 compared  to 15 mm on the previous exam.  Borderline enlarged right hilar lymph  node is seen on image 85 measuring 10 mm. Trace pericardial effusion  is present. No axillary adenopathy. The aorta is normal in caliber.     CORONARY ARTERY CALCIFICATION: Severe.     UPPER ABDOMEN: No significant finding.     MUSCULOSKELETAL: Mild degenerative changes are noted throughout the  spine.                                                                      IMPRESSION:   1.  New spiculated appearing nodular opacity along the right minor  fissure is very thin on coronal views and likely represents some  atelectasis or an inflammatory process. Short-term follow-up in one to  two months or PET scan would be recommended.  2.  Stable irregular and lobulated bronchial wall thickening with  intraluminal likely mucous plugging involving the bronchus intermedius  and right lower lobe bronchi is unchanged from the previous exam.  Bronchoscopy should be considered if it has not been performed  already.  3.  Interval increase in size of likely adenopathy in the subcarinal  region. Stable borderline enlarged right hilar lymph node.  4.  Stable background emphysema.  5.  Stable 4 mm right lower lobe nodule. Other nodules in the right  upper lung have resolved. If the patient is high risk, follow-up would  be recommended in one year.    CT chest - 1/13/25 - FINDINGS:   LUNGS AND PLEURA: There is a new right lower lobe basilar consolidation measuring 3.1 x 2.6 cm (series 4, image 267) with multiple adjacent foci of bronchial mucus plugging. Stable patchy opacity in the superior lateral right lower lobe (series 4, image   181).     Interval increased size of a spiculated right upper lobe mass measuring 1.4 x 1.4 cm, previously 1.2 x 0.9 cm on 12/3/2024 (series 4, image 99). Several other new and enlarging nodules predominantly in the right lung. For example, a lateral right upper   lobe 1.0 x 0.7 cm nodule abutting the right major fissure is new (series 4, image 144) and a right  apical 0.8 cm nodule is new (4/75). Mild bronchial plugging in the left lung base.     Advanced emphysema. Mild bronchiectasis at the lung bases. No pleural effusion.     MEDIASTINUM/AXILLAE: No lymphadenopathy. No thoracic aneurysm.     CORONARY ARTERY CALCIFICATION: Moderate.     UPPER ABDOMEN: Few renal cysts bilaterally, stable.     MUSCULOSKELETAL: No suspicious lesions in the bones.                                                                      IMPRESSION:   1.  Interval increased size of a right upper lobe spiculated nodule, and few new and enlarging right upper lobe adjacent nodules, indeterminate but suspicious for recurrent malignancy.  2.  New focus of consolidation in the right lower lobe at the lung base and several other new nodular and patchy opacities in the lungs are indeterminate, may be due to an infectious/inflammatory process or metastatic disease.  3.  Consider a short interval follow-up chest CT or PET CT to assess for changes.

## 2025-02-06 NOTE — NURSING NOTE
"Initial BP (!) 144/98 (BP Location: Right arm, Patient Position: Sitting, Cuff Size: Adult Regular)   Pulse 100   Temp 98.2  F (36.8  C) (Tympanic)   Ht 1.626 m (5' 4\")   Wt 53.5 kg (118 lb)   SpO2 96%   BMI 20.25 kg/m   Estimated body mass index is 20.25 kg/m  as calculated from the following:    Height as of this encounter: 1.626 m (5' 4\").    Weight as of this encounter: 53.5 kg (118 lb). .  Flores Haji MA    "

## 2025-02-06 NOTE — PATIENT INSTRUCTIONS
"Prasad,    It was nice seeing you!  I am glad to hear you are doing better.     Recommendations as follows:   - continue current therapies including daliresp (labs due 2/26/25), azithromycin, prednisone, advair, spiriva, nebs  - continue to use aerobika 3-5 times per day  - make sure you are staying well-hydrated  - plan for repeat chest CT as currently planned   - continue to stay active regularly   - you are due for a RSV vaccine - please obtain this prior to your next immunotherapy administratin   - Please call the pulmonary clinic with any questions. The pulmonary clinic number is: [phone], press 2 for \"specialty clinic\" and follow the prompt.  Please let them know if you need to speak to a care team member.     Stay up to date with vaccinations including your yearly flu vaccine. Wash your hands regularly. Consider wearing a mask in crowded places to reduce the risk of respiratory illnesses. I recommend that you receive the COVID-19 vaccine and stay up to date with boosters.     Have a nice winter and stay well! Please let me know if you have questions or concerns.     Yamilet Cuellar MD  Pulmonary, Allergy, Critical Care, and Sleep Medicine   [facility]         "

## 2025-02-26 ENCOUNTER — ONCOLOGY VISIT (OUTPATIENT)
Dept: ONCOLOGY | Facility: CLINIC | Age: 60
End: 2025-02-26
Attending: NURSE PRACTITIONER
Payer: COMMERCIAL

## 2025-02-26 ENCOUNTER — LAB (OUTPATIENT)
Dept: LAB | Facility: CLINIC | Age: 60
End: 2025-02-26
Payer: COMMERCIAL

## 2025-02-26 ENCOUNTER — INFUSION THERAPY VISIT (OUTPATIENT)
Dept: INFUSION THERAPY | Facility: CLINIC | Age: 60
End: 2025-02-26
Attending: INTERNAL MEDICINE
Payer: COMMERCIAL

## 2025-02-26 VITALS
TEMPERATURE: 97.3 F | WEIGHT: 118 LBS | DIASTOLIC BLOOD PRESSURE: 91 MMHG | BODY MASS INDEX: 20.14 KG/M2 | HEART RATE: 83 BPM | RESPIRATION RATE: 16 BRPM | HEIGHT: 64 IN | OXYGEN SATURATION: 94 % | SYSTOLIC BLOOD PRESSURE: 138 MMHG

## 2025-02-26 VITALS — HEART RATE: 78 BPM | DIASTOLIC BLOOD PRESSURE: 82 MMHG | SYSTOLIC BLOOD PRESSURE: 130 MMHG

## 2025-02-26 DIAGNOSIS — C34.91 SQUAMOUS CELL LUNG CANCER, RIGHT (H): Primary | ICD-10-CM

## 2025-02-26 DIAGNOSIS — Z51.11 ENCOUNTER FOR ANTINEOPLASTIC CHEMOTHERAPY: ICD-10-CM

## 2025-02-26 DIAGNOSIS — Z79.899 HIGH RISK MEDICATION USE: Primary | ICD-10-CM

## 2025-02-26 DIAGNOSIS — C34.91 SQUAMOUS CELL LUNG CANCER, RIGHT (H): ICD-10-CM

## 2025-02-26 DIAGNOSIS — J44.9 COPD, VERY SEVERE (H): ICD-10-CM

## 2025-02-26 LAB
ALBUMIN SERPL BCG-MCNC: 4 G/DL (ref 3.5–5.2)
ALP SERPL-CCNC: 51 U/L (ref 40–150)
ALT SERPL W P-5'-P-CCNC: 14 U/L (ref 0–70)
ANION GAP SERPL CALCULATED.3IONS-SCNC: 10 MMOL/L (ref 7–15)
AST SERPL W P-5'-P-CCNC: 15 U/L (ref 0–45)
BASOPHILS # BLD AUTO: 0.1 10E3/UL (ref 0–0.2)
BASOPHILS NFR BLD AUTO: 1 %
BILIRUB SERPL-MCNC: <0.2 MG/DL
BUN SERPL-MCNC: 13.9 MG/DL (ref 6–20)
CALCIUM SERPL-MCNC: 9.5 MG/DL (ref 8.8–10.4)
CHLORIDE SERPL-SCNC: 97 MMOL/L (ref 98–107)
CREAT SERPL-MCNC: 0.79 MG/DL (ref 0.67–1.17)
EGFRCR SERPLBLD CKD-EPI 2021: >90 ML/MIN/1.73M2
EOSINOPHIL # BLD AUTO: 0.2 10E3/UL (ref 0–0.7)
EOSINOPHIL NFR BLD AUTO: 2 %
ERYTHROCYTE [DISTWIDTH] IN BLOOD BY AUTOMATED COUNT: 13 % (ref 10–15)
GLUCOSE SERPL-MCNC: 87 MG/DL (ref 70–99)
HCO3 SERPL-SCNC: 29 MMOL/L (ref 22–29)
HCT VFR BLD AUTO: 39.2 % (ref 40–53)
HGB BLD-MCNC: 12.1 G/DL (ref 13.3–17.7)
IMM GRANULOCYTES # BLD: 0 10E3/UL
IMM GRANULOCYTES NFR BLD: 0 %
LYMPHOCYTES # BLD AUTO: 1.5 10E3/UL (ref 0.8–5.3)
LYMPHOCYTES NFR BLD AUTO: 12 %
MCH RBC QN AUTO: 30.3 PG (ref 26.5–33)
MCHC RBC AUTO-ENTMCNC: 30.9 G/DL (ref 31.5–36.5)
MCV RBC AUTO: 98 FL (ref 78–100)
MONOCYTES # BLD AUTO: 1.2 10E3/UL (ref 0–1.3)
MONOCYTES NFR BLD AUTO: 10 %
NEUTROPHILS # BLD AUTO: 9.1 10E3/UL (ref 1.6–8.3)
NEUTROPHILS NFR BLD AUTO: 75 %
NRBC # BLD AUTO: 0 10E3/UL
NRBC BLD AUTO-RTO: 0 /100
PLATELET # BLD AUTO: 447 10E3/UL (ref 150–450)
POTASSIUM SERPL-SCNC: 4 MMOL/L (ref 3.4–5.3)
PROT SERPL-MCNC: 6.6 G/DL (ref 6.4–8.3)
RBC # BLD AUTO: 3.99 10E6/UL (ref 4.4–5.9)
SODIUM SERPL-SCNC: 136 MMOL/L (ref 135–145)
T4 FREE SERPL-MCNC: 1.53 NG/DL (ref 0.9–1.7)
TSH SERPL DL<=0.005 MIU/L-ACNC: 0.18 UIU/ML (ref 0.3–4.2)
WBC # BLD AUTO: 12.1 10E3/UL (ref 4–11)

## 2025-02-26 PROCEDURE — 85025 COMPLETE CBC W/AUTO DIFF WBC: CPT | Performed by: NURSE PRACTITIONER

## 2025-02-26 PROCEDURE — 258N000003 HC RX IP 258 OP 636: Performed by: NURSE PRACTITIONER

## 2025-02-26 PROCEDURE — 96413 CHEMO IV INFUSION 1 HR: CPT

## 2025-02-26 PROCEDURE — G0463 HOSPITAL OUTPT CLINIC VISIT: HCPCS | Performed by: NURSE PRACTITIONER

## 2025-02-26 PROCEDURE — 99214 OFFICE O/P EST MOD 30 MIN: CPT | Performed by: NURSE PRACTITIONER

## 2025-02-26 PROCEDURE — 84439 ASSAY OF FREE THYROXINE: CPT | Performed by: INTERNAL MEDICINE

## 2025-02-26 PROCEDURE — 36415 COLL VENOUS BLD VENIPUNCTURE: CPT | Performed by: NURSE PRACTITIONER

## 2025-02-26 PROCEDURE — G2211 COMPLEX E/M VISIT ADD ON: HCPCS | Performed by: NURSE PRACTITIONER

## 2025-02-26 PROCEDURE — 84443 ASSAY THYROID STIM HORMONE: CPT | Performed by: INTERNAL MEDICINE

## 2025-02-26 PROCEDURE — 250N000011 HC RX IP 250 OP 636: Mod: JZ | Performed by: NURSE PRACTITIONER

## 2025-02-26 PROCEDURE — 80053 COMPREHEN METABOLIC PANEL: CPT | Performed by: NURSE PRACTITIONER

## 2025-02-26 RX ORDER — ALBUTEROL SULFATE 0.83 MG/ML
2.5 SOLUTION RESPIRATORY (INHALATION)
Status: CANCELLED | OUTPATIENT
Start: 2025-02-26

## 2025-02-26 RX ORDER — MEPERIDINE HYDROCHLORIDE 25 MG/ML
25 INJECTION INTRAMUSCULAR; INTRAVENOUS; SUBCUTANEOUS EVERY 30 MIN PRN
Status: CANCELLED | OUTPATIENT
Start: 2025-02-26

## 2025-02-26 RX ORDER — LORAZEPAM 2 MG/ML
0.5 INJECTION INTRAMUSCULAR EVERY 4 HOURS PRN
Status: CANCELLED | OUTPATIENT
Start: 2025-02-26

## 2025-02-26 RX ORDER — METHYLPREDNISOLONE SODIUM SUCCINATE 125 MG/2ML
125 INJECTION INTRAMUSCULAR; INTRAVENOUS
Status: CANCELLED
Start: 2025-02-26

## 2025-02-26 RX ORDER — HEPARIN SODIUM (PORCINE) LOCK FLUSH IV SOLN 100 UNIT/ML 100 UNIT/ML
5 SOLUTION INTRAVENOUS
Status: CANCELLED | OUTPATIENT
Start: 2025-02-26

## 2025-02-26 RX ORDER — DIPHENHYDRAMINE HYDROCHLORIDE 50 MG/ML
50 INJECTION INTRAMUSCULAR; INTRAVENOUS
Status: CANCELLED
Start: 2025-02-26

## 2025-02-26 RX ORDER — ALBUTEROL SULFATE 90 UG/1
1-2 INHALANT RESPIRATORY (INHALATION)
Status: CANCELLED
Start: 2025-02-26

## 2025-02-26 RX ORDER — HEPARIN SODIUM,PORCINE 10 UNIT/ML
5-20 VIAL (ML) INTRAVENOUS DAILY PRN
Status: CANCELLED | OUTPATIENT
Start: 2025-02-26

## 2025-02-26 RX ORDER — EPINEPHRINE 1 MG/ML
0.3 INJECTION, SOLUTION, CONCENTRATE INTRAVENOUS EVERY 5 MIN PRN
Status: CANCELLED | OUTPATIENT
Start: 2025-02-26

## 2025-02-26 RX ADMIN — SODIUM CHLORIDE 250 ML: 9 INJECTION, SOLUTION INTRAVENOUS at 10:20

## 2025-02-26 RX ADMIN — SODIUM CHLORIDE 400 MG: 9 INJECTION, SOLUTION INTRAVENOUS at 10:27

## 2025-02-26 ASSESSMENT — PAIN SCALES - GENERAL: PAINLEVEL_OUTOF10: NO PAIN (0)

## 2025-02-26 NOTE — PROGRESS NOTES
Infusion Nursing Note:  Luis BURNS David presents today for Keytruda.    Patient seen by provider today: Yes: Jennifer Cabrera   present during visit today: Not Applicable.    Note: N/A.    Intravenous Access:  Peripheral IV placed.    Treatment Conditions:  Lab Results   Component Value Date     02/26/2025    POTASSIUM 4.0 02/26/2025    MAG 2.1 12/23/2024    CR 0.79 02/26/2025    MIKE 9.5 02/26/2025    BILITOTAL <0.2 02/26/2025    ALBUMIN 4.0 02/26/2025    ALT 14 02/26/2025    AST 15 02/26/2025       Results reviewed, labs MET treatment parameters, ok to proceed with treatment.    Post Infusion Assessment:  Patient tolerated infusion without incident.  Blood return noted pre and post infusion.  Site patent and intact, free from redness, edema or discomfort.  No evidence of extravasations.  Access discontinued per protocol.     Discharge Plan:   Patient and/or family verbalized understanding of discharge instructions and all questions answered.  Patient discharged in stable condition accompanied by: self.  Departure Mode: Ambulatory.    Den Rodriguez RN

## 2025-02-26 NOTE — LETTER
2/26/2025      Luis Hernandez  [address] [facility] [address]      Dear Colleague,    Thank you for referring your patient, Luis Hernandez, to the [facility] [place]. Please see a copy of my visit note below.    [facility] Hematology and Oncology Outpatient Progress Note    Patient: Luis Hernandez  MRN: [id number]  Date of Service: Feb 26, 2025          Reason for Visit    Lung cancer    Primary Oncologist: Dr. Friedell      Assessment/Plan  >Stage IIIB squamous cell lung cancer  Bilateral lung nodules, indeterminate for inflammation/cancer  Low TSH, intermittent  Not a surgical nor definitive chemoradiation candidate given his severe COPD.      Luis completed 3 cycles of palliative carboplatin, Taxol, pembrolizumab with Neulasta support. Got an excellent response, but complicated by hospitalization for pneumonia/COPD exacerbation. Therefore, further chemo discontinued and he has continued maintenance pembro since May.   Pembro was changed to 400 mg every 6 weeks.    Tolerating immunotherapy well.   He has been treated for frequent COPD exacerbations every 1-2 mths; last completed antibiotics + steroids this week and feels recovered back to baseline. This has been a chronic baseline predating immunotherapy, so unlikely immunotherapy-induced.     1/2025 CT showed possible mild progression in the cancer, but somewhat indeterminate with his recent COPD exacerbations which may be inflammatory changes. A spiculated RUL nodule is 1.4 cm (compared to 1.2 cm prior) and several new/enlarging nodules predominate in right lung are subcm in size but suspicious.     Lab work: hgb 12.1, WBC 12.1/ANC 9.1. CMP WNL. TSH pending.    Given the marginal progression/questionable sites progression vs inflammatory change, we opted to continue immunotherapy alone for another few months and reassess with CT. If next CT shows further progression in cancer, we will have limited options that would pose a lot of  risk in setting of his severe COPD. His COPD is carrying him more risks/symptoms than his lung cancer is. Dr. Friedell would consider revisiting carbo/Taxol (without pembro) given his prior excellent response but this would need to be drastically dose-reduced (he proposed by 40%) to reduce toxicity risk. Patient is not sure he'd want to revisit chemo in this palliative setting, which is a very reasonable decision.     Plan:  -Proceed with C12 pembrolizumab every 6 weeks  -Return in 6 weeks with repeat CT and see Dr. Friedell. At time of clear progression, will need to decide if we return to dose-modified chemo vs no treatment/Hospice at that time.  -Has had intermittently suppressed TSH, currently in normal range. May be immunotherapy-related and usually evolves to hypothyroidism in time    Severe COPD (emphysema), chronic oxygen use  Recurrent/frequent exacerbations  Has had frequent exacerbations - appears to require antibiotic/prednisone about every 1-2 mths. He reports this as his baseline predating starting treatment for his cancer; so I don't think it is an immunotherapy-induced complication.     Last treated as outpt with steroids + antibiotics in Dec-Jan. Symptoms and oxygen needs have been back to baseline. Continues on prednisone 10 mg daily. Managed in Pulmonary.    Plan:  -His COPD is currently a more pressing issue and barrier on his prognosis than his cancer is  -Manages in Pulmonary   ______________________________________________________________________________    History of Present Illness/ Interval History    Mr. Luis Hernandez is a 59 year old with at least locally advanced non-operable lung cancer. He completed 3 cycles of palliative carbo/Taxol/pembrolizumab (with Neulasta) with excellent SD/near CR but complicated by pneumonia/respiratory failure after 3rd cycle so further chemo discontinued. He has continued maintenance pembrolizumab alone. He's had some interruptions due to frequent COPD  exacerbations. Since cycle 6, dosing was changed to 400 mg every 6-week dosing. Returns ahead of cycle 12 today.     His last hospitalization for COPD exacerbation with respiratory failure was 12/2024.  He has severe COPD and oxygen dependent. Frequent COPD exacerbations long-standing. On daily prednisone 10 mg. Follows Pulmonary.    Since his last visit, respiratory status has been stable. He's been feeling generally well.   No diarrhea, skin rash. Stable weight. No urinary symptoms.    No new sites of pain. No headaches/neuro symptoms.       ECOG Performance    1-2      Oncology History/Treatment  Diagnosis/Stage:   1/2024: Stage IIIB (cT4-cN2-cM0) squamous cell cancer (possible/indeterminate bilateral lung involvement, stage IV). Not candidate for definitive RT/surgery.  -Hx severe COPD (FEV1 18%), tobacco use (quit 2021)  -Followed in Pulmonary with CT for waxing/waning bilateral lung nodules  -9/2023 chest CT: new spiculated RUL nodule + persistent filling defect of bronchus intermedius/RLL bronchus concerning for possible endobronchial tumor vs mucus plugging; progressive subcarinal soft tissue mass  -PET: avid right  hilar mass encasing RML bronchus contiguous with subcarinal node/mass, intraluminal soft tissue density RLL bronchus concerning for primary tumor; avid bilateral opacities indeterminate but favored as inflammatory. Incidentally, distal sigmoid/rectal avid soft tissue thickening noted.   -10/2023 Cologuard negative  -Treated with empiric antibiotics for pneumonia. Follow-up CT: RADHA improved; new nodules through bilateral lungs (up to 7 mm) and persistent right lung mass/bronchial findings.   -1/18/2024 flex + rigid bronch with tumor debulking and intermedius bronchial stent placement, with RUL, R mainstem bronchus and bronchus intermedius biopsy: mod-diff squamous cell cancer. PDL1 CPS 10-20% and TPS 5%.   -Lung NGS panel: CDKN2A and PIK3CA alterations   -Brain MRI  negative    Treatment:  2/29/2024 - 4/12/2024: palliative carbo, Taxol, pembrolizumab + Neulasta support. Completed 3 cycles  -->following 3 cycles, had overall good [place]/near CR; but complicated by hospitalization early May for hypoxic respiratory failure + pneumonia. Chemo stopped and treatment held until recovered.     5/15/2024 - present: maintenance pembrolizumab   -Initially, 200 mg every 3 weeks  -8/28/2024 - present: 400 mg every 6 weeks        Physical Exam    GENERAL: Alert and oriented to time place and person. Seated comfortably.  In no distress. Alone.  HEAD: Atraumatic and normocephalic. No alopecia.  LYMPH: No palpable cervical, supraclav nor axillary adenopathy  CHEST: Regular respiratory effort. 1.5 L Oxygen via NC on. Diminished lungs and expiratory wheezes bilaterally.   HEART: RRR  ABD: soft, non-tender, non-distended  EXTREMITIES: No edema  NEURO: No gross deficit noted. Non-antalgic gait.      Lab Results    Recent Results (from the past week)   Comprehensive metabolic panel   Result Value Ref Range    Sodium 136 135 - 145 mmol/L    Potassium 4.0 3.4 - 5.3 mmol/L    Carbon Dioxide (CO2) 29 22 - 29 mmol/L    Anion Gap 10 7 - 15 mmol/L    Urea Nitrogen 13.9 6.0 - 20.0 mg/dL    Creatinine 0.79 0.67 - 1.17 mg/dL    GFR Estimate >90 >60 mL/min/1.73m2    Calcium 9.5 8.8 - 10.4 mg/dL    Chloride 97 (L) 98 - 107 mmol/L    Glucose 87 70 - 99 mg/dL    Alkaline Phosphatase 51 40 - 150 U/L    AST 15 0 - 45 U/L    ALT 14 0 - 70 U/L    Protein Total 6.6 6.4 - 8.3 g/dL    Albumin 4.0 3.5 - 5.2 g/dL    Bilirubin Total <0.2 <=1.2 mg/dL   CBC with platelets and differential   Result Value Ref Range    WBC Count 12.1 (H) 4.0 - 11.0 10e3/uL    RBC Count 3.99 (L) 4.40 - 5.90 10e6/uL    Hemoglobin 12.1 (L) 13.3 - 17.7 g/dL    Hematocrit 39.2 (L) 40.0 - 53.0 %    MCV 98 78 - 100 fL    MCH 30.3 26.5 - 33.0 pg    MCHC 30.9 (L) 31.5 - 36.5 g/dL    RDW 13.0 10.0 - 15.0 %    Platelet Count 447 150 - 450 10e3/uL    %  "Neutrophils 75 %    % Lymphocytes 12 %    % Monocytes 10 %    % Eosinophils 2 %    % Basophils 1 %    % Immature Granulocytes 0 %    NRBCs per 100 WBC 0 <1 /100    Absolute Neutrophils 9.1 (H) 1.6 - 8.3 10e3/uL    Absolute Lymphocytes 1.5 0.8 - 5.3 10e3/uL    Absolute Monocytes 1.2 0.0 - 1.3 10e3/uL    Absolute Eosinophils 0.2 0.0 - 0.7 10e3/uL    Absolute Basophils 0.1 0.0 - 0.2 10e3/uL    Absolute Immature Granulocytes 0.0 <=0.4 10e3/uL    Absolute NRBCs 0.0 10e3/uL       Imaging    No results found.    Billing  Total time 30 minutes, to include face to face visit, review of EMR, ordering, documentation and coordination of care on date of service   complexity modifier for longitudinal care.     Signed by: Jennifer Cabrera NP      Oncology Rooming Note    February 26, 2025 9:41 AM   Luis Hernandez is a 59 year old male who presents for:    Chief Complaint   Patient presents with     Oncology Clinic Visit     Squamous cell lung cancer, right - Labs provider and infusion     Initial Vitals: There were no vitals taken for this visit. Estimated body mass index is 20.25 kg/m  as calculated from the following:    Height as of 2/6/25: 1.626 m (5' 4\").    Weight as of 2/6/25: 53.5 kg (118 lb). There is no height or weight on file to calculate BSA.  Data Unavailable Comment: Data Unavailable   No LMP for male patient.  Allergies reviewed: Yes  Medications reviewed: Yes    Medications: Medication refills not needed today.  Pharmacy name entered into [facility]:    [id number] - [place], [place] - [address] [id number] - [place], [place] - [address] [place] [address]    Frailty Screening:   Is the patient here for a new oncology consult visit in cancer care? 2. No    PHQ9:  Did this patient require a PHQ9?: No      Clinical concerns:  None today.      Feli Mead, [facility]                Again, thank you for allowing me to participate in the care of your patient.        Sincerely,        Jennifer Cabrera, " NP    Electronically signed

## 2025-02-26 NOTE — PROGRESS NOTES
"Oncology Rooming Note    February 26, 2025 9:41 AM   Luis Hernandez is a 59 year old male who presents for:    Chief Complaint   Patient presents with    Oncology Clinic Visit     Squamous cell lung cancer, right - Labs provider and infusion     Initial Vitals: There were no vitals taken for this visit. Estimated body mass index is 20.25 kg/m  as calculated from the following:    Height as of 2/6/25: 1.626 m (5' 4\").    Weight as of 2/6/25: 53.5 kg (118 lb). There is no height or weight on file to calculate BSA.  Data Unavailable Comment: Data Unavailable   No LMP for male patient.  Allergies reviewed: Yes  Medications reviewed: Yes    Medications: Medication refills not needed today.  Pharmacy name entered into [employer]:    [id number] - [place], [place] - [address] [id number] - [place], [place] - [facility]    Frailty Screening:   Is the patient here for a new oncology consult visit in cancer care? 2. No    PHQ9:  Did this patient require a PHQ9?: No      Clinical concerns:  None today.      Feli Mead, HERMELINDA              "

## 2025-02-26 NOTE — PROGRESS NOTES
[facility] Hematology and Oncology Outpatient Progress Note    Patient: Luis Hernandez  MRN: [id number]  Date of Service: Feb 26, 2025          Reason for Visit    Lung cancer    Primary Oncologist: Dr. Friedell      Assessment/Plan  >Stage IIIB squamous cell lung cancer  Bilateral lung nodules, indeterminate for inflammation/cancer  Low TSH, intermittent  Not a surgical nor definitive chemoradiation candidate given his severe COPD.      Luis completed 3 cycles of palliative carboplatin, Taxol, pembrolizumab with Neulasta support. Got an excellent response, but complicated by hospitalization for pneumonia/COPD exacerbation. Therefore, further chemo discontinued and he has continued maintenance pembro since May.   Pembro was changed to 400 mg every 6 weeks.    Tolerating immunotherapy well.   He has been treated for frequent COPD exacerbations every 1-2 mths; last completed antibiotics + steroids this week and feels recovered back to baseline. This has been a chronic baseline predating immunotherapy, so unlikely immunotherapy-induced.     1/2025 CT showed possible mild progression in the cancer, but somewhat indeterminate with his recent COPD exacerbations which may be inflammatory changes. A spiculated RUL nodule is 1.4 cm (compared to 1.2 cm prior) and several new/enlarging nodules predominate in right lung are subcm in size but suspicious.     Lab work: hgb 12.1, WBC 12.1/ANC 9.1. CMP WNL. TSH pending.    Given the marginal progression/questionable sites progression vs inflammatory change, we opted to continue immunotherapy alone for another few months and reassess with CT. If next CT shows further progression in cancer, we will have limited options that would pose a lot of risk in setting of his severe COPD. His COPD is carrying him more risks/symptoms than his lung cancer is. Dr. Friedell would consider revisiting carbo/Taxol (without pembro) given his prior excellent response but this would need to be  drastically dose-reduced (he proposed by 40%) to reduce toxicity risk. Patient is not sure he'd want to revisit chemo in this palliative setting, which is a very reasonable decision.     Plan:  -Proceed with C12 pembrolizumab every 6 weeks  -Return in 6 weeks with repeat CT and see Dr. Friedell. At time of clear progression, will need to decide if we return to dose-modified chemo vs no treatment/Hospice at that time.  -Has had intermittently suppressed TSH, currently in normal range. May be immunotherapy-related and usually evolves to hypothyroidism in time    Severe COPD (emphysema), chronic oxygen use  Recurrent/frequent exacerbations  Has had frequent exacerbations - appears to require antibiotic/prednisone about every 1-2 mths. He reports this as his baseline predating starting treatment for his cancer; so I don't think it is an immunotherapy-induced complication.     Last treated as outpt with steroids + antibiotics in Dec-Jan. Symptoms and oxygen needs have been back to baseline. Continues on prednisone 10 mg daily. Managed in Pulmonary.    Plan:  -His COPD is currently a more pressing issue and barrier on his prognosis than his cancer is  -Manages in Pulmonary   ______________________________________________________________________________    History of Present Illness/ Interval History    Mr. Luis Hernandez is a 59 year old with at least locally advanced non-operable lung cancer. He completed 3 cycles of palliative carbo/Taxol/pembrolizumab (with Neulasta) with excellent UT/near CR but complicated by pneumonia/respiratory failure after 3rd cycle so further chemo discontinued. He has continued maintenance pembrolizumab alone. He's had some interruptions due to frequent COPD exacerbations. Since cycle 6, dosing was changed to 400 mg every 6-week dosing. Returns ahead of cycle 12 today.     His last hospitalization for COPD exacerbation with respiratory failure was 12/2024.  He has severe COPD and oxygen  dependent. Frequent COPD exacerbations long-standing. On daily prednisone 10 mg. Follows Pulmonary.    Since his last visit, respiratory status has been stable. He's been feeling generally well.   No diarrhea, skin rash. Stable weight. No urinary symptoms.    No new sites of pain. No headaches/neuro symptoms.       ECOG Performance    1-2      Oncology History/Treatment  Diagnosis/Stage:   1/2024: Stage IIIB (cT4-cN2-cM0) squamous cell cancer (possible/indeterminate bilateral lung involvement, stage IV). Not candidate for definitive RT/surgery.  -Hx severe COPD (FEV1 18%), tobacco use (quit 2021)  -Followed in Pulmonary with CT for waxing/waning bilateral lung nodules  -9/2023 chest CT: new spiculated RUL nodule + persistent filling defect of bronchus intermedius/RLL bronchus concerning for possible endobronchial tumor vs mucus plugging; progressive subcarinal soft tissue mass  -PET: avid right  hilar mass encasing RML bronchus contiguous with subcarinal node/mass, intraluminal soft tissue density RLL bronchus concerning for primary tumor; avid bilateral opacities indeterminate but favored as inflammatory. Incidentally, distal sigmoid/rectal avid soft tissue thickening noted.   -10/2023 Cologuard negative  -Treated with empiric antibiotics for pneumonia. Follow-up CT: RADHA improved; new nodules through bilateral lungs (up to 7 mm) and persistent right lung mass/bronchial findings.   -1/18/2024 flex + rigid bronch with tumor debulking and intermedius bronchial stent placement, with RUL, R mainstem bronchus and bronchus intermedius biopsy: mod-diff squamous cell cancer. PDL1 CPS 10-20% and TPS 5%.   -Lung NGS panel: CDKN2A and PIK3CA alterations   -Brain MRI negative    Treatment:  2/29/2024 - 4/12/2024: palliative carbo, Taxol, pembrolizumab + Neulasta support. Completed 3 cycles  -->following 3 cycles, had overall good UT/near CR; but complicated by hospitalization early May for hypoxic respiratory failure +  pneumonia. Chemo stopped and treatment held until recovered.     5/15/2024 - present: maintenance pembrolizumab   -Initially, 200 mg every 3 weeks  -8/28/2024 - present: 400 mg every 6 weeks        Physical Exam    GENERAL: Alert and oriented to time place and person. Seated comfortably.  In no distress. Alone.  HEAD: Atraumatic and normocephalic. No alopecia.  LYMPH: No palpable cervical, supraclav nor axillary adenopathy  CHEST: Regular respiratory effort. 1.5 L Oxygen via NC on. Diminished lungs and expiratory wheezes bilaterally.   HEART: RRR  ABD: soft, non-tender, non-distended  EXTREMITIES: No edema  NEURO: No gross deficit noted. Non-antalgic gait.      Lab Results    Recent Results (from the past week)   Comprehensive metabolic panel   Result Value Ref Range    Sodium 136 135 - 145 mmol/L    Potassium 4.0 3.4 - 5.3 mmol/L    Carbon Dioxide (CO2) 29 22 - 29 mmol/L    Anion Gap 10 7 - 15 mmol/L    Urea Nitrogen 13.9 6.0 - 20.0 mg/dL    Creatinine 0.79 0.67 - 1.17 mg/dL    GFR Estimate >90 >60 mL/min/1.73m2    Calcium 9.5 8.8 - 10.4 mg/dL    Chloride 97 (L) 98 - 107 mmol/L    Glucose 87 70 - 99 mg/dL    Alkaline Phosphatase 51 40 - 150 U/L    AST 15 0 - 45 U/L    ALT 14 0 - 70 U/L    Protein Total 6.6 6.4 - 8.3 g/dL    Albumin 4.0 3.5 - 5.2 g/dL    Bilirubin Total <0.2 <=1.2 mg/dL   CBC with platelets and differential   Result Value Ref Range    WBC Count 12.1 (H) 4.0 - 11.0 10e3/uL    RBC Count 3.99 (L) 4.40 - 5.90 10e6/uL    Hemoglobin 12.1 (L) 13.3 - 17.7 g/dL    Hematocrit 39.2 (L) 40.0 - 53.0 %    MCV 98 78 - 100 fL    MCH 30.3 26.5 - 33.0 pg    MCHC 30.9 (L) 31.5 - 36.5 g/dL    RDW 13.0 10.0 - 15.0 %    Platelet Count 447 150 - 450 10e3/uL    % Neutrophils 75 %    % Lymphocytes 12 %    % Monocytes 10 %    % Eosinophils 2 %    % Basophils 1 %    % Immature Granulocytes 0 %    NRBCs per 100 WBC 0 <1 /100    Absolute Neutrophils 9.1 (H) 1.6 - 8.3 10e3/uL    Absolute Lymphocytes 1.5 0.8 - 5.3 10e3/uL     Absolute Monocytes 1.2 0.0 - 1.3 10e3/uL    Absolute Eosinophils 0.2 0.0 - 0.7 10e3/uL    Absolute Basophils 0.1 0.0 - 0.2 10e3/uL    Absolute Immature Granulocytes 0.0 <=0.4 10e3/uL    Absolute NRBCs 0.0 10e3/uL       Imaging    No results found.    Billing  Total time 30 minutes, to include face to face visit, review of EMR, ordering, documentation and coordination of care on date of service   complexity modifier for longitudinal care.     Signed by: Jennifer Cabrera NP

## 2025-04-02 DIAGNOSIS — I10 ESSENTIAL HYPERTENSION, BENIGN: ICD-10-CM

## 2025-04-03 ENCOUNTER — APPOINTMENT (OUTPATIENT)
Dept: CT IMAGING | Facility: CLINIC | Age: 60
End: 2025-04-03
Attending: EMERGENCY MEDICINE
Payer: COMMERCIAL

## 2025-04-03 ENCOUNTER — HOSPITAL ENCOUNTER (INPATIENT)
Facility: CLINIC | Age: 60
End: 2025-04-03
Attending: EMERGENCY MEDICINE | Admitting: FAMILY MEDICINE
Payer: COMMERCIAL

## 2025-04-03 VITALS
TEMPERATURE: 97.9 F | RESPIRATION RATE: 20 BRPM | DIASTOLIC BLOOD PRESSURE: 81 MMHG | HEIGHT: 64 IN | OXYGEN SATURATION: 93 % | SYSTOLIC BLOOD PRESSURE: 128 MMHG | HEART RATE: 110 BPM | BODY MASS INDEX: 19.46 KG/M2 | WEIGHT: 113.98 LBS

## 2025-04-03 DIAGNOSIS — J18.9 PNEUMONIA OF RIGHT LUNG DUE TO INFECTIOUS ORGANISM, UNSPECIFIED PART OF LUNG: ICD-10-CM

## 2025-04-03 PROBLEM — D64.9 ANEMIA, UNSPECIFIED TYPE: Status: ACTIVE | Noted: 2021-05-07

## 2025-04-03 LAB
ALBUMIN SERPL BCG-MCNC: 3.2 G/DL (ref 3.5–5.2)
ALP SERPL-CCNC: 89 U/L (ref 40–150)
ALT SERPL W P-5'-P-CCNC: 71 U/L (ref 0–70)
ANION GAP SERPL CALCULATED.3IONS-SCNC: 12 MMOL/L (ref 7–15)
APAP SERPL-MCNC: <5 UG/ML (ref 10–30)
AST SERPL W P-5'-P-CCNC: 53 U/L (ref 0–45)
ATRIAL RATE - MUSE: 127 BPM
BASE EXCESS BLDV CALC-SCNC: 4.6 MMOL/L (ref -3–3)
BILIRUB DIRECT SERPL-MCNC: 0.11 MG/DL (ref 0–0.3)
BILIRUB SERPL-MCNC: 0.3 MG/DL
BUN SERPL-MCNC: 27.3 MG/DL (ref 6–20)
CALCIUM SERPL-MCNC: 9.3 MG/DL (ref 8.8–10.4)
CHLORIDE SERPL-SCNC: 92 MMOL/L (ref 98–107)
CREAT SERPL-MCNC: 0.92 MG/DL (ref 0.67–1.17)
DIASTOLIC BLOOD PRESSURE - MUSE: NORMAL MMHG
EGFRCR SERPLBLD CKD-EPI 2021: >90 ML/MIN/1.73M2
ERYTHROCYTE [DISTWIDTH] IN BLOOD BY AUTOMATED COUNT: 14.3 % (ref 10–15)
GLUCOSE SERPL-MCNC: 92 MG/DL (ref 70–99)
HCO3 BLDV-SCNC: 31 MMOL/L (ref 21–28)
HCO3 SERPL-SCNC: 26 MMOL/L (ref 22–29)
HCT VFR BLD AUTO: 38.2 % (ref 40–53)
HGB BLD-MCNC: 12 G/DL (ref 13.3–17.7)
HOLD SPECIMEN: NORMAL
INTERPRETATION ECG - MUSE: NORMAL
LACTATE SERPL-SCNC: 0.8 MMOL/L (ref 0.7–2)
LACTATE SERPL-SCNC: 0.8 MMOL/L (ref 0.7–2)
MCH RBC QN AUTO: 29.6 PG (ref 26.5–33)
MCHC RBC AUTO-ENTMCNC: 31.4 G/DL (ref 31.5–36.5)
MCV RBC AUTO: 94 FL (ref 78–100)
NT-PROBNP SERPL-MCNC: 138 PG/ML (ref 0–900)
O2/TOTAL GAS SETTING VFR VENT: 2 %
OXYHGB MFR BLDV: 53 % (ref 70–75)
P AXIS - MUSE: 66 DEGREES
PCO2 BLDV: 51 MM HG (ref 40–50)
PH BLDV: 7.39 [PH] (ref 7.32–7.43)
PLATELET # BLD AUTO: 717 10E3/UL (ref 150–450)
PO2 BLDV: 30 MM HG (ref 25–47)
POTASSIUM SERPL-SCNC: 4.4 MMOL/L (ref 3.4–5.3)
PR INTERVAL - MUSE: 122 MS
PROT SERPL-MCNC: 7.3 G/DL (ref 6.4–8.3)
QRS DURATION - MUSE: 82 MS
QT - MUSE: 294 MS
QTC - MUSE: 427 MS
R AXIS - MUSE: 118 DEGREES
RBC # BLD AUTO: 4.06 10E6/UL (ref 4.4–5.9)
SAO2 % BLDV: 54.3 % (ref 70–75)
SODIUM SERPL-SCNC: 130 MMOL/L (ref 135–145)
SYSTOLIC BLOOD PRESSURE - MUSE: NORMAL MMHG
T AXIS - MUSE: 56 DEGREES
TROPONIN T SERPL HS-MCNC: 14 NG/L
TROPONIN T SERPL HS-MCNC: 18 NG/L
VENTRICULAR RATE- MUSE: 127 BPM
WBC # BLD AUTO: 28.8 10E3/UL (ref 4–11)

## 2025-04-03 PROCEDURE — 82805 BLOOD GASES W/O2 SATURATION: CPT | Performed by: EMERGENCY MEDICINE

## 2025-04-03 PROCEDURE — 250N000011 HC RX IP 250 OP 636: Performed by: EMERGENCY MEDICINE

## 2025-04-03 PROCEDURE — 250N000013 HC RX MED GY IP 250 OP 250 PS 637: Performed by: FAMILY MEDICINE

## 2025-04-03 PROCEDURE — 93005 ELECTROCARDIOGRAM TRACING: CPT | Performed by: EMERGENCY MEDICINE

## 2025-04-03 PROCEDURE — 36415 COLL VENOUS BLD VENIPUNCTURE: CPT | Performed by: EMERGENCY MEDICINE

## 2025-04-03 PROCEDURE — 99207 PR APP CREDIT; MD BILLING SHARED VISIT: CPT | Mod: FS

## 2025-04-03 PROCEDURE — 83605 ASSAY OF LACTIC ACID: CPT | Performed by: EMERGENCY MEDICINE

## 2025-04-03 PROCEDURE — 83880 ASSAY OF NATRIURETIC PEPTIDE: CPT | Performed by: EMERGENCY MEDICINE

## 2025-04-03 PROCEDURE — 258N000003 HC RX IP 258 OP 636

## 2025-04-03 PROCEDURE — 94640 AIRWAY INHALATION TREATMENT: CPT

## 2025-04-03 PROCEDURE — 84484 ASSAY OF TROPONIN QUANT: CPT | Performed by: EMERGENCY MEDICINE

## 2025-04-03 PROCEDURE — 96374 THER/PROPH/DIAG INJ IV PUSH: CPT | Mod: 59 | Performed by: EMERGENCY MEDICINE

## 2025-04-03 PROCEDURE — 85027 COMPLETE CBC AUTOMATED: CPT | Performed by: EMERGENCY MEDICINE

## 2025-04-03 PROCEDURE — 80143 DRUG ASSAY ACETAMINOPHEN: CPT

## 2025-04-03 PROCEDURE — 99285 EMERGENCY DEPT VISIT HI MDM: CPT | Mod: 25 | Performed by: EMERGENCY MEDICINE

## 2025-04-03 PROCEDURE — 99285 EMERGENCY DEPT VISIT HI MDM: CPT | Performed by: EMERGENCY MEDICINE

## 2025-04-03 PROCEDURE — 71275 CT ANGIOGRAPHY CHEST: CPT

## 2025-04-03 PROCEDURE — 82248 BILIRUBIN DIRECT: CPT | Performed by: EMERGENCY MEDICINE

## 2025-04-03 PROCEDURE — 258N000003 HC RX IP 258 OP 636: Performed by: EMERGENCY MEDICINE

## 2025-04-03 PROCEDURE — 250N000009 HC RX 250

## 2025-04-03 PROCEDURE — 999N000157 HC STATISTIC RCP TIME EA 10 MIN

## 2025-04-03 PROCEDURE — 36415 COLL VENOUS BLD VENIPUNCTURE: CPT

## 2025-04-03 PROCEDURE — 99223 1ST HOSP IP/OBS HIGH 75: CPT | Performed by: FAMILY MEDICINE

## 2025-04-03 PROCEDURE — 250N000013 HC RX MED GY IP 250 OP 250 PS 637

## 2025-04-03 PROCEDURE — 87040 BLOOD CULTURE FOR BACTERIA: CPT | Performed by: EMERGENCY MEDICINE

## 2025-04-03 PROCEDURE — 250N000009 HC RX 250: Performed by: EMERGENCY MEDICINE

## 2025-04-03 PROCEDURE — 80053 COMPREHEN METABOLIC PANEL: CPT | Performed by: EMERGENCY MEDICINE

## 2025-04-03 PROCEDURE — 120N000001 HC R&B MED SURG/OB

## 2025-04-03 PROCEDURE — 83605 ASSAY OF LACTIC ACID: CPT

## 2025-04-03 PROCEDURE — 250N000011 HC RX IP 250 OP 636: Mod: JZ

## 2025-04-03 PROCEDURE — 93010 ELECTROCARDIOGRAM REPORT: CPT | Performed by: EMERGENCY MEDICINE

## 2025-04-03 RX ORDER — POLYETHYLENE GLYCOL 3350 17 G/17G
17 POWDER, FOR SOLUTION ORAL DAILY PRN
Status: ACTIVE | OUTPATIENT
Start: 2025-04-03

## 2025-04-03 RX ORDER — FLUTICASONE FUROATE AND VILANTEROL 200; 25 UG/1; UG/1
1 POWDER RESPIRATORY (INHALATION) DAILY
Status: ACTIVE | OUTPATIENT
Start: 2025-04-03

## 2025-04-03 RX ORDER — ONDANSETRON 2 MG/ML
4 INJECTION INTRAMUSCULAR; INTRAVENOUS EVERY 6 HOURS PRN
Status: ACTIVE | OUTPATIENT
Start: 2025-04-03

## 2025-04-03 RX ORDER — ACETAMINOPHEN 325 MG/1
650 TABLET ORAL EVERY 4 HOURS PRN
Status: ACTIVE | OUTPATIENT
Start: 2025-04-03

## 2025-04-03 RX ORDER — LISINOPRIL 40 MG/1
40 TABLET ORAL DAILY
Status: ACTIVE | OUTPATIENT
Start: 2025-04-04

## 2025-04-03 RX ORDER — AMLODIPINE BESYLATE 5 MG/1
5 TABLET ORAL DAILY
Status: ACTIVE | OUTPATIENT
Start: 2025-04-04

## 2025-04-03 RX ORDER — ROFLUMILAST 500 UG/1
500 TABLET ORAL DAILY
Status: DISPENSED | OUTPATIENT
Start: 2025-04-04

## 2025-04-03 RX ORDER — ALBUTEROL SULFATE 90 UG/1
2 INHALANT RESPIRATORY (INHALATION)
Status: ACTIVE | OUTPATIENT
Start: 2025-04-03

## 2025-04-03 RX ORDER — AZITHROMYCIN MONOHYDRATE 500 MG/5ML
500 INJECTION, POWDER, LYOPHILIZED, FOR SOLUTION INTRAVENOUS EVERY 24 HOURS
Status: COMPLETED | OUTPATIENT
Start: 2025-04-03 | End: 2025-04-03

## 2025-04-03 RX ORDER — ALBUTEROL SULFATE 0.83 MG/ML
2.5 SOLUTION RESPIRATORY (INHALATION)
Status: ACTIVE | OUTPATIENT
Start: 2025-04-03

## 2025-04-03 RX ORDER — IOPAMIDOL 755 MG/ML
64 INJECTION, SOLUTION INTRAVASCULAR ONCE
Status: COMPLETED | OUTPATIENT
Start: 2025-04-03 | End: 2025-04-03

## 2025-04-03 RX ORDER — IPRATROPIUM BROMIDE AND ALBUTEROL SULFATE 2.5; .5 MG/3ML; MG/3ML
3 SOLUTION RESPIRATORY (INHALATION)
Status: DISCONTINUED | OUTPATIENT
Start: 2025-04-03 | End: 2025-04-03

## 2025-04-03 RX ORDER — CEFEPIME HYDROCHLORIDE 2 G/1
2 INJECTION, POWDER, FOR SOLUTION INTRAVENOUS EVERY 8 HOURS
Status: DISPENSED | OUTPATIENT
Start: 2025-04-03 | End: 2025-04-10

## 2025-04-03 RX ORDER — ALBUTEROL SULFATE 0.83 MG/ML
2.5 SOLUTION RESPIRATORY (INHALATION)
Status: DISCONTINUED | OUTPATIENT
Start: 2025-04-03 | End: 2025-04-03

## 2025-04-03 RX ORDER — IPRATROPIUM BROMIDE AND ALBUTEROL SULFATE 2.5; .5 MG/3ML; MG/3ML
3 SOLUTION RESPIRATORY (INHALATION) EVERY 4 HOURS PRN
Status: DISCONTINUED | OUTPATIENT
Start: 2025-04-03 | End: 2025-04-04

## 2025-04-03 RX ORDER — AMLODIPINE BESYLATE 5 MG/1
5 TABLET ORAL DAILY
Qty: 90 TABLET | Refills: 0 | Status: ON HOLD | OUTPATIENT
Start: 2025-04-03

## 2025-04-03 RX ORDER — SODIUM CHLORIDE, SODIUM LACTATE, POTASSIUM CHLORIDE, CALCIUM CHLORIDE 600; 310; 30; 20 MG/100ML; MG/100ML; MG/100ML; MG/100ML
1000 INJECTION, SOLUTION INTRAVENOUS CONTINUOUS
Status: ACTIVE | OUTPATIENT
Start: 2025-04-03

## 2025-04-03 RX ORDER — ONDANSETRON 4 MG/1
4 TABLET, ORALLY DISINTEGRATING ORAL EVERY 6 HOURS PRN
Status: ACTIVE | OUTPATIENT
Start: 2025-04-03

## 2025-04-03 RX ORDER — PROCHLORPERAZINE MALEATE 5 MG/1
10 TABLET ORAL EVERY 6 HOURS PRN
Status: ACTIVE | OUTPATIENT
Start: 2025-04-03

## 2025-04-03 RX ORDER — FLUTICASONE FUROATE AND VILANTEROL 200; 25 UG/1; UG/1
1 POWDER RESPIRATORY (INHALATION) DAILY
Status: DISCONTINUED | OUTPATIENT
Start: 2025-04-03 | End: 2025-04-03

## 2025-04-03 RX ORDER — METHYLPREDNISOLONE SODIUM SUCCINATE 125 MG/2ML
125 INJECTION INTRAMUSCULAR; INTRAVENOUS ONCE
Status: COMPLETED | OUTPATIENT
Start: 2025-04-03 | End: 2025-04-03

## 2025-04-03 RX ORDER — GUAIFENESIN 600 MG/1
1200 TABLET, EXTENDED RELEASE ORAL 2 TIMES DAILY
Status: DISPENSED | OUTPATIENT
Start: 2025-04-03

## 2025-04-03 RX ORDER — CALCIUM CARBONATE 500 MG/1
1000 TABLET, CHEWABLE ORAL 4 TIMES DAILY PRN
Status: ACTIVE | OUTPATIENT
Start: 2025-04-03

## 2025-04-03 RX ORDER — PREDNISONE 20 MG/1
40 TABLET ORAL DAILY
Status: ACTIVE | OUTPATIENT
Start: 2025-04-04 | End: 2025-04-08

## 2025-04-03 RX ORDER — METOPROLOL TARTRATE 25 MG/1
25 TABLET, FILM COATED ORAL 2 TIMES DAILY
Status: DISPENSED | OUTPATIENT
Start: 2025-04-03

## 2025-04-03 RX ADMIN — SODIUM CHLORIDE 1000 ML: 9 INJECTION, SOLUTION INTRAVENOUS at 14:52

## 2025-04-03 RX ADMIN — METHYLPREDNISOLONE SODIUM SUCCINATE 125 MG: 125 INJECTION, POWDER, FOR SOLUTION INTRAMUSCULAR; INTRAVENOUS at 13:15

## 2025-04-03 RX ADMIN — IPRATROPIUM BROMIDE AND ALBUTEROL SULFATE 3 ML: .5; 3 SOLUTION RESPIRATORY (INHALATION) at 16:13

## 2025-04-03 RX ADMIN — CEFEPIME 2 G: 2 INJECTION, POWDER, FOR SOLUTION INTRAVENOUS at 22:00

## 2025-04-03 RX ADMIN — SODIUM CHLORIDE 1593 ML: 9 INJECTION, SOLUTION INTRAVENOUS at 17:38

## 2025-04-03 RX ADMIN — AZITHROMYCIN MONOHYDRATE 500 MG: 500 INJECTION, POWDER, LYOPHILIZED, FOR SOLUTION INTRAVENOUS at 14:49

## 2025-04-03 RX ADMIN — IOPAMIDOL 64 ML: 755 INJECTION, SOLUTION INTRAVENOUS at 12:31

## 2025-04-03 RX ADMIN — CEFEPIME 2 G: 2 INJECTION, POWDER, FOR SOLUTION INTRAVENOUS at 16:04

## 2025-04-03 RX ADMIN — SODIUM CHLORIDE, SODIUM LACTATE, POTASSIUM CHLORIDE, AND CALCIUM CHLORIDE 1000 ML: .6; .31; .03; .02 INJECTION, SOLUTION INTRAVENOUS at 18:44

## 2025-04-03 RX ADMIN — GUAIFENESIN 1200 MG: 600 TABLET ORAL at 17:59

## 2025-04-03 RX ADMIN — SODIUM CHLORIDE 93 ML: 9 INJECTION, SOLUTION INTRAVENOUS at 12:31

## 2025-04-03 RX ADMIN — ALBUTEROL SULFATE 2 PUFF: 90 INHALANT RESPIRATORY (INHALATION) at 20:12

## 2025-04-03 RX ADMIN — FLUTICASONE FUROATE AND VILANTEROL TRIFENATATE 1 PUFF: 200; 25 POWDER RESPIRATORY (INHALATION) at 23:43

## 2025-04-03 RX ADMIN — METOPROLOL TARTRATE 25 MG: 25 TABLET, FILM COATED ORAL at 17:59

## 2025-04-03 ASSESSMENT — ACTIVITIES OF DAILY LIVING (ADL)
ADLS_ACUITY_SCORE: 57
ADLS_ACUITY_SCORE: 31
ADLS_ACUITY_SCORE: 57
ADLS_ACUITY_SCORE: 31
ADLS_ACUITY_SCORE: 57
ADLS_ACUITY_SCORE: 57
ADLS_ACUITY_SCORE: 31
ADLS_ACUITY_SCORE: 57

## 2025-04-03 NOTE — ED NOTES
"[facility]   Admission Handoff    The patient is Luis Hernandez, 59 year old who arrived in the ED by CAR from home with a complaint of Palpitations (Pt stated sx have been \"going on for about a week\". Pt reports sx are \"coughing up yellow sputum, pain in lungs, increased oxygen needs, increased heart rate\". Pt has hx of COPD. Pt stated HR in AM was \"150 and then I took Metoprolol and it went down\". Pt reporting lightheadedness/dizziness. Denied N/V, chest pain and/or SOB )  . The patient's current symptoms are new and during this time the symptoms have remained the same. In the ED, patient was diagnosed with   Final diagnoses:   Pneumonia of right lung due to infectious organism, unspecified part of lung         Needed?: No    Allergies:    Allergies   Allergen Reactions    Hctz Other (See Comments)     He has hyponatremia - avoid use    Penicillins Unknown     Childhood reaction.       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)     COPD exacerbation (H) 04/01/2019    COPD exacerbation (H) 02/16/2020    Erectile dysfunction     Hypertension     Hyponatremia 04/01/2019    Pneumonia 04/10/2020       Initial vitals were: BP: 133/83  Pulse: (!) 125  Temp: 100  F (37.8  C)  Resp: 17  Height: 162.6 cm (5' 4\")  Weight: 53.1 kg (117 lb)  SpO2: 94 %   Recent vital Signs: /79   Pulse 113   Temp 100  F (37.8  C) (Oral)   Resp 17   Ht 1.626 m (5' 4\")   Wt 53.1 kg (117 lb)   SpO2 94%   BMI 20.08 kg/m      Elimination Status: Continent: Yes     Activity Level: SBA    Fall Status: Reason for falls risk:  Mobility  arm band in " place, patient and family education, assistive device/personal items within reach, activity supervised, and mobility aid in reach    Baseline Mental status: WDL  Current Mental Status changes: at basesline    Infection present or suspected this encounter: yes respiratory  Sepsis suspected: No    Isolation type: N/A    Bariatric equipment needed?: No    In the ED these meds were given:   Medications   ceFEPIme (MAXIPIME) 2 g vial to attach to  mL bag for ADULTS or NS 50 mL bag for PEDS (has no administration in time range)   azithromycin (ZITHROMAX) 500 mg vial to attach to  mL bag (500 mg Intravenous $New Bag 4/3/25 1449)   azithromycin (ZITHROMAX) 250 mg in  mL intermittent infusion (has no administration in time range)   calcium carbonate (TUMS) chewable tablet 1,000 mg (has no administration in time range)   iopamidol (ISOVUE-370) solution 64 mL (64 mLs Intravenous $Given 4/3/25 1231)   sodium chloride 0.9 % bag for CT scan flush (93 mLs Intravenous $Given 4/3/25 1231)   methylPREDNISolone Na Suc (solu-MEDROL) injection 125 mg (125 mg Intravenous $Given 4/3/25 1315)   sodium chloride 0.9% BOLUS 1,593 mL (1,000 mLs Intravenous $New Bag 4/3/25 1452)       Drips running?  Yes    Home pump  No    Current LDAs: Peripheral IV: Site R AC; Gauge 18  normal saline     Results:   Labs/Imaging  Ordered and Resulted from Time of ED Arrival Up to the Time of Departure from the ED  Results for orders placed or performed during the hospital encounter of 04/03/25 (from the past 24 hours)   EKG 12 lead   Result Value Ref Range    Systolic Blood Pressure  mmHg    Diastolic Blood Pressure  mmHg    Ventricular Rate 127 BPM    Atrial Rate 127 BPM    WA Interval 122 ms    QRS Duration 82 ms     ms    QTc 427 ms    P Axis 66 degrees    R AXIS 118 degrees    T Axis 56 degrees    Interpretation ECG       Sinus tachycardia with Premature supraventricular complexes  Biatrial enlargement  Left posterior fascicular  block  Abnormal ECG  When compared with ECG of 22-Dec-2024 09:01,  Premature supraventricular complexes are now Present     [facility] Draw    Narrative    The following orders were created for panel order [facility] Draw.  Procedure                               Abnormality         Status                     ---------                               -----------         ------                     Extra Blue Top Tube[[id number]]                             Final result               Extra Green Top (Lithiu...[[id number]]                      Final result               Extra Purple Top Tube[[id number]]                           Final result                 Please view results for these tests on the individual orders.   Extra Blue Top Tube   Result Value Ref Range    Hold Specimen JIC    Extra Green Top (Lithium Heparin) Tube   Result Value Ref Range    Hold Specimen JIC    Extra Purple Top Tube   Result Value Ref Range    Hold Specimen JIC    CBC with platelets   Result Value Ref Range    WBC Count 28.8 (H) 4.0 - 11.0 10e3/uL    RBC Count 4.06 (L) 4.40 - 5.90 10e6/uL    Hemoglobin 12.0 (L) 13.3 - 17.7 g/dL    Hematocrit 38.2 (L) 40.0 - 53.0 %    MCV 94 78 - 100 fL    MCH 29.6 26.5 - 33.0 pg    MCHC 31.4 (L) 31.5 - 36.5 g/dL    RDW 14.3 10.0 - 15.0 %    Platelet Count 717 (H) 150 - 450 10e3/uL   Basic metabolic panel   Result Value Ref Range    Sodium 130 (L) 135 - 145 mmol/L    Potassium 4.4 3.4 - 5.3 mmol/L    Chloride 92 (L) 98 - 107 mmol/L    Carbon Dioxide (CO2) 26 22 - 29 mmol/L    Anion Gap 12 7 - 15 mmol/L    Urea Nitrogen 27.3 (H) 6.0 - 20.0 mg/dL    Creatinine 0.92 0.67 - 1.17 mg/dL    GFR Estimate >90 >60 mL/min/1.73m2    Calcium 9.3 8.8 - 10.4 mg/dL    Glucose 92 70 - 99 mg/dL   Hepatic function panel   Result Value Ref Range    Protein Total 7.3 6.4 - 8.3 g/dL    Albumin 3.2 (L) 3.5 - 5.2 g/dL    Bilirubin Total 0.3 <=1.2 mg/dL    Alkaline Phosphatase 89 40 - 150 U/L    AST 53 (H) 0 - 45 U/L    ALT 71 (H) 0 - 70  U/L    Bilirubin Direct 0.11 0.00 - 0.30 mg/dL   Troponin T, High Sensitivity   Result Value Ref Range    Troponin T, High Sensitivity 18 <=22 ng/L   Nt probnp inpatient   Result Value Ref Range    N terminal Pro BNP Inpatient 138 0 - 900 pg/mL   Blood gas venous   Result Value Ref Range    pH Venous 7.39 7.32 - 7.43    pCO2 Venous 51 (H) 40 - 50 mm Hg    pO2 Venous 30 25 - 47 mm Hg    Bicarbonate Venous 31 (H) 21 - 28 mmol/L    Base Excess/Deficit Venous 4.6 (H) -3.0 - 3.0 mmol/L    FIO2 2     Oxyhemoglobin Venous 53 (L) 70 - 75 %    O2 Sat, Venous 54.3 (L) 70.0 - 75.0 %    Narrative    In healthy individuals, oxyhemoglobin (O2Hb) and oxygen saturation (SO2) are approximately equal. In the presence of dyshemoglobins, oxyhemoglobin can be considerably lower than oxygen saturation.   CT Chest Pulmonary Embolism w Contrast    Narrative    EXAM: CT CHEST PULMONARY EMBOLISM WITH CONTRAST  LOCATION: [facility]  DATE: 04/03/2025    INDICATION: Right pleuritic chest pain.  COMPARISON: Chest CT 01/13/2025.  TECHNIQUE: CT chest pulmonary angiogram during arterial phase injection of IV contrast. Multiplanar reformats and MIP reconstructions were performed. Dose reduction techniques were used.   CONTRAST: 64 mL Isovue 370.    FINDINGS:  ANGIOGRAM CHEST: Pulmonary arteries are normal caliber and negative for pulmonary emboli. Thoracic aorta is negative for dissection. No evidence for thoracic aortic aneurysm. Mild atherosclerotic calcification of the thoracic aorta.    LUNGS AND PLEURA: Severe upper lung predominant emphysematous change. Extensive consolidation in the right upper and middle lobes, suspicious for pneumonia. A 3.8 x 3 cm heterogeneous masslike opacity abutting the pleura in the left upper lobe anteriorly   and medially (series 5 image 46) is new since the previous exam. Mild consolidation is also noted inferior to this masslike opacity. A 0.8 cm left upper lobe nodule (series 6 image  210) is new since the previous exam. Previously noted right upper lobe   pulmonary nodules are unchanged, with the largest measuring 1.6 x 1.2 cm (series 6 image 85). Right upper lobe calcified granuloma is unchanged. No pleural effusions.    MEDIASTINUM/AXILLAE: No enlarged lymph nodes in the chest. No pericardial effusion.    CORONARY ARTERY CALCIFICATION: Moderate.    UPPER ABDOMEN: Small right renal cyst would require no specific follow-up. Small hiatal hernia.    MUSCULOSKELETAL: Unremarkable.      Impression    IMPRESSION:  1.  No evidence for pulmonary embolism.  2.  Extensive consolidation in the right upper and middle lobes, suspicious for pneumonia.  3.  A 3.8 cm heterogeneous masslike opacity abutting the lower in the left upper lobe anteriorly and medially is new since the previous exam. This could also be related to pneumonia, however, neoplasm cannot be excluded. Short-term follow-up CT is   recommended to ensure resolution.  4.  A 0.8 cm indeterminate left upper lobe pulmonary nodule is also new since the previous exam.  5.  Severe emphysema.  6.  Previously noted right upper lobe pulmonary nodules are unchanged.     Troponin T, High Sensitivity   Result Value Ref Range    Troponin T, High Sensitivity 14 <=22 ng/L   Lactic Acid Whole Blood with 1X Repeat in 2 HR when >2   Result Value Ref Range    Lactic Acid, Initial 0.8 0.7 - 2.0 mmol/L       For the majority of the shift this patient's behavior was Green     Cardiac Rhythm: Other, sinus tachy   Pt needs tele? Yes  Skin/wound Issues: None    Code Status: Special Code     Pain control: good    Nausea control: good    Abnormal labs/tests/findings requiring intervention: see lab tab     Patient tested for COVID 19 prior to admission: NO     OBS brochure/video discussed/provided to patient/family: N/A     Family present during ED course? No     Family Comments/Social Situation comments: N/A    Tasks needing completion: None, abx currently running, need  second abx afterwards and IVF bolus     Maria Elena FOSTER

## 2025-04-03 NOTE — MEDICATION SCRIBE - ADMISSION MEDICATION HISTORY
Medication Scribe Admission Medication History    Admission medication history is complete. The information provided in this note is only as accurate as the sources available at the time of the update.    Information Source(s): Patient and CareEverywhere/SureScripts via in-person    Pertinent Information: PTA med list reviewed with patient in room and finished at desk.  He takes care of his own medicines at home.  He only has Albuterol Inhaler with him today.    Changes made to PTA medication list:  Added: None  Deleted: Oxymetolazone  Changed: Oxygen to 1.5 L/min cont.    Allergies reviewed with patient and updates made in EHR: yes, no change.    Medication History Completed By: Shahnaz Huff 4/3/2025 4:06 PM    PTA Med List   Medication Sig Note Last Dose/Taking    albuterol (PROAIR HFA/PROVENTIL HFA/VENTOLIN HFA) 108 (90 Base) MCG/ACT inhaler Inhale 2 puffs into the lungs every 4 hours as needed for shortness of breath 4/3/2025: He has this with today 4/3/2025 at  9:00 AM    albuterol (PROVENTIL) (2.5 MG/3ML) 0.083% neb solution Take 1 vial (2.5 mg) by nebulization every 4 hours as needed for shortness of breath, wheezing or cough.  More than a month    amLODIPine (NORVASC) 5 MG tablet TAKE 1 TABLET BY MOUTH DAILY  4/3/2025 Morning    azithromycin (ZITHROMAX) 250 MG tablet Take 1 tablet (250 mg) by mouth daily.  4/2/2025 Morning    fluticasone-salmeterol (ADVAIR) 500-50 MCG/ACT inhaler Inhale 1 puff into the lungs every 12 hours.  4/3/2025 at  8:00 AM    guaiFENesin (MUCINEX) 600 MG 12 hr tablet Take 600 mg by mouth 2 times daily.  4/3/2025 Morning    ibuprofen (ADVIL/MOTRIN) 200 MG tablet Take 3 tablets by mouth every 6 hours as needed for pain.  More than a month    lisinopril (ZESTRIL) 40 MG tablet Take 1 tablet (40 mg) by mouth daily.  4/3/2025 Morning    metoprolol tartrate (LOPRESSOR) 25 MG tablet Take 1 tablet (25 mg) by mouth 2 times daily  4/3/2025 Morning    order for DME Equipment being ordered: Oxygen  3L via NC continuous.  O2 saturated noted to be 86% on room air at rest. (Patient taking differently: 1.5 L/min continuously. Equipment being ordered: Oxygen 3L via NC continuous.  O2 saturated noted to be 86% on room air at rest.)  4/3/2025    order for DME Equipment being ordered: Nebulizer  More than a month    predniSONE (DELTASONE) 10 MG tablet Take 1 tablet (10 mg) by mouth daily.  4/2/2025 Morning    roflumilast (DALIRESP) 500 MCG TABS tablet Take 1 tablet (500 mcg) by mouth daily.  4/3/2025 Morning    sodium chloride 0.9 % neb solution Take 3 mLs by nebulization 2 times daily as needed for wheezing.  More than a month    tiotropium (SPIRIVA RESPIMAT) 2.5 MCG/ACT inhaler Inhale 2 puffs into the lungs daily.  4/3/2025 at  8:00 AM

## 2025-04-03 NOTE — ED PROVIDER NOTES
[facility] EMERGENCY DEPT  PHYSICIAN NOTE    MRN: [id number]    FINAL IMPRESSION     1. Pneumonia of right lung due to infectious organism, unspecified part of lung          ED COURSE & MDM     Patient presented for pleuritic right-sided chest pain and worsened ESPARZA.  Initial vital signs notable for tachycardia and borderline fever.  Patient does have a history of tachycardia for which he is on metoprolol, however I cannot find documentation regarding the etiology.  He is reporting worsened tachycardia from his baseline and this last week, and the fact that it did not respond fully to a double dose of metoprolol today is concerning.  This in addition to his increased oxygen requirement and active cancer status raises concern for PE.  CT was negative for PE, but does show extensive pneumonia in the right middle and lower lobes empiric antibiotics and blood cultures ordered, lactate is normal.  Due to his many lung problems and increased oxygen requirement, I believe he is a high risk for decompensation at home.  CT scan did note an area concerning for new neoplasm versus sequelae of pneumonia, and short interval repeat CT is recommended.  His EKG shows no ischemic changes and troponin is negative x 2, so my suspicion for cardiac etiology of his pain is very low.  Blood gas does not show significant CO2 retention, so do not believe acute exacerbation of his COPD is significantly contributing to his symptoms, despite his lung exam, however empiric Solu-Medrol was given prior to results coming back.  I discussed this with the patient and he voiced understanding and will follow-up with his oncologist.  Patient admitted for further management and observation.    Medications Administered During This ED Encounter  Medications   ceFEPIme (MAXIPIME) 2 g vial to attach to  mL bag for ADULTS or NS 50 mL bag for PEDS (has no administration in time range)   azithromycin (ZITHROMAX) 500 mg vial to attach to NS  250 mL bag (has no administration in time range)   azithromycin (ZITHROMAX) 250 mg in  mL intermittent infusion (has no administration in time range)   sodium chloride 0.9% BOLUS 1,593 mL (has no administration in time range)   calcium carbonate (TUMS) chewable tablet 1,000 mg (has no administration in time range)   iopamidol (ISOVUE-370) solution 64 mL (64 mLs Intravenous $Given 4/3/25 1231)   sodium chloride 0.9 % bag for CT scan flush (93 mLs Intravenous $Given 4/3/25 1231)   methylPREDNISolone Na Suc (solu-MEDROL) injection 125 mg (125 mg Intravenous $Given 4/3/25 1315)         ===================================================================    HPI     Luis Hernandez is a 59 year old male with relevant PMH significant for hypertension, COPD, lung cancer, and pulmonary hypertension presenting with pleuritic lateral right chest pain x 1 week.  Patient states the pain is typically worse in the morning and slowly lessens over the course of the day.  He has also had increased heart rate, up to 150, and today he took a double dose of his metoprolol.  He is also been having increased cough with production of yellow sputum, and dyspnea on exertion, which worsened today and was accompanied by dizziness that he has not had before.  He is on daily azithromycin and 10 mg of prednisone for his COPD, as well as 1.5 L of oxygen.  He has also had to increase his oxygen to 2 L this past week to maintain a sat in the low 90s.  He denies nausea, vomiting, abdominal pain, lower extremity pain/swelling, and fever.  No personal or family history of VTE.    I reviewed applicable documentation in the patient's chart including the oncology office visit note from 2/2/62025.    ROS  All other ROS negative.    Problem list, medications, allergies, PMH, PSH, family history, and social history reviewed and updated as able in Epic.      PHYSICAL EXAM     Vitals:    04/03/25 1339 04/03/25 1354 04/03/25 1409 04/03/25 1424   BP:        Pulse: 112 114 112 113   Resp: 12 29 (!) 99 17   Temp:       TempSrc:       SpO2: 95% 95% 95% 94%   Weight:       Height:            Constitutional: Alert, no acute distress.  CV: Tachycardic, regular rhythm. Peripheral pulses intact and symmetric.  Pulm: Non-labored respirations. Inspiratory breath sounds diminished diffusely. Expiratory wheezing diffusely but worse on the right.  Abdomen: Soft, non-tender.  MSK: No chest wall tenderness. No edema or calf tenderness.  Neuro: Oriented to person, place, and time. Normal speech. No focal deficits.  Skin: Warm and dry, no rash.      TESTING   All testing reviewed and independently interpreted.    EKG  Sinus tachycardia with PAC(s). No significant ST or T wave changes. Normal intervals. Similar to previous.     LABS  Labs Ordered and Resulted from Time of ED Arrival to Time of ED Departure   CBC WITH PLATELETS - Abnormal       Result Value    WBC Count 28.8 (*)     RBC Count 4.06 (*)     Hemoglobin 12.0 (*)     Hematocrit 38.2 (*)     MCV 94      MCH 29.6      MCHC 31.4 (*)     RDW 14.3      Platelet Count 717 (*)    BASIC METABOLIC PANEL - Abnormal    Sodium 130 (*)     Potassium 4.4      Chloride 92 (*)     Carbon Dioxide (CO2) 26      Anion Gap 12      Urea Nitrogen 27.3 (*)     Creatinine 0.92      GFR Estimate >90      Calcium 9.3      Glucose 92     HEPATIC FUNCTION PANEL - Abnormal    Protein Total 7.3      Albumin 3.2 (*)     Bilirubin Total 0.3      Alkaline Phosphatase 89      AST 53 (*)     ALT 71 (*)     Bilirubin Direct 0.11     BLOOD GAS VENOUS - Abnormal    pH Venous 7.39      pCO2 Venous 51 (*)     pO2 Venous 30      Bicarbonate Venous 31 (*)     Base Excess/Deficit Venous 4.6 (*)     FIO2 2      Oxyhemoglobin Venous 53 (*)     O2 Sat, Venous 54.3 (*)    TROPONIN T, HIGH SENSITIVITY - Normal    Troponin T, High Sensitivity 18     NT PROBNP INPATIENT - Normal    N terminal Pro BNP Inpatient 138     LACTIC ACID WHOLE BLOOD WITH 1X REPEAT IN 2 HR WHEN >2  - Normal    Lactic Acid, Initial 0.8     TROPONIN T, HIGH SENSITIVITY   BLOOD CULTURE   BLOOD CULTURE       IMAGING  CT Chest Pulmonary Embolism w Contrast   Final Result   IMPRESSION:   1.  No evidence for pulmonary embolism.   2.  Extensive consolidation in the right upper and middle lobes, suspicious for pneumonia.   3.  A 3.8 cm heterogeneous masslike opacity abutting the lower in the left upper lobe anteriorly and medially is new since the previous exam. This could also be related to pneumonia, however, neoplasm cannot be excluded. Short-term follow-up CT is    recommended to ensure resolution.   4.  A 0.8 cm indeterminate left upper lobe pulmonary nodule is also new since the previous exam.   5.  Severe emphysema.   6.  Previously noted right upper lobe pulmonary nodules are unchanged.                    Asp, MD Tyrell  04/03/25 1031

## 2025-04-03 NOTE — PROGRESS NOTES
"[facility] ADMISSION NOTE    Patient admitted to room 2311 at approximately 1830 via cart from emergency room. Patient was accompanied by transport tech.     Verbal SBAR report received from Sera FOSTER prior to patient arrival.     Patient ambulated to bed with stand-by assist. Patient alert and oriented X 3. The patient is not having any pain.  . Admission vital signs: Blood pressure 117/75, pulse 114, temperature 98.1  F (36.7  C), temperature source Oral, resp. rate 18, height 1.626 m (5' 4\"), weight 51.7 kg (113 lb 15.7 oz), SpO2 93%. Patient was oriented to plan of care, call light, bed controls, tv, telephone, bathroom, and visiting hours.     Risk Assessment    The following safety risks were identified during admission: none. Yellow risk band applied: NO.     Skin Initial Assessment    This writer admitted this patient and completed a full skin assessment and Troy score in the Adult PCS flowsheet.   Photo documentation of skin problem and/or wound competed via [employer] Notes application (located under Media):  N/A    Appropriate interventions initiated as needed.     Secondary skin check completed by Full assessment not completed- patient refused.         Education    Patient has a Chauncey to Observation order: No  Observation education completed and documented: N/A      Deb High RN      "

## 2025-04-03 NOTE — ED TRIAGE NOTES
"Pt stated sx have been \"going on for about a week\". Pt reports sx are \"coughing up yellow sputum, pain in lungs, increased oxygen needs, increased heart rate\". Pt has hx of COPD, lung cancer. Pt stated HR in AM was \"150 and then I took Metoprolol and it went down\". Pt reporting lightheadedness/dizziness. Denied N/V, chest pain and/or SOB      Triage Assessment (Adult)       Row Name 04/03/25 1208          Triage Assessment    Airway WDL WDL        Respiratory WDL    Respiratory WDL cough;X     Cough Frequency frequent     Cough Type productive        Skin Circulation/Temperature WDL    Skin Circulation/Temperature WDL WDL        Cardiac WDL    Cardiac WDL WDL        Peripheral/Neurovascular WDL    Peripheral Neurovascular WDL WDL        Cognitive/Neuro/Behavioral WDL    Cognitive/Neuro/Behavioral WDL WDL                     "

## 2025-04-03 NOTE — H&P
[facility]    History and Physical  Hospital Medicine       Date of Admission:  4/3/2025  Date of Service: 4/3/2025     Assessment & Plan   Luis Hernandez is a 59 year old male with past medical history of tobacco use, pulmonary hypertension, squamous cell carcinoma, emphysema, tobacco use in remission, anemia, hypertension now presents on 4/3/2025 with dyspnea, pleuritic pain. He is found to have pneumonia and possible COPD exacerbation and is being admitted for     Acute respiratory failure with hypoxia    Presents with 1wk ESPARZA, pleuritic chest pain. Had episode of dizziness with standing AM 4/3 so came to ER. Known history of severe COPD on 1.5L baseline, lung cancer currently on immunotherapy, previous pseudomonas & klebsiella pneumonia.    CT PE shows extensive consolidation in right upper & middle lobes suspicious for pneumonia; 3.8cm heterogeneous mass-like opacity abutting left upper lobe is new & could be inflammatory from pneumonia but mass cannot be ruled out (below); new left upper lobe nodule; severe emphysema.     Clinical picture most consistent with CAP. Likely with mild COPD exacerbation contributing & cannot rule out worsening metastasis.   - Continue oxygen via NC to maintain SPO2 88-94%  - Management of CAP, below  - Management of COPD, below    Pneumonia  Sepsis    Septic on presentation with increased O2 requirements from baseline. CT shows right upper & middle lobe consolidations suspicious for pneumonia. Chronic cough now with increased yellow sputum. Denies sick contacts. Known history of pseudomonas & klebsiella pneumonia April 2024.       Tachycardia (124bpm), tachypnea (29/min) & leukocytosis (WBC 28.8) are concerning for sepsis. Lactic acid WNL (0.8). VBG shows chronic compensated respiratory acidosis (pH 7.39, pCO2 51, bicarb 31).   - Continue cefepime 2g Q8hrs for CAP given history of pseudomonas   - Continue azithromycin 500mg IV Q24hrs   - IVF with  lactated ringer 100/hr continuous  - Blood cultures x2 4/3  NGTD  - CBC in AM   - Repeat lactic acid 1630     Emphysema, severe  Follows with [facility] for severe pan-lobar emphysema on 1.5L NC at baseline.   Managed PTA with roflumilast, Advair, Spiriva, albuterol inhaler, mucinex PRN, prednisone 10mg daily, and azithromycin 250mg daily.     Wheezy on exam with prolonged expiratory phase & apparent air-hunger. Likely COPD exacerbation contributing to acute respiratory failure.   - Prednisone 40mg every day  x4 days (total 5 day steroid burst)   - Albuterol neb Q2hrs PRN   - Ipratropium-albuterol neb four times daily   - Continue PTA roflumilast 500mcg daily   - HOLD PTA inhalers (Advair, spiriva, albuterol) during admission while receiving nebs  - Mucinex 1200mg scheduled BID     Squamous cell lung cancer, stage IIIB    Follows with [facility] oncology for non-operable SCC of R lung involving R mainstem s/p stent 1/18/24 & tumor debulking. S/p chemotherapy & now on maintenance pembrolizumab Q6wks; last dose 2/26/2025.     Imaging 4/3 shows new pulmonary masses as per below.   - Will need expedited oncology follow up for ongoing eval of new pulm nodules (below)    Lung nodules, new  CT PE study 4/3 shows 3.8cm heterogeneous masslike opacity abutting the left uppr lobe anteriorly & medially which is new since previous exam (1/2025). Could be related to pneumonia, however, neoplasm cannot be excluded. 0.8cm indeterminate left upper lobe pulmonary nodule also new since previous exam 1/2025.   - Follow up with oncology for ongoing monitoring & management decisions    Hyponatremia  Na 130 in ER. Likely 2/2 acute illness. Not clinically significant.     Transaminitis  Mild, acute. AST 53, ALT 71.  Could be related to sepsis, above.   - acetaminophen level ordered  - CMP in AM     Hypertension  Normotensive on presentation.   - Continue PTA lisinopril 40mg daily   - Continue PTA amlodipine 5mg daily   - Continue  PTA metoprolol tartrate 25mg BID    Pulmonary hypertension  Pulmonology notes likely pulmonary HTN with RVP 55mmHg plus RAP on 2019 ECHO, dilated RV but normal RV EF. Does not appear to follow regularly with cardiology.     Thrombocytosis  Acute. Platelets baseline ~350, platelets on presentation 717. Probably hemo-concentrated from acute infection, above.   - IVF as per sepsis, above  - CBC in AM     Anemia  Normocytic, chronic. Baseline hemoglobin 11.5-12.0, hemoglobin on presentation 12.0.   Previously though to be chemo-induced.     Tobacco abuse, in remission  Quit Aug 2021.     Clinically Significant Risk Factors Present on Admission         # Hyponatremia: Lowest Na = 130 mmol/L in last 2 days, will monitor as appropriate  # Hypochloremia: Lowest Cl = 92 mmol/L in last 2 days, will monitor as appropriate      # Hypoalbuminemia: Lowest albumin = 3.2 g/dL at 4/3/2025 12:10 PM, will monitor as appropriate     # Hypertension: Noted on problem list               # Financial/Environmental Concerns:    # COPD: noted on problem list         Diet:  low saturated fat  DVT Prophylaxis: Ambulate every shift  Miller Catheter: Not present  Code Status:  DNR, DNI  Lines: PIV    Disposition Plan   Medically Ready for Discharge: Anticipated in 2-4 Days     The patient's care was discussed with the Attending Physician, Dr. Seth Ortiz, bedside RN, and the patient .    Ashley Mariscal PA-C        Primary Care Physician   Shalini Washington [phone]    History is obtained from the patient, who is a decent historian, handoff from ER provider, and review of old records via the EMR.    History of Present Illness   Luis Hernandez is a 59 year old male with past medical history of tobacco use, pulmonary hypertension, squamous cell carcinoma, emphysema, tobacco use in remission, anemia, hypertension now presents on 4/3/2025 with dyspnea, pleuritic pain.     Reports frequent COPD exacerbations; using oxygen at home usually  1.5L. Says he often increases it to 2-3L when he is up walking around to keep his SPO2 >89%. Over past 1 week PTA he has had an increase in dyspnea with exertion & at rest. He's increased his O2 to 2L at rest. Cough is usually productive of white sputum, but in past week has had significantly increased sputum yellow in color without blood mostly in the morning. Denies fevers or chills. Denies emesis. Denies significant or chronic aspiration (though thinks he had an isolated event of aspirating water AM 4/2). Denies LE edema. Denies headaches. Denies orthopnea.     Presented to ER for duration of symptoms & felt dizzy AM 4/3 which is a progression of sxs from earlier. Says currently symptoms are different from previous COPD exacerbations.     Review of Systems    ROS: 10 point ROS neg other than the symptoms noted above in the HPI.    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)     COPD exacerbation (H) 04/01/2019    COPD exacerbation (H) 02/16/2020    Erectile dysfunction     Hypertension     Hyponatremia 04/01/2019    Pneumonia 04/10/2020     Past Surgical History   Past Surgical History:   Procedure Laterality Date    APPENDECTOMY      childhood    BRONCHOSCOPY, DILATE BRONCHUS, STENT BRONCHUS, COMBINED N/A 1/18/2024    Procedure: Bronchoscopy with tissue debulking,  and stent placement.;  Surgeon: Isac Prescott MD;  [facility]: [facility]    BRONCHOSCOPY, DILATE BRONCHUS, STENT BRONCHUS, COMBINED N/A 11/15/2024    Procedure: BRONCHOSCOPY, RIGID and flexible, stent removal;  Surgeon: Scout Wells MD;  Location: [facility]    ENDOBRONCHIAL ULTRASOUND FLEXIBLE N/A  1/18/2024    Procedure: Endobronchial ultrasound with Endobronchial and Cryo biopsy.;  Surgeon: Isac Prescott MD;  Location: [facility]      Prior to Admission Medications   Prior to Admission Medications   Prescriptions Last Dose Informant Patient Reported? Taking?   Oxymetazoline HCl (NASAL SPRAY NA)  Self Yes No   Sig: Spray in nostril as needed.   albuterol (PROAIR HFA/PROVENTIL HFA/VENTOLIN HFA) 108 (90 Base) MCG/ACT inhaler  Self No No   Sig: Inhale 2 puffs into the lungs every 4 hours as needed for shortness of breath   albuterol (PROVENTIL) (2.5 MG/3ML) 0.083% neb solution  Self No No   Sig: Take 1 vial (2.5 mg) by nebulization every 4 hours as needed for shortness of breath, wheezing or cough.   amLODIPine (NORVASC) 5 MG tablet   No No   Sig: TAKE 1 TABLET BY MOUTH DAILY   azithromycin (ZITHROMAX) 250 MG tablet  Self No No   Sig: Take 1 tablet (250 mg) by mouth daily.   fluticasone-salmeterol (ADVAIR) 500-50 MCG/ACT inhaler  Self No No   Sig: Inhale 1 puff into the lungs every 12 hours.   guaiFENesin (MUCINEX) 600 MG 12 hr tablet  Self Yes No   Sig: Take 600 mg by mouth 2 times daily.   ibuprofen (ADVIL/MOTRIN) 200 MG tablet  Self Yes No   Sig: Take 3 tablets by mouth every 6 hours as needed for pain.   lisinopril (ZESTRIL) 40 MG tablet   No No   Sig: Take 1 tablet (40 mg) by mouth daily.   metoprolol tartrate (LOPRESSOR) 25 MG tablet  Self No No   Sig: Take 1 tablet (25 mg) by mouth 2 times daily   order for DME  Self No No   Sig: Equipment being ordered: Nebulizer   order for DME  Self No No   Sig: Equipment being ordered: Oxygen 3L via NC continuous.  O2 saturated noted to be 86% on room air at rest.   predniSONE (DELTASONE) 10 MG tablet   No No   Sig: Take 1 tablet (10 mg) by mouth daily.   roflumilast (DALIRESP) 500 MCG TABS tablet   No No   Sig: Take 1 tablet (500 mcg) by mouth daily.   sodium chloride 0.9 % neb solution   No No   Sig: Take 3 mLs by nebulization 2 times daily as needed for wheezing.  "  tiotropium (SPIRIVA RESPIMAT) 2.5 MCG/ACT inhaler   No No   Sig: Inhale 2 puffs into the lungs daily.      Facility-Administered Medications: None     Allergies   Allergies   Allergen Reactions    Hctz Other (See Comments)     He has hyponatremia - avoid use    Penicillins Unknown     Childhood reaction.     Family History    Family History   Problem Relation Age of Onset    Hypertension Mother     Ovarian Cancer Mother     Breast Cancer Mother     Hypertension Father     Lipids Father     C.A.D. Father     Heart Disease Father     Chronic Obstructive Pulmonary Disease Father     Cerebrovascular Disease Father     Diabetes Paternal Grandmother     Chronic Obstructive Pulmonary Disease Paternal Grandfather      Social History   Social History     Socioeconomic History    Marital status: Single     Physical Exam   /79   Pulse 113   Temp 100  F (37.8  C) (Oral)   Resp 17   Ht 1.626 m (5' 4\")   Wt 53.1 kg (117 lb)   SpO2 94%   BMI 20.08 kg/m       Weight: 117 lbs 0 oz Body mass index is 20.08 kg/m .     Constitutional: Alert, oriented, cooperative, appears acutely uncomfortable but nontoxic   Eyes: Eyes are clear  HENT: Oropharynx is clear and moist. Tongue midline. No evidence of cranial trauma.  Cardiovascular: Regular rhythm, tachycardic, otherwise normal S1 and S2, and no murmur noted. No JVD. Radial pulse 2+. No pitting lower extremity edema.  Respiratory: Prolonged expiratory phase with some apparent air hunger. Inspiratory & expiratory wheezing worse in right lung but present bilaterally throughout. Significant mucus & rhonchi bilaterally inspiratory & expiratory. Crackles in left lung base & right middle lung fields.   GI: Soft, non-tender, normal bowel sounds, non-distended.   Genitourinary: Deferred  Musculoskeletal: Normal muscle bulk, no obvious joint deformities.   Skin: Warm and dry, no rashes  on exposed skin areas.   Neurologic: Neck supple.   is symmetric. Alert & interacting " appropriately. Trace tremor bilateral hands.     Data   Data reviewed today:     I have personally reviewed the following data over the past 24 hrs:    28.8 (H)  \   12.0 (L)   / 717 (H)     130 (L) 92 (L) 27.3 (H) /  92   4.4 26 0.92 \     ALT: 71 (H) AST: 53 (H) AP: 89 TBILI: 0.3   ALB: 3.2 (L) TOT PROTEIN: 7.3 LIPASE: N/A     Trop: 18 BNP: 138       Recent Results (from the past 24 hours)   CT Chest Pulmonary Embolism w Contrast    Narrative    EXAM: CT CHEST PULMONARY EMBOLISM WITH CONTRAST  LOCATION: [facility] [place]  DATE: 04/03/2025    INDICATION: Right pleuritic chest pain.  COMPARISON: Chest CT 01/13/2025.  TECHNIQUE: CT chest pulmonary angiogram during arterial phase injection of IV contrast. Multiplanar reformats and MIP reconstructions were performed. Dose reduction techniques were used.   CONTRAST: 64 mL Isovue 370.    FINDINGS:  ANGIOGRAM CHEST: Pulmonary arteries are normal caliber and negative for pulmonary emboli. Thoracic aorta is negative for dissection. No evidence for thoracic aortic aneurysm. Mild atherosclerotic calcification of the thoracic aorta.    LUNGS AND PLEURA: Severe upper lung predominant emphysematous change. Extensive consolidation in the right upper and middle lobes, suspicious for pneumonia. A 3.8 x 3 cm heterogeneous masslike opacity abutting the pleura in the left upper lobe anteriorly   and medially (series 5 image 46) is new since the previous exam. Mild consolidation is also noted inferior to this masslike opacity. A 0.8 cm left upper lobe nodule (series 6 image 210) is new since the previous exam. Previously noted right upper lobe   pulmonary nodules are unchanged, with the largest measuring 1.6 x 1.2 cm (series 6 image 85). Right upper lobe calcified granuloma is unchanged. No pleural effusions.    MEDIASTINUM/AXILLAE: No enlarged lymph nodes in the chest. No pericardial effusion.    CORONARY ARTERY CALCIFICATION: Moderate.    UPPER ABDOMEN: Small  right renal cyst would require no specific follow-up. Small hiatal hernia.    MUSCULOSKELETAL: Unremarkable.      Impression    IMPRESSION:  1.  No evidence for pulmonary embolism.  2.  Extensive consolidation in the right upper and middle lobes, suspicious for pneumonia.  3.  A 3.8 cm heterogeneous masslike opacity abutting the lower in the left upper lobe anteriorly and medially is new since the previous exam. This could also be related to pneumonia, however, neoplasm cannot be excluded. Short-term follow-up CT is   recommended to ensure resolution.  4.  A 0.8 cm indeterminate left upper lobe pulmonary nodule is also new since the previous exam.  5.  Severe emphysema.  6.  Previously noted right upper lobe pulmonary nodules are unchanged.

## 2025-04-04 ENCOUNTER — APPOINTMENT (OUTPATIENT)
Dept: GENERAL RADIOLOGY | Facility: CLINIC | Age: 60
End: 2025-04-04
Attending: INTERNAL MEDICINE
Payer: COMMERCIAL

## 2025-04-04 ENCOUNTER — APPOINTMENT (OUTPATIENT)
Dept: GENERAL RADIOLOGY | Facility: CLINIC | Age: 60
End: 2025-04-04
Payer: COMMERCIAL

## 2025-04-04 LAB
ALBUMIN SERPL BCG-MCNC: 2.8 G/DL (ref 3.5–5.2)
ALP SERPL-CCNC: 89 U/L (ref 40–150)
ALT SERPL W P-5'-P-CCNC: 45 U/L (ref 0–70)
ANION GAP SERPL CALCULATED.3IONS-SCNC: 12 MMOL/L (ref 7–15)
AST SERPL W P-5'-P-CCNC: 20 U/L (ref 0–45)
ATRIAL RATE - MUSE: 179 BPM
BASE EXCESS BLDV CALC-SCNC: -6.9 MMOL/L (ref -3–3)
BILIRUB SERPL-MCNC: <0.2 MG/DL
BUN SERPL-MCNC: 21.8 MG/DL (ref 6–20)
CALCIUM SERPL-MCNC: 8.5 MG/DL (ref 8.8–10.4)
CHLORIDE SERPL-SCNC: 99 MMOL/L (ref 98–107)
CREAT SERPL-MCNC: 0.6 MG/DL (ref 0.67–1.17)
DIASTOLIC BLOOD PRESSURE - MUSE: NORMAL MMHG
EGFRCR SERPLBLD CKD-EPI 2021: >90 ML/MIN/1.73M2
ERYTHROCYTE [DISTWIDTH] IN BLOOD BY AUTOMATED COUNT: 14.5 % (ref 10–15)
FLUAV RNA SPEC QL NAA+PROBE: NEGATIVE
FLUBV RNA RESP QL NAA+PROBE: NEGATIVE
GLUCOSE BLDC GLUCOMTR-MCNC: 119 MG/DL (ref 70–99)
GLUCOSE BLDC GLUCOMTR-MCNC: 125 MG/DL (ref 70–99)
GLUCOSE BLDC GLUCOMTR-MCNC: 135 MG/DL (ref 70–99)
GLUCOSE SERPL-MCNC: 108 MG/DL (ref 70–99)
HCO3 BLDV-SCNC: 24 MMOL/L (ref 21–28)
HCO3 SERPL-SCNC: 22 MMOL/L (ref 22–29)
HCT VFR BLD AUTO: 32.9 % (ref 40–53)
HGB BLD-MCNC: 10 G/DL (ref 13.3–17.7)
HOLD SPECIMEN: NORMAL
INTERPRETATION ECG - MUSE: NORMAL
LACTATE SERPL-SCNC: 3.1 MMOL/L (ref 0.7–2)
MCH RBC QN AUTO: 29.1 PG (ref 26.5–33)
MCHC RBC AUTO-ENTMCNC: 30.4 G/DL (ref 31.5–36.5)
MCV RBC AUTO: 96 FL (ref 78–100)
MRSA DNA SPEC QL NAA+PROBE: NEGATIVE
O2/TOTAL GAS SETTING VFR VENT: 100 %
OXYHGB MFR BLDV: 57 % (ref 70–75)
P AXIS - MUSE: 97 DEGREES
PCO2 BLDV: 75 MM HG (ref 40–50)
PH BLDV: 7.11 [PH] (ref 7.32–7.43)
PLATELET # BLD AUTO: 627 10E3/UL (ref 150–450)
PO2 BLDV: 41 MM HG (ref 25–47)
POTASSIUM SERPL-SCNC: 4.4 MMOL/L (ref 3.4–5.3)
PR INTERVAL - MUSE: 80 MS
PROCALCITONIN SERPL IA-MCNC: 0.34 NG/ML
PROT SERPL-MCNC: 6.2 G/DL (ref 6.4–8.3)
QRS DURATION - MUSE: 82 MS
QT - MUSE: 228 MS
QTC - MUSE: 393 MS
R AXIS - MUSE: 108 DEGREES
RBC # BLD AUTO: 3.44 10E6/UL (ref 4.4–5.9)
RSV RNA SPEC NAA+PROBE: NEGATIVE
SA TARGET DNA: POSITIVE
SAO2 % BLDV: 58.3 % (ref 70–75)
SARS-COV-2 RNA RESP QL NAA+PROBE: NEGATIVE
SODIUM SERPL-SCNC: 133 MMOL/L (ref 135–145)
SYSTOLIC BLOOD PRESSURE - MUSE: NORMAL MMHG
T AXIS - MUSE: 40 DEGREES
VENTRICULAR RATE- MUSE: 179 BPM
WBC # BLD AUTO: 27.2 10E3/UL (ref 4–11)

## 2025-04-04 PROCEDURE — 258N000003 HC RX IP 258 OP 636

## 2025-04-04 PROCEDURE — 250N000011 HC RX IP 250 OP 636: Performed by: INTERNAL MEDICINE

## 2025-04-04 PROCEDURE — 87640 STAPH A DNA AMP PROBE: CPT | Performed by: INTERNAL MEDICINE

## 2025-04-04 PROCEDURE — 0W9930Z DRAINAGE OF RIGHT PLEURAL CAVITY WITH DRAINAGE DEVICE, PERCUTANEOUS APPROACH: ICD-10-PCS | Performed by: SURGERY

## 2025-04-04 PROCEDURE — 999N000185 HC STATISTIC TRANSPORT TIME EA 15 MIN

## 2025-04-04 PROCEDURE — 999N000157 HC STATISTIC RCP TIME EA 10 MIN

## 2025-04-04 PROCEDURE — 83605 ASSAY OF LACTIC ACID: CPT

## 2025-04-04 PROCEDURE — 82805 BLOOD GASES W/O2 SATURATION: CPT

## 2025-04-04 PROCEDURE — 84145 PROCALCITONIN (PCT): CPT | Performed by: INTERNAL MEDICINE

## 2025-04-04 PROCEDURE — 99232 SBSQ HOSP IP/OBS MODERATE 35: CPT | Performed by: INTERNAL MEDICINE

## 2025-04-04 PROCEDURE — 94640 AIRWAY INHALATION TREATMENT: CPT | Mod: 76

## 2025-04-04 PROCEDURE — 250N000011 HC RX IP 250 OP 636: Mod: JZ

## 2025-04-04 PROCEDURE — 250N000012 HC RX MED GY IP 250 OP 636 PS 637

## 2025-04-04 PROCEDURE — 71045 X-RAY EXAM CHEST 1 VIEW: CPT

## 2025-04-04 PROCEDURE — 94660 CPAP INITIATION&MGMT: CPT

## 2025-04-04 PROCEDURE — 93005 ELECTROCARDIOGRAM TRACING: CPT

## 2025-04-04 PROCEDURE — 87637 SARSCOV2&INF A&B&RSV AMP PRB: CPT

## 2025-04-04 PROCEDURE — 258N000003 HC RX IP 258 OP 636: Performed by: INTERNAL MEDICINE

## 2025-04-04 PROCEDURE — 85027 COMPLETE CBC AUTOMATED: CPT

## 2025-04-04 PROCEDURE — 250N000013 HC RX MED GY IP 250 OP 250 PS 637: Performed by: INTERNAL MEDICINE

## 2025-04-04 PROCEDURE — 250N000013 HC RX MED GY IP 250 OP 250 PS 637

## 2025-04-04 PROCEDURE — 36415 COLL VENOUS BLD VENIPUNCTURE: CPT

## 2025-04-04 PROCEDURE — 5A09357 ASSISTANCE WITH RESPIRATORY VENTILATION, LESS THAN 24 CONSECUTIVE HOURS, CONTINUOUS POSITIVE AIRWAY PRESSURE: ICD-10-PCS | Performed by: INTERNAL MEDICINE

## 2025-04-04 PROCEDURE — 93010 ELECTROCARDIOGRAM REPORT: CPT | Performed by: INTERNAL MEDICINE

## 2025-04-04 PROCEDURE — 250N000009 HC RX 250: Performed by: FAMILY MEDICINE

## 2025-04-04 PROCEDURE — 200N000001 HC R&B ICU

## 2025-04-04 PROCEDURE — 80053 COMPREHEN METABOLIC PANEL: CPT

## 2025-04-04 PROCEDURE — 999N000065 XR CHEST PORT 1 VIEW

## 2025-04-04 RX ORDER — CARBOXYMETHYLCELLULOSE SODIUM 5 MG/ML
1 SOLUTION/ DROPS OPHTHALMIC
Status: DISCONTINUED | OUTPATIENT
Start: 2025-04-04 | End: 2025-04-07

## 2025-04-04 RX ORDER — OXYCODONE HYDROCHLORIDE 5 MG/1
5 TABLET ORAL EVERY 4 HOURS PRN
Status: DISCONTINUED | OUTPATIENT
Start: 2025-04-04 | End: 2025-04-06

## 2025-04-04 RX ORDER — NALOXONE HYDROCHLORIDE 0.4 MG/ML
0.4 INJECTION, SOLUTION INTRAMUSCULAR; INTRAVENOUS; SUBCUTANEOUS
Status: DISCONTINUED | OUTPATIENT
Start: 2025-04-04 | End: 2025-04-10 | Stop reason: HOSPADM

## 2025-04-04 RX ORDER — NALOXONE HYDROCHLORIDE 0.4 MG/ML
0.2 INJECTION, SOLUTION INTRAMUSCULAR; INTRAVENOUS; SUBCUTANEOUS
Status: DISCONTINUED | OUTPATIENT
Start: 2025-04-04 | End: 2025-04-10 | Stop reason: HOSPADM

## 2025-04-04 RX ORDER — IPRATROPIUM BROMIDE AND ALBUTEROL SULFATE 2.5; .5 MG/3ML; MG/3ML
3 SOLUTION RESPIRATORY (INHALATION)
Status: ACTIVE | OUTPATIENT
Start: 2025-04-04

## 2025-04-04 RX ORDER — SODIUM CHLORIDE FOR INHALATION 3 %
3 VIAL, NEBULIZER (ML) INHALATION
Status: DISCONTINUED | OUTPATIENT
Start: 2025-04-04 | End: 2025-04-07

## 2025-04-04 RX ORDER — PIPERACILLIN SODIUM, TAZOBACTAM SODIUM 4; .5 G/20ML; G/20ML
4.5 INJECTION, POWDER, LYOPHILIZED, FOR SOLUTION INTRAVENOUS EVERY 6 HOURS
Status: DISCONTINUED | OUTPATIENT
Start: 2025-04-04 | End: 2025-04-07

## 2025-04-04 RX ORDER — METOPROLOL TARTRATE 25 MG/1
25 TABLET, FILM COATED ORAL ONCE
Status: COMPLETED | OUTPATIENT
Start: 2025-04-04 | End: 2025-04-04

## 2025-04-04 RX ORDER — ACETAMINOPHEN 325 MG/1
650 TABLET ORAL EVERY 4 HOURS PRN
Status: DISCONTINUED | OUTPATIENT
Start: 2025-04-04 | End: 2025-04-10 | Stop reason: HOSPADM

## 2025-04-04 RX ADMIN — AZITHROMYCIN MONOHYDRATE 500 MG: 500 INJECTION, POWDER, LYOPHILIZED, FOR SOLUTION INTRAVENOUS at 15:11

## 2025-04-04 RX ADMIN — GUAIFENESIN 1200 MG: 600 TABLET ORAL at 17:23

## 2025-04-04 RX ADMIN — IPRATROPIUM BROMIDE AND ALBUTEROL SULFATE 3 ML: .5; 3 SOLUTION RESPIRATORY (INHALATION) at 06:51

## 2025-04-04 RX ADMIN — METOPROLOL TARTRATE 25 MG: 25 TABLET, FILM COATED ORAL at 08:12

## 2025-04-04 RX ADMIN — METOPROLOL TARTRATE 25 MG: 25 TABLET, FILM COATED ORAL at 06:30

## 2025-04-04 RX ADMIN — AMLODIPINE BESYLATE 5 MG: 5 TABLET ORAL at 08:13

## 2025-04-04 RX ADMIN — OXYCODONE 5 MG: 5 TABLET ORAL at 20:21

## 2025-04-04 RX ADMIN — OXYCODONE 5 MG: 5 TABLET ORAL at 10:16

## 2025-04-04 RX ADMIN — GUAIFENESIN 1200 MG: 600 TABLET ORAL at 08:12

## 2025-04-04 RX ADMIN — IPRATROPIUM BROMIDE AND ALBUTEROL SULFATE 3 ML: .5; 3 SOLUTION RESPIRATORY (INHALATION) at 06:50

## 2025-04-04 RX ADMIN — SODIUM CHLORIDE 1000 ML: 0.9 INJECTION, SOLUTION INTRAVENOUS at 07:52

## 2025-04-04 RX ADMIN — ROFLUMILAST 500 MCG: 500 TABLET ORAL at 09:19

## 2025-04-04 RX ADMIN — ALBUTEROL SULFATE 2 PUFF: 90 INHALANT RESPIRATORY (INHALATION) at 05:00

## 2025-04-04 RX ADMIN — SODIUM CHLORIDE 1000 ML: 0.9 INJECTION, SOLUTION INTRAVENOUS at 09:28

## 2025-04-04 RX ADMIN — CEFEPIME 2 G: 2 INJECTION, POWDER, FOR SOLUTION INTRAVENOUS at 06:13

## 2025-04-04 RX ADMIN — LISINOPRIL 40 MG: 40 TABLET ORAL at 08:13

## 2025-04-04 RX ADMIN — ALBUTEROL SULFATE 2 PUFF: 90 INHALANT RESPIRATORY (INHALATION) at 12:30

## 2025-04-04 RX ADMIN — ACETAMINOPHEN 650 MG: 325 TABLET ORAL at 10:16

## 2025-04-04 RX ADMIN — OXYCODONE 5 MG: 5 TABLET ORAL at 14:21

## 2025-04-04 RX ADMIN — PREDNISONE 40 MG: 20 TABLET ORAL at 08:12

## 2025-04-04 RX ADMIN — PIPERACILLIN AND TAZOBACTAM 4.5 G: 4; .5 INJECTION, POWDER, FOR SOLUTION INTRAVENOUS; PARENTERAL at 17:23

## 2025-04-04 RX ADMIN — METOPROLOL TARTRATE 25 MG: 25 TABLET, FILM COATED ORAL at 17:23

## 2025-04-04 RX ADMIN — PIPERACILLIN AND TAZOBACTAM 4.5 G: 4; .5 INJECTION, POWDER, FOR SOLUTION INTRAVENOUS; PARENTERAL at 11:23

## 2025-04-04 RX ADMIN — FLUTICASONE FUROATE AND VILANTEROL TRIFENATATE 1 PUFF: 200; 25 POWDER RESPIRATORY (INHALATION) at 09:19

## 2025-04-04 ASSESSMENT — ACTIVITIES OF DAILY LIVING (ADL)
ADLS_ACUITY_SCORE: 32
ADLS_ACUITY_SCORE: 33
ADLS_ACUITY_SCORE: 33
ADLS_ACUITY_SCORE: 32
ADLS_ACUITY_SCORE: 33
ADLS_ACUITY_SCORE: 33
ADLS_ACUITY_SCORE: 32
ADLS_ACUITY_SCORE: 33
ADLS_ACUITY_SCORE: 32
ADLS_ACUITY_SCORE: 32
ADLS_ACUITY_SCORE: 33
ADLS_ACUITY_SCORE: 32
ADLS_ACUITY_SCORE: 33
ADLS_ACUITY_SCORE: 32
ADLS_ACUITY_SCORE: 33
ADLS_ACUITY_SCORE: 32
ADLS_ACUITY_SCORE: 32
ADLS_ACUITY_SCORE: 33
ADLS_ACUITY_SCORE: 33
ADLS_ACUITY_SCORE: 32
ADLS_ACUITY_SCORE: 33

## 2025-04-04 NOTE — PLAN OF CARE
Goal Outcome Evaluation:      Plan of Care Reviewed With: patient    Overall Patient Progress: improvingOverall Patient Progress: improving    Outcome Evaluation: Admit with pneumonia; BC pending. Transfer to ICU this morning due to increased work of breathing. Chest tube placed with quick improvement to respiratory status. Currently on nasal cannula. He wears 1.5L home O2 at baseline.      Problem: Pneumonia  Goal: Effective Oxygenation and Ventilation  Intervention: Promote Airway Secretion Clearance  Recent Flowsheet Documentation  Taken 4/4/2025 1200 by Viridiana Benoit RN  Cough And Deep Breathing: done with encouragement  Activity Management:   activity adjusted per tolerance   up ad emilee  Taken 4/4/2025 1000 by Viridiana Benoit RN  Cough And Deep Breathing: done with encouragement  Activity Management:   activity adjusted per tolerance   up ad emilee  Taken 4/4/2025 0717 by Viridiana Benoit RN  Activity Management: bedrest      Problem: Pneumonia  Goal: Effective Oxygenation and Ventilation  Intervention: Optimize Oxygenation and Ventilation  Recent Flowsheet Documentation  Taken 4/4/2025 0717 by Viridiana Benoit RN  Airway/Ventilation Management:   airway patency maintained   oxygen therapy provided   pulmonary hygiene promoted   position adjusted

## 2025-04-04 NOTE — PROGRESS NOTES
End Of Shift Note    Situation: Admitted due to pneumonia with history of lung cancer, currently on keytruda treatments    Plan: Monitor respiratory status after chest tube placement today. Weaned from bipap to 2L NC this shift.     Subjective/Objective:    Neuro: WDL    Cardiac: ST on monitor, additional dose metoprolol given this morning. Per report, his baseline is approx 100 bpm.    Resp: wheezing heart throughout lungs bilaterally. Better air movement after chest tube placement to right side.     GI/: WDL    Endo: ICU protocol blood sugars being monitored and stable    MSK: WDL; SBA to use urinal due to equipment and to prevent tugging on chest tube    Skin: WDL    LDAs: PIV saline locked

## 2025-04-04 NOTE — PROVIDER NOTIFICATION
I was paged regarding heart rate 180-190's.     Patient recently transferred to ICU for increased work of breathing & hypoxia.     Since starting BiPAP, patient's breathing feels unchanged.   Denies chest pain, heaviness, pressure.     Appears to be sinus tachycardia on telemetry. BP stable.   Already received metoprolol tartrate 25mg this AM.     Concern for worsening sepsis. Hesitant to aggressively beta block since it could impair sepsis response.   ECHO 4/2024 LVEF 65-67%.     - EKG STAT  - 1L normal saline over 1 hr  - Additional 1L normal saline over 2hrs after initial 1L bolus  - Metoprolol tartrate 25mg PO one time (to total 50mg PO this AM)  - Ongoing telemetry (ordered)    - Can consider low-dose oxycodone for air hunger? Will focus on HR for now but consider for ongoing air hunger even on BiPAP.   - Can consider adding med for anxiety? Related to air hunger?     Discussed with attending physician Dr. Amy Montes.     Ashley Mariscal PA-C

## 2025-04-04 NOTE — PROGRESS NOTES
Chest tube placed per surgeon with immediate relief of breathing issues. Removing bipap at this time. Sats and HR improved.

## 2025-04-04 NOTE — PROCEDURES
After informed consent, right chest was prepped and draped in sterile fashion.    Space selected at nipple line, anterior axillary line.  Local instilled.  Needle used to aspirate air on initial pass over top of rib.  Wire inserted.  Tract sequentially dilated.  Pigtail placed and wire removed.  Prompt return of air and improvement in oxygenation.  Chest tube hooked to pleuravac.  Secured in place with 2x sutures in purse string fashion.  Occlusive dressing applied.    He tolerated procedure well with prompt improvement in oxygenation, SOB.    Mike Rodriguez DO on 4/4/2025 at 9:09 AM

## 2025-04-04 NOTE — PROVIDER NOTIFICATION
I was notified about significantly worsening respiratory status. Patient is having gasping breaths, significant mucus but unable to clear. O2 saturations mid 80's on 10L oxymask.     Lung sounds rhonchorus with significant mucus in proximal fields, diminished air movement throughout peripherally. Appears acutely uncomfortable.     Refusing nebs. Previously refused chest physiotherapy / volera at time of admission. On admission discussed DNR/DNI but patient would be ok with BiPAP.     Concern for ongoing / worsening mucus plugging.     - Transfer to ICU for BiPAP  - Continuous BiPAP  - Saline neb ordered   - CXR 1 view STAT  - VBG STAT  - Will discuss importance of nebs and / or mucus clearance with physiotherapy with patient once on BiPAP  - Hold IVF in case of new volume overload (though does not sound crackly)    Ashley Mariscal PA-C

## 2025-04-04 NOTE — PROGRESS NOTES
0625 Patient called stating that they suddenly felt like they couldn't breath. Resp status in mid 80s, gasping for air. Oxygen turned up,switched from NC to oxy mask 10LPM. Given metoprolol for high heart rate in 110-120s. Charge and provider notified  Respiratory called. Emotional support was provided to calm patient down and help breathe. It was then decided that the Patient was to be put on BiPAP, transferred down to ICU. Report given to ICU RN upon transfer.

## 2025-04-04 NOTE — PROGRESS NOTES
Pt transferred to ICU without incident. Bipap settings increased to 20/10 in response to VBG at 7.11/75/24

## 2025-04-04 NOTE — PROGRESS NOTES
[facility] Medicine Progress Note  Date of Service (when I saw the patient): 04/04/2025    REASON FOR ADMISSION / INTERVAL HISTORY:  Luis Hernandez is a 59 year old male with past medical history of tobacco use in remission, pulmonary hypertension, squamous cell carcinoma, emphysema,  anemia, hypertension admitted on 4/3/2025 with dyspnea, pleuritic pain   Pt was very SOB and in resp distress this morning. See details below.     Assessment/ Plan     Community acquired Pneumonia  Severe Sepsis   Presented with 1wk ESPARZA, pleuritic chest pain  Septic on presentation with increased O2 requirements from baseline. CT shows right upper & middle lobe consolidations suspicious for pneumonia. Chronic cough now with increased yellow sputum. Denies sick contacts. Known history of pseudomonas & klebsiella pneumonia April 2024. Was tachycardic (124bpm), tachypneic (29/min) & leukocytosis (WBC 28.8) Lactic acid WNL (0.8). VBG shows chronic compensated respiratory acidosis (pH 7.39, pCO2 51, bicarb 31). Influenza/ covid / RSV negative  Was started on cefepime 2g Q8hrs for CAP given history of pseudomonas/ azithromycin 500mg IV Q24hrs /IVF with lactated ringer 100/hr continuous.   Pt decompensated early this AM-acute resp distress. HR in 190s and increased work of breathing. LA was 3.1 and WBC still 27k.  Stat CXR showed large pneumo tx R side. Surgery consulted and pt had stat chest tube placed with immediate, significant relief. Pt was tx to ICU for BIPAP  HR down to 90s after chest tube placed-pt did receive one extra dose of metoprolol 25mg too.  O2 sats now stable on 2LNC  Due to intermediate  resistance of pseudomonas to cefepime, this is changed to zosyn.Blood cultures x2 on 4/3  NGTD.   -continue zosyn/ zithromax/ iv fluids. Continue o2 NC-wean as tolerated. Follow PCT     Emphysema, severe with acute exacerbation  Follows with [place] pulmonology for severe pan-lobar emphysema on 1.5L  NC at baseline.   Managed PTA with roflumilast, Advair, Spiriva, albuterol inhaler, mucinex PRN, prednisone 10mg daily, and azithromycin 250mg daily.   Wheezy on exam with prolonged expiratory phase & apparent air-hunger. Was started on prednisone 40mg/day and nebs.  Still SOB/ wheezing  -continue prednisone     Acute respiratory failure with hypoxia  Acute resp distress  2ndry to above.   Rx as above.      Squamous cell lung cancer, stage IIIB    Follows with [place] oncology for non-operable SCC of R lung involving R mainstem s/p stent 1/18/24 & tumor debulking. S/p chemotherapy & now on maintenance pembrolizumab Q6wks; last dose 2/26/2025.     Imaging 4/3 shows new pulmonary masses as per below.   - Will need expedited oncology follow up for ongoing eval of new pulm nodules (below)     Lung nodules, new  CT PE study 4/3 shows 3.8cm heterogeneous masslike opacity abutting the left uppr lobe anteriorly & medially which is new since previous exam (1/2025). Could be related to pneumonia, however, neoplasm cannot be excluded. 0.8cm indeterminate left upper lobe pulmonary nodule also new since previous exam 1/2025.   - Follow up with oncology for ongoing monitoring & management decisions     Hyponatremia  Na 130 in ER. Likely 2/2 acute illness. Not clinically significant.   Improving      Transaminitis  Mild, acute. AST 53, ALT 71.  Could be related to sepsis, above.   Improved.      Hypertension  Normotensive on presentation.   - Continue PTA lisinopril 40mg daily   - Continue PTA amlodipine 5mg daily   - Continue PTA metoprolol tartrate 25mg BID     Pulmonary hypertension  Pulmonology notes likely pulmonary HTN with RVP 55mmHg plus RAP on 2019 ECHO, dilated RV but normal RV EF. Does not appear to follow regularly with cardiology.      Thrombocytosis  Acute. Platelets baseline ~350, platelets on presentation 717. Probably hemo-concentrated from acute infection, above.   Rx as above and follow     Anemia  Normocytic,  "chronic. Baseline hemoglobin 11.5-12.0, hemoglobin on presentation 12.0.   Previously though to be chemo-induced.      Tobacco abuse, in remission  Quit Aug 2021.        Diet: Regular Diet Adult    DVT Prophylaxis: Ambulate every shift  Millre Catheter: Not present  Code Status: No CPR- Do NOT Intubate      Medically Ready for Discharge: Anticipated in 2-4 Days        CANDICE HARRIS MD   Pg [phone]        ROS:  As described in A/P and Exam.  Otherwise ALL are  negative.    PHYSICAL EXAM:  All vitals have been reviewed    Blood pressure 111/85, pulse 93, temperature 98.4  F (36.9  C), temperature source Oral, resp. rate 13, height 1.626 m (5' 4\"), weight 51.7 kg (113 lb 15.7 oz), SpO2 94%.    No intake/output data recorded.    GENERAL APPEARANCE: healthy, alert and in mod resp distress  EYES: conjunctiva clear, eyes grossly normal  HENT: external ears and nose normal   RESP: lungs-decreased BS R lung - no rales, rhonchi or wheezes  CV: regular rate and rhythm, normal S1 S2, no S3 or S4 and no murmur, click or rub   ABDOMEN: soft, nontender, no HSM or masses and bowel sounds normal  MS: no clubbing, cyanosis; no edema  SKIN: clear without significant rashes or lesions  NEURO: -non-focal moves all 4 extr    ROUTINE  LABS (Last four results)  CMP  Recent Labs   Lab 04/04/25  1123 04/04/25  0532 04/03/25  1210   NA  --  133* 130*   POTASSIUM  --  4.4 4.4   CHLORIDE  --  99 92*   CO2  --  22 26   ANIONGAP  --  12 12   * 108* 92   BUN  --  21.8* 27.3*   CR  --  0.60* 0.92   GFRESTIMATED  --  >90 >90   MIKE  --  8.5* 9.3   PROTTOTAL  --  6.2* 7.3   ALBUMIN  --  2.8* 3.2*   BILITOTAL  --  <0.2 0.3   ALKPHOS  --  89 89   AST  --  20 53*   ALT  --  45 71*     CBC  Recent Labs   Lab 04/04/25  0532 04/03/25  1210   WBC 27.2* 28.8*   RBC 3.44* 4.06*   HGB 10.0* 12.0*   HCT 32.9* 38.2*   MCV 96 94   MCH 29.1 29.6   MCHC 30.4* 31.4*   RDW 14.5 14.3   * 717*     INRNo lab results found in last 7 days.  Arterial Blood " Gas  Recent Labs   Lab 04/04/25  0700 04/03/25  1223   O2PER 100 2       Recent Results (from the past 24 hours)   CT Chest Pulmonary Embolism w Contrast    Narrative    EXAM: CT CHEST PULMONARY EMBOLISM WITH CONTRAST  LOCATION: [facility]  DATE: 04/03/2025    INDICATION: Right pleuritic chest pain.  COMPARISON: Chest CT 01/13/2025.  TECHNIQUE: CT chest pulmonary angiogram during arterial phase injection of IV contrast. Multiplanar reformats and MIP reconstructions were performed. Dose reduction techniques were used.   CONTRAST: 64 mL Isovue 370.    FINDINGS:  ANGIOGRAM CHEST: Pulmonary arteries are normal caliber and negative for pulmonary emboli. Thoracic aorta is negative for dissection. No evidence for thoracic aortic aneurysm. Mild atherosclerotic calcification of the thoracic aorta.    LUNGS AND PLEURA: Severe upper lung predominant emphysematous change. Extensive consolidation in the right upper and middle lobes, suspicious for pneumonia. A 3.8 x 3 cm heterogeneous masslike opacity abutting the pleura in the left upper lobe anteriorly   and medially (series 5 image 46) is new since the previous exam. Mild consolidation is also noted inferior to this masslike opacity. A 0.8 cm left upper lobe nodule (series 6 image 210) is new since the previous exam. Previously noted right upper lobe   pulmonary nodules are unchanged, with the largest measuring 1.6 x 1.2 cm (series 6 image 85). Right upper lobe calcified granuloma is unchanged. No pleural effusions.    MEDIASTINUM/AXILLAE: No enlarged lymph nodes in the chest. No pericardial effusion.    CORONARY ARTERY CALCIFICATION: Moderate.    UPPER ABDOMEN: Small right renal cyst would require no specific follow-up. Small hiatal hernia.    MUSCULOSKELETAL: Unremarkable.      Impression    IMPRESSION:  1.  No evidence for pulmonary embolism.  2.  Extensive consolidation in the right upper and middle lobes, suspicious for pneumonia.  3.  A 3.8  cm heterogeneous masslike opacity abutting the lower in the left upper lobe anteriorly and medially is new since the previous exam. This could also be related to pneumonia, however, neoplasm cannot be excluded. Short-term follow-up CT is   recommended to ensure resolution.  4.  A 0.8 cm indeterminate left upper lobe pulmonary nodule is also new since the previous exam.  5.  Severe emphysema.  6.  Previously noted right upper lobe pulmonary nodules are unchanged.     XR Chest Port 1 View    Narrative    EXAM: XR CHEST PORT 1 VIEW  LOCATION: [facility]  DATE: 4/4/2025    INDICATION: Worsening respiratory status. Admitted for pneumonia with history of severe COPD. Please eval for new/worsening lung process.  COMPARISON: CT dated 4/3/2025.      Impression    IMPRESSION:  1.  Cardiac silhouette is within normal limits. There is a large right pneumothorax. Within the collapsed right lung there is infiltrate and consolidation through the midportion. Extensive emphysematous changes are noted through the left lung, but no   left-sided pneumothorax is seen. No evidence for effusion. No significant bony abnormalities. Findings regarding the right pneumothorax were discussed with Dr. Taylor at approximately 0815 hours on 4/4/2025.     XR Chest Port 1 View    Narrative    EXAM: XR CHEST PORT 1 VIEW  LOCATION: [facility]  DATE: 4/4/2025    INDICATION: Chest tube placement.    COMPARISON: Chest radiograph performed earlier today.      Impression    IMPRESSION: Single right pigtail chest tube has been placed. Tiny residual right apical pneumothorax. Mild subcutaneous emphysema right chest wall. Ill-defined consolidation in the right mid and lower lung and mild patchy opacities in the left mid and   lower lung are similar to the previous exam.

## 2025-04-05 ENCOUNTER — ANESTHESIA (OUTPATIENT)
Dept: INTENSIVE CARE | Facility: CLINIC | Age: 60
End: 2025-04-05
Payer: COMMERCIAL

## 2025-04-05 ENCOUNTER — APPOINTMENT (OUTPATIENT)
Dept: GENERAL RADIOLOGY | Facility: CLINIC | Age: 60
End: 2025-04-05
Attending: SPECIALIST
Payer: COMMERCIAL

## 2025-04-05 ENCOUNTER — ANESTHESIA EVENT (OUTPATIENT)
Dept: INTENSIVE CARE | Facility: CLINIC | Age: 60
End: 2025-04-05
Payer: COMMERCIAL

## 2025-04-05 ENCOUNTER — APPOINTMENT (OUTPATIENT)
Dept: GENERAL RADIOLOGY | Facility: CLINIC | Age: 60
End: 2025-04-05
Attending: INTERNAL MEDICINE
Payer: COMMERCIAL

## 2025-04-05 LAB
ANION GAP SERPL CALCULATED.3IONS-SCNC: 12 MMOL/L (ref 7–15)
BASOPHILS # BLD AUTO: 0.1 10E3/UL (ref 0–0.2)
BASOPHILS NFR BLD AUTO: 0 %
BUN SERPL-MCNC: 24.4 MG/DL (ref 6–20)
CALCIUM SERPL-MCNC: 8.8 MG/DL (ref 8.8–10.4)
CHLORIDE SERPL-SCNC: 102 MMOL/L (ref 98–107)
CREAT SERPL-MCNC: 0.82 MG/DL (ref 0.67–1.17)
EGFRCR SERPLBLD CKD-EPI 2021: >90 ML/MIN/1.73M2
EOSINOPHIL # BLD AUTO: 0 10E3/UL (ref 0–0.7)
EOSINOPHIL NFR BLD AUTO: 0 %
ERYTHROCYTE [DISTWIDTH] IN BLOOD BY AUTOMATED COUNT: 14.6 % (ref 10–15)
GLUCOSE BLDC GLUCOMTR-MCNC: 116 MG/DL (ref 70–99)
GLUCOSE BLDC GLUCOMTR-MCNC: 124 MG/DL (ref 70–99)
GLUCOSE SERPL-MCNC: 105 MG/DL (ref 70–99)
HCO3 SERPL-SCNC: 21 MMOL/L (ref 22–29)
HCT VFR BLD AUTO: 33.4 % (ref 40–53)
HGB BLD-MCNC: 10.3 G/DL (ref 13.3–17.7)
IMM GRANULOCYTES # BLD: 0.3 10E3/UL
IMM GRANULOCYTES NFR BLD: 1 %
LYMPHOCYTES # BLD AUTO: 0.2 10E3/UL (ref 0.8–5.3)
LYMPHOCYTES NFR BLD AUTO: 1 %
MCH RBC QN AUTO: 29.8 PG (ref 26.5–33)
MCHC RBC AUTO-ENTMCNC: 30.8 G/DL (ref 31.5–36.5)
MCV RBC AUTO: 97 FL (ref 78–100)
MONOCYTES # BLD AUTO: 2.2 10E3/UL (ref 0–1.3)
MONOCYTES NFR BLD AUTO: 6 %
NEUTROPHILS # BLD AUTO: 33.3 10E3/UL (ref 1.6–8.3)
NEUTROPHILS NFR BLD AUTO: 93 %
NRBC # BLD AUTO: 0 10E3/UL
NRBC BLD AUTO-RTO: 0 /100
PLAT MORPH BLD: NORMAL
PLATELET # BLD AUTO: 638 10E3/UL (ref 150–450)
POTASSIUM SERPL-SCNC: 4.6 MMOL/L (ref 3.4–5.3)
PROCALCITONIN SERPL IA-MCNC: 9 NG/ML
RBC # BLD AUTO: 3.46 10E6/UL (ref 4.4–5.9)
RBC MORPH BLD: NORMAL
SODIUM SERPL-SCNC: 135 MMOL/L (ref 135–145)
WBC # BLD AUTO: 35.9 10E3/UL (ref 4–11)

## 2025-04-05 PROCEDURE — 250N000011 HC RX IP 250 OP 636: Performed by: INTERNAL MEDICINE

## 2025-04-05 PROCEDURE — 200N000001 HC R&B ICU

## 2025-04-05 PROCEDURE — 250N000013 HC RX MED GY IP 250 OP 250 PS 637: Performed by: INTERNAL MEDICINE

## 2025-04-05 PROCEDURE — 250N000011 HC RX IP 250 OP 636

## 2025-04-05 PROCEDURE — 258N000003 HC RX IP 258 OP 636: Performed by: INTERNAL MEDICINE

## 2025-04-05 PROCEDURE — 250N000012 HC RX MED GY IP 250 OP 636 PS 637

## 2025-04-05 PROCEDURE — 250N000009 HC RX 250: Performed by: NURSE ANESTHETIST, CERTIFIED REGISTERED

## 2025-04-05 PROCEDURE — 370N000003 HC ANESTHESIA WARD SERVICE: Performed by: NURSE ANESTHETIST, CERTIFIED REGISTERED

## 2025-04-05 PROCEDURE — 71045 X-RAY EXAM CHEST 1 VIEW: CPT

## 2025-04-05 PROCEDURE — 84145 PROCALCITONIN (PCT): CPT | Performed by: INTERNAL MEDICINE

## 2025-04-05 PROCEDURE — 99233 SBSQ HOSP IP/OBS HIGH 50: CPT | Performed by: INTERNAL MEDICINE

## 2025-04-05 PROCEDURE — 999N000065 XR CHEST PORT 1 VIEW

## 2025-04-05 PROCEDURE — 99232 SBSQ HOSP IP/OBS MODERATE 35: CPT | Performed by: SPECIALIST

## 2025-04-05 PROCEDURE — 250N000013 HC RX MED GY IP 250 OP 250 PS 637

## 2025-04-05 PROCEDURE — 85025 COMPLETE CBC W/AUTO DIFF WBC: CPT | Performed by: INTERNAL MEDICINE

## 2025-04-05 PROCEDURE — 36415 COLL VENOUS BLD VENIPUNCTURE: CPT | Performed by: INTERNAL MEDICINE

## 2025-04-05 PROCEDURE — 0W9930Z DRAINAGE OF RIGHT PLEURAL CAVITY WITH DRAINAGE DEVICE, PERCUTANEOUS APPROACH: ICD-10-PCS | Performed by: EMERGENCY MEDICINE

## 2025-04-05 PROCEDURE — 250N000011 HC RX IP 250 OP 636: Performed by: NURSE ANESTHETIST, CERTIFIED REGISTERED

## 2025-04-05 PROCEDURE — 71045 X-RAY EXAM CHEST 1 VIEW: CPT | Mod: 77

## 2025-04-05 PROCEDURE — 80048 BASIC METABOLIC PNL TOTAL CA: CPT | Performed by: INTERNAL MEDICINE

## 2025-04-05 RX ORDER — LORAZEPAM 2 MG/ML
0.5 INJECTION INTRAMUSCULAR ONCE
Status: COMPLETED | OUTPATIENT
Start: 2025-04-05 | End: 2025-04-05

## 2025-04-05 RX ORDER — LIDOCAINE HYDROCHLORIDE 20 MG/ML
INJECTION, SOLUTION INFILTRATION; PERINEURAL PRN
Status: DISCONTINUED | OUTPATIENT
Start: 2025-04-05 | End: 2025-04-05

## 2025-04-05 RX ORDER — PROPOFOL 10 MG/ML
INJECTION, EMULSION INTRAVENOUS CONTINUOUS PRN
Status: DISCONTINUED | OUTPATIENT
Start: 2025-04-05 | End: 2025-04-05

## 2025-04-05 RX ADMIN — PROCHLORPERAZINE MALEATE 10 MG: 5 TABLET ORAL at 07:38

## 2025-04-05 RX ADMIN — PREDNISONE 40 MG: 20 TABLET ORAL at 08:17

## 2025-04-05 RX ADMIN — PIPERACILLIN AND TAZOBACTAM 4.5 G: 4; .5 INJECTION, POWDER, FOR SOLUTION INTRAVENOUS; PARENTERAL at 12:00

## 2025-04-05 RX ADMIN — OXYCODONE 5 MG: 5 TABLET ORAL at 12:52

## 2025-04-05 RX ADMIN — LIDOCAINE HYDROCHLORIDE 20 MG: 20 INJECTION, SOLUTION INFILTRATION; PERINEURAL at 22:55

## 2025-04-05 RX ADMIN — AMLODIPINE BESYLATE 5 MG: 5 TABLET ORAL at 08:17

## 2025-04-05 RX ADMIN — PROPOFOL 20 MG: 10 INJECTION, EMULSION INTRAVENOUS at 22:59

## 2025-04-05 RX ADMIN — OXYCODONE 5 MG: 5 TABLET ORAL at 22:06

## 2025-04-05 RX ADMIN — OXYCODONE 5 MG: 5 TABLET ORAL at 17:23

## 2025-04-05 RX ADMIN — LISINOPRIL 40 MG: 40 TABLET ORAL at 08:17

## 2025-04-05 RX ADMIN — ONDANSETRON 4 MG: 2 INJECTION INTRAMUSCULAR; INTRAVENOUS at 04:45

## 2025-04-05 RX ADMIN — PIPERACILLIN AND TAZOBACTAM 4.5 G: 4; .5 INJECTION, POWDER, FOR SOLUTION INTRAVENOUS; PARENTERAL at 05:28

## 2025-04-05 RX ADMIN — ONDANSETRON 4 MG: 2 INJECTION INTRAMUSCULAR; INTRAVENOUS at 00:22

## 2025-04-05 RX ADMIN — ROFLUMILAST 500 MCG: 500 TABLET ORAL at 08:22

## 2025-04-05 RX ADMIN — PROPOFOL 20 MG: 10 INJECTION, EMULSION INTRAVENOUS at 22:56

## 2025-04-05 RX ADMIN — FLUTICASONE FUROATE AND VILANTEROL TRIFENATATE 1 PUFF: 200; 25 POWDER RESPIRATORY (INHALATION) at 08:21

## 2025-04-05 RX ADMIN — PIPERACILLIN AND TAZOBACTAM 4.5 G: 4; .5 INJECTION, POWDER, FOR SOLUTION INTRAVENOUS; PARENTERAL at 17:03

## 2025-04-05 RX ADMIN — ALBUTEROL SULFATE 2 PUFF: 90 INHALANT RESPIRATORY (INHALATION) at 07:39

## 2025-04-05 RX ADMIN — GUAIFENESIN 1200 MG: 600 TABLET ORAL at 17:02

## 2025-04-05 RX ADMIN — PIPERACILLIN AND TAZOBACTAM 4.5 G: 4; .5 INJECTION, POWDER, FOR SOLUTION INTRAVENOUS; PARENTERAL at 22:25

## 2025-04-05 RX ADMIN — PIPERACILLIN AND TAZOBACTAM 4.5 G: 4; .5 INJECTION, POWDER, FOR SOLUTION INTRAVENOUS; PARENTERAL at 00:13

## 2025-04-05 RX ADMIN — AZITHROMYCIN MONOHYDRATE 500 MG: 500 INJECTION, POWDER, LYOPHILIZED, FOR SOLUTION INTRAVENOUS at 15:02

## 2025-04-05 RX ADMIN — OXYCODONE 5 MG: 5 TABLET ORAL at 09:17

## 2025-04-05 RX ADMIN — LORAZEPAM 0.5 MG: 2 INJECTION INTRAMUSCULAR; INTRAVENOUS at 22:22

## 2025-04-05 RX ADMIN — METOPROLOL TARTRATE 25 MG: 25 TABLET, FILM COATED ORAL at 17:02

## 2025-04-05 RX ADMIN — OXYCODONE 5 MG: 5 TABLET ORAL at 02:19

## 2025-04-05 RX ADMIN — GUAIFENESIN 1200 MG: 600 TABLET ORAL at 08:18

## 2025-04-05 RX ADMIN — METOPROLOL TARTRATE 25 MG: 25 TABLET, FILM COATED ORAL at 08:17

## 2025-04-05 RX ADMIN — ACETAMINOPHEN 650 MG: 325 TABLET ORAL at 12:14

## 2025-04-05 ASSESSMENT — ACTIVITIES OF DAILY LIVING (ADL)
ADLS_ACUITY_SCORE: 33
DEPENDENT_IADLS:: INDEPENDENT
ADLS_ACUITY_SCORE: 33

## 2025-04-05 ASSESSMENT — COPD QUESTIONNAIRES
COPD: 1
CAT_SEVERITY: MODERATE

## 2025-04-05 NOTE — PROGRESS NOTES
Dr Soares adjusted the chest tube. Shortly afterwards patients heartrate down from 120 to 109, RR 18, O2 now to 3 lpm via NC, Pt more comfortable with breathing. Repeat CXR pending read.

## 2025-04-05 NOTE — PROGRESS NOTES
Per Dr. Sparks from [facility]- the 1 view portable cxr shows the chest tube has slipped down and retracted causing a larger pneumothorax. Pt on NC 3 lpm, sat 90%. RR 22. Pt denies increased SOA but has been coughing quite a bit last night. Will notify Dr Montes,

## 2025-04-05 NOTE — PROGRESS NOTES
[facility] Medicine Progress Note  Date of Service (when I saw the patient): 04/05/2025    REASON FOR ADMISSION / INTERVAL HISTORY:  Luis Hernandez is a 59 year old male with past medical history of tobacco use in remission, pulmonary hypertension, squamous cell carcinoma, emphysema,  anemia, hypertension admitted on 4/3/2025 with dyspnea, pleuritic pain   Mildly SOB. See details below.     Assessment/ Plan     Community acquired Pneumonia  Severe Sepsis   Presented with 1wk ESPARZA, pleuritic chest pain  Septic on presentation with increased O2 requirements from baseline. CT shows right upper & middle lobe consolidations suspicious for pneumonia. Chronic cough now with increased yellow sputum. Denies sick contacts. Known history of pseudomonas & klebsiella pneumonia April 2024. Was tachycardic (124bpm), tachypneic (29/min) & leukocytosis (WBC 28.8) Lactic acid WNL (0.8). VBG shows chronic compensated respiratory acidosis (pH 7.39, pCO2 51, bicarb 31). Influenza/ covid / RSV negative  Was started on cefepime 2g Q8hrs for CAP given history of pseudomonas/ azithromycin 500mg IV Q24hrs /IVF with lactated ringer 100/hr continuous.   Pt decompensated early 4/4 AM-acute resp distress. HR in 190s and increased work of breathing. LA was 3.1 and WBC still 27k.  Stat CXR showed large pneumo tx R side. Surgery consulted and pt had stat chest tube placed with immediate, significant relief. Pt was tx to ICU for BIPAP  HR down to 90s after chest tube placed-pt did receive one extra dose of metoprolol 25mg too.  O2 sats were stable on 2LNC. Due to intermediate  resistance of pseudomonas to cefepime, this is changed to zosyn.Blood cultures x2 on 4/3  NGTD.   CXR this AM shows R PTX and chest tube pulled out couple inches. Surgery re-adjusted tube today but repeat CXR not changed. 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 Now on 3L o2 NC. Discussed with  thoracic surgery-would watch couple days, if not improving, needs 2nd chest tube placed and or bronch. WBC worse today 35k  -continue zosyn/ zithromax/ iv fluids. Continue o2 NC-wean as tolerated. Follow PCT     Emphysema, severe with acute exacerbation  Follows with [place] pulmonology for severe pan-lobar emphysema on 1.5L NC at baseline.   Managed PTA with roflumilast, Advair, Spiriva, albuterol inhaler, mucinex PRN, prednisone 10mg daily, and azithromycin 250mg daily.   Was started on prednisone 40mg/day and nebs-hanged to iv steroids after couple doses.   Stopped iv steroids and started prednisone 4/4. Still SOB/ mild wheezing  -continue prednisone     Acute respiratory failure with hypoxia  Acute resp distress  2ndry to above.   Rx as above.      Squamous cell lung cancer, stage IIIB    Follows with [place] oncology for non-operable SCC of R lung involving R mainstem s/p stent 1/18/24 & tumor debulking. S/p chemotherapy & now on maintenance pembrolizumab Q6wks; last dose 2/26/2025.     Imaging 4/3 shows new pulmonary masses as per below.   - Will need expedited oncology follow up for ongoing eval of new pulm nodules (below)     Lung nodules, new  CT PE study 4/3 shows 3.8cm heterogeneous masslike opacity abutting the left uppr lobe anteriorly & medially which is new since previous exam (1/2025). Could be related to pneumonia, however, neoplasm cannot be excluded. 0.8cm indeterminate left upper lobe pulmonary nodule also new since previous exam 1/2025.   - Follow up with oncology for ongoing monitoring & management decisions     Hyponatremia  Na 130 in ER. Likely 2/2 acute illness. Not clinically significant.   Improving      Transaminitis  Mild, acute. AST 53, ALT 71.  Could be related to sepsis, above.   Improved.      Hypertension  Normotensive on presentation.   - Continue PTA lisinopril 40mg daily   - Continue PTA amlodipine 5mg daily   - Continue PTA metoprolol tartrate 25mg BID     Pulmonary  "hypertension  Pulmonology notes likely pulmonary HTN with RVP 55mmHg plus RAP on 2019 ECHO, dilated RV but normal RV EF. Does not appear to follow regularly with cardiology.      Thrombocytosis  Acute. Platelets baseline ~350, platelets on presentation 717. Probably hemo-concentrated from acute infection, above.   Rx as above and follow     Anemia  Normocytic, chronic. Baseline hemoglobin 11.5-12.0, hemoglobin on presentation 12.0.   Previously though to be chemo-induced.      Tobacco abuse, in remission  Quit Aug 2021.        Diet: Regular Diet Adult    DVT Prophylaxis: Ambulate every shift  Miller Catheter: Not present  Code Status: No CPR- Do NOT Intubate      Medically Ready for Discharge: Anticipated in 2-4 Days        CANDICE HARRIS MD   Pg [phone]        ROS:  As described in A/P and Exam.  Otherwise ALL are  negative.    PHYSICAL EXAM:  All vitals have been reviewed    Blood pressure 128/88, pulse 103, temperature 98  F (36.7  C), temperature source Oral, resp. rate 23, height 1.626 m (5' 4\"), weight 51.7 kg (113 lb 15.7 oz), SpO2 93%.    I/O this shift:  In: 75 [P.O.:75]  Out: 500 [Urine:500]    GENERAL APPEARANCE: healthy, alert and in mod resp distress  EYES: conjunctiva clear, eyes grossly normal  HENT: external ears and nose normal   RESP: lungs-decreased BS R upper lung - mild diffuse  wheezes  CV: regular rate and rhythm, normal S1 S2, no S3 or S4 and no murmur, click or rub   ABDOMEN: soft, nontender, no HSM or masses and bowel sounds normal  MS: no clubbing, cyanosis; no edema  SKIN: clear without significant rashes or lesions  NEURO: -non-focal moves all 4 extr    ROUTINE  LABS (Last four results)  CMP  Recent Labs   Lab 04/05/25  1126 04/05/25  0743 04/05/25  0536 04/04/25  2034 04/04/25  1123 04/04/25  0532 04/03/25  1210   NA  --   --  135  --   --  133* 130*   POTASSIUM  --   --  4.6  --   --  4.4 4.4   CHLORIDE  --   --  102  --   --  99 92*   CO2  --   --  21*  --   --  22 26   ANIONGAP  --  "  --  12  --   --  12 12   * 116* 105* 135*   < > 108* 92   BUN  --   --  24.4*  --   --  21.8* 27.3*   CR  --   --  0.82  --   --  0.60* 0.92   GFRESTIMATED  --   --  >90  --   --  >90 >90   MIKE  --   --  8.8  --   --  8.5* 9.3   PROTTOTAL  --   --   --   --   --  6.2* 7.3   ALBUMIN  --   --   --   --   --  2.8* 3.2*   BILITOTAL  --   --   --   --   --  <0.2 0.3   ALKPHOS  --   --   --   --   --  89 89   AST  --   --   --   --   --  20 53*   ALT  --   --   --   --   --  45 71*    < > = values in this interval not displayed.     CBC  Recent Labs   Lab 04/05/25  0536 04/04/25  0532 04/03/25  1210   WBC 35.9* 27.2* 28.8*   RBC 3.46* 3.44* 4.06*   HGB 10.3* 10.0* 12.0*   HCT 33.4* 32.9* 38.2*   MCV 97 96 94   MCH 29.8 29.1 29.6   MCHC 30.8* 30.4* 31.4*   RDW 14.6 14.5 14.3   * 627* 717*     INRNo lab results found in last 7 days.  Arterial Blood Gas  Recent Labs   Lab 04/04/25  0700 04/03/25  1223   O2PER 100 2       Recent Results (from the past 24 hours)   XR Chest Port 1 View    Narrative    EXAM: XR CHEST PORT 1 VIEW  LOCATION: [facility]  DATE: 4/5/2025    INDICATION: Follow-up chest tube and pneumothorax  COMPARISON: 4/4/2025      Impression    IMPRESSION: Right chest tube has slightly retracted. There is interval increase/development of a moderate-sized right apical pneumothorax. Slight interval increase in the patchy right basilar opacities. The remainder of the exam is otherwise not   significantly changed.    Findings discussed with Yamilet LONG RN, at 0741 hours.    XR Chest Port 1 View    Narrative    EXAM: XR CHEST PORT 1 VIEW  LOCATION: [facility]  DATE: 4/5/2025    INDICATION: s p CT adjustment  COMPARISON: 4/5/2025 0723 hours.      Impression    IMPRESSION: Stable position of the right pigtail chest tube with the loop formed at the lateral aspect of the right mid hemithorax. No significant change in the moderate to large right. Stable  dense airspace opacities within the right mid to lower lung.   Stable chronic appearing interstitial changes within the left lung base likely due to underlying emphysema. Cardiac silhouette size is within normal limits. Moderate subcutaneous emphysema at the right lateral chest wall.

## 2025-04-05 NOTE — PROGRESS NOTES
Surgery    Subjective:  Patient's breathing feels okay this morning.  Denies any shortness of breath.  Still reportedly have large airleak.  Nurse changed dressing last night for is of concern over large air leak.  No crepitus reported.      Objective:  Vitals:    04/05/25 0200 04/05/25 0400 04/05/25 0745 04/05/25 0821   BP: 130/85 (!) 156/99 (!) 136/92    BP Location:   Right arm    Pulse: 98 120  (!) 128   Resp: 11 24 18   Temp:  98.5  F (36.9  C) 98.6  F (37  C)    TempSrc:  Oral Oral    SpO2: 92% (!) 91%  (!) 90%   Weight:       Height:         Chest tube-315 serosanguineous overnight.  Large airleak with good respiratory variation.    Chest: Rhonchi bilaterally.    Results for orders placed or performed during the hospital encounter of 04/03/25 (from the past 24 hours)   Glucose by meter   Result Value Ref Range    GLUCOSE BY METER POCT 125 (H) 70 - 99 mg/dL   Glucose by meter   Result Value Ref Range    GLUCOSE BY METER POCT 119 (H) 70 - 99 mg/dL   Glucose by meter   Result Value Ref Range    GLUCOSE BY METER POCT 135 (H) 70 - 99 mg/dL   CBC with Platelets & Differential    Narrative    The following orders were created for panel order CBC with Platelets & Differential.  Procedure                               Abnormality         Status                     ---------                               -----------         ------                     CBC with platelets and ...[[id number]]  Abnormal            Final result               RBC and Platelet Morpho...[[id number]]                      Final result                 Please view results for these tests on the individual orders.   Basic metabolic panel   Result Value Ref Range    Sodium 135 135 - 145 mmol/L    Potassium 4.6 3.4 - 5.3 mmol/L    Chloride 102 98 - 107 mmol/L    Carbon Dioxide (CO2) 21 (L) 22 - 29 mmol/L    Anion Gap 12 7 - 15 mmol/L    Urea Nitrogen 24.4 (H) 6.0 - 20.0 mg/dL    Creatinine 0.82 0.67 - 1.17 mg/dL    GFR Estimate >90 >60 mL/min/1.73m2     Calcium 8.8 8.8 - 10.4 mg/dL    Glucose 105 (H) 70 - 99 mg/dL   Procalcitonin   Result Value Ref Range    Procalcitonin 9.00 (HH) <0.50 ng/mL   CBC with platelets and differential   Result Value Ref Range    WBC Count 35.9 (H) 4.0 - 11.0 10e3/uL    RBC Count 3.46 (L) 4.40 - 5.90 10e6/uL    Hemoglobin 10.3 (L) 13.3 - 17.7 g/dL    Hematocrit 33.4 (L) 40.0 - 53.0 %    MCV 97 78 - 100 fL    MCH 29.8 26.5 - 33.0 pg    MCHC 30.8 (L) 31.5 - 36.5 g/dL    RDW 14.6 10.0 - 15.0 %    Platelet Count 638 (H) 150 - 450 10e3/uL    % Neutrophils 93 %    % Lymphocytes 1 %    % Monocytes 6 %    % Eosinophils 0 %    % Basophils 0 %    % Immature Granulocytes 1 %    NRBCs per 100 WBC 0 <1 /100    Absolute Neutrophils 33.3 (H) 1.6 - 8.3 10e3/uL    Absolute Lymphocytes 0.2 (L) 0.8 - 5.3 10e3/uL    Absolute Monocytes 2.2 (H) 0.0 - 1.3 10e3/uL    Absolute Eosinophils 0.0 0.0 - 0.7 10e3/uL    Absolute Basophils 0.1 0.0 - 0.2 10e3/uL    Absolute Immature Granulocytes 0.3 <=0.4 10e3/uL    Absolute NRBCs 0.0 10e3/uL   RBC and Platelet Morphology   Result Value Ref Range    RBC Morphology Confirmed RBC Indices     Platelet Assessment  Automated Count Confirmed. Platelet morphology is normal.     Automated Count Confirmed. Platelet morphology is normal.   XR Chest Port 1 View    Narrative    EXAM: XR CHEST PORT 1 VIEW  LOCATION: [facility]  DATE: 4/5/2025    INDICATION: Follow-up chest tube and pneumothorax  COMPARISON: 4/4/2025      Impression    IMPRESSION: Right chest tube has slightly retracted. There is interval increase/development of a moderate-sized right apical pneumothorax. Slight interval increase in the patchy right basilar opacities. The remainder of the exam is otherwise not   significantly changed.    Findings discussed with Yamilet LONG RN, at 0741 hours.    Glucose by meter   Result Value Ref Range    GLUCOSE BY METER POCT 116 (H) 70 - 99 mg/dL     Assessment/plan:  This is a 59-year-old gentleman with  a large left pneumothorax that was treated with the pigtail.  Pneumothorax is greatly improved from the original but the apical pneumothorax is slightly larger on today's visit.  Tube was adjusted along with checking all connections.  Tube does have good respiratory variation with a large air leak.  Suspect this may be due to an ongoing perforated malignancy.  Will await chest x-ray after readjustment.  Due to the large air leak and malignancy recommend thoracic surgery evaluation.  If no further improvement on the repeat chest x-ray will increase suction to 30 mmHg.    Osmani Soares MD, FACS

## 2025-04-05 NOTE — PLAN OF CARE
Prasad is doing okay after chest tub placed yesterday. O2 needs did increase slightly to 2.5 - 3 L O2 to maintain O2sat and comfort. He has a loose congested cough with occas production. LS wheezy, coarse.   He is sitting and sleeping at 90 degree angle for ease of breathing.   Chest tube output of 230 mL clear yellowish fluid since 7 pm last evening. Air leak 1+. Dressing was taken down and repositioned vaseline gauze against CT insertion site, retaped dressing. Air leak did improve some. Pt is taking in deep breaths most of the time and has strong deep coughing spells.   Vomiting x3 episodes this shift of watery bile emesis. Zofran given with good results.   Oxycodone 5 mg for R sided chest pain near sternal border.   SR/Sinus tach rate 100-115 throughout night except  5-10 mins of 130-140's which resolved itself. Pt says this has been happening in the early mornings for a while now.   Urine output decreased.   Taking in sips of water.   Periorbital edema present.   Remains A/Ox3, pleasant/cooperative.

## 2025-04-05 NOTE — PROGRESS NOTES
End Of Shift Note     Situation: Admitted due to pneumonia with history of lung cancer, currently on keytruda treatments     Plan: Monitor respiratory status after chest tube placement yesterday, repositioned today. Monitor closely for resp changes, may need thoracic surgery if pneumo can't resolve in a couple days.      Subjective/Objective:     Neuro: WDL     Cardiac: ST on monitor, getting Metoprolol.. Per report, his baseline is approx 100 bpm.     Resp: wheezing Left side intermittently, coarse RUL. Better air movement/comfort after repositioning.      GI/: Uses urinal for adequate amts. Poor appetite, intermittent nausea, drinks fluids and ensure well.     Endo: ICU protocol blood sugars being monitored and stable     MSK: WDL; SBA to use urinal due to equipment and to prevent tugging on chest tube     Skin: WDL     LDAs: PIV saline locked/ABX.

## 2025-04-05 NOTE — PROGRESS NOTES
Pt having continuous nausea, Zofran last given at 0445. Pt says it may be from coughing and all the mucus. Pt has been taking Oxycodone Q 4 hrs or so which he does not take at home, could be causing? Oral Compazine given. Monitor.

## 2025-04-05 NOTE — CONSULTS
Care Management Initial Consult    General Information  Assessment completed with: VM-chart review, Patient, Prasad  Type of CM/SW Visit: CM Role Introduction    Primary Care Provider verified and updated as needed: Yes   Readmission within the last 30 days: no previous admission in last 30 days      Reason for Consult: discharge planning  Advance Care Planning: Advance Care Planning Reviewed: no concerns identified          Communication Assessment  Patient's communication style: spoken language (English or Bilingual)    Hearing Difficulty or Deaf: no   Wear Glasses or Blind: no    Cognitive  Cognitive/Neuro/Behavioral: WDL                      Living Environment:   People in home: parent(s)     Current living Arrangements: house      Able to return to prior arrangements: yes       Family/Social Support:  Care provided by: self  Provides care for:       Support system: Parent(s)          Description of Support System: Supportive, Involved    Support Assessment: Adequate family and caregiver support    Current Resources:   Patient receiving home care services: No        Community Resources: None  Equipment currently used at home: walker, standard  Supplies currently used at home: Oxygen Tubing/Supplies    Employment/Financial:  Employment Status: disabled        Financial Concerns: none           Does the patient's insurance plan have a 3 day qualifying hospital stay waiver?  Yes     Which insurance plan 3 day waiver is available? Alternative insurance waiver    Will the waiver be used for post-acute placement? No    Lifestyle & Psychosocial Needs:  Social Drivers of Health     Food Insecurity: Low Risk  (4/3/2025)    Food Insecurity     Within the past 12 months, did you worry that your food would run out before you got money to buy more?: No     Within the past 12 months, did the food you bought just not last and you didn t have money to get more?: No   Depression: Not at risk (1/9/2025)    PHQ-2     PHQ-2 Score: 0    Housing Stability: Low Risk  (4/3/2025)    Housing Stability     Do you have housing? : Yes     Are you worried about losing your housing?: No   Tobacco Use: Medium Risk (4/3/2025)    Patient History     Smoking Tobacco Use: Former     Smokeless Tobacco Use: Former     Passive Exposure: Not on file   Financial Resource Strain: Low Risk  (4/3/2025)    Financial Resource Strain     Within the past 12 months, have you or your family members you live with been unable to get utilities (heat, electricity) when it was really needed?: No   Alcohol Use: Not on file   Transportation Needs: Low Risk  (4/3/2025)    Transportation Needs     Within the past 12 months, has lack of transportation kept you from medical appointments, getting your medicines, non-medical meetings or appointments, work, or from getting things that you need?: No   Physical Activity: Inactive (8/30/2021)    Exercise Vital Sign     Days of Exercise per Week: 0 days     Minutes of Exercise per Session: 0 min   Interpersonal Safety: Low Risk  (4/3/2025)    Interpersonal Safety     Do you feel physically and emotionally safe where you currently live?: Yes     Within the past 12 months, have you been hit, slapped, kicked or otherwise physically hurt by someone?: No     Within the past 12 months, have you been humiliated or emotionally abused in other ways by your partner or ex-partner?: No   Stress: Not on file   Social Connections: Unknown (8/30/2021)    Social Connection and Isolation Panel [NHANES]     Frequency of Communication with Friends and Family: Twice a week     Frequency of Social Gatherings with Friends and Family: Twice a week     Attends Congregation Services: Never     Active Member of Clubs or Organizations: No     Attends Club or Organization Meetings: Never     Marital Status: Not on file   Health Literacy: Not on file       Functional Status:  Prior to admission patient needed assistance:   Dependent ADLs:: Independent,  Ambulation-walker  Dependent IADLs:: Independent  Assesssment of Functional Status: Not at  functional baseline    Mental Health Status:  Mental Health Status: No Current Concerns       Chemical Dependency Status:  Chemical Dependency Status: No Current Concerns             Values/Beliefs:  Spiritual, Cultural Beliefs, Judaism Practices, Values that affect care:                 Discussed  Partnership in Safe Discharge Planning  document with patient/family: Yes: Patient    Additional Information:    Received Referral due to high risk for readmission.    Met with the Patient.  He lives with his father and father's girlfriend in the community in a house.  He is independent with ADLs/IADLs.  He ambulates with a walker.  He is on chronic oxygen, 1.5 liters.  He does drive.    Patient has a history of lung cancer.  He was admitted for sepsis due to pneumonia.    There are no discharge needs anticipated.    Hand off referral placed for [facility] Care Coordination.    Plan:  Home    Next Steps:     Care Management will continue to monitor  through daily Interdisciplinary Rounds.     Care Management signed off.       Tati Aleman RN

## 2025-04-06 ENCOUNTER — APPOINTMENT (OUTPATIENT)
Dept: GENERAL RADIOLOGY | Facility: CLINIC | Age: 60
End: 2025-04-06
Attending: INTERNAL MEDICINE
Payer: COMMERCIAL

## 2025-04-06 LAB
ANION GAP SERPL CALCULATED.3IONS-SCNC: 11 MMOL/L (ref 7–15)
BASOPHILS # BLD AUTO: 0 10E3/UL (ref 0–0.2)
BASOPHILS NFR BLD AUTO: 0 %
BUN SERPL-MCNC: 22.5 MG/DL (ref 6–20)
CALCIUM SERPL-MCNC: 9 MG/DL (ref 8.8–10.4)
CHLORIDE SERPL-SCNC: 103 MMOL/L (ref 98–107)
CREAT SERPL-MCNC: 0.69 MG/DL (ref 0.67–1.17)
EGFRCR SERPLBLD CKD-EPI 2021: >90 ML/MIN/1.73M2
EOSINOPHIL # BLD AUTO: 0 10E3/UL (ref 0–0.7)
EOSINOPHIL NFR BLD AUTO: 0 %
ERYTHROCYTE [DISTWIDTH] IN BLOOD BY AUTOMATED COUNT: 14.6 % (ref 10–15)
GLUCOSE BLDC GLUCOMTR-MCNC: 122 MG/DL (ref 70–99)
GLUCOSE BLDC GLUCOMTR-MCNC: 133 MG/DL (ref 70–99)
GLUCOSE BLDC GLUCOMTR-MCNC: 92 MG/DL (ref 70–99)
GLUCOSE SERPL-MCNC: 92 MG/DL (ref 70–99)
HCO3 SERPL-SCNC: 25 MMOL/L (ref 22–29)
HCT VFR BLD AUTO: 34.8 % (ref 40–53)
HGB BLD-MCNC: 10.5 G/DL (ref 13.3–17.7)
IMM GRANULOCYTES # BLD: 0.3 10E3/UL
IMM GRANULOCYTES NFR BLD: 1 %
LYMPHOCYTES # BLD AUTO: 0.3 10E3/UL (ref 0.8–5.3)
LYMPHOCYTES NFR BLD AUTO: 1 %
MCH RBC QN AUTO: 29.6 PG (ref 26.5–33)
MCHC RBC AUTO-ENTMCNC: 30.2 G/DL (ref 31.5–36.5)
MCV RBC AUTO: 98 FL (ref 78–100)
MONOCYTES # BLD AUTO: 2.1 10E3/UL (ref 0–1.3)
MONOCYTES NFR BLD AUTO: 6 %
NEUTROPHILS # BLD AUTO: 30.4 10E3/UL (ref 1.6–8.3)
NEUTROPHILS NFR BLD AUTO: 92 %
NRBC # BLD AUTO: 0 10E3/UL
NRBC BLD AUTO-RTO: 0 /100
PLAT MORPH BLD: NORMAL
PLATELET # BLD AUTO: 682 10E3/UL (ref 150–450)
POTASSIUM SERPL-SCNC: 4.2 MMOL/L (ref 3.4–5.3)
PROCALCITONIN SERPL IA-MCNC: 4.86 NG/ML
RBC # BLD AUTO: 3.55 10E6/UL (ref 4.4–5.9)
RBC MORPH BLD: NORMAL
SODIUM SERPL-SCNC: 139 MMOL/L (ref 135–145)
WBC # BLD AUTO: 33.1 10E3/UL (ref 4–11)

## 2025-04-06 PROCEDURE — 250N000013 HC RX MED GY IP 250 OP 250 PS 637: Performed by: INTERNAL MEDICINE

## 2025-04-06 PROCEDURE — 82374 ASSAY BLOOD CARBON DIOXIDE: CPT | Performed by: INTERNAL MEDICINE

## 2025-04-06 PROCEDURE — 71045 X-RAY EXAM CHEST 1 VIEW: CPT

## 2025-04-06 PROCEDURE — 250N000012 HC RX MED GY IP 250 OP 636 PS 637

## 2025-04-06 PROCEDURE — 250N000011 HC RX IP 250 OP 636

## 2025-04-06 PROCEDURE — 99232 SBSQ HOSP IP/OBS MODERATE 35: CPT | Performed by: INTERNAL MEDICINE

## 2025-04-06 PROCEDURE — 250N000013 HC RX MED GY IP 250 OP 250 PS 637

## 2025-04-06 PROCEDURE — 200N000001 HC R&B ICU

## 2025-04-06 PROCEDURE — 250N000011 HC RX IP 250 OP 636: Performed by: INTERNAL MEDICINE

## 2025-04-06 PROCEDURE — 99232 SBSQ HOSP IP/OBS MODERATE 35: CPT | Performed by: SPECIALIST

## 2025-04-06 PROCEDURE — 84145 PROCALCITONIN (PCT): CPT | Performed by: INTERNAL MEDICINE

## 2025-04-06 PROCEDURE — 85004 AUTOMATED DIFF WBC COUNT: CPT | Performed by: INTERNAL MEDICINE

## 2025-04-06 PROCEDURE — 36415 COLL VENOUS BLD VENIPUNCTURE: CPT | Performed by: INTERNAL MEDICINE

## 2025-04-06 PROCEDURE — 258N000003 HC RX IP 258 OP 636: Performed by: INTERNAL MEDICINE

## 2025-04-06 PROCEDURE — 272N000054 HC CANNULA HIGH FLOW, ADULT

## 2025-04-06 RX ORDER — MORPHINE SULFATE 2 MG/ML
2 INJECTION, SOLUTION INTRAMUSCULAR; INTRAVENOUS
Status: DISCONTINUED | OUTPATIENT
Start: 2025-04-06 | End: 2025-04-06

## 2025-04-06 RX ORDER — VANCOMYCIN HYDROCHLORIDE 1 G/200ML
1000 INJECTION, SOLUTION INTRAVENOUS EVERY 12 HOURS
Status: DISCONTINUED | OUTPATIENT
Start: 2025-04-06 | End: 2025-04-07

## 2025-04-06 RX ORDER — OXYCODONE HYDROCHLORIDE 5 MG/1
5 TABLET ORAL
Status: DISCONTINUED | OUTPATIENT
Start: 2025-04-06 | End: 2025-04-07

## 2025-04-06 RX ORDER — MORPHINE SULFATE 2 MG/ML
1-2 INJECTION, SOLUTION INTRAMUSCULAR; INTRAVENOUS
Status: DISCONTINUED | OUTPATIENT
Start: 2025-04-06 | End: 2025-04-07

## 2025-04-06 RX ADMIN — PIPERACILLIN AND TAZOBACTAM 4.5 G: 4; .5 INJECTION, POWDER, FOR SOLUTION INTRAVENOUS; PARENTERAL at 10:49

## 2025-04-06 RX ADMIN — AZITHROMYCIN MONOHYDRATE 500 MG: 500 INJECTION, POWDER, LYOPHILIZED, FOR SOLUTION INTRAVENOUS at 14:54

## 2025-04-06 RX ADMIN — METOPROLOL TARTRATE 25 MG: 25 TABLET, FILM COATED ORAL at 07:26

## 2025-04-06 RX ADMIN — MORPHINE SULFATE 2 MG: 2 INJECTION, SOLUTION INTRAMUSCULAR; INTRAVENOUS at 17:04

## 2025-04-06 RX ADMIN — OXYCODONE 5 MG: 5 TABLET ORAL at 10:49

## 2025-04-06 RX ADMIN — GUAIFENESIN 1200 MG: 600 TABLET ORAL at 09:29

## 2025-04-06 RX ADMIN — METOPROLOL TARTRATE 25 MG: 25 TABLET, FILM COATED ORAL at 17:04

## 2025-04-06 RX ADMIN — FLUTICASONE FUROATE AND VILANTEROL TRIFENATATE 1 PUFF: 200; 25 POWDER RESPIRATORY (INHALATION) at 08:07

## 2025-04-06 RX ADMIN — OXYCODONE 5 MG: 5 TABLET ORAL at 16:17

## 2025-04-06 RX ADMIN — OXYCODONE 5 MG: 5 TABLET ORAL at 07:25

## 2025-04-06 RX ADMIN — AMLODIPINE BESYLATE 5 MG: 5 TABLET ORAL at 08:04

## 2025-04-06 RX ADMIN — ALBUTEROL SULFATE 2 PUFF: 90 INHALANT RESPIRATORY (INHALATION) at 13:56

## 2025-04-06 RX ADMIN — GUAIFENESIN 1200 MG: 600 TABLET ORAL at 17:04

## 2025-04-06 RX ADMIN — VANCOMYCIN HYDROCHLORIDE 1000 MG: 1 INJECTION, SOLUTION INTRAVENOUS at 16:11

## 2025-04-06 RX ADMIN — LISINOPRIL 40 MG: 40 TABLET ORAL at 08:05

## 2025-04-06 RX ADMIN — MORPHINE SULFATE 1 MG: 2 INJECTION, SOLUTION INTRAMUSCULAR; INTRAVENOUS at 14:51

## 2025-04-06 RX ADMIN — PIPERACILLIN AND TAZOBACTAM 4.5 G: 4; .5 INJECTION, POWDER, FOR SOLUTION INTRAVENOUS; PARENTERAL at 17:04

## 2025-04-06 RX ADMIN — PREDNISONE 40 MG: 20 TABLET ORAL at 09:03

## 2025-04-06 RX ADMIN — PIPERACILLIN AND TAZOBACTAM 4.5 G: 4; .5 INJECTION, POWDER, FOR SOLUTION INTRAVENOUS; PARENTERAL at 04:50

## 2025-04-06 RX ADMIN — ROFLUMILAST 500 MCG: 500 TABLET ORAL at 08:04

## 2025-04-06 RX ADMIN — ONDANSETRON 4 MG: 2 INJECTION INTRAMUSCULAR; INTRAVENOUS at 06:17

## 2025-04-06 RX ADMIN — MORPHINE SULFATE 2 MG: 2 INJECTION, SOLUTION INTRAMUSCULAR; INTRAVENOUS at 20:35

## 2025-04-06 ASSESSMENT — ACTIVITIES OF DAILY LIVING (ADL)
ADLS_ACUITY_SCORE: 34
ADLS_ACUITY_SCORE: 42
ADLS_ACUITY_SCORE: 42
ADLS_ACUITY_SCORE: 34
ADLS_ACUITY_SCORE: 34
ADLS_ACUITY_SCORE: 33
ADLS_ACUITY_SCORE: 34
ADLS_ACUITY_SCORE: 33
ADLS_ACUITY_SCORE: 34
ADLS_ACUITY_SCORE: 42
ADLS_ACUITY_SCORE: 33
ADLS_ACUITY_SCORE: 42
ADLS_ACUITY_SCORE: 34
ADLS_ACUITY_SCORE: 42
ADLS_ACUITY_SCORE: 33
ADLS_ACUITY_SCORE: 33
ADLS_ACUITY_SCORE: 34
ADLS_ACUITY_SCORE: 34

## 2025-04-06 NOTE — PROCEDURES
I was called emergently to the ICU in order to place chest tube as the patient's prior chest tube had been withdrawn, and chest x-ray had been performed prior to my being notified of the patient that he is exhibiting signs of tension pneumothorax.    Upon my arrival patient is currently on 10 L oximask oxygen.  His oxygen saturations are around 90% on oxymask, and patient is tachycardic in the 130 to 140 bpm range.  Emergent situation considered, and therefore consent is implied given the emergent nature of the situation, and requirement of chest tube.    The previous chest tube was noted to be almost completely withdrawn from the chest.  This was further removed.  I did elect to place additional chest tube just cephalad to the prior location.    I also did review the chest x-ray imaging that had just been performed prior to my arrival to the room.  Chest x-ray does show right-sided pneumothorax, with chest tube which is out of the chest cavity, and tension physiology is present.    Implied consent as mentioned above.  Chest tube was inserted as documented below.  Patient did have improvement of his oxygen saturations, and improvement of his heart rate.  Furthermore, chest x-ray was performed at bedside immediately after the procedure, with portable chest obtained.  I personally reviewed imaging, and patient is with chest tube in appropriate position on the right side, with reexpansion of the lung.    Critical care time:   30 minutes for brief review of patient's medical record, review of prior imaging, assessment, review of repeat imaging, and documentation.  This is exclusive of procedure.      [facility]    -Chest Tube Insertion    Date/Time: 4/5/2025 11:24 PM    Performed by: Kal Espino MD  Authorized by: Kal Espino MD    Emergent condition/consent implied      PRE-PROCEDURE DETAILS:     Skin preparation:  ChloraPrep    ANESTHESIA (see MAR for exact dosages):      Anesthesia method:  Local infiltration    Local anesthetic:  Lidocaine 1% w/o epi      PROCEDURE DETAILS:     Placement location:  R lateral    Scalpel size:  11    Tube size (Fr):  8    Tension pneumothorax: yes      Tube connected to:  Heimlich valve    Drainage characteristics:  Air only    Suture material:  0 silk    Dressing:  Petrolatum-impregnated gauze      POST-PROCEDURE DETAILS:     Post-insertion x-ray findings: tube in good position      PROCEDURE    Patient Tolerance:  Patient tolerated the procedure well with no immediate complications

## 2025-04-06 NOTE — PHARMACY-VANCOMYCIN DOSING SERVICE
"Pharmacy Vancomycin Initial Note  Date of Service 2025  Patient's  1965  59 year old, male    Indication: Community Acquired Pneumonia    Current estimated CrCl = Estimated Creatinine Clearance: 86.5 mL/min (based on SCr of 0.69 mg/dL).    Creatinine for last 3 days  2025:  5:32 AM Creatinine 0.60 mg/dL  2025:  5:36 AM Creatinine 0.82 mg/dL  2025:  5:51 AM Creatinine 0.69 mg/dL    Recent Vancomycin Level(s) for last 3 days  No results found for requested labs within last 3 days.      Vancomycin IV Administrations (past 72 hours)        No vancomycin orders with administrations in past 72 hours.                    Nephrotoxins and other renal medications (From now, onward)      Start     Dose/Rate Route Frequency Ordered Stop    25 1500  vancomycin (VANCOCIN) 1,000 mg in 200 mL dextrose intermittent infusion         1,000 mg  200 mL/hr over 1 Hours Intravenous EVERY 12 HOURS 25 1442      25 1100  piperacillin-tazobactam (ZOSYN) 4.5 g vial to attach to  mL bag        Note to Pharmacy: For [facility], [facility] and [facility]: For Zosyn-naive patients, use the \"Zosyn initial dose + extended infusion\" order panel.    4.5 g  40 mL/hr over 30 Minutes Intravenous EVERY 6 HOURS 25 1048      25 0800  lisinopril (ZESTRIL) tablet 40 mg        Note to Pharmacy: PTA Sig:Take 1 tablet (40 mg) by mouth daily.      40 mg Oral DAILY 25 1530              Contrast Orders - past 72 hours (72h ago, onward)      None            [employer] Prediction of Planned Initial Vancomycin Regimen  Loading dose: N/A  Regimen: 1000 mg IV every 12 hours.  Start time: 14:41 on 2025  Exposure target: AUC24 (range)400-600 mg/L.hr   AUC24,ss: 568 mg/L.hr  Probability of AUC24 > 400: 84 %  Ctrough,ss: 16.8 mg/L  Probability of Ctrough,ss > 20: 36 %  Probability of nephrotoxicity (Lodise ROQUE ): 12 %        Plan:  Start vancomycin  1000 mg IV q12h.   Vancomycin monitoring method: AUC  Vancomycin " therapeutic monitoring goal: 400-600 mg*h/L  Pharmacy will check vancomycin levels as appropriate in 1-3 Days.    Serum creatinine levels will be ordered daily for the first week of therapy and at least twice weekly for subsequent weeks.      Syeda Cunningham, Tiny[employer]

## 2025-04-06 NOTE — PROGRESS NOTES
Surgery    Subjective:  Patient's breathing feels okay this morning.  Denies any shortness of breath.         Last night events noted.  Chest tube had become dislodged from the pleural cavity.       Objective:  Vitals:    04/06/25 0758 04/06/25 0800 04/06/25 0804 04/06/25 0900   BP:  131/86 131/86    BP Location:       Cuff Size:       Pulse: (!) 126 (!) 125 120 110   Resp:  18 20 21   Temp:       TempSrc:       SpO2:  (!) 91% (!) 90% (!) 90%   Weight:    51.8 kg (114 lb 3.2 oz)   Height:         Chest tube- Large airleak with good respiratory variation.    Chest: Rhonchi bilaterally.    Results for orders placed or performed during the hospital encounter of 04/03/25 (from the past 24 hours)   Glucose by meter   Result Value Ref Range    GLUCOSE BY METER POCT 124 (H) 70 - 99 mg/dL   XR Chest Port 1 View    Narrative    EXAM: XR CHEST PORT 1 VIEW  LOCATION: [facility]  DATE: 4/5/2025    INDICATION: Eval pneumothorax; worsening shortness of breath.  COMPARISON: 4/5/2025.      Impression    IMPRESSION: Small bore right chest tube has changed position since the prior study and is no longer located in the pleural space. Large right pneumothorax has increased in size. Borderline slight mediastinal shift of structures to the left. Heart size   within normal limits. Dense consolidation/infiltrate throughout much of the right lung which is partially collapsed. Minimal infiltrate versus atelectasis left lung base. Large amount of subcutaneous emphysema right hemithorax, increased from previous.    Findings called to Dr. Torrez 4/5/2025 10:36 PM CDT.   XR Chest Port 1 View    Narrative    EXAM: XR CHEST PORT 1 VIEW  LOCATION: [facility]  DATE: 4/5/2025    INDICATION: chest tube placement  COMPARISON: Earlier today at 2213 hours      Impression    IMPRESSION: Percutaneous pigtail drain has now been successfully replaced into the right pleural space with significant  decrease size in the right pneumothorax. Only a small residual apical pneumothorax persists. Dense consolidation right midlung   unchanged. Left lung grossly clear with presumed nipple shadow visible. Heart size normal. Extensive right-sided subcutaneous emphysema unchanged.   -Chest Tube Insertion    Narrative    Kal Espino MD     4/6/2025 12:37 AM  [facility]    -Chest Tube Insertion    Date/Time: 4/5/2025 11:24 PM    Performed by: Kal Espino MD  Authorized by: Kal Espino MD    Emergent condition/consent implied      PRE-PROCEDURE DETAILS:     Skin preparation:  ChloraPrep    ANESTHESIA (see MAR for exact dosages):     Anesthesia method:  Local infiltration    Local anesthetic:  Lidocaine 1% w/o epi      PROCEDURE DETAILS:     Placement location:  R lateral    Scalpel size:  11    Tube size (Fr):  8    Tension pneumothorax: yes      Tube connected to:  Heimlich valve    Drainage characteristics:  Air only    Suture material:  0 silk    Dressing:  Petrolatum-impregnated gauze      POST-PROCEDURE DETAILS:     Post-insertion x-ray findings: tube in good position      PROCEDURE    Patient Tolerance:  Patient tolerated the procedure well with no immediate   complications   CBC with Platelets & Differential    Narrative    The following orders were created for panel order CBC with Platelets & Differential.  Procedure                               Abnormality         Status                     ---------                               -----------         ------                     CBC with platelets and ...[[id number]]  Abnormal            Final result               RBC and Platelet Morpho...[[id number]]                      Final result                 Please view results for these tests on the individual orders.   Basic metabolic panel   Result Value Ref Range    Sodium 139 135 - 145 mmol/L    Potassium 4.2 3.4 - 5.3 mmol/L    Chloride 103 98 - 107 mmol/L     Carbon Dioxide (CO2) 25 22 - 29 mmol/L    Anion Gap 11 7 - 15 mmol/L    Urea Nitrogen 22.5 (H) 6.0 - 20.0 mg/dL    Creatinine 0.69 0.67 - 1.17 mg/dL    GFR Estimate >90 >60 mL/min/1.73m2    Calcium 9.0 8.8 - 10.4 mg/dL    Glucose 92 70 - 99 mg/dL   Procalcitonin   Result Value Ref Range    Procalcitonin 4.86 (H) <0.50 ng/mL   CBC with platelets and differential   Result Value Ref Range    WBC Count 33.1 (H) 4.0 - 11.0 10e3/uL    RBC Count 3.55 (L) 4.40 - 5.90 10e6/uL    Hemoglobin 10.5 (L) 13.3 - 17.7 g/dL    Hematocrit 34.8 (L) 40.0 - 53.0 %    MCV 98 78 - 100 fL    MCH 29.6 26.5 - 33.0 pg    MCHC 30.2 (L) 31.5 - 36.5 g/dL    RDW 14.6 10.0 - 15.0 %    Platelet Count 682 (H) 150 - 450 10e3/uL    % Neutrophils 92 %    % Lymphocytes 1 %    % Monocytes 6 %    % Eosinophils 0 %    % Basophils 0 %    % Immature Granulocytes 1 %    NRBCs per 100 WBC 0 <1 /100    Absolute Neutrophils 30.4 (H) 1.6 - 8.3 10e3/uL    Absolute Lymphocytes 0.3 (L) 0.8 - 5.3 10e3/uL    Absolute Monocytes 2.1 (H) 0.0 - 1.3 10e3/uL    Absolute Eosinophils 0.0 0.0 - 0.7 10e3/uL    Absolute Basophils 0.0 0.0 - 0.2 10e3/uL    Absolute Immature Granulocytes 0.3 <=0.4 10e3/uL    Absolute NRBCs 0.0 10e3/uL   RBC and Platelet Morphology   Result Value Ref Range    RBC Morphology Morphology Essentially Normal for a [place]     Platelet Assessment  Automated Count Confirmed. Platelet morphology is normal.     Automated Count Confirmed. Platelet morphology is normal.     Assessment/plan:  This is a 59-year-old gentleman with a large left pneumothorax that was treated with the pigtail.  Tube became dislodged overnight with development of a tension pneumothorax.  Appreciate help of ER physician (Dr. Espino) and placing tube emergently as I was an hour away.  Patient improved this morning.  Still with large air leak.  Will continue chest tube to suction.  If airleak does not seal may need thoracic surgery involvement.    Osmani Soares MD, FACS

## 2025-04-06 NOTE — PROGRESS NOTES
End Of Shift Note     Situation: Admitted due to pneumonia with history of lung cancer, currently on keytruda treatments     Plan: Monitor respiratory status after chest tube re- placement yesterday evening 2230. Monitor closely for resp changes, may need thoracic surgery if pneumo can't resolve in a couple days.      Subjective/Objective:     Neuro: WDL-sleepy from Ativan last evening. Does not remember having CT replaced     Cardiac: ST on monitor 120-140's, getting scheduled Metoprolol.. Per report, his baseline is approx 100 bpm.     Resp: Mild exp. wheezing right side intermittently, coarse RUL. Diminished left side. Coarse harsh cough, has been refusing nebs, as they make him cough, which hurts him. Better air movement/comfort after CTube re insertion..      GI/: Uses urinal for adequate amts. Poor appetite, intermittent nausea, drinks fluids when offered..     Endo: ICU protocol blood sugars being monitored and stable     MSK: WDL; Asst 1-2  to stand and use urinal due to equipment and to prevent tugging on chest tube     Skin: WDL     LDAs: PIV saline locked/ABX.    Oxycodone/Tylenol for pain.

## 2025-04-06 NOTE — PROGRESS NOTES
"Pt now on 60L high flow o2. Acutely decompensated. Cxr reveals near complete involvemnt of the R lung with infiltrate. Discussed with pt. Understands and wants no aggressive measures-no intubation.   Requests inder. Says \"I knew it was coming\"  Will discuss with daughter-pt gives permission.   "

## 2025-04-06 NOTE — PROGRESS NOTES
[facility] Medicine Progress Note  Date of Service (when I saw the patient): 04/06/2025    REASON FOR ADMISSION / INTERVAL HISTORY:  Luis Hernandez is a 59 year old male with past medical history of tobacco use in remission, pulmonary hypertension, squamous cell carcinoma, emphysema,  anemia, hypertension admitted on 4/3/2025 with dyspnea, pleuritic pain   Continues to be SOB/ coughing.  See details below.     Assessment/ Plan     Community acquired Pneumonia  Severe Sepsis  Tension pneumothorax-resolved  Extensive subcutaneous emphysema in the right chest wall    Presented with 1wk ESPARZA, pleuritic chest pain  Septic on presentation with increased O2 requirements from baseline. CT shows right upper & middle lobe consolidations suspicious for pneumonia. Chronic cough now with increased yellow sputum. Denies sick contacts. Known history of pseudomonas & klebsiella pneumonia April 2024. Was tachycardic (124bpm), tachypneic (29/min) & leukocytosis (WBC 28.8) Lactic acid WNL (0.8). VBG shows chronic compensated respiratory acidosis (pH 7.39, pCO2 51, bicarb 31). Influenza/ covid / RSV negative  Was started on cefepime 2g Q8hrs for CAP given history of pseudomonas/ azithromycin 500mg IV Q24hrs /IVF with lactated ringer 100/hr continuous.   Pt decompensated early 4/4 AM-acute resp distress. HR in 190s and increased work of breathing. LA was 3.1 and WBC still 27k.  Stat CXR showed large pneumo tx R side. Surgery consulted and pt had stat chest tube placed with immediate, significant relief. Pt was tx to ICU for BIPAP  HR down to 90s after chest tube placed-pt did receive one extra dose of metoprolol 25mg too.  O2 sats were stable on 2LNC. Due to intermediate  resistance of pseudomonas to cefepime, this is changed to zosyn.Blood cultures x2 on 4/3  NGTD.   CXR 4/5 AM shows R PTX and chest tube slipped out couple inches. Surgery re-adjusted tube  but repeat CXR not changed. 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 . Discussed with thoracic surgery 4/5-would watch couple days, if not improving, needs 2nd chest tube placed and or bronch. WBC was 35k on 4/5.  Pt decompensated again last night around midnight.. CXR showed chest tube not in pleural space and a new recurrent tension PTX. Was on 15L oxymask. A new chest tube was placed by ER MD with re-expansion of the lung.  Continues to have WBC 33k, PCT improved though.   Will get legionella/ strep pneumo antigens, sputum cx/ stain. Continue current antbx/ steroids/ nebs.   Continue o2 NC-wean as tolerated. Follow PCT. If not improving in next couple days, needs transfer for bronch.      Emphysema, severe with acute exacerbation  Follows with [place] pul[facility]ology for severe pan-lobar emphysema on 1.5L NC at baseline.   Managed PTA with roflumilast, Advair, Spiriva, albuterol inhaler, mucinex PRN, prednisone 10mg daily, and azithromycin 250mg daily.   Was started on prednisone 40mg/day and nebs-hanged to iv steroids after couple doses.   Stopped iv steroids and started prednisone 4/4. Still SOB/ coarse BS b  -continue prednisone/ nebs    Acute respiratory failure with hypoxia  Acute resp distress  2ndry to above.   Rx as above.      Squamous cell lung cancer, stage IIIB    Follows with [place] oncology for non-operable SCC of R lung involving R mainstem s/p stent 1/18/24 & tumor debulking. S/p chemotherapy & now on maintenance pembrolizumab Q6wks; last dose 2/26/2025.     Imaging 4/3 shows new pulmonary masses as per below.   - Will need expedited oncology follow up for ongoing eval of new pulm nodules (below)     Lung nodules, new  CT PE study 4/3 shows 3.8cm heterogeneous masslike opacity abutting the left uppr lobe anteriorly & medially which is new since previous exam (1/2025). Could be related to pneumonia, however, neoplasm cannot be excluded. 0.8cm indeterminate left upper lobe pulmonary nodule  "also new since previous exam 1/2025.   - Follow up with oncology for ongoing monitoring & management decisions     Hyponatremia  Na 130 in ER. Likely 2/2 acute illness. Not clinically significant.   Resolved      Transaminitis  Mild, acute. AST 53, ALT 71.  Could be related to sepsis, above.   Improved.      Hypertension  Normotensive on presentation.   - Continue PTA lisinopril 40mg daily   - Continue PTA amlodipine 5mg daily   - Continue PTA metoprolol tartrate 25mg BID     Pulmonary hypertension  Pulmonology notes likely pulmonary HTN with RVP 55mmHg plus RAP on 2019 ECHO, dilated RV but normal RV EF. Does not appear to follow regularly with cardiology.      Thrombocytosis  Acute. Platelets baseline ~350, platelets on presentation 717. Probably hemo-concentrated from acute infection, above.   Rx as above and follow     Anemia  Normocytic, chronic. Baseline hemoglobin 11.5-12.0, hemoglobin on presentation 12.0.   Previously though to be chemo-induced.      Tobacco abuse, in remission  Quit Aug 2021.        Diet: Regular Diet Adult    DVT Prophylaxis: Ambulate every shift  Miller Catheter: Not present  Code Status: No CPR- Do NOT Intubate      Medically Ready for Discharge: Anticipated in 2-4 Days        CANDICE HARRIS MD   Pg [phone]        ROS:  As described in A/P and Exam.  Otherwise ALL are  negative.    PHYSICAL EXAM:  All vitals have been reviewed    Blood pressure 131/86, pulse 110, temperature 98.4  F (36.9  C), temperature source Axillary, resp. rate 21, height 1.626 m (5' 4\"), weight 51.8 kg (114 lb 3.2 oz), SpO2 (!) 90%.    I/O this shift:  In: 100 [P.O.:100]  Out: 750 [Urine:750]    GENERAL APPEARANCE: ill appearing, alert and in mild resp distress  EYES: conjunctiva clear, eyes grossly normal  HENT: external ears and nose normal   RESP: lungs-coarse B BS-diffuse  wheezes  CV: regular rate and rhythm, normal S1 S2, no S3 or S4 and no murmur, click or rub   ABDOMEN: soft, nontender, no HSM or masses " and bowel sounds normal  MS: no clubbing, cyanosis; no edema  SKIN: clear without significant rashes or lesions  NEURO: -non-focal moves all 4 extr    ROUTINE  LABS (Last four results)  CMP  Recent Labs   Lab 04/06/25 0551 04/05/25  1126 04/05/25  0743 04/05/25  0536 04/04/25  1123 04/04/25  0532 04/03/25  1210     --   --  135  --  133* 130*   POTASSIUM 4.2  --   --  4.6  --  4.4 4.4   CHLORIDE 103  --   --  102  --  99 92*   CO2 25  --   --  21*  --  22 26   ANIONGAP 11  --   --  12  --  12 12   GLC 92 124* 116* 105*   < > 108* 92   BUN 22.5*  --   --  24.4*  --  21.8* 27.3*   CR 0.69  --   --  0.82  --  0.60* 0.92   GFRESTIMATED >90  --   --  >90  --  >90 >90   MIKE 9.0  --   --  8.8  --  8.5* 9.3   PROTTOTAL  --   --   --   --   --  6.2* 7.3   ALBUMIN  --   --   --   --   --  2.8* 3.2*   BILITOTAL  --   --   --   --   --  <0.2 0.3   ALKPHOS  --   --   --   --   --  89 89   AST  --   --   --   --   --  20 53*   ALT  --   --   --   --   --  45 71*    < > = values in this interval not displayed.     CBC  Recent Labs   Lab 04/06/25 0551 04/05/25  0536 04/04/25  0532 04/03/25  1210   WBC 33.1* 35.9* 27.2* 28.8*   RBC 3.55* 3.46* 3.44* 4.06*   HGB 10.5* 10.3* 10.0* 12.0*   HCT 34.8* 33.4* 32.9* 38.2*   MCV 98 97 96 94   MCH 29.6 29.8 29.1 29.6   MCHC 30.2* 30.8* 30.4* 31.4*   RDW 14.6 14.6 14.5 14.3   * 638* 627* 717*     INRNo lab results found in last 7 days.  Arterial Blood Gas  Recent Labs   Lab 04/04/25  0700 04/03/25  1223   O2PER 100 2       Recent Results (from the past 24 hours)   XR Chest Port 1 View    Narrative    EXAM: XR CHEST PORT 1 VIEW  LOCATION: [facility]  DATE: 4/5/2025    INDICATION: Eval pneumothorax; worsening shortness of breath.  COMPARISON: 4/5/2025.      Impression    IMPRESSION: Small bore right chest tube has changed position since the prior study and is no longer located in the pleural space. Large right pneumothorax has increased in size.  Borderline slight mediastinal shift of structures to the left. Heart size   within normal limits. Dense consolidation/infiltrate throughout much of the right lung which is partially collapsed. Minimal infiltrate versus atelectasis left lung base. Large amount of subcutaneous emphysema right hemithorax, increased from previous.    Findings called to Dr. Torrez 4/5/2025 10:36 PM CDT.   XR Chest Port 1 View    Narrative    EXAM: XR CHEST PORT 1 VIEW  LOCATION: [facility]  DATE: 4/5/2025    INDICATION: chest tube placement  COMPARISON: Earlier today at 2213 hours      Impression    IMPRESSION: Percutaneous pigtail drain has now been successfully replaced into the right pleural space with significant decrease size in the right pneumothorax. Only a small residual apical pneumothorax persists. Dense consolidation right midlung   unchanged. Left lung grossly clear with presumed nipple shadow visible. Heart size normal. Extensive right-sided subcutaneous emphysema unchanged.

## 2025-04-06 NOTE — SIGNIFICANT EVENT
Significant Event Note    Notified by RN that patient more short of breath, tachycardic, increased oxygen requirements  CXR showing pigtail out of the pleural space and large right pneumothorax  Spoke with RN at 2229 regarding these findings  ED provider removed prior chest tube and placed new one  Repeat CXR showing only small residual apical pneumothorax; oxygen sat improved    TRACEE CALDERA DO

## 2025-04-06 NOTE — ANESTHESIA CARE TRANSFER NOTE
Patient: Luis Hernandez    Procedure: * No procedures listed *       Diagnosis: * No pre-op diagnosis entered *  Diagnosis Additional Information: No value filed.    Anesthesia Type:   No value filed.     Note:    Oropharynx: oropharynx clear of all foreign objects  Level of Consciousness: drowsy and unresponsive  Oxygen Supplementation: face mask  Level of Supplemental Oxygen (L/min / FiO2): 10  Independent Airway: airway patency satisfactory and stable  Dentition: dentition unchanged  Vital Signs Stable: post-procedure vital signs reviewed and stable  Report to RN Given: handoff report given  Patient transferred to: ICU    ICU Handoff: Call for PAUSE to initiate/utilize ICU HANDOFF, Identified Patient, Identified Responsible Provider, Reviewed the Pertinent Medical History, Discussed Surgical Course, Reviewed Intra-OP Anesthesia Management and Issues during Anesthesia, Set Expectations for Post Procedure Period and Allowed Opportunity for Questions and Acknowledgement of Understanding      Vitals:  Vitals Value Taken Time   /94 04/05/25 2258   Temp     Pulse 115 04/05/25 2312   Resp 21 04/05/25 2312   SpO2 92 % 04/05/25 2312   Vitals shown include unfiled device data.    Electronically Signed By: CHITO Hopkins CRNA  April 5, 2025  11:13 PM

## 2025-04-06 NOTE — PROVIDER NOTIFICATION
At 2250 Torrez notified of patient having increased SOB, crepitus on posterior and anterior shoulder, with air leak on inspiration and expiration. Earlier noted air leak with only inspiration breaths. Was on 3LNC, then increased to 6L NC. PRN Ativan given for patient restlessness and SOB. Stat X-ray ordered. Radiologist gave results to Surgeon on call- Fany Soares called and wanted MD in ED to emergently place new chest tube due to dislodgment of current chest tube. Dr. Espino placed new right chest tube and anesthesia at bedside to help with sedation and airway. X-ray ordered and confirmed new placement. Patient remains restless and is on 15L Oxymask with a sitter at bedside.

## 2025-04-06 NOTE — PROGRESS NOTES
Pt had increase in Hypoxia due to infiltrate increase in right lung. Started on HFNC  95% 45 lpm Spo2 increased from low 80's to 93-95% Did not want Bipap but was ok with HFNC.Will wean as needed and continue to monitor closely.

## 2025-04-06 NOTE — PROGRESS NOTES
End Of Shift Note     Situation: Admitted due to pneumonia with history of lung cancer, currently on keytruda treatments     Plan: Monitor respiratory status after chest tube re- placement last evening approx 2230. Monitor closely for resp changes, may need thoracic surgery if pneumo can't resolve in a couple days.      Subjective/Objective:     Neuro: WDL-sleepy from Ativan     Cardiac: ST on monitor 120's -140's, getting scheduled Metoprolol.. Per report, his baseline is approx 100 bpm.     Resp: wheezing/coarse right side intermittently, coarse RUL. Better air movement/comfort after replacement, but RR continues to be 24 belly breathing.      GI/: Uses urinal for adequate amts. Poor appetite, intermittent nausea, drinks fluids and ensure well.     Endo: ICU protocol blood sugars being monitored and stable     MSK: WDL; SBA to use urinal due to equipment and to prevent tugging on chest tube     Skin: WDL     LDAs: PIV saline locked/ABX.    PAIN:  Oxycodone for right sided chest wall pain

## 2025-04-06 NOTE — ANESTHESIA PREPROCEDURE EVALUATION
Anesthesia Pre-Procedure Evaluation    Patient: Luis Hernandez   MRN: [id number] : 1965        Procedure : * No procedures listed *          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)     COPD exacerbation (H) 2019    COPD exacerbation (H) 2020    Erectile dysfunction     Hypertension     Hyponatremia 2019    Pneumonia 04/10/2020      Past Surgical History:   Procedure Laterality Date    APPENDECTOMY      childhood    BRONCHOSCOPY, DILATE BRONCHUS, STENT BRONCHUS, COMBINED N/A 2024    Procedure: Bronchoscopy with tissue debulking,  and stent placement.;  Surgeon: Isac Prescott MD;  Location: [facility] OR    BRONCHOSCOPY, DILATE BRONCHUS, STENT BRONCHUS, COMBINED N/A 11/15/2024    Procedure: BRONCHOSCOPY, RIGID and flexible, stent removal;  Surgeon: Scout Wells MD;  Location: [facility] OR    ENDOBRONCHIAL ULTRASOUND FLEXIBLE N/A 2024    Procedure: Endobronchial ultrasound with Endobronchial and Cryo biopsy.;  Surgeon: Isac Prescott MD;  Location: [facility] OR      Allergies   Allergen Reactions    Hctz Other (See Comments)     He has hyponatremia - avoid use    Penicillins Other (See Comments) and Unknown     Childhood reaction.      Social History     Tobacco Use    Smoking status: Former     Current packs/day: 0.00     Average packs/day: 2.0 packs/day for 30.0 years (60.0 ttl pk-yrs)     Types: Cigarettes     Start date: 1991     Quit date: 2021     Years since quitting: 3.6    Smokeless tobacco: Former   Substance Use Topics    Alcohol use: Yes     Comment: rare      Wt Readings from Last 1 Encounters:   25 51.7 kg (113 lb 15.7 oz)         Anesthesia Evaluation            ROS/MED HX  ENT/Pulmonary:     (+)                         moderate,  COPD,    recent URI,          Neurologic:       Cardiovascular:     (+)  hypertension- -   -  - -                                pulmonary hypertension, Previous cardiac testing   Echo: Date: 4/24 Results:  Interpretation Summary     1. The left ventricle is normal in size. Proximal septal thickening is noted.  Hyperdynamic left ventricular function. The visual ejection fraction is 65-  70%. No regional wall motion abnormalities noted.  2. The right ventricle is normal size. The right ventricular systolic function  is normal.  3. There is moderate (2+) tricuspid regurgitation. Right ventricular systolic  pressure is elevated, consistent with severe pulmonary hypertension.  4. No pericardial effusion.  5. In comparison to the previous report dated 04/01/2019, the findings are  similar.  ______________________________________________________________________________  Left Ventricle  The left ventricle is normal in size. Proximal septal thickening is noted.  Hyperdynamic left ventricular function. The visual ejection fraction is 65-  70%. No regional wall motion abnormalities noted.     Right Ventricle  The right ventricle is normal size. The right ventricular systolic function is  normal.     Atria  Normal left atrial size. Right atrial size is normal. There is no color  Doppler evidence of an atrial shunt.     Mitral Valve  There is trace mitral regurgitation.     Tricuspid Valve  There is moderate (2+) tricuspid regurgitation. The right ventricular systolic  pressure is approximated at 53.3 mmHg plus the right atrial pressure. IVC  diameter <2.1 cm collapsing >50% with sniff suggests a normal RA pressure of 3  mmHg. Right ventricular systolic pressure is elevated, consistent with severe  pulmonary hypertension.     Aortic Valve  The aortic valve is trileaflet. No aortic regurgitation is present. No  aortic  stenosis is present.     Pulmonic Valve  There is no pulmonic valvular stenosis.     Vessels  The inferior vena cava is normal.     Pericardium  There is no pericardial effusion.     Rhythm  The rhythm was sinus tachycardia.    Stress Test:  Date: Results:    ECG Reviewed:  Date: 4/24 Results:  Sinus tachycardia with Premature supraventricular complexes   Rightward axis   Abnormal ECG     Cath:  Date: Results:      METS/Exercise Tolerance:     Hematologic:       Musculoskeletal:       GI/Hepatic:       Renal/Genitourinary:       Endo:       Psychiatric/Substance Use:       Infectious Disease:       Malignancy:       Other:            Physical Exam    Airway   unable to assess          Respiratory Devices and Support         Dental    unable to assess        Cardiovascular          Rhythm and rate: regular and tachycardia     Pulmonary    Unable to assess               OUTSIDE LABS:  CBC:   Lab Results   Component Value Date    WBC 35.9 (H) 04/05/2025    WBC 27.2 (H) 04/04/2025    HGB 10.3 (L) 04/05/2025    HGB 10.0 (L) 04/04/2025    HCT 33.4 (L) 04/05/2025    HCT 32.9 (L) 04/04/2025     (H) 04/05/2025     (H) 04/04/2025     BMP:   Lab Results   Component Value Date     04/05/2025     (L) 04/04/2025    POTASSIUM 4.6 04/05/2025    POTASSIUM 4.4 04/04/2025    CHLORIDE 102 04/05/2025    CHLORIDE 99 04/04/2025    CO2 21 (L) 04/05/2025    CO2 22 04/04/2025    BUN 24.4 (H) 04/05/2025    BUN 21.8 (H) 04/04/2025    CR 0.82 04/05/2025    CR 0.60 (L) 04/04/2025     (H) 04/05/2025     (H) 04/05/2025     COAGS:   Lab Results   Component Value Date    PTT 31 01/07/2014    INR 0.94 01/07/2024     POC:   Lab Results   Component Value Date     (H) 06/30/2021     HEPATIC:   Lab Results   Component Value Date    ALBUMIN 2.8 (L) 04/04/2025    PROTTOTAL 6.2 (L) 04/04/2025    ALT 45 04/04/2025    AST 20 04/04/2025    ALKPHOS 89 04/04/2025    BILITOTAL <0.2 04/04/2025     OTHER:   Lab  Results   Component Value Date    PH 7.32 (L) 04/26/2022    LACT 3.1 (H) 04/04/2025    MIKE 8.8 04/05/2025    PHOS 3.4 05/01/2024    MAG 2.1 12/23/2024    LIPASE 210 04/22/2022    TSH 0.18 (L) 02/26/2025    T4 1.53 02/26/2025    CRP <2.9 04/24/2022       Anesthesia Plan    ASA Status:  3    NPO Status:  ELEVATED Aspiration Risk/Unknown    Anesthesia Type: General.     - Airway: Native airway   Induction: Intravenous, Propofol.   Maintenance: TIVA.        Consents    Anesthesia Plan(s) and associated risks, benefits, and realistic alternatives discussed. Questions answered and patient/representative(s) expressed understanding.     - Discussed:     - Discussed with:  Implied consent/emergency      - Extended Intubation/Ventilatory Support Discussed: No.      - Patient is DNR/DNI Status: No     Use of blood products discussed: No .     Postoperative Care            Comments:               CHITO Hopkins CRNA    Clinically Significant Risk Factors         # Hyponatremia: Lowest Na = 133 mmol/L in last 2 days, will monitor as appropriate       # Hypoalbuminemia: Lowest albumin = 2.8 g/dL at 4/4/2025  5:32 AM, will monitor as appropriate     # Hypertension: Noted on problem list     # Acute Hypercapnic Respiratory Failure: based on venous blood gas results.  Continue supplemental oxygen and ventilatory support as indicated.             # Financial/Environmental Concerns: none  # COPD: noted on problem list

## 2025-04-07 ENCOUNTER — APPOINTMENT (OUTPATIENT)
Dept: GENERAL RADIOLOGY | Facility: CLINIC | Age: 60
End: 2025-04-07
Attending: INTERNAL MEDICINE
Payer: COMMERCIAL

## 2025-04-07 LAB
ANION GAP SERPL CALCULATED.3IONS-SCNC: 9 MMOL/L (ref 7–15)
BASOPHILS # BLD AUTO: 0 10E3/UL (ref 0–0.2)
BASOPHILS NFR BLD AUTO: 0 %
BUN SERPL-MCNC: 17.9 MG/DL (ref 6–20)
CALCIUM SERPL-MCNC: 9 MG/DL (ref 8.8–10.4)
CHLORIDE SERPL-SCNC: 100 MMOL/L (ref 98–107)
CREAT SERPL-MCNC: 0.55 MG/DL (ref 0.67–1.17)
EGFRCR SERPLBLD CKD-EPI 2021: >90 ML/MIN/1.73M2
EOSINOPHIL # BLD AUTO: 0 10E3/UL (ref 0–0.7)
EOSINOPHIL NFR BLD AUTO: 0 %
ERYTHROCYTE [DISTWIDTH] IN BLOOD BY AUTOMATED COUNT: 14.6 % (ref 10–15)
GLUCOSE BLDC GLUCOMTR-MCNC: 95 MG/DL (ref 70–99)
GLUCOSE SERPL-MCNC: 103 MG/DL (ref 70–99)
HCO3 SERPL-SCNC: 30 MMOL/L (ref 22–29)
HCT VFR BLD AUTO: 35.6 % (ref 40–53)
HGB BLD-MCNC: 10.6 G/DL (ref 13.3–17.7)
IMM GRANULOCYTES # BLD: 0.2 10E3/UL
IMM GRANULOCYTES NFR BLD: 1 %
L PNEUMO1 AG UR QL IA: NEGATIVE
LYMPHOCYTES # BLD AUTO: 0.3 10E3/UL (ref 0.8–5.3)
LYMPHOCYTES NFR BLD AUTO: 1 %
MCH RBC QN AUTO: 29.4 PG (ref 26.5–33)
MCHC RBC AUTO-ENTMCNC: 29.8 G/DL (ref 31.5–36.5)
MCV RBC AUTO: 99 FL (ref 78–100)
MONOCYTES # BLD AUTO: 1.8 10E3/UL (ref 0–1.3)
MONOCYTES NFR BLD AUTO: 6 %
NEUTROPHILS # BLD AUTO: 25.2 10E3/UL (ref 1.6–8.3)
NEUTROPHILS NFR BLD AUTO: 92 %
NRBC # BLD AUTO: 0 10E3/UL
NRBC BLD AUTO-RTO: 0 /100
PLAT MORPH BLD: NORMAL
PLATELET # BLD AUTO: 624 10E3/UL (ref 150–450)
POTASSIUM SERPL-SCNC: 3.9 MMOL/L (ref 3.4–5.3)
PROCALCITONIN SERPL IA-MCNC: 2.23 NG/ML
RBC # BLD AUTO: 3.6 10E6/UL (ref 4.4–5.9)
RBC MORPH BLD: NORMAL
S PNEUM AG SPEC QL: NEGATIVE
SODIUM SERPL-SCNC: 139 MMOL/L (ref 135–145)
SPECIMEN TYPE: NORMAL
WBC # BLD AUTO: 27.5 10E3/UL (ref 4–11)

## 2025-04-07 PROCEDURE — 84145 PROCALCITONIN (PCT): CPT | Performed by: INTERNAL MEDICINE

## 2025-04-07 PROCEDURE — 71045 X-RAY EXAM CHEST 1 VIEW: CPT

## 2025-04-07 PROCEDURE — 250N000011 HC RX IP 250 OP 636: Mod: JZ

## 2025-04-07 PROCEDURE — 250N000012 HC RX MED GY IP 250 OP 636 PS 637: Performed by: STUDENT IN AN ORGANIZED HEALTH CARE EDUCATION/TRAINING PROGRAM

## 2025-04-07 PROCEDURE — 120N000004 HC R&B MS OVERFLOW

## 2025-04-07 PROCEDURE — 99222 1ST HOSP IP/OBS MODERATE 55: CPT | Performed by: NURSE PRACTITIONER

## 2025-04-07 PROCEDURE — 36415 COLL VENOUS BLD VENIPUNCTURE: CPT | Performed by: INTERNAL MEDICINE

## 2025-04-07 PROCEDURE — 250N000012 HC RX MED GY IP 250 OP 636 PS 637

## 2025-04-07 PROCEDURE — 250N000013 HC RX MED GY IP 250 OP 250 PS 637

## 2025-04-07 PROCEDURE — 85025 COMPLETE CBC W/AUTO DIFF WBC: CPT | Performed by: INTERNAL MEDICINE

## 2025-04-07 PROCEDURE — 999N000157 HC STATISTIC RCP TIME EA 10 MIN

## 2025-04-07 PROCEDURE — 250N000011 HC RX IP 250 OP 636: Mod: JZ | Performed by: STUDENT IN AN ORGANIZED HEALTH CARE EDUCATION/TRAINING PROGRAM

## 2025-04-07 PROCEDURE — 80048 BASIC METABOLIC PNL TOTAL CA: CPT | Performed by: INTERNAL MEDICINE

## 2025-04-07 PROCEDURE — 87899 AGENT NOS ASSAY W/OPTIC: CPT | Performed by: INTERNAL MEDICINE

## 2025-04-07 PROCEDURE — 250N000013 HC RX MED GY IP 250 OP 250 PS 637: Performed by: INTERNAL MEDICINE

## 2025-04-07 PROCEDURE — 99233 SBSQ HOSP IP/OBS HIGH 50: CPT | Performed by: STUDENT IN AN ORGANIZED HEALTH CARE EDUCATION/TRAINING PROGRAM

## 2025-04-07 PROCEDURE — 250N000009 HC RX 250: Performed by: STUDENT IN AN ORGANIZED HEALTH CARE EDUCATION/TRAINING PROGRAM

## 2025-04-07 PROCEDURE — 250N000011 HC RX IP 250 OP 636: Mod: JZ | Performed by: INTERNAL MEDICINE

## 2025-04-07 RX ORDER — ATROPINE SULFATE 10 MG/ML
2 SOLUTION/ DROPS OPHTHALMIC EVERY 4 HOURS PRN
Status: DISCONTINUED | OUTPATIENT
Start: 2025-04-07 | End: 2025-04-07

## 2025-04-07 RX ORDER — MORPHINE SULFATE 10 MG/5ML
5 SOLUTION ORAL
Status: DISCONTINUED | OUTPATIENT
Start: 2025-04-07 | End: 2025-04-08

## 2025-04-07 RX ORDER — ATROPINE SULFATE 10 MG/ML
2 SOLUTION/ DROPS OPHTHALMIC EVERY 4 HOURS PRN
Status: DISCONTINUED | OUTPATIENT
Start: 2025-04-07 | End: 2025-04-10 | Stop reason: HOSPADM

## 2025-04-07 RX ORDER — NALOXONE HYDROCHLORIDE 0.4 MG/ML
0.2 INJECTION, SOLUTION INTRAMUSCULAR; INTRAVENOUS; SUBCUTANEOUS
Status: DISCONTINUED | OUTPATIENT
Start: 2025-04-07 | End: 2025-04-10

## 2025-04-07 RX ORDER — PREDNISONE 20 MG/1
20 TABLET ORAL DAILY
Status: DISCONTINUED | OUTPATIENT
Start: 2025-04-07 | End: 2025-04-10 | Stop reason: HOSPADM

## 2025-04-07 RX ORDER — MORPHINE SULFATE 2 MG/ML
2 INJECTION, SOLUTION INTRAMUSCULAR; INTRAVENOUS
Status: DISCONTINUED | OUTPATIENT
Start: 2025-04-07 | End: 2025-04-09

## 2025-04-07 RX ORDER — MORPHINE SULFATE 2 MG/ML
2 INJECTION, SOLUTION INTRAMUSCULAR; INTRAVENOUS EVERY 6 HOURS
Status: DISCONTINUED | OUTPATIENT
Start: 2025-04-07 | End: 2025-04-08

## 2025-04-07 RX ORDER — MORPHINE SULFATE 2 MG/ML
1-2 INJECTION, SOLUTION INTRAMUSCULAR; INTRAVENOUS
Status: DISCONTINUED | OUTPATIENT
Start: 2025-04-07 | End: 2025-04-09

## 2025-04-07 RX ORDER — MORPHINE SULFATE 10 MG/5ML
10 SOLUTION ORAL
Status: DISCONTINUED | OUTPATIENT
Start: 2025-04-07 | End: 2025-04-08

## 2025-04-07 RX ORDER — CARBOXYMETHYLCELLULOSE SODIUM 5 MG/ML
1-2 SOLUTION/ DROPS OPHTHALMIC
Status: DISCONTINUED | OUTPATIENT
Start: 2025-04-07 | End: 2025-04-07

## 2025-04-07 RX ORDER — OLANZAPINE 5 MG/1
5 TABLET, ORALLY DISINTEGRATING ORAL EVERY 6 HOURS PRN
Status: DISCONTINUED | OUTPATIENT
Start: 2025-04-07 | End: 2025-04-10 | Stop reason: HOSPADM

## 2025-04-07 RX ORDER — MORPHINE SULFATE 2 MG/ML
1 INJECTION, SOLUTION INTRAMUSCULAR; INTRAVENOUS
Status: DISCONTINUED | OUTPATIENT
Start: 2025-04-07 | End: 2025-04-08

## 2025-04-07 RX ORDER — NALOXONE HYDROCHLORIDE 0.4 MG/ML
0.1 INJECTION, SOLUTION INTRAMUSCULAR; INTRAVENOUS; SUBCUTANEOUS
Status: DISCONTINUED | OUTPATIENT
Start: 2025-04-07 | End: 2025-04-10

## 2025-04-07 RX ORDER — GLYCOPYRROLATE 0.2 MG/ML
0.2 INJECTION, SOLUTION INTRAMUSCULAR; INTRAVENOUS EVERY 4 HOURS PRN
Status: DISCONTINUED | OUTPATIENT
Start: 2025-04-07 | End: 2025-04-10 | Stop reason: HOSPADM

## 2025-04-07 RX ORDER — MINERAL OIL/HYDROPHIL PETROLAT
OINTMENT (GRAM) TOPICAL
Status: DISCONTINUED | OUTPATIENT
Start: 2025-04-07 | End: 2025-04-10

## 2025-04-07 RX ORDER — BISACODYL 10 MG
10 SUPPOSITORY, RECTAL RECTAL
Status: DISCONTINUED | OUTPATIENT
Start: 2025-04-10 | End: 2025-04-07

## 2025-04-07 RX ORDER — SENNOSIDES 8.6 MG
1 TABLET ORAL 2 TIMES DAILY PRN
Status: DISCONTINUED | OUTPATIENT
Start: 2025-04-07 | End: 2025-04-10

## 2025-04-07 RX ADMIN — OXYCODONE 5 MG: 5 TABLET ORAL at 00:11

## 2025-04-07 RX ADMIN — MORPHINE SULFATE 2 MG: 2 INJECTION, SOLUTION INTRAMUSCULAR; INTRAVENOUS at 04:17

## 2025-04-07 RX ADMIN — MORPHINE SULFATE 2 MG: 2 INJECTION, SOLUTION INTRAMUSCULAR; INTRAVENOUS at 23:15

## 2025-04-07 RX ADMIN — GUAIFENESIN 1200 MG: 600 TABLET ORAL at 07:54

## 2025-04-07 RX ADMIN — MORPHINE SULFATE 2 MG: 2 INJECTION, SOLUTION INTRAMUSCULAR; INTRAVENOUS at 00:52

## 2025-04-07 RX ADMIN — ONDANSETRON 4 MG: 2 INJECTION INTRAMUSCULAR; INTRAVENOUS at 00:25

## 2025-04-07 RX ADMIN — MORPHINE SULFATE 2 MG: 2 INJECTION, SOLUTION INTRAMUSCULAR; INTRAVENOUS at 06:27

## 2025-04-07 RX ADMIN — ONDANSETRON 4 MG: 2 INJECTION INTRAMUSCULAR; INTRAVENOUS at 21:20

## 2025-04-07 RX ADMIN — OXYCODONE 5 MG: 5 TABLET ORAL at 06:27

## 2025-04-07 RX ADMIN — AMLODIPINE BESYLATE 5 MG: 5 TABLET ORAL at 07:54

## 2025-04-07 RX ADMIN — MORPHINE SULFATE 2 MG: 2 INJECTION, SOLUTION INTRAMUSCULAR; INTRAVENOUS at 09:21

## 2025-04-07 RX ADMIN — VANCOMYCIN HYDROCHLORIDE 1000 MG: 1 INJECTION, SOLUTION INTRAVENOUS at 04:17

## 2025-04-07 RX ADMIN — PIPERACILLIN AND TAZOBACTAM 4.5 G: 4; .5 INJECTION, POWDER, FOR SOLUTION INTRAVENOUS; PARENTERAL at 00:11

## 2025-04-07 RX ADMIN — OXYCODONE 5 MG: 5 TABLET ORAL at 09:11

## 2025-04-07 RX ADMIN — MORPHINE SULFATE 1 MG: 2 INJECTION, SOLUTION INTRAMUSCULAR; INTRAVENOUS at 22:02

## 2025-04-07 RX ADMIN — PREDNISONE 20 MG: 20 TABLET ORAL at 09:21

## 2025-04-07 RX ADMIN — PIPERACILLIN AND TAZOBACTAM 4.5 G: 4; .5 INJECTION, POWDER, FOR SOLUTION INTRAVENOUS; PARENTERAL at 06:23

## 2025-04-07 RX ADMIN — PREDNISONE 40 MG: 20 TABLET ORAL at 07:54

## 2025-04-07 RX ADMIN — MORPHINE SULFATE 2 MG: 2 INJECTION, SOLUTION INTRAMUSCULAR; INTRAVENOUS at 19:47

## 2025-04-07 RX ADMIN — ONDANSETRON 4 MG: 2 INJECTION INTRAMUSCULAR; INTRAVENOUS at 08:09

## 2025-04-07 RX ADMIN — ALBUTEROL SULFATE 2 PUFF: 90 INHALANT RESPIRATORY (INHALATION) at 09:14

## 2025-04-07 RX ADMIN — MORPHINE SULFATE 2 MG: 2 INJECTION, SOLUTION INTRAMUSCULAR; INTRAVENOUS at 07:55

## 2025-04-07 RX ADMIN — ATROPINE SULFATE 2 DROP: 10 SOLUTION/ DROPS OPHTHALMIC at 22:31

## 2025-04-07 RX ADMIN — LISINOPRIL 40 MG: 40 TABLET ORAL at 07:54

## 2025-04-07 RX ADMIN — METOPROLOL TARTRATE 25 MG: 25 TABLET, FILM COATED ORAL at 07:54

## 2025-04-07 RX ADMIN — MORPHINE SULFATE 2 MG: 2 INJECTION, SOLUTION INTRAMUSCULAR; INTRAVENOUS at 20:56

## 2025-04-07 RX ADMIN — PROCHLORPERAZINE EDISYLATE 10 MG: 5 INJECTION INTRAMUSCULAR; INTRAVENOUS at 09:47

## 2025-04-07 RX ADMIN — ROFLUMILAST 500 MCG: 500 TABLET ORAL at 09:14

## 2025-04-07 ASSESSMENT — ACTIVITIES OF DAILY LIVING (ADL)
ADLS_ACUITY_SCORE: 42
ADLS_ACUITY_SCORE: 41
ADLS_ACUITY_SCORE: 41
ADLS_ACUITY_SCORE: 42
ADLS_ACUITY_SCORE: 41
ADLS_ACUITY_SCORE: 42
ADLS_ACUITY_SCORE: 42
ADLS_ACUITY_SCORE: 41
ADLS_ACUITY_SCORE: 42
ADLS_ACUITY_SCORE: 41
ADLS_ACUITY_SCORE: 42
ADLS_ACUITY_SCORE: 42
ADLS_ACUITY_SCORE: 41
ADLS_ACUITY_SCORE: 41

## 2025-04-07 NOTE — PROGRESS NOTES
Home choice:    [facility]   [address] [place] [address]    Daughter: Analilia  Contact: [phone]

## 2025-04-07 NOTE — PROGRESS NOTES
Subjective:   Seen and examined at bedside.  Patient resting.      I/O last 3 completed shifts:  In: 350 [P.O.:100; IV Piggyback:250]  Out: 3350 [Urine:2550; Chest Tube:800]     No current outpatient medications on file.         ROUTINE IP LABS (Last four results)  BMP  Recent Labs   Lab 04/07/25  0659 04/07/25  0043 04/06/25  2055 04/06/25  1633 04/06/25  1052 04/06/25  0551 04/05/25  0743 04/05/25  0536 04/04/25  1123 04/04/25  0532     --   --   --   --  139  --  135  --  133*   POTASSIUM 3.9  --   --   --   --  4.2  --  4.6  --  4.4   CHLORIDE 100  --   --   --   --  103  --  102  --  99   MIKE 9.0  --   --   --   --  9.0  --  8.8  --  8.5*   CO2 30*  --   --   --   --  25  --  21*  --  22   BUN 17.9  --   --   --   --  22.5*  --  24.4*  --  21.8*   CR 0.55*  --   --   --   --  0.69  --  0.82  --  0.60*   * 95 122* 133*   < > 92   < > 105*   < > 108*    < > = values in this interval not displayed.     CBC  Recent Labs   Lab 04/07/25  0659 04/06/25  0551 04/05/25  0536 04/04/25  0532   WBC 27.5* 33.1* 35.9* 27.2*   RBC 3.60* 3.55* 3.46* 3.44*   HGB 10.6* 10.5* 10.3* 10.0*   HCT 35.6* 34.8* 33.4* 32.9*   MCV 99 98 97 96   MCH 29.4 29.6 29.8 29.1   MCHC 29.8* 30.2* 30.8* 30.4*   RDW 14.6 14.6 14.6 14.5   * 682* 638* 627*     INRNo lab results found in last 7 days.         Tcurrent: 98.7   B/P: 122/83  P: 109  R: 21        EXAM  Sleeping, appropriate  CT with small leak        A/P:   R Pneumothorax  - Continue CT to suction    2. Pneumonia    Discussed with primary team, patient DNR/DNI.  Discussion of cares ongoing.  Available and will follow peripherally.

## 2025-04-07 NOTE — PROGRESS NOTES
[facility]    Medicine Progress Note - Hospitalist Service    Date of Admission:  4/3/2025    Assessment & Plan   Luis Hernandez is a 59 year old male with past medical history of tobacco use in remission, pulmonary hypertension, squamous cell carcinoma, emphysema,  anemia, hypertension admitted on 4/3/2025 with dyspnea, pleuritic pain. His hospital course was complicated by sepsis related to pneumonia and tension pneumothorax requiring a chest tube insertion (re-insertion due to migration out of chest cavity as well). On 04/07, patient decided to transition to comfort-focused care with palliative care on board.    Prognosis: Guarded, unlikely to survive this hospitalization.    Update 04/07: Comfort-focused care. Patient was sedated when palliative care met with him but was able to talk to daughter who is in agreement with patient's plan to transition toward comfort focused care. Daughter is hoping to wait until her father wakes up before transitioning (and stopping HFNC). If patient has pain related to chest tube we can have our surgeons remove it, but otherwise okay to keep for now.     Assessment/ Plan    Goals of Care  See palliative note 04/07 04/07: Spoke with patient in AM. He was lucid. He stated that he doesn't have much quality of life in between hospitalizations. Doesn't want to keep coming to the hospital and struggle through repeat illnesses. Asked him if he wanted to focus on quality of life and he stated yes. Demonstrated capacity. Answered no to continuing antibiotics. Agreeable to talk to palliative (consulted).    - Pall consult  - Care management consulted (okay to see 04/08)    Community acquired Pneumonia  Sepsis with Acute hypoxic respiratory failure and lactic acidosis  Tension pneumothorax-resolved  Extensive subcutaneous emphysema in the right chest wall    Presented with 1wk ESPARZA, pleuritic chest pain  Septic on presentation with increased O2 requirements from  baseline. CT shows right upper & middle lobe consolidations suspicious for pneumonia. Chronic cough now with increased yellow sputum. Denies sick contacts. Known history of pseudomonas & klebsiella pneumonia April 2024. Was tachycardic (124bpm), tachypneic (29/min) & leukocytosis (WBC 28.8) Lactic acid WNL (0.8). VBG shows chronic compensated respiratory acidosis (pH 7.39, pCO2 51, bicarb 31). Influenza/ covid / RSV negative  Was started on cefepime 2g Q8hrs for CAP given history of pseudomonas/ azithromycin 500mg IV Q24hrs /IVF with lactated ringer 100/hr continuous.   Pt decompensated early 4/4 AM-acute resp distress. HR in 190s and increased work of breathing. LA was 3.1 and WBC still 27k.  Stat CXR showed large pneumo tx R side. Surgery consulted and pt had stat chest tube placed with immediate, significant relief. Pt was tx to ICU for BIPAP  HR down to 90s after chest tube placed-pt did receive one extra dose of metoprolol 25mg too.  O2 sats were stable on 2LNC. Due to intermediate  resistance of pseudomonas to cefepime, this is changed to zosyn.Blood cultures x2 on 4/3  NGTD.   CXR 4/5 AM shows R PTX and chest tube slipped out couple inches. Surgery re-adjusted tube  but repeat CXR not changed. 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 . Discussed with thoracic surgery 4/5-would watch couple days, if not improving, needs 2nd chest tube placed and or bronch. WBC was 35k on 4/5.  Pt decompensated again last night around midnight.. CXR showed chest tube not in pleural space and a new recurrent tension PTX. Was on 15L oxymask. A new chest tube was placed by ER MD with re-expansion of the lung.  Continues to have WBC 33k, PCT improved though.   Will get legionella/ strep pneumo antigens, sputum cx/ stain. Continue current antbx/ steroids/ nebs.   Continue o2 NC-wean as tolerated. Follow PCT. If not improving in next couple days, needs transfer for bronch.    ---------------------------------------------  04/07: Patient wanting to transition to comfort-focus care and possibly hospice. Agreeable to palliative care discussion.    - Palliative care consulted  - PO/IV Morphine  - Goals of care discussions     Emphysema, severe with acute exacerbation  Follows with [facility] pul[facility]ology for severe pan-lobar emphysema on 1.5L NC at baseline.   Managed PTA with roflumilast, Advair, Spiriva, albuterol inhaler, mucinex PRN, prednisone 10mg daily, and azithromycin 250mg daily.   Was started on prednisone 40mg/day and nebs-hanged to iv steroids after couple doses.   Stopped iv steroids and started prednisone 4/4. Still SOB/ coarse BS b  -continue prednisone/ nebs     Acute respiratory failure with hypoxia  Acute resp distress  2ndry to above.   Rx as above.      Squamous cell lung cancer, stage IIIB    Follows with [facility] oncology for non-operable SCC of R lung involving R mainstem s/p stent 1/18/24 & tumor debulking. S/p chemotherapy & now on maintenance pembrolizumab Q6wks; last dose 2/26/2025.     Imaging 4/3 shows new pulmonary masses as per below.      Lung nodules, new  CT PE study 4/3 shows 3.8cm heterogeneous masslike opacity abutting the left uppr lobe anteriorly & medially which is new since previous exam (1/2025). Could be related to pneumonia, however, neoplasm cannot be excluded. 0.8cm indeterminate left upper lobe pulmonary nodule also new since previous exam 1/2025.      Hyponatremia  Na 130 in ER. Likely 2/2 acute illness. Not clinically significant.   Resolved      Transaminitis  Mild, acute. AST 53, ALT 71.  Could be related to sepsis, above.   Improved.      Hypertension  Normotensive on presentation.   - Continue PTA lisinopril 40mg daily   - Continue PTA amlodipine 5mg daily   - Continue PTA metoprolol tartrate 25mg BID     Pulmonary hypertension  Pulmonology notes likely pulmonary HTN with RVP 55mmHg plus RAP on 2019 ECHO, dilated RV but normal RV EF. Does  not appear to follow regularly with cardiology.      Thrombocytosis  Acute. Platelets baseline ~350, platelets on presentation 717. Probably hemo-concentrated from acute infection, above.   Rx as above and follow     Anemia  Normocytic, chronic. Baseline hemoglobin 11.5-12.0, hemoglobin on presentation 12.0.   Previously though to be chemo-induced.      Tobacco abuse, in remission  Quit Aug 2021.           Diet: Regular Diet Adult  Snacks/Supplements Adult: Ensure Enlive; With Meals    DVT Prophylaxis: Low Risk/Ambulatory with no VTE prophylaxis indicated  Miller Catheter: Not present  Lines: None     Cardiac Monitoring: ACTIVE order. Indication: ICU  Code Status: No CPR- Do NOT Intubate      Clinically Significant Risk Factors               # Hypoalbuminemia: Lowest albumin = 2.8 g/dL at 4/4/2025  5:32 AM, will monitor as appropriate     # Hypertension: Noted on problem list     # Acute Hypercapnic Respiratory Failure: based on venous blood gas results.  Continue supplemental oxygen and ventilatory support as indicated.             # Financial/Environmental Concerns: none  # COPD: noted on problem list        Social Drivers of Health    Tobacco Use: Medium Risk (4/3/2025)    Patient History     Smoking Tobacco Use: Former     Smokeless Tobacco Use: Former   Physical Activity: Inactive (8/30/2021)    Exercise Vital Sign     Days of Exercise per Week: 0 days     Minutes of Exercise per Session: 0 min   Social Connections: Unknown (8/30/2021)    Social Connection and Isolation Panel [NHANES]     Frequency of Communication with Friends and Family: Twice a week     Frequency of Social Gatherings with Friends and Family: Twice a week     Attends Mosque Services: Never     Active Member of Clubs or Organizations: No     Attends Club or Organization Meetings: Never          Disposition Plan     Medically Ready for Discharge: Anticipated Today             Gareth Briseno DO  Hospitalist Service  [facility]  [facility]  Securely message with [email or web address] (more info)  Text page via [facility] Paging/Directory   ______________________________________________________________________    Interval History   Comfort focused care    Physical Exam   Vital Signs: Temp: 97.7  F (36.5  C) Temp src: Axillary BP: 125/85 Pulse: 112   Resp: 19 SpO2: 95 % O2 Device: High Flow Nasal Cannula (HFNC) Oxygen Delivery: 45 LPM  Weight: 117 lbs 1.03 oz    Physical Exam  Constitutional:       General: Pt is not in acute distress.  HENT:      Head: Normocephalic and atraumatic.      Nose: Nose normal.   Eyes:      Conjunctiva/sclera: Conjunctivae normal.   Pulmonary:      Effort: Pulmonary effort is labored. Fast and swallow. Rhochi on Right anteriro chest.   Abdominal:      General: Abdomen is flat.   Skin:     Findings: No rash.   Neurological:      General: No focal deficit present.      Mental Status: Pt is alert.   Psychiatric:         Mood and Affect: Mood normal.       Medical Decision Making       40 MINUTES SPENT BY ME on the date of service doing chart review, history, exam, documentation & further activities per the note.      Data     I have personally reviewed the following data over the past 24 hrs:    27.5 (H)  \   10.6 (L)   / 624 (H)     139 100 17.9 /  103 (H)   3.9 30 (H) 0.55 (L) \     Procal: 2.23 (H) CRP: N/A Lactic Acid: N/A         Imaging results reviewed over the past 24 hrs:   Recent Results (from the past 24 hours)   XR Chest Port 1 View    Narrative    EXAM: XR CHEST PORT 1 VIEW  LOCATION: [facility]  DATE: 4/7/2025    INDICATION: f up infiltrate   PTX  COMPARISON: 4/6/2025.      Impression    IMPRESSION: The right-sided chest tube is unchanged in position. There is a small right-sided pneumothorax, not significantly changed compared to prior exam. Dense consolidations again seen in the right midlung zone with slight improvement of the   aeration of the right lung base compared with prior study.  Remainder of the exam is not significant changed.

## 2025-04-07 NOTE — PLAN OF CARE
Patient sats remained >90% on HFNC without change to settings.  45L and 65%.  He does get short of breath with any movement.  Continues to have chest crepitus that is unchanged.  Loose productive cough.  LS remain CS with wheezes.  Mentation remains oriented.  Pain controlled with PO oxycodone and IV morphine.  Has been voiding in the urinal in bed with assistance.  No other needs at this time.  Resting between cares.      Problem: ARDS (Acute Respiratory Distress Syndrome)  Goal: Effective Oxygenation  Outcome: Not Progressing  Intervention: Optimize Oxygenation, Ventilation and Perfusion  Recent Flowsheet Documentation  Taken 4/7/2025 0400 by Cheryl Fuller, RN  Head of Bed (HOB) Positioning: HOB at 30-45 degrees  Taken 4/7/2025 0006 by Cheryl Fuller RN  Head of Bed (HOB) Positioning: HOB at 30-45 degrees     Problem: Risk for Delirium  Goal: Improved Behavioral Control  Outcome: Progressing  Intervention: Minimize Safety Risk  Recent Flowsheet Documentation  Taken 4/7/2025 0400 by Cheryl Fuller RN  Enhanced Safety Measures: pain management  Trust Relationship/Rapport:   care explained   choices provided   thoughts/feelings acknowledged  Taken 4/7/2025 0006 by Cheryl Fuller RN  Enhanced Safety Measures: pain management  Trust Relationship/Rapport:   care explained   choices provided   thoughts/feelings acknowledged   Goal Outcome Evaluation:

## 2025-04-07 NOTE — CONSULTS
Palliative Care Consultation      Patient ID/Chief Complaint: Luis Hernandez 59 year old male being seen for palliative care consultation at the request of hospitalist secondary to goals of care/symptom management.   The patient's primary care provider is:  Shalini Washington.       Impression & Recommendations:  59-year-old male with severe COPD, non-small cell lung cancer stage IIIb (follows with [facility] oncology, new lung nodules noted on recent CT concerning for cancer versus infectious), pulmonary hypertension, HTN, long history of tobacco use stopped 2021.    He is admitted with significant dyspnea on exertion and pleuritic chest pain.  Workup revealed tension pneumothorax with extensive subcutaneous emphysema in right chest wall, severe sepsis secondary to CAP.     Emergent chest tube was placed in the ER.  Chest tube became dislodged, required replacement.    Symptoms/recommendations/discussion:  Advance care planning:  Per his daughter, father, and sister, all are unsure if he has completed a healthcare directive.  He has been able to express his wishes clearly but was obtunded when I saw him following pain medication.  He told RN, hospitalist, and all of his family members that he primarily wants to focus on comfort measures.  His family confirms that he does not want to prolong life related to poor baseline quality of life.  Hopefully he will wake up to talk about whether to continue on high flow nasal cannula oxygen versus transition to 2 to 4 L nasal cannula as high flow nasal cannula is likely prolonging.  Family at bedside indicate they do not believe he would want to be prolonged in any way.  They say that he has tried for quite some time to cope with poor baseline health status.  Dyspnea:  Opioid pain medication simplified to morphine 2 mg IV push every 6 hours scheduled and 1 to 2 mg every 1 hour as needed  Discontinue oxycodone IR to prevent dose stacking with different onsets of  "action between IV and p.o. pain medications  Chest pain:  Same management as dyspnea    Addendum 1345: I tried to talk with Prasad again about continuing HFNC vs transitioning to NC 2-4 L to not prolong him. Mentation wasn't clear enough to advocate for himself. His daughter Analilia was present and knows antibiotics have been stopped per her Dad's previously expressed desire for comfort care. She knows he's high risk to continue to worsen and may not be able to speak for himself. She wants to continue with HFNC for now but may make transition to NC if his condition worsens or his does not regain ability to advocate for himself. She wants to give him more time to see if he is able to speak for himself.         Thank you for the opportunity to participate in the care of this patient and family. Our team: will continue to follow.   CHITO Elizalde CNP  Securely message with Vocera (more info)  Text page via [employer] Paging/Directory     History:  History gathered today from: patient, family/loved ones, medical chart, medical team members    ROS:  10 point ROS is negative unless exceptions noted below    PE: BP 91/68   Pulse 97   Temp 98.7  F (37.1  C) (Oral)   Resp 14   Ht 1.626 m (5' 4\")   Wt 53.1 kg (117 lb 1 oz)   SpO2 (!) 91%   BMI 20.09 kg/m     Wt Readings from Last 3 Encounters:   04/07/25 53.1 kg (117 lb 1 oz)   02/26/25 53.5 kg (118 lb)   02/06/25 53.5 kg (118 lb)     Gen: Sleeping very soundly after pain medication, no apparent distress  Head NCAT.  Eyes anicteric without injection  Face symmetric, grossly normal  Heart regular and rhythmic,  Lungs unlabored, no cough  Skin no rashes or lesions evident on face/neck  Neuro Face symmetric, eyes conjugate; sedated  Neuropsych no apparent distress        Data reviewed:  I reviewed electrolytes, BUN/creatinine, liver profile, hemoglobin and hematocrit, platelet count, and most recent imaging          70 minutes spent on the date of the encounter doing chart " review, history and exam, patient education & counseling, documentation and other activities as noted above.        Thank you for involving us in the patient's care.   CHITO Elizalde, [facility]  [facility] Palliative Care Service

## 2025-04-07 NOTE — PROVIDER NOTIFICATION
I was contacted regarding patient discussion with daughter confirming decision to transition to comfort cares.   Discussions were had earlier in the day, and plan was to likely transition to comfort once daughter arrived.     - Comfort care order set & transition to comfort-focused care   - Discontinue routine vitals & meds  - Transfer to med surg    Discussed with charge RN.     Ashley Mariscal PA-C

## 2025-04-07 NOTE — PROGRESS NOTES
RT PROGRESS NOTE  CURRENT HIGH FLOW SETTINGS:  LITER FLOW: 45 LPM        FIO2:   65%  PATIENT PARAMETERS:  SAT: 90-92%  HR: 110-120  RR: 16    Respiratory Medications: duo qid -refused    NOTE / SHIFT SUMMARY:   possibly comfort cares this evening.      Rosio Haro, RT

## 2025-04-07 NOTE — PLAN OF CARE
Pt A & O x 4, denies pain, increased SOA with position change and standing at bedside to use urinal, no appetite declined meals, able to make needs known, HFNC 45L 65% with O2 between 88-92%, tele with sinus tach and rare PVC. Coarse lung sounds, declined neb treatments/inhaler. Writer spoke with pt on wants/needs during his stay, he would like to be made comfortable, Medications given per MAR for SOA and nausea, palliative care came by and pt was sleeping, Seth spoke with daughter and family about treatment plans going forward. Will notify Seth as soon as pt is awake to clarify needs in person.    Pt would like to sleep for a little longer and declined neb, meal. Family left bedside, will return shortly.       Problem: ARDS (Acute Respiratory Distress Syndrome)  Goal: Effective Oxygenation  Outcome: Not Progressing  Intervention: Optimize Oxygenation, Ventilation and Perfusion  Recent Flowsheet Documentation  Taken 4/7/2025 1200 by Goldie Ramirez RN  Head of Bed (HOB) Positioning: (per pt for ease of breathing)   HOB at 60 degrees   other (see comments)  Taken 4/7/2025 0800 by Goldie Ramirez RN  Head of Bed (HOB) Positioning: (per pt for ease of breathing)   HOB at 60 degrees   other (see comments)     Problem: Risk for Delirium  Goal: Improved Sleep  Outcome: Progressing  Intervention: Promote Sleep  Recent Flowsheet Documentation  Taken 4/7/2025 1200 by Goldie Ramirez RN  Sleep/Rest Enhancement:   comfort measures   natural light exposure provided  Taken 4/7/2025 0800 by Goldie Ramirez RN  Sleep/Rest Enhancement:   comfort measures   natural light exposure provided   Goal Outcome Evaluation:

## 2025-04-07 NOTE — PROGRESS NOTES
"SPIRITUAL HEALTH SERVICES  [facility] - ICU    Referral Source: Pallative Care    - I spoke with Seth from [facility] in the [place].  He suggested a visit to Prasad and his family.    - When I arrived daughter Analilia was present along with her three children.  Kimberly, Ricardo and Stevo were \"hanging out\" waiting for their grandpa to wake up after his medication wore off.    - Analilia said that Prasad had been diagnosed with cancer about a year ago but that it was his COPD, which he has had for many years, that was giving him the most problems right now.  She said that he has come through times like this before with her dad but \"the waiting and wondering\" is the hardest part.  She has been able to visit with him yesterday some and now today.  She lives about 45 minutes away.    - I talked with Kimberly some.  She said she was \"taking it all in.\"  I asked, too, if rPasad had a oskar background.  She said he was mostly Voodoo.  She had been baptized Denominational.  She welcomed a prayer for her Dad.    - I provided prayer for Prasad and for his family.      Plan:  will remain available for any ongoing support needs during LOS.      Tito Polo M.A., [facility]  Staff Chaplain [facility]OL[facility]  Office: [phone]    "

## 2025-04-07 NOTE — PLAN OF CARE
"Goal Outcome Evaluation:    DATE & TIME: 11A-11P                 Cognitive Concerns/ Orientation : drowsy/disoriented to time  BEHAVIOR & AGGRESSION TOOL COLOR: green  ABNL VS/O2: 65% 45L HFNC  MOBILITY: bedrest  PAIN MANAGEMENT: Morphine, oxycodone PRN  DIET: regular  BOWEL/BLADDER: continent of B&B  DRAIN/DEVICES: pigtail chest tube, PIV   TELEMETRY RHYTHM: sinus tachycardia     OTHER IMPORTANT INFO:     Pt placed on HFNC after desaturations earlier (see previous chest xray), tolerating well on 65% and 45L. Dr. Montes at bedside- discussed plan of care w/patient and daughter on the phone. Pt re-iterated code status and stated understanding of current care plan- morphine added for air hunger w/relief. Daughter at bedside this afternoon- supportive of patient. Bedrest today- urinating in urinal. Loose cough. Pt states \"I just want to rest\".     Vee Sims RN on 4/6/2025 at 10:53 PM        "

## 2025-04-08 LAB
ATRIAL RATE - MUSE: 127 BPM
BACTERIA BLD CULT: NO GROWTH
BACTERIA BLD CULT: NO GROWTH
DIASTOLIC BLOOD PRESSURE - MUSE: NORMAL MMHG
INTERPRETATION ECG - MUSE: NORMAL
P AXIS - MUSE: 66 DEGREES
PR INTERVAL - MUSE: 122 MS
QRS DURATION - MUSE: 82 MS
QT - MUSE: 294 MS
QTC - MUSE: 427 MS
R AXIS - MUSE: 118 DEGREES
SYSTOLIC BLOOD PRESSURE - MUSE: NORMAL MMHG
T AXIS - MUSE: 56 DEGREES
VENTRICULAR RATE- MUSE: 127 BPM

## 2025-04-08 PROCEDURE — 99233 SBSQ HOSP IP/OBS HIGH 50: CPT | Performed by: NURSE PRACTITIONER

## 2025-04-08 PROCEDURE — 250N000011 HC RX IP 250 OP 636: Mod: JZ

## 2025-04-08 PROCEDURE — 999N000157 HC STATISTIC RCP TIME EA 10 MIN

## 2025-04-08 PROCEDURE — 250N000013 HC RX MED GY IP 250 OP 250 PS 637

## 2025-04-08 PROCEDURE — 120N000004 HC R&B MS OVERFLOW

## 2025-04-08 PROCEDURE — 99233 SBSQ HOSP IP/OBS HIGH 50: CPT | Performed by: STUDENT IN AN ORGANIZED HEALTH CARE EDUCATION/TRAINING PROGRAM

## 2025-04-08 PROCEDURE — 250N000013 HC RX MED GY IP 250 OP 250 PS 637: Performed by: NURSE PRACTITIONER

## 2025-04-08 RX ORDER — FENTANYL 12.5 UG/1
12 PATCH TRANSDERMAL
Status: DISCONTINUED | OUTPATIENT
Start: 2025-04-08 | End: 2025-04-10 | Stop reason: HOSPADM

## 2025-04-08 RX ORDER — MORPHINE SULFATE 10 MG/5ML
5 SOLUTION ORAL
Status: DISCONTINUED | OUTPATIENT
Start: 2025-04-08 | End: 2025-04-09

## 2025-04-08 RX ORDER — SENNOSIDES 8.6 MG
8.6 TABLET ORAL ONCE
Status: COMPLETED | OUTPATIENT
Start: 2025-04-08 | End: 2025-04-08

## 2025-04-08 RX ORDER — SENNOSIDES 8.6 MG
8.6 TABLET ORAL 2 TIMES DAILY
Status: DISCONTINUED | OUTPATIENT
Start: 2025-04-08 | End: 2025-04-10 | Stop reason: HOSPADM

## 2025-04-08 RX ORDER — MORPHINE SULFATE 10 MG/5ML
10 SOLUTION ORAL
Status: DISCONTINUED | OUTPATIENT
Start: 2025-04-08 | End: 2025-04-09

## 2025-04-08 RX ADMIN — PROCHLORPERAZINE EDISYLATE 10 MG: 5 INJECTION INTRAMUSCULAR; INTRAVENOUS at 06:06

## 2025-04-08 RX ADMIN — MORPHINE SULFATE 2 MG: 2 INJECTION, SOLUTION INTRAMUSCULAR; INTRAVENOUS at 15:30

## 2025-04-08 RX ADMIN — PROCHLORPERAZINE EDISYLATE 10 MG: 5 INJECTION INTRAMUSCULAR; INTRAVENOUS at 22:58

## 2025-04-08 RX ADMIN — MORPHINE SULFATE 2 MG: 2 INJECTION, SOLUTION INTRAMUSCULAR; INTRAVENOUS at 00:08

## 2025-04-08 RX ADMIN — ACETAMINOPHEN 650 MG: 325 TABLET ORAL at 11:50

## 2025-04-08 RX ADMIN — MORPHINE SULFATE 10 MG: 10 SOLUTION ORAL at 15:02

## 2025-04-08 RX ADMIN — MORPHINE SULFATE 2 MG: 2 INJECTION, SOLUTION INTRAMUSCULAR; INTRAVENOUS at 02:42

## 2025-04-08 RX ADMIN — ATROPINE SULFATE 2 DROP: 10 SOLUTION/ DROPS OPHTHALMIC at 03:45

## 2025-04-08 RX ADMIN — ONDANSETRON 4 MG: 2 INJECTION INTRAMUSCULAR; INTRAVENOUS at 21:39

## 2025-04-08 RX ADMIN — ONDANSETRON 4 MG: 2 INJECTION INTRAMUSCULAR; INTRAVENOUS at 03:40

## 2025-04-08 RX ADMIN — GLYCOPYRROLATE 0.2 MG: 0.2 INJECTION INTRAMUSCULAR; INTRAVENOUS at 04:40

## 2025-04-08 RX ADMIN — MORPHINE SULFATE 2 MG: 2 INJECTION, SOLUTION INTRAMUSCULAR; INTRAVENOUS at 04:36

## 2025-04-08 RX ADMIN — FENTANYL 1 PATCH: 12 PATCH TRANSDERMAL at 08:53

## 2025-04-08 RX ADMIN — MORPHINE SULFATE 2 MG: 2 INJECTION, SOLUTION INTRAMUSCULAR; INTRAVENOUS at 08:53

## 2025-04-08 RX ADMIN — ACETAMINOPHEN 650 MG: 325 TABLET ORAL at 18:21

## 2025-04-08 ASSESSMENT — ACTIVITIES OF DAILY LIVING (ADL)
ADLS_ACUITY_SCORE: 37
ADLS_ACUITY_SCORE: 37
ADLS_ACUITY_SCORE: 41
ADLS_ACUITY_SCORE: 37
ADLS_ACUITY_SCORE: 41
ADLS_ACUITY_SCORE: 37
ADLS_ACUITY_SCORE: 37
ADLS_ACUITY_SCORE: 41
ADLS_ACUITY_SCORE: 37
ADLS_ACUITY_SCORE: 41
ADLS_ACUITY_SCORE: 41
ADLS_ACUITY_SCORE: 37
ADLS_ACUITY_SCORE: 37
ADLS_ACUITY_SCORE: 41
ADLS_ACUITY_SCORE: 37
ADLS_ACUITY_SCORE: 37
ADLS_ACUITY_SCORE: 41
ADLS_ACUITY_SCORE: 37
ADLS_ACUITY_SCORE: 41

## 2025-04-08 NOTE — PROGRESS NOTES
"SPIRITUAL HEALTH SERVICES  [facility] - ICU    Referral Source: Patient changed to comfort cares    - Prasad was alone in his room when I arrived.  His daughter, Analilia, had just gone home to spend some time with her kids.    - Prasad was alert and carried on a clear conversation.  I introduced myself and my role.  I mentioned that I had been here yesterday when he was resting.  I gave a short introduction to Spiritual Health.  He stated that \"I think I'm OK for now\" and thanked me for stopping.    Plan:  will remain available for any ongoing support needs during LOS.    Tito Polo M.A., [facility]  Staff   [facility]  Office: [phone]    "

## 2025-04-08 NOTE — PLAN OF CARE
Pt moved to comfort care status. Now on NC 4L for comfort. CO abdominal pain 6/10 and cough pain, PRN 2 mg morphine given ~q2. Frequent, productive cough present, PRN atropine and Robinul given and have seemed to help.   Pt states being very comfortable overall, cooperative and talkative with staff. Daughter present at bedside.

## 2025-04-08 NOTE — PROGRESS NOTES
Palliative Care Consultation      Patient ID/Chief Complaint: Luis Hernandez 59 year old male being seen for palliative care consultation at the request of hospitalist secondary to goals of care/symptom management.   The patient's primary care provider is:  Shalini Washington.       Impression & Recommendations:  59-year-old male with severe COPD, non-small cell lung cancer stage IIIb (follows with [facility] oncology, new lung nodules noted on recent CT concerning for cancer versus infectious), pulmonary hypertension, HTN, long history of tobacco use stopped 2021.    He is admitted with significant dyspnea on exertion and pleuritic chest pain.  Workup revealed tension pneumothorax with extensive subcutaneous emphysema in right chest wall, severe sepsis secondary to CAP.     Emergent chest tube was placed in the ER.  Chest tube became dislodged, required replacement.    Symptoms/recommendations/discussion:  Advance care planning:  Per his daughter, father, and sister, all are unsure if he has completed a healthcare directive.  He has been able to express his wishes clearly but was obtunded when I saw him following pain medication.  He told RN, hospitalist, and all of his family members that he primarily wants to focus on comfort measures.  His family confirms that he does not want to prolong life related to poor baseline quality of life.  Hopefully he will wake up to talk about whether to continue on high flow nasal cannula oxygen versus transition to 2 to 4 L nasal cannula as high flow nasal cannula is likely prolonging.  Family at bedside indicate they do not believe he would want to be prolonged in any way.  They say that he has tried for quite some time to cope with poor baseline health status--- patient transition to comfort care on 4/7/2025  Dyspnea:  Start fentanyl 12 mcg/h weekly patch  As needed opioids include Roxanol 5 to 10 mg every 1 hour as needed, morphine IV push 1 to 2 mg every 1 hour as  "needed.  Roxanol if his preferred opioid unless very rapid relief of symptoms is necessary  Chest pain:  Same management as dyspnea  Patient transitioned to comfort care on 4/7/2025.  Today, he is awake and able to interact. He is not mentally clear---tells me he is at the [facility] and year is 1925--- but is able to participate in conversation.  Hospitalist managing chest tube.  Plan is to reevaluate tomorrow pending patient's condition--- Per discussion with patient and daughter, he does not want chest tube replaced once removed even if respiratory status worsens  Also began discussion today about hospice services outside of the hospital.  He knows he cannot be alone at home and has no one there to care for him.  Care would need to occur in LTC setting. He may meet [facility] admission criteria and will likely die within 30 days.             Thank you for the opportunity to participate in the care of this patient and family. Our team: will continue to follow.   CHITO Elizalde CNP  Securely message with Vocera (more info)  Text page via [employer] Paging/Directory     History:  History gathered today from: patient, family/loved ones, medical chart, medical team members    ROS:  10 point ROS is negative unless exceptions noted below    PE: /82   Pulse 120   Temp 97.7  F (36.5  C) (Axillary)   Resp 16   Ht 1.626 m (5' 4\")   Wt 53.1 kg (117 lb 1 oz)   SpO2 92%   BMI 20.09 kg/m     Wt Readings from Last 3 Encounters:   04/07/25 53.1 kg (117 lb 1 oz)   02/26/25 53.5 kg (118 lb)   02/06/25 53.5 kg (118 lb)     Gen: Sleeping very soundly after pain medication, no apparent distress  Head NCAT.  Eyes anicteric without injection  Face symmetric, grossly normal  Heart regular and rhythmic,  Lungs unlabored, no cough  Skin no rashes or lesions evident on face/neck  Neuro Face symmetric, eyes conjugate; sedated  Neuropsych no apparent distress        Data reviewed:  I reviewed electrolytes, " BUN/creatinine, liver profile, hemoglobin and hematocrit, platelet count, and most recent imaging          52 minutes spent on the date of the encounter doing chart review, history and exam, patient education & counseling, documentation and other activities as noted above.        Thank you for involving us in the patient's care.   CHITO Elizalde, [facility] Palliative Care Service

## 2025-04-08 NOTE — CONSULTS
Care Management Follow Up    Length of Stay (days): 5    Expected Discharge Date: 4/9/25     Concerns to be Addressed: discharge planning     Patient plan of care discussed at interdisciplinary rounds: Yes    Anticipated Discharge Disposition: TBD         Anticipated Discharge Services: TBD  Anticipated Discharge DME: TBD    Patient/family educated on Medicare website which has current facility and service quality ratings: no  Education Provided on the Discharge Plan: Yes  Patient/Family in Agreement with the Plan: yes    Referrals Placed by CM/SW: Internal Clinic Care Coordination  Private pay costs discussed: Not applicable    Discussed  Partnership in Safe Discharge Planning  document with patient/family: No     Handoff Completed: No, handoff not indicated or clinically appropriate    Additional Information:    Received Referral for discharge planning/disposition.  Care Management Initial Consult was completed 4/5/25.    The Patient lives with his father and father's girlfriend.  Prior to admission he was independent with ADLs/IADLs.  He is on home oxygen, 1.5 liters.    Patient has a history of lung cancer. He was admitted for sepsis due to pneumonia.     Per the discussion in Interdisciplinary Rounds, Palliative Care is following for goals of care/symptom management due to severe COPD and non-small cell lung cancer stage IIIb.  Patient is comfort care status.  Patient may pass away during this hospitalization.  MD has not discussed hospice.  Care Management will follow up tomorrow if appropriate.    Plan:  TBD    Next Steps:     Care Management will continue to monitor for discharge needs through daily chart reviews and Interdisciplinary Rounds.        Tati Aleman RN

## 2025-04-08 NOTE — PROGRESS NOTES
Assumed care [id number]  Neuro: alert and able to make needs known. Confused this afternoon.  Cardiac: No tele. No vitals due to comfort care status.   Respiratory: On 4L NC for comfort.  GI/: Adequate urine output. No BM this shift, stool softener offered and refused.  Diet/appetite: Tolerating regular diet. Poor apatite.   Activity:  Assist of 1, up to chair and bedside commode.  Pain: At acceptable level on current regimen. Started fentanyl patch this shift per MAR.  Skin: No new deficits noted.  LDA's: Chest tube in place with plan for surgery to remove tomorrow.     Plan: Continue with POC. Notify primary team with changes.

## 2025-04-08 NOTE — PROGRESS NOTES
[facility]    Medicine Progress Note - Hospitalist Service    Date of Admission:  4/3/2025    Assessment & Plan   Luis Hernandez is a 59 year old male with past medical history of tobacco use in remission, pulmonary hypertension, squamous cell carcinoma, emphysema,  anemia, hypertension admitted on 4/3/2025 with dyspnea, pleuritic pain. His hospital course was complicated by sepsis related to pneumonia and tension pneumothorax requiring a chest tube insertion (re-insertion due to migration out of chest cavity as well). On 04/07, patient decided to transition to comfort-focused care with palliative care on board.     Update 04/08:  12mcg/h weekly fent patch started 04/08, as needed roxanol po with prn iv morphine. Surgery to remove chest tube today vs tomorrow. May place hemlich valve if still having an air leak. He does NOT want chest tube replaced even if his respiratory status were to worsen. Care management to see patient 04/09 for possible home with hospice.      Assessment/ Plan    Goals of Care  See palliative note 04/07 04/07: Spoke with patient in AM. He was lucid. He stated that he doesn't have much quality of life in between hospitalizations. Doesn't want to keep coming to the hospital and struggle through repeat illnesses. Asked him if he wanted to focus on quality of life and he stated yes. Demonstrated capacity. Answered no to continuing antibiotics. Agreeable to talk to palliative (consulted).      - Pall consult: 12mcg/h weekly fent patch started 04/08, as needed roxanol po with prn iv morphine.  - Care management consulted (okay to see 04/09)    Community acquired Pneumonia  Sepsis with Acute hypoxic respiratory failure and lactic acidosis  Tension pneumothorax-resolved  Extensive subcutaneous emphysema in the right chest wall    Presented with 1wk ESPARZA, pleuritic chest pain  Septic on presentation with increased O2 requirements from baseline. CT shows right upper &  middle lobe consolidations suspicious for pneumonia. Chronic cough now with increased yellow sputum. Denies sick contacts. Known history of pseudomonas & klebsiella pneumonia April 2024. Was tachycardic (124bpm), tachypneic (29/min) & leukocytosis (WBC 28.8) Lactic acid WNL (0.8). VBG shows chronic compensated respiratory acidosis (pH 7.39, pCO2 51, bicarb 31). Influenza/ covid / RSV negative  Was started on cefepime 2g Q8hrs for CAP given history of pseudomonas/ azithromycin 500mg IV Q24hrs /IVF with lactated ringer 100/hr continuous.   Pt decompensated early 4/4 AM-acute resp distress. HR in 190s and increased work of breathing. LA was 3.1 and WBC still 27k.  Stat CXR showed large pneumo tx R side. Surgery consulted and pt had stat chest tube placed with immediate, significant relief. Pt was tx to ICU for BIPAP  HR down to 90s after chest tube placed-pt did receive one extra dose of metoprolol 25mg too.  O2 sats were stable on 2LNC. Due to intermediate  resistance of pseudomonas to cefepime, this is changed to zosyn.Blood cultures x2 on 4/3  NGTD.   CXR 4/5 AM shows R PTX and chest tube slipped out couple inches. Surgery re-adjusted tube  but repeat CXR not changed. 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 . Discussed with thoracic surgery 4/5-would watch couple days, if not improving, needs 2nd chest tube placed and or bronch. WBC was 35k on 4/5.  Pt decompensated again last night around midnight.. CXR showed chest tube not in pleural space and a new recurrent tension PTX. Was on 15L oxymask. A new chest tube was placed by ER MD with re-expansion of the lung.  Continues to have WBC 33k, PCT improved though.   Will get legionella/ strep pneumo antigens, sputum cx/ stain. Continue current antbx/ steroids/ nebs.   Continue o2 NC-wean as tolerated. Follow PCT. If not improving in next couple days, needs transfer for bronch.    ---------------------------------------------  04/07: Patient wanting to transition to comfort-focus care and possibly hospice. Agreeable to palliative care discussion.    - see above under goals of care     Emphysema, severe with acute exacerbation  Follows with [facility] pulmonology for severe pan-lobar emphysema on 1.5L NC at baseline.   Managed PTA with roflumilast, Advair, Spiriva, albuterol inhaler, mucinex PRN, prednisone 10mg daily, and azithromycin 250mg daily.   Was started on prednisone 40mg/day and nebs-hanged to iv steroids after couple doses.   Stopped iv steroids and started prednisone 4/4. Still SOB/ coarse BS b  -continue prednisone/ nebs     Acute respiratory failure with hypoxia  Acute resp distress  2ndry to above.   Rx as above.      Squamous cell lung cancer, stage IIIB    Follows with [facility] oncology for non-operable SCC of R lung involving R mainstem s/p stent 1/18/24 & tumor debulking. S/p chemotherapy & now on maintenance pembrolizumab Q6wks; last dose 2/26/2025.     Imaging 4/3 shows new pulmonary masses as per below.      Lung nodules, new  CT PE study 4/3 shows 3.8cm heterogeneous masslike opacity abutting the left uppr lobe anteriorly & medially which is new since previous exam (1/2025). Could be related to pneumonia, however, neoplasm cannot be excluded. 0.8cm indeterminate left upper lobe pulmonary nodule also new since previous exam 1/2025.      Hyponatremia  Na 130 in ER. Likely 2/2 acute illness. Not clinically significant.   Resolved      Transaminitis  Mild, acute. AST 53, ALT 71.  Could be related to sepsis, above.   Improved.      Hypertension  Normotensive on presentation.   - Continue PTA lisinopril 40mg daily   - Continue PTA amlodipine 5mg daily   - Continue PTA metoprolol tartrate 25mg BID     Pulmonary hypertension  Pulmonology notes likely pulmonary HTN with RVP 55mmHg plus RAP on 2019 ECHO, dilated RV but normal RV EF. Does not appear to follow regularly with  cardiology.      Thrombocytosis  Acute. Platelets baseline ~350, platelets on presentation 717. Probably hemo-concentrated from acute infection, above.   Rx as above and follow     Anemia  Normocytic, chronic. Baseline hemoglobin 11.5-12.0, hemoglobin on presentation 12.0.   Previously though to be chemo-induced.      Tobacco abuse, in remission  Quit Aug 2021.           Diet: Regular Diet Adult    DVT Prophylaxis: Low Risk/Ambulatory with no VTE prophylaxis indicated  Miller Catheter: Not present  Lines: None     Cardiac Monitoring: None  Code Status: No CPR- Do NOT Intubate      Clinically Significant Risk Factors               # Hypoalbuminemia: Lowest albumin = 2.8 g/dL at 4/4/2025  5:32 AM, will monitor as appropriate     # Hypertension: Noted on problem list     # Acute Hypercapnic Respiratory Failure: based on venous blood gas results.  Continue supplemental oxygen and ventilatory support as indicated.             # Financial/Environmental Concerns: none  # COPD: noted on problem list        Social Drivers of Health    Tobacco Use: Medium Risk (4/3/2025)    Patient History     Smoking Tobacco Use: Former     Smokeless Tobacco Use: Former   Physical Activity: Inactive (8/30/2021)    Exercise Vital Sign     Days of Exercise per Week: 0 days     Minutes of Exercise per Session: 0 min   Social Connections: Unknown (8/30/2021)    Social Connection and Isolation Panel [NHANES]     Frequency of Communication with Friends and Family: Twice a week     Frequency of Social Gatherings with Friends and Family: Twice a week     Attends Zoroastrian Services: Never     Active Member of Clubs or Organizations: No     Attends Club or Organization Meetings: Never          Disposition Plan     Medically Ready for Discharge: Anticipated Tomorrow             Gareth Briseno DO  Hospitalist Service  [facility]  Securely message with Deeplink (more info)  Text page via [employer] Paging/Directory    ______________________________________________________________________    Interval History   No acute events. Patient comfortable on comfort cares. Wants chest tube out, asked surgeon to do this tomorrow. They may place a heimlich valve.    Physical Exam   Vital Signs:     BP: 118/82 Pulse: 120   Resp: 16 SpO2: 92 % O2 Device: High Flow Nasal Cannula (HFNC) Oxygen Delivery: 45 LPM  Weight: 117 lbs 1.03 oz    Physical Exam  Constitutional:       General: Pt is not in acute distress.  HENT:      Head: Normocephalic and atraumatic.      Nose: Nose normal.   Eyes:      Conjunctiva/sclera: Conjunctivae normal.   Pulmonary:      Effort: Pulmonary effort is okay.  Abdominal:      General: Abdomen is flat.   Skin:     Findings: No rash.   Neurological:      General: No focal deficit present.      Mental Status: Pt is alert.   Psychiatric:         Mood and Affect: Mood normal.       Medical Decision Making       35 MINUTES SPENT BY ME on the date of service doing chart review, history, exam, documentation & further activities per the note.      Data         Imaging results reviewed over the past 24 hrs:   No results found for this or any previous visit (from the past 24 hours).

## 2025-04-08 NOTE — PLAN OF CARE
Pt drowsy and intermittent confusion. Pt is ready to transition to comfort cares - orders are in signed & held. HiFlow @ 45L, 65%. Voiding in urinal. R chest tube to suction. Refusing to eat dinner. Provider, family, and pt aware of plan and next steps.    Problem: Gas Exchange Impaired  Goal: Optimal Gas Exchange  Outcome: Progressing  Intervention: Optimize Oxygenation and Ventilation  Recent Flowsheet Documentation  Taken 4/7/2025 1600 by Yaquelin Silveira RN  Head of Bed (HOB) Positioning: HOB at 60-90 degrees     Problem: Pneumonia  Goal: Fluid Balance  Outcome: Progressing  Goal: Resolution of Infection Signs and Symptoms  Outcome: Progressing  Goal: Effective Oxygenation and Ventilation  Outcome: Progressing  Intervention: Promote Airway Secretion Clearance  Recent Flowsheet Documentation  Taken 4/7/2025 1600 by Yaquelin Silveira RN  Cough And Deep Breathing: done with encouragement  Intervention: Optimize Oxygenation and Ventilation  Recent Flowsheet Documentation  Taken 4/7/2025 1600 by Yaquelin Silveira RN  Head of Bed (HOB) Positioning: HOB at 60-90 degrees   Goal Outcome Evaluation:

## 2025-04-08 NOTE — PLAN OF CARE
Goal Outcome Evaluation:      Plan of Care Reviewed With: patient, family    Overall Patient Progress: decliningOverall Patient Progress: declining    Outcome Evaluation: on comfort cares, prn morphine for shortness of breath

## 2025-04-09 PROCEDURE — 99418 PROLNG IP/OBS E/M EA 15 MIN: CPT | Performed by: NURSE PRACTITIONER

## 2025-04-09 PROCEDURE — 99497 ADVNCD CARE PLAN 30 MIN: CPT | Mod: 25 | Performed by: NURSE PRACTITIONER

## 2025-04-09 PROCEDURE — 250N000013 HC RX MED GY IP 250 OP 250 PS 637

## 2025-04-09 PROCEDURE — 250N000013 HC RX MED GY IP 250 OP 250 PS 637: Performed by: NURSE PRACTITIONER

## 2025-04-09 PROCEDURE — 120N000004 HC R&B MS OVERFLOW

## 2025-04-09 PROCEDURE — 99233 SBSQ HOSP IP/OBS HIGH 50: CPT | Mod: 25 | Performed by: NURSE PRACTITIONER

## 2025-04-09 PROCEDURE — 250N000011 HC RX IP 250 OP 636: Mod: JZ | Performed by: NURSE PRACTITIONER

## 2025-04-09 PROCEDURE — 250N000011 HC RX IP 250 OP 636: Mod: JZ

## 2025-04-09 PROCEDURE — 99233 SBSQ HOSP IP/OBS HIGH 50: CPT | Performed by: STUDENT IN AN ORGANIZED HEALTH CARE EDUCATION/TRAINING PROGRAM

## 2025-04-09 PROCEDURE — 250N000012 HC RX MED GY IP 250 OP 636 PS 637

## 2025-04-09 RX ORDER — MORPHINE SULFATE 10 MG/5ML
15 SOLUTION ORAL
Status: DISCONTINUED | OUTPATIENT
Start: 2025-04-09 | End: 2025-04-10 | Stop reason: HOSPADM

## 2025-04-09 RX ORDER — ALBUTEROL SULFATE 90 UG/1
2 INHALANT RESPIRATORY (INHALATION) EVERY 8 HOURS
Status: DISCONTINUED | OUTPATIENT
Start: 2025-04-09 | End: 2025-04-10 | Stop reason: HOSPADM

## 2025-04-09 RX ORDER — FLUTICASONE FUROATE AND VILANTEROL 200; 25 UG/1; UG/1
1 POWDER RESPIRATORY (INHALATION) DAILY
Status: DISCONTINUED | OUTPATIENT
Start: 2025-04-09 | End: 2025-04-10 | Stop reason: HOSPADM

## 2025-04-09 RX ORDER — MORPHINE SULFATE 2 MG/ML
2 INJECTION, SOLUTION INTRAMUSCULAR; INTRAVENOUS
Status: DISCONTINUED | OUTPATIENT
Start: 2025-04-09 | End: 2025-04-10 | Stop reason: HOSPADM

## 2025-04-09 RX ORDER — MORPHINE SULFATE 10 MG/5ML
10 SOLUTION ORAL
Status: DISCONTINUED | OUTPATIENT
Start: 2025-04-09 | End: 2025-04-10 | Stop reason: HOSPADM

## 2025-04-09 RX ORDER — MORPHINE SULFATE 4 MG/ML
4 INJECTION, SOLUTION INTRAMUSCULAR; INTRAVENOUS
Status: DISCONTINUED | OUTPATIENT
Start: 2025-04-09 | End: 2025-04-10 | Stop reason: HOSPADM

## 2025-04-09 RX ADMIN — METOPROLOL TARTRATE 25 MG: 25 TABLET, FILM COATED ORAL at 07:54

## 2025-04-09 RX ADMIN — MORPHINE SULFATE 2 MG: 2 INJECTION, SOLUTION INTRAMUSCULAR; INTRAVENOUS at 10:58

## 2025-04-09 RX ADMIN — METOPROLOL TARTRATE 25 MG: 25 TABLET, FILM COATED ORAL at 18:00

## 2025-04-09 RX ADMIN — MORPHINE SULFATE 4 MG: 4 INJECTION, SOLUTION INTRAMUSCULAR; INTRAVENOUS at 15:51

## 2025-04-09 RX ADMIN — MORPHINE SULFATE 2 MG: 2 INJECTION, SOLUTION INTRAMUSCULAR; INTRAVENOUS at 09:44

## 2025-04-09 RX ADMIN — GUAIFENESIN 1200 MG: 600 TABLET ORAL at 07:53

## 2025-04-09 RX ADMIN — MORPHINE SULFATE 2 MG: 2 INJECTION, SOLUTION INTRAMUSCULAR; INTRAVENOUS at 01:07

## 2025-04-09 RX ADMIN — GUAIFENESIN 1200 MG: 600 TABLET ORAL at 18:00

## 2025-04-09 RX ADMIN — OLANZAPINE 5 MG: 5 TABLET, ORALLY DISINTEGRATING ORAL at 01:07

## 2025-04-09 RX ADMIN — ALBUTEROL SULFATE 2 PUFF: 90 INHALANT RESPIRATORY (INHALATION) at 11:00

## 2025-04-09 RX ADMIN — MORPHINE SULFATE 4 MG: 4 INJECTION, SOLUTION INTRAMUSCULAR; INTRAVENOUS at 18:54

## 2025-04-09 RX ADMIN — ALBUTEROL SULFATE 2 PUFF: 90 INHALANT RESPIRATORY (INHALATION) at 18:00

## 2025-04-09 RX ADMIN — MORPHINE SULFATE 10 MG: 10 SOLUTION ORAL at 23:23

## 2025-04-09 RX ADMIN — FLUTICASONE FUROATE AND VILANTEROL TRIFENATATE 1 PUFF: 200; 25 POWDER RESPIRATORY (INHALATION) at 08:00

## 2025-04-09 RX ADMIN — MORPHINE SULFATE 5 MG: 10 SOLUTION ORAL at 03:30

## 2025-04-09 RX ADMIN — PREDNISONE 20 MG: 20 TABLET ORAL at 07:54

## 2025-04-09 RX ADMIN — SENNOSIDES 8.6 MG: 8.6 TABLET, FILM COATED ORAL at 07:53

## 2025-04-09 RX ADMIN — MORPHINE SULFATE 2 MG: 2 INJECTION, SOLUTION INTRAMUSCULAR; INTRAVENOUS at 11:28

## 2025-04-09 RX ADMIN — MORPHINE SULFATE 2 MG: 2 INJECTION, SOLUTION INTRAMUSCULAR; INTRAVENOUS at 07:54

## 2025-04-09 ASSESSMENT — ACTIVITIES OF DAILY LIVING (ADL)
ADLS_ACUITY_SCORE: 36
ADLS_ACUITY_SCORE: 37
ADLS_ACUITY_SCORE: 37
ADLS_ACUITY_SCORE: 36
ADLS_ACUITY_SCORE: 37
ADLS_ACUITY_SCORE: 36
ADLS_ACUITY_SCORE: 37

## 2025-04-09 NOTE — PROGRESS NOTES
Prasad has been resting comfortably this afternoon with IV Morphine allowing him to breathe more easily. His appetite remains poor. Family has been at bedside and supportive. He remains on 2-4L per NC for comfort.

## 2025-04-09 NOTE — PLAN OF CARE
Goal Outcome Evaluation:      Plan of Care Reviewed With: patient    Overall Patient Progress: decliningOverall Patient Progress: declining    This RN assumed care [id number]. [facility] visited this shift. They recommended scheduled sublingual morphine q4hrs and that recommendation was paged to Dr. Briseno. Pt comfortable this shift with morphine per MAR. Heilmich valve in place.

## 2025-04-09 NOTE — PROGRESS NOTES
[facility]    Consult Note - [facility]  ______________________________________________________________________    [facility]7 Contact Number: [phone]    - Providers: Please contact [facility] with changes in orders or clinical plan of care   - Nursing: Please contact  Val with significant changes in patient condition    Hospice will notify the care team (including the hospitalist) to confirm date of inpatient hospice (GIP) admission.    New Epic encounter will not be created until hospice completes admission.   ______________________________________________________________________     Hospice Diagnosis: malignant neoplasm of right lung    Indication for Inpatient Hospice: air hunger, requiring multiple medication changes and prn use, strict comfort measures, dnr    Goals for Hospital Discharge: symptom management with oral/SL meds    Plan of Care Discussed with the Following:   - Nurse: Viridiana  - Charge Nurse: Lauren Michael  - Hospitalist/Rounding Provider: Dr. Gareth Briseno, hospitalist   - Luis's Family/Preferred Contact: Analilia Hernandez  - Hospice Provider: [facility]    Summary of Visit (includes assessment, medications and any new orders): pt admits to hospice for lung cancer. Requiring multiple doses of prn and scheduled meds for comfort and air hunger. He demonstrates accessory muscle use with each breath. Slack jaw, open mouth breathing, audible secretions heard when breathing. Requiring head of bed  greater than 45 degrees for dyspnea.Tachycardia at 109, HR irregular. Discussion with daughter about general inpatient hospice care versus hospice at home.       Sahil Serrano, RN

## 2025-04-09 NOTE — PROGRESS NOTES
Patient had chest tube removed this morning and replaced with a heimlich. He continues with labored breathing that worsens with activity. PRN morphine being given to treat respiratory symptoms. He switches from being in bed to up in chair. He denies pain.     Palliative care and care management following for appropriate care now and upon discharge. Plan is for discharge to a facility that provides hospice.

## 2025-04-09 NOTE — PROGRESS NOTES
Subjective:   Patient reports improvement in breathing overnight, continues to endorse pain at the chest tube insertion site well controlled with current pain meds. Patient reports considering comfort care/hospice.       I/O last 3 completed shifts:  In: 120 [P.O.:120]  Out: 525 [Urine:400; Chest Tube:125]     No current outpatient medications on file.         ROUTINE IP LABS (Last four results)  BMP  Recent Labs   Lab 04/07/25  0659 04/07/25  0043 04/06/25  2055 04/06/25  1633 04/06/25  1052 04/06/25  0551 04/05/25  0743 04/05/25  0536 04/04/25  1123 04/04/25  0532     --   --   --   --  139  --  135  --  133*   POTASSIUM 3.9  --   --   --   --  4.2  --  4.6  --  4.4   CHLORIDE 100  --   --   --   --  103  --  102  --  99   MIKE 9.0  --   --   --   --  9.0  --  8.8  --  8.5*   CO2 30*  --   --   --   --  25  --  21*  --  22   BUN 17.9  --   --   --   --  22.5*  --  24.4*  --  21.8*   CR 0.55*  --   --   --   --  0.69  --  0.82  --  0.60*   * 95 122* 133*   < > 92   < > 105*   < > 108*    < > = values in this interval not displayed.     CBC  Recent Labs   Lab 04/07/25 0659 04/06/25  0551 04/05/25  0536 04/04/25  0532   WBC 27.5* 33.1* 35.9* 27.2*   RBC 3.60* 3.55* 3.46* 3.44*   HGB 10.6* 10.5* 10.3* 10.0*   HCT 35.6* 34.8* 33.4* 32.9*   MCV 99 98 97 96   MCH 29.4 29.6 29.8 29.1   MCHC 29.8* 30.2* 30.8* 30.4*   RDW 14.6 14.6 14.6 14.5   * 682* 638* 627*     INRNo lab results found in last 7 days.         Tmax: 97.7  --->  Tcurrent: 97.7   B/P: 143/95  P: 133  R: 16  SpO2:95%         EXAM  AOX4 NAD  Chest rise equal B/L. R chest tube in place, air leak present. Serosanguinous fluid noted within chest tube collection chamber  RRR  S&NTND +BS  No CCE        A/P:   Continue chest to to suction for now, air leak present  May consider chest tube exchange to pleurX catheter versus removal should patient wish to pursue comfort care/hospice. Pending further discussion with attending surgeon    The above  plan will be discussed with attending surgeon

## 2025-04-09 NOTE — PROGRESS NOTES
Palliative Care Consultation      Patient ID/Chief Complaint: Luis Hernandez 59 year old male being seen for palliative care consultation at the request of hospitalist secondary to goals of care/symptom management.   The patient's primary care provider is:  Shalini Washington.       Impression & Recommendations:  59-year-old male with severe COPD, non-small cell lung cancer stage IIIb (follows with [facility] oncology, new lung nodules noted on recent CT concerning for cancer versus infectious), pulmonary hypertension, HTN, long history of tobacco use stopped 2021.    He is admitted with significant dyspnea on exertion and pleuritic chest pain.  Workup revealed tension pneumothorax with extensive subcutaneous emphysema in right chest wall, severe sepsis secondary to CAP.     Emergent chest tube was placed in the ER.  Chest tube became dislodged, required replacement.    Symptoms/recommendations/discussion:  Advance care planning:  Patient wants his daughter Analilia to be his agent when needed. He is able to advocate for himself at this time.   [employer] [place] healthcare directive provided to patient today to formalize his daughter as healthcare agent  Dyspnea:  Started fentanyl 12 mcg/h weekly patch on 4/8---can easily be switched to other long-acting opioid if needed. As needed opioid use reviewed, will likely need fentanyl patch increased tomorrow or transition to other long acting opioid.   Increase as needed Roxanol to 10 to 15 mg every 1 hour as needed, morphine IV push 2 to 4 mg every 15 minutes as needed.  Roxanol if his preferred opioid unless very rapid relief of symptoms is necessary.   Continues to require IV morphine as roxanol has seemed less effective.   Chest pain:  Same management as dyspnea  Anorexia, very minimal to no PO intake other than water:  No intervention, he continues to drink water regularly but has no appetite for food  He is not distressed by anorexia  Chest tube  "discontinued today, Heimlich valve placed to prevent rapid deterioration in respiratory status due to ongoing large air leak  Patient continues to request comfort care and no prolonging measures   Palliative performance scale 30 to 40%.  Life expectancy 1 month or less.  Patient needs around-the-clock care and cannot return home.  Will need long-term care with hospice arranged.  Case discussed with hospitalist and  about eval for [facility] hospice.             Thank you for the opportunity to participate in the care of this patient and family. Our team: will continue to follow.   CHITO Elizalde CNP  Securely message with [employer] info)  Text page via [facility] Paging/Directory     History:  History gathered today from: patient, family/loved ones, medical chart, medical team members    ROS:  10 point ROS is negative unless exceptions noted below    PE: BP (!) 143/95   Pulse (!) 133   Temp 97.7  F (36.5  C) (Axillary)   Resp 16   Ht 1.626 m (5' 4\")   Wt 53.1 kg (117 lb 1 oz)   SpO2 92%   BMI 20.09 kg/m     Wt Readings from Last 3 Encounters:   04/07/25 53.1 kg (117 lb 1 oz)   02/26/25 53.5 kg (118 lb)   02/06/25 53.5 kg (118 lb)     Gen: Awake, alert, no apparent distress  Head NCAT.  Eyes anicteric without injection  Face symmetric, grossly normal  Heart regular and rhythmic,  Lungs tachypneic, cough, Heimlich valve  Skin no rashes or lesions evident on face/neck  Neuro Face symmetric, eyes conjugate; alert and oriented  Neuropsych not anxious or depressed appearing        Data reviewed:  I reviewed electrolytes, BUN/creatinine, liver profile, hemoglobin and hematocrit, platelet count, and most recent imaging          75 minutes spent on the date of the encounter doing chart review, history and exam, patient education & counseling, documentation and other activities as noted above.      Management discussed worsening symptom burden, advanced disease state, opioid management  Hospitalist and nursing " notes reviewed  Laboratory/imaging results reviewed  Outcomes of above conversation include: Facilitated completion of healthcare directive, POLST form completed, inpatient hospice eval      Advance Care Planning Discussion: ISeth APRN met with patient and family today to discuss advance care planning.  Patient and family understand advance care planning discussion is voluntary and wished to proceed.  Discussion included: Medical management options, recommendation for hospice care, patient values and goals.  Total ACP time was 30, beginning at 10 and ending at 1030. Total time spent on the date of the encounter was 75 minutes, 45 minutes spent exclusive of total time on ACP.                 Thank you for involving us in the patient's care.   CHITO Elizalde, [facility] Palliative Care Service

## 2025-04-09 NOTE — PROGRESS NOTES
SPIRITUAL HEALTH SERVICES  [facility] - ICU    Referral Source: Follow up to yesterday's transition to Comfort Cares    - I provided a check in with Prasad and his daughter, Analilia.  Prasad stated he was about the same as yesterday.  Analilia was working on some details related to her Dad's paperwork.  Then he was going to be taking a rest.  Visitors had just left.  I referenced my availability for anything related to spiritual support during the days ahead.    Plan:  will remain available for any ongoing support needs during LOS.    Tito Polo M.A., [facility]  Staff Chaplain [facility]  Office: [phone]

## 2025-04-09 NOTE — PROGRESS NOTES
Care Management Follow Up    Length of Stay (days): 6    Expected Discharge Date: 04/09/2025     Concerns to be Addressed: discharge planning     Patient plan of care discussed at interdisciplinary rounds: Yes    Anticipated Discharge Disposition: TBD; pt unable to return home.  Discussing LTC placement, however, unable to due to IV medication needs.      Anticipated Discharge Services: Hospice  Anticipated Discharge DME: TBD     Patient/family educated on Medicare website which has current facility and service quality ratings: yes  Education Provided on the Discharge Plan: Yes  Patient/Family in Agreement with the Plan: yes    Referrals Placed by CM/SW: Internal Clinic Care Coordination; hospice; post acute facilities  Private pay costs discussed: private room/amenity fees and transportation costs    Discussed  Partnership in Safe Discharge Planning  document with patient/family: Yes     Handoff Completed: No, handoff not indicated or clinically appropriate    Additional Information:  Patient is medically ready for discharge per provider.  Patient has been medically ready since 4.9/25.      Background information:   Pt with a past medical history of tobacco use in remission, pulmonary hypertension, squamous cell carcinoma, emphysema, anemia, hypertension admitted on 4/3/2025 with dyspnea, pleuritic pain. His hospital course was complicated by sepsis related to pneumonia and tension pneumothorax requiring a chest tube insertion (re-insertion due to migration out of chest cavity as well). On 04/07, patient decided to transition to comfort-focused care with palliative care on board.     Initially, RALEIGH met with pt, daughter-Analilia, father-Luis & his SO, and educated on the Medicare program of hospice and LTC placement.  We discussed that pt will be private pay (has a 401K) at the SNF and discussed hospice cares.  Referrals pending with community SNFs; pending the ability to get pt onto oral medications for  comfort.    SW informed on eligibility requirements, care team approach, episodic nature of visits, goals of comfort & symptom management, DME equipment needs, & answered questions as they arose.  SW offered choice of agencies that provide hospice cares in pt's geographic location & educated on Medicare.gov for research on each agency, if wished.      Once writer was back to office & speaking with PC provider and MD, it was determined that a [facility] order would be placed to see if pt meets criteria.      Current status and recommendations:   [facility] hospice is evaluating for program eligibility vs LTC if we can get pt onto all oral medications.    Barriers to discharge:    Comfort and LTC placement on oral medications  Placement - SW has sent referrals; awaiting response.    Johnson contacts:   Analilia- daughter, [phone]    Upcoming important dates:   230 PM today- [facility] will come to evaluate if pt is eligible for [facility] hospice cares.    Next steps:   [facility] hospice evaluation today at 230 PM  2.  SW contact [facility] (Phone:[phone] Fax: [phone]) for bed availability.- spoke with Maurice, 2 people on their wait-list.  Sent referral for possible bed in the next few days.  3.  SW sent referrals to all LTC-SNF in geographic areas discussed with Analilia today for bed placement.  (Near [facility], [facility], [facility], [facility], etc.)    Care Management team to follow for discharge plans.      FREDY Gil

## 2025-04-09 NOTE — PLAN OF CARE
End Of Shift Note    Plan: Surgery to possibly remove chest tube today. Pt aware and says he is ready for this but then hesitates. Comfort cares.     Subjective/Objective:    Neuro: Alert/Oriented x3 but has intermittent confusion     Cardiac: off monitors, no VS d/t comfort cares    Resp: Pt became SOB several times tonight while at rest. Morphine given with some relief. CT remains in place, output minimal. O2 4 L/min LS coarse, loose, productive cough present. Dyspnea with any activity.     GI/: Poor appetite, had a cup of coffee this morning. Offered various snacks but pt declined.     Endo: WNL    MSK: generalized weakness, shaky,     Skin: L arm PIV infiltrated.     LDAs: L lower forearm PIV placed.        Problem: Adult Inpatient Plan of Care  Goal: Absence of Hospital-Acquired Illness or Injury  Intervention: Prevent Skin Injury  Recent Flowsheet Documentation  Taken 4/9/2025 0107 by Colleen Morgan RN  Body Position: position changed independently  Intervention: Prevent Infection  Recent Flowsheet Documentation  Taken 4/9/2025 0107 by Colleen Morgan RN  Infection Prevention:   rest/sleep promoted   single patient room provided  Goal: Optimal Comfort and Wellbeing  Intervention: Monitor Pain and Promote Comfort  Recent Flowsheet Documentation  Taken 4/9/2025 0107 by Colleen Morgan RN  Pain Management Interventions: medication (see MAR)  Intervention: Provide Person-Centered Care  Recent Flowsheet Documentation  Taken 4/9/2025 0107 by Colleen Morgan RN  Trust Relationship/Rapport:   care explained   choices provided   emotional support provided   empathic listening provided   thoughts/feelings acknowledged   reassurance provided     Problem: ARDS (Acute Respiratory Distress Syndrome)  Goal: Effective Oxygenation  Intervention: Optimize Oxygenation, Ventilation and Perfusion  Recent Flowsheet Documentation  Taken 4/9/2025 0107 by Colleen Morgan RN  Head of Bed (HOB) Positioning: HOB at 60-90 degrees     Problem:  Risk for Delirium  Goal: Optimal Coping  Intervention: Optimize Psychosocial Adjustment to Delirium  Recent Flowsheet Documentation  Taken 4/9/2025 0107 by Colleen Morgan RN  Supportive Measures:   active listening utilized   positive reinforcement provided  Goal: Improved Behavioral Control  Intervention: Prevent and Manage Agitation  Recent Flowsheet Documentation  Taken 4/9/2025 0107 by Colleen Morgan RN  Environment Familiarity/Consistency:   familiar objects from home provided   daily routine followed   personal clothing/items utilized  Intervention: Minimize Safety Risk  Recent Flowsheet Documentation  Taken 4/9/2025 0107 by Colleen Morgan RN  Trust Relationship/Rapport:   care explained   choices provided   emotional support provided   empathic listening provided   thoughts/feelings acknowledged   reassurance provided  Goal: Improved Attention and Thought Clarity  Intervention: Maximize Cognitive Function  Recent Flowsheet Documentation  Taken 4/9/2025 0107 by Colleen Morgan RN  Reorientation Measures:   calendar in view   clock in view   family pictures in room     Problem: Gas Exchange Impaired  Goal: Optimal Gas Exchange  Intervention: Optimize Oxygenation and Ventilation  Recent Flowsheet Documentation  Taken 4/9/2025 0107 by Colleen Morgan RN  Head of Bed (HOB) Positioning: HOB at 60-90 degrees     Problem: Pneumonia  Goal: Effective Oxygenation and Ventilation  Intervention: Promote Airway Secretion Clearance  Recent Flowsheet Documentation  Taken 4/9/2025 0600 by Colleen Morgan RN  Activity Management: back to bed  Taken 4/9/2025 0500 by Colleen Morgan RN  Activity Management: up in chair  Taken 4/9/2025 0107 by Colleen Morgan RN  Cough And Deep Breathing: done independently per patient  Intervention: Optimize Oxygenation and Ventilation  Recent Flowsheet Documentation  Taken 4/9/2025 0107 by Colleen Morgan RN  Head of Bed (HOB) Positioning: HOB at 60-90 degrees   Goal Outcome Evaluation:

## 2025-04-09 NOTE — PLAN OF CARE
"Goal Outcome Evaluation: pt sits himself at side of be stating \"I can't breath\"  chest tube not kinked, NC oxygen on. Medicated with 5mg oral morphine. . Pt siting straight up in bed , adjusting self to comfort.                        "

## 2025-04-09 NOTE — PROGRESS NOTES
[facility]    Medicine Progress Note - Hospitalist Service    Date of Admission:  4/3/2025    Assessment & Plan   Luis Hernandez is a 59 year old male with past medical history of tobacco use in remission, pulmonary hypertension, squamous cell carcinoma, emphysema,  anemia, hypertension admitted on 4/3/2025 with dyspnea, pleuritic pain. His hospital course was complicated by sepsis related to pneumonia and tension pneumothorax requiring a chest tube insertion (re-insertion due to migration out of chest cavity as well). On 04/07, patient decided to transition to comfort-focused care with palliative care on board.     Update 04/09:  Chest tube exchanged for hemlich valve today. [facility] hospice recommended by palliative care, consult placed. (Should be here around 2:30PM)     Assessment/ Plan    Goals of Care  See palliative note 04/07 04/07: Spoke with patient in AM. He was lucid. He stated that he doesn't have much quality of life in between hospitalizations. Doesn't want to keep coming to the hospital and struggle through repeat illnesses. Asked him if he wanted to focus on quality of life and he stated yes. Demonstrated capacity. Answered no to continuing antibiotics. Agreeable to talk to palliative (consulted).    - Pall consult: 12mcg/h weekly fent patch started 04/08, as needed roxanol po with prn iv morphine.  - Care management consulted (okay to see 04/09)    Community acquired Pneumonia  Sepsis with Acute hypoxic respiratory failure and lactic acidosis  Tension pneumothorax-resolved  Extensive subcutaneous emphysema in the right chest wall    Presented with 1wk ESPARZA, pleuritic chest pain  Septic on presentation with increased O2 requirements from baseline. CT shows right upper & middle lobe consolidations suspicious for pneumonia. Chronic cough now with increased yellow sputum. Denies sick contacts. Known history of pseudomonas & klebsiella pneumonia April 2024. Was tachycardic  (124bpm), tachypneic (29/min) & leukocytosis (WBC 28.8) Lactic acid WNL (0.8). VBG shows chronic compensated respiratory acidosis (pH 7.39, pCO2 51, bicarb 31). Influenza/ covid / RSV negative  Was started on cefepime 2g Q8hrs for CAP given history of pseudomonas/ azithromycin 500mg IV Q24hrs /IVF with lactated ringer 100/hr continuous.   Pt decompensated early 4/4 AM-acute resp distress. HR in 190s and increased work of breathing. LA was 3.1 and WBC still 27k.  Stat CXR showed large pneumo tx R side. Surgery consulted and pt had stat chest tube placed with immediate, significant relief. Pt was tx to ICU for BIPAP  HR down to 90s after chest tube placed-pt did receive one extra dose of metoprolol 25mg too.  O2 sats were stable on 2LNC. Due to intermediate  resistance of pseudomonas to cefepime, this is changed to zosyn.Blood cultures x2 on 4/3  NGTD.   CXR 4/5 AM shows R PTX and chest tube slipped out couple inches. Surgery re-adjusted tube  but repeat CXR not changed. 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 . Discussed with thoracic surgery 4/5-would watch couple days, if not improving, needs 2nd chest tube placed and or bronch. WBC was 35k on 4/5.  Pt decompensated again last night around midnight.. CXR showed chest tube not in pleural space and a new recurrent tension PTX. Was on 15L oxymask. A new chest tube was placed by ER MD with re-expansion of the lung.  Continues to have WBC 33k, PCT improved though.   Will get legionella/ strep pneumo antigens, sputum cx/ stain. Continue current antbx/ steroids/ nebs.   Continue o2 NC-wean as tolerated. Follow PCT. If not improving in next couple days, needs transfer for bronch.   ---------------------------------------------  04/07: Patient wanting to transition to comfort-focus care and possibly hospice. Agreeable to palliative care discussion.  04/09: hemlich valve placed by general surgery.    - see above under goals of  care     Emphysema, severe with acute exacerbation  Follows with [facility]monology for severe pan-lobar emphysema on 1.5L NC at baseline.   Managed PTA with roflumilast, Advair, Spiriva, albuterol inhaler, mucinex PRN, prednisone 10mg daily, and azithromycin 250mg daily.   Was started on prednisone 40mg/day and nebs-hanged to iv steroids after couple doses.   Stopped iv steroids and started prednisone 4/4. Still SOB/ coarse BS     -continue prednisone. Pt didn't want nebs so stopped.     Acute respiratory failure with hypoxia  Acute resp distress  2ndry to above.   Rx as above.      Squamous cell lung cancer, stage IIIB    Follows with [facility] oncology for non-operable SCC of R lung involving R mainstem s/p stent 1/18/24 & tumor debulking. S/p chemotherapy & now on maintenance pembrolizumab Q6wks; last dose 2/26/2025.     Imaging 4/3 shows new pulmonary masses as per below.      Lung nodules, new  CT PE study 4/3 shows 3.8cm heterogeneous masslike opacity abutting the left uppr lobe anteriorly & medially which is new since previous exam (1/2025). Could be related to pneumonia, however, neoplasm cannot be excluded. 0.8cm indeterminate left upper lobe pulmonary nodule also new since previous exam 1/2025.      Hyponatremia  Na 130 in ER. Likely 2/2 acute illness. Not clinically significant.   Resolved      Transaminitis  Mild, acute. AST 53, ALT 71.  Could be related to sepsis, above.   Improved.      Hypertension  Normotensive on presentation.   - Continue PTA lisinopril 40mg daily   - Continue PTA amlodipine 5mg daily   - Continue PTA metoprolol tartrate 25mg BID     Pulmonary hypertension  Pulmonology notes likely pulmonary HTN with RVP 55mmHg plus RAP on 2019 ECHO, dilated RV but normal RV EF. Does not appear to follow regularly with cardiology.      Thrombocytosis  Acute. Platelets baseline ~350, platelets on presentation 717. Probably hemo-concentrated from acute infection, above.   Rx as above and follow      Anemia  Normocytic, chronic. Baseline hemoglobin 11.5-12.0, hemoglobin on presentation 12.0.   Previously though to be chemo-induced.      Tobacco abuse, in remission  Quit Aug 2021.           Diet: Regular Diet Adult    DVT Prophylaxis: Low Risk/Ambulatory with no VTE prophylaxis indicated  Miller Catheter: Not present  Lines: None     Cardiac Monitoring: None  Code Status: No CPR- Do NOT Intubate      Clinically Significant Risk Factors               # Hypoalbuminemia: Lowest albumin = 2.8 g/dL at 4/4/2025  5:32 AM, will monitor as appropriate     # Hypertension: Noted on problem list     # Acute Hypercapnic Respiratory Failure: based on venous blood gas results.  Continue supplemental oxygen and ventilatory support as indicated.             # Financial/Environmental Concerns: none  # COPD: noted on problem list        Social Drivers of Health    Tobacco Use: Medium Risk (4/3/2025)    Patient History     Smoking Tobacco Use: Former     Smokeless Tobacco Use: Former   Physical Activity: Inactive (8/30/2021)    Exercise Vital Sign     Days of Exercise per Week: 0 days     Minutes of Exercise per Session: 0 min   Social Connections: Unknown (8/30/2021)    Social Connection and Isolation Panel [NHANES]     Frequency of Communication with Friends and Family: Twice a week     Frequency of Social Gatherings with Friends and Family: Twice a week     Attends Sabianism Services: Never     Active Member of Clubs or Organizations: No     Attends Club or Organization Meetings: Never          Disposition Plan     Medically Ready for Discharge: Anticipated Tomorrow             Gareth Briseno DO  Hospitalist Service  [facility]  Securely message with [employer] (more info)  Text page via [employer] Paging/Directory   ______________________________________________________________________    Interval History   No acute events. [facility] hospice eval.    Physical Exam   Vital Signs:     BP: (!) 143/95 Pulse: (!) 133             Weight: 117 lbs 1.03 oz    Physical Exam  Constitutional:       General: Pt is not in acute distress.  HENT:      Head: Normocephalic and atraumatic.      Nose: Nose normal.   Eyes:      Conjunctiva/sclera: Conjunctivae normal.   Pulmonary:      Effort: Pulmonary effort is normal.   Abdominal:      General: Abdomen is flat.   Skin:     Findings: No rash.   Neurological:      General: No focal deficit present.      Mental Status: Pt is alert.   Psychiatric:         Mood and Affect: Mood normal.       Medical Decision Making       45 MINUTES SPENT BY ME on the date of service doing chart review, history, exam, documentation & further activities per the note.      Data         Imaging results reviewed over the past 24 hrs:   No results found for this or any previous visit (from the past 24 hours).

## 2025-04-09 NOTE — PLAN OF CARE
Goal Outcome Evaluation:      Plan of Care Reviewed With: patient, family    Overall Patient Progress: no changeOverall Patient Progress: no change    Outcome Evaluation: [facility] here to discuss potential admission for inpatient hospice and/ or discharge planning.

## 2025-04-10 ENCOUNTER — DOCUMENTATION ONLY (OUTPATIENT)
Dept: OTHER | Facility: CLINIC | Age: 60
End: 2025-04-10
Payer: COMMERCIAL

## 2025-04-10 ENCOUNTER — HOSPITAL ENCOUNTER (INPATIENT)
Facility: CLINIC | Age: 60
LOS: 1 days | Discharge: HOSPICE | End: 2025-04-11
Attending: INTERNAL MEDICINE | Admitting: INTERNAL MEDICINE
Payer: COMMERCIAL

## 2025-04-10 VITALS
RESPIRATION RATE: 22 BRPM | TEMPERATURE: 98.3 F | HEIGHT: 64 IN | DIASTOLIC BLOOD PRESSURE: 101 MMHG | HEART RATE: 117 BPM | BODY MASS INDEX: 19.99 KG/M2 | WEIGHT: 117.06 LBS | SYSTOLIC BLOOD PRESSURE: 145 MMHG | OXYGEN SATURATION: 91 %

## 2025-04-10 VITALS
SYSTOLIC BLOOD PRESSURE: 124 MMHG | HEART RATE: 109 BPM | OXYGEN SATURATION: 95 % | RESPIRATION RATE: 18 BRPM | TEMPERATURE: 98.8 F | DIASTOLIC BLOOD PRESSURE: 86 MMHG

## 2025-04-10 DIAGNOSIS — Z51.5 HOSPICE CARE: Primary | ICD-10-CM

## 2025-04-10 PROCEDURE — 250N000013 HC RX MED GY IP 250 OP 250 PS 637: Performed by: INTERNAL MEDICINE

## 2025-04-10 PROCEDURE — 99222 1ST HOSP IP/OBS MODERATE 55: CPT | Performed by: INTERNAL MEDICINE

## 2025-04-10 PROCEDURE — 250N000012 HC RX MED GY IP 250 OP 636 PS 637

## 2025-04-10 PROCEDURE — 250N000013 HC RX MED GY IP 250 OP 250 PS 637

## 2025-04-10 PROCEDURE — 250N000013 HC RX MED GY IP 250 OP 250 PS 637: Performed by: NURSE PRACTITIONER

## 2025-04-10 PROCEDURE — 99232 SBSQ HOSP IP/OBS MODERATE 35: CPT | Performed by: NURSE PRACTITIONER

## 2025-04-10 PROCEDURE — 110N000001 HC R&B HOSPICE

## 2025-04-10 PROCEDURE — 99239 HOSP IP/OBS DSCHRG MGMT >30: CPT | Performed by: INTERNAL MEDICINE

## 2025-04-10 RX ORDER — NALOXONE HYDROCHLORIDE 0.4 MG/ML
0.1 INJECTION, SOLUTION INTRAMUSCULAR; INTRAVENOUS; SUBCUTANEOUS
Status: CANCELLED | OUTPATIENT
Start: 2025-04-10

## 2025-04-10 RX ORDER — FLUTICASONE FUROATE AND VILANTEROL 200; 25 UG/1; UG/1
1 POWDER RESPIRATORY (INHALATION) DAILY
Status: DISCONTINUED | OUTPATIENT
Start: 2025-04-11 | End: 2025-04-11 | Stop reason: HOSPADM

## 2025-04-10 RX ORDER — ONDANSETRON 4 MG/1
4 TABLET, ORALLY DISINTEGRATING ORAL EVERY 6 HOURS PRN
Status: DISCONTINUED | OUTPATIENT
Start: 2025-04-10 | End: 2025-04-11 | Stop reason: HOSPADM

## 2025-04-10 RX ORDER — MINERAL OIL/HYDROPHIL PETROLAT
OINTMENT (GRAM) TOPICAL
Status: DISCONTINUED | OUTPATIENT
Start: 2025-04-10 | End: 2025-04-10

## 2025-04-10 RX ORDER — POLYETHYLENE GLYCOL 3350 17 G/17G
17 POWDER, FOR SOLUTION ORAL DAILY PRN
Status: DISCONTINUED | OUTPATIENT
Start: 2025-04-10 | End: 2025-04-11 | Stop reason: HOSPADM

## 2025-04-10 RX ORDER — OLANZAPINE 5 MG/1
5 TABLET, ORALLY DISINTEGRATING ORAL EVERY 6 HOURS PRN
Status: CANCELLED | OUTPATIENT
Start: 2025-04-10

## 2025-04-10 RX ORDER — BISACODYL 10 MG
10 SUPPOSITORY, RECTAL RECTAL
Status: DISCONTINUED | OUTPATIENT
Start: 2025-04-13 | End: 2025-04-10

## 2025-04-10 RX ORDER — NALOXONE HYDROCHLORIDE 0.4 MG/ML
0.4 INJECTION, SOLUTION INTRAMUSCULAR; INTRAVENOUS; SUBCUTANEOUS
Status: CANCELLED | OUTPATIENT
Start: 2025-04-10

## 2025-04-10 RX ORDER — MINERAL OIL/HYDROPHIL PETROLAT
OINTMENT (GRAM) TOPICAL
Status: DISCONTINUED | OUTPATIENT
Start: 2025-04-10 | End: 2025-04-10 | Stop reason: HOSPADM

## 2025-04-10 RX ORDER — PREDNISONE 20 MG/1
20 TABLET ORAL DAILY
Status: DISCONTINUED | OUTPATIENT
Start: 2025-04-11 | End: 2025-04-11 | Stop reason: HOSPADM

## 2025-04-10 RX ORDER — ALBUTEROL SULFATE 90 UG/1
2 INHALANT RESPIRATORY (INHALATION)
Status: CANCELLED | OUTPATIENT
Start: 2025-04-10

## 2025-04-10 RX ORDER — NALOXONE HYDROCHLORIDE 0.4 MG/ML
0.1 INJECTION, SOLUTION INTRAMUSCULAR; INTRAVENOUS; SUBCUTANEOUS
Status: DISCONTINUED | OUTPATIENT
Start: 2025-04-10 | End: 2025-04-11 | Stop reason: HOSPADM

## 2025-04-10 RX ORDER — MORPHINE SULFATE 10 MG/5ML
10 SOLUTION ORAL
Status: CANCELLED | OUTPATIENT
Start: 2025-04-10

## 2025-04-10 RX ORDER — ONDANSETRON 4 MG/1
4 TABLET, ORALLY DISINTEGRATING ORAL EVERY 6 HOURS PRN
Status: CANCELLED | OUTPATIENT
Start: 2025-04-10

## 2025-04-10 RX ORDER — ATROPINE SULFATE 10 MG/ML
2 SOLUTION/ DROPS OPHTHALMIC EVERY 4 HOURS PRN
Status: DISCONTINUED | OUTPATIENT
Start: 2025-04-10 | End: 2025-04-11 | Stop reason: HOSPADM

## 2025-04-10 RX ORDER — MORPHINE SULFATE 4 MG/ML
4 INJECTION, SOLUTION INTRAMUSCULAR; INTRAVENOUS
Status: CANCELLED | OUTPATIENT
Start: 2025-04-10

## 2025-04-10 RX ORDER — ACETAMINOPHEN 325 MG/1
650 TABLET ORAL EVERY 4 HOURS PRN
Status: CANCELLED | OUTPATIENT
Start: 2025-04-10

## 2025-04-10 RX ORDER — MINERAL OIL/HYDROPHIL PETROLAT
OINTMENT (GRAM) TOPICAL
Status: CANCELLED | OUTPATIENT
Start: 2025-04-10

## 2025-04-10 RX ORDER — MINERAL OIL/HYDROPHIL PETROLAT
OINTMENT (GRAM) TOPICAL
Status: DISCONTINUED | OUTPATIENT
Start: 2025-04-10 | End: 2025-04-11 | Stop reason: HOSPADM

## 2025-04-10 RX ORDER — METOPROLOL TARTRATE 25 MG/1
25 TABLET, FILM COATED ORAL 2 TIMES DAILY
Status: DISCONTINUED | OUTPATIENT
Start: 2025-04-10 | End: 2025-04-11 | Stop reason: HOSPADM

## 2025-04-10 RX ORDER — SENNOSIDES 8.6 MG
1 TABLET ORAL 2 TIMES DAILY PRN
Status: DISCONTINUED | OUTPATIENT
Start: 2025-04-10 | End: 2025-04-10 | Stop reason: HOSPADM

## 2025-04-10 RX ORDER — GUAIFENESIN 600 MG/1
1200 TABLET, EXTENDED RELEASE ORAL 2 TIMES DAILY
Status: CANCELLED | OUTPATIENT
Start: 2025-04-10

## 2025-04-10 RX ORDER — ONDANSETRON 2 MG/ML
4 INJECTION INTRAMUSCULAR; INTRAVENOUS EVERY 6 HOURS PRN
Status: DISCONTINUED | OUTPATIENT
Start: 2025-04-10 | End: 2025-04-10

## 2025-04-10 RX ORDER — FENTANYL 12.5 UG/1
12 PATCH TRANSDERMAL
Status: DISCONTINUED | OUTPATIENT
Start: 2025-04-10 | End: 2025-04-10

## 2025-04-10 RX ORDER — GLYCOPYRROLATE 0.2 MG/ML
0.2 INJECTION, SOLUTION INTRAMUSCULAR; INTRAVENOUS EVERY 4 HOURS PRN
Status: DISCONTINUED | OUTPATIENT
Start: 2025-04-10 | End: 2025-04-11 | Stop reason: HOSPADM

## 2025-04-10 RX ORDER — CALCIUM CARBONATE 500 MG/1
1000 TABLET, CHEWABLE ORAL 4 TIMES DAILY PRN
Status: DISCONTINUED | OUTPATIENT
Start: 2025-04-10 | End: 2025-04-11 | Stop reason: HOSPADM

## 2025-04-10 RX ORDER — NALOXONE HYDROCHLORIDE 0.4 MG/ML
0.2 INJECTION, SOLUTION INTRAMUSCULAR; INTRAVENOUS; SUBCUTANEOUS
Status: DISCONTINUED | OUTPATIENT
Start: 2025-04-10 | End: 2025-04-11 | Stop reason: HOSPADM

## 2025-04-10 RX ORDER — PREDNISONE 20 MG/1
20 TABLET ORAL DAILY
Status: CANCELLED | OUTPATIENT
Start: 2025-04-11

## 2025-04-10 RX ORDER — ALBUTEROL SULFATE 90 UG/1
2 INHALANT RESPIRATORY (INHALATION)
Status: DISCONTINUED | OUTPATIENT
Start: 2025-04-10 | End: 2025-04-11 | Stop reason: HOSPADM

## 2025-04-10 RX ORDER — FLUTICASONE FUROATE AND VILANTEROL 200; 25 UG/1; UG/1
1 POWDER RESPIRATORY (INHALATION) DAILY
Status: CANCELLED | OUTPATIENT
Start: 2025-04-11

## 2025-04-10 RX ORDER — NALOXONE HYDROCHLORIDE 0.4 MG/ML
0.2 INJECTION, SOLUTION INTRAMUSCULAR; INTRAVENOUS; SUBCUTANEOUS
Status: CANCELLED | OUTPATIENT
Start: 2025-04-10

## 2025-04-10 RX ORDER — GLYCOPYRROLATE 0.2 MG/ML
0.2 INJECTION, SOLUTION INTRAMUSCULAR; INTRAVENOUS EVERY 4 HOURS PRN
Status: CANCELLED | OUTPATIENT
Start: 2025-04-10

## 2025-04-10 RX ORDER — PROCHLORPERAZINE MALEATE 5 MG/1
10 TABLET ORAL EVERY 6 HOURS PRN
Status: CANCELLED | OUTPATIENT
Start: 2025-04-10

## 2025-04-10 RX ORDER — CALCIUM CARBONATE 500 MG/1
1000 TABLET, CHEWABLE ORAL 4 TIMES DAILY PRN
Status: CANCELLED | OUTPATIENT
Start: 2025-04-10

## 2025-04-10 RX ORDER — NALOXONE HYDROCHLORIDE 0.4 MG/ML
0.2 INJECTION, SOLUTION INTRAMUSCULAR; INTRAVENOUS; SUBCUTANEOUS
Status: DISCONTINUED | OUTPATIENT
Start: 2025-04-10 | End: 2025-04-10

## 2025-04-10 RX ORDER — GUAIFENESIN 600 MG/1
1200 TABLET, EXTENDED RELEASE ORAL 2 TIMES DAILY
Status: DISCONTINUED | OUTPATIENT
Start: 2025-04-10 | End: 2025-04-11 | Stop reason: HOSPADM

## 2025-04-10 RX ORDER — ACETAMINOPHEN 325 MG/1
650 TABLET ORAL EVERY 4 HOURS PRN
Status: DISCONTINUED | OUTPATIENT
Start: 2025-04-10 | End: 2025-04-11 | Stop reason: HOSPADM

## 2025-04-10 RX ORDER — OLANZAPINE 5 MG/1
5 TABLET, ORALLY DISINTEGRATING ORAL EVERY 6 HOURS PRN
Status: DISCONTINUED | OUTPATIENT
Start: 2025-04-10 | End: 2025-04-11 | Stop reason: HOSPADM

## 2025-04-10 RX ORDER — ALBUTEROL SULFATE 90 UG/1
2 INHALANT RESPIRATORY (INHALATION) EVERY 8 HOURS
Status: DISCONTINUED | OUTPATIENT
Start: 2025-04-10 | End: 2025-04-11 | Stop reason: HOSPADM

## 2025-04-10 RX ORDER — NALOXONE HYDROCHLORIDE 0.4 MG/ML
0.4 INJECTION, SOLUTION INTRAMUSCULAR; INTRAVENOUS; SUBCUTANEOUS
Status: DISCONTINUED | OUTPATIENT
Start: 2025-04-10 | End: 2025-04-10

## 2025-04-10 RX ORDER — CARBOXYMETHYLCELLULOSE SODIUM 5 MG/ML
1-2 SOLUTION/ DROPS OPHTHALMIC
Status: DISCONTINUED | OUTPATIENT
Start: 2025-04-10 | End: 2025-04-10

## 2025-04-10 RX ORDER — BISACODYL 10 MG
10 SUPPOSITORY, RECTAL RECTAL
Status: CANCELLED | OUTPATIENT
Start: 2025-04-13

## 2025-04-10 RX ORDER — ALBUTEROL SULFATE 0.83 MG/ML
2.5 SOLUTION RESPIRATORY (INHALATION)
Status: DISCONTINUED | OUTPATIENT
Start: 2025-04-10 | End: 2025-04-11 | Stop reason: HOSPADM

## 2025-04-10 RX ORDER — MORPHINE SULFATE 4 MG/ML
4 INJECTION, SOLUTION INTRAMUSCULAR; INTRAVENOUS
Status: DISCONTINUED | OUTPATIENT
Start: 2025-04-10 | End: 2025-04-11 | Stop reason: HOSPADM

## 2025-04-10 RX ORDER — SENNOSIDES 8.6 MG
8.6 TABLET ORAL 2 TIMES DAILY
Status: CANCELLED | OUTPATIENT
Start: 2025-04-10

## 2025-04-10 RX ORDER — SENNOSIDES 8.6 MG
1 TABLET ORAL 2 TIMES DAILY PRN
Status: DISCONTINUED | OUTPATIENT
Start: 2025-04-10 | End: 2025-04-11 | Stop reason: HOSPADM

## 2025-04-10 RX ORDER — SENNOSIDES 8.6 MG
8.6 TABLET ORAL 2 TIMES DAILY
Status: DISCONTINUED | OUTPATIENT
Start: 2025-04-10 | End: 2025-04-11 | Stop reason: HOSPADM

## 2025-04-10 RX ORDER — CARBOXYMETHYLCELLULOSE SODIUM 5 MG/ML
1-2 SOLUTION/ DROPS OPHTHALMIC
Status: DISCONTINUED | OUTPATIENT
Start: 2025-04-10 | End: 2025-04-11 | Stop reason: HOSPADM

## 2025-04-10 RX ORDER — ONDANSETRON 2 MG/ML
4 INJECTION INTRAMUSCULAR; INTRAVENOUS EVERY 6 HOURS PRN
Status: CANCELLED | OUTPATIENT
Start: 2025-04-10

## 2025-04-10 RX ORDER — CARBOXYMETHYLCELLULOSE SODIUM 5 MG/ML
1-2 SOLUTION/ DROPS OPHTHALMIC
Status: DISCONTINUED | OUTPATIENT
Start: 2025-04-10 | End: 2025-04-10 | Stop reason: HOSPADM

## 2025-04-10 RX ORDER — MORPHINE SULFATE 10 MG/5ML
15 SOLUTION ORAL
Status: DISCONTINUED | OUTPATIENT
Start: 2025-04-10 | End: 2025-04-11 | Stop reason: HOSPADM

## 2025-04-10 RX ORDER — PROCHLORPERAZINE MALEATE 5 MG/1
10 TABLET ORAL EVERY 6 HOURS PRN
Status: DISCONTINUED | OUTPATIENT
Start: 2025-04-10 | End: 2025-04-10

## 2025-04-10 RX ORDER — BISACODYL 10 MG
10 SUPPOSITORY, RECTAL RECTAL
Status: DISCONTINUED | OUTPATIENT
Start: 2025-04-13 | End: 2025-04-11 | Stop reason: HOSPADM

## 2025-04-10 RX ORDER — MORPHINE SULFATE 2 MG/ML
2 INJECTION, SOLUTION INTRAMUSCULAR; INTRAVENOUS
Status: CANCELLED | OUTPATIENT
Start: 2025-04-10

## 2025-04-10 RX ORDER — MORPHINE SULFATE 2 MG/ML
2 INJECTION, SOLUTION INTRAMUSCULAR; INTRAVENOUS
Status: DISCONTINUED | OUTPATIENT
Start: 2025-04-10 | End: 2025-04-11 | Stop reason: HOSPADM

## 2025-04-10 RX ORDER — CARBOXYMETHYLCELLULOSE SODIUM 5 MG/ML
1-2 SOLUTION/ DROPS OPHTHALMIC
Status: CANCELLED | OUTPATIENT
Start: 2025-04-10

## 2025-04-10 RX ORDER — NALOXONE HYDROCHLORIDE 0.4 MG/ML
0.1 INJECTION, SOLUTION INTRAMUSCULAR; INTRAVENOUS; SUBCUTANEOUS
Status: DISCONTINUED | OUTPATIENT
Start: 2025-04-10 | End: 2025-04-10

## 2025-04-10 RX ORDER — BISACODYL 10 MG
10 SUPPOSITORY, RECTAL RECTAL
Status: DISCONTINUED | OUTPATIENT
Start: 2025-04-13 | End: 2025-04-10 | Stop reason: HOSPADM

## 2025-04-10 RX ORDER — ALBUTEROL SULFATE 90 UG/1
2 INHALANT RESPIRATORY (INHALATION) EVERY 8 HOURS
Status: CANCELLED | OUTPATIENT
Start: 2025-04-10

## 2025-04-10 RX ORDER — ALBUTEROL SULFATE 0.83 MG/ML
2.5 SOLUTION RESPIRATORY (INHALATION)
Status: CANCELLED | OUTPATIENT
Start: 2025-04-10

## 2025-04-10 RX ORDER — SENNOSIDES 8.6 MG
1 TABLET ORAL 2 TIMES DAILY PRN
Status: CANCELLED | OUTPATIENT
Start: 2025-04-10

## 2025-04-10 RX ORDER — MORPHINE SULFATE 10 MG/5ML
15 SOLUTION ORAL
Status: CANCELLED | OUTPATIENT
Start: 2025-04-10

## 2025-04-10 RX ORDER — METOPROLOL TARTRATE 25 MG/1
25 TABLET, FILM COATED ORAL 2 TIMES DAILY
Status: CANCELLED | OUTPATIENT
Start: 2025-04-10

## 2025-04-10 RX ORDER — SENNOSIDES 8.6 MG
1 TABLET ORAL 2 TIMES DAILY PRN
Status: DISCONTINUED | OUTPATIENT
Start: 2025-04-10 | End: 2025-04-10

## 2025-04-10 RX ORDER — POLYETHYLENE GLYCOL 3350 17 G/17G
17 POWDER, FOR SOLUTION ORAL DAILY PRN
Status: CANCELLED | OUTPATIENT
Start: 2025-04-10

## 2025-04-10 RX ORDER — MORPHINE SULFATE 10 MG/5ML
10 SOLUTION ORAL
Status: DISCONTINUED | OUTPATIENT
Start: 2025-04-10 | End: 2025-04-11 | Stop reason: HOSPADM

## 2025-04-10 RX ORDER — ATROPINE SULFATE 10 MG/ML
2 SOLUTION/ DROPS OPHTHALMIC EVERY 4 HOURS PRN
Status: CANCELLED | OUTPATIENT
Start: 2025-04-10

## 2025-04-10 RX ORDER — FENTANYL 12.5 UG/1
12 PATCH TRANSDERMAL
Status: DISCONTINUED | OUTPATIENT
Start: 2025-04-11 | End: 2025-04-11 | Stop reason: HOSPADM

## 2025-04-10 RX ADMIN — MORPHINE SULFATE 10 MG: 10 SOLUTION ORAL at 02:50

## 2025-04-10 RX ADMIN — MORPHINE SULFATE 10 MG: 10 SOLUTION ORAL at 14:36

## 2025-04-10 RX ADMIN — GUAIFENESIN 1200 MG: 600 TABLET ORAL at 09:07

## 2025-04-10 RX ADMIN — METOPROLOL TARTRATE 25 MG: 25 TABLET, FILM COATED ORAL at 20:14

## 2025-04-10 RX ADMIN — MORPHINE SULFATE 10 MG: 10 SOLUTION ORAL at 09:17

## 2025-04-10 RX ADMIN — ALBUTEROL SULFATE 2 PUFF: 90 INHALANT RESPIRATORY (INHALATION) at 09:08

## 2025-04-10 RX ADMIN — ALBUTEROL SULFATE 2 PUFF: 90 INHALANT RESPIRATORY (INHALATION) at 02:44

## 2025-04-10 RX ADMIN — ALBUTEROL SULFATE 2 PUFF: 90 INHALANT RESPIRATORY (INHALATION) at 18:12

## 2025-04-10 RX ADMIN — ALBUTEROL SULFATE 2 PUFF: 90 INHALANT RESPIRATORY (INHALATION) at 06:31

## 2025-04-10 RX ADMIN — SENNOSIDES 8.6 MG: 8.6 TABLET, FILM COATED ORAL at 20:14

## 2025-04-10 RX ADMIN — METOPROLOL TARTRATE 25 MG: 25 TABLET, FILM COATED ORAL at 09:07

## 2025-04-10 RX ADMIN — GUAIFENESIN 1200 MG: 600 TABLET ORAL at 20:14

## 2025-04-10 RX ADMIN — FLUTICASONE FUROATE AND VILANTEROL TRIFENATATE 1 PUFF: 200; 25 POWDER RESPIRATORY (INHALATION) at 09:08

## 2025-04-10 RX ADMIN — MORPHINE SULFATE 10 MG: 10 SOLUTION ORAL at 12:08

## 2025-04-10 RX ADMIN — SENNOSIDES 8.6 MG: 8.6 TABLET, FILM COATED ORAL at 09:07

## 2025-04-10 RX ADMIN — MORPHINE SULFATE 10 MG: 10 SOLUTION ORAL at 20:25

## 2025-04-10 RX ADMIN — PREDNISONE 20 MG: 20 TABLET ORAL at 09:07

## 2025-04-10 ASSESSMENT — ACTIVITIES OF DAILY LIVING (ADL)
ADLS_ACUITY_SCORE: 37
ADLS_ACUITY_SCORE: 37
ADLS_ACUITY_SCORE: 56
ADLS_ACUITY_SCORE: 33
ADLS_ACUITY_SCORE: 37
ADLS_ACUITY_SCORE: 33
ADLS_ACUITY_SCORE: 30
ADLS_ACUITY_SCORE: 33
FALL_HISTORY_WITHIN_LAST_SIX_MONTHS: NO
ADLS_ACUITY_SCORE: 33
ADLS_ACUITY_SCORE: 30
ADLS_ACUITY_SCORE: 37
ADLS_ACUITY_SCORE: 37
ADLS_ACUITY_SCORE: 33
ADLS_ACUITY_SCORE: 37
ADLS_ACUITY_SCORE: 33
ADLS_ACUITY_SCORE: 37
ADLS_ACUITY_SCORE: 30
ADLS_ACUITY_SCORE: 37
ADLS_ACUITY_SCORE: 33
ADLS_ACUITY_SCORE: 33
ADLS_ACUITY_SCORE: 37

## 2025-04-10 NOTE — PLAN OF CARE
Goal Outcome Evaluation:      Plan of Care Reviewed With: patient    Overall Patient Progress: decliningOverall Patient Progress: declining    Outcome Evaluation: Patient is A/O with some forgetfulness. Patient is up with SBA and uses the urinal at bedside with assistance. Patient has a Heimlich chest tube on the Rt side. PRN Morphine liq 10mg given, see Emar. Fentanyl patch on Lt arm. Patient is on comfort cares and is suppose to be discharging today with [facility]. O2 4L/NC on patient. Patient is very short of breath.

## 2025-04-10 NOTE — DISCHARGE SUMMARY
[facility]ist Discharge Summary    Luis Hernandez MRN# [id number]   Age: 59 year old YOB: 1965     Date of Admission:  4/3/2025  Date of Discharge::  4/10/2025  Admitting Physician:  Amy Montes MD  Discharge Physician:  Amy Montes MD  Primary Physician: Shalini Washington  Transferring Facility: N/A     Home clinic: unknown          Admission Diagnoses:   Pneumonia of right lung due to infectious organism, unspecified part of lung [J18.9]          Discharge Diagnosis:     Principle diagnosis:   Community acquired Pneumonia  Sepsis with Acute hypoxic respiratory failure and lactic acidosis  Tension pneumothorax-resolved  Extensive subcutaneous emphysema in the right chest wall   Secondary diagnoses:  Patient Active Problem List   Diagnosis    Essential hypertension, benign    ED (erectile dysfunction)    Eczema    COPD, very severe (H)    CARDIOVASCULAR SCREENING; LDL GOAL LESS THAN 130    Incidental pulmonary nodule, > 3mm and < 8mm, new from 2010    Tobacco abuse, in remission    Peripheral edema    Pulmonary hypertension (H)    Anemia, unspecified type    Squamous cell lung cancer, right (H)    Encounter for antineoplastic chemotherapy    Full code status    High risk medication use    Encounter for long-term (current) use of high-risk medication    Pneumonia of right lung due to infectious organism, unspecified part of lung    Hospice care          Brief History of Presenting Illness:   As per admit hx  Luis Hernandez is a 59 year old male with past medical history of tobacco use, pulmonary hypertension, squamous cell carcinoma, emphysema, tobacco use in remission, anemia, hypertension now presents on 4/3/2025 with dyspnea, pleuritic pain.      Reports frequent COPD exacerbations; using oxygen at home usually 1.5L. Says he often increases it to 2-3L when he is up walking around to keep his SPO2 >89%. Over past 1 week PTA he has had an increase in dyspnea with exertion & at rest. He's  increased his O2 to 2L at rest. Cough is usually productive of white sputum, but in past week has had significantly increased sputum yellow in color without blood mostly in the morning. Denies fevers or chills. Denies emesis. Denies significant or chronic aspiration (though thinks he had an isolated event of aspirating water AM 4/2). Denies LE edema. Denies headaches. Denies orthopnea.      Presented to ER for duration of symptoms & felt dizzy AM 4/3 which is a progression of sxs from earlier. Says currently symptoms are different from previous COPD exacerbations.             Hospital Course:   Assessment/ Plan      Community acquired Pneumonia  Sepsis with Acute hypoxic respiratory failure and lactic acidosis  Tension pneumothorax-resolved  Extensive subcutaneous emphysema in the right chest wall    Presented with 1wk ESPARZA, pleuritic chest pain  Septic on presentation with increased O2 requirements from baseline. CT shows right upper & middle lobe consolidations suspicious for pneumonia. Chronic cough now with increased yellow sputum. Denies sick contacts. Known history of pseudomonas & klebsiella pneumonia April 2024. Was tachycardic (124bpm), tachypneic (29/min) & leukocytosis (WBC 28.8) Lactic acid WNL (0.8). VBG shows chronic compensated respiratory acidosis (pH 7.39, pCO2 51, bicarb 31). Influenza/ covid / RSV negative  Was started on cefepime 2g Q8hrs for CAP given history of pseudomonas/ azithromycin 500mg IV Q24hrs /IVF with lactated ringer 100/hr continuous.   Pt decompensated early 4/4 AM-acute resp distress. HR in 190s and increased work of breathing. LA was 3.1 and WBC still 27k.  Stat CXR showed large pneumo tx R side. Surgery consulted and pt had stat chest tube placed with immediate, significant relief. Pt was tx to ICU for BIPAP  HR down to 90s after chest tube placed-pt did receive one extra dose of metoprolol 25mg too.  O2 sats were stable on 2LNC. Due to intermediate  resistance of pseudomonas to  cefepime, this is changed to zosyn.Blood cultures x2 on 4/3  NGTD.   CXR 4/5 AM shows R PTX and chest tube slipped out couple inches. Surgery re-adjusted tube  but repeat CXR not changed. 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 . Discussed with thoracic surgery 4/5-would watch couple days, if not improving, needs 2nd chest tube placed and or bronch. WBC was 35k on 4/5.  Pt decompensated again last night around midnight.. CXR showed chest tube not in pleural space and a new recurrent tension PTX. Was on 15L oxymask. A new chest tube was placed by ER MD with re-expansion of the lung.  Continued to have WBC 33k, PCT improved though.   Will get legionella/ strep pneumo antigens, sputum cx/ stain. Continue current antbx/ steroids/ nebs.   Pt decided to go with hospice and comfort cares 4/7. Hemlich valve placed by general surgery 4/9.  Pt now under care of [facility] hospice.     Emphysema, severe with acute exacerbation  Follows with [place] pulmonology for severe pan-lobar emphysema on 1.5L NC at baseline.   Managed PTA with roflumilast, Advair, Spiriva, albuterol inhaler, mucinex PRN, prednisone 10mg daily, and azithromycin 250mg daily.   Was started on prednisone 40mg/day and nebs-hanged to iv steroids after couple doses.   Stopped iv steroids and started prednisone 4/4. Still SOB/ coarse BS      -continue prednisone. Pt didn't want nebs so stopped.     Acute respiratory failure with hypoxia  Acute resp distress  2ndry to above.   Rx as above.      Squamous cell lung cancer, stage IIIB    Follows with [place] oncology for non-operable SCC of R lung involving R mainstem s/p stent 1/18/24 & tumor debulking. S/p chemotherapy & now on maintenance pembrolizumab Q6wks; last dose 2/26/2025.     Imaging 4/3 shows new pulmonary masses as per below.      Lung nodules, new  CT PE study 4/3 shows 3.8cm heterogeneous masslike opacity abutting the left uppr lobe anteriorly & medially which is  new since previous exam (1/2025). Could be related to pneumonia, however, neoplasm cannot be excluded. 0.8cm indeterminate left upper lobe pulmonary nodule also new since previous exam 1/2025.      Hyponatremia  Na 130 in ER. Likely 2/2 acute illness. Not clinically significant.   Resolved      Transaminitis  Mild, acute. AST 53, ALT 71.  Could be related to sepsis, above.   Improved.      Hypertension  Normotensive on presentation.   PTA lisinopril 40mg daily /amlodipine 5mg daily stopped 4/8  - Continue PTA metoprolol tartrate 25mg BID. Will discuss with pt if he wants to continue all meds or not.     Pulmonary hypertension  Pulmonology notes likely pulmonary HTN with RVP 55mmHg plus RAP on 2019 ECHO, dilated RV but normal RV EF. Does not appear to follow regularly with cardiology.      Thrombocytosis  Acute. Platelets baseline ~350, platelets on presentation 717. Probably hemo-concentrated from acute infection, above.   Rx as above and follow     Anemia  Normocytic, chronic. Baseline hemoglobin 11.5-12.0, hemoglobin on presentation 12.0.   Previously though to be chemo-induced.      Tobacco abuse, in remission  Quit Aug 2021.                 Procedures:   No procedures performed during this admission         Allergies:      Allergies   Allergen Reactions    Hctz Other (See Comments)     He has hyponatremia - avoid use    Penicillins Other (See Comments) and Unknown     Childhood reaction.             Medications Prior to Admission:     Medications Prior to Admission   Medication Sig Dispense Refill Last Dose/Taking    albuterol (PROAIR HFA/PROVENTIL HFA/VENTOLIN HFA) 108 (90 Base) MCG/ACT inhaler Inhale 2 puffs into the lungs every 4 hours as needed for shortness of breath 54 g 3 4/3/2025 at  9:00 AM    albuterol (PROVENTIL) (2.5 MG/3ML) 0.083% neb solution Take 1 vial (2.5 mg) by nebulization every 4 hours as needed for shortness of breath, wheezing or cough. 360 mL 5 More than a month    amLODIPine (NORVASC) 5  MG tablet TAKE 1 TABLET BY MOUTH DAILY 90 tablet 0 4/3/2025 Morning    azithromycin (ZITHROMAX) 250 MG tablet Take 1 tablet (250 mg) by mouth daily. 30 tablet 11 4/2/2025 Morning    fluticasone-salmeterol (ADVAIR) 500-50 MCG/ACT inhaler Inhale 1 puff into the lungs every 12 hours. 60 each 11 4/3/2025 at  8:00 AM    guaiFENesin (MUCINEX) 600 MG 12 hr tablet Take 600 mg by mouth 2 times daily.   4/3/2025 Morning    ibuprofen (ADVIL/MOTRIN) 200 MG tablet Take 3 tablets by mouth every 6 hours as needed for pain.   More than a month    lisinopril (ZESTRIL) 40 MG tablet Take 1 tablet (40 mg) by mouth daily. 90 tablet 3 4/3/2025 Morning    metoprolol tartrate (LOPRESSOR) 25 MG tablet Take 1 tablet (25 mg) by mouth 2 times daily 180 tablet 3 4/3/2025 Morning    order for DME Equipment being ordered: Oxygen 3L via NC continuous.  O2 saturated noted to be 86% on room air at rest. (Patient taking differently: 1.5 L/min continuously. Equipment being ordered: Oxygen 3L via NC continuous.  O2 saturated noted to be 86% on room air at rest.) 1 Units 0 4/3/2025    order for DME Equipment being ordered: Nebulizer 1 Device 0 More than a month    predniSONE (DELTASONE) 10 MG tablet Take 1 tablet (10 mg) by mouth daily. 30 tablet 5 4/2/2025 Morning    roflumilast (DALIRESP) 500 MCG TABS tablet Take 1 tablet (500 mcg) by mouth daily. 30 tablet 5 4/3/2025 Morning    sodium chloride 0.9 % neb solution Take 3 mLs by nebulization 2 times daily as needed for wheezing. 180 mL 5 More than a month    tiotropium (SPIRIVA RESPIMAT) 2.5 MCG/ACT inhaler Inhale 2 puffs into the lungs daily. 12 g 11 4/3/2025 at  8:00 AM             Discharge Medications:     Current Discharge Medication List        START taking these medications    Details   amLODIPine (NORVASC) 5 MG tablet TAKE 1 TABLET BY MOUTH DAILY  Qty: 90 tablet, Refills: 0    Comments: Needs clinic appointment for further refills.  Associated Diagnoses: Essential hypertension, benign            CONTINUE these medications which have NOT CHANGED    Details   albuterol (PROAIR HFA/PROVENTIL HFA/VENTOLIN HFA) 108 (90 Base) MCG/ACT inhaler Inhale 2 puffs into the lungs every 4 hours as needed for shortness of breath  Qty: 54 g, Refills: 3    Comments: Pharmacy may dispense brand covered by insurance (Proair, or proventil or ventolin or generic albuterol inhaler)  Associated Diagnoses: Centrilobular emphysema (H)      albuterol (PROVENTIL) (2.5 MG/3ML) 0.083% neb solution Take 1 vial (2.5 mg) by nebulization every 4 hours as needed for shortness of breath, wheezing or cough.  Qty: 360 mL, Refills: 5    Associated Diagnoses: Chronic obstructive pulmonary disease, unspecified COPD type (H)      azithromycin (ZITHROMAX) 250 MG tablet Take 1 tablet (250 mg) by mouth daily.  Qty: 30 tablet, Refills: 11    Associated Diagnoses: Chronic obstructive pulmonary disease, unspecified COPD type (H)      fluticasone-salmeterol (ADVAIR) 500-50 MCG/ACT inhaler Inhale 1 puff into the lungs every 12 hours.  Qty: 60 each, Refills: 11    Associated Diagnoses: Acute on chronic respiratory failure with hypoxia and hypercapnia (H); Pulmonary emphysema, unspecified emphysema type (H)      guaiFENesin (MUCINEX) 600 MG 12 hr tablet Take 600 mg by mouth 2 times daily.      ibuprofen (ADVIL/MOTRIN) 200 MG tablet Take 3 tablets by mouth every 6 hours as needed for pain.      lisinopril (ZESTRIL) 40 MG tablet Take 1 tablet (40 mg) by mouth daily.  Qty: 90 tablet, Refills: 3    Associated Diagnoses: Essential hypertension, benign      metoprolol tartrate (LOPRESSOR) 25 MG tablet Take 1 tablet (25 mg) by mouth 2 times daily  Qty: 180 tablet, Refills: 3    Associated Diagnoses: Pulmonary hypertension (H)      !! order for DME Equipment being ordered: Oxygen 3L via NC continuous.  O2 saturated noted to be 86% on room air at rest.  Qty: 1 Units, Refills: 0    Associated Diagnoses: Mixed simple and mucopurulent chronic bronchitis (H)      !!  "order for DME Equipment being ordered: Nebulizer  Qty: 1 Device, Refills: 0    Associated Diagnoses: Simple chronic bronchitis (H)      predniSONE (DELTASONE) 10 MG tablet Take 1 tablet (10 mg) by mouth daily.  Qty: 30 tablet, Refills: 5    Associated Diagnoses: COPD exacerbation (H)      roflumilast (DALIRESP) 500 MCG TABS tablet Take 1 tablet (500 mcg) by mouth daily.  Qty: 30 tablet, Refills: 5    Associated Diagnoses: COPD exacerbation (H)      sodium chloride 0.9 % neb solution Take 3 mLs by nebulization 2 times daily as needed for wheezing.  Qty: 180 mL, Refills: 5    Associated Diagnoses: COPD exacerbation (H)      tiotropium (SPIRIVA RESPIMAT) 2.5 MCG/ACT inhaler Inhale 2 puffs into the lungs daily.  Qty: 12 g, Refills: 11    Associated Diagnoses: Acute on chronic respiratory failure with hypoxia and hypercapnia (H); Pulmonary emphysema, unspecified emphysema type (H)       !! - Potential duplicate medications found. Please discuss with provider.                Consultations:   No consultations were requested during this admission            Discharge Exam:   Blood pressure (!) 145/101, pulse 117, temperature 98.3  F (36.8  C), temperature source Oral, resp. rate 22, height 1.626 m (5' 4\"), weight 53.1 kg (117 lb 1 oz), SpO2 (!) 91%.  GENERAL APPEARANCE: healthy, alert and no distress  EYES: conjunctiva clear, eyes grossly normal  HENT: external ears and nose normal   SKIN: clear without significant rashes or lesions  NEURO: Normal strength and tone, sensory exam grossly normal, mentation intact and speech normal    Unresulted Labs Ordered in the Past 30 Days of this Admission       No orders found from 3/4/2025 to 4/4/2025.            No results found for this or any previous visit (from the past 24 hours).         Pending Tests at Discharge:   None         Discharge Instructions and Follow-Up:     Discharge diet: Regular   Discharge activity: Activity as tolerated   Discharge follow-up: hospice           " Discharge Disposition:     Discharged to home      Attestation:  I have reviewed today's vital signs, notes, medications, labs and imaging.    Time Spent on this Encounter   I, Amy Montes MD, personally saw the patient today and spent greater than 30 minutes discharging this patient.    Amy Montes MD

## 2025-04-10 NOTE — PROGRESS NOTES
Patient admitted under [facility] today. Patient was admitted inpatient on 4/3/2025 and discharge from inpatient medicine to inpatient [facility].

## 2025-04-10 NOTE — PROGRESS NOTES
[facility] TRANSPORT NOTE  Data:   Reason for Transport:  bed Availability    Luis Hernandez was transported to Med Surg Room# 2309 via ambulation at 0220.  Patient was accompanied by Nursing Assistant. Equipment used for transport: Oxygen  Nasal Cannula. Family was aware of reason for transport: yes    Action:  Report: received from CRISTIAN Macias    Response:  Patient's condition upon return was Stable.    Jo Awan RN

## 2025-04-10 NOTE — PLAN OF CARE
"  Problem: Adult Inpatient Plan of Care  Goal: Readiness for Transition of Care  Outcome: Progressing  Flowsheets (Taken 4/10/2025 1232)  Transportation Anticipated: family or friend will provide  Intervention: Mutually Develop Transition Plan  Recent Flowsheet Documentation  Taken 4/10/2025 1232 by Roselyn Maier RN  Transportation Anticipated: family or friend will provide  Patient/Family Anticipated Services at Transition: none  Patient/Family Anticipates Transition to: home with family  Equipment Currently Used at Home: walker, standard     Problem: Adult Inpatient Plan of Care  Goal: Readiness for Transition of Care  Intervention: Mutually Develop Transition Plan  Recent Flowsheet Documentation  Taken 4/10/2025 1232 by Roselyn Maier, RN  Transportation Anticipated: family or friend will provide  Patient/Family Anticipated Services at Transition: none  Patient/Family Anticipates Transition to: home with family  Equipment Currently Used at Home: walker, standard  Patient in good spirits. Patient able to make needs known. Pain managed with oral morphine 10 mg. Patient has not had a bowel movement since 4/3, has scheduled senna, offered to give him prn miralax and he declined \" I haven't been eating, I should go by tomorrow\".   Heimlich valve in place, dressing intact, no drainage from end of heimlich tube.  Offered patient to bathe but he declined.   [facility] here to visit with patient earlier this morning ( refer to inpatient admission notes from 4/10/2025)                        "

## 2025-04-10 NOTE — PROGRESS NOTES
Hospice Diagnosis: malignant neoplasm of right lung     Indication for Inpatient Hospice: air hunger, requiring multiple medication changes and prn use, strict comfort measures, dnr     Goals for Hospital Discharge: symptom management with oral/SL meds     Plan of Care Discussed with the Following:   - Nurse: Noelle  - Charge Nurse:   - Hospitalist/Rounding Provider: Dr. Montes, hospitalist   - Luis's Family/Preferred Contact: Analilia Hernandez  - Hosp[facility] Provider[facility]    RN met with patient and daughter Analilia. Hospice Medical Social Worker also present. Patient and daughter agree with a transfer to an assisted living or skilled nursing care facility. Pt and family had no questions at this time.    Patient stated no pain at this time. Smiling and in good spirits.  Lung sounds with rhonchi in ULL, diminished in bases.   T 99.9, O2 92% on 4L. Dyspnea at rest. P 123 thready. RR 21.

## 2025-04-10 NOTE — PROGRESS NOTES
"[facility]     Social Work Progress Note  - Pt is GIP with [facility] at this time.      [facility]  contact information:  Office:  [phone]  Cell:  [phone]  Email:  [email or web address]     ___________________________________________________________     [facility] 24/7 Contact Number: [phone]    - Providers: Please contact [facility] with changes in orders or clinical plan of care   - Nursing: Please contact [facility] with significant changes in patient condition  - Social Work: Please contact [facility] for discharge phanning/updates  ___________________________________________________________      Summary of Visit (includes psychosocial assessment/updates, acceptance of palliative care/hospice philosophy, discharge plans):   Per hospice RN & SW, \"RN met with patient and daughter Analilia. Hospice Medical Social Worker also present. Patient and daughter agree with a transfer to an assisted living or skilled nursing care facility. Pt and family had no questions at this time.\"     alessia Anderson SW, contacted writer to request status of placement options/plans & referrals sent.  SW sent to Bhumika via secure email & offered to place any new referrals as needed.      Interventions and response to Interventions (short-term counseling, family conference, education/resources offered to the family):  NA at this time.     Plan of Care Discussed with the Following:   - Nurse: Noelle  - Charge Nurse: Sahil  - Hospitalist/Rounding Provider: deneen Gonzales   - Luis's Family/Preferred Contact: Analilia Hernandez  - Hospice Provider: [facility]  - Hospice Social Worker: Bhumika Bautista     The following information was shared with JANELL Gann  for LTC placement.  Referrals remain pending at:  [facility] (Phone:[phone] Fax: [phone]) - Maurice was sent referral;   Service Provider Request Status " Address Phone Fax Patient Preferred   [facility] (SNF) Considering  need to discuss finances with family [address] [place] [id number] [phone] --   [facility] REFERRAL (REF'L) Pending - Request Sent [address] [facility] [address] [phone] --         [facility] ([facility]) Declined  Bed Not Available [address] [id number] [phone] --   [facility] ([facility]) Declined  Cannot meet patient's needs [address] [facility] [id number] [phone] --   [facility] ([facility]) Declined  Bed Not Available [address] [facility] [id number] [phone] --   [facility] () Declined  Cannot meet patient's needs [address] [place] [id number] [phone] --   [facility] ([facility]) Declined  Bed Not Available [address] [facility] [facility] [id number] [phone] --           FREDY Gil

## 2025-04-10 NOTE — PROGRESS NOTES
"[facility]    Social Work Progress Note  - Pt is GIP with [facility] at this time.     [facility]  contact information:  Office:  [phone]  Cell:  [phone]  Email:  [email or web address]    ___________________________________________________________    [facility] 24/7 Contact Number: [phone]    - Providers: Please contact [facility] with changes in orders or clinical plan of care   - Nursing: Please contact [facility] with significant changes in patient condition  - Social Work: Please contact [facility] for discharge phanning/updates  ___________________________________________________________      Summary of Visit (includes psychosocial assessment/updates, acceptance of palliative care/hospice philosophy, discharge plans):   Per hospice RN & SW, \"RN met with patient and daughter Analilia. Hospice Medical Social Worker also present. Patient and daughter agree with a transfer to an assisted living or skilled nursing care facility. Pt and family had no questions at this time.\"    Bhumika Bautista hospice SW, contacted writer to request status of placement options/plans & referrals sent.  SW sent to Bhumika via secure email & offered to place any new referrals as needed.     Interventions and response to Interventions (short-term counseling, family conference, education/resources offered to the family):  NA at this time.    Plan of Care Discussed with the Following:   - Nurse: Noelle  - Charge Nurse: Sahil  - Hospitalist/Rounding Provider: deneen Gonzales   - Luis's Family/Preferred Contact: Analilia Hernandez  - Hospice Provider: [facility]  - Hospice Social Worker: FREDY Ansari    "

## 2025-04-10 NOTE — PHARMACY-ADMISSION MEDICATION HISTORY
Admission medication history completed at [facility]. Please see Medication Scribe Admission Medication History note from 04/04/2025.

## 2025-04-10 NOTE — PROGRESS NOTES
Patient admitted under [facility] today. Patient was admitted inpatient on 4/3/2025 and discharge from inpatient medicine to inpatient [facility].   Refer to active orders for plan of care. Call [facility]

## 2025-04-10 NOTE — H&P
[facility]    History and Physical  Hospital Medicine    Luis Hernandez MRN# [id number]   Age: 59 year old YOB: 1965     Date of Admission:  4/3/2025    Home clinic: [facility]  Primary care provider: Shalini Washington         Chief Complaint:   Cough/ sob    History is obtained from the patient          History of Present Illness:   Luis Hernandez is a 59 year old male with past medical history of tobacco use in remission, pulmonary hypertension, squamous cell carcinoma, emphysema, anemia, hypertension admitted on 4/3/2025 with dyspnea, pleuritic pain. His hospital course was complicated by sepsis related to pneumonia and tension pneumothorax requiring a chest tube insertion (re-insertion due to migration out of chest cavity as well). On 04/07, patient decided to transition to comfort-focused care with palliative care on board.   Pt is now admitted under [facility] hospice care           Past Medical History:     Patient Active Problem List    Diagnosis Date Noted    Hospice care 04/10/2025     Priority: Medium    Pneumonia of right lung due to infectious organism, unspecified part of lung 04/03/2025     Priority: Medium    Encounter for long-term (current) use of high-risk medication 07/18/2024     Priority: Medium    High risk medication use 05/09/2024     Priority: Medium    Full code status 04/23/2024     Priority: Medium    Encounter for antineoplastic chemotherapy 02/26/2024     Priority: Medium    Squamous cell lung cancer, right (H) 02/20/2024     Priority: Medium    Anemia, unspecified type 05/07/2021     Priority: Medium    Peripheral edema 02/16/2020     Priority: Medium    Pulmonary hypertension (H) 02/16/2020     Priority: Medium    Tobacco abuse, in remission 12/05/2017     Priority: Medium    Incidental pulmonary nodule, > 3mm and < 8mm, new from 2010 01/07/2014     Priority: Medium     By chest x-ray and chest CT new from CT chest from  Oct 2010.   Stable 01/2014 to 07/2014, 6 month follow scan recommended.   Chest CT of 1/15/2015= stable nodule, f/u one year.      CARDIOVASCULAR SCREENING; LDL GOAL LESS THAN 130 10/31/2010     Priority: Medium    COPD, very severe (H) 10/25/2010     Priority: Medium     Severe to very severe      Eczema 10/04/2010     Priority: Medium    ED (erectile dysfunction) 04/20/2009     Priority: Medium    Essential hypertension, benign 10/15/2007     Priority: Medium               Past Surgical History:      Past Surgical History:   Procedure Laterality Date    APPENDECTOMY      childhood    BRONCHOSCOPY, DILATE BRONCHUS, STENT BRONCHUS, COMBINED N/A 1/18/2024    Procedure: Bronchoscopy with tissue debulking,  and stent placement.;  Surgeon: Isac Prescott MD;  Location: [facility] OR    BRONCHOSCOPY, DILATE BRONCHUS, STENT BRONCHUS, COMBINED N/A 11/15/2024    Procedure: BRONCHOSCOPY, RIGID and flexible, stent removal;  Surgeon: Scout Wells MD;  Location: [facility] OR    ENDOBRONCHIAL ULTRASOUND FLEXIBLE N/A 1/18/2024    Procedure: Endobronchial ultrasound with Endobronchial and Cryo biopsy.;  Surgeon: Isac Prescott MD;  Location: [facility] OR             Social History:     Social History     Socioeconomic History    Marital status: Single     Spouse name: Not on file    Number of children: Not on file    Years of education: Not on file    Highest education level: Not on file   Occupational History    Not on file   Tobacco Use    Smoking status: Former     Current packs/day: 0.00     Average packs/day: 2.0 packs/day for 30.0 years (60.0 ttl pk-yrs)     Types: Cigarettes     Start date: 08/1991     Quit date: 08/2021     Years since quitting: 3.6    Smokeless tobacco: Former   Vaping Use    Vaping status: Never Used   Substance and Sexual Activity    Alcohol use: Yes     Comment: rare    Drug use: No    Sexual activity: Yes     Partners: Female   Other Topics Concern    Parent/sibling w/ CABG, MI or angioplasty before 65F 55M? No    Social History Narrative    The patient has a 60+ pk yr tobacco hx.  He has has no active use, quit Jan 2014.  Alcohol use is <1 alcoholic drinks per week.  He denies use of recreational drugs.  He is employed. [employer].  Works nights.   The patient is .  Has 1 child.Hot Tub Exposure: NORecent Travel: NO Hx of incarceration:  NOBird Exposure:   NOAnimal Exposure:  NOInhalation Exposure:  NO        Lives in [place], [place] with father. 2 dogs.          Social Drivers of Health     Financial Resource Strain: Low Risk  (4/3/2025)    Financial Resource Strain     Within the past 12 months, have you or your family members you live with been unable to get utilities (heat, electricity) when it was really needed?: No   Food Insecurity: Low Risk  (4/3/2025)    Food Insecurity     Within the past 12 months, did you worry that your food would run out before you got money to buy more?: No     Within the past 12 months, did the food you bought just not last and you didn t have money to get more?: No   Transportation Needs: Low Risk  (4/3/2025)    Transportation Needs     Within the past 12 months, has lack of transportation kept you from medical appointments, getting your medicines, non-medical meetings or appointments, work, or from getting things that you need?: No   Physical Activity: Inactive (8/30/2021)    Exercise Vital Sign     Days of Exercise per Week: 0 days     Minutes of Exercise per Session: 0 min   Stress: Not on file   Social Connections: Unknown (8/30/2021)    Social Connection and Isolation Panel [NHANES]     Frequency of Communication with Friends and Family: Twice a week     Frequency of Social Gatherings with Friends and Family: Twice a week     Attends Congregational Services: Never     Active Member of Clubs or Organizations: No     Attends Club or Organization Meetings: Never     Marital Status: Not on file   Interpersonal Safety: Low Risk  (4/3/2025)    Interpersonal Safety     Do you feel  physically and emotionally safe where you currently live?: Yes     Within the past 12 months, have you been hit, slapped, kicked or otherwise physically hurt by someone?: No     Within the past 12 months, have you been humiliated or emotionally abused in other ways by your partner or ex-partner?: No   Housing Stability: Low Risk  (4/3/2025)    Housing Stability     Do you have housing? : Yes     Are you worried about losing your housing?: No             Family History:     Family History   Problem Relation Age of Onset    Hypertension Mother     Ovarian Cancer Mother     Breast Cancer Mother     Hypertension Father     Lipids Father     C.A.D. Father     Heart Disease Father     Chronic Obstructive Pulmonary Disease Father     Cerebrovascular Disease Father     Diabetes Paternal Grandmother     Chronic Obstructive Pulmonary Disease Paternal Grandfather              Allergies:     Allergies   Allergen Reactions    Hctz Other (See Comments)     He has hyponatremia - avoid use    Penicillins Other (See Comments) and Unknown     Childhood reaction.             Medications:     Prior to Admission medications    Medication Sig Last Dose Taking? Auth Provider Long Term End Date   albuterol (PROAIR HFA/PROVENTIL HFA/VENTOLIN HFA) 108 (90 Base) MCG/ACT inhaler Inhale 2 puffs into the lungs every 4 hours as needed for shortness of breath 4/3/2025 at  9:00 AM Yes Bianca Cuellar MD Yes    albuterol (PROVENTIL) (2.5 MG/3ML) 0.083% neb solution Take 1 vial (2.5 mg) by nebulization every 4 hours as needed for shortness of breath, wheezing or cough. More than a month Yes Bianca Cuellar MD Yes    amLODIPine (NORVASC) 5 MG tablet TAKE 1 TABLET BY MOUTH DAILY 4/3/2025 Morning Yes Shalini Washington NP Yes    azithromycin (ZITHROMAX) 250 MG tablet Take 1 tablet (250 mg) by mouth daily. 4/2/2025 Morning Yes Bianca Cuellar MD     fluticasone-salmeterol (ADVAIR) 500-50 MCG/ACT inhaler Inhale 1 puff into  "the lungs every 12 hours. 4/3/2025 at  8:00 AM Yes Bianca Cuellar MD Yes    guaiFENesin (MUCINEX) 600 MG 12 hr tablet Take 600 mg by mouth 2 times daily. 4/3/2025 Morning Yes Reported, Patient No    ibuprofen (ADVIL/MOTRIN) 200 MG tablet Take 3 tablets by mouth every 6 hours as needed for pain. More than a month Yes Reported, Patient     lisinopril (ZESTRIL) 40 MG tablet Take 1 tablet (40 mg) by mouth daily. 4/3/2025 Morning Yes Shalini Washington NP Yes    metoprolol tartrate (LOPRESSOR) 25 MG tablet Take 1 tablet (25 mg) by mouth 2 times daily 4/3/2025 Morning Yes Shalini Washington NP Yes    order for DME Equipment being ordered: Oxygen 3L via NC continuous.  O2 saturated noted to be 86% on room air at rest.  Patient taking differently: 1.5 L/min continuously. Equipment being ordered: Oxygen 3L via NC continuous.  O2 saturated noted to be 86% on room air at rest. 4/3/2025 Yes Eric Patel MD     order for DME Equipment being ordered: Nebulizer More than a month Yes Jean Carlos [facility] MD     predniSONE (DELTASONE) 10 MG tablet Take 1 tablet (10 mg) by mouth daily. 4/2/2025 Morning Yes Bianca Cuellar MD No    roflumilast (DALIRESP) 500 MCG TABS tablet Take 1 tablet (500 mcg) by mouth daily. 4/3/2025 Morning Yes Bianca Cuellar MD Yes    sodium chloride 0.9 % neb solution Take 3 mLs by nebulization 2 times daily as needed for wheezing. More than a month Yes Bianca Cuellar MD No    tiotropium (SPIRIVA RESPIMAT) 2.5 MCG/ACT inhaler Inhale 2 puffs into the lungs daily. 4/3/2025 at  8:00 AM Yes Bianca Cuellar MD Yes             Review of Systems:   The Review of Systems is negative in ALL other than noted in the HPI          Physical Exam:   Blood pressure (!) 145/101, pulse 117, temperature 98.3  F (36.8  C), temperature source Oral, resp. rate 22, height 1.626 m (5' 4\"), weight 53.1 kg (117 lb 1 oz), SpO2 (!) 91%.  GENERAL APPEARANCE: healthy, alert and no " "distress  EYES: conjunctiva clear, eyes grossly normal  HENT: external ears and nose normal   SKIN: clear without significant rashes or lesions  NEURO: Normal strength and tone, sensory exam grossly normal, mentation intact and speech normal         Data:     Lab Results   Component Value Date    WBC 27.5 (H) 04/07/2025    HGB 10.6 (L) 04/07/2025    HCT 35.6 (L) 04/07/2025    MCV 99 04/07/2025     (H) 04/07/2025     Lab Results   Component Value Date     04/07/2025    CO2 30 (H) 04/07/2025     Lab Results   Component Value Date    BUN 17.9 04/07/2025     No components found for: \"SEDRATE\"  No results found for: \"DDIMER\"  No results found for: \"BNP\"  Lab Results   Component Value Date    TSH 0.18 (L) 02/26/2025     No results found for: \"TROPONIN\"  UA RESULTS:  Recent Labs   Lab Test 04/01/19  1134   COLOR Straw   APPEARANCE Clear   URINEGLC Negative   URINEBILI Negative   URINEKETONE Negative   SG 1.008   UBLD Negative   URINEPH 8.0*   PROTEIN Negative   NITRITE Negative   LEUKEST Negative   RBCU <1   WBCU <1     Liver Function Studies -   Recent Labs   Lab Test 04/04/25  0532   PROTTOTAL 6.2*   ALBUMIN 2.8*   BILITOTAL <0.2   ALKPHOS 89   AST 20   ALT 45            Assessment & Plan   Luis Hernandez is a 59 year old male who presents on 4/3/2025 with sob/ cough. See details below.       Community acquired Pneumonia  Sepsis with Acute hypoxic respiratory failure and lactic acidosis  Tension pneumothorax-resolved  Extensive subcutaneous emphysema in the right chest wall    Presented with 1wk ESPARZA, pleuritic chest pain  Septic on presentation with increased O2 requirements from baseline. CT shows right upper & middle lobe consolidations suspicious for pneumonia. Chronic cough now with increased yellow sputum. Denies sick contacts. Known history of pseudomonas & klebsiella pneumonia April 2024. Was tachycardic (124bpm), tachypneic (29/min) & leukocytosis (WBC 28.8) Lactic acid WNL (0.8). VBG shows chronic " compensated respiratory acidosis (pH 7.39, pCO2 51, bicarb 31). Influenza/ covid / RSV negative  Was started on cefepime 2g Q8hrs for CAP given history of pseudomonas/ azithromycin 500mg IV Q24hrs /IVF with lactated ringer 100/hr continuous.   Pt decompensated early 4/4 AM-acute resp distress. HR in 190s and increased work of breathing. LA was 3.1 and WBC still 27k.  Stat CXR showed large pneumo tx R side. Surgery consulted and pt had stat chest tube placed with immediate, significant relief. Pt was tx to ICU for BIPAP  HR down to 90s after chest tube placed-pt did receive one extra dose of metoprolol 25mg too.  O2 sats were stable on 2LNC. Due to intermediate  resistance of pseudomonas to cefepime, this is changed to zosyn.Blood cultures x2 on 4/3  NGTD.   CXR 4/5 AM shows R PTX and chest tube slipped out couple inches. Surgery re-adjusted tube  but repeat CXR not changed. 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 . Discussed with thoracic surgery 4/5-would watch couple days, if not improving, needs 2nd chest tube placed and or bronch. WBC was 35k on 4/5.  Pt decompensated again last night around midnight.. CXR showed chest tube not in pleural space and a new recurrent tension PTX. Was on 15L oxymask. A new chest tube was placed by ER MD with re-expansion of the lung.  Continued to have WBC 33k, PCT improved though.   Will get legionella/ strep pneumo antigens, sputum cx/ stain. Continue current antbx/ steroids/ nebs.   Pt decided to go with hospice and comfort cares 4/7. Hemlich valve placed by general surgery 4/9.  Pt now under care of [facility] hospice. On fentanyl patch and prn oral morphine with good pain control.      Emphysema, severe with acute exacerbation  Follows with [facility] for severe pan-lobar emphysema on 1.5L NC at baseline.   Managed PTA with roflumilast, Advair, Spiriva, albuterol inhaler, mucinex PRN, prednisone 10mg daily, and azithromycin 250mg  daily.   Was started on prednisone 40mg/day and nebs-hanged to iv steroids after couple doses.   Stopped iv steroids and started prednisone 4/4. Still SOB/ coarse BS      -continue prednisone. Pt didn't want nebs so stopped.     Acute respiratory failure with hypoxia  Acute resp distress  2ndry to above.   Rx as above.      Squamous cell lung cancer, stage IIIB    Follows with [facility] oncology for non-operable SCC of R lung involving R mainstem s/p stent 1/18/24 & tumor debulking. S/p chemotherapy & now on maintenance pembrolizumab Q6wks; last dose 2/26/2025.     Imaging 4/3 shows new pulmonary masses as per below.      Lung nodules, new  CT PE study 4/3 shows 3.8cm heterogeneous masslike opacity abutting the left uppr lobe anteriorly & medially which is new since previous exam (1/2025). Could be related to pneumonia, however, neoplasm cannot be excluded. 0.8cm indeterminate left upper lobe pulmonary nodule also new since previous exam 1/2025.      Hyponatremia  Na 130 in ER. Likely 2/2 acute illness. Not clinically significant.   Resolved      Transaminitis  Mild, acute. AST 53, ALT 71.  Could be related to sepsis, above.   Improved.      Hypertension  Normotensive on presentation.   PTA lisinopril 40mg daily /amlodipine 5mg daily stopped 4/8  - Continue PTA metoprolol tartrate 25mg BID. Will discuss with pt if he wants to continue all meds or not.     Pulmonary hypertension  Pulmonology notes likely pulmonary HTN with RVP 55mmHg plus RAP on 2019 ECHO, dilated RV but normal RV EF. Does not appear to follow regularly with cardiology.      Thrombocytosis  Acute. Platelets baseline ~350, platelets on presentation 717. Probably hemo-concentrated from acute infection, above.   Rx as above and follow     Anemia  Normocytic, chronic. Baseline hemoglobin 11.5-12.0, hemoglobin on presentation 12.0.   Previously though to be chemo-induced.      Tobacco abuse, in remission  Quit Aug 2021.           Clinically Significant  Risk Factors               # Hypoalbuminemia: Lowest albumin = 2.8 g/dL at 4/4/2025  5:32 AM, will monitor as appropriate     # Hypertension: Noted on problem list     # Acute Hypercapnic Respiratory Failure: based on venous blood gas results.  Continue supplemental oxygen and ventilatory support as indicated.             # Financial/Environmental Concerns: none  # COPD: noted on problem list                     Amy Montes MD  [phone]

## 2025-04-10 NOTE — PROGRESS NOTES
Palliative Care Consultation      Patient ID/Chief Complaint: Luis Hernandez 59 year old male being seen for palliative care consultation at the request of hospitalist secondary to goals of care/symptom management.   The patient's primary care provider is:  Shalini Washington.       Impression & Recommendations:  59-year-old male with severe COPD, non-small cell lung cancer stage IIIb (follows with [facility] oncology, new lung nodules noted on recent CT concerning for cancer versus infectious), pulmonary hypertension, HTN, long history of tobacco use stopped 2021.    He is admitted with significant dyspnea on exertion and pleuritic chest pain.  Workup revealed tension pneumothorax with extensive subcutaneous emphysema in right chest wall, severe sepsis secondary to CAP.     Emergent chest tube was placed in the ER.  Chest tube became dislodged, required replacement.    Symptoms/recommendations/discussion:  Advance care planning:  Patient wants his daughter Analilia to be his agent when needed. He is able to advocate for himself at this time.   [employer] [place] healthcare directive provided to patient today to formalize his daughter as healthcare agent  Dyspnea:  Started fentanyl 12 mcg/h weekly patch on 4/8---can easily be switched to other long-acting opioid if needed. As needed opioid use reviewed, will likely need fentanyl patch increased tomorrow or transition to other long acting opioid.   Increase as needed Roxanol to 10 to 15 mg every 1 hour as needed, morphine IV push 2 to 4 mg every 15 minutes as needed.  Roxanol if his preferred opioid unless very rapid relief of symptoms is necessary.   Continues to require IV morphine as roxanol has seemed less effective.   Chest pain:  Same management as dyspnea  Anorexia, very minimal to no PO intake other than water:  No intervention, he continues to drink water regularly but has no appetite for food  He is not distressed by anorexia  Chest tube  "discontinued today, Heimlich valve placed to prevent rapid deterioration in respiratory status due to ongoing large air leak  Patient continues to request comfort care and no prolonging measures   Palliative performance scale 30 to 40%.  Life expectancy 1 month or less.  Patient needs around-the-clock care and cannot return home.  Will need long-term care with hospice arranged.  Case discussed with hospitalist and  about eval for [facility] hospice.             Thank you for the opportunity to participate in the care of this patient and family. Our team: will continue to follow.   CHITO Elizalde CNP  Securely message with Vocera (more info)  Text page via [facility] Paging/Directory     History:  History gathered today from: patient, family/loved ones, medical chart, medical team members    ROS:  10 point ROS is negative unless exceptions noted below    PE: BP (!) 145/101 (BP Location: Right arm)   Pulse 117   Temp 98.3  F (36.8  C) (Oral)   Resp 22   Ht 1.626 m (5' 4\")   Wt 53.1 kg (117 lb 1 oz)   SpO2 (!) 91%   BMI 20.09 kg/m     Wt Readings from Last 3 Encounters:   04/07/25 53.1 kg (117 lb 1 oz)   02/26/25 53.5 kg (118 lb)   02/06/25 53.5 kg (118 lb)     Gen: Awake, alert, no apparent distress  Head NCAT.  Eyes anicteric without injection  Face symmetric, grossly normal  Heart regular and rhythmic,  Lungs tachypneic, cough, Heimlich valve  Skin no rashes or lesions evident on face/neck  Neuro Face symmetric, eyes conjugate; alert and oriented  Neuropsych not anxious or depressed appearing        Data reviewed:  I reviewed electrolytes, BUN/creatinine, liver profile, hemoglobin and hematocrit, platelet count, and most recent imaging          25 minutes spent on the date of the encounter doing chart review, history and exam, patient education & counseling, documentation and other activities as noted above.              Thank you for involving us in the patient's care.   CHITO Elizalde, PHILLIP  [facility]

## 2025-04-10 NOTE — PROGRESS NOTES
Pt being transferred to [facility] in stable condition via w/c with O2. Report given to Jo FOSTER.   All of pt's belongings with him.

## 2025-04-11 PROCEDURE — 250N000013 HC RX MED GY IP 250 OP 250 PS 637: Performed by: INTERNAL MEDICINE

## 2025-04-11 PROCEDURE — 99239 HOSP IP/OBS DSCHRG MGMT >30: CPT | Mod: GV | Performed by: INTERNAL MEDICINE

## 2025-04-11 PROCEDURE — 250N000012 HC RX MED GY IP 250 OP 636 PS 637: Performed by: INTERNAL MEDICINE

## 2025-04-11 RX ORDER — FENTANYL 12.5 UG/1
1 PATCH TRANSDERMAL
Qty: 2 PATCH | Refills: 0 | Status: SHIPPED | OUTPATIENT
Start: 2025-04-14

## 2025-04-11 RX ORDER — MORPHINE SULFATE 10 MG/5ML
10 SOLUTION ORAL EVERY 4 HOURS PRN
Qty: 50 ML | Refills: 0 | Status: SHIPPED | OUTPATIENT
Start: 2025-04-11

## 2025-04-11 RX ADMIN — GUAIFENESIN 1200 MG: 600 TABLET ORAL at 08:30

## 2025-04-11 RX ADMIN — MORPHINE SULFATE 10 MG: 10 SOLUTION ORAL at 00:02

## 2025-04-11 RX ADMIN — FLUTICASONE FUROATE AND VILANTEROL 1 PUFF: 200; 25 POWDER RESPIRATORY (INHALATION) at 08:31

## 2025-04-11 RX ADMIN — SENNOSIDES 8.6 MG: 8.6 TABLET, FILM COATED ORAL at 08:30

## 2025-04-11 RX ADMIN — PREDNISONE 20 MG: 20 TABLET ORAL at 08:31

## 2025-04-11 RX ADMIN — FENTANYL 1 PATCH: 12 PATCH TRANSDERMAL at 08:28

## 2025-04-11 RX ADMIN — MORPHINE SULFATE 10 MG: 10 SOLUTION ORAL at 16:27

## 2025-04-11 RX ADMIN — METOPROLOL TARTRATE 25 MG: 25 TABLET, FILM COATED ORAL at 08:30

## 2025-04-11 RX ADMIN — MORPHINE SULFATE 10 MG: 10 SOLUTION ORAL at 08:31

## 2025-04-11 ASSESSMENT — ACTIVITIES OF DAILY LIVING (ADL)
ADLS_ACUITY_SCORE: 39
ADLS_ACUITY_SCORE: 33
ADLS_ACUITY_SCORE: 39
ADLS_ACUITY_SCORE: 33
ADLS_ACUITY_SCORE: 39
ADLS_ACUITY_SCORE: 33
ADLS_ACUITY_SCORE: 39

## 2025-04-11 NOTE — PLAN OF CARE
Problem: Adult Inpatient Plan of Care  Goal: Absence of Hospital-Acquired Illness or Injury  Intervention: Identify and Manage Fall Risk  Recent Flowsheet Documentation  Taken 4/11/2025 0320 by Kvng Beck RN  Safety Promotion/Fall Prevention:   nonskid shoes/slippers when out of bed   patient and family education   lighting adjusted  Intervention: Prevent Skin Injury  Recent Flowsheet Documentation  Taken 4/11/2025 0320 by Kvng Beck RN  Body Position: position changed independently  Intervention: Prevent Infection  Recent Flowsheet Documentation  Taken 4/11/2025 0320 by Kvng Beck RN  Infection Prevention: rest/sleep promoted  Goal: Optimal Comfort and Wellbeing  Intervention: Monitor Pain and Promote Comfort  Recent Flowsheet Documentation  Taken 4/11/2025 0320 by Kvng Beck RN  Pain Management Interventions: medication (see MAR)  Taken 4/11/2025 0002 by Kvng Beck RN  Pain Management Interventions: medication (see MAR)     Problem: End-of-Life Care  Goal: Comfort, Peace and Preserved Dignity  Intervention: Promote Physical Comfort  Recent Flowsheet Documentation  Taken 4/11/2025 0320 by Kvng Beck RN  Sensory Stimulation Regulation:   lighting decreased   care clustered   quiet environment promoted  Intervention: Promote Peace and Maintain Dignity  Recent Flowsheet Documentation  Taken 4/11/2025 0320 by Kvng Beck RN  Supportive Measures: active listening utilized     Problem: Pain Acute  Goal: Optimal Pain Control and Function  Outcome: Not Progressing  Intervention: Optimize Psychosocial Wellbeing  Recent Flowsheet Documentation  Taken 4/11/2025 0320 by Kvng Beck RN  Supportive Measures: active listening utilized  Intervention: Develop Pain Management Plan  Recent Flowsheet Documentation  Taken 4/11/2025 0320 by Kvng Beck RN  Pain Management Interventions: medication (see MAR)  Taken 4/11/2025 0002 by Kvng Beck RN  Pain Management Interventions: medication (see  MAR)  Intervention: Prevent or Manage Pain  Recent Flowsheet Documentation  Taken 4/11/2025 0320 by Kvng Beck, RN  Sensory Stimulation Regulation:   lighting decreased   care clustered   quiet environment promoted  Sleep/Rest Enhancement:   awakenings minimized   comfort measures   medication   noise level reduced   regular sleep/rest pattern promoted   room darkened  Medication Review/Management: medications reviewed   Goal Outcome Evaluation:       Pt is alert and oriented X4. PRN Morphine given as needed for pain. Pt has been comfortably sleeping throughout the night. Comfort care measures maintained. Will continue to monitor.

## 2025-04-11 NOTE — PROGRESS NOTES
Hospice Dx: Squamous Cell Lung Cancer    Attending MD: Amy Montes  RN: Jacquie Chaudhari  MSW: Deb Ansari    Patient alert and oriented x 3.   T 98.6 O2 94% on 4L, P 107, /82, RR 30    Respirations labored, using accessory muscles. Rhonchi in RUL. Diminished in bases.  Cough with secretions. Significant periorbital edema.  Denied pain.     Coordinated care with ANSELMO Boyd and MSW Bhumika Bautista. Deb confirmed [facility] will fill 2 days worth of comfort medications at discharge.  Daughter present at bedside. Daughter Analilia meeting with Deo FOSTER at 1200. Tentative plan for transfer to [facility] pending facility acceptance.

## 2025-04-11 NOTE — PROGRESS NOTES
[facility] DISCHARGE NOTE    Patient discharged to assisted living at 5:03 PM via wheel chair. Accompanied by daughter and staff. Discharge instructions reviewed with patient and daughter, opportunity offered to ask questions. Prescriptions sent to patients preferred pharmacy. All belongings sent with patient.    Rosa Maria Dumont RN

## 2025-04-11 NOTE — DISCHARGE SUMMARY
[place] Hospitalist Discharge Summary    Luis Hernandez MRN# [id number]   Age: 59 year old YOB: 1965     Date of Admission:  4/3/2025  Date of Discharge::  4/11/2025  Admitting Physician:  Amy Montes MD  Discharge Physician:  Amy Montes MD  Primary Physician: Shalini Washington  Transferring Facility: N/A     Home clinic: unknown          Admission Diagnoses:   Pneumonia of right lung due to infectious organism, unspecified part of lung [J18.9]          Discharge Diagnosis:     Principle diagnosis:   Community acquired Pneumonia  Sepsis with Acute hypoxic respiratory failure and lactic acidosis  Tension pneumothorax-resolved  Extensive subcutaneous emphysema in the right chest wall   Secondary diagnoses:  Patient Active Problem List   Diagnosis    Essential hypertension, benign    ED (erectile dysfunction)    Eczema    COPD, very severe (H)    CARDIOVASCULAR SCREENING; LDL GOAL LESS THAN 130    Incidental pulmonary nodule, > 3mm and < 8mm, new from 2010    Tobacco abuse, in remission    Peripheral edema    Pulmonary hypertension (H)    Anemia, unspecified type    Squamous cell lung cancer, right (H)    Encounter for antineoplastic chemotherapy    Full code status    High risk medication use    Encounter for long-term (current) use of high-risk medication    Pneumonia of right lung due to infectious organism, unspecified part of lung    Hospice care          Brief History of Presenting Illness:   As per admit hx  Luis Hernandez is a 59 year old male with past medical history of tobacco use, pulmonary hypertension, squamous cell carcinoma, emphysema, tobacco use in remission, anemia, hypertension now presents on 4/3/2025 with dyspnea, pleuritic pain.      Reports frequent COPD exacerbations; using oxygen at home usually 1.5L. Says he often increases it to 2-3L when he is up walking around to keep his SPO2 >89%. Over past 1 week PTA he has had an increase in dyspnea with exertion & at rest. He's  increased his O2 to 2L at rest. Cough is usually productive of white sputum, but in past week has had significantly increased sputum yellow in color without blood mostly in the morning. Denies fevers or chills. Denies emesis. Denies significant or chronic aspiration (though thinks he had an isolated event of aspirating water AM 4/2). Denies LE edema. Denies headaches. Denies orthopnea.      Presented to ER for duration of symptoms & felt dizzy AM 4/3 which is a progression of sxs from earlier. Says currently symptoms are different from previous COPD exacerbations.             Hospital Course:   Assessment/ Plan      Community acquired Pneumonia  Sepsis with Acute hypoxic respiratory failure and lactic acidosis  Tension pneumothorax-resolved  Extensive subcutaneous emphysema in the right chest wall    Presented with 1wk ESPARZA, pleuritic chest pain  Septic on presentation with increased O2 requirements from baseline. CT shows right upper & middle lobe consolidations suspicious for pneumonia. Chronic cough now with increased yellow sputum. Denies sick contacts. Known history of pseudomonas & klebsiella pneumonia April 2024. Was tachycardic (124bpm), tachypneic (29/min) & leukocytosis (WBC 28.8) Lactic acid WNL (0.8). VBG shows chronic compensated respiratory acidosis (pH 7.39, pCO2 51, bicarb 31). Influenza/ covid / RSV negative  Was started on cefepime 2g Q8hrs for CAP given history of pseudomonas/ azithromycin 500mg IV Q24hrs /IVF with lactated ringer 100/hr continuous.   Pt decompensated early 4/4 AM-acute resp distress. HR in 190s and increased work of breathing. LA was 3.1 and WBC still 27k.  Stat CXR showed large pneumo tx R side. Surgery consulted and pt had stat chest tube placed with immediate, significant relief. Pt was tx to ICU for BIPAP  HR down to 90s after chest tube placed-pt did receive one extra dose of metoprolol 25mg too.  O2 sats were stable on 2LNC. Due to intermediate  resistance of pseudomonas to  cefepime, this is changed to zosyn.Blood cultures x2 on 4/3  NGTD.   CXR 4/5 AM shows R PTX and chest tube slipped out couple inches. Surgery re-adjusted tube  but repeat CXR not changed. 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 . Discussed with thoracic surgery 4/5-would watch couple days, if not improving, needs 2nd chest tube placed and or bronch. WBC was 35k on 4/5.  Pt decompensated again last night around midnight.. CXR showed chest tube not in pleural space and a new recurrent tension PTX. Was on 15L oxymask. A new chest tube was placed by ER MD with re-expansion of the lung.  Continued to have WBC 33k, PCT improved though.   Will get legionella/ strep pneumo antigens, sputum cx/ stain. Continue current antbx/ steroids/ nebs.   Pt decided to go with hospice and comfort cares 4/7. Hemlich valve placed by general surgery 4/9.  Pt now under care of [facility] hospice. Will discharge to [place] with hospice f/up     Emphysema, severe with acute exacerbation  Follows with [place] pulmonology for severe pan-lobar emphysema on 1.5L NC at baseline.   Managed PTA with roflumilast, Advair, Spiriva, albuterol inhaler, mucinex PRN, prednisone 10mg daily, and azithromycin 250mg daily.   Was started on prednisone 40mg/day and nebs-hanged to iv steroids after couple doses.   Stopped iv steroids and started prednisone 4/4. Still SOB/ coarse BS   Continue prednisone      Acute respiratory failure with hypoxia  Acute resp distress  2ndry to above.   Rx as above.      Squamous cell lung cancer, stage IIIB    Follows with [place] oncology for non-operable SCC of R lung involving R mainstem s/p stent 1/18/24 & tumor debulking. S/p chemotherapy & now on maintenance pembrolizumab Q6wks; last dose 2/26/2025.     Imaging 4/3 shows new pulmonary masses as per below.      Lung nodules, new  CT PE study 4/3 shows 3.8cm heterogeneous masslike opacity abutting the left uppr lobe anteriorly & medially which  is new since previous exam (1/2025). Could be related to pneumonia, however, neoplasm cannot be excluded. 0.8cm indeterminate left upper lobe pulmonary nodule also new since previous exam 1/2025.      Hyponatremia  Na 130 in ER. Likely 2/2 acute illness. Not clinically significant.   Resolved      Transaminitis  Mild, acute. AST 53, ALT 71.  Could be related to sepsis, above.   Improved.      Hypertension  Normotensive on presentation.   PTA lisinopril 40mg daily /amlodipine 5mg daily stopped 4/8  Continue meds. Can decide after hospice sees-to continue or not.      Pulmonary hypertension  Pulmonology notes likely pulmonary HTN with RVP 55mmHg plus RAP on 2019 ECHO, dilated RV but normal RV EF. Does not appear to follow regularly with cardiology.      Thrombocytosis  Acute. Platelets baseline ~350, platelets on presentation 717. Probably hemo-concentrated from acute infection, above.   Rx as above and follow     Anemia  Normocytic, chronic. Baseline hemoglobin 11.5-12.0, hemoglobin on presentation 12.0.   Previously though to be chemo-induced.      Tobacco abuse, in remission  Quit Aug 2021.                 Procedures:   No procedures performed during this admission         Allergies:      Allergies   Allergen Reactions    Hctz Other (See Comments)     He has hyponatremia - avoid use    Penicillins Other (See Comments) and Unknown     Childhood reaction.             Medications Prior to Admission:     Medications Prior to Admission   Medication Sig Dispense Refill Last Dose/Taking    albuterol (PROAIR HFA/PROVENTIL HFA/VENTOLIN HFA) 108 (90 Base) MCG/ACT inhaler Inhale 2 puffs into the lungs every 4 hours as needed for shortness of breath 54 g 3     albuterol (PROVENTIL) (2.5 MG/3ML) 0.083% neb solution Take 1 vial (2.5 mg) by nebulization every 4 hours as needed for shortness of breath, wheezing or cough. 360 mL 5     amLODIPine (NORVASC) 5 MG tablet TAKE 1 TABLET BY MOUTH DAILY 90 tablet 0     azithromycin  (ZITHROMAX) 250 MG tablet Take 1 tablet (250 mg) by mouth daily. 30 tablet 11     fluticasone-salmeterol (ADVAIR) 500-50 MCG/ACT inhaler Inhale 1 puff into the lungs every 12 hours. 60 each 11     guaiFENesin (MUCINEX) 600 MG 12 hr tablet Take 600 mg by mouth 2 times daily.       ibuprofen (ADVIL/MOTRIN) 200 MG tablet Take 3 tablets by mouth every 6 hours as needed for pain.       lisinopril (ZESTRIL) 40 MG tablet Take 1 tablet (40 mg) by mouth daily. 90 tablet 3     metoprolol tartrate (LOPRESSOR) 25 MG tablet Take 1 tablet (25 mg) by mouth 2 times daily 180 tablet 3     order for DME Equipment being ordered: Oxygen 3L via NC continuous.  O2 saturated noted to be 86% on room air at rest. (Patient taking differently: 1.5 L/min continuously. Equipment being ordered: Oxygen 3L via NC continuous.  O2 saturated noted to be 86% on room air at rest.) 1 Units 0     order for DME Equipment being ordered: Nebulizer 1 Device 0     predniSONE (DELTASONE) 10 MG tablet Take 1 tablet (10 mg) by mouth daily. 30 tablet 5     roflumilast (DALIRESP) 500 MCG TABS tablet Take 1 tablet (500 mcg) by mouth daily. 30 tablet 5     sodium chloride 0.9 % neb solution Take 3 mLs by nebulization 2 times daily as needed for wheezing. 180 mL 5     tiotropium (SPIRIVA RESPIMAT) 2.5 MCG/ACT inhaler Inhale 2 puffs into the lungs daily. 12 g 11              Discharge Medications:     Current Discharge Medication List        START taking these medications    Details   amLODIPine (NORVASC) 5 MG tablet TAKE 1 TABLET BY MOUTH DAILY  Qty: 90 tablet, Refills: 0    Comments: Needs clinic appointment for further refills.  Associated Diagnoses: Essential hypertension, benign           CONTINUE these medications which have NOT CHANGED    Details   albuterol (PROAIR HFA/PROVENTIL HFA/VENTOLIN HFA) 108 (90 Base) MCG/ACT inhaler Inhale 2 puffs into the lungs every 4 hours as needed for shortness of breath  Qty: 54 g, Refills: 3    Comments: Pharmacy may dispense  brand covered by insurance (Proair, or proventil or ventolin or generic albuterol inhaler)  Associated Diagnoses: Centrilobular emphysema (H)      albuterol (PROVENTIL) (2.5 MG/3ML) 0.083% neb solution Take 1 vial (2.5 mg) by nebulization every 4 hours as needed for shortness of breath, wheezing or cough.  Qty: 360 mL, Refills: 5    Associated Diagnoses: Chronic obstructive pulmonary disease, unspecified COPD type (H)      azithromycin (ZITHROMAX) 250 MG tablet Take 1 tablet (250 mg) by mouth daily.  Qty: 30 tablet, Refills: 11    Associated Diagnoses: Chronic obstructive pulmonary disease, unspecified COPD type (H)      fluticasone-salmeterol (ADVAIR) 500-50 MCG/ACT inhaler Inhale 1 puff into the lungs every 12 hours.  Qty: 60 each, Refills: 11    Associated Diagnoses: Acute on chronic respiratory failure with hypoxia and hypercapnia (H); Pulmonary emphysema, unspecified emphysema type (H)      guaiFENesin (MUCINEX) 600 MG 12 hr tablet Take 600 mg by mouth 2 times daily.      ibuprofen (ADVIL/MOTRIN) 200 MG tablet Take 3 tablets by mouth every 6 hours as needed for pain.      lisinopril (ZESTRIL) 40 MG tablet Take 1 tablet (40 mg) by mouth daily.  Qty: 90 tablet, Refills: 3    Associated Diagnoses: Essential hypertension, benign      metoprolol tartrate (LOPRESSOR) 25 MG tablet Take 1 tablet (25 mg) by mouth 2 times daily  Qty: 180 tablet, Refills: 3    Associated Diagnoses: Pulmonary hypertension (H)      !! order for DME Equipment being ordered: Oxygen 3L via NC continuous.  O2 saturated noted to be 86% on room air at rest.  Qty: 1 Units, Refills: 0    Associated Diagnoses: Mixed simple and mucopurulent chronic bronchitis (H)      !! order for DME Equipment being ordered: Nebulizer  Qty: 1 Device, Refills: 0    Associated Diagnoses: Simple chronic bronchitis (H)      predniSONE (DELTASONE) 10 MG tablet Take 1 tablet (10 mg) by mouth daily.  Qty: 30 tablet, Refills: 5    Associated Diagnoses: COPD exacerbation (H)       roflumilast (DALIRESP) 500 MCG TABS tablet Take 1 tablet (500 mcg) by mouth daily.  Qty: 30 tablet, Refills: 5    Associated Diagnoses: COPD exacerbation (H)      sodium chloride 0.9 % neb solution Take 3 mLs by nebulization 2 times daily as needed for wheezing.  Qty: 180 mL, Refills: 5    Associated Diagnoses: COPD exacerbation (H)      tiotropium (SPIRIVA RESPIMAT) 2.5 MCG/ACT inhaler Inhale 2 puffs into the lungs daily.  Qty: 12 g, Refills: 11    Associated Diagnoses: Acute on chronic respiratory failure with hypoxia and hypercapnia (H); Pulmonary emphysema, unspecified emphysema type (H)       !! - Potential duplicate medications found. Please discuss with provider.      Fentanyl patch 12 mcg/ 72 hrs  Oral morphine 10mg q1 hr prn          Consultations:   No consultations were requested during this admission            Discharge Exam:   Blood pressure 124/86, pulse 109, temperature 98.8  F (37.1  C), temperature source Oral, resp. rate 18, SpO2 95%.  GENERAL APPEARANCE: healthy, alert and no distress  EYES: conjunctiva clear, eyes grossly normal  HENT: external ears and nose normal   SKIN: clear without significant rashes or lesions  NEURO: Normal strength and tone, sensory exam grossly normal, mentation intact and speech normal    Unresulted Labs Ordered in the Past 30 Days of this Admission       No orders found for last 31 day(s).            No results found for this or any previous visit (from the past 24 hours).         Pending Tests at Discharge:   None         Discharge Instructions and Follow-Up:     Discharge diet: Regular   Discharge activity: Activity as tolerated   Discharge follow-up: hospice           Discharge Disposition:     Discharged to home      Attestation:  I have reviewed today's vital signs, notes, medications, labs and imaging.    Time Spent on this Encounter   IAmy MD, personally saw the patient today and spent greater than 30 minutes discharging this patient.    Amy  Simon CHRISTINE

## 2025-04-11 NOTE — PROGRESS NOTES
Care Management Discharge Note    Discharge Date: 04/11/2025       Discharge Disposition:  [facility] (Phone: [phone] Fax:[phone]) with [facility] (Phone: [phone] Fax: [phone])    Discharge Services:  Hospice to continue to follow    Discharge DME:  Hospice is providing    Discharge Transportation: family or friend will provide    Private pay costs discussed: private room/amenity fees    Does the patient's insurance plan have a 3 day qualifying hospital stay waiver?  No    PAS Confirmation Code:  NA  Patient/family educated on Medicare website which has current facility and service quality ratings:  Yes    Education Provided on the Discharge Plan:  Yes  Persons Notified of Discharge Plans: Pt, daughter, [facility] and [facility] hospice SW  Patient/Family in Agreement with the Plan:  yes    Handoff Referral Completed: No, handoff not indicated or clinically appropriate    Additional Information:  Per discussion with pt, daughter, [facility] hospice [place]- Bhumika, and RN- Rhonda at [facility] that pt is able to admit to [facility] today and keep hospice services.    DME will be delivered today within 4 hours and pt/daughter are aware that pt will need to remain at the hospital until it has arrived at facility for safety of oxygen use.    Pt/daughter and team aware of pt's need to get 2 days of medications from     FREDY Gil

## 2025-04-11 NOTE — PLAN OF CARE
Goal Outcome Evaluation:    Patient sleeping on and off throughout suppertime and the evening. Not much of an appetite. Stood up at edge of bed to use urinal. PRN morphine given for SOB. Denies any pain.

## 2025-04-11 NOTE — PROGRESS NOTES
"SPIRITUAL HEALTH SERVICES  [facility] - Med Surg    - I noted a \"Yes\" in the SH column so I checked with Prasad's RN Noelle.  She confirmed that it wasn't that Prasad had requested a visit but that it would indicate as he went to [facility] status that he had been visited by a  during his stay.      Tito Polo M.A., [facility]  Staff   [facility]  Office: [phone]    "

## 2025-04-11 NOTE — PROGRESS NOTES
Small air leak noted on heimlich with submersion.  Suggest RTC next week for removal.  Dicussed with hospice nursing and Dr Montes.    Mike Rodriguez, DO on 4/11/2025 at 2:47 PM

## 2025-04-17 ENCOUNTER — OFFICE VISIT (OUTPATIENT)
Dept: SURGERY | Facility: CLINIC | Age: 60
End: 2025-04-17
Payer: COMMERCIAL

## 2025-04-17 VITALS
BODY MASS INDEX: 19.97 KG/M2 | SYSTOLIC BLOOD PRESSURE: 95 MMHG | WEIGHT: 117 LBS | HEIGHT: 64 IN | OXYGEN SATURATION: 96 % | HEART RATE: 87 BPM | DIASTOLIC BLOOD PRESSURE: 67 MMHG

## 2025-04-17 DIAGNOSIS — Z96.89 CHEST TUBE IN PLACE: Primary | ICD-10-CM

## 2025-04-17 NOTE — PROGRESS NOTES
"Patient presents for chest tube removal.  Deneis significant pain with tube.  He can hear it \"tooting\" periodically.    On exam with submersion the heimlich has an obvious, large air leak.    Discussed not a candidate for removal at this time.  Reviewed how to check for air leak with daughter.  If/when this resolves, he will return to clinic for removal.  Advised he may shower.  Advised not to submerge tube or entry site in water.    Mike Rodriguez, DO on 4/17/2025 at 11:34 AM    "

## 2025-04-17 NOTE — NURSING NOTE
"Initial BP 95/67   Pulse 87   Ht 1.626 m (5' 4\")   Wt 53.1 kg (117 lb)   SpO2 96%   PF (!) 2 L/min   BMI 20.08 kg/m   Estimated body mass index is 20.08 kg/m  as calculated from the following:    Height as of this encounter: 1.626 m (5' 4\").    Weight as of this encounter: 53.1 kg (117 lb). .  Virginia He MA    "

## 2025-04-17 NOTE — LETTER
"4/17/2025      Luis Hernandez  [id number] [facility] [address]      Dear Colleague,    Thank you for referring your patient, Luis Hernandez, to the [facility]. Please see a copy of my visit note below.    Patient presents for chest tube removal.  Deneis significant pain with tube.  He can hear it \"tooting\" periodically.    On exam with submersion the heimlich has an obvious, large air leak.    Discussed not a candidate for removal at this time.  Reviewed how to check for air leak with daughter.  If/when this resolves, he will return to clinic for removal.  Advised he may shower.  Advised not to submerge tube or entry site in water.    Mike Rodriguez, DO on 4/17/2025 at 11:34 AM      Again, thank you for allowing me to participate in the care of your patient.        Sincerely,        Mike Rodriguez, DO    Electronically signed"

## 2025-04-23 DIAGNOSIS — J44.1 COPD EXACERBATION (H): ICD-10-CM

## 2025-04-23 RX ORDER — ROFLUMILAST 500 UG/1
500 TABLET ORAL DAILY
Qty: 30 TABLET | Refills: 5 | Status: SHIPPED | OUTPATIENT
Start: 2025-04-23

## 2025-04-23 NOTE — TELEPHONE ENCOUNTER
"Refill request for roflumilast 500mcg tabs: take 1 tab daily  Last filled 12/31/24 for 6 month supply  LOV with Alisa 2/6/254:  \"...Recommendations as follows:   -Continue Advair, spiriva, albuterol  -Continue roflumilast, 500 mg daily and blood work later this month...\"      Hepatic Function lab ordered by Alisa on 2/6/25 not completed.  Patient did have AST and ALT drawn 2/26/25 under NO Sly, both were within normal range.  Pulmonology RN Refill Protocol states that we always need to check with MD.    Routed to Dr Cuellar:  Can patient have fill as pended?    Isma FOSTER [facility]    "

## 2025-04-23 NOTE — TELEPHONE ENCOUNTER
"Dr Cuellar replied with the following routing comment.    \"Isma, ok to refill. \"    Isma FOSTER [facility]    "

## 2025-04-23 NOTE — TELEPHONE ENCOUNTER
Requested Prescriptions   Pending Prescriptions Disp Refills    roflumilast (DALIRESP) 500 MCG TABS tablet 30 tablet 5     Sig: Take 1 tablet (500 mcg) by mouth daily.       There is no refill protocol information for this order          Last office visit: 2/6/2025 ; last virtual visit: Visit date not found with prescribing provider: Bianca Cuellar    Future Office Visit:      Digna Stout   Clinic Station    [facility]  [phone]

## 2025-06-21 ENCOUNTER — HEALTH MAINTENANCE LETTER (OUTPATIENT)
Age: 60
End: 2025-06-21

## 2025-07-07 DIAGNOSIS — I10 ESSENTIAL HYPERTENSION, BENIGN: ICD-10-CM

## 2025-07-07 RX ORDER — AMLODIPINE BESYLATE 5 MG/1
5 TABLET ORAL DAILY
Qty: 90 TABLET | Refills: 2 | Status: SHIPPED | OUTPATIENT
Start: 2025-07-07

## (undated) DEVICE — CONNECTOR OMNI-FLEX 3222

## (undated) DEVICE — SYR 10ML SLIP TIP W/O NDL 303134

## (undated) DEVICE — ANTIFOG SOLUTION W/FOAM PAD 31142527

## (undated) DEVICE — PITCHER STERILE 1000ML  SSK9004A

## (undated) DEVICE — ENDO VALVE SUCTION ULTRASOUND BRONCH MAJ-1414

## (undated) DEVICE — ENDO VALVE BX EVIS MAJ-210

## (undated) DEVICE — LUBRICANT INST KIT ENDO-LUBE 220-90

## (undated) DEVICE — CATH BALLOON ELATION PULMONARY 3CM 12-13.5-15MMX100CM P12L30

## (undated) DEVICE — ENDO ADPT BRONCH SWIVEL Y A1002

## (undated) DEVICE — NDL BLUNT 18GA 1" W/O FILTER 305181

## (undated) DEVICE — INFLATION DEVICE BIG 60 ENDO-AN6012

## (undated) DEVICE — KIT ENDO FIRST STEP DISINFECTANT 200ML W/POUCH EP-4

## (undated) DEVICE — TUBING SUCTION 10'X3/16" N510

## (undated) DEVICE — ENDO VALVE SUCTION BRONCH EVIS MAJ-209

## (undated) DEVICE — SOL NACL 0.9% IRRIG 1000ML BOTTLE 2F7124

## (undated) DEVICE — SYR 30ML SLIP TIP W/O NDL 302833

## (undated) DEVICE — ENDO TOOTH GUARD SAC2001

## (undated) DEVICE — CUP AND LID 2PK 2OZ STERILE  SSK9006A

## (undated) DEVICE — LABEL MEDICATION SYSTEM 3303-P

## (undated) DEVICE — LINEN TOWEL PACK X5 5464

## (undated) DEVICE — DRSG TELFA 3X8" 1238

## (undated) DEVICE — JELLY LUBRICATING SURGILUBE 2OZ TUBE

## (undated) DEVICE — ESU PROBE ERBE FLEX 2.4MMX15MR 20402-411

## (undated) RX ORDER — GLYCOPYRROLATE 0.2 MG/ML
INJECTION, SOLUTION INTRAMUSCULAR; INTRAVENOUS
Status: DISPENSED
Start: 2024-01-18

## (undated) RX ORDER — PROPOFOL 10 MG/ML
INJECTION, EMULSION INTRAVENOUS
Status: DISPENSED
Start: 2024-01-18

## (undated) RX ORDER — METRONIDAZOLE 500 MG/100ML
INJECTION, SOLUTION INTRAVENOUS
Status: DISPENSED
Start: 2024-01-18

## (undated) RX ORDER — FENTANYL CITRATE 50 UG/ML
INJECTION, SOLUTION INTRAMUSCULAR; INTRAVENOUS
Status: DISPENSED
Start: 2024-01-18

## (undated) RX ORDER — DEXAMETHASONE SODIUM PHOSPHATE 4 MG/ML
INJECTION, SOLUTION INTRA-ARTICULAR; INTRALESIONAL; INTRAMUSCULAR; INTRAVENOUS; SOFT TISSUE
Status: DISPENSED
Start: 2024-11-15

## (undated) RX ORDER — FENTANYL CITRATE-0.9 % NACL/PF 10 MCG/ML
PLASTIC BAG, INJECTION (ML) INTRAVENOUS
Status: DISPENSED
Start: 2024-11-15

## (undated) RX ORDER — CEFAZOLIN SODIUM/WATER 2 G/20 ML
SYRINGE (ML) INTRAVENOUS
Status: DISPENSED
Start: 2024-01-18

## (undated) RX ORDER — PROPOFOL 10 MG/ML
INJECTION, EMULSION INTRAVENOUS
Status: DISPENSED
Start: 2024-11-15

## (undated) RX ORDER — DEXAMETHASONE SODIUM PHOSPHATE 4 MG/ML
INJECTION, SOLUTION INTRA-ARTICULAR; INTRALESIONAL; INTRAMUSCULAR; INTRAVENOUS; SOFT TISSUE
Status: DISPENSED
Start: 2024-01-18

## (undated) RX ORDER — ONDANSETRON 2 MG/ML
INJECTION INTRAMUSCULAR; INTRAVENOUS
Status: DISPENSED
Start: 2024-01-18

## (undated) RX ORDER — LIDOCAINE 40 MG/G
CREAM TOPICAL
Status: DISPENSED
Start: 2024-11-15

## (undated) RX ORDER — FENTANYL CITRATE-0.9 % NACL/PF 10 MCG/ML
PLASTIC BAG, INJECTION (ML) INTRAVENOUS
Status: DISPENSED
Start: 2024-01-18

## (undated) RX ORDER — LABETALOL HYDROCHLORIDE 5 MG/ML
INJECTION, SOLUTION INTRAVENOUS
Status: DISPENSED
Start: 2024-01-18

## (undated) RX ORDER — IPRATROPIUM BROMIDE AND ALBUTEROL SULFATE 2.5; .5 MG/3ML; MG/3ML
SOLUTION RESPIRATORY (INHALATION)
Status: DISPENSED
Start: 2024-11-15

## (undated) RX ORDER — ONDANSETRON 2 MG/ML
INJECTION INTRAMUSCULAR; INTRAVENOUS
Status: DISPENSED
Start: 2024-11-15

## (undated) RX ORDER — FENTANYL CITRATE 50 UG/ML
INJECTION, SOLUTION INTRAMUSCULAR; INTRAVENOUS
Status: DISPENSED
Start: 2024-11-15

## (undated) RX ORDER — IPRATROPIUM BROMIDE AND ALBUTEROL SULFATE 2.5; .5 MG/3ML; MG/3ML
SOLUTION RESPIRATORY (INHALATION)
Status: DISPENSED
Start: 2024-01-18